# Patient Record
Sex: MALE | Race: OTHER | NOT HISPANIC OR LATINO | URBAN - METROPOLITAN AREA
[De-identification: names, ages, dates, MRNs, and addresses within clinical notes are randomized per-mention and may not be internally consistent; named-entity substitution may affect disease eponyms.]

---

## 2017-06-09 ENCOUNTER — EMERGENCY (EMERGENCY)
Facility: HOSPITAL | Age: 62
LOS: 1 days | Discharge: HOME | End: 2017-06-09
Admitting: EMERGENCY MEDICINE
Payer: COMMERCIAL

## 2017-06-09 VITALS
SYSTOLIC BLOOD PRESSURE: 153 MMHG | RESPIRATION RATE: 20 BRPM | HEIGHT: 68 IN | DIASTOLIC BLOOD PRESSURE: 89 MMHG | HEART RATE: 88 BPM | OXYGEN SATURATION: 98 % | TEMPERATURE: 98 F

## 2017-06-09 DIAGNOSIS — Z90.49 ACQUIRED ABSENCE OF OTHER SPECIFIED PARTS OF DIGESTIVE TRACT: ICD-10-CM
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
## 2017-06-09 DIAGNOSIS — Z90.49 ACQUIRED ABSENCE OF OTHER SPECIFIED PARTS OF DIGESTIVE TRACT: Chronic | ICD-10-CM

## 2017-06-09 DIAGNOSIS — R51 HEADACHE: ICD-10-CM
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
## 2017-06-09 DIAGNOSIS — E11.9 TYPE 2 DIABETES MELLITUS WITHOUT COMPLICATIONS: ICD-10-CM
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
## 2017-06-09 DIAGNOSIS — I10 ESSENTIAL (PRIMARY) HYPERTENSION: ICD-10-CM
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
## 2017-06-09 DIAGNOSIS — S13.9XXA SPRAIN OF JOINTS AND LIGAMENTS OF UNSPECIFIED PARTS OF NECK, INITIAL ENCOUNTER: ICD-10-CM

## 2017-06-09 DIAGNOSIS — Z79.899 OTHER LONG TERM (CURRENT) DRUG THERAPY: ICD-10-CM

## 2017-06-09 PROCEDURE — 99284 EMERGENCY DEPT VISIT MOD MDM: CPT

## 2017-06-09 PROCEDURE — 99284 EMERGENCY DEPT VISIT MOD MDM: CPT | Mod: 25

## 2017-06-09 PROCEDURE — 72125 CT NECK SPINE W/O DYE: CPT | Mod: 26

## 2017-06-09 PROCEDURE — 72100 X-RAY EXAM L-S SPINE 2/3 VWS: CPT | Mod: 26

## 2017-06-09 PROCEDURE — 70450 CT HEAD/BRAIN W/O DYE: CPT | Mod: 26

## 2017-06-09 PROCEDURE — 72100 X-RAY EXAM L-S SPINE 2/3 VWS: CPT

## 2017-06-09 PROCEDURE — 71045 X-RAY EXAM CHEST 1 VIEW: CPT

## 2017-06-09 PROCEDURE — 72125 CT NECK SPINE W/O DYE: CPT

## 2017-06-09 PROCEDURE — 70450 CT HEAD/BRAIN W/O DYE: CPT

## 2017-06-09 PROCEDURE — 71010: CPT | Mod: 26

## 2017-06-09 RX ORDER — ACETAMINOPHEN 500 MG
975 TABLET ORAL ONCE
Qty: 0 | Refills: 0 | Status: COMPLETED | OUTPATIENT
Start: 2017-06-09 | End: 2017-06-09

## 2017-06-09 RX ADMIN — Medication 975 MILLIGRAM(S): at 22:58
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
## 2017-06-09 NOTE — ED PROVIDER NOTE - PHYSICAL EXAMINATION
NAD, VSS, Afebrile, No facial tender, No scalp swelling or hematoma, No C/T mid tender, + Low lumbar mid tender without sciatica tender, No pelvic or hip tender, Neuro- intact with steady gait.

## 2018-04-01 ENCOUNTER — OUTPATIENT (OUTPATIENT)
Dept: OUTPATIENT SERVICES | Facility: HOSPITAL | Age: 63
LOS: 1 days | End: 2018-04-01
Payer: MEDICAID

## 2018-04-01 DIAGNOSIS — Z90.49 ACQUIRED ABSENCE OF OTHER SPECIFIED PARTS OF DIGESTIVE TRACT: Chronic | ICD-10-CM

## 2018-04-01 PROCEDURE — G9001: CPT

## 2018-04-17 DIAGNOSIS — R69 ILLNESS, UNSPECIFIED: ICD-10-CM

## 2020-01-01 ENCOUNTER — OUTPATIENT (OUTPATIENT)
Dept: OUTPATIENT SERVICES | Facility: HOSPITAL | Age: 65
LOS: 1 days | End: 2020-01-01
Payer: MEDICAID

## 2020-01-01 DIAGNOSIS — Z90.49 ACQUIRED ABSENCE OF OTHER SPECIFIED PARTS OF DIGESTIVE TRACT: Chronic | ICD-10-CM

## 2020-01-24 ENCOUNTER — INPATIENT (INPATIENT)
Facility: HOSPITAL | Age: 65
LOS: 51 days | Discharge: HOME | DRG: 228 | End: 2020-03-16
Attending: THORACIC SURGERY (CARDIOTHORACIC VASCULAR SURGERY) | Admitting: HOSPITALIST
Payer: MEDICAID

## 2020-01-24 VITALS
HEART RATE: 103 BPM | OXYGEN SATURATION: 97 % | SYSTOLIC BLOOD PRESSURE: 185 MMHG | RESPIRATION RATE: 18 BRPM | TEMPERATURE: 98 F | WEIGHT: 179.9 LBS | DIASTOLIC BLOOD PRESSURE: 100 MMHG | HEIGHT: 68 IN

## 2020-01-24 DIAGNOSIS — Z90.49 ACQUIRED ABSENCE OF OTHER SPECIFIED PARTS OF DIGESTIVE TRACT: Chronic | ICD-10-CM

## 2020-01-24 PROCEDURE — 99285 EMERGENCY DEPT VISIT HI MDM: CPT

## 2020-01-25 DIAGNOSIS — I20.8 OTHER FORMS OF ANGINA PECTORIS: ICD-10-CM

## 2020-01-25 DIAGNOSIS — E11.69 TYPE 2 DIABETES MELLITUS WITH OTHER SPECIFIED COMPLICATION: ICD-10-CM

## 2020-01-25 DIAGNOSIS — Z29.9 ENCOUNTER FOR PROPHYLACTIC MEASURES, UNSPECIFIED: ICD-10-CM

## 2020-01-25 DIAGNOSIS — I24.9 ACUTE ISCHEMIC HEART DISEASE, UNSPECIFIED: ICD-10-CM

## 2020-01-25 DIAGNOSIS — R07.89 OTHER CHEST PAIN: ICD-10-CM

## 2020-01-25 DIAGNOSIS — I10 ESSENTIAL (PRIMARY) HYPERTENSION: ICD-10-CM

## 2020-01-25 DIAGNOSIS — N17.9 ACUTE KIDNEY FAILURE, UNSPECIFIED: ICD-10-CM

## 2020-01-25 DIAGNOSIS — Z02.9 ENCOUNTER FOR ADMINISTRATIVE EXAMINATIONS, UNSPECIFIED: ICD-10-CM

## 2020-01-25 DIAGNOSIS — E87.5 HYPERKALEMIA: ICD-10-CM

## 2020-01-25 PROBLEM — E11.9 TYPE 2 DIABETES MELLITUS WITHOUT COMPLICATIONS: Chronic | Status: ACTIVE | Noted: 2017-06-09

## 2020-01-25 LAB
ALBUMIN SERPL ELPH-MCNC: 3.2 G/DL — LOW (ref 3.3–5)
ALP SERPL-CCNC: 82 U/L — SIGNIFICANT CHANGE UP (ref 40–120)
ALT FLD-CCNC: 18 U/L — SIGNIFICANT CHANGE UP (ref 10–45)
ANION GAP SERPL CALC-SCNC: 12 MMOL/L — SIGNIFICANT CHANGE UP (ref 5–17)
ANION GAP SERPL CALC-SCNC: 12 MMOL/L — SIGNIFICANT CHANGE UP (ref 5–17)
ANION GAP SERPL CALC-SCNC: 14 MMOL/L — SIGNIFICANT CHANGE UP (ref 5–17)
AST SERPL-CCNC: 28 U/L — SIGNIFICANT CHANGE UP (ref 10–40)
BILIRUB SERPL-MCNC: 0.2 MG/DL — SIGNIFICANT CHANGE UP (ref 0.2–1.2)
BUN SERPL-MCNC: 38 MG/DL — HIGH (ref 7–23)
BUN SERPL-MCNC: 38 MG/DL — HIGH (ref 7–23)
BUN SERPL-MCNC: 40 MG/DL — HIGH (ref 7–23)
CALCIUM SERPL-MCNC: 9.3 MG/DL — SIGNIFICANT CHANGE UP (ref 8.4–10.5)
CALCIUM SERPL-MCNC: 9.4 MG/DL — SIGNIFICANT CHANGE UP (ref 8.4–10.5)
CALCIUM SERPL-MCNC: 9.7 MG/DL — SIGNIFICANT CHANGE UP (ref 8.4–10.5)
CHLORIDE SERPL-SCNC: 101 MMOL/L — SIGNIFICANT CHANGE UP (ref 96–108)
CHLORIDE SERPL-SCNC: 102 MMOL/L — SIGNIFICANT CHANGE UP (ref 96–108)
CHLORIDE SERPL-SCNC: 99 MMOL/L — SIGNIFICANT CHANGE UP (ref 96–108)
CK MB BLD-MCNC: 2.9 % — SIGNIFICANT CHANGE UP (ref 0–3.5)
CK MB CFR SERPL CALC: 3.7 NG/ML — SIGNIFICANT CHANGE UP (ref 0–6.7)
CK MB CFR SERPL CALC: 3.8 NG/ML — SIGNIFICANT CHANGE UP (ref 0–6.7)
CK MB CFR SERPL CALC: 4.3 NG/ML — SIGNIFICANT CHANGE UP (ref 0–6.7)
CK SERPL-CCNC: 126 U/L — SIGNIFICANT CHANGE UP (ref 30–200)
CK SERPL-CCNC: 126 U/L — SIGNIFICANT CHANGE UP (ref 30–200)
CK SERPL-CCNC: 87 U/L — SIGNIFICANT CHANGE UP (ref 30–200)
CO2 SERPL-SCNC: 20 MMOL/L — LOW (ref 22–31)
CO2 SERPL-SCNC: 22 MMOL/L — SIGNIFICANT CHANGE UP (ref 22–31)
CO2 SERPL-SCNC: 23 MMOL/L — SIGNIFICANT CHANGE UP (ref 22–31)
CREAT SERPL-MCNC: 1.79 MG/DL — HIGH (ref 0.5–1.3)
CREAT SERPL-MCNC: 1.85 MG/DL — HIGH (ref 0.5–1.3)
CREAT SERPL-MCNC: 1.96 MG/DL — HIGH (ref 0.5–1.3)
GLUCOSE BLDC GLUCOMTR-MCNC: 219 MG/DL — HIGH (ref 70–99)
GLUCOSE BLDC GLUCOMTR-MCNC: 219 MG/DL — HIGH (ref 70–99)
GLUCOSE BLDC GLUCOMTR-MCNC: 220 MG/DL — HIGH (ref 70–99)
GLUCOSE BLDC GLUCOMTR-MCNC: 251 MG/DL — HIGH (ref 70–99)
GLUCOSE SERPL-MCNC: 266 MG/DL — HIGH (ref 70–99)
GLUCOSE SERPL-MCNC: 279 MG/DL — HIGH (ref 70–99)
GLUCOSE SERPL-MCNC: 287 MG/DL — HIGH (ref 70–99)
HCT VFR BLD CALC: 35.7 % — LOW (ref 39–50)
HGB BLD-MCNC: 12.7 G/DL — LOW (ref 13–17)
MCHC RBC-ENTMCNC: 29.5 PG — SIGNIFICANT CHANGE UP (ref 27–34)
MCHC RBC-ENTMCNC: 35.6 GM/DL — SIGNIFICANT CHANGE UP (ref 32–36)
MCV RBC AUTO: 82.8 FL — SIGNIFICANT CHANGE UP (ref 80–100)
NRBC # BLD: 0 /100 WBCS — SIGNIFICANT CHANGE UP (ref 0–0)
PLATELET # BLD AUTO: 181 K/UL — SIGNIFICANT CHANGE UP (ref 150–400)
POTASSIUM SERPL-MCNC: 5 MMOL/L — SIGNIFICANT CHANGE UP (ref 3.5–5.3)
POTASSIUM SERPL-MCNC: 5.5 MMOL/L — HIGH (ref 3.5–5.3)
POTASSIUM SERPL-MCNC: 5.7 MMOL/L — HIGH (ref 3.5–5.3)
POTASSIUM SERPL-SCNC: 5 MMOL/L — SIGNIFICANT CHANGE UP (ref 3.5–5.3)
POTASSIUM SERPL-SCNC: 5.5 MMOL/L — HIGH (ref 3.5–5.3)
POTASSIUM SERPL-SCNC: 5.7 MMOL/L — HIGH (ref 3.5–5.3)
PROT SERPL-MCNC: 6.5 G/DL — SIGNIFICANT CHANGE UP (ref 6–8.3)
RBC # BLD: 4.31 M/UL — SIGNIFICANT CHANGE UP (ref 4.2–5.8)
RBC # FLD: 11.7 % — SIGNIFICANT CHANGE UP (ref 10.3–14.5)
SODIUM SERPL-SCNC: 133 MMOL/L — LOW (ref 135–145)
SODIUM SERPL-SCNC: 136 MMOL/L — SIGNIFICANT CHANGE UP (ref 135–145)
SODIUM SERPL-SCNC: 136 MMOL/L — SIGNIFICANT CHANGE UP (ref 135–145)
TROPONIN T, HIGH SENSITIVITY RESULT: 38 NG/L — SIGNIFICANT CHANGE UP (ref 0–51)
TROPONIN T, HIGH SENSITIVITY RESULT: 45 NG/L — SIGNIFICANT CHANGE UP (ref 0–51)
TROPONIN T, HIGH SENSITIVITY RESULT: 53 NG/L — HIGH (ref 0–51)
WBC # BLD: 7.44 K/UL — SIGNIFICANT CHANGE UP (ref 3.8–10.5)
WBC # FLD AUTO: 7.44 K/UL — SIGNIFICANT CHANGE UP (ref 3.8–10.5)

## 2020-01-25 PROCEDURE — 71046 X-RAY EXAM CHEST 2 VIEWS: CPT | Mod: 26

## 2020-01-25 PROCEDURE — 99223 1ST HOSP IP/OBS HIGH 75: CPT

## 2020-01-25 RX ORDER — DEXTROSE 50 % IN WATER 50 %
25 SYRINGE (ML) INTRAVENOUS ONCE
Refills: 0 | Status: DISCONTINUED | OUTPATIENT
Start: 2020-01-25 | End: 2020-02-03

## 2020-01-25 RX ORDER — LISINOPRIL 2.5 MG/1
1 TABLET ORAL
Qty: 0 | Refills: 0 | DISCHARGE

## 2020-01-25 RX ORDER — METOPROLOL TARTRATE 50 MG
12.5 TABLET ORAL
Refills: 0 | Status: DISCONTINUED | OUTPATIENT
Start: 2020-01-25 | End: 2020-01-26

## 2020-01-25 RX ORDER — ASPIRIN/CALCIUM CARB/MAGNESIUM 324 MG
81 TABLET ORAL DAILY
Refills: 0 | Status: DISCONTINUED | OUTPATIENT
Start: 2020-01-25 | End: 2020-02-03

## 2020-01-25 RX ORDER — ATORVASTATIN CALCIUM 80 MG/1
40 TABLET, FILM COATED ORAL AT BEDTIME
Refills: 0 | Status: DISCONTINUED | OUTPATIENT
Start: 2020-01-25 | End: 2020-02-03

## 2020-01-25 RX ORDER — INSULIN LISPRO 100/ML
4 VIAL (ML) SUBCUTANEOUS
Refills: 0 | Status: DISCONTINUED | OUTPATIENT
Start: 2020-01-25 | End: 2020-01-26

## 2020-01-25 RX ORDER — DEXTROSE 50 % IN WATER 50 %
12.5 SYRINGE (ML) INTRAVENOUS ONCE
Refills: 0 | Status: DISCONTINUED | OUTPATIENT
Start: 2020-01-25 | End: 2020-02-03

## 2020-01-25 RX ORDER — DEXTROSE 50 % IN WATER 50 %
15 SYRINGE (ML) INTRAVENOUS ONCE
Refills: 0 | Status: DISCONTINUED | OUTPATIENT
Start: 2020-01-25 | End: 2020-02-03

## 2020-01-25 RX ORDER — SODIUM CHLORIDE 9 MG/ML
1000 INJECTION, SOLUTION INTRAVENOUS
Refills: 0 | Status: DISCONTINUED | OUTPATIENT
Start: 2020-01-25 | End: 2020-02-03

## 2020-01-25 RX ORDER — INSULIN GLARGINE 100 [IU]/ML
20 INJECTION, SOLUTION SUBCUTANEOUS AT BEDTIME
Refills: 0 | Status: DISCONTINUED | OUTPATIENT
Start: 2020-01-25 | End: 2020-01-26

## 2020-01-25 RX ORDER — GLUCAGON INJECTION, SOLUTION 0.5 MG/.1ML
1 INJECTION, SOLUTION SUBCUTANEOUS ONCE
Refills: 0 | Status: DISCONTINUED | OUTPATIENT
Start: 2020-01-25 | End: 2020-02-03

## 2020-01-25 RX ORDER — METFORMIN HYDROCHLORIDE 850 MG/1
1 TABLET ORAL
Qty: 0 | Refills: 0 | DISCHARGE

## 2020-01-25 RX ORDER — SODIUM ZIRCONIUM CYCLOSILICATE 10 G/10G
5 POWDER, FOR SUSPENSION ORAL ONCE
Refills: 0 | Status: COMPLETED | OUTPATIENT
Start: 2020-01-25 | End: 2020-01-25

## 2020-01-25 RX ORDER — GLYBURIDE 5 MG
1 TABLET ORAL
Qty: 0 | Refills: 0 | DISCHARGE

## 2020-01-25 RX ORDER — INSULIN LISPRO 100/ML
VIAL (ML) SUBCUTANEOUS
Refills: 0 | Status: DISCONTINUED | OUTPATIENT
Start: 2020-01-25 | End: 2020-02-03

## 2020-01-25 RX ORDER — HEPARIN SODIUM 5000 [USP'U]/ML
5000 INJECTION INTRAVENOUS; SUBCUTANEOUS EVERY 8 HOURS
Refills: 0 | Status: DISCONTINUED | OUTPATIENT
Start: 2020-01-25 | End: 2020-02-03

## 2020-01-25 RX ORDER — INSULIN LISPRO 100/ML
VIAL (ML) SUBCUTANEOUS AT BEDTIME
Refills: 0 | Status: DISCONTINUED | OUTPATIENT
Start: 2020-01-25 | End: 2020-02-03

## 2020-01-25 RX ADMIN — Medication 1: at 21:43

## 2020-01-25 RX ADMIN — SODIUM ZIRCONIUM CYCLOSILICATE 5 GRAM(S): 10 POWDER, FOR SUSPENSION ORAL at 17:07

## 2020-01-25 RX ADMIN — ATORVASTATIN CALCIUM 40 MILLIGRAM(S): 80 TABLET, FILM COATED ORAL at 21:43

## 2020-01-25 RX ADMIN — Medication 4 UNIT(S): at 17:08

## 2020-01-25 RX ADMIN — Medication 81 MILLIGRAM(S): at 17:48

## 2020-01-25 RX ADMIN — Medication 12.5 MILLIGRAM(S): at 17:06

## 2020-01-25 RX ADMIN — HEPARIN SODIUM 5000 UNIT(S): 5000 INJECTION INTRAVENOUS; SUBCUTANEOUS at 15:00

## 2020-01-25 RX ADMIN — Medication 2: at 11:06

## 2020-01-25 RX ADMIN — INSULIN GLARGINE 20 UNIT(S): 100 INJECTION, SOLUTION SUBCUTANEOUS at 21:43

## 2020-01-25 RX ADMIN — Medication 2: at 17:08

## 2020-01-25 RX ADMIN — HEPARIN SODIUM 5000 UNIT(S): 5000 INJECTION INTRAVENOUS; SUBCUTANEOUS at 21:43

## 2020-01-25 NOTE — H&P ADULT - ASSESSMENT
3yo male with hx of HTN, DM II, presented to the ED with complaints of acute on chronic left sided exertional chest pain, concerning for stable angina 63yo male with hx of HTN, DM II, presented to the ED with complaints of acute on chronic left sided exertional chest pain, concerning for stable angina

## 2020-01-25 NOTE — H&P ADULT - PROBLEM SELECTOR PLAN 4
-Likely in the setting of ACE-i use and STEPHANIE and Hyperglycemia  -Repeat K trending downward  -Will provide Insulin premeal  -Monitor BMP

## 2020-01-25 NOTE — ED PROVIDER NOTE - PHYSICAL EXAMINATION
General: Patient awake alert NAD.   HEENT: normocephalic, atraumatic, EOMI, no scleral icterus, moist MM  Cardiac: RRR, S1, S2, no murmur.   LUNGS: CTA B/L no wheeze, rhonchi, rales.   Abdomen: soft NT, ND, no rebound no guarding, no CVA tenderness.   EXT: no edema. 5/5 strength and full ROM in all extremities.     Neuro: A&Ox3, gait normal, no focal neurological deficits, CN 2-12 grossly intact  Skin: warm, dry, no rash.

## 2020-01-25 NOTE — ED ADULT NURSE NOTE - OBJECTIVE STATEMENT
64 year old male presents to the ED complaining of occasional intermittent chest pain (midsternal and worse with twisting movements/exerertion). PMH of HTN, DM. Pt. denies dizziness, SOB, back pain, numbness/tingling. Labs drawn and sent to lab. EKG done in triage and given to attending MD. Patient on CM - NSR in 90s.

## 2020-01-25 NOTE — ED PROVIDER NOTE - NS ED ROS FT
GENERAL: No fever, chills  EYES: no vision changes, no discharge.   HEENT: no difficulty swallowing or speaking   CARDIAC: + chest pain/pressure, +SOB, no lower ex edema  PULMONARY: no cough, + SOB  GI: no abdominal pain, n/v/d  : no dysuria  SKIN: no rashes  NEURO: no headache, lightheadedness, paresthesia  MSK: No joint pain, myalgia, weakness.

## 2020-01-25 NOTE — H&P ADULT - NSHPSOCIALHISTORY_GEN_ALL_CORE
Lives at home with wife and son  Current . Planning on quitting in 1 month  Denies smoking. Very rare social EtOH use

## 2020-01-25 NOTE — ED PROVIDER NOTE - OBJECTIVE STATEMENT
63 yo M pmhx of DM and HTN pw chest pain x 1 month. Since december, pt has noticed exertional left retrosternal chest pain with associated SOB. episodes resolve with rest and pain does not radiate anywhere, no nausea, vomiting, no diaphoresis. 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

## 2020-01-25 NOTE — H&P ADULT - PROBLEM SELECTOR PLAN 2
-Complaints of chest pain consistent with Stable angina  -Multiple risk factors  -Start ASA/Statin  -Start Metoprolol 12.5mg BID. Titrate as needed  -Plan for stress test and ECHO. Avoid caffeine  -Consider Cardiology consult after workup

## 2020-01-25 NOTE — H&P ADULT - HISTORY OF PRESENT ILLNESS
63yo male with hx of HTN, DM II, presented to the ED with complaints of acute on chronic left sided exertional chest pain. Pt states he has been having left sided pressure and stabbing chest pain radiating to his sternum and right with activity for the past few months. He states each episode last approximately 1-2 mins and subsides upon resting. He denies associated symptoms of radiation to his back, arm or jaw. He recently was at a wedding which he could not enjoy because dancing would reproduce to the pain. He states he did not pursue and medical attention until this time. Pt states this time his pain was associated with sob up exertion. He denies symptoms of LOC, diaphoresis, palpitations, abd pain, N/V, fever or chills. He denies prior cardiac work up.

## 2020-01-25 NOTE — H&P ADULT - PROBLEM SELECTOR PLAN 3
-No prior hx of Kidney disease. Unclear of baseline renal function  -Cr improving on repeat Blood work  -Encourage PO hydration  -Will continue to monitor

## 2020-01-25 NOTE — ED ADULT NURSE REASSESSMENT NOTE - NS ED NURSE REASSESS COMMENT FT1
Pt received from KAVON Figueroa in [facility]. Pt remains stable in the ED. Resting comfortably in bed. VSS. NAD. AOx4. Lungs CTA. Breathing unlabored and spontaneously, sating 97% on room air. S1 and S2 heard. No peripheral edema. Peripheral pulses strong bilaterally. On cardiac monitor, NSR. Abdomen soft, nontender and nondistended. Positive bowel sounds in all 4 quadrants. Pain rated as 0. Admitted to [facility] for ACS. Awaiting bed. Will continue to monitor.

## 2020-01-25 NOTE — ED PROVIDER NOTE - ATTENDING CONTRIBUTION TO CARE
Afebrile. Awake and Alert. Lungs CTA. Heart RRR. Abdomen soft NTND. CN II-XII grossly intact. Moves all extremities without lateralization.    r/o ACS Afebrile. Awake and Alert. Lungs CTA. Heart RRR. Abdomen soft NTND. CN II-XII grossly intact. Moves all extremities without lateralization.    r/o ACS: Risk factors HTN + HL, EKG non-ischemic  No stress testing available on Saturday, patient will need admission give risk profile  r/o PE: Well's negative and no hypoxia or tachycardia  r/o infection: no cough and no fever

## 2020-01-25 NOTE — ED PROVIDER NOTE - CLINICAL SUMMARY MEDICAL DECISION MAKING FREE TEXT BOX
elderly male with HTN DM, presents with exertional CP and SOb x 1 month. suspicious for stable angina. Obtain ED chest pain workup. Pt has never had cardiac workup, not candidate for CT coronaries due to baseline Cr. Likely requires admission for echo and stress test.

## 2020-01-25 NOTE — H&P ADULT - PROBLEM SELECTOR PLAN 8
Transitions of Care Status:  1.  Name of PCP: Dr. Shawn Camara [phone]  2.  PCP Contacted on Admission: [ ] Y    [x ] N    3.  PCP contacted at Discharge: [ ] Y    [ ] N    [ ] N/A  4.  Post-Discharge Appointment Date and Location:  5.  Summary of Handoff given to PCP:

## 2020-01-25 NOTE — H&P ADULT - PROBLEM SELECTOR PLAN 1
-Asymptomatic currently  -Likely secondary to stable angina. Multiple risk factors  -EKG NSR without ST/Twave changes  -Trops negative x 1  -Follow up with repeat trops  -Plan for Exercise stress test  -ECHO

## 2020-01-25 NOTE — H&P ADULT - PROBLEM SELECTOR PLAN 5
-Chronic  -Home Lisinopril 20mg daily  -Will hold in the setting of STEPHANIE + Hyperkalemia  -For BP control, started Metoprolol 12.5mg BID. As BB poor choice for antihypertensive, if uncontrolled BP start Amlodipine 5mg daily  -Continue to monitor

## 2020-01-25 NOTE — H&P ADULT - PROBLEM SELECTOR PLAN 6
-Chronic  -Home Glyburide + Metformin + Insulin 30units QHS  -Hold PO meds  -Will start Lantus 20units QHS + 4units premeal + SSI  -Follow up with Ha1c and fingersticks

## 2020-01-25 NOTE — ED ADULT NURSE NOTE - NSIMPLEMENTINTERV_GEN_ALL_ED
Implemented All Universal Safety Interventions:  [place] to call system. Call bell, personal items and telephone within reach. Instruct patient to call for assistance. Room bathroom lighting operational. Non-slip footwear when patient is off stretcher. Physically safe environment: no spills, clutter or unnecessary equipment. Stretcher in lowest position, wheels locked, appropriate side rails in place.

## 2020-01-25 NOTE — ED ADULT NURSE NOTE - CHPI ED NUR SYMPTOMS NEG
no diaphoresis/no syncope/no chills/no back pain/no vomiting/no dizziness/no fever/no nausea/no congestion/no shortness of breath

## 2020-01-25 NOTE — PATIENT PROFILE ADULT - LIVES WITH
Pt brought to see  by RN but pt reports she has AD's 'did long ago at [facility]' that she has at home.  shared the documents changed in 2015 and we could revisit but she wasn't interested.  asked if she could place in glove box to bring in so they could scan because she shared her husbands were on file at the [address] and I stated hers should be on file with her doctor too. No other needs uncovered but conversation was had. spouse

## 2020-01-26 LAB
ANION GAP SERPL CALC-SCNC: 14 MMOL/L — SIGNIFICANT CHANGE UP (ref 5–17)
BASOPHILS # BLD AUTO: 0.09 K/UL — SIGNIFICANT CHANGE UP (ref 0–0.2)
BASOPHILS NFR BLD AUTO: 1.8 % — SIGNIFICANT CHANGE UP (ref 0–2)
BUN SERPL-MCNC: 37 MG/DL — HIGH (ref 7–23)
CALCIUM SERPL-MCNC: 9.5 MG/DL — SIGNIFICANT CHANGE UP (ref 8.4–10.5)
CHLORIDE SERPL-SCNC: 103 MMOL/L — SIGNIFICANT CHANGE UP (ref 96–108)
CO2 SERPL-SCNC: 20 MMOL/L — LOW (ref 22–31)
CREAT SERPL-MCNC: 1.83 MG/DL — HIGH (ref 0.5–1.3)
EOSINOPHIL # BLD AUTO: 0.41 K/UL — SIGNIFICANT CHANGE UP (ref 0–0.5)
EOSINOPHIL NFR BLD AUTO: 8.4 % — HIGH (ref 0–6)
GLUCOSE BLDC GLUCOMTR-MCNC: 119 MG/DL — HIGH (ref 70–99)
GLUCOSE BLDC GLUCOMTR-MCNC: 187 MG/DL — HIGH (ref 70–99)
GLUCOSE BLDC GLUCOMTR-MCNC: 217 MG/DL — HIGH (ref 70–99)
GLUCOSE BLDC GLUCOMTR-MCNC: 227 MG/DL — HIGH (ref 70–99)
GLUCOSE BLDC GLUCOMTR-MCNC: 236 MG/DL — HIGH (ref 70–99)
GLUCOSE SERPL-MCNC: 234 MG/DL — HIGH (ref 70–99)
HBA1C BLD-MCNC: 9.2 % — HIGH (ref 4–5.6)
HCT VFR BLD CALC: 35.2 % — LOW (ref 39–50)
HCV AB S/CO SERPL IA: 0.19 S/CO — SIGNIFICANT CHANGE UP (ref 0–0.99)
HCV AB SERPL-IMP: SIGNIFICANT CHANGE UP
HGB BLD-MCNC: 11.9 G/DL — LOW (ref 13–17)
IMM GRANULOCYTES NFR BLD AUTO: 0.4 % — SIGNIFICANT CHANGE UP (ref 0–1.5)
LYMPHOCYTES # BLD AUTO: 1.63 K/UL — SIGNIFICANT CHANGE UP (ref 1–3.3)
LYMPHOCYTES # BLD AUTO: 33.4 % — SIGNIFICANT CHANGE UP (ref 13–44)
MCHC RBC-ENTMCNC: 28.5 PG — SIGNIFICANT CHANGE UP (ref 27–34)
MCHC RBC-ENTMCNC: 33.8 GM/DL — SIGNIFICANT CHANGE UP (ref 32–36)
MCV RBC AUTO: 84.4 FL — SIGNIFICANT CHANGE UP (ref 80–100)
MONOCYTES # BLD AUTO: 0.53 K/UL — SIGNIFICANT CHANGE UP (ref 0–0.9)
MONOCYTES NFR BLD AUTO: 10.9 % — SIGNIFICANT CHANGE UP (ref 2–14)
NEUTROPHILS # BLD AUTO: 2.2 K/UL — SIGNIFICANT CHANGE UP (ref 1.8–7.4)
NEUTROPHILS NFR BLD AUTO: 45.1 % — SIGNIFICANT CHANGE UP (ref 43–77)
PLATELET # BLD AUTO: 161 K/UL — SIGNIFICANT CHANGE UP (ref 150–400)
POTASSIUM SERPL-MCNC: 4.6 MMOL/L — SIGNIFICANT CHANGE UP (ref 3.5–5.3)
POTASSIUM SERPL-SCNC: 4.6 MMOL/L — SIGNIFICANT CHANGE UP (ref 3.5–5.3)
RBC # BLD: 4.17 M/UL — LOW (ref 4.2–5.8)
RBC # FLD: 11.6 % — SIGNIFICANT CHANGE UP (ref 10.3–14.5)
SODIUM SERPL-SCNC: 137 MMOL/L — SIGNIFICANT CHANGE UP (ref 135–145)
WBC # BLD: 4.88 K/UL — SIGNIFICANT CHANGE UP (ref 3.8–10.5)
WBC # FLD AUTO: 4.88 K/UL — SIGNIFICANT CHANGE UP (ref 3.8–10.5)

## 2020-01-26 PROCEDURE — 93306 TTE W/DOPPLER COMPLETE: CPT | Mod: 26

## 2020-01-26 PROCEDURE — 99233 SBSQ HOSP IP/OBS HIGH 50: CPT

## 2020-01-26 RX ORDER — METOPROLOL TARTRATE 50 MG
25 TABLET ORAL
Refills: 0 | Status: DISCONTINUED | OUTPATIENT
Start: 2020-01-26 | End: 2020-02-03

## 2020-01-26 RX ORDER — INSULIN LISPRO 100/ML
8 VIAL (ML) SUBCUTANEOUS
Refills: 0 | Status: DISCONTINUED | OUTPATIENT
Start: 2020-01-26 | End: 2020-01-27

## 2020-01-26 RX ORDER — INSULIN GLARGINE 100 [IU]/ML
25 INJECTION, SOLUTION SUBCUTANEOUS AT BEDTIME
Refills: 0 | Status: DISCONTINUED | OUTPATIENT
Start: 2020-01-26 | End: 2020-01-27

## 2020-01-26 RX ORDER — PANTOPRAZOLE SODIUM 20 MG/1
40 TABLET, DELAYED RELEASE ORAL
Refills: 0 | Status: DISCONTINUED | OUTPATIENT
Start: 2020-01-26 | End: 2020-02-03

## 2020-01-26 RX ADMIN — INSULIN GLARGINE 25 UNIT(S): 100 INJECTION, SOLUTION SUBCUTANEOUS at 23:19

## 2020-01-26 RX ADMIN — ATORVASTATIN CALCIUM 40 MILLIGRAM(S): 80 TABLET, FILM COATED ORAL at 23:20

## 2020-01-26 RX ADMIN — Medication 81 MILLIGRAM(S): at 12:16

## 2020-01-26 RX ADMIN — Medication 8 UNIT(S): at 17:13

## 2020-01-26 RX ADMIN — Medication 8 UNIT(S): at 08:42

## 2020-01-26 RX ADMIN — Medication 8 UNIT(S): at 12:16

## 2020-01-26 RX ADMIN — Medication 12.5 MILLIGRAM(S): at 05:58

## 2020-01-26 RX ADMIN — Medication 2: at 17:13

## 2020-01-26 RX ADMIN — HEPARIN SODIUM 5000 UNIT(S): 5000 INJECTION INTRAVENOUS; SUBCUTANEOUS at 13:59

## 2020-01-26 RX ADMIN — Medication 25 MILLIGRAM(S): at 18:57

## 2020-01-26 RX ADMIN — HEPARIN SODIUM 5000 UNIT(S): 5000 INJECTION INTRAVENOUS; SUBCUTANEOUS at 05:58

## 2020-01-26 RX ADMIN — Medication 1: at 08:42

## 2020-01-26 NOTE — PROGRESS NOTE ADULT - PROBLEM SELECTOR PLAN 1
-Asymptomatic currently  -Likely secondary to stable angina. Multiple risk factors  -EKG NSR without ST/Twave changes  -Trops peaked at 53 and trending downward  -Plan for Nuclear stress test  -ECHO

## 2020-01-26 NOTE — PROGRESS NOTE ADULT - PROBLEM SELECTOR PLAN 6
-Chronic  -Ha1c 9.2  -Home Glyburide + Metformin + Insulin 30units QHS  -Hold PO meds  -Increase insulin requirements to Lantus 25units QHS + 8units premeal + SSI  -Monitor Fingersticks

## 2020-01-26 NOTE — PROGRESS NOTE ADULT - PROBLEM SELECTOR PLAN 3
-No prior hx of Kidney disease. Unclear of baseline renal function, although highly suspect CKD   -Cr improving on repeat Blood work  -Encourage PO hydration  -Will continue to monitor

## 2020-01-26 NOTE — PROGRESS NOTE ADULT - PROBLEM SELECTOR PLAN 5
-Chronic  -Well controlled  -Continue to hold home Lisinopril 20mg daily  -Increased Metoprolol to 25mg BID  -As BB poor choice for antihypertensive, if uncontrolled BP start Amlodipine 5mg daily  -Continue to monitor

## 2020-01-26 NOTE — PROGRESS NOTE ADULT - ASSESSMENT
65yo male with hx of HTN, DM II, presented to the ED with complaints of acute on chronic left sided exertional chest pain, concerning for stable angina

## 2020-01-26 NOTE — PROGRESS NOTE ADULT - SUBJECTIVE AND OBJECTIVE BOX
Julio Cesar Matias M.D. Pager Number [phone]    Patient is a 64y old  Male who presents with a chief complaint of Chest pain (25 Jan 2020 12:57)      SUBJECTIVE / OVERNIGHT EVENTS:  Pt seen and examined at bedside. No acute events overnight.  Pt denies cp, palpitations, sob, abd pain, N/V, fever, chills.    ROS:  All other review of systems negative    Allergies    No Known Allergies    Intolerances        MEDICATIONS  (STANDING):  aspirin  chewable 81 milliGRAM(s) Oral daily  atorvastatin 40 milliGRAM(s) Oral at bedtime  dextrose 5%. 1000 milliLiter(s) (50 mL/Hr) IV Continuous <Continuous>  dextrose 50% Injectable 12.5 Gram(s) IV Push once  dextrose 50% Injectable 25 Gram(s) IV Push once  dextrose 50% Injectable 25 Gram(s) IV Push once  heparin  Injectable 5000 Unit(s) SubCutaneous every 8 hours  insulin glargine Injectable (LANTUS) 25 Unit(s) SubCutaneous at bedtime  insulin lispro (HumaLOG) corrective regimen sliding scale   SubCutaneous three times a day before meals  insulin lispro (HumaLOG) corrective regimen sliding scale   SubCutaneous at bedtime  insulin lispro Injectable (HumaLOG) 8 Unit(s) SubCutaneous three times a day before meals  metoprolol tartrate 25 milliGRAM(s) Oral two times a day    MEDICATIONS  (PRN):  dextrose 40% Gel 15 Gram(s) Oral once PRN Blood Glucose LESS THAN 70 milliGRAM(s)/deciliter  glucagon  Injectable 1 milliGRAM(s) IntraMuscular once PRN Glucose LESS THAN 70 milligrams/deciliter      Vital Signs Last 24 Hrs  T(C): 36.3 (26 Jan 2020 12:15), Max: 36.7 (25 Jan 2020 16:07)  T(F): 97.3 (26 Jan 2020 12:15), Max: 98 (25 Jan 2020 16:07)  HR: 76 (26 Jan 2020 12:15) (76 - 79)  BP: 139/80 (26 Jan 2020 12:15) (138/79 - 160/93)  BP(mean): --  RR: 18 (26 Jan 2020 12:15) (16 - 18)  SpO2: 99% (26 Jan 2020 12:15) (96% - 99%)  CAPILLARY BLOOD GLUCOSE      POCT Blood Glucose.: 119 mg/dL (26 Jan 2020 12:10)  POCT Blood Glucose.: 187 mg/dL (26 Jan 2020 07:55)  POCT Blood Glucose.: 251 mg/dL (25 Jan 2020 21:36)  POCT Blood Glucose.: 220 mg/dL (25 Jan 2020 16:41)    I&O's Summary    25 Jan 2020 07:01  -  26 Jan 2020 07:00  --------------------------------------------------------  IN: 360 mL / OUT: 0 mL / NET: 360 mL        PHYSICAL EXAM:  GENERAL: NAD, Obese male   HEAD:  Atraumatic, Normocephalic  EYES: EOMI, PERRLA, conjunctiva and sclera clear  NECK: Supple, No JVD  CHEST/LUNG: Clear to auscultation bilaterally; No wheeze  HEART: Regular rate and rhythm; No murmurs, rubs, or gallops  ABDOMEN: Soft, Nontender, Nondistended; Bowel sounds present  EXTREMITIES:  2+ Peripheral Pulses, No clubbing, cyanosis, or edema  NEUROLOGY: AAOx3, non-focal  PSYCH: calm  SKIN: No rashes or lesions    LABS:                        11.9   4.88  )-----------( 161      ( 26 Jan 2020 09:03 )             35.2     01-26    137  |  103  |  37<H>  ----------------------------<  234<H>  4.6   |  20<L>  |  1.83<H>    Ca    9.5      26 Jan 2020 06:31    TPro  6.5  /  Alb  3.2<L>  /  TBili  0.2  /  DBili  x   /  AST  28  /  ALT  18  /  AlkPhos  82  01-25      CARDIAC MARKERS ( 25 Jan 2020 15:33 )  x     / x     / 87 U/L / x     / 3.8 ng/mL  CARDIAC MARKERS ( 25 Jan 2020 11:44 )  x     / x     / 89 U/L / x     / 3.7 ng/mL  CARDIAC MARKERS ( 25 Jan 2020 01:48 )  x     / x     / 126 U/L / x     / 4.3 ng/mL          RADIOLOGY & ADDITIONAL TESTS:  Results Reviewed:   Imaging Personally Reviewed:  Electrocardiogram Personally Reviewed:    COORDINATION OF CARE:  Care Discussed with Consultants/Other Providers [Y/N]:  Prior or Outpatient Records Reviewed [Y/N]:

## 2020-01-26 NOTE — PROGRESS NOTE ADULT - PROBLEM SELECTOR PLAN 2
-Complaints of chest pain consistent with Stable angina  -Multiple risk factors  -Continue ASA/Statin  -Increase Metoprolol to 25mg BID. Titrate as needed  -Plan for stress test and ECHO. Avoid caffeine  -Consider Cardiology consult after workup

## 2020-01-27 DIAGNOSIS — N18.3 CHRONIC KIDNEY DISEASE, STAGE 3 (MODERATE): ICD-10-CM

## 2020-01-27 LAB
ANION GAP SERPL CALC-SCNC: 13 MMOL/L — SIGNIFICANT CHANGE UP (ref 5–17)
APPEARANCE UR: CLEAR — SIGNIFICANT CHANGE UP
BACTERIA # UR AUTO: NEGATIVE — SIGNIFICANT CHANGE UP
BILIRUB UR-MCNC: NEGATIVE — SIGNIFICANT CHANGE UP
BUN SERPL-MCNC: 46 MG/DL — HIGH (ref 7–23)
CALCIUM SERPL-MCNC: 9.5 MG/DL — SIGNIFICANT CHANGE UP (ref 8.4–10.5)
CHLORIDE SERPL-SCNC: 104 MMOL/L — SIGNIFICANT CHANGE UP (ref 96–108)
CHLORIDE UR-SCNC: <35 MMOL/L — SIGNIFICANT CHANGE UP
CO2 SERPL-SCNC: 21 MMOL/L — LOW (ref 22–31)
COLOR SPEC: SIGNIFICANT CHANGE UP
CREAT ?TM UR-MCNC: 46 MG/DL — SIGNIFICANT CHANGE UP
CREAT SERPL-MCNC: 1.97 MG/DL — HIGH (ref 0.5–1.3)
DIFF PNL FLD: ABNORMAL
EPI CELLS # UR: 0 /HPF — SIGNIFICANT CHANGE UP (ref 0–5)
GLUCOSE BLDC GLUCOMTR-MCNC: 170 MG/DL — HIGH (ref 70–99)
GLUCOSE BLDC GLUCOMTR-MCNC: 191 MG/DL — HIGH (ref 70–99)
GLUCOSE BLDC GLUCOMTR-MCNC: 195 MG/DL — HIGH (ref 70–99)
GLUCOSE BLDC GLUCOMTR-MCNC: 241 MG/DL — HIGH (ref 70–99)
GLUCOSE SERPL-MCNC: 190 MG/DL — HIGH (ref 70–99)
GLUCOSE UR QL: SIGNIFICANT CHANGE UP
HCT VFR BLD CALC: 35.9 % — LOW (ref 39–50)
HGB BLD-MCNC: 12.3 G/DL — LOW (ref 13–17)
HYALINE CASTS # UR AUTO: 2 /LPF — SIGNIFICANT CHANGE UP (ref 0–7)
KETONES UR-MCNC: NEGATIVE — SIGNIFICANT CHANGE UP
LEUKOCYTE ESTERASE UR-ACNC: NEGATIVE — SIGNIFICANT CHANGE UP
MAGNESIUM SERPL-MCNC: 2 MG/DL — SIGNIFICANT CHANGE UP (ref 1.6–2.6)
MCHC RBC-ENTMCNC: 28.5 PG — SIGNIFICANT CHANGE UP (ref 27–34)
MCHC RBC-ENTMCNC: 34.3 GM/DL — SIGNIFICANT CHANGE UP (ref 32–36)
MCV RBC AUTO: 83.3 FL — SIGNIFICANT CHANGE UP (ref 80–100)
NITRITE UR-MCNC: NEGATIVE — SIGNIFICANT CHANGE UP
OSMOLALITY UR: 264 MOSM/KG — SIGNIFICANT CHANGE UP (ref 50–1200)
PH UR: 5.5 — SIGNIFICANT CHANGE UP (ref 5–8)
PLATELET # BLD AUTO: 165 K/UL — SIGNIFICANT CHANGE UP (ref 150–400)
POTASSIUM SERPL-MCNC: 4.3 MMOL/L — SIGNIFICANT CHANGE UP (ref 3.5–5.3)
POTASSIUM SERPL-SCNC: 4.3 MMOL/L — SIGNIFICANT CHANGE UP (ref 3.5–5.3)
POTASSIUM UR-SCNC: 22 MMOL/L — SIGNIFICANT CHANGE UP
PROT ?TM UR-MCNC: 178 MG/DL — HIGH (ref 0–12)
PROT UR-MCNC: ABNORMAL
RBC # BLD: 4.31 M/UL — SIGNIFICANT CHANGE UP (ref 4.2–5.8)
RBC # FLD: 11.7 % — SIGNIFICANT CHANGE UP (ref 10.3–14.5)
RBC CASTS # UR COMP ASSIST: 4 /HPF — SIGNIFICANT CHANGE UP (ref 0–4)
SODIUM SERPL-SCNC: 138 MMOL/L — SIGNIFICANT CHANGE UP (ref 135–145)
SODIUM UR-SCNC: 35 MMOL/L — SIGNIFICANT CHANGE UP
SP GR SPEC: 1.01 — LOW (ref 1.01–1.02)
TROPONIN T, HIGH SENSITIVITY RESULT: 47 NG/L — SIGNIFICANT CHANGE UP (ref 0–51)
URATE UR-MCNC: 15.4 MG/DL — SIGNIFICANT CHANGE UP
UROBILINOGEN FLD QL: SIGNIFICANT CHANGE UP
UUN UR-MCNC: 384 MG/DL — SIGNIFICANT CHANGE UP
WBC # BLD: 6.06 K/UL — SIGNIFICANT CHANGE UP (ref 3.8–10.5)
WBC # FLD AUTO: 6.06 K/UL — SIGNIFICANT CHANGE UP (ref 3.8–10.5)
WBC UR QL: 0 /HPF — SIGNIFICANT CHANGE UP (ref 0–5)

## 2020-01-27 PROCEDURE — 93016 CV STRESS TEST SUPVJ ONLY: CPT

## 2020-01-27 PROCEDURE — 93018 CV STRESS TEST I&R ONLY: CPT

## 2020-01-27 PROCEDURE — 99233 SBSQ HOSP IP/OBS HIGH 50: CPT

## 2020-01-27 PROCEDURE — 78452 HT MUSCLE IMAGE SPECT MULT: CPT | Mod: 26

## 2020-01-27 RX ORDER — INSULIN LISPRO 100/ML
12 VIAL (ML) SUBCUTANEOUS
Refills: 0 | Status: DISCONTINUED | OUTPATIENT
Start: 2020-01-27 | End: 2020-01-29

## 2020-01-27 RX ORDER — INSULIN GLARGINE 100 [IU]/ML
30 INJECTION, SOLUTION SUBCUTANEOUS AT BEDTIME
Refills: 0 | Status: DISCONTINUED | OUTPATIENT
Start: 2020-01-27 | End: 2020-01-29

## 2020-01-27 RX ADMIN — PANTOPRAZOLE SODIUM 40 MILLIGRAM(S): 20 TABLET, DELAYED RELEASE ORAL at 05:22

## 2020-01-27 RX ADMIN — Medication 25 MILLIGRAM(S): at 17:06

## 2020-01-27 RX ADMIN — Medication 81 MILLIGRAM(S): at 13:11

## 2020-01-27 RX ADMIN — ATORVASTATIN CALCIUM 40 MILLIGRAM(S): 80 TABLET, FILM COATED ORAL at 22:43

## 2020-01-27 RX ADMIN — HEPARIN SODIUM 5000 UNIT(S): 5000 INJECTION INTRAVENOUS; SUBCUTANEOUS at 05:21

## 2020-01-27 RX ADMIN — HEPARIN SODIUM 5000 UNIT(S): 5000 INJECTION INTRAVENOUS; SUBCUTANEOUS at 14:34

## 2020-01-27 RX ADMIN — HEPARIN SODIUM 5000 UNIT(S): 5000 INJECTION INTRAVENOUS; SUBCUTANEOUS at 22:43

## 2020-01-27 RX ADMIN — Medication 1: at 13:10

## 2020-01-27 RX ADMIN — Medication 25 MILLIGRAM(S): at 05:21

## 2020-01-27 RX ADMIN — INSULIN GLARGINE 30 UNIT(S): 100 INJECTION, SOLUTION SUBCUTANEOUS at 22:42

## 2020-01-27 RX ADMIN — Medication 8 UNIT(S): at 08:08

## 2020-01-27 RX ADMIN — Medication 1: at 08:08

## 2020-01-27 RX ADMIN — Medication 12 UNIT(S): at 13:11

## 2020-01-27 RX ADMIN — Medication 12 UNIT(S): at 17:07

## 2020-01-27 RX ADMIN — Medication 1: at 17:06

## 2020-01-27 NOTE — PROGRESS NOTE ADULT - PROBLEM SELECTOR PLAN 2
-Complaints of chest pain consistent with Stable angina  -Multiple risk factors  -Continue ASA/Statin  -Continue Metoprolol to 25mg BID.  -Follow up with Nuclear stress test results  -ECHO revealed EF 55% grossly normal  -Consider Cardiology consult after workup

## 2020-01-27 NOTE — CONSULT NOTE ADULT - SUBJECTIVE AND OBJECTIVE BOX
CARDIOLOGY CONSULT NOTE - DR. CUADRA    HPI:  Patient is a 64 year old man with history of HTN, DM II, admitted with progressive exertional angina     sx present for months  no rest sx    Patient denies any dyspnea, palpitations, cough, syncope, edema, exertional symptoms, nausea, abdominal pain, fever, chills,  or rash.  Denies any history of CAD, MI, valve disease, cardiomyopathy, or congenital heart disease.    had stress today      ++angina during exer portion  ++ defects on spect      PAST MEDICAL & SURGICAL HISTORY:  HTN (hypertension)  Diabetes  History of appendectomy: x 30 yrs ago        PREVIOUS DIAGNOSTIC TESTING:    [ ] Echocardiogram:  [ ]  Catheterization:  [ ] Stress Test:  	    MEDICATIONS:    Home Medications:  Artificial Tears ophthalmic solution: 1 drop(s) to each affected eye , As Needed (2020 12:55)  Daily Gadiel oral tablet: 1 tab(s) orally once a day (2020 12:55)  glyBURIDE 5 mg oral tablet: 2 tab(s) orally 2 times a day (2020 12:55)  lisinopril 20 mg oral tablet: 1 tab(s) orally once a day (2020 12:55)  metFORMIN 1000 mg oral tablet: 0.5 tab(s) orally 2 times a day (2020 12:55)  Naprosyn 500 mg oral tablet: 1 tab(s) orally , As Needed (2020 11:02)  Tresiba FlexTouch 100 units/mL subcutaneous solution: 30 unit(s) subcutaneous once a day (at bedtime) (2020 12:55)  vitamin B Complex: 1 tab(s) orally once a day (2020 12:55)      MEDICATIONS  (STANDING):  aspirin  chewable 81 milliGRAM(s) Oral daily  atorvastatin 40 milliGRAM(s) Oral at bedtime  dextrose 5%. 1000 milliLiter(s) (50 mL/Hr) IV Continuous <Continuous>  dextrose 50% Injectable 12.5 Gram(s) IV Push once  dextrose 50% Injectable 25 Gram(s) IV Push once  dextrose 50% Injectable 25 Gram(s) IV Push once  heparin  Injectable 5000 Unit(s) SubCutaneous every 8 hours  insulin glargine Injectable (LANTUS) 30 Unit(s) SubCutaneous at bedtime  insulin lispro (HumaLOG) corrective regimen sliding scale   SubCutaneous three times a day before meals  insulin lispro (HumaLOG) corrective regimen sliding scale   SubCutaneous at bedtime  insulin lispro Injectable (HumaLOG) 12 Unit(s) SubCutaneous three times a day before meals  metoprolol tartrate 25 milliGRAM(s) Oral two times a day  pantoprazole    Tablet 40 milliGRAM(s) Oral before breakfast      FAMILY HISTORY:  FH: type 2 diabetes      SOCIAL HISTORY:    [x ] Non-smoker  [ ] Smoker  [ ] Alcohol    Allergies    No Known Allergies    Intolerances    	    REVIEW OF SYSTEMS:  CONSTITUTIONAL: No fever, weight loss, or fatigue  EYES: No eye pain, visual disturbances, or discharge  ENMT:  No difficulty hearing, tinnitus, vertigo; No sinus or throat pain  NECK: No pain or stiffness  RESPIRATORY: No cough, wheezing, chills or hemoptysis; No Shortness of Breath  CARDIOVASCULAR: as HPI  GASTROINTESTINAL: No abdominal or epigastric pain. No nausea, vomiting, or hematemesis; No diarrhea or constipation. No melena or hematochezia.  GENITOURINARY: No dysuria, frequency, hematuria, or incontinence  NEUROLOGICAL: No headaches, memory loss, loss of strength, numbness, or tremors  SKIN: No itching, burning, rashes, or lesions   	  [ ] All others negative	  [ ] Unable to obtain    PHYSICAL EXAM:    T(C): 36.6 (20 @ 13:57), Max: 36.6 (20 @ 13:57)  HR: 80 (20 @ 13:57) (79 - 87)  BP: 144/75 (20 @ 13:57) (125/74 - 154/85)  RR: 18 (20 @ 13:57) (18 - 18)  SpO2: 99% (20 @ 13:57) (97% - 99%)  Wt(kg): --  I&O's Summary    2020 07:  -  2020 07:00  --------------------------------------------------------  IN: 840 mL / OUT: 0 mL / NET: 840 mL      -  2020 16:44  --------------------------------------------------------  IN: 480 mL / OUT: 0 mL / NET: 480 mL      Daily     Daily Weight in k.6 (2020 13:57)    Appearance: Normal	  Psychiatry: A & O x 3, Mood & affect appropriate  HEENT:   Normal oral mucosa, PERRL, EOMI	  Lymphatic: No lymphadenopathy  Cardiovascular: Normal S1 S2,RRR, No JVD, No murmurs  Respiratory: Lungs clear to auscultation	  Gastrointestinal:  Soft, Non-tender, + BS	  Skin: No rashes, No ecchymoses, No cyanosis	  Neurologic: Non-focal  Extremities: Normal range of motion, No clubbing, cyanosis or edema  Vascular: Peripheral pulses palpable 2+ bilaterally    TELEMETRY: 	    ECG:  	sr, nonspec st abnl   RADIOLOGY:  OTHER: 	  	  LABS:	 	    CARDIAC MARKERS:        proBNP:     Lipid Profile:   HgA1c: Hemoglobin A1C, Whole Blood: 9.2 % (20 @ 09:03)    TSH:                           12.3   6.06  )-----------( 165      ( 2020 07:34 )             35.9         138  |  104  |  46<H>  ----------------------------<  190<H>  4.3   |  21<L>  |  1.97<H>    Ca    9.5      2020 06:10  Mg     2.0               Creatinine, Serum: 1.97 mg/dL (20 @ 06:10)  Creatinine, Serum: 1.83 mg/dL (20 @ 06:31)  Creatinine, Serum: 1.96 mg/dL (20 @ 15:33)  Creatinine, Serum: 1.79 mg/dL (20 @ 11:44)  Creatinine, Serum: 1.85 mg/dL (20 @ 01:48)        ASSESSMENT/PLAN:

## 2020-01-27 NOTE — PROGRESS NOTE ADULT - SUBJECTIVE AND OBJECTIVE BOX
Julio Cesar Matias M.D. Pager Number [phone]    Patient is a 64y old  Male who presents with a chief complaint of Chest pain (26 Jan 2020 13:11)      SUBJECTIVE / OVERNIGHT EVENTS:  Pt seen and examined at bedside. No acute events overnight. Pt s/p Nuclear stress test this AM. Tolerated it well.   Pt denies cp, palpitations, sob, abd pain, N/V, fever, chills.    ROS:  All other review of systems negative    Allergies    No Known Allergies    Intolerances        MEDICATIONS  (STANDING):  aspirin  chewable 81 milliGRAM(s) Oral daily  atorvastatin 40 milliGRAM(s) Oral at bedtime  dextrose 5%. 1000 milliLiter(s) (50 mL/Hr) IV Continuous <Continuous>  dextrose 50% Injectable 12.5 Gram(s) IV Push once  dextrose 50% Injectable 25 Gram(s) IV Push once  dextrose 50% Injectable 25 Gram(s) IV Push once  heparin  Injectable 5000 Unit(s) SubCutaneous every 8 hours  insulin glargine Injectable (LANTUS) 30 Unit(s) SubCutaneous at bedtime  insulin lispro (HumaLOG) corrective regimen sliding scale   SubCutaneous three times a day before meals  insulin lispro (HumaLOG) corrective regimen sliding scale   SubCutaneous at bedtime  insulin lispro Injectable (HumaLOG) 12 Unit(s) SubCutaneous three times a day before meals  metoprolol tartrate 25 milliGRAM(s) Oral two times a day  pantoprazole    Tablet 40 milliGRAM(s) Oral before breakfast    MEDICATIONS  (PRN):  dextrose 40% Gel 15 Gram(s) Oral once PRN Blood Glucose LESS THAN 70 milliGRAM(s)/deciliter  glucagon  Injectable 1 milliGRAM(s) IntraMuscular once PRN Glucose LESS THAN 70 milligrams/deciliter      Vital Signs Last 24 Hrs  T(C): 36.3 (27 Jan 2020 04:24), Max: 36.3 (26 Jan 2020 20:23)  T(F): 97.4 (27 Jan 2020 04:24), Max: 97.4 (27 Jan 2020 04:24)  HR: 81 (27 Jan 2020 04:24) (79 - 87)  BP: 125/74 (27 Jan 2020 04:24) (125/74 - 154/85)  BP(mean): --  RR: 18 (27 Jan 2020 04:24) (18 - 18)  SpO2: 97% (27 Jan 2020 04:24) (97% - 98%)  CAPILLARY BLOOD GLUCOSE      POCT Blood Glucose.: 195 mg/dL (27 Jan 2020 13:06)  POCT Blood Glucose.: 191 mg/dL (27 Jan 2020 07:56)  POCT Blood Glucose.: 217 mg/dL (26 Jan 2020 23:10)  POCT Blood Glucose.: 236 mg/dL (26 Jan 2020 21:37)  POCT Blood Glucose.: 227 mg/dL (26 Jan 2020 16:48)    I&O's Summary    26 Jan 2020 07:01  -  27 Jan 2020 07:00  --------------------------------------------------------  IN: 840 mL / OUT: 0 mL / NET: 840 mL        PHYSICAL EXAM:  GENERAL: NAD, Obese male   CHEST/LUNG: Clear to auscultation bilaterally; No wheeze  HEART: Regular rate and rhythm; No murmurs, rubs, or gallops  ABDOMEN: Soft, Nontender, Nondistended; Bowel sounds present  EXTREMITIES:  2+ Peripheral Pulses, No clubbing, cyanosis, or edema  NEUROLOGY: AAOx3, non-focal  PSYCH: calm  SKIN: No rashes or lesions    LABS:                        12.3   6.06  )-----------( 165      ( 27 Jan 2020 07:34 )             35.9     01-27    138  |  104  |  46<H>  ----------------------------<  190<H>  4.3   |  21<L>  |  1.97<H>    Ca    9.5      27 Jan 2020 06:10  Mg     2.0     01-27        CARDIAC MARKERS ( 25 Jan 2020 15:33 )  x     / x     / 87 U/L / x     / 3.8 ng/mL          RADIOLOGY & ADDITIONAL TESTS:  Results Reviewed:   Imaging Personally Reviewed:  Electrocardiogram Personally Reviewed:    COORDINATION OF CARE:  Care Discussed with Consultants/Other Providers [Y/N]:  Prior or Outpatient Records Reviewed [Y/N]:

## 2020-01-27 NOTE — PROGRESS NOTE ADULT - PROBLEM SELECTOR PLAN 3
-Pt with likely undiagnosed Stage 3 CKD, likely secondary to chronic HTN and DM II  -It appears baseline Cr aroun 1.8  -Avoid nephrotoxic medication  -Continue to monitor renal function

## 2020-01-27 NOTE — PROGRESS NOTE ADULT - PROBLEM SELECTOR PLAN 7
-Chronic  -Ha1c 9.2  -Home Glyburide + Metformin + Insulin 30units QHS  -Hold PO meds  -Increase insulin requirements to Lantus 30units QHS + 12units premeal + SSI  -Monitor Fingersticks

## 2020-01-27 NOTE — PROGRESS NOTE ADULT - PROBLEM SELECTOR PLAN 6
-Chronic  -Well controlled  -Continue to hold home Lisinopril 20mg daily  -Continue Metoprolol to 25mg BID  -Continue to monitor

## 2020-01-27 NOTE — CONSULT NOTE ADULT - ASSESSMENT
64 year old man with history of HTN, DM II, admitted with progressive exertional angina    1. Unstable exertional angina  -no rest symptoms   -no CHF  -high risk stress findings with exercise induced angina  -also some defects in anterior wall with inferior lv dysfxn  -will need cardiac cath to define anatomy in setting of above  -also with stephanie, med w/u in progress  -d/w patient, he agrees to proceed with cardiac cath if medical team is ok with dye load  -will definitely stage any needed pci in 24-48 hours post diag cath   -d/w pt risk of quan with dye, he agrees to proceed  -will d.w medical team regarding renal optimization before cath  -cont asa     -if develops rest angina, iv hep and brilinta load    2. STEPHANIE  -med w/u  dvt ppx      Cory Steel M.D. [facility]  Cardiology/Interventional Cardiology      office [phone]  cell [phone]

## 2020-01-27 NOTE — PROGRESS NOTE ADULT - PROBLEM SELECTOR PLAN 1
-Asymptomatic currently  -Likely secondary to stable angina. Multiple risk factors  -EKG NSR without ST/Twave changes  -Trops stable   -Follow up with Nuclear stress test results  -ECHO revealed EF 55% grossly normal

## 2020-01-28 DIAGNOSIS — N14.1 NEPHROPATHY INDUCED BY OTHER DRUGS, MEDICAMENTS AND BIOLOGICAL SUBSTANCES: ICD-10-CM

## 2020-01-28 LAB
ANION GAP SERPL CALC-SCNC: 15 MMOL/L — SIGNIFICANT CHANGE UP (ref 5–17)
BUN SERPL-MCNC: 48 MG/DL — HIGH (ref 7–23)
CALCIUM SERPL-MCNC: 9.3 MG/DL — SIGNIFICANT CHANGE UP (ref 8.4–10.5)
CHLORIDE SERPL-SCNC: 103 MMOL/L — SIGNIFICANT CHANGE UP (ref 96–108)
CO2 SERPL-SCNC: 20 MMOL/L — LOW (ref 22–31)
CREAT SERPL-MCNC: 2.01 MG/DL — HIGH (ref 0.5–1.3)
GLUCOSE BLDC GLUCOMTR-MCNC: 133 MG/DL — HIGH (ref 70–99)
GLUCOSE BLDC GLUCOMTR-MCNC: 182 MG/DL — HIGH (ref 70–99)
GLUCOSE BLDC GLUCOMTR-MCNC: 275 MG/DL — HIGH (ref 70–99)
GLUCOSE BLDC GLUCOMTR-MCNC: 97 MG/DL — SIGNIFICANT CHANGE UP (ref 70–99)
GLUCOSE SERPL-MCNC: 139 MG/DL — HIGH (ref 70–99)
HCT VFR BLD CALC: 34.9 % — LOW (ref 39–50)
HGB BLD-MCNC: 12.1 G/DL — LOW (ref 13–17)
MAGNESIUM SERPL-MCNC: 2.1 MG/DL — SIGNIFICANT CHANGE UP (ref 1.6–2.6)
MCHC RBC-ENTMCNC: 28.8 PG — SIGNIFICANT CHANGE UP (ref 27–34)
MCHC RBC-ENTMCNC: 34.7 GM/DL — SIGNIFICANT CHANGE UP (ref 32–36)
MCV RBC AUTO: 83.1 FL — SIGNIFICANT CHANGE UP (ref 80–100)
PLATELET # BLD AUTO: 172 K/UL — SIGNIFICANT CHANGE UP (ref 150–400)
POTASSIUM SERPL-MCNC: 4 MMOL/L — SIGNIFICANT CHANGE UP (ref 3.5–5.3)
POTASSIUM SERPL-SCNC: 4 MMOL/L — SIGNIFICANT CHANGE UP (ref 3.5–5.3)
RBC # BLD: 4.2 M/UL — SIGNIFICANT CHANGE UP (ref 4.2–5.8)
RBC # FLD: 11.9 % — SIGNIFICANT CHANGE UP (ref 10.3–14.5)
SODIUM SERPL-SCNC: 138 MMOL/L — SIGNIFICANT CHANGE UP (ref 135–145)
WBC # BLD: 6.29 K/UL — SIGNIFICANT CHANGE UP (ref 3.8–10.5)
WBC # FLD AUTO: 6.29 K/UL — SIGNIFICANT CHANGE UP (ref 3.8–10.5)

## 2020-01-28 PROCEDURE — 99233 SBSQ HOSP IP/OBS HIGH 50: CPT

## 2020-01-28 PROCEDURE — 76770 US EXAM ABDO BACK WALL COMP: CPT | Mod: 26

## 2020-01-28 PROCEDURE — 99223 1ST HOSP IP/OBS HIGH 75: CPT | Mod: GC

## 2020-01-28 RX ORDER — POLYETHYLENE GLYCOL 3350 17 G/17G
17 POWDER, FOR SOLUTION ORAL DAILY
Refills: 0 | Status: DISCONTINUED | OUTPATIENT
Start: 2020-01-28 | End: 2020-02-03

## 2020-01-28 RX ORDER — SODIUM CHLORIDE 9 MG/ML
1000 INJECTION INTRAMUSCULAR; INTRAVENOUS; SUBCUTANEOUS
Refills: 0 | Status: DISCONTINUED | OUTPATIENT
Start: 2020-01-28 | End: 2020-01-30

## 2020-01-28 RX ORDER — SENNA PLUS 8.6 MG/1
2 TABLET ORAL AT BEDTIME
Refills: 0 | Status: DISCONTINUED | OUTPATIENT
Start: 2020-01-28 | End: 2020-02-03

## 2020-01-28 RX ADMIN — PANTOPRAZOLE SODIUM 40 MILLIGRAM(S): 20 TABLET, DELAYED RELEASE ORAL at 06:31

## 2020-01-28 RX ADMIN — HEPARIN SODIUM 5000 UNIT(S): 5000 INJECTION INTRAVENOUS; SUBCUTANEOUS at 16:00

## 2020-01-28 RX ADMIN — Medication 25 MILLIGRAM(S): at 17:16

## 2020-01-28 RX ADMIN — ATORVASTATIN CALCIUM 40 MILLIGRAM(S): 80 TABLET, FILM COATED ORAL at 22:15

## 2020-01-28 RX ADMIN — Medication 1: at 17:14

## 2020-01-28 RX ADMIN — HEPARIN SODIUM 5000 UNIT(S): 5000 INJECTION INTRAVENOUS; SUBCUTANEOUS at 22:15

## 2020-01-28 RX ADMIN — SODIUM CHLORIDE 100 MILLILITER(S): 9 INJECTION INTRAMUSCULAR; INTRAVENOUS; SUBCUTANEOUS at 22:16

## 2020-01-28 RX ADMIN — Medication 81 MILLIGRAM(S): at 12:45

## 2020-01-28 RX ADMIN — INSULIN GLARGINE 30 UNIT(S): 100 INJECTION, SOLUTION SUBCUTANEOUS at 22:15

## 2020-01-28 RX ADMIN — Medication 12 UNIT(S): at 08:27

## 2020-01-28 RX ADMIN — Medication 12 UNIT(S): at 12:43

## 2020-01-28 RX ADMIN — Medication 1: at 22:15

## 2020-01-28 RX ADMIN — Medication 12 UNIT(S): at 17:14

## 2020-01-28 RX ADMIN — SODIUM CHLORIDE 100 MILLILITER(S): 9 INJECTION INTRAMUSCULAR; INTRAVENOUS; SUBCUTANEOUS at 19:21

## 2020-01-28 RX ADMIN — SENNA PLUS 2 TABLET(S): 8.6 TABLET ORAL at 22:15

## 2020-01-28 RX ADMIN — Medication 25 MILLIGRAM(S): at 06:31

## 2020-01-28 NOTE — PROGRESS NOTE ADULT - PROBLEM SELECTOR PLAN 3
-Pt with likely undiagnosed Stage 3 CKD, likely secondary to chronic HTN and DM II  -It appears baseline Cr aroun 1.8  -Renal US revealed Increased cortical echogenicity bilaterally, consistent with renal parenchymal disease. No hydronephrosis.  -Follow up with Nephrology consult  -Avoid nephrotoxic medication  -Continue to monitor renal function

## 2020-01-28 NOTE — DIETITIAN INITIAL EVALUATION ADULT. - PHYSICAL APPEARANCE
overweight/Nutrition-focused physical exam not warranted at this time./other (specify) Ht: 68 inches Wt: 182 pounds BMI: 27.7 kg/m2 IBW: 154 pounds(+/-10%) 118%IBW  no edema. no pressure ulcers documented.

## 2020-01-28 NOTE — CONSULT NOTE ADULT - SUBJECTIVE AND OBJECTIVE BOX
[facility] DIVISION OF KIDNEY DISEASES AND HYPERTENSION -- [phone]  -- INITIAL CONSULT NOTE  --------------------------------------------------------------------------------  HPI:         PAST HISTORY  --------------------------------------------------------------------------------  PAST MEDICAL & SURGICAL HISTORY:  HTN (hypertension)  Diabetes  History of appendectomy: x 30 yrs ago    FAMILY HISTORY:  FH: type 2 diabetes    PAST SOCIAL HISTORY: works as , lives with family, from [facility]    ALLERGIES & MEDICATIONS  --------------------------------------------------------------------------------  Allergies    No Known Allergies    Intolerances      Standing Inpatient Medications  aspirin  chewable 81 milliGRAM(s) Oral daily  atorvastatin 40 milliGRAM(s) Oral at bedtime  dextrose 5%. 1000 milliLiter(s) IV Continuous <Continuous>  dextrose 50% Injectable 12.5 Gram(s) IV Push once  dextrose 50% Injectable 25 Gram(s) IV Push once  dextrose 50% Injectable 25 Gram(s) IV Push once  heparin  Injectable 5000 Unit(s) SubCutaneous every 8 hours  insulin glargine Injectable (LANTUS) 30 Unit(s) SubCutaneous at bedtime  insulin lispro (HumaLOG) corrective regimen sliding scale   SubCutaneous three times a day before meals  insulin lispro (HumaLOG) corrective regimen sliding scale   SubCutaneous at bedtime  insulin lispro Injectable (HumaLOG) 12 Unit(s) SubCutaneous three times a day before meals  metoprolol tartrate 25 milliGRAM(s) Oral two times a day  pantoprazole    Tablet 40 milliGRAM(s) Oral before breakfast  senna 2 Tablet(s) Oral at bedtime    REVIEW OF SYSTEMS  --------------------------------------------------------------------------------  Gen: No fevers/chills  Skin: No rashes  Respiratory: No dyspnea, cough  CV: No chest pain  GI: No abdominal pain, diarrhea  MSK: No  edema  Heme: No easy bruising or bleeding  Psych: No significant depression    All other systems were reviewed and are negative, except as noted.    VITALS/PHYSICAL EXAM  --------------------------------------------------------------------------------  T(C): 36.6 (01-28-20 @ 13:31), Max: 36.6 (01-28-20 @ 13:31)  HR: 78 (01-28-20 @ 13:31) (72 - 79)  BP: 139/77 (01-28-20 @ 13:31) (131/82 - 147/85)  RR: 18 (01-28-20 @ 13:31) (18 - 18)  SpO2: 98% (01-28-20 @ 13:31) (98% - 98%)  Wt(kg): --    01-27-20 @ 07:01  -  01-28-20 @ 07:00  --------------------------------------------------------  IN: 830 mL / OUT: 0 mL / NET: 830 mL    01-28-20 @ 07:01  -  01-28-20 @ 14:38  --------------------------------------------------------  IN: 480 mL / OUT: 0 mL / NET: 480 mL    Physical Exam:  	Gen: NAD  	HEENT: MMM  	Pulm: CTA B/L  	CV: S1S2  	Abd: Soft, +BS, obese   	Ext: trace LE edema B/L  	Neuro: Awake  	Skin: Warm and dry, no rash	    LABS/STUDIES  --------------------------------------------------------------------------------              12.1   6.29  >-----------<  172      [01-28-20 @ 08:08]              34.9     138  |  103  |  48  ----------------------------<  139      [01-28-20 @ 05:39]  4.0   |  20  |  2.01        Ca     9.3     [01-28-20 @ 05:39]      Mg     2.1     [01-28-20 @ 05:39]    Creatinine Trend:  SCr 2.01 [01-28 @ 05:39]  SCr 1.97 [01-27 @ 06:10]  SCr 1.83 [01-26 @ 06:31]  SCr 1.96 [01-25 @ 15:33]  SCr 1.79 [01-25 @ 11:44]    Urinalysis - [01-27-20 @ 21:37]      Color Light Yellow / Appearance Clear / SG 1.008 / pH 5.5      Gluc Trace / Ketone Negative  / Bili Negative / Urobili <2 mg/dL       Blood Small / Protein 100 mg/dL / Leuk Est Negative / Nitrite Negative      RBC 4 / WBC 0 / Hyaline 2 / Gran  / Sq Epi  / Non Sq Epi 0 / Bacteria Negative    Urine Creatinine 46      [01-27-20 @ 19:10]  Urine Protein 178      [01-27-20 @ 21:35]  Urine Sodium 35      [01-27-20 @ 19:10]  Urine Urea Nitrogen 384      [01-27-20 @ 21:36]  Urine Potassium 22      [01-27-20 @ 19:10]  Urine Chloride <35      [01-27-20 @ 19:10]  Urine Osmolality 264      [01-27-20 @ 21:37]    HbA1c 9.2      [01-26-20 @ 09:03]    HCV 0.19, Nonreact      [01-26-20 @ 09:40] [facility] DIVISION OF KIDNEY DISEASES AND HYPERTENSION -- [phone]  -- INITIAL CONSULT NOTE  --------------------------------------------------------------------------------  HPI: 65yo male with PMH of HTN, DM II (20 years), presented to the ED with complaints of acute on chronic left sided exertional chest pain. Nephrology consulted for elevated serum creatinine and pre-CATH assessment[facility] no [facility] available. On admission (1/25/2020), Scr was 1.85 and has remained stable, today is 2.01. Patient reports chest pain which worsened on exertion and is relieved by rest. ateint denies any previous history of kidney disease. Reports having a PCP, Dr. Camara, with whom he had blood work done in August 2019 and was told he only had issues with his DM. Patient states his A1c has been as high as 11, but more recently has come down to 9. Reports neuropathy in his feet and receiving treatment for his eyes due to DM. Patient reports ESRD in his mother at an advanced age who started HD for 1 year before she passed away. Reports some occasional ASA use for pain but denies using NSAIDs on regular basis. Does reports having foamy urine but denies any history of coca colored urine. Denies any rashes.    PAST HISTORY  --------------------------------------------------------------------------------  PAST MEDICAL & SURGICAL HISTORY:  HTN (hypertension)  Diabetes  History of appendectomy: x 30 yrs ago    FAMILY HISTORY:  FH: type 2 diabetes    PAST SOCIAL HISTORY: works as , lives with family, from [facility]    ALLERGIES & MEDICATIONS  --------------------------------------------------------------------------------  Allergies    No Known Allergies    Intolerances    Standing Inpatient Medications  aspirin  chewable 81 milliGRAM(s) Oral daily  atorvastatin 40 milliGRAM(s) Oral at bedtime  dextrose 5%. 1000 milliLiter(s) IV Continuous <Continuous>  dextrose 50% Injectable 12.5 Gram(s) IV Push once  dextrose 50% Injectable 25 Gram(s) IV Push once  dextrose 50% Injectable 25 Gram(s) IV Push once  heparin  Injectable 5000 Unit(s) SubCutaneous every 8 hours  insulin glargine Injectable (LANTUS) 30 Unit(s) SubCutaneous at bedtime  insulin lispro (HumaLOG) corrective regimen sliding scale   SubCutaneous three times a day before meals  insulin lispro (HumaLOG) corrective regimen sliding scale   SubCutaneous at bedtime  insulin lispro Injectable (HumaLOG) 12 Unit(s) SubCutaneous three times a day before meals  metoprolol tartrate 25 milliGRAM(s) Oral two times a day  pantoprazole    Tablet 40 milliGRAM(s) Oral before breakfast  senna 2 Tablet(s) Oral at bedtime    REVIEW OF SYSTEMS  --------------------------------------------------------------------------------  Gen: No fevers/chills  Skin: No rashes  Respiratory: No dyspnea, cough  CV: No chest pain  GI: No abdominal pain, diarrhea  MSK: No  edema  Heme: No easy bruising or bleeding  Psych: No significant depression    All other systems were reviewed and are negative, except as noted.    VITALS/PHYSICAL EXAM  --------------------------------------------------------------------------------  T(C): 36.6 (01-28-20 @ 13:31), Max: 36.6 (01-28-20 @ 13:31)  HR: 78 (01-28-20 @ 13:31) (72 - 79)  BP: 139/77 (01-28-20 @ 13:31) (131/82 - 147/85)  RR: 18 (01-28-20 @ 13:31) (18 - 18)  SpO2: 98% (01-28-20 @ 13:31) (98% - 98%)  Wt(kg): --    01-27-20 @ 07:01  -  01-28-20 @ 07:00  --------------------------------------------------------  IN: 830 mL / OUT: 0 mL / NET: 830 mL    01-28-20 @ 07:01  -  01-28-20 @ 14:38  --------------------------------------------------------  IN: 480 mL / OUT: 0 mL / NET: 480 mL    Physical Exam:  	Gen: NAD  	HEENT: MMM  	Pulm: CTA B/L  	CV: S1S2  	Abd: Soft, +BS, obese   	Ext: trace LE edema B/L  	Neuro: Awake  	Skin: Warm and dry, no rash	    LABS/STUDIES  --------------------------------------------------------------------------------              12.1   6.29  >-----------<  172      [01-28-20 @ 08:08]              34.9     138  |  103  |  48  ----------------------------<  139      [01-28-20 @ 05:39]  4.0   |  20  |  2.01        Ca     9.3     [01-28-20 @ 05:39]      Mg     2.1     [01-28-20 @ 05:39]    Creatinine Trend:  SCr 2.01 [01-28 @ 05:39]  SCr 1.97 [01-27 @ 06:10]  SCr 1.83 [01-26 @ 06:31]  SCr 1.96 [01-25 @ 15:33]  SCr 1.79 [01-25 @ 11:44]    Urinalysis - [01-27-20 @ 21:37]      Color Light Yellow / Appearance Clear / SG 1.008 / pH 5.5      Gluc Trace / Ketone Negative  / Bili Negative / Urobili <2 mg/dL       Blood Small / Protein 100 mg/dL / Leuk Est Negative / Nitrite Negative      RBC 4 / WBC 0 / Hyaline 2 / Gran  / Sq Epi  / Non Sq Epi 0 / Bacteria Negative    Urine Creatinine 46      [01-27-20 @ 19:10]  Urine Protein 178      [01-27-20 @ 21:35]  Urine Sodium 35      [01-27-20 @ 19:10]  Urine Urea Nitrogen 384      [01-27-20 @ 21:36]  Urine Potassium 22      [01-27-20 @ 19:10]  Urine Chloride <35      [01-27-20 @ 19:10]  Urine Osmolality 264      [01-27-20 @ 21:37]    HbA1c 9.2      [01-26-20 @ 09:03]    HCV 0.19, Nonreact      [01-26-20 @ 09:40]

## 2020-01-28 NOTE — DIETITIAN INITIAL EVALUATION ADULT. - ADD RECOMMEND
1) Recommend adding bowel regimen for constipation 2) RD is available for diet education reinforcement.

## 2020-01-28 NOTE — CONSULT NOTE ADULT - PROBLEM SELECTOR RECOMMENDATION 2
Pt. with STEPHANIE vs CKD due to longstanding DM and HTN. Patient has 25% risk of ARRON and 1% risk of post PCI-ARRON requiring dialysis. Risk of ARRON discussed with patient. Hydrate 6 hours pre and 2 hours post with normal saline. Try to minimize contrast load to be <100cc. Use CCB or alternate bp meds for BP control if needed. Monitor Scr. Pt. with STEPHANIE vs CKD due to longstanding DM and HTN. Patient has 25% risk of ARRON and 1% risk of post PCI-ARRON requiring dialysis. Risk of ARRON discussed with patient. Hydrate 6 hours pre and 2 hours post with normal saline. Try to minimize contrast load. Use CCB or alternate bp meds for BP control if needed. Monitor Scr.

## 2020-01-28 NOTE — PROGRESS NOTE ADULT - SUBJECTIVE AND OBJECTIVE BOX
CARDIOLOGY FOLLOW UP - Dr. Steel    CC no cp or sob        PHYSICAL EXAM:    TELEMETRY: nsr     T(C): 36.6 (01-28-20 @ 13:31), Max: 36.6 (01-27-20 @ 13:57)  HR: 78 (01-28-20 @ 13:31) (72 - 80)  BP: 139/77 (01-28-20 @ 13:31) (131/82 - 147/85)  RR: 18 (01-28-20 @ 13:31) (18 - 18)  SpO2: 98% (01-28-20 @ 13:31) (98% - 99%)  Wt(kg): --  I&O's Summary    27 Jan 2020 07:01  -  28 Jan 2020 07:00  --------------------------------------------------------  IN: 830 mL / OUT: 0 mL / NET: 830 mL        Appearance: Normal	  Cardiovascular: Normal S1 S2,RRR, No JVD, No murmurs  Respiratory: crackles at Left base   Gastrointestinal:  Soft, Non-tender, + BS	  Extremities: Normal range of motion, No clubbing, cyanosis or edema        MEDICATIONS  (STANDING):  aspirin  chewable 81 milliGRAM(s) Oral daily  atorvastatin 40 milliGRAM(s) Oral at bedtime  dextrose 5%. 1000 milliLiter(s) (50 mL/Hr) IV Continuous <Continuous>  dextrose 50% Injectable 12.5 Gram(s) IV Push once  dextrose 50% Injectable 25 Gram(s) IV Push once  dextrose 50% Injectable 25 Gram(s) IV Push once  heparin  Injectable 5000 Unit(s) SubCutaneous every 8 hours  insulin glargine Injectable (LANTUS) 30 Unit(s) SubCutaneous at bedtime  insulin lispro (HumaLOG) corrective regimen sliding scale   SubCutaneous three times a day before meals  insulin lispro (HumaLOG) corrective regimen sliding scale   SubCutaneous at bedtime  insulin lispro Injectable (HumaLOG) 12 Unit(s) SubCutaneous three times a day before meals  metoprolol tartrate 25 milliGRAM(s) Oral two times a day  pantoprazole    Tablet 40 milliGRAM(s) Oral before breakfast  senna 2 Tablet(s) Oral at bedtime      	    ECG:  	  RADIOLOGY:   DIAGNOSTIC TESTING:  [ ] Echocardiogram:  [ ]  Catheterization:  [ ] Stress Test:    < from: Nuclear Stress Test-Pharmacologic (01.27.20 @ 11:41) >  IMPRESSIONS:Abnormal Study  * Chest Pain: No chest pain with administration of  Regadenoson.  However, during the initial attempt at  exercise stress, patient complained of 7/10 chest pressure  starting at 4:34 min of exercise and persisting 7:41 min  in recovery.  * Symptom: neck discomfort, abd discomfort,SOB.  * HR Response: Appropriate.  * BP Response: Appropriate.  * Heart Rhythm: Sinus Rhythm - 79 BPM.  * Conduction defects: Right axis deviation.  * Baseline ECG: Nonspecific ST-T wave abnormality.  * ECG Changes: No significant ischemic ST segmentchanges  beyond baseline abnormalities.  * Arrhythmia: None.  * Review of raw data shows: Significant motion artfact.  * The left ventricle was normal in size. There are large,  mild to moderate defects in the inferolateral, basal  inferior, and basal inferoseptal walls that are mostly  fixed suggestive of infarct with mild luis-infarct  ischemia.  * There are medium-sized, mild to moderate defects in the  anteroseptal and apical walls that are mostly fixed  suggestive of infarct with mild luis-infarct ischemia.  * Post-stress gated wall motion analysis was performed  (LVEF = 55 %;LVEDV = 76 ml.) revealing hypokinesis of the  inferolateral, basal inferior, basal inferoseptal, and  anteroseptal walls and reduced systolic thickening of the  apex.  *** No previous Nuclear/Stress exam.  ------------------------------------------------------------------------  Confirmed on  1/27/2020 - 15:16:14 by Arya Hernandez M.D.  ------------------------------------------------------------------------    < end of copied text >    OTHER: 	  < from: Xray Chest 2 Views PA/Lat (01.25.20 @ 02:05) >    FINDINGS:    The lungs are clear.  The cardiac silhouette is within normal limits.  Degenerative osseous changes.    IMPRESSION:     Clear lungs.        < end of copied text >    LABS:	 	    Troponin T, High Sensitivity Result: 47 ng/L [0 - 51] (01-27 @ 06:10)  Creatine Kinase, Serum: 87 U/L [30 - 200] (01-25 @ 15:33)  CKMB Units: 3.8 ng/mL [0.0 - 6.7] (01-25 @ 15:33)  Troponin T, High Sensitivity Result: 45 ng/L [0 - 51] (01-25 @ 15:33)  Troponin T, High Sensitivity Result: 53 ng/L [0 - 51] (01-25 @ 11:44)  Creatine Kinase, Serum: 89 U/L [30 - 200] (01-25 @ 11:44)  CKMB Units: 3.7 ng/mL [0.0 - 6.7] (01-25 @ 11:44)  Troponin T, High Sensitivity Result: 38 ng/L [0 - 51] (01-25 @ 01:48)  Creatine Kinase, Serum: 126 U/L [30 - 200] (01-25 @ 01:48)  CKMB Units: 4.3 ng/mL [0.0 - 6.7] (01-25 @ 01:48)                          12.1   6.29  )-----------( 172      ( 28 Jan 2020 08:08 )             34.9     01-28    138  |  103  |  48<H>  ----------------------------<  139<H>  4.0   |  20<L>  |  2.01<H>    Ca    9.3      28 Jan 2020 05:39  Mg     2.1     01-28

## 2020-01-28 NOTE — PROGRESS NOTE ADULT - PROBLEM SELECTOR PLAN 7
-Chronic  -Ha1c 9.2  -Home Glyburide + Metformin + Insulin 30units QHS  -Hold PO meds  -Fingersticks improving  -Will continue insulin requirements with Lantus 30units QHS + 12units premeal + SSI  -Monitor Fingersticks

## 2020-01-28 NOTE — CONSULT NOTE ADULT - ASSESSMENT
Patient with STEPHANIE vs stable CKD in the setting of long standing HTN and DM requiring cardiac CATH.

## 2020-01-28 NOTE — DIETITIAN INITIAL EVALUATION ADULT. - PERTINENT MEDS FT
MEDICATIONS  (STANDING):  aspirin  chewable 81 milliGRAM(s) Oral daily  atorvastatin 40 milliGRAM(s) Oral at bedtime  dextrose 5%. 1000 milliLiter(s) (50 mL/Hr) IV Continuous <Continuous>  dextrose 50% Injectable 12.5 Gram(s) IV Push once  dextrose 50% Injectable 25 Gram(s) IV Push once  dextrose 50% Injectable 25 Gram(s) IV Push once  heparin  Injectable 5000 Unit(s) SubCutaneous every 8 hours  insulin glargine Injectable (LANTUS) 30 Unit(s) SubCutaneous at bedtime  insulin lispro (HumaLOG) corrective regimen sliding scale   SubCutaneous three times a day before meals  insulin lispro (HumaLOG) corrective regimen sliding scale   SubCutaneous at bedtime  insulin lispro Injectable (HumaLOG) 12 Unit(s) SubCutaneous three times a day before meals  metoprolol tartrate 25 milliGRAM(s) Oral two times a day  pantoprazole    Tablet 40 milliGRAM(s) Oral before breakfast

## 2020-01-28 NOTE — CHART NOTE - NSCHARTNOTEFT_GEN_A_CORE
PA Medicine Event Note    Reviewed nephrology's note re IVF 6 hours prior and 2 hours post cath.  Discussed with Dr. Matias; confirmed  to start IVF  cc/h standing in preparation for possible cath.  discussed with KAVON.    Ammy Melgar PA-C  Dept of Medicine  #[id number]

## 2020-01-28 NOTE — DIETITIAN INITIAL EVALUATION ADULT. - OTHER INFO
Per chart, 65 y/o male admitted for acute on chronic left sided exertional chest pain, concerning for stable angina   PMH: HTN, T2DM    Information obtained from: pt, EMR    Intake PTA: Pt reports good PO intake at home. Pt states he is familiar with diet recommendations, although has trouble following them with his work schedule. He reports diet high in refined carbohydrates and "junk." Pt displays some nutrition knowledge. A1c is 9.2%, indicating poor glycemic control. Pt reports he checks his blood sugar throughout the day and is compliant with medications. Pt verbalizes his readiness for change.    Confirms NKFA, no nausea/vomiting, no difficulty chewing/swallowing, Multivitamin micronutrient supplementation PTA, last BM PTA. Pt endorses constipation, is requesting stool softener-PA notified.    Diet: Pt with good PO intake in-house, % PO intake per flow sheets    Education: The following were discussed with the pt: carbohydrate sources, pairing carbohydrates with proteins at meals, protein sources, portion sizes, balanced plate, consistent carbohydrate intake, carb counting, relationship of carbs and blood sugar, heart healthy fats, limiting saturated fats, increasing fiber intake, necessary diet modifications. Written education also provided.     Weight Hx: Pt endorses  pounds, current wt is 182 pounds (1/27)- wt stable.

## 2020-01-28 NOTE — PROGRESS NOTE ADULT - ASSESSMENT
64 year old man with history of HTN, DM II, admitted with progressive exertional angina    1. Unstable exertional angina  -no rest symptoms   -no CHF  -high risk stress findings with exercise induced angina  -also some defects in anterior wall with inferior lv dysfxn  -will need cardiac cath to define anatomy in setting of above  -also with stephanie, med w/u in progress  -d/w patient, he agrees to proceed with cardiac cath if medical team is ok with dye load  -will definitely stage any needed pci in 24-48 hours post diag cath   -d/w pt risk of quan with dye, he agrees to proceed  - Pending renal optimization before cath  -cont asa     -if develops rest angina, iv hep and brilinta load    2. STEPHANIE  -med w/u  - renal eval       dvt ppx

## 2020-01-28 NOTE — CONSULT NOTE ADULT - PROBLEM SELECTOR RECOMMENDATION 9
Pt with STEPHANIE vs stable CKD in the setting of long standing DM and HTN. Exact duration of STEPHANIE however unknown. UA significant for protein and RBCs. Urine electrolytes consistent with intrinsic disease. Urine Pr/Cr ratio in nephrotic range. Please repeat UA. Send serological work-up for secondary causes of renal failure including RCAHEL, P-ANCA, C-ANCA, Anti-GBM ab, HBsAg, Hep C antibody, HIV, Parvovirus, C3, C4, serum and urine immunofixation, PLAR-2, and RPR. Monitor labs and urine output. Avoid NSAIDs, ACEI/ARBS, RCA and nephrotoxins. Dose medications as per eGFR.

## 2020-01-28 NOTE — DIETITIAN INITIAL EVALUATION ADULT. - PROBLEM SELECTOR PLAN 8
Transitions of Care Status:  1.  Name of PCP: Dr. Shawn Camara [phone]  2.  PCP Contacted on Admission: [ ] Y    [x ] N    3.  PCP contacted at Discharge: [ ] Y    [ ] N    [ ] N/A  4.  Post-Discharge Appointment Date and Location:  5.  Summary of Handoff given to PCP:

## 2020-01-28 NOTE — PROGRESS NOTE ADULT - ASSESSMENT
63yo male with hx of HTN, DM II, presented to the ED with complaints of acute on chronic left sided exertional chest pain, concerning for stable angina

## 2020-01-28 NOTE — PROGRESS NOTE ADULT - SUBJECTIVE AND OBJECTIVE BOX
Julio Cesar Matias M.D. Pager Number [phone]    Patient is a 64y old  Male who presents with a chief complaint of Chest pain (2020 16:44)      SUBJECTIVE / OVERNIGHT EVENTS:  Pt seen and examined at bedside. No acute events overnight.  Pt denies cp, palpitations, sob, abd pain, N/V, fever, chills.    ROS:  All other review of systems negative    Allergies    No Known Allergies    Intolerances        MEDICATIONS  (STANDING):  aspirin  chewable 81 milliGRAM(s) Oral daily  atorvastatin 40 milliGRAM(s) Oral at bedtime  dextrose 5%. 1000 milliLiter(s) (50 mL/Hr) IV Continuous <Continuous>  dextrose 50% Injectable 12.5 Gram(s) IV Push once  dextrose 50% Injectable 25 Gram(s) IV Push once  dextrose 50% Injectable 25 Gram(s) IV Push once  heparin  Injectable 5000 Unit(s) SubCutaneous every 8 hours  insulin glargine Injectable (LANTUS) 30 Unit(s) SubCutaneous at bedtime  insulin lispro (HumaLOG) corrective regimen sliding scale   SubCutaneous three times a day before meals  insulin lispro (HumaLOG) corrective regimen sliding scale   SubCutaneous at bedtime  insulin lispro Injectable (HumaLOG) 12 Unit(s) SubCutaneous three times a day before meals  metoprolol tartrate 25 milliGRAM(s) Oral two times a day  pantoprazole    Tablet 40 milliGRAM(s) Oral before breakfast  senna 2 Tablet(s) Oral at bedtime    MEDICATIONS  (PRN):  dextrose 40% Gel 15 Gram(s) Oral once PRN Blood Glucose LESS THAN 70 milliGRAM(s)/deciliter  glucagon  Injectable 1 milliGRAM(s) IntraMuscular once PRN Glucose LESS THAN 70 milligrams/deciliter  polyethylene glycol 3350 17 Gram(s) Oral daily PRN Constipation      Vital Signs Last 24 Hrs  T(C): 36.3 (2020 04:18), Max: 36.6 (2020 13:57)  T(F): 97.4 (2020 04:18), Max: 97.9 (2020 13:57)  HR: 75 (2020 04:18) (72 - 80)  BP: 147/85 (2020 04:18) (131/82 - 147/85)  BP(mean): --  RR: 18 (2020 04:18) (18 - 18)  SpO2: 98% (2020 04:18) (98% - 99%)  CAPILLARY BLOOD GLUCOSE      POCT Blood Glucose.: 97 mg/dL (2020 12:09)  POCT Blood Glucose.: 133 mg/dL (2020 08:03)  POCT Blood Glucose.: 241 mg/dL (2020 21:47)  POCT Blood Glucose.: 170 mg/dL (2020 16:38)  POCT Blood Glucose.: 195 mg/dL (2020 13:06)    I&O's Summary    2020 07:01  -  2020 07:00  --------------------------------------------------------  IN: 830 mL / OUT: 0 mL / NET: 830 mL        PHYSICAL EXAM:  GENERAL: NAD, Obese male   CHEST/LUNG: Clear to auscultation bilaterally; No wheeze  HEART: Regular rate and rhythm; No murmurs, rubs, or gallops  ABDOMEN: Soft, Nontender, Nondistended; Bowel sounds present  EXTREMITIES:  2+ Peripheral Pulses, No clubbing, cyanosis, or edema  NEUROLOGY: AAOx3, non-focal  PSYCH: calm  SKIN: No rashes or lesions    LABS:                        12.1   6.29  )-----------( 172      ( 2020 08:08 )             34.9     01-28    138  |  103  |  48<H>  ----------------------------<  139<H>  4.0   |  20<L>  |  2.01<H>    Ca    9.3      2020 05:39  Mg     2.1     -            Urinalysis Basic - ( 2020 21:37 )    Color: Light Yellow / Appearance: Clear / S.008 / pH: x  Gluc: x / Ketone: Negative  / Bili: Negative / Urobili: <2 mg/dL   Blood: x / Protein: 100 mg/dL / Nitrite: Negative   Leuk Esterase: Negative / RBC: 4 /HPF / WBC 0 /HPF   Sq Epi: x / Non Sq Epi: 0 /HPF / Bacteria: Negative        RADIOLOGY & ADDITIONAL TESTS:  Results Reviewed:   Imaging Personally Reviewed:  Electrocardiogram Personally Reviewed:    COORDINATION OF CARE:  Care Discussed with Consultants/Other Providers [Y/N]:  Prior or Outpatient Records Reviewed [Y/N]:

## 2020-01-28 NOTE — PROGRESS NOTE ADULT - ATTENDING COMMENTS
Patient seen and examined, agree with the above assessment and plan by AUDIE Templeton.  appreciate renal eval  plan for cath tomorrow   start hydration preprocedure  cont current mgmt

## 2020-01-28 NOTE — PROGRESS NOTE ADULT - PROBLEM SELECTOR PLAN 1
-Asymptomatic currently  -Nuclear stress test revealed large, mild to moderate defects in the inferolateral, basal inferior, and basal inferoseptal walls that are mostly fixed suggestive of infarct with mild luis-infarct ischemia.  -ECHO revealed EF 55% grossly normal  -Cardiology recs noted; Plan for Cath after Nephrology consult/recommendations

## 2020-01-28 NOTE — PROVIDER CONTACT NOTE (OTHER) - SITUATION
patient pulled out his IV inadvertently, left with only 1 IVL with heparin, not compatible with albumin

## 2020-01-28 NOTE — PROGRESS NOTE ADULT - PROBLEM SELECTOR PLAN 2
-Complaints of chest pain consistent with Stable angina  -Multiple risk factors  -Continue ASA/Statin  -Continue Metoprolol to 25mg BID.  -Nuclear stress test results noted above  -ECHO revealed EF 55% grossly normal  -Cardiology recs noted; Plan for Cath after Nephrology consult/recommendations

## 2020-01-29 DIAGNOSIS — Z71.89 OTHER SPECIFIED COUNSELING: ICD-10-CM

## 2020-01-29 LAB
ANION GAP SERPL CALC-SCNC: 11 MMOL/L — SIGNIFICANT CHANGE UP (ref 5–17)
BUN SERPL-MCNC: 45 MG/DL — HIGH (ref 7–23)
CALCIUM SERPL-MCNC: 8.7 MG/DL — SIGNIFICANT CHANGE UP (ref 8.4–10.5)
CHLORIDE SERPL-SCNC: 106 MMOL/L — SIGNIFICANT CHANGE UP (ref 96–108)
CO2 SERPL-SCNC: 21 MMOL/L — LOW (ref 22–31)
CREAT SERPL-MCNC: 2.03 MG/DL — HIGH (ref 0.5–1.3)
GLUCOSE BLDC GLUCOMTR-MCNC: 256 MG/DL — HIGH (ref 70–99)
GLUCOSE BLDC GLUCOMTR-MCNC: 272 MG/DL — HIGH (ref 70–99)
GLUCOSE BLDC GLUCOMTR-MCNC: 70 MG/DL — SIGNIFICANT CHANGE UP (ref 70–99)
GLUCOSE BLDC GLUCOMTR-MCNC: 94 MG/DL — SIGNIFICANT CHANGE UP (ref 70–99)
GLUCOSE SERPL-MCNC: 121 MG/DL — HIGH (ref 70–99)
HCT VFR BLD CALC: 35.6 % — LOW (ref 39–50)
HGB BLD-MCNC: 12 G/DL — LOW (ref 13–17)
MAGNESIUM SERPL-MCNC: 2 MG/DL — SIGNIFICANT CHANGE UP (ref 1.6–2.6)
MCHC RBC-ENTMCNC: 28 PG — SIGNIFICANT CHANGE UP (ref 27–34)
MCHC RBC-ENTMCNC: 33.7 GM/DL — SIGNIFICANT CHANGE UP (ref 32–36)
MCV RBC AUTO: 83 FL — SIGNIFICANT CHANGE UP (ref 80–100)
NRBC # BLD: 0 /100 WBCS — SIGNIFICANT CHANGE UP (ref 0–0)
PLATELET # BLD AUTO: 161 K/UL — SIGNIFICANT CHANGE UP (ref 150–400)
POTASSIUM SERPL-MCNC: 4.2 MMOL/L — SIGNIFICANT CHANGE UP (ref 3.5–5.3)
POTASSIUM SERPL-SCNC: 4.2 MMOL/L — SIGNIFICANT CHANGE UP (ref 3.5–5.3)
RBC # BLD: 4.29 M/UL — SIGNIFICANT CHANGE UP (ref 4.2–5.8)
RBC # FLD: 11.9 % — SIGNIFICANT CHANGE UP (ref 10.3–14.5)
SODIUM SERPL-SCNC: 138 MMOL/L — SIGNIFICANT CHANGE UP (ref 135–145)
WBC # BLD: 6.33 K/UL — SIGNIFICANT CHANGE UP (ref 3.8–10.5)
WBC # FLD AUTO: 6.33 K/UL — SIGNIFICANT CHANGE UP (ref 3.8–10.5)

## 2020-01-29 PROCEDURE — 99233 SBSQ HOSP IP/OBS HIGH 50: CPT

## 2020-01-29 RX ORDER — INSULIN GLARGINE 100 [IU]/ML
25 INJECTION, SOLUTION SUBCUTANEOUS AT BEDTIME
Refills: 0 | Status: DISCONTINUED | OUTPATIENT
Start: 2020-01-29 | End: 2020-01-30

## 2020-01-29 RX ORDER — INSULIN LISPRO 100/ML
8 VIAL (ML) SUBCUTANEOUS
Refills: 0 | Status: DISCONTINUED | OUTPATIENT
Start: 2020-01-29 | End: 2020-01-30

## 2020-01-29 RX ADMIN — Medication 3: at 18:26

## 2020-01-29 RX ADMIN — INSULIN GLARGINE 25 UNIT(S): 100 INJECTION, SOLUTION SUBCUTANEOUS at 22:23

## 2020-01-29 RX ADMIN — SODIUM CHLORIDE 100 MILLILITER(S): 9 INJECTION INTRAMUSCULAR; INTRAVENOUS; SUBCUTANEOUS at 06:12

## 2020-01-29 RX ADMIN — Medication 25 MILLIGRAM(S): at 18:27

## 2020-01-29 RX ADMIN — PANTOPRAZOLE SODIUM 40 MILLIGRAM(S): 20 TABLET, DELAYED RELEASE ORAL at 06:12

## 2020-01-29 RX ADMIN — Medication 81 MILLIGRAM(S): at 12:05

## 2020-01-29 RX ADMIN — Medication 25 MILLIGRAM(S): at 06:12

## 2020-01-29 RX ADMIN — ATORVASTATIN CALCIUM 40 MILLIGRAM(S): 80 TABLET, FILM COATED ORAL at 22:24

## 2020-01-29 RX ADMIN — Medication 12 UNIT(S): at 08:38

## 2020-01-29 RX ADMIN — SODIUM CHLORIDE 100 MILLILITER(S): 9 INJECTION INTRAMUSCULAR; INTRAVENOUS; SUBCUTANEOUS at 18:38

## 2020-01-29 RX ADMIN — SENNA PLUS 2 TABLET(S): 8.6 TABLET ORAL at 22:24

## 2020-01-29 RX ADMIN — Medication 1: at 22:22

## 2020-01-29 RX ADMIN — Medication 8 UNIT(S): at 18:27

## 2020-01-29 NOTE — PROGRESS NOTE ADULT - SUBJECTIVE AND OBJECTIVE BOX
Julio Cesar Matias M.D. Pager Number [phone]    Patient is a 64y old  Male who presents with a chief complaint of Chest pain (2020 14:37)      SUBJECTIVE / OVERNIGHT EVENTS:  Pt seen and examined at bedside. No acute events overnight.  Pt denies cp, palpitations, sob, abd pain, N/V, fever, chills.    ROS:  All other review of systems negative    Allergies    No Known Allergies    Intolerances        MEDICATIONS  (STANDING):  aspirin  chewable 81 milliGRAM(s) Oral daily  atorvastatin 40 milliGRAM(s) Oral at bedtime  dextrose 5%. 1000 milliLiter(s) (50 mL/Hr) IV Continuous <Continuous>  dextrose 50% Injectable 12.5 Gram(s) IV Push once  dextrose 50% Injectable 25 Gram(s) IV Push once  dextrose 50% Injectable 25 Gram(s) IV Push once  heparin  Injectable 5000 Unit(s) SubCutaneous every 8 hours  insulin glargine Injectable (LANTUS) 30 Unit(s) SubCutaneous at bedtime  insulin lispro (HumaLOG) corrective regimen sliding scale   SubCutaneous three times a day before meals  insulin lispro (HumaLOG) corrective regimen sliding scale   SubCutaneous at bedtime  insulin lispro Injectable (HumaLOG) 12 Unit(s) SubCutaneous three times a day before meals  metoprolol tartrate 25 milliGRAM(s) Oral two times a day  pantoprazole    Tablet 40 milliGRAM(s) Oral before breakfast  senna 2 Tablet(s) Oral at bedtime  sodium chloride 0.9%. 1000 milliLiter(s) (100 mL/Hr) IV Continuous <Continuous>    MEDICATIONS  (PRN):  dextrose 40% Gel 15 Gram(s) Oral once PRN Blood Glucose LESS THAN 70 milliGRAM(s)/deciliter  glucagon  Injectable 1 milliGRAM(s) IntraMuscular once PRN Glucose LESS THAN 70 milligrams/deciliter  polyethylene glycol 3350 17 Gram(s) Oral daily PRN Constipation      Vital Signs Last 24 Hrs  T(C): 36.7 (2020 12:04), Max: 36.9 (2020 21:04)  T(F): 98 (2020 12:04), Max: 98.4 (2020 21:04)  HR: 68 (2020 12:04) (68 - 82)  BP: 162/89 (2020 12:04) (146/83 - 162/89)  BP(mean): --  RR: 18 (2020 12:04) (18 - 18)  SpO2: 99% (2020 12:04) (97% - 99%)  CAPILLARY BLOOD GLUCOSE      POCT Blood Glucose.: 70 mg/dL (2020 11:56)  POCT Blood Glucose.: 94 mg/dL (2020 08:13)  POCT Blood Glucose.: 275 mg/dL (2020 21:30)  POCT Blood Glucose.: 182 mg/dL (2020 16:36)    I&O's Summary    2020 07:01  -  2020 07:00  --------------------------------------------------------  IN: 1970 mL / OUT: 0 mL / NET: 1970 mL    2020 07:01  -  2020 15:29  --------------------------------------------------------  IN: 720 mL / OUT: 0 mL / NET: 720 mL        PHYSICAL EXAM:  GENERAL: NAD, Obese male   CHEST/LUNG: Clear to auscultation bilaterally; No wheeze  HEART: Regular rate and rhythm; No murmurs, rubs, or gallops  ABDOMEN: Soft, Nontender, Nondistended; Bowel sounds present  EXTREMITIES:  2+ Peripheral Pulses, No clubbing, cyanosis, or edema  NEUROLOGY: AAOx3, non-focal  PSYCH: calm  SKIN: No rashes or lesions    LABS:                        12.0   6.33  )-----------( 161      ( 2020 06:12 )             35.6         138  |  106  |  45<H>  ----------------------------<  121<H>  4.2   |  21<L>  |  2.03<H>    Ca    8.7      2020 06:12  Mg     2.0                 Urinalysis Basic - ( 2020 21:37 )    Color: Light Yellow / Appearance: Clear / S.008 / pH: x  Gluc: x / Ketone: Negative  / Bili: Negative / Urobili: <2 mg/dL   Blood: x / Protein: 100 mg/dL / Nitrite: Negative   Leuk Esterase: Negative / RBC: 4 /HPF / WBC 0 /HPF   Sq Epi: x / Non Sq Epi: 0 /HPF / Bacteria: Negative        RADIOLOGY & ADDITIONAL TESTS:  Results Reviewed:   Imaging Personally Reviewed:  Electrocardiogram Personally Reviewed:    COORDINATION OF CARE:  Care Discussed with Consultants/Other Providers [Y/N]:  Prior or Outpatient Records Reviewed [Y/N]:

## 2020-01-29 NOTE — PROGRESS NOTE ADULT - PROBLEM SELECTOR PLAN 2
-Complaints of chest pain consistent with Stable angina  -Multiple risk factors  -Continue ASA/Statin  -Continue Metoprolol to 25mg BID.  -Nuclear stress test results noted above  -ECHO revealed EF 55% grossly normal  -Follow up with Staged cath

## 2020-01-29 NOTE — PROGRESS NOTE ADULT - PROBLEM SELECTOR PLAN 3
-Pt with likely undiagnosed Stage 3 CKD, likely secondary to chronic HTN and DM II  -It appears baseline Cr aroun 1.8  -Renal US revealed Increased cortical echogenicity bilaterally, consistent with renal parenchymal disease. No hydronephrosis.  -Nephrology recs noted  -Avoid nephrotoxic medication  -Continue to monitor renal function

## 2020-01-29 NOTE — PROGRESS NOTE ADULT - PROBLEM SELECTOR PLAN 1
-Asymptomatic currently  -Nuclear stress test revealed large, mild to moderate defects in the inferolateral, basal inferior, and basal inferoseptal walls that are mostly fixed suggestive of infarct with mild luis-infarct ischemia.  -ECHO revealed EF 55% grossly normal  -Follow up with staged Cath due to Renal function  -Cardiology and Nephrology on board

## 2020-01-30 DIAGNOSIS — I25.10 ATHEROSCLEROTIC HEART DISEASE OF NATIVE CORONARY ARTERY WITHOUT ANGINA PECTORIS: ICD-10-CM

## 2020-01-30 DIAGNOSIS — E78.5 HYPERLIPIDEMIA, UNSPECIFIED: ICD-10-CM

## 2020-01-30 LAB
ANION GAP SERPL CALC-SCNC: 10 MMOL/L — SIGNIFICANT CHANGE UP (ref 5–17)
APTT BLD: 45.8 SEC — HIGH (ref 27.5–36.3)
B19V IGG SER-ACNC: 5.2 INDEX — HIGH (ref 0–0.8)
B19V IGG+IGM SER-IMP: POSITIVE
B19V IGG+IGM SER-IMP: SIGNIFICANT CHANGE UP
B19V IGM FLD-ACNC: 0.1 — SIGNIFICANT CHANGE UP
B19V IGM SER-ACNC: NEGATIVE — SIGNIFICANT CHANGE UP
BLD GP AB SCN SERPL QL: NEGATIVE — SIGNIFICANT CHANGE UP
BUN SERPL-MCNC: 38 MG/DL — HIGH (ref 7–23)
C3 SERPL-MCNC: 136 MG/DL — SIGNIFICANT CHANGE UP (ref 81–157)
C4 SERPL-MCNC: 56 MG/DL — HIGH (ref 13–39)
CALCIUM SERPL-MCNC: 8.2 MG/DL — LOW (ref 8.4–10.5)
CHLORIDE SERPL-SCNC: 104 MMOL/L — SIGNIFICANT CHANGE UP (ref 96–108)
CO2 SERPL-SCNC: 20 MMOL/L — LOW (ref 22–31)
CREAT SERPL-MCNC: 1.91 MG/DL — HIGH (ref 0.5–1.3)
FIBRINOGEN PPP-MCNC: 632 MG/DL — HIGH (ref 350–510)
GLUCOSE BLDC GLUCOMTR-MCNC: 102 MG/DL — HIGH (ref 70–99)
GLUCOSE BLDC GLUCOMTR-MCNC: 184 MG/DL — HIGH (ref 70–99)
GLUCOSE BLDC GLUCOMTR-MCNC: 226 MG/DL — HIGH (ref 70–99)
GLUCOSE BLDC GLUCOMTR-MCNC: 242 MG/DL — HIGH (ref 70–99)
GLUCOSE BLDC GLUCOMTR-MCNC: 245 MG/DL — HIGH (ref 70–99)
GLUCOSE BLDC GLUCOMTR-MCNC: 93 MG/DL — SIGNIFICANT CHANGE UP (ref 70–99)
GLUCOSE SERPL-MCNC: 253 MG/DL — HIGH (ref 70–99)
HBV SURFACE AG SER-ACNC: SIGNIFICANT CHANGE UP
HCT VFR BLD CALC: 32.1 % — LOW (ref 39–50)
HCV AB S/CO SERPL IA: 0.19 S/CO — SIGNIFICANT CHANGE UP (ref 0–0.99)
HCV AB SERPL-IMP: SIGNIFICANT CHANGE UP
HGB BLD-MCNC: 10.8 G/DL — LOW (ref 13–17)
INR BLD: 0.94 RATIO — SIGNIFICANT CHANGE UP (ref 0.88–1.16)
INTERPRETATION SERPL IFE-IMP: SIGNIFICANT CHANGE UP
MCHC RBC-ENTMCNC: 28.1 PG — SIGNIFICANT CHANGE UP (ref 27–34)
MCHC RBC-ENTMCNC: 33.6 GM/DL — SIGNIFICANT CHANGE UP (ref 32–36)
MCV RBC AUTO: 83.6 FL — SIGNIFICANT CHANGE UP (ref 80–100)
NRBC # BLD: 0 /100 WBCS — SIGNIFICANT CHANGE UP (ref 0–0)
PLATELET # BLD AUTO: 153 K/UL — SIGNIFICANT CHANGE UP (ref 150–400)
POTASSIUM SERPL-MCNC: 4.2 MMOL/L — SIGNIFICANT CHANGE UP (ref 3.5–5.3)
POTASSIUM SERPL-SCNC: 4.2 MMOL/L — SIGNIFICANT CHANGE UP (ref 3.5–5.3)
PROTHROM AB SERPL-ACNC: 10.8 SEC — SIGNIFICANT CHANGE UP (ref 10–12.9)
RBC # BLD: 3.84 M/UL — LOW (ref 4.2–5.8)
RBC # FLD: 11.9 % — SIGNIFICANT CHANGE UP (ref 10.3–14.5)
RH IG SCN BLD-IMP: POSITIVE — SIGNIFICANT CHANGE UP
SODIUM SERPL-SCNC: 134 MMOL/L — LOW (ref 135–145)
T PALLIDUM AB TITR SER: NEGATIVE — SIGNIFICANT CHANGE UP
T3 SERPL-MCNC: 103 NG/DL — SIGNIFICANT CHANGE UP (ref 80–200)
T4 AB SER-ACNC: 7.9 UG/DL — SIGNIFICANT CHANGE UP (ref 4.6–12)
TSH SERPL-MCNC: 1.78 UIU/ML — SIGNIFICANT CHANGE UP (ref 0.27–4.2)
WBC # BLD: 5.91 K/UL — SIGNIFICANT CHANGE UP (ref 3.8–10.5)
WBC # FLD AUTO: 5.91 K/UL — SIGNIFICANT CHANGE UP (ref 3.8–10.5)

## 2020-01-30 PROCEDURE — 99232 SBSQ HOSP IP/OBS MODERATE 35: CPT | Mod: GC

## 2020-01-30 PROCEDURE — 99223 1ST HOSP IP/OBS HIGH 75: CPT

## 2020-01-30 PROCEDURE — 99233 SBSQ HOSP IP/OBS HIGH 50: CPT

## 2020-01-30 PROCEDURE — 93880 EXTRACRANIAL BILAT STUDY: CPT | Mod: 26

## 2020-01-30 RX ORDER — INSULIN GLARGINE 100 [IU]/ML
30 INJECTION, SOLUTION SUBCUTANEOUS AT BEDTIME
Refills: 0 | Status: DISCONTINUED | OUTPATIENT
Start: 2020-01-30 | End: 2020-02-03

## 2020-01-30 RX ORDER — INSULIN LISPRO 100/ML
10 VIAL (ML) SUBCUTANEOUS
Refills: 0 | Status: DISCONTINUED | OUTPATIENT
Start: 2020-01-30 | End: 2020-01-30

## 2020-01-30 RX ORDER — INSULIN GLARGINE 100 [IU]/ML
28 INJECTION, SOLUTION SUBCUTANEOUS AT BEDTIME
Refills: 0 | Status: DISCONTINUED | OUTPATIENT
Start: 2020-01-30 | End: 2020-01-30

## 2020-01-30 RX ORDER — INSULIN LISPRO 100/ML
6 VIAL (ML) SUBCUTANEOUS
Refills: 0 | Status: DISCONTINUED | OUTPATIENT
Start: 2020-01-30 | End: 2020-02-03

## 2020-01-30 RX ORDER — INSULIN LISPRO 100/ML
5 VIAL (ML) SUBCUTANEOUS ONCE
Refills: 0 | Status: COMPLETED | OUTPATIENT
Start: 2020-01-30 | End: 2020-01-30

## 2020-01-30 RX ORDER — INSULIN LISPRO 100/ML
8 VIAL (ML) SUBCUTANEOUS
Refills: 0 | Status: DISCONTINUED | OUTPATIENT
Start: 2020-01-30 | End: 2020-02-03

## 2020-01-30 RX ADMIN — Medication 8 UNIT(S): at 17:47

## 2020-01-30 RX ADMIN — Medication 81 MILLIGRAM(S): at 13:22

## 2020-01-30 RX ADMIN — HEPARIN SODIUM 5000 UNIT(S): 5000 INJECTION INTRAVENOUS; SUBCUTANEOUS at 22:36

## 2020-01-30 RX ADMIN — HEPARIN SODIUM 5000 UNIT(S): 5000 INJECTION INTRAVENOUS; SUBCUTANEOUS at 13:22

## 2020-01-30 RX ADMIN — Medication 5 UNIT(S): at 13:23

## 2020-01-30 RX ADMIN — ATORVASTATIN CALCIUM 40 MILLIGRAM(S): 80 TABLET, FILM COATED ORAL at 22:36

## 2020-01-30 RX ADMIN — SENNA PLUS 2 TABLET(S): 8.6 TABLET ORAL at 22:36

## 2020-01-30 RX ADMIN — PANTOPRAZOLE SODIUM 40 MILLIGRAM(S): 20 TABLET, DELAYED RELEASE ORAL at 05:21

## 2020-01-30 RX ADMIN — Medication 2: at 17:47

## 2020-01-30 RX ADMIN — HEPARIN SODIUM 5000 UNIT(S): 5000 INJECTION INTRAVENOUS; SUBCUTANEOUS at 05:18

## 2020-01-30 RX ADMIN — Medication 1: at 08:25

## 2020-01-30 RX ADMIN — Medication 25 MILLIGRAM(S): at 05:18

## 2020-01-30 RX ADMIN — INSULIN GLARGINE 30 UNIT(S): 100 INJECTION, SOLUTION SUBCUTANEOUS at 22:36

## 2020-01-30 RX ADMIN — Medication 8 UNIT(S): at 08:25

## 2020-01-30 RX ADMIN — Medication 25 MILLIGRAM(S): at 17:02

## 2020-01-30 NOTE — PROGRESS NOTE ADULT - PROBLEM SELECTOR PLAN 2
-Asymptomatic currently  -ECHO revealed EF 55% grossly normal  -S/p cath with results noted above  -Cardiology and Nephrology on board  -Follow up with CT Surgery

## 2020-01-30 NOTE — PROGRESS NOTE ADULT - PROBLEM SELECTOR PLAN 4
-Pt with likely undiagnosed Stage 3 CKD, likely secondary to chronic HTN and DM II  -It appears baseline Cr around 1.8  -Renal US revealed Increased cortical echogenicity bilaterally, consistent with renal parenchymal disease. No hydronephrosis.  -Nephrology recs noted  -Avoid nephrotoxic medication  -Continue to monitor renal function

## 2020-01-30 NOTE — CONSULT NOTE ADULT - PROBLEM SELECTOR RECOMMENDATION 9
Will increase Lantus to 30u at bedtime.  Will adjust Humalog to be 6u before breakfast and 8u before lunch/dinner. Will continue Humalog correction scale coverage.   Will continue monitoring FS, log, and FU.  Patient counseled for compliance with consistent low carb diet and exercise as tolerated outpatient. Will continue monitoring FS, log, and FU.  Patient counseled for compliance with consistent low carb diet and exercise as tolerated outpatient.

## 2020-01-30 NOTE — PROGRESS NOTE ADULT - ASSESSMENT
64 year old man with history of HTN, DM II, admitted with progressive exertional angina.     1. CAD   -s/p stress w high risk findings - exercise induced angina, also some defects in anterior wall with inferior lv dysfxn  -s/p cath revealing severe triple vessel disease including lesions at the bifurcation of the LAD/diagonal and distal RCA/RPDA/RPL trifurcation.   -cr remains elevated, - renal f/u  -CTS eval pending for CABG    -cont asa, statin, bb       2. HTN  bp labile  continue bb  avoid acei/arb in setting of STEPHANIE   if needed start norvasc 5mg daily     3. STEPHANIE/CKD  renal f/u     dvt ppx

## 2020-01-30 NOTE — CONSULT NOTE ADULT - ASSESSMENT
Assessment  DMT2: 64y Male with DM T2 with hyperglycemia, A1C 9.2%, was on oral meds and insulin at home, now on basal bolus insulin, blood sugars fluctuating, at times running high and at times within lower-normal range, no hypoglycemic episodes. Patient's basal dose was increased today, Humalog lunch-time dose was cut in half due to fluctuating blood sugars. Patient is eating meals, non compliant with low carb diet, son by the bedside.  CAD: Planning CABG Monday 2/3, cardiac workup in progress, on medications, no chest pain, stable, monitored.  HTN: Controlled,  on antihypertensive medications.  HLD: Controlled, on statin.  CKD: Monitor labs/BMP          Harper Matias MD  Cell: [phone]  Office: [phone] Assessment  DMT2: 64y Male with DM T2 with hyperglycemia, A1C 9.2%, was on oral meds and insulin at home, now on basal bolus insulin, blood sugars fluctuating, at times running high and at times within lower-normal range,  no hypoglycemic episodes. Patient's basal dose was increased today, Humalog lunch-time dose was cut in half due to fluctuating blood sugars. Patient is eating meals, non compliant with low carb diet, son by the bedside.  CAD: Planning CABG Monday 2/3, cardiac workup in progress, on medications, no chest pain, stable, monitored.  HTN: Controlled,  on antihypertensive medications.  HLD: Controlled, on statin.  CKD: Monitor labs/BMP          Harper Matias MD  Cell: [phone]  Office: [phone]

## 2020-01-30 NOTE — PROGRESS NOTE ADULT - SUBJECTIVE AND OBJECTIVE BOX
CARDIOLOGY FOLLOW UP - Dr. Steel    CC no cp/sob   " i feel great"     PHYSICAL EXAM:  T(C): 36.3 (20 @ 11:15), Max: 36.8 (20 @ 21:09)  HR: 68 (20 @ 11:15) (68 - 83)  BP: 169/88 (20 @ 11:15) (129/66 - 169/88)  RR: 18 (20 @ 11:15) (18 - 18)  SpO2: 99% (20 @ 11:15) (96% - 99%)  Wt(kg): --  I&O's Summary    2020 07:01  -  2020 07:00  --------------------------------------------------------  IN: 2100 mL / OUT: 0 mL / NET: 2100 mL        Appearance: Normal 	  Cardiovascular: Normal S1 S2,RRR, No JVD, No murmurs  Respiratory: Lungs clear to auscultation	  Gastrointestinal:  Soft, Non-tender, + BS	  Extremities: Normal range of motion, No clubbing, cyanosis or edema        MEDICATIONS  (STANDING):  aspirin  chewable 81 milliGRAM(s) Oral daily  atorvastatin 40 milliGRAM(s) Oral at bedtime  dextrose 5%. 1000 milliLiter(s) (50 mL/Hr) IV Continuous <Continuous>  dextrose 50% Injectable 12.5 Gram(s) IV Push once  dextrose 50% Injectable 25 Gram(s) IV Push once  dextrose 50% Injectable 25 Gram(s) IV Push once  heparin  Injectable 5000 Unit(s) SubCutaneous every 8 hours  insulin glargine Injectable (LANTUS) 28 Unit(s) SubCutaneous at bedtime  insulin lispro (HumaLOG) corrective regimen sliding scale   SubCutaneous three times a day before meals  insulin lispro (HumaLOG) corrective regimen sliding scale   SubCutaneous at bedtime  insulin lispro Injectable (HumaLOG) 10 Unit(s) SubCutaneous three times a day before meals  metoprolol tartrate 25 milliGRAM(s) Oral two times a day  pantoprazole    Tablet 40 milliGRAM(s) Oral before breakfast  senna 2 Tablet(s) Oral at bedtime  sodium chloride 0.9%. 1000 milliLiter(s) (100 mL/Hr) IV Continuous <Continuous>      TELEMETRY: NSR 70-90	    ECG:  	  RADIOLOGY:   DIAGNOSTIC TESTING:  [ ] Echocardiogram:  [ ]  Catheterization: < from: Cardiac Cath Lab - Adult (20 @ 14:29) >  [facility]  Department of Cardiology  [address] [place] [id number]  [phone]  Cath Lab Report -- Comprehensive Report  Patient: FRANKLIN PRESLEY  Study date: 2020  Account number: [id number]  MR number: [id number]  : 1955  Gender: Male  Race: O  Case Physician(s):  Glen Steel M.D.  Referring Physician:  INDICATIONS: Unstable angina - CCS3.  HISTORY: There was no prior cardiac history. The patient has hypertension,  oral hypoglycemic-treated diabetes, and renal failure (not requiring  dialysis).  PROCEDURE:  --  Left coronary angiography.  --  Right coronary angiography.  TECHNIQUE: The risks and alternatives of the procedures and conscious  sedation were explained to the patient and informed consent was obtained.  Cardiac catheterization performed electively.  Local anesthetic given. Right femoral artery access. Left coronary artery  angiography. The vessel was injected utilizing a catheter. Right coronary  artery angiography. The vessel was injected utilizing a catheter.  RADIATION EXPOSURE: 2.6 min.  CONTRAST GIVEN: Omnipaque 31 ml.  MEDICATIONS GIVEN: Midazolam, 1 mg, IV. Fentanyl, 25 mcg, IV.  CORONARY VESSELS: The coronary circulation is right dominant.  LM:   --  LM: Normal.  LAD:   --  Proximal LAD: There was a discrete 80 % stenosis.  --  Distal LAD: There was a 60 % stenosis.  CX:   --  OM1: There was a 80 % stenosis.  RI:   --  Ramus intermedius: The vessel was small to medium sized.  Angiography showed severe atherosclerosis.  RCA:   --  Distal RCA: There was a 95 % stenosis.  COMPLICATIONS: There were no complications.  DIAGNOSTIC IMPRESSIONS: Severe triple vessel disease including lesions at  the bifurcation of the LAD/diagonal and distal RCA/RPDA/RPL trifurcation.  DIAGNOSTIC RECOMMENDATIONS: CTS evaluation for possible CABG versus high  risk/multivessel PCI.  Continue current medical management.  INTERVENTIONAL IMPRESSIONS: Severe triple vessel disease including lesions  at the bifurcation of the LAD/diagonal and distal RCA/RPDA/RPL  trifurcation.  INTERVENTIONAL RECOMMENDATIONS: CTS evaluation for possible CABG versus  high risk/multivessel PCI.  Continue current medical management.  DISPOSITION: The patient left the catheterization laboratory instable  condition.  Prepared and signed by  Glen Steel M.D.    < end of copied text >    [ ] Stress Test:    OTHER: 	    LABS:	 	                                10.8   5.91  )-----------( 153      ( 2020 06:06 )             32.1         134<L>  |  104  |  38<H>  ----------------------------<  253<H>  4.2   |  20<L>  |  1.91<H>    Ca    8.2<L>      2020 06:05  Mg     2.0

## 2020-01-30 NOTE — PROGRESS NOTE ADULT - SUBJECTIVE AND OBJECTIVE BOX
Julio Cesar Matias M.D. Pager Number [phone]    Patient is a 64y old  Male who presents with a chief complaint of Chest pain (30 Jan 2020 11:22)      SUBJECTIVE / OVERNIGHT EVENTS:  Pt seen and examined at bedside. No acute events overnight.  Pt denies cp, palpitations, sob, abd pain, N/V, fever, chills.    ROS:  All other review of systems negative    Allergies    No Known Allergies    Intolerances        MEDICATIONS  (STANDING):  aspirin  chewable 81 milliGRAM(s) Oral daily  atorvastatin 40 milliGRAM(s) Oral at bedtime  dextrose 5%. 1000 milliLiter(s) (50 mL/Hr) IV Continuous <Continuous>  dextrose 50% Injectable 12.5 Gram(s) IV Push once  dextrose 50% Injectable 25 Gram(s) IV Push once  dextrose 50% Injectable 25 Gram(s) IV Push once  heparin  Injectable 5000 Unit(s) SubCutaneous every 8 hours  insulin glargine Injectable (LANTUS) 28 Unit(s) SubCutaneous at bedtime  insulin lispro (HumaLOG) corrective regimen sliding scale   SubCutaneous three times a day before meals  insulin lispro (HumaLOG) corrective regimen sliding scale   SubCutaneous at bedtime  insulin lispro Injectable (HumaLOG) 10 Unit(s) SubCutaneous three times a day before meals  metoprolol tartrate 25 milliGRAM(s) Oral two times a day  pantoprazole    Tablet 40 milliGRAM(s) Oral before breakfast  senna 2 Tablet(s) Oral at bedtime  sodium chloride 0.9%. 1000 milliLiter(s) (100 mL/Hr) IV Continuous <Continuous>    MEDICATIONS  (PRN):  dextrose 40% Gel 15 Gram(s) Oral once PRN Blood Glucose LESS THAN 70 milliGRAM(s)/deciliter  glucagon  Injectable 1 milliGRAM(s) IntraMuscular once PRN Glucose LESS THAN 70 milligrams/deciliter  polyethylene glycol 3350 17 Gram(s) Oral daily PRN Constipation      Vital Signs Last 24 Hrs  T(C): 36.3 (30 Jan 2020 11:15), Max: 36.8 (29 Jan 2020 21:09)  T(F): 97.4 (30 Jan 2020 11:15), Max: 98.3 (29 Jan 2020 21:09)  HR: 68 (30 Jan 2020 11:15) (68 - 83)  BP: 169/88 (30 Jan 2020 11:15) (129/66 - 169/88)  BP(mean): --  RR: 18 (30 Jan 2020 11:15) (18 - 18)  SpO2: 99% (30 Jan 2020 11:15) (96% - 99%)  CAPILLARY BLOOD GLUCOSE      POCT Blood Glucose.: 93 mg/dL (30 Jan 2020 11:31)  POCT Blood Glucose.: 184 mg/dL (30 Jan 2020 08:17)  POCT Blood Glucose.: 256 mg/dL (29 Jan 2020 22:02)  POCT Blood Glucose.: 272 mg/dL (29 Jan 2020 17:14)    I&O's Summary    29 Jan 2020 07:01  -  30 Jan 2020 07:00  --------------------------------------------------------  IN: 2100 mL / OUT: 0 mL / NET: 2100 mL        PHYSICAL EXAM:  GENERAL: NAD, Obese male   CHEST/LUNG: Clear to auscultation bilaterally; No wheeze  HEART: Regular rate and rhythm; No murmurs, rubs, or gallops  ABDOMEN: Soft, Nontender, Nondistended; Bowel sounds present  EXTREMITIES:  2+ Peripheral Pulses, No clubbing, cyanosis, or edema  NEUROLOGY: AAOx3, non-focal  PSYCH: calm  SKIN: No rashes or lesions    LABS:                        10.8   5.91  )-----------( 153      ( 30 Jan 2020 06:06 )             32.1     01-30    134<L>  |  104  |  38<H>  ----------------------------<  253<H>  4.2   |  20<L>  |  1.91<H>    Ca    8.2<L>      30 Jan 2020 06:05  Mg     2.0     01-29                RADIOLOGY & ADDITIONAL TESTS:  Results Reviewed:   Imaging Personally Reviewed:  Electrocardiogram Personally Reviewed:    COORDINATION OF CARE:  Care Discussed with Consultants/Other Providers [Y/N]:  Prior or Outpatient Records Reviewed [Y/N]:

## 2020-01-30 NOTE — PROGRESS NOTE ADULT - PROBLEM SELECTOR PLAN 1
-C/p Cath which revealed severe triple vessel disease including lesions at the bifurcation of the LAD/diagonal and distal RCA/RPDA/RPL trifurcation  -Pt will benefit from CABG vs PCI  -Follow up with CT surgery eval  -Tentative plan for procedure on Monday 2/3

## 2020-01-30 NOTE — PROGRESS NOTE ADULT - PROBLEM SELECTOR PLAN 3
-Complaints of chest pain consistent with Stable angina  -Multiple risk factors  -Continue ASA/Statin  -Continue Metoprolol to 25mg BID.  -ECHO revealed EF 55% grossly normal  -S/p cath with results noted above

## 2020-01-30 NOTE — PROGRESS NOTE ADULT - PROBLEM SELECTOR PLAN 1
Pt with STEPHANIE vs stable CKD in the setting of long standing DM and HTN. Exact duration of STEPHANIE however unknown. UA significant for protein and RBCs. Urine electrolytes consistent with intrinsic disease. Urine Pr/Cr ratio in nephrotic range. Please repeat UA. HepBsAg, HepC, C3, C4 negative. RACHEL, P-ANCA, C-ANCA, Anti-GBM ab, HIV, Parvovirus, serum and urine immunofixation, PLAR-2, and RPR pending. Monitor labs and urine output. Avoid NSAIDs, ACEI/ARBS, RCA and nephrotoxins. Dose medications as per eGFR.

## 2020-01-30 NOTE — CONSULT NOTE ADULT - SUBJECTIVE AND OBJECTIVE BOX
History of Present Illness:  HPI:  65yo male with hx of HTN, DM II, presented to the ED with complaints of acute on chronic left sided exertional chest pain. Pt states he has been having left sided pressure and stabbing chest pain radiating to his sternum and right with activity for the past few months. He states each episode last approximately 1-2 mins and subsides upon resting. He denies associated symptoms of radiation to his back, arm or jaw. He recently was at a wedding which he could not enjoy because dancing would reproduce to the pain. He states he did not pursue and medical attention until this time. Pt states this time his pain was associated with sob up exertion. He denies symptoms of LOC, diaphoresis, palpitations, abd pain, N/V, fever or chills. He denies prior cardiac work up. (2020 12:57)       Past Medical History  HTN (hypertension)  Diabetes      Past Surgical History  History of appendectomy: x 30 yrs ago      MEDICATIONS  (STANDING):  aspirin  chewable 81 milliGRAM(s) Oral daily  atorvastatin 40 milliGRAM(s) Oral at bedtime  dextrose 5%. 1000 milliLiter(s) (50 mL/Hr) IV Continuous <Continuous>  dextrose 50% Injectable 12.5 Gram(s) IV Push once  dextrose 50% Injectable 25 Gram(s) IV Push once  dextrose 50% Injectable 25 Gram(s) IV Push once  heparin  Injectable 5000 Unit(s) SubCutaneous every 8 hours  insulin glargine Injectable (LANTUS) 28 Unit(s) SubCutaneous at bedtime  insulin lispro (HumaLOG) corrective regimen sliding scale   SubCutaneous three times a day before meals  insulin lispro (HumaLOG) corrective regimen sliding scale   SubCutaneous at bedtime  insulin lispro Injectable (HumaLOG) 10 Unit(s) SubCutaneous three times a day before meals  metoprolol tartrate 25 milliGRAM(s) Oral two times a day  pantoprazole    Tablet 40 milliGRAM(s) Oral before breakfast  senna 2 Tablet(s) Oral at bedtime  sodium chloride 0.9%. 1000 milliLiter(s) (100 mL/Hr) IV Continuous <Continuous>    MEDICATIONS  (PRN):  dextrose 40% Gel 15 Gram(s) Oral once PRN Blood Glucose LESS THAN 70 milliGRAM(s)/deciliter  glucagon  Injectable 1 milliGRAM(s) IntraMuscular once PRN Glucose LESS THAN 70 milligrams/deciliter  polyethylene glycol 3350 17 Gram(s) Oral daily PRN Constipation      Vital Signs Last 24 Hrs  T(C): 36.3 (20 @ 11:15), Max: 36.8 (20 @ 21:09)  T(F): 97.4 (20 @ 11:15), Max: 98.3 (20 @ 21:09)  HR: 68 (20 @ 11:15) (68 - 83)  BP: 169/88 (20 @ 11:15) (129/66 - 169/88)  RR: 18 (20 @ 11:15) (18 - 18)  SpO2: 99% (20 @ 11:15) (96% - 99%)           Daily     Daily Weight in k.8 (2020 08:34)  Admit Wt: Drug Dosing Weight  Height (cm): 172.72 (2020 23:16)  Weight (kg): 81.6 (2020 23:16)  BMI (kg/m2): 27.4 (2020 23:16)  BSA (m2): 1.95 (2020 23:16)    Allergies: No Known Allergies      SOCIAL HISTORY:  Smoker: [ ] Yes  [X] No                 Pt denies  ETOH use: [ ] Yes  [X] No             Pt denies  Ilicit Drug use:  [ ] Yes  [X] No     Pt denies    FAMILY HISTORY:  FH: type 2 diabetes      Review of Systems  GENERAL:  no weakness, fatigue, fevers or chills  NEURO: no dizziness, numbness, tingling or weakness  SKIN: no itching, burning, rashes, or lesions   HEENT: no visual changes;  no headache, no vertigo, no recent colds  RESPIRATORY: no shortness of breath, no cough, sputum, wheezing  CARDIOVASCULAR:  no chest pain,  or palpitations  GI: no abd pain. no N/V/D.  PERIPHERAL VASCULAR: no swelling, no tenderness, no erythema    PHYSICAL EXAM  General: Well nourished, well developed, NAD.                                              Neuro: Normal exam oriented to person/place & time with no focal motor or sensory  deficits.                    Eyes: Normal exam of conjunctiva & lids, pupils equally reactive.   ENT: Normal exam of nasal/oral mucosa with absence of cyanosis.   Neck: Normal exam of jugular veins, trachea & thyroid.   Chest: Normal lung exam with good air movement absence of wheezes, rales, or rhonchi.                                                                         CV:  Auscultation: normal S1S2, RRR   Carotids: No Bruits[X]  Abdominal Aorta: normal [X] nonpalpable[X]                                                                         GI: Normal exam of abdomen with no noted masses or tenderness. +BSx4Q                                                                                            Extremities: Normal no evidence of cyanosis or deformity, Edema: none  Lower Extremity Pulses: Right[+2DP] Left[+2DP] Varicosities[none]  SKIN : Normal exam to inspection & palpation.                                                           LABS:                        10.8   5.91  )-----------( 153      ( 2020 06:06 )             32.1     01    134<L>  |  104  |  38<H>  ----------------------------<  253<H>  4.2   |  20<L>  |  1.91<H>    Ca    8.2<L>      2020 06:05  Mg     2.0                 Cardiac Cath:    TTE / OLGA: History of Present Illness:  65yo male with hx of HTN, DM II, presented to the ED with complaints of acute on chronic left sided exertional chest pain. Pt states he has been having left sided pressure and stabbing chest pain radiating to his sternum and right with activity for the past few months. He states each episode last approximately 1-2 mins and subsides upon resting. He denies associated symptoms of radiation to his back, arm or jaw. He recently was at a wedding which he could not enjoy because dancing would reproduce to the pain. He states he did not pursue and medical attention until this time. Pt states this time his pain was associated with sob up exertion. He denies symptoms of LOC, diaphoresis, palpitations, abd pain, N/V, fever or chills. He denies prior cardiac work up. (25 Jan 2020 12:57)       Past Medical History  HTN (hypertension)  Diabetes      Past Surgical History  History of appendectomy: x 30 yrs ago      MEDICATIONS  (STANDING):  aspirin  chewable 81 milliGRAM(s) Oral daily  atorvastatin 40 milliGRAM(s) Oral at bedtime  heparin  Injectable 5000 Unit(s) SubCutaneous every 8 hours  insulin glargine Injectable (LANTUS) 28 Unit(s) SubCutaneous at bedtime  insulin lispro (HumaLOG) corrective regimen sliding scale   SubCutaneous three times a day before meals  insulin lispro (HumaLOG) corrective regimen sliding scale   SubCutaneous at bedtime  insulin lispro Injectable (HumaLOG) 10 Unit(s) SubCutaneous three times a day before meals  metoprolol tartrate 25 milliGRAM(s) Oral two times a day  pantoprazole    Tablet 40 milliGRAM(s) Oral before breakfast  senna 2 Tablet(s) Oral at bedtime  sodium chloride 0.9%. 1000 milliLiter(s) (100 mL/Hr) IV Continuous <Continuous>      Vital Signs Last 24 Hrs  T(C): 36.3 (01-30-20 @ 11:15), Max: 36.8 (01-29-20 @ 21:09)  T(F): 97.4 (01-30-20 @ 11:15), Max: 98.3 (01-29-20 @ 21:09)  HR: 68 (01-30-20 @ 11:15) (68 - 83)  BP: 169/88 (01-30-20 @ 11:15) (129/66 - 169/88)  RR: 18 (01-30-20 @ 11:15) (18 - 18)  SpO2: 99% (01-30-20 @ 11:15) (96% - 99%)             Height (cm): 172.72   Weight (kg): 81.6   BMI (kg/m2): 27.4   (24 Jan 2020 23:16)      Allergies: No Known Allergies      Social History:  Lives at home with wife and son  Current . Planning on quitting in 1 month  Smoker: [ ] Yes  [X] No                 Pt denies  ETOH use: [ ] Yes  [X] No             Pt denies      FAMILY HISTORY:  FH: type 2 diabetes      Review of Systems  GENERAL:  no weakness, fatigue, fevers or chills  NEURO: no dizziness, numbness, tingling or weakness  SKIN: no itching, burning, rashes, or lesions   HEENT: no visual changes;  no headache, no vertigo, no recent colds  RESPIRATORY: no shortness of breath, no cough, sputum, wheezing  CARDIOVASCULAR:  no chest pain,  or palpitations  GI: no abd pain. no N/V/D.  PERIPHERAL VASCULAR: no swelling, no tenderness, no erythema      PHYSICAL EXAM  General: Well nourished, well developed, NAD.                                              Neuro: Normal exam oriented to person/place & time with no focal motor or sensory  deficits.                    Eyes: Normal exam of conjunctiva & lids, pupils equally reactive.   ENT: Normal exam of nasal/oral mucosa with absence of cyanosis.   Neck: Normal exam of jugular veins, trachea & thyroid.   Chest: Normal lung exam with good air movement absence of wheezes, rales, or rhonchi.                                                                         CV:  Auscultation: normal S1S2, RRR   Carotids: No Bruits[X]  Abdominal Aorta: normal [X] nonpalpable[X]                                                                         GI: Normal exam of abdomen with no noted masses or tenderness. +BSx4Q                                                                                            Extremities: Normal no evidence of cyanosis or deformity, Edema: none  Lower Extremity Pulses: Right[+2DP] Left[+2DP] Varicosities[none]  SKIN : Normal exam to inspection & palpation. Right groin CDI                                                            LABS:                        10.8   5.91  )-----------( 153      ( 30 Jan 2020 06:06 )             32.1     134<L>  |  104  |  38<H>  ----------------------------<  253<H>  4.2   |  20<L>  |  1.91<H>        < from: Cardiac Cath Lab - Adult (01.29.20 @ 14:29) >  CORONARY VESSELS: The coronary circulation is right dominant.  LM:   --  LM: Normal.  LAD:   --  Proximal LAD: There was a discrete 80 % stenosis.  --  Distal LAD: There was a 60 % stenosis.  CX:   --  OM1: There was a 80 % stenosis.  RI:   --  Ramus intermedius: The vessel was small to medium sized.  Angiography showed severe atherosclerosis.  RCA:   --  Distal RCA: There was a 95 % stenosis.  COMPLICATIONS: There were no complications.      < from: Transthoracic Echocardiogram (01.26.20 @ 10:04) >  Observations:  Mitral Valve: Normal mitral valve. Minimal mitral  regurgitation.  Aortic Valve/Aorta: Aortic valve not well visualized;  probably normal. Peak transaortic valve gradient equals 6  mm Hg. Mild aortic regurgitation. Peak left ventricular  outflow tract gradient equals 3 mm Hg.  Aortic Root: 3.4 cm.  LVOT diameter: 2 cm.  Left Atrium: Normal left atrium.  LA volume index = 20  cc/m2.  Left Ventricle: Endocardium not well visualized; grossly  low normal left ventricular systolic function. Consider  repeat imaging with intravenous echo contrast to better  visualize the LV if clinically indicated. Normal left  ventricular internal dimensions and wall thickness.  Right Heart: Normal right atrium. Normal right ventricular  size and function. Tricuspid valve not well visualized,  probably normal. Minimal tricuspid regurgitation. Normal  pulmonic valve. No pulmonic regurgitation.  Pericardium/Pleura: Normal pericardium with no pericardial  effusion.  Hemodynamic: Estimated right atrial pressure is 8 mm Hg.  Inadequate tricuspid regurgitation Doppler envelope  precludes estimation of RVSP.

## 2020-01-30 NOTE — PROGRESS NOTE ADULT - ATTENDING COMMENTS
Patient seen and examined.  Agree with above NP note.  cv stable  no rest angina  cath with severe three vessel disease with bifurcations lesions of distal RCA, LAD  in light of diabetes, a1c 9.2, 3 vessel disease, ckd, high risk if quan with multiple staged intrerventions needed for high risk lesions including 2 bifurcation lesions CABG is recommended  await CTS eval Dr Quintero  cont asa, statin

## 2020-01-30 NOTE — PROGRESS NOTE ADULT - PROVIDER SPECIALTY LIST ADULT
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 [Sclera] : the conjunctiva were normal [Outer Ear] : the ears and nose were normal in appearance [Examination Of The Oral Cavity] : mucous membranes were moist and pink [Auscultation Breath Sounds / Voice Sounds] : breath sounds clear to auscultation bilaterally [Normal Chest Appearance] : the chest was normal in appearance [Chest Palpation Tender Sternum] : no chest wall tenderness [Apical Impulse] : quiet precordium with normal apical impulse [Heart Rate And Rhythm] : normal heart rate and rhythm [Heart Sounds] : normal S1 and S2 [Heart Sounds Gallop] : no gallops [Heart Sounds Pericardial Friction Rub] : no pericardial rub [Heart Sounds Click] : no clicks [Arterial Pulses] : normal upper and lower extremity pulses with no pulse delay [Edema] : no edema [Capillary Refill Test] : normal capillary refill [III] : a grade 3/6   [LMSB] : LMSB  [Holosystolic] : holosystolic [High] : high pitched [Harsh] : harsh [Bowel Sounds] : normal bowel sounds [Abdomen Soft] : soft [Nondistended] : nondistended [Abdomen Tenderness] : non-tender [Musculoskeletal Exam: Normal Movement Of All Extremities] : normal movements of all extremities [Nail Clubbing] : no clubbing  or cyanosis of the fingers [] : no rash [Skin Lesions] : no lesions [Skin Turgor] : normal turgor [FreeTextEntry1] : bowed legs, club feet bl

## 2020-01-30 NOTE — PROGRESS NOTE ADULT - ATTENDING COMMENTS
65 yo [place] male with DM poorly controlled, HTN with likely CKD3  He has echogenic kidney on u/s and has diabetic retinopathy and neuropathy with nephropathy  Had cath and multivessel disease  Renal function stable  Dc fluids  Trend renal function  Serologies ordered    Chacha Dimas MD  Off: [phone]  Cell: [phone]

## 2020-01-30 NOTE — CONSULT NOTE ADULT - PROBLEM SELECTOR RECOMMENDATION 9
Continue asa 81 daily, metoprolol 25 BID and atorvastatin 40 HS  Continue pre-op cardiac surgery workup  Check US Carotids/PFTs  Plan for OR Monday 2/3 with Dr. Mariscal

## 2020-01-30 NOTE — CONSULT NOTE ADULT - ASSESSMENT
65 y/o male with PMhx of HTN, DM II on insulin, CKD who presented to the ED with complaints of acute on chronic left sided exertional CP x few months which subsides upon resting. Pt had abnormal stress test and now s/p cardiac cath demonstrating multivessel CAD. CT Surgery consulted for CABG evaluation.

## 2020-01-30 NOTE — PROGRESS NOTE ADULT - SUBJECTIVE AND OBJECTIVE BOX
[facility] DIVISION OF KIDNEY DISEASES AND HYPERTENSION -- FOLLOW UP NOTE  --------------------------------------------------------------------------------  HPI: 65yo male with PMH of HTN, DM II (20 years), presented to the ED with complaints of acute on chronic left sided exertional chest pain. Nephrology consulted for elevated serum creatinine and pre-CATH assessment. [facility] available. On admission (1/25/2020), Scr was 1.85 and has remained stable, today is 2.01. Patient went for cardiac cath yesterday which revealed triple vessel disease. Patient was pre-medicated prior to cath with IV fluids, Scr today is 1.91.    Patient evaluated at bedside, in no acute distress. Pending Cardiology follow up and CT Surgery evaluation.    PAST HISTORY  --------------------------------------------------------------------------------  No significant changes to PMH, PSH, FHx, SHx, unless otherwise noted    ALLERGIES & MEDICATIONS  --------------------------------------------------------------------------------  Allergies    No Known Allergies    Intolerances    Standing Inpatient Medications  aspirin  chewable 81 milliGRAM(s) Oral daily  atorvastatin 40 milliGRAM(s) Oral at bedtime  dextrose 5%. 1000 milliLiter(s) IV Continuous <Continuous>  dextrose 50% Injectable 12.5 Gram(s) IV Push once  dextrose 50% Injectable 25 Gram(s) IV Push once  dextrose 50% Injectable 25 Gram(s) IV Push once  heparin  Injectable 5000 Unit(s) SubCutaneous every 8 hours  insulin glargine Injectable (LANTUS) 28 Unit(s) SubCutaneous at bedtime  insulin lispro (HumaLOG) corrective regimen sliding scale   SubCutaneous three times a day before meals  insulin lispro (HumaLOG) corrective regimen sliding scale   SubCutaneous at bedtime  insulin lispro Injectable (HumaLOG) 10 Unit(s) SubCutaneous three times a day before meals  metoprolol tartrate 25 milliGRAM(s) Oral two times a day  pantoprazole    Tablet 40 milliGRAM(s) Oral before breakfast  senna 2 Tablet(s) Oral at bedtime  sodium chloride 0.9%. 1000 milliLiter(s) IV Continuous <Continuous>    REVIEW OF SYSTEMS  --------------------------------------------------------------------------------  Gen: no lethargy  Respiratory: No dyspnea  CV: No chest pain  GI: No abdominal pain  MSK: no LE edema  Neuro: No dizziness  Heme: No bleeding    All other systems were reviewed and are negative, except as noted.    VITALS/PHYSICAL EXAM  --------------------------------------------------------------------------------  T(C): 36.3 (01-30-20 @ 11:15), Max: 36.8 (01-29-20 @ 21:09)  HR: 68 (01-30-20 @ 11:15) (68 - 83)  BP: 169/88 (01-30-20 @ 11:15) (129/66 - 169/88)  RR: 18 (01-30-20 @ 11:15) (18 - 18)  SpO2: 99% (01-30-20 @ 11:15) (96% - 99%)  Wt(kg): --    01-29-20 @ 07:01  -  01-30-20 @ 07:00  --------------------------------------------------------  IN: 2100 mL / OUT: 0 mL / NET: 2100 mL    Physical Exam:  	Gen: NAD  	HEENT: MMM  	Pulm: CTA B/L  	CV: S1S2  	Abd: Soft, +BS, obese   	Ext: trace LE edema B/L  	Neuro: Awake  	Skin: Warm and dry, no rash	    LABS/STUDIES  --------------------------------------------------------------------------------              10.8   5.91  >-----------<  153      [01-30-20 @ 06:06]              32.1     134  |  104  |  38  ----------------------------<  253      [01-30-20 @ 06:05]  4.2   |  20  |  1.91        Ca     8.2     [01-30-20 @ 06:05]      Mg     2.0     [01-29-20 @ 06:12]    Creatinine Trend:  SCr 1.91 [01-30 @ 06:05]  SCr 2.03 [01-29 @ 06:12]  SCr 2.01 [01-28 @ 05:39]  SCr 1.97 [01-27 @ 06:10]  SCr 1.83 [01-26 @ 06:31]

## 2020-01-30 NOTE — CONSULT NOTE ADULT - SUBJECTIVE AND OBJECTIVE BOX
HPI:  65yo male with hx of HTN, DM II, presented to the ED with complaints of acute on chronic left sided exertional chest pain. Pt states he has been having left sided pressure and stabbing chest pain radiating to his sternum and right with activity for the past few months. He states each episode last approximately 1-2 mins and subsides upon resting. He denies associated symptoms of radiation to his back, arm or jaw. He recently was at a wedding which he could not enjoy because dancing would reproduce to the pain. He states he did not pursue and medical attention until this time. Pt states this time his pain was associated with sob up exertion. He denies symptoms of LOC, diaphoresis, palpitations, abd pain, N/V, fever or chills. He denies prior cardiac work up. (25 Jan 2020 12:57)  Patient has history of diabetes, A1C 9.2%, was on insulin and oral meds at home (Tresiba 30u at bedtime, Glyburide 10mg BID, Metformin 500mg BID), no recent hypoglycemic episodes, no polyuria polydipsia. Patient follows up with PCP for diabetes management.  Endo was consulted for perioperative glycemic control.    PAST MEDICAL & SURGICAL HISTORY:  HTN (hypertension)  Diabetes  History of appendectomy: x 30 yrs ago      FAMILY HISTORY:  FH: type 2 diabetes      Social History:    Outpatient Medications:    MEDICATIONS  (STANDING):  aspirin  chewable 81 milliGRAM(s) Oral daily  atorvastatin 40 milliGRAM(s) Oral at bedtime  dextrose 5%. 1000 milliLiter(s) (50 mL/Hr) IV Continuous <Continuous>  dextrose 50% Injectable 12.5 Gram(s) IV Push once  dextrose 50% Injectable 25 Gram(s) IV Push once  dextrose 50% Injectable 25 Gram(s) IV Push once  heparin  Injectable 5000 Unit(s) SubCutaneous every 8 hours  insulin glargine Injectable (LANTUS) 28 Unit(s) SubCutaneous at bedtime  insulin lispro (HumaLOG) corrective regimen sliding scale   SubCutaneous three times a day before meals  insulin lispro (HumaLOG) corrective regimen sliding scale   SubCutaneous at bedtime  insulin lispro Injectable (HumaLOG) 10 Unit(s) SubCutaneous three times a day before meals  metoprolol tartrate 25 milliGRAM(s) Oral two times a day  pantoprazole    Tablet 40 milliGRAM(s) Oral before breakfast  senna 2 Tablet(s) Oral at bedtime  sodium chloride 0.9%. 1000 milliLiter(s) (100 mL/Hr) IV Continuous <Continuous>    MEDICATIONS  (PRN):  dextrose 40% Gel 15 Gram(s) Oral once PRN Blood Glucose LESS THAN 70 milliGRAM(s)/deciliter  glucagon  Injectable 1 milliGRAM(s) IntraMuscular once PRN Glucose LESS THAN 70 milligrams/deciliter  polyethylene glycol 3350 17 Gram(s) Oral daily PRN Constipation      Allergies    No Known Allergies    Intolerances      Review of Systems:  Constitutional: No fever, no chills  Eyes: No blurry vision  Neuro: No tremors  HEENT: No pain, no neck swelling  Cardiovascular: No chest pain, no palpitations  Respiratory: Has SOB, no cough  GI: No nausea, vomiting, abdominal pain  : No dysuria  Skin: no rash  MSK: Has leg swelling.  Psych: no depression  Endocrine: no polyuria, polydipsia    ALL OTHER SYSTEMS REVIEWED AND NEGATIVE    UNABLE TO OBTAIN    PHYSICAL EXAM:  VITALS: T(C): 36.3 (01-30-20 @ 11:15)  T(F): 97.4 (01-30-20 @ 11:15), Max: 98.3 (01-29-20 @ 21:09)  HR: 68 (01-30-20 @ 11:15) (68 - 83)  BP: 169/88 (01-30-20 @ 11:15) (129/66 - 169/88)  RR:  (18 - 18)  SpO2:  (96% - 99%)  Wt(kg): --  GENERAL: NAD, well-groomed, well-developed  EYES: No proptosis, no lid lag  HEENT:  Atraumatic, Normocephalic  THYROID: Normal size, no palpable nodules  RESPIRATORY: Clear to auscultation bilaterally; No rales, rhonchi, wheezing  CARDIOVASCULAR: Si S2, No murmurs;  GI: Soft, non distended, normal bowel sounds  SKIN: Dry, intact, No rashes or lesions  MUSCULOSKELETAL: Has BL lower extremity edema.  NEURO:  no tremor, sensation decreased in feet BL,    POCT Blood Glucose.: 102 mg/dL (01-30-20 @ 13:15)  POCT Blood Glucose.: 93 mg/dL (01-30-20 @ 11:31)  POCT Blood Glucose.: 184 mg/dL (01-30-20 @ 08:17)  POCT Blood Glucose.: 256 mg/dL (01-29-20 @ 22:02)  POCT Blood Glucose.: 272 mg/dL (01-29-20 @ 17:14)  POCT Blood Glucose.: 70 mg/dL (01-29-20 @ 11:56)  POCT Blood Glucose.: 94 mg/dL (01-29-20 @ 08:13)  POCT Blood Glucose.: 275 mg/dL (01-28-20 @ 21:30)  POCT Blood Glucose.: 182 mg/dL (01-28-20 @ 16:36)  POCT Blood Glucose.: 97 mg/dL (01-28-20 @ 12:09)  POCT Blood Glucose.: 133 mg/dL (01-28-20 @ 08:03)  POCT Blood Glucose.: 241 mg/dL (01-27-20 @ 21:47)  POCT Blood Glucose.: 170 mg/dL (01-27-20 @ 16:38)                            10.8   5.91  )-----------( 153      ( 30 Jan 2020 06:06 )             32.1       01-30    134<L>  |  104  |  38<H>  ----------------------------<  253<H>  4.2   |  20<L>  |  1.91<H>    EGFR if : 42<L>  EGFR if non : 36<L>    Ca    8.2<L>      01-30  Mg     2.0     01-29        Thyroid Function Tests:      Hemoglobin A1C, Whole Blood: 9.2 % <H> [4.0 - 5.6] (01-26-20 @ 09:03)          Radiology: HPI:  63yo male with hx of HTN, DM II, presented to the ED with complaints of acute on chronic left sided exertional chest pain. Pt states he has been having left sided pressure and stabbing chest pain radiating to his sternum and right with activity for the past few months. He states each episode last approximately 1-2 mins and subsides upon resting. He denies associated symptoms of radiation to his back, arm or jaw. He recently was at a wedding which he could not enjoy because dancing would reproduce to the pain. He states he did not pursue and medical attention until this time. Pt states this time his pain was associated with sob up exertion. He denies symptoms of LOC, diaphoresis, palpitations, abd pain, N/V, fever or chills. He denies prior cardiac work up. (25 Jan 2020 12:57)  Patient has history of diabetes, A1C 9.2%, was on insulin and oral meds at home (Tresiba 30u at bedtime, Glyburide 10mg BID, Metformin 500mg BID), no recent hypoglycemic episodes, no polyuria polydipsia. Patient follows up with PCP for diabetes management.  Endo was consulted for perioperative glycemic control.    PAST MEDICAL & SURGICAL HISTORY:  HTN (hypertension)  Diabetes  History of appendectomy: x 30 yrs ago      FAMILY HISTORY:  FH: type 2 diabetes      Social History:    Outpatient Medications:    MEDICATIONS  (STANDING):  aspirin  chewable 81 milliGRAM(s) Oral daily  atorvastatin 40 milliGRAM(s) Oral at bedtime  dextrose 5%. 1000 milliLiter(s) (50 mL/Hr) IV Continuous <Continuous>  dextrose 50% Injectable 12.5 Gram(s) IV Push once  dextrose 50% Injectable 25 Gram(s) IV Push once  dextrose 50% Injectable 25 Gram(s) IV Push once  heparin  Injectable 5000 Unit(s) SubCutaneous every 8 hours  insulin glargine Injectable (LANTUS) 28 Unit(s) SubCutaneous at bedtime  insulin lispro (HumaLOG) corrective regimen sliding scale   SubCutaneous three times a day before meals  insulin lispro (HumaLOG) corrective regimen sliding scale   SubCutaneous at bedtime  insulin lispro Injectable (HumaLOG) 10 Unit(s) SubCutaneous three times a day before meals  metoprolol tartrate 25 milliGRAM(s) Oral two times a day  pantoprazole    Tablet 40 milliGRAM(s) Oral before breakfast  senna 2 Tablet(s) Oral at bedtime  sodium chloride 0.9%. 1000 milliLiter(s) (100 mL/Hr) IV Continuous <Continuous>    MEDICATIONS  (PRN):  dextrose 40% Gel 15 Gram(s) Oral once PRN Blood Glucose LESS THAN 70 milliGRAM(s)/deciliter  glucagon  Injectable 1 milliGRAM(s) IntraMuscular once PRN Glucose LESS THAN 70 milligrams/deciliter  polyethylene glycol 3350 17 Gram(s) Oral daily PRN Constipation      Allergies    No Known Allergies    Intolerances      Review of Systems:  Constitutional: No fever, no chills  Eyes: No blurry vision  Neuro: No tremors  HEENT: No pain, no neck swelling  Cardiovascular: No chest pain, no palpitations  Respiratory: Has SOB, no cough  GI: No nausea, vomiting, abdominal pain  : No dysuria  Skin: no rash  MSK: Has leg swelling.  Psych: no depression  Endocrine: no polyuria, polydipsia    ALL OTHER SYSTEMS REVIEWED AND NEGATIVE    UNABLE TO OBTAIN    PHYSICAL EXAM:  VITALS: T(C): 36.3 (01-30-20 @ 11:15)  T(F): 97.4 (01-30-20 @ 11:15), Max: 98.3 (01-29-20 @ 21:09)  HR: 68 (01-30-20 @ 11:15) (68 - 83)  BP: 169/88 (01-30-20 @ 11:15) (129/66 - 169/88)  RR:  (18 - 18)  SpO2:  (96% - 99%)  Wt(kg): --  GENERAL: NAD, well-groomed, well-developed  EYES: No proptosis, no lid lag  HEENT:  Atraumatic, Normocephalic  THYROID: Normal size, no palpable nodules  RESPIRATORY: Clear to auscultation bilaterally; No rales, rhonchi, wheezing  CARDIOVASCULAR: Si S2, No murmurs;  GI: Soft, non distended, normal bowel sounds  SKIN: Dry, intact, No rashes or lesions  MUSCULOSKELETAL: Has BL lower extremity edema.  NEURO:  no tremor, sensation decreased in feet BL,    POCT Blood Glucose.: 102 mg/dL (01-30-20 @ 13:15)  POCT Blood Glucose.: 93 mg/dL (01-30-20 @ 11:31)  POCT Blood Glucose.: 184 mg/dL (01-30-20 @ 08:17)  POCT Blood Glucose.: 256 mg/dL (01-29-20 @ 22:02)  POCT Blood Glucose.: 272 mg/dL (01-29-20 @ 17:14)  POCT Blood Glucose.: 70 mg/dL (01-29-20 @ 11:56)  POCT Blood Glucose.: 94 mg/dL (01-29-20 @ 08:13)  POCT Blood Glucose.: 275 mg/dL (01-28-20 @ 21:30)  POCT Blood Glucose.: 182 mg/dL (01-28-20 @ 16:36)  POCT Blood Glucose.: 97 mg/dL (01-28-20 @ 12:09)  POCT Blood Glucose.: 133 mg/dL (01-28-20 @ 08:03)  POCT Blood Glucose.: 241 mg/dL (01-27-20 @ 21:47)  POCT Blood Glucose.: 170 mg/dL (01-27-20 @ 16:38)                            10.8   5.91  )-----------( 153      ( 30 Jan 2020 06:06 )             32.1       01-30    134<L>  |  104  |  38<H>  ----------------------------<  253<H>  4.2   |  20<L>  |  1.91<H>    EGFR if : 42<L>  EGFR if non : 36<L>    Ca    8.2<L>      01-30  Mg     2.0     01-29        Thyroid Function Tests:      Hemoglobin A1C, Whole Blood: 9.2 % <H> [4.0 - 5.6] (01-26-20 @ 09:03)          Radiology:

## 2020-01-31 LAB
ANION GAP SERPL CALC-SCNC: 10 MMOL/L — SIGNIFICANT CHANGE UP (ref 5–17)
BUN SERPL-MCNC: 32 MG/DL — HIGH (ref 7–23)
CALCIUM SERPL-MCNC: 8.7 MG/DL — SIGNIFICANT CHANGE UP (ref 8.4–10.5)
CHLORIDE SERPL-SCNC: 108 MMOL/L — SIGNIFICANT CHANGE UP (ref 96–108)
CO2 SERPL-SCNC: 20 MMOL/L — LOW (ref 22–31)
CREAT SERPL-MCNC: 1.95 MG/DL — HIGH (ref 0.5–1.3)
GLUCOSE BLDC GLUCOMTR-MCNC: 123 MG/DL — HIGH (ref 70–99)
GLUCOSE BLDC GLUCOMTR-MCNC: 149 MG/DL — HIGH (ref 70–99)
GLUCOSE BLDC GLUCOMTR-MCNC: 172 MG/DL — HIGH (ref 70–99)
GLUCOSE BLDC GLUCOMTR-MCNC: 276 MG/DL — HIGH (ref 70–99)
GLUCOSE SERPL-MCNC: 193 MG/DL — HIGH (ref 70–99)
HCT VFR BLD CALC: 31.4 % — LOW (ref 39–50)
HGB BLD-MCNC: 11.2 G/DL — LOW (ref 13–17)
MCHC RBC-ENTMCNC: 29.2 PG — SIGNIFICANT CHANGE UP (ref 27–34)
MCHC RBC-ENTMCNC: 35.7 GM/DL — SIGNIFICANT CHANGE UP (ref 32–36)
MCV RBC AUTO: 82 FL — SIGNIFICANT CHANGE UP (ref 80–100)
MRSA PCR RESULT.: SIGNIFICANT CHANGE UP
NRBC # BLD: 0 /100 WBCS — SIGNIFICANT CHANGE UP (ref 0–0)
PHOSPHOLIPASE A2 RECEPTOR ELISA: <2 RU/ML — SIGNIFICANT CHANGE UP
PHOSPHOLIPASE A2 RECEPTOR IFA: NEGATIVE — SIGNIFICANT CHANGE UP
PLATELET # BLD AUTO: 154 K/UL — SIGNIFICANT CHANGE UP (ref 150–400)
POTASSIUM SERPL-MCNC: 4.3 MMOL/L — SIGNIFICANT CHANGE UP (ref 3.5–5.3)
POTASSIUM SERPL-SCNC: 4.3 MMOL/L — SIGNIFICANT CHANGE UP (ref 3.5–5.3)
RBC # BLD: 3.83 M/UL — LOW (ref 4.2–5.8)
RBC # FLD: 11.9 % — SIGNIFICANT CHANGE UP (ref 10.3–14.5)
S AUREUS DNA NOSE QL NAA+PROBE: DETECTED
SODIUM SERPL-SCNC: 138 MMOL/L — SIGNIFICANT CHANGE UP (ref 135–145)
WBC # BLD: 6.06 K/UL — SIGNIFICANT CHANGE UP (ref 3.8–10.5)
WBC # FLD AUTO: 6.06 K/UL — SIGNIFICANT CHANGE UP (ref 3.8–10.5)

## 2020-01-31 PROCEDURE — 99232 SBSQ HOSP IP/OBS MODERATE 35: CPT | Mod: GC

## 2020-01-31 PROCEDURE — 99233 SBSQ HOSP IP/OBS HIGH 50: CPT

## 2020-01-31 RX ORDER — MUPIROCIN 20 MG/G
1 OINTMENT TOPICAL
Refills: 0 | Status: DISCONTINUED | OUTPATIENT
Start: 2020-01-31 | End: 2020-02-03

## 2020-01-31 RX ORDER — MUPIROCIN 20 MG/G
1 OINTMENT TOPICAL
Refills: 0 | Status: DISCONTINUED | OUTPATIENT
Start: 2020-01-31 | End: 2020-01-31

## 2020-01-31 RX ADMIN — PANTOPRAZOLE SODIUM 40 MILLIGRAM(S): 20 TABLET, DELAYED RELEASE ORAL at 05:47

## 2020-01-31 RX ADMIN — INSULIN GLARGINE 30 UNIT(S): 100 INJECTION, SOLUTION SUBCUTANEOUS at 22:42

## 2020-01-31 RX ADMIN — Medication 1: at 08:41

## 2020-01-31 RX ADMIN — Medication 81 MILLIGRAM(S): at 11:06

## 2020-01-31 RX ADMIN — Medication 1: at 22:43

## 2020-01-31 RX ADMIN — ATORVASTATIN CALCIUM 40 MILLIGRAM(S): 80 TABLET, FILM COATED ORAL at 22:42

## 2020-01-31 RX ADMIN — SENNA PLUS 2 TABLET(S): 8.6 TABLET ORAL at 22:42

## 2020-01-31 RX ADMIN — Medication 6 UNIT(S): at 08:40

## 2020-01-31 RX ADMIN — Medication 25 MILLIGRAM(S): at 17:28

## 2020-01-31 RX ADMIN — HEPARIN SODIUM 5000 UNIT(S): 5000 INJECTION INTRAVENOUS; SUBCUTANEOUS at 22:43

## 2020-01-31 RX ADMIN — Medication 8 UNIT(S): at 14:19

## 2020-01-31 RX ADMIN — Medication 25 MILLIGRAM(S): at 05:47

## 2020-01-31 RX ADMIN — MUPIROCIN 1 APPLICATION(S): 20 OINTMENT TOPICAL at 17:29

## 2020-01-31 RX ADMIN — Medication 8 UNIT(S): at 17:29

## 2020-01-31 RX ADMIN — HEPARIN SODIUM 5000 UNIT(S): 5000 INJECTION INTRAVENOUS; SUBCUTANEOUS at 05:53

## 2020-01-31 NOTE — PROGRESS NOTE ADULT - PROBLEM SELECTOR PLAN 10
Transitions of Care Status:  1.  Name of PCP: Dr. Shawn Camara [phone]  2.  PCP Contacted on Admission: [ ] Y    [x ] N    3.  PCP contacted at Discharge: [ ] Y    [ ] N    [ ] N/A  4.  Post-Discharge Appointment Date and Location:  5.  Summary of Handoff given to PCP:
Transitions of Care Status:  1.  Name of PCP: Dr. Shawn Camara [phone]  2.  PCP Contacted on Admission: [ ] Y    [x ] N    3.  PCP contacted at Discharge: [ ] Y    [ ] N    [ ] N/A  4.  Post-Discharge Appointment Date and Location:  5.  Summary of Handoff given to PCP:

## 2020-01-31 NOTE — PROGRESS NOTE ADULT - PROBLEM SELECTOR PLAN 9
DVT ppx HSQ
DVT ppx HSQ
Transitions of Care Status:  1.  Name of PCP: Dr. Shawn Camara [phone]  2.  PCP Contacted on Admission: [ ] Y    [x ] N    3.  PCP contacted at Discharge: [ ] Y    [ ] N    [ ] N/A  4.  Post-Discharge Appointment Date and Location:  5.  Summary of Handoff given to PCP:
Transitions of Care Status:  1.  Name of PCP: Dr. Shawn Camara [phone]  2.  PCP Contacted on Admission: [ ] Y    [x ] N    3.  PCP contacted at Discharge: [ ] Y    [ ] N    [ ] N/A  4.  Post-Discharge Appointment Date and Location:  5.  Summary of Handoff given to PCP:
Transitions of Care Status:  1.  Name of PCP: Dr. Shawn Camara [phone]  2.  PCP Contacted on Admission: [ ] Y    [x ] N    3.  PCP contacted at Discharge: [ ] Y    [ ] N    [ ] N/A  4.  Post-Discharge Appointment Date and Location:  5.  Summary of Handoff given to PCP:

## 2020-01-31 NOTE — PROGRESS NOTE ADULT - PROBLEM SELECTOR PLAN 1
Pt with stable CKD in the setting of long standing DM and HTN. Exact duration of STEPHANIE however unknown. UA significant for protein and RBCs. Urine electrolytes consistent with intrinsic disease. Urine Pr/Cr ratio in nephrotic range. Please repeat UA. HepBsAg, HepC, C3, C4, SIFE negative. RACHEL, P-ANCA, C-ANCA, Anti-GBM ab, HIV, Parvovirus, PLAR-2, and RPR pending. Monitor labs and urine output. Avoid NSAIDs, ACEI/ARBS, RCA and nephrotoxins. Dose medications as per eGFR.

## 2020-01-31 NOTE — PROGRESS NOTE ADULT - SUBJECTIVE AND OBJECTIVE BOX
CC: no cp/sob    TELEMETRY: nsr    PHYSICAL EXAM:    T(C): 36.7 (01-31-20 @ 11:23), Max: 36.7 (01-31-20 @ 11:23)  HR: 72 (01-31-20 @ 11:23) (70 - 82)  BP: 173/87 (01-31-20 @ 11:23) (157/87 - 173/87)  RR: 18 (01-31-20 @ 11:23) (18 - 18)  SpO2: 99% (01-31-20 @ 11:23) (97% - 99%)  Wt(kg): --  I&O's Summary    30 Jan 2020 07:01  -  31 Jan 2020 07:00  --------------------------------------------------------  IN: 504 mL / OUT: 0 mL / NET: 504 mL    31 Jan 2020 07:01  -  31 Jan 2020 11:38  --------------------------------------------------------  IN: 250 mL / OUT: 0 mL / NET: 250 mL        Appearance: Normal	  Cardiovascular: Normal S1 S2,RRR, No JVD, No murmurs  Respiratory: Lungs clear to auscultation	  Gastrointestinal:  Soft, Non-tender, + BS	  Extremities: Normal range of motion, No clubbing, cyanosis or edema  Vascular: Peripheral pulses palpable 2+ bilaterally     LABS:	 	                          11.2   6.06  )-----------( 154      ( 31 Jan 2020 05:57 )             31.4     01-31    138  |  108  |  32<H>  ----------------------------<  193<H>  4.3   |  20<L>  |  1.95<H>    Ca    8.7      31 Jan 2020 05:57        PT/INR - ( 30 Jan 2020 14:20 )   PT: 10.8 sec;   INR: 0.94 ratio         PTT - ( 30 Jan 2020 14:20 )  PTT:45.8 sec    CARDIAC MARKERS:

## 2020-01-31 NOTE — PROGRESS NOTE ADULT - PROBLEM SELECTOR PLAN 1
-C/p Cath which revealed severe triple vessel disease including lesions at the bifurcation of the LAD/diagonal and distal RCA/RPDA/RPL trifurcation  -CT surgery recs noted; Will take pt over to their service with plan for procedure on Monday 2/3

## 2020-01-31 NOTE — PROGRESS NOTE ADULT - PROBLEM SELECTOR PLAN 2
-Asymptomatic currently  -ECHO revealed EF 55% grossly normal  -S/p cath with results noted above  -Cardiology and Nephrology on board  -CT surgery recs noted; Will take pt over to their service with plan for procedure on Monday 2/3

## 2020-01-31 NOTE — PROGRESS NOTE ADULT - SUBJECTIVE AND OBJECTIVE BOX
[facility] DIVISION OF KIDNEY DISEASES AND HYPERTENSION -- FOLLOW UP NOTE  --------------------------------------------------------------------------------  HPI: 63yo male with PMH of HTN, DM II (20 years), presented to the ED with complaints of acute on chronic left sided exertional chest pain. Nephrology consulted for elevated serum creatinine and pre-CATH assessment. [facility] available. On admission (1/25/2020), Scr was 1.85 and has remained stable, today is 2.01. Patient went for cardiac cath on 1/29/2020 which revealed triple vessel disease. Patient was pre-medicated prior to cath with IV fluids, Scr today 48 hours post CATH today is 1.91.     Patient evaluated at bedside, in no acute distress. Evaluated by CT Surgery, planned for CABG during hospital visit.     PAST HISTORY  --------------------------------------------------------------------------------  No significant changes to PMH, PSH, FHx, SHx, unless otherwise noted    ALLERGIES & MEDICATIONS  --------------------------------------------------------------------------------  Allergies    No Known Allergies    Intolerances    Standing Inpatient Medications  aspirin  chewable 81 milliGRAM(s) Oral daily  atorvastatin 40 milliGRAM(s) Oral at bedtime  dextrose 5%. 1000 milliLiter(s) IV Continuous <Continuous>  dextrose 50% Injectable 12.5 Gram(s) IV Push once  dextrose 50% Injectable 25 Gram(s) IV Push once  dextrose 50% Injectable 25 Gram(s) IV Push once  heparin  Injectable 5000 Unit(s) SubCutaneous every 8 hours  insulin glargine Injectable (LANTUS) 30 Unit(s) SubCutaneous at bedtime  insulin lispro (HumaLOG) corrective regimen sliding scale   SubCutaneous three times a day before meals  insulin lispro (HumaLOG) corrective regimen sliding scale   SubCutaneous at bedtime  insulin lispro Injectable (HumaLOG) 6 Unit(s) SubCutaneous before breakfast  insulin lispro Injectable (HumaLOG) 8 Unit(s) SubCutaneous before lunch  insulin lispro Injectable (HumaLOG) 8 Unit(s) SubCutaneous before dinner  metoprolol tartrate 25 milliGRAM(s) Oral two times a day  mupirocin 2% Ointment 1 Application(s) Topical two times a day  pantoprazole    Tablet 40 milliGRAM(s) Oral before breakfast  senna 2 Tablet(s) Oral at bedtime    REVIEW OF SYSTEMS  --------------------------------------------------------------------------------  Gen: no lethargy  Respiratory: No dyspnea  CV: No chest pain  GI: No abdominal pain  MSK: no LE edema  Neuro: No dizziness  Heme: No bleeding    All other systems were reviewed and are negative, except as noted.    VITALS/PHYSICAL EXAM  --------------------------------------------------------------------------------  T(C): 36.5 (01-31-20 @ 04:18), Max: 36.6 (01-30-20 @ 22:05)  HR: 82 (01-31-20 @ 04:18) (68 - 82)  BP: 157/87 (01-31-20 @ 04:18) (157/87 - 169/88)  RR: 18 (01-31-20 @ 04:18) (18 - 18)  SpO2: 97% (01-31-20 @ 04:18) (97% - 99%)  Wt(kg): --    01-30-20 @ 07:01  -  01-31-20 @ 07:00  --------------------------------------------------------  IN: 504 mL / OUT: 0 mL / NET: 504 mL    Physical Exam:  	Gen: NAD  	HEENT: MMM  	Pulm: CTA B/L  	CV: S1S2  	Abd: Soft, +BS   	Ext: No LE edema B/L  	Neuro: Awake  	Skin: Warm and dry  	  LABS/STUDIES  --------------------------------------------------------------------------------              11.2   6.06  >-----------<  154      [01-31-20 @ 05:57]              31.4     138  |  108  |  32  ----------------------------<  193      [01-31-20 @ 05:57]  4.3   |  20  |  1.95        Ca     8.7     [01-31-20 @ 05:57]      PT/INR: PT 10.8 , INR 0.94       [01-30-20 @ 14:20]  PTT: 45.8       [01-30-20 @ 14:20]    Creatinine Trend:  SCr 1.95 [01-31 @ 05:57]  SCr 1.91 [01-30 @ 06:05]  SCr 2.03 [01-29 @ 06:12]  SCr 2.01 [01-28 @ 05:39]  SCr 1.97 [01-27 @ 06:10]

## 2020-01-31 NOTE — PROGRESS NOTE ADULT - SUBJECTIVE AND OBJECTIVE BOX
Julio Cesar Matias M.D. Pager Number [phone]    Patient is a 64y old  Male who presents with a chief complaint of Chest pain (31 Jan 2020 11:37)      SUBJECTIVE / OVERNIGHT EVENTS:  Pt seen and examined at bedside. No acute events overnight.  Pt denies cp, palpitations, sob, abd pain, N/V, fever, chills.    ROS:  All other review of systems negative    Allergies    No Known Allergies    Intolerances        MEDICATIONS  (STANDING):  aspirin  chewable 81 milliGRAM(s) Oral daily  atorvastatin 40 milliGRAM(s) Oral at bedtime  dextrose 5%. 1000 milliLiter(s) (50 mL/Hr) IV Continuous <Continuous>  dextrose 50% Injectable 12.5 Gram(s) IV Push once  dextrose 50% Injectable 25 Gram(s) IV Push once  dextrose 50% Injectable 25 Gram(s) IV Push once  heparin  Injectable 5000 Unit(s) SubCutaneous every 8 hours  insulin glargine Injectable (LANTUS) 30 Unit(s) SubCutaneous at bedtime  insulin lispro (HumaLOG) corrective regimen sliding scale   SubCutaneous three times a day before meals  insulin lispro (HumaLOG) corrective regimen sliding scale   SubCutaneous at bedtime  insulin lispro Injectable (HumaLOG) 6 Unit(s) SubCutaneous before breakfast  insulin lispro Injectable (HumaLOG) 8 Unit(s) SubCutaneous before lunch  insulin lispro Injectable (HumaLOG) 8 Unit(s) SubCutaneous before dinner  metoprolol tartrate 25 milliGRAM(s) Oral two times a day  mupirocin 2% Ointment 1 Application(s) Topical two times a day  pantoprazole    Tablet 40 milliGRAM(s) Oral before breakfast  senna 2 Tablet(s) Oral at bedtime    MEDICATIONS  (PRN):  dextrose 40% Gel 15 Gram(s) Oral once PRN Blood Glucose LESS THAN 70 milliGRAM(s)/deciliter  glucagon  Injectable 1 milliGRAM(s) IntraMuscular once PRN Glucose LESS THAN 70 milligrams/deciliter  polyethylene glycol 3350 17 Gram(s) Oral daily PRN Constipation      Vital Signs Last 24 Hrs  T(C): 36.2 (31 Jan 2020 12:05), Max: 36.7 (31 Jan 2020 11:23)  T(F): 97.2 (31 Jan 2020 12:05), Max: 98.1 (31 Jan 2020 11:23)  HR: 74 (31 Jan 2020 12:05) (70 - 82)  BP: 172/95 (31 Jan 2020 12:05) (157/87 - 173/87)  BP(mean): --  RR: 18 (31 Jan 2020 12:05) (18 - 18)  SpO2: 99% (31 Jan 2020 12:05) (97% - 99%)  CAPILLARY BLOOD GLUCOSE      POCT Blood Glucose.: 172 mg/dL (31 Jan 2020 07:55)  POCT Blood Glucose.: 226 mg/dL (30 Jan 2020 21:41)  POCT Blood Glucose.: 245 mg/dL (30 Jan 2020 17:46)  POCT Blood Glucose.: 242 mg/dL (30 Jan 2020 16:32)  POCT Blood Glucose.: 102 mg/dL (30 Jan 2020 13:15)    I&O's Summary    30 Jan 2020 07:01  -  31 Jan 2020 07:00  --------------------------------------------------------  IN: 504 mL / OUT: 0 mL / NET: 504 mL    31 Jan 2020 07:01  -  31 Jan 2020 12:41  --------------------------------------------------------  IN: 250 mL / OUT: 0 mL / NET: 250 mL        PHYSICAL EXAM:  GENERAL: NAD, Obese male   CHEST/LUNG: Clear to auscultation bilaterally; No wheeze  HEART: Regular rate and rhythm; No murmurs, rubs, or gallops  ABDOMEN: Soft, Nontender, Nondistended; Bowel sounds present  EXTREMITIES:  2+ Peripheral Pulses, No clubbing, cyanosis, or edema  NEUROLOGY: AAOx3, non-focal  PSYCH: calm  SKIN: No rashes or lesions    LABS:                        11.2   6.06  )-----------( 154      ( 31 Jan 2020 05:57 )             31.4     01-31    138  |  108  |  32<H>  ----------------------------<  193<H>  4.3   |  20<L>  |  1.95<H>    Ca    8.7      31 Jan 2020 05:57      PT/INR - ( 30 Jan 2020 14:20 )   PT: 10.8 sec;   INR: 0.94 ratio         PTT - ( 30 Jan 2020 14:20 )  PTT:45.8 sec          RADIOLOGY & ADDITIONAL TESTS:  Results Reviewed:   Imaging Personally Reviewed:  Electrocardiogram Personally Reviewed:    COORDINATION OF CARE:  Care Discussed with Consultants/Other Providers [Y/N]:  Prior or Outpatient Records Reviewed [Y/N]:

## 2020-01-31 NOTE — PROGRESS NOTE ADULT - ASSESSMENT
Assessment  DMT2: 64y Male with DM T2 with hyperglycemia, A1C 9.2%, was on oral meds and insulin at home, now on basal bolus insulin, adjusted dose yesterday, blood sugars improving, no hypoglycemic episodes. Patient is eating meals with good appetite, appears comfortable. Planning CABG Monday.  CAD: Planning CABG 2/3, cardiac workup in progress, on medications, no chest pain, stable, monitored.  HTN: Controlled,  on antihypertensive medications.  HLD: Controlled, on statin.  CKD: Monitor labs/BMP          Harper Matias MD  Cell: [phone]  Office: [phone] Assessment  DMT2: 64y Male with DM T2 with hyperglycemia, A1C 9.2%, was on oral meds and insulin at home, now on basal bolus insulin,  adjusted dose yesterday, blood sugars improving, no hypoglycemic episodes. Patient is eating meals with good appetite, appears comfortable. Planning CABG Monday.  CAD: Planning CABG 2/3, cardiac workup in progress, on medications, no chest pain, stable, monitored.  HTN: Controlled,  on antihypertensive medications.  HLD: Controlled, on statin.  CKD: Monitor labs/BMP          Harper Matias MD  Cell: [phone]  Office: [phone]

## 2020-01-31 NOTE — PROGRESS NOTE ADULT - SUBJECTIVE AND OBJECTIVE BOX
Chief complaint  Patient is a 64y old  Male who presents with a chief complaint of Chest pain (31 Jan 2020 12:41)   Review of systems  Patient in bed, looks comfortable, no hypoglycemia.    Labs and Fingersticks  CAPILLARY BLOOD GLUCOSE      POCT Blood Glucose.: 123 mg/dL (31 Jan 2020 13:35)  POCT Blood Glucose.: 172 mg/dL (31 Jan 2020 07:55)  POCT Blood Glucose.: 226 mg/dL (30 Jan 2020 21:41)  POCT Blood Glucose.: 245 mg/dL (30 Jan 2020 17:46)  POCT Blood Glucose.: 242 mg/dL (30 Jan 2020 16:32)      Anion Gap, Serum: 10 (01-31 @ 05:57)  Anion Gap, Serum: 10 (01-30 @ 06:05)      Calcium, Total Serum: 8.7 (01-31 @ 05:57)  Calcium, Total Serum: 8.2 <L> (01-30 @ 06:05)          01-31    138  |  108  |  32<H>  ----------------------------<  193<H>  4.3   |  20<L>  |  1.95<H>    Ca    8.7      31 Jan 2020 05:57                          11.2   6.06  )-----------( 154      ( 31 Jan 2020 05:57 )             31.4     Medications  MEDICATIONS  (STANDING):  aspirin  chewable 81 milliGRAM(s) Oral daily  atorvastatin 40 milliGRAM(s) Oral at bedtime  dextrose 5%. 1000 milliLiter(s) (50 mL/Hr) IV Continuous <Continuous>  dextrose 50% Injectable 12.5 Gram(s) IV Push once  dextrose 50% Injectable 25 Gram(s) IV Push once  dextrose 50% Injectable 25 Gram(s) IV Push once  heparin  Injectable 5000 Unit(s) SubCutaneous every 8 hours  insulin glargine Injectable (LANTUS) 30 Unit(s) SubCutaneous at bedtime  insulin lispro (HumaLOG) corrective regimen sliding scale   SubCutaneous three times a day before meals  insulin lispro (HumaLOG) corrective regimen sliding scale   SubCutaneous at bedtime  insulin lispro Injectable (HumaLOG) 6 Unit(s) SubCutaneous before breakfast  insulin lispro Injectable (HumaLOG) 8 Unit(s) SubCutaneous before lunch  insulin lispro Injectable (HumaLOG) 8 Unit(s) SubCutaneous before dinner  metoprolol tartrate 25 milliGRAM(s) Oral two times a day  mupirocin 2% Ointment 1 Application(s) Topical two times a day  pantoprazole    Tablet 40 milliGRAM(s) Oral before breakfast  senna 2 Tablet(s) Oral at bedtime      Physical Exam  General: Patient comfortable in bed  Vital Signs Last 12 Hrs  T(F): 97.2 (01-31-20 @ 12:05), Max: 98.1 (01-31-20 @ 11:23)  HR: 74 (01-31-20 @ 12:05) (72 - 82)  BP: 172/95 (01-31-20 @ 12:05) (157/87 - 173/87)  BP(mean): --  RR: 18 (01-31-20 @ 12:05) (18 - 18)  SpO2: 99% (01-31-20 @ 12:05) (97% - 99%)  Neck: No palpable thyroid nodules.  CVS: S1S2, No murmurs  Respiratory: No wheezing, no crepitations  GI: Abdomen soft, bowel sounds positive  Musculoskeletal:  edema lower extremities.   Skin: No skin rashes, no ecchymosis    Diagnostics Chief complaint  Patient is a 64y old  Male who presents with a chief  complaint of Chest pain (31 Jan 2020 12:41)   Review of systems  Patient in bed, looks comfortable, no hypoglycemia.    Labs and Fingersticks  CAPILLARY BLOOD GLUCOSE      POCT Blood Glucose.: 123 mg/dL (31 Jan 2020 13:35)  POCT Blood Glucose.: 172 mg/dL (31 Jan 2020 07:55)  POCT Blood Glucose.: 226 mg/dL (30 Jan 2020 21:41)  POCT Blood Glucose.: 245 mg/dL (30 Jan 2020 17:46)  POCT Blood Glucose.: 242 mg/dL (30 Jan 2020 16:32)      Anion Gap, Serum: 10 (01-31 @ 05:57)  Anion Gap, Serum: 10 (01-30 @ 06:05)      Calcium, Total Serum: 8.7 (01-31 @ 05:57)  Calcium, Total Serum: 8.2 <L> (01-30 @ 06:05)          01-31    138  |  108  |  32<H>  ----------------------------<  193<H>  4.3   |  20<L>  |  1.95<H>    Ca    8.7      31 Jan 2020 05:57                          11.2   6.06  )-----------( 154      ( 31 Jan 2020 05:57 )             31.4     Medications  MEDICATIONS  (STANDING):  aspirin  chewable 81 milliGRAM(s) Oral daily  atorvastatin 40 milliGRAM(s) Oral at bedtime  dextrose 5%. 1000 milliLiter(s) (50 mL/Hr) IV Continuous <Continuous>  dextrose 50% Injectable 12.5 Gram(s) IV Push once  dextrose 50% Injectable 25 Gram(s) IV Push once  dextrose 50% Injectable 25 Gram(s) IV Push once  heparin  Injectable 5000 Unit(s) SubCutaneous every 8 hours  insulin glargine Injectable (LANTUS) 30 Unit(s) SubCutaneous at bedtime  insulin lispro (HumaLOG) corrective regimen sliding scale   SubCutaneous three times a day before meals  insulin lispro (HumaLOG) corrective regimen sliding scale   SubCutaneous at bedtime  insulin lispro Injectable (HumaLOG) 6 Unit(s) SubCutaneous before breakfast  insulin lispro Injectable (HumaLOG) 8 Unit(s) SubCutaneous before lunch  insulin lispro Injectable (HumaLOG) 8 Unit(s) SubCutaneous before dinner  metoprolol tartrate 25 milliGRAM(s) Oral two times a day  mupirocin 2% Ointment 1 Application(s) Topical two times a day  pantoprazole    Tablet 40 milliGRAM(s) Oral before breakfast  senna 2 Tablet(s) Oral at bedtime      Physical Exam  General: Patient comfortable in bed  Vital Signs Last 12 Hrs  T(F): 97.2 (01-31-20 @ 12:05), Max: 98.1 (01-31-20 @ 11:23)  HR: 74 (01-31-20 @ 12:05) (72 - 82)  BP: 172/95 (01-31-20 @ 12:05) (157/87 - 173/87)  BP(mean): --  RR: 18 (01-31-20 @ 12:05) (18 - 18)  SpO2: 99% (01-31-20 @ 12:05) (97% - 99%)  Neck: No palpable thyroid nodules.  CVS: S1S2, No murmurs  Respiratory: No wheezing, no crepitations  GI: Abdomen soft, bowel sounds positive  Musculoskeletal:  edema lower extremities.   Skin: No skin rashes, no ecchymosis    Diagnostics

## 2020-02-01 DIAGNOSIS — E11.9 TYPE 2 DIABETES MELLITUS WITHOUT COMPLICATIONS: ICD-10-CM

## 2020-02-01 LAB
ANION GAP SERPL CALC-SCNC: 11 MMOL/L — SIGNIFICANT CHANGE UP (ref 5–17)
BUN SERPL-MCNC: 34 MG/DL — HIGH (ref 7–23)
CALCIUM SERPL-MCNC: 9.1 MG/DL — SIGNIFICANT CHANGE UP (ref 8.4–10.5)
CHLORIDE SERPL-SCNC: 105 MMOL/L — SIGNIFICANT CHANGE UP (ref 96–108)
CO2 SERPL-SCNC: 22 MMOL/L — SIGNIFICANT CHANGE UP (ref 22–31)
CREAT SERPL-MCNC: 1.96 MG/DL — HIGH (ref 0.5–1.3)
GBM IGG SER-ACNC: <1 AI — SIGNIFICANT CHANGE UP
GLUCOSE BLDC GLUCOMTR-MCNC: 123 MG/DL — HIGH (ref 70–99)
GLUCOSE BLDC GLUCOMTR-MCNC: 135 MG/DL — HIGH (ref 70–99)
GLUCOSE BLDC GLUCOMTR-MCNC: 150 MG/DL — HIGH (ref 70–99)
GLUCOSE BLDC GLUCOMTR-MCNC: 182 MG/DL — HIGH (ref 70–99)
GLUCOSE SERPL-MCNC: 240 MG/DL — HIGH (ref 70–99)
HCT VFR BLD CALC: 33.3 % — LOW (ref 39–50)
HGB BLD-MCNC: 11.2 G/DL — LOW (ref 13–17)
MCHC RBC-ENTMCNC: 28 PG — SIGNIFICANT CHANGE UP (ref 27–34)
MCHC RBC-ENTMCNC: 33.6 GM/DL — SIGNIFICANT CHANGE UP (ref 32–36)
MCV RBC AUTO: 83.3 FL — SIGNIFICANT CHANGE UP (ref 80–100)
NRBC # BLD: 0 /100 WBCS — SIGNIFICANT CHANGE UP (ref 0–0)
PLATELET # BLD AUTO: 156 K/UL — SIGNIFICANT CHANGE UP (ref 150–400)
POTASSIUM SERPL-MCNC: 4.2 MMOL/L — SIGNIFICANT CHANGE UP (ref 3.5–5.3)
POTASSIUM SERPL-SCNC: 4.2 MMOL/L — SIGNIFICANT CHANGE UP (ref 3.5–5.3)
RBC # BLD: 4 M/UL — LOW (ref 4.2–5.8)
RBC # FLD: 12 % — SIGNIFICANT CHANGE UP (ref 10.3–14.5)
SODIUM SERPL-SCNC: 138 MMOL/L — SIGNIFICANT CHANGE UP (ref 135–145)
WBC # BLD: 6.12 K/UL — SIGNIFICANT CHANGE UP (ref 3.8–10.5)
WBC # FLD AUTO: 6.12 K/UL — SIGNIFICANT CHANGE UP (ref 3.8–10.5)

## 2020-02-01 PROCEDURE — 99231 SBSQ HOSP IP/OBS SF/LOW 25: CPT

## 2020-02-01 RX ORDER — MUPIROCIN 20 MG/G
1 OINTMENT TOPICAL
Refills: 0 | Status: DISCONTINUED | OUTPATIENT
Start: 2020-02-01 | End: 2020-02-01

## 2020-02-01 RX ADMIN — MUPIROCIN 1 APPLICATION(S): 20 OINTMENT TOPICAL at 09:01

## 2020-02-01 RX ADMIN — HEPARIN SODIUM 5000 UNIT(S): 5000 INJECTION INTRAVENOUS; SUBCUTANEOUS at 05:42

## 2020-02-01 RX ADMIN — Medication 81 MILLIGRAM(S): at 12:32

## 2020-02-01 RX ADMIN — Medication 6 UNIT(S): at 09:00

## 2020-02-01 RX ADMIN — MUPIROCIN 1 APPLICATION(S): 20 OINTMENT TOPICAL at 17:24

## 2020-02-01 RX ADMIN — Medication 25 MILLIGRAM(S): at 05:43

## 2020-02-01 RX ADMIN — Medication 8 UNIT(S): at 12:30

## 2020-02-01 RX ADMIN — INSULIN GLARGINE 30 UNIT(S): 100 INJECTION, SOLUTION SUBCUTANEOUS at 22:43

## 2020-02-01 RX ADMIN — ATORVASTATIN CALCIUM 40 MILLIGRAM(S): 80 TABLET, FILM COATED ORAL at 22:38

## 2020-02-01 RX ADMIN — Medication 25 MILLIGRAM(S): at 17:24

## 2020-02-01 RX ADMIN — HEPARIN SODIUM 5000 UNIT(S): 5000 INJECTION INTRAVENOUS; SUBCUTANEOUS at 22:38

## 2020-02-01 RX ADMIN — SENNA PLUS 2 TABLET(S): 8.6 TABLET ORAL at 22:38

## 2020-02-01 RX ADMIN — Medication 8 UNIT(S): at 17:22

## 2020-02-01 RX ADMIN — HEPARIN SODIUM 5000 UNIT(S): 5000 INJECTION INTRAVENOUS; SUBCUTANEOUS at 14:42

## 2020-02-01 NOTE — PROGRESS NOTE ADULT - ASSESSMENT
Assessment  DMT2: 64y Male with DM T2 with hyperglycemia, A1C 9.2%, was on oral meds and insulin at home, now on basal bolus insulin,  adjusted dose yesterday, blood sugars improving, no hypoglycemic episodes. Patient is eating meals with good appetite, appears comfortable. Planning CABG Monday.  CAD: Planning CABG 2/3, cardiac workup in progress, on medications, no chest pain, stable, monitored.  HTN: Controlled,  on antihypertensive medications.  HLD: Controlled, on statin.  CKD: Monitor labs/BMP          Harper Matias MD  Cell: [phone]  Office: [phone]

## 2020-02-01 NOTE — PROGRESS NOTE ADULT - ASSESSMENT
64 year old man with history of HTN, DM II, admitted with progressive exertional angina.     1. CAD   -s/p cath revealing severe triple vessel disease including lesions at the bifurcation of the LAD/diagonal and distal RCA/RPDA/RPL trifurcation.   -cr remains stable - renal f/u  -CTS planning for CABG    -cont asa, statin, bb       2. HTN  bp labile  continue bb  avoid acei/arb in setting of STEPHANIE   if needed start norvasc 5mg daily     3. STEPHANIE/CKD  renal f/u     dvt ppx

## 2020-02-01 NOTE — PROGRESS NOTE ADULT - SUBJECTIVE AND OBJECTIVE BOX
VITAL SIGNS    Telemetry:  SR 70-80  Vital Signs Last 24 Hrs  T(C): 36.3 (02-01-20 @ 04:45), Max: 36.3 (01-31-20 @ 19:35)  T(F): 97.3 (02-01-20 @ 04:45), Max: 97.3 (01-31-20 @ 19:35)  HR: 70 (02-01-20 @ 04:45) (70 - 73)  BP: 158/89 (02-01-20 @ 04:45) (149/81 - 158/89)  RR: 18 (02-01-20 @ 04:45) (18 - 18)  SpO2: 100% (02-01-20 @ 04:45) (100% - 100%)            01-31 @ 07:01  -  02-01 @ 07:00  --------------------------------------------------------  IN: 1050 mL / OUT: 1200 mL / NET: -150 mL    02-01 @ 07:01  -  02-01 @ 13:35  --------------------------------------------------------  IN: 240 mL / OUT: 0 mL / NET: 240 mL       Daily     Daily   Admit Wt: Drug Dosing Weight  Height (cm): 172.72 (24 Jan 2020 23:16)  Weight (kg): 81.6 (24 Jan 2020 23:16)  BMI (kg/m2): 27.4 (24 Jan 2020 23:16)  BSA (m2): 1.95 (24 Jan 2020 23:16)      CAPILLARY BLOOD GLUCOSE      POCT Blood Glucose.: 123 mg/dL (01 Feb 2020 12:18)  POCT Blood Glucose.: 135 mg/dL (01 Feb 2020 08:45)  POCT Blood Glucose.: 276 mg/dL (31 Jan 2020 21:51)  POCT Blood Glucose.: 149 mg/dL (31 Jan 2020 17:18)          MEDICATIONS  aspirin  chewable 81 milliGRAM(s) Oral daily  atorvastatin 40 milliGRAM(s) Oral at bedtime  dextrose 40% Gel 15 Gram(s) Oral once PRN  dextrose 5%. 1000 milliLiter(s) IV Continuous <Continuous>  dextrose 50% Injectable 12.5 Gram(s) IV Push once  dextrose 50% Injectable 25 Gram(s) IV Push once  dextrose 50% Injectable 25 Gram(s) IV Push once  glucagon  Injectable 1 milliGRAM(s) IntraMuscular once PRN  heparin  Injectable 5000 Unit(s) SubCutaneous every 8 hours  insulin glargine Injectable (LANTUS) 30 Unit(s) SubCutaneous at bedtime  insulin lispro (HumaLOG) corrective regimen sliding scale   SubCutaneous three times a day before meals  insulin lispro (HumaLOG) corrective regimen sliding scale   SubCutaneous at bedtime  insulin lispro Injectable (HumaLOG) 6 Unit(s) SubCutaneous before breakfast  insulin lispro Injectable (HumaLOG) 8 Unit(s) SubCutaneous before lunch  insulin lispro Injectable (HumaLOG) 8 Unit(s) SubCutaneous before dinner  metoprolol tartrate 25 milliGRAM(s) Oral two times a day  mupirocin 2% Ointment 1 Application(s) Topical two times a day  pantoprazole    Tablet 40 milliGRAM(s) Oral before breakfast  polyethylene glycol 3350 17 Gram(s) Oral daily PRN  senna 2 Tablet(s) Oral at bedtime      >>> <<<  PHYSICAL EXAM  Subjective: NAD  Neurology: alert and oriented x 3, nonfocal, no gross deficits  CV : s1s2  Lungs: CTA b/l  Abdomen: soft, NT,ND, ( +)BM  :  voiding  Extremities: -c/c/e      LABS  02-01    138  |  105  |  34<H>  ----------------------------<  240<H>  4.2   |  22  |  1.96<H>    Ca    9.1      01 Feb 2020 06:10                                   11.2   6.12  )-----------( 156      ( 01 Feb 2020 06:10 )             33.3          PT/INR - ( 30 Jan 2020 14:20 )   PT: 10.8 sec;   INR: 0.94 ratio         PTT - ( 30 Jan 2020 14:20 )  PTT:45.8 sec       PAST MEDICAL & SURGICAL HISTORY:  HTN (hypertension)  Diabetes  History of appendectomy: x 30 yrs ago

## 2020-02-01 NOTE — PROGRESS NOTE ADULT - SUBJECTIVE AND OBJECTIVE BOX
CC: no cp/sob    TELEMETRY: nsr    PHYSICAL EXAM:    T(C): 36.3 (02-01-20 @ 04:45), Max: 36.7 (01-31-20 @ 11:23)  HR: 70 (02-01-20 @ 04:45) (70 - 74)  BP: 158/89 (02-01-20 @ 04:45) (149/81 - 173/87)  RR: 18 (02-01-20 @ 04:45) (18 - 18)  SpO2: 100% (02-01-20 @ 04:45) (99% - 100%)  Wt(kg): --  I&O's Summary    31 Jan 2020 07:01  -  01 Feb 2020 07:00  --------------------------------------------------------  IN: 1050 mL / OUT: 1200 mL / NET: -150 mL        Appearance: Normal	  Cardiovascular: Normal S1 S2,RRR, No JVD, No murmurs  Respiratory: Lungs clear to auscultation	  Gastrointestinal:  Soft, Non-tender, + BS	  Extremities: Normal range of motion, No clubbing, cyanosis or edema  Vascular: Peripheral pulses palpable 2+ bilaterally     LABS:	 	                          11.2   6.12  )-----------( 156      ( 01 Feb 2020 06:10 )             33.3     02-01    138  |  105  |  34<H>  ----------------------------<  240<H>  4.2   |  22  |  1.96<H>    Ca    9.1      01 Feb 2020 06:10        PT/INR - ( 30 Jan 2020 14:20 )   PT: 10.8 sec;   INR: 0.94 ratio         PTT - ( 30 Jan 2020 14:20 )  PTT:45.8 sec    CARDIAC MARKERS:      MEDICATIONS  (STANDING):  aspirin  chewable 81 milliGRAM(s) Oral daily  atorvastatin 40 milliGRAM(s) Oral at bedtime  dextrose 5%. 1000 milliLiter(s) (50 mL/Hr) IV Continuous <Continuous>  dextrose 50% Injectable 12.5 Gram(s) IV Push once  dextrose 50% Injectable 25 Gram(s) IV Push once  dextrose 50% Injectable 25 Gram(s) IV Push once  heparin  Injectable 5000 Unit(s) SubCutaneous every 8 hours  insulin glargine Injectable (LANTUS) 30 Unit(s) SubCutaneous at bedtime  insulin lispro (HumaLOG) corrective regimen sliding scale   SubCutaneous three times a day before meals  insulin lispro (HumaLOG) corrective regimen sliding scale   SubCutaneous at bedtime  insulin lispro Injectable (HumaLOG) 6 Unit(s) SubCutaneous before breakfast  insulin lispro Injectable (HumaLOG) 8 Unit(s) SubCutaneous before lunch  insulin lispro Injectable (HumaLOG) 8 Unit(s) SubCutaneous before dinner  metoprolol tartrate 25 milliGRAM(s) Oral two times a day  mupirocin 2% Ointment 1 Application(s) Topical two times a day  pantoprazole    Tablet 40 milliGRAM(s) Oral before breakfast  senna 2 Tablet(s) Oral at bedtime

## 2020-02-01 NOTE — PROGRESS NOTE ADULT - SUBJECTIVE AND OBJECTIVE BOX
Chief complaint  Patient is a 64y old  Male who presents with a chief complaint of Chest pain (01 Feb 2020 13:35)   Review of systems  Patient in bed, looks comfortable, no fever, no hypoglycemia.    Labs and Fingersticks  CAPILLARY BLOOD GLUCOSE      POCT Blood Glucose.: 123 mg/dL (01 Feb 2020 12:18)  POCT Blood Glucose.: 135 mg/dL (01 Feb 2020 08:45)  POCT Blood Glucose.: 276 mg/dL (31 Jan 2020 21:51)  POCT Blood Glucose.: 149 mg/dL (31 Jan 2020 17:18)      Anion Gap, Serum: 11 (02-01 @ 06:10)  Anion Gap, Serum: 10 (01-31 @ 05:57)      Calcium, Total Serum: 9.1 (02-01 @ 06:10)  Calcium, Total Serum: 8.7 (01-31 @ 05:57)          02-01    138  |  105  |  34<H>  ----------------------------<  240<H>  4.2   |  22  |  1.96<H>    Ca    9.1      01 Feb 2020 06:10                          11.2   6.12  )-----------( 156      ( 01 Feb 2020 06:10 )             33.3     Medications  MEDICATIONS  (STANDING):  aspirin  chewable 81 milliGRAM(s) Oral daily  atorvastatin 40 milliGRAM(s) Oral at bedtime  dextrose 5%. 1000 milliLiter(s) (50 mL/Hr) IV Continuous <Continuous>  dextrose 50% Injectable 12.5 Gram(s) IV Push once  dextrose 50% Injectable 25 Gram(s) IV Push once  dextrose 50% Injectable 25 Gram(s) IV Push once  heparin  Injectable 5000 Unit(s) SubCutaneous every 8 hours  insulin glargine Injectable (LANTUS) 30 Unit(s) SubCutaneous at bedtime  insulin lispro (HumaLOG) corrective regimen sliding scale   SubCutaneous three times a day before meals  insulin lispro (HumaLOG) corrective regimen sliding scale   SubCutaneous at bedtime  insulin lispro Injectable (HumaLOG) 6 Unit(s) SubCutaneous before breakfast  insulin lispro Injectable (HumaLOG) 8 Unit(s) SubCutaneous before lunch  insulin lispro Injectable (HumaLOG) 8 Unit(s) SubCutaneous before dinner  metoprolol tartrate 25 milliGRAM(s) Oral two times a day  mupirocin 2% Nasal 1 Application(s) Nasal two times a day  mupirocin 2% Ointment 1 Application(s) Topical two times a day  pantoprazole    Tablet 40 milliGRAM(s) Oral before breakfast  senna 2 Tablet(s) Oral at bedtime      Physical Exam  General: Patient comfortable in bed  Vital Signs Last 12 Hrs  T(F): 97.7 (02-01-20 @ 12:30), Max: 97.7 (02-01-20 @ 12:30)  HR: 76 (02-01-20 @ 12:30) (70 - 76)  BP: 160/89 (02-01-20 @ 12:30) (158/89 - 160/89)  BP(mean): 112 (02-01-20 @ 04:45) (112 - 112)  RR: 18 (02-01-20 @ 12:30) (18 - 18)  SpO2: 99% (02-01-20 @ 12:30) (99% - 100%)  Neck: No palpable thyroid nodules.  CVS: S1S2, No murmurs  Respiratory: No wheezing, no crepitations  GI: Abdomen soft, bowel sounds positive  Musculoskeletal:  edema lower extremities.   Skin: No skin rashes, no ecchymosis    Diagnostics done

## 2020-02-01 NOTE — PROGRESS NOTE ADULT - ASSESSMENT
63 y/o male with PMhx of HTN, DM II on insulin, CKD who presented to the ED with complaints of acute on chronic left sided exertional CP x few months which subsides upon resting. Pt had abnormal stress test and now s/p cardiac cath demonstrating multivessel CAD. CT Surgery consulted for CABG  2/1 Endocrinology consulted for asistance in hyperglycemic management. c/w asa, statin and BBlocker. Pending carotid doppler

## 2020-02-02 LAB
ANA TITR SER: NEGATIVE — SIGNIFICANT CHANGE UP
ANION GAP SERPL CALC-SCNC: 11 MMOL/L — SIGNIFICANT CHANGE UP (ref 5–17)
BLD GP AB SCN SERPL QL: NEGATIVE — SIGNIFICANT CHANGE UP
BUN SERPL-MCNC: 37 MG/DL — HIGH (ref 7–23)
CALCIUM SERPL-MCNC: 9.6 MG/DL — SIGNIFICANT CHANGE UP (ref 8.4–10.5)
CHLORIDE SERPL-SCNC: 103 MMOL/L — SIGNIFICANT CHANGE UP (ref 96–108)
CO2 SERPL-SCNC: 23 MMOL/L — SIGNIFICANT CHANGE UP (ref 22–31)
CREAT SERPL-MCNC: 1.91 MG/DL — HIGH (ref 0.5–1.3)
GLUCOSE BLDC GLUCOMTR-MCNC: 108 MG/DL — HIGH (ref 70–99)
GLUCOSE BLDC GLUCOMTR-MCNC: 200 MG/DL — HIGH (ref 70–99)
GLUCOSE BLDC GLUCOMTR-MCNC: 215 MG/DL — HIGH (ref 70–99)
GLUCOSE BLDC GLUCOMTR-MCNC: 91 MG/DL — SIGNIFICANT CHANGE UP (ref 70–99)
GLUCOSE SERPL-MCNC: 122 MG/DL — HIGH (ref 70–99)
HCT VFR BLD CALC: 33.2 % — LOW (ref 39–50)
HGB BLD-MCNC: 11.8 G/DL — LOW (ref 13–17)
MCHC RBC-ENTMCNC: 29.4 PG — SIGNIFICANT CHANGE UP (ref 27–34)
MCHC RBC-ENTMCNC: 35.5 GM/DL — SIGNIFICANT CHANGE UP (ref 32–36)
MCV RBC AUTO: 82.8 FL — SIGNIFICANT CHANGE UP (ref 80–100)
NRBC # BLD: 0 /100 WBCS — SIGNIFICANT CHANGE UP (ref 0–0)
PLATELET # BLD AUTO: 171 K/UL — SIGNIFICANT CHANGE UP (ref 150–400)
POTASSIUM SERPL-MCNC: 3.8 MMOL/L — SIGNIFICANT CHANGE UP (ref 3.5–5.3)
POTASSIUM SERPL-SCNC: 3.8 MMOL/L — SIGNIFICANT CHANGE UP (ref 3.5–5.3)
RBC # BLD: 4.01 M/UL — LOW (ref 4.2–5.8)
RBC # FLD: 11.9 % — SIGNIFICANT CHANGE UP (ref 10.3–14.5)
RH IG SCN BLD-IMP: POSITIVE — SIGNIFICANT CHANGE UP
SODIUM SERPL-SCNC: 137 MMOL/L — SIGNIFICANT CHANGE UP (ref 135–145)
WBC # BLD: 5.73 K/UL — SIGNIFICANT CHANGE UP (ref 3.8–10.5)
WBC # FLD AUTO: 5.73 K/UL — SIGNIFICANT CHANGE UP (ref 3.8–10.5)

## 2020-02-02 RX ORDER — CHLORHEXIDINE GLUCONATE 213 G/1000ML
1 SOLUTION TOPICAL
Refills: 0 | Status: DISCONTINUED | OUTPATIENT
Start: 2020-02-02 | End: 2020-02-03

## 2020-02-02 RX ORDER — CHLORHEXIDINE GLUCONATE 213 G/1000ML
15 SOLUTION TOPICAL
Refills: 0 | Status: COMPLETED | OUTPATIENT
Start: 2020-02-02 | End: 2020-02-03

## 2020-02-02 RX ORDER — CEFUROXIME AXETIL 250 MG
1500 TABLET ORAL ONCE
Refills: 0 | Status: DISCONTINUED | OUTPATIENT
Start: 2020-02-02 | End: 2020-02-03

## 2020-02-02 RX ADMIN — Medication 200 MILLIGRAM(S): at 11:28

## 2020-02-02 RX ADMIN — HEPARIN SODIUM 5000 UNIT(S): 5000 INJECTION INTRAVENOUS; SUBCUTANEOUS at 14:12

## 2020-02-02 RX ADMIN — CHLORHEXIDINE GLUCONATE 15 MILLILITER(S): 213 SOLUTION TOPICAL at 17:01

## 2020-02-02 RX ADMIN — Medication 25 MILLIGRAM(S): at 06:08

## 2020-02-02 RX ADMIN — HEPARIN SODIUM 5000 UNIT(S): 5000 INJECTION INTRAVENOUS; SUBCUTANEOUS at 06:07

## 2020-02-02 RX ADMIN — Medication 81 MILLIGRAM(S): at 11:28

## 2020-02-02 RX ADMIN — INSULIN GLARGINE 30 UNIT(S): 100 INJECTION, SOLUTION SUBCUTANEOUS at 22:52

## 2020-02-02 RX ADMIN — HEPARIN SODIUM 5000 UNIT(S): 5000 INJECTION INTRAVENOUS; SUBCUTANEOUS at 22:35

## 2020-02-02 RX ADMIN — Medication 6 UNIT(S): at 08:19

## 2020-02-02 RX ADMIN — Medication 2: at 17:01

## 2020-02-02 RX ADMIN — POLYETHYLENE GLYCOL 3350 17 GRAM(S): 17 POWDER, FOR SOLUTION ORAL at 11:29

## 2020-02-02 RX ADMIN — MUPIROCIN 1 APPLICATION(S): 20 OINTMENT TOPICAL at 17:01

## 2020-02-02 RX ADMIN — Medication 25 MILLIGRAM(S): at 17:01

## 2020-02-02 RX ADMIN — Medication 8 UNIT(S): at 12:34

## 2020-02-02 RX ADMIN — SENNA PLUS 2 TABLET(S): 8.6 TABLET ORAL at 22:35

## 2020-02-02 RX ADMIN — ATORVASTATIN CALCIUM 40 MILLIGRAM(S): 80 TABLET, FILM COATED ORAL at 22:35

## 2020-02-02 RX ADMIN — Medication 8 UNIT(S): at 17:01

## 2020-02-02 RX ADMIN — MUPIROCIN 1 APPLICATION(S): 20 OINTMENT TOPICAL at 06:08

## 2020-02-02 RX ADMIN — PANTOPRAZOLE SODIUM 40 MILLIGRAM(S): 20 TABLET, DELAYED RELEASE ORAL at 06:08

## 2020-02-02 NOTE — PROGRESS NOTE ADULT - ASSESSMENT
64 year old man with history of HTN, DM II, admitted with progressive exertional angina.     1. CAD   -s/p cath revealing severe triple vessel disease including lesions at the bifurcation of the LAD/diagonal and distal RCA/RPDA/RPL trifurcation.   -cr remains stable - renal f/u  -CTS planning for CABG tomorrow  -cont asa, statin, bb       2. HTN  bp labile  continue bb  avoid acei/arb in setting of STEPHANIE   if needed start norvasc 5mg daily     3. STEPHANIE/CKD  renal f/u     dvt ppx

## 2020-02-02 NOTE — PROGRESS NOTE ADULT - SUBJECTIVE AND OBJECTIVE BOX
Cardiac Surgery Pre-op Note:    CC: Patient is a 64y old  Male who presents with a chief complaint of Chest pain (02 Feb 2020 08:53)      Referring Physician:                                                                                                           Surgeon: Dr Steel    Procedure: (Date) (Procedure) C3-EVH    Allergies    No Known Allergies    Intolerances        HPI:  63yo male with hx of HTN, DM II, presented to the ED with complaints of acute on chronic left sided exertional chest pain. Pt states he has been having left sided pressure and stabbing chest pain radiating to his sternum and right with activity for the past few months. He states each episode last approximately 1-2 mins and subsides upon resting. He denies associated symptoms of radiation to his back, arm or jaw. He recently was at a wedding which he could not enjoy because dancing would reproduce to the pain. He states he did not pursue and medical attention until this time. Pt states this time his pain was associated with sob up exertion. He denies symptoms of LOC, diaphoresis, palpitations, abd pain, N/V, fever or chills. He denies prior cardiac work up. (25 Jan 2020 12:57)      PAST MEDICAL & SURGICAL HISTORY:  HTN (hypertension)  Diabetes  History of appendectomy: x 30 yrs ago      MEDICATIONS  (STANDING):  aspirin  chewable 81 milliGRAM(s) Oral daily  atorvastatin 40 milliGRAM(s) Oral at bedtime  cefuroxime  IVPB 1500 milliGRAM(s) IV Intermittent once  chlorhexidine 0.12% Liquid 15 milliLiter(s) Swish and Spit two times a day  chlorhexidine 4% Liquid 1 Application(s) Topical two times a day  dextrose 5%. 1000 milliLiter(s) (50 mL/Hr) IV Continuous <Continuous>  dextrose 50% Injectable 12.5 Gram(s) IV Push once  dextrose 50% Injectable 25 Gram(s) IV Push once  dextrose 50% Injectable 25 Gram(s) IV Push once  heparin  Injectable 5000 Unit(s) SubCutaneous every 8 hours  insulin glargine Injectable (LANTUS) 30 Unit(s) SubCutaneous at bedtime  insulin lispro (HumaLOG) corrective regimen sliding scale   SubCutaneous three times a day before meals  insulin lispro (HumaLOG) corrective regimen sliding scale   SubCutaneous at bedtime  insulin lispro Injectable (HumaLOG) 6 Unit(s) SubCutaneous before breakfast  insulin lispro Injectable (HumaLOG) 8 Unit(s) SubCutaneous before lunch  insulin lispro Injectable (HumaLOG) 8 Unit(s) SubCutaneous before dinner  metoprolol tartrate 25 milliGRAM(s) Oral two times a day  mupirocin 2% Ointment 1 Application(s) Topical two times a day  pantoprazole    Tablet 40 milliGRAM(s) Oral before breakfast  senna 2 Tablet(s) Oral at bedtime    MEDICATIONS  (PRN):  dextrose 40% Gel 15 Gram(s) Oral once PRN Blood Glucose LESS THAN 70 milliGRAM(s)/deciliter  glucagon  Injectable 1 milliGRAM(s) IntraMuscular once PRN Glucose LESS THAN 70 milligrams/deciliter  guaiFENesin   Syrup  (Sugar-Free) 200 milliGRAM(s) Oral every 6 hours PRN Cough  polyethylene glycol 3350 17 Gram(s) Oral daily PRN Constipation        Labs:                        11.8   5.73  )-----------( 171      ( 02 Feb 2020 07:00 )             33.2     02-02    137  |  103  |  37<H>  ----------------------------<  122<H>  3.8   |  23  |  1.91<H>    Ca    9.6      02 Feb 2020 07:00          Blood Type: ABO Interpretation: O (02-02 @ 06:12)    HGB A1C: 9.2  Prealbumin:   Pro-BNP:   Thyroid Panel: 01-30 @ 16:43/1.78  --/7.9/103    MRSA: MRSA PCR Result.: NotDetec (01-30 @ 22:12)   / MSSA: Staph Aureus PCR Result: Detected (01.30.20 @ 22:12)          CXR: < from: Xray Chest 2 Views PA/Lat (01.25.20 @ 02:05) >    PROCEDURE DATE:  01/25/2020            INTERPRETATION:  CLINICAL INFORMATION: Chest Pain    EXAM: PA and lateral chest radiographs    COMPARISON: Chest radiograph from 6/9/2017.    FINDINGS:    The lungs are clear.  The cardiac silhouette is within normal limits.  Degenerative osseous changes.    IMPRESSION:     Clear lungs.    < end of copied text >      EKG: < from: 12 Lead ECG (01.25.20 @ 04:09) >    Ventricular Rate 84 BPM    Atrial Rate 84 BPM    P-R Interval 134 ms    QRS Duration 84 ms    Q-T Interval 340 ms    QTC Calculation(Bezet) 401 ms    P Axis 44 degrees    R Axis 61 degrees    T Axis 61 degrees    Diagnosis Line NORMAL SINUS RHYTHM  NORMAL ECG    < end of copied text >      Carotid Duplex:  < from: [facility] Duplex Genaro Salvador (01.30.20 @ 15:59) >  INTERPRETATION:  Technique: Grayscale, color and spectral Doppler examination of both carotid arteries was performed.     HISTORY:  Preop CABG.    Mild intimal thickening of the bilateral carotid arteries.    Blood flow velocities are as follows:    RIGHT:    PROX CCA = 112 ;  DIST CCA = 61 ;  PROX ICA = 47 ;  DIST ICA = 54 ;  ECA = 104    LEFT   :    PROX CCA = 83 ;  DIST CCA = 70 ;  PROX ICA = 54;  DIST ICA = 54 ;  ECA = 89    There is antegrade flow through both vertebral arteries.    IMPRESSION: No hemodynamically significant stenosis in either carotid artery.    < end of copied text >      PFT's:     Echocardiogram: < from: Transthoracic Echocardiogram (01.26.20 @ 10:04) >  Dimensions:    Normal Values:  LA:     3.1    2.0 - 4.0 cm  Ao:     3.4    2.0 - 3.8 cm  SEPTUM: 0.9    0.6 - 1.2 cm  PWT:    0.8    0.6 - 1.1 cm  LVIDd:  4.1    3.0 - 5.6 cm  LVIDs:  3.1    1.8 - 4.0 cm  Derived variables:  LVMI: 54 g/m2  RWT: 0.39  Fractional short: 24 %  EF (Visual Estimate): 50-55 %  Doppler Peak Velocity (m/sec): AoV=1.2  ------------------------------------------------------------------------  Observations:  Mitral Valve: Normal mitral valve. Minimal mitral  regurgitation.  Aortic Valve/Aorta: Aortic valve not well visualized;  probably normal. Peak transaortic valve gradient equals 6  mm Hg. Mild aortic regurgitation. Peak left ventricular  outflow tract gradient equals 3 mm Hg.  Aortic Root: 3.4 cm.  LVOT diameter: 2 cm.  Left Atrium: Normal left atrium.  LA volume index = 20  cc/m2.  Left Ventricle: Endocardium not well visualized; grossly  low normal leftventricular systolic function. Consider  repeat imaging with intravenous echo contrast to better  visualize the LV if clinically indicated. Normal left  ventricular internal dimensions and wall thickness.  Right Heart: Normal right atrium. Normal right ventricular  size and function. Tricuspid valve not well visualized,  probably normal. Minimal tricuspid regurgitation. Normal  pulmonic valve. No pulmonic regurgitation.  Pericardium/Pleura: Normal pericardium with no pericardial  effusion.  Hemodynamic: Estimated right atrial pressure is 8 mm Hg.  Inadequate tricuspid regurgitation Doppler envelope  precludes estimation of RVSP.  ------------------------------------------------------------------------  Conclusions:  1. Normal mitral valve. Minimal mitral regurgitation.  2. Aortic valve not well visualized; probably normal. Mild  aortic regurgitation.  3. Endocardium not well visualized; grossly low normal left  ventricular systolic function. Consider repeat imaging with  intravenous echo contrast to better visualize the LV if  clinically indicated.  4. Normal right ventricular size and function.  *** No previous Echo exam.    < end of copied text >      Cardiac catheterization: < from: Cardiac Cath Lab - Adult (01.29.20 @ 14:29) >  PROCEDURE:  --  Left coronary angiography.  --  Right coronary angiography.  TECHNIQUE: The risks and alternatives of the procedures and conscious  sedation were explained to the patient and informed consent was obtained.  Cardiac catheterization performed electively.  Local anesthetic given. Right femoral artery access. Left coronary artery  angiography. The vessel was injected utilizing a catheter. Right coronary  artery angiography. The vessel was injected utilizing a catheter.  RADIATION EXPOSURE: 2.6 min.  CONTRAST GIVEN: Omnipaque 31 ml.  MEDICATIONS GIVEN: Midazolam, 1 mg, IV. Fentanyl, 25 mcg, IV.  CORONARY VESSELS: The coronary circulation is right dominant.  LM:   --  LM: Normal.  LAD:   --  Proximal LAD: There was a discrete 80 % stenosis.  --  Distal LAD: There was a 60 % stenosis.  CX:   --  OM1: There was a 80 % stenosis.  RI:   --  Ramus intermedius: The vessel was small to medium sized.  Angiography showed severe atherosclerosis.  RCA:   --  Distal RCA: There was a 95 % stenosis.  COMPLICATIONS: There were no complications.  DIAGNOSTIC IMPRESSIONS: Severe triple vessel disease including lesions at  the bifurcation of the LAD/diagonal and distal RCA/RPDA/RPL trifurcation.  DIAGNOSTIC RECOMMENDATIONS: CTS evaluation for possible CABG versus high  risk/multivessel PCI.  Continue current medical management.  INTERVENTIONAL IMPRESSIONS: Severe triple vessel disease including lesions  at the bifurcation of the LAD/diagonal and distal RCA/RPDA/RPL  trifurcation.  INTERVENTIONAL RECOMMENDATIONS: CTS evaluation for possible CABG versus  high risk/multivessel PCI.  Continue current medical management.  DISPOSITION: The patient left the catheterization laboratory instable  condition.    < end of copied text >      Vein Mapping:    Gen: WN/WD NAD  Neuro: AAOx3, nonfocal  Pulm: CTA B/L  CV: RRR, S1S2  Abd: Soft, NT, ND +BS  Ext: No edema, + peripheral pulses      Pt has AICD/PPM [ ] Yes  [x ] No             Brand Name:  Pre-op Beta Blocker ordered within 24 hrs of surgery (CABG ONLY)?  [x ] Yes  [ ] No  If not, Why?  Type & Cross  [x ] Yes  [ ] No  NPO after Midnight [x ] Yes  [ ] No  Pre-op ABX ordered, to be taped on chart:  [x ] Yes  [ ] No     Hibiclens/Peridex ordered [x ] Yes  [ ] No  Intraop on Hold: PRBCs, CXR, OLGA [x ]   Consent obtained  [x ] Yes  [ ] No

## 2020-02-02 NOTE — PROGRESS NOTE ADULT - ASSESSMENT
65 y/o male with PMhx of HTN, DM II on insulin, CKD who presented to the ED with complaints of acute on chronic left sided exertional CP x few months which subsides upon resting. Pt had abnormal stress test and now s/p cardiac cath demonstrating multivessel CAD. CT Surgery consulted for CABG  2/1 Endocrinology consulted for asistance in hyperglycemic management. c/w asa, statin and BBlocker. Pending carotid doppler

## 2020-02-02 NOTE — PROGRESS NOTE ADULT - ASSESSMENT
Assessment  DMT2: 64y Male with DM T2 with hyperglycemia, A1C 9.2%, was on oral meds and insulin at home, now on basal bolus insulin, blood sugars trending within acceptable range, no hypoglycemic episodes. Patient is eating meals, ambulating, comfortable, family at the bedside. Planning CABG tomorrow.  CAD: Planning CABG 2/3, cardiac workup in progress, on medications, no chest pain, stable, monitored.  HTN: Controlled,  on antihypertensive medications.  HLD: Controlled, on statin.  CKD: Monitor labs/BMP          Harper Matias MD  Cell: [phone]  Office: [phone] Assessment  DMT2: 64y Male with DM T2 with hyperglycemia, A1C 9.2%, was on oral meds and insulin at home, now on basal bolus insulin, blood sugars trending within acceptable range,  no hypoglycemic episodes. Patient is eating meals, ambulating, comfortable, family at the bedside. Planning CABG tomorrow.  CAD: Planning CABG 2/3, cardiac workup in progress, on medications, no chest pain, stable, monitored.  HTN: Controlled,  on antihypertensive medications.  HLD: Controlled, on statin.  CKD: Monitor labs/BMP          Harper Matias MD  Cell: [phone]  Office: [phone]

## 2020-02-02 NOTE — PROGRESS NOTE ADULT - SUBJECTIVE AND OBJECTIVE BOX
CC: no cp/sob    TELEMETRY: nsr    PHYSICAL EXAM:    T(C): 36.3 (02-02-20 @ 04:50), Max: 36.5 (02-01-20 @ 12:30)  HR: 71 (02-02-20 @ 04:50) (71 - 76)  BP: 160/82 (02-02-20 @ 04:50) (160/82 - 171/75)  RR: 18 (02-02-20 @ 04:50) (16 - 18)  SpO2: 98% (02-02-20 @ 04:50) (98% - 99%)  Wt(kg): --  I&O's Summary    01 Feb 2020 07:01  -  02 Feb 2020 07:00  --------------------------------------------------------  IN: 775 mL / OUT: 1000 mL / NET: -225 mL        Appearance: Normal	  Cardiovascular: Normal S1 S2,RRR, No JVD, No murmurs  Respiratory: Lungs clear to auscultation	  Gastrointestinal:  Soft, Non-tender, + BS	  Extremities: Normal range of motion, No clubbing, cyanosis or edema  Vascular: Peripheral pulses palpable 2+ bilaterally     LABS:	 	                          11.8   5.73  )-----------( 171      ( 02 Feb 2020 07:00 )             33.2     02-02    137  |  103  |  37<H>  ----------------------------<  122<H>  3.8   |  23  |  1.91<H>    Ca    9.6      02 Feb 2020 07:00            CARDIAC MARKERS:

## 2020-02-02 NOTE — PROGRESS NOTE ADULT - SUBJECTIVE AND OBJECTIVE BOX
Chief complaint  Patient is a 64y old  Male who presents with a chief complaint of Chest pain (02 Feb 2020 11:26)   Review of systems  Patient in bed, looks comfortable, no hypoglycemia.    Labs and Fingersticks  CAPILLARY BLOOD GLUCOSE      POCT Blood Glucose.: 91 mg/dL (02 Feb 2020 12:20)  POCT Blood Glucose.: 108 mg/dL (02 Feb 2020 07:34)  POCT Blood Glucose.: 182 mg/dL (01 Feb 2020 22:35)  POCT Blood Glucose.: 150 mg/dL (01 Feb 2020 17:01)      Anion Gap, Serum: 11 (02-02 @ 07:00)  Anion Gap, Serum: 11 (02-01 @ 06:10)      Calcium, Total Serum: 9.6 (02-02 @ 07:00)  Calcium, Total Serum: 9.1 (02-01 @ 06:10)          02-02    137  |  103  |  37<H>  ----------------------------<  122<H>  3.8   |  23  |  1.91<H>    Ca    9.6      02 Feb 2020 07:00                          11.8   5.73  )-----------( 171      ( 02 Feb 2020 07:00 )             33.2     Medications  MEDICATIONS  (STANDING):  aspirin  chewable 81 milliGRAM(s) Oral daily  atorvastatin 40 milliGRAM(s) Oral at bedtime  cefuroxime  IVPB 1500 milliGRAM(s) IV Intermittent once  chlorhexidine 0.12% Liquid 15 milliLiter(s) Swish and Spit two times a day  chlorhexidine 4% Liquid 1 Application(s) Topical two times a day  dextrose 5%. 1000 milliLiter(s) (50 mL/Hr) IV Continuous <Continuous>  dextrose 50% Injectable 12.5 Gram(s) IV Push once  dextrose 50% Injectable 25 Gram(s) IV Push once  dextrose 50% Injectable 25 Gram(s) IV Push once  heparin  Injectable 5000 Unit(s) SubCutaneous every 8 hours  insulin glargine Injectable (LANTUS) 30 Unit(s) SubCutaneous at bedtime  insulin lispro (HumaLOG) corrective regimen sliding scale   SubCutaneous three times a day before meals  insulin lispro (HumaLOG) corrective regimen sliding scale   SubCutaneous at bedtime  insulin lispro Injectable (HumaLOG) 6 Unit(s) SubCutaneous before breakfast  insulin lispro Injectable (HumaLOG) 8 Unit(s) SubCutaneous before lunch  insulin lispro Injectable (HumaLOG) 8 Unit(s) SubCutaneous before dinner  metoprolol tartrate 25 milliGRAM(s) Oral two times a day  mupirocin 2% Ointment 1 Application(s) Topical two times a day  pantoprazole    Tablet 40 milliGRAM(s) Oral before breakfast  senna 2 Tablet(s) Oral at bedtime      Physical Exam  General: Patient comfortable in bed  Vital Signs Last 12 Hrs  T(F): 97.3 (02-02-20 @ 12:23), Max: 97.3 (02-02-20 @ 04:50)  HR: 73 (02-02-20 @ 12:23) (71 - 73)  BP: 155/84 (02-02-20 @ 12:23) (155/84 - 160/82)  BP(mean): 108 (02-02-20 @ 04:50) (108 - 108)  RR: 18 (02-02-20 @ 12:23) (18 - 18)  SpO2: 99% (02-02-20 @ 12:23) (98% - 99%)  Neck: No palpable thyroid nodules.  CVS: S1S2, No murmurs  Respiratory: No wheezing, no crepitations  GI: Abdomen soft, bowel sounds positive  Musculoskeletal:  edema lower extremities.   Skin: No skin rashes, no ecchymosis    Diagnostics Chief complaint  Patient is a 64y old  Male who presents with a chief complaint of Chest pain (02 Feb 2020 11:26)   Review of systems  Patient in bed, looks comfortable,  no hypoglycemia.    Labs and Fingersticks  CAPILLARY BLOOD GLUCOSE      POCT Blood Glucose.: 91 mg/dL (02 Feb 2020 12:20)  POCT Blood Glucose.: 108 mg/dL (02 Feb 2020 07:34)  POCT Blood Glucose.: 182 mg/dL (01 Feb 2020 22:35)  POCT Blood Glucose.: 150 mg/dL (01 Feb 2020 17:01)      Anion Gap, Serum: 11 (02-02 @ 07:00)  Anion Gap, Serum: 11 (02-01 @ 06:10)      Calcium, Total Serum: 9.6 (02-02 @ 07:00)  Calcium, Total Serum: 9.1 (02-01 @ 06:10)          02-02    137  |  103  |  37<H>  ----------------------------<  122<H>  3.8   |  23  |  1.91<H>    Ca    9.6      02 Feb 2020 07:00                          11.8   5.73  )-----------( 171      ( 02 Feb 2020 07:00 )             33.2     Medications  MEDICATIONS  (STANDING):  aspirin  chewable 81 milliGRAM(s) Oral daily  atorvastatin 40 milliGRAM(s) Oral at bedtime  cefuroxime  IVPB 1500 milliGRAM(s) IV Intermittent once  chlorhexidine 0.12% Liquid 15 milliLiter(s) Swish and Spit two times a day  chlorhexidine 4% Liquid 1 Application(s) Topical two times a day  dextrose 5%. 1000 milliLiter(s) (50 mL/Hr) IV Continuous <Continuous>  dextrose 50% Injectable 12.5 Gram(s) IV Push once  dextrose 50% Injectable 25 Gram(s) IV Push once  dextrose 50% Injectable 25 Gram(s) IV Push once  heparin  Injectable 5000 Unit(s) SubCutaneous every 8 hours  insulin glargine Injectable (LANTUS) 30 Unit(s) SubCutaneous at bedtime  insulin lispro (HumaLOG) corrective regimen sliding scale   SubCutaneous three times a day before meals  insulin lispro (HumaLOG) corrective regimen sliding scale   SubCutaneous at bedtime  insulin lispro Injectable (HumaLOG) 6 Unit(s) SubCutaneous before breakfast  insulin lispro Injectable (HumaLOG) 8 Unit(s) SubCutaneous before lunch  insulin lispro Injectable (HumaLOG) 8 Unit(s) SubCutaneous before dinner  metoprolol tartrate 25 milliGRAM(s) Oral two times a day  mupirocin 2% Ointment 1 Application(s) Topical two times a day  pantoprazole    Tablet 40 milliGRAM(s) Oral before breakfast  senna 2 Tablet(s) Oral at bedtime      Physical Exam  General: Patient comfortable in bed  Vital Signs Last 12 Hrs  T(F): 97.3 (02-02-20 @ 12:23), Max: 97.3 (02-02-20 @ 04:50)  HR: 73 (02-02-20 @ 12:23) (71 - 73)  BP: 155/84 (02-02-20 @ 12:23) (155/84 - 160/82)  BP(mean): 108 (02-02-20 @ 04:50) (108 - 108)  RR: 18 (02-02-20 @ 12:23) (18 - 18)  SpO2: 99% (02-02-20 @ 12:23) (98% - 99%)  Neck: No palpable thyroid nodules.  CVS: S1S2, No murmurs  Respiratory: No wheezing, no crepitations  GI: Abdomen soft, bowel sounds positive  Musculoskeletal:  edema lower extremities.   Skin: No skin rashes, no ecchymosis    Diagnostics

## 2020-02-02 NOTE — PROGRESS NOTE ADULT - PROBLEM SELECTOR PLAN 1
Will continue current insulin regimen for now. Will continue monitoring FS, log, will Follow up.  Patient counseled for compliance with consistent low carb diet, exercise as tolerated as out patient. Will continue current insulin regimen for now. Will continue monitoring FS, log, will Follow up.

## 2020-02-03 ENCOUNTER — APPOINTMENT (OUTPATIENT)
Dept: CARDIOTHORACIC SURGERY | Facility: HOSPITAL | Age: 65
End: 2020-02-03

## 2020-02-03 LAB
ALBUMIN SERPL ELPH-MCNC: 2.9 G/DL — LOW (ref 3.3–5)
ALP SERPL-CCNC: 31 U/L — LOW (ref 40–120)
ALT FLD-CCNC: 30 U/L — SIGNIFICANT CHANGE UP (ref 10–45)
ANION GAP SERPL CALC-SCNC: 13 MMOL/L — SIGNIFICANT CHANGE UP (ref 5–17)
APTT BLD: 31.2 SEC — SIGNIFICANT CHANGE UP (ref 27.5–36.3)
AST SERPL-CCNC: 59 U/L — HIGH (ref 10–40)
AUTO DIFF PNL BLD: NEGATIVE — SIGNIFICANT CHANGE UP
BASE EXCESS BLDMV CALC-SCNC: -0.1 MMOL/L — SIGNIFICANT CHANGE UP (ref -3–3)
BASE EXCESS BLDMV CALC-SCNC: -0.1 MMOL/L — SIGNIFICANT CHANGE UP (ref -3–3)
BASE EXCESS BLDMV CALC-SCNC: 0.2 MMOL/L — SIGNIFICANT CHANGE UP (ref -3–3)
BASE EXCESS BLDMV CALC-SCNC: 0.7 MMOL/L — SIGNIFICANT CHANGE UP (ref -3–3)
BASE EXCESS BLDV CALC-SCNC: 1.2 MMOL/L — SIGNIFICANT CHANGE UP (ref -2–2)
BASE EXCESS BLDV CALC-SCNC: 1.2 MMOL/L — SIGNIFICANT CHANGE UP (ref -2–2)
BASE EXCESS BLDV CALC-SCNC: 1.9 MMOL/L — SIGNIFICANT CHANGE UP (ref -2–2)
BASE EXCESS BLDV CALC-SCNC: 3.7 MMOL/L — HIGH (ref -2–2)
BASE EXCESS BLDV CALC-SCNC: 4.5 MMOL/L — HIGH (ref -2–2)
BASOPHILS # BLD AUTO: 0 K/UL — SIGNIFICANT CHANGE UP (ref 0–0.2)
BASOPHILS NFR BLD AUTO: 0 % — SIGNIFICANT CHANGE UP (ref 0–2)
BILIRUB SERPL-MCNC: 0.8 MG/DL — SIGNIFICANT CHANGE UP (ref 0.2–1.2)
BLOOD GAS VENOUS - CREATININE: SIGNIFICANT CHANGE UP MG/DL (ref 0.5–1.3)
BUN SERPL-MCNC: 32 MG/DL — HIGH (ref 7–23)
C-ANCA SER-ACNC: NEGATIVE — SIGNIFICANT CHANGE UP
CA-I SERPL-SCNC: 0.93 MMOL/L — LOW (ref 1.12–1.3)
CA-I SERPL-SCNC: 0.94 MMOL/L — LOW (ref 1.12–1.3)
CA-I SERPL-SCNC: 0.95 MMOL/L — LOW (ref 1.12–1.3)
CA-I SERPL-SCNC: 1.09 MMOL/L — LOW (ref 1.12–1.3)
CA-I SERPL-SCNC: 1.21 MMOL/L — SIGNIFICANT CHANGE UP (ref 1.12–1.3)
CALCIUM SERPL-MCNC: 8 MG/DL — LOW (ref 8.4–10.5)
CHLORIDE BLDV-SCNC: SIGNIFICANT CHANGE UP MMOL/L (ref 96–108)
CHLORIDE SERPL-SCNC: 105 MMOL/L — SIGNIFICANT CHANGE UP (ref 96–108)
CK MB BLD-MCNC: 9.7 % — HIGH (ref 0–3.5)
CK MB CFR SERPL CALC: 63 NG/ML — HIGH (ref 0–6.7)
CK SERPL-CCNC: 648 U/L — HIGH (ref 30–200)
CO2 BLDMV-SCNC: 27 MMOL/L — SIGNIFICANT CHANGE UP (ref 21–29)
CO2 SERPL-SCNC: 23 MMOL/L — SIGNIFICANT CHANGE UP (ref 22–31)
CREAT SERPL-MCNC: 1.76 MG/DL — HIGH (ref 0.5–1.3)
EOSINOPHIL # BLD AUTO: 0.16 K/UL — SIGNIFICANT CHANGE UP (ref 0–0.5)
EOSINOPHIL NFR BLD AUTO: 2 % — SIGNIFICANT CHANGE UP (ref 0–6)
FIBRINOGEN PPP-MCNC: 324 MG/DL — LOW (ref 350–510)
GAS PNL BLDA: SIGNIFICANT CHANGE UP
GAS PNL BLDMV: SIGNIFICANT CHANGE UP
GAS PNL BLDV: 132 MMOL/L — LOW (ref 135–145)
GAS PNL BLDV: 133 MMOL/L — LOW (ref 135–145)
GAS PNL BLDV: 134 MMOL/L — LOW (ref 135–145)
GAS PNL BLDV: 134 MMOL/L — LOW (ref 135–145)
GAS PNL BLDV: 135 MMOL/L — SIGNIFICANT CHANGE UP (ref 135–145)
GAS PNL BLDV: SIGNIFICANT CHANGE UP
GLUCOSE BLDC GLUCOMTR-MCNC: 129 MG/DL — HIGH (ref 70–99)
GLUCOSE BLDC GLUCOMTR-MCNC: 131 MG/DL — HIGH (ref 70–99)
GLUCOSE BLDC GLUCOMTR-MCNC: 168 MG/DL — HIGH (ref 70–99)
GLUCOSE BLDV-MCNC: 153 MG/DL — HIGH (ref 70–99)
GLUCOSE BLDV-MCNC: 167 MG/DL — HIGH (ref 70–99)
GLUCOSE BLDV-MCNC: 177 MG/DL — HIGH (ref 70–99)
GLUCOSE BLDV-MCNC: 178 MG/DL — HIGH (ref 70–99)
GLUCOSE BLDV-MCNC: 178 MG/DL — HIGH (ref 70–99)
GLUCOSE SERPL-MCNC: 151 MG/DL — HIGH (ref 70–99)
HCO3 BLDMV-SCNC: 25 MMOL/L — SIGNIFICANT CHANGE UP (ref 20–28)
HCO3 BLDMV-SCNC: 26 MMOL/L — SIGNIFICANT CHANGE UP (ref 20–28)
HCO3 BLDV-SCNC: 25 MMOL/L — SIGNIFICANT CHANGE UP (ref 21–29)
HCO3 BLDV-SCNC: 26 MMOL/L — SIGNIFICANT CHANGE UP (ref 21–29)
HCO3 BLDV-SCNC: 27 MMOL/L — SIGNIFICANT CHANGE UP (ref 21–29)
HCO3 BLDV-SCNC: 28 MMOL/L — SIGNIFICANT CHANGE UP (ref 21–29)
HCO3 BLDV-SCNC: 28 MMOL/L — SIGNIFICANT CHANGE UP (ref 21–29)
HCT VFR BLD CALC: 21.9 % — LOW (ref 39–50)
HCT VFR BLDA CALC: 19 % — CRITICAL LOW (ref 39–50)
HCT VFR BLDA CALC: 20 % — CRITICAL LOW (ref 39–50)
HCT VFR BLDA CALC: 20 % — CRITICAL LOW (ref 39–50)
HCT VFR BLDA CALC: 21 % — CRITICAL LOW (ref 39–50)
HCT VFR BLDA CALC: 21 % — CRITICAL LOW (ref 39–50)
HEPARINASE TEG R TIME: 13.3 MIN (ref 4.3–8.3)
HGB BLD CALC-MCNC: 6.1 G/DL — CRITICAL LOW (ref 13–17)
HGB BLD CALC-MCNC: 6.2 G/DL — CRITICAL LOW (ref 13–17)
HGB BLD CALC-MCNC: 6.2 G/DL — CRITICAL LOW (ref 13–17)
HGB BLD CALC-MCNC: 6.7 G/DL — CRITICAL LOW (ref 13–17)
HGB BLD CALC-MCNC: 6.7 G/DL — CRITICAL LOW (ref 13–17)
HGB BLD-MCNC: 7.4 G/DL — LOW (ref 13–17)
HOROWITZ INDEX BLDMV+IHG-RTO: 100 — SIGNIFICANT CHANGE UP
HOROWITZ INDEX BLDMV+IHG-RTO: 40 — SIGNIFICANT CHANGE UP
HOROWITZ INDEX BLDMV+IHG-RTO: 50 — SIGNIFICANT CHANGE UP
HOROWITZ INDEX BLDMV+IHG-RTO: 50 — SIGNIFICANT CHANGE UP
HOROWITZ INDEX BLDV+IHG-RTO: 0 — SIGNIFICANT CHANGE UP
INR BLD: 1.16 RATIO — SIGNIFICANT CHANGE UP (ref 0.88–1.16)
LACTATE BLDV-MCNC: 0.6 MMOL/L — LOW (ref 0.7–2)
LACTATE BLDV-MCNC: 0.8 MMOL/L — SIGNIFICANT CHANGE UP (ref 0.7–2)
LACTATE BLDV-MCNC: 0.9 MMOL/L — SIGNIFICANT CHANGE UP (ref 0.7–2)
LACTATE BLDV-MCNC: 0.9 MMOL/L — SIGNIFICANT CHANGE UP (ref 0.7–2)
LACTATE BLDV-MCNC: 1 MMOL/L — SIGNIFICANT CHANGE UP (ref 0.7–2)
LYMPHOCYTES # BLD AUTO: 1.19 K/UL — SIGNIFICANT CHANGE UP (ref 1–3.3)
LYMPHOCYTES # BLD AUTO: 15 % — SIGNIFICANT CHANGE UP (ref 13–44)
MAGNESIUM SERPL-MCNC: 2.5 MG/DL — SIGNIFICANT CHANGE UP (ref 1.6–2.6)
MANUAL SMEAR VERIFICATION: SIGNIFICANT CHANGE UP
MCHC RBC-ENTMCNC: 28.4 PG — SIGNIFICANT CHANGE UP (ref 27–34)
MCHC RBC-ENTMCNC: 33.8 GM/DL — SIGNIFICANT CHANGE UP (ref 32–36)
MCV RBC AUTO: 83.9 FL — SIGNIFICANT CHANGE UP (ref 80–100)
METAMYELOCYTES # FLD: 1 % — HIGH (ref 0–0)
MONOCYTES # BLD AUTO: 0.55 K/UL — SIGNIFICANT CHANGE UP (ref 0–0.9)
MONOCYTES NFR BLD AUTO: 7 % — SIGNIFICANT CHANGE UP (ref 2–14)
NEUTROPHILS # BLD AUTO: 5.93 K/UL — SIGNIFICANT CHANGE UP (ref 1.8–7.4)
NEUTROPHILS NFR BLD AUTO: 70 % — SIGNIFICANT CHANGE UP (ref 43–77)
NEUTS BAND # BLD: 5 % — SIGNIFICANT CHANGE UP (ref 0–8)
NRBC # BLD: 0 /100 — SIGNIFICANT CHANGE UP (ref 0–0)
O2 CT VFR BLD CALC: 28 MMHG — LOW (ref 30–65)
O2 CT VFR BLD CALC: 30 MMHG — SIGNIFICANT CHANGE UP (ref 30–65)
O2 CT VFR BLD CALC: 32 MMHG — SIGNIFICANT CHANGE UP (ref 30–65)
O2 CT VFR BLD CALC: 37 MMHG — SIGNIFICANT CHANGE UP (ref 30–65)
P-ANCA SER-ACNC: NEGATIVE — SIGNIFICANT CHANGE UP
PCO2 BLDMV: 48 MMHG — SIGNIFICANT CHANGE UP (ref 30–65)
PCO2 BLDMV: 48 MMHG — SIGNIFICANT CHANGE UP (ref 30–65)
PCO2 BLDMV: 49 MMHG — SIGNIFICANT CHANGE UP (ref 30–65)
PCO2 BLDMV: 50 MMHG — SIGNIFICANT CHANGE UP (ref 30–65)
PCO2 BLDV: 36 MMHG — SIGNIFICANT CHANGE UP (ref 35–50)
PCO2 BLDV: 37 MMHG — SIGNIFICANT CHANGE UP (ref 35–50)
PCO2 BLDV: 40 MMHG — SIGNIFICANT CHANGE UP (ref 35–50)
PCO2 BLDV: 40 MMHG — SIGNIFICANT CHANGE UP (ref 35–50)
PCO2 BLDV: 61 MMHG — HIGH (ref 35–50)
PH BLDMV: 7.33 — SIGNIFICANT CHANGE UP (ref 7.32–7.45)
PH BLDMV: 7.34 — SIGNIFICANT CHANGE UP (ref 7.32–7.45)
PH BLDMV: 7.34 — SIGNIFICANT CHANGE UP (ref 7.32–7.45)
PH BLDMV: 7.35 — SIGNIFICANT CHANGE UP (ref 7.32–7.45)
PH BLDV: 7.28 — LOW (ref 7.35–7.45)
PH BLDV: 7.42 — SIGNIFICANT CHANGE UP (ref 7.35–7.45)
PH BLDV: 7.42 — SIGNIFICANT CHANGE UP (ref 7.35–7.45)
PH BLDV: 7.48 — HIGH (ref 7.35–7.45)
PH BLDV: 7.5 — HIGH (ref 7.35–7.45)
PHOSPHATE SERPL-MCNC: 2.8 MG/DL — SIGNIFICANT CHANGE UP (ref 2.5–4.5)
PLAT MORPH BLD: NORMAL — SIGNIFICANT CHANGE UP
PLATELET # BLD AUTO: 97 K/UL — LOW (ref 150–400)
PO2 BLDV: 35 MMHG — SIGNIFICANT CHANGE UP (ref 25–45)
PO2 BLDV: 43 MMHG — SIGNIFICANT CHANGE UP (ref 25–45)
PO2 BLDV: 45 MMHG — SIGNIFICANT CHANGE UP (ref 25–45)
PO2 BLDV: 47 MMHG — HIGH (ref 25–45)
PO2 BLDV: 74 MMHG — HIGH (ref 25–45)
POTASSIUM BLDV-SCNC: 4.4 MMOL/L — SIGNIFICANT CHANGE UP (ref 3.5–5)
POTASSIUM BLDV-SCNC: 5.6 MMOL/L — HIGH (ref 3.5–5)
POTASSIUM BLDV-SCNC: 6 MMOL/L — HIGH (ref 3.5–5)
POTASSIUM BLDV-SCNC: 6 MMOL/L — HIGH (ref 3.5–5)
POTASSIUM BLDV-SCNC: 6.1 MMOL/L — HIGH (ref 3.5–5)
POTASSIUM SERPL-MCNC: 4.2 MMOL/L — SIGNIFICANT CHANGE UP (ref 3.5–5.3)
POTASSIUM SERPL-SCNC: 4.2 MMOL/L — SIGNIFICANT CHANGE UP (ref 3.5–5.3)
PROT SERPL-MCNC: 4.5 G/DL — LOW (ref 6–8.3)
PROTHROM AB SERPL-ACNC: 13.3 SEC — HIGH (ref 10–12.9)
RAPIDTEG MAXIMUM AMPLITUDE: 47.5 MM (ref 52–70)
RBC # BLD: 2.61 M/UL — LOW (ref 4.2–5.8)
RBC # FLD: 12.9 % — SIGNIFICANT CHANGE UP (ref 10.3–14.5)
RBC BLD AUTO: SIGNIFICANT CHANGE UP
SAO2 % BLDMV: 50 % — LOW (ref 60–90)
SAO2 % BLDMV: 55 % — LOW (ref 60–90)
SAO2 % BLDMV: 60 % — SIGNIFICANT CHANGE UP (ref 60–90)
SAO2 % BLDMV: 69 % — SIGNIFICANT CHANGE UP (ref 60–90)
SAO2 % BLDV: 69 % — SIGNIFICANT CHANGE UP (ref 67–88)
SAO2 % BLDV: 81 % — SIGNIFICANT CHANGE UP (ref 67–88)
SAO2 % BLDV: 82 % — SIGNIFICANT CHANGE UP (ref 67–88)
SAO2 % BLDV: 84 % — SIGNIFICANT CHANGE UP (ref 67–88)
SAO2 % BLDV: 93 % — HIGH (ref 67–88)
SODIUM SERPL-SCNC: 141 MMOL/L — SIGNIFICANT CHANGE UP (ref 135–145)
TEG FUNCTIONAL FIBRINOGEN: 14.6 MM (ref 15–32)
TEG MAXIMUM AMPLITUDE: 50.2 MM (ref 52–69)
TEG REACTION TIME: 14.2 MIN (ref 4.6–9.1)
TROPONIN T, HIGH SENSITIVITY RESULT: 2148 NG/L — HIGH (ref 0–51)
WBC # BLD: 7.9 K/UL — SIGNIFICANT CHANGE UP (ref 3.8–10.5)
WBC # FLD AUTO: 7.9 K/UL — SIGNIFICANT CHANGE UP (ref 3.8–10.5)

## 2020-02-03 PROCEDURE — 71045 X-RAY EXAM CHEST 1 VIEW: CPT | Mod: 26

## 2020-02-03 PROCEDURE — 33508 ENDOSCOPIC VEIN HARVEST: CPT

## 2020-02-03 PROCEDURE — 93010 ELECTROCARDIOGRAM REPORT: CPT

## 2020-02-03 PROCEDURE — 33519 CABG ARTERY-VEIN THREE: CPT

## 2020-02-03 PROCEDURE — 33533 CABG ARTERIAL SINGLE: CPT

## 2020-02-03 PROCEDURE — 99291 CRITICAL CARE FIRST HOUR: CPT

## 2020-02-03 RX ORDER — METOCLOPRAMIDE HCL 10 MG
10 TABLET ORAL EVERY 8 HOURS
Refills: 0 | Status: DISCONTINUED | OUTPATIENT
Start: 2020-02-03 | End: 2020-02-03

## 2020-02-03 RX ORDER — SODIUM CHLORIDE 9 MG/ML
1000 INJECTION INTRAMUSCULAR; INTRAVENOUS; SUBCUTANEOUS
Refills: 0 | Status: DISCONTINUED | OUTPATIENT
Start: 2020-02-03 | End: 2020-02-10

## 2020-02-03 RX ORDER — SODIUM CHLORIDE 9 MG/ML
500 INJECTION, SOLUTION INTRAVENOUS ONCE
Refills: 0 | Status: COMPLETED | OUTPATIENT
Start: 2020-02-03 | End: 2020-02-03

## 2020-02-03 RX ORDER — SODIUM CHLORIDE 9 MG/ML
10 INJECTION INTRAMUSCULAR; INTRAVENOUS; SUBCUTANEOUS
Refills: 0 | Status: DISCONTINUED | OUTPATIENT
Start: 2020-02-03 | End: 2020-02-25

## 2020-02-03 RX ORDER — INSULIN HUMAN 100 [IU]/ML
5 INJECTION, SOLUTION SUBCUTANEOUS
Qty: 100 | Refills: 0 | Status: DISCONTINUED | OUTPATIENT
Start: 2020-02-03 | End: 2020-02-04

## 2020-02-03 RX ORDER — POLYETHYLENE GLYCOL 3350 17 G/17G
17 POWDER, FOR SOLUTION ORAL DAILY
Refills: 0 | Status: DISCONTINUED | OUTPATIENT
Start: 2020-02-03 | End: 2020-02-25

## 2020-02-03 RX ORDER — CHLORHEXIDINE GLUCONATE 213 G/1000ML
5 SOLUTION TOPICAL EVERY 4 HOURS
Refills: 0 | Status: DISCONTINUED | OUTPATIENT
Start: 2020-02-03 | End: 2020-02-03

## 2020-02-03 RX ORDER — ACETAMINOPHEN 500 MG
650 TABLET ORAL EVERY 6 HOURS
Refills: 0 | Status: DISCONTINUED | OUTPATIENT
Start: 2020-02-03 | End: 2020-02-05

## 2020-02-03 RX ORDER — CHLORHEXIDINE GLUCONATE 213 G/1000ML
1 SOLUTION TOPICAL
Refills: 0 | Status: DISCONTINUED | OUTPATIENT
Start: 2020-02-03 | End: 2020-02-03

## 2020-02-03 RX ORDER — NOREPINEPHRINE BITARTRATE/D5W 8 MG/250ML
0.03 PLASTIC BAG, INJECTION (ML) INTRAVENOUS
Qty: 8 | Refills: 0 | Status: DISCONTINUED | OUTPATIENT
Start: 2020-02-03 | End: 2020-02-05

## 2020-02-03 RX ORDER — CALCIUM GLUCONATE 100 MG/ML
1 VIAL (ML) INTRAVENOUS ONCE
Refills: 0 | Status: COMPLETED | OUTPATIENT
Start: 2020-02-03 | End: 2020-02-03

## 2020-02-03 RX ORDER — METOCLOPRAMIDE HCL 10 MG
10 TABLET ORAL EVERY 8 HOURS
Refills: 0 | Status: COMPLETED | OUTPATIENT
Start: 2020-02-03 | End: 2020-02-05

## 2020-02-03 RX ORDER — CHLORHEXIDINE GLUCONATE 213 G/1000ML
1 SOLUTION TOPICAL
Refills: 0 | Status: DISCONTINUED | OUTPATIENT
Start: 2020-02-03 | End: 2020-02-07

## 2020-02-03 RX ORDER — DOBUTAMINE HCL 250MG/20ML
2.5 VIAL (ML) INTRAVENOUS
Qty: 500 | Refills: 0 | Status: DISCONTINUED | OUTPATIENT
Start: 2020-02-03 | End: 2020-02-05

## 2020-02-03 RX ORDER — CEFUROXIME AXETIL 250 MG
1500 TABLET ORAL EVERY 8 HOURS
Refills: 0 | Status: COMPLETED | OUTPATIENT
Start: 2020-02-03 | End: 2020-02-04

## 2020-02-03 RX ORDER — MUPIROCIN 20 MG/G
1 OINTMENT TOPICAL
Refills: 0 | Status: COMPLETED | OUTPATIENT
Start: 2020-02-03 | End: 2020-02-08

## 2020-02-03 RX ORDER — MEPERIDINE HYDROCHLORIDE 50 MG/ML
25 INJECTION INTRAMUSCULAR; INTRAVENOUS; SUBCUTANEOUS ONCE
Refills: 0 | Status: DISCONTINUED | OUTPATIENT
Start: 2020-02-03 | End: 2020-02-05

## 2020-02-03 RX ORDER — SODIUM CHLORIDE 9 MG/ML
500 INJECTION, SOLUTION INTRAVENOUS ONCE
Refills: 0 | Status: DISCONTINUED | OUTPATIENT
Start: 2020-02-03 | End: 2020-02-07

## 2020-02-03 RX ORDER — PANTOPRAZOLE SODIUM 20 MG/1
40 TABLET, DELAYED RELEASE ORAL DAILY
Refills: 0 | Status: DISCONTINUED | OUTPATIENT
Start: 2020-02-03 | End: 2020-02-11

## 2020-02-03 RX ORDER — ASPIRIN/CALCIUM CARB/MAGNESIUM 324 MG
81 TABLET ORAL DAILY
Refills: 0 | Status: DISCONTINUED | OUTPATIENT
Start: 2020-02-03 | End: 2020-02-07

## 2020-02-03 RX ORDER — CHLORHEXIDINE GLUCONATE 213 G/1000ML
1 SOLUTION TOPICAL
Refills: 0 | Status: DISCONTINUED | OUTPATIENT
Start: 2020-02-03 | End: 2020-02-14

## 2020-02-03 RX ADMIN — PANTOPRAZOLE SODIUM 40 MILLIGRAM(S): 20 TABLET, DELAYED RELEASE ORAL at 06:16

## 2020-02-03 RX ADMIN — Medication 100 GRAM(S): at 20:18

## 2020-02-03 RX ADMIN — Medication 10 MILLIGRAM(S): at 23:53

## 2020-02-03 RX ADMIN — CHLORHEXIDINE GLUCONATE 1 APPLICATION(S): 213 SOLUTION TOPICAL at 06:07

## 2020-02-03 RX ADMIN — Medication 25 MILLIGRAM(S): at 06:16

## 2020-02-03 RX ADMIN — CHLORHEXIDINE GLUCONATE 15 MILLILITER(S): 213 SOLUTION TOPICAL at 06:15

## 2020-02-03 RX ADMIN — SODIUM CHLORIDE 3000 MILLILITER(S): 9 INJECTION, SOLUTION INTRAVENOUS at 22:06

## 2020-02-03 RX ADMIN — Medication 300 MILLIGRAM(S): at 23:55

## 2020-02-03 RX ADMIN — SODIUM CHLORIDE 2000 MILLILITER(S): 9 INJECTION, SOLUTION INTRAVENOUS at 21:39

## 2020-02-03 RX ADMIN — Medication 100 MILLIGRAM(S): at 18:58

## 2020-02-03 RX ADMIN — MUPIROCIN 1 APPLICATION(S): 20 OINTMENT TOPICAL at 18:59

## 2020-02-03 RX ADMIN — MUPIROCIN 1 APPLICATION(S): 20 OINTMENT TOPICAL at 06:16

## 2020-02-03 NOTE — PROGRESS NOTE ADULT - SUBJECTIVE AND OBJECTIVE BOX
FRANKLIN PRESLEY  MRN-[id number]  Patient is a 64y old  Male who presents with a chief complaint of Chest pain (03 Feb 2020 17:02)    HPI:  65yo male with hx of HTN, DM II, presented to the ED with complaints of acute on chronic left sided exertional chest pain. Pt states he has been having left sided pressure and stabbing chest pain radiating to his sternum and right with activity for the past few months. He states each episode last approximately 1-2 mins and subsides upon resting. He denies associated symptoms of radiation to his back, arm or jaw. He recently was at a wedding which he could not enjoy because dancing would reproduce to the pain. He states he did not pursue and medical attention until this time. Pt states this time his pain was associated with sob up exertion. He denies symptoms of LOC, diaphoresis, palpitations, abd pain, N/V, fever or chills. He denies prior cardiac work up. (25 Jan 2020 12:57)      Surgery/Hospital course:    Today:    REVIEW OF SYSTEMS:  Gen: No fever  EYES/ENT: No visual changes;  No vertigo or throat pain   NECK: No pain   RES:  No shortness of breath or Cough  Chest: + incisional pain  CARD: No chest pain   GI: No abdominal pain  : No dysuria  NEURO: No weakness  SKIN: No itching, rashes     Physical Exam:  Vital Signs Last 24 Hrs  T(C): 36 (03 Feb 2020 20:00), Max: 36.6 (03 Feb 2020 02:54)  T(F): 96.8 (03 Feb 2020 20:00), Max: 97.9 (03 Feb 2020 02:54)  HR: 94 (03 Feb 2020 22:15) (77 - 107)  BP: 160/96 (03 Feb 2020 07:20) (160/96 - 160/96)  BP(mean): 118 (03 Feb 2020 07:20) (118 - 118)  RR: 18 (03 Feb 2020 07:20) (18 - 18)  SpO2: 100% (03 Feb 2020 22:15) (98% - 100%)  Gen:  Awake, alert   CNS: non focal 	  Neck: no JVD  RES : clear , no wheezing    Chest:   + chest tubes                     CVS: Regular  rhythm. Normal S1/S2  Abd: Soft, non-distended. Bowel sounds present.  Skin: No rash.  Ext:  no edema, A Line  PSY:  ============================I/O===========================   I&O's Detail    02 Feb 2020 07:01  -  03 Feb 2020 07:00  --------------------------------------------------------  IN:    Oral Fluid: 1120 mL  Total IN: 1120 mL    OUT:    Voided: 700 mL  Total OUT: 700 mL    Total NET: 420 mL      03 Feb 2020 07:01  -  03 Feb 2020 22:48  --------------------------------------------------------  IN:    DOBUTamine Infusion: 24.4 mL    insulin regular Infusion: 21 mL    IV PiggyBack: 100 mL    Lactated Ringers IV Bolus: 1000 mL    norepinephrine Infusion: 28.8 mL    Packed Red Blood Cells: 350 mL    sodium chloride 0.9%.: 50 mL  Total IN: 1574.2 mL    OUT:    Chest Tube: 60 mL    Chest Tube: 120 mL    Chest Tube: 130 mL    Indwelling Catheter - Urethral: 445 mL  Total OUT: 755 mL    Total NET: 819.2 mL        ============================ LABS =========================                        7.4    7.90  )-----------( 97       ( 03 Feb 2020 16:55 )             21.9     02-03    141  |  105  |  32<H>  ----------------------------<  151<H>  4.2   |  23  |  1.76<H>    Ca    8.0<L>      03 Feb 2020 17:22  Phos  2.8     02-03  Mg     2.5     02-03    TPro  4.5<L>  /  Alb  2.9<L>  /  TBili  0.8  /  DBili  x   /  AST  59<H>  /  ALT  30  /  AlkPhos  31<L>  02-03    LIVER FUNCTIONS - ( 03 Feb 2020 17:22 )  Alb: 2.9 g/dL / Pro: 4.5 g/dL / ALK PHOS: 31 U/L / ALT: 30 U/L / AST: 59 U/L / GGT: x           PT/INR - ( 03 Feb 2020 16:55 )   PT: 13.3 sec;   INR: 1.16 ratio         PTT - ( 03 Feb 2020 16:55 )  PTT:31.2 sec  ABG - ( 03 Feb 2020 21:06 )  pH, Arterial: 7.37  pH, Blood: x     /  pCO2: 42    /  pO2: 133   / HCO3: 24    / Base Excess: -1.1  /  SaO2: 99                  ======================Micro/Rad/Cardio=================  Culture: Reviewed   CXR: Reviewed  Echo:Reviewed  ======================================================  PAST MEDICAL & SURGICAL HISTORY:  HTN (hypertension)  Diabetes  History of appendectomy: x 30 yrs ago    ====================ASSESMENT ==============  CNS:  RES:  CVS:  Hem:  John:  GI:  Endo:  ID:  Skin  Plan:  ====================== NEUROLOGY=====================  acetaminophen   Tablet .. 650 milliGRAM(s) Oral every 6 hours PRN Mild Pain (1 - 3)  meperidine     Injectable 25 milliGRAM(s) IV Push once  metoclopramide Injectable 10 milliGRAM(s) IV Push every 8 hours    ==================== RESPIRATORY======================  Mechanical Ventilation:  Mode: AC/ CMV (Assist Control/ Continuous Mandatory Ventilation)  RR (machine): 14  TV (machine): 550  FiO2: 40  PEEP: 5  ITime: 1  MAP: 10  PIP: 25      ====================CARDIOVASCULAR==================  DOBUTamine Infusion 2.5 MICROgram(s)/kG/Min (6.082 mL/Hr) IV Continuous <Continuous>  norepinephrine Infusion 0.03 MICROgram(s)/kG/Min (4.6 mL/Hr) IV Continuous <Continuous>    ===================HEMATOLOGIC/ONC ===================  aspirin enteric coated 81 milliGRAM(s) Oral daily    ===================== RENAL =========================  Osorio for monitoring urine output    ==================== GASTROINTESTINAL===================  lactated ringers Bolus 500 milliLiter(s) IV Bolus once  pantoprazole  Injectable 40 milliGRAM(s) IV Push daily  polyethylene glycol 3350 17 Gram(s) Oral daily  sodium chloride 0.9% lock flush 10 milliLiter(s) IV Push every 1 hour PRN Pre/post blood products, medications, blood draw, and to maintain line patency  sodium chloride 0.9%. 1000 milliLiter(s) (10 mL/Hr) IV Continuous <Continuous>    =======================    ENDOCRINE  =====================  insulin regular Infusion 5 Unit(s)/Hr (5 mL/Hr) IV Continuous <Continuous>    ========================INFECTIOUS DISEASE================  cefuroxime  IVPB 1500 milliGRAM(s) IV Intermittent every 8 hours    .crit FRANKLIN PRESLEY  MRN-[id number]  Patient is a 64y old  Male who presents with a chief complaint of Chest pain (03 Feb 2020 17:02)    HPI:  65yo male with hx of HTN, DM II, presented to the ED with complaints of acute on chronic left sided exertional chest pain. Pt states he has been having left sided pressure and stabbing chest pain radiating to his sternum and right with activity for the past few months. He states each episode last approximately 1-2 mins and subsides upon resting. He denies associated symptoms of radiation to his back, arm or jaw. He recently was at a wedding which he could not enjoy because dancing would reproduce to the pain. He states he did not pursue and medical attention until this time. Pt states this time his pain was associated with sob up exertion. He denies symptoms of LOC, diaphoresis, palpitations, abd pain, N/V, fever or chills. He denies prior cardiac work up. (25 Jan 2020 12:57)      Surgery/Hospital course:  2/3/20 CABG C4L patch angioplasty of LAD    intubated, full vent support  weaning   on Dobutamine and insulin drips  transfused 2 PRBC     Physical Exam:  Vital Signs Last 24 Hrs  T(C): 36 (03 Feb 2020 20:00), Max: 36.6 (03 Feb 2020 02:54)  T(F): 96.8 (03 Feb 2020 20:00), Max: 97.9 (03 Feb 2020 02:54)  HR: 94 (03 Feb 2020 22:15) (77 - 107)  BP: 160/96 (03 Feb 2020 07:20) (160/96 - 160/96)  BP(mean): 118 (03 Feb 2020 07:20) (118 - 118)  RR: 18 (03 Feb 2020 07:20) (18 - 18)  SpO2: 100% (03 Feb 2020 22:15) (98% - 100%)  Gen:  Awake, alert   CNS: non focal 	  Neck: no JVD  RES : clear , no wheezing    Chest:   + chest tubes                     CVS: Regular  rhythm. Normal S1/S2  Abd: Soft, non-distended. Bowel sounds present.  Skin: No rash.  Ext:  no edema, A Line    ============================I/O===========================   I&O's Detail    02 Feb 2020 07:01  -  03 Feb 2020 07:00  --------------------------------------------------------  IN:    Oral Fluid: 1120 mL  Total IN: 1120 mL    OUT:    Voided: 700 mL  Total OUT: 700 mL    Total NET: 420 mL      03 Feb 2020 07:01  -  03 Feb 2020 22:48  --------------------------------------------------------  IN:    DOBUTamine Infusion: 24.4 mL    insulin regular Infusion: 21 mL    IV PiggyBack: 100 mL    Lactated Ringers IV Bolus: 1000 mL    norepinephrine Infusion: 28.8 mL    Packed Red Blood Cells: 350 mL    sodium chloride 0.9%.: 50 mL  Total IN: 1574.2 mL    OUT:    Chest Tube: 60 mL    Chest Tube: 120 mL    Chest Tube: 130 mL    Indwelling Catheter - Urethral: 445 mL  Total OUT: 755 mL    Total NET: 819.2 mL        ============================ LABS =========================                        7.4    7.90  )-----------( 97       ( 03 Feb 2020 16:55 )             21.9     02-03    141  |  105  |  32<H>  ----------------------------<  151<H>  4.2   |  23  |  1.76<H>    Ca    8.0<L>      03 Feb 2020 17:22  Phos  2.8     02-03  Mg     2.5     02-03    TPro  4.5<L>  /  Alb  2.9<L>  /  TBili  0.8  /  DBili  x   /  AST  59<H>  /  ALT  30  /  AlkPhos  31<L>  02-03    LIVER FUNCTIONS - ( 03 Feb 2020 17:22 )  Alb: 2.9 g/dL / Pro: 4.5 g/dL / ALK PHOS: 31 U/L / ALT: 30 U/L / AST: 59 U/L / GGT: x           PT/INR - ( 03 Feb 2020 16:55 )   PT: 13.3 sec;   INR: 1.16 ratio   PTT - ( 03 Feb 2020 16:55 )  PTT:31.2 sec  ABG - ( 03 Feb 2020 21:06 )pH, Arterial: 7.37  pH,   pCO2: 42    /  pO2: 133   / HCO3: 24    / Base Excess: -1.1  /  SaO2: 99        ======================Micro/Rad/Cardio=================  CXR: Reviewed  Echo:Reviewed  ======================================================  PAST MEDICAL & SURGICAL HISTORY:  HTN (hypertension)  Diabetes  History of appendectomy: x 30 yrs ago    ====================ASSESMENT ==============  2/3/20 CABG C4L patch angioplasty of LAD  acute systolic CHF  hemodynamic instability  Hyperglycemia  CAD  CKD  DM2    Plan:  ====================== NEUROLOGY=====================  acetaminophen   Tablet .. 650 milliGRAM(s) Oral every 6 hours PRN Mild Pain (1 - 3)  meperidine     Injectable 25 milliGRAM(s) IV Push once  metoclopramide Injectable 10 milliGRAM(s) IV Push every 8 hours    ==================== RESPIRATORY======================  Mechanical Ventilation:  Mode: AC/ CMV (Assist Control/ Continuous Mandatory Ventilation)  RR (machine): 14  TV (machine): 550  FiO2: 40  PEEP: 5  wean to extubated  ====================CARDIOVASCULAR==================  inotropic suppport , BP support , titrate   ion 2.5 MICROgram(s)/kG/Min (6.082 mL/Hr) IV Continuous <Continuous>  norepinephrine Infusion 0.03 MICROgram(s)/kG/Min (4.6 mL/Hr) IV Continuous <Continuous>    ===================HEMATOLOGIC/ONC ===================  aspirin enteric coated 81 milliGRAM(s) Oral daily  transfuse 2 units PRBC  ===================== RENAL =========================  Osorio for monitoring urine output  iv fluid bolus  ==================== GASTROINTESTINAL===================  lactated ringers Bolus 500 milliLiter(s) IV Bolus once  pantoprazole  Injectable 40 milliGRAM(s) IV Push daily  polyethylene glycol 3350 17 Gram(s) Oral daily    =======================    ENDOCRINE  =====================  insulin regular Infusion 5 Unit(s)/Hr (5 mL/Hr) IV Continuous <Continuous>    ========================INFECTIOUS DISEASE================  cefuroxime  IVPB 1500 milliGRAM(s) IV Intermittent every 8 hours    Patient requires continuous monitoring with bedside rhythm monitoring,arterial line,pulse oximetry,ventilator monitoring;intermittent blood gas analysis.  Care plan discussed with ICU care team.  patient remain critical; required more than usual post op care; I have spent 40 minutes providing non routine post op care.

## 2020-02-03 NOTE — PROGRESS NOTE ADULT - ASSESSMENT
64 year old man with history of HTN, DM II, admitted with progressive exertional angina.     1. CAD   -s/p cath revealing severe triple vessel disease including lesions at the bifurcation of the LAD/diagonal and distal RCA/RPDA/RPL trifurcation.   -s/p CABG x 4   -cont asa, statin,  -pressors/ mech vent per CTU    2. HTN  on pressor per ctu     3. STEPHANIE/CKD  renal f/u     dvt ppx

## 2020-02-03 NOTE — PROGRESS NOTE ADULT - ASSESSMENT
Assessment  DMT2: 64y Male with DM T2 with hyperglycemia, A1C 9.2%, was on oral meds and insulin at home, kept on basal bolus insulin preoperatively, no hypoglycemic episodes. Patient is in the OR for CABG this AM.  CAD: Planning CABG 2/3, cardiac workup in progress, on medications, no chest pain, stable, monitored.  HTN: Controlled,  on antihypertensive medications.  HLD: Controlled, on statin.  CKD: Monitor labs/BMP          Harper Matias MD  Cell: [phone]  Office: [phone] Assessment  DMT2: 64y Male with DM T2 with hyperglycemia, A1C 9.2%, was on oral meds and insulin at home, kept on basal bolus insulin preoperatively, no hypoglycemic episodes.  Patient is in the OR for CABG this AM.  CAD: Planning CABG 2/3, cardiac workup in progress, on medications, no chest pain, stable, monitored.  HTN: Controlled,  on antihypertensive medications.  HLD: Controlled, on statin.  CKD: Monitor labs/BMP          Harper Matias MD  Cell: [phone]  Office: [phone]

## 2020-02-03 NOTE — PROGRESS NOTE ADULT - SUBJECTIVE AND OBJECTIVE BOX
CARDIOLOGY FOLLOW UP - Dr. Steel    CC s/p OR   intubated/ sedated        PHYSICAL EXAM:  T(C): 36.6 (02-03-20 @ 07:20), Max: 36.6 (02-03-20 @ 02:54)  HR: 99 (02-03-20 @ 16:33) (73 - 99)  BP: 160/96 (02-03-20 @ 07:20) (138/80 - 160/96)  RR: 18 (02-03-20 @ 07:20) (18 - 18)  SpO2: 100% (02-03-20 @ 16:33) (98% - 100%)  Wt(kg): --  I&O's Summary    02 Feb 2020 07:01  -  03 Feb 2020 07:00  --------------------------------------------------------  IN: 1120 mL / OUT: 700 mL / NET: 420 mL        Appearance: intubate/ sedated 	  Cardiovascular: Normal S1 S2,RRR, No JVD, No murmurs  Respiratory: Lungs clear to auscultation	  Gastrointestinal:  Soft, Non-tender, + BS	  Extremities: Normal range of motion, No clubbing, cyanosis or edema        MEDICATIONS  (STANDING):  aspirin enteric coated 81 milliGRAM(s) Oral daily  cefuroxime  IVPB 1500 milliGRAM(s) IV Intermittent every 8 hours  chlorhexidine 2% Cloths 1 Application(s) Topical <User Schedule>  chlorhexidine 4% Liquid 1 Application(s) Topical <User Schedule>  insulin regular Infusion 5 Unit(s)/Hr (5 mL/Hr) IV Continuous <Continuous>  meperidine     Injectable 25 milliGRAM(s) IV Push once  metoclopramide  IVPB 10 milliGRAM(s) IV Intermittent every 8 hours  mupirocin 2% Ointment 1 Application(s) Topical two times a day  norepinephrine Infusion 0.03 MICROgram(s)/kG/Min (4.6 mL/Hr) IV Continuous <Continuous>  pantoprazole  Injectable 40 milliGRAM(s) IV Push daily  polyethylene glycol 3350 17 Gram(s) Oral daily  sodium chloride 0.9%. 1000 milliLiter(s) (10 mL/Hr) IV Continuous <Continuous>      TELEMETRY: nsr 	    ECG:  	  RADIOLOGY:   DIAGNOSTIC TESTING:  [ ] Echocardiogram:  [ ]  Catheterization:  [ ] Stress Test:    OTHER: 	    LABS:	 	                            11.8   5.73  )-----------( 171      ( 02 Feb 2020 07:00 )             33.2     02-02    137  |  103  |  37<H>  ----------------------------<  122<H>  3.8   |  23  |  1.91<H>    Ca    9.6      02 Feb 2020 07:00

## 2020-02-03 NOTE — PROGRESS NOTE ADULT - PROBLEM SELECTOR PLAN 1
Patient will be on IV insulin postoperatively. Will continue monitoring FS and FU.  Once patient is eating meals, will transition to basal bolus insulin. Once patient is eating meals, will transition to basal bolus insulin.

## 2020-02-03 NOTE — PROGRESS NOTE ADULT - SUBJECTIVE AND OBJECTIVE BOX
Chief complaint  Patient is a 64y old  Male who presents with a chief complaint of Chest pain (02 Feb 2020 14:27)   Review of systems  Patient in OR for CABG this AM, no hypoglycemia.    Labs and Fingersticks  CAPILLARY BLOOD GLUCOSE      POCT Blood Glucose.: 168 mg/dL (03 Feb 2020 06:24)  POCT Blood Glucose.: 200 mg/dL (02 Feb 2020 22:14)  POCT Blood Glucose.: 215 mg/dL (02 Feb 2020 16:48)      Anion Gap, Serum: 11 (02-02 @ 07:00)      Calcium, Total Serum: 9.6 (02-02 @ 07:00)          02-02    137  |  103  |  37<H>  ----------------------------<  122<H>  3.8   |  23  |  1.91<H>    Ca    9.6      02 Feb 2020 07:00                          11.8   5.73  )-----------( 171      ( 02 Feb 2020 07:00 )             33.2     Medications  MEDICATIONS  (STANDING):  chlorhexidine 4% Liquid 1 Application(s) Topical two times a day      Physical Exam  General: Patient comfortable in bed  Vital Signs Last 12 Hrs  T(F): 97.9 (02-03-20 @ 04:33), Max: 97.9 (02-03-20 @ 02:54)  HR: 77 (02-03-20 @ 07:20) (77 - 77)  BP: 160/96 (02-03-20 @ 07:20) (160/96 - 160/96)  BP(mean): 118 (02-03-20 @ 07:20) (118 - 118)  RR: 18 (02-03-20 @ 07:20) (18 - 18)  SpO2: 98% (02-03-20 @ 07:20) (98% - 98%)  Neck: No palpable thyroid nodules.  CVS: S1S2, No murmurs  Respiratory: No wheezing, no crepitations  GI: Abdomen soft, bowel sounds positive  Musculoskeletal:  edema lower extremities.   Skin: No skin rashes, no ecchymosis    Diagnostics Chief complaint  Patient is a 64y old  Male who presents with a chief complaint of Chest pain  (02 Feb 2020 14:27)   Review of systems  Patient in OR for CABG this AM, no hypoglycemia.    Labs and Fingersticks  CAPILLARY BLOOD GLUCOSE      POCT Blood Glucose.: 168 mg/dL (03 Feb 2020 06:24)  POCT Blood Glucose.: 200 mg/dL (02 Feb 2020 22:14)  POCT Blood Glucose.: 215 mg/dL (02 Feb 2020 16:48)      Anion Gap, Serum: 11 (02-02 @ 07:00)      Calcium, Total Serum: 9.6 (02-02 @ 07:00)          02-02    137  |  103  |  37<H>  ----------------------------<  122<H>  3.8   |  23  |  1.91<H>    Ca    9.6      02 Feb 2020 07:00                          11.8   5.73  )-----------( 171      ( 02 Feb 2020 07:00 )             33.2     Medications  MEDICATIONS  (STANDING):  chlorhexidine 4% Liquid 1 Application(s) Topical two times a day      Physical Exam  General: Patient comfortable in bed  Vital Signs Last 12 Hrs  T(F): 97.9 (02-03-20 @ 04:33), Max: 97.9 (02-03-20 @ 02:54)  HR: 77 (02-03-20 @ 07:20) (77 - 77)  BP: 160/96 (02-03-20 @ 07:20) (160/96 - 160/96)  BP(mean): 118 (02-03-20 @ 07:20) (118 - 118)  RR: 18 (02-03-20 @ 07:20) (18 - 18)  SpO2: 98% (02-03-20 @ 07:20) (98% - 98%)  Neck: No palpable thyroid nodules.  CVS: S1S2, No murmurs  Respiratory: No wheezing, no crepitations  GI: Abdomen soft, bowel sounds positive  Musculoskeletal:  edema lower extremities.   Skin: No skin rashes, no ecchymosis    Diagnostics

## 2020-02-03 NOTE — PRE-OP CHECKLIST - MUPIRONCIN COMMENTS
[facility] 2[facility]2/2020 at 2100 and 2/3/2020 HIBICLENS SHOWER DONE @[facility] 2/2/2020 at 2100 and 2/3/2020 at 0599

## 2020-02-04 DIAGNOSIS — Z95.1 PRESENCE OF AORTOCORONARY BYPASS GRAFT: ICD-10-CM

## 2020-02-04 DIAGNOSIS — Z96.89 PRESENCE OF OTHER SPECIFIED FUNCTIONAL IMPLANTS: ICD-10-CM

## 2020-02-04 DIAGNOSIS — Z96.0 PRESENCE OF UROGENITAL IMPLANTS: ICD-10-CM

## 2020-02-04 LAB
ALBUMIN SERPL ELPH-MCNC: 2.8 G/DL — LOW (ref 3.3–5)
ALP SERPL-CCNC: 33 U/L — LOW (ref 40–120)
ALT FLD-CCNC: 40 U/L — SIGNIFICANT CHANGE UP (ref 10–45)
ANION GAP SERPL CALC-SCNC: 14 MMOL/L — SIGNIFICANT CHANGE UP (ref 5–17)
AST SERPL-CCNC: 90 U/L — HIGH (ref 10–40)
BASE EXCESS BLDMV CALC-SCNC: -0.1 MMOL/L — SIGNIFICANT CHANGE UP (ref -3–3)
BASE EXCESS BLDMV CALC-SCNC: -0.5 MMOL/L — SIGNIFICANT CHANGE UP (ref -3–3)
BASE EXCESS BLDMV CALC-SCNC: -0.8 MMOL/L — SIGNIFICANT CHANGE UP (ref -3–3)
BASE EXCESS BLDMV CALC-SCNC: -1 MMOL/L — SIGNIFICANT CHANGE UP (ref -3–3)
BASE EXCESS BLDMV CALC-SCNC: -1 MMOL/L — SIGNIFICANT CHANGE UP (ref -3–3)
BASE EXCESS BLDV CALC-SCNC: -0.9 MMOL/L — SIGNIFICANT CHANGE UP (ref -2–2)
BASE EXCESS BLDV CALC-SCNC: -1.6 MMOL/L — SIGNIFICANT CHANGE UP (ref -2–2)
BASE EXCESS BLDV CALC-SCNC: -4.2 MMOL/L — LOW (ref -2–2)
BILIRUB SERPL-MCNC: 0.4 MG/DL — SIGNIFICANT CHANGE UP (ref 0.2–1.2)
BUN SERPL-MCNC: 33 MG/DL — HIGH (ref 7–23)
CALCIUM SERPL-MCNC: 8.1 MG/DL — LOW (ref 8.4–10.5)
CHLORIDE SERPL-SCNC: 107 MMOL/L — SIGNIFICANT CHANGE UP (ref 96–108)
CO2 BLDMV-SCNC: 25 MMOL/L — SIGNIFICANT CHANGE UP (ref 21–29)
CO2 BLDMV-SCNC: 26 MMOL/L — SIGNIFICANT CHANGE UP (ref 21–29)
CO2 BLDMV-SCNC: 26 MMOL/L — SIGNIFICANT CHANGE UP (ref 21–29)
CO2 BLDV-SCNC: 22 MMOL/L — SIGNIFICANT CHANGE UP (ref 22–30)
CO2 BLDV-SCNC: 24 MMOL/L — SIGNIFICANT CHANGE UP (ref 22–30)
CO2 BLDV-SCNC: 26 MMOL/L — SIGNIFICANT CHANGE UP (ref 22–30)
CO2 SERPL-SCNC: 20 MMOL/L — LOW (ref 22–31)
CREAT SERPL-MCNC: 2 MG/DL — HIGH (ref 0.5–1.3)
GAS PNL BLDA: SIGNIFICANT CHANGE UP
GAS PNL BLDMV: SIGNIFICANT CHANGE UP
GAS PNL BLDV: SIGNIFICANT CHANGE UP
GAS PNL BLDV: SIGNIFICANT CHANGE UP
GLUCOSE BLDC GLUCOMTR-MCNC: 104 MG/DL — HIGH (ref 70–99)
GLUCOSE BLDC GLUCOMTR-MCNC: 109 MG/DL — HIGH (ref 70–99)
GLUCOSE BLDC GLUCOMTR-MCNC: 110 MG/DL — HIGH (ref 70–99)
GLUCOSE BLDC GLUCOMTR-MCNC: 116 MG/DL — HIGH (ref 70–99)
GLUCOSE BLDC GLUCOMTR-MCNC: 136 MG/DL — HIGH (ref 70–99)
GLUCOSE BLDC GLUCOMTR-MCNC: 137 MG/DL — HIGH (ref 70–99)
GLUCOSE BLDC GLUCOMTR-MCNC: 201 MG/DL — HIGH (ref 70–99)
GLUCOSE BLDC GLUCOMTR-MCNC: 237 MG/DL — HIGH (ref 70–99)
GLUCOSE SERPL-MCNC: 132 MG/DL — HIGH (ref 70–99)
HCO3 BLDMV-SCNC: 23 MMOL/L — SIGNIFICANT CHANGE UP (ref 20–28)
HCO3 BLDMV-SCNC: 24 MMOL/L — SIGNIFICANT CHANGE UP (ref 20–28)
HCO3 BLDMV-SCNC: 24 MMOL/L — SIGNIFICANT CHANGE UP (ref 20–28)
HCO3 BLDMV-SCNC: 25 MMOL/L — SIGNIFICANT CHANGE UP (ref 20–28)
HCO3 BLDMV-SCNC: 25 MMOL/L — SIGNIFICANT CHANGE UP (ref 20–28)
HCO3 BLDV-SCNC: 21 MMOL/L — SIGNIFICANT CHANGE UP (ref 21–29)
HCO3 BLDV-SCNC: 23 MMOL/L — SIGNIFICANT CHANGE UP (ref 21–29)
HCO3 BLDV-SCNC: 24 MMOL/L — SIGNIFICANT CHANGE UP (ref 21–29)
HCT VFR BLD CALC: 29.9 % — LOW (ref 39–50)
HCV AB S/CO SERPL IA: 0.16 S/CO — SIGNIFICANT CHANGE UP (ref 0–0.99)
HCV AB SERPL-IMP: SIGNIFICANT CHANGE UP
HGB BLD-MCNC: 9.9 G/DL — LOW (ref 13–17)
HOROWITZ INDEX BLDMV+IHG-RTO: 21 — SIGNIFICANT CHANGE UP
HOROWITZ INDEX BLDMV+IHG-RTO: 32 — SIGNIFICANT CHANGE UP
HOROWITZ INDEX BLDMV+IHG-RTO: 36 — SIGNIFICANT CHANGE UP
HOROWITZ INDEX BLDMV+IHG-RTO: 40 — SIGNIFICANT CHANGE UP
HOROWITZ INDEX BLDMV+IHG-RTO: 40 — SIGNIFICANT CHANGE UP
HOROWITZ INDEX BLDV+IHG-RTO: 32 — SIGNIFICANT CHANGE UP
HOROWITZ INDEX BLDV+IHG-RTO: 32 — SIGNIFICANT CHANGE UP
HOROWITZ INDEX BLDV+IHG-RTO: 36 — SIGNIFICANT CHANGE UP
MAGNESIUM SERPL-MCNC: 2.3 MG/DL — SIGNIFICANT CHANGE UP (ref 1.6–2.6)
MCHC RBC-ENTMCNC: 28.1 PG — SIGNIFICANT CHANGE UP (ref 27–34)
MCHC RBC-ENTMCNC: 33.1 GM/DL — SIGNIFICANT CHANGE UP (ref 32–36)
MCV RBC AUTO: 84.9 FL — SIGNIFICANT CHANGE UP (ref 80–100)
NRBC # BLD: 0 /100 WBCS — SIGNIFICANT CHANGE UP (ref 0–0)
O2 CT VFR BLD CALC: 29 MMHG — LOW (ref 30–65)
O2 CT VFR BLD CALC: 30 MMHG — SIGNIFICANT CHANGE UP (ref 30–65)
O2 CT VFR BLD CALC: 32 MMHG — SIGNIFICANT CHANGE UP (ref 30–65)
PCO2 BLDMV: 40 MMHG — SIGNIFICANT CHANGE UP (ref 30–65)
PCO2 BLDMV: 42 MMHG — SIGNIFICANT CHANGE UP (ref 30–65)
PCO2 BLDMV: 43 MMHG — SIGNIFICANT CHANGE UP (ref 30–65)
PCO2 BLDMV: 44 MMHG — SIGNIFICANT CHANGE UP (ref 30–65)
PCO2 BLDMV: 48 MMHG — SIGNIFICANT CHANGE UP (ref 30–65)
PCO2 BLDV: 40 MMHG — SIGNIFICANT CHANGE UP (ref 35–50)
PCO2 BLDV: 42 MMHG — SIGNIFICANT CHANGE UP (ref 35–50)
PCO2 BLDV: 45 MMHG — SIGNIFICANT CHANGE UP (ref 35–50)
PH BLDMV: 7.34 — SIGNIFICANT CHANGE UP (ref 7.32–7.45)
PH BLDMV: 7.36 — SIGNIFICANT CHANGE UP (ref 7.32–7.45)
PH BLDMV: 7.36 — SIGNIFICANT CHANGE UP (ref 7.32–7.45)
PH BLDMV: 7.37 — SIGNIFICANT CHANGE UP (ref 7.32–7.45)
PH BLDMV: 7.38 — SIGNIFICANT CHANGE UP (ref 7.32–7.45)
PH BLDV: 7.34 — LOW (ref 7.35–7.45)
PH BLDV: 7.35 — SIGNIFICANT CHANGE UP (ref 7.35–7.45)
PH BLDV: 7.36 — SIGNIFICANT CHANGE UP (ref 7.35–7.45)
PHOSPHATE SERPL-MCNC: 3.3 MG/DL — SIGNIFICANT CHANGE UP (ref 2.5–4.5)
PLATELET # BLD AUTO: 84 K/UL — LOW (ref 150–400)
PO2 BLDV: 31 MMHG — SIGNIFICANT CHANGE UP (ref 25–45)
PO2 BLDV: 33 MMHG — SIGNIFICANT CHANGE UP (ref 25–45)
PO2 BLDV: 36 MMHG — SIGNIFICANT CHANGE UP (ref 25–45)
POTASSIUM SERPL-MCNC: 5 MMOL/L — SIGNIFICANT CHANGE UP (ref 3.5–5.3)
POTASSIUM SERPL-SCNC: 5 MMOL/L — SIGNIFICANT CHANGE UP (ref 3.5–5.3)
PROT SERPL-MCNC: 4.4 G/DL — LOW (ref 6–8.3)
RBC # BLD: 3.52 M/UL — LOW (ref 4.2–5.8)
RBC # FLD: 14 % — SIGNIFICANT CHANGE UP (ref 10.3–14.5)
SAO2 % BLDMV: 47 % — LOW (ref 60–90)
SAO2 % BLDMV: 50 % — LOW (ref 60–90)
SAO2 % BLDMV: 53 % — LOW (ref 60–90)
SAO2 % BLDMV: 53 % — LOW (ref 60–90)
SAO2 % BLDMV: 57 % — LOW (ref 60–90)
SAO2 % BLDV: 53 % — LOW (ref 67–88)
SAO2 % BLDV: 58 % — LOW (ref 67–88)
SAO2 % BLDV: 60 % — LOW (ref 67–88)
SODIUM SERPL-SCNC: 141 MMOL/L — SIGNIFICANT CHANGE UP (ref 135–145)
WBC # BLD: 7.74 K/UL — SIGNIFICANT CHANGE UP (ref 3.8–10.5)
WBC # FLD AUTO: 7.74 K/UL — SIGNIFICANT CHANGE UP (ref 3.8–10.5)

## 2020-02-04 PROCEDURE — 93010 ELECTROCARDIOGRAM REPORT: CPT

## 2020-02-04 PROCEDURE — 99291 CRITICAL CARE FIRST HOUR: CPT

## 2020-02-04 PROCEDURE — 93321 DOPPLER ECHO F-UP/LMTD STD: CPT | Mod: 26

## 2020-02-04 PROCEDURE — 93308 TTE F-UP OR LMTD: CPT | Mod: 26

## 2020-02-04 PROCEDURE — 99232 SBSQ HOSP IP/OBS MODERATE 35: CPT | Mod: GC

## 2020-02-04 PROCEDURE — 71045 X-RAY EXAM CHEST 1 VIEW: CPT | Mod: 26,59

## 2020-02-04 RX ORDER — INSULIN LISPRO 100/ML
VIAL (ML) SUBCUTANEOUS
Refills: 0 | Status: DISCONTINUED | OUTPATIENT
Start: 2020-02-04 | End: 2020-02-04

## 2020-02-04 RX ORDER — DEXTROSE 50 % IN WATER 50 %
15 SYRINGE (ML) INTRAVENOUS ONCE
Refills: 0 | Status: DISCONTINUED | OUTPATIENT
Start: 2020-02-04 | End: 2020-02-25

## 2020-02-04 RX ORDER — INSULIN LISPRO 100/ML
9 VIAL (ML) SUBCUTANEOUS
Refills: 0 | Status: DISCONTINUED | OUTPATIENT
Start: 2020-02-04 | End: 2020-02-05

## 2020-02-04 RX ORDER — OXYCODONE AND ACETAMINOPHEN 5; 325 MG/1; MG/1
2 TABLET ORAL EVERY 4 HOURS
Refills: 0 | Status: DISCONTINUED | OUTPATIENT
Start: 2020-02-04 | End: 2020-02-05

## 2020-02-04 RX ORDER — INSULIN GLARGINE 100 [IU]/ML
20 INJECTION, SOLUTION SUBCUTANEOUS AT BEDTIME
Refills: 0 | Status: DISCONTINUED | OUTPATIENT
Start: 2020-02-04 | End: 2020-02-04

## 2020-02-04 RX ORDER — OXYCODONE AND ACETAMINOPHEN 5; 325 MG/1; MG/1
1 TABLET ORAL EVERY 4 HOURS
Refills: 0 | Status: DISCONTINUED | OUTPATIENT
Start: 2020-02-04 | End: 2020-02-05

## 2020-02-04 RX ORDER — ASPIRIN/CALCIUM CARB/MAGNESIUM 324 MG
300 TABLET ORAL ONCE
Refills: 0 | Status: COMPLETED | OUTPATIENT
Start: 2020-02-04 | End: 2020-02-03

## 2020-02-04 RX ORDER — INSULIN LISPRO 100/ML
6 VIAL (ML) SUBCUTANEOUS
Refills: 0 | Status: DISCONTINUED | OUTPATIENT
Start: 2020-02-04 | End: 2020-02-04

## 2020-02-04 RX ORDER — OXYCODONE HYDROCHLORIDE 5 MG/1
5 TABLET ORAL ONCE
Refills: 0 | Status: DISCONTINUED | OUTPATIENT
Start: 2020-02-04 | End: 2020-02-04

## 2020-02-04 RX ORDER — INSULIN LISPRO 100/ML
VIAL (ML) SUBCUTANEOUS AT BEDTIME
Refills: 0 | Status: DISCONTINUED | OUTPATIENT
Start: 2020-02-04 | End: 2020-02-07

## 2020-02-04 RX ORDER — DEXTROSE 50 % IN WATER 50 %
12.5 SYRINGE (ML) INTRAVENOUS ONCE
Refills: 0 | Status: DISCONTINUED | OUTPATIENT
Start: 2020-02-04 | End: 2020-02-25

## 2020-02-04 RX ORDER — ATORVASTATIN CALCIUM 80 MG/1
40 TABLET, FILM COATED ORAL AT BEDTIME
Refills: 0 | Status: DISCONTINUED | OUTPATIENT
Start: 2020-02-04 | End: 2020-02-08

## 2020-02-04 RX ORDER — HYDROMORPHONE HYDROCHLORIDE 2 MG/ML
0.5 INJECTION INTRAMUSCULAR; INTRAVENOUS; SUBCUTANEOUS ONCE
Refills: 0 | Status: DISCONTINUED | OUTPATIENT
Start: 2020-02-04 | End: 2020-02-04

## 2020-02-04 RX ORDER — INSULIN LISPRO 100/ML
VIAL (ML) SUBCUTANEOUS
Refills: 0 | Status: DISCONTINUED | OUTPATIENT
Start: 2020-02-04 | End: 2020-02-07

## 2020-02-04 RX ORDER — DEXTROSE 50 % IN WATER 50 %
25 SYRINGE (ML) INTRAVENOUS ONCE
Refills: 0 | Status: DISCONTINUED | OUTPATIENT
Start: 2020-02-04 | End: 2020-02-25

## 2020-02-04 RX ORDER — SODIUM CHLORIDE 9 MG/ML
500 INJECTION INTRAMUSCULAR; INTRAVENOUS; SUBCUTANEOUS ONCE
Refills: 0 | Status: COMPLETED | OUTPATIENT
Start: 2020-02-04 | End: 2020-02-04

## 2020-02-04 RX ORDER — FUROSEMIDE 40 MG
20 TABLET ORAL ONCE
Refills: 0 | Status: COMPLETED | OUTPATIENT
Start: 2020-02-04 | End: 2020-02-04

## 2020-02-04 RX ORDER — INSULIN GLARGINE 100 [IU]/ML
36 INJECTION, SOLUTION SUBCUTANEOUS AT BEDTIME
Refills: 0 | Status: DISCONTINUED | OUTPATIENT
Start: 2020-02-04 | End: 2020-02-05

## 2020-02-04 RX ORDER — SODIUM CHLORIDE 9 MG/ML
1000 INJECTION, SOLUTION INTRAVENOUS
Refills: 0 | Status: DISCONTINUED | OUTPATIENT
Start: 2020-02-04 | End: 2020-02-25

## 2020-02-04 RX ORDER — HEPARIN SODIUM 5000 [USP'U]/ML
5000 INJECTION INTRAVENOUS; SUBCUTANEOUS EVERY 8 HOURS
Refills: 0 | Status: DISCONTINUED | OUTPATIENT
Start: 2020-02-04 | End: 2020-02-07

## 2020-02-04 RX ORDER — GLUCAGON INJECTION, SOLUTION 0.5 MG/.1ML
1 INJECTION, SOLUTION SUBCUTANEOUS ONCE
Refills: 0 | Status: DISCONTINUED | OUTPATIENT
Start: 2020-02-04 | End: 2020-02-25

## 2020-02-04 RX ORDER — OXYCODONE AND ACETAMINOPHEN 5; 325 MG/1; MG/1
1 TABLET ORAL EVERY 6 HOURS
Refills: 0 | Status: DISCONTINUED | OUTPATIENT
Start: 2020-02-04 | End: 2020-02-04

## 2020-02-04 RX ORDER — OXYCODONE AND ACETAMINOPHEN 5; 325 MG/1; MG/1
2 TABLET ORAL EVERY 6 HOURS
Refills: 0 | Status: DISCONTINUED | OUTPATIENT
Start: 2020-02-04 | End: 2020-02-04

## 2020-02-04 RX ADMIN — OXYCODONE HYDROCHLORIDE 5 MILLIGRAM(S): 5 TABLET ORAL at 08:35

## 2020-02-04 RX ADMIN — Medication 6.08 MICROGRAM(S)/KG/MIN: at 08:05

## 2020-02-04 RX ADMIN — Medication 100 MILLIGRAM(S): at 17:06

## 2020-02-04 RX ADMIN — Medication 100 MILLIGRAM(S): at 00:35

## 2020-02-04 RX ADMIN — POLYETHYLENE GLYCOL 3350 17 GRAM(S): 17 POWDER, FOR SOLUTION ORAL at 13:33

## 2020-02-04 RX ADMIN — Medication 6.08 MICROGRAM(S)/KG/MIN: at 20:19

## 2020-02-04 RX ADMIN — OXYCODONE AND ACETAMINOPHEN 2 TABLET(S): 5; 325 TABLET ORAL at 13:23

## 2020-02-04 RX ADMIN — PANTOPRAZOLE SODIUM 40 MILLIGRAM(S): 20 TABLET, DELAYED RELEASE ORAL at 12:54

## 2020-02-04 RX ADMIN — HEPARIN SODIUM 5000 UNIT(S): 5000 INJECTION INTRAVENOUS; SUBCUTANEOUS at 13:33

## 2020-02-04 RX ADMIN — Medication 20 MILLIGRAM(S): at 17:10

## 2020-02-04 RX ADMIN — OXYCODONE HYDROCHLORIDE 5 MILLIGRAM(S): 5 TABLET ORAL at 08:05

## 2020-02-04 RX ADMIN — Medication 100 MILLIGRAM(S): at 10:47

## 2020-02-04 RX ADMIN — HYDROMORPHONE HYDROCHLORIDE 0.5 MILLIGRAM(S): 2 INJECTION INTRAMUSCULAR; INTRAVENOUS; SUBCUTANEOUS at 20:25

## 2020-02-04 RX ADMIN — Medication 2: at 12:53

## 2020-02-04 RX ADMIN — Medication 2: at 18:21

## 2020-02-04 RX ADMIN — OXYCODONE AND ACETAMINOPHEN 2 TABLET(S): 5; 325 TABLET ORAL at 12:53

## 2020-02-04 RX ADMIN — OXYCODONE AND ACETAMINOPHEN 2 TABLET(S): 5; 325 TABLET ORAL at 22:06

## 2020-02-04 RX ADMIN — INSULIN HUMAN 5 UNIT(S)/HR: 100 INJECTION, SOLUTION SUBCUTANEOUS at 08:05

## 2020-02-04 RX ADMIN — INSULIN GLARGINE 36 UNIT(S): 100 INJECTION, SOLUTION SUBCUTANEOUS at 22:04

## 2020-02-04 RX ADMIN — Medication 10 MILLIGRAM(S): at 06:21

## 2020-02-04 RX ADMIN — Medication 6 UNIT(S): at 12:53

## 2020-02-04 RX ADMIN — Medication 9 UNIT(S): at 18:58

## 2020-02-04 RX ADMIN — ATORVASTATIN CALCIUM 40 MILLIGRAM(S): 80 TABLET, FILM COATED ORAL at 22:05

## 2020-02-04 RX ADMIN — MUPIROCIN 1 APPLICATION(S): 20 OINTMENT TOPICAL at 06:22

## 2020-02-04 RX ADMIN — Medication 650 MILLIGRAM(S): at 06:51

## 2020-02-04 RX ADMIN — CHLORHEXIDINE GLUCONATE 1 APPLICATION(S): 213 SOLUTION TOPICAL at 05:31

## 2020-02-04 RX ADMIN — Medication 10 MILLIGRAM(S): at 13:33

## 2020-02-04 RX ADMIN — Medication 650 MILLIGRAM(S): at 06:21

## 2020-02-04 RX ADMIN — Medication 10 MILLIGRAM(S): at 22:05

## 2020-02-04 RX ADMIN — CHLORHEXIDINE GLUCONATE 1 APPLICATION(S): 213 SOLUTION TOPICAL at 04:57

## 2020-02-04 RX ADMIN — HYDROMORPHONE HYDROCHLORIDE 0.5 MILLIGRAM(S): 2 INJECTION INTRAMUSCULAR; INTRAVENOUS; SUBCUTANEOUS at 20:10

## 2020-02-04 RX ADMIN — Medication 81 MILLIGRAM(S): at 12:54

## 2020-02-04 RX ADMIN — SODIUM CHLORIDE 2000 MILLILITER(S): 9 INJECTION INTRAMUSCULAR; INTRAVENOUS; SUBCUTANEOUS at 11:25

## 2020-02-04 RX ADMIN — HEPARIN SODIUM 5000 UNIT(S): 5000 INJECTION INTRAVENOUS; SUBCUTANEOUS at 22:05

## 2020-02-04 RX ADMIN — OXYCODONE AND ACETAMINOPHEN 2 TABLET(S): 5; 325 TABLET ORAL at 22:36

## 2020-02-04 RX ADMIN — Medication 4.6 MICROGRAM(S)/KG/MIN: at 08:05

## 2020-02-04 RX ADMIN — MUPIROCIN 1 APPLICATION(S): 20 OINTMENT TOPICAL at 17:45

## 2020-02-04 NOTE — PROGRESS NOTE ADULT - PROBLEM SELECTOR PLAN 6
ASA continued for graft occlusion-thromboembolism prophylaxis  Lipitor was also started for long term graft patency  VTE prophylaxis with SCD+hep sq  PPI maintained for GI bleeding prophylaxis

## 2020-02-04 NOTE — PROGRESS NOTE ADULT - PROBLEM SELECTOR PLAN 4
transition to sliding scale +/- Lantus, once tolerating diet  Diabetic diet  Diabetic teaching  Endo consulting, appreciate recommendations

## 2020-02-04 NOTE — PROGRESS NOTE ADULT - SUBJECTIVE AND OBJECTIVE BOX
CRITICAL CARE ATTENDING - CTICU    MEDICATIONS  (STANDING):  aspirin enteric coated 81 milliGRAM(s) Oral daily  cefuroxime  IVPB 1500 milliGRAM(s) IV Intermittent every 8 hours  chlorhexidine 2% Cloths 1 Application(s) Topical <User Schedule>  chlorhexidine 4% Liquid 1 Application(s) Topical <User Schedule>  DOBUTamine Infusion 2.5 MICROgram(s)/kG/Min (6.082 mL/Hr) IV Continuous <Continuous>  insulin regular Infusion 5 Unit(s)/Hr (5 mL/Hr) IV Continuous <Continuous>  lactated ringers Bolus 500 milliLiter(s) IV Bolus once  meperidine     Injectable 25 milliGRAM(s) IV Push once  metoclopramide Injectable 10 milliGRAM(s) IV Push every 8 hours  mupirocin 2% Ointment 1 Application(s) Topical two times a day  norepinephrine Infusion 0.03 MICROgram(s)/kG/Min (4.6 mL/Hr) IV Continuous <Continuous>  pantoprazole  Injectable 40 milliGRAM(s) IV Push daily  polyethylene glycol 3350 17 Gram(s) Oral daily  sodium chloride 0.9%. 1000 milliLiter(s) (10 mL/Hr) IV Continuous <Continuous>                                    9.9    7.74  )-----------( 84       ( 04 Feb 2020 01:30 )             29.9       02-04    141  |  107  |  33<H>  ----------------------------<  132<H>  5.0   |  20<L>  |  2.00<H>    Ca    8.1<L>      04 Feb 2020 01:30  Phos  3.3     02-04  Mg     2.3     02-04    TPro  4.4<L>  /  Alb  2.8<L>  /  TBili  0.4  /  DBili  x   /  AST  90<H>  /  ALT  40  /  AlkPhos  33<L>  02-04      PT/INR - ( 03 Feb 2020 16:55 )   PT: 13.3 sec;   INR: 1.16 ratio         PTT - ( 03 Feb 2020 16:55 )  PTT:31.2 sec    Mode: CPAP with PS  RR (machine): 19  FiO2: 40  MAP: 8  PIP: 10      Daily Height in cm: 172.72 (03 Feb 2020 07:20)    Daily       02-02 @ 07:01 - 02-03 @ 07:00  --------------------------------------------------------  IN: 1120 mL / OUT: 700 mL / NET: 420 mL    02-03 @ 07:01  - 02-04 @ 06:18  --------------------------------------------------------  IN: 2123.9 mL / OUT: 1230 mL / NET: 893.9 mL        Critically Ill patient  : [ ] preoperative ,   [x ] post operative    Requires :  [ x] Arterial Line   [x ] Central Line  [ x] PA catheter  [ ] IABP  [ ] ECMO  [ ] LVAD  [ ] Ventilator  [ ] pacemaker [ ] Impella.                      [x ] ABG's     [ x] Pulse Oxymetry Monitoring  Bedside evaluation , monitoring , treatment of hemodynamics , fluids , IVP/ IVCD meds.        Diagnosis:       POD 1 - C4L, patch angioplasty of LAD     hx of CAD     CKD     Chest tube drainage    Requires chest PT, pulmonary toilet,  suctioning to maintain SaO2,  patent airway and treat atelectasis.     Hemodynamic lability,  instability. Requires IVCD [x ] vasopressors [x ] inotropes  [ ] vasodilator  [ ]IVSS fluid  to maintain MAP, perfusion, C.I.     [place] Toy[place] catheter interpretation and therapeutic management of unstable hemodynamics     DM type 2 - IVCD insulin     Thrombocytopenia            By signing my name below, I, Ciara Coronado, attest that this documentation has been prepared under the direction and in the presence of Matt Key MD.   Electronically Signed: Phill Castaneda. 02-04-20 @ 06:18    Discussed with CT surgeon, Physician's Assistant - Nurse Practitioner- Critical care medicine team.   Dicussed at  AM / PM rounds.   Chart, labs , films reviewed.    Total Time: CRITICAL CARE ATTENDING - CTICU    MEDICATIONS  (STANDING):  aspirin enteric coated 81 milliGRAM(s) Oral daily  cefuroxime  IVPB 1500 milliGRAM(s) IV Intermittent every 8 hours  chlorhexidine 2% Cloths 1 Application(s) Topical <User Schedule>  chlorhexidine 4% Liquid 1 Application(s) Topical <User Schedule>  DOBUTamine Infusion 2.5 MICROgram(s)/kG/Min (6.082 mL/Hr) IV Continuous <Continuous>  insulin regular Infusion 5 Unit(s)/Hr (5 mL/Hr) IV Continuous <Continuous>  lactated ringers Bolus 500 milliLiter(s) IV Bolus once  meperidine     Injectable 25 milliGRAM(s) IV Push once  metoclopramide Injectable 10 milliGRAM(s) IV Push every 8 hours  mupirocin 2% Ointment 1 Application(s) Topical two times a day  norepinephrine Infusion 0.03 MICROgram(s)/kG/Min (4.6 mL/Hr) IV Continuous <Continuous>  pantoprazole  Injectable 40 milliGRAM(s) IV Push daily  polyethylene glycol 3350 17 Gram(s) Oral daily  sodium chloride 0.9%. 1000 milliLiter(s) (10 mL/Hr) IV Continuous <Continuous>                                    9.9    7.74  )-----------( 84       ( 04 Feb 2020 01:30 )             29.9       02-04    141  |  107  |  33<H>  ----------------------------<  132<H>  5.0   |  20<L>  |  2.00<H>    Ca    8.1<L>      04 Feb 2020 01:30  Phos  3.3     02-04  Mg     2.3     02-04    TPro  4.4<L>  /  Alb  2.8<L>  /  TBili  0.4  /  DBili  x   /  AST  90<H>  /  ALT  40  /  AlkPhos  33<L>  02-04      PT/INR - ( 03 Feb 2020 16:55 )   PT: 13.3 sec;   INR: 1.16 ratio         PTT - ( 03 Feb 2020 16:55 )  PTT:31.2 sec    Mode: CPAP with PS  RR (machine): 19  FiO2: 40  MAP: 8  PIP: 10      Daily Height in cm: 172.72 (03 Feb 2020 07:20)    Daily       02-02 @ 07:01 - 02-03 @ 07:00  --------------------------------------------------------  IN: 1120 mL / OUT: 700 mL / NET: 420 mL    02-03 @ 07:01  - 02-04 @ 06:18  --------------------------------------------------------  IN: 2123.9 mL / OUT: 1230 mL / NET: 893.9 mL        Critically Ill patient  : [ ] preoperative ,   [x ] post operative    Requires :  [ x] Arterial Line   [x ] Central Line  [ x] PA catheter  [ ] IABP  [ ] ECMO  [ ] LVAD  [ ] Ventilator  [ ] pacemaker [ ] Impella.                      [x ] ABG's     [ x] Pulse Oxymetry Monitoring  Bedside evaluation , monitoring , treatment of hemodynamics , fluids , IVP/ IVCD meds.        Diagnosis:       POD 1 - C4L, patch angioplasty of LAD     Hypotension    Hypovolemia    [place] Toyin catheter interpretation and therapeutic management of unstable hemodynamics     CHF- acute [ x]   chronic [ ]    systolic [ x]   diatolic [ ]   Post Op           - Echo- EF -   ?          [ ] RV dysfunction          - Cxr-cardiomegally, edema          - Clinical-  [x ]inotropes   [x ]pressors   [ ]diuresis   [ ]IABP   [ ]ECMO   [ ]LVAD   [ ]Respiratory Failure   -     hx of CAD     CKD     Renal Failure - Acute Kidney Injury     Chest tube drainage    Requires chest PT, pulmonary toilet,  suctioning to maintain SaO2,  patent airway and treat atelectasis.     Hemodynamic lability,  instability. Requires IVCD [x ] vasopressors [x ] inotropes  [ ] vasodilator  [ x]IVSS fluid  to maintain MAP, perfusion, C.I.     [place] Toy[place] catheter interpretation and therapeutic management of unstable hemodynamics     DM type 2 - IVCD insulin     Thrombocytopenia    Requires bedside physical therapy, mobilization and total assisted care.     IMatt, personally performed the services described in this documentation. All medical record entries made by the scribe were at my direction and in my presence. I have reviewed the chart and agree that the record reflects my personal performance and is accurate and complete.   Matt Key MD.             By signing my name below, I, Ciara Coronado, attest that this documentation has been prepared under the direction and in the presence of Matt Key MD.   Electronically Signed: Phill Castaneda. 02-04-20 @ 06:18    Discussed with CT surgeon, Physician's Assistant - Nurse Practitioner- Critical care medicine team.   Dicussed at  AM / PM rounds.   Chart, labs , films reviewed.    Total Time: 30 min

## 2020-02-04 NOTE — PROGRESS NOTE ADULT - PROBLEM SELECTOR PLAN 1
Will increase Lantus to 36u at bedtime.  Will increase Humalog to 9u before each meal and continue Humalog correction scale coverage. Will continue monitoring FS and FU.  Patient counseled for compliance with consistent low carb diet and exercise as tolerated outpatient. Patient counseled for compliance with consistent low carb diet and exercise as tolerated outpatient.

## 2020-02-04 NOTE — PROGRESS NOTE ADULT - SUBJECTIVE AND OBJECTIVE BOX
Chief complaint  Patient is a 64y old  Male who presents with a chief complaint of Chest pain (04 Feb 2020 11:29)   Review of systems  Patient in bed, looks comfortable, no hypoglycemia.    Labs and Fingersticks  CAPILLARY BLOOD GLUCOSE      POCT Blood Glucose.: 137 mg/dL (04 Feb 2020 10:00)  POCT Blood Glucose.: 116 mg/dL (04 Feb 2020 09:17)  POCT Blood Glucose.: 109 mg/dL (04 Feb 2020 07:15)  POCT Blood Glucose.: 104 mg/dL (04 Feb 2020 04:54)  POCT Blood Glucose.: 110 mg/dL (04 Feb 2020 03:00)  POCT Blood Glucose.: 136 mg/dL (03 Feb 2020 23:46)  POCT Blood Glucose.: 129 mg/dL (03 Feb 2020 22:25)  POCT Blood Glucose.: 131 mg/dL (03 Feb 2020 18:57)      Anion Gap, Serum: 14 (02-04 @ 01:30)  Anion Gap, Serum: 13 (02-03 @ 17:22)      Calcium, Total Serum: 8.1 <L> (02-04 @ 01:30)  Calcium, Total Serum: 8.0 <L> (02-03 @ 17:22)  Albumin, Serum: 2.8 <L> (02-04 @ 01:30)  Albumin, Serum: 2.9 <L> (02-03 @ 17:22)    Alanine Aminotransferase (ALT/SGPT): 40 (02-04 @ 01:30)  Alanine Aminotransferase (ALT/SGPT): 30 (02-03 @ 17:22)  Alkaline Phosphatase, Serum: 33 <L> (02-04 @ 01:30)  Alkaline Phosphatase, Serum: 31 <L> (02-03 @ 17:22)  Aspartate Aminotransferase (AST/SGOT): 90 <H> (02-04 @ 01:30)  Aspartate Aminotransferase (AST/SGOT): 59 <H> (02-03 @ 17:22)        02-04    141  |  107  |  33<H>  ----------------------------<  132<H>  5.0   |  20<L>  |  2.00<H>    Ca    8.1<L>      04 Feb 2020 01:30  Phos  3.3     02-04  Mg     2.3     02-04    TPro  4.4<L>  /  Alb  2.8<L>  /  TBili  0.4  /  DBili  x   /  AST  90<H>  /  ALT  40  /  AlkPhos  33<L>  02-04                        9.9    7.74  )-----------( 84       ( 04 Feb 2020 01:30 )             29.9     Medications  MEDICATIONS  (STANDING):  artificial  tears Solution 1 Drop(s) Both EYES two times a day  aspirin enteric coated 81 milliGRAM(s) Oral daily  atorvastatin 40 milliGRAM(s) Oral at bedtime  chlorhexidine 2% Cloths 1 Application(s) Topical <User Schedule>  chlorhexidine 4% Liquid 1 Application(s) Topical <User Schedule>  dextrose 5%. 1000 milliLiter(s) (50 mL/Hr) IV Continuous <Continuous>  dextrose 50% Injectable 12.5 Gram(s) IV Push once  dextrose 50% Injectable 25 Gram(s) IV Push once  dextrose 50% Injectable 25 Gram(s) IV Push once  DOBUTamine Infusion 2.5 MICROgram(s)/kG/Min (6.082 mL/Hr) IV Continuous <Continuous>  heparin  Injectable 5000 Unit(s) SubCutaneous every 8 hours  insulin glargine Injectable (LANTUS) 36 Unit(s) SubCutaneous at bedtime  insulin lispro (HumaLOG) corrective regimen sliding scale   SubCutaneous three times a day before meals  insulin lispro (HumaLOG) corrective regimen sliding scale   SubCutaneous at bedtime  insulin lispro Injectable (HumaLOG) 9 Unit(s) SubCutaneous three times a day before meals  insulin regular Infusion 5 Unit(s)/Hr (5 mL/Hr) IV Continuous <Continuous>  lactated ringers Bolus 500 milliLiter(s) IV Bolus once  meperidine     Injectable 25 milliGRAM(s) IV Push once  metoclopramide Injectable 10 milliGRAM(s) IV Push every 8 hours  multivitamin 1 Tablet(s) Oral daily  mupirocin 2% Ointment 1 Application(s) Topical two times a day  norepinephrine Infusion 0.03 MICROgram(s)/kG/Min (4.6 mL/Hr) IV Continuous <Continuous>  pantoprazole  Injectable 40 milliGRAM(s) IV Push daily  polyethylene glycol 3350 17 Gram(s) Oral daily  sodium chloride 0.9%. 1000 milliLiter(s) (10 mL/Hr) IV Continuous <Continuous>      Physical Exam  General: Patient comfortable in bed  Vital Signs Last 12 Hrs  T(F): 99 (02-04-20 @ 16:00), Max: 99.1 (02-04-20 @ 08:00)  HR: 95 (02-04-20 @ 17:00) (88 - 98)  BP: 116/57 (02-04-20 @ 17:00) (116/57 - 116/57)  BP(mean): 79 (02-04-20 @ 17:00) (79 - 79)  RR: 23 (02-04-20 @ 17:00) (15 - 23)  SpO2: 96% (02-04-20 @ 17:00) (95% - 99%)  Neck: No palpable thyroid nodules.  CVS: S1S2, No murmurs  Respiratory: No wheezing, no crepitations  GI: Abdomen soft, bowel sounds positive  Musculoskeletal:  edema lower extremities.   Skin: No skin rashes, no ecchymosis    Diagnostics Chief complaint  Patient is a 64y old  Male who presents with a chief complaint of Chest pain (04 Feb 2020 11:29)   Review of systems  Patient in bed, looks comfortable,  no hypoglycemia.    Labs and Fingersticks  CAPILLARY BLOOD GLUCOSE      POCT Blood Glucose.: 137 mg/dL (04 Feb 2020 10:00)  POCT Blood Glucose.: 116 mg/dL (04 Feb 2020 09:17)  POCT Blood Glucose.: 109 mg/dL (04 Feb 2020 07:15)  POCT Blood Glucose.: 104 mg/dL (04 Feb 2020 04:54)  POCT Blood Glucose.: 110 mg/dL (04 Feb 2020 03:00)  POCT Blood Glucose.: 136 mg/dL (03 Feb 2020 23:46)  POCT Blood Glucose.: 129 mg/dL (03 Feb 2020 22:25)  POCT Blood Glucose.: 131 mg/dL (03 Feb 2020 18:57)      Anion Gap, Serum: 14 (02-04 @ 01:30)  Anion Gap, Serum: 13 (02-03 @ 17:22)      Calcium, Total Serum: 8.1 <L> (02-04 @ 01:30)  Calcium, Total Serum: 8.0 <L> (02-03 @ 17:22)  Albumin, Serum: 2.8 <L> (02-04 @ 01:30)  Albumin, Serum: 2.9 <L> (02-03 @ 17:22)    Alanine Aminotransferase (ALT/SGPT): 40 (02-04 @ 01:30)  Alanine Aminotransferase (ALT/SGPT): 30 (02-03 @ 17:22)  Alkaline Phosphatase, Serum: 33 <L> (02-04 @ 01:30)  Alkaline Phosphatase, Serum: 31 <L> (02-03 @ 17:22)  Aspartate Aminotransferase (AST/SGOT): 90 <H> (02-04 @ 01:30)  Aspartate Aminotransferase (AST/SGOT): 59 <H> (02-03 @ 17:22)        02-04    141  |  107  |  33<H>  ----------------------------<  132<H>  5.0   |  20<L>  |  2.00<H>    Ca    8.1<L>      04 Feb 2020 01:30  Phos  3.3     02-04  Mg     2.3     02-04    TPro  4.4<L>  /  Alb  2.8<L>  /  TBili  0.4  /  DBili  x   /  AST  90<H>  /  ALT  40  /  AlkPhos  33<L>  02-04                        9.9    7.74  )-----------( 84       ( 04 Feb 2020 01:30 )             29.9     Medications  MEDICATIONS  (STANDING):  artificial  tears Solution 1 Drop(s) Both EYES two times a day  aspirin enteric coated 81 milliGRAM(s) Oral daily  atorvastatin 40 milliGRAM(s) Oral at bedtime  chlorhexidine 2% Cloths 1 Application(s) Topical <User Schedule>  chlorhexidine 4% Liquid 1 Application(s) Topical <User Schedule>  dextrose 5%. 1000 milliLiter(s) (50 mL/Hr) IV Continuous <Continuous>  dextrose 50% Injectable 12.5 Gram(s) IV Push once  dextrose 50% Injectable 25 Gram(s) IV Push once  dextrose 50% Injectable 25 Gram(s) IV Push once  DOBUTamine Infusion 2.5 MICROgram(s)/kG/Min (6.082 mL/Hr) IV Continuous <Continuous>  heparin  Injectable 5000 Unit(s) SubCutaneous every 8 hours  insulin glargine Injectable (LANTUS) 36 Unit(s) SubCutaneous at bedtime  insulin lispro (HumaLOG) corrective regimen sliding scale   SubCutaneous three times a day before meals  insulin lispro (HumaLOG) corrective regimen sliding scale   SubCutaneous at bedtime  insulin lispro Injectable (HumaLOG) 9 Unit(s) SubCutaneous three times a day before meals  insulin regular Infusion 5 Unit(s)/Hr (5 mL/Hr) IV Continuous <Continuous>  lactated ringers Bolus 500 milliLiter(s) IV Bolus once  meperidine     Injectable 25 milliGRAM(s) IV Push once  metoclopramide Injectable 10 milliGRAM(s) IV Push every 8 hours  multivitamin 1 Tablet(s) Oral daily  mupirocin 2% Ointment 1 Application(s) Topical two times a day  norepinephrine Infusion 0.03 MICROgram(s)/kG/Min (4.6 mL/Hr) IV Continuous <Continuous>  pantoprazole  Injectable 40 milliGRAM(s) IV Push daily  polyethylene glycol 3350 17 Gram(s) Oral daily  sodium chloride 0.9%. 1000 milliLiter(s) (10 mL/Hr) IV Continuous <Continuous>      Physical Exam  General: Patient comfortable in bed  Vital Signs Last 12 Hrs  T(F): 99 (02-04-20 @ 16:00), Max: 99.1 (02-04-20 @ 08:00)  HR: 95 (02-04-20 @ 17:00) (88 - 98)  BP: 116/57 (02-04-20 @ 17:00) (116/57 - 116/57)  BP(mean): 79 (02-04-20 @ 17:00) (79 - 79)  RR: 23 (02-04-20 @ 17:00) (15 - 23)  SpO2: 96% (02-04-20 @ 17:00) (95% - 99%)  Neck: No palpable thyroid nodules.  CVS: S1S2, No murmurs  Respiratory: No wheezing, no crepitations  GI: Abdomen soft, bowel sounds positive  Musculoskeletal:  edema lower extremities.   Skin: No skin rashes, no ecchymosis    Diagnostics

## 2020-02-04 NOTE — PROGRESS NOTE ADULT - SUBJECTIVE AND OBJECTIVE BOX
[facility] DIVISION OF KIDNEY DISEASES AND HYPERTENSION -- FOLLOW UP NOTE  --------------------------------------------------------------------------------  HPI: 63yo male with PMH of HTN, DM II (20 years), presented to the ED with complaints of acute on chronic left sided exertional chest pain. Nephrology consulted for elevated serum creatinine and pre-CATH assessment. [facility] available. On admission (1/25/2020), Scr was 1.85. Patient went for cardiac cath on 1/29/2020 which revealed triple vessel disease. Patient was pre-medicated prior to cath with IV fluids. Scr had improved to 1.76 yesterday, however has increased mildly to 2.0 after CABG (2/3/20).    Pt. seen and examined at bedside this AM. Pt. states that he has chest discomfort secondary to CABG yesterday. Denies SOB, N/V/F/C.  PAST HISTORY  --------------------------------------------------------------------------------  No significant changes to PMH, PSH, FHx, SHx, unless otherwise noted    ALLERGIES & MEDICATIONS  --------------------------------------------------------------------------------  Allergies    No Known Allergies    Intolerances    Standing Inpatient Medications  aspirin enteric coated 81 milliGRAM(s) Oral daily  cefuroxime  IVPB 1500 milliGRAM(s) IV Intermittent every 8 hours  chlorhexidine 2% Cloths 1 Application(s) Topical <User Schedule>  chlorhexidine 4% Liquid 1 Application(s) Topical <User Schedule>  DOBUTamine Infusion 2.5 MICROgram(s)/kG/Min IV Continuous <Continuous>  insulin regular Infusion 5 Unit(s)/Hr IV Continuous <Continuous>  lactated ringers Bolus 500 milliLiter(s) IV Bolus once  meperidine     Injectable 25 milliGRAM(s) IV Push once  metoclopramide Injectable 10 milliGRAM(s) IV Push every 8 hours  mupirocin 2% Ointment 1 Application(s) Topical two times a day  norepinephrine Infusion 0.03 MICROgram(s)/kG/Min IV Continuous <Continuous>  pantoprazole  Injectable 40 milliGRAM(s) IV Push daily  polyethylene glycol 3350 17 Gram(s) Oral daily  sodium chloride 0.9%. 1000 milliLiter(s) IV Continuous <Continuous>    REVIEW OF SYSTEMS  --------------------------------------------------------------------------------  Gen: no lethargy  Respiratory: No dyspnea  CV: No chest pain  GI: No abdominal pain  MSK: no LE edema  Neuro: No dizziness  Heme: No bleeding    All other systems were reviewed and are negative, except as noted.  VITALS/PHYSICAL EXAM  --------------------------------------------------------------------------------  T(C): 37.3 (02-04-20 @ 08:00), Max: 37.3 (02-04-20 @ 08:00)  HR: 93 (02-04-20 @ 08:00) (87 - 107)  BP: --  RR: 18 (02-04-20 @ 08:00) (18 - 18)  SpO2: 98% (02-04-20 @ 08:00) (96% - 100%)  Wt(kg): --  Height (cm): 172.72 (02-03-20 @ 07:20)  Weight (kg): 81.1 (02-03-20 @ 07:20)  BMI (kg/m2): 27.2 (02-03-20 @ 07:20)  BSA (m2): 1.95 (02-03-20 @ 07:20)    02-03-20 @ 07:01  -  02-04-20 @ 07:00  --------------------------------------------------------  IN: 2164.1 mL / OUT: 1375 mL / NET: 789.1 mL    02-04-20 @ 07:01  -  02-04-20 @ 08:53  --------------------------------------------------------  IN: 20.1 mL / OUT: 220 mL / NET: -199.9 mL    Physical Exam:  	Gen: NAD  	HEENT: MMM  	Pulm: shallow breathing however CTA B/L, + chest tubes  	CV: + central chest dressing from CABG C/D/I, S1S2  	Abd: Soft, +BS   	Ext: trace LE edema B/L  	Neuro: Awake  	Skin: Warm and dry  LABS/STUDIES  --------------------------------------------------------------------------------              9.9    7.74  >-----------<  84       [02-04-20 @ 01:30]              29.9     141  |  107  |  33  ----------------------------<  132      [02-04-20 @ 01:30]  5.0   |  20  |  2.00        Ca     8.1     [02-04-20 @ 01:30]      Mg     2.3     [02-04-20 @ 01:30]      Phos  3.3     [02-04-20 @ 01:30]    Creatinine Trend:  SCr 2.00 [02-04 @ 01:30]  SCr 1.76 [02-03 @ 17:22]  SCr 1.91 [02-02 @ 07:00]  SCr 1.96 [02-01 @ 06:10]  SCr 1.95 [01-31 @ 05:57]

## 2020-02-04 NOTE — PROGRESS NOTE ADULT - SUBJECTIVE AND OBJECTIVE BOX
CTU transfer note  VITAL SIGNS  T(F): 98  HR: 90  Tele:   BP: 105/60  RR: 18  SpO2: 99%    02-03-20 @ 07:01  -  02-04-20 @ 07:00  --------------------------------------------------------  OUT: 1375 mL / NET: -1375 mL    02-04-20 @ 07:01  -  02-04-20 @ 20:01  --------------------------------------------------------  OUT: 960 mL / NET: -960 mL    LAB                        9.9    7.74  )-----------( 84       ( 04 Feb 2020 01:30 )             29.9   141  |  107  |  33<H>  ----------------------------<  132<H>  5.0   |  20<L>  |  2.00<H>      CAPILLARY BLOOD GLUCOSE  POCT Blood Glucose.: 201 mg/dL (04 Feb 2020 18:02)  POCT Blood Glucose.: 137 mg/dL (04 Feb 2020 10:00)  POCT Blood Glucose.: 116 mg/dL (04 Feb 2020 09:17)  POCT Blood Glucose.: 109 mg/dL (04 Feb 2020 07:15)  POCT Blood Glucose.: 104 mg/dL (04 Feb 2020 04:54)  POCT Blood Glucose.: 110 mg/dL (04 Feb 2020 03:00)  POCT Blood Glucose.: 136 mg/dL (03 Feb 2020 23:46)  POCT Blood Glucose.: 129 mg/dL (03 Feb 2020 22:25)      DIAGNOSTICS  Xray Chest 1 View- PORTABLE-Routine (02.04.20 @ 03:37)   The heart appears to be normal in size. The lungs are clear. Endotracheal tube and NG tube were removed.   A left chest tubes in place. No pneumothorax. A [place] catheter is seen on the right and the tip is in the main pulmonary artery. Mediastinal tube is in place.  Status post sternotomy.  TTE: Endocardial visualization enhanced with intravenous  injection of Ultrasonic Enhancing Agent (Definity).  Normal left ventricular systolic function. Probably normal wall motion.  The right ventricle is only adequately visualized in the parasternal long-axis images. In that view, it appears grossly normal.  A catheter is seen in the RVOT.  No pericardial effusion.      MEDICATIONS  aspirin enteric coated 81 milliGRAM(s) Oral daily  atorvastatin 40 milliGRAM(s) Oral at bedtime  DOBUTamine Infusion 2.5 MICROgram(s)/kG/Min IV Continuous <Continuous>  heparin  Injectable 5000 Unit(s) SubCutaneous every 8 hours  HYDROmorphone  Injectable 0.5 milliGRAM(s) IV Push once  insulin glargine Injectable (LANTUS) 36 Unit(s) SubCutaneous at bedtime  insulin lispro (HumaLOG) corrective regimen sliding scale   SubCutaneous three times a day before meals  insulin lispro (HumaLOG) corrective regimen sliding scale   SubCutaneous at bedtime  insulin lispro Injectable (HumaLOG) 9 Unit(s) SubCutaneous three times a day before meals  letoclopramide Injectable 10 milliGRAM(s) IV Push every 8 hours  mupirocin 2% Ointment 1 Application(s) Topical two times a day  pantoprazole  Injectable 40 milliGRAM(s) IV Push daily  polyethylene glycol 3350 17 Gram(s) Oral daily  sodium chloride 0.9%. 1000 milliLiter(s) IV Continuous <Continuous>      PHYSICAL EXAM  GEN: Patient in Pain 9/10 dilauded ordered, examined in bed   PSYCH: Appropriate, communicative, A+Ox3  NEURO: Non-focal,  A+O x 3  CARDIAC: S1 S2 RRR without rub or murmur  Edema +1-+2/4   Pacing wires:  Ventricular  setting: to EPM off  PULMONARY:   clear vesicular  Chest tubes: left Pleural serosanguinous drainage no airleak noted   Abdomen: Soft flat non-tender, distended bowel sounds active x 4 LBM:   : clear yellow urine  Skin:  warm dry intact   sternal incision:   covered, clean, dry intact dressing  ACE wraps: intact to right leg, feet cool to touch + pedal pulses CTU transfer note  VITAL SIGNS  T(F): 98  HR: 90  Tele: sr  BP: 105/60  RR: 18  SpO2: 99%    02-03-20 @ 07:01  -  02-04-20 @ 07:00  --------------------------------------------------------  OUT: 1375 mL / NET: -1375 mL    02-04-20 @ 07:01  -  02-04-20 @ 20:01  --------------------------------------------------------  OUT: 960 mL / NET: -960 mL    LAB                        9.9    7.74  )-----------( 84       ( 04 Feb 2020 01:30 )             29.9   141  |  107  |  33<H>  ----------------------------<  132<H>  5.0   |  20<L>  |  2.00<H>      CAPILLARY BLOOD GLUCOSE  POCT Blood Glucose.: 201 mg/dL (04 Feb 2020 18:02)  POCT Blood Glucose.: 137 mg/dL (04 Feb 2020 10:00)  POCT Blood Glucose.: 116 mg/dL (04 Feb 2020 09:17)  POCT Blood Glucose.: 109 mg/dL (04 Feb 2020 07:15)  POCT Blood Glucose.: 104 mg/dL (04 Feb 2020 04:54)  POCT Blood Glucose.: 110 mg/dL (04 Feb 2020 03:00)  POCT Blood Glucose.: 136 mg/dL (03 Feb 2020 23:46)  POCT Blood Glucose.: 129 mg/dL (03 Feb 2020 22:25)      DIAGNOSTICS  Xray Chest 1 View- PORTABLE-Routine (02.04.20 @ 03:37)   The heart appears to be normal in size. The lungs are clear. Endotracheal tube and NG tube were removed.   A left chest tubes in place. No pneumothorax. A [place] catheter is seen on the right and the tip is in the main pulmonary artery. Mediastinal tube is in place.  Status post sternotomy.  TTE: Endocardial visualization enhanced with intravenous  injection of Ultrasonic Enhancing Agent (Definity).  Normal left ventricular systolic function. Probably normal wall motion.  The right ventricle is only adequately visualized in the parasternal long-axis images. In that view, it appears grossly normal.  A catheter is seen in the RVOT.  No pericardial effusion.      MEDICATIONS  aspirin enteric coated 81 milliGRAM(s) Oral daily  atorvastatin 40 milliGRAM(s) Oral at bedtime  DOBUTamine Infusion 2.5 MICROgram(s)/kG/Min IV Continuous <Continuous>  heparin  Injectable 5000 Unit(s) SubCutaneous every 8 hours  HYDROmorphone  Injectable 0.5 milliGRAM(s) IV Push once  insulin glargine Injectable (LANTUS) 36 Unit(s) SubCutaneous at bedtime  insulin lispro (HumaLOG) corrective regimen sliding scale   SubCutaneous three times a day before meals  insulin lispro (HumaLOG) corrective regimen sliding scale   SubCutaneous at bedtime  insulin lispro Injectable (HumaLOG) 9 Unit(s) SubCutaneous three times a day before meals  letoclopramide Injectable 10 milliGRAM(s) IV Push every 8 hours  mupirocin 2% Ointment 1 Application(s) Topical two times a day  pantoprazole  Injectable 40 milliGRAM(s) IV Push daily  polyethylene glycol 3350 17 Gram(s) Oral daily  sodium chloride 0.9%. 1000 milliLiter(s) IV Continuous <Continuous>      PHYSICAL EXAM  GEN: Patient in Pain 9/10 dilauded ordered, examined in bed   PSYCH: Appropriate, communicative, A+Ox3  NEURO: Non-focal,  A+O x 3  CARDIAC: S1 S2 RRR without rub or murmur  Edema +1-+2/4   Pacing wires:  Ventricular  setting: to EPM off  PULMONARY:   clear vesicular  Chest tubes: left Pleural serosanguinous drainage no airleak noted   Abdomen: Soft flat non-tender, distended bowel sounds active x 4 LBM:   : clear yellow urine  Skin:  warm dry intact   sternal incision:   covered, clean, dry intact dressing  ACE wraps: intact to right leg, feet cool to touch + pedal pulses

## 2020-02-04 NOTE — PROGRESS NOTE ADULT - PROBLEM SELECTOR PLAN 1
Transferred to 2C step down  De-intensify:  Remove IJ/Henny when appropriate  +pacing wires to EPM  follow up AM labs: Keep K>4.0  Mg>2.0	  progressive ambulation  pulmonary toileting/incentive spirometry  pain management  restart appropriate home medications  Beta blocker when appropriate

## 2020-02-04 NOTE — PROGRESS NOTE ADULT - ATTENDING COMMENTS
Will increase Lantus to 36u at bedtime.  Will increase Humalog to 9u before each meal and continue Humalog correction scale coverage. Will continue monitoring FS and FU.

## 2020-02-04 NOTE — PROGRESS NOTE ADULT - SUBJECTIVE AND OBJECTIVE BOX
CC" pt awake, alert. c/o mold chest discomfort as surgical site, hungry    TELEMETRY: nsr    PHYSICAL EXAM:    T(C): 37.3 (02-04-20 @ 08:00), Max: 37.3 (02-04-20 @ 08:00)  HR: 90 (02-04-20 @ 10:00) (87 - 107)  BP: --  RR: 17 (02-04-20 @ 10:00) (17 - 18)  SpO2: 96% (02-04-20 @ 10:00) (96% - 100%)  Wt(kg): --  I&O's Summary    03 Feb 2020 07:01  -  04 Feb 2020 07:00  --------------------------------------------------------  IN: 2164.1 mL / OUT: 1375 mL / NET: 789.1 mL    04 Feb 2020 07:01  -  04 Feb 2020 11:30  --------------------------------------------------------  IN: 580.4 mL / OUT: 445 mL / NET: 135.4 mL        Appearance: Normal	  Cardiovascular: Normal S1 S2,RRR, No JVD, No murmurs  Respiratory: Lungs clear to auscultation	  Gastrointestinal:  Soft, Non-tender, + BS	  Extremities: Normal range of motion, No clubbing, cyanosis or edema  Vascular: Peripheral pulses palpable 2+ bilaterally     LABS:	 	                          9.9    7.74  )-----------( 84       ( 04 Feb 2020 01:30 )             29.9     02-04    141  |  107  |  33<H>  ----------------------------<  132<H>  5.0   |  20<L>  |  2.00<H>    Ca    8.1<L>      04 Feb 2020 01:30  Phos  3.3     02-04  Mg     2.3     02-04    TPro  4.4<L>  /  Alb  2.8<L>  /  TBili  0.4  /  DBili  x   /  AST  90<H>  /  ALT  40  /  AlkPhos  33<L>  02-04      PT/INR - ( 03 Feb 2020 16:55 )   PT: 13.3 sec;   INR: 1.16 ratio         PTT - ( 03 Feb 2020 16:55 )  PTT:31.2 sec    CARDIAC MARKERS:        MEDICATIONS  (STANDING):  aspirin enteric coated 81 milliGRAM(s) Oral daily  atorvastatin 40 milliGRAM(s) Oral at bedtime  cefuroxime  IVPB 1500 milliGRAM(s) IV Intermittent every 8 hours  chlorhexidine 2% Cloths 1 Application(s) Topical <User Schedule>  chlorhexidine 4% Liquid 1 Application(s) Topical <User Schedule>  dextrose 5%. 1000 milliLiter(s) (50 mL/Hr) IV Continuous <Continuous>  dextrose 50% Injectable 12.5 Gram(s) IV Push once  dextrose 50% Injectable 25 Gram(s) IV Push once  dextrose 50% Injectable 25 Gram(s) IV Push once  DOBUTamine Infusion 2.5 MICROgram(s)/kG/Min (6.082 mL/Hr) IV Continuous <Continuous>  heparin  Injectable 5000 Unit(s) SubCutaneous every 8 hours  insulin glargine Injectable (LANTUS) 20 Unit(s) SubCutaneous at bedtime  insulin lispro (HumaLOG) corrective regimen sliding scale   SubCutaneous three times a day before meals  insulin lispro Injectable (HumaLOG) 6 Unit(s) SubCutaneous three times a day before meals  insulin regular Infusion 5 Unit(s)/Hr (5 mL/Hr) IV Continuous <Continuous>  lactated ringers Bolus 500 milliLiter(s) IV Bolus once  meperidine     Injectable 25 milliGRAM(s) IV Push once  metoclopramide Injectable 10 milliGRAM(s) IV Push every 8 hours  mupirocin 2% Ointment 1 Application(s) Topical two times a day  norepinephrine Infusion 0.03 MICROgram(s)/kG/Min (4.6 mL/Hr) IV Continuous <Continuous>  pantoprazole  Injectable 40 milliGRAM(s) IV Push daily  polyethylene glycol 3350 17 Gram(s) Oral daily  sodium chloride 0.9%. 1000 milliLiter(s) (10 mL/Hr) IV Continuous <Continuous>

## 2020-02-04 NOTE — PROGRESS NOTE ADULT - ASSESSMENT
Assessment  DMT2: 64y Male with DM T2 with hyperglycemia, A1C 9.2%, was on oral meds and insulin at home, postop transitioned to subQ insulin, blood sugars trending up and not at target (ABG , 192), no hypoglycemic episodes. Patient is postop CABG, starting to eat meals, appears comfortable.  CAD: s/p CABG 2/3, cardiac workup in progress, on medications, no chest pain, stable, monitored.  HTN: Controlled,  on antihypertensive medications.  HLD: Controlled, on statin.  CKD: Monitor labs/BMP          Harper Matias MD  Cell: [phone]  Office: [phone] Assessment  DMT2: 64y Male with DM T2 with hyperglycemia, A1C 9.2%, was on oral meds and insulin at home, postop transitioned to subQ insulin, blood sugars trending up and not at target (ABG , 192), no hypoglycemic episodes. Patient is postop CABG,  starting to eat meals, appears comfortable.  CAD: s/p CABG 2/3, cardiac workup in progress, on medications, no chest pain, stable, monitored.  HTN: Controlled,  on antihypertensive medications.  HLD: Controlled, on statin.  CKD: Monitor labs/BMP          Harper Matias MD  Cell: [phone]  Office: [phone]

## 2020-02-04 NOTE — PROGRESS NOTE ADULT - PROBLEM SELECTOR PLAN 5
stable CKD in the setting of long standing HTN and DM  Avoid NSAIDs, ACEI/ARBS, RCA and nephrotoxins. Dose medications as per eGFR.

## 2020-02-04 NOTE — PROGRESS NOTE ADULT - PROBLEM SELECTOR PLAN 1
Pt with likely stable CKD in the setting of long standing DM and HTN. However, patient wit possibly ? STEPHANIE in the setting of cardiorenal syndrome. No prior labs for review. Scr on admission was 1.85, and is currently stable at 2.0 this AM after CABG. UA significant for protein and RBCs. Urine electrolytes consistent with intrinsic disease. Urine Pr/Cr ratio in nephrotic range. HepBsAg, HepC, C3, C4, SIFE, RACHEL, P-ANCA, C-ANCA, Anti-GBM ab, Parvovirus, RPR were negative/non-reactive. PLAR-2 pending. Monitor labs and urine output. Avoid NSAIDs, ACEI/ARBS, RCA and nephrotoxins. Dose medications as per eGFR.    Kevin Correa  Nephrology Fellow  Cell: [phone] (from 8 am to 5 pm)  (After 5 pm or on weekends please page on-call fellow)

## 2020-02-04 NOTE — PROGRESS NOTE ADULT - ASSESSMENT
64 year old man with history of HTN, DM II, admitted with progressive exertional angina.     1. CAD   -s/p cath revealing severe triple vessel disease including lesions at the bifurcation of the LAD/diagonal and distal RCA/RPDA/RPL trifurcation.   -s/p CABG x 4   -cont asa, statin,  -iontropes/pressors per CTU    2. HTN  on pressor per ctu     3. STEPHANIE/CKD  renal f/u     dvt ppx

## 2020-02-04 NOTE — PROGRESS NOTE ADULT - ASSESSMENT
65 y/o male with PMhx of HTN, DM II on insulin, CKD who presented to the ED with complaints of acute on chronic left sided exertional CP x few months which subsides upon resting. Pt had abnormal stress test and now s/p cardiac cath demonstrating multivessel CAD.  EF 50%, A1c 9.2  Hospital course:  2/3: C4L, Patch angioplasty of LAD. Patient received 500 FEIBA, 4 PRBC, 5 Cryo, 1 PLT, 2 FFP 1 V wire with skin ground   2/4:  Extubated at 0130 Transferred to step down on Dobutamine at 2.68 m/k/m with a Left PCT Devon and attached to EPM which is currently off  Dispo: PT eval pending anticipate home 65 y/o male with PMhx of HTN, DM II on insulin, CKD who presented to the ED with complaints of acute on chronic left sided exertional CP x few months which subsides upon resting. Pt had abnormal stress test and now s/p cardiac cath demonstrating multivessel CAD.  EF 50%, A1c 9.2  Hospital course:  2/3: C4L, Patch angioplasty of LAD. Patient received 500 FEIBA, 4 PRBC, 5 Cryo, 1 PLT, 2 FFP 1 V wire with skin ground   2/4:  Extubated at 0130 Transferred to step down on Dobutamine at 2.68 m/k/m with a Left CT , Devon, + pw to epm to off  Dispo: PT eval pending anticipate home

## 2020-02-05 LAB
ANION GAP SERPL CALC-SCNC: 15 MMOL/L — SIGNIFICANT CHANGE UP (ref 5–17)
BASOPHILS # BLD AUTO: 0.02 K/UL — SIGNIFICANT CHANGE UP (ref 0–0.2)
BASOPHILS NFR BLD AUTO: 0.2 % — SIGNIFICANT CHANGE UP (ref 0–2)
BUN SERPL-MCNC: 41 MG/DL — HIGH (ref 7–23)
CALCIUM SERPL-MCNC: 7.9 MG/DL — LOW (ref 8.4–10.5)
CHLORIDE SERPL-SCNC: 104 MMOL/L — SIGNIFICANT CHANGE UP (ref 96–108)
CO2 SERPL-SCNC: 19 MMOL/L — LOW (ref 22–31)
CREAT SERPL-MCNC: 2.47 MG/DL — HIGH (ref 0.5–1.3)
EOSINOPHIL # BLD AUTO: 0.03 K/UL — SIGNIFICANT CHANGE UP (ref 0–0.5)
EOSINOPHIL NFR BLD AUTO: 0.3 % — SIGNIFICANT CHANGE UP (ref 0–6)
GLUCOSE BLDC GLUCOMTR-MCNC: 107 MG/DL — HIGH (ref 70–99)
GLUCOSE BLDC GLUCOMTR-MCNC: 139 MG/DL — HIGH (ref 70–99)
GLUCOSE BLDC GLUCOMTR-MCNC: 144 MG/DL — HIGH (ref 70–99)
GLUCOSE BLDC GLUCOMTR-MCNC: 217 MG/DL — HIGH (ref 70–99)
GLUCOSE BLDC GLUCOMTR-MCNC: 224 MG/DL — HIGH (ref 70–99)
GLUCOSE BLDC GLUCOMTR-MCNC: 229 MG/DL — HIGH (ref 70–99)
GLUCOSE SERPL-MCNC: 219 MG/DL — HIGH (ref 70–99)
HCT VFR BLD CALC: 25.8 % — LOW (ref 39–50)
HCV RNA SERPL NAA DL=5-ACNC: SIGNIFICANT CHANGE UP IU/ML
HCV RNA SPEC NAA+PROBE-LOG IU: SIGNIFICANT CHANGE UP LOG10IU/ML
HGB BLD-MCNC: 8.6 G/DL — LOW (ref 13–17)
IMM GRANULOCYTES NFR BLD AUTO: 0.3 % — SIGNIFICANT CHANGE UP (ref 0–1.5)
LYMPHOCYTES # BLD AUTO: 1.31 K/UL — SIGNIFICANT CHANGE UP (ref 1–3.3)
LYMPHOCYTES # BLD AUTO: 14.8 % — SIGNIFICANT CHANGE UP (ref 13–44)
MCHC RBC-ENTMCNC: 28.8 PG — SIGNIFICANT CHANGE UP (ref 27–34)
MCHC RBC-ENTMCNC: 33.3 GM/DL — SIGNIFICANT CHANGE UP (ref 32–36)
MCV RBC AUTO: 86.3 FL — SIGNIFICANT CHANGE UP (ref 80–100)
MONOCYTES # BLD AUTO: 1.46 K/UL — HIGH (ref 0–0.9)
MONOCYTES NFR BLD AUTO: 16.5 % — HIGH (ref 2–14)
NEUTROPHILS # BLD AUTO: 6 K/UL — SIGNIFICANT CHANGE UP (ref 1.8–7.4)
NEUTROPHILS NFR BLD AUTO: 67.9 % — SIGNIFICANT CHANGE UP (ref 43–77)
NRBC # BLD: 0 /100 WBCS — SIGNIFICANT CHANGE UP (ref 0–0)
PLATELET # BLD AUTO: 95 K/UL — LOW (ref 150–400)
POTASSIUM SERPL-MCNC: 4.8 MMOL/L — SIGNIFICANT CHANGE UP (ref 3.5–5.3)
POTASSIUM SERPL-SCNC: 4.8 MMOL/L — SIGNIFICANT CHANGE UP (ref 3.5–5.3)
RBC # BLD: 2.99 M/UL — LOW (ref 4.2–5.8)
RBC # FLD: 14 % — SIGNIFICANT CHANGE UP (ref 10.3–14.5)
SODIUM SERPL-SCNC: 138 MMOL/L — SIGNIFICANT CHANGE UP (ref 135–145)
WBC # BLD: 8.85 K/UL — SIGNIFICANT CHANGE UP (ref 3.8–10.5)
WBC # FLD AUTO: 8.85 K/UL — SIGNIFICANT CHANGE UP (ref 3.8–10.5)

## 2020-02-05 PROCEDURE — 71045 X-RAY EXAM CHEST 1 VIEW: CPT | Mod: 26

## 2020-02-05 PROCEDURE — 71045 X-RAY EXAM CHEST 1 VIEW: CPT | Mod: 26,77

## 2020-02-05 PROCEDURE — 99232 SBSQ HOSP IP/OBS MODERATE 35: CPT | Mod: GC

## 2020-02-05 RX ORDER — HYDROMORPHONE HYDROCHLORIDE 2 MG/ML
4 INJECTION INTRAMUSCULAR; INTRAVENOUS; SUBCUTANEOUS EVERY 4 HOURS
Refills: 0 | Status: DISCONTINUED | OUTPATIENT
Start: 2020-02-05 | End: 2020-02-07

## 2020-02-05 RX ORDER — INSULIN GLARGINE 100 [IU]/ML
48 INJECTION, SOLUTION SUBCUTANEOUS AT BEDTIME
Refills: 0 | Status: DISCONTINUED | OUTPATIENT
Start: 2020-02-05 | End: 2020-02-06

## 2020-02-05 RX ORDER — INSULIN LISPRO 100/ML
14 VIAL (ML) SUBCUTANEOUS
Refills: 0 | Status: DISCONTINUED | OUTPATIENT
Start: 2020-02-05 | End: 2020-02-06

## 2020-02-05 RX ORDER — HYDROMORPHONE HYDROCHLORIDE 2 MG/ML
0.25 INJECTION INTRAMUSCULAR; INTRAVENOUS; SUBCUTANEOUS EVERY 4 HOURS
Refills: 0 | Status: DISCONTINUED | OUTPATIENT
Start: 2020-02-05 | End: 2020-02-07

## 2020-02-05 RX ORDER — ACETAMINOPHEN 500 MG
650 TABLET ORAL EVERY 6 HOURS
Refills: 0 | Status: DISCONTINUED | OUTPATIENT
Start: 2020-02-05 | End: 2020-02-07

## 2020-02-05 RX ORDER — METOPROLOL TARTRATE 50 MG
50 TABLET ORAL EVERY 12 HOURS
Refills: 0 | Status: DISCONTINUED | OUTPATIENT
Start: 2020-02-05 | End: 2020-02-07

## 2020-02-05 RX ORDER — METOPROLOL TARTRATE 50 MG
25 TABLET ORAL EVERY 12 HOURS
Refills: 0 | Status: DISCONTINUED | OUTPATIENT
Start: 2020-02-05 | End: 2020-02-05

## 2020-02-05 RX ORDER — HYDROMORPHONE HYDROCHLORIDE 2 MG/ML
2 INJECTION INTRAMUSCULAR; INTRAVENOUS; SUBCUTANEOUS
Refills: 0 | Status: DISCONTINUED | OUTPATIENT
Start: 2020-02-05 | End: 2020-02-07

## 2020-02-05 RX ADMIN — Medication 1 DROP(S): at 18:02

## 2020-02-05 RX ADMIN — Medication 10 MILLIGRAM(S): at 13:02

## 2020-02-05 RX ADMIN — PANTOPRAZOLE SODIUM 40 MILLIGRAM(S): 20 TABLET, DELAYED RELEASE ORAL at 13:02

## 2020-02-05 RX ADMIN — Medication 1 TABLET(S): at 13:01

## 2020-02-05 RX ADMIN — OXYCODONE AND ACETAMINOPHEN 2 TABLET(S): 5; 325 TABLET ORAL at 10:54

## 2020-02-05 RX ADMIN — Medication 14 UNIT(S): at 18:03

## 2020-02-05 RX ADMIN — POLYETHYLENE GLYCOL 3350 17 GRAM(S): 17 POWDER, FOR SOLUTION ORAL at 13:01

## 2020-02-05 RX ADMIN — HEPARIN SODIUM 5000 UNIT(S): 5000 INJECTION INTRAVENOUS; SUBCUTANEOUS at 22:11

## 2020-02-05 RX ADMIN — OXYCODONE AND ACETAMINOPHEN 2 TABLET(S): 5; 325 TABLET ORAL at 07:10

## 2020-02-05 RX ADMIN — HEPARIN SODIUM 5000 UNIT(S): 5000 INJECTION INTRAVENOUS; SUBCUTANEOUS at 05:29

## 2020-02-05 RX ADMIN — Medication 25 MILLIGRAM(S): at 15:45

## 2020-02-05 RX ADMIN — MUPIROCIN 1 APPLICATION(S): 20 OINTMENT TOPICAL at 05:29

## 2020-02-05 RX ADMIN — Medication 10 MILLIGRAM(S): at 05:28

## 2020-02-05 RX ADMIN — HYDROMORPHONE HYDROCHLORIDE 4 MILLIGRAM(S): 2 INJECTION INTRAMUSCULAR; INTRAVENOUS; SUBCUTANEOUS at 19:53

## 2020-02-05 RX ADMIN — HYDROMORPHONE HYDROCHLORIDE 0.25 MILLIGRAM(S): 2 INJECTION INTRAMUSCULAR; INTRAVENOUS; SUBCUTANEOUS at 16:00

## 2020-02-05 RX ADMIN — OXYCODONE AND ACETAMINOPHEN 2 TABLET(S): 5; 325 TABLET ORAL at 06:36

## 2020-02-05 RX ADMIN — HEPARIN SODIUM 5000 UNIT(S): 5000 INJECTION INTRAVENOUS; SUBCUTANEOUS at 13:02

## 2020-02-05 RX ADMIN — INSULIN GLARGINE 48 UNIT(S): 100 INJECTION, SOLUTION SUBCUTANEOUS at 22:20

## 2020-02-05 RX ADMIN — HYDROMORPHONE HYDROCHLORIDE 0.25 MILLIGRAM(S): 2 INJECTION INTRAMUSCULAR; INTRAVENOUS; SUBCUTANEOUS at 15:45

## 2020-02-05 RX ADMIN — Medication 50 MILLIGRAM(S): at 20:53

## 2020-02-05 RX ADMIN — CHLORHEXIDINE GLUCONATE 1 APPLICATION(S): 213 SOLUTION TOPICAL at 05:25

## 2020-02-05 RX ADMIN — Medication 9 UNIT(S): at 08:05

## 2020-02-05 RX ADMIN — MUPIROCIN 1 APPLICATION(S): 20 OINTMENT TOPICAL at 17:57

## 2020-02-05 RX ADMIN — OXYCODONE AND ACETAMINOPHEN 2 TABLET(S): 5; 325 TABLET ORAL at 02:41

## 2020-02-05 RX ADMIN — HYDROMORPHONE HYDROCHLORIDE 4 MILLIGRAM(S): 2 INJECTION INTRAMUSCULAR; INTRAVENOUS; SUBCUTANEOUS at 20:23

## 2020-02-05 RX ADMIN — Medication 2: at 08:05

## 2020-02-05 RX ADMIN — Medication 2: at 13:01

## 2020-02-05 RX ADMIN — OXYCODONE AND ACETAMINOPHEN 2 TABLET(S): 5; 325 TABLET ORAL at 02:11

## 2020-02-05 RX ADMIN — Medication 81 MILLIGRAM(S): at 13:02

## 2020-02-05 RX ADMIN — Medication 14 UNIT(S): at 13:00

## 2020-02-05 RX ADMIN — ATORVASTATIN CALCIUM 40 MILLIGRAM(S): 80 TABLET, FILM COATED ORAL at 22:11

## 2020-02-05 RX ADMIN — OXYCODONE AND ACETAMINOPHEN 2 TABLET(S): 5; 325 TABLET ORAL at 11:25

## 2020-02-05 NOTE — PROGRESS NOTE ADULT - ASSESSMENT
64 year old man with history of HTN, DM II, admitted with progressive exertional angina.     1. CAD   -s/p cath revealing severe triple vessel disease including lesions at the bifurcation of the LAD/diagonal and distal RCA/RPDA/RPL trifurcation.   -s/p CABG x 4   -cont asa, statin,  -wean off pressors per CTS  -eventual BB when BP improves    2. HTN  on pressors per ctu     3. STEPHANIE/CKD  renal f/u     dvt ppx

## 2020-02-05 NOTE — PROGRESS NOTE ADULT - SUBJECTIVE AND OBJECTIVE BOX
im ok  VITAL SIGNS    Telemetry:  nsr 90  Vital Signs Last 24 Hrs  T(C): 36.3 (02-05-20 @ 07:41), Max: 37.2 (02-04-20 @ 16:00)  T(F): 97.4 (02-05-20 @ 07:41), Max: 99 (02-04-20 @ 16:00)  HR: 90 (02-05-20 @ 07:41) (88 - 98)  BP: 102/62 (02-05-20 @ 07:41) (102/62 - 120/64)  RR: 18 (02-05-20 @ 07:41) (15 - 23)  SpO2: 100% (02-05-20 @ 07:41) (95% - 100%)                       8.6<L>                138  | 19<L>| 41<H>        8.85  >-----------< 95<L>   ------------------------< 219<H>                 25.8<L>                4.8  | 104  | 2.47<H>                                                                     Ca 7.9<L> Mg x     Ph x        ,             9.9<L>                141  | 20<L>| 33<H>        7.74  >-----------< 84<L>   ------------------------< 132<H>                 29.9<L>                5.0  | 107  | 2.00<H>                                                                     Ca 8.1<L> Mg 2.3   Ph 3.3              02-04-20 @ 07:01  -  02-05-20 @ 07:00  --------------------------------------------------------  IN: 1702.4 mL / OUT: 1470 mL / NET: 232.4 mL    02-05-20 @ 07:01  -  02-05-20 @ 08:49  --------------------------------------------------------  IN: 10 mL / OUT: 50 mL / NET: -40 mL    02-03 @ 16:55  PT13.3 INR1.16  PTT31.2      Daily     Daily         CAPILLARY BLOOD GLUCOSE      POCT Blood Glucose.: 217 mg/dL (05 Feb 2020 07:56)  POCT Blood Glucose.: 237 mg/dL (04 Feb 2020 21:41)  POCT Blood Glucose.: 201 mg/dL (04 Feb 2020 18:02)  POCT Blood Glucose.: 137 mg/dL (04 Feb 2020 10:00)  POCT Blood Glucose.: 116 mg/dL (04 Feb 2020 09:17)                Coumadin    [ ] YES          [x  ]      NO                                   PHYSICAL EXAM        Neurology: alert and oriented x 3, nonfocal, no gross deficits  CV : .S1S2 RRR  Sternal Wound :  CDI , Stable  Pacing Wires        [ x ]   Settings:            vvi                      Isolated  [  ]  Lungs: bibasilar crackles   Drains:     MS         [  ] Drainage:                 L Pleural  [ x  Drainage:       50/340       Abdomen: soft, nontender, nondistended, positive bowel sounds  :         thomas  Extremities:     Trace __ edema, _neg__calf tenderness.                            R__svg site cdi          acetaminophen   Tablet .. 650 milliGRAM(s) Oral every 6 hours PRN  artificial tears (preservative free) Ophthalmic Solution 1 Drop(s) Both EYES two times a day  aspirin enteric coated 81 milliGRAM(s) Oral daily  atorvastatin 40 milliGRAM(s) Oral at bedtime  chlorhexidine 2% Cloths 1 Application(s) Topical <User Schedule>  chlorhexidine 4% Liquid 1 Application(s) Topical <User Schedule>  dextrose 40% Gel 15 Gram(s) Oral once PRN  dextrose 5%. 1000 milliLiter(s) IV Continuous <Continuous>  dextrose 50% Injectable 12.5 Gram(s) IV Push once  dextrose 50% Injectable 25 Gram(s) IV Push once  dextrose 50% Injectable 25 Gram(s) IV Push once  glucagon  Injectable 1 milliGRAM(s) IntraMuscular once PRN  heparin  Injectable 5000 Unit(s) SubCutaneous every 8 hours  insulin glargine Injectable (LANTUS) 36 Unit(s) SubCutaneous at bedtime  insulin lispro (HumaLOG) corrective regimen sliding scale   SubCutaneous three times a day before meals  insulin lispro (HumaLOG) corrective regimen sliding scale   SubCutaneous at bedtime  insulin lispro Injectable (HumaLOG) 9 Unit(s) SubCutaneous three times a day before meals  lactated ringers Bolus 500 milliLiter(s) IV Bolus once  meperidine     Injectable 25 milliGRAM(s) IV Push once  metoclopramide Injectable 10 milliGRAM(s) IV Push every 8 hours  multivitamin 1 Tablet(s) Oral daily  mupirocin 2% Ointment 1 Application(s) Topical two times a day  norepinephrine Infusion 0.03 MICROgram(s)/kG/Min IV Continuous <Continuous>  oxycodone    5 mG/acetaminophen 325 mG 1 Tablet(s) Oral every 4 hours PRN  oxycodone    5 mG/acetaminophen 325 mG 2 Tablet(s) Oral every 4 hours PRN  pantoprazole  Injectable 40 milliGRAM(s) IV Push daily  polyethylene glycol 3350 17 Gram(s) Oral daily  sodium chloride 0.9% lock flush 10 milliLiter(s) IV Push every 1 hour PRN  sodium chloride 0.9%. 1000 milliLiter(s) IV Continuous <Continuous>                    Physical Therapy Rec:   Home  [  ]   Home w/ PT  [  ]  Rehab  [  ]  Discussed with Cardiothoracic Team at AM rounds.

## 2020-02-05 NOTE — PROGRESS NOTE ADULT - SUBJECTIVE AND OBJECTIVE BOX
[facility] DIVISION OF KIDNEY DISEASES AND HYPERTENSION -- FOLLOW UP NOTE  --------------------------------------------------------------------------------  HPI: 65 yo M with HTN, DM II (20 years), admitted with complaints of acute on chronic left sided exertional chest pain. Nephrology team is following for elevated serum creatinine and pre-cardiac catheterization assessment. [facility] available. On admission (1/25/2020), Scr was 1.85. Patient went for cardiac cath on 1/29/2020 which revealed triple vessel disease. Scr had improved to 1.76 on 2/3/20. Pt. underwent CABG on 2/3/20. Scr now progressively worsened to 2.47 today. Pt. is non-oliguric.    Pt. seen and examined at bedside this AM. Pt. states that he has chest discomfort secondary to CABG. Noted to have episodes of relatively lower BP overnight. Denies SOB, N/V/F/C.  PAST HISTORY  --------------------------------------------------------------------------------  No significant changes to PMH, PSH, FHx, SHx, unless otherwise noted    ALLERGIES & MEDICATIONS  --------------------------------------------------------------------------------  Allergies    No Known Allergies    Intolerances    Standing Inpatient Medications  artificial tears (preservative free) Ophthalmic Solution 1 Drop(s) Both EYES two times a day  aspirin enteric coated 81 milliGRAM(s) Oral daily  atorvastatin 40 milliGRAM(s) Oral at bedtime  chlorhexidine 2% Cloths 1 Application(s) Topical <User Schedule>  chlorhexidine 4% Liquid 1 Application(s) Topical <User Schedule>  dextrose 5%. 1000 milliLiter(s) IV Continuous <Continuous>  dextrose 50% Injectable 12.5 Gram(s) IV Push once  dextrose 50% Injectable 25 Gram(s) IV Push once  dextrose 50% Injectable 25 Gram(s) IV Push once  heparin  Injectable 5000 Unit(s) SubCutaneous every 8 hours  insulin glargine Injectable (LANTUS) 36 Unit(s) SubCutaneous at bedtime  insulin lispro (HumaLOG) corrective regimen sliding scale   SubCutaneous three times a day before meals  insulin lispro (HumaLOG) corrective regimen sliding scale   SubCutaneous at bedtime  insulin lispro Injectable (HumaLOG) 9 Unit(s) SubCutaneous three times a day before meals  lactated ringers Bolus 500 milliLiter(s) IV Bolus once  meperidine     Injectable 25 milliGRAM(s) IV Push once  metoclopramide Injectable 10 milliGRAM(s) IV Push every 8 hours  multivitamin 1 Tablet(s) Oral daily  mupirocin 2% Ointment 1 Application(s) Topical two times a day  norepinephrine Infusion 0.03 MICROgram(s)/kG/Min IV Continuous <Continuous>  pantoprazole  Injectable 40 milliGRAM(s) IV Push daily  polyethylene glycol 3350 17 Gram(s) Oral daily  sodium chloride 0.9%. 1000 milliLiter(s) IV Continuous <Continuous>    REVIEW OF SYSTEMS  --------------------------------------------------------------------------------  Gen: + lethargy  Respiratory: No dyspnea  CV: + (surgically-related) chest pain  GI: No abdominal pain  MSK: No LE edema  Neuro: No dizziness  Heme: No bleeding    All other systems were reviewed and are negative, except as noted.  VITALS/PHYSICAL EXAM  --------------------------------------------------------------------------------  T(C): 36.3 (02-05-20 @ 07:41), Max: 37.2 (02-04-20 @ 16:00)  HR: 90 (02-05-20 @ 07:41) (88 - 98)  BP: 102/62 (02-05-20 @ 07:41) (102/62 - 120/64)  RR: 18 (02-05-20 @ 07:41) (15 - 23)  SpO2: 100% (02-05-20 @ 07:41) (95% - 100%)  Wt(kg): --    02-04-20 @ 07:01  -  02-05-20 @ 07:00  --------------------------------------------------------  IN: 1702.4 mL / OUT: 1470 mL / NET: 232.4 mL    02-05-20 @ 07:01  -  02-05-20 @ 08:54  --------------------------------------------------------  IN: 10 mL / OUT: 50 mL / NET: -40 mL    Physical Exam:  	Gen: NAD  	HEENT: MMM  	Pulm: shallow breathing however CTA B/L, + chest tubes  	CV: + central chest dressing from CABG C/D/I, S1S2  	Abd: Soft, +BS   	Ext: trace LE edema B/L  	Neuro: Awake  	Skin: Warm and dry  LABS/STUDIES  --------------------------------------------------------------------------------              8.6    8.85  >-----------<  95       [02-05-20 @ 04:31]              25.8     138  |  104  |  41  ----------------------------<  219      [02-05-20 @ 04:31]  4.8   |  19  |  2.47        Ca     7.9     [02-05-20 @ 04:31]      Mg     2.3     [02-04-20 @ 01:30]      Phos  3.3     [02-04-20 @ 01:30]    Creatinine Trend:  SCr 2.47 [02-05 @ 04:31]  SCr 2.00 [02-04 @ 01:30]  SCr 1.76 [02-03 @ 17:22]  SCr 1.91 [02-02 @ 07:00]  SCr 1.96 [02-01 @ 06:10]

## 2020-02-05 NOTE — PROGRESS NOTE ADULT - PROBLEM SELECTOR PLAN 1
Pt with likely stable CKD in the setting of long standing DM and HTN. However, patient wit possibly ? STEPHANIE in the setting of cardiorenal syndrome. No prior labs for review. Scr on admission was 1.85, and is currently stable at 2.47 this AM after CABG and relatively lower BP. Pt. is non-oliguric. UA significant for protein and RBCs. Urine electrolytes consistent with intrinsic disease. Urine Pr/Cr ratio in nephrotic range. HepBsAg, HepC, C3, C4, SIFE, RACHEL, P-ANCA, C-ANCA, Anti-GBM ab, Parvovirus, RPR were negative/non-reactive. Monitor labs and urine output. Avoid NSAIDs, ACEI/ARBS, RCA and nephrotoxins. Dose medications as per eGFR.    Kevin Correa  Nephrology Fellow  Cell: [phone] (from 8 am to 5 pm)  (After 5 pm or on weekends please page on-call fellow) Pt with  CKD likely  in the setting of long standing DM and HTN.  No prior labs for review. Scr since admission has ranged between 1.8-2 mg/dl. This am elevated at 2.4 likely secondary to hemodynamic injury after CABG ( POD# 2) and relatively lower BP. Pt. is non-oliguric. UA significant for protein and RBCs. Urine electrolytes consistent with intrinsic disease. Urine Pr/Cr ratio in nephrotic range. HepBsAg, HepC, C3, C4, SIFE, RACHEL, P-ANCA, C-ANCA, Anti-GBM ab, Parvovirus, RPR were negative/non-reactive. Monitor labs and urine output. Avoid NSAIDs, ACEI/ARBS, RCA and nephrotoxins. Dose medications as per eGFR.    Kevin Correa  Nephrology Fellow  Cell: [phone] (from 8 am to 5 pm)  (After 5 pm or on weekends please page on-call fellow)

## 2020-02-05 NOTE — CHART NOTE - NSCHARTNOTEFT_GEN_A_CORE
Pt seen for LOS f/u on 2COH.     Pt is a 64 year old male with PMH HTN, T2DM (20 years), admitted with complaints of acute on chronic left sided exertional chest pain. Went for cardiac cath on 1/29/2020 which revealed triple vessel disease. Is now s/p CABG on 2/3/20. Endo following for glycemic management, FS remain elevated >200.    Source: Patient [x]    Family [ ]     other [x] EMR    Diet: Regular PO diet     Patient reports [ ] nausea  [ ] vomiting [ ] diarrhea [ ] constipation  [ ]chewing problems [ ] swallowing issues  [x] other:     PO intake:  < 50% [ ] 50-75% [ ]   % [ ]  other :    Source for PO intake [x] Patient [ ] family [ ] chart [ ] staff [ ] other    Enteral/Parenteral Nutrition: n/a    Current Weight: 203.7 pounds (current, standing, +1 generalized edema noted). 182.1 pounds 1/27. Question accuracy of current weight, pt's weight stable at around 185 pounds throughout admission.    Pertinent Medications: MEDICATIONS  (STANDING):  artificial tears (preservative free) Ophthalmic Solution 1 Drop(s) Both EYES two times a day  aspirin enteric coated 81 milliGRAM(s) Oral daily  atorvastatin 40 milliGRAM(s) Oral at bedtime  chlorhexidine 2% Cloths 1 Application(s) Topical <User Schedule>  chlorhexidine 4% Liquid 1 Application(s) Topical <User Schedule>  dextrose 5%. 1000 milliLiter(s) (50 mL/Hr) IV Continuous <Continuous>  dextrose 50% Injectable 12.5 Gram(s) IV Push once  dextrose 50% Injectable 25 Gram(s) IV Push once  dextrose 50% Injectable 25 Gram(s) IV Push once  heparin  Injectable 5000 Unit(s) SubCutaneous every 8 hours  insulin glargine Injectable (LANTUS) 36 Unit(s) SubCutaneous at bedtime  insulin lispro (HumaLOG) corrective regimen sliding scale   SubCutaneous three times a day before meals  insulin lispro (HumaLOG) corrective regimen sliding scale   SubCutaneous at bedtime  insulin lispro Injectable (HumaLOG) 9 Unit(s) SubCutaneous three times a day before meals  lactated ringers Bolus 500 milliLiter(s) IV Bolus once  meperidine     Injectable 25 milliGRAM(s) IV Push once  metoclopramide Injectable 10 milliGRAM(s) IV Push every 8 hours  multivitamin 1 Tablet(s) Oral daily  mupirocin 2% Ointment 1 Application(s) Topical two times a day  norepinephrine Infusion 0.03 MICROgram(s)/kG/Min (4.6 mL/Hr) IV Continuous <Continuous>  pantoprazole  Injectable 40 milliGRAM(s) IV Push daily  polyethylene glycol 3350 17 Gram(s) Oral daily  sodium chloride 0.9%. 1000 milliLiter(s) (10 mL/Hr) IV Continuous <Continuous>    MEDICATIONS  (PRN):  acetaminophen   Tablet .. 650 milliGRAM(s) Oral every 6 hours PRN Mild Pain (1 - 3)  dextrose 40% Gel 15 Gram(s) Oral once PRN Blood Glucose LESS THAN 70 milliGRAM(s)/deciliter  glucagon  Injectable 1 milliGRAM(s) IntraMuscular once PRN Glucose LESS THAN 70 milligrams/deciliter  oxycodone    5 mG/acetaminophen 325 mG 1 Tablet(s) Oral every 4 hours PRN Moderate Pain (4 - 6)  oxycodone    5 mG/acetaminophen 325 mG 2 Tablet(s) Oral every 4 hours PRN Severe Pain (7 - 10)  sodium chloride 0.9% lock flush 10 milliLiter(s) IV Push every 1 hour PRN Pre/post blood products, medications, blood draw, and to maintain line patency    Pertinent Labs:  02-05 Na138 mmol/L Glu 219 mg/dL<H> K+ 4.8 mmol/L Cr  2.47 mg/dL<H> BUN 41 mg/dL<H> 02-04 Phos 3.3 mg/dL 02-04 Alb 2.8 g/dL<L> 01-26 [id number] 9.2 %<H>  CAPILLARY BLOOD GLUCOSE  POCT Blood Glucose.: 229 mg/dL (05 Feb 2020 09:20)  POCT Blood Glucose.: 217 mg/dL (05 Feb 2020 07:56)  POCT Blood Glucose.: 237 mg/dL (04 Feb 2020 21:41)  POCT Blood Glucose.: 201 mg/dL (04 Feb 2020 18:02)      Skin: No pressure injuries noted      Estimated Needs:   [x] no change since previous assessment  [ ] recalculated:       Previous Nutrition Diagnosis: Food and Nutrition Related Knowledge deficit         Nutrition Diagnosis is [x] ongoing  [ ] resolved [ ] not applicable          New Nutrition Diagnosis: Food & Nutrition Related Knowledge Deficit         Interventions:     1)        Monitoring and Evaluation:     [x] PO intake [x] Tolerance to diet prescription [x] weights [x] follow up per protocol    [x] other: RD remains available: Naima Rodriguez MS, RDN, CDN, CDE, [facility]. #[phone] Pt seen for LOS f/u on 2COH.     Pt is a 64 year old male with PMH HTN, T2DM (20 years), admitted with complaints of acute on chronic left sided exertional chest pain. Went for cardiac cath on 1/29/2020 which revealed triple vessel disease. Is now s/p CABG on 2/3/20. Endo following for glycemic management, FS remain elevated >200.    Source: Patient [x]    Family [ ]     other [x] EMR    Diet: Regular PO diet     Patient reports [ ] nausea  [ ] vomiting [ ] diarrhea [ ] constipation  [ ]chewing problems [ ] swallowing issues  [x] other:     PO intake:  < 50% [ ] 50-75% [ ]   % [ ]  other :    Source for PO intake [x] Patient [ ] family [ ] chart [ ] staff [ ] other    Enteral/Parenteral Nutrition: n/a    Current Weight: 203.7 pounds (current, standing, +1 generalized edema noted). 182.1 pounds 1/27. Question accuracy of current weight, pt's weight stable at around 185 pounds throughout admission.    Pertinent Medications: MEDICATIONS  (STANDING):  artificial tears (preservative free) Ophthalmic Solution 1 Drop(s) Both EYES two times a day  aspirin enteric coated 81 milliGRAM(s) Oral daily  atorvastatin 40 milliGRAM(s) Oral at bedtime  chlorhexidine 2% Cloths 1 Application(s) Topical <User Schedule>  chlorhexidine 4% Liquid 1 Application(s) Topical <User Schedule>  dextrose 5%. 1000 milliLiter(s) (50 mL/Hr) IV Continuous <Continuous>  dextrose 50% Injectable 12.5 Gram(s) IV Push once  dextrose 50% Injectable 25 Gram(s) IV Push once  dextrose 50% Injectable 25 Gram(s) IV Push once  heparin  Injectable 5000 Unit(s) SubCutaneous every 8 hours  insulin glargine Injectable (LANTUS) 36 Unit(s) SubCutaneous at bedtime  insulin lispro (HumaLOG) corrective regimen sliding scale   SubCutaneous three times a day before meals  insulin lispro (HumaLOG) corrective regimen sliding scale   SubCutaneous at bedtime  insulin lispro Injectable (HumaLOG) 9 Unit(s) SubCutaneous three times a day before meals  lactated ringers Bolus 500 milliLiter(s) IV Bolus once  meperidine     Injectable 25 milliGRAM(s) IV Push once  metoclopramide Injectable 10 milliGRAM(s) IV Push every 8 hours  multivitamin 1 Tablet(s) Oral daily  mupirocin 2% Ointment 1 Application(s) Topical two times a day  norepinephrine Infusion 0.03 MICROgram(s)/kG/Min (4.6 mL/Hr) IV Continuous <Continuous>  pantoprazole  Injectable 40 milliGRAM(s) IV Push daily  polyethylene glycol 3350 17 Gram(s) Oral daily  sodium chloride 0.9%. 1000 milliLiter(s) (10 mL/Hr) IV Continuous <Continuous>    MEDICATIONS  (PRN):  acetaminophen   Tablet .. 650 milliGRAM(s) Oral every 6 hours PRN Mild Pain (1 - 3)  dextrose 40% Gel 15 Gram(s) Oral once PRN Blood Glucose LESS THAN 70 milliGRAM(s)/deciliter  glucagon  Injectable 1 milliGRAM(s) IntraMuscular once PRN Glucose LESS THAN 70 milligrams/deciliter  oxycodone    5 mG/acetaminophen 325 mG 1 Tablet(s) Oral every 4 hours PRN Moderate Pain (4 - 6)  oxycodone    5 mG/acetaminophen 325 mG 2 Tablet(s) Oral every 4 hours PRN Severe Pain (7 - 10)  sodium chloride 0.9% lock flush 10 milliLiter(s) IV Push every 1 hour PRN Pre/post blood products, medications, blood draw, and to maintain line patency    Pertinent Labs:  02-05 Na138 mmol/L Glu 219 mg/dL<H> K+ 4.8 mmol/L Cr  2.47 mg/dL<H> BUN 41 mg/dL<H> 02-04 Phos 3.3 mg/dL 02-04 Alb 2.8 g/dL<L> 01-26 [id number] 9.2 %<H>  CAPILLARY BLOOD GLUCOSE  POCT Blood Glucose.: 229 mg/dL (05 Feb 2020 09:20)  POCT Blood Glucose.: 217 mg/dL (05 Feb 2020 07:56)  POCT Blood Glucose.: 237 mg/dL (04 Feb 2020 21:41)  POCT Blood Glucose.: 201 mg/dL (04 Feb 2020 18:02)      Skin: No pressure injuries noted      Estimated Needs:   [x] no change since previous assessment  [ ] recalculated:       Previous Nutrition Diagnosis: Food and Nutrition Related Knowledge deficit         Nutrition Diagnosis is [x] ongoing  [ ] resolved [ ] not applicable          New Nutrition Diagnosis: Food & Nutrition Related Knowledge Deficit         Interventions:     1) Recommend add consistent CHO no snack diet to promote glycemic control  2)      Monitoring and Evaluation:     [x] PO intake [x] Tolerance to diet prescription [x] weights [x] follow up per protocol    [x] other: RD remains available: Naima Rodriguez MS, RDN, CDN, CDE, [facility] #[phone] Pt seen for LOS f/u on 2COH. Pt appears lethargic, reports fair appetite at this time, only consuming broth and regular gingerale for lunch today. Is amenable to supplement meals with Glucerna shakes.    Pt is a 64 year old male with PMH HTN, T2DM (20 years), admitted with complaints of acute on chronic left sided exertional chest pain. Went for cardiac cath on 1/29/2020 which revealed triple vessel disease. Is now s/p CABG on 2/3/20. Endo following for glycemic management, FS remain elevated >200.    Source: Patient [x]    Family [ ]     other [x] EMR    Diet: Regular PO diet     Patient reports [ ] nausea  [ ] vomiting [ ] diarrhea [ ] constipation  [ ]chewing problems [ ] swallowing issues  [x] other: Denies N/V, diarrhea or constipation    PO intake:  < 50% [x] 50-75% [ ]   % [ ]  other :    Source for PO intake [x] Patient [ ] family [ ] chart [ ] staff [ ] other    Enteral/Parenteral Nutrition: n/a    Current Weight: 203.7 pounds (current, standing, +1 generalized edema noted). 182.1 pounds 1/27. Question accuracy of current weight, pt's weight stable at around 185 pounds throughout admission.    Pertinent Medications: MEDICATIONS  (STANDING):  artificial tears (preservative free) Ophthalmic Solution 1 Drop(s) Both EYES two times a day  aspirin enteric coated 81 milliGRAM(s) Oral daily  atorvastatin 40 milliGRAM(s) Oral at bedtime  chlorhexidine 2% Cloths 1 Application(s) Topical <User Schedule>  chlorhexidine 4% Liquid 1 Application(s) Topical <User Schedule>  dextrose 5%. 1000 milliLiter(s) (50 mL/Hr) IV Continuous <Continuous>  dextrose 50% Injectable 12.5 Gram(s) IV Push once  dextrose 50% Injectable 25 Gram(s) IV Push once  dextrose 50% Injectable 25 Gram(s) IV Push once  heparin  Injectable 5000 Unit(s) SubCutaneous every 8 hours  insulin glargine Injectable (LANTUS) 36 Unit(s) SubCutaneous at bedtime  insulin lispro (HumaLOG) corrective regimen sliding scale   SubCutaneous three times a day before meals  insulin lispro (HumaLOG) corrective regimen sliding scale   SubCutaneous at bedtime  insulin lispro Injectable (HumaLOG) 9 Unit(s) SubCutaneous three times a day before meals  lactated ringers Bolus 500 milliLiter(s) IV Bolus once  meperidine     Injectable 25 milliGRAM(s) IV Push once  metoclopramide Injectable 10 milliGRAM(s) IV Push every 8 hours  multivitamin 1 Tablet(s) Oral daily  mupirocin 2% Ointment 1 Application(s) Topical two times a day  norepinephrine Infusion 0.03 MICROgram(s)/kG/Min (4.6 mL/Hr) IV Continuous <Continuous>  pantoprazole  Injectable 40 milliGRAM(s) IV Push daily  polyethylene glycol 3350 17 Gram(s) Oral daily  sodium chloride 0.9%. 1000 milliLiter(s) (10 mL/Hr) IV Continuous <Continuous>    MEDICATIONS  (PRN):  acetaminophen   Tablet .. 650 milliGRAM(s) Oral every 6 hours PRN Mild Pain (1 - 3)  dextrose 40% Gel 15 Gram(s) Oral once PRN Blood Glucose LESS THAN 70 milliGRAM(s)/deciliter  glucagon  Injectable 1 milliGRAM(s) IntraMuscular once PRN Glucose LESS THAN 70 milligrams/deciliter  oxycodone    5 mG/acetaminophen 325 mG 1 Tablet(s) Oral every 4 hours PRN Moderate Pain (4 - 6)  oxycodone    5 mG/acetaminophen 325 mG 2 Tablet(s) Oral every 4 hours PRN Severe Pain (7 - 10)  sodium chloride 0.9% lock flush 10 milliLiter(s) IV Push every 1 hour PRN Pre/post blood products, medications, blood draw, and to maintain line patency    Pertinent Labs:  02-05 Na138 mmol/L Glu 219 mg/dL<H> K+ 4.8 mmol/L Cr  2.47 mg/dL<H> BUN 41 mg/dL<H> 02-04 Phos 3.3 mg/dL 02-04 Alb 2.8 g/dL<L> 01-26 [id number] 9.2 %<H>  CAPILLARY BLOOD GLUCOSE  POCT Blood Glucose.: 229 mg/dL (05 Feb 2020 09:20)  POCT Blood Glucose.: 217 mg/dL (05 Feb 2020 07:56)  POCT Blood Glucose.: 237 mg/dL (04 Feb 2020 21:41)  POCT Blood Glucose.: 201 mg/dL (04 Feb 2020 18:02)      Skin: No pressure injuries noted      Estimated Needs:   [x] no change since previous assessment  [ ] recalculated:       Previous Nutrition Diagnosis: Food and Nutrition Related Knowledge deficit         Nutrition Diagnosis is [x] ongoing  [ ] resolved [ ] not applicable          New Nutrition Diagnosis: Increased nutrient needs related to post-op nutrient and energy demands for surgical wound healing and recovery as evidenced by s/p CABG POD#2; and Inadequate po intake  related to decreased appetite post-op as evidenced by meal intake 20% today         Interventions:     1) Recommend add consistent CHO no snack diet to promote glycemic control in setting of hyperglycemia  2) Recommend add Glucerna x 3 containers daily (660 kcal, 30 grams protein) to supplement meals in setting of poor meal intake with pre-meal insulin dosing. Instructed pt and spouse at bedside to consume meals or supplements as tolerated especially in setting of pre-meal insulin dosing and to meet post-op needs  3) Diet education reinforcement deferred at this time as pt is very lethargic. May revisit when pt more receptive to education       Monitoring and Evaluation:     [x] PO intake [x] Tolerance to diet prescription [x] weights [x] follow up per protocol    [x] other: RD remains available: Naima Rodriguez MS, RDN, CDN, CDE, [facility]. #[phone]

## 2020-02-05 NOTE — PHYSICAL THERAPY INITIAL EVALUATION ADULT - ADDITIONAL COMMENTS
Pt states he lives in a private home. There are 4 steps to enter the home and a full flight of stairs to the bedroom. Pt states he was independent with all functional mobility & ADL's without the use of an AD prior.

## 2020-02-05 NOTE — PROGRESS NOTE ADULT - ASSESSMENT
63 y/o male with PMhx of HTN, DM II on insulin, CKD who presented to the ED with complaints of acute on chronic left sided exertional CP x few months which subsides upon resting. Pt had abnormal stress test and now s/p cardiac cath demonstrating multivessel CAD.  EF 50%, A1c 9.2  Hospital course:  2/3: C4L, Patch angioplasty of LAD. Patient received 500 FEIBA, 4 PRBC, 5 Cryo, 1 PLT, 2 FFP 1 V wire with skin ground   :  Extubated at 0130 Transferred to step down on Dobutamine at 2.68 m/k/m with a Left CT , Osorio, + pw to [facility] to off  Dispo: PT eval pending anticipate home    D/c  , d/c ct, d/c intro, d/c CT, d/c carlos.

## 2020-02-05 NOTE — PROGRESS NOTE ADULT - ASSESSMENT
Assessment  DMT2: 64y Male with DM T2 with hyperglycemia, A1C 9.2%, was on oral meds and insulin at home, postop on basal bolus insulin, increased dose yesterday, blood sugars still running high and not at target, no hypoglycemic episodes. Patient is stepped down to 2[place], eating meals, appears comfortable.  CAD: s/p CABG 2/3, on medications, no chest pain, stable, monitored.  HTN: Controlled,  on antihypertensive medications.  HLD: Controlled, on statin.  CKD: Monitor labs/BMP          Harper Matias MD  Cell: [phone]  Office: [phone] Assessment  DMT2: 64y Male with DM T2 with hyperglycemia, A1C 9.2%, was on oral meds and insulin at home, postop on basal bolus insulin, increased dose yesterday,  blood sugars still running high and not at target, no hypoglycemic episodes. Patient is stepped down to 2[place]hen, eating meals, appears comfortable.  CAD: s/p CABG 2/3, on medications, no chest pain, stable, monitored.  HTN: Controlled,  on antihypertensive medications.  HLD: Controlled, on statin.  CKD: Monitor labs/BMP          Harper Matias MD  Cell: [phone]  Office: [phone]

## 2020-02-05 NOTE — PHYSICAL THERAPY INITIAL EVALUATION ADULT - ACTIVE RANGE OF MOTION EXAMINATION, REHAB EVAL
bilateral  lower extremity Active ROM was WFL (within functional limits)/no Active ROM deficits were identified

## 2020-02-05 NOTE — PROGRESS NOTE ADULT - ATTENDING COMMENTS
Will increase Lantus to 48u at bedtime.  Will increase Humalog to 14u before each meal and continue Humalog correction scale coverage. Will continue monitoring FS and FU.

## 2020-02-05 NOTE — PROGRESS NOTE ADULT - PROBLEM SELECTOR PLAN 1
Will increase Lantus to 48u at bedtime.  Will increase Humalog to 14u before each meal and continue Humalog correction scale coverage. Will continue monitoring FS and FU.  Patient counseled for compliance with consistent low carb diet and exercise as tolerated outpatient. Patient counseled for compliance with consistent low carb diet and exercise as tolerated outpatient.

## 2020-02-05 NOTE — PHYSICAL THERAPY INITIAL EVALUATION ADULT - PERTINENT HX OF CURRENT PROBLEM, REHAB EVAL
Pt is a 63 y/o M with PMH of HTN, DM II, presented to the ED with complaints of acute on chronic left sided exertional chest pain. Pt stated he has been having left sided pressure and stabbing chest pain radiating to his sternum and right with activity for the past few months. He stated each episode last approximately 1-2 mins and subsided upon resting. He denied associated symptoms of radiation to his back, arm or jaw. CONT....

## 2020-02-05 NOTE — PHYSICAL THERAPY INITIAL EVALUATION ADULT - PLANNED THERAPY INTERVENTIONS, PT EVAL
1. GOAL: Pt will be able to negotiate 10 steps +HR independently with reciprocal pattern in 3 weeks./bed mobility training/transfer training/gait training

## 2020-02-05 NOTE — PHYSICAL THERAPY INITIAL EVALUATION ADULT - MODALITIES TREATMENT COMMENTS
3/1/20: surgical incision complication wound midline inferior sternum 3.8cm x 1.8cm x 4.5cm with clear/red/tan drainage, no malodor, no purulence, no erythema

## 2020-02-05 NOTE — PROGRESS NOTE ADULT - SUBJECTIVE AND OBJECTIVE BOX
CC: no cp/sob    TELEMETRY: nsr    PHYSICAL EXAM:    T(C): 36.5 (02-05-20 @ 11:02), Max: 37.2 (02-04-20 @ 16:00)  HR: 93 (02-05-20 @ 11:02) (90 - 98)  BP: 132/76 (02-05-20 @ 11:02) (102/62 - 132/76)  RR: 16 (02-05-20 @ 11:02) (16 - 23)  SpO2: 96% (02-05-20 @ 11:02) (93% - 100%)  Wt(kg): --  I&O's Summary    04 Feb 2020 07:01  -  05 Feb 2020 07:00  --------------------------------------------------------  IN: 1702.4 mL / OUT: 1470 mL / NET: 232.4 mL    05 Feb 2020 07:01  -  05 Feb 2020 14:03  --------------------------------------------------------  IN: 270 mL / OUT: 250 mL / NET: 20 mL        Appearance: Normal	  Cardiovascular: Normal S1 S2,RRR, No JVD, No murmurs  Respiratory: Lungs clear to auscultation	  Gastrointestinal:  Soft, Non-tender, + BS	  Extremities: Normal range of motion, No clubbing, cyanosis or edema  Vascular: Peripheral pulses palpable 2+ bilaterally     LABS:	 	                          8.6    8.85  )-----------( 95       ( 05 Feb 2020 04:31 )             25.8     02-05    138  |  104  |  41<H>  ----------------------------<  219<H>  4.8   |  19<L>  |  2.47<H>    Ca    7.9<L>      05 Feb 2020 04:31  Phos  3.3     02-04  Mg     2.3     02-04    TPro  4.4<L>  /  Alb  2.8<L>  /  TBili  0.4  /  DBili  x   /  AST  90<H>  /  ALT  40  /  AlkPhos  33<L>  02-04      PT/INR - ( 03 Feb 2020 16:55 )   PT: 13.3 sec;   INR: 1.16 ratio         PTT - ( 03 Feb 2020 16:55 )  PTT:31.2 sec    CARDIAC MARKERS:        MEDICATIONS  (STANDING):  artificial tears (preservative free) Ophthalmic Solution 1 Drop(s) Both EYES two times a day  aspirin enteric coated 81 milliGRAM(s) Oral daily  atorvastatin 40 milliGRAM(s) Oral at bedtime  chlorhexidine 2% Cloths 1 Application(s) Topical <User Schedule>  chlorhexidine 4% Liquid 1 Application(s) Topical <User Schedule>  dextrose 5%. 1000 milliLiter(s) (50 mL/Hr) IV Continuous <Continuous>  dextrose 50% Injectable 12.5 Gram(s) IV Push once  dextrose 50% Injectable 25 Gram(s) IV Push once  dextrose 50% Injectable 25 Gram(s) IV Push once  heparin  Injectable 5000 Unit(s) SubCutaneous every 8 hours  insulin glargine Injectable (LANTUS) 48 Unit(s) SubCutaneous at bedtime  insulin lispro (HumaLOG) corrective regimen sliding scale   SubCutaneous three times a day before meals  insulin lispro (HumaLOG) corrective regimen sliding scale   SubCutaneous at bedtime  insulin lispro Injectable (HumaLOG) 14 Unit(s) SubCutaneous three times a day before meals  lactated ringers Bolus 500 milliLiter(s) IV Bolus once  meperidine     Injectable 25 milliGRAM(s) IV Push once  multivitamin 1 Tablet(s) Oral daily  mupirocin 2% Ointment 1 Application(s) Topical two times a day  norepinephrine Infusion 0.03 MICROgram(s)/kG/Min (4.6 mL/Hr) IV Continuous <Continuous>  pantoprazole  Injectable 40 milliGRAM(s) IV Push daily  polyethylene glycol 3350 17 Gram(s) Oral daily  sodium chloride 0.9%. 1000 milliLiter(s) (10 mL/Hr) IV Continuous <Continuous>

## 2020-02-05 NOTE — PROGRESS NOTE ADULT - SUBJECTIVE AND OBJECTIVE BOX
Chief complaint  Patient is a 64y old  Male who presents with a chief complaint of Chest pain (05 Feb 2020 14:03)   Review of systems  Patient in bed, looks comfortable, no hypoglycemia.    Labs and Fingersticks  CAPILLARY BLOOD GLUCOSE      POCT Blood Glucose.: 224 mg/dL (05 Feb 2020 12:32)  POCT Blood Glucose.: 229 mg/dL (05 Feb 2020 09:20)  POCT Blood Glucose.: 217 mg/dL (05 Feb 2020 07:56)  POCT Blood Glucose.: 237 mg/dL (04 Feb 2020 21:41)  POCT Blood Glucose.: 201 mg/dL (04 Feb 2020 18:02)      Anion Gap, Serum: 15 (02-05 @ 04:31)  Anion Gap, Serum: 14 (02-04 @ 01:30)  Anion Gap, Serum: 13 (02-03 @ 17:22)      Calcium, Total Serum: 7.9 <L> (02-05 @ 04:31)  Calcium, Total Serum: 8.1 <L> (02-04 @ 01:30)  Calcium, Total Serum: 8.0 <L> (02-03 @ 17:22)  Albumin, Serum: 2.8 <L> (02-04 @ 01:30)  Albumin, Serum: 2.9 <L> (02-03 @ 17:22)    Alanine Aminotransferase (ALT/SGPT): 40 (02-04 @ 01:30)  Alanine Aminotransferase (ALT/SGPT): 30 (02-03 @ 17:22)  Alkaline Phosphatase, Serum: 33 <L> (02-04 @ 01:30)  Alkaline Phosphatase, Serum: 31 <L> (02-03 @ 17:22)  Aspartate Aminotransferase (AST/SGOT): 90 <H> (02-04 @ 01:30)  Aspartate Aminotransferase (AST/SGOT): 59 <H> (02-03 @ 17:22)        02-05    138  |  104  |  41<H>  ----------------------------<  219<H>  4.8   |  19<L>  |  2.47<H>    Ca    7.9<L>      05 Feb 2020 04:31  Phos  3.3     02-04  Mg     2.3     02-04    TPro  4.4<L>  /  Alb  2.8<L>  /  TBili  0.4  /  DBili  x   /  AST  90<H>  /  ALT  40  /  AlkPhos  33<L>  02-04                        8.6    8.85  )-----------( 95       ( 05 Feb 2020 04:31 )             25.8     Medications  MEDICATIONS  (STANDING):  artificial tears (preservative free) Ophthalmic Solution 1 Drop(s) Both EYES two times a day  aspirin enteric coated 81 milliGRAM(s) Oral daily  atorvastatin 40 milliGRAM(s) Oral at bedtime  chlorhexidine 2% Cloths 1 Application(s) Topical <User Schedule>  chlorhexidine 4% Liquid 1 Application(s) Topical <User Schedule>  dextrose 5%. 1000 milliLiter(s) (50 mL/Hr) IV Continuous <Continuous>  dextrose 50% Injectable 12.5 Gram(s) IV Push once  dextrose 50% Injectable 25 Gram(s) IV Push once  dextrose 50% Injectable 25 Gram(s) IV Push once  heparin  Injectable 5000 Unit(s) SubCutaneous every 8 hours  insulin glargine Injectable (LANTUS) 48 Unit(s) SubCutaneous at bedtime  insulin lispro (HumaLOG) corrective regimen sliding scale   SubCutaneous three times a day before meals  insulin lispro (HumaLOG) corrective regimen sliding scale   SubCutaneous at bedtime  insulin lispro Injectable (HumaLOG) 14 Unit(s) SubCutaneous three times a day before meals  lactated ringers Bolus 500 milliLiter(s) IV Bolus once  meperidine     Injectable 25 milliGRAM(s) IV Push once  multivitamin 1 Tablet(s) Oral daily  mupirocin 2% Ointment 1 Application(s) Topical two times a day  norepinephrine Infusion 0.03 MICROgram(s)/kG/Min (4.6 mL/Hr) IV Continuous <Continuous>  pantoprazole  Injectable 40 milliGRAM(s) IV Push daily  polyethylene glycol 3350 17 Gram(s) Oral daily  sodium chloride 0.9%. 1000 milliLiter(s) (10 mL/Hr) IV Continuous <Continuous>      Physical Exam  General: Patient comfortable in bed  Vital Signs Last 12 Hrs  T(F): 97.7 (02-05-20 @ 11:02), Max: 97.8 (02-05-20 @ 03:11)  HR: 93 (02-05-20 @ 11:02) (90 - 97)  BP: 132/76 (02-05-20 @ 11:02) (102/62 - 132/76)  BP(mean): 85 (02-05-20 @ 03:11) (85 - 85)  RR: 16 (02-05-20 @ 11:02) (16 - 18)  SpO2: 96% (02-05-20 @ 11:02) (93% - 100%)  Neck: No palpable thyroid nodules.  CVS: S1S2, No murmurs  Respiratory: No wheezing, no crepitations  GI: Abdomen soft, bowel sounds positive  Musculoskeletal:  edema lower extremities.   Skin: No skin rashes, no ecchymosis    Diagnostics Chief complaint  Patient is a 64y old  Male who presents  with a chief complaint of Chest pain (05 Feb 2020 14:03)   Review of systems  Patient in bed, looks comfortable, no hypoglycemia.    Labs and Fingersticks  CAPILLARY BLOOD GLUCOSE      POCT Blood Glucose.: 224 mg/dL (05 Feb 2020 12:32)  POCT Blood Glucose.: 229 mg/dL (05 Feb 2020 09:20)  POCT Blood Glucose.: 217 mg/dL (05 Feb 2020 07:56)  POCT Blood Glucose.: 237 mg/dL (04 Feb 2020 21:41)  POCT Blood Glucose.: 201 mg/dL (04 Feb 2020 18:02)      Anion Gap, Serum: 15 (02-05 @ 04:31)  Anion Gap, Serum: 14 (02-04 @ 01:30)  Anion Gap, Serum: 13 (02-03 @ 17:22)      Calcium, Total Serum: 7.9 <L> (02-05 @ 04:31)  Calcium, Total Serum: 8.1 <L> (02-04 @ 01:30)  Calcium, Total Serum: 8.0 <L> (02-03 @ 17:22)  Albumin, Serum: 2.8 <L> (02-04 @ 01:30)  Albumin, Serum: 2.9 <L> (02-03 @ 17:22)    Alanine Aminotransferase (ALT/SGPT): 40 (02-04 @ 01:30)  Alanine Aminotransferase (ALT/SGPT): 30 (02-03 @ 17:22)  Alkaline Phosphatase, Serum: 33 <L> (02-04 @ 01:30)  Alkaline Phosphatase, Serum: 31 <L> (02-03 @ 17:22)  Aspartate Aminotransferase (AST/SGOT): 90 <H> (02-04 @ 01:30)  Aspartate Aminotransferase (AST/SGOT): 59 <H> (02-03 @ 17:22)        02-05    138  |  104  |  41<H>  ----------------------------<  219<H>  4.8   |  19<L>  |  2.47<H>    Ca    7.9<L>      05 Feb 2020 04:31  Phos  3.3     02-04  Mg     2.3     02-04    TPro  4.4<L>  /  Alb  2.8<L>  /  TBili  0.4  /  DBili  x   /  AST  90<H>  /  ALT  40  /  AlkPhos  33<L>  02-04                        8.6    8.85  )-----------( 95       ( 05 Feb 2020 04:31 )             25.8     Medications  MEDICATIONS  (STANDING):  artificial tears (preservative free) Ophthalmic Solution 1 Drop(s) Both EYES two times a day  aspirin enteric coated 81 milliGRAM(s) Oral daily  atorvastatin 40 milliGRAM(s) Oral at bedtime  chlorhexidine 2% Cloths 1 Application(s) Topical <User Schedule>  chlorhexidine 4% Liquid 1 Application(s) Topical <User Schedule>  dextrose 5%. 1000 milliLiter(s) (50 mL/Hr) IV Continuous <Continuous>  dextrose 50% Injectable 12.5 Gram(s) IV Push once  dextrose 50% Injectable 25 Gram(s) IV Push once  dextrose 50% Injectable 25 Gram(s) IV Push once  heparin  Injectable 5000 Unit(s) SubCutaneous every 8 hours  insulin glargine Injectable (LANTUS) 48 Unit(s) SubCutaneous at bedtime  insulin lispro (HumaLOG) corrective regimen sliding scale   SubCutaneous three times a day before meals  insulin lispro (HumaLOG) corrective regimen sliding scale   SubCutaneous at bedtime  insulin lispro Injectable (HumaLOG) 14 Unit(s) SubCutaneous three times a day before meals  lactated ringers Bolus 500 milliLiter(s) IV Bolus once  meperidine     Injectable 25 milliGRAM(s) IV Push once  multivitamin 1 Tablet(s) Oral daily  mupirocin 2% Ointment 1 Application(s) Topical two times a day  norepinephrine Infusion 0.03 MICROgram(s)/kG/Min (4.6 mL/Hr) IV Continuous <Continuous>  pantoprazole  Injectable 40 milliGRAM(s) IV Push daily  polyethylene glycol 3350 17 Gram(s) Oral daily  sodium chloride 0.9%. 1000 milliLiter(s) (10 mL/Hr) IV Continuous <Continuous>      Physical Exam  General: Patient comfortable in bed  Vital Signs Last 12 Hrs  T(F): 97.7 (02-05-20 @ 11:02), Max: 97.8 (02-05-20 @ 03:11)  HR: 93 (02-05-20 @ 11:02) (90 - 97)  BP: 132/76 (02-05-20 @ 11:02) (102/62 - 132/76)  BP(mean): 85 (02-05-20 @ 03:11) (85 - 85)  RR: 16 (02-05-20 @ 11:02) (16 - 18)  SpO2: 96% (02-05-20 @ 11:02) (93% - 100%)  Neck: No palpable thyroid nodules.  CVS: S1S2, No murmurs  Respiratory: No wheezing, no crepitations  GI: Abdomen soft, bowel sounds positive  Musculoskeletal:  edema lower extremities.   Skin: No skin rashes, no ecchymosis    Diagnostics

## 2020-02-06 LAB
ALBUMIN SERPL ELPH-MCNC: 2.4 G/DL — LOW (ref 3.3–5)
ALP SERPL-CCNC: 62 U/L — SIGNIFICANT CHANGE UP (ref 40–120)
ALT FLD-CCNC: 171 U/L — HIGH (ref 10–45)
ANION GAP SERPL CALC-SCNC: 12 MMOL/L — SIGNIFICANT CHANGE UP (ref 5–17)
APTT BLD: 32.8 SEC — SIGNIFICANT CHANGE UP (ref 27.5–36.3)
AST SERPL-CCNC: 174 U/L — HIGH (ref 10–40)
BILIRUB SERPL-MCNC: 0.4 MG/DL — SIGNIFICANT CHANGE UP (ref 0.2–1.2)
BUN SERPL-MCNC: 50 MG/DL — HIGH (ref 7–23)
CALCIUM SERPL-MCNC: 8 MG/DL — LOW (ref 8.4–10.5)
CHLORIDE SERPL-SCNC: 98 MMOL/L — SIGNIFICANT CHANGE UP (ref 96–108)
CO2 SERPL-SCNC: 20 MMOL/L — LOW (ref 22–31)
CREAT SERPL-MCNC: 2.55 MG/DL — HIGH (ref 0.5–1.3)
GAS PNL BLDA: SIGNIFICANT CHANGE UP
GAS PNL BLDA: SIGNIFICANT CHANGE UP
GLUCOSE BLDC GLUCOMTR-MCNC: 110 MG/DL — HIGH (ref 70–99)
GLUCOSE BLDC GLUCOMTR-MCNC: 121 MG/DL — HIGH (ref 70–99)
GLUCOSE BLDC GLUCOMTR-MCNC: 135 MG/DL — HIGH (ref 70–99)
GLUCOSE BLDC GLUCOMTR-MCNC: 59 MG/DL — LOW (ref 70–99)
GLUCOSE BLDC GLUCOMTR-MCNC: 65 MG/DL — LOW (ref 70–99)
GLUCOSE BLDC GLUCOMTR-MCNC: 78 MG/DL — SIGNIFICANT CHANGE UP (ref 70–99)
GLUCOSE BLDC GLUCOMTR-MCNC: 87 MG/DL — SIGNIFICANT CHANGE UP (ref 70–99)
GLUCOSE SERPL-MCNC: 95 MG/DL — SIGNIFICANT CHANGE UP (ref 70–99)
HCT VFR BLD CALC: 23.7 % — LOW (ref 39–50)
HCT VFR BLD CALC: 27.8 % — LOW (ref 39–50)
HGB BLD-MCNC: 7.7 G/DL — LOW (ref 13–17)
HGB BLD-MCNC: 9.1 G/DL — LOW (ref 13–17)
INR BLD: 1.25 RATIO — HIGH (ref 0.88–1.16)
MAGNESIUM SERPL-MCNC: 2.3 MG/DL — SIGNIFICANT CHANGE UP (ref 1.6–2.6)
MCHC RBC-ENTMCNC: 28.2 PG — SIGNIFICANT CHANGE UP (ref 27–34)
MCHC RBC-ENTMCNC: 28.6 PG — SIGNIFICANT CHANGE UP (ref 27–34)
MCHC RBC-ENTMCNC: 32.5 GM/DL — SIGNIFICANT CHANGE UP (ref 32–36)
MCHC RBC-ENTMCNC: 32.7 GM/DL — SIGNIFICANT CHANGE UP (ref 32–36)
MCV RBC AUTO: 86.8 FL — SIGNIFICANT CHANGE UP (ref 80–100)
MCV RBC AUTO: 87.4 FL — SIGNIFICANT CHANGE UP (ref 80–100)
NRBC # BLD: 0 /100 WBCS — SIGNIFICANT CHANGE UP (ref 0–0)
NRBC # BLD: 0 /100 WBCS — SIGNIFICANT CHANGE UP (ref 0–0)
PHOSPHATE SERPL-MCNC: 3.2 MG/DL — SIGNIFICANT CHANGE UP (ref 2.5–4.5)
PLATELET # BLD AUTO: 156 K/UL — SIGNIFICANT CHANGE UP (ref 150–400)
PLATELET # BLD AUTO: 191 K/UL — SIGNIFICANT CHANGE UP (ref 150–400)
POTASSIUM SERPL-MCNC: 4.6 MMOL/L — SIGNIFICANT CHANGE UP (ref 3.5–5.3)
POTASSIUM SERPL-SCNC: 4.6 MMOL/L — SIGNIFICANT CHANGE UP (ref 3.5–5.3)
PROT SERPL-MCNC: 5.2 G/DL — LOW (ref 6–8.3)
PROTHROM AB SERPL-ACNC: 14.5 SEC — HIGH (ref 10–12.9)
RBC # BLD: 2.73 M/UL — LOW (ref 4.2–5.8)
RBC # BLD: 3.18 M/UL — LOW (ref 4.2–5.8)
RBC # FLD: 13.5 % — SIGNIFICANT CHANGE UP (ref 10.3–14.5)
RBC # FLD: 13.7 % — SIGNIFICANT CHANGE UP (ref 10.3–14.5)
SODIUM SERPL-SCNC: 130 MMOL/L — LOW (ref 135–145)
WBC # BLD: 13.76 K/UL — HIGH (ref 3.8–10.5)
WBC # BLD: 17.75 K/UL — HIGH (ref 3.8–10.5)
WBC # FLD AUTO: 13.76 K/UL — HIGH (ref 3.8–10.5)
WBC # FLD AUTO: 17.75 K/UL — HIGH (ref 3.8–10.5)

## 2020-02-06 PROCEDURE — 71045 X-RAY EXAM CHEST 1 VIEW: CPT | Mod: 26

## 2020-02-06 PROCEDURE — 99291 CRITICAL CARE FIRST HOUR: CPT

## 2020-02-06 PROCEDURE — 93306 TTE W/DOPPLER COMPLETE: CPT | Mod: 26

## 2020-02-06 PROCEDURE — 99232 SBSQ HOSP IP/OBS MODERATE 35: CPT | Mod: GC

## 2020-02-06 RX ORDER — FUROSEMIDE 40 MG
20 TABLET ORAL ONCE
Refills: 0 | Status: COMPLETED | OUTPATIENT
Start: 2020-02-06 | End: 2020-02-06

## 2020-02-06 RX ORDER — FUROSEMIDE 40 MG
20 TABLET ORAL DAILY
Refills: 0 | Status: DISCONTINUED | OUTPATIENT
Start: 2020-02-07 | End: 2020-02-07

## 2020-02-06 RX ORDER — HYDROMORPHONE HYDROCHLORIDE 2 MG/ML
0.5 INJECTION INTRAMUSCULAR; INTRAVENOUS; SUBCUTANEOUS ONCE
Refills: 0 | Status: DISCONTINUED | OUTPATIENT
Start: 2020-02-06 | End: 2020-02-06

## 2020-02-06 RX ORDER — TAMSULOSIN HYDROCHLORIDE 0.4 MG/1
0.4 CAPSULE ORAL AT BEDTIME
Refills: 0 | Status: DISCONTINUED | OUTPATIENT
Start: 2020-02-06 | End: 2020-02-25

## 2020-02-06 RX ORDER — INSULIN LISPRO 100/ML
8 VIAL (ML) SUBCUTANEOUS
Refills: 0 | Status: DISCONTINUED | OUTPATIENT
Start: 2020-02-06 | End: 2020-02-07

## 2020-02-06 RX ORDER — INSULIN LISPRO 100/ML
11 VIAL (ML) SUBCUTANEOUS
Refills: 0 | Status: DISCONTINUED | OUTPATIENT
Start: 2020-02-06 | End: 2020-02-06

## 2020-02-06 RX ORDER — INSULIN GLARGINE 100 [IU]/ML
40 INJECTION, SOLUTION SUBCUTANEOUS AT BEDTIME
Refills: 0 | Status: DISCONTINUED | OUTPATIENT
Start: 2020-02-06 | End: 2020-02-07

## 2020-02-06 RX ADMIN — Medication 1 TABLET(S): at 11:29

## 2020-02-06 RX ADMIN — MUPIROCIN 1 APPLICATION(S): 20 OINTMENT TOPICAL at 17:46

## 2020-02-06 RX ADMIN — PANTOPRAZOLE SODIUM 40 MILLIGRAM(S): 20 TABLET, DELAYED RELEASE ORAL at 11:29

## 2020-02-06 RX ADMIN — POLYETHYLENE GLYCOL 3350 17 GRAM(S): 17 POWDER, FOR SOLUTION ORAL at 11:29

## 2020-02-06 RX ADMIN — TAMSULOSIN HYDROCHLORIDE 0.4 MILLIGRAM(S): 0.4 CAPSULE ORAL at 21:08

## 2020-02-06 RX ADMIN — MUPIROCIN 1 APPLICATION(S): 20 OINTMENT TOPICAL at 06:36

## 2020-02-06 RX ADMIN — Medication 1 DROP(S): at 06:36

## 2020-02-06 RX ADMIN — Medication 50 MILLIGRAM(S): at 06:37

## 2020-02-06 RX ADMIN — Medication 50 MILLIGRAM(S): at 17:46

## 2020-02-06 RX ADMIN — HEPARIN SODIUM 5000 UNIT(S): 5000 INJECTION INTRAVENOUS; SUBCUTANEOUS at 21:08

## 2020-02-06 RX ADMIN — ATORVASTATIN CALCIUM 40 MILLIGRAM(S): 80 TABLET, FILM COATED ORAL at 21:08

## 2020-02-06 RX ADMIN — Medication 5 MILLIGRAM(S): at 06:40

## 2020-02-06 RX ADMIN — HYDROMORPHONE HYDROCHLORIDE 4 MILLIGRAM(S): 2 INJECTION INTRAMUSCULAR; INTRAVENOUS; SUBCUTANEOUS at 07:58

## 2020-02-06 RX ADMIN — INSULIN GLARGINE 40 UNIT(S): 100 INJECTION, SOLUTION SUBCUTANEOUS at 22:09

## 2020-02-06 RX ADMIN — Medication 20 MILLIGRAM(S): at 03:29

## 2020-02-06 RX ADMIN — Medication 20 MILLIGRAM(S): at 15:30

## 2020-02-06 RX ADMIN — HEPARIN SODIUM 5000 UNIT(S): 5000 INJECTION INTRAVENOUS; SUBCUTANEOUS at 06:37

## 2020-02-06 RX ADMIN — HYDROMORPHONE HYDROCHLORIDE 4 MILLIGRAM(S): 2 INJECTION INTRAMUSCULAR; INTRAVENOUS; SUBCUTANEOUS at 17:50

## 2020-02-06 RX ADMIN — Medication 1 DROP(S): at 17:45

## 2020-02-06 RX ADMIN — HYDROMORPHONE HYDROCHLORIDE 4 MILLIGRAM(S): 2 INJECTION INTRAMUSCULAR; INTRAVENOUS; SUBCUTANEOUS at 18:20

## 2020-02-06 RX ADMIN — HYDROMORPHONE HYDROCHLORIDE 4 MILLIGRAM(S): 2 INJECTION INTRAMUSCULAR; INTRAVENOUS; SUBCUTANEOUS at 08:27

## 2020-02-06 RX ADMIN — HEPARIN SODIUM 5000 UNIT(S): 5000 INJECTION INTRAVENOUS; SUBCUTANEOUS at 15:31

## 2020-02-06 RX ADMIN — HYDROMORPHONE HYDROCHLORIDE 4 MILLIGRAM(S): 2 INJECTION INTRAMUSCULAR; INTRAVENOUS; SUBCUTANEOUS at 12:28

## 2020-02-06 RX ADMIN — CHLORHEXIDINE GLUCONATE 1 APPLICATION(S): 213 SOLUTION TOPICAL at 11:28

## 2020-02-06 RX ADMIN — HYDROMORPHONE HYDROCHLORIDE 4 MILLIGRAM(S): 2 INJECTION INTRAMUSCULAR; INTRAVENOUS; SUBCUTANEOUS at 13:00

## 2020-02-06 RX ADMIN — HYDROMORPHONE HYDROCHLORIDE 0.5 MILLIGRAM(S): 2 INJECTION INTRAMUSCULAR; INTRAVENOUS; SUBCUTANEOUS at 03:29

## 2020-02-06 RX ADMIN — Medication 14 UNIT(S): at 07:54

## 2020-02-06 RX ADMIN — Medication 81 MILLIGRAM(S): at 11:29

## 2020-02-06 NOTE — PROGRESS NOTE ADULT - SUBJECTIVE AND OBJECTIVE BOX
VITAL SIGNS    Telemetry:  SR 60-90  Vital Signs Last 24 Hrs  T(C): 37 (20 @ 12:03), Max: 37 (20 @ 12:03)  T(F): 98.6 (20 @ 12:03), Max: 98.6 (20 @ 12:03)  HR: 96 (20 @ 12:03) (90 - 101)  BP: 124/74 (20 @ 12:03) (109/72 - 146/77)  RR: 18 (20 @ 12:03) (18 - 20)  SpO2: 90% (20 @ 12:05) (85% - 96%)             @ 07:01  -   @ 07:00  --------------------------------------------------------  IN: 1450 mL / OUT: 1690 mL / NET: -240 mL     @ 07:01  -   @ 15:48  --------------------------------------------------------  IN: 360 mL / OUT: 0 mL / NET: 360 mL       Daily     Daily Weight in k.9 (2020 08:31)  Admit Wt: Drug Dosing Weight  Height (cm): 172.72 (2020 07:20)  Weight (kg): 81.1 (2020 07:20)  BMI (kg/m2): 27.2 (2020 07:20)  BSA (m2): 1.95 (2020 07:20)    Bilirubin Total, Serum: 0.4 mg/dL ( @ 09:59)    CAPILLARY BLOOD GLUCOSE      POCT Blood Glucose.: 87 mg/dL (2020 11:34)  POCT Blood Glucose.: 110 mg/dL (2020 07:44)  POCT Blood Glucose.: 107 mg/dL (2020 21:37)  POCT Blood Glucose.: 139 mg/dL (2020 18:01)  POCT Blood Glucose.: 144 mg/dL (2020 16:57)          MEDICATIONS  acetaminophen   Tablet .. 650 milliGRAM(s) Oral every 6 hours PRN  artificial tears (preservative free) Ophthalmic Solution 1 Drop(s) Both EYES two times a day  aspirin enteric coated 81 milliGRAM(s) Oral daily  atorvastatin 40 milliGRAM(s) Oral at bedtime  chlorhexidine 2% Cloths 1 Application(s) Topical <User Schedule>  chlorhexidine 4% Liquid 1 Application(s) Topical <User Schedule>  dextrose 40% Gel 15 Gram(s) Oral once PRN  dextrose 5%. 1000 milliLiter(s) IV Continuous <Continuous>  dextrose 50% Injectable 12.5 Gram(s) IV Push once  dextrose 50% Injectable 25 Gram(s) IV Push once  dextrose 50% Injectable 25 Gram(s) IV Push once  glucagon  Injectable 1 milliGRAM(s) IntraMuscular once PRN  heparin  Injectable 5000 Unit(s) SubCutaneous every 8 hours  HYDROmorphone   Tablet 2 milliGRAM(s) Oral every 3 hours PRN  HYDROmorphone   Tablet 4 milliGRAM(s) Oral every 4 hours PRN  HYDROmorphone  Injectable 0.25 milliGRAM(s) IV Push every 4 hours PRN  insulin glargine Injectable (LANTUS) 40 Unit(s) SubCutaneous at bedtime  insulin lispro (HumaLOG) corrective regimen sliding scale   SubCutaneous three times a day before meals  insulin lispro (HumaLOG) corrective regimen sliding scale   SubCutaneous at bedtime  insulin lispro Injectable (HumaLOG) 8 Unit(s) SubCutaneous three times a day before meals  lactated ringers Bolus 500 milliLiter(s) IV Bolus once  metoprolol tartrate 50 milliGRAM(s) Oral every 12 hours  multivitamin 1 Tablet(s) Oral daily  mupirocin 2% Ointment 1 Application(s) Topical two times a day  pantoprazole  Injectable 40 milliGRAM(s) IV Push daily  polyethylene glycol 3350 17 Gram(s) Oral daily  sodium chloride 0.9% lock flush 10 milliLiter(s) IV Push every 1 hour PRN  sodium chloride 0.9%. 1000 milliLiter(s) IV Continuous <Continuous>  tamsulosin 0.4 milliGRAM(s) Oral at bedtime      Interval History: Pt states he feels better overall. Denies sob or chest pain. No acute events overnight. Osorio d/c this morning.    PHYSICAL EXAM  General: WN, WD, NAD  Neurology: alert and oriented x 3, nonfocal, no gross deficits  CV : s1, s2  Sternal Wound :  CDI , Stable + pw  Lungs: CTA B/L  Abdomen: soft, NT,ND, ( +)Bowel sounds  :  voiding  Extremities: Rt leg ACE wrapped SVG site.       LABS  -    130<L>  |  98  |  50<H>  ----------------------------<  95  4.6   |  20<L>  |  2.55<H>    Ca    8.0<L>      2020 09:59  Phos  3.2     02-  Mg     2.3     -    TPro  5.2<L>  /  Alb  2.4<L>  /  TBili  0.4  /  DBili  x   /  AST  174<H>  /  ALT  171<H>  /  AlkPhos  62  02-06                                 9.1    13.76 )-----------( 156      ( 2020 09:59 )             27.8                 PAST MEDICAL & SURGICAL HISTORY:  HTN (hypertension)  Diabetes  History of appendectomy: x 30 yrs ago

## 2020-02-06 NOTE — PROGRESS NOTE ADULT - ATTENDING COMMENTS
Patient seen and examined.  Agree with above NP note.  patient appears somewhat lethargic with dyspnea  appears hypervolemic   would give iv lasix   d/w cts nurse prac  in setting of inc lethargy, rising creat  ? hypoperfusion  consider repeat tte, inotropic support if does not improve  r/o infection

## 2020-02-06 NOTE — PROGRESS NOTE ADULT - ASSESSMENT
intraop kennedy 2/3/20 ef 50%, nl lv, mild diastolic dysfx stage 1  limited echo 2/4/20: nl LV sys fx , no pericardial effusion     a/p  64 year old man with history of HTN, DM II, admitted with progressive exertional angina.     1. CAD   -s/p cath revealing severe triple vessel disease including lesions at the bifurcation of the LAD/diagonal and distal RCA/RPDA/RPL trifurcation.   -s/p CABG x 4   - intraop kennedy ef 50%, nl lv, mild diastolic dysfx stage 1  -cont asa, statin  -s/p  pressors   -on BB   -appears overloaded, chest xray with increased bibasilar consolidation , diuretics per cts    2. HTN  s/p pressors , sys bp stable   on BB    3. STEPHANIE/CKD  renal f/u     dvt ppx

## 2020-02-06 NOTE — PROGRESS NOTE ADULT - SUBJECTIVE AND OBJECTIVE BOX
[facility] DIVISION OF KIDNEY DISEASES AND HYPERTENSION -- FOLLOW UP NOTE  --------------------------------------------------------------------------------  HPI: 65 yo M with HTN, DM II (20 years), admitted with complaints of acute on chronic left sided exertional chest pain. Nephrology team is following for elevated serum creatinine and pre-cardiac catheterization assessment. [facility] available. On admission (1/25/2020), Scr was 1.85. Patient went for cardiac cath on 1/29/20 which revealed triple vessel disease. Scr had improved to 1.76 on 2/3/20. Pt. underwent CABG on 2/3/20. Scr now increased however likely plateaued at 2.55 today. Pt. is non-oliguric.    Pt. seen and examined at bedside this AM. Pt. states that he has chest discomfort secondary to CABG. Noted to have episodes of relatively lower BP overnight. Chanelle ARZATE, N/V/F/C.    PAST HISTORY  --------------------------------------------------------------------------------  No significant changes to PMH, PSH, FHx, SHx, unless otherwise noted    ALLERGIES & MEDICATIONS  --------------------------------------------------------------------------------  Allergies    No Known Allergies    Intolerances      Standing Inpatient Medications  artificial tears (preservative free) Ophthalmic Solution 1 Drop(s) Both EYES two times a day  aspirin enteric coated 81 milliGRAM(s) Oral daily  atorvastatin 40 milliGRAM(s) Oral at bedtime  chlorhexidine 2% Cloths 1 Application(s) Topical <User Schedule>  chlorhexidine 4% Liquid 1 Application(s) Topical <User Schedule>  dextrose 5%. 1000 milliLiter(s) IV Continuous <Continuous>  dextrose 50% Injectable 12.5 Gram(s) IV Push once  dextrose 50% Injectable 25 Gram(s) IV Push once  dextrose 50% Injectable 25 Gram(s) IV Push once  heparin  Injectable 5000 Unit(s) SubCutaneous every 8 hours  insulin glargine Injectable (LANTUS) 40 Unit(s) SubCutaneous at bedtime  insulin lispro (HumaLOG) corrective regimen sliding scale   SubCutaneous three times a day before meals  insulin lispro (HumaLOG) corrective regimen sliding scale   SubCutaneous at bedtime  insulin lispro Injectable (HumaLOG) 8 Unit(s) SubCutaneous three times a day before meals  lactated ringers Bolus 500 milliLiter(s) IV Bolus once  metoprolol tartrate 50 milliGRAM(s) Oral every 12 hours  multivitamin 1 Tablet(s) Oral daily  mupirocin 2% Ointment 1 Application(s) Topical two times a day  pantoprazole  Injectable 40 milliGRAM(s) IV Push daily  polyethylene glycol 3350 17 Gram(s) Oral daily  sodium chloride 0.9%. 1000 milliLiter(s) IV Continuous <Continuous>    REVIEW OF SYSTEMS  --------------------------------------------------------------------------------  Gen: + lethargy  Respiratory: No dyspnea  CV: + (surgically-related) chest pain  GI: No abdominal pain  MSK: No LE edema  Neuro: No dizziness  Heme: No bleeding    All other systems were reviewed and are negative, except as noted.    VITALS/PHYSICAL EXAM  --------------------------------------------------------------------------------  T(C): 37 (02-06-20 @ 12:03), Max: 37 (02-06-20 @ 12:03)  HR: 96 (02-06-20 @ 12:03) (90 - 107)  BP: 124/74 (02-06-20 @ 12:03) (109/72 - 146/77)  RR: 18 (02-06-20 @ 12:03) (17 - 20)  SpO2: 90% (02-06-20 @ 12:05) (85% - 96%)  Wt(kg): --    02-05-20 @ 07:01  -  02-06-20 @ 07:00  --------------------------------------------------------  IN: 1450 mL / OUT: 1690 mL / NET: -240 mL    02-06-20 @ 07:01  -  02-06-20 @ 14:31  --------------------------------------------------------  IN: 120 mL / OUT: 0 mL / NET: 120 mL    Physical Exam:  	Gen: NAD  	HEENT: MMM  	Pulm: shallow breathing however CTA B/L, + chest tubes  	CV: + central chest dressing from CABG C/D/I, S1S2  	Abd: Soft, +BS   	Ext: trace LE edema B/L  	Neuro: Awake  	Skin: Warm and dry    LABS/STUDIES  --------------------------------------------------------------------------------              9.1    13.76 >-----------<  156      [02-06-20 @ 09:59]              27.8     130  |  98  |  50  ----------------------------<  95      [02-06-20 @ 09:59]  4.6   |  20  |  2.55        Ca     8.0     [02-06-20 @ 09:59]      Mg     2.3     [02-06-20 @ 09:59]      Phos  3.2     [02-06-20 @ 09:59]    Creatinine Trend:  SCr 2.55 [02-06 @ 09:59]  SCr 2.47 [02-05 @ 04:31]  SCr 2.00 [02-04 @ 01:30]  SCr 1.76 [02-03 @ 17:22]  SCr 1.91 [02-02 @ 07:00]

## 2020-02-06 NOTE — PROGRESS NOTE ADULT - SUBJECTIVE AND OBJECTIVE BOX
CARDIOLOGY FOLLOW UP - Dr. Steel    CC no cp or sob        PHYSICAL EXAM:  T(C): 36.9 (02-06-20 @ 04:58), Max: 36.9 (02-06-20 @ 04:58)  HR: 93 (02-06-20 @ 06:32) (90 - 107)  BP: 130/78 (02-06-20 @ 06:32) (109/72 - 146/77)  RR: 18 (02-06-20 @ 06:32) (16 - 20)  SpO2: 93% (02-06-20 @ 06:32) (90% - 96%)  Wt(kg): --  I&O's Summary    05 Feb 2020 07:01  -  06 Feb 2020 07:00  --------------------------------------------------------  IN: 1450 mL / OUT: 1690 mL / NET: -240 mL    06 Feb 2020 07:01  -  06 Feb 2020 10:55  --------------------------------------------------------  IN: 120 mL / OUT: 0 mL / NET: 120 mL        Appearance: Normal	  Cardiovascular: Normal S1 S2,RRR   Respiratory:  crackles   Gastrointestinal:  Soft, Non-tender, + BS	  Extremities: UE/LE  edema        MEDICATIONS  (STANDING):  artificial tears (preservative free) Ophthalmic Solution 1 Drop(s) Both EYES two times a day  aspirin enteric coated 81 milliGRAM(s) Oral daily  atorvastatin 40 milliGRAM(s) Oral at bedtime  chlorhexidine 2% Cloths 1 Application(s) Topical <User Schedule>  chlorhexidine 4% Liquid 1 Application(s) Topical <User Schedule>  dextrose 5%. 1000 milliLiter(s) (50 mL/Hr) IV Continuous <Continuous>  dextrose 50% Injectable 12.5 Gram(s) IV Push once  dextrose 50% Injectable 25 Gram(s) IV Push once  dextrose 50% Injectable 25 Gram(s) IV Push once  heparin  Injectable 5000 Unit(s) SubCutaneous every 8 hours  insulin glargine Injectable (LANTUS) 40 Unit(s) SubCutaneous at bedtime  insulin lispro (HumaLOG) corrective regimen sliding scale   SubCutaneous three times a day before meals  insulin lispro (HumaLOG) corrective regimen sliding scale   SubCutaneous at bedtime  insulin lispro Injectable (HumaLOG) 11 Unit(s) SubCutaneous three times a day before meals  lactated ringers Bolus 500 milliLiter(s) IV Bolus once  metoprolol tartrate 50 milliGRAM(s) Oral every 12 hours  multivitamin 1 Tablet(s) Oral daily  mupirocin 2% Ointment 1 Application(s) Topical two times a day  pantoprazole  Injectable 40 milliGRAM(s) IV Push daily  polyethylene glycol 3350 17 Gram(s) Oral daily  sodium chloride 0.9%. 1000 milliLiter(s) (10 mL/Hr) IV Continuous <Continuous>      TELEMETRY: NSR- sinus tachycardia 	    ECG:  	  RADIOLOGY  : < from: Xray Chest 1 View- PORTABLE-Urgent (02.06.20 @ 04:39) >    FINDINGS:    Lines/Tubes: None    Heart and mediastinum:  Post sternotomy.    Lungs, pleura, and airways: Bibasilar consolidations, increased since prior.     Bones and soft tissues: The bones and soft tissues are unchanged.    Impression:    Bibasilar consolidations, increased since prior          < end of copied text >    DIAGNOSTIC TESTING:  [ ] Echocardiogram:  < from: Limited Transthoracic Echo (w/Cont) (02.04.20 @ 08:38) >  ------------------------------------------------------------------------  Observations:  Aortic Valve/Aorta:  Left Ventricle: Endocardial visualization enhanced with  intravenous injection of Ultrasonic Enhancing Agent  (Definity).  Normal left ventricular internal dimensions and wall  thickness.  Normal left ventricular systolic function. Probably normal  wall motion.  Right Heart: The right ventricle is only adequately  visualized in the parasternal long-axis images. In that  view, it appears grossly normal.  A catheter is seen in the RVOT.  Pericardium/Pleura: No pericardial effusion.  ------------------------------------------------------------------------  Conclusions:  Endocardial visualization enhanced with intravenous  injection of Ultrasonic Enhancing Agent (Definity).  Normal left ventricular systolic function. Probably normal  wall motion.  The right ventricle is only adequately visualized in the  parasternal long-axis images. In that view, it appears  grossly normal.  A catheter is seen in the RVOT.  No pericardial effusion.  ------------------------------------------------------------------------  Confirmed on  2/4/2020 - 10:02:15 by Bradford Chadwick MD, FASE  ------------------------------------------------------------------------    < end of copied text >    [ ]  Catheterization:  [ ] Stress Test:    OTHER: 	    LABS:	 	    Creatine Kinase, Serum: 648 U/L [30 - 200] (02-03 @ 17:22)  CKMB Units: 63.0 ng/mL [0.0 - 6.7] (02-03 @ 17:22)  Troponin T, High Sensitivity Result: 2148 ng/L [0 - 51] (02-03 @ 17:22)                          9.1    13.76 )-----------( 156      ( 06 Feb 2020 09:59 )             27.8     02-06    130<L>  |  98  |  50<H>  ----------------------------<  95  4.6   |  20<L>  |  2.55<H>    Ca    8.0<L>      06 Feb 2020 09:59  Phos  3.2     02-06  Mg     2.3     02-06    TPro  5.2<L>  /  Alb  2.4<L>  /  TBili  0.4  /  DBili  x   /  AST  174<H>  /  ALT  171<H>  /  AlkPhos  62  02-06

## 2020-02-06 NOTE — PROGRESS NOTE ADULT - PROBLEM SELECTOR PLAN 3
Cr. slightly elevated to 2.5. stable CKD in the setting of long standing HTN and DM  Avoid NSAIDs, ACEI/ARBS, RCA and nephrotoxins. Dose medications as per eGFR.

## 2020-02-06 NOTE — PROGRESS NOTE ADULT - SUBJECTIVE AND OBJECTIVE BOX
HPI:  65yo male resented to the ED with complaints of acute on chronic left sided exertional chest pain. Pt states he has been having left sided pressure and stabbing chest pain radiating to his sternum and right with activity for the past few months. He states each episode last approximately 1-2 mins and subsides upon resting. He denies associated symptoms of radiation to his back, arm or jaw. He recently was at a wedding which he could not enjoy because dancing would reproduce to the pain. He states he did not pursue and medical attention until this time. Pt states this time his pain was associated with sob up exertion. He denies symptoms of LOC, diaphoresis, palpitations, abd pain, N/V, fever or chills. He denies prior cardiac work up. (25 Jan 2020 12:57)  FRANKLIN PRESLEY  MRN#:  [id number]    The patient is a 64y [place] hx of HTN, DM II, CKD stage III, presented with unstable angina, found to have multivessel CAD and underwent C4L with patch angioplasty of 2/03. Patient had been recovering on the floor with generalized weakness and poor cough. He called the  who was seen, evaluated, & examined with the CTICU staff on rounds and later in the evening with a multidisciplinary care plan formulated & implemented.  All available clinical, laboratory, radiographic, pharmacologic, and electrocardiographic data were reviewed & analyzed.      The patient was in the CTICU in critical condition at risk for imminent decompensation secondary to persistent cardiopulmonary dysfunction, anticipated cardiogenic shock-cardiovascular dysfunction, hypovolemic shock, hemodynamically significant anemia due to acute blood loss, hemodynamically significant bradycardia, hyperlactatemia-acidosis, and stress hyperglycemia.      Respiratory status required full ventilatory support, close monitoring of respiratory rate and breathing pattern, the following of ABG’s with A-line monitoring, continuous pulse oximetry monitoring, an IV Dexmedetomidine infusion, an IV Propofol infusion for support & to evaluate for & prevent further decompensation secondary to persistent cardiopulmonary dysfunction and cardiogenic shock-cardiovascular dysfunction. Based upon my evaluation and review, I maintained the current therapy/I made appropriate ventilator changes/I extubated the patient.     Invasive hemodynamic monitoring with a central venous catheter & an A-line were required for the continuous central venous and MAP/BP monitoring to ensure adequate cardiovascular support and to evaluate for & help prevent decompensation while receiving intermittent volume expansion, external epicardial pacing, blood transfusions, blood product transfusions, an IV Levophed drip, an IV Vasopressin drip, an IV Epinephrine drip, an IV Dobutamine drip, an IV Primacor drip, an IV Amiodarone drip, and an IV Neosynephrine drip secondary to anticipated cardiogenic shock-cardiovascular dysfunction, hemodynamically significant anemia due to acute blood loss, hypovolemic shock, hyperlactatemia-acidosis, hemodynamically significant bradycardia, acute postoperative blood loss anemia, and acute on chronic postoperative kidney injury-failure. Based upon my evaluation and review, I maintained the current vasopressor/inotropic/antiarrhythmic/fluid therapy/I modified the vasopressor/inotropic/ antiarrythymic/fluid therapy/ I added the following vasopressor/inotropic/antiarrythmic/fluid therapy.    Metabolic stability, uncontrolled type 2 diabetes-hyperglycemia, & infection prophylaxis required an IV regular Insulin drip & the following of serial glucose levels to help achieve & maintain euglycemia. Based upon my evaluation and review, I maintained the current metabolic therapy.     Patient required acute postoperative critical care management and it at risk for life threatening decompensation. I provided 35 minutes of non-continuous care to the patient. FRANKLIN PRESLEY  MRN#:  [id number]    The patient is a 64y Male with PMH of HTN, DM II, CKD stage III, presented with unstable angina, found to have multivessel CAD and underwent C4L with patch angioplasty of his LAD 2/03. Patient had been recovering on the floor with generalized weakness and poor cough. He called the nurse this evening and told her he was having trouble breathing and subsequently became unresponsive and a code blue was called. Upon arrival, patient was in PEA with a heart rate of about 50 and dropped to 37 on the monitor. I supervised his CPR and after intubation, one amp of Epinephrine and Atropine, patient developed rapid atrial fibrillation and regained a pulse. He was treated with IV Amiodarone and returned to SR and subsequent bradycardia. An IV phenylephrine infusion was started and patient brought to CTU for further care.  All available clinical, laboratory, radiographic, pharmacologic, and electrocardiographic data were reviewed & analyzed.      The patient was in the CTICU in critical condition at risk for imminent decompensation secondary to acute hypoxic respiratory failure, vasogenic shock, s/p PEA, hemodynamically significant anemia,, hemodynamically significant bradycardia, hyperlactatemia-acidosis, stage II CKD and stress hyperglycemia.      Respiratory status required full ventilatory support, close monitoring of respiratory rate and breathing pattern, the following of ABG’s with A-line monitoring, continuous pulse oximetry monitoring, and an IV Diprivan infusion for support & to evaluate for & prevent further decompensation secondary to acute hypoxic respiratory failure and vasogenic shock. Based upon my evaluation and review, I increased the PEEP on the ventilator and ordered gradual tapering of the Diprivan in order to assess patient's mental status. Patient considered to possibly have a PE, but currently not sufficiently stable to travel to CT scan. Empiric IV Heparin and a D-dimer ordered.    Invasive hemodynamic monitoring with a central venous catheter & an A-line were placed after resuscitation and required for the continuous central venous and MAP/BP monitoring to ensure adequate cardiovascular support and to evaluate for & help prevent decompensation while receiving intermittent volume expansion and an IV Neosynephrine drip secondary to vasogenic shock s/p PEA arrest, hemodynamically significant anemia due to recent acute blood loss, hypovolemic shock, hyperlactatemia-acidosis, hemodynamically significant bradycardia, acute postoperative blood loss anemia, and chronic stage III kidney disease. Based upon my evaluation and review, I ordered packed red blood cells, discontinued patient's Lopressor     Metabolic stability, uncontrolled type 2 diabetes-hyperglycemia, & infection prophylaxis required an insulin sliding scale, Lantus, and Humalog & the following of serial glucose levels to help achieve & maintain euglycemia. Based upon my evaluation and review, I plan to start an insulin infusion due to his instability and recent hypoglycemia.    Patient required acute postoperative critical care management and it at risk for life threatening decompensation. I provided 35 minutes of non-continuous care to the patient. FRANKLIN PRESLEY  MRN#:  [id number]    The patient is a 64y Male with PMH of HTN, DM II, CKD stage III, presented with unstable angina, found to have multivessel CAD and underwent C4L with patch angioplasty of his LAD 2/03. Patient had been recovering on the floor with generalized weakness and poor cough. He called the nurse this evening and told her he was having trouble breathing and subsequently became unresponsive and a code blue was called. Upon arrival, patient was in PEA with a heart rate of about 50 and dropped to 37 on the monitor. I supervised his CPR and after intubation, one amp of Epinephrine and Atropine, patient developed rapid atrial fibrillation and regained a pulse. He was treated with IV Amiodarone and returned to SR and subsequent bradycardia. An IV phenylephrine infusion was started and patient being sent to CTU for further care.  All available clinical, laboratory, radiographic, pharmacologic, and electrocardiographic data were reviewed & analyzed.      The patient was in the CTICU in critical condition at risk for imminent decompensation secondary to acute hypoxic respiratory failure, vasogenic shock, s/p PEA, hemodynamically significant anemia,, hemodynamically significant bradycardia, hyperlactatemia-acidosis, stage II CKD and stress hyperglycemia.      Respiratory status required full ventilatory support, close monitoring of respiratory rate and breathing pattern, the following of ABG’s with A-line monitoring, continuous pulse oximetry monitoring, and an IV Diprivan infusion for support & to evaluate for & prevent further decompensation secondary to acute hypoxic respiratory failure and vasogenic shock. Based upon my evaluation and review, I plan to increase the PEEP on the ventilator and taper the Diprivan once stabilized in order to assess patient's mental status. Patient considered to possibly have a PE, but currently not sufficiently stable to travel to CT scan. Empiric IV Heparin and a D-dimer ordered.    Invasive hemodynamic monitoring with a central venous catheter & an A-line were placed after resuscitation and required for the continuous central venous and MAP/BP monitoring to ensure adequate cardiovascular support and to evaluate for & help prevent decompensation while receiving intermittent volume expansion and an IV Neosynephrine drip secondary to vasogenic shock s/p PEA arrest, hemodynamically significant anemia due to recent acute blood loss, hypovolemic shock, hyperlactatemia-acidosis, hemodynamically significant bradycardia, acute postoperative blood loss anemia, and chronic stage III kidney disease. Based upon my evaluation and review, I plan to order packed red blood cells and discontinue patient's Lopressor.    Metabolic stability, uncontrolled type 2 diabetes-hyperglycemia, & infection prophylaxis required an insulin sliding scale, Lantus, and Humalog & the following of serial glucose levels to help achieve & maintain euglycemia. Based upon my evaluation and review, I plan to start an insulin infusion due to his instability and recent hypoglycemia.    Patient required acute postoperative critical care management and it at risk for life threatening decompensation. I provided 30 minutes of non-continuous care to the patient.

## 2020-02-06 NOTE — PROGRESS NOTE ADULT - ASSESSMENT
65 y/o male with PMhx of HTN, DM II on insulin, CKD who presented to the ED with complaints of acute on chronic left sided exertional CP x few months which subsides upon resting. Pt had abnormal stress test and now s/p cardiac cath demonstrating multivessel CAD.  EF 50%, A1c 9.2  Hospital course:  2/3: C4L, Patch angioplasty of LAD. Patient received 500 FEIBA, 4 PRBC, 5 Cryo, 1 PLT, 2 FFP 1 V wire with skin ground   :  Extubated at 0130 Transferred to step down on Dobutamine at 2.68 m/k/m with a Left CT , Osorio, + pw to epm to off  Dispo: PT eval pending anticipate home    D/c  , d/c ct, d/c intro, d/c CT, d/c carlos.    VSS; Lasix 20mg IV x 1. Started on lasix 20mg PO starting 2/7 x 3 days. TOV passed-400. Cr. 2.5 today. Continue to trend.   D/C planning to home w/ PT when medically cleared.

## 2020-02-06 NOTE — PROGRESS NOTE ADULT - SUBJECTIVE AND OBJECTIVE BOX
Chief complaint  Patient is a 64y old  Male who presents with a chief complaint of Chest pain (06 Feb 2020 10:55)   Review of systems  Patient in bed, looks comfortable, no hypoglycemia.    Labs and Fingersticks  CAPILLARY BLOOD GLUCOSE      POCT Blood Glucose.: 87 mg/dL (06 Feb 2020 11:34)  POCT Blood Glucose.: 110 mg/dL (06 Feb 2020 07:44)  POCT Blood Glucose.: 107 mg/dL (05 Feb 2020 21:37)  POCT Blood Glucose.: 139 mg/dL (05 Feb 2020 18:01)  POCT Blood Glucose.: 144 mg/dL (05 Feb 2020 16:57)  POCT Blood Glucose.: 224 mg/dL (05 Feb 2020 12:32)      Anion Gap, Serum: 12 (02-06 @ 09:59)  Anion Gap, Serum: 15 (02-05 @ 04:31)      Calcium, Total Serum: 8.0 <L> (02-06 @ 09:59)  Calcium, Total Serum: 7.9 <L> (02-05 @ 04:31)  Albumin, Serum: 2.4 <L> (02-06 @ 09:59)    Alanine Aminotransferase (ALT/SGPT): 171 <H> (02-06 @ 09:59)  Alkaline Phosphatase, Serum: 62 (02-06 @ 09:59)  Aspartate Aminotransferase (AST/SGOT): 174 <H> (02-06 @ 09:59)        02-06    130<L>  |  98  |  50<H>  ----------------------------<  95  4.6   |  20<L>  |  2.55<H>    Ca    8.0<L>      06 Feb 2020 09:59  Phos  3.2     02-06  Mg     2.3     02-06    TPro  5.2<L>  /  Alb  2.4<L>  /  TBili  0.4  /  DBili  x   /  AST  174<H>  /  ALT  171<H>  /  AlkPhos  62  02-06                        9.1    13.76 )-----------( 156      ( 06 Feb 2020 09:59 )             27.8     Medications  MEDICATIONS  (STANDING):  artificial tears (preservative free) Ophthalmic Solution 1 Drop(s) Both EYES two times a day  aspirin enteric coated 81 milliGRAM(s) Oral daily  atorvastatin 40 milliGRAM(s) Oral at bedtime  chlorhexidine 2% Cloths 1 Application(s) Topical <User Schedule>  chlorhexidine 4% Liquid 1 Application(s) Topical <User Schedule>  dextrose 5%. 1000 milliLiter(s) (50 mL/Hr) IV Continuous <Continuous>  dextrose 50% Injectable 12.5 Gram(s) IV Push once  dextrose 50% Injectable 25 Gram(s) IV Push once  dextrose 50% Injectable 25 Gram(s) IV Push once  heparin  Injectable 5000 Unit(s) SubCutaneous every 8 hours  insulin glargine Injectable (LANTUS) 40 Unit(s) SubCutaneous at bedtime  insulin lispro (HumaLOG) corrective regimen sliding scale   SubCutaneous three times a day before meals  insulin lispro (HumaLOG) corrective regimen sliding scale   SubCutaneous at bedtime  insulin lispro Injectable (HumaLOG) 8 Unit(s) SubCutaneous three times a day before meals  lactated ringers Bolus 500 milliLiter(s) IV Bolus once  metoprolol tartrate 50 milliGRAM(s) Oral every 12 hours  multivitamin 1 Tablet(s) Oral daily  mupirocin 2% Ointment 1 Application(s) Topical two times a day  pantoprazole  Injectable 40 milliGRAM(s) IV Push daily  polyethylene glycol 3350 17 Gram(s) Oral daily  sodium chloride 0.9%. 1000 milliLiter(s) (10 mL/Hr) IV Continuous <Continuous>      Physical Exam  General: Patient comfortable in bed  Vital Signs Last 12 Hrs  T(F): 98.6 (02-06-20 @ 12:03), Max: 98.6 (02-06-20 @ 12:03)  HR: 96 (02-06-20 @ 12:03) (93 - 101)  BP: 124/74 (02-06-20 @ 12:03) (111/72 - 146/77)  BP(mean): 85 (02-06-20 @ 04:58) (85 - 100)  RR: 18 (02-06-20 @ 12:03) (18 - 20)  SpO2: 90% (02-06-20 @ 12:05) (85% - 96%)  Neck: No palpable thyroid nodules.  CVS: S1S2, No murmurs  Respiratory: No wheezing, no crepitations  GI: Abdomen soft, bowel sounds positive  Musculoskeletal:  edema lower extremities.   Skin: No skin rashes, no ecchymosis    Diagnostics Chief complaint  Patient is a 64y old  Male who presents with a chief complaint of Chest pain (06 Feb 2020 10:55)   Review of systems  Patient in bed, looks comfortable,  no hypoglycemia.    Labs and Fingersticks  CAPILLARY BLOOD GLUCOSE      POCT Blood Glucose.: 87 mg/dL (06 Feb 2020 11:34)  POCT Blood Glucose.: 110 mg/dL (06 Feb 2020 07:44)  POCT Blood Glucose.: 107 mg/dL (05 Feb 2020 21:37)  POCT Blood Glucose.: 139 mg/dL (05 Feb 2020 18:01)  POCT Blood Glucose.: 144 mg/dL (05 Feb 2020 16:57)  POCT Blood Glucose.: 224 mg/dL (05 Feb 2020 12:32)      Anion Gap, Serum: 12 (02-06 @ 09:59)  Anion Gap, Serum: 15 (02-05 @ 04:31)      Calcium, Total Serum: 8.0 <L> (02-06 @ 09:59)  Calcium, Total Serum: 7.9 <L> (02-05 @ 04:31)  Albumin, Serum: 2.4 <L> (02-06 @ 09:59)    Alanine Aminotransferase (ALT/SGPT): 171 <H> (02-06 @ 09:59)  Alkaline Phosphatase, Serum: 62 (02-06 @ 09:59)  Aspartate Aminotransferase (AST/SGOT): 174 <H> (02-06 @ 09:59)        02-06    130<L>  |  98  |  50<H>  ----------------------------<  95  4.6   |  20<L>  |  2.55<H>    Ca    8.0<L>      06 Feb 2020 09:59  Phos  3.2     02-06  Mg     2.3     02-06    TPro  5.2<L>  /  Alb  2.4<L>  /  TBili  0.4  /  DBili  x   /  AST  174<H>  /  ALT  171<H>  /  AlkPhos  62  02-06                        9.1    13.76 )-----------( 156      ( 06 Feb 2020 09:59 )             27.8     Medications  MEDICATIONS  (STANDING):  artificial tears (preservative free) Ophthalmic Solution 1 Drop(s) Both EYES two times a day  aspirin enteric coated 81 milliGRAM(s) Oral daily  atorvastatin 40 milliGRAM(s) Oral at bedtime  chlorhexidine 2% Cloths 1 Application(s) Topical <User Schedule>  chlorhexidine 4% Liquid 1 Application(s) Topical <User Schedule>  dextrose 5%. 1000 milliLiter(s) (50 mL/Hr) IV Continuous <Continuous>  dextrose 50% Injectable 12.5 Gram(s) IV Push once  dextrose 50% Injectable 25 Gram(s) IV Push once  dextrose 50% Injectable 25 Gram(s) IV Push once  heparin  Injectable 5000 Unit(s) SubCutaneous every 8 hours  insulin glargine Injectable (LANTUS) 40 Unit(s) SubCutaneous at bedtime  insulin lispro (HumaLOG) corrective regimen sliding scale   SubCutaneous three times a day before meals  insulin lispro (HumaLOG) corrective regimen sliding scale   SubCutaneous at bedtime  insulin lispro Injectable (HumaLOG) 8 Unit(s) SubCutaneous three times a day before meals  lactated ringers Bolus 500 milliLiter(s) IV Bolus once  metoprolol tartrate 50 milliGRAM(s) Oral every 12 hours  multivitamin 1 Tablet(s) Oral daily  mupirocin 2% Ointment 1 Application(s) Topical two times a day  pantoprazole  Injectable 40 milliGRAM(s) IV Push daily  polyethylene glycol 3350 17 Gram(s) Oral daily  sodium chloride 0.9%. 1000 milliLiter(s) (10 mL/Hr) IV Continuous <Continuous>      Physical Exam  General: Patient comfortable in bed  Vital Signs Last 12 Hrs  T(F): 98.6 (02-06-20 @ 12:03), Max: 98.6 (02-06-20 @ 12:03)  HR: 96 (02-06-20 @ 12:03) (93 - 101)  BP: 124/74 (02-06-20 @ 12:03) (111/72 - 146/77)  BP(mean): 85 (02-06-20 @ 04:58) (85 - 100)  RR: 18 (02-06-20 @ 12:03) (18 - 20)  SpO2: 90% (02-06-20 @ 12:05) (85% - 96%)  Neck: No palpable thyroid nodules.  CVS: S1S2, No murmurs  Respiratory: No wheezing, no crepitations  GI: Abdomen soft, bowel sounds positive  Musculoskeletal:  edema lower extremities.   Skin: No skin rashes, no ecchymosis    Diagnostics

## 2020-02-06 NOTE — AIRWAY PLACEMENT NOTE ADULT - POST AIRWAY PLACEMENT ASSESSMENT:
breath sounds bilateral/breath sounds equal/positive end tidal CO2 noted/chest excursion noted/skin color improved

## 2020-02-06 NOTE — PROGRESS NOTE ADULT - ASSESSMENT
Assessment  DMT2: 64y Male with DM T2 with hyperglycemia, A1C 9.2%, was on oral meds and insulin at home, postop on basal bolus insulin, increased dose yesterday, blood sugars improving, trending down, no hypoglycemic episodes. Patient is eating meals, appears comfortable, family at the bedside.  CAD: s/p CABG 2/3, on medications, no chest pain, stable, monitored.  HTN: Controlled,  on antihypertensive medications.  HLD: Controlled, on statin.  CKD: Monitor labs/BMP          Harper Matias MD  Cell: [phone]  Office: [phone] Assessment  DMT2: 64y Male with DM T2 with hyperglycemia, A1C 9.2%, was on  oral meds and insulin at home, postop on basal bolus insulin, increased dose yesterday, blood sugars improving, trending down, no hypoglycemic episodes. Patient is eating meals, appears comfortable, family at the bedside.  CAD: s/p CABG 2/3, on medications, no chest pain, stable, monitored.  HTN: Controlled,  on antihypertensive medications.  HLD: Controlled, on statin.  CKD: Monitor labs/BMP          Harper Matias MD  Cell: [phone]  Office: [phone]

## 2020-02-06 NOTE — PROGRESS NOTE ADULT - PROBLEM SELECTOR PLAN 1
Pt with  CKD likely  in the setting of long standing DM and HTN.  No prior labs for review. Scr since admission has ranged between 1.8-2 mg/dl. This AM Scr was elevated but likely plateaued at 2.55 secondary to hemodynamic injury after CABG ( POD# 3) and relatively lower BP. Pt. is non-oliguric. UA significant for protein and RBCs. Urine electrolytes consistent with intrinsic disease. Urine Pr/Cr ratio in nephrotic range. HepBsAg, HepC, C3, C4, SIFE, RACHEL, P-ANCA, C-ANCA, Anti-GBM ab, Parvovirus, RPR were negative/non-reactive. Monitor labs and urine output. Avoid NSAIDs, ACEI/ARBS, RCA and nephrotoxins. Dose medications as per eGFR.    Kevin Correa  Nephrology Fellow  Cell: [phone] (from 8 am to 5 pm)  (After 5 pm or on weekends please page on-call fellow)

## 2020-02-06 NOTE — PROGRESS NOTE ADULT - ATTENDING COMMENTS
CKD with superimposed STEPHANIE: likely hemodynamic injury in the setting of relatively lower BP/Surgery  s/p CABG POD 3  Creatinine appears to have plateaued but now hyponatremic  Complains of not urinating since earlier today. Need to check bladder scan to r/o retention    Roya Horta MD  O: [phone]  C: [phone]

## 2020-02-06 NOTE — PROGRESS NOTE ADULT - ATTENDING COMMENTS
Will decrease Lantus to 40u at bedtime.  Will decrease Humalog to 8u before each meal and continue Humalog correction scale coverage. Will continue monitoring FS and FU.

## 2020-02-06 NOTE — PROGRESS NOTE ADULT - PROBLEM SELECTOR PLAN 1
Will decrease Lantus to 40u at bedtime.  Will decrease Humalog to 8u before each meal and continue Humalog correction scale coverage. Will continue monitoring FS and FU.  Patient was on insulin at home; Suggest to DC on current insulin regimen and FU endo 2 weeks.  Patient counseled for compliance with consistent low carb diet and exercise as tolerated outpatient. Patient was on insulin at home; Suggest to DC on current insulin regimen and FU endo 2 weeks.  Patient counseled for compliance with consistent low carb diet and exercise as tolerated outpatient.

## 2020-02-06 NOTE — PROGRESS NOTE ADULT - PROBLEM SELECTOR PLAN 1
+pacing wires isolated  follow up AM labs: Keep K>4.0  Mg>2.0	  progressive ambulation  pulmonary toileting/incentive spirometry  pain management  restart appropriate home medications  Beta blocker, asa, statin

## 2020-02-07 DIAGNOSIS — E87.1 HYPO-OSMOLALITY AND HYPONATREMIA: ICD-10-CM

## 2020-02-07 LAB
ALBUMIN SERPL ELPH-MCNC: 2.2 G/DL — LOW (ref 3.3–5)
ALBUMIN SERPL ELPH-MCNC: 2.3 G/DL — LOW (ref 3.3–5)
ALP SERPL-CCNC: 177 U/L — HIGH (ref 40–120)
ALP SERPL-CCNC: 179 U/L — HIGH (ref 40–120)
ALT FLD-CCNC: 265 U/L — HIGH (ref 10–45)
ALT FLD-CCNC: 314 U/L — HIGH (ref 10–45)
ANION GAP SERPL CALC-SCNC: 15 MMOL/L — SIGNIFICANT CHANGE UP (ref 5–17)
ANION GAP SERPL CALC-SCNC: 17 MMOL/L — SIGNIFICANT CHANGE UP (ref 5–17)
APTT BLD: 30.9 SEC — SIGNIFICANT CHANGE UP (ref 27.5–36.3)
APTT BLD: 86.3 SEC — HIGH (ref 27.5–36.3)
AST SERPL-CCNC: 288 U/L — HIGH (ref 10–40)
AST SERPL-CCNC: 357 U/L — HIGH (ref 10–40)
BASOPHILS # BLD AUTO: 0.1 K/UL — SIGNIFICANT CHANGE UP (ref 0–0.2)
BASOPHILS NFR BLD AUTO: 0.8 % — SIGNIFICANT CHANGE UP (ref 0–2)
BILIRUB SERPL-MCNC: 0.7 MG/DL — SIGNIFICANT CHANGE UP (ref 0.2–1.2)
BILIRUB SERPL-MCNC: 0.7 MG/DL — SIGNIFICANT CHANGE UP (ref 0.2–1.2)
BLD GP AB SCN SERPL QL: NEGATIVE — SIGNIFICANT CHANGE UP
BUN SERPL-MCNC: 52 MG/DL — HIGH (ref 7–23)
BUN SERPL-MCNC: 53 MG/DL — HIGH (ref 7–23)
CALCIUM SERPL-MCNC: 7.3 MG/DL — LOW (ref 8.4–10.5)
CALCIUM SERPL-MCNC: 7.9 MG/DL — LOW (ref 8.4–10.5)
CHLORIDE SERPL-SCNC: 94 MMOL/L — LOW (ref 96–108)
CHLORIDE SERPL-SCNC: 94 MMOL/L — LOW (ref 96–108)
CO2 SERPL-SCNC: 16 MMOL/L — LOW (ref 22–31)
CO2 SERPL-SCNC: 19 MMOL/L — LOW (ref 22–31)
CREAT SERPL-MCNC: 2.53 MG/DL — HIGH (ref 0.5–1.3)
CREAT SERPL-MCNC: 2.58 MG/DL — HIGH (ref 0.5–1.3)
D DIMER BLD IA.RAPID-MCNC: 544 NG/ML DDU — HIGH
EOSINOPHIL # BLD AUTO: 0.22 K/UL — SIGNIFICANT CHANGE UP (ref 0–0.5)
EOSINOPHIL NFR BLD AUTO: 1.7 % — SIGNIFICANT CHANGE UP (ref 0–6)
GAS PNL BLDA: SIGNIFICANT CHANGE UP
GLUCOSE BLDC GLUCOMTR-MCNC: 104 MG/DL — HIGH (ref 70–99)
GLUCOSE BLDC GLUCOMTR-MCNC: 105 MG/DL — HIGH (ref 70–99)
GLUCOSE BLDC GLUCOMTR-MCNC: 115 MG/DL — HIGH (ref 70–99)
GLUCOSE BLDC GLUCOMTR-MCNC: 133 MG/DL — HIGH (ref 70–99)
GLUCOSE BLDC GLUCOMTR-MCNC: 135 MG/DL — HIGH (ref 70–99)
GLUCOSE BLDC GLUCOMTR-MCNC: 141 MG/DL — HIGH (ref 70–99)
GLUCOSE BLDC GLUCOMTR-MCNC: 84 MG/DL — SIGNIFICANT CHANGE UP (ref 70–99)
GLUCOSE BLDC GLUCOMTR-MCNC: 86 MG/DL — SIGNIFICANT CHANGE UP (ref 70–99)
GLUCOSE BLDC GLUCOMTR-MCNC: 95 MG/DL — SIGNIFICANT CHANGE UP (ref 70–99)
GLUCOSE BLDC GLUCOMTR-MCNC: 95 MG/DL — SIGNIFICANT CHANGE UP (ref 70–99)
GLUCOSE SERPL-MCNC: 165 MG/DL — HIGH (ref 70–99)
GLUCOSE SERPL-MCNC: 247 MG/DL — HIGH (ref 70–99)
HCT VFR BLD CALC: 22.3 % — LOW (ref 39–50)
HCT VFR BLD CALC: 26.8 % — LOW (ref 39–50)
HCT VFR BLD CALC: 27.2 % — LOW (ref 39–50)
HGB BLD-MCNC: 7.4 G/DL — LOW (ref 13–17)
HGB BLD-MCNC: 8.7 G/DL — LOW (ref 13–17)
HGB BLD-MCNC: 9 G/DL — LOW (ref 13–17)
INR BLD: 1.24 RATIO — HIGH (ref 0.88–1.16)
LYMPHOCYTES # BLD AUTO: 1.51 K/UL — SIGNIFICANT CHANGE UP (ref 1–3.3)
LYMPHOCYTES # BLD AUTO: 11.9 % — LOW (ref 13–44)
MAGNESIUM SERPL-MCNC: 2.2 MG/DL — SIGNIFICANT CHANGE UP (ref 1.6–2.6)
MANUAL SMEAR VERIFICATION: SIGNIFICANT CHANGE UP
MCHC RBC-ENTMCNC: 28.1 PG — SIGNIFICANT CHANGE UP (ref 27–34)
MCHC RBC-ENTMCNC: 28.4 PG — SIGNIFICANT CHANGE UP (ref 27–34)
MCHC RBC-ENTMCNC: 28.5 PG — SIGNIFICANT CHANGE UP (ref 27–34)
MCHC RBC-ENTMCNC: 32.5 GM/DL — SIGNIFICANT CHANGE UP (ref 32–36)
MCHC RBC-ENTMCNC: 33.1 GM/DL — SIGNIFICANT CHANGE UP (ref 32–36)
MCHC RBC-ENTMCNC: 33.2 GM/DL — SIGNIFICANT CHANGE UP (ref 32–36)
MCV RBC AUTO: 85.4 FL — SIGNIFICANT CHANGE UP (ref 80–100)
MCV RBC AUTO: 86.1 FL — SIGNIFICANT CHANGE UP (ref 80–100)
MCV RBC AUTO: 86.5 FL — SIGNIFICANT CHANGE UP (ref 80–100)
MONOCYTES # BLD AUTO: 0.65 K/UL — SIGNIFICANT CHANGE UP (ref 0–0.9)
MONOCYTES NFR BLD AUTO: 5.1 % — SIGNIFICANT CHANGE UP (ref 2–14)
NEUTROPHILS # BLD AUTO: 9.88 K/UL — HIGH (ref 1.8–7.4)
NEUTROPHILS NFR BLD AUTO: 78 % — HIGH (ref 43–77)
NRBC # BLD: 0 /100 WBCS — SIGNIFICANT CHANGE UP (ref 0–0)
NRBC # BLD: 0 /100 WBCS — SIGNIFICANT CHANGE UP (ref 0–0)
NRBC # BLD: 1 /100 — HIGH (ref 0–0)
PHOSPHATE SERPL-MCNC: 3.7 MG/DL — SIGNIFICANT CHANGE UP (ref 2.5–4.5)
PLAT MORPH BLD: NORMAL — SIGNIFICANT CHANGE UP
PLATELET # BLD AUTO: 157 K/UL — SIGNIFICANT CHANGE UP (ref 150–400)
PLATELET # BLD AUTO: 163 K/UL — SIGNIFICANT CHANGE UP (ref 150–400)
PLATELET # BLD AUTO: 165 K/UL — SIGNIFICANT CHANGE UP (ref 150–400)
POLYCHROMASIA BLD QL SMEAR: SLIGHT — SIGNIFICANT CHANGE UP
POTASSIUM SERPL-MCNC: 4.1 MMOL/L — SIGNIFICANT CHANGE UP (ref 3.5–5.3)
POTASSIUM SERPL-MCNC: 4.3 MMOL/L — SIGNIFICANT CHANGE UP (ref 3.5–5.3)
POTASSIUM SERPL-SCNC: 4.1 MMOL/L — SIGNIFICANT CHANGE UP (ref 3.5–5.3)
POTASSIUM SERPL-SCNC: 4.3 MMOL/L — SIGNIFICANT CHANGE UP (ref 3.5–5.3)
PROT SERPL-MCNC: 4.8 G/DL — LOW (ref 6–8.3)
PROT SERPL-MCNC: 5.1 G/DL — LOW (ref 6–8.3)
PROTHROM AB SERPL-ACNC: 14.3 SEC — HIGH (ref 10–12.9)
RBC # BLD: 2.61 M/UL — LOW (ref 4.2–5.8)
RBC # BLD: 3.1 M/UL — LOW (ref 4.2–5.8)
RBC # BLD: 3.16 M/UL — LOW (ref 4.2–5.8)
RBC # FLD: 13.4 % — SIGNIFICANT CHANGE UP (ref 10.3–14.5)
RBC # FLD: 13.5 % — SIGNIFICANT CHANGE UP (ref 10.3–14.5)
RBC # FLD: 13.7 % — SIGNIFICANT CHANGE UP (ref 10.3–14.5)
RBC BLD AUTO: SIGNIFICANT CHANGE UP
RH IG SCN BLD-IMP: POSITIVE — SIGNIFICANT CHANGE UP
SODIUM SERPL-SCNC: 127 MMOL/L — LOW (ref 135–145)
SODIUM SERPL-SCNC: 128 MMOL/L — LOW (ref 135–145)
VARIANT LYMPHS # BLD: 2.5 % — SIGNIFICANT CHANGE UP (ref 0–6)
WBC # BLD: 11.76 K/UL — HIGH (ref 3.8–10.5)
WBC # BLD: 11.95 K/UL — HIGH (ref 3.8–10.5)
WBC # BLD: 12.67 K/UL — HIGH (ref 3.8–10.5)
WBC # FLD AUTO: 11.76 K/UL — HIGH (ref 3.8–10.5)
WBC # FLD AUTO: 11.95 K/UL — HIGH (ref 3.8–10.5)
WBC # FLD AUTO: 12.67 K/UL — HIGH (ref 3.8–10.5)

## 2020-02-07 PROCEDURE — 93970 EXTREMITY STUDY: CPT | Mod: 26

## 2020-02-07 PROCEDURE — 99232 SBSQ HOSP IP/OBS MODERATE 35: CPT | Mod: GC

## 2020-02-07 PROCEDURE — 99291 CRITICAL CARE FIRST HOUR: CPT

## 2020-02-07 PROCEDURE — 71045 X-RAY EXAM CHEST 1 VIEW: CPT | Mod: 26,77

## 2020-02-07 PROCEDURE — 93306 TTE W/DOPPLER COMPLETE: CPT | Mod: 26

## 2020-02-07 PROCEDURE — 36620 INSERTION CATHETER ARTERY: CPT | Mod: 78

## 2020-02-07 PROCEDURE — 32557 INSERT CATH PLEURA W/ IMAGE: CPT | Mod: 78

## 2020-02-07 PROCEDURE — 71045 X-RAY EXAM CHEST 1 VIEW: CPT | Mod: 26,76

## 2020-02-07 PROCEDURE — 36556 INSERT NON-TUNNEL CV CATH: CPT | Mod: 78

## 2020-02-07 PROCEDURE — 93010 ELECTROCARDIOGRAM REPORT: CPT | Mod: 76

## 2020-02-07 RX ORDER — PHENYLEPHRINE HYDROCHLORIDE 10 MG/ML
0.82 INJECTION INTRAVENOUS
Qty: 40 | Refills: 0 | Status: DISCONTINUED | OUTPATIENT
Start: 2020-02-07 | End: 2020-02-07

## 2020-02-07 RX ORDER — PROPOFOL 10 MG/ML
50 INJECTION, EMULSION INTRAVENOUS
Qty: 500 | Refills: 0 | Status: DISCONTINUED | OUTPATIENT
Start: 2020-02-07 | End: 2020-02-07

## 2020-02-07 RX ORDER — CHLORHEXIDINE GLUCONATE 213 G/1000ML
15 SOLUTION TOPICAL EVERY 12 HOURS
Refills: 0 | Status: DISCONTINUED | OUTPATIENT
Start: 2020-02-07 | End: 2020-02-08

## 2020-02-07 RX ORDER — SODIUM CHLORIDE 9 MG/ML
1000 INJECTION, SOLUTION INTRAVENOUS
Refills: 0 | Status: DISCONTINUED | OUTPATIENT
Start: 2020-02-07 | End: 2020-02-10

## 2020-02-07 RX ORDER — FUROSEMIDE 40 MG
20 TABLET ORAL ONCE
Refills: 0 | Status: COMPLETED | OUTPATIENT
Start: 2020-02-07 | End: 2020-02-07

## 2020-02-07 RX ORDER — DEXMEDETOMIDINE HYDROCHLORIDE IN 0.9% SODIUM CHLORIDE 4 UG/ML
0.3 INJECTION INTRAVENOUS
Qty: 200 | Refills: 0 | Status: DISCONTINUED | OUTPATIENT
Start: 2020-02-07 | End: 2020-02-08

## 2020-02-07 RX ORDER — ASPIRIN/CALCIUM CARB/MAGNESIUM 324 MG
81 TABLET ORAL DAILY
Refills: 0 | Status: DISCONTINUED | OUTPATIENT
Start: 2020-02-07 | End: 2020-02-15

## 2020-02-07 RX ORDER — INSULIN HUMAN 100 [IU]/ML
4 INJECTION, SOLUTION SUBCUTANEOUS
Qty: 100 | Refills: 0 | Status: DISCONTINUED | OUTPATIENT
Start: 2020-02-07 | End: 2020-02-13

## 2020-02-07 RX ORDER — POTASSIUM CHLORIDE 20 MEQ
10 PACKET (EA) ORAL ONCE
Refills: 0 | Status: COMPLETED | OUTPATIENT
Start: 2020-02-07 | End: 2020-02-07

## 2020-02-07 RX ORDER — CHLORHEXIDINE GLUCONATE 213 G/1000ML
1 SOLUTION TOPICAL
Refills: 0 | Status: DISCONTINUED | OUTPATIENT
Start: 2020-02-07 | End: 2020-02-07

## 2020-02-07 RX ORDER — HEPARIN SODIUM 5000 [USP'U]/ML
1000 INJECTION INTRAVENOUS; SUBCUTANEOUS
Qty: 25000 | Refills: 0 | Status: DISCONTINUED | OUTPATIENT
Start: 2020-02-07 | End: 2020-02-14

## 2020-02-07 RX ADMIN — DEXMEDETOMIDINE HYDROCHLORIDE IN 0.9% SODIUM CHLORIDE 6.08 MICROGRAM(S)/KG/HR: 4 INJECTION INTRAVENOUS at 20:00

## 2020-02-07 RX ADMIN — CHLORHEXIDINE GLUCONATE 1 APPLICATION(S): 213 SOLUTION TOPICAL at 07:15

## 2020-02-07 RX ADMIN — CHLORHEXIDINE GLUCONATE 15 MILLILITER(S): 213 SOLUTION TOPICAL at 07:16

## 2020-02-07 RX ADMIN — Medication 50 MILLIEQUIVALENT(S): at 07:15

## 2020-02-07 RX ADMIN — Medication 81 MILLIGRAM(S): at 14:27

## 2020-02-07 RX ADMIN — CHLORHEXIDINE GLUCONATE 15 MILLILITER(S): 213 SOLUTION TOPICAL at 17:22

## 2020-02-07 RX ADMIN — PANTOPRAZOLE SODIUM 40 MILLIGRAM(S): 20 TABLET, DELAYED RELEASE ORAL at 13:29

## 2020-02-07 RX ADMIN — Medication 1 DROP(S): at 17:22

## 2020-02-07 RX ADMIN — MUPIROCIN 1 APPLICATION(S): 20 OINTMENT TOPICAL at 17:22

## 2020-02-07 RX ADMIN — Medication 20 MILLIGRAM(S): at 17:40

## 2020-02-07 NOTE — PROGRESS NOTE ADULT - SUBJECTIVE AND OBJECTIVE BOX
CRITICAL CARE ATTENDING - CTICU    MEDICATIONS  (STANDING):  artificial tears (preservative free) Ophthalmic Solution 1 Drop(s) Both EYES two times a day  aspirin enteric coated 81 milliGRAM(s) Oral daily  atorvastatin 40 milliGRAM(s) Oral at bedtime  chlorhexidine 0.12% Liquid 15 milliLiter(s) Oral Mucosa every 12 hours  chlorhexidine 2% Cloths 1 Application(s) Topical <User Schedule>  chlorhexidine 4% Liquid 1 Application(s) Topical <User Schedule>  dextrose 5%. 1000 milliLiter(s) (50 mL/Hr) IV Continuous <Continuous>  dextrose 50% Injectable 12.5 Gram(s) IV Push once  dextrose 50% Injectable 25 Gram(s) IV Push once  dextrose 50% Injectable 25 Gram(s) IV Push once  heparin  Infusion 1000 Unit(s)/Hr (10 mL/Hr) IV Continuous <Continuous>  insulin glargine Injectable (LANTUS) 40 Unit(s) SubCutaneous at bedtime  insulin regular Infusion 4 Unit(s)/Hr (4 mL/Hr) IV Continuous <Continuous>  lactated ringers Bolus 500 milliLiter(s) IV Bolus once  multivitamin 1 Tablet(s) Oral daily  mupirocin 2% Ointment 1 Application(s) Topical two times a day  pantoprazole  Injectable 40 milliGRAM(s) IV Push daily  phenylephrine    Infusion 0.822 MICROgram(s)/kG/Min (25 mL/Hr) IV Continuous <Continuous>  polyethylene glycol 3350 17 Gram(s) Oral daily  potassium chloride  10 mEq/50 mL IVPB 10 milliEquivalent(s) IV Intermittent once  propofol Infusion 50 MICROgram(s)/kG/Min (24.33 mL/Hr) IV Continuous <Continuous>  sodium chloride 0.9%. 1000 milliLiter(s) (10 mL/Hr) IV Continuous <Continuous>  tamsulosin 0.4 milliGRAM(s) Oral at bedtime                                    8.7    12.67 )-----------( 163      ( 2020 05:16 )             26.8       02-07    128<L>  |  94<L>  |  52<H>  ----------------------------<  165<H>  4.3   |  19<L>  |  2.53<H>    Ca    7.9<L>      2020 02:06  Phos  3.7     02-07  Mg     2.2     02-07    TPro  5.1<L>  /  Alb  2.2<L>  /  TBili  0.7  /  DBili  x   /  AST  357<H>  /  ALT  314<H>  /  AlkPhos  179<H>  02-      PT/INR - ( 2020 02:06 )   PT: 14.3 sec;   INR: 1.24 ratio         PTT - ( 2020 02:06 )  PTT:30.9 sec    Mode: AC/ CMV (Assist Control/ Continuous Mandatory Ventilation)  RR (machine): 12  TV (machine): 550  FiO2: 80  PEEP: 10  ITime: 1  MAP: 12  PIP: 23      Daily     Daily Weight in k (2020 01:00)      02- @ 07:01  -  02- @ 07:00  --------------------------------------------------------  IN: 1138.6 mL / OUT: 2100 mL / NET: -961.4 mL        Critically Ill patient  : [ ] preoperative ,   [ x] post operative    Requires :  [x ] Arterial Line   [x ] Central Line  [x ] PA catheter  [ ] IABP  [ ] ECMO  [ ] LVAD  [ x] Ventilator  [ ] pacemaker [ ] Impella.                      [ x] ABG's     [x ] Pulse Oxymetry Monitoring  Bedside evaluation , monitoring , treatment of hemodynamics , fluids , IVP/ IVCD meds.        Diagnosis:     POD 2 - C4L, patch angioplasty of LAD     Hypotension    Hypovolemia    [place] [facility] catheter interpretation and therapeutic management of unstable hemodynamics     CHF- acute [ x]   chronic [ ]    systolic [ x]   diatolic [ ]   Post Op           - Echo- EF -   ?          [ ] RV dysfunction          - Cxr-cardiomegally, edema          - Clinical-  [ ]inotropes   [ ]pressors   [ ]diuresis   [ ]IABP   [ ]ECMO   [ ]LVAD   [ ]Respiratory Failure   -     hx of CAD     CKD     Renal Failure - Acute Kidney Injury     Chest tube drainage    Requires chest PT, pulmonary toilet,  suctioning to maintain SaO2,  patent airway and treat atelectasis.     Hemodynamic lability,  instability. Requires IVCD [ ] vasopressors [ ] inotropes  [ ] vasodilator  [ x]IVSS fluid  to maintain MAP, perfusion, C.I.     Ventilator Management:  [ x]AC-rest    [ ]CPAP-PS Wean    [ ]Trach Collar     [ ]Extubate    [ ] T-Piece  [ ]peep>5     IVCD anticoagulation with [x ] Heparin  [ ] Argatroban for     [place] [facility] catheter interpretation and therapeutic management of unstable hemodynamics     DM type 2 - IVCD insulin     Requires bedside physical therapy, mobilization and total senior living care.     Hyponatremia           By signing my name below, I, Ciara Coronado, attest that this documentation has been prepared under the direction and in the presence of Matt Key MD.   Electronically Signed: Phill Castaneda. 20 @ 07:02    Discussed with CT surgeon, Physician's Assistant - Nurse Practitioner- Critical care medicine team.   Dicussed at  AM / PM rounds.   Chart, labs , films reviewed.    Total Time: CRITICAL CARE ATTENDING - CTICU    MEDICATIONS  (STANDING):  artificial tears (preservative free) Ophthalmic Solution 1 Drop(s) Both EYES two times a day  aspirin enteric coated 81 milliGRAM(s) Oral daily  atorvastatin 40 milliGRAM(s) Oral at bedtime  chlorhexidine 0.12% Liquid 15 milliLiter(s) Oral Mucosa every 12 hours  chlorhexidine 2% Cloths 1 Application(s) Topical <User Schedule>  chlorhexidine 4% Liquid 1 Application(s) Topical <User Schedule>  dextrose 5%. 1000 milliLiter(s) (50 mL/Hr) IV Continuous <Continuous>  dextrose 50% Injectable 12.5 Gram(s) IV Push once  dextrose 50% Injectable 25 Gram(s) IV Push once  dextrose 50% Injectable 25 Gram(s) IV Push once  heparin  Infusion 1000 Unit(s)/Hr (10 mL/Hr) IV Continuous <Continuous>  insulin glargine Injectable (LANTUS) 40 Unit(s) SubCutaneous at bedtime  insulin regular Infusion 4 Unit(s)/Hr (4 mL/Hr) IV Continuous <Continuous>  lactated ringers Bolus 500 milliLiter(s) IV Bolus once  multivitamin 1 Tablet(s) Oral daily  mupirocin 2% Ointment 1 Application(s) Topical two times a day  pantoprazole  Injectable 40 milliGRAM(s) IV Push daily  phenylephrine    Infusion 0.822 MICROgram(s)/kG/Min (25 mL/Hr) IV Continuous <Continuous>  polyethylene glycol 3350 17 Gram(s) Oral daily  potassium chloride  10 mEq/50 mL IVPB 10 milliEquivalent(s) IV Intermittent once  propofol Infusion 50 MICROgram(s)/kG/Min (24.33 mL/Hr) IV Continuous <Continuous>  sodium chloride 0.9%. 1000 milliLiter(s) (10 mL/Hr) IV Continuous <Continuous>  tamsulosin 0.4 milliGRAM(s) Oral at bedtime                                    8.7    12.67 )-----------( 163      ( 2020 05:16 )             26.8       02-07    128<L>  |  94<L>  |  52<H>  ----------------------------<  165<H>  4.3   |  19<L>  |  2.53<H>    Ca    7.9<L>      2020 02:06  Phos  3.7     02-07  Mg     2.2     02-07    TPro  5.1<L>  /  Alb  2.2<L>  /  TBili  0.7  /  DBili  x   /  AST  357<H>  /  ALT  314<H>  /  AlkPhos  179<H>  02-07      PT/INR - ( 2020 02:06 )   PT: 14.3 sec;   INR: 1.24 ratio         PTT - ( 2020 02:06 )  PTT:30.9 sec    Mode: AC/ CMV (Assist Control/ Continuous Mandatory Ventilation)  RR (machine): 12  TV (machine): 550  FiO2: 80  PEEP: 10  ITime: 1  MAP: 12  PIP: 23      Daily     Daily Weight in k (2020 01:00)      02- @ 07:01  -  02- @ 07:00  --------------------------------------------------------  IN: 1138.6 mL / OUT: 2100 mL / NET: -961.4 mL        Critically Ill patient  : [ ] preoperative ,   [ x] post operative    Requires :  [x ] Arterial Line   [x ] Central Line  [x ] PA catheter  [ ] IABP  [ ] ECMO  [ ] LVAD  [ x] Ventilator  [ ] pacemaker [ ] Impella.                      [ x] ABG's     [x ] Pulse Oxymetry Monitoring  Bedside evaluation , monitoring , treatment of hemodynamics , fluids , IVP/ IVCD meds.        Diagnosis:     POD 4 - C4L, patch angioplasty of LAD     hx of CAD     CKD     PEA arrest     [place] [facility] catheter interpretation and therapeutic management of unstable hemodynamics     IVCD anticoagulation with [ x] Heparin  [ ] Argatroban for     CHF- acute [ x]   chronic [ ]    systolic [ x]   diatolic [ ]   Post Op           - Echo- EF -   ?          [ ] RV dysfunction          - Cxr-cardiomegally, edema          - Clinical-  [ ]inotropes   [ ]pressors   [ ]diuresis   [ ]IABP   [ ]ECMO   [ ]LVAD   [ ]Respiratory Failure   -     Ventilator Management:  [x ]AC-rest    [ ]CPAP-PS Wean    [ ]Trach Collar     [ ]Extubate    [ ] T-Piece  [ ]peep>5     Requires chest PT, pulmonary toilet,  suctioning to maintain SaO2,  patent airway and treat atelectasis.     Hemodynamic lability,  instability. Requires IVCD [ ] vasopressors [ ] inotropes  [ ] vasodilator  [ x]IVSS fluid  to maintain MAP, perfusion, C.I.     DM type 2 - IVCD insulin     Hyponatremia           By signing my name below, I, Ciara Coronado, attest that this documentation has been prepared under the direction and in the presence of Matt Key MD.   Electronically Signed: Phill Castaneda. 20 @ 07:02    Discussed with CT surgeon, Physician's Assistant - Nurse Practitioner- Critical care medicine team.   Dicussed at  AM / PM rounds.   Chart, labs , films reviewed.    Total Time: CRITICAL CARE ATTENDING - CTICU    MEDICATIONS  (STANDING):  artificial tears (preservative free) Ophthalmic Solution 1 Drop(s) Both EYES two times a day  aspirin enteric coated 81 milliGRAM(s) Oral daily  atorvastatin 40 milliGRAM(s) Oral at bedtime  chlorhexidine 0.12% Liquid 15 milliLiter(s) Oral Mucosa every 12 hours  chlorhexidine 2% Cloths 1 Application(s) Topical <User Schedule>  chlorhexidine 4% Liquid 1 Application(s) Topical <User Schedule>  dextrose 5%. 1000 milliLiter(s) (50 mL/Hr) IV Continuous <Continuous>  dextrose 50% Injectable 12.5 Gram(s) IV Push once  dextrose 50% Injectable 25 Gram(s) IV Push once  dextrose 50% Injectable 25 Gram(s) IV Push once  heparin  Infusion 1000 Unit(s)/Hr (10 mL/Hr) IV Continuous <Continuous>  insulin glargine Injectable (LANTUS) 40 Unit(s) SubCutaneous at bedtime  insulin regular Infusion 4 Unit(s)/Hr (4 mL/Hr) IV Continuous <Continuous>  lactated ringers Bolus 500 milliLiter(s) IV Bolus once  multivitamin 1 Tablet(s) Oral daily  mupirocin 2% Ointment 1 Application(s) Topical two times a day  pantoprazole  Injectable 40 milliGRAM(s) IV Push daily  phenylephrine    Infusion 0.822 MICROgram(s)/kG/Min (25 mL/Hr) IV Continuous <Continuous>  polyethylene glycol 3350 17 Gram(s) Oral daily  potassium chloride  10 mEq/50 mL IVPB 10 milliEquivalent(s) IV Intermittent once  propofol Infusion 50 MICROgram(s)/kG/Min (24.33 mL/Hr) IV Continuous <Continuous>  sodium chloride 0.9%. 1000 milliLiter(s) (10 mL/Hr) IV Continuous <Continuous>  tamsulosin 0.4 milliGRAM(s) Oral at bedtime                                    8.7    12.67 )-----------( 163      ( 2020 05:16 )             26.8       02-07    128<L>  |  94<L>  |  52<H>  ----------------------------<  165<H>  4.3   |  19<L>  |  2.53<H>    Ca    7.9<L>      2020 02:06  Phos  3.7     02-07  Mg     2.2     02-07    TPro  5.1<L>  /  Alb  2.2<L>  /  TBili  0.7  /  DBili  x   /  AST  357<H>  /  ALT  314<H>  /  AlkPhos  179<H>  02-07      PT/INR - ( 2020 02:06 )   PT: 14.3 sec;   INR: 1.24 ratio         PTT - ( 2020 02:06 )  PTT:30.9 sec    Mode: AC/ CMV (Assist Control/ Continuous Mandatory Ventilation)  RR (machine): 12  TV (machine): 550  FiO2: 80  PEEP: 10  ITime: 1  MAP: 12  PIP: 23      Daily     Daily Weight in k (2020 01:00)      02- @ 07:01  -  - @ 07:00  --------------------------------------------------------  IN: 1138.6 mL / OUT: 2100 mL / NET: -961.4 mL        Critically Ill patient  : [ ] preoperative ,   [ x] post operative    Requires :  [x ] Arterial Line   [x ] Central Line  [x ] PA catheter  [ ] IABP  [ ] ECMO  [ ] LVAD  [ x] Ventilator  [x ] pacemaker [ ] Impella.                      [ x] ABG's     [x ] Pulse Oxymetry Monitoring  Bedside evaluation , monitoring , treatment of hemodynamics , fluids , IVP/ IVCD meds.        Diagnosis:     POD 4 - C4L, patch angioplasty of LAD     hx of CAD     CKD     PEA arrest last night / CPR    hypotension    Bradycardia    Temporary pacemaker (TPM) interrogation and setting.     Requires    [x ]VVI    temporary pacing at 80 min     to maintain HR, MAP, CI, and perfusion. overnight - TPM now off - on back up     [place] [facility] catheter interpretation and therapeutic management of unstable hemodynamics     IVCD anticoagulation with [ x] Heparin  [ ] Argatroban for ?  PE    CHF- acute [ x]   chronic [ ]    systolic [ x]   diatolic [ ]   Post Op           - Echo- EF -   ?          [ ] RV dysfunction          - Cxr-cardiomegally, edema          - Clinical-  [ ]inotropes   [x ]pressors   [ ]diuresis   [ ]IABP   [ ]ECMO   [ ]LVAD   [ x]Respiratory Failure   -     Ventilator Management:  [x ]AC-rest    [ ]CPAP-PS Wean    [ ]Trach Collar     [ ]Extubate    [ ] T-Piece  [ ]peep>5     Requires chest PT, pulmonary toilet,  suctioning to maintain SaO2,  patent airway and treat atelectasis.     Difficult weaning process - multiple organ system involvement in critically ill patient     Hemodynamic lability,  instability. Requires IVCD [ x] vasopressors [ ] inotropes  [ ] vasodilator  [ x]IVSS fluid  to maintain MAP, perfusion, C.I.     DM type 2 - IVCD insulin     Hyponatremia     Renal Failure - Acute Kidney Injury         By signing my name below, I, Ciara Coronado, attest that this documentation has been prepared under the direction and in the presence of Matt Key MD.   Electronically Signed: Phill Castaneda. 20 @ 07:02    Discussed with CT surgeon, Physician's Assistant - Nurse Practitioner- Critical care medicine team.   Dicussed at  AM / PM rounds.   Chart, labs , films reviewed.    Total Time: 30 min

## 2020-02-07 NOTE — PROVIDER CONTACT NOTE (CHANGE IN STATUS NOTIFICATION) - ACTION/TREATMENT ORDERED:
code blue/ACLS protocol initiated. see code flowsheet. ROSC ultimately obtained. pt transferred to CTU 22. handoff report given to CTU RN.

## 2020-02-07 NOTE — PROGRESS NOTE ADULT - SUBJECTIVE AND OBJECTIVE BOX
Chief complaint  Patient is a 64y old  Male who presents with a chief complaint of Chest pain (07 Feb 2020 14:44)   Review of systems  Patient in bed, looks comfortable, no hypoglycemia.    Labs and Fingersticks  CAPILLARY BLOOD GLUCOSE  105 (07 Feb 2020 13:00)  165 (07 Feb 2020 03:00)  211 (07 Feb 2020 02:00)      POCT Blood Glucose.: 105 mg/dL (07 Feb 2020 12:56)  POCT Blood Glucose.: 104 mg/dL (07 Feb 2020 09:47)  POCT Blood Glucose.: 95 mg/dL (07 Feb 2020 09:21)  POCT Blood Glucose.: 95 mg/dL (07 Feb 2020 08:17)  POCT Blood Glucose.: 86 mg/dL (07 Feb 2020 07:17)  POCT Blood Glucose.: 84 mg/dL (07 Feb 2020 06:19)  POCT Blood Glucose.: 133 mg/dL (07 Feb 2020 03:59)  POCT Blood Glucose.: 135 mg/dL (06 Feb 2020 22:50)  POCT Blood Glucose.: 121 mg/dL (06 Feb 2020 22:05)  POCT Blood Glucose.: 65 mg/dL (06 Feb 2020 21:45)  POCT Blood Glucose.: 59 mg/dL (06 Feb 2020 21:29)  POCT Blood Glucose.: 78 mg/dL (06 Feb 2020 16:46)      Anion Gap, Serum: 15 (02-07 @ 02:06)  Anion Gap, Serum: 17 (02-06 @ 23:46)  Anion Gap, Serum: 12 (02-06 @ 09:59)      Calcium, Total Serum: 7.9 <L> (02-07 @ 02:06)  Calcium, Total Serum: 7.3 <L> (02-06 @ 23:46)  Calcium, Total Serum: 8.0 <L> (02-06 @ 09:59)  Albumin, Serum: 2.2 <L> (02-07 @ 02:06)  Albumin, Serum: 2.3 <L> (02-06 @ 23:46)  Albumin, Serum: 2.4 <L> (02-06 @ 09:59)    Alanine Aminotransferase (ALT/SGPT): 314 <H> (02-07 @ 02:06)  Alanine Aminotransferase (ALT/SGPT): 265 <H> (02-06 @ 23:46)  Alanine Aminotransferase (ALT/SGPT): 171 <H> (02-06 @ 09:59)  Alkaline Phosphatase, Serum: 179 <H> (02-07 @ 02:06)  Alkaline Phosphatase, Serum: 177 <H> (02-06 @ 23:46)  Alkaline Phosphatase, Serum: 62 (02-06 @ 09:59)  Aspartate Aminotransferase (AST/SGOT): 357 <H> (02-07 @ 02:06)  Aspartate Aminotransferase (AST/SGOT): 288 <H> (02-06 @ 23:46)  Aspartate Aminotransferase (AST/SGOT): 174 <H> (02-06 @ 09:59)        02-07    128<L>  |  94<L>  |  52<H>  ----------------------------<  165<H>  4.3   |  19<L>  |  2.53<H>    Ca    7.9<L>      07 Feb 2020 02:06  Phos  3.7     02-07  Mg     2.2     02-07    TPro  5.1<L>  /  Alb  2.2<L>  /  TBili  0.7  /  DBili  x   /  AST  357<H>  /  ALT  314<H>  /  AlkPhos  179<H>  02-07                        9.0    11.95 )-----------( 165      ( 07 Feb 2020 14:33 )             27.2     Medications  MEDICATIONS  (STANDING):  artificial tears (preservative free) Ophthalmic Solution 1 Drop(s) Both EYES two times a day  aspirin  chewable 81 milliGRAM(s) Oral daily  atorvastatin 40 milliGRAM(s) Oral at bedtime  chlorhexidine 0.12% Liquid 15 milliLiter(s) Oral Mucosa every 12 hours  chlorhexidine 2% Cloths 1 Application(s) Topical <User Schedule>  chlorhexidine 4% Liquid 1 Application(s) Topical <User Schedule>  dexMEDEtomidine Infusion 0.3 MICROgram(s)/kG/Hr (6.082 mL/Hr) IV Continuous <Continuous>  dextrose 5% + sodium chloride 0.9%. 1000 milliLiter(s) (25 mL/Hr) IV Continuous <Continuous>  dextrose 5%. 1000 milliLiter(s) (50 mL/Hr) IV Continuous <Continuous>  dextrose 50% Injectable 12.5 Gram(s) IV Push once  dextrose 50% Injectable 25 Gram(s) IV Push once  dextrose 50% Injectable 25 Gram(s) IV Push once  heparin  Infusion 1000 Unit(s)/Hr (10 mL/Hr) IV Continuous <Continuous>  insulin glargine Injectable (LANTUS) 40 Unit(s) SubCutaneous at bedtime  insulin regular Infusion 4 Unit(s)/Hr (4 mL/Hr) IV Continuous <Continuous>  lactated ringers Bolus 500 milliLiter(s) IV Bolus once  multivitamin 1 Tablet(s) Oral daily  mupirocin 2% Ointment 1 Application(s) Topical two times a day  pantoprazole  Injectable 40 milliGRAM(s) IV Push daily  phenylephrine    Infusion 0.822 MICROgram(s)/kG/Min (25 mL/Hr) IV Continuous <Continuous>  polyethylene glycol 3350 17 Gram(s) Oral daily  propofol Infusion 50 MICROgram(s)/kG/Min (24.33 mL/Hr) IV Continuous <Continuous>  sodium chloride 0.9%. 1000 milliLiter(s) (10 mL/Hr) IV Continuous <Continuous>  tamsulosin 0.4 milliGRAM(s) Oral at bedtime      Physical Exam  General: Patient comfortable in bed  Vital Signs Last 12 Hrs  T(F): 99.1 (02-07-20 @ 12:00), Max: 99.1 (02-07-20 @ 12:00)  HR: 79 (02-07-20 @ 14:45) (74 - 90)  BP: --  BP(mean): --  RR: 17 (02-07-20 @ 14:45) (12 - 34)  SpO2: 100% (02-07-20 @ 14:45) (64% - 100%)  Neck: No palpable thyroid nodules.  CVS: S1S2, No murmurs  Respiratory: No wheezing, no crepitations  GI: Abdomen soft, bowel sounds positive  Musculoskeletal:  edema lower extremities.   Skin: No skin rashes, no ecchymosis    Diagnostics Chief complaint  Patient is a 64y old  Male who presents with a chief complaint of Chest pain (07 Feb 2020 14:44)   Review of systems  Patient in bed, looks comfortable,  no hypoglycemia.    Labs and Fingersticks  CAPILLARY BLOOD GLUCOSE  105 (07 Feb 2020 13:00)  165 (07 Feb 2020 03:00)  211 (07 Feb 2020 02:00)      POCT Blood Glucose.: 105 mg/dL (07 Feb 2020 12:56)  POCT Blood Glucose.: 104 mg/dL (07 Feb 2020 09:47)  POCT Blood Glucose.: 95 mg/dL (07 Feb 2020 09:21)  POCT Blood Glucose.: 95 mg/dL (07 Feb 2020 08:17)  POCT Blood Glucose.: 86 mg/dL (07 Feb 2020 07:17)  POCT Blood Glucose.: 84 mg/dL (07 Feb 2020 06:19)  POCT Blood Glucose.: 133 mg/dL (07 Feb 2020 03:59)  POCT Blood Glucose.: 135 mg/dL (06 Feb 2020 22:50)  POCT Blood Glucose.: 121 mg/dL (06 Feb 2020 22:05)  POCT Blood Glucose.: 65 mg/dL (06 Feb 2020 21:45)  POCT Blood Glucose.: 59 mg/dL (06 Feb 2020 21:29)  POCT Blood Glucose.: 78 mg/dL (06 Feb 2020 16:46)      Anion Gap, Serum: 15 (02-07 @ 02:06)  Anion Gap, Serum: 17 (02-06 @ 23:46)  Anion Gap, Serum: 12 (02-06 @ 09:59)      Calcium, Total Serum: 7.9 <L> (02-07 @ 02:06)  Calcium, Total Serum: 7.3 <L> (02-06 @ 23:46)  Calcium, Total Serum: 8.0 <L> (02-06 @ 09:59)  Albumin, Serum: 2.2 <L> (02-07 @ 02:06)  Albumin, Serum: 2.3 <L> (02-06 @ 23:46)  Albumin, Serum: 2.4 <L> (02-06 @ 09:59)    Alanine Aminotransferase (ALT/SGPT): 314 <H> (02-07 @ 02:06)  Alanine Aminotransferase (ALT/SGPT): 265 <H> (02-06 @ 23:46)  Alanine Aminotransferase (ALT/SGPT): 171 <H> (02-06 @ 09:59)  Alkaline Phosphatase, Serum: 179 <H> (02-07 @ 02:06)  Alkaline Phosphatase, Serum: 177 <H> (02-06 @ 23:46)  Alkaline Phosphatase, Serum: 62 (02-06 @ 09:59)  Aspartate Aminotransferase (AST/SGOT): 357 <H> (02-07 @ 02:06)  Aspartate Aminotransferase (AST/SGOT): 288 <H> (02-06 @ 23:46)  Aspartate Aminotransferase (AST/SGOT): 174 <H> (02-06 @ 09:59)        02-07    128<L>  |  94<L>  |  52<H>  ----------------------------<  165<H>  4.3   |  19<L>  |  2.53<H>    Ca    7.9<L>      07 Feb 2020 02:06  Phos  3.7     02-07  Mg     2.2     02-07    TPro  5.1<L>  /  Alb  2.2<L>  /  TBili  0.7  /  DBili  x   /  AST  357<H>  /  ALT  314<H>  /  AlkPhos  179<H>  02-07                        9.0    11.95 )-----------( 165      ( 07 Feb 2020 14:33 )             27.2     Medications  MEDICATIONS  (STANDING):  artificial tears (preservative free) Ophthalmic Solution 1 Drop(s) Both EYES two times a day  aspirin  chewable 81 milliGRAM(s) Oral daily  atorvastatin 40 milliGRAM(s) Oral at bedtime  chlorhexidine 0.12% Liquid 15 milliLiter(s) Oral Mucosa every 12 hours  chlorhexidine 2% Cloths 1 Application(s) Topical <User Schedule>  chlorhexidine 4% Liquid 1 Application(s) Topical <User Schedule>  dexMEDEtomidine Infusion 0.3 MICROgram(s)/kG/Hr (6.082 mL/Hr) IV Continuous <Continuous>  dextrose 5% + sodium chloride 0.9%. 1000 milliLiter(s) (25 mL/Hr) IV Continuous <Continuous>  dextrose 5%. 1000 milliLiter(s) (50 mL/Hr) IV Continuous <Continuous>  dextrose 50% Injectable 12.5 Gram(s) IV Push once  dextrose 50% Injectable 25 Gram(s) IV Push once  dextrose 50% Injectable 25 Gram(s) IV Push once  heparin  Infusion 1000 Unit(s)/Hr (10 mL/Hr) IV Continuous <Continuous>  insulin glargine Injectable (LANTUS) 40 Unit(s) SubCutaneous at bedtime  insulin regular Infusion 4 Unit(s)/Hr (4 mL/Hr) IV Continuous <Continuous>  lactated ringers Bolus 500 milliLiter(s) IV Bolus once  multivitamin 1 Tablet(s) Oral daily  mupirocin 2% Ointment 1 Application(s) Topical two times a day  pantoprazole  Injectable 40 milliGRAM(s) IV Push daily  phenylephrine    Infusion 0.822 MICROgram(s)/kG/Min (25 mL/Hr) IV Continuous <Continuous>  polyethylene glycol 3350 17 Gram(s) Oral daily  propofol Infusion 50 MICROgram(s)/kG/Min (24.33 mL/Hr) IV Continuous <Continuous>  sodium chloride 0.9%. 1000 milliLiter(s) (10 mL/Hr) IV Continuous <Continuous>  tamsulosin 0.4 milliGRAM(s) Oral at bedtime      Physical Exam  General: Patient comfortable in bed  Vital Signs Last 12 Hrs  T(F): 99.1 (02-07-20 @ 12:00), Max: 99.1 (02-07-20 @ 12:00)  HR: 79 (02-07-20 @ 14:45) (74 - 90)  BP: --  BP(mean): --  RR: 17 (02-07-20 @ 14:45) (12 - 34)  SpO2: 100% (02-07-20 @ 14:45) (64% - 100%)  Neck: No palpable thyroid nodules.  CVS: S1S2, No murmurs  Respiratory: No wheezing, no crepitations  GI: Abdomen soft, bowel sounds positive  Musculoskeletal:  edema lower extremities.   Skin: No skin rashes, no ecchymosis    Diagnostics

## 2020-02-07 NOTE — PROGRESS NOTE ADULT - SUBJECTIVE AND OBJECTIVE BOX
[facility] DIVISION OF KIDNEY DISEASES AND HYPERTENSION -- FOLLOW UP NOTE  --------------------------------------------------------------------------------  HPI: 65 yo M with HTN, DM II (20 years), admitted with complaints of acute on chronic left sided exertional chest pain. Nephrology team is following for elevated serum creatinine and pre-cardiac catheterization assessment. [facility] no prior records available. On admission (1/25/2020), Scr was 1.85. Patient went for cardiac cath on 1/29/20 which revealed triple vessel disease. Scr had improved to 1.76 on 2/3/20. Pt. underwent CABG on 2/3/20. Scr now increased however likely plateaued at 2.53 today. Pt. is non-oliguric.    Pt. seen and examined at bedside this AM. Overnight pt. had PEA arrest with ROSC achieved quickly. Pt. found to be bradycardic now being paced. Pt. was intubated and transferred to CCU. Noted to have high lactate initially at 6.1 late yesterday, now improved. UOP in past 24 hours ~1.5L. Remains intubated, but awakening.    PAST HISTORY  --------------------------------------------------------------------------------  No significant changes to PMH, PSH, FHx, SHx, unless otherwise noted    ALLERGIES & MEDICATIONS  --------------------------------------------------------------------------------  Allergies    No Known Allergies    Intolerances    Standing Inpatient Medications  artificial tears (preservative free) Ophthalmic Solution 1 Drop(s) Both EYES two times a day  aspirin enteric coated 81 milliGRAM(s) Oral daily  atorvastatin 40 milliGRAM(s) Oral at bedtime  chlorhexidine 0.12% Liquid 15 milliLiter(s) Oral Mucosa every 12 hours  chlorhexidine 2% Cloths 1 Application(s) Topical <User Schedule>  chlorhexidine 4% Liquid 1 Application(s) Topical <User Schedule>  dextrose 5%. 1000 milliLiter(s) IV Continuous <Continuous>  dextrose 50% Injectable 12.5 Gram(s) IV Push once  dextrose 50% Injectable 25 Gram(s) IV Push once  dextrose 50% Injectable 25 Gram(s) IV Push once  heparin  Infusion 1000 Unit(s)/Hr IV Continuous <Continuous>  insulin glargine Injectable (LANTUS) 40 Unit(s) SubCutaneous at bedtime  insulin regular Infusion 4 Unit(s)/Hr IV Continuous <Continuous>  lactated ringers Bolus 500 milliLiter(s) IV Bolus once  multivitamin 1 Tablet(s) Oral daily  mupirocin 2% Ointment 1 Application(s) Topical two times a day  pantoprazole  Injectable 40 milliGRAM(s) IV Push daily  phenylephrine    Infusion 0.822 MICROgram(s)/kG/Min IV Continuous <Continuous>  polyethylene glycol 3350 17 Gram(s) Oral daily  propofol Infusion 50 MICROgram(s)/kG/Min IV Continuous <Continuous>  sodium chloride 0.9%. 1000 milliLiter(s) IV Continuous <Continuous>  tamsulosin 0.4 milliGRAM(s) Oral at bedtime    REVIEW OF SYSTEMS  --------------------------------------------------------------------------------  Unable to obtain.    VITALS/PHYSICAL EXAM  --------------------------------------------------------------------------------  T(C): 36.7 (02-06-20 @ 19:45), Max: 37 (02-06-20 @ 12:03)  HR: 74 (02-07-20 @ 06:45) (74 - 96)  BP: 102/69 (02-06-20 @ 19:45) (102/69 - 124/74)  RR: 16 (02-07-20 @ 06:45) (13 - 18)  SpO2: 80% (02-07-20 @ 06:45) (80% - 100%)  Wt(kg): --    02-06-20 @ 07:01  -  02-07-20 @ 07:00  --------------------------------------------------------  IN: 1138.6 mL / OUT: 2100 mL / NET: -961.4 mL    Physical Exam:  	Gen: intubated, awake  	HEENT: + ETT  	Pulm: CTA b/l  	CV: + central chest dressing from CABG C/D/I, S1S2  	Abd: Soft, +BS   	Ext: trace LE edema B/L  	Neuro: Awake  	Skin: Warm and dry    LABS/STUDIES  --------------------------------------------------------------------------------              8.7    12.67 >-----------<  163      [02-07-20 @ 05:16]              26.8     128  |  94  |  52  ----------------------------<  165      [02-07-20 @ 02:06]  4.3   |  19  |  2.53        Ca     7.9     [02-07-20 @ 02:06]      Mg     2.2     [02-07-20 @ 02:06]      Phos  3.7     [02-07-20 @ 02:06]    Creatinine Trend:  SCr 2.53 [02-07 @ 02:06]  SCr 2.58 [02-06 @ 23:46]  SCr 2.55 [02-06 @ 09:59]  SCr 2.47 [02-05 @ 04:31]  SCr 2.00 [02-04 @ 01:30]    PLA2R: KRYSTYNA <2, IFA Negative      [01-30-20 @ 01:27]    Immunofixation Serum:   No Monoclonal Band Identified  Reference Range: None Detected      [01-30-20 @ 00:23]

## 2020-02-07 NOTE — PROGRESS NOTE ADULT - SUBJECTIVE AND OBJECTIVE BOX
FRANKLIN PRESLEY  MRN#:  [id number]    The patient is a 64y Male with PMH of HTN, DM II, CKD stage III, presented with unstable angina, found to have multivessel CAD and underwent C4L with patch angioplasty of his LAD 2/03. Patient had been recovering on the floor with generalized weakness and poor cough. He called the nurse last evening and told her he was having trouble breathing and subsequently became unresponsive and a code blue was called. Upon arrival, patient was in PEA with a heart rate of about 50 and dropped to 37 on the monitor. I supervised his CPR and after intubation, one amp of Epinephrine and Atropine, patient developed rapid atrial fibrillation and regained a pulse. He was treated with IV Amiodarone and returned to SR and subsequent bradycardia. An IV phenylephrine infusion was started and patient being sent to CTU for further care.  All available clinical, laboratory, radiographic, pharmacologic, and electrocardiographic data were reviewed & analyzed.      The patient was in the CTICU in critical condition at risk for imminent decompensation secondary to acute hypoxic respiratory failure, vasogenic shock, s/p PEA, hemodynamically significant anemia,, hemodynamically significant bradycardia, hyperlactatemia-acidosis, stage II CKD and stress hyperglycemia.      Respiratory status required full ventilatory support, close monitoring of respiratory rate and breathing pattern, the following of ABG’s with A-line monitoring, continuous pulse oximetry monitoring, and an IV Diprivan infusion for support & to evaluate for & prevent further decompensation secondary to acute hypoxic respiratory failure and vasogenic shock. Based upon my evaluation and review, I increased the PEEP and lowered the Fi02 to 70% on the ventilator. Patient continues on empiric IV heparin with a plan to obtain LE dopplers and possibly CTA if Aa gradient persists. Diprivan weaned to off and patient able to follow commands and move all extremities.    Invasive hemodynamic monitoring with a central venous catheter & an A-line were placed after resuscitation and required for the continuous central venous and MAP/BP monitoring to ensure adequate cardiovascular support and to evaluate for & help prevent decompensation while receiving intermittent volume expansion and an IV Neosynephrine drip secondary to vasogenic shock s/p PEA arrest, hemodynamically significant anemia due to recent acute blood loss, hypovolemic shock, hyperlactatemia-acidosis, acute postoperative blood loss anemia, and chronic stage III kidney disease. Based upon my evaluation and review, I ordered intermittent volume resuscitation and weaned off the Neosynephrine infusion.    Metabolic stability, uncontrolled type 2 diabetes-hyperglycemia, & infection prophylaxis required an insulin sliding scale, Lantus, and Humalog & the following of serial glucose levels to help achieve & maintain euglycemia. Based upon my evaluation and review, I plan to start an insulin infusion due to his instability and recent hypoglycemia.    Patient required acute postoperative critical care management and it at risk for life threatening decompensation. I provided 40 minutes of non-continuous care to the patient. FRANKLIN PRESLEY  MRN#:  [id number]    The patient is a 64y Male with PMH of HTN, DM II, CKD stage III, presented with unstable angina, found to have multivessel CAD and underwent C4L with patch angioplasty of his LAD 2/03. Patient had been recovering on the floor with generalized weakness and poor cough. He called the nurse last evening and told her he was having trouble breathing and subsequently became unresponsive and a code blue was called. Upon arrival, patient was in PEA with a heart rate of about 50 and dropped to 37 on the monitor. I supervised his CPR and after intubation, one amp of Epinephrine and Atropine, patient developed rapid atrial fibrillation and regained a pulse. He was treated with IV Amiodarone and returned to SR and subsequent bradycardia. An IV phenylephrine infusion was started and patient being sent to CTU for further care.  All available clinical, laboratory, radiographic, pharmacologic, and electrocardiographic data were reviewed & analyzed.      The patient was in the CTICU in critical condition at risk for imminent decompensation secondary to acute hypoxic respiratory failure, vasogenic shock, s/p PEA, hemodynamically significant anemia,, hemodynamically significant bradycardia, hyperlactatemia-acidosis, stage II CKD and stress hyperglycemia.      Respiratory status required full ventilatory support, close monitoring of respiratory rate and breathing pattern, the following of ABG’s with A-line monitoring, continuous pulse oximetry monitoring, and an IV Diprivan infusion for support & to evaluate for & prevent further decompensation secondary to acute hypoxic respiratory failure and vasogenic shock. Based upon my evaluation and review, I increased the PEEP and lowered the Fi02 to 70% on the ventilator. Patient continues on empiric IV heparin with a plan to obtain LE dopplers and possibly CTA if Aa gradient persists. Diprivan weaned to off and patient able to follow commands and move all extremities.    Invasive hemodynamic monitoring with a central venous catheter & an A-line were placed after resuscitation and required for the continuous central venous and MAP/BP monitoring to ensure adequate cardiovascular support and to evaluate for & help prevent decompensation while receiving intermittent volume expansion and an IV Neosynephrine drip secondary to vasogenic shock s/p PEA arrest, hemodynamically significant anemia due to recent acute blood loss, hypovolemic shock, hyperlactatemia-acidosis, acute postoperative blood loss anemia, and chronic stage III kidney disease. Based upon my evaluation and review, I ordered intermittent volume resuscitation and weaned off the Neosynephrine infusion. AV pacing initiated with temporary epicardial wires due to frequent symptomatic bradycardia.    Metabolic stability, uncontrolled type 2 diabetes-hyperglycemia, & infection prophylaxis required an insulin sliding scale, Lantus, and Humalog & the following of serial glucose levels to help achieve & maintain euglycemia. Based upon my evaluation and review, I plan to start an insulin infusion due to his instability and recent hypoglycemia.    Patient required acute postoperative critical care management and it at risk for life threatening decompensation. I provided 40 minutes of non-continuous care to the patient.

## 2020-02-07 NOTE — PROVIDER CONTACT NOTE (CHANGE IN STATUS NOTIFICATION) - SITUATION
responded to patient call bell. pt stated "I can't breathe". pt appeared in distress. 3L NC placed on patient. pt became unresponsive, code blue initiated. code team @ bedside. see code flowsheet.

## 2020-02-07 NOTE — PROGRESS NOTE ADULT - ASSESSMENT
Assessment  DMT2: 64y Male with DM T2 with hyperglycemia, A1C 9.2%, was on oral meds and insulin at home, postop on basal bolus insulin, s/p pea arrest yesterday transferred to CTU and was intubated, blood sugars trending within acceptable range, patient got basal insulin last night, now restarted on IV. Patient is currently intubated, family at the bedside.  CAD: s/p CABG 2/3, on medications, no chest pain, stable, monitored.  HTN: Controlled,  on antihypertensive medications.  HLD: Controlled, on statin.  CKD: Monitor labs/BMP          Harper Matias MD  Cell: [phone]  Office: [phone] Assessment  DMT2: 64y Male with DM T2 with hyperglycemia, A1C 9.2%, was on oral meds and insulin at home,  postop on basal bolus insulin, s/p pea arrest yesterday transferred to CTU and was intubated, blood sugars trending within acceptable range, patient got basal insulin last night, now restarted on IV. Patient is currently intubated, family at the bedside.  CAD: s/p CABG 2/3, on medications, no chest pain, stable, monitored.  HTN: Controlled,  on antihypertensive medications.  HLD: Controlled, on statin.  CKD: Monitor labs/BMP          Harper Matias MD  Cell: [phone]  Office: [phone]

## 2020-02-07 NOTE — PROGRESS NOTE ADULT - ASSESSMENT
intraop kennedy 2/3/20 ef 50%, nl lv, mild diastolic dysfx stage 1  limited echo 2/4/20: nl LV sys fx , no pericardial effusion     a/p  64 year old man with history of HTN, DM II, admitted with progressive exertional angina, s/p cath with severe triple vessel disease, s/p CABG, post op course c/b brief witnessed PEA likely hypoxic arrest, s/p intubation.     1. CAD, s/p cath with severe triple vessel disease including lesions at the bifurcation of the LAD/diagonal and distal RCA/RPDA/RPL trifurcation.   -s/p CABG x 4   -intraop kennedy ef 50%  -cont asa, statin  -s/p PEA arrest, vent management per cticu   -off vasopressors  -cont a/c  -r/o DVT/PE    2. HTN  -bp stable off pressors    3. STEPHANIE/CKD  renal f/u     4. Diuresis per CTICU    5. S/P Right pigtail placement    dvt ppx  d/w bedside nurse  d/w family at bedside

## 2020-02-07 NOTE — PROGRESS NOTE ADULT - ATTENDING COMMENTS
CKD with superimposed STEPHANIE: likely hemodynamic injury in the setting of relatively lower BP/Surgery  s/p CABG POD 4  Overnight with a PEA arrest ( 2/7)  Creatinine appears to have plateaued    Non oliguric  Hyponatremia noted: aim to keep net negative  Free water restriction    Roya Horta MD  O: [phone]  C: [phone]

## 2020-02-07 NOTE — PROGRESS NOTE ADULT - PROBLEM SELECTOR PLAN 1
Pt with  CKD likely  in the setting of long standing DM and HTN.  No prior labs for review. Scr since admission has ranged between 1.8-2 mg/dl. This AM Scr was elevated but likely plateaued at 2.53 secondary to hemodynamic injury after CABG ( POD# 4) and PEA arrest s/p CPR and cardiac pacing overnight. Pt. is non-oliguric. UA significant for protein and RBCs. Urine electrolytes consistent with intrinsic disease. Urine Pr/Cr ratio in nephrotic range. HepBsAg, HepC, C3, C4, SIFE, RACHEL, P-ANCA, C-ANCA, Anti-GBM ab, Parvovirus, RPR, anti-PLA2R ab were negative/non-reactive. Monitor labs and urine output. Avoid NSAIDs, ACEI/ARBS, RCA and nephrotoxins. Dose medications as per eGFR

## 2020-02-07 NOTE — PROGRESS NOTE ADULT - PROBLEM SELECTOR PLAN 2
Pt. with likely hypervolemic hyponatremia in the setting of volume overload. Check urine osm, serum osm, and urine sodium. Recommend stabilizing pt. hemodynamically before considering diuretic therapy. Pt. is currently non-oliguric. Monitor serum sodium    Kevin Correa  Nephrology Fellow  Cell: [phone] (from 8 am to 5 pm)  (After 5 pm or on weekends please page on-call fellow)

## 2020-02-07 NOTE — PROGRESS NOTE ADULT - SUBJECTIVE AND OBJECTIVE BOX
CARDIOLOGY FOLLOW UP NOTE - DR. CUADRA    Subjective:    events noted  pea arrest yesterday   intubated, sedated    PHYSICAL EXAM:  T(C): 37.3 (20 @ 12:00), Max: 37.3 (20 @ 12:00)  HR: 76 (20 @ 14:30) (74 - 90)  BP: 102/69 (20 @ 19:45) (102/69 - 102/69)  RR: 17 (20 @ 14:30) (12 - 34)  SpO2: 100% (20 @ 14:30) (64% - 100%)  Wt(kg): --  I&O's Summary    2020 07:01  -  2020 07:00  --------------------------------------------------------  IN: 1188.6 mL / OUT: 2500 mL / NET: -1311.4 mL    2020 07:01  -  2020 14:45  --------------------------------------------------------  IN: 198.1 mL / OUT: 1055 mL / NET: -856.9 mL      Daily     Daily Weight in k (2020 01:00)    Appearance: intubated, sedated  Cardiovascular: Normal S1 S2,RRR, No JVD, No murmurs  Respiratory: Lungs clear to auscultation	  Gastrointestinal:  Soft, Non-tender, + BS	  Extremities: Normal range of motion, edema b/l      Home Medications:  Artificial Tears ophthalmic solution: 1 drop(s) to each affected eye , As Needed (2020 12:55)  Daily Gadiel oral tablet: 1 tab(s) orally once a day (2020 12:55)  glyBURIDE 5 mg oral tablet: 2 tab(s) orally 2 times a day (2020 12:55)  lisinopril 20 mg oral tablet: 1 tab(s) orally once a day (2020 12:55)  metFORMIN 1000 mg oral tablet: 0.5 tab(s) orally 2 times a day (2020 12:55)  Naprosyn 500 mg oral tablet: 1 tab(s) orally , As Needed (2020 11:02)  Tresiba FlexTouch 100 units/mL subcutaneous solution: 30 unit(s) subcutaneous once a day (at bedtime) (2020 12:55)  vitamin B Complex: 1 tab(s) orally once a day (2020 12:55)      MEDICATIONS  (STANDING):  artificial tears (preservative free) Ophthalmic Solution 1 Drop(s) Both EYES two times a day  aspirin  chewable 81 milliGRAM(s) Oral daily  atorvastatin 40 milliGRAM(s) Oral at bedtime  chlorhexidine 0.12% Liquid 15 milliLiter(s) Oral Mucosa every 12 hours  chlorhexidine 2% Cloths 1 Application(s) Topical <User Schedule>  chlorhexidine 4% Liquid 1 Application(s) Topical <User Schedule>  dexMEDEtomidine Infusion 0.3 MICROgram(s)/kG/Hr (6.082 mL/Hr) IV Continuous <Continuous>  dextrose 5% + sodium chloride 0.9%. 1000 milliLiter(s) (25 mL/Hr) IV Continuous <Continuous>  dextrose 5%. 1000 milliLiter(s) (50 mL/Hr) IV Continuous <Continuous>  dextrose 50% Injectable 12.5 Gram(s) IV Push once  dextrose 50% Injectable 25 Gram(s) IV Push once  dextrose 50% Injectable 25 Gram(s) IV Push once  heparin  Infusion 1000 Unit(s)/Hr (10 mL/Hr) IV Continuous <Continuous>  insulin glargine Injectable (LANTUS) 40 Unit(s) SubCutaneous at bedtime  insulin regular Infusion 4 Unit(s)/Hr (4 mL/Hr) IV Continuous <Continuous>  lactated ringers Bolus 500 milliLiter(s) IV Bolus once  multivitamin 1 Tablet(s) Oral daily  mupirocin 2% Ointment 1 Application(s) Topical two times a day  pantoprazole  Injectable 40 milliGRAM(s) IV Push daily  phenylephrine    Infusion 0.822 MICROgram(s)/kG/Min (25 mL/Hr) IV Continuous <Continuous>  polyethylene glycol 3350 17 Gram(s) Oral daily  propofol Infusion 50 MICROgram(s)/kG/Min (24.33 mL/Hr) IV Continuous <Continuous>  sodium chloride 0.9%. 1000 milliLiter(s) (10 mL/Hr) IV Continuous <Continuous>  tamsulosin 0.4 milliGRAM(s) Oral at bedtime      TELEMETRY: 	    ECG:  	  RADIOLOGY:   DIAGNOSTIC TESTING:  [ ] Echocardiogram:  [ ] Catheterization:  [ ] Stress Test:    OTHER: 	    LABS:	 	    CARDIAC MARKERS:  Troponin T, High Sensitivity Result: 2148 ng/L ( @ 17:22)                                9.0    11.95 )-----------( 165      ( 2020 14:33 )             27.2         128<L>  |  94<L>  |  52<H>  ----------------------------<  165<H>  4.3   |  19<L>  |  2.53<H>    Ca    7.9<L>      2020 02:06  Phos  3.7       Mg     2.2         TPro  5.1<L>  /  Alb  2.2<L>  /  TBili  0.7  /  DBili  x   /  AST  357<H>  /  ALT  314<H>  /  AlkPhos  179<H>      proBNP:   PT/INR - ( 2020 02:06 )   PT: 14.3 sec;   INR: 1.24 ratio         PTT - ( 2020 02:06 )  PTT:30.9 sec  Lipid Profile:   HgA1c:     Creatinine, Serum: 2.53 mg/dL (20 @ 02:06)  Creatinine, Serum: 2.58 mg/dL (20 @ 23:46)  Creatinine, Serum: 2.55 mg/dL (20 @ 09:59)  Creatinine, Serum: 2.47 mg/dL (20 @ 04:31)

## 2020-02-07 NOTE — PROGRESS NOTE ADULT - PROBLEM SELECTOR PLAN 1
Suggest to continue IV insulin for now.   Will continue monitoring FS and FU. Suggest to continue IV insulin for now.

## 2020-02-08 LAB
ALBUMIN SERPL ELPH-MCNC: 2.1 G/DL — LOW (ref 3.3–5)
ALBUMIN SERPL ELPH-MCNC: 2.5 G/DL — LOW (ref 3.3–5)
ALP SERPL-CCNC: 147 U/L — HIGH (ref 40–120)
ALP SERPL-CCNC: 147 U/L — HIGH (ref 40–120)
ALT FLD-CCNC: 238 U/L — HIGH (ref 10–45)
ALT FLD-CCNC: 288 U/L — HIGH (ref 10–45)
ANION GAP SERPL CALC-SCNC: 15 MMOL/L — SIGNIFICANT CHANGE UP (ref 5–17)
ANION GAP SERPL CALC-SCNC: 22 MMOL/L — HIGH (ref 5–17)
APPEARANCE UR: CLEAR — SIGNIFICANT CHANGE UP
APTT BLD: 68.2 SEC — HIGH (ref 27.5–36.3)
APTT BLD: 89.4 SEC — HIGH (ref 27.5–36.3)
APTT BLD: 93.7 SEC — HIGH (ref 27.5–36.3)
APTT BLD: 96.6 SEC — HIGH (ref 27.5–36.3)
AST SERPL-CCNC: 160 U/L — HIGH (ref 10–40)
AST SERPL-CCNC: 92 U/L — HIGH (ref 10–40)
BILIRUB SERPL-MCNC: 0.6 MG/DL — SIGNIFICANT CHANGE UP (ref 0.2–1.2)
BILIRUB SERPL-MCNC: 0.9 MG/DL — SIGNIFICANT CHANGE UP (ref 0.2–1.2)
BILIRUB UR-MCNC: NEGATIVE — SIGNIFICANT CHANGE UP
BUN SERPL-MCNC: 56 MG/DL — HIGH (ref 7–23)
BUN SERPL-MCNC: 59 MG/DL — HIGH (ref 7–23)
CALCIUM SERPL-MCNC: 7.8 MG/DL — LOW (ref 8.4–10.5)
CALCIUM SERPL-MCNC: 7.8 MG/DL — LOW (ref 8.4–10.5)
CHLORIDE SERPL-SCNC: 96 MMOL/L — SIGNIFICANT CHANGE UP (ref 96–108)
CHLORIDE SERPL-SCNC: 99 MMOL/L — SIGNIFICANT CHANGE UP (ref 96–108)
CO2 SERPL-SCNC: 15 MMOL/L — LOW (ref 22–31)
CO2 SERPL-SCNC: 18 MMOL/L — LOW (ref 22–31)
COLOR SPEC: YELLOW — SIGNIFICANT CHANGE UP
CREAT SERPL-MCNC: 2.53 MG/DL — HIGH (ref 0.5–1.3)
CREAT SERPL-MCNC: 2.55 MG/DL — HIGH (ref 0.5–1.3)
DIFF PNL FLD: ABNORMAL
GAS PNL BLDA: SIGNIFICANT CHANGE UP
GLUCOSE BLDC GLUCOMTR-MCNC: 104 MG/DL — HIGH (ref 70–99)
GLUCOSE BLDC GLUCOMTR-MCNC: 106 MG/DL — HIGH (ref 70–99)
GLUCOSE BLDC GLUCOMTR-MCNC: 106 MG/DL — HIGH (ref 70–99)
GLUCOSE BLDC GLUCOMTR-MCNC: 107 MG/DL — HIGH (ref 70–99)
GLUCOSE BLDC GLUCOMTR-MCNC: 117 MG/DL — HIGH (ref 70–99)
GLUCOSE BLDC GLUCOMTR-MCNC: 118 MG/DL — HIGH (ref 70–99)
GLUCOSE BLDC GLUCOMTR-MCNC: 119 MG/DL — HIGH (ref 70–99)
GLUCOSE BLDC GLUCOMTR-MCNC: 122 MG/DL — HIGH (ref 70–99)
GLUCOSE BLDC GLUCOMTR-MCNC: 123 MG/DL — HIGH (ref 70–99)
GLUCOSE BLDC GLUCOMTR-MCNC: 126 MG/DL — HIGH (ref 70–99)
GLUCOSE BLDC GLUCOMTR-MCNC: 132 MG/DL — HIGH (ref 70–99)
GLUCOSE BLDC GLUCOMTR-MCNC: 135 MG/DL — HIGH (ref 70–99)
GLUCOSE BLDC GLUCOMTR-MCNC: 136 MG/DL — HIGH (ref 70–99)
GLUCOSE BLDC GLUCOMTR-MCNC: 148 MG/DL — HIGH (ref 70–99)
GLUCOSE BLDC GLUCOMTR-MCNC: 158 MG/DL — HIGH (ref 70–99)
GLUCOSE BLDC GLUCOMTR-MCNC: 92 MG/DL — SIGNIFICANT CHANGE UP (ref 70–99)
GLUCOSE SERPL-MCNC: 114 MG/DL — HIGH (ref 70–99)
GLUCOSE SERPL-MCNC: 155 MG/DL — HIGH (ref 70–99)
GLUCOSE UR QL: ABNORMAL
GRAM STN FLD: SIGNIFICANT CHANGE UP
GRAM STN FLD: SIGNIFICANT CHANGE UP
HCT VFR BLD CALC: 25.8 % — LOW (ref 39–50)
HCT VFR BLD CALC: 27.6 % — LOW (ref 39–50)
HGB BLD-MCNC: 8.7 G/DL — LOW (ref 13–17)
HGB BLD-MCNC: 9.2 G/DL — LOW (ref 13–17)
KETONES UR-MCNC: NEGATIVE — SIGNIFICANT CHANGE UP
LEUKOCYTE ESTERASE UR-ACNC: NEGATIVE — SIGNIFICANT CHANGE UP
MAGNESIUM SERPL-MCNC: 2.3 MG/DL — SIGNIFICANT CHANGE UP (ref 1.6–2.6)
MCHC RBC-ENTMCNC: 28.8 PG — SIGNIFICANT CHANGE UP (ref 27–34)
MCHC RBC-ENTMCNC: 29.1 PG — SIGNIFICANT CHANGE UP (ref 27–34)
MCHC RBC-ENTMCNC: 33.3 GM/DL — SIGNIFICANT CHANGE UP (ref 32–36)
MCHC RBC-ENTMCNC: 33.7 GM/DL — SIGNIFICANT CHANGE UP (ref 32–36)
MCV RBC AUTO: 86.3 FL — SIGNIFICANT CHANGE UP (ref 80–100)
MCV RBC AUTO: 86.3 FL — SIGNIFICANT CHANGE UP (ref 80–100)
NITRITE UR-MCNC: NEGATIVE — SIGNIFICANT CHANGE UP
NRBC # BLD: 0 /100 WBCS — SIGNIFICANT CHANGE UP (ref 0–0)
NRBC # BLD: 0 /100 WBCS — SIGNIFICANT CHANGE UP (ref 0–0)
PH UR: 6 — SIGNIFICANT CHANGE UP (ref 5–8)
PHOSPHATE SERPL-MCNC: 3.6 MG/DL — SIGNIFICANT CHANGE UP (ref 2.5–4.5)
PLATELET # BLD AUTO: 174 K/UL — SIGNIFICANT CHANGE UP (ref 150–400)
PLATELET # BLD AUTO: 218 K/UL — SIGNIFICANT CHANGE UP (ref 150–400)
POTASSIUM SERPL-MCNC: 4.4 MMOL/L — SIGNIFICANT CHANGE UP (ref 3.5–5.3)
POTASSIUM SERPL-MCNC: 4.6 MMOL/L — SIGNIFICANT CHANGE UP (ref 3.5–5.3)
POTASSIUM SERPL-SCNC: 4.4 MMOL/L — SIGNIFICANT CHANGE UP (ref 3.5–5.3)
POTASSIUM SERPL-SCNC: 4.6 MMOL/L — SIGNIFICANT CHANGE UP (ref 3.5–5.3)
PROT SERPL-MCNC: 5.4 G/DL — LOW (ref 6–8.3)
PROT SERPL-MCNC: 5.5 G/DL — LOW (ref 6–8.3)
PROT UR-MCNC: ABNORMAL
RBC # BLD: 2.99 M/UL — LOW (ref 4.2–5.8)
RBC # BLD: 3.2 M/UL — LOW (ref 4.2–5.8)
RBC # FLD: 13.8 % — SIGNIFICANT CHANGE UP (ref 10.3–14.5)
RBC # FLD: 13.8 % — SIGNIFICANT CHANGE UP (ref 10.3–14.5)
SODIUM SERPL-SCNC: 132 MMOL/L — LOW (ref 135–145)
SODIUM SERPL-SCNC: 133 MMOL/L — LOW (ref 135–145)
SP GR SPEC: 1.02 — SIGNIFICANT CHANGE UP (ref 1.01–1.02)
SPECIMEN SOURCE: SIGNIFICANT CHANGE UP
SPECIMEN SOURCE: SIGNIFICANT CHANGE UP
UROBILINOGEN FLD QL: ABNORMAL
WBC # BLD: 12.97 K/UL — HIGH (ref 3.8–10.5)
WBC # BLD: 18.94 K/UL — HIGH (ref 3.8–10.5)
WBC # FLD AUTO: 12.97 K/UL — HIGH (ref 3.8–10.5)
WBC # FLD AUTO: 18.94 K/UL — HIGH (ref 3.8–10.5)

## 2020-02-08 PROCEDURE — 99232 SBSQ HOSP IP/OBS MODERATE 35: CPT | Mod: GC

## 2020-02-08 PROCEDURE — 93010 ELECTROCARDIOGRAM REPORT: CPT

## 2020-02-08 PROCEDURE — 71045 X-RAY EXAM CHEST 1 VIEW: CPT | Mod: 26

## 2020-02-08 PROCEDURE — 99291 CRITICAL CARE FIRST HOUR: CPT

## 2020-02-08 RX ORDER — AMIODARONE HYDROCHLORIDE 400 MG/1
TABLET ORAL
Refills: 0 | Status: DISCONTINUED | OUTPATIENT
Start: 2020-02-08 | End: 2020-02-25

## 2020-02-08 RX ORDER — DIGOXIN 250 MCG
0.25 TABLET ORAL ONCE
Refills: 0 | Status: COMPLETED | OUTPATIENT
Start: 2020-02-08 | End: 2020-02-08

## 2020-02-08 RX ORDER — FENTANYL CITRATE 50 UG/ML
25 INJECTION INTRAVENOUS ONCE
Refills: 0 | Status: DISCONTINUED | OUTPATIENT
Start: 2020-02-08 | End: 2020-02-08

## 2020-02-08 RX ORDER — FUROSEMIDE 40 MG
40 TABLET ORAL DAILY
Refills: 0 | Status: DISCONTINUED | OUTPATIENT
Start: 2020-02-08 | End: 2020-02-10

## 2020-02-08 RX ORDER — AMIODARONE HYDROCHLORIDE 400 MG/1
200 TABLET ORAL DAILY
Refills: 0 | Status: DISCONTINUED | OUTPATIENT
Start: 2020-02-12 | End: 2020-02-25

## 2020-02-08 RX ORDER — FUROSEMIDE 40 MG
40 TABLET ORAL ONCE
Refills: 0 | Status: COMPLETED | OUTPATIENT
Start: 2020-02-08 | End: 2020-02-08

## 2020-02-08 RX ORDER — POTASSIUM CHLORIDE 20 MEQ
10 PACKET (EA) ORAL ONCE
Refills: 0 | Status: COMPLETED | OUTPATIENT
Start: 2020-02-08 | End: 2020-02-08

## 2020-02-08 RX ORDER — POTASSIUM CHLORIDE 20 MEQ
10 PACKET (EA) ORAL
Refills: 0 | Status: COMPLETED | OUTPATIENT
Start: 2020-02-08 | End: 2020-02-08

## 2020-02-08 RX ORDER — VANCOMYCIN HCL 1 G
1000 VIAL (EA) INTRAVENOUS EVERY 24 HOURS
Refills: 0 | Status: DISCONTINUED | OUTPATIENT
Start: 2020-02-08 | End: 2020-02-09

## 2020-02-08 RX ORDER — MEROPENEM 1 G/30ML
1000 INJECTION INTRAVENOUS EVERY 12 HOURS
Refills: 0 | Status: DISCONTINUED | OUTPATIENT
Start: 2020-02-08 | End: 2020-02-10

## 2020-02-08 RX ORDER — HYDRALAZINE HCL 50 MG
5 TABLET ORAL ONCE
Refills: 0 | Status: COMPLETED | OUTPATIENT
Start: 2020-02-08 | End: 2020-02-08

## 2020-02-08 RX ORDER — AMIODARONE HYDROCHLORIDE 400 MG/1
400 TABLET ORAL EVERY 8 HOURS
Refills: 0 | Status: COMPLETED | OUTPATIENT
Start: 2020-02-08 | End: 2020-02-12

## 2020-02-08 RX ORDER — HYDROMORPHONE HYDROCHLORIDE 2 MG/ML
0.5 INJECTION INTRAMUSCULAR; INTRAVENOUS; SUBCUTANEOUS ONCE
Refills: 0 | Status: DISCONTINUED | OUTPATIENT
Start: 2020-02-08 | End: 2020-02-08

## 2020-02-08 RX ORDER — INSULIN LISPRO 100/ML
VIAL (ML) SUBCUTANEOUS
Refills: 0 | Status: DISCONTINUED | OUTPATIENT
Start: 2020-02-08 | End: 2020-02-10

## 2020-02-08 RX ORDER — FENTANYL CITRATE 50 UG/ML
50 INJECTION INTRAVENOUS ONCE
Refills: 0 | Status: DISCONTINUED | OUTPATIENT
Start: 2020-02-08 | End: 2020-02-08

## 2020-02-08 RX ORDER — MAGNESIUM SULFATE 500 MG/ML
2 VIAL (ML) INJECTION ONCE
Refills: 0 | Status: COMPLETED | OUTPATIENT
Start: 2020-02-08 | End: 2020-02-08

## 2020-02-08 RX ORDER — NICARDIPINE HYDROCHLORIDE 30 MG/1
3 CAPSULE, EXTENDED RELEASE ORAL
Qty: 40 | Refills: 0 | Status: DISCONTINUED | OUTPATIENT
Start: 2020-02-08 | End: 2020-02-08

## 2020-02-08 RX ADMIN — FENTANYL CITRATE 25 MICROGRAM(S): 50 INJECTION INTRAVENOUS at 11:55

## 2020-02-08 RX ADMIN — SODIUM CHLORIDE 25 MILLILITER(S): 9 INJECTION, SOLUTION INTRAVENOUS at 08:40

## 2020-02-08 RX ADMIN — Medication 1 DROP(S): at 06:20

## 2020-02-08 RX ADMIN — Medication 40 MILLIGRAM(S): at 19:29

## 2020-02-08 RX ADMIN — PANTOPRAZOLE SODIUM 40 MILLIGRAM(S): 20 TABLET, DELAYED RELEASE ORAL at 14:19

## 2020-02-08 RX ADMIN — POLYETHYLENE GLYCOL 3350 17 GRAM(S): 17 POWDER, FOR SOLUTION ORAL at 17:48

## 2020-02-08 RX ADMIN — CHLORHEXIDINE GLUCONATE 15 MILLILITER(S): 213 SOLUTION TOPICAL at 06:17

## 2020-02-08 RX ADMIN — HEPARIN SODIUM 8 UNIT(S)/HR: 5000 INJECTION INTRAVENOUS; SUBCUTANEOUS at 08:35

## 2020-02-08 RX ADMIN — AMIODARONE HYDROCHLORIDE 400 MILLIGRAM(S): 400 TABLET ORAL at 19:29

## 2020-02-08 RX ADMIN — TAMSULOSIN HYDROCHLORIDE 0.4 MILLIGRAM(S): 0.4 CAPSULE ORAL at 21:47

## 2020-02-08 RX ADMIN — FENTANYL CITRATE 50 MICROGRAM(S): 50 INJECTION INTRAVENOUS at 21:55

## 2020-02-08 RX ADMIN — FENTANYL CITRATE 25 MICROGRAM(S): 50 INJECTION INTRAVENOUS at 11:40

## 2020-02-08 RX ADMIN — MEROPENEM 100 MILLIGRAM(S): 1 INJECTION INTRAVENOUS at 19:29

## 2020-02-08 RX ADMIN — HYDROMORPHONE HYDROCHLORIDE 0.5 MILLIGRAM(S): 2 INJECTION INTRAMUSCULAR; INTRAVENOUS; SUBCUTANEOUS at 13:16

## 2020-02-08 RX ADMIN — Medication 81 MILLIGRAM(S): at 17:41

## 2020-02-08 RX ADMIN — NICARDIPINE HYDROCHLORIDE 15 MG/HR: 30 CAPSULE, EXTENDED RELEASE ORAL at 11:41

## 2020-02-08 RX ADMIN — DEXMEDETOMIDINE HYDROCHLORIDE IN 0.9% SODIUM CHLORIDE 6.08 MICROGRAM(S)/KG/HR: 4 INJECTION INTRAVENOUS at 08:36

## 2020-02-08 RX ADMIN — Medication 50 MILLIEQUIVALENT(S): at 11:40

## 2020-02-08 RX ADMIN — Medication 1 DROP(S): at 17:48

## 2020-02-08 RX ADMIN — HEPARIN SODIUM 7.5 UNIT(S)/HR: 5000 INJECTION INTRAVENOUS; SUBCUTANEOUS at 17:52

## 2020-02-08 RX ADMIN — Medication 50 MILLIEQUIVALENT(S): at 12:20

## 2020-02-08 RX ADMIN — Medication 1 TABLET(S): at 17:41

## 2020-02-08 RX ADMIN — HYDROMORPHONE HYDROCHLORIDE 0.5 MILLIGRAM(S): 2 INJECTION INTRAMUSCULAR; INTRAVENOUS; SUBCUTANEOUS at 13:31

## 2020-02-08 RX ADMIN — CHLORHEXIDINE GLUCONATE 1 APPLICATION(S): 213 SOLUTION TOPICAL at 06:17

## 2020-02-08 RX ADMIN — SODIUM CHLORIDE 10 MILLILITER(S): 9 INJECTION INTRAMUSCULAR; INTRAVENOUS; SUBCUTANEOUS at 08:37

## 2020-02-08 RX ADMIN — FENTANYL CITRATE 50 MICROGRAM(S): 50 INJECTION INTRAVENOUS at 21:40

## 2020-02-08 RX ADMIN — Medication 0.25 MILLIGRAM(S): at 14:19

## 2020-02-08 RX ADMIN — MUPIROCIN 1 APPLICATION(S): 20 OINTMENT TOPICAL at 06:17

## 2020-02-08 RX ADMIN — INSULIN HUMAN 4 UNIT(S)/HR: 100 INJECTION, SOLUTION SUBCUTANEOUS at 08:36

## 2020-02-08 RX ADMIN — Medication 40 MILLIGRAM(S): at 10:00

## 2020-02-08 RX ADMIN — Medication 50 GRAM(S): at 12:19

## 2020-02-08 RX ADMIN — Medication 5 MILLIGRAM(S): at 12:48

## 2020-02-08 NOTE — PROGRESS NOTE ADULT - SUBJECTIVE AND OBJECTIVE BOX
CARDIOLOGY FOLLOW UP - Dr. Steel    CC   seen earlier today; off sedation, intubated  plan for extubation today       PHYSICAL EXAM:  T(C): 38.1 (02-08-20 @ 08:00), Max: 38.5 (02-07-20 @ 20:00)  HR: 97 (02-08-20 @ 11:00) (69 - 98)  BP: 110/68 (02-08-20 @ 01:00) (110/68 - 110/68)  RR: 18 (02-08-20 @ 11:00) (14 - 24)  SpO2: 97% (02-08-20 @ 11:00) (97% - 100%)  Wt(kg): --  I&O's Summary    07 Feb 2020 07:01  -  08 Feb 2020 07:00  --------------------------------------------------------  IN: 992 mL / OUT: 1865 mL / NET: -873 mL    08 Feb 2020 07:01  -  08 Feb 2020 11:29  --------------------------------------------------------  IN: 223.4 mL / OUT: 465 mL / NET: -241.6 mL        Appearance: NAD, intubated 	  Cardiovascular: Normal S1 S2,RRR  Respiratory: rhonchi   Gastrointestinal:  Soft, Non-tender, + BS	  Extremities: Normal range of motion bl LE  edema        MEDICATIONS  (STANDING):  artificial tears (preservative free) Ophthalmic Solution 1 Drop(s) Both EYES two times a day  aspirin  chewable 81 milliGRAM(s) Oral daily  chlorhexidine 2% Cloths 1 Application(s) Topical <User Schedule>  dexMEDEtomidine Infusion 0.3 MICROgram(s)/kG/Hr (6.082 mL/Hr) IV Continuous <Continuous>  dextrose 5% + sodium chloride 0.9%. 1000 milliLiter(s) (25 mL/Hr) IV Continuous <Continuous>  dextrose 5%. 1000 milliLiter(s) (50 mL/Hr) IV Continuous <Continuous>  dextrose 50% Injectable 12.5 Gram(s) IV Push once  dextrose 50% Injectable 25 Gram(s) IV Push once  dextrose 50% Injectable 25 Gram(s) IV Push once  fentaNYL    Injectable 25 MICROGram(s) IV Push once  heparin  Infusion 1000 Unit(s)/Hr (8 mL/Hr) IV Continuous <Continuous>  insulin regular Infusion 4 Unit(s)/Hr (4 mL/Hr) IV Continuous <Continuous>  magnesium sulfate  IVPB 2 Gram(s) IV Intermittent once  multivitamin 1 Tablet(s) Oral daily  niCARdipine Infusion 3 mG/Hr (15 mL/Hr) IV Continuous <Continuous>  pantoprazole  Injectable 40 milliGRAM(s) IV Push daily  polyethylene glycol 3350 17 Gram(s) Oral daily  potassium chloride  10 mEq/50 mL IVPB 10 milliEquivalent(s) IV Intermittent once  potassium chloride  10 mEq/50 mL IVPB 10 milliEquivalent(s) IV Intermittent every 1 hour  sodium chloride 0.9%. 1000 milliLiter(s) (10 mL/Hr) IV Continuous <Continuous>  tamsulosin 0.4 milliGRAM(s) Oral at bedtime      TELEMETRY: nsr, pac 	    ECG:  	  RADIOLOGY:   DIAGNOSTIC TESTING:  [ ] Echocardiogram:  [ ]  Catheterization:  [ ] Stress Test:    OTHER: 	  < from: [facility] Duplex Lower Ext Vein Scan, Bilat (02.07.20 @ 13:20) >  IMPRESSION:     No evidence of deep venous thrombosis in either lower extremity.      < end of copied text >    LABS:	 	    Creatine Kinase, Serum: 648 U/L [30 - 200] (02-03 @ 17:22)  CKMB Units: 63.0 ng/mL [0.0 - 6.7] (02-03 @ 17:22)  Troponin T, High Sensitivity Result: 2148 ng/L [0 - 51] (02-03 @ 17:22)                          8.7    12.97 )-----------( 174      ( 08 Feb 2020 01:54 )             25.8     02-08    132<L>  |  99  |  56<H>  ----------------------------<  114<H>  4.4   |  18<L>  |  2.55<H>    Ca    7.8<L>      08 Feb 2020 01:54  Phos  3.6     02-08  Mg     2.3     02-08    TPro  5.4<L>  /  Alb  2.1<L>  /  TBili  0.6  /  DBili  x   /  AST  160<H>  /  ALT  288<H>  /  AlkPhos  147<H>  02-08    PT/INR - ( 07 Feb 2020 02:06 )   PT: 14.3 sec;   INR: 1.24 ratio         PTT - ( 08 Feb 2020 06:47 )  PTT:93.7 sec

## 2020-02-08 NOTE — PROGRESS NOTE ADULT - SUBJECTIVE AND OBJECTIVE BOX
[facility] Division of Kidney Diseases & Hypertension  FOLLOW UP NOTE  [phone]--------------------------------------------------------------------------------  Chief Complaint:Acute ischemic heart disease      24 hour events/subjective:    Patient seen today, intubated and sedated. No acute events overnight  Charts and labs reviewed. SCr: 2.55 today  UO: 1.8L over the past 24 hours        PAST HISTORY  --------------------------------------------------------------------------------  No significant changes to PMH, PSH, FHx, SHx, unless otherwise noted    ALLERGIES & MEDICATIONS  --------------------------------------------------------------------------------  Allergies    No Known Allergies    Intolerances      Standing Inpatient Medications  artificial tears (preservative free) Ophthalmic Solution 1 Drop(s) Both EYES two times a day  aspirin  chewable 81 milliGRAM(s) Oral daily  chlorhexidine 0.12% Liquid 15 milliLiter(s) Oral Mucosa every 12 hours  chlorhexidine 2% Cloths 1 Application(s) Topical <User Schedule>  dexMEDEtomidine Infusion 0.3 MICROgram(s)/kG/Hr IV Continuous <Continuous>  dextrose 5% + sodium chloride 0.9%. 1000 milliLiter(s) IV Continuous <Continuous>  dextrose 5%. 1000 milliLiter(s) IV Continuous <Continuous>  dextrose 50% Injectable 12.5 Gram(s) IV Push once  dextrose 50% Injectable 25 Gram(s) IV Push once  dextrose 50% Injectable 25 Gram(s) IV Push once  heparin  Infusion 1000 Unit(s)/Hr IV Continuous <Continuous>  insulin regular Infusion 4 Unit(s)/Hr IV Continuous <Continuous>  multivitamin 1 Tablet(s) Oral daily  niCARdipine Infusion 3 mG/Hr IV Continuous <Continuous>  pantoprazole  Injectable 40 milliGRAM(s) IV Push daily  polyethylene glycol 3350 17 Gram(s) Oral daily  potassium chloride  10 mEq/50 mL IVPB 10 milliEquivalent(s) IV Intermittent every 1 hour  sodium chloride 0.9%. 1000 milliLiter(s) IV Continuous <Continuous>  tamsulosin 0.4 milliGRAM(s) Oral at bedtime    PRN Inpatient Medications  dextrose 40% Gel 15 Gram(s) Oral once PRN  glucagon  Injectable 1 milliGRAM(s) IntraMuscular once PRN  sodium chloride 0.9% lock flush 10 milliLiter(s) IV Push every 1 hour PRN      REVIEW OF SYSTEMS  --------------------------------------------------------------------------------  Unable to obtain.  VITALS/PHYSICAL EXAM  --------------------------------------------------------------------------------  T(C): 38.1 (02-08-20 @ 08:00), Max: 38.5 (02-07-20 @ 20:00)  HR: 97 (02-08-20 @ 11:00) (69 - 98)  BP: 110/68 (02-08-20 @ 01:00) (110/68 - 110/68)  RR: 18 (02-08-20 @ 11:00) (14 - 24)  SpO2: 97% (02-08-20 @ 11:00) (97% - 100%)  Wt(kg): --        02-07-20 @ 07:01  -  02-08-20 @ 07:00  --------------------------------------------------------  IN: 992 mL / OUT: 1865 mL / NET: -873 mL    02-08-20 @ 07:01  -  02-08-20 @ 11:17  --------------------------------------------------------  IN: 223.4 mL / OUT: 465 mL / NET: -241.6 mL      Physical Exam:  	Gen: intubated, awake  	HEENT: + ETT  	Pulm: CTA b/l  	CV: + central chest dressing from CABG C/D/I, S1S2  	Abd: Soft, +BS   	Ext: trace LE edema B/L  	Neuro: Awake  	Skin: Warm and dry    LABS/STUDIES  --------------------------------------------------------------------------------              8.7    12.97 >-----------<  174      [02-08-20 @ 01:54]              25.8     132  |  99  |  56  ----------------------------<  114      [02-08-20 @ 01:54]  4.4   |  18  |  2.55        Ca     7.8     [02-08-20 @ 01:54]      Mg     2.3     [02-08-20 @ 01:54]      Phos  3.6     [02-08-20 @ 01:54]    TPro  5.4  /  Alb  2.1  /  TBili  0.6  /  DBili  x   /  AST  160  /  ALT  288  /  AlkPhos  147  [02-08-20 @ 01:54]    PT/INR: PT 14.3 , INR 1.24       [02-07-20 @ 02:06]  PTT: 93.7       [02-08-20 @ 06:47]      Creatinine Trend:  SCr 2.55 [02-08 @ 01:54]  SCr 2.53 [02-07 @ 02:06]  SCr 2.58 [02-06 @ 23:46]  SCr 2.55 [02-06 @ 09:59]  SCr 2.47 [02-05 @ 04:31]    Urinalysis - [02-08-20 @ 02:06]      Color Yellow / Appearance Clear / SG 1.023 / pH 6.0      Gluc Trace / Ketone Negative  / Bili Negative / Urobili 2 mg/dL       Blood Moderate / Protein 100 mg/dL / Leuk Est Negative / Nitrite Negative      RBC 15 / WBC 6 / Hyaline 8 / Gran 2 / Sq Epi  / Non Sq Epi 1 / Bacteria Negative        HCV 0.16, Nonreact      [02-04-20 @ 10:29]

## 2020-02-08 NOTE — PROGRESS NOTE ADULT - ASSESSMENT
intraop kennedy 2/3/20 ef 50%, nl lv, mild diastolic dysfx stage 1  limited echo 2/4/20: nl LV sys fx , no pericardial effusion     a/p  64 year old man with history of HTN, DM II, admitted with progressive exertional angina, s/p cath with severe triple vessel disease, s/p CABG, post op course c/b brief witnessed PEA likely hypoxic arrest, s/p intubation.     1. CAD, s/p cath with severe triple vessel disease including lesions at the bifurcation of the LAD/diagonal and distal RCA/RPDA/RPL trifurcation.   -s/p CABG x 4   -intraop kennedy ef 50%  -cont asa, statin  -s/p PEA arrest,   -events noted now extubation   -off vasopressors  -cont a/c : hep gtt   -LE dopplers, negative for dvt     2. HTN  -bp stable off pressors    3. STEPHANIE/CKD  renal f/u     4. Diuresis per CTICU    5. S/P Right pigtail placement    dvt ppx  d/w bedside nurse

## 2020-02-08 NOTE — PROGRESS NOTE ADULT - ATTENDING COMMENTS
Agree with above NP note.  events noted  extubated  diuresis per cticu   asa  trend creat  care per cticu

## 2020-02-08 NOTE — PROGRESS NOTE ADULT - SUBJECTIVE AND OBJECTIVE BOX
FRANKLIN PRESLEY  MRN-[id number]  Patient is a 64y old  Male who presents with a chief complaint of Chest pain (2020 15:06)    HPI:  63yo male with hx of HTN, DM II, presented to the ED with complaints of acute on chronic left sided exertional chest pain. Pt states he has been having left sided pressure and stabbing chest pain radiating to his sternum and right with activity for the past few months. He states each episode last approximately 1-2 mins and subsides upon resting. He denies associated symptoms of radiation to his back, arm or jaw. He recently was at a wedding which he could not enjoy because dancing would reproduce to the pain. He states he did not pursue and medical attention until this time. Pt states this time his pain was associated with sob up exertion. He denies symptoms of LOC, diaphoresis, palpitations, abd pain, N/V, fever or chills. He denies prior cardiac work up. (2020 12:57)      Surgery/Hospital course:  2/3/20 CABG C4L patch angioplasty of LAD  20 witnessed PEA arrest     Today:    REVIEW OF SYSTEMS:  Gen: No fever  EYES/ENT: No visual changes;  No vertigo or throat pain   NECK: No pain   RES:  No shortness of breath or Cough  Chest: + incisional pain  CARD: No chest pain   GI: No abdominal pain  : No dysuria  NEURO: No weakness  SKIN: No itching, rashes     Physical Exam:  Vital Signs Last 24 Hrs  T(C): 38.3 (2020 06:00), Max: 38.5 (2020 20:00)  T(F): 100.9 (2020 06:00), Max: 101.3 (2020 20:00)  HR: 81 (2020 06:00) (69 - 84)  BP: 110/68 (2020 01:00) (110/68 - 110/68)  BP(mean): 84 (2020 01:00) (84 - 84)  RR: 17 (2020 06:00) (12 - 34)  SpO2: 100% (2020 06:00) (64% - 100%)  Gen:  Awake, alert   CNS: non focal 	  Neck: no JVD  RES : clear , no wheezing    Chest:   + chest tubes                     CVS: Regular  rhythm. Normal S1/S2  Abd: Soft, non-distended. Bowel sounds present.  Skin: No rash.  Ext:  no edema, A Line    ============================I/O===========================   I&O's Detail    2020 07:  -  2020 07:00  --------------------------------------------------------  IN:    heparin Infusion: 60 mL    insulin regular Infusion: 5 mL    Oral Fluid: 480 mL    Packed Red Blood Cells: 400 mL    phenylephrine   Infusion: 35 mL    propofol Infusion: 138.6 mL    sodium chloride 0.9%.: 70 mL  Total IN: 1188.6 mL    OUT:    Indwelling Catheter - Urethral: 1125 mL    Nasoenteral Tube: 400 mL    Voided: 975 mL  Total OUT: 2500 mL    Total NET: -1311.4 mL      2020 07:  -  2020 06:47  --------------------------------------------------------  IN:    dexmedetomidine Infusion: 198.2 mL    dextrose 5% + sodium chloride 0.9%.: 500 mL    heparin Infusion: 196 mL    insulin regular Infusion: 12 mL    propofol Infusion: 55.8 mL    sodium chloride 0.9%.: 30 mL  Total IN: 992 mL    OUT:    Chest Tube: 670 mL    Indwelling Catheter - Urethral: 1045 mL    Nasoenteral Tube: 150 mL  Total OUT: 1865 mL    Total NET: -873 mL        ============================ LABS =========================                        8.7    12.97 )-----------( 174      ( 2020 01:54 )             25.8     02-08    132<L>  |  99  |  56<H>  ----------------------------<  114<H>  4.4   |  18<L>  |  2.55<H>    Ca    7.8<L>      2020 01:54  Phos  3.6     02-  Mg     2.3     -08    TPro  5.4<L>  /  Alb  2.1<L>  /  TBili  0.6  /  DBili  x   /  AST  160<H>  /  ALT  288<H>  /  AlkPhos  147<H>  02-08    LIVER FUNCTIONS - ( 2020 01:54 )  Alb: 2.1 g/dL / Pro: 5.4 g/dL / ALK PHOS: 147 U/L / ALT: 288 U/L / AST: 160 U/L / GGT: x           PT/INR - ( 2020 02:06 )   PT: 14.3 sec;   INR: 1.24 ratio         PTT - ( 2020 23:45 )  PTT:96.6 sec  ABG - ( 2020 06:37 )  pH, Arterial: 7.43  pH, Blood: x     /  pCO2: 29    /  pO2: 130   / HCO3: 19    / Base Excess: -4.4  /  SaO2: 99                Urinalysis Basic - ( 2020 02:06 )    Color: Yellow / Appearance: Clear / S.023 / pH: x  Gluc: x / Ketone: Negative  / Bili: Negative / Urobili: 2 mg/dL   Blood: x / Protein: 100 mg/dL / Nitrite: Negative   Leuk Esterase: Negative / RBC: 15 /hpf / WBC 6 /HPF   Sq Epi: x / Non Sq Epi: 1 / Bacteria: Negative      ======================Micro/Rad/Cardio=================  Culture: Reviewed   CXR: Reviewed  Echo:Reviewed  ======================================================  PAST MEDICAL & SURGICAL HISTORY:  HTN (hypertension)  Diabetes  History of appendectomy: x 30 yrs ago    ====================ASSESMENT ==============  2/3/20 CABG C4L patch angioplasty of LAD  PEA arrest   acute systolic CHF  hemodynamic instability  Hyperglycemia  CAD  CKD  DM2  essential hypertension       Plan:  ====================== NEUROLOGY=====================  dexMEDEtomidine Infusion 0.3 MICROgram(s)/kG/Hr (6.082 mL/Hr) IV Continuous <Continuous>    ==================== RESPIRATORY======================  Mechanical Ventilation:  Mode: AC/ CMV (Assist Control/ Continuous Mandatory Ventilation)  RR (machine): 12  TV (machine): 500  FiO2: 50  PEEP: 8      ====================CARDIOVASCULAR==================  tamsulosin 0.4 milliGRAM(s) Oral at bedtime    ===================HEMATOLOGIC/ONC ===================  aspirin  chewable 81 milliGRAM(s) Oral daily  heparin  Infusion 1000 Unit(s)/Hr (8.5 mL/Hr) IV Continuous <Continuous>    ===================== RENAL =========================  Osorio for monitoring urine output    ==================== GASTROINTESTINAL===================  dextrose 5% + sodium chloride 0.9%. 1000 milliLiter(s) (25 mL/Hr) IV Continuous <Continuous>  dextrose 5%. 1000 milliLiter(s) (50 mL/Hr) IV Continuous <Continuous>  multivitamin 1 Tablet(s) Oral daily  pantoprazole  Injectable 40 milliGRAM(s) IV Push daily  polyethylene glycol 3350 17 Gram(s) Oral daily    =======================    ENDOCRINE  =====================  glucagon  Injectable 1 milliGRAM(s) IntraMuscular once PRN Glucose LESS THAN 70 milligrams/deciliter  insulin regular Infusion 4 Unit(s)/Hr (4 mL/Hr) IV Continuous <Continuous>    ========================INFECTIOUS DISEASE================      .crit    By signing my name below, I, Ciara Coronado, attest that this documentation has been prepared under the direction and in the presence of Dr. Carranza.  Electronically signed: Doreen Castaneda, 20 @ 06:47    I Dr. Carranza, personally performed the services described in this documentation. all medical record entries made by the doreen were at my direction and in my presence. I have reviewed the chart and agree that the record reflects my personal performance and is accurate and complete  Electronically signed: Jessenia Carranza, 20 @ 06:47 FRANKLIN PRESLEY  MRN-[id number]  Patient is a 64y old  Male who presents with a chief complaint of Chest pain (2020 15:06)    HPI:  63yo male with hx of HTN, DM II, presented to the ED with complaints of acute on chronic left sided exertional chest pain. Pt states he has been having left sided pressure and stabbing chest pain radiating to his sternum and right with activity for the past few months. He states each episode last approximately 1-2 mins and subsides upon resting. He denies associated symptoms of radiation to his back, arm or jaw. He recently was at a wedding which he could not enjoy because dancing would reproduce to the pain. He states he did not pursue and medical attention until this time. Pt states this time his pain was associated with sob up exertion. He denies symptoms of LOC, diaphoresis, palpitations, abd pain, N/V, fever or chills. He denies prior cardiac work up. (2020 12:57)      Surgery/Hospital course:  2/3/20 CABG C4L patch angioplasty of LAD  20 witnessed PEA arrest     Today:  Wean off ventilator to extubation   C/o incisional chest pain, mild cough, no fever    Drips: Heparin, Nicardipine,       REVIEW OF SYSTEMS:  Gen: No fever  EYES/ENT: No visual changes;  No vertigo or throat pain   NECK: No pain   RES:  No shortness of breath or Cough  Chest: + incisional pain  CARD: No chest pain   GI: No abdominal pain  : No dysuria  NEURO: No weakness  SKIN: No itching, rashes     Physical Exam:  Vital Signs Last 24 Hrs  T(C): 38.3 (2020 06:00), Max: 38.5 (2020 20:00)  T(F): 100.9 (2020 06:00), Max: 101.3 (2020 20:00)  HR: 81 (2020 06:00) (69 - 84)  BP: 110/68 (2020 01:00) (110/68 - 110/68)  BP(mean): 84 (2020 01:00) (84 - 84)  RR: 17 (2020 06:00) (12 - 34)  SpO2: 100% (2020 06:00) (64% - 100%)  Gen:  Awake, alert   CNS: non focal 	  Neck: no JVD  RES : minimal rhonchi, no wheezing   Chest:   + chest tubes                     CVS: Tachycardia, aflutter   Abd: Soft, non-distended. Bowel sounds present.  Skin: No rash.  Ext:  +edema, A Line    ============================I/O===========================   I&O's Detail    2020 07:01  -  2020 07:00  --------------------------------------------------------  IN:    heparin Infusion: 60 mL    insulin regular Infusion: 5 mL    Oral Fluid: 480 mL    Packed Red Blood Cells: 400 mL    phenylephrine   Infusion: 35 mL    propofol Infusion: 138.6 mL    sodium chloride 0.9%.: 70 mL  Total IN: 1188.6 mL    OUT:    Indwelling Catheter - Urethral: 1125 mL    Nasoenteral Tube: 400 mL    Voided: 975 mL  Total OUT: 2500 mL    Total NET: -1311.4 mL      2020 07:01  -  2020 06:47  --------------------------------------------------------  IN:    dexmedetomidine Infusion: 198.2 mL    dextrose 5% + sodium chloride 0.9%.: 500 mL    heparin Infusion: 196 mL    insulin regular Infusion: 12 mL    propofol Infusion: 55.8 mL    sodium chloride 0.9%.: 30 mL  Total IN: 992 mL    OUT:    Chest Tube: 670 mL    Indwelling Catheter - Urethral: 1045 mL    Nasoenteral Tube: 150 mL  Total OUT: 1865 mL    Total NET: -873 mL        ============================ LABS =========================                        8.7    12.97 )-----------( 174      ( 2020 01:54 )             25.8     02-08    132<L>  |  99  |  56<H>  ----------------------------<  114<H>  4.4   |  18<L>  |  2.55<H>    Ca    7.8<L>      2020 01:54  Phos  3.6     02-  Mg     2.3     -    TPro  5.4<L>  /  Alb  2.1<L>  /  TBili  0.6  /  DBili  x   /  AST  160<H>  /  ALT  288<H>  /  AlkPhos  147<H>  02    LIVER FUNCTIONS - ( 2020 01:54 )  Alb: 2.1 g/dL / Pro: 5.4 g/dL / ALK PHOS: 147 U/L / ALT: 288 U/L / AST: 160 U/L / GGT: x           PT/INR - ( 2020 02:06 )   PT: 14.3 sec;   INR: 1.24 ratio         PTT - ( 2020 23:45 )  PTT:96.6 sec  ABG - ( 2020 06:37 )  pH, Arterial: 7.43  pH, Blood: x     /  pCO2: 29    /  pO2: 130   / HCO3: 19    / Base Excess: -4.4  /  SaO2: 99                Urinalysis Basic - ( 2020 02:06 )    Color: Yellow / Appearance: Clear / S.023 / pH: x  Gluc: x / Ketone: Negative  / Bili: Negative / Urobili: 2 mg/dL   Blood: x / Protein: 100 mg/dL / Nitrite: Negative   Leuk Esterase: Negative / RBC: 15 /hpf / WBC 6 /HPF   Sq Epi: x / Non Sq Epi: 1 / Bacteria: Negative      ======================Micro/Rad/Cardio=================  Culture: Reviewed   CXR: Reviewed  Echo:Reviewed  ======================================================  PAST MEDICAL & SURGICAL HISTORY:  HTN (hypertension)  Diabetes  History of appendectomy: x 30 yrs ago    ====================ASSESMENT ==============  2/3/20 CABG C4L patch angioplasty of LAD  PEA arrest   acute systolic CHF  hypertension   fever   shock liver   acute on chronic STEPHANIE   CAD/ASHD   CKD  DM2  essential hypertension     Plan:  ====================== NEUROLOGY=====================  Analgesia, fentanyl IV   ==================== RESPIRATORY======================  Weaning to extubation, pO2 66, post extubation requires HF O2 support   Repeat ABG    Mechanical Ventilation:  Mode: AC/ CMV (Assist Control/ Continuous Mandatory Ventilation)  RR (machine): 12  TV (machine): 500  FiO2: 50  PEEP: 8      ====================CARDIOVASCULAR==================  Atrial flutter, new onset, will replace potassium and magnesium, monitor HR   Pt with elevated liver enzymes, hold off amiodarone for now     ===================HEMATOLOGIC/ONC ===================  aspirin  chewable 81 milliGRAM(s) Oral daily  heparin  Infusion 1000 Unit(s)/Hr (8.5 mL/Hr) IV Continuous <Continuous>    ===================== RENAL =========================  Osorio for monitoring urine output    tamsulosin 0.4 milliGRAM(s) Oral at bedtime      ==================== GASTROINTESTINAL===================  dextrose 5% + sodium chloride 0.9%. 1000 milliLiter(s) (25 mL/Hr) IV Continuous <Continuous>  dextrose 5%. 1000 milliLiter(s) (50 mL/Hr) IV Continuous <Continuous>  multivitamin 1 Tablet(s) Oral daily  pantoprazole  Injectable 40 milliGRAM(s) IV Push daily  polyethylene glycol 3350 17 Gram(s) Oral daily    =======================    ENDOCRINE  =====================  glucagon  Injectable 1 milliGRAM(s) IntraMuscular once PRN Glucose LESS THAN 70 milligrams/deciliter  insulin regular Infusion 4 Unit(s)/Hr (4 mL/Hr) IV Continuous <Continuous>      .crit    By signing my name below, I, Ciara Coronado, attest that this documentation has been prepared under the direction and in the presence of Dr. Carranza.  Electronically signed: Phill Castaneda, 20 @ 06:47    I Dr. Carranza, personally performed the services described in this documentation. all medical record entries made by the steveibandreas were at my direction and in my presence. I have reviewed the chart and agree that the record reflects my personal performance and is accurate and complete  Electronically signed: Jessenia Carranza, 20 @ 06:47 FRANKLIN PRESLEY  MRN-[id number]  Patient is a 64y old  Male who presents with a chief complaint of Chest pain (2020 15:06)    HPI:  63yo male with hx of HTN, DM II, presented to the ED with complaints of acute on chronic left sided exertional chest pain. Pt states he has been having left sided pressure and stabbing chest pain radiating to his sternum and right with activity for the past few months. He states each episode last approximately 1-2 mins and subsides upon resting. He denies associated symptoms of radiation to his back, arm or jaw. He recently was at a wedding which he could not enjoy because dancing would reproduce to the pain. He states he did not pursue and medical attention until this time. Pt states this time his pain was associated with sob up exertion. He denies symptoms of LOC, diaphoresis, palpitations, abd pain, N/V, fever or chills. He denies prior cardiac work up. (2020 12:57)      Surgery/Hospital course:  2/3/20 CABG C4L patch angioplasty of LAD  20 witnessed PEA arrest     Today:  Wean off ventilator to extubation   C/o incisional chest pain, mild cough, no fever    Drips: Heparin, Nicardipine,       REVIEW OF SYSTEMS:  Gen: No fever  EYES/ENT: No visual changes;  No vertigo or throat pain   NECK: No pain   RES:  + shortness of breath   Chest: + incisional pain  CARD: No chest pain   GI: No abdominal pain  : No dysuria  NEURO: No weakness  SKIN: No itching, rashes     Physical Exam:  Vital Signs Last 24 Hrs  T(C): 38.3 (2020 06:00), Max: 38.5 (2020 20:00)  T(F): 100.9 (2020 06:00), Max: 101.3 (2020 20:00)  HR: 81 (2020 06:00) (69 - 84)  BP: 110/68 (2020 01:00) (110/68 - 110/68)  BP(mean): 84 (2020 01:00) (84 - 84)  RR: 17 (2020 06:00) (12 - 34)  SpO2: 100% (2020 06:00) (64% - 100%)  Gen:  Awake, alert   CNS: non focal 	  Neck: no JVD  RES : minimal rhonchi, no wheezing   Chest:   + chest tubes                     CVS: Tachycardia, aflutter   Abd: Soft, non-distended. Bowel sounds present.  Skin: No rash.  Ext:  +edema, A Line    ============================I/O===========================   I&O's Detail    2020 07:01  -  2020 07:00  --------------------------------------------------------  IN:    heparin Infusion: 60 mL    insulin regular Infusion: 5 mL    Oral Fluid: 480 mL    Packed Red Blood Cells: 400 mL    phenylephrine   Infusion: 35 mL    propofol Infusion: 138.6 mL    sodium chloride 0.9%.: 70 mL  Total IN: 1188.6 mL    OUT:    Indwelling Catheter - Urethral: 1125 mL    Nasoenteral Tube: 400 mL    Voided: 975 mL  Total OUT: 2500 mL    Total NET: -1311.4 mL      2020 07:  -  2020 06:47  --------------------------------------------------------  IN:    dexmedetomidine Infusion: 198.2 mL    dextrose 5% + sodium chloride 0.9%.: 500 mL    heparin Infusion: 196 mL    insulin regular Infusion: 12 mL    propofol Infusion: 55.8 mL    sodium chloride 0.9%.: 30 mL  Total IN: 992 mL    OUT:    Chest Tube: 670 mL    Indwelling Catheter - Urethral: 1045 mL    Nasoenteral Tube: 150 mL  Total OUT: 1865 mL    Total NET: -873 mL        ============================ LABS =========================                        8.7    12.97 )-----------( 174      ( 2020 01:54 )             25.8     02-08    132<L>  |  99  |  56<H>  ----------------------------<  114<H>  4.4   |  18<L>  |  2.55<H>    Ca    7.8<L>      2020 01:54  Phos  3.6     02-  Mg     2.3     -    TPro  5.4<L>  /  Alb  2.1<L>  /  TBili  0.6  /  DBili  x   /  AST  160<H>  /  ALT  288<H>  /  AlkPhos  147<H>  02    LIVER FUNCTIONS - ( 2020 01:54 )  Alb: 2.1 g/dL / Pro: 5.4 g/dL / ALK PHOS: 147 U/L / ALT: 288 U/L / AST: 160 U/L / GGT: x           PT/INR - ( 2020 02:06 )   PT: 14.3 sec;   INR: 1.24 ratio         PTT - ( 2020 23:45 )  PTT:96.6 sec  ABG - ( 2020 06:37 )  pH, Arterial: 7.43  pH, Blood: x     /  pCO2: 29    /  pO2: 130   / HCO3: 19    / Base Excess: -4.4  /  SaO2: 99                Urinalysis Basic - ( 2020 02:06 )    Color: Yellow / Appearance: Clear / S.023 / pH: x  Gluc: x / Ketone: Negative  / Bili: Negative / Urobili: 2 mg/dL   Blood: x / Protein: 100 mg/dL / Nitrite: Negative   Leuk Esterase: Negative / RBC: 15 /hpf / WBC 6 /HPF   Sq Epi: x / Non Sq Epi: 1 / Bacteria: Negative      ======================Micro/Rad/Cardio=================  Culture: Reviewed   CXR: Reviewed  Echo:Reviewed  ======================================================  PAST MEDICAL & SURGICAL HISTORY:  HTN (hypertension)  Diabetes  History of appendectomy: x 30 yrs ago    ====================ASSESMENT ==============  2/3/20 CABG C4L patch angioplasty of LAD  PEA arrest   acute systolic CHF  hypertension   atrial fibrillation with RVR  fever   shock liver   acute on chronic STEPHANIE   CAD/ASHD   CKD  DM2  essential hypertension     Plan:  ====================== NEUROLOGY=====================  Analgesia, fentanyl , dilaudid IV   ==================== RESPIRATORY======================  Weaning to extubation, pO2 66, post extubation requires HF O2 support   Repeat ABG    Mechanical Ventilation:  Mode: AC/ CMV (Assist Control/ Continuous Mandatory Ventilation)  RR (machine): 12  TV (machine): 500  FiO2: 50  PEEP: 8      ====================CARDIOVASCULAR==================  Atrial flutter, new onset, will replace potassium and magnesium, monitor HR   Pt with elevated liver enzymes, hold off amiodarone for now , repeat LFT/CMP  digoxine iv x1  ===================HEMATOLOGIC/ONC ===================  aspirin  chewable 81 milliGRAM(s) Oral daily  heparin  Infusion 1000 Unit(s)/Hr (8.5 mL/Hr) IV Continuous <Continuous>    ===================== RENAL =========================  Osorio for monitoring urine output  tamsulosin 0.4 milliGRAM(s) Oral at bedtime      ==================== GASTROINTESTINAL===================  dextrose 5% + sodium chloride 0.9%. 1000 milliLiter(s) (25 mL/Hr) IV Continuous <Continuous>  multivitamin 1 Tablet(s) Oral daily  pantoprazole  Injectable 40 milliGRAM(s) IV Push daily  polyethylene glycol 3350 17 Gram(s) Oral daily    =======================    ENDOCRINE  =====================  glucagon  Injectable 1 milliGRAM(s) IntraMuscular once PRN Glucose LESS THAN 70 milligrams/deciliter  insulin regular Infusion 4 Unit(s)/Hr (4 mL/Hr) IV Continuous <Continuous>      Patient requires continuous monitoring with bedside rhythm monitoring,arterial line,pulse oximetry,ventilator monitoring/ extubation ;intermittent blood gas analysis.  Care plan discussed with ICU care team.  patient remain critical; required more than usual post op care; I have spent 35 minutes providing non routine post op care.      By signing my name below, I, Ciara Coronado, attest that this documentation has been prepared under the direction and in the presence of Dr. Carranza.  Electronically signed: Doreen Castaneda, 20 @ 06:47    I Dr. Carranza, personally performed the services described in this documentation. all medical record entries made by the doreen were at my direction and in my presence. I have reviewed the chart and agree that the record reflects my personal performance and is accurate and complete  Electronically signed: Jessenia Carranza, 20 @ 06:47

## 2020-02-08 NOTE — PROGRESS NOTE ADULT - SUBJECTIVE AND OBJECTIVE BOX
Chief complaint  Patient is a 64y old  Male who presents with a chief complaint of Chest pain (08 Feb 2020 11:29)   Review of systems  Patient in bed, looks comfortable, no fever, no hypoglycemia.    Labs and Fingersticks  CAPILLARY BLOOD GLUCOSE  149 (08 Feb 2020 17:00)  135 (08 Feb 2020 16:00)  122 (08 Feb 2020 15:00)  109 (08 Feb 2020 14:00)  92 (08 Feb 2020 13:00)  117 (08 Feb 2020 12:00)  108 (08 Feb 2020 11:00)  92 (08 Feb 2020 10:00)  106 (08 Feb 2020 09:00)  119 (08 Feb 2020 08:00)  132 (08 Feb 2020 06:00)  118 (08 Feb 2020 04:00)  106 (08 Feb 2020 03:00)  107 (08 Feb 2020 02:00)  118 (08 Feb 2020 01:00)  136 (08 Feb 2020 00:00)  141 (07 Feb 2020 23:00)  135 (07 Feb 2020 21:00)  133 (07 Feb 2020 20:00)      POCT Blood Glucose.: 126 mg/dL (08 Feb 2020 18:01)  POCT Blood Glucose.: 135 mg/dL (08 Feb 2020 16:11)  POCT Blood Glucose.: 122 mg/dL (08 Feb 2020 15:00)  POCT Blood Glucose.: 92 mg/dL (08 Feb 2020 13:05)  POCT Blood Glucose.: 106 mg/dL (08 Feb 2020 09:00)  POCT Blood Glucose.: 119 mg/dL (08 Feb 2020 08:14)  POCT Blood Glucose.: 132 mg/dL (08 Feb 2020 06:05)  POCT Blood Glucose.: 118 mg/dL (08 Feb 2020 04:19)  POCT Blood Glucose.: 106 mg/dL (08 Feb 2020 03:14)  POCT Blood Glucose.: 107 mg/dL (08 Feb 2020 02:02)  POCT Blood Glucose.: 136 mg/dL (08 Feb 2020 00:13)  POCT Blood Glucose.: 141 mg/dL (07 Feb 2020 23:15)  POCT Blood Glucose.: 135 mg/dL (07 Feb 2020 21:32)  POCT Blood Glucose.: 115 mg/dL (07 Feb 2020 18:17)      Anion Gap, Serum: 22 <H> (02-08 @ 17:20)  Anion Gap, Serum: 15 (02-08 @ 01:54)  Anion Gap, Serum: 15 (02-07 @ 02:06)  Anion Gap, Serum: 17 (02-06 @ 23:46)      Calcium, Total Serum: 7.8 <L> (02-08 @ 17:20)  Calcium, Total Serum: 7.8 <L> (02-08 @ 01:54)  Calcium, Total Serum: 7.9 <L> (02-07 @ 02:06)  Calcium, Total Serum: 7.3 <L> (02-06 @ 23:46)  Albumin, Serum: 2.5 <L> (02-08 @ 17:20)  Albumin, Serum: 2.1 <L> (02-08 @ 01:54)  Albumin, Serum: 2.2 <L> (02-07 @ 02:06)  Albumin, Serum: 2.3 <L> (02-06 @ 23:46)    Alanine Aminotransferase (ALT/SGPT): 238 <H> (02-08 @ 17:20)  Alanine Aminotransferase (ALT/SGPT): 288 <H> (02-08 @ 01:54)  Alanine Aminotransferase (ALT/SGPT): 314 <H> (02-07 @ 02:06)  Alanine Aminotransferase (ALT/SGPT): 265 <H> (02-06 @ 23:46)  Alkaline Phosphatase, Serum: 147 <H> (02-08 @ 17:20)  Alkaline Phosphatase, Serum: 147 <H> (02-08 @ 01:54)  Alkaline Phosphatase, Serum: 179 <H> (02-07 @ 02:06)  Alkaline Phosphatase, Serum: 177 <H> (02-06 @ 23:46)  Aspartate Aminotransferase (AST/SGOT): 92 <H> (02-08 @ 17:20)  Aspartate Aminotransferase (AST/SGOT): 160 <H> (02-08 @ 01:54)  Aspartate Aminotransferase (AST/SGOT): 357 <H> (02-07 @ 02:06)  Aspartate Aminotransferase (AST/SGOT): 288 <H> (02-06 @ 23:46)        02-08    133<L>  |  96  |  59<H>  ----------------------------<  155<H>  4.6   |  15<L>  |  2.53<H>    Ca    7.8<L>      08 Feb 2020 17:20  Phos  3.6     02-08  Mg     2.3     02-08    TPro  5.5<L>  /  Alb  2.5<L>  /  TBili  0.9  /  DBili  x   /  AST  92<H>  /  ALT  238<H>  /  AlkPhos  147<H>  02-08                        9.2    18.94 )-----------( 218      ( 08 Feb 2020 17:20 )             27.6     Medications  MEDICATIONS  (STANDING):  artificial tears (preservative free) Ophthalmic Solution 1 Drop(s) Both EYES two times a day  aspirin  chewable 81 milliGRAM(s) Oral daily  chlorhexidine 2% Cloths 1 Application(s) Topical <User Schedule>  dexMEDEtomidine Infusion 0.3 MICROgram(s)/kG/Hr (6.082 mL/Hr) IV Continuous <Continuous>  dextrose 5% + sodium chloride 0.9%. 1000 milliLiter(s) (25 mL/Hr) IV Continuous <Continuous>  dextrose 5%. 1000 milliLiter(s) (50 mL/Hr) IV Continuous <Continuous>  dextrose 50% Injectable 12.5 Gram(s) IV Push once  dextrose 50% Injectable 25 Gram(s) IV Push once  dextrose 50% Injectable 25 Gram(s) IV Push once  heparin  Infusion 1000 Unit(s)/Hr (7.5 mL/Hr) IV Continuous <Continuous>  insulin regular Infusion 4 Unit(s)/Hr (4 mL/Hr) IV Continuous <Continuous>  multivitamin 1 Tablet(s) Oral daily  niCARdipine Infusion 3 mG/Hr (15 mL/Hr) IV Continuous <Continuous>  pantoprazole  Injectable 40 milliGRAM(s) IV Push daily  polyethylene glycol 3350 17 Gram(s) Oral daily  sodium chloride 0.9%. 1000 milliLiter(s) (10 mL/Hr) IV Continuous <Continuous>  tamsulosin 0.4 milliGRAM(s) Oral at bedtime      Physical Exam  Culture - Sputum (collected 02-08-20 @ 09:16)  Source: .Sputum Sputum  Gram Stain (02-08-20 @ 14:05):    Moderate polymorphonuclear leukocytes per low power field    Few Squamous epithelial cells per low power field    Few Gram Negative Rods seen per oil power field    Rare Yeast like cells seen per oil power field    Rare Gram Positive Cocci in Pairs and Chains seen per oil power field    Rare Gram Negative Diplococci seen per oil power field      General: Patient comfortable in bed  Vital Signs Last 12 Hrs  T(F): 101.1 (02-08-20 @ 16:00), Max: 101.1 (02-08-20 @ 16:00)  HR: 109 (02-08-20 @ 17:00) (69 - 120)  BP: --  BP(mean): --  RR: 22 (02-08-20 @ 17:00) (14 - 25)  SpO2: 98% (02-08-20 @ 17:00) (97% - 100%)  Neck: No palpable thyroid nodules.  CVS: S1S2, No murmurs  Respiratory: No wheezing, no crepitations  GI: Abdomen soft, bowel sounds positive  Musculoskeletal:  edema lower extremities.   Skin: No skin rashes, no ecchymosis    Diagnostics

## 2020-02-08 NOTE — PROGRESS NOTE ADULT - PROBLEM SELECTOR PLAN 2
Pt. with likely hypervolemic hyponatremia in the setting of volume overload. Recommend stabilizing pt. hemodynamically before considering diuretic therapy. Pt. is currently non-oliguric. Monitor serum sodium      Michelle Gonzalez  Nephrology Fellow  Pager: [phone]

## 2020-02-09 LAB
ALBUMIN SERPL ELPH-MCNC: 2.2 G/DL — LOW (ref 3.3–5)
ALP SERPL-CCNC: 140 U/L — HIGH (ref 40–120)
ALT FLD-CCNC: 210 U/L — HIGH (ref 10–45)
ANION GAP SERPL CALC-SCNC: 15 MMOL/L — SIGNIFICANT CHANGE UP (ref 5–17)
APTT BLD: 34 SEC — SIGNIFICANT CHANGE UP (ref 27.5–36.3)
APTT BLD: 34.5 SEC — SIGNIFICANT CHANGE UP (ref 27.5–36.3)
APTT BLD: 74.5 SEC — HIGH (ref 27.5–36.3)
AST SERPL-CCNC: 79 U/L — HIGH (ref 10–40)
BILIRUB SERPL-MCNC: 0.8 MG/DL — SIGNIFICANT CHANGE UP (ref 0.2–1.2)
BUN SERPL-MCNC: 59 MG/DL — HIGH (ref 7–23)
CALCIUM SERPL-MCNC: 7.6 MG/DL — LOW (ref 8.4–10.5)
CHLORIDE SERPL-SCNC: 97 MMOL/L — SIGNIFICANT CHANGE UP (ref 96–108)
CO2 SERPL-SCNC: 19 MMOL/L — LOW (ref 22–31)
CREAT SERPL-MCNC: 2.57 MG/DL — HIGH (ref 0.5–1.3)
ENTEROCOC DNA BLD POS QL NAA+NON-PROBE: SIGNIFICANT CHANGE UP
GAS PNL BLDA: SIGNIFICANT CHANGE UP
GLUCOSE BLDC GLUCOMTR-MCNC: 119 MG/DL — HIGH (ref 70–99)
GLUCOSE BLDC GLUCOMTR-MCNC: 123 MG/DL — HIGH (ref 70–99)
GLUCOSE BLDC GLUCOMTR-MCNC: 151 MG/DL — HIGH (ref 70–99)
GLUCOSE BLDC GLUCOMTR-MCNC: 159 MG/DL — HIGH (ref 70–99)
GLUCOSE BLDC GLUCOMTR-MCNC: 163 MG/DL — HIGH (ref 70–99)
GLUCOSE BLDC GLUCOMTR-MCNC: 165 MG/DL — HIGH (ref 70–99)
GLUCOSE BLDC GLUCOMTR-MCNC: 174 MG/DL — HIGH (ref 70–99)
GLUCOSE BLDC GLUCOMTR-MCNC: 176 MG/DL — HIGH (ref 70–99)
GLUCOSE BLDC GLUCOMTR-MCNC: 180 MG/DL — HIGH (ref 70–99)
GLUCOSE BLDC GLUCOMTR-MCNC: 188 MG/DL — HIGH (ref 70–99)
GLUCOSE BLDC GLUCOMTR-MCNC: 210 MG/DL — HIGH (ref 70–99)
GLUCOSE BLDC GLUCOMTR-MCNC: 246 MG/DL — HIGH (ref 70–99)
GLUCOSE BLDC GLUCOMTR-MCNC: 278 MG/DL — HIGH (ref 70–99)
GLUCOSE BLDC GLUCOMTR-MCNC: 279 MG/DL — HIGH (ref 70–99)
GLUCOSE SERPL-MCNC: 125 MG/DL — HIGH (ref 70–99)
GRAM STN FLD: SIGNIFICANT CHANGE UP
GRAM STN FLD: SIGNIFICANT CHANGE UP
HCT VFR BLD CALC: 26.8 % — LOW (ref 39–50)
HGB BLD-MCNC: 8.6 G/DL — LOW (ref 13–17)
MAGNESIUM SERPL-MCNC: 2.5 MG/DL — SIGNIFICANT CHANGE UP (ref 1.6–2.6)
MCHC RBC-ENTMCNC: 27.7 PG — SIGNIFICANT CHANGE UP (ref 27–34)
MCHC RBC-ENTMCNC: 32.1 GM/DL — SIGNIFICANT CHANGE UP (ref 32–36)
MCV RBC AUTO: 86.5 FL — SIGNIFICANT CHANGE UP (ref 80–100)
METHOD TYPE: SIGNIFICANT CHANGE UP
NRBC # BLD: 0 /100 WBCS — SIGNIFICANT CHANGE UP (ref 0–0)
PHOSPHATE SERPL-MCNC: 3 MG/DL — SIGNIFICANT CHANGE UP (ref 2.5–4.5)
PLATELET # BLD AUTO: 186 K/UL — SIGNIFICANT CHANGE UP (ref 150–400)
POTASSIUM SERPL-MCNC: 4.3 MMOL/L — SIGNIFICANT CHANGE UP (ref 3.5–5.3)
POTASSIUM SERPL-SCNC: 4.3 MMOL/L — SIGNIFICANT CHANGE UP (ref 3.5–5.3)
PROT SERPL-MCNC: 5.5 G/DL — LOW (ref 6–8.3)
RBC # BLD: 3.1 M/UL — LOW (ref 4.2–5.8)
RBC # FLD: 14.1 % — SIGNIFICANT CHANGE UP (ref 10.3–14.5)
SODIUM SERPL-SCNC: 131 MMOL/L — LOW (ref 135–145)
SPECIMEN SOURCE: SIGNIFICANT CHANGE UP
WBC # BLD: 17.15 K/UL — HIGH (ref 3.8–10.5)
WBC # FLD AUTO: 17.15 K/UL — HIGH (ref 3.8–10.5)

## 2020-02-09 PROCEDURE — 99233 SBSQ HOSP IP/OBS HIGH 50: CPT

## 2020-02-09 PROCEDURE — 74176 CT ABD & PELVIS W/O CONTRAST: CPT | Mod: 26

## 2020-02-09 PROCEDURE — 71045 X-RAY EXAM CHEST 1 VIEW: CPT | Mod: 26

## 2020-02-09 PROCEDURE — 71250 CT THORAX DX C-: CPT | Mod: 26

## 2020-02-09 PROCEDURE — 36620 INSERTION CATHETER ARTERY: CPT | Mod: 78

## 2020-02-09 PROCEDURE — 99232 SBSQ HOSP IP/OBS MODERATE 35: CPT | Mod: GC

## 2020-02-09 RX ORDER — LINEZOLID 600 MG/300ML
600 INJECTION, SOLUTION INTRAVENOUS ONCE
Refills: 0 | Status: COMPLETED | OUTPATIENT
Start: 2020-02-09 | End: 2020-02-09

## 2020-02-09 RX ORDER — HYDROMORPHONE HYDROCHLORIDE 2 MG/ML
2 INJECTION INTRAMUSCULAR; INTRAVENOUS; SUBCUTANEOUS EVERY 6 HOURS
Refills: 0 | Status: DISCONTINUED | OUTPATIENT
Start: 2020-02-09 | End: 2020-02-09

## 2020-02-09 RX ORDER — BENZOCAINE AND MENTHOL 5; 1 G/100ML; G/100ML
1 LIQUID ORAL ONCE
Refills: 0 | Status: COMPLETED | OUTPATIENT
Start: 2020-02-09 | End: 2020-02-09

## 2020-02-09 RX ORDER — LINEZOLID 600 MG/300ML
600 INJECTION, SOLUTION INTRAVENOUS EVERY 12 HOURS
Refills: 0 | Status: DISCONTINUED | OUTPATIENT
Start: 2020-02-09 | End: 2020-02-10

## 2020-02-09 RX ORDER — FUROSEMIDE 40 MG
40 TABLET ORAL ONCE
Refills: 0 | Status: COMPLETED | OUTPATIENT
Start: 2020-02-09 | End: 2020-02-09

## 2020-02-09 RX ORDER — OXYCODONE AND ACETAMINOPHEN 5; 325 MG/1; MG/1
1 TABLET ORAL EVERY 4 HOURS
Refills: 0 | Status: DISCONTINUED | OUTPATIENT
Start: 2020-02-09 | End: 2020-02-16

## 2020-02-09 RX ORDER — HYDROMORPHONE HYDROCHLORIDE 2 MG/ML
4 INJECTION INTRAMUSCULAR; INTRAVENOUS; SUBCUTANEOUS EVERY 6 HOURS
Refills: 0 | Status: DISCONTINUED | OUTPATIENT
Start: 2020-02-09 | End: 2020-02-09

## 2020-02-09 RX ORDER — LINEZOLID 600 MG/300ML
INJECTION, SOLUTION INTRAVENOUS
Refills: 0 | Status: DISCONTINUED | OUTPATIENT
Start: 2020-02-09 | End: 2020-02-10

## 2020-02-09 RX ORDER — POTASSIUM CHLORIDE 20 MEQ
40 PACKET (EA) ORAL ONCE
Refills: 0 | Status: COMPLETED | OUTPATIENT
Start: 2020-02-09 | End: 2020-02-09

## 2020-02-09 RX ORDER — INSULIN LISPRO 100/ML
10 VIAL (ML) SUBCUTANEOUS
Refills: 0 | Status: DISCONTINUED | OUTPATIENT
Start: 2020-02-09 | End: 2020-02-10

## 2020-02-09 RX ORDER — INSULIN GLARGINE 100 [IU]/ML
35 INJECTION, SOLUTION SUBCUTANEOUS AT BEDTIME
Refills: 0 | Status: DISCONTINUED | OUTPATIENT
Start: 2020-02-09 | End: 2020-02-10

## 2020-02-09 RX ORDER — HYDRALAZINE HCL 50 MG
5 TABLET ORAL ONCE
Refills: 0 | Status: COMPLETED | OUTPATIENT
Start: 2020-02-09 | End: 2020-02-09

## 2020-02-09 RX ADMIN — AMIODARONE HYDROCHLORIDE 400 MILLIGRAM(S): 400 TABLET ORAL at 05:02

## 2020-02-09 RX ADMIN — HYDROMORPHONE HYDROCHLORIDE 4 MILLIGRAM(S): 2 INJECTION INTRAMUSCULAR; INTRAVENOUS; SUBCUTANEOUS at 11:16

## 2020-02-09 RX ADMIN — HYDROMORPHONE HYDROCHLORIDE 4 MILLIGRAM(S): 2 INJECTION INTRAMUSCULAR; INTRAVENOUS; SUBCUTANEOUS at 11:46

## 2020-02-09 RX ADMIN — LINEZOLID 300 MILLIGRAM(S): 600 INJECTION, SOLUTION INTRAVENOUS at 17:20

## 2020-02-09 RX ADMIN — Medication 40 MILLIEQUIVALENT(S): at 17:19

## 2020-02-09 RX ADMIN — HEPARIN SODIUM 7.5 UNIT(S)/HR: 5000 INJECTION INTRAVENOUS; SUBCUTANEOUS at 20:00

## 2020-02-09 RX ADMIN — INSULIN HUMAN 4 UNIT(S)/HR: 100 INJECTION, SOLUTION SUBCUTANEOUS at 08:30

## 2020-02-09 RX ADMIN — Medication 40 MILLIGRAM(S): at 05:02

## 2020-02-09 RX ADMIN — OXYCODONE AND ACETAMINOPHEN 1 TABLET(S): 5; 325 TABLET ORAL at 20:45

## 2020-02-09 RX ADMIN — TAMSULOSIN HYDROCHLORIDE 0.4 MILLIGRAM(S): 0.4 CAPSULE ORAL at 21:08

## 2020-02-09 RX ADMIN — Medication 5 MILLIGRAM(S): at 12:41

## 2020-02-09 RX ADMIN — INSULIN GLARGINE 35 UNIT(S): 100 INJECTION, SOLUTION SUBCUTANEOUS at 21:10

## 2020-02-09 RX ADMIN — Medication 1 TABLET(S): at 11:16

## 2020-02-09 RX ADMIN — BENZOCAINE AND MENTHOL 1 LOZENGE: 5; 1 LIQUID ORAL at 12:34

## 2020-02-09 RX ADMIN — AMIODARONE HYDROCHLORIDE 400 MILLIGRAM(S): 400 TABLET ORAL at 21:08

## 2020-02-09 RX ADMIN — OXYCODONE AND ACETAMINOPHEN 1 TABLET(S): 5; 325 TABLET ORAL at 21:30

## 2020-02-09 RX ADMIN — Medication 40 MILLIGRAM(S): at 20:23

## 2020-02-09 RX ADMIN — POLYETHYLENE GLYCOL 3350 17 GRAM(S): 17 POWDER, FOR SOLUTION ORAL at 11:16

## 2020-02-09 RX ADMIN — HYDROMORPHONE HYDROCHLORIDE 4 MILLIGRAM(S): 2 INJECTION INTRAMUSCULAR; INTRAVENOUS; SUBCUTANEOUS at 02:20

## 2020-02-09 RX ADMIN — Medication 1 DROP(S): at 17:20

## 2020-02-09 RX ADMIN — PANTOPRAZOLE SODIUM 40 MILLIGRAM(S): 20 TABLET, DELAYED RELEASE ORAL at 11:16

## 2020-02-09 RX ADMIN — AMIODARONE HYDROCHLORIDE 400 MILLIGRAM(S): 400 TABLET ORAL at 13:19

## 2020-02-09 RX ADMIN — Medication 1 DROP(S): at 05:02

## 2020-02-09 RX ADMIN — Medication 81 MILLIGRAM(S): at 11:16

## 2020-02-09 RX ADMIN — MEROPENEM 100 MILLIGRAM(S): 1 INJECTION INTRAVENOUS at 05:02

## 2020-02-09 RX ADMIN — HEPARIN SODIUM 7.5 UNIT(S)/HR: 5000 INJECTION INTRAVENOUS; SUBCUTANEOUS at 00:38

## 2020-02-09 RX ADMIN — MEROPENEM 100 MILLIGRAM(S): 1 INJECTION INTRAVENOUS at 17:20

## 2020-02-09 RX ADMIN — Medication 250 MILLIGRAM(S): at 00:17

## 2020-02-09 RX ADMIN — LINEZOLID 300 MILLIGRAM(S): 600 INJECTION, SOLUTION INTRAVENOUS at 06:22

## 2020-02-09 RX ADMIN — HYDROMORPHONE HYDROCHLORIDE 4 MILLIGRAM(S): 2 INJECTION INTRAMUSCULAR; INTRAVENOUS; SUBCUTANEOUS at 02:50

## 2020-02-09 RX ADMIN — CHLORHEXIDINE GLUCONATE 1 APPLICATION(S): 213 SOLUTION TOPICAL at 05:02

## 2020-02-09 NOTE — PROGRESS NOTE ADULT - PROBLEM SELECTOR PLAN 1
Suggest to continue IV insulin for now.  Will continue monitoring FS and FU.  Will switch to basal bolus insulin once blood sugars stabilize. Will continue monitoring FS and FU.  Will switch to basal bolus insulin once blood sugars stabilize.

## 2020-02-09 NOTE — PROGRESS NOTE ADULT - SUBJECTIVE AND OBJECTIVE BOX
Chief complaint  Patient is a 64y old  Male who presents with a chief complaint of Chest pain (09 Feb 2020 12:32)   Review of systems  Patient in bed, looks comfortable, no hypoglycemia.    Labs and Fingersticks  CAPILLARY BLOOD GLUCOSE  151 (09 Feb 2020 15:00)  174 (09 Feb 2020 14:00)  210 (09 Feb 2020 13:00)  246 (09 Feb 2020 12:00)  279 (09 Feb 2020 11:00)  278 (09 Feb 2020 10:00)  334 (09 Feb 2020 08:30)  119 (09 Feb 2020 00:00)  104 (08 Feb 2020 23:00)  123 (08 Feb 2020 22:00)  156 (08 Feb 2020 21:00)  148 (08 Feb 2020 20:00)  117 (08 Feb 2020 19:00)  126 (08 Feb 2020 18:00)  149 (08 Feb 2020 17:00)  135 (08 Feb 2020 16:00)      POCT Blood Glucose.: 151 mg/dL (09 Feb 2020 15:00)  POCT Blood Glucose.: 174 mg/dL (09 Feb 2020 14:00)  POCT Blood Glucose.: 210 mg/dL (09 Feb 2020 13:01)  POCT Blood Glucose.: 246 mg/dL (09 Feb 2020 11:59)  POCT Blood Glucose.: 279 mg/dL (09 Feb 2020 11:07)  POCT Blood Glucose.: 278 mg/dL (09 Feb 2020 10:17)  POCT Blood Glucose.: 180 mg/dL (09 Feb 2020 01:24)  POCT Blood Glucose.: 104 mg/dL (08 Feb 2020 23:25)  POCT Blood Glucose.: 123 mg/dL (08 Feb 2020 22:20)  POCT Blood Glucose.: 158 mg/dL (08 Feb 2020 21:02)  POCT Blood Glucose.: 148 mg/dL (08 Feb 2020 20:39)  POCT Blood Glucose.: 117 mg/dL (08 Feb 2020 18:50)  POCT Blood Glucose.: 126 mg/dL (08 Feb 2020 18:01)  POCT Blood Glucose.: 135 mg/dL (08 Feb 2020 16:11)      Anion Gap, Serum: 15 (02-09 @ 00:38)  Anion Gap, Serum: 22 <H> (02-08 @ 17:20)  Anion Gap, Serum: 15 (02-08 @ 01:54)      Calcium, Total Serum: 7.6 <L> (02-09 @ 00:38)  Calcium, Total Serum: 7.8 <L> (02-08 @ 17:20)  Calcium, Total Serum: 7.8 <L> (02-08 @ 01:54)  Albumin, Serum: 2.2 <L> (02-09 @ 00:38)  Albumin, Serum: 2.5 <L> (02-08 @ 17:20)  Albumin, Serum: 2.1 <L> (02-08 @ 01:54)    Alanine Aminotransferase (ALT/SGPT): 210 <H> (02-09 @ 00:38)  Alanine Aminotransferase (ALT/SGPT): 238 <H> (02-08 @ 17:20)  Alanine Aminotransferase (ALT/SGPT): 288 <H> (02-08 @ 01:54)  Alkaline Phosphatase, Serum: 140 <H> (02-09 @ 00:38)  Alkaline Phosphatase, Serum: 147 <H> (02-08 @ 17:20)  Alkaline Phosphatase, Serum: 147 <H> (02-08 @ 01:54)  Aspartate Aminotransferase (AST/SGOT): 79 <H> (02-09 @ 00:38)  Aspartate Aminotransferase (AST/SGOT): 92 <H> (02-08 @ 17:20)  Aspartate Aminotransferase (AST/SGOT): 160 <H> (02-08 @ 01:54)        02-09    131<L>  |  97  |  59<H>  ----------------------------<  125<H>  4.3   |  19<L>  |  2.57<H>    Ca    7.6<L>      09 Feb 2020 00:38  Phos  3.0     02-09  Mg     2.5     02-09    TPro  5.5<L>  /  Alb  2.2<L>  /  TBili  0.8  /  DBili  x   /  AST  79<H>  /  ALT  210<H>  /  AlkPhos  140<H>  02-09                        8.6    17.15 )-----------( 186      ( 09 Feb 2020 00:38 )             26.8     Medications  MEDICATIONS  (STANDING):  aMIOdarone    Tablet 400 milliGRAM(s) Oral every 8 hours  aMIOdarone    Tablet   Oral   artificial tears (preservative free) Ophthalmic Solution 1 Drop(s) Both EYES two times a day  aspirin  chewable 81 milliGRAM(s) Oral daily  chlorhexidine 2% Cloths 1 Application(s) Topical <User Schedule>  dextrose 5% + sodium chloride 0.9%. 1000 milliLiter(s) (25 mL/Hr) IV Continuous <Continuous>  dextrose 5%. 1000 milliLiter(s) (50 mL/Hr) IV Continuous <Continuous>  dextrose 50% Injectable 12.5 Gram(s) IV Push once  dextrose 50% Injectable 25 Gram(s) IV Push once  dextrose 50% Injectable 25 Gram(s) IV Push once  furosemide    Tablet 40 milliGRAM(s) Oral daily  heparin  Infusion 1000 Unit(s)/Hr (7.5 mL/Hr) IV Continuous <Continuous>  insulin lispro (HumaLOG) corrective regimen sliding scale   SubCutaneous Before meals and at bedtime  insulin regular Infusion 4 Unit(s)/Hr (4 mL/Hr) IV Continuous <Continuous>  linezolid  IVPB      linezolid  IVPB 600 milliGRAM(s) IV Intermittent every 12 hours  meropenem  IVPB 1000 milliGRAM(s) IV Intermittent every 12 hours  multivitamin 1 Tablet(s) Oral daily  pantoprazole  Injectable 40 milliGRAM(s) IV Push daily  polyethylene glycol 3350 17 Gram(s) Oral daily  sodium chloride 0.9%. 1000 milliLiter(s) (10 mL/Hr) IV Continuous <Continuous>  tamsulosin 0.4 milliGRAM(s) Oral at bedtime      Physical Exam  Culture - Sputum (collected 02-08-20 @ 23:06)  Source: .Sputum Sputum  Gram Stain (02-09-20 @ 04:29):    No polymorphonuclear leukocytes per low power field    Few Squamous epithelial cells per low power field    Few Gram Negative Rods seen per oil power field    Few Gram positive cocci in pairs seen per oil power field      General: Patient comfortable in bed  Vital Signs Last 12 Hrs  T(F): 99.1 (02-09-20 @ 12:00), Max: 99.5 (02-09-20 @ 04:00)  HR: 97 (02-09-20 @ 15:00) (91 - 101)  BP: --  BP(mean): --  RR: 23 (02-09-20 @ 15:00) (18 - 25)  SpO2: 99% (02-09-20 @ 15:00) (98% - 100%)  Neck: No palpable thyroid nodules.  CVS: S1S2, No murmurs  Respiratory: No wheezing, no crepitations  GI: Abdomen soft, bowel sounds positive  Musculoskeletal:  edema lower extremities.   Skin: No skin rashes, no ecchymosis    Diagnostics Chief complaint  Patient is a 64y old  Male who presents with  a chief complaint of Chest pain (09 Feb 2020 12:32)   Review of systems  Patient in bed, looks comfortable, no hypoglycemia.    Labs and Fingersticks  CAPILLARY BLOOD GLUCOSE  151 (09 Feb 2020 15:00)  174 (09 Feb 2020 14:00)  210 (09 Feb 2020 13:00)  246 (09 Feb 2020 12:00)  279 (09 Feb 2020 11:00)  278 (09 Feb 2020 10:00)  334 (09 Feb 2020 08:30)  119 (09 Feb 2020 00:00)  104 (08 Feb 2020 23:00)  123 (08 Feb 2020 22:00)  156 (08 Feb 2020 21:00)  148 (08 Feb 2020 20:00)  117 (08 Feb 2020 19:00)  126 (08 Feb 2020 18:00)  149 (08 Feb 2020 17:00)  135 (08 Feb 2020 16:00)      POCT Blood Glucose.: 151 mg/dL (09 Feb 2020 15:00)  POCT Blood Glucose.: 174 mg/dL (09 Feb 2020 14:00)  POCT Blood Glucose.: 210 mg/dL (09 Feb 2020 13:01)  POCT Blood Glucose.: 246 mg/dL (09 Feb 2020 11:59)  POCT Blood Glucose.: 279 mg/dL (09 Feb 2020 11:07)  POCT Blood Glucose.: 278 mg/dL (09 Feb 2020 10:17)  POCT Blood Glucose.: 180 mg/dL (09 Feb 2020 01:24)  POCT Blood Glucose.: 104 mg/dL (08 Feb 2020 23:25)  POCT Blood Glucose.: 123 mg/dL (08 Feb 2020 22:20)  POCT Blood Glucose.: 158 mg/dL (08 Feb 2020 21:02)  POCT Blood Glucose.: 148 mg/dL (08 Feb 2020 20:39)  POCT Blood Glucose.: 117 mg/dL (08 Feb 2020 18:50)  POCT Blood Glucose.: 126 mg/dL (08 Feb 2020 18:01)  POCT Blood Glucose.: 135 mg/dL (08 Feb 2020 16:11)      Anion Gap, Serum: 15 (02-09 @ 00:38)  Anion Gap, Serum: 22 <H> (02-08 @ 17:20)  Anion Gap, Serum: 15 (02-08 @ 01:54)      Calcium, Total Serum: 7.6 <L> (02-09 @ 00:38)  Calcium, Total Serum: 7.8 <L> (02-08 @ 17:20)  Calcium, Total Serum: 7.8 <L> (02-08 @ 01:54)  Albumin, Serum: 2.2 <L> (02-09 @ 00:38)  Albumin, Serum: 2.5 <L> (02-08 @ 17:20)  Albumin, Serum: 2.1 <L> (02-08 @ 01:54)    Alanine Aminotransferase (ALT/SGPT): 210 <H> (02-09 @ 00:38)  Alanine Aminotransferase (ALT/SGPT): 238 <H> (02-08 @ 17:20)  Alanine Aminotransferase (ALT/SGPT): 288 <H> (02-08 @ 01:54)  Alkaline Phosphatase, Serum: 140 <H> (02-09 @ 00:38)  Alkaline Phosphatase, Serum: 147 <H> (02-08 @ 17:20)  Alkaline Phosphatase, Serum: 147 <H> (02-08 @ 01:54)  Aspartate Aminotransferase (AST/SGOT): 79 <H> (02-09 @ 00:38)  Aspartate Aminotransferase (AST/SGOT): 92 <H> (02-08 @ 17:20)  Aspartate Aminotransferase (AST/SGOT): 160 <H> (02-08 @ 01:54)        02-09    131<L>  |  97  |  59<H>  ----------------------------<  125<H>  4.3   |  19<L>  |  2.57<H>    Ca    7.6<L>      09 Feb 2020 00:38  Phos  3.0     02-09  Mg     2.5     02-09    TPro  5.5<L>  /  Alb  2.2<L>  /  TBili  0.8  /  DBili  x   /  AST  79<H>  /  ALT  210<H>  /  AlkPhos  140<H>  02-09                        8.6    17.15 )-----------( 186      ( 09 Feb 2020 00:38 )             26.8     Medications  MEDICATIONS  (STANDING):  aMIOdarone    Tablet 400 milliGRAM(s) Oral every 8 hours  aMIOdarone    Tablet   Oral   artificial tears (preservative free) Ophthalmic Solution 1 Drop(s) Both EYES two times a day  aspirin  chewable 81 milliGRAM(s) Oral daily  chlorhexidine 2% Cloths 1 Application(s) Topical <User Schedule>  dextrose 5% + sodium chloride 0.9%. 1000 milliLiter(s) (25 mL/Hr) IV Continuous <Continuous>  dextrose 5%. 1000 milliLiter(s) (50 mL/Hr) IV Continuous <Continuous>  dextrose 50% Injectable 12.5 Gram(s) IV Push once  dextrose 50% Injectable 25 Gram(s) IV Push once  dextrose 50% Injectable 25 Gram(s) IV Push once  furosemide    Tablet 40 milliGRAM(s) Oral daily  heparin  Infusion 1000 Unit(s)/Hr (7.5 mL/Hr) IV Continuous <Continuous>  insulin lispro (HumaLOG) corrective regimen sliding scale   SubCutaneous Before meals and at bedtime  insulin regular Infusion 4 Unit(s)/Hr (4 mL/Hr) IV Continuous <Continuous>  linezolid  IVPB      linezolid  IVPB 600 milliGRAM(s) IV Intermittent every 12 hours  meropenem  IVPB 1000 milliGRAM(s) IV Intermittent every 12 hours  multivitamin 1 Tablet(s) Oral daily  pantoprazole  Injectable 40 milliGRAM(s) IV Push daily  polyethylene glycol 3350 17 Gram(s) Oral daily  sodium chloride 0.9%. 1000 milliLiter(s) (10 mL/Hr) IV Continuous <Continuous>  tamsulosin 0.4 milliGRAM(s) Oral at bedtime      Physical Exam  Culture - Sputum (collected 02-08-20 @ 23:06)  Source: .Sputum Sputum  Gram Stain (02-09-20 @ 04:29):    No polymorphonuclear leukocytes per low power field    Few Squamous epithelial cells per low power field    Few Gram Negative Rods seen per oil power field    Few Gram positive cocci in pairs seen per oil power field      General: Patient comfortable in bed  Vital Signs Last 12 Hrs  T(F): 99.1 (02-09-20 @ 12:00), Max: 99.5 (02-09-20 @ 04:00)  HR: 97 (02-09-20 @ 15:00) (91 - 101)  BP: --  BP(mean): --  RR: 23 (02-09-20 @ 15:00) (18 - 25)  SpO2: 99% (02-09-20 @ 15:00) (98% - 100%)  Neck: No palpable thyroid nodules.  CVS: S1S2, No murmurs  Respiratory: No wheezing, no crepitations  GI: Abdomen soft, bowel sounds positive  Musculoskeletal:  edema lower extremities.   Skin: No skin rashes, no ecchymosis    Diagnostics

## 2020-02-09 NOTE — PROGRESS NOTE ADULT - ATTENDING COMMENTS
Agree with above NP note.  cv stable  extubated  off vasopressors  + bacteremia  iv abx per cticu   supp Fio2  diuresis as needed  monitor creat

## 2020-02-09 NOTE — PROCEDURE NOTE - NSPOSTCAREGUIDE_GEN_A_CORE
Care for catheter as per unit/ICU protocols
Care for catheter as per unit/ICU protocols/Verbal/written post procedure instructions were given to patient/caregiver
Care for catheter as per unit/ICU protocols
Care for catheter as per unit/ICU protocols

## 2020-02-09 NOTE — PROGRESS NOTE ADULT - SUBJECTIVE AND OBJECTIVE BOX
CARDIOLOGY FOLLOW UP - Dr. Steel    CC c/o dry cough   on hi flow         PHYSICAL EXAM:  T(C): 37.3 (02-09-20 @ 12:00), Max: 38.5 (02-08-20 @ 20:00)  HR: 95 (02-09-20 @ 12:00) (91 - 120)  BP: --  RR: 20 (02-09-20 @ 12:00) (10 - 26)  SpO2: 100% (02-09-20 @ 12:00) (98% - 100%)  Wt(kg): --  I&O's Summary    08 Feb 2020 07:01  -  09 Feb 2020 07:00  --------------------------------------------------------  IN: 2151.3 mL / OUT: 2770 mL / NET: -618.7 mL    09 Feb 2020 07:01  -  09 Feb 2020 12:32  --------------------------------------------------------  IN: 277.5 mL / OUT: 605 mL / NET: -327.5 mL        Appearance: Normal	  Cardiovascular: Normal S1 S2,RRR, No JVD, No murmurs  Respiratory: diminsihed  Gastrointestinal:  Soft, Non-tender, + BS	  Extremities: Normal range of motion, ++ LE edema        MEDICATIONS  (STANDING):  aMIOdarone    Tablet 400 milliGRAM(s) Oral every 8 hours  aMIOdarone    Tablet   Oral   artificial tears (preservative free) Ophthalmic Solution 1 Drop(s) Both EYES two times a day  aspirin  chewable 81 milliGRAM(s) Oral daily  benzocaine 15 mG/menthol 3.6 mG (Sugar-Free) Lozenge 1 Lozenge Oral once  chlorhexidine 2% Cloths 1 Application(s) Topical <User Schedule>  dextrose 5% + sodium chloride 0.9%. 1000 milliLiter(s) (25 mL/Hr) IV Continuous <Continuous>  dextrose 5%. 1000 milliLiter(s) (50 mL/Hr) IV Continuous <Continuous>  dextrose 50% Injectable 12.5 Gram(s) IV Push once  dextrose 50% Injectable 25 Gram(s) IV Push once  dextrose 50% Injectable 25 Gram(s) IV Push once  furosemide    Tablet 40 milliGRAM(s) Oral daily  heparin  Infusion 1000 Unit(s)/Hr (7.5 mL/Hr) IV Continuous <Continuous>  insulin lispro (HumaLOG) corrective regimen sliding scale   SubCutaneous Before meals and at bedtime  insulin regular Infusion 4 Unit(s)/Hr (4 mL/Hr) IV Continuous <Continuous>  linezolid  IVPB      linezolid  IVPB 600 milliGRAM(s) IV Intermittent every 12 hours  meropenem  IVPB 1000 milliGRAM(s) IV Intermittent every 12 hours  multivitamin 1 Tablet(s) Oral daily  pantoprazole  Injectable 40 milliGRAM(s) IV Push daily  polyethylene glycol 3350 17 Gram(s) Oral daily  sodium chloride 0.9%. 1000 milliLiter(s) (10 mL/Hr) IV Continuous <Continuous>  tamsulosin 0.4 milliGRAM(s) Oral at bedtime      TELEMETRY: nsr pac 	    ECG:  	  RADIOLOGY:   < from: Xray Chest 1 View- PORTABLE-Routine (02.09.20 @ 02:56) >    Impression:    The heart is enlarged. Left lower lobe pneumonia and/or atelectasis. The right lung is clear. Endotracheal tube and NG tube were removed. The remaining life supporting devices are in good position and unchanged when compared to previous study done February 2020. No pneumothorax. Status post sternotomy.      < end of copied text >    DIAGNOSTIC TESTING:  [ ] Echocardiogram:  [ ]  Catheterization:  [ ] Stress Test:    OTHER: 	    LABS:	   	  Gram Stain:   Growth in anaerobic bottle: Gram Positive Cocci in Pairs and Chains (02.08.20 @ 14:44)      Creatine Kinase, Serum: 648 U/L [30 - 200] (02-03 @ 17:22)  CKMB Units: 63.0 ng/mL [0.0 - 6.7] (02-03 @ 17:22)  Troponin T, High Sensitivity Result: 2148 ng/L [0 - 51] (02-03 @ 17:22)                          8.6    17.15 )-----------( 186      ( 09 Feb 2020 00:38 )             26.8     02-09    131<L>  |  97  |  59<H>  ----------------------------<  125<H>  4.3   |  19<L>  |  2.57<H>    Ca    7.6<L>      09 Feb 2020 00:38  Phos  3.0     02-09  Mg     2.5     02-09    TPro  5.5<L>  /  Alb  2.2<L>  /  TBili  0.8  /  DBili  x   /  AST  79<H>  /  ALT  210<H>  /  AlkPhos  140<H>  02-09    PTT - ( 09 Feb 2020 10:24 )  PTT:34.0 sec

## 2020-02-09 NOTE — PROGRESS NOTE ADULT - ASSESSMENT
intraop kennedy 2/3/20 ef 50%, nl lv, mild diastolic dysfx stage 1  limited echo 2/4/20: nl LV sys fx , no pericardial effusion     a/p  64 year old man with history of HTN, DM II, admitted with progressive exertional angina, s/p cath with severe triple vessel disease, s/p CABG, post op course c/b brief witnessed PEA likely hypoxic arrest, s/p intubation.     1. CAD, s/p cath with severe triple vessel disease including lesions at the bifurcation of the LAD/diagonal and distal RCA/RPDA/RPL trifurcation.   -s/p CABG x 4   -intraop kennedy ef 50%  -cont asa, statin  -s/p PEA arrest, now extubated  -off vasopressors  -cont a/c : hep gtt   -LE dopplers, negative for dvt     2. HTN  -bp stable off pressors    3. STEPHANIE/CKD  renal f/u     4. Diuresis per [facility]    5. S/P Right pigtail placement    6. Sepsis   + bcx 2/8 Gram positive cocci in pair/chain   IV abx per [facility]  pending CT abd/pelvis  repeat chest xray 2/9  with LLL pna/ atelectasis       dvt ppx intraop kennedy 2/3/20 ef 50%, nl lv, mild diastolic dysfx stage 1  limited echo 2/4/20: nl LV sys fx , no pericardial effusion     a/p  64 year old man with history of HTN, DM II, admitted with progressive exertional angina, s/p cath with severe triple vessel disease, s/p CABG, post op course c/b brief witnessed PEA likely hypoxic arrest, s/p intubation.     1. CAD, s/p cath with severe triple vessel disease including lesions at the bifurcation of the LAD/diagonal and distal RCA/RPDA/RPL trifurcation.   -s/p CABG x 4   -intraop kennedy ef 50%  -cont asa, statin  -s/p PEA arrest, now extubated  -off vasopressors  -PAf  -cont a/c : hep gtt   -LE dopplers, negative for dvt     2. HTN  -bp stable off pressors    3. STEPHANIE/CKD  renal f/u     4. Diuresis per [facility]    5. S/P Right pigtail placement    6. Sepsis   + bcx 2/8 Gram positive cocci in pair/chain   IV abx per [facility]  pending CT abd/pelvis  repeat chest xray 2/9  with LLL pna/ atelectasis       dvt ppx

## 2020-02-09 NOTE — PROGRESS NOTE ADULT - PROBLEM SELECTOR PLAN 1
Pt with  CKD likely  in the setting of long standing DM and HTN.  No prior labs for review. Scr since admission has ranged between 1.8-2 mg/dl.  Scr likely plateaued at 2.53 secondary to hemodynamic injury after CABG  and PEA arrest s/p CPR and cardiac pacing overnight. Pt. is non-oliguric. UA significant for protein and RBCs. Urine electrolytes consistent with intrinsic disease. Urine Pr/Cr ratio in nephrotic range. HepBsAg, HepC, C3, C4, SIFE, RACHEL, P-ANCA, C-ANCA, Anti-GBM ab, Parvovirus, RPR, anti-PLA2R ab were negative/non-reactive. Monitor labs and urine output. Avoid NSAIDs, ACEI/ARBS, RCA and nephrotoxins. Dose medications as per eGFR. Renal function improving. Diuretics as needed

## 2020-02-09 NOTE — PROGRESS NOTE ADULT - SUBJECTIVE AND OBJECTIVE BOX
FRANKLIN PRESLEY  MRN-[id number]  Patient is a 64y old  Male who presents with a chief complaint of Chest pain (2020 15:03)    HPI:  65yo male with hx of HTN, DM II, presented to the ED with complaints of acute on chronic left sided exertional chest pain. Pt states he has been having left sided pressure and stabbing chest pain radiating to his sternum and right with activity for the past few months. He states each episode last approximately 1-2 mins and subsides upon resting. He denies associated symptoms of radiation to his back, arm or jaw. He recently was at a wedding which he could not enjoy because dancing would reproduce to the pain. He states he did not pursue and medical attention until this time. Pt states this time his pain was associated with sob up exertion. He denies symptoms of LOC, diaphoresis, palpitations, abd pain, N/V, fever or chills. He denies prior cardiac work up. (2020 12:57)      Surgery/Hospital course:    Today:    REVIEW OF SYSTEMS:  Gen: No fever  EYES/ENT: No visual changes;  No vertigo or throat pain   NECK: No pain   RES:  No shortness of breath or Cough  Chest: + incisional pain  CARD: No chest pain   GI: No abdominal pain  : No dysuria  NEURO: No weakness  SKIN: No itching, rashes     Physical Exam:  Vital Signs Last 24 Hrs  T(C): 37.4 (2020 16:00), Max: 38.5 (2020 20:00)  T(F): 99.3 (2020 16:00), Max: 101.3 (2020 20:00)  HR: 92 (2020 19:00) (91 - 105)  BP: --  BP(mean): --  RR: 22 (2020 19:00) (10 - 32)  SpO2: 97% (2020 19:00) (97% - 100%)  Gen:  Awake, alert   CNS: non focal 	  Neck: no JVD  RES : clear , no wheezing    Chest:   + chest tubes                     CVS: Regular  rhythm. Normal S1/S2  Abd: Soft, non-distended. Bowel sounds present.  Skin: No rash.  Ext:  no edema, A Line  PSY:  ============================I/O===========================   I&O's Detail    2020 07:01  -  2020 07:00  --------------------------------------------------------  IN:    dexmedetomidine Infusion: 30.3 mL    dextrose 5% + sodium chloride 0.9%.: 325 mL    heparin Infusion: 184.5 mL    insulin regular Infusion: 26.5 mL    IV PiggyBack: 850 mL    niCARdipine Infusion: 25 mL    Oral Fluid: 600 mL    sodium chloride 0.9%.: 110 mL  Total IN: 2151.3 mL    OUT:    Chest Tube: 50 mL    Indwelling Catheter - Urethral: 2720 mL  Total OUT: 2770 mL    Total NET: -618.7 mL      2020 07:01  -  2020 19:09  --------------------------------------------------------  IN:    heparin Infusion: 52.5 mL    insulin regular Infusion: 66 mL    Oral Fluid: 180 mL    sodium chloride 0.9%.: 120 mL  Total IN: 418.5 mL    OUT:    Chest Tube: 10 mL    Indwelling Catheter - Urethral: 1180 mL  Total OUT: 1190 mL    Total NET: -771.5 mL        ============================ LABS =========================                        8.6    17.15 )-----------( 186      ( 2020 00:38 )             26.8     02-09    131<L>  |  97  |  59<H>  ----------------------------<  125<H>  4.3   |  19<L>  |  2.57<H>    Ca    7.6<L>      2020 00:38  Phos  3.0     02  Mg     2.5         TPro  5.5<L>  /  Alb  2.2<L>  /  TBili  0.8  /  DBili  x   /  AST  79<H>  /  ALT  210<H>  /  AlkPhos  140<H>  0209    LIVER FUNCTIONS - ( 2020 00:38 )  Alb: 2.2 g/dL / Pro: 5.5 g/dL / ALK PHOS: 140 U/L / ALT: 210 U/L / AST: 79 U/L / GGT: x           PTT - ( 2020 16:55 )  PTT:34.5 sec  ABG - ( 2020 16:54 )  pH, Arterial: 7.48  pH, Blood: x     /  pCO2: 30    /  pO2: 108   / HCO3: 22    / Base Excess: -.4   /  SaO2: 98                Urinalysis Basic - ( 2020 02:06 )    Color: Yellow / Appearance: Clear / S.023 / pH: x  Gluc: x / Ketone: Negative  / Bili: Negative / Urobili: 2 mg/dL   Blood: x / Protein: 100 mg/dL / Nitrite: Negative   Leuk Esterase: Negative / RBC: 15 /hpf / WBC 6 /HPF   Sq Epi: x / Non Sq Epi: 1 / Bacteria: Negative      ======================Micro/Rad/Cardio=================  Culture: Reviewed   CXR: Reviewed  Echo:Reviewed  ======================================================  PAST MEDICAL & SURGICAL HISTORY:  HTN (hypertension)  Diabetes  History of appendectomy: x 30 yrs ago    ====================ASSESMENT ==============  CNS:  RES:  CVS:  Hem:  John:  GI:  Endo:  ID:  Skin  Plan:  ====================== NEUROLOGY=====================  HYDROmorphone   Tablet 2 milliGRAM(s) Oral every 6 hours PRN Moderate Pain (4 - 6)  HYDROmorphone   Tablet 4 milliGRAM(s) Oral every 6 hours PRN Severe Pain (7 - 10)    ==================== RESPIRATORY======================  Mechanical Ventilation:      ====================CARDIOVASCULAR==================  aMIOdarone    Tablet 400 milliGRAM(s) Oral every 8 hours  aMIOdarone    Tablet   Oral   furosemide    Tablet 40 milliGRAM(s) Oral daily  tamsulosin 0.4 milliGRAM(s) Oral at bedtime    ===================HEMATOLOGIC/ONC ===================  aspirin  chewable 81 milliGRAM(s) Oral daily  heparin  Infusion 1000 Unit(s)/Hr (7.5 mL/Hr) IV Continuous <Continuous>    ===================== RENAL =========================  Osorio for monitoring urine output    ==================== GASTROINTESTINAL===================  dextrose 5% + sodium chloride 0.9%. 1000 milliLiter(s) (25 mL/Hr) IV Continuous <Continuous>  dextrose 5%. 1000 milliLiter(s) (50 mL/Hr) IV Continuous <Continuous>  multivitamin 1 Tablet(s) Oral daily  pantoprazole  Injectable 40 milliGRAM(s) IV Push daily  polyethylene glycol 3350 17 Gram(s) Oral daily  sodium chloride 0.9% lock flush 10 milliLiter(s) IV Push every 1 hour PRN Pre/post blood products, medications, blood draw, and to maintain line patency  sodium chloride 0.9%. 1000 milliLiter(s) (10 mL/Hr) IV Continuous <Continuous>    =======================    ENDOCRINE  =====================  dextrose 40% Gel 15 Gram(s) Oral once PRN Blood Glucose LESS THAN 70 milliGRAM(s)/deciliter  dextrose 50% Injectable 12.5 Gram(s) IV Push once  dextrose 50% Injectable 25 Gram(s) IV Push once  dextrose 50% Injectable 25 Gram(s) IV Push once  glucagon  Injectable 1 milliGRAM(s) IntraMuscular once PRN Glucose LESS THAN 70 milligrams/deciliter  insulin glargine Injectable (LANTUS) 35 Unit(s) SubCutaneous at bedtime  insulin lispro (HumaLOG) corrective regimen sliding scale   SubCutaneous Before meals and at bedtime  insulin lispro Injectable (HumaLOG) 10 Unit(s) SubCutaneous three times a day before meals  insulin regular Infusion 4 Unit(s)/Hr (4 mL/Hr) IV Continuous <Continuous>    ========================INFECTIOUS DISEASE================  linezolid  IVPB      linezolid  IVPB 600 milliGRAM(s) IV Intermittent every 12 hours  meropenem  IVPB 1000 milliGRAM(s) IV Intermittent every 12 hours    .crit

## 2020-02-09 NOTE — PROGRESS NOTE ADULT - ASSESSMENT
Assessment  DMT2: 64y Male with DM T2 with hyperglycemia, A1C 9.2%, was on oral meds and insulin at home, restarted on IV insulin due to hyperglycemia, blood sugars are fluctuating, running high today and not at target, no hypoglycemic episodes. Patient is eating meals, alert and verbal.  CAD: s/p CABG 2/3, on medications, no chest pain, stable, monitored.  HTN: Controlled,  on antihypertensive medications.  HLD: Controlled, on statin.  CKD: Monitor labs/BMP          Harper Matias MD  Cell: [phone]  Office: [phone] Assessment  DMT2: 64y Male with DM T2 with hyperglycemia, A1C 9.2%, was on oral meds and insulin at home, restarted on IV insulin due to hyperglycemia, blood sugars are fluctuating, running high today and not at target, no hypoglycemic episodes. Patient is eating meals,  alert and verbal.  CAD: s/p CABG 2/3, on medications, no chest pain, stable, monitored.  HTN: Controlled,  on antihypertensive medications.  HLD: Controlled, on statin.  CKD: Monitor labs/BMP          Harper Matias MD  Cell: [phone]  Office: [phone]

## 2020-02-09 NOTE — PROGRESS NOTE ADULT - SUBJECTIVE AND OBJECTIVE BOX
[facility] Division of Kidney Diseases & Hypertension  FOLLOW UP NOTE  [phone]--------------------------------------------------------------------------------  Chief Complaint:Acute ischemic heart disease      24 hour events/subjective:  Patient seen today, extubated yesterday He is coughing up secretion but reports able to expectorate. Also has problems with getting sleep  No other complaints  Labs and charts reviewed. No other significant events  SCr: 1.34 today. UO: 3.5L over the past 24 hours        PAST HISTORY  --------------------------------------------------------------------------------  No significant changes to PMH, PSH, FHx, SHx, unless otherwise noted    ALLERGIES & MEDICATIONS  --------------------------------------------------------------------------------  Allergies    No Known Allergies    Intolerances      Standing Inpatient Medications  aMIOdarone    Tablet 400 milliGRAM(s) Oral every 8 hours  aMIOdarone    Tablet   Oral   artificial tears (preservative free) Ophthalmic Solution 1 Drop(s) Both EYES two times a day  aspirin  chewable 81 milliGRAM(s) Oral daily  chlorhexidine 2% Cloths 1 Application(s) Topical <User Schedule>  dextrose 5% + sodium chloride 0.9%. 1000 milliLiter(s) IV Continuous <Continuous>  dextrose 5%. 1000 milliLiter(s) IV Continuous <Continuous>  dextrose 50% Injectable 12.5 Gram(s) IV Push once  dextrose 50% Injectable 25 Gram(s) IV Push once  dextrose 50% Injectable 25 Gram(s) IV Push once  furosemide    Tablet 40 milliGRAM(s) Oral daily  heparin  Infusion 1000 Unit(s)/Hr IV Continuous <Continuous>  insulin lispro (HumaLOG) corrective regimen sliding scale   SubCutaneous Before meals and at bedtime  insulin regular Infusion 4 Unit(s)/Hr IV Continuous <Continuous>  linezolid  IVPB      linezolid  IVPB 600 milliGRAM(s) IV Intermittent every 12 hours  meropenem  IVPB 1000 milliGRAM(s) IV Intermittent every 12 hours  multivitamin 1 Tablet(s) Oral daily  pantoprazole  Injectable 40 milliGRAM(s) IV Push daily  polyethylene glycol 3350 17 Gram(s) Oral daily  sodium chloride 0.9%. 1000 milliLiter(s) IV Continuous <Continuous>  tamsulosin 0.4 milliGRAM(s) Oral at bedtime    PRN Inpatient Medications  dextrose 40% Gel 15 Gram(s) Oral once PRN  glucagon  Injectable 1 milliGRAM(s) IntraMuscular once PRN  HYDROmorphone   Tablet 2 milliGRAM(s) Oral every 6 hours PRN  HYDROmorphone   Tablet 4 milliGRAM(s) Oral every 6 hours PRN  sodium chloride 0.9% lock flush 10 milliLiter(s) IV Push every 1 hour PRN      REVIEW OF SYSTEMS  --------------------------------------------------------------------------------  Gen: No  fevers/chills  Skin: No rashes  Head/Eyes/Ears/Mouth: No headache; Normal hearing; Normal vision w/o blurriness  Respiratory: + cough  CV: + chest discomfort  GI: No abdominal pain  : No increased frequency, dysuria, hematuria, nocturia    All other systems were reviewed and are negative, except as noted.    VITALS/PHYSICAL EXAM  --------------------------------------------------------------------------------  T(C): 37.3 (02-09-20 @ 08:00), Max: 38.5 (02-08-20 @ 20:00)  HR: 98 (02-09-20 @ 10:30) (91 - 120)  BP: --  RR: 25 (02-09-20 @ 10:30) (10 - 26)  SpO2: 100% (02-09-20 @ 10:30) (97% - 100%)  Wt(kg): --        02-08-20 @ 07:01  -  02-09-20 @ 07:00  --------------------------------------------------------  IN: 2151.3 mL / OUT: 2770 mL / NET: -618.7 mL    02-09-20 @ 07:01  -  02-09-20 @ 10:43  --------------------------------------------------------  IN: 229.5 mL / OUT: 460 mL / NET: -230.5 mL      Physical Exam:  	Gen: awake, not in distress  	Pulm: CTA b/l  	CV: + central chest dressing from CABG C/D/I, S1S2  	Abd: Soft, +BS   	Ext: trace LE edema B/L  	Neuro: Awake  	Skin: Warm and dry    LABS/STUDIES  --------------------------------------------------------------------------------              8.6    17.15 >-----------<  186      [02-09-20 @ 00:38]              26.8     131  |  97  |  59  ----------------------------<  125      [02-09-20 @ 00:38]  4.3   |  19  |  2.57        Ca     7.6     [02-09-20 @ 00:38]      Mg     2.5     [02-09-20 @ 00:38]      Phos  3.0     [02-09-20 @ 00:38]    TPro  5.5  /  Alb  2.2  /  TBili  0.8  /  DBili  x   /  AST  79  /  ALT  210  /  AlkPhos  140  [02-09-20 @ 00:38]      PTT: 74.5       [02-09-20 @ 03:24]      Creatinine Trend:  SCr 2.57 [02-09 @ 00:38]  SCr 2.53 [02-08 @ 17:20]  SCr 2.55 [02-08 @ 01:54]  SCr 2.53 [02-07 @ 02:06]  SCr 2.58 [02-06 @ 23:46]    Urinalysis - [02-08-20 @ 02:06]      Color Yellow / Appearance Clear / SG 1.023 / pH 6.0      Gluc Trace / Ketone Negative  / Bili Negative / Urobili 2 mg/dL       Blood Moderate / Protein 100 mg/dL / Leuk Est Negative / Nitrite Negative      RBC 15 / WBC 6 / Hyaline 8 / Gran 2 / Sq Epi  / Non Sq Epi 1 / Bacteria Negative        HCV 0.16, Nonreact      [02-04-20 @ 10:29]

## 2020-02-09 NOTE — PROGRESS NOTE ADULT - PROBLEM SELECTOR PLAN 2
Pt. with likely hypervolemic hyponatremia in the setting of volume overload. Recommend stabilizing pt. hemodynamically before considering diuretic therapy. Pt. is currently non-oliguric. Monitor serum sodium      Michelle Gonzalez  Nephrology Fellow  Pager: [phone]

## 2020-02-10 LAB
-  AMPICILLIN: SIGNIFICANT CHANGE UP
-  GENTAMICIN SYNERGY: SIGNIFICANT CHANGE UP
-  VANCOMYCIN: SIGNIFICANT CHANGE UP
ALBUMIN SERPL ELPH-MCNC: 2.3 G/DL — LOW (ref 3.3–5)
ALBUMIN SERPL ELPH-MCNC: 2.4 G/DL — LOW (ref 3.3–5)
ALBUMIN SERPL ELPH-MCNC: 2.4 G/DL — LOW (ref 3.3–5)
ALP SERPL-CCNC: 116 U/L — SIGNIFICANT CHANGE UP (ref 40–120)
ALP SERPL-CCNC: 121 U/L — HIGH (ref 40–120)
ALP SERPL-CCNC: 127 U/L — HIGH (ref 40–120)
ALT FLD-CCNC: 137 U/L — HIGH (ref 10–45)
ALT FLD-CCNC: 144 U/L — HIGH (ref 10–45)
ALT FLD-CCNC: 155 U/L — HIGH (ref 10–45)
ANION GAP SERPL CALC-SCNC: 13 MMOL/L — SIGNIFICANT CHANGE UP (ref 5–17)
ANION GAP SERPL CALC-SCNC: 14 MMOL/L — SIGNIFICANT CHANGE UP (ref 5–17)
ANION GAP SERPL CALC-SCNC: 15 MMOL/L — SIGNIFICANT CHANGE UP (ref 5–17)
APPEARANCE UR: CLEAR — SIGNIFICANT CHANGE UP
APTT BLD: 43.6 SEC — HIGH (ref 27.5–36.3)
APTT BLD: 54.2 SEC — HIGH (ref 27.5–36.3)
APTT BLD: 60.8 SEC — HIGH (ref 27.5–36.3)
AST SERPL-CCNC: 42 U/L — HIGH (ref 10–40)
AST SERPL-CCNC: 48 U/L — HIGH (ref 10–40)
AST SERPL-CCNC: 61 U/L — HIGH (ref 10–40)
BACTERIA # UR AUTO: NEGATIVE — SIGNIFICANT CHANGE UP
BILIRUB SERPL-MCNC: 0.7 MG/DL — SIGNIFICANT CHANGE UP (ref 0.2–1.2)
BILIRUB SERPL-MCNC: 0.7 MG/DL — SIGNIFICANT CHANGE UP (ref 0.2–1.2)
BILIRUB SERPL-MCNC: 0.9 MG/DL — SIGNIFICANT CHANGE UP (ref 0.2–1.2)
BILIRUB UR-MCNC: NEGATIVE — SIGNIFICANT CHANGE UP
BUN SERPL-MCNC: 64 MG/DL — HIGH (ref 7–23)
BUN SERPL-MCNC: 65 MG/DL — HIGH (ref 7–23)
BUN SERPL-MCNC: 65 MG/DL — HIGH (ref 7–23)
CALCIUM SERPL-MCNC: 7.6 MG/DL — LOW (ref 8.4–10.5)
CALCIUM SERPL-MCNC: 7.7 MG/DL — LOW (ref 8.4–10.5)
CALCIUM SERPL-MCNC: 7.8 MG/DL — LOW (ref 8.4–10.5)
CHLORIDE SERPL-SCNC: 90 MMOL/L — LOW (ref 96–108)
CHLORIDE SERPL-SCNC: 90 MMOL/L — LOW (ref 96–108)
CHLORIDE SERPL-SCNC: 92 MMOL/L — LOW (ref 96–108)
CO2 SERPL-SCNC: 18 MMOL/L — LOW (ref 22–31)
CO2 SERPL-SCNC: 20 MMOL/L — LOW (ref 22–31)
CO2 SERPL-SCNC: 21 MMOL/L — LOW (ref 22–31)
COLOR SPEC: YELLOW — SIGNIFICANT CHANGE UP
CREAT SERPL-MCNC: 2.4 MG/DL — HIGH (ref 0.5–1.3)
CREAT SERPL-MCNC: 2.46 MG/DL — HIGH (ref 0.5–1.3)
CREAT SERPL-MCNC: 2.47 MG/DL — HIGH (ref 0.5–1.3)
CULTURE RESULTS: SIGNIFICANT CHANGE UP
DIFF PNL FLD: ABNORMAL
EPI CELLS # UR: 1 /HPF — SIGNIFICANT CHANGE UP
GAS PNL BLDA: SIGNIFICANT CHANGE UP
GLUCOSE BLDC GLUCOMTR-MCNC: 125 MG/DL — HIGH (ref 70–99)
GLUCOSE BLDC GLUCOMTR-MCNC: 134 MG/DL — HIGH (ref 70–99)
GLUCOSE BLDC GLUCOMTR-MCNC: 165 MG/DL — HIGH (ref 70–99)
GLUCOSE BLDC GLUCOMTR-MCNC: 169 MG/DL — HIGH (ref 70–99)
GLUCOSE BLDC GLUCOMTR-MCNC: 175 MG/DL — HIGH (ref 70–99)
GLUCOSE BLDC GLUCOMTR-MCNC: 182 MG/DL — HIGH (ref 70–99)
GLUCOSE BLDC GLUCOMTR-MCNC: 185 MG/DL — HIGH (ref 70–99)
GLUCOSE BLDC GLUCOMTR-MCNC: 191 MG/DL — HIGH (ref 70–99)
GLUCOSE BLDC GLUCOMTR-MCNC: 195 MG/DL — HIGH (ref 70–99)
GLUCOSE BLDC GLUCOMTR-MCNC: 218 MG/DL — HIGH (ref 70–99)
GLUCOSE BLDC GLUCOMTR-MCNC: 229 MG/DL — HIGH (ref 70–99)
GLUCOSE BLDC GLUCOMTR-MCNC: 270 MG/DL — HIGH (ref 70–99)
GLUCOSE SERPL-MCNC: 151 MG/DL — HIGH (ref 70–99)
GLUCOSE SERPL-MCNC: 227 MG/DL — HIGH (ref 70–99)
GLUCOSE SERPL-MCNC: 248 MG/DL — HIGH (ref 70–99)
GLUCOSE UR QL: ABNORMAL
HCT VFR BLD CALC: 25.4 % — LOW (ref 39–50)
HGB BLD-MCNC: 8.2 G/DL — LOW (ref 13–17)
HYALINE CASTS # UR AUTO: 4 /LPF — HIGH (ref 0–2)
KETONES UR-MCNC: NEGATIVE — SIGNIFICANT CHANGE UP
LEUKOCYTE ESTERASE UR-ACNC: NEGATIVE — SIGNIFICANT CHANGE UP
MAGNESIUM SERPL-MCNC: 2.3 MG/DL — SIGNIFICANT CHANGE UP (ref 1.6–2.6)
MCHC RBC-ENTMCNC: 27.6 PG — SIGNIFICANT CHANGE UP (ref 27–34)
MCHC RBC-ENTMCNC: 32.3 GM/DL — SIGNIFICANT CHANGE UP (ref 32–36)
MCV RBC AUTO: 85.5 FL — SIGNIFICANT CHANGE UP (ref 80–100)
METHOD TYPE: SIGNIFICANT CHANGE UP
NITRITE UR-MCNC: NEGATIVE — SIGNIFICANT CHANGE UP
NRBC # BLD: 0 /100 WBCS — SIGNIFICANT CHANGE UP (ref 0–0)
NT-PROBNP SERPL-SCNC: 5669 PG/ML — HIGH (ref 0–300)
ORGANISM # SPEC MICROSCOPIC CNT: SIGNIFICANT CHANGE UP
OSMOLALITY SERPL: 297 MOSMOL/KG — SIGNIFICANT CHANGE UP (ref 280–301)
PH UR: 6 — SIGNIFICANT CHANGE UP (ref 5–8)
PHOSPHATE SERPL-MCNC: 3.4 MG/DL — SIGNIFICANT CHANGE UP (ref 2.5–4.5)
PLATELET # BLD AUTO: 190 K/UL — SIGNIFICANT CHANGE UP (ref 150–400)
POTASSIUM SERPL-MCNC: 4.5 MMOL/L — SIGNIFICANT CHANGE UP (ref 3.5–5.3)
POTASSIUM SERPL-MCNC: 4.6 MMOL/L — SIGNIFICANT CHANGE UP (ref 3.5–5.3)
POTASSIUM SERPL-MCNC: 4.8 MMOL/L — SIGNIFICANT CHANGE UP (ref 3.5–5.3)
POTASSIUM SERPL-SCNC: 4.5 MMOL/L — SIGNIFICANT CHANGE UP (ref 3.5–5.3)
POTASSIUM SERPL-SCNC: 4.6 MMOL/L — SIGNIFICANT CHANGE UP (ref 3.5–5.3)
POTASSIUM SERPL-SCNC: 4.8 MMOL/L — SIGNIFICANT CHANGE UP (ref 3.5–5.3)
PROT SERPL-MCNC: 5.6 G/DL — LOW (ref 6–8.3)
PROT SERPL-MCNC: 5.7 G/DL — LOW (ref 6–8.3)
PROT SERPL-MCNC: 5.8 G/DL — LOW (ref 6–8.3)
PROT UR-MCNC: 100 — SIGNIFICANT CHANGE UP
RBC # BLD: 2.97 M/UL — LOW (ref 4.2–5.8)
RBC # FLD: 13.6 % — SIGNIFICANT CHANGE UP (ref 10.3–14.5)
RBC CASTS # UR COMP ASSIST: 25 /HPF — HIGH (ref 0–4)
SODIUM SERPL-SCNC: 123 MMOL/L — LOW (ref 135–145)
SODIUM SERPL-SCNC: 123 MMOL/L — LOW (ref 135–145)
SODIUM SERPL-SCNC: 127 MMOL/L — LOW (ref 135–145)
SP GR SPEC: 1.01 — SIGNIFICANT CHANGE UP (ref 1.01–1.02)
SPECIMEN SOURCE: SIGNIFICANT CHANGE UP
UROBILINOGEN FLD QL: ABNORMAL
WBC # BLD: 15.4 K/UL — HIGH (ref 3.8–10.5)
WBC # FLD AUTO: 15.4 K/UL — HIGH (ref 3.8–10.5)
WBC UR QL: 2 /HPF — SIGNIFICANT CHANGE UP (ref 0–5)

## 2020-02-10 PROCEDURE — 71045 X-RAY EXAM CHEST 1 VIEW: CPT | Mod: 26

## 2020-02-10 PROCEDURE — 99222 1ST HOSP IP/OBS MODERATE 55: CPT

## 2020-02-10 PROCEDURE — 99232 SBSQ HOSP IP/OBS MODERATE 35: CPT | Mod: GC

## 2020-02-10 PROCEDURE — 76700 US EXAM ABDOM COMPLETE: CPT | Mod: 26

## 2020-02-10 PROCEDURE — 99233 SBSQ HOSP IP/OBS HIGH 50: CPT

## 2020-02-10 RX ORDER — FUROSEMIDE 40 MG
40 TABLET ORAL EVERY 12 HOURS
Refills: 0 | Status: DISCONTINUED | OUTPATIENT
Start: 2020-02-10 | End: 2020-02-10

## 2020-02-10 RX ORDER — BUDESONIDE, MICRONIZED 100 %
0.5 POWDER (GRAM) MISCELLANEOUS ONCE
Refills: 0 | Status: COMPLETED | OUTPATIENT
Start: 2020-02-10 | End: 2020-02-10

## 2020-02-10 RX ORDER — POTASSIUM CHLORIDE 20 MEQ
40 PACKET (EA) ORAL ONCE
Refills: 0 | Status: COMPLETED | OUTPATIENT
Start: 2020-02-10 | End: 2020-02-10

## 2020-02-10 RX ORDER — FUROSEMIDE 40 MG
10 TABLET ORAL
Qty: 500 | Refills: 0 | Status: DISCONTINUED | OUTPATIENT
Start: 2020-02-10 | End: 2020-02-11

## 2020-02-10 RX ORDER — AMPICILLIN TRIHYDRATE 250 MG
1 CAPSULE ORAL EVERY 8 HOURS
Refills: 0 | Status: DISCONTINUED | OUTPATIENT
Start: 2020-02-10 | End: 2020-02-11

## 2020-02-10 RX ORDER — IPRATROPIUM/ALBUTEROL SULFATE 18-103MCG
3 AEROSOL WITH ADAPTER (GRAM) INHALATION EVERY 6 HOURS
Refills: 0 | Status: DISCONTINUED | OUTPATIENT
Start: 2020-02-10 | End: 2020-02-17

## 2020-02-10 RX ADMIN — OXYCODONE AND ACETAMINOPHEN 1 TABLET(S): 5; 325 TABLET ORAL at 21:38

## 2020-02-10 RX ADMIN — OXYCODONE AND ACETAMINOPHEN 1 TABLET(S): 5; 325 TABLET ORAL at 22:05

## 2020-02-10 RX ADMIN — TAMSULOSIN HYDROCHLORIDE 0.4 MILLIGRAM(S): 0.4 CAPSULE ORAL at 21:38

## 2020-02-10 RX ADMIN — Medication 3 MILLILITER(S): at 23:38

## 2020-02-10 RX ADMIN — Medication 108 GRAM(S): at 21:38

## 2020-02-10 RX ADMIN — Medication 40 MILLIGRAM(S): at 05:08

## 2020-02-10 RX ADMIN — AMIODARONE HYDROCHLORIDE 400 MILLIGRAM(S): 400 TABLET ORAL at 13:50

## 2020-02-10 RX ADMIN — Medication 5 MG/HR: at 13:50

## 2020-02-10 RX ADMIN — OXYCODONE AND ACETAMINOPHEN 1 TABLET(S): 5; 325 TABLET ORAL at 01:10

## 2020-02-10 RX ADMIN — HEPARIN SODIUM 11 UNIT(S)/HR: 5000 INJECTION INTRAVENOUS; SUBCUTANEOUS at 21:39

## 2020-02-10 RX ADMIN — Medication 10 MG/HR: at 21:39

## 2020-02-10 RX ADMIN — Medication 1 DROP(S): at 18:16

## 2020-02-10 RX ADMIN — AMIODARONE HYDROCHLORIDE 400 MILLIGRAM(S): 400 TABLET ORAL at 05:08

## 2020-02-10 RX ADMIN — CHLORHEXIDINE GLUCONATE 1 APPLICATION(S): 213 SOLUTION TOPICAL at 05:05

## 2020-02-10 RX ADMIN — Medication 81 MILLIGRAM(S): at 13:24

## 2020-02-10 RX ADMIN — Medication 0.5 MILLIGRAM(S): at 12:12

## 2020-02-10 RX ADMIN — AMIODARONE HYDROCHLORIDE 400 MILLIGRAM(S): 400 TABLET ORAL at 21:38

## 2020-02-10 RX ADMIN — OXYCODONE AND ACETAMINOPHEN 1 TABLET(S): 5; 325 TABLET ORAL at 13:54

## 2020-02-10 RX ADMIN — Medication 1 TABLET(S): at 13:24

## 2020-02-10 RX ADMIN — PANTOPRAZOLE SODIUM 40 MILLIGRAM(S): 20 TABLET, DELAYED RELEASE ORAL at 12:24

## 2020-02-10 RX ADMIN — INSULIN HUMAN 4 UNIT(S)/HR: 100 INJECTION, SOLUTION SUBCUTANEOUS at 13:51

## 2020-02-10 RX ADMIN — Medication 40 MILLIEQUIVALENT(S): at 15:44

## 2020-02-10 RX ADMIN — HEPARIN SODIUM 10 UNIT(S)/HR: 5000 INJECTION INTRAVENOUS; SUBCUTANEOUS at 13:50

## 2020-02-10 RX ADMIN — Medication 3 MILLILITER(S): at 17:53

## 2020-02-10 RX ADMIN — LINEZOLID 300 MILLIGRAM(S): 600 INJECTION, SOLUTION INTRAVENOUS at 05:08

## 2020-02-10 RX ADMIN — OXYCODONE AND ACETAMINOPHEN 1 TABLET(S): 5; 325 TABLET ORAL at 01:55

## 2020-02-10 RX ADMIN — Medication 108 GRAM(S): at 13:52

## 2020-02-10 RX ADMIN — Medication 1 DROP(S): at 05:05

## 2020-02-10 RX ADMIN — Medication 3 MILLILITER(S): at 12:12

## 2020-02-10 RX ADMIN — OXYCODONE AND ACETAMINOPHEN 1 TABLET(S): 5; 325 TABLET ORAL at 13:24

## 2020-02-10 RX ADMIN — INSULIN HUMAN 4 UNIT(S)/HR: 100 INJECTION, SOLUTION SUBCUTANEOUS at 21:39

## 2020-02-10 NOTE — PROGRESS NOTE ADULT - ASSESSMENT
intraop kennedy 2/3/20 ef 50%, nl lv, mild diastolic dysfx stage 1  limited echo 2/4/20: nl LV sys fx , no pericardial effusion     a/p  64 year old man with history of HTN, DM II, admitted with progressive exertional angina, s/p cath with severe triple vessel disease, s/p CABG, post op course c/b brief witnessed PEA likely hypoxic arrest, s/p intubation.     1. CAD, s/p cath with severe triple vessel disease including lesions at the bifurcation of the LAD/diagonal and distal RCA/RPDA/RPL trifurcation.   -s/p CABG x 4   -intraop kennedy ef 50%  -cont asa, statin  -s/p PEA arrest, now extubated  -off vasopressors  -PAF  -cont a/c : hep gtt   -LE dopplers, negative for dvt     2. HTN  -bp stable off pressors    3. STEPHANIE/CKD  renal f/u     4. Diuresis per [facility]    5. S/P Right pigtail placement    6. Sepsis   + bcx 2/8 Gram positive cocci in pair/chain   IV abx per [facility]  ID following   repeat chest xray 2/9  with LLL pna/ atelectasis   u/s abd pending  to r/o cholecystitis       dvt ppx

## 2020-02-10 NOTE — PROGRESS NOTE ADULT - SUBJECTIVE AND OBJECTIVE BOX
FRANKLIN PRESLEY  MRN-[id number]  Patient is a 64y old  Male who presents with a chief complaint of Chest pain (10 Feb 2020 15:07)    HPI:  63yo male with hx of HTN, DM II, presented to the ED with complaints of acute on chronic left sided exertional chest pain. Pt states he has been having left sided pressure and stabbing chest pain radiating to his sternum and right with activity for the past few months. He states each episode last approximately 1-2 mins and subsides upon resting. He denies associated symptoms of radiation to his back, arm or jaw. He recently was at a wedding which he could not enjoy because dancing would reproduce to the pain. He states he did not pursue and medical attention until this time. Pt states this time his pain was associated with sob up exertion. He denies symptoms of LOC, diaphoresis, palpitations, abd pain, N/V, fever or chills. He denies prior cardiac work up. (2020 12:57)      Surgery/Hospital course:    Today:    REVIEW OF SYSTEMS:  Gen: No fever  EYES/ENT: No visual changes;  No vertigo or throat pain   NECK: No pain   RES:  No shortness of breath or Cough  Chest: + incisional pain  CARD: No chest pain   GI: No abdominal pain  : No dysuria  NEURO: No weakness  SKIN: No itching, rashes     Physical Exam:  Vital Signs Last 24 Hrs  T(C): 36.8 (10 Feb 2020 16:00), Max: 37.7 (10 Feb 2020 00:00)  T(F): 98.2 (10 Feb 2020 16:00), Max: 99.9 (10 Feb 2020 00:00)  HR: 77 (10 Feb 2020 18:00) (71 - 92)  BP: 128/60 (10 Feb 2020 17:00) (118/67 - 139/70)  BP(mean): 86 (10 Feb 2020 17:00) (86 - 97)  RR: 23 (10 Feb 2020 18:00) (16 - 29)  SpO2: 100% (10 Feb 2020 17:34) (96% - 100%)  Gen:  Awake, alert   CNS: non focal 	  Neck: no JVD  RES : clear , no wheezing    Chest:   + chest tubes                     CVS: Regular  rhythm. Normal S1/S2  Abd: Soft, non-distended. Bowel sounds present.  Skin: No rash.  Ext:  no edema, A Line  PSY:  ============================I/O===========================   I&O's Detail    2020 07:  -  10 Feb 2020 07:00  --------------------------------------------------------  IN:    heparin Infusion: 155 mL    insulin regular Infusion: 87 mL    IV PiggyBack: 300 mL    Oral Fluid: 180 mL    sodium chloride 0.9%: 240 mL  Total IN: 962 mL    OUT:    Chest Tube: 360 mL    Indwelling Catheter - Urethral: 1995 mL  Total OUT: 2355 mL    Total NET: -1393 mL      10 Feb 2020 07:  -  10 Feb 2020 18:41  --------------------------------------------------------  IN:    furosemide Infusion: 10 mL    furosemide Infusion: 40 mL    heparin Infusion: 120 mL    insulin regular Infusion: 49 mL    sodium chloride 0.9%: 50 mL  Total IN: 269 mL    OUT:    Chest Tube: 110 mL    Indwelling Catheter - Urethral: 940 mL  Total OUT: 1050 mL    Total NET: -781 mL        ============================ LABS =========================                        8.2    15.40 )-----------( 190      ( 10 Feb 2020 01:59 )             25.4     02-10    127<L>  |  92<L>  |  65<H>  ----------------------------<  151<H>  4.6   |  21<L>  |  2.47<H>    Ca    7.6<L>      10 Feb 2020 15:46  Phos  3.4     02-10  Mg     2.3     02-10    TPro  5.7<L>  /  Alb  2.3<L>  /  TBili  0.7  /  DBili  x   /  AST  48<H>  /  ALT  137<H>  /  AlkPhos  116  02-10    LIVER FUNCTIONS - ( 10 Feb 2020 15:46 )  Alb: 2.3 g/dL / Pro: 5.7 g/dL / ALK PHOS: 116 U/L / ALT: 137 U/L / AST: 48 U/L / GGT: x           PTT - ( 10 Feb 2020 13:17 )  PTT:54.2 sec  ABG - ( 10 Feb 2020 14:01 )  pH, Arterial: 7.48  pH, Blood: x     /  pCO2: 31    /  pO2: 109   / HCO3: 23    / Base Excess: .1    /  SaO2: 99                Urinalysis Basic - ( 10 Feb 2020 09:32 )    Color: Yellow / Appearance: Clear / S.014 / pH: x  Gluc: x / Ketone: Negative  / Bili: Negative / Urobili: 2 mg/dL   Blood: x / Protein: 100 / Nitrite: Negative   Leuk Esterase: Negative / RBC: 25 /hpf / WBC 2 /HPF   Sq Epi: x / Non Sq Epi: 1 /hpf / Bacteria: Negative      ======================Micro/Rad/Cardio=================  Culture: Reviewed   CXR: Reviewed  Echo:Reviewed  ======================================================  PAST MEDICAL & SURGICAL HISTORY:  HTN (hypertension)  Diabetes  History of appendectomy: x 30 yrs ago    ====================ASSESMENT ==============  CNS:  RES:  CVS:  Hem:  John:  GI:  Endo:  ID:  Skin  Plan:  ====================== NEUROLOGY=====================  oxycodone    5 mG/acetaminophen 325 mG 1 Tablet(s) Oral every 4 hours PRN Severe Pain (7 - 10)    ==================== RESPIRATORY======================  Mechanical Ventilation:    albuterol/ipratropium for Nebulization 3 milliLiter(s) Nebulizer every 6 hours    ====================CARDIOVASCULAR==================  aMIOdarone    Tablet 400 milliGRAM(s) Oral every 8 hours  aMIOdarone    Tablet   Oral   furosemide Infusion 10 mG/Hr (5 mL/Hr) IV Continuous <Continuous>  tamsulosin 0.4 milliGRAM(s) Oral at bedtime    ===================HEMATOLOGIC/ONC ===================  aspirin  chewable 81 milliGRAM(s) Oral daily  heparin  Infusion 1000 Unit(s)/Hr (10 mL/Hr) IV Continuous <Continuous>    ===================== RENAL =========================  Osorio for monitoring urine output    ==================== GASTROINTESTINAL===================  dextrose 5%. 1000 milliLiter(s) (50 mL/Hr) IV Continuous <Continuous>  multivitamin 1 Tablet(s) Oral daily  pantoprazole  Injectable 40 milliGRAM(s) IV Push daily  polyethylene glycol 3350 17 Gram(s) Oral daily  sodium chloride 0.9% lock flush 10 milliLiter(s) IV Push every 1 hour PRN Pre/post blood products, medications, blood draw, and to maintain line patency    =======================    ENDOCRINE  =====================  dextrose 40% Gel 15 Gram(s) Oral once PRN Blood Glucose LESS THAN 70 milliGRAM(s)/deciliter  dextrose 50% Injectable 12.5 Gram(s) IV Push once  dextrose 50% Injectable 25 Gram(s) IV Push once  dextrose 50% Injectable 25 Gram(s) IV Push once  glucagon  Injectable 1 milliGRAM(s) IntraMuscular once PRN Glucose LESS THAN 70 milligrams/deciliter  insulin regular Infusion 4 Unit(s)/Hr (4 mL/Hr) IV Continuous <Continuous>    ========================INFECTIOUS DISEASE================  ampicillin  IVPB 1 Gram(s) IV Intermittent every 8 hours    .crit

## 2020-02-10 NOTE — PROGRESS NOTE ADULT - PROBLEM SELECTOR PLAN 1
Will DC Lantus and continue IV insulin for now.  Will continue monitoring FS and FU.  Will switch to basal bolus insulin once blood sugars stabilize. Will DC Lantus and continue IV insulin for now.  Will continue monitoring FS and FU.

## 2020-02-10 NOTE — PROGRESS NOTE ADULT - PROBLEM SELECTOR PLAN 1
Pt with  CKD likely  in the setting of long standing DM and HTN.  No prior labs for review. Scr since admission has ranged between 1.8-2 mg/dl.  Scr increased to >2.50 secondary to hemodynamic injury after CABG  and PEA arrest s/p CPR, however now improved this AM at 2.40. Pt. is non-oliguric. UA significant for protein and RBCs. Urine electrolytes consistent with intrinsic disease. Urine Pr/Cr ratio in nephrotic range. HepBsAg, HepC, C3, C4, SIFE, RACHEL, P-ANCA, C-ANCA, Anti-GBM ab, Parvovirus, RPR, anti-PLA2R ab were negative/non-reactive. Monitor labs and urine output. Avoid NSAIDs, ACEI/ARBS, RCA and nephrotoxins. Dose medications as per eGFR. Renal function improving. Diuretics as needed

## 2020-02-10 NOTE — PROGRESS NOTE ADULT - SUBJECTIVE AND OBJECTIVE BOX
Chief complaint  Patient is a 64y old  Male who presents with a chief complaint of Chest pain (10 Feb 2020 08:38)   Review of systems  Patient in bed, looks comfortable, no hypoglycemia.    Labs and Fingersticks  CAPILLARY BLOOD GLUCOSE  163 (09 Feb 2020 23:00)  176 (09 Feb 2020 22:00)  165 (09 Feb 2020 21:00)  159 (09 Feb 2020 20:00)  188 (09 Feb 2020 19:00)  119 (09 Feb 2020 18:00)  103 (09 Feb 2020 17:00)  123 (09 Feb 2020 16:00)  151 (09 Feb 2020 15:00)      POCT Blood Glucose.: 134 mg/dL (10 Feb 2020 13:02)  POCT Blood Glucose.: 169 mg/dL (10 Feb 2020 12:02)  POCT Blood Glucose.: 182 mg/dL (10 Feb 2020 11:07)  POCT Blood Glucose.: 191 mg/dL (10 Feb 2020 10:13)  POCT Blood Glucose.: 218 mg/dL (10 Feb 2020 09:02)  POCT Blood Glucose.: 163 mg/dL (09 Feb 2020 23:00)  POCT Blood Glucose.: 176 mg/dL (09 Feb 2020 22:03)  POCT Blood Glucose.: 165 mg/dL (09 Feb 2020 21:06)  POCT Blood Glucose.: 159 mg/dL (09 Feb 2020 19:55)  POCT Blood Glucose.: 188 mg/dL (09 Feb 2020 18:47)  POCT Blood Glucose.: 119 mg/dL (09 Feb 2020 17:51)  POCT Blood Glucose.: 123 mg/dL (09 Feb 2020 16:04)  POCT Blood Glucose.: 151 mg/dL (09 Feb 2020 15:00)      Anion Gap, Serum: 13 (02-10 @ 09:32)  Anion Gap, Serum: 15 (02-10 @ 01:59)  Anion Gap, Serum: 15 (02-09 @ 00:38)  Anion Gap, Serum: 22 <H> (02-08 @ 17:20)      Calcium, Total Serum: 7.8 <L> (02-10 @ 09:32)  Calcium, Total Serum: 7.7 <L> (02-10 @ 01:59)  Calcium, Total Serum: 7.6 <L> (02-09 @ 00:38)  Calcium, Total Serum: 7.8 <L> (02-08 @ 17:20)  Albumin, Serum: 2.4 <L> (02-10 @ 09:32)  Albumin, Serum: 2.4 <L> (02-10 @ 01:59)  Albumin, Serum: 2.2 <L> (02-09 @ 00:38)  Albumin, Serum: 2.5 <L> (02-08 @ 17:20)    Alanine Aminotransferase (ALT/SGPT): 155 <H> (02-10 @ 09:32)  Alanine Aminotransferase (ALT/SGPT): 144 <H> (02-10 @ 01:59)  Alanine Aminotransferase (ALT/SGPT): 210 <H> (02-09 @ 00:38)  Alanine Aminotransferase (ALT/SGPT): 238 <H> (02-08 @ 17:20)  Alkaline Phosphatase, Serum: 121 <H> (02-10 @ 09:32)  Alkaline Phosphatase, Serum: 127 <H> (02-10 @ 01:59)  Alkaline Phosphatase, Serum: 140 <H> (02-09 @ 00:38)  Alkaline Phosphatase, Serum: 147 <H> (02-08 @ 17:20)  Aspartate Aminotransferase (AST/SGOT): 61 <H> (02-10 @ 09:32)  Aspartate Aminotransferase (AST/SGOT): 42 <H> (02-10 @ 01:59)  Aspartate Aminotransferase (AST/SGOT): 79 <H> (02-09 @ 00:38)  Aspartate Aminotransferase (AST/SGOT): 92 <H> (02-08 @ 17:20)        02-10    123<L>  |  90<L>  |  65<H>  ----------------------------<  227<H>  4.5   |  20<L>  |  2.46<H>    Ca    7.8<L>      10 Feb 2020 09:32  Phos  3.4     02-10  Mg     2.3     02-10    TPro  5.8<L>  /  Alb  2.4<L>  /  TBili  0.7  /  DBili  x   /  AST  61<H>  /  ALT  155<H>  /  AlkPhos  121<H>  02-10                        8.2    15.40 )-----------( 190      ( 10 Feb 2020 01:59 )             25.4     Medications  MEDICATIONS  (STANDING):  albuterol/ipratropium for Nebulization 3 milliLiter(s) Nebulizer every 6 hours  aMIOdarone    Tablet 400 milliGRAM(s) Oral every 8 hours  aMIOdarone    Tablet   Oral   ampicillin  IVPB 1 Gram(s) IV Intermittent every 8 hours  artificial tears (preservative free) Ophthalmic Solution 1 Drop(s) Both EYES two times a day  aspirin  chewable 81 milliGRAM(s) Oral daily  chlorhexidine 2% Cloths 1 Application(s) Topical <User Schedule>  dextrose 5%. 1000 milliLiter(s) (50 mL/Hr) IV Continuous <Continuous>  dextrose 50% Injectable 12.5 Gram(s) IV Push once  dextrose 50% Injectable 25 Gram(s) IV Push once  dextrose 50% Injectable 25 Gram(s) IV Push once  furosemide Infusion 10 mG/Hr (5 mL/Hr) IV Continuous <Continuous>  heparin  Infusion 1000 Unit(s)/Hr (10 mL/Hr) IV Continuous <Continuous>  insulin regular Infusion 4 Unit(s)/Hr (4 mL/Hr) IV Continuous <Continuous>  multivitamin 1 Tablet(s) Oral daily  pantoprazole  Injectable 40 milliGRAM(s) IV Push daily  polyethylene glycol 3350 17 Gram(s) Oral daily  tamsulosin 0.4 milliGRAM(s) Oral at bedtime      Physical Exam  General: Patient comfortable in bed  Vital Signs Last 12 Hrs  T(F): 98.4 (02-10-20 @ 12:00), Max: 99.5 (02-10-20 @ 04:00)  HR: 80 (02-10-20 @ 14:00) (77 - 89)  BP: 118/67 (02-10-20 @ 13:00) (118/67 - 139/70)  BP(mean): 87 (02-10-20 @ 13:00) (87 - 97)  RR: 20 (02-10-20 @ 14:00) (16 - 24)  SpO2: 100% (02-10-20 @ 14:00) (96% - 100%)  Neck: No palpable thyroid nodules.  CVS: S1S2, No murmurs  Respiratory: No wheezing, no crepitations  GI: Abdomen soft, bowel sounds positive  Musculoskeletal:  edema lower extremities.   Skin: No skin rashes, no ecchymosis    Diagnostics Chief complaint  Patient is a 64y old  Male who presents with a chief complaint of Chest pain (10 Feb 2020 08:38)   Review of systems  Patient in bed, looks comfortable, no  hypoglycemia.    Labs and Fingersticks  CAPILLARY BLOOD GLUCOSE  163 (09 Feb 2020 23:00)  176 (09 Feb 2020 22:00)  165 (09 Feb 2020 21:00)  159 (09 Feb 2020 20:00)  188 (09 Feb 2020 19:00)  119 (09 Feb 2020 18:00)  103 (09 Feb 2020 17:00)  123 (09 Feb 2020 16:00)  151 (09 Feb 2020 15:00)      POCT Blood Glucose.: 134 mg/dL (10 Feb 2020 13:02)  POCT Blood Glucose.: 169 mg/dL (10 Feb 2020 12:02)  POCT Blood Glucose.: 182 mg/dL (10 Feb 2020 11:07)  POCT Blood Glucose.: 191 mg/dL (10 Feb 2020 10:13)  POCT Blood Glucose.: 218 mg/dL (10 Feb 2020 09:02)  POCT Blood Glucose.: 163 mg/dL (09 Feb 2020 23:00)  POCT Blood Glucose.: 176 mg/dL (09 Feb 2020 22:03)  POCT Blood Glucose.: 165 mg/dL (09 Feb 2020 21:06)  POCT Blood Glucose.: 159 mg/dL (09 Feb 2020 19:55)  POCT Blood Glucose.: 188 mg/dL (09 Feb 2020 18:47)  POCT Blood Glucose.: 119 mg/dL (09 Feb 2020 17:51)  POCT Blood Glucose.: 123 mg/dL (09 Feb 2020 16:04)  POCT Blood Glucose.: 151 mg/dL (09 Feb 2020 15:00)      Anion Gap, Serum: 13 (02-10 @ 09:32)  Anion Gap, Serum: 15 (02-10 @ 01:59)  Anion Gap, Serum: 15 (02-09 @ 00:38)  Anion Gap, Serum: 22 <H> (02-08 @ 17:20)      Calcium, Total Serum: 7.8 <L> (02-10 @ 09:32)  Calcium, Total Serum: 7.7 <L> (02-10 @ 01:59)  Calcium, Total Serum: 7.6 <L> (02-09 @ 00:38)  Calcium, Total Serum: 7.8 <L> (02-08 @ 17:20)  Albumin, Serum: 2.4 <L> (02-10 @ 09:32)  Albumin, Serum: 2.4 <L> (02-10 @ 01:59)  Albumin, Serum: 2.2 <L> (02-09 @ 00:38)  Albumin, Serum: 2.5 <L> (02-08 @ 17:20)    Alanine Aminotransferase (ALT/SGPT): 155 <H> (02-10 @ 09:32)  Alanine Aminotransferase (ALT/SGPT): 144 <H> (02-10 @ 01:59)  Alanine Aminotransferase (ALT/SGPT): 210 <H> (02-09 @ 00:38)  Alanine Aminotransferase (ALT/SGPT): 238 <H> (02-08 @ 17:20)  Alkaline Phosphatase, Serum: 121 <H> (02-10 @ 09:32)  Alkaline Phosphatase, Serum: 127 <H> (02-10 @ 01:59)  Alkaline Phosphatase, Serum: 140 <H> (02-09 @ 00:38)  Alkaline Phosphatase, Serum: 147 <H> (02-08 @ 17:20)  Aspartate Aminotransferase (AST/SGOT): 61 <H> (02-10 @ 09:32)  Aspartate Aminotransferase (AST/SGOT): 42 <H> (02-10 @ 01:59)  Aspartate Aminotransferase (AST/SGOT): 79 <H> (02-09 @ 00:38)  Aspartate Aminotransferase (AST/SGOT): 92 <H> (02-08 @ 17:20)        02-10    123<L>  |  90<L>  |  65<H>  ----------------------------<  227<H>  4.5   |  20<L>  |  2.46<H>    Ca    7.8<L>      10 Feb 2020 09:32  Phos  3.4     02-10  Mg     2.3     02-10    TPro  5.8<L>  /  Alb  2.4<L>  /  TBili  0.7  /  DBili  x   /  AST  61<H>  /  ALT  155<H>  /  AlkPhos  121<H>  02-10                        8.2    15.40 )-----------( 190      ( 10 Feb 2020 01:59 )             25.4     Medications  MEDICATIONS  (STANDING):  albuterol/ipratropium for Nebulization 3 milliLiter(s) Nebulizer every 6 hours  aMIOdarone    Tablet 400 milliGRAM(s) Oral every 8 hours  aMIOdarone    Tablet   Oral   ampicillin  IVPB 1 Gram(s) IV Intermittent every 8 hours  artificial tears (preservative free) Ophthalmic Solution 1 Drop(s) Both EYES two times a day  aspirin  chewable 81 milliGRAM(s) Oral daily  chlorhexidine 2% Cloths 1 Application(s) Topical <User Schedule>  dextrose 5%. 1000 milliLiter(s) (50 mL/Hr) IV Continuous <Continuous>  dextrose 50% Injectable 12.5 Gram(s) IV Push once  dextrose 50% Injectable 25 Gram(s) IV Push once  dextrose 50% Injectable 25 Gram(s) IV Push once  furosemide Infusion 10 mG/Hr (5 mL/Hr) IV Continuous <Continuous>  heparin  Infusion 1000 Unit(s)/Hr (10 mL/Hr) IV Continuous <Continuous>  insulin regular Infusion 4 Unit(s)/Hr (4 mL/Hr) IV Continuous <Continuous>  multivitamin 1 Tablet(s) Oral daily  pantoprazole  Injectable 40 milliGRAM(s) IV Push daily  polyethylene glycol 3350 17 Gram(s) Oral daily  tamsulosin 0.4 milliGRAM(s) Oral at bedtime      Physical Exam  General: Patient comfortable in bed  Vital Signs Last 12 Hrs  T(F): 98.4 (02-10-20 @ 12:00), Max: 99.5 (02-10-20 @ 04:00)  HR: 80 (02-10-20 @ 14:00) (77 - 89)  BP: 118/67 (02-10-20 @ 13:00) (118/67 - 139/70)  BP(mean): 87 (02-10-20 @ 13:00) (87 - 97)  RR: 20 (02-10-20 @ 14:00) (16 - 24)  SpO2: 100% (02-10-20 @ 14:00) (96% - 100%)  Neck: No palpable thyroid nodules.  CVS: S1S2, No murmurs  Respiratory: No wheezing, no crepitations  GI: Abdomen soft, bowel sounds positive  Musculoskeletal:  edema lower extremities.   Skin: No skin rashes, no ecchymosis    Diagnostics

## 2020-02-10 NOTE — PROGRESS NOTE ADULT - ASSESSMENT
Assessment  DMT2: 64y Male with DM T2 with hyperglycemia, A1C 9.2%, was on oral meds and insulin at home, patient received basal insulin last night, now on IV insulin, blood sugars improving, no hypoglycemic episodes. Patient is NPO for US.  CAD: s/p CABG 2/3, on medications, no chest pain, stable, monitored.  HTN: Controlled,  on antihypertensive medications.  HLD: Controlled, on statin.  CKD: Monitor labs/BMP          Harper Matias MD  Cell: [phone]  Office: [phone] Assessment  DMT2: 64y Male with DM T2 with hyperglycemia, A1C 9.2%, was on oral meds and insulin at home, patient received basal insulin last night, now on IV insulin, blood sugars improving, no hypoglycemic episodes.  Patient is NPO for US.  CAD: s/p CABG 2/3, on medications, no chest pain, stable, monitored.  HTN: Controlled,  on antihypertensive medications.  HLD: Controlled, on statin.  CKD: Monitor labs/BMP          Harper Matias MD  Cell: [phone]  Office: [phone]

## 2020-02-10 NOTE — PROGRESS NOTE ADULT - ATTENDING COMMENTS
Agree with above NP note.  cv stable  extubated  off vasopressors  + bacteremia r/o betty  iv abx per cticu   supp Fio2  diuresis as needed  monitor creat

## 2020-02-10 NOTE — PROGRESS NOTE ADULT - ATTENDING COMMENTS
CKD with superimposed STEPHANIE: likely hemodynamic injury in the setting of relatively lower BP/Surgery. Creatinine bumped to 2.5 from a baseline of 1.8-2 mg/dl  s/p CABG ( 2/3)  and PEA arrest ( 2/6)  Creatinine  appears to have plateaued but now hyponatremic  remains fluid overloaded   Would diurese with lasix drip. Can increase to 10-20 mg/hr as needed  If refractory to lasix drip will consider UF

## 2020-02-10 NOTE — PROGRESS NOTE ADULT - SUBJECTIVE AND OBJECTIVE BOX
CARDIOLOGY FOLLOW UP - Dr. Steel    CC c/o headache       PHYSICAL EXAM:  T(C): 36.9 (02-10-20 @ 12:00), Max: 37.7 (02-10-20 @ 00:00)  HR: 74 (02-10-20 @ 15:00) (74 - 97)  BP: 118/67 (02-10-20 @ 13:00) (118/67 - 139/70)  RR: 25 (02-10-20 @ 15:00) (16 - 32)  SpO2: 100% (02-10-20 @ 15:00) (96% - 100%)  Wt(kg): --  I&O's Summary    09 Feb 2020 07:01  -  10 Feb 2020 07:00  --------------------------------------------------------  IN: 962 mL / OUT: 2355 mL / NET: -1393 mL    10 Feb 2020 07:01  -  10 Feb 2020 15:07  --------------------------------------------------------  IN: 200 mL / OUT: 770 mL / NET: -570 mL        Appearance: Normal	  Cardiovascular: Normal S1 S2,RRR, No JVD, No murmurs  Respiratory: diminished  on hi flow   Gastrointestinal:  Soft, Non-tender, + BS	  Extremities: Normal range of motion, No clubbing, + b/l le edema         MEDICATIONS  (STANDING):  albuterol/ipratropium for Nebulization 3 milliLiter(s) Nebulizer every 6 hours  aMIOdarone    Tablet 400 milliGRAM(s) Oral every 8 hours  aMIOdarone    Tablet   Oral   ampicillin  IVPB 1 Gram(s) IV Intermittent every 8 hours  artificial tears (preservative free) Ophthalmic Solution 1 Drop(s) Both EYES two times a day  aspirin  chewable 81 milliGRAM(s) Oral daily  chlorhexidine 2% Cloths 1 Application(s) Topical <User Schedule>  dextrose 5%. 1000 milliLiter(s) (50 mL/Hr) IV Continuous <Continuous>  dextrose 50% Injectable 12.5 Gram(s) IV Push once  dextrose 50% Injectable 25 Gram(s) IV Push once  dextrose 50% Injectable 25 Gram(s) IV Push once  furosemide Infusion 10 mG/Hr (5 mL/Hr) IV Continuous <Continuous>  heparin  Infusion 1000 Unit(s)/Hr (10 mL/Hr) IV Continuous <Continuous>  insulin regular Infusion 4 Unit(s)/Hr (4 mL/Hr) IV Continuous <Continuous>  multivitamin 1 Tablet(s) Oral daily  pantoprazole  Injectable 40 milliGRAM(s) IV Push daily  polyethylene glycol 3350 17 Gram(s) Oral daily  potassium chloride   Solution 40 milliEquivalent(s) Oral once  tamsulosin 0.4 milliGRAM(s) Oral at bedtime      TELEMETRY: NSR     ECG:  	  RADIOLOGY:   DIAGNOSTIC TESTING:  [ ] Echocardiogram:  [ ]  Catheterization:  [ ] Stress Test:    OTHER: 	    LABS:	 	                                8.2    15.40 )-----------( 190      ( 10 Feb 2020 01:59 )             25.4     02-10    123<L>  |  90<L>  |  65<H>  ----------------------------<  227<H>  4.5   |  20<L>  |  2.46<H>    Ca    7.8<L>      10 Feb 2020 09:32  Phos  3.4     02-10  Mg     2.3     02-10    TPro  5.8<L>  /  Alb  2.4<L>  /  TBili  0.7  /  DBili  x   /  AST  61<H>  /  ALT  155<H>  /  AlkPhos  121<H>  02-10    PTT - ( 10 Feb 2020 13:17 )  PTT:54.2 sec

## 2020-02-10 NOTE — PROGRESS NOTE ADULT - PROBLEM SELECTOR PLAN 2
Pt. with likely hypervolemic hyponatremia in the setting of volume overload. Consider diuretic therapy. Check serum osm, urine osm, and urine sodium. Pt. is currently non-oliguric. Monitor serum sodium    Kevin Correa  Nephrology Fellow  Cell: [phone] (from 8 am to 5 pm)  (After 5 pm or on weekends please page on-call fellow)

## 2020-02-10 NOTE — PROGRESS NOTE ADULT - SUBJECTIVE AND OBJECTIVE BOX
[facility] DIVISION OF KIDNEY DISEASES AND HYPERTENSION -- FOLLOW UP NOTE  --------------------------------------------------------------------------------  HPI: 63 yo M with HTN, DM II (20 years), admitted with complaints of acute on chronic left sided exertional chest pain. Nephrology team is following for elevated serum creatinine and pre-cardiac catheterization assessment. [facility], no prior records available. On admission (1/25/2020), Scr was 1.85. Patient went for cardiac cath on 1/29/20 which revealed triple vessel disease. Scr had improved to 1.76 on 2/3/20. Pt. underwent CABG on 2/3/20. Scr now increased after CABG and PEA arrest on 2/6/20, however has improved now to 2.40 today. Pt. extubated on 2/8/20.     Pt. seen and examined at bedside this AM. Pt. appears weak and is unable to complete ROS. Serum sodium noted to be acutely worse this AM at 123. Pt. is non-oliguric with 1.8L of UOP in past 24 hours.    PAST HISTORY  --------------------------------------------------------------------------------  No significant changes to PMH, PSH, FHx, SHx, unless otherwise noted    ALLERGIES & MEDICATIONS  --------------------------------------------------------------------------------  Allergies    No Known Allergies    Intolerances    Standing Inpatient Medications  aMIOdarone    Tablet 400 milliGRAM(s) Oral every 8 hours  aMIOdarone    Tablet   Oral   artificial tears (preservative free) Ophthalmic Solution 1 Drop(s) Both EYES two times a day  aspirin  chewable 81 milliGRAM(s) Oral daily  chlorhexidine 2% Cloths 1 Application(s) Topical <User Schedule>  dextrose 5%. 1000 milliLiter(s) IV Continuous <Continuous>  dextrose 50% Injectable 12.5 Gram(s) IV Push once  dextrose 50% Injectable 25 Gram(s) IV Push once  dextrose 50% Injectable 25 Gram(s) IV Push once  furosemide   Injectable 40 milliGRAM(s) IV Push every 12 hours  heparin  Infusion 1000 Unit(s)/Hr IV Continuous <Continuous>  insulin glargine Injectable (LANTUS) 35 Unit(s) SubCutaneous at bedtime  insulin lispro (HumaLOG) corrective regimen sliding scale   SubCutaneous Before meals and at bedtime  insulin lispro Injectable (HumaLOG) 10 Unit(s) SubCutaneous three times a day before meals  insulin regular Infusion 4 Unit(s)/Hr IV Continuous <Continuous>  linezolid  IVPB      linezolid  IVPB 600 milliGRAM(s) IV Intermittent every 12 hours  multivitamin 1 Tablet(s) Oral daily  pantoprazole  Injectable 40 milliGRAM(s) IV Push daily  polyethylene glycol 3350 17 Gram(s) Oral daily  sodium chloride 0.9%. 1000 milliLiter(s) IV Continuous <Continuous>  tamsulosin 0.4 milliGRAM(s) Oral at bedtime    REVIEW OF SYSTEMS  --------------------------------------------------------------------------------  Unable to obtain-- See HPI    VITALS/PHYSICAL EXAM  --------------------------------------------------------------------------------  T(C): 37.5 (02-10-20 @ 04:00), Max: 37.7 (02-10-20 @ 00:00)  HR: 89 (02-10-20 @ 07:00) (77 - 101)  BP: --  RR: 19 (02-10-20 @ 07:00) (18 - 32)  SpO2: 96% (02-10-20 @ 07:00) (96% - 100%)  Wt(kg): --    02-09-20 @ 07:01  -  02-10-20 @ 07:00  --------------------------------------------------------  IN: 962 mL / OUT: 2355 mL / NET: -1393 mL    Physical Exam:  	Gen: awake  	HEENT: + high-flow NC O2  	Pulm: CTA b/l  	CV: + central chest dressing from CABG C/D/I, S1S2  	Abd: Soft  	Ext: trace edema B/L  	Neuro: Awake  	Skin: Warm and dry    LABS/STUDIES  --------------------------------------------------------------------------------              8.2    15.40 >-----------<  190      [02-10-20 @ 01:59]              25.4     123  |  90  |  64  ----------------------------<  248      [02-10-20 @ 01:59]  4.8   |  18  |  2.40        Ca     7.7     [02-10-20 @ 01:59]      Mg     2.3     [02-10-20 @ 01:59]      Phos  3.4     [02-10-20 @ 01:59]    Creatinine Trend:  SCr 2.40 [02-10 @ 01:59]  SCr 2.57 [02-09 @ 00:38]  SCr 2.53 [02-08 @ 17:20]  SCr 2.55 [02-08 @ 01:54]  SCr 2.53 [02-07 @ 02:06]

## 2020-02-10 NOTE — CONSULT NOTE ADULT - SUBJECTIVE AND OBJECTIVE BOX
Patient is a 64y old  Male who presents with a chief complaint of Chest pain (10 Feb 2020 07:57)    HPI:  65yo male with hx of HTN, DM II, presented to the ED with complaints of acute on chronic left sided exertional chest pain. Pt states he has been having left sided pressure and stabbing chest pain radiating to his sternum and right with activity for the past few months. He states each episode last approximately 1-2 mins and subsides upon resting. He denies associated symptoms of radiation to his back, arm or jaw. He recently was at a wedding which he could not enjoy because dancing would reproduce to the pain. He states he did not pursue and medical attention until this time. Pt states this time his pain was associated with sob up exertion. He denies symptoms of LOC, diaphoresis, palpitations, abd pain, N/V, fever or chills. He denies prior cardiac work up. (25 Jan 2020 12:57)      PAST MEDICAL & SURGICAL HISTORY:  HTN (hypertension)  Diabetes  History of appendectomy: x 30 yrs ago      Social history:    FAMILY HISTORY:  FH: type 2 diabetes                 Allergic/Immunologic:	No hives or rash   Allergies    No Known Allergies    Intolerances        Antimicrobials:    linezolid  IVPB      linezolid  IVPB 600 milliGRAM(s) IV Intermittent every 12 hours        Vital Signs Last 24 Hrs  T(C): 37.1 (10 Feb 2020 08:00), Max: 37.7 (10 Feb 2020 00:00)  T(F): 98.8 (10 Feb 2020 08:00), Max: 99.9 (10 Feb 2020 00:00)  HR: 82 (10 Feb 2020 08:00) (77 - 101)  BP: --  BP(mean): --  RR: 16 (10 Feb 2020 08:00) (16 - 32)  SpO2: 97% (10 Feb 2020 08:00) (96% - 100%)     .         No cachexia     Eyes:PERRL EOMI.NO discharge or conjunctival injection    ENMT:No sinus tenderness.No thrush.No pharyngeal exudate or erythema.Fair dental hygiene    Neck:Supple,No LN,no JVD      Respiratory:Good air entry bilaterally,CTA    Cardiovascular:S1 S2 wnl, No murmurs,rub or gallops    Gastrointestinal:Soft BS(+) no tenderness no masses ,No rebound or guarding    Genitourinary:No CVA tendereness     Rectal:    Extremities:No cyanosis,clubbing or edema.    Vascular:peripheral pulses felt    Neurological:AAO X 3,No grossly focal deficits    Skin:No rash     Lymph Nodes:No palpable LNs    Musculoskeletal:No joint swelling or LOM    Psychiatric:Affect normal.                                8.2    15.40 )-----------( 190      ( 10 Feb 2020 01:59 )             25.4         02-10    123<L>  |  90<L>  |  64<H>  ----------------------------<  248<H>  4.8   |  18<L>  |  2.40<H>    Ca    7.7<L>      10 Feb 2020 01:59  Phos  3.4     02-10  Mg     2.3     02-10    TPro  5.6<L>  /  Alb  2.4<L>  /  TBili  0.9  /  DBili  x   /  AST  42<H>  /  ALT  144<H>  /  AlkPhos  127<H>  02-10      RECENT CULTURES:  02-09 @ 04:25  .Blood Blood-Peripheral  --  --  --    No growth to date.  --  02-08 @ 23:06  .Sputum Sputum  --  --  --    Normal Respiratory Missy present  --  02-08 @ 14:44  .Blood Blood  --  --  --    Growth in anaerobic bottle: Gram Positive Cocci in Pairs and Chains  --  02-08 @ 09:16  .Sputum Sputum  --  --  --    Normal Respiratory Missy present  --  02-08 @ 09:13  .Blood Blood  Blood Culture PCR  Blood Culture PCR  PCR    Growth in anaerobic bottle: Enterococcus faecalis  ***Blood Panel PCR results on this specimen are available  approximately 3 hours after the Gram stain result.***  Gram stain, PCR, and/or culture results may not always  correspond due to difference in methodologies.  ************************************************************  This PCR assay was performed using [employer].  The following targets are tested for: Enterococcus,  vancomycin resistant enterococci, Listeria monocytogenes,  coagulase negative staphylococci, S. aureus,  methicillin resistant S. aureus, Streptococcus agalactiae  (Group B), S. pneumoniae, S. pyogenes (Group A),  Acinetobacter baumannii, Enterobacter cloacae, E. coli,  Klebsiella oxytoca, K. pneumoniae, Proteus sp.,  Serratia marcescens, Haemophilus influenzae,  Neisseria meningitidis, Pseudomonas aeruginosa, Candida  albicans, C. glabrata, C krusei, C parapsilosis,  C. tropicalis and the KPC resistance gene.  --      MICROBIOLOGY:  Culture Results:   No growth to date. (02-09 @ 04:25)  Culture Results:   No growth to date. (02-09 @ 04:25)  Culture Results:   Normal Respiratory Missy present (02-08 @ 23:06)  Culture Results:   Growth in anaerobic bottle: Gram Positive Cocci in Pairs and Chains (02-08 @ 14:44)  Culture Results:   Normal Respiratory Missy present (02-08 @ 09:16)  Culture Results:   Growth in anaerobic bottle: Enterococcus faecalis  ***Blood Panel PCR results on this specimen are available  approximately 3 hours after the Gram stain result.***  Gram stain, PCR, and/or culture results may not always  correspond due to difference in methodologies.  ************************************************************  This PCR assay was performed using [employer].  The following targets are tested for: Enterococcus,  vancomycin resistant enterococci, Listeria monocytogenes,  coagulase negative staphylococci, S. aureus,  methicillin resistant S. aureus, Streptococcus agalactiae  (Group B), S. pneumoniae, S. pyogenes (Group A),  Acinetobacter baumannii, Enterobacter cloacae, E. coli,  Klebsiella oxytoca, K. pneumoniae, Proteus sp.,  Serratia marcescens, Haemophilus influenzae,  Neisseria meningitidis, Pseudomonas aeruginosa, Candida  albicans, C. glabrata, C krusei, C parapsilosis,  C. tropicalis and the KPC resistance gene. (02-08 @ 09:13)          Radiology:      Assessment:        Recommendations and Plan:    Pager [phone]  After 5 pm/weekends or if no response :[phone]

## 2020-02-10 NOTE — CONSULT NOTE ADULT - ASSESSMENT
64 yr old with cri stage 4, DM, PVD, admitted and had CABG, Post op intubated and has bilateral effusions, worsening renal disease and bc positive for E. faecalis   follow up bc negative to date.   no signs of sternal wd infection  no recent UTI  lines all new.    discussed with Dr. Mariscal  no pna likely in reviewing ct  change to ampicillin .  will follow

## 2020-02-11 LAB
ALBUMIN SERPL ELPH-MCNC: 2.5 G/DL — LOW (ref 3.3–5)
ALP SERPL-CCNC: 119 U/L — SIGNIFICANT CHANGE UP (ref 40–120)
ALT FLD-CCNC: 125 U/L — HIGH (ref 10–45)
ANION GAP SERPL CALC-SCNC: 15 MMOL/L — SIGNIFICANT CHANGE UP (ref 5–17)
APTT BLD: 53.7 SEC — HIGH (ref 27.5–36.3)
APTT BLD: 62.2 SEC — HIGH (ref 27.5–36.3)
AST SERPL-CCNC: 39 U/L — SIGNIFICANT CHANGE UP (ref 10–40)
BILIRUB SERPL-MCNC: 0.6 MG/DL — SIGNIFICANT CHANGE UP (ref 0.2–1.2)
BUN SERPL-MCNC: 68 MG/DL — HIGH (ref 7–23)
CALCIUM SERPL-MCNC: 8 MG/DL — LOW (ref 8.4–10.5)
CHLORIDE SERPL-SCNC: 92 MMOL/L — LOW (ref 96–108)
CO2 SERPL-SCNC: 21 MMOL/L — LOW (ref 22–31)
CREAT SERPL-MCNC: 2.55 MG/DL — HIGH (ref 0.5–1.3)
CULTURE RESULTS: SIGNIFICANT CHANGE UP
GAS PNL BLDA: SIGNIFICANT CHANGE UP
GLUCOSE BLDC GLUCOMTR-MCNC: 114 MG/DL — HIGH (ref 70–99)
GLUCOSE BLDC GLUCOMTR-MCNC: 122 MG/DL — HIGH (ref 70–99)
GLUCOSE BLDC GLUCOMTR-MCNC: 126 MG/DL — HIGH (ref 70–99)
GLUCOSE BLDC GLUCOMTR-MCNC: 133 MG/DL — HIGH (ref 70–99)
GLUCOSE BLDC GLUCOMTR-MCNC: 136 MG/DL — HIGH (ref 70–99)
GLUCOSE BLDC GLUCOMTR-MCNC: 156 MG/DL — HIGH (ref 70–99)
GLUCOSE BLDC GLUCOMTR-MCNC: 157 MG/DL — HIGH (ref 70–99)
GLUCOSE BLDC GLUCOMTR-MCNC: 173 MG/DL — HIGH (ref 70–99)
GLUCOSE BLDC GLUCOMTR-MCNC: 175 MG/DL — HIGH (ref 70–99)
GLUCOSE BLDC GLUCOMTR-MCNC: 176 MG/DL — HIGH (ref 70–99)
GLUCOSE BLDC GLUCOMTR-MCNC: 189 MG/DL — HIGH (ref 70–99)
GLUCOSE BLDC GLUCOMTR-MCNC: 217 MG/DL — HIGH (ref 70–99)
GLUCOSE BLDC GLUCOMTR-MCNC: 94 MG/DL — SIGNIFICANT CHANGE UP (ref 70–99)
GLUCOSE SERPL-MCNC: 114 MG/DL — HIGH (ref 70–99)
HCT VFR BLD CALC: 25.9 % — LOW (ref 39–50)
HGB BLD-MCNC: 8.3 G/DL — LOW (ref 13–17)
INR BLD: 1.24 RATIO — HIGH (ref 0.88–1.16)
MAGNESIUM SERPL-MCNC: 2.3 MG/DL — SIGNIFICANT CHANGE UP (ref 1.6–2.6)
MCHC RBC-ENTMCNC: 27.2 PG — SIGNIFICANT CHANGE UP (ref 27–34)
MCHC RBC-ENTMCNC: 32 GM/DL — SIGNIFICANT CHANGE UP (ref 32–36)
MCV RBC AUTO: 84.9 FL — SIGNIFICANT CHANGE UP (ref 80–100)
NRBC # BLD: 0 /100 WBCS — SIGNIFICANT CHANGE UP (ref 0–0)
PHOSPHATE SERPL-MCNC: 3.5 MG/DL — SIGNIFICANT CHANGE UP (ref 2.5–4.5)
PLATELET # BLD AUTO: 235 K/UL — SIGNIFICANT CHANGE UP (ref 150–400)
POTASSIUM SERPL-MCNC: 4.4 MMOL/L — SIGNIFICANT CHANGE UP (ref 3.5–5.3)
POTASSIUM SERPL-SCNC: 4.4 MMOL/L — SIGNIFICANT CHANGE UP (ref 3.5–5.3)
PROT SERPL-MCNC: 6 G/DL — SIGNIFICANT CHANGE UP (ref 6–8.3)
PROTHROM AB SERPL-ACNC: 14.4 SEC — HIGH (ref 10–12.9)
RBC # BLD: 3.05 M/UL — LOW (ref 4.2–5.8)
RBC # FLD: 13.7 % — SIGNIFICANT CHANGE UP (ref 10.3–14.5)
SODIUM SERPL-SCNC: 128 MMOL/L — LOW (ref 135–145)
SPECIMEN SOURCE: SIGNIFICANT CHANGE UP
WBC # BLD: 16.74 K/UL — HIGH (ref 3.8–10.5)
WBC # FLD AUTO: 16.74 K/UL — HIGH (ref 3.8–10.5)

## 2020-02-11 PROCEDURE — 71045 X-RAY EXAM CHEST 1 VIEW: CPT | Mod: 26

## 2020-02-11 PROCEDURE — 99291 CRITICAL CARE FIRST HOUR: CPT

## 2020-02-11 PROCEDURE — 32556 INSERT CATH PLEURA W/O IMAGE: CPT | Mod: 78

## 2020-02-11 PROCEDURE — 99232 SBSQ HOSP IP/OBS MODERATE 35: CPT

## 2020-02-11 PROCEDURE — 99232 SBSQ HOSP IP/OBS MODERATE 35: CPT | Mod: GC

## 2020-02-11 RX ORDER — HYDROMORPHONE HYDROCHLORIDE 2 MG/ML
0.25 INJECTION INTRAMUSCULAR; INTRAVENOUS; SUBCUTANEOUS ONCE
Refills: 0 | Status: DISCONTINUED | OUTPATIENT
Start: 2020-02-11 | End: 2020-02-11

## 2020-02-11 RX ORDER — INSULIN LISPRO 100/ML
VIAL (ML) SUBCUTANEOUS
Refills: 0 | Status: DISCONTINUED | OUTPATIENT
Start: 2020-02-11 | End: 2020-02-25

## 2020-02-11 RX ORDER — AMPICILLIN TRIHYDRATE 250 MG
2 CAPSULE ORAL EVERY 8 HOURS
Refills: 0 | Status: DISCONTINUED | OUTPATIENT
Start: 2020-02-11 | End: 2020-02-20

## 2020-02-11 RX ORDER — INSULIN LISPRO 100/ML
10 VIAL (ML) SUBCUTANEOUS
Refills: 0 | Status: DISCONTINUED | OUTPATIENT
Start: 2020-02-11 | End: 2020-02-12

## 2020-02-11 RX ORDER — INSULIN LISPRO 100/ML
VIAL (ML) SUBCUTANEOUS AT BEDTIME
Refills: 0 | Status: DISCONTINUED | OUTPATIENT
Start: 2020-02-11 | End: 2020-02-25

## 2020-02-11 RX ORDER — PANTOPRAZOLE SODIUM 20 MG/1
40 TABLET, DELAYED RELEASE ORAL
Refills: 0 | Status: DISCONTINUED | OUTPATIENT
Start: 2020-02-12 | End: 2020-02-25

## 2020-02-11 RX ORDER — BUMETANIDE 0.25 MG/ML
0.5 INJECTION INTRAMUSCULAR; INTRAVENOUS
Qty: 20 | Refills: 0 | Status: DISCONTINUED | OUTPATIENT
Start: 2020-02-11 | End: 2020-02-13

## 2020-02-11 RX ORDER — INSULIN GLARGINE 100 [IU]/ML
40 INJECTION, SOLUTION SUBCUTANEOUS AT BEDTIME
Refills: 0 | Status: DISCONTINUED | OUTPATIENT
Start: 2020-02-11 | End: 2020-02-12

## 2020-02-11 RX ADMIN — INSULIN HUMAN 4 UNIT(S)/HR: 100 INJECTION, SOLUTION SUBCUTANEOUS at 00:18

## 2020-02-11 RX ADMIN — Medication 108 GRAM(S): at 05:10

## 2020-02-11 RX ADMIN — OXYCODONE AND ACETAMINOPHEN 1 TABLET(S): 5; 325 TABLET ORAL at 02:00

## 2020-02-11 RX ADMIN — Medication 216 GRAM(S): at 21:21

## 2020-02-11 RX ADMIN — Medication 81 MILLIGRAM(S): at 11:26

## 2020-02-11 RX ADMIN — Medication 1 DROP(S): at 18:08

## 2020-02-11 RX ADMIN — Medication 3 MILLILITER(S): at 18:17

## 2020-02-11 RX ADMIN — AMIODARONE HYDROCHLORIDE 400 MILLIGRAM(S): 400 TABLET ORAL at 21:21

## 2020-02-11 RX ADMIN — INSULIN HUMAN 4 UNIT(S)/HR: 100 INJECTION, SOLUTION SUBCUTANEOUS at 11:27

## 2020-02-11 RX ADMIN — HYDROMORPHONE HYDROCHLORIDE 0.25 MILLIGRAM(S): 2 INJECTION INTRAMUSCULAR; INTRAVENOUS; SUBCUTANEOUS at 14:15

## 2020-02-11 RX ADMIN — OXYCODONE AND ACETAMINOPHEN 1 TABLET(S): 5; 325 TABLET ORAL at 09:52

## 2020-02-11 RX ADMIN — OXYCODONE AND ACETAMINOPHEN 1 TABLET(S): 5; 325 TABLET ORAL at 16:30

## 2020-02-11 RX ADMIN — Medication 10 UNIT(S): at 18:09

## 2020-02-11 RX ADMIN — Medication 1 DROP(S): at 05:10

## 2020-02-11 RX ADMIN — AMIODARONE HYDROCHLORIDE 400 MILLIGRAM(S): 400 TABLET ORAL at 05:10

## 2020-02-11 RX ADMIN — Medication 3 MILLILITER(S): at 12:03

## 2020-02-11 RX ADMIN — HEPARIN SODIUM 11 UNIT(S)/HR: 5000 INJECTION INTRAVENOUS; SUBCUTANEOUS at 00:18

## 2020-02-11 RX ADMIN — Medication 3 MILLILITER(S): at 06:48

## 2020-02-11 RX ADMIN — OXYCODONE AND ACETAMINOPHEN 1 TABLET(S): 5; 325 TABLET ORAL at 09:22

## 2020-02-11 RX ADMIN — Medication 5 MG/HR: at 11:27

## 2020-02-11 RX ADMIN — Medication 1 TABLET(S): at 11:26

## 2020-02-11 RX ADMIN — INSULIN GLARGINE 40 UNIT(S): 100 INJECTION, SOLUTION SUBCUTANEOUS at 21:21

## 2020-02-11 RX ADMIN — OXYCODONE AND ACETAMINOPHEN 1 TABLET(S): 5; 325 TABLET ORAL at 22:05

## 2020-02-11 RX ADMIN — HYDROMORPHONE HYDROCHLORIDE 0.25 MILLIGRAM(S): 2 INJECTION INTRAMUSCULAR; INTRAVENOUS; SUBCUTANEOUS at 14:30

## 2020-02-11 RX ADMIN — Medication 216 GRAM(S): at 14:00

## 2020-02-11 RX ADMIN — BUMETANIDE 2.5 MG/HR: 0.25 INJECTION INTRAMUSCULAR; INTRAVENOUS at 13:32

## 2020-02-11 RX ADMIN — AMIODARONE HYDROCHLORIDE 400 MILLIGRAM(S): 400 TABLET ORAL at 13:59

## 2020-02-11 RX ADMIN — CHLORHEXIDINE GLUCONATE 1 APPLICATION(S): 213 SOLUTION TOPICAL at 05:10

## 2020-02-11 RX ADMIN — OXYCODONE AND ACETAMINOPHEN 1 TABLET(S): 5; 325 TABLET ORAL at 21:20

## 2020-02-11 RX ADMIN — OXYCODONE AND ACETAMINOPHEN 1 TABLET(S): 5; 325 TABLET ORAL at 16:00

## 2020-02-11 RX ADMIN — OXYCODONE AND ACETAMINOPHEN 1 TABLET(S): 5; 325 TABLET ORAL at 01:35

## 2020-02-11 RX ADMIN — TAMSULOSIN HYDROCHLORIDE 0.4 MILLIGRAM(S): 0.4 CAPSULE ORAL at 21:21

## 2020-02-11 NOTE — PROGRESS NOTE ADULT - ASSESSMENT
Assessment  DMT2: 64y Male with DM T2 with hyperglycemia, A1C 9.2%, was on oral meds and insulin at home, now on IV insulin, blood sugars improving, FS in overall acceptable range, no hypoglycemic episodes.  Patient is eating full meals.  CAD: s/p CABG 2/3, on medications, no chest pain, stable, monitored.  HTN: Controlled,  on antihypertensive medications.  HLD: Controlled, on statin.  CKD: Monitor labs/BMP          Harper Matias MD  Cell: [phone]  Office: [phone] Assessment  DMT2: 64y Male with DM T2 with hyperglycemia, A1C 9.2%, was on oral meds and insulin at home, now on IV insulin, blood sugars improving, FS in overall acceptable range, no hypoglycemic episodes.   Patient is eating full meals.  CAD: s/p CABG 2/3, on medications, no chest pain, stable, monitored.  HTN: Controlled,  on antihypertensive medications.  HLD: Controlled, on statin.  CKD: Monitor labs/BMP          Harper Matias MD  Cell: [phone]  Office: [phone]

## 2020-02-11 NOTE — PROGRESS NOTE ADULT - PROBLEM SELECTOR PLAN 1
Pt with  CKD likely  in the setting of long standing DM and HTN.  No prior labs for review. Scr since admission has ranged between 1.8-2 mg/dl.  Scr increased to >2.50 secondary to hemodynamic injury after CABG  and PEA arrest s/p CPR, however now stable at 2.55 this AM. UA significant for protein and RBCs. Urine electrolytes consistent with intrinsic disease. Urine Pr/Cr ratio in nephrotic range. HepBsAg, HepC, C3, C4, SIFE, RACHEL, P-ANCA, C-ANCA, Anti-GBM ab, Parvovirus, RPR, anti-PLA2R ab were negative/non-reactive. Monitor labs and urine output.     Pt. is non-oliguric on Lasix gtt. Consider switching to Bumex gtt given hypoalbuminemia. Avoid NSAIDs, ACEI/ARBS, RCA and nephrotoxins. Dose medications as per eGFR. Pt with  CKD likely  in the setting of long standing DM and HTN.  No prior labs for review. Scr since admission has ranged between 1.8-2 mg/dl.  Scr increased to >2.50 secondary to hemodynamic injury after CABG  and PEA arrest s/p CPR, however now stable at 2.55 this AM. UA significant for protein and RBCs. Urine electrolytes consistent with intrinsic disease. Urine Pr/Cr ratio in nephrotic range. HepBsAg, HepC, C3, C4, SIFE, RACHEL, P-ANCA, C-ANCA, Anti-GBM ab, Parvovirus, RPR, anti-PLA2R ab were negative/non-reactive. Monitor labs and urine output.     Pt. is non-oliguric on Lasix gtt.  Avoid NSAIDs, ACEI/ARBS, RCA and nephrotoxins. Dose medications as per eGFR.

## 2020-02-11 NOTE — PROGRESS NOTE ADULT - SUBJECTIVE AND OBJECTIVE BOX
FRANKLIN PRESLEY                     [id number]    HPI:  63yo male with hx of HTN, DM II, presented to the ED with complaints of acute on chronic left sided exertional chest pain. Pt states he has been having left sided pressure and stabbing chest pain radiating to his sternum and right with activity for the past few months. He states each episode last approximately 1-2 mins and subsides upon resting. He denies associated symptoms of radiation to his back, arm or jaw. He recently was at a wedding which he could not enjoy because dancing would reproduce to the pain. He states he did not pursue and medical attention until this time. Pt states this time his pain was associated with sob up exertion. He denies symptoms of LOC, diaphoresis, palpitations, abd pain, N/V, fever or chills. He denies prior cardiac work up. (2020 12:57)      Today:   s/p C4L on 2/3; PEA arrest on      VITAL SIGNS:  Vital Signs Last 24 Hrs  T(C): 37 (2020 03:00), Max: 37.1 (10 Feb 2020 08:00)  T(F): 98.6 (2020 03:00), Max: 98.8 (10 Feb 2020 08:00)  HR: 79 (2020 06:49) (71 - 85)  BP: 128/60 (10 Feb 2020 17:00) (118/67 - 139/70)  BP(mean): 86 (10 Feb 2020 17:00) (86 - 97)  RR: 17 (2020 06:00) (15 - 29)  SpO2: 100% (2020 06:49) (93% - 100%)    =========================I&Os=========================    I/Os:  I&O's Detail    10 Feb 2020 07:01  -  2020 07:00  --------------------------------------------------------  IN:    furosemide Infusion: 10 mL    furosemide Infusion: 60 mL    furosemide Infusion: 80 mL    furosemide Infusion: 10 mL    heparin Infusion: 252 mL    insulin regular Infusion: 87 mL    IV PiggyBack: 100 mL    Oral Fluid: 300 mL    sodium chloride 0.9%: 50 mL  Total IN: 949 mL    OUT:    Chest Tube: 280 mL    Indwelling Catheter - Urethral: 2400 mL  Total OUT: 2680 mL    Total NET: -1731 mL          CAPILLARY BLOOD GLUCOSE  176 (2020 07:00)  189 (2020 06:00)  136 (2020 04:00)  114 (2020 03:00)  99 (2020 02:00)  113 (2020 01:00)  122 (2020 00:00)        POCT Blood Glucose.: 176 mg/dL (2020 06:47)  POCT Blood Glucose.: 189 mg/dL (2020 06:05)  POCT Blood Glucose.: 136 mg/dL (2020 04:06)  POCT Blood Glucose.: 114 mg/dL (2020 02:53)  POCT Blood Glucose.: 122 mg/dL (2020 00:11)    ============================LABS=========================                        8.3    16.74 )-----------( 235      ( :28 )             25.9     02-    128<L>  |  92<L>  |  68<H>  ----------------------------<  114<H>  4.4   |  21<L>  |  2.55<H>    Ca    8.0<L>      28  Phos  3.5       Mg     2.3         TPro  6.0  /  Alb  2.5<L>  /  TBili  0.6  /  DBili  x   /  AST  39  /  ALT  125<H>  /  AlkPhos  119      LIVER FUNCTIONS - ( :28 )  Alb: 2.5 g/dL / Pro: 6.0 g/dL / ALK PHOS: 119 U/L / ALT: 125 U/L / AST: 39 U/L / GGT: x           PT/INR - ( 2020 01:28 )   PT: 14.4 sec;   INR: 1.24 ratio         PTT - ( 2020 01:28 )  PTT:62.2 sec  ABG - ( 2020 02:00 )  pH, Arterial: 7.50  pH, Blood: x     /  pCO2: 29    /  pO2: 118   / HCO3: 23    / Base Excess: .2    /  SaO2: 99                Urinalysis Basic - ( 10 Feb 2020 09:32 )    Color: Yellow / Appearance: Clear / S.014 / pH: x  Gluc: x / Ketone: Negative  / Bili: Negative / Urobili: 2 mg/dL   Blood: x / Protein: 100 / Nitrite: Negative   Leuk Esterase: Negative / RBC: 25 /hpf / WBC 2 /HPF   Sq Epi: x / Non Sq Epi: 1 /hpf / Bacteria: Negative        ===========================PMHX&PSHx==========================    PAST MEDICAL & SURGICAL HISTORY:  HTN (hypertension)  Diabetes  History of appendectomy: x 30 yrs ago      ===========================MEDICATIONS============================    MEDICATIONS  (STANDING):  albuterol/ipratropium for Nebulization 3 milliLiter(s) Nebulizer every 6 hours  aMIOdarone    Tablet 400 milliGRAM(s) Oral every 8 hours  aMIOdarone    Tablet   Oral   ampicillin  IVPB 1 Gram(s) IV Intermittent every 8 hours  artificial tears (preservative free) Ophthalmic Solution 1 Drop(s) Both EYES two times a day  aspirin  chewable 81 milliGRAM(s) Oral daily  chlorhexidine 2% Cloths 1 Application(s) Topical <User Schedule>  dextrose 5%. 1000 milliLiter(s) (50 mL/Hr) IV Continuous <Continuous>  dextrose 50% Injectable 12.5 Gram(s) IV Push once  dextrose 50% Injectable 25 Gram(s) IV Push once  dextrose 50% Injectable 25 Gram(s) IV Push once  furosemide Infusion 10 mG/Hr (5 mL/Hr) IV Continuous <Continuous>  heparin  Infusion 1000 Unit(s)/Hr (11 mL/Hr) IV Continuous <Continuous>  insulin regular Infusion 4 Unit(s)/Hr (4 mL/Hr) IV Continuous <Continuous>  multivitamin 1 Tablet(s) Oral daily  pantoprazole  Injectable 40 milliGRAM(s) IV Push daily  polyethylene glycol 3350 17 Gram(s) Oral daily  tamsulosin 0.4 milliGRAM(s) Oral at bedtime    MEDICATIONS  (PRN):  dextrose 40% Gel 15 Gram(s) Oral once PRN Blood Glucose LESS THAN 70 milliGRAM(s)/deciliter  glucagon  Injectable 1 milliGRAM(s) IntraMuscular once PRN Glucose LESS THAN 70 milligrams/deciliter  oxycodone    5 mG/acetaminophen 325 mG 1 Tablet(s) Oral every 4 hours PRN Severe Pain (7 - 10)  sodium chloride 0.9% lock flush 10 milliLiter(s) IV Push every 1 hour PRN Pre/post blood products, medications, blood draw, and to maintain line patency      ============================IMAGING STUDIES=========================  CXR (2/10/20):   Impression:    The heart is slightly enlarged. Left pleural effusion. Left lower lobe pneumonia and/or atelectasis. A pigtail catheter is seen in the right hemithorax. No pneumothorax. A central line is seen on the left and the tip is in the superior vena cava. Status post sternotomy.    ECHO (20):   Conclusions:  1. Endocardium not well visualized; grossly normal left  ventricular systolic function.   Endocardial visualization  enhanced with intravenous injection of Ultrasonic Enhancing  Agent (Definity).  Imaging is inadequate to rule out  discrete wall motion defects. Septal motion consistent with  cardiac surgery.  2. Decreased right ventricular systolic function.  No parasternal imaging.    =============================ASSESSMENT===========================   C4L on 2/3   PEA arrest on 2/6     =============================NEUROLOGY============================  Pain control with PCA / PCEA / Tylenol IV / Toradol / Percocet    ==============================RESPIRATORY========================  Pt is on       L nasal canula   Comfortable, not in any distress.  Using incentive spirometry & doing                ml  Monitor chest tube output  Chest tube to suction   Continue bronchodilators, pulmonary toilet    ============================CARDIOVASCULAR======================  Continue hemodynamic monitoring.  Not on any pressors    =====================RENAL===================  Continue LR 30CC/hr    Monitor I/Os and electrolytes    ====================GASTROINTESTINAL===================  On clears, tolerating  Continue GI prophylaxis with Pepcid / Protonix  Continue Zofran / Reglan for nausea - PRN	    ========================HEMATOLOGIC/ONCOLOGIC====================  Monitor chest tube output. No signs of active bleeding.   Follow CBC in AM    ============================INFECTIOUS DISEASE========================  Monitor for fever / leukocytosis.  All surgical incision / chest tube  sites look clean      Pt is on GI & DVT prophylaxis  OOB & ambulate     Pertinent clinical, laboratory, radiographic, hemodynamic, echocardiographic, respiratory data, microbiologic data and chart were reviewed and analyzed frequently throughout the course of the day and night  Patient seen, examined and plan discussed with CT Surgery / CTICU team during rounds.    I spent 30 minutes at bedside providing critical care from 7am to 5pm.    By signing my name below, I, Ciara Coronado, attest that this documentation has been prepared under the direction and in the presence of Brant Francois MD   Electronically Signed: Phill Castaneda. 20 @ 07:09 FRANKLIN PRESLEY                     [id number]    HPI:  65yo male with hx of HTN, DM II, presented to the ED with complaints of acute on chronic left sided exertional chest pain. Pt states he has been having left sided pressure and stabbing chest pain radiating to his sternum and right with activity for the past few months. He states each episode last approximately 1-2 mins and subsides upon resting. He denies associated symptoms of radiation to his back, arm or jaw. He recently was at a wedding which he could not enjoy because dancing would reproduce to the pain. He states he did not pursue and medical attention until this time. Pt states this time his pain was associated with sob up exertion. He denies symptoms of LOC, diaphoresis, palpitations, abd pain, N/V, fever or chills. He denies prior cardiac work up. (2020 12:57)      Hospital Course:  s/p C4L on 2/3; PEA arrest on      VITAL SIGNS:  Vital Signs Last 24 Hrs  T(C): 37 (2020 03:00), Max: 37.1 (10 Feb 2020 08:00)  T(F): 98.6 (2020 03:00), Max: 98.8 (10 Feb 2020 08:00)  HR: 79 (2020 06:49) (71 - 85)  BP: 128/60 (10 Feb 2020 17:00) (118/67 - 139/70)  BP(mean): 86 (10 Feb 2020 17:00) (86 - 97)  RR: 17 (2020 06:00) (15 - 29)  SpO2: 100% (2020 06:49) (93% - 100%)    =========================I&Os=========================    I/Os:  I&O's Detail    10 Feb 2020 07:01  -  2020 07:00  --------------------------------------------------------  IN:    furosemide Infusion: 10 mL    furosemide Infusion: 60 mL    furosemide Infusion: 80 mL    furosemide Infusion: 10 mL    heparin Infusion: 252 mL    insulin regular Infusion: 87 mL    IV PiggyBack: 100 mL    Oral Fluid: 300 mL    sodium chloride 0.9%: 50 mL  Total IN: 949 mL    OUT:    Chest Tube: 280 mL    Indwelling Catheter - Urethral: 2400 mL  Total OUT: 2680 mL    Total NET: -1731 mL          CAPILLARY BLOOD GLUCOSE  176 (2020 07:00)  189 (2020 06:00)  136 (2020 04:00)  114 (2020 03:00)  99 (2020 02:00)  113 (2020 01:00)  122 (2020 00:00)        POCT Blood Glucose.: 176 mg/dL (2020 06:47)  POCT Blood Glucose.: 189 mg/dL (2020 06:05)  POCT Blood Glucose.: 136 mg/dL (2020 04:06)  POCT Blood Glucose.: 114 mg/dL (2020 02:53)  POCT Blood Glucose.: 122 mg/dL (2020 00:11)    ============================LABS=========================                        8.3    16.74 )-----------( 235      ( :28 )             25.9         128<L>  |  92<L>  |  68<H>  ----------------------------<  114<H>  4.4   |  21<L>  |  2.55<H>    Ca    8.0<L>        Phos  3.5       Mg     2.3         TPro  6.0  /  Alb  2.5<L>  /  TBili  0.6  /  DBili  x   /  AST  39  /  ALT  125<H>  /  AlkPhos  119      LIVER FUNCTIONS - ( :28 )  Alb: 2.5 g/dL / Pro: 6.0 g/dL / ALK PHOS: 119 U/L / ALT: 125 U/L / AST: 39 U/L / GGT: x           PT/INR - ( 2020 01:28 )   PT: 14.4 sec;   INR: 1.24 ratio         PTT - ( 2020 01:28 )  PTT:62.2 sec  ABG - ( 2020 02:00 )  pH, Arterial: 7.50  pH, Blood: x     /  pCO2: 29    /  pO2: 118   / HCO3: 23    / Base Excess: .2    /  SaO2: 99                Urinalysis Basic - ( 10 Feb 2020 09:32 )    Color: Yellow / Appearance: Clear / S.014 / pH: x  Gluc: x / Ketone: Negative  / Bili: Negative / Urobili: 2 mg/dL   Blood: x / Protein: 100 / Nitrite: Negative   Leuk Esterase: Negative / RBC: 25 /hpf / WBC 2 /HPF   Sq Epi: x / Non Sq Epi: 1 /hpf / Bacteria: Negative        ===========================PMHX&PSHx==========================    PAST MEDICAL & SURGICAL HISTORY:  HTN (hypertension)  Diabetes  History of appendectomy: x 30 yrs ago      ===========================MEDICATIONS============================    MEDICATIONS  (STANDING):  albuterol/ipratropium for Nebulization 3 milliLiter(s) Nebulizer every 6 hours  aMIOdarone    Tablet 400 milliGRAM(s) Oral every 8 hours  aMIOdarone    Tablet   Oral   ampicillin  IVPB 1 Gram(s) IV Intermittent every 8 hours  artificial tears (preservative free) Ophthalmic Solution 1 Drop(s) Both EYES two times a day  aspirin  chewable 81 milliGRAM(s) Oral daily  chlorhexidine 2% Cloths 1 Application(s) Topical <User Schedule>  dextrose 5%. 1000 milliLiter(s) (50 mL/Hr) IV Continuous <Continuous>  dextrose 50% Injectable 12.5 Gram(s) IV Push once  dextrose 50% Injectable 25 Gram(s) IV Push once  dextrose 50% Injectable 25 Gram(s) IV Push once  furosemide Infusion 10 mG/Hr (5 mL/Hr) IV Continuous <Continuous>  heparin  Infusion 1000 Unit(s)/Hr (11 mL/Hr) IV Continuous <Continuous>  insulin regular Infusion 4 Unit(s)/Hr (4 mL/Hr) IV Continuous <Continuous>  multivitamin 1 Tablet(s) Oral daily  pantoprazole  Injectable 40 milliGRAM(s) IV Push daily  polyethylene glycol 3350 17 Gram(s) Oral daily  tamsulosin 0.4 milliGRAM(s) Oral at bedtime    MEDICATIONS  (PRN):  dextrose 40% Gel 15 Gram(s) Oral once PRN Blood Glucose LESS THAN 70 milliGRAM(s)/deciliter  glucagon  Injectable 1 milliGRAM(s) IntraMuscular once PRN Glucose LESS THAN 70 milligrams/deciliter  oxycodone    5 mG/acetaminophen 325 mG 1 Tablet(s) Oral every 4 hours PRN Severe Pain (7 - 10)  sodium chloride 0.9% lock flush 10 milliLiter(s) IV Push every 1 hour PRN Pre/post blood products, medications, blood draw, and to maintain line patency      ============================IMAGING STUDIES=========================  CXR (2/10/20):   Impression:    The heart is slightly enlarged. Left pleural effusion. Left lower lobe pneumonia and/or atelectasis. A pigtail catheter is seen in the right hemithorax. No pneumothorax. A central line is seen on the left and the tip is in the superior vena cava. Status post sternotomy.    ECHO (20):   Conclusions:  1. Endocardium not well visualized; grossly normal left  ventricular systolic function.   Endocardial visualization  enhanced with intravenous injection of Ultrasonic Enhancing  Agent (Definity).  Imaging is inadequate to rule out  discrete wall motion defects. Septal motion consistent with  cardiac surgery.  2. Decreased right ventricular systolic function.  No parasternal imaging.    =============================ASSESSMENT===========================   C4L on 2/3   PEA arrest on 2/6     =============================NEUROLOGY============================  Pain control with PCA / PCEA / Tylenol IV / Toradol / Percocet    ==============================RESPIRATORY========================  HF O2, 50% on 50L, will wean   Comfortable, not in any distress.  Monitor chest tube output  Chest tube to suction   Continue bronchodilators, pulmonary toilet    ============================CARDIOVASCULAR======================  Continue hemodynamic monitoring.  Not on any pressors    =====================RENAL===================  Osorio   Decrease lasix drip to 5mg   Monitor I/Os and electrolytes    ====================GASTROINTESTINAL===================  On clears, tolerating  Continue GI prophylaxis with Protonix    ========================HEMATOLOGIC/ONCOLOGIC====================  Anticoagulation with heparin for afib and possible PE   Monitor chest tube output. No signs of active bleeding.   Follow CBC in AM    ============================INFECTIOUS DISEASE========================  Start ampicillin   Monitor for fever / leukocytosis.  All surgical incision / chest tube  sites look clean      Pt is on GI & DVT prophylaxis  OOB & ambulate     Pertinent clinical, laboratory, radiographic, hemodynamic, echocardiographic, respiratory data, microbiologic data and chart were reviewed and analyzed frequently throughout the course of the day and night  Patient seen, examined and plan discussed with CT Surgery / CTICU team during rounds.    I spent 30 minutes at bedside providing critical care from 7am to 5pm.    By signing my name below, I, Ciara Coronado, attest that this documentation has been prepared under the direction and in the presence of Brant Francois MD   Electronically Signed: Phill Castaneda. 20 @ 07:09 FRANKLIN PRESLEY                     [id number]    HPI:  63yo male with hx of HTN, DM II, presented to the ED with complaints of acute on chronic left sided exertional chest pain. Pt states he has been having left sided pressure and stabbing chest pain radiating to his sternum and right with activity for the past few months. He states each episode last approximately 1-2 mins and subsides upon resting. He denies associated symptoms of radiation to his back, arm or jaw. He recently was at a wedding which he could not enjoy because dancing would reproduce to the pain. He states he did not pursue and medical attention until this time. Pt states this time his pain was associated with sob up exertion. He denies symptoms of LOC, diaphoresis, palpitations, abd pain, N/V, fever or chills. He denies prior cardiac work up. (2020 12:57)      Hospital Course:  s/p C4L on 2/3; PEA arrest on      VITAL SIGNS:  Vital Signs Last 24 Hrs  T(C): 37 (2020 03:00), Max: 37.1 (10 Feb 2020 08:00)  T(F): 98.6 (2020 03:00), Max: 98.8 (10 Feb 2020 08:00)  HR: 79 (2020 06:49) (71 - 85)  BP: 128/60 (10 Feb 2020 17:00) (118/67 - 139/70)  BP(mean): 86 (10 Feb 2020 17:00) (86 - 97)  RR: 17 (2020 06:00) (15 - 29)  SpO2: 100% (2020 06:49) (93% - 100%)    =========================I&Os=========================    I/Os:  I&O's Detail    10 Feb 2020 07:01  -  2020 07:00  --------------------------------------------------------  IN:    furosemide Infusion: 10 mL    furosemide Infusion: 60 mL    furosemide Infusion: 80 mL    furosemide Infusion: 10 mL    heparin Infusion: 252 mL    insulin regular Infusion: 87 mL    IV PiggyBack: 100 mL    Oral Fluid: 300 mL    sodium chloride 0.9%: 50 mL  Total IN: 949 mL    OUT:    Chest Tube: 280 mL    Indwelling Catheter - Urethral: 2400 mL  Total OUT: 2680 mL    Total NET: -1731 mL          CAPILLARY BLOOD GLUCOSE  176 (2020 07:00)  189 (2020 06:00)  136 (2020 04:00)  114 (2020 03:00)  99 (2020 02:00)  113 (2020 01:00)  122 (2020 00:00)        POCT Blood Glucose.: 176 mg/dL (2020 06:47)  POCT Blood Glucose.: 189 mg/dL (2020 06:05)  POCT Blood Glucose.: 136 mg/dL (2020 04:06)  POCT Blood Glucose.: 114 mg/dL (2020 02:53)  POCT Blood Glucose.: 122 mg/dL (2020 00:11)    ============================LABS=========================                        8.3    16.74 )-----------( 235      ( :28 )             25.9         128<L>  |  92<L>  |  68<H>  ----------------------------<  114<H>  4.4   |  21<L>  |  2.55<H>    Ca    8.0<L>        Phos  3.5       Mg     2.3         TPro  6.0  /  Alb  2.5<L>  /  TBili  0.6  /  DBili  x   /  AST  39  /  ALT  125<H>  /  AlkPhos  119      LIVER FUNCTIONS - ( :28 )  Alb: 2.5 g/dL / Pro: 6.0 g/dL / ALK PHOS: 119 U/L / ALT: 125 U/L / AST: 39 U/L / GGT: x           PT/INR - ( 2020 01:28 )   PT: 14.4 sec;   INR: 1.24 ratio         PTT - ( 2020 01:28 )  PTT:62.2 sec  ABG - ( 2020 02:00 )  pH, Arterial: 7.50  pH, Blood: x     /  pCO2: 29    /  pO2: 118   / HCO3: 23    / Base Excess: .2    /  SaO2: 99                Urinalysis Basic - ( 10 Feb 2020 09:32 )    Color: Yellow / Appearance: Clear / S.014 / pH: x  Gluc: x / Ketone: Negative  / Bili: Negative / Urobili: 2 mg/dL   Blood: x / Protein: 100 / Nitrite: Negative   Leuk Esterase: Negative / RBC: 25 /hpf / WBC 2 /HPF   Sq Epi: x / Non Sq Epi: 1 /hpf / Bacteria: Negative        ===========================PMHX&PSHx==========================    PAST MEDICAL & SURGICAL HISTORY:  HTN (hypertension)  Diabetes  History of appendectomy: x 30 yrs ago      ===========================MEDICATIONS============================    MEDICATIONS  (STANDING):  albuterol/ipratropium for Nebulization 3 milliLiter(s) Nebulizer every 6 hours  aMIOdarone    Tablet 400 milliGRAM(s) Oral every 8 hours  aMIOdarone    Tablet   Oral   ampicillin  IVPB 1 Gram(s) IV Intermittent every 8 hours  artificial tears (preservative free) Ophthalmic Solution 1 Drop(s) Both EYES two times a day  aspirin  chewable 81 milliGRAM(s) Oral daily  chlorhexidine 2% Cloths 1 Application(s) Topical <User Schedule>  dextrose 5%. 1000 milliLiter(s) (50 mL/Hr) IV Continuous <Continuous>  dextrose 50% Injectable 12.5 Gram(s) IV Push once  dextrose 50% Injectable 25 Gram(s) IV Push once  dextrose 50% Injectable 25 Gram(s) IV Push once  furosemide Infusion 10 mG/Hr (5 mL/Hr) IV Continuous <Continuous>  heparin  Infusion 1000 Unit(s)/Hr (11 mL/Hr) IV Continuous <Continuous>  insulin regular Infusion 4 Unit(s)/Hr (4 mL/Hr) IV Continuous <Continuous>  multivitamin 1 Tablet(s) Oral daily  pantoprazole  Injectable 40 milliGRAM(s) IV Push daily  polyethylene glycol 3350 17 Gram(s) Oral daily  tamsulosin 0.4 milliGRAM(s) Oral at bedtime    MEDICATIONS  (PRN):  dextrose 40% Gel 15 Gram(s) Oral once PRN Blood Glucose LESS THAN 70 milliGRAM(s)/deciliter  glucagon  Injectable 1 milliGRAM(s) IntraMuscular once PRN Glucose LESS THAN 70 milligrams/deciliter  oxycodone    5 mG/acetaminophen 325 mG 1 Tablet(s) Oral every 4 hours PRN Severe Pain (7 - 10)  sodium chloride 0.9% lock flush 10 milliLiter(s) IV Push every 1 hour PRN Pre/post blood products, medications, blood draw, and to maintain line patency      ============================IMAGING STUDIES=========================  CXR (2/10/20):   Impression:    The heart is slightly enlarged. Left pleural effusion. Left lower lobe pneumonia and/or atelectasis. A pigtail catheter is seen in the right hemithorax. No pneumothorax. A central line is seen on the left and the tip is in the superior vena cava. Status post sternotomy.    ECHO (20):   Conclusions:  1. Endocardium not well visualized; grossly normal left  ventricular systolic function.   Endocardial visualization  enhanced with intravenous injection of Ultrasonic Enhancing  Agent (Definity).  Imaging is inadequate to rule out  discrete wall motion defects. Septal motion consistent with  cardiac surgery.  2. Decreased right ventricular systolic function.  No parasternal imaging.    =============================ASSESSMENT===========================   C4L on 2/3   PEA arrest on 2/6     =============================NEUROLOGY============================  Pain control with PCA / PCEA / Tylenol IV / Toradol / Percocet    ==============================RESPIRATORY========================  Left moderate to large pleural effusion on bedside sono, pigtail   HF O2, 50% on 50L, will wean   Comfortable, not in any distress.  Monitor chest tube output  Chest tube to suction   Continue bronchodilators, pulmonary toilet    ============================CARDIOVASCULAR======================  Continue hemodynamic monitoring.  Not on any pressors    =====================RENAL===================  Osorio   Decrease lasix drip to 5mg   Monitor I/Os and electrolytes    ====================GASTROINTESTINAL===================  On clears, tolerating  Continue GI prophylaxis with Protonix    ========================HEMATOLOGIC/ONCOLOGIC====================  Hold heparin for now for pigtail   Monitor chest tube output. No signs of active bleeding.   Follow CBC in AM    ============================INFECTIOUS DISEASE========================  Start ampicillin   Monitor for fever / leukocytosis.  All surgical incision / chest tube  sites look clean      Pt is on GI & DVT prophylaxis  OOB & ambulate     Pertinent clinical, laboratory, radiographic, hemodynamic, echocardiographic, respiratory data, microbiologic data and chart were reviewed and analyzed frequently throughout the course of the day and night  Patient seen, examined and plan discussed with CT Surgery / CTICU team during rounds.    I spent 30 minutes at bedside providing critical care from 7am to 5pm.    By signing my name below, I, Ciara Coronado, attest that this documentation has been prepared under the direction and in the presence of Brant Francois MD   Electronically Signed: Phill Castaneda. 20 @ 07:09 FRANKLIN PRESLEY                     [id number]    HPI:  65yo male with hx of HTN, DM II, presented to the ED with complaints of acute on chronic left sided exertional chest pain. Pt states he has been having left sided pressure and stabbing chest pain radiating to his sternum and right with activity for the past few months. He states each episode last approximately 1-2 mins and subsides upon resting. He denies associated symptoms of radiation to his back, arm or jaw. He recently was at a wedding which he could not enjoy because dancing would reproduce to the pain. He states he did not pursue and medical attention until this time. Pt states this time his pain was associated with sob up exertion. He denies symptoms of LOC, diaphoresis, palpitations, abd pain, N/V, fever or chills. He denies prior cardiac work up. (2020 12:57)      Hospital Course:  s/p C4L on 2/3; PEA arrest on      VITAL SIGNS:  Vital Signs Last 24 Hrs  T(C): 37 (2020 03:00), Max: 37.1 (10 Feb 2020 08:00)  T(F): 98.6 (2020 03:00), Max: 98.8 (10 Feb 2020 08:00)  HR: 79 (2020 06:49) (71 - 85)  BP: 128/60 (10 Feb 2020 17:00) (118/67 - 139/70)  BP(mean): 86 (10 Feb 2020 17:00) (86 - 97)  RR: 17 (2020 06:00) (15 - 29)  SpO2: 100% (2020 06:49) (93% - 100%)    =========================I&Os=========================    I/Os:  I&O's Detail    10 Feb 2020 07:01  -  2020 07:00  --------------------------------------------------------  IN:    furosemide Infusion: 10 mL    furosemide Infusion: 60 mL    furosemide Infusion: 80 mL    furosemide Infusion: 10 mL    heparin Infusion: 252 mL    insulin regular Infusion: 87 mL    IV PiggyBack: 100 mL    Oral Fluid: 300 mL    sodium chloride 0.9%: 50 mL  Total IN: 949 mL    OUT:    Chest Tube: 280 mL    Indwelling Catheter - Urethral: 2400 mL  Total OUT: 2680 mL    Total NET: -1731 mL          CAPILLARY BLOOD GLUCOSE  176 (2020 07:00)  189 (2020 06:00)  136 (2020 04:00)  114 (2020 03:00)  99 (2020 02:00)  113 (2020 01:00)  122 (2020 00:00)        POCT Blood Glucose.: 176 mg/dL (2020 06:47)  POCT Blood Glucose.: 189 mg/dL (2020 06:05)  POCT Blood Glucose.: 136 mg/dL (2020 04:06)  POCT Blood Glucose.: 114 mg/dL (2020 02:53)  POCT Blood Glucose.: 122 mg/dL (2020 00:11)    ============================LABS=========================                        8.3    16.74 )-----------( 235      ( :28 )             25.9         128<L>  |  92<L>  |  68<H>  ----------------------------<  114<H>  4.4   |  21<L>  |  2.55<H>    Ca    8.0<L>        Phos  3.5       Mg     2.3         TPro  6.0  /  Alb  2.5<L>  /  TBili  0.6  /  DBili  x   /  AST  39  /  ALT  125<H>  /  AlkPhos  119      LIVER FUNCTIONS - ( :28 )  Alb: 2.5 g/dL / Pro: 6.0 g/dL / ALK PHOS: 119 U/L / ALT: 125 U/L / AST: 39 U/L / GGT: x           PT/INR - ( 2020 01:28 )   PT: 14.4 sec;   INR: 1.24 ratio         PTT - ( 2020 01:28 )  PTT:62.2 sec  ABG - ( 2020 02:00 )  pH, Arterial: 7.50  pH, Blood: x     /  pCO2: 29    /  pO2: 118   / HCO3: 23    / Base Excess: .2    /  SaO2: 99                Urinalysis Basic - ( 10 Feb 2020 09:32 )    Color: Yellow / Appearance: Clear / S.014 / pH: x  Gluc: x / Ketone: Negative  / Bili: Negative / Urobili: 2 mg/dL   Blood: x / Protein: 100 / Nitrite: Negative   Leuk Esterase: Negative / RBC: 25 /hpf / WBC 2 /HPF   Sq Epi: x / Non Sq Epi: 1 /hpf / Bacteria: Negative        ===========================PMHX&PSHx==========================    PAST MEDICAL & SURGICAL HISTORY:  HTN (hypertension)  Diabetes  History of appendectomy: x 30 yrs ago      ===========================MEDICATIONS============================    MEDICATIONS  (STANDING):  albuterol/ipratropium for Nebulization 3 milliLiter(s) Nebulizer every 6 hours  aMIOdarone    Tablet 400 milliGRAM(s) Oral every 8 hours  aMIOdarone    Tablet   Oral   ampicillin  IVPB 1 Gram(s) IV Intermittent every 8 hours  artificial tears (preservative free) Ophthalmic Solution 1 Drop(s) Both EYES two times a day  aspirin  chewable 81 milliGRAM(s) Oral daily  chlorhexidine 2% Cloths 1 Application(s) Topical <User Schedule>  dextrose 5%. 1000 milliLiter(s) (50 mL/Hr) IV Continuous <Continuous>  dextrose 50% Injectable 12.5 Gram(s) IV Push once  dextrose 50% Injectable 25 Gram(s) IV Push once  dextrose 50% Injectable 25 Gram(s) IV Push once  furosemide Infusion 10 mG/Hr (5 mL/Hr) IV Continuous <Continuous>  heparin  Infusion 1000 Unit(s)/Hr (11 mL/Hr) IV Continuous <Continuous>  insulin regular Infusion 4 Unit(s)/Hr (4 mL/Hr) IV Continuous <Continuous>  multivitamin 1 Tablet(s) Oral daily  pantoprazole  Injectable 40 milliGRAM(s) IV Push daily  polyethylene glycol 3350 17 Gram(s) Oral daily  tamsulosin 0.4 milliGRAM(s) Oral at bedtime    MEDICATIONS  (PRN):  dextrose 40% Gel 15 Gram(s) Oral once PRN Blood Glucose LESS THAN 70 milliGRAM(s)/deciliter  glucagon  Injectable 1 milliGRAM(s) IntraMuscular once PRN Glucose LESS THAN 70 milligrams/deciliter  oxycodone    5 mG/acetaminophen 325 mG 1 Tablet(s) Oral every 4 hours PRN Severe Pain (7 - 10)  sodium chloride 0.9% lock flush 10 milliLiter(s) IV Push every 1 hour PRN Pre/post blood products, medications, blood draw, and to maintain line patency      ============================IMAGING STUDIES=========================  CXR (2/10/20):   Impression:    The heart is slightly enlarged. Left pleural effusion. Left lower lobe pneumonia and/or atelectasis. A pigtail catheter is seen in the right hemithorax. No pneumothorax. A central line is seen on the left and the tip is in the superior vena cava. Status post sternotomy.    ECHO (20):   Conclusions:  1. Endocardium not well visualized; grossly normal left  ventricular systolic function.   Endocardial visualization  enhanced with intravenous injection of Ultrasonic Enhancing  Agent (Definity).  Imaging is inadequate to rule out  discrete wall motion defects. Septal motion consistent with  cardiac surgery.  2. Decreased right ventricular systolic function.  No parasternal imaging.    =============================ASSESSMENT===========================   s/p C4L on 2/3   PEA arrest on 2/6     =============================NEUROLOGY============================  A+O x3 Nonfocal  In Chair  ==============================RESPIRATORY========================  acute respiratory failure, post op, requring BiPAP/High Flow for hyp[oxia  Left moderate to large pleural effusion on bedside sono, pigtail placed  HF O2, 50% on 50L, will wean   Comfortable, not in any distress.  Monitor chest tube output  Chest tube to suction   Continue bronchodilators, pulmonary toilet    ============================CARDIOVASCULAR======================  Continue hemodynamic monitoring.  Not on any pressors  PAF, now in NSR, continue Amio load  =====================RENAL===================  Osorio   Decrease lasix drip to 5mg , strict I/O  Monitor I/Os and electrolytes    ====================GASTROINTESTINAL===================    Continue GI prophylaxis with Protonix    ========================HEMATOLOGIC/ONCOLOGIC====================  Hold heparin for now for pigtail   Monitor chest tube output. No signs of active bleeding.   Follow CBC in AM    ============================INFECTIOUS DISEASE========================  Start ampicillin   Monitor for fever / leukocytosis.  All surgical incision / chest tube  sites look clean      Pt is on GI & DVT prophylaxis  OOB & ambulate     Pertinent clinical, laboratory, radiographic, hemodynamic, echocardiographic, respiratory data, microbiologic data and chart were reviewed and analyzed frequently throughout the course of the day and night  Patient seen, examined and plan discussed with CT Surgery / CTICU team during rounds.    I spent 30 minutes at bedside providing critical care from 7am to 5pm.    By signing my name below, I, Ciara Coronado, attest that this documentation has been prepared under the direction and in the presence of Brant Francois MD   Electronically Signed: Phill Castaneda. 20 @ 07:09

## 2020-02-11 NOTE — PROGRESS NOTE ADULT - PROBLEM SELECTOR PLAN 1
Will DC IV insulin.  Will start Lantus 40u at bedtime.  Will start Humalog 10u before each meal and continue Humalog correction scale coverage. Will continue monitoring FS and FU.  Patient counseled for compliance with consistent low carb diet and exercise as tolerated outpatient

## 2020-02-11 NOTE — PROGRESS NOTE ADULT - SUBJECTIVE AND OBJECTIVE BOX
[facility] DIVISION OF KIDNEY DISEASES AND HYPERTENSION -- FOLLOW UP NOTE  --------------------------------------------------------------------------------  HPI: 63 yo M with HTN, DM II (20 years), admitted with complaints of acute on chronic left sided exertional chest pain. Nephrology team is following for elevated serum creatinine and pre-cardiac catheterization assessment. [facility] available. On admission (1/25/2020), Scr was 1.85. Patient went for cardiac cath on 1/29/20 which revealed triple vessel disease. Scr had improved to 1.76 on 2/3/20. Pt. underwent CABG on 2/3/20. Scr now increased after CABG and PEA arrest on 2/6/20, however is stable at 2.55 today. Pt. extubated on 2/8/20.     Pt. seen and examined at bedside this AM. Pt. is sitting upright in chair. Pt. on Lasix gtt, and is non-oliguric with 2.4 L of UOP in the past 24 hours. Pt. complains of scrotal edema, but feels strong enough to attempt walking today.    PAST HISTORY  --------------------------------------------------------------------------------  No significant changes to PMH, PSH, FHx, SHx, unless otherwise noted    ALLERGIES & MEDICATIONS  --------------------------------------------------------------------------------  Allergies    No Known Allergies    Intolerances    Standing Inpatient Medications  albuterol/ipratropium for Nebulization 3 milliLiter(s) Nebulizer every 6 hours  aMIOdarone    Tablet 400 milliGRAM(s) Oral every 8 hours  aMIOdarone    Tablet   Oral   ampicillin  IVPB 1 Gram(s) IV Intermittent every 8 hours  artificial tears (preservative free) Ophthalmic Solution 1 Drop(s) Both EYES two times a day  aspirin  chewable 81 milliGRAM(s) Oral daily  chlorhexidine 2% Cloths 1 Application(s) Topical <User Schedule>  dextrose 5%. 1000 milliLiter(s) IV Continuous <Continuous>  dextrose 50% Injectable 12.5 Gram(s) IV Push once  dextrose 50% Injectable 25 Gram(s) IV Push once  dextrose 50% Injectable 25 Gram(s) IV Push once  furosemide Infusion 10 mG/Hr IV Continuous <Continuous>  heparin  Infusion 1000 Unit(s)/Hr IV Continuous <Continuous>  insulin regular Infusion 4 Unit(s)/Hr IV Continuous <Continuous>  multivitamin 1 Tablet(s) Oral daily  pantoprazole  Injectable 40 milliGRAM(s) IV Push daily  polyethylene glycol 3350 17 Gram(s) Oral daily  tamsulosin 0.4 milliGRAM(s) Oral at bedtime    REVIEW OF SYSTEMS  --------------------------------------------------------------------------------  Limited ROS - complains of surgically related CP and scrotal edema.    VITALS/PHYSICAL EXAM  --------------------------------------------------------------------------------  T(C): 37 (02-11-20 @ 03:00), Max: 37.1 (02-10-20 @ 08:00)  HR: 79 (02-11-20 @ 06:49) (71 - 85)  BP: 128/60 (02-10-20 @ 17:00) (118/67 - 139/70)  RR: 17 (02-11-20 @ 06:00) (15 - 29)  SpO2: 100% (02-11-20 @ 06:49) (93% - 100%)  Wt(kg): --    02-10-20 @ 07:01  -  02-11-20 @ 07:00  --------------------------------------------------------  IN: 949 mL / OUT: 2680 mL / NET: -1731 mL    Physical Exam:  	Gen: awake  	HEENT: + high-flow NC O2  	Pulm: CTA b/l  	CV: + central chest dressing from CABG C/D/I, S1S2  	Abd: Soft  	: + scrotal edema  	Ext: pitting edema B/L  	Neuro: Awake  	Skin: Warm and dry    LABS/STUDIES  --------------------------------------------------------------------------------              8.3    16.74 >-----------<  235      [02-11-20 @ 01:28]              25.9     128  |  92  |  68  ----------------------------<  114      [02-11-20 @ 01:28]  4.4   |  21  |  2.55        Ca     8.0     [02-11-20 @ 01:28]      Mg     2.3     [02-11-20 @ 01:28]      Phos  3.5     [02-11-20 @ 01:28]    Serum Osmolality 297      [02-10-20 @ 07:32]    Creatinine Trend:  SCr 2.55 [02-11 @ 01:28]  SCr 2.47 [02-10 @ 15:46]  SCr 2.46 [02-10 @ 09:32]  SCr 2.40 [02-10 @ 01:59]  SCr 2.57 [02-09 @ 00:38]    Urinalysis - [02-10-20 @ 09:32]      Color Yellow / Appearance Clear / SG 1.014 / pH 6.0      Gluc 100 mg/dL / Ketone Negative  / Bili Negative / Urobili 2 mg/dL       Blood Small / Protein 100 / Leuk Est Negative / Nitrite Negative      RBC 25 / WBC 2 / Hyaline 4 / Gran  / Sq Epi  / Non Sq Epi 1 / Bacteria Negative

## 2020-02-11 NOTE — PROGRESS NOTE ADULT - PROBLEM SELECTOR PLAN 2
Pt. with likely hypervolemic hyponatremia in the setting of volume overload. Consider changing diuretics to Bumex gtt. Monitor daily weight and UOP.    Kevin Correa  Nephrology Fellow  Cell: [phone] (from 8 am to 5 pm)  (After 5 pm or on weekends please page on-call fellow) Pt. with likely hypervolemic hyponatremia in the setting of volume overload. Na improved. Consider changing diuretics to Bumex gtt. Monitor daily weight and UOP.    Kevin Correa  Nephrology Fellow  Cell: [phone] (from 8 am to 5 pm)  (After 5 pm or on weekends please page on-call fellow)

## 2020-02-11 NOTE — PROGRESS NOTE ADULT - ASSESSMENT
intraop kennedy 2/3/20 ef 50%, nl lv, mild diastolic dysfx stage 1  limited echo 2/4/20: nl LV sys fx , no pericardial effusion     a/p  64 year old man with history of HTN, DM II, admitted with progressive exertional angina, s/p cath with severe triple vessel disease, s/p CABG, post op course c/b brief witnessed PEA likely hypoxic arrest, s/p intubation.     1. CAD, s/p cath with severe triple vessel disease including lesions at the bifurcation of the LAD/diagonal and distal RCA/RPDA/RPL trifurcation.   -s/p CABG x 4   -intraop kennedy ef 50%  -cont asa, statin  -s/p PEA arrest, now extubated  -off vasopressors  --LE dopplers, negative for dvt     2. Postop  CHF-Systolic  -cont lasix gtt per CTU    3. PAF  -cont amio load  -cont heparin gtt  -AC per CTS    4. HTN  -bp stable off pressors    5. STEPHANIE/CKD  renal f/u     6. Postop Pleural effusion  - S/P Right pigtail placement  -CT mgmt per CTS    7. Sepsis -E. Faecalis bacteremia  IV abx per CTICU  ID following   repeat chest xray 2/9  with LLL pna/ atelectasis         dvt ppx

## 2020-02-11 NOTE — PROGRESS NOTE ADULT - SUBJECTIVE AND OBJECTIVE BOX
infectious diseases progress note:    Patient is a 64y old  Male who presents with a chief complaint of Chest pain (2020 07:09)        Acute ischemic heart disease        ROS:  CONSTITUTIONAL:  Negative fever or chills, feels well, good appetite  EYES:  Negative  blurry vision or double vision  CARDIOVASCULAR:  Negative for chest pain or palpitations  RESPIRATORY:  Negative for cough, wheezing, or SOB   GASTROINTESTINAL:  Negative for nausea, vomiting, diarrhea, constipation, or abdominal pain  GENITOURINARY:  Negative frequency, urgency or dysuria  NEUROLOGIC:  No headache, confusion, dizziness, lightheadedness    Allergies    No Known Allergies    Intolerances        ANTIBIOTICS/RELEVANT:  antimicrobials  ampicillin  IVPB 1 Gram(s) IV Intermittent every 8 hours    immunologic:    OTHER:  albuterol/ipratropium for Nebulization 3 milliLiter(s) Nebulizer every 6 hours  aMIOdarone    Tablet 400 milliGRAM(s) Oral every 8 hours  aMIOdarone    Tablet   Oral   artificial tears (preservative free) Ophthalmic Solution 1 Drop(s) Both EYES two times a day  aspirin  chewable 81 milliGRAM(s) Oral daily  chlorhexidine 2% Cloths 1 Application(s) Topical <User Schedule>  dextrose 40% Gel 15 Gram(s) Oral once PRN  dextrose 5%. 1000 milliLiter(s) IV Continuous <Continuous>  dextrose 50% Injectable 12.5 Gram(s) IV Push once  dextrose 50% Injectable 25 Gram(s) IV Push once  dextrose 50% Injectable 25 Gram(s) IV Push once  furosemide Infusion 10 mG/Hr IV Continuous <Continuous>  glucagon  Injectable 1 milliGRAM(s) IntraMuscular once PRN  heparin  Infusion 1000 Unit(s)/Hr IV Continuous <Continuous>  insulin regular Infusion 4 Unit(s)/Hr IV Continuous <Continuous>  multivitamin 1 Tablet(s) Oral daily  oxycodone    5 mG/acetaminophen 325 mG 1 Tablet(s) Oral every 4 hours PRN  pantoprazole  Injectable 40 milliGRAM(s) IV Push daily  polyethylene glycol 3350 17 Gram(s) Oral daily  sodium chloride 0.9% lock flush 10 milliLiter(s) IV Push every 1 hour PRN  tamsulosin 0.4 milliGRAM(s) Oral at bedtime      Objective:  Vital Signs Last 24 Hrs  T(C): 37.2 (2020 08:00), Max: 37.2 (2020 08:00)  T(F): 99 (2020 08:00), Max: 99 (2020 08:00)  HR: 80 (2020 08:00) (71 - 85)  BP: 128/60 (10 Feb 2020 17:00) (118/67 - 139/70)  BP(mean): 86 (10 Feb 2020 17:00) (86 - 97)  RR: 25 (2020 08:00) (15 - 29)  SpO2: 100% (2020 08:00) (93% - 100%)    PHYSICAL EXAM:  Constitutional:Well-developed, well nourished--no acute distress  Eyes:DANIELA, EOMI  Ear/Nose/Throat: no oral lesion, no sinus tenderness on percussion	  Neck:no JVD, no lymphadenopathy, supple  Respiratory: CTA lanre  Cardiovascular: S1S2 RRR, no murmurs  Gastrointestinal:soft, (+) BS, no HSM  Extremities:no e/e/c        LABS:                        8.3    16.74 )-----------( 235      ( 2020 01:28 )             25.9     02-11    128<L>  |  92<L>  |  68<H>  ----------------------------<  114<H>  4.4   |  21<L>  |  2.55<H>    Ca    8.0<L>      2020 01:28  Phos  3.5     -11  Mg     2.3     -11    TPro  6.0  /  Alb  2.5<L>  /  TBili  0.6  /  DBili  x   /  AST  39  /  ALT  125<H>  /  AlkPhos  119  02-11    PT/INR - ( 2020 01:28 )   PT: 14.4 sec;   INR: 1.24 ratio         PTT - ( 2020 01:28 )  PTT:62.2 sec  Urinalysis Basic - ( 10 Feb 2020 09:32 )    Color: Yellow / Appearance: Clear / S.014 / pH: x  Gluc: x / Ketone: Negative  / Bili: Negative / Urobili: 2 mg/dL   Blood: x / Protein: 100 / Nitrite: Negative   Leuk Esterase: Negative / RBC: 25 /hpf / WBC 2 /HPF   Sq Epi: x / Non Sq Epi: 1 /hpf / Bacteria: Negative          MICROBIOLOGY:    RECENT CULTURES:   @ 23:08 .Blood Blood                No growth to date.     @ 04:25 .Blood Blood-Peripheral                No growth to date.     @ 23:06 .Sputum Sputum       No polymorphonuclear leukocytes per low power field  Few Squamous epithelial cells per low power field  Few Gram Negative Rods seen per oil power field  Few Gram positive cocci in pairs seen per oil power field           Normal Respiratory Missy present     @ 14:44 .Blood Blood       Growth in anaerobic bottle: Gram Positive Cocci in Pairs and Chains           Growth in anaerobic bottle: Enterococcus faecalis  See previous culture [id number]     @ 09:16 .Sputum Sputum       Moderate polymorphonuclear leukocytes per low power field  Few Squamous epithelial cells per low power field  Few Gram Negative Rods seen per oil power field  Rare Yeast like cells seen per oil power field  Rare Gram Positive Cocci in Pairs and Chains seen per oil power field  Rare Gram Negative Diplococci seen per oil power field           Normal Respiratory Missy present     @ 09:13 .Blood Blood   PCR    Growth in anaerobic bottle: Gram Positive Cocci in Pairs and Chains    Blood Culture PCR  Enterococcus faecalis  Blood Culture PCR     Growth in anaerobic bottle: Enterococcus faecalis  ***Blood Panel PCR results on this specimen are available  approximately 3 hours after the Gram stain result.***  Gram stain, PCR, and/or culture results may not always  correspond due to difference in methodologies.  ************************************************************  This PCR assay was performed using [employer].  The following targets are tested for: Enterococcus,  vancomycin resistant enterococci, Listeria monocytogenes,  coagulase negative staphylococci, S. aureus,  methicillin resistant S. aureus, Streptococcus agalactiae  (Group B), S. pneumoniae, S. pyogenes (Group A),  Acinetobacter baumannii, Enterobacter cloacae, E. coli,  Klebsiella oxytoca, K. pneumoniae, Proteus sp.,  Serratia marcescens, Haemophilus influenzae,  Neisseria meningitidis, Pseudomonas aeruginosa, Candida  albicans, C. glabrata, C krusei, C parapsilosis,  C. tropicalis and the KPC resistance gene.          RESPIRATORY CULTURES:              RADIOLOGY & ADDITIONAL STUDIES:        Pager [phone]  After 5 pm/weekends or if no response :[phone]

## 2020-02-11 NOTE — PROGRESS NOTE ADULT - SUBJECTIVE AND OBJECTIVE BOX
CC: no events, no cp/sob    TELEMETRY: nsr    PHYSICAL EXAM:    T(C): 37.2 (02-11-20 @ 08:00), Max: 37.2 (02-11-20 @ 08:00)  HR: 75 (02-11-20 @ 11:00) (71 - 85)  BP: 128/60 (02-10-20 @ 17:00) (118/67 - 139/70)  RR: 31 (02-11-20 @ 11:00) (15 - 31)  SpO2: 99% (02-11-20 @ 11:00) (93% - 100%)  Wt(kg): --  I&O's Summary    10 Feb 2020 07:01  -  11 Feb 2020 07:00  --------------------------------------------------------  IN: 949 mL / OUT: 2680 mL / NET: -1731 mL    11 Feb 2020 07:01  -  11 Feb 2020 11:51  --------------------------------------------------------  IN: 177 mL / OUT: 595 mL / NET: -418 mL        Appearance: Normal	  Cardiovascular: Normal S1 S2,RRR, No JVD, No murmurs  Respiratory: Lungs clear to auscultation	  Gastrointestinal:  Soft, Non-tender, + BS	  Extremities: Normal range of motion, No clubbing, cyanosis or edema  Vascular: Peripheral pulses palpable 2+ bilaterally     LABS:	 	                          8.3    16.74 )-----------( 235      ( 11 Feb 2020 01:28 )             25.9     02-11    128<L>  |  92<L>  |  68<H>  ----------------------------<  114<H>  4.4   |  21<L>  |  2.55<H>    Ca    8.0<L>      11 Feb 2020 01:28  Phos  3.5     02-11  Mg     2.3     02-11    TPro  6.0  /  Alb  2.5<L>  /  TBili  0.6  /  DBili  x   /  AST  39  /  ALT  125<H>  /  AlkPhos  119  02-11      PT/INR - ( 11 Feb 2020 01:28 )   PT: 14.4 sec;   INR: 1.24 ratio         PTT - ( 11 Feb 2020 01:28 )  PTT:62.2 sec    CARDIAC MARKERS:      MEDICATIONS  (STANDING):  albuterol/ipratropium for Nebulization 3 milliLiter(s) Nebulizer every 6 hours  aMIOdarone    Tablet 400 milliGRAM(s) Oral every 8 hours  aMIOdarone    Tablet   Oral   ampicillin  IVPB 2 Gram(s) IV Intermittent every 8 hours  artificial tears (preservative free) Ophthalmic Solution 1 Drop(s) Both EYES two times a day  aspirin  chewable 81 milliGRAM(s) Oral daily  chlorhexidine 2% Cloths 1 Application(s) Topical <User Schedule>  dextrose 5%. 1000 milliLiter(s) (50 mL/Hr) IV Continuous <Continuous>  dextrose 50% Injectable 12.5 Gram(s) IV Push once  dextrose 50% Injectable 25 Gram(s) IV Push once  dextrose 50% Injectable 25 Gram(s) IV Push once  furosemide Infusion 10 mG/Hr (5 mL/Hr) IV Continuous <Continuous>  heparin  Infusion 1000 Unit(s)/Hr (11 mL/Hr) IV Continuous <Continuous>  insulin regular Infusion 4 Unit(s)/Hr (4 mL/Hr) IV Continuous <Continuous>  multivitamin 1 Tablet(s) Oral daily  polyethylene glycol 3350 17 Gram(s) Oral daily  tamsulosin 0.4 milliGRAM(s) Oral at bedtime

## 2020-02-11 NOTE — PROGRESS NOTE ADULT - SUBJECTIVE AND OBJECTIVE BOX
Chief complaint  Patient is a 64y old  Male who presents with a chief complaint of Chest pain (11 Feb 2020 11:51)   Review of systems  Patient in bed, looks comfortable, no hypoglycemia.    Labs and Fingersticks  CAPILLARY BLOOD GLUCOSE  173 (11 Feb 2020 12:00)  179 (11 Feb 2020 11:00)  217 (11 Feb 2020 10:00)  156 (11 Feb 2020 09:00)  175 (11 Feb 2020 08:00)  176 (11 Feb 2020 07:00)  189 (11 Feb 2020 06:00)  136 (11 Feb 2020 04:00)  114 (11 Feb 2020 03:00)  99 (11 Feb 2020 02:00)  113 (11 Feb 2020 01:00)  122 (11 Feb 2020 00:00)  165 (10 Feb 2020 23:00)  192 (10 Feb 2020 22:00)  229 (10 Feb 2020 21:00)  195 (10 Feb 2020 20:00)  270 (10 Feb 2020 19:00)  185 (10 Feb 2020 18:00)      POCT Blood Glucose.: 126 mg/dL (11 Feb 2020 14:53)  POCT Blood Glucose.: 157 mg/dL (11 Feb 2020 13:40)  POCT Blood Glucose.: 173 mg/dL (11 Feb 2020 12:02)  POCT Blood Glucose.: 217 mg/dL (11 Feb 2020 10:14)  POCT Blood Glucose.: 156 mg/dL (11 Feb 2020 08:57)  POCT Blood Glucose.: 175 mg/dL (11 Feb 2020 08:05)  POCT Blood Glucose.: 176 mg/dL (11 Feb 2020 06:47)  POCT Blood Glucose.: 189 mg/dL (11 Feb 2020 06:05)  POCT Blood Glucose.: 136 mg/dL (11 Feb 2020 04:06)  POCT Blood Glucose.: 114 mg/dL (11 Feb 2020 02:53)  POCT Blood Glucose.: 122 mg/dL (11 Feb 2020 00:11)  POCT Blood Glucose.: 165 mg/dL (10 Feb 2020 22:59)  POCT Blood Glucose.: 229 mg/dL (10 Feb 2020 21:25)  POCT Blood Glucose.: 195 mg/dL (10 Feb 2020 20:09)  POCT Blood Glucose.: 270 mg/dL (10 Feb 2020 18:39)  POCT Blood Glucose.: 185 mg/dL (10 Feb 2020 17:46)  POCT Blood Glucose.: 175 mg/dL (10 Feb 2020 16:45)      Anion Gap, Serum: 15 (02-11 @ 01:28)  Anion Gap, Serum: 14 (02-10 @ 15:46)  Anion Gap, Serum: 13 (02-10 @ 09:32)  Anion Gap, Serum: 15 (02-10 @ 01:59)      Calcium, Total Serum: 8.0 <L> (02-11 @ 01:28)  Calcium, Total Serum: 7.6 <L> (02-10 @ 15:46)  Calcium, Total Serum: 7.8 <L> (02-10 @ 09:32)  Calcium, Total Serum: 7.7 <L> (02-10 @ 01:59)  Albumin, Serum: 2.5 <L> (02-11 @ 01:28)  Albumin, Serum: 2.3 <L> (02-10 @ 15:46)  Albumin, Serum: 2.4 <L> (02-10 @ 09:32)  Albumin, Serum: 2.4 <L> (02-10 @ 01:59)    Alanine Aminotransferase (ALT/SGPT): 125 <H> (02-11 @ 01:28)  Alanine Aminotransferase (ALT/SGPT): 137 <H> (02-10 @ 15:46)  Alanine Aminotransferase (ALT/SGPT): 155 <H> (02-10 @ 09:32)  Alanine Aminotransferase (ALT/SGPT): 144 <H> (02-10 @ 01:59)  Alkaline Phosphatase, Serum: 119 (02-11 @ 01:28)  Alkaline Phosphatase, Serum: 116 (02-10 @ 15:46)  Alkaline Phosphatase, Serum: 121 <H> (02-10 @ 09:32)  Alkaline Phosphatase, Serum: 127 <H> (02-10 @ 01:59)  Aspartate Aminotransferase (AST/SGOT): 39 (02-11 @ 01:28)  Aspartate Aminotransferase (AST/SGOT): 48 <H> (02-10 @ 15:46)  Aspartate Aminotransferase (AST/SGOT): 61 <H> (02-10 @ 09:32)  Aspartate Aminotransferase (AST/SGOT): 42 <H> (02-10 @ 01:59)        02-11    128<L>  |  92<L>  |  68<H>  ----------------------------<  114<H>  4.4   |  21<L>  |  2.55<H>    Ca    8.0<L>      11 Feb 2020 01:28  Phos  3.5     02-11  Mg     2.3     02-11    TPro  6.0  /  Alb  2.5<L>  /  TBili  0.6  /  DBili  x   /  AST  39  /  ALT  125<H>  /  AlkPhos  119  02-11                        8.3    16.74 )-----------( 235      ( 11 Feb 2020 01:28 )             25.9     Medications  MEDICATIONS  (STANDING):  albuterol/ipratropium for Nebulization 3 milliLiter(s) Nebulizer every 6 hours  aMIOdarone    Tablet 400 milliGRAM(s) Oral every 8 hours  aMIOdarone    Tablet   Oral   ampicillin  IVPB 2 Gram(s) IV Intermittent every 8 hours  artificial tears (preservative free) Ophthalmic Solution 1 Drop(s) Both EYES two times a day  aspirin  chewable 81 milliGRAM(s) Oral daily  buMETAnide Infusion 0.5 mG/Hr (2.5 mL/Hr) IV Continuous <Continuous>  chlorhexidine 2% Cloths 1 Application(s) Topical <User Schedule>  dextrose 5%. 1000 milliLiter(s) (50 mL/Hr) IV Continuous <Continuous>  dextrose 50% Injectable 12.5 Gram(s) IV Push once  dextrose 50% Injectable 25 Gram(s) IV Push once  dextrose 50% Injectable 25 Gram(s) IV Push once  heparin  Infusion 1000 Unit(s)/Hr (11 mL/Hr) IV Continuous <Continuous>  insulin glargine Injectable (LANTUS) 40 Unit(s) SubCutaneous at bedtime  insulin lispro (HumaLOG) corrective regimen sliding scale   SubCutaneous three times a day before meals  insulin lispro (HumaLOG) corrective regimen sliding scale   SubCutaneous at bedtime  insulin lispro Injectable (HumaLOG) 10 Unit(s) SubCutaneous three times a day before meals  insulin regular Infusion 4 Unit(s)/Hr (4 mL/Hr) IV Continuous <Continuous>  multivitamin 1 Tablet(s) Oral daily  polyethylene glycol 3350 17 Gram(s) Oral daily  tamsulosin 0.4 milliGRAM(s) Oral at bedtime      Physical Exam  General: Patient comfortable in bed  Vital Signs Last 12 Hrs  T(F): 97.7 (02-11-20 @ 12:00), Max: 99 (02-11-20 @ 08:00)  HR: 76 (02-11-20 @ 14:00) (75 - 85)  BP: --  BP(mean): --  RR: 24 (02-11-20 @ 14:00) (15 - 31)  SpO2: 98% (02-11-20 @ 14:00) (98% - 100%)  Neck: No palpable thyroid nodules.  CVS: S1S2, No murmurs  Respiratory: No wheezing, no crepitations  GI: Abdomen soft, bowel sounds positive  Musculoskeletal:  edema lower extremities.   Skin: No skin rashes, no ecchymosis    Diagnostics Chief complaint  Patient is a 64y old  Male who presents with a chief complaint of  Chest pain (11 Feb 2020 11:51)   Review of systems  Patient in bed, looks comfortable, no hypoglycemia.    Labs and Fingersticks  CAPILLARY BLOOD GLUCOSE  173 (11 Feb 2020 12:00)  179 (11 Feb 2020 11:00)  217 (11 Feb 2020 10:00)  156 (11 Feb 2020 09:00)  175 (11 Feb 2020 08:00)  176 (11 Feb 2020 07:00)  189 (11 Feb 2020 06:00)  136 (11 Feb 2020 04:00)  114 (11 Feb 2020 03:00)  99 (11 Feb 2020 02:00)  113 (11 Feb 2020 01:00)  122 (11 Feb 2020 00:00)  165 (10 Feb 2020 23:00)  192 (10 Feb 2020 22:00)  229 (10 Feb 2020 21:00)  195 (10 Feb 2020 20:00)  270 (10 Feb 2020 19:00)  185 (10 Feb 2020 18:00)      POCT Blood Glucose.: 126 mg/dL (11 Feb 2020 14:53)  POCT Blood Glucose.: 157 mg/dL (11 Feb 2020 13:40)  POCT Blood Glucose.: 173 mg/dL (11 Feb 2020 12:02)  POCT Blood Glucose.: 217 mg/dL (11 Feb 2020 10:14)  POCT Blood Glucose.: 156 mg/dL (11 Feb 2020 08:57)  POCT Blood Glucose.: 175 mg/dL (11 Feb 2020 08:05)  POCT Blood Glucose.: 176 mg/dL (11 Feb 2020 06:47)  POCT Blood Glucose.: 189 mg/dL (11 Feb 2020 06:05)  POCT Blood Glucose.: 136 mg/dL (11 Feb 2020 04:06)  POCT Blood Glucose.: 114 mg/dL (11 Feb 2020 02:53)  POCT Blood Glucose.: 122 mg/dL (11 Feb 2020 00:11)  POCT Blood Glucose.: 165 mg/dL (10 Feb 2020 22:59)  POCT Blood Glucose.: 229 mg/dL (10 Feb 2020 21:25)  POCT Blood Glucose.: 195 mg/dL (10 Feb 2020 20:09)  POCT Blood Glucose.: 270 mg/dL (10 Feb 2020 18:39)  POCT Blood Glucose.: 185 mg/dL (10 Feb 2020 17:46)  POCT Blood Glucose.: 175 mg/dL (10 Feb 2020 16:45)      Anion Gap, Serum: 15 (02-11 @ 01:28)  Anion Gap, Serum: 14 (02-10 @ 15:46)  Anion Gap, Serum: 13 (02-10 @ 09:32)  Anion Gap, Serum: 15 (02-10 @ 01:59)      Calcium, Total Serum: 8.0 <L> (02-11 @ 01:28)  Calcium, Total Serum: 7.6 <L> (02-10 @ 15:46)  Calcium, Total Serum: 7.8 <L> (02-10 @ 09:32)  Calcium, Total Serum: 7.7 <L> (02-10 @ 01:59)  Albumin, Serum: 2.5 <L> (02-11 @ 01:28)  Albumin, Serum: 2.3 <L> (02-10 @ 15:46)  Albumin, Serum: 2.4 <L> (02-10 @ 09:32)  Albumin, Serum: 2.4 <L> (02-10 @ 01:59)    Alanine Aminotransferase (ALT/SGPT): 125 <H> (02-11 @ 01:28)  Alanine Aminotransferase (ALT/SGPT): 137 <H> (02-10 @ 15:46)  Alanine Aminotransferase (ALT/SGPT): 155 <H> (02-10 @ 09:32)  Alanine Aminotransferase (ALT/SGPT): 144 <H> (02-10 @ 01:59)  Alkaline Phosphatase, Serum: 119 (02-11 @ 01:28)  Alkaline Phosphatase, Serum: 116 (02-10 @ 15:46)  Alkaline Phosphatase, Serum: 121 <H> (02-10 @ 09:32)  Alkaline Phosphatase, Serum: 127 <H> (02-10 @ 01:59)  Aspartate Aminotransferase (AST/SGOT): 39 (02-11 @ 01:28)  Aspartate Aminotransferase (AST/SGOT): 48 <H> (02-10 @ 15:46)  Aspartate Aminotransferase (AST/SGOT): 61 <H> (02-10 @ 09:32)  Aspartate Aminotransferase (AST/SGOT): 42 <H> (02-10 @ 01:59)        02-11    128<L>  |  92<L>  |  68<H>  ----------------------------<  114<H>  4.4   |  21<L>  |  2.55<H>    Ca    8.0<L>      11 Feb 2020 01:28  Phos  3.5     02-11  Mg     2.3     02-11    TPro  6.0  /  Alb  2.5<L>  /  TBili  0.6  /  DBili  x   /  AST  39  /  ALT  125<H>  /  AlkPhos  119  02-11                        8.3    16.74 )-----------( 235      ( 11 Feb 2020 01:28 )             25.9     Medications  MEDICATIONS  (STANDING):  albuterol/ipratropium for Nebulization 3 milliLiter(s) Nebulizer every 6 hours  aMIOdarone    Tablet 400 milliGRAM(s) Oral every 8 hours  aMIOdarone    Tablet   Oral   ampicillin  IVPB 2 Gram(s) IV Intermittent every 8 hours  artificial tears (preservative free) Ophthalmic Solution 1 Drop(s) Both EYES two times a day  aspirin  chewable 81 milliGRAM(s) Oral daily  buMETAnide Infusion 0.5 mG/Hr (2.5 mL/Hr) IV Continuous <Continuous>  chlorhexidine 2% Cloths 1 Application(s) Topical <User Schedule>  dextrose 5%. 1000 milliLiter(s) (50 mL/Hr) IV Continuous <Continuous>  dextrose 50% Injectable 12.5 Gram(s) IV Push once  dextrose 50% Injectable 25 Gram(s) IV Push once  dextrose 50% Injectable 25 Gram(s) IV Push once  heparin  Infusion 1000 Unit(s)/Hr (11 mL/Hr) IV Continuous <Continuous>  insulin glargine Injectable (LANTUS) 40 Unit(s) SubCutaneous at bedtime  insulin lispro (HumaLOG) corrective regimen sliding scale   SubCutaneous three times a day before meals  insulin lispro (HumaLOG) corrective regimen sliding scale   SubCutaneous at bedtime  insulin lispro Injectable (HumaLOG) 10 Unit(s) SubCutaneous three times a day before meals  insulin regular Infusion 4 Unit(s)/Hr (4 mL/Hr) IV Continuous <Continuous>  multivitamin 1 Tablet(s) Oral daily  polyethylene glycol 3350 17 Gram(s) Oral daily  tamsulosin 0.4 milliGRAM(s) Oral at bedtime      Physical Exam  General: Patient comfortable in bed  Vital Signs Last 12 Hrs  T(F): 97.7 (02-11-20 @ 12:00), Max: 99 (02-11-20 @ 08:00)  HR: 76 (02-11-20 @ 14:00) (75 - 85)  BP: --  BP(mean): --  RR: 24 (02-11-20 @ 14:00) (15 - 31)  SpO2: 98% (02-11-20 @ 14:00) (98% - 100%)  Neck: No palpable thyroid nodules.  CVS: S1S2, No murmurs  Respiratory: No wheezing, no crepitations  GI: Abdomen soft, bowel sounds positive  Musculoskeletal:  edema lower extremities.   Skin: No skin rashes, no ecchymosis    Diagnostics

## 2020-02-11 NOTE — PROGRESS NOTE ADULT - ATTENDING COMMENTS
CKD with superimposed STEPHANIE: likely hemodynamic injury in the setting of relatively lower BP/Surgery. Creatinine bumped to 2.5 from a baseline of 1.8-2 mg/dl  s/p CABG ( 2/3)  and PEA arrest ( 2/6)  Creatinine  appears to have plateaued  Remains hyponatremic but improved  remains fluid overloaded and net negative over the last 24 hours  Consider increasing lasix drip to 15 mg/hr

## 2020-02-11 NOTE — PROGRESS NOTE ADULT - ATTENDING COMMENTS
Patient seen and exam today at bedside. Chart, labs, vitals, radiology reviewed. Above H&P reviewed and edited where appropriate.  Agree with history and physical exam. Agree with assessment and plan.

## 2020-02-11 NOTE — PROGRESS NOTE ADULT - ASSESSMENT
64 yr old with cri stage 4, DM, PVD, admitted and had CABG, Post op intubated and has bilateral effusions, worsening renal disease and bc positive for E. faecalis   follow up bc negative to date.   no signs of sternal wd infection  no recent UTI  lines all new.    discussed with Dr. Mariscal  no pna likely in reviewing ct  change to ampicillin .   follow up bc are all negative  no signs of endocarditis   continue present therapy  length needs to be discussed

## 2020-02-12 LAB
ALBUMIN SERPL ELPH-MCNC: 2.3 G/DL — LOW (ref 3.3–5)
ALP SERPL-CCNC: 116 U/L — SIGNIFICANT CHANGE UP (ref 40–120)
ALT FLD-CCNC: 78 U/L — HIGH (ref 10–45)
ANION GAP SERPL CALC-SCNC: 16 MMOL/L — SIGNIFICANT CHANGE UP (ref 5–17)
APTT BLD: 102.3 SEC — HIGH (ref 27.5–36.3)
APTT BLD: 79.5 SEC — HIGH (ref 27.5–36.3)
APTT BLD: 84.8 SEC — HIGH (ref 27.5–36.3)
AST SERPL-CCNC: 24 U/L — SIGNIFICANT CHANGE UP (ref 10–40)
BILIRUB SERPL-MCNC: 0.6 MG/DL — SIGNIFICANT CHANGE UP (ref 0.2–1.2)
BUN SERPL-MCNC: 75 MG/DL — HIGH (ref 7–23)
CALCIUM SERPL-MCNC: 7.9 MG/DL — LOW (ref 8.4–10.5)
CHLORIDE SERPL-SCNC: 88 MMOL/L — LOW (ref 96–108)
CO2 SERPL-SCNC: 21 MMOL/L — LOW (ref 22–31)
CREAT SERPL-MCNC: 2.48 MG/DL — HIGH (ref 0.5–1.3)
GAS PNL BLDA: SIGNIFICANT CHANGE UP
GLUCOSE BLDC GLUCOMTR-MCNC: 103 MG/DL — HIGH (ref 70–99)
GLUCOSE BLDC GLUCOMTR-MCNC: 119 MG/DL — HIGH (ref 70–99)
GLUCOSE BLDC GLUCOMTR-MCNC: 123 MG/DL — HIGH (ref 70–99)
GLUCOSE BLDC GLUCOMTR-MCNC: 131 MG/DL — HIGH (ref 70–99)
GLUCOSE BLDC GLUCOMTR-MCNC: 135 MG/DL — HIGH (ref 70–99)
GLUCOSE BLDC GLUCOMTR-MCNC: 136 MG/DL — HIGH (ref 70–99)
GLUCOSE BLDC GLUCOMTR-MCNC: 147 MG/DL — HIGH (ref 70–99)
GLUCOSE BLDC GLUCOMTR-MCNC: 149 MG/DL — HIGH (ref 70–99)
GLUCOSE BLDC GLUCOMTR-MCNC: 155 MG/DL — HIGH (ref 70–99)
GLUCOSE BLDC GLUCOMTR-MCNC: 162 MG/DL — HIGH (ref 70–99)
GLUCOSE BLDC GLUCOMTR-MCNC: 230 MG/DL — HIGH (ref 70–99)
GLUCOSE SERPL-MCNC: 248 MG/DL — HIGH (ref 70–99)
HCT VFR BLD CALC: 25.4 % — LOW (ref 39–50)
HGB BLD-MCNC: 8.3 G/DL — LOW (ref 13–17)
MAGNESIUM SERPL-MCNC: 2.2 MG/DL — SIGNIFICANT CHANGE UP (ref 1.6–2.6)
MCHC RBC-ENTMCNC: 27.9 PG — SIGNIFICANT CHANGE UP (ref 27–34)
MCHC RBC-ENTMCNC: 32.7 GM/DL — SIGNIFICANT CHANGE UP (ref 32–36)
MCV RBC AUTO: 85.2 FL — SIGNIFICANT CHANGE UP (ref 80–100)
NRBC # BLD: 0 /100 WBCS — SIGNIFICANT CHANGE UP (ref 0–0)
PHOSPHATE SERPL-MCNC: 4.4 MG/DL — SIGNIFICANT CHANGE UP (ref 2.5–4.5)
PLATELET # BLD AUTO: 264 K/UL — SIGNIFICANT CHANGE UP (ref 150–400)
POTASSIUM SERPL-MCNC: 4.6 MMOL/L — SIGNIFICANT CHANGE UP (ref 3.5–5.3)
POTASSIUM SERPL-SCNC: 4.6 MMOL/L — SIGNIFICANT CHANGE UP (ref 3.5–5.3)
PROT SERPL-MCNC: 5.9 G/DL — LOW (ref 6–8.3)
RBC # BLD: 2.98 M/UL — LOW (ref 4.2–5.8)
RBC # FLD: 13.7 % — SIGNIFICANT CHANGE UP (ref 10.3–14.5)
SODIUM SERPL-SCNC: 125 MMOL/L — LOW (ref 135–145)
WBC # BLD: 17.87 K/UL — HIGH (ref 3.8–10.5)
WBC # FLD AUTO: 17.87 K/UL — HIGH (ref 3.8–10.5)

## 2020-02-12 PROCEDURE — 99232 SBSQ HOSP IP/OBS MODERATE 35: CPT

## 2020-02-12 PROCEDURE — 99291 CRITICAL CARE FIRST HOUR: CPT

## 2020-02-12 PROCEDURE — 99232 SBSQ HOSP IP/OBS MODERATE 35: CPT | Mod: GC

## 2020-02-12 PROCEDURE — 71045 X-RAY EXAM CHEST 1 VIEW: CPT | Mod: 26

## 2020-02-12 RX ORDER — HYDRALAZINE HCL 50 MG
10 TABLET ORAL EVERY 8 HOURS
Refills: 0 | Status: DISCONTINUED | OUTPATIENT
Start: 2020-02-12 | End: 2020-02-14

## 2020-02-12 RX ORDER — INSULIN LISPRO 100/ML
13 VIAL (ML) SUBCUTANEOUS
Refills: 0 | Status: DISCONTINUED | OUTPATIENT
Start: 2020-02-12 | End: 2020-02-13

## 2020-02-12 RX ORDER — INSULIN GLARGINE 100 [IU]/ML
54 INJECTION, SOLUTION SUBCUTANEOUS AT BEDTIME
Refills: 0 | Status: DISCONTINUED | OUTPATIENT
Start: 2020-02-12 | End: 2020-02-13

## 2020-02-12 RX ADMIN — PANTOPRAZOLE SODIUM 40 MILLIGRAM(S): 20 TABLET, DELAYED RELEASE ORAL at 08:47

## 2020-02-12 RX ADMIN — OXYCODONE AND ACETAMINOPHEN 1 TABLET(S): 5; 325 TABLET ORAL at 16:15

## 2020-02-12 RX ADMIN — Medication 216 GRAM(S): at 14:12

## 2020-02-12 RX ADMIN — OXYCODONE AND ACETAMINOPHEN 1 TABLET(S): 5; 325 TABLET ORAL at 05:05

## 2020-02-12 RX ADMIN — Medication 3 MILLILITER(S): at 13:41

## 2020-02-12 RX ADMIN — Medication 3 MILLILITER(S): at 06:16

## 2020-02-12 RX ADMIN — AMIODARONE HYDROCHLORIDE 400 MILLIGRAM(S): 400 TABLET ORAL at 14:11

## 2020-02-12 RX ADMIN — TAMSULOSIN HYDROCHLORIDE 0.4 MILLIGRAM(S): 0.4 CAPSULE ORAL at 21:45

## 2020-02-12 RX ADMIN — OXYCODONE AND ACETAMINOPHEN 1 TABLET(S): 5; 325 TABLET ORAL at 10:45

## 2020-02-12 RX ADMIN — Medication 10 MILLIGRAM(S): at 14:11

## 2020-02-12 RX ADMIN — Medication 216 GRAM(S): at 05:05

## 2020-02-12 RX ADMIN — OXYCODONE AND ACETAMINOPHEN 1 TABLET(S): 5; 325 TABLET ORAL at 10:10

## 2020-02-12 RX ADMIN — POLYETHYLENE GLYCOL 3350 17 GRAM(S): 17 POWDER, FOR SOLUTION ORAL at 12:12

## 2020-02-12 RX ADMIN — Medication 1 DROP(S): at 05:06

## 2020-02-12 RX ADMIN — CHLORHEXIDINE GLUCONATE 1 APPLICATION(S): 213 SOLUTION TOPICAL at 05:06

## 2020-02-12 RX ADMIN — AMIODARONE HYDROCHLORIDE 400 MILLIGRAM(S): 400 TABLET ORAL at 05:06

## 2020-02-12 RX ADMIN — Medication 81 MILLIGRAM(S): at 12:12

## 2020-02-12 RX ADMIN — Medication 10 MILLIGRAM(S): at 08:47

## 2020-02-12 RX ADMIN — OXYCODONE AND ACETAMINOPHEN 1 TABLET(S): 5; 325 TABLET ORAL at 21:45

## 2020-02-12 RX ADMIN — OXYCODONE AND ACETAMINOPHEN 1 TABLET(S): 5; 325 TABLET ORAL at 05:50

## 2020-02-12 RX ADMIN — OXYCODONE AND ACETAMINOPHEN 1 TABLET(S): 5; 325 TABLET ORAL at 22:15

## 2020-02-12 RX ADMIN — Medication 1 TABLET(S): at 12:12

## 2020-02-12 RX ADMIN — Medication 10 MILLIGRAM(S): at 21:45

## 2020-02-12 RX ADMIN — Medication 216 GRAM(S): at 21:45

## 2020-02-12 RX ADMIN — Medication 3 MILLILITER(S): at 18:22

## 2020-02-12 RX ADMIN — Medication 1 DROP(S): at 21:00

## 2020-02-12 RX ADMIN — OXYCODONE AND ACETAMINOPHEN 1 TABLET(S): 5; 325 TABLET ORAL at 15:46

## 2020-02-12 NOTE — PROGRESS NOTE ADULT - ASSESSMENT
64 yr old with cri stage 4, DM, PVD, admitted and had CABG, Post op intubated and has bilateral effusions, worsening renal disease and bc positive for E. faecalis   follow up bc negative to date.   no signs of sternal wd infection  no recent UTI  lines all new.    discussed with Dr. Mariscal  no pna likely in reviewing ct      follow up bc are all negative  no signs of endocarditis   continue present therapy  length needs to be discussed   wbc still high but no [id number] fo uncontrolled source

## 2020-02-12 NOTE — PROGRESS NOTE ADULT - ATTENDING COMMENTS
CKD with superimposed STEPHANIE: likely hemodynamic injury in the setting of relatively lower BP/Surgery. Creatinine bumped to 2.5 from a baseline of 1.8-2 mg/dl. With 3.8 gms of proteinuria. Serological w/u negative. Renal sono with no hydro  s/p CABG ( 2/3)  and PEA arrest ( 2/6)  S/P chest tube ( 2/11)  Creatinine  appears to have plateaued  Remains hyponatremic   remains fluid overloaded despite being net negative over the last 24 hours  Maintain on bumex drip  Would impose fluid restriction of 1 liter  Repeat BMP in PM for sodium

## 2020-02-12 NOTE — PROGRESS NOTE ADULT - SUBJECTIVE AND OBJECTIVE BOX
infectious diseases progress note:    Patient is a 64y old  Male who presents with a chief complaint of Chest pain (2020 07:32)        Acute ischemic heart disease        ROS:  CONSTITUTIONAL:  Negative fever or chills, feels well, good appetite  EYES:  Negative  blurry vision or double vision  CARDIOVASCULAR:  Negative for chest pain or palpitations  RESPIRATORY:  Negative for cough, wheezing, or SOB   GASTROINTESTINAL:  Negative for nausea, vomiting, diarrhea, constipation, or abdominal pain  GENITOURINARY:  Negative frequency, urgency or dysuria  NEUROLOGIC:  No headache, confusion, dizziness, lightheadedness    Allergies    No Known Allergies    Intolerances        ANTIBIOTICS/RELEVANT:  antimicrobials  ampicillin  IVPB 2 Gram(s) IV Intermittent every 8 hours    immunologic:    OTHER:  albuterol/ipratropium for Nebulization 3 milliLiter(s) Nebulizer every 6 hours  aMIOdarone    Tablet 400 milliGRAM(s) Oral every 8 hours  aMIOdarone    Tablet 200 milliGRAM(s) Oral daily  aMIOdarone    Tablet   Oral   artificial tears (preservative free) Ophthalmic Solution 1 Drop(s) Both EYES two times a day  aspirin  chewable 81 milliGRAM(s) Oral daily  buMETAnide Infusion 0.5 mG/Hr IV Continuous <Continuous>  chlorhexidine 2% Cloths 1 Application(s) Topical <User Schedule>  dextrose 40% Gel 15 Gram(s) Oral once PRN  dextrose 5%. 1000 milliLiter(s) IV Continuous <Continuous>  dextrose 50% Injectable 12.5 Gram(s) IV Push once  dextrose 50% Injectable 25 Gram(s) IV Push once  dextrose 50% Injectable 25 Gram(s) IV Push once  glucagon  Injectable 1 milliGRAM(s) IntraMuscular once PRN  heparin  Infusion 1000 Unit(s)/Hr IV Continuous <Continuous>  hydrALAZINE 10 milliGRAM(s) Oral every 8 hours  insulin glargine Injectable (LANTUS) 40 Unit(s) SubCutaneous at bedtime  insulin lispro (HumaLOG) corrective regimen sliding scale   SubCutaneous three times a day before meals  insulin lispro (HumaLOG) corrective regimen sliding scale   SubCutaneous at bedtime  insulin lispro Injectable (HumaLOG) 10 Unit(s) SubCutaneous three times a day before meals  insulin regular Infusion 4 Unit(s)/Hr IV Continuous <Continuous>  multivitamin 1 Tablet(s) Oral daily  oxycodone    5 mG/acetaminophen 325 mG 1 Tablet(s) Oral every 4 hours PRN  pantoprazole    Tablet 40 milliGRAM(s) Oral before breakfast  polyethylene glycol 3350 17 Gram(s) Oral daily  sodium chloride 0.9% lock flush 10 milliLiter(s) IV Push every 1 hour PRN  tamsulosin 0.4 milliGRAM(s) Oral at bedtime      Objective:  Vital Signs Last 24 Hrs  T(C): 36.8 (2020 04:00), Max: 37 (2020 20:00)  T(F): 98.2 (2020 04:00), Max: 98.6 (2020 20:00)  HR: 74 (2020 08:00) (72 - 85)  BP: --  BP(mean): --  RR: 19 (2020 08:00) (14 - 34)  SpO2: 100% (2020 08:00) (93% - 100%)    P   Eyes:DANIELA, EOMI  Ear/Nose/Throat: no oral lesion, no sinus tenderness on percussion	  Neck:no JVD, no lymphadenopathy, supple  Respiratory: CTA lanre  Cardiovascular: S1S2 RRR, no murmurs  Gastrointestinal:soft, (+) BS, no HSM  Extremities:no e/e/c emea  ulcers that are not infected on legs  scrotal edema         LABS:                        8.3    17.87 )-----------( 264      ( 2020 01:43 )             25.4     02-12    125<L>  |  88<L>  |  75<H>  ----------------------------<  248<H>  4.6   |  21<L>  |  2.48<H>    Ca    7.9<L>      2020 01:43  Phos  4.4     02-12  Mg     2.2     02-12    TPro  5.9<L>  /  Alb  2.3<L>  /  TBili  0.6  /  DBili  x   /  AST  24  /  ALT  78<H>  /  AlkPhos  116  02-12    PT/INR - ( 2020 01:28 )   PT: 14.4 sec;   INR: 1.24 ratio         PTT - ( 2020 05:24 )  PTT:79.5 sec  Urinalysis Basic - ( 10 Feb 2020 09:32 )    Color: Yellow / Appearance: Clear / S.014 / pH: x  Gluc: x / Ketone: Negative  / Bili: Negative / Urobili: 2 mg/dL   Blood: x / Protein: 100 / Nitrite: Negative   Leuk Esterase: Negative / RBC: 25 /hpf / WBC 2 /HPF   Sq Epi: x / Non Sq Epi: 1 /hpf / Bacteria: Negative          MICROBIOLOGY:    RECENT CULTURES:   @ 23:08 .Blood Blood                No growth to date.     @ 04:25 .Blood Blood-Peripheral                No growth to date.     @ 23:06 .Sputum Sputum       No polymorphonuclear leukocytes per low power field  Few Squamous epithelial cells per low power field  Few Gram Negative Rods seen per oil power field  Few Gram positive cocci in pairs seen per oil power field           Normal Respiratory Missy present     @ 14:44 .Blood Blood       Growth in anaerobic bottle: Gram Positive Cocci in Pairs and Chains           Growth in anaerobic bottle: Enterococcus faecalis  See previous culture [id number]     @ 09:16 .Sputum Sputum       Moderate polymorphonuclear leukocytes per low power field  Few Squamous epithelial cells per low power field  Few Gram Negative Rods seen per oil power field  Rare Yeast like cells seen per oil power field  Rare Gram Positive Cocci in Pairs and Chains seen per oil power field  Rare Gram Negative Diplococci seen per oil power field           Normal Respiratory Missy present     @ 09:13 .Blood Blood   PCR    Growth in anaerobic bottle: Gram Positive Cocci in Pairs and Chains    Blood Culture PCR  Enterococcus faecalis  Blood Culture PCR     Growth in anaerobic bottle: Enterococcus faecalis  ***Blood Panel PCR results on this specimen are available  approximately 3 hours after the Gram stain result.***  Gram stain, PCR, and/or culture results may not always  correspond due to difference in methodologies.  ************************************************************  This PCR assay was performed using [employer].  The following targets are tested for: Enterococcus,  vancomycin resistant enterococci, Listeria monocytogenes,  coagulase negative staphylococci, S. aureus,  methicillin resistant S. aureus, Streptococcus agalactiae  (Group B), S. pneumoniae, S. pyogenes (Group A),  Acinetobacter baumannii, Enterobacter cloacae, E. coli,  Klebsiella oxytoca, K. pneumoniae, Proteus sp.,  Serratia marcescens, Haemophilus influenzae,  Neisseria meningitidis, Pseudomonas aeruginosa, Candida  albicans, C. glabrata, C krusei, C parapsilosis,  C. tropicalis and the KPC resistance gene.          RESPIRATORY CULTURES:              RADIOLOGY & ADDITIONAL STUDIES:        Pager [phone]  After 5 pm/weekends or if no response :[phone]

## 2020-02-12 NOTE — PROGRESS NOTE ADULT - ASSESSMENT
Assessment  DMT2: 64y Male with DM T2 with hyperglycemia, A1C 9.2%, was on oral meds and insulin at home, now on insulin, patient received basal-dose Lantus last night, IV insulin was restarted this AM 2/2 hyperglycemia. Patient now on IV insulin, blood sugars improving, no hypoglycemic episodes. He is eating meals, appears comfortable, family at the bedside.  CAD: s/p CABG 2/3, on medications, no chest pain, stable, monitored.  HTN: Controlled,  on antihypertensive medications.  HLD: Controlled, on statin.  CKD: Monitor labs/BMP          Harper Matias MD  Cell: [phone]  Office: [phone] Assessment  DMT2: 64y Male with DM T2 with hyperglycemia, A1C 9.2%,  was on oral meds and insulin at home, now on insulin, patient received basal-dose Lantus last night, IV insulin was restarted this AM 2/2 hyperglycemia. Patient now on IV insulin, blood sugars improving, no hypoglycemic episodes. He is eating meals, appears comfortable, family at the bedside.  CAD: s/p CABG 2/3, on medications, no chest pain, stable, monitored.  HTN: Controlled,  on antihypertensive medications.  HLD: Controlled, on statin.  CKD: Monitor labs/BMP          Harper Matias MD  Cell: [phone]  Office: [phone]

## 2020-02-12 NOTE — PROGRESS NOTE ADULT - SUBJECTIVE AND OBJECTIVE BOX
CC: pt feeling better today, one chest tube removed, ambulating, son at bedside    TELEMETRY: nsr    PHYSICAL EXAM:    T(C): 36.4 (02-12-20 @ 12:00), Max: 37 (02-11-20 @ 20:00)  HR: 77 (02-12-20 @ 13:00) (72 - 81)  BP: --  RR: 22 (02-12-20 @ 13:00) (14 - 34)  SpO2: 100% (02-12-20 @ 13:00) (90% - 100%)  Wt(kg): --  I&O's Summary    11 Feb 2020 07:01  -  12 Feb 2020 07:00  --------------------------------------------------------  IN: 993 mL / OUT: 4130 mL / NET: -3137 mL    12 Feb 2020 07:01  -  12 Feb 2020 13:16  --------------------------------------------------------  IN: 445 mL / OUT: 890 mL / NET: -445 mL        Appearance: Normal	  Cardiovascular: Normal S1 S2,RRR, No JVD, No murmurs  Respiratory: Lungs clear to auscultation	  Gastrointestinal:  Soft, Non-tender, + BS	  Extremities: Normal range of motion, No clubbing, cyanosis or edema  Vascular: Peripheral pulses palpable 2+ bilaterally     LABS:	 	                          8.3    17.87 )-----------( 264      ( 12 Feb 2020 01:43 )             25.4     02-12    125<L>  |  88<L>  |  75<H>  ----------------------------<  248<H>  4.6   |  21<L>  |  2.48<H>    Ca    7.9<L>      12 Feb 2020 01:43  Phos  4.4     02-12  Mg     2.2     02-12    TPro  5.9<L>  /  Alb  2.3<L>  /  TBili  0.6  /  DBili  x   /  AST  24  /  ALT  78<H>  /  AlkPhos  116  02-12      PT/INR - ( 11 Feb 2020 01:28 )   PT: 14.4 sec;   INR: 1.24 ratio         PTT - ( 12 Feb 2020 12:12 )  PTT:102.3 sec    CARDIAC MARKERS:        MEDICATIONS  (STANDING):  albuterol/ipratropium for Nebulization 3 milliLiter(s) Nebulizer every 6 hours  aMIOdarone    Tablet 400 milliGRAM(s) Oral every 8 hours  aMIOdarone    Tablet 200 milliGRAM(s) Oral daily  aMIOdarone    Tablet   Oral   ampicillin  IVPB 2 Gram(s) IV Intermittent every 8 hours  artificial tears (preservative free) Ophthalmic Solution 1 Drop(s) Both EYES two times a day  aspirin  chewable 81 milliGRAM(s) Oral daily  buMETAnide Infusion 0.5 mG/Hr (2.5 mL/Hr) IV Continuous <Continuous>  chlorhexidine 2% Cloths 1 Application(s) Topical <User Schedule>  dextrose 5%. 1000 milliLiter(s) (50 mL/Hr) IV Continuous <Continuous>  dextrose 50% Injectable 12.5 Gram(s) IV Push once  dextrose 50% Injectable 25 Gram(s) IV Push once  dextrose 50% Injectable 25 Gram(s) IV Push once  heparin  Infusion 1000 Unit(s)/Hr (13 mL/Hr) IV Continuous <Continuous>  hydrALAZINE 10 milliGRAM(s) Oral every 8 hours  insulin glargine Injectable (LANTUS) 40 Unit(s) SubCutaneous at bedtime  insulin lispro (HumaLOG) corrective regimen sliding scale   SubCutaneous three times a day before meals  insulin lispro (HumaLOG) corrective regimen sliding scale   SubCutaneous at bedtime  insulin lispro Injectable (HumaLOG) 10 Unit(s) SubCutaneous three times a day before meals  insulin regular Infusion 4 Unit(s)/Hr (4 mL/Hr) IV Continuous <Continuous>  multivitamin 1 Tablet(s) Oral daily  pantoprazole    Tablet 40 milliGRAM(s) Oral before breakfast  polyethylene glycol 3350 17 Gram(s) Oral daily  tamsulosin 0.4 milliGRAM(s) Oral at bedtime

## 2020-02-12 NOTE — PROGRESS NOTE ADULT - PROBLEM SELECTOR PLAN 2
Pt. with likely hypervolemic hyponatremia in the setting of volume overload. Na is stable at 125 this AM. Recommend increasing Bumex gtt as outlined above. Monitor daily weight and UOP.    Kevin Correa  Nephrology Fellow  Cell: [phone] (from 8 am to 5 pm)  (After 5 pm or on weekends please page on-call fellow)

## 2020-02-12 NOTE — PROGRESS NOTE ADULT - SUBJECTIVE AND OBJECTIVE BOX
FRANKLIN PRESLEY                     [id number]    HPI:  65yo male with hx of HTN, DM II, presented to the ED with complaints of acute on chronic left sided exertional chest pain. Pt states he has been having left sided pressure and stabbing chest pain radiating to his sternum and right with activity for the past few months. He states each episode last approximately 1-2 mins and subsides upon resting. He denies associated symptoms of radiation to his back, arm or jaw. He recently was at a wedding which he could not enjoy because dancing would reproduce to the pain. He states he did not pursue and medical attention until this time. Pt states this time his pain was associated with sob up exertion. He denies symptoms of LOC, diaphoresis, palpitations, abd pain, N/V, fever or chills. He denies prior cardiac work up. (2020 12:57)      Hospital Course:  s/p C4L on 2/3; PEA arrest on      VITAL SIGNS:  Vital Signs Last 24 Hrs  T(C): 36.8 (2020 04:00), Max: 37.2 (2020 08:00)  T(F): 98.2 (2020 04:00), Max: 99 (2020 08:00)  HR: 72 (2020 07:00) (72 - 85)  BP: --  BP(mean): --  RR: 14 (2020 07:00) (14 - 34)  SpO2: 100% (2020 07:00) (93% - 100%)    =========================I&Os=========================    I/Os:  I&O's Detail    2020 07:01  -  2020 07:00  --------------------------------------------------------  IN:    bumetanide Infusion: 45 mL    furosemide Infusion: 5 mL    furosemide Infusion: 10 mL    furosemide Infusion: 15 mL    heparin Infusion: 227 mL    insulin regular Infusion: 41 mL    IV PiggyBack: 100 mL    Oral Fluid: 500 mL  Total IN: 943 mL    OUT:    Chest Tube: 970 mL    Chest Tube: 130 mL    Indwelling Catheter - Urethral: 3030 mL  Total OUT: 4130 mL    Total NET: -3187 mL          CAPILLARY BLOOD GLUCOSE  174 (2020 21:00)  133 (2020 18:00)  96 (2020 17:00)  101 (2020 16:00)  126 (2020 15:00)  157 (2020 14:00)  173 (2020 12:00)  179 (2020 11:00)  217 (2020 10:00)  156 (2020 09:00)  175 (2020 08:00)      POCT Blood Glucose.: 133 mg/dL (2020 17:58)  POCT Blood Glucose.: 94 mg/dL (2020 17:11)  POCT Blood Glucose.: 126 mg/dL (2020 14:53)  POCT Blood Glucose.: 157 mg/dL (2020 13:40)  POCT Blood Glucose.: 173 mg/dL (2020 12:02)  POCT Blood Glucose.: 217 mg/dL (2020 10:14)  POCT Blood Glucose.: 156 mg/dL (2020 08:57)  POCT Blood Glucose.: 175 mg/dL (2020 08:05)        ============================LABS=========================                        8.3    17.87 )-----------( 264      ( 2020 01:43 )             25.4     02-12    125<L>  |  88<L>  |  75<H>  ----------------------------<  248<H>  4.6   |  21<L>  |  2.48<H>    Ca    7.9<L>      :43  Phos  4.4     02-12  Mg     2.2         TPro  5.9<L>  /  Alb  2.3<L>  /  TBili  0.6  /  DBili  x   /  AST  24  /  ALT  78<H>  /  AlkPhos  116      LIVER FUNCTIONS - ( 2020 01:43 )  Alb: 2.3 g/dL / Pro: 5.9 g/dL / ALK PHOS: 116 U/L / ALT: 78 U/L / AST: 24 U/L / GGT: x           PT/INR - ( 2020 01:28 )   PT: 14.4 sec;   INR: 1.24 ratio         PTT - ( 2020 05:24 )  PTT:79.5 sec  ABG - ( 2020 01:42 )  pH, Arterial: 7.45  pH, Blood: x     /  pCO2: 34    /  pO2: 111   / HCO3: 23    / Base Excess: -.2   /  SaO2: 98                Urinalysis Basic - ( 10 Feb 2020 09:32 )    Color: Yellow / Appearance: Clear / S.014 / pH: x  Gluc: x / Ketone: Negative  / Bili: Negative / Urobili: 2 mg/dL   Blood: x / Protein: 100 / Nitrite: Negative   Leuk Esterase: Negative / RBC: 25 /hpf / WBC 2 /HPF   Sq Epi: x / Non Sq Epi: 1 /hpf / Bacteria: Negative        ===========================PMHX&PSHx==========================    PAST MEDICAL & SURGICAL HISTORY:  HTN (hypertension)  Diabetes  History of appendectomy: x 30 yrs ago      ===========================MEDICATIONS============================    MEDICATIONS  (STANDING):  albuterol/ipratropium for Nebulization 3 milliLiter(s) Nebulizer every 6 hours  aMIOdarone    Tablet 400 milliGRAM(s) Oral every 8 hours  aMIOdarone    Tablet 200 milliGRAM(s) Oral daily  aMIOdarone    Tablet   Oral   ampicillin  IVPB 2 Gram(s) IV Intermittent every 8 hours  artificial tears (preservative free) Ophthalmic Solution 1 Drop(s) Both EYES two times a day  aspirin  chewable 81 milliGRAM(s) Oral daily  buMETAnide Infusion 0.5 mG/Hr (2.5 mL/Hr) IV Continuous <Continuous>  chlorhexidine 2% Cloths 1 Application(s) Topical <User Schedule>  dextrose 5%. 1000 milliLiter(s) (50 mL/Hr) IV Continuous <Continuous>  dextrose 50% Injectable 12.5 Gram(s) IV Push once  dextrose 50% Injectable 25 Gram(s) IV Push once  dextrose 50% Injectable 25 Gram(s) IV Push once  heparin  Infusion 1000 Unit(s)/Hr (13 mL/Hr) IV Continuous <Continuous>  insulin glargine Injectable (LANTUS) 40 Unit(s) SubCutaneous at bedtime  insulin lispro (HumaLOG) corrective regimen sliding scale   SubCutaneous three times a day before meals  insulin lispro (HumaLOG) corrective regimen sliding scale   SubCutaneous at bedtime  insulin lispro Injectable (HumaLOG) 10 Unit(s) SubCutaneous three times a day before meals  insulin regular Infusion 4 Unit(s)/Hr (4 mL/Hr) IV Continuous <Continuous>  multivitamin 1 Tablet(s) Oral daily  pantoprazole    Tablet 40 milliGRAM(s) Oral before breakfast  polyethylene glycol 3350 17 Gram(s) Oral daily  tamsulosin 0.4 milliGRAM(s) Oral at bedtime    MEDICATIONS  (PRN):  dextrose 40% Gel 15 Gram(s) Oral once PRN Blood Glucose LESS THAN 70 milliGRAM(s)/deciliter  glucagon  Injectable 1 milliGRAM(s) IntraMuscular once PRN Glucose LESS THAN 70 milligrams/deciliter  oxycodone    5 mG/acetaminophen 325 mG 1 Tablet(s) Oral every 4 hours PRN Severe Pain (7 - 10)  sodium chloride 0.9% lock flush 10 milliLiter(s) IV Push every 1 hour PRN Pre/post blood products, medications, blood draw, and to maintain line patency      ============================IMAGING STUDIES=========================    =============================ASSESSMENT===========================   s/p C4L on 2/3   PEA arrest on 2/6     =============================NEUROLOGY============================  A+O x3 Nonfocal  In Chair    ==============================RESPIRATORY========================  acute respiratory failure, post op, requiring BiPAP/High Flow for hypoxia  Left moderate to large pleural effusion on bedside sono, pigtail placed  HF O2, 50% on 50L, will wean   Comfortable, not in any distress.  Monitor chest tube output  Chest tube to suction   Continue bronchodilators, pulmonary toilet      ============================CARDIOVASCULAR======================  Continue hemodynamic monitoring.  Not on any pressors  PAF, now in NSR, continue Amio load    =====================RENAL===================  Osorio   Decrease lasix drip to 5mg , strict I/O  Monitor I/Os and electrolytes      ====================GASTROINTESTINAL===================  Continue GI prophylaxis with Protonix    ========================HEMATOLOGIC/ONCOLOGIC====================  Hold heparin for now for pigtail   Monitor chest tube output. No signs of active bleeding.   Follow CBC in AM    ============================INFECTIOUS DISEASE========================  Start ampicillin   Monitor for fever / leukocytosis.  All surgical incision / chest tube  sites look clean      Pt is on GI & DVT prophylaxis  OOB & ambulate     Pertinent clinical, laboratory, radiographic, hemodynamic, echocardiographic, respiratory data, microbiologic data and chart were reviewed and analyzed frequently throughout the course of the day and night  Patient seen, examined and plan discussed with CT Surgery / CTICU team during rounds.    I spent 30 minutes at [facility] providing critical care from 7am to 5pm.    By signing my name below, I, Ciara Coronado, attest that this documentation has been prepared under the direction and in the presence of Brant Francois MD   Electronically Signed: Phill Castaneda. 20 @ 07:32 FRANKLIN PRESLEY                     [id number]    HPI:  65yo male with hx of HTN, DM II, presented to the ED with complaints of acute on chronic left sided exertional chest pain. Pt states he has been having left sided pressure and stabbing chest pain radiating to his sternum and right with activity for the past few months. He states each episode last approximately 1-2 mins and subsides upon resting. He denies associated symptoms of radiation to his back, arm or jaw. He recently was at a wedding which he could not enjoy because dancing would reproduce to the pain. He states he did not pursue and medical attention until this time. Pt states this time his pain was associated with sob up exertion. He denies symptoms of LOC, diaphoresis, palpitations, abd pain, N/V, fever or chills. He denies prior cardiac work up. (2020 12:57)      Hospital Course:  s/p C4L on 2/3; PEA arrest on      VITAL SIGNS:  Vital Signs Last 24 Hrs  T(C): 36.8 (2020 04:00), Max: 37.2 (2020 08:00)  T(F): 98.2 (2020 04:00), Max: 99 (2020 08:00)  HR: 72 (2020 07:00) (72 - 85)  BP: --  BP(mean): --  RR: 14 (2020 07:00) (14 - 34)  SpO2: 100% (2020 07:00) (93% - 100%)    =========================I&Os=========================    I/Os:  I&O's Detail    2020 07:01  -  2020 07:00  --------------------------------------------------------  IN:    bumetanide Infusion: 45 mL    furosemide Infusion: 5 mL    furosemide Infusion: 10 mL    furosemide Infusion: 15 mL    heparin Infusion: 227 mL    insulin regular Infusion: 41 mL    IV PiggyBack: 100 mL    Oral Fluid: 500 mL  Total IN: 943 mL    OUT:    Chest Tube: 970 mL    Chest Tube: 130 mL    Indwelling Catheter - Urethral: 3030 mL  Total OUT: 4130 mL    Total NET: -3187 mL          CAPILLARY BLOOD GLUCOSE  174 (2020 21:00)  133 (2020 18:00)  96 (2020 17:00)  101 (2020 16:00)  126 (2020 15:00)  157 (2020 14:00)  173 (2020 12:00)  179 (2020 11:00)  217 (2020 10:00)  156 (2020 09:00)  175 (2020 08:00)      POCT Blood Glucose.: 133 mg/dL (2020 17:58)  POCT Blood Glucose.: 94 mg/dL (2020 17:11)  POCT Blood Glucose.: 126 mg/dL (2020 14:53)  POCT Blood Glucose.: 157 mg/dL (2020 13:40)  POCT Blood Glucose.: 173 mg/dL (2020 12:02)  POCT Blood Glucose.: 217 mg/dL (2020 10:14)  POCT Blood Glucose.: 156 mg/dL (2020 08:57)  POCT Blood Glucose.: 175 mg/dL (2020 08:05)        ============================LABS=========================                        8.3    17.87 )-----------( 264      ( 2020 01:43 )             25.4     02-12    125<L>  |  88<L>  |  75<H>  ----------------------------<  248<H>  4.6   |  21<L>  |  2.48<H>    Ca    7.9<L>      :43  Phos  4.4     02-12  Mg     2.2         TPro  5.9<L>  /  Alb  2.3<L>  /  TBili  0.6  /  DBili  x   /  AST  24  /  ALT  78<H>  /  AlkPhos  116      LIVER FUNCTIONS - ( 2020 01:43 )  Alb: 2.3 g/dL / Pro: 5.9 g/dL / ALK PHOS: 116 U/L / ALT: 78 U/L / AST: 24 U/L / GGT: x           PT/INR - ( 2020 01:28 )   PT: 14.4 sec;   INR: 1.24 ratio         PTT - ( 2020 05:24 )  PTT:79.5 sec  ABG - ( 2020 01:42 )  pH, Arterial: 7.45  pH, Blood: x     /  pCO2: 34    /  pO2: 111   / HCO3: 23    / Base Excess: -.2   /  SaO2: 98                Urinalysis Basic - ( 10 Feb 2020 09:32 )    Color: Yellow / Appearance: Clear / S.014 / pH: x  Gluc: x / Ketone: Negative  / Bili: Negative / Urobili: 2 mg/dL   Blood: x / Protein: 100 / Nitrite: Negative   Leuk Esterase: Negative / RBC: 25 /hpf / WBC 2 /HPF   Sq Epi: x / Non Sq Epi: 1 /hpf / Bacteria: Negative        ===========================PMHX&PSHx==========================    PAST MEDICAL & SURGICAL HISTORY:  HTN (hypertension)  Diabetes  History of appendectomy: x 30 yrs ago      ===========================MEDICATIONS============================    MEDICATIONS  (STANDING):  albuterol/ipratropium for Nebulization 3 milliLiter(s) Nebulizer every 6 hours  aMIOdarone    Tablet 400 milliGRAM(s) Oral every 8 hours  aMIOdarone    Tablet 200 milliGRAM(s) Oral daily  aMIOdarone    Tablet   Oral   ampicillin  IVPB 2 Gram(s) IV Intermittent every 8 hours  artificial tears (preservative free) Ophthalmic Solution 1 Drop(s) Both EYES two times a day  aspirin  chewable 81 milliGRAM(s) Oral daily  buMETAnide Infusion 0.5 mG/Hr (2.5 mL/Hr) IV Continuous <Continuous>  chlorhexidine 2% Cloths 1 Application(s) Topical <User Schedule>  dextrose 5%. 1000 milliLiter(s) (50 mL/Hr) IV Continuous <Continuous>  dextrose 50% Injectable 12.5 Gram(s) IV Push once  dextrose 50% Injectable 25 Gram(s) IV Push once  dextrose 50% Injectable 25 Gram(s) IV Push once  heparin  Infusion 1000 Unit(s)/Hr (13 mL/Hr) IV Continuous <Continuous>  insulin glargine Injectable (LANTUS) 40 Unit(s) SubCutaneous at bedtime  insulin lispro (HumaLOG) corrective regimen sliding scale   SubCutaneous three times a day before meals  insulin lispro (HumaLOG) corrective regimen sliding scale   SubCutaneous at bedtime  insulin lispro Injectable (HumaLOG) 10 Unit(s) SubCutaneous three times a day before meals  insulin regular Infusion 4 Unit(s)/Hr (4 mL/Hr) IV Continuous <Continuous>  multivitamin 1 Tablet(s) Oral daily  pantoprazole    Tablet 40 milliGRAM(s) Oral before breakfast  polyethylene glycol 3350 17 Gram(s) Oral daily  tamsulosin 0.4 milliGRAM(s) Oral at bedtime    MEDICATIONS  (PRN):  dextrose 40% Gel 15 Gram(s) Oral once PRN Blood Glucose LESS THAN 70 milliGRAM(s)/deciliter  glucagon  Injectable 1 milliGRAM(s) IntraMuscular once PRN Glucose LESS THAN 70 milligrams/deciliter  oxycodone    5 mG/acetaminophen 325 mG 1 Tablet(s) Oral every 4 hours PRN Severe Pain (7 - 10)  sodium chloride 0.9% lock flush 10 milliLiter(s) IV Push every 1 hour PRN Pre/post blood products, medications, blood draw, and to maintain line patency      ============================IMAGING STUDIES=========================  CXR (20):  Impression:    The heart is enlarged. Bilateral pigtail catheter is seen in the pleural spaces. No pneumothorax. A central line seen on the left and the tip is in the superior vena cava. Status post sternotomy. No change in the appearance the chest when compared to previous study done 2020. A 2015 at 1:51 PM.    =============================ASSESSMENT===========================   s/p C4L on 2/3   PEA arrest on      =============================NEUROLOGY============================  A+O x3 Nonfocal  In Chair    ==============================RESPIRATORY========================  acute respiratory failure, post op, requiring BiPAP/High Flow for hypoxia  Left moderate to large pleural effusion on bedside sono, pigtail in place  HF O2, 40% on 40L, will wean   Comfortable, not in any distress.  Remove R chest tube today   Monitor L chest tube output  L Chest tube to suction   Continue bronchodilators, pulmonary toilet    ============================CARDIOVASCULAR======================  Continue hemodynamic monitoring.  Not on any pressors  PAF, now in NSR, continue Amio load    =====================RENAL===================  Osorio   Bumex drip 5mg  Monitor I/Os and electrolytes    ====================GASTROINTESTINAL===================  Continue GI prophylaxis with Protonix    ========================HEMATOLOGIC/ONCOLOGIC====================  Continue heparin drip  Monitor chest tube output. No signs of active bleeding.   Follow CBC in AM    ============================INFECTIOUS DISEASE========================  Continue ampicillin as per ID  Monitor for fever / leukocytosis.  All surgical incision / chest tube  sites look clean      Pt is on GI & DVT prophylaxis  OOB & ambulate     Pertinent clinical, laboratory, radiographic, hemodynamic, echocardiographic, respiratory data, microbiologic data and chart were reviewed and analyzed frequently throughout the course of the day and night  Patient seen, examined and plan discussed with CT Surgery / CTICU team during rounds.    I spent 30 minutes at bedside providing critical care from 7am to 5pm.    By signing my name below, I, Ciara Coronado, attest that this documentation has been prepared under the direction and in the presence of Brant Francois MD   Electronically Signed: Phill Castaneda. 20 @ 07:32

## 2020-02-12 NOTE — PROGRESS NOTE ADULT - PROBLEM SELECTOR PLAN 1
Will increase Lantus to 54u at bedtime.  Will increase Humalog to 15u before each meal and continue Humalog correction scale coverage. Will continue monitoring FS and FU.  Patient counseled for compliance with consistent low carb diet. Patient counseled for compliance with consistent low carb diet.

## 2020-02-12 NOTE — PROGRESS NOTE ADULT - SUBJECTIVE AND OBJECTIVE BOX
Chief complaint  Patient is a 64y old  Male who presents with a chief complaint of Chest pain (12 Feb 2020 13:16)   Review of systems  Patient in bed, looks comfortable, no hypoglycemia.    Labs and Fingersticks  CAPILLARY BLOOD GLUCOSE  230 (12 Feb 2020 08:00)  174 (11 Feb 2020 21:00)  133 (11 Feb 2020 18:00)  96 (11 Feb 2020 17:00)  101 (11 Feb 2020 16:00)      POCT Blood Glucose.: 136 mg/dL (12 Feb 2020 14:26)  POCT Blood Glucose.: 123 mg/dL (12 Feb 2020 13:33)  POCT Blood Glucose.: 119 mg/dL (12 Feb 2020 12:04)  POCT Blood Glucose.: 131 mg/dL (12 Feb 2020 11:10)  POCT Blood Glucose.: 147 mg/dL (12 Feb 2020 09:59)  POCT Blood Glucose.: 230 mg/dL (12 Feb 2020 08:31)  POCT Blood Glucose.: 133 mg/dL (11 Feb 2020 17:58)  POCT Blood Glucose.: 94 mg/dL (11 Feb 2020 17:11)      Anion Gap, Serum: 16 (02-12 @ 01:43)  Anion Gap, Serum: 15 (02-11 @ 01:28)  Anion Gap, Serum: 14 (02-10 @ 15:46)      Calcium, Total Serum: 7.9 <L> (02-12 @ 01:43)  Calcium, Total Serum: 8.0 <L> (02-11 @ 01:28)  Calcium, Total Serum: 7.6 <L> (02-10 @ 15:46)  Albumin, Serum: 2.3 <L> (02-12 @ 01:43)  Albumin, Serum: 2.5 <L> (02-11 @ 01:28)  Albumin, Serum: 2.3 <L> (02-10 @ 15:46)    Alanine Aminotransferase (ALT/SGPT): 78 <H> (02-12 @ 01:43)  Alanine Aminotransferase (ALT/SGPT): 125 <H> (02-11 @ 01:28)  Alanine Aminotransferase (ALT/SGPT): 137 <H> (02-10 @ 15:46)  Alkaline Phosphatase, Serum: 116 (02-12 @ 01:43)  Alkaline Phosphatase, Serum: 119 (02-11 @ 01:28)  Alkaline Phosphatase, Serum: 116 (02-10 @ 15:46)  Aspartate Aminotransferase (AST/SGOT): 24 (02-12 @ 01:43)  Aspartate Aminotransferase (AST/SGOT): 39 (02-11 @ 01:28)  Aspartate Aminotransferase (AST/SGOT): 48 <H> (02-10 @ 15:46)        02-12    125<L>  |  88<L>  |  75<H>  ----------------------------<  248<H>  4.6   |  21<L>  |  2.48<H>    Ca    7.9<L>      12 Feb 2020 01:43  Phos  4.4     02-12  Mg     2.2     02-12    TPro  5.9<L>  /  Alb  2.3<L>  /  TBili  0.6  /  DBili  x   /  AST  24  /  ALT  78<H>  /  AlkPhos  116  02-12                        8.3    17.87 )-----------( 264      ( 12 Feb 2020 01:43 )             25.4     Medications  MEDICATIONS  (STANDING):  albuterol/ipratropium for Nebulization 3 milliLiter(s) Nebulizer every 6 hours  aMIOdarone    Tablet 200 milliGRAM(s) Oral daily  aMIOdarone    Tablet   Oral   ampicillin  IVPB 2 Gram(s) IV Intermittent every 8 hours  artificial tears (preservative free) Ophthalmic Solution 1 Drop(s) Both EYES two times a day  aspirin  chewable 81 milliGRAM(s) Oral daily  buMETAnide Infusion 0.5 mG/Hr (2.5 mL/Hr) IV Continuous <Continuous>  chlorhexidine 2% Cloths 1 Application(s) Topical <User Schedule>  dextrose 5%. 1000 milliLiter(s) (50 mL/Hr) IV Continuous <Continuous>  dextrose 50% Injectable 12.5 Gram(s) IV Push once  dextrose 50% Injectable 25 Gram(s) IV Push once  dextrose 50% Injectable 25 Gram(s) IV Push once  heparin  Infusion 1000 Unit(s)/Hr (12 mL/Hr) IV Continuous <Continuous>  hydrALAZINE 10 milliGRAM(s) Oral every 8 hours  insulin glargine Injectable (LANTUS) 54 Unit(s) SubCutaneous at bedtime  insulin lispro (HumaLOG) corrective regimen sliding scale   SubCutaneous three times a day before meals  insulin lispro (HumaLOG) corrective regimen sliding scale   SubCutaneous at bedtime  insulin lispro Injectable (HumaLOG) 13 Unit(s) SubCutaneous three times a day before meals  insulin regular Infusion 4 Unit(s)/Hr (4 mL/Hr) IV Continuous <Continuous>  multivitamin 1 Tablet(s) Oral daily  pantoprazole    Tablet 40 milliGRAM(s) Oral before breakfast  polyethylene glycol 3350 17 Gram(s) Oral daily  tamsulosin 0.4 milliGRAM(s) Oral at bedtime      Physical Exam  General: Patient comfortable in bed  Vital Signs Last 12 Hrs  T(F): 97.6 (02-12-20 @ 12:00), Max: 98.2 (02-12-20 @ 04:00)  HR: 76 (02-12-20 @ 15:00) (72 - 81)  BP: --  BP(mean): --  RR: 22 (02-12-20 @ 15:00) (14 - 32)  SpO2: 98% (02-12-20 @ 15:00) (90% - 100%)  Neck: No palpable thyroid nodules.  CVS: S1S2, No murmurs  Respiratory: No wheezing, no crepitations  GI: Abdomen soft, bowel sounds positive  Musculoskeletal:  edema lower extremities.   Skin: No skin rashes, no ecchymosis    Diagnostics

## 2020-02-12 NOTE — PROGRESS NOTE ADULT - ASSESSMENT
intraop kennedy 2/3/20 ef 50%, nl lv, mild diastolic dysfx stage 1  limited echo 2/4/20: nl LV sys fx , no pericardial effusion     a/p  64 year old man with history of HTN, DM II, admitted with progressive exertional angina, s/p cath with severe triple vessel disease, s/p CABG, post op course c/b brief witnessed PEA likely hypoxic arrest, s/p intubation.     1. CAD, s/p cath with severe triple vessel disease including lesions at the bifurcation of the LAD/diagonal and distal RCA/RPDA/RPL trifurcation.   -s/p CABG x 4   -intraop kennedy ef 50%  -cont asa, statin  -s/p PEA arrest, now extubated  -off vasopressors  --LE dopplers, negative for dvt     2. Postop  CHF-Systolic  -cont bumex gtt per CTU    3. PAF  -cont amio load  -cont heparin gtt  -AC per CTS    4. HTN  -bp stable on hydralzine    5. STEPHANIE/CKD  renal f/u     6. Postop Pleural effusion  - S/P Right pigtail placement  -CT mgmt per CTS    7. Sepsis -E. Faecalis bacteremia  IV abx per CTICU  ID following   repeat chest xray 2/9  with LLL pna/ atelectasis         dvt ppx

## 2020-02-12 NOTE — PROGRESS NOTE ADULT - ATTENDING COMMENTS
Will increase Lantus to 54u at bedtime.  Will increase Humalog to 15u before each meal and continue Humalog correction scale coverage. Will continue monitoring FS and FU.

## 2020-02-12 NOTE — PROGRESS NOTE ADULT - SUBJECTIVE AND OBJECTIVE BOX
[facility] DIVISION OF KIDNEY DISEASES AND HYPERTENSION -- FOLLOW UP NOTE  --------------------------------------------------------------------------------  HPI: 65 yo M with HTN, DM II (20 years), admitted with complaints of acute on chronic left sided exertional chest pain. Nephrology team is following for elevated serum creatinine and pre-cardiac catheterization assessment. [facility] no prior records available. On admission (1/25/2020), Scr was 1.85. Patient went for cardiac cath on 1/29/20 which revealed triple vessel disease. Scr had improved to 1.76 on 2/3/20. Pt. underwent CABG on 2/3/20. Scr now increased after CABG and PEA arrest on 2/6/20, however is stable at 2.48 today. Pt. extubated on 2/8/20.     Pt. seen and examined at bedside this AM. Pt. is sitting upright in chair. Had chest tube inserted yesterday. Pt. on Bumex gtt, and is non-oliguric with 2.8 L of UOP in the past 24 hours. Pt. complains of scrotal edema. Pt. hypertensive. CVP reading is low.     PAST HISTORY  --------------------------------------------------------------------------------  No significant changes to PMH, PSH, FHx, SHx, unless otherwise noted    ALLERGIES & MEDICATIONS  --------------------------------------------------------------------------------  Allergies    No Known Allergies    Intolerances    Standing Inpatient Medications  albuterol/ipratropium for Nebulization 3 milliLiter(s) Nebulizer every 6 hours  aMIOdarone    Tablet 400 milliGRAM(s) Oral every 8 hours  aMIOdarone    Tablet 200 milliGRAM(s) Oral daily  aMIOdarone    Tablet   Oral   ampicillin  IVPB 2 Gram(s) IV Intermittent every 8 hours  artificial tears (preservative free) Ophthalmic Solution 1 Drop(s) Both EYES two times a day  aspirin  chewable 81 milliGRAM(s) Oral daily  buMETAnide Infusion 0.5 mG/Hr IV Continuous <Continuous>  chlorhexidine 2% Cloths 1 Application(s) Topical <User Schedule>  dextrose 5%. 1000 milliLiter(s) IV Continuous <Continuous>  dextrose 50% Injectable 12.5 Gram(s) IV Push once  dextrose 50% Injectable 25 Gram(s) IV Push once  dextrose 50% Injectable 25 Gram(s) IV Push once  heparin  Infusion 1000 Unit(s)/Hr IV Continuous <Continuous>  insulin glargine Injectable (LANTUS) 40 Unit(s) SubCutaneous at bedtime  insulin lispro (HumaLOG) corrective regimen sliding scale   SubCutaneous three times a day before meals  insulin lispro (HumaLOG) corrective regimen sliding scale   SubCutaneous at bedtime  insulin lispro Injectable (HumaLOG) 10 Unit(s) SubCutaneous three times a day before meals  insulin regular Infusion 4 Unit(s)/Hr IV Continuous <Continuous>  multivitamin 1 Tablet(s) Oral daily  pantoprazole    Tablet 40 milliGRAM(s) Oral before breakfast  polyethylene glycol 3350 17 Gram(s) Oral daily  tamsulosin 0.4 milliGRAM(s) Oral at bedtime    REVIEW OF SYSTEMS  --------------------------------------------------------------------------------  Gen: + lethargy, + fatigue  Respiratory: No dyspnea  CV: + CP from chest tube  GI: No abdominal pain  MSK: + LE edema, + scrotal edema  Neuro: No dizziness  Heme: No bleeding    All other systems were reviewed and are negative, except as noted.    VITALS/PHYSICAL EXAM  --------------------------------------------------------------------------------  T(C): 36.8 (02-12-20 @ 04:00), Max: 37.2 (02-11-20 @ 08:00)  HR: 76 (02-12-20 @ 06:00) (73 - 85)  BP: --  RR: 20 (02-12-20 @ 06:00) (15 - 34)  SpO2: 100% (02-12-20 @ 06:00) (93% - 100%)  Wt(kg): --    02-10-20 @ 07:01  -  02-11-20 @ 07:00  --------------------------------------------------------  IN: 949 mL / OUT: 2680 mL / NET: -1731 mL    02-11-20 @ 07:01  -  02-12-20 @ 06:30  --------------------------------------------------------  IN: 927.5 mL / OUT: 3980 mL / NET: -3052.5 mL    Physical Exam:  	Gen: awake  	HEENT: + high-flow NC O2  	Pulm: CTA b/l  	CV: + central chest dressing from CABG C/D/I, S1S2, + chest tube  	Abd: Soft  	: + scrotal edema  	Ext: + pitting edema B/L  	Neuro: Awake  	Skin: Warm and dry    LABS/STUDIES  --------------------------------------------------------------------------------              8.3    17.87 >-----------<  264      [02-12-20 @ 01:43]              25.4     125  |  88  |  75  ----------------------------<  248      [02-12-20 @ 01:43]  4.6   |  21  |  2.48        Ca     7.9     [02-12-20 @ 01:43]      Mg     2.2     [02-12-20 @ 01:43]      Phos  4.4     [02-12-20 @ 01:43]    Serum Osmolality 297      [02-10-20 @ 07:32]    Creatinine Trend:  SCr 2.48 [02-12 @ 01:43]  SCr 2.55 [02-11 @ 01:28]  SCr 2.47 [02-10 @ 15:46]  SCr 2.46 [02-10 @ 09:32]  SCr 2.40 [02-10 @ 01:59]    Urinalysis - [02-10-20 @ 09:32]      Color Yellow / Appearance Clear / SG 1.014 / pH 6.0      Gluc 100 mg/dL / Ketone Negative  / Bili Negative / Urobili 2 mg/dL       Blood Small / Protein 100 / Leuk Est Negative / Nitrite Negative      RBC 25 / WBC 2 / Hyaline 4 / Gran  / Sq Epi  / Non Sq Epi 1 / Bacteria Negative

## 2020-02-12 NOTE — PROGRESS NOTE ADULT - PROBLEM SELECTOR PLAN 1
Pt with  CKD likely  in the setting of long standing DM and HTN.  No prior labs for review. Scr since admission has ranged between 1.8-2 mg/dl.  Scr increased to ~2.50 secondary to hemodynamic injury after CABG  and PEA arrest s/p CPR, however now stable at 2.48 this AM. UA significant for protein and RBCs. Urine electrolytes consistent with intrinsic disease. Urine Pr/Cr ratio in nephrotic range. HepBsAg, HepC, C3, C4, SIFE, RACHEL, P-ANCA, C-ANCA, Anti-GBM ab, Parvovirus, RPR, anti-PLA2R ab were negative/non-reactive. Monitor labs and urine output.     Pt. is non-oliguric on Bumex gtt @ 0.5 mg/hr. CVP is low, however has significant edema (LE and scrotal). Recommend increasing Bumex to 1 mg/hr.  Avoid NSAIDs, ACEI/ARBS, RCA and nephrotoxins. Dose medications as per eGFR

## 2020-02-13 LAB
ALBUMIN SERPL ELPH-MCNC: 2.3 G/DL — LOW (ref 3.3–5)
ALP SERPL-CCNC: 111 U/L — SIGNIFICANT CHANGE UP (ref 40–120)
ALT FLD-CCNC: 62 U/L — HIGH (ref 10–45)
ANION GAP SERPL CALC-SCNC: 16 MMOL/L — SIGNIFICANT CHANGE UP (ref 5–17)
APTT BLD: 80.5 SEC — HIGH (ref 27.5–36.3)
APTT BLD: 82.2 SEC — HIGH (ref 27.5–36.3)
APTT BLD: 85.9 SEC — HIGH (ref 27.5–36.3)
APTT BLD: 88.6 SEC — HIGH (ref 27.5–36.3)
AST SERPL-CCNC: 23 U/L — SIGNIFICANT CHANGE UP (ref 10–40)
BILIRUB SERPL-MCNC: 0.6 MG/DL — SIGNIFICANT CHANGE UP (ref 0.2–1.2)
BUN SERPL-MCNC: 77 MG/DL — HIGH (ref 7–23)
CALCIUM SERPL-MCNC: 7.8 MG/DL — LOW (ref 8.4–10.5)
CHLORIDE SERPL-SCNC: 91 MMOL/L — LOW (ref 96–108)
CO2 SERPL-SCNC: 21 MMOL/L — LOW (ref 22–31)
CREAT SERPL-MCNC: 2.56 MG/DL — HIGH (ref 0.5–1.3)
GAS PNL BLDA: SIGNIFICANT CHANGE UP
GLUCOSE BLDC GLUCOMTR-MCNC: 280 MG/DL — HIGH (ref 70–99)
GLUCOSE BLDC GLUCOMTR-MCNC: 287 MG/DL — HIGH (ref 70–99)
GLUCOSE SERPL-MCNC: 148 MG/DL — HIGH (ref 70–99)
HCT VFR BLD CALC: 25.3 % — LOW (ref 39–50)
HGB BLD-MCNC: 8.1 G/DL — LOW (ref 13–17)
INR BLD: 1.24 RATIO — HIGH (ref 0.88–1.16)
MAGNESIUM SERPL-MCNC: 2.1 MG/DL — SIGNIFICANT CHANGE UP (ref 1.6–2.6)
MCHC RBC-ENTMCNC: 27.1 PG — SIGNIFICANT CHANGE UP (ref 27–34)
MCHC RBC-ENTMCNC: 32 GM/DL — SIGNIFICANT CHANGE UP (ref 32–36)
MCV RBC AUTO: 84.6 FL — SIGNIFICANT CHANGE UP (ref 80–100)
NRBC # BLD: 0 /100 WBCS — SIGNIFICANT CHANGE UP (ref 0–0)
PHOSPHATE SERPL-MCNC: 4.8 MG/DL — HIGH (ref 2.5–4.5)
PLATELET # BLD AUTO: 264 K/UL — SIGNIFICANT CHANGE UP (ref 150–400)
POTASSIUM SERPL-MCNC: 4.4 MMOL/L — SIGNIFICANT CHANGE UP (ref 3.5–5.3)
POTASSIUM SERPL-SCNC: 4.4 MMOL/L — SIGNIFICANT CHANGE UP (ref 3.5–5.3)
PROT SERPL-MCNC: 5.6 G/DL — LOW (ref 6–8.3)
PROTHROM AB SERPL-ACNC: 14.3 SEC — HIGH (ref 10–12.9)
RBC # BLD: 2.99 M/UL — LOW (ref 4.2–5.8)
RBC # FLD: 13.7 % — SIGNIFICANT CHANGE UP (ref 10.3–14.5)
SODIUM SERPL-SCNC: 128 MMOL/L — LOW (ref 135–145)
WBC # BLD: 21.1 K/UL — HIGH (ref 3.8–10.5)
WBC # FLD AUTO: 21.1 K/UL — HIGH (ref 3.8–10.5)

## 2020-02-13 PROCEDURE — 71045 X-RAY EXAM CHEST 1 VIEW: CPT | Mod: 26

## 2020-02-13 PROCEDURE — 99232 SBSQ HOSP IP/OBS MODERATE 35: CPT | Mod: GC

## 2020-02-13 PROCEDURE — 99291 CRITICAL CARE FIRST HOUR: CPT

## 2020-02-13 PROCEDURE — 99232 SBSQ HOSP IP/OBS MODERATE 35: CPT

## 2020-02-13 RX ORDER — INSULIN LISPRO 100/ML
16 VIAL (ML) SUBCUTANEOUS
Refills: 0 | Status: DISCONTINUED | OUTPATIENT
Start: 2020-02-13 | End: 2020-02-16

## 2020-02-13 RX ORDER — BACITRACIN ZINC 500 UNIT/G
1 OINTMENT IN PACKET (EA) TOPICAL
Refills: 0 | Status: DISCONTINUED | OUTPATIENT
Start: 2020-02-13 | End: 2020-02-13

## 2020-02-13 RX ORDER — INSULIN GLARGINE 100 [IU]/ML
46 INJECTION, SOLUTION SUBCUTANEOUS AT BEDTIME
Refills: 0 | Status: DISCONTINUED | OUTPATIENT
Start: 2020-02-13 | End: 2020-02-13

## 2020-02-13 RX ORDER — INSULIN LISPRO 100/ML
10 VIAL (ML) SUBCUTANEOUS
Refills: 0 | Status: DISCONTINUED | OUTPATIENT
Start: 2020-02-13 | End: 2020-02-13

## 2020-02-13 RX ORDER — BUMETANIDE 0.25 MG/ML
1 INJECTION INTRAMUSCULAR; INTRAVENOUS EVERY 12 HOURS
Refills: 0 | Status: DISCONTINUED | OUTPATIENT
Start: 2020-02-13 | End: 2020-02-14

## 2020-02-13 RX ORDER — INSULIN GLARGINE 100 [IU]/ML
50 INJECTION, SOLUTION SUBCUTANEOUS AT BEDTIME
Refills: 0 | Status: DISCONTINUED | OUTPATIENT
Start: 2020-02-13 | End: 2020-02-17

## 2020-02-13 RX ORDER — BACITRACIN ZINC 500 UNIT/G
1 OINTMENT IN PACKET (EA) TOPICAL
Refills: 0 | Status: DISCONTINUED | OUTPATIENT
Start: 2020-02-13 | End: 2020-02-24

## 2020-02-13 RX ORDER — INSULIN LISPRO 100/ML
13 VIAL (ML) SUBCUTANEOUS
Refills: 0 | Status: DISCONTINUED | OUTPATIENT
Start: 2020-02-13 | End: 2020-02-13

## 2020-02-13 RX ORDER — INSULIN GLARGINE 100 [IU]/ML
40 INJECTION, SOLUTION SUBCUTANEOUS AT BEDTIME
Refills: 0 | Status: DISCONTINUED | OUTPATIENT
Start: 2020-02-13 | End: 2020-02-13

## 2020-02-13 RX ADMIN — Medication 3 MILLILITER(S): at 19:12

## 2020-02-13 RX ADMIN — Medication 216 GRAM(S): at 15:38

## 2020-02-13 RX ADMIN — Medication 216 GRAM(S): at 21:05

## 2020-02-13 RX ADMIN — HEPARIN SODIUM 11 UNIT(S)/HR: 5000 INJECTION INTRAVENOUS; SUBCUTANEOUS at 09:57

## 2020-02-13 RX ADMIN — POLYETHYLENE GLYCOL 3350 17 GRAM(S): 17 POWDER, FOR SOLUTION ORAL at 12:07

## 2020-02-13 RX ADMIN — Medication 13 UNIT(S): at 08:26

## 2020-02-13 RX ADMIN — CHLORHEXIDINE GLUCONATE 1 APPLICATION(S): 213 SOLUTION TOPICAL at 05:54

## 2020-02-13 RX ADMIN — Medication 10 MILLIGRAM(S): at 15:28

## 2020-02-13 RX ADMIN — Medication 3 MILLILITER(S): at 06:06

## 2020-02-13 RX ADMIN — Medication 216 GRAM(S): at 05:53

## 2020-02-13 RX ADMIN — BUMETANIDE 1 MILLIGRAM(S): 0.25 INJECTION INTRAMUSCULAR; INTRAVENOUS at 18:00

## 2020-02-13 RX ADMIN — Medication 3: at 12:05

## 2020-02-13 RX ADMIN — Medication 13 UNIT(S): at 12:06

## 2020-02-13 RX ADMIN — OXYCODONE AND ACETAMINOPHEN 1 TABLET(S): 5; 325 TABLET ORAL at 10:30

## 2020-02-13 RX ADMIN — Medication 1 APPLICATION(S): at 21:58

## 2020-02-13 RX ADMIN — OXYCODONE AND ACETAMINOPHEN 1 TABLET(S): 5; 325 TABLET ORAL at 09:39

## 2020-02-13 RX ADMIN — Medication 10 MILLIGRAM(S): at 21:58

## 2020-02-13 RX ADMIN — Medication 3: at 17:21

## 2020-02-13 RX ADMIN — Medication 10 MILLIGRAM(S): at 05:53

## 2020-02-13 RX ADMIN — AMIODARONE HYDROCHLORIDE 200 MILLIGRAM(S): 400 TABLET ORAL at 05:53

## 2020-02-13 RX ADMIN — Medication 3 MILLILITER(S): at 12:55

## 2020-02-13 RX ADMIN — Medication 2: at 08:25

## 2020-02-13 RX ADMIN — OXYCODONE AND ACETAMINOPHEN 1 TABLET(S): 5; 325 TABLET ORAL at 20:30

## 2020-02-13 RX ADMIN — TAMSULOSIN HYDROCHLORIDE 0.4 MILLIGRAM(S): 0.4 CAPSULE ORAL at 21:58

## 2020-02-13 RX ADMIN — Medication 13 UNIT(S): at 17:22

## 2020-02-13 RX ADMIN — Medication 1 DROP(S): at 18:00

## 2020-02-13 RX ADMIN — INSULIN GLARGINE 50 UNIT(S): 100 INJECTION, SOLUTION SUBCUTANEOUS at 22:02

## 2020-02-13 RX ADMIN — PANTOPRAZOLE SODIUM 40 MILLIGRAM(S): 20 TABLET, DELAYED RELEASE ORAL at 08:28

## 2020-02-13 RX ADMIN — Medication 1 TABLET(S): at 12:08

## 2020-02-13 RX ADMIN — Medication 81 MILLIGRAM(S): at 12:07

## 2020-02-13 RX ADMIN — Medication 1 DROP(S): at 05:53

## 2020-02-13 RX ADMIN — OXYCODONE AND ACETAMINOPHEN 1 TABLET(S): 5; 325 TABLET ORAL at 20:00

## 2020-02-13 NOTE — PROGRESS NOTE ADULT - PROBLEM SELECTOR PLAN 2
Pt. with likely hypervolemic hyponatremia in the setting of volume overload. Na is improved at 128 this AM. Recommend increasing Bumex gtt as outlined above. Monitor daily weight and UOP.    Kevin Correa  Nephrology Fellow  Cell: [phone] (from 8 am to 5 pm)  (After 5 pm or on weekends please page on-call fellow)

## 2020-02-13 NOTE — PROGRESS NOTE ADULT - ATTENDING COMMENTS
Will adjust Lantus to 50u at bedtime.  Will adjust Humalog to 16u before each meal and continue coverage scale.  Will continue monitoring FS, log, and FU.

## 2020-02-13 NOTE — PROGRESS NOTE ADULT - ATTENDING COMMENTS
CKD with superimposed STEPHANIE: likely hemodynamic injury in the setting of relatively lower BP/Surgery. Creatinine bumped to 2.5 from a baseline of 1.8-2 mg/dl. With 3.8 gms of proteinuria. Serological w/u negative. Renal sono with no hydro  s/p CABG ( 2/3)  and PEA arrest ( 2/6)  S/P chest tube ( 2/11)  Creatinine  stable, BUn slightly higher   Remains hyponatremic   remains fluid overloaded, although improved(   net negative over the last 24 hours)  Would maintain on  bumex drip for additional 24 hours or if need to change would make it IV ( not oral)   Would impose fluid restriction of 1 liter

## 2020-02-13 NOTE — PROGRESS NOTE ADULT - SUBJECTIVE AND OBJECTIVE BOX
Chief complaint  Patient is a 64y old  Male who presents with a chief complaint of Chest pain (13 Feb 2020 14:27)   Review of systems  Patient in bed, looks comfortable, no hypoglycemia.    Labs and Fingersticks  CAPILLARY BLOOD GLUCOSE  183 (13 Feb 2020 05:00)  146 (13 Feb 2020 00:00)  103 (12 Feb 2020 21:00)  132 (12 Feb 2020 20:00)  149 (12 Feb 2020 19:00)  155 (12 Feb 2020 18:00)  135 (12 Feb 2020 17:00)  169 (12 Feb 2020 16:00)      POCT Blood Glucose.: 287 mg/dL (13 Feb 2020 11:24)  POCT Blood Glucose.: 103 mg/dL (12 Feb 2020 21:17)  POCT Blood Glucose.: 149 mg/dL (12 Feb 2020 19:02)  POCT Blood Glucose.: 155 mg/dL (12 Feb 2020 18:23)  POCT Blood Glucose.: 135 mg/dL (12 Feb 2020 17:13)  POCT Blood Glucose.: 162 mg/dL (12 Feb 2020 15:58)      Anion Gap, Serum: 16 (02-13 @ 00:21)  Anion Gap, Serum: 16 (02-12 @ 01:43)      Calcium, Total Serum: 7.8 <L> (02-13 @ 00:21)  Calcium, Total Serum: 7.9 <L> (02-12 @ 01:43)  Albumin, Serum: 2.3 <L> (02-13 @ 00:21)  Albumin, Serum: 2.3 <L> (02-12 @ 01:43)    Alanine Aminotransferase (ALT/SGPT): 62 <H> (02-13 @ 00:21)  Alanine Aminotransferase (ALT/SGPT): 78 <H> (02-12 @ 01:43)  Alkaline Phosphatase, Serum: 111 (02-13 @ 00:21)  Alkaline Phosphatase, Serum: 116 (02-12 @ 01:43)  Aspartate Aminotransferase (AST/SGOT): 23 (02-13 @ 00:21)  Aspartate Aminotransferase (AST/SGOT): 24 (02-12 @ 01:43)        02-13    128<L>  |  91<L>  |  77<H>  ----------------------------<  148<H>  4.4   |  21<L>  |  2.56<H>    Ca    7.8<L>      13 Feb 2020 00:21  Phos  4.8     02-13  Mg     2.1     02-13    TPro  5.6<L>  /  Alb  2.3<L>  /  TBili  0.6  /  DBili  x   /  AST  23  /  ALT  62<H>  /  AlkPhos  111  02-13                        8.1    21.10 )-----------( 264      ( 13 Feb 2020 00:21 )             25.3     Medications  MEDICATIONS  (STANDING):  albuterol/ipratropium for Nebulization 3 milliLiter(s) Nebulizer every 6 hours  aMIOdarone    Tablet 200 milliGRAM(s) Oral daily  aMIOdarone    Tablet   Oral   ampicillin  IVPB 2 Gram(s) IV Intermittent every 8 hours  artificial tears (preservative free) Ophthalmic Solution 1 Drop(s) Both EYES two times a day  aspirin  chewable 81 milliGRAM(s) Oral daily  buMETAnide 1 milliGRAM(s) Oral every 12 hours  chlorhexidine 2% Cloths 1 Application(s) Topical <User Schedule>  dextrose 5%. 1000 milliLiter(s) (50 mL/Hr) IV Continuous <Continuous>  dextrose 50% Injectable 12.5 Gram(s) IV Push once  dextrose 50% Injectable 25 Gram(s) IV Push once  dextrose 50% Injectable 25 Gram(s) IV Push once  heparin  Infusion 1000 Unit(s)/Hr (11 mL/Hr) IV Continuous <Continuous>  hydrALAZINE 10 milliGRAM(s) Oral every 8 hours  insulin glargine Injectable (LANTUS) 46 Unit(s) SubCutaneous at bedtime  insulin lispro (HumaLOG) corrective regimen sliding scale   SubCutaneous three times a day before meals  insulin lispro (HumaLOG) corrective regimen sliding scale   SubCutaneous at bedtime  insulin lispro Injectable (HumaLOG) 13 Unit(s) SubCutaneous three times a day before meals  insulin regular Infusion 4 Unit(s)/Hr (4 mL/Hr) IV Continuous <Continuous>  multivitamin 1 Tablet(s) Oral daily  pantoprazole    Tablet 40 milliGRAM(s) Oral before breakfast  polyethylene glycol 3350 17 Gram(s) Oral daily  tamsulosin 0.4 milliGRAM(s) Oral at bedtime      Physical Exam  General: Patient comfortable in bed  Vital Signs Last 12 Hrs  T(F): 98.6 (02-13-20 @ 14:00), Max: 98.8 (02-13-20 @ 08:00)  HR: 83 (02-13-20 @ 15:00) (77 - 106)  BP: --  BP(mean): --  RR: 20 (02-13-20 @ 15:00) (15 - 33)  SpO2: 93% (02-13-20 @ 15:00) (92% - 99%)  Neck: No palpable thyroid nodules.  CVS: S1S2, No murmurs  Respiratory: No wheezing, no crepitations  GI: Abdomen soft, bowel sounds positive  Musculoskeletal:  edema lower extremities.   Skin: No skin rashes, no ecchymosis    Diagnostics Chief complaint  Patient is a 64y old  Male who presents with a chief complaint of Chest pain (13 Feb 2020 14:27)   Review of systems  Patient in bed, looks comfortable,  no hypoglycemia.    Labs and Fingersticks  CAPILLARY BLOOD GLUCOSE  183 (13 Feb 2020 05:00)  146 (13 Feb 2020 00:00)  103 (12 Feb 2020 21:00)  132 (12 Feb 2020 20:00)  149 (12 Feb 2020 19:00)  155 (12 Feb 2020 18:00)  135 (12 Feb 2020 17:00)  169 (12 Feb 2020 16:00)      POCT Blood Glucose.: 287 mg/dL (13 Feb 2020 11:24)  POCT Blood Glucose.: 103 mg/dL (12 Feb 2020 21:17)  POCT Blood Glucose.: 149 mg/dL (12 Feb 2020 19:02)  POCT Blood Glucose.: 155 mg/dL (12 Feb 2020 18:23)  POCT Blood Glucose.: 135 mg/dL (12 Feb 2020 17:13)  POCT Blood Glucose.: 162 mg/dL (12 Feb 2020 15:58)      Anion Gap, Serum: 16 (02-13 @ 00:21)  Anion Gap, Serum: 16 (02-12 @ 01:43)      Calcium, Total Serum: 7.8 <L> (02-13 @ 00:21)  Calcium, Total Serum: 7.9 <L> (02-12 @ 01:43)  Albumin, Serum: 2.3 <L> (02-13 @ 00:21)  Albumin, Serum: 2.3 <L> (02-12 @ 01:43)    Alanine Aminotransferase (ALT/SGPT): 62 <H> (02-13 @ 00:21)  Alanine Aminotransferase (ALT/SGPT): 78 <H> (02-12 @ 01:43)  Alkaline Phosphatase, Serum: 111 (02-13 @ 00:21)  Alkaline Phosphatase, Serum: 116 (02-12 @ 01:43)  Aspartate Aminotransferase (AST/SGOT): 23 (02-13 @ 00:21)  Aspartate Aminotransferase (AST/SGOT): 24 (02-12 @ 01:43)        02-13    128<L>  |  91<L>  |  77<H>  ----------------------------<  148<H>  4.4   |  21<L>  |  2.56<H>    Ca    7.8<L>      13 Feb 2020 00:21  Phos  4.8     02-13  Mg     2.1     02-13    TPro  5.6<L>  /  Alb  2.3<L>  /  TBili  0.6  /  DBili  x   /  AST  23  /  ALT  62<H>  /  AlkPhos  111  02-13                        8.1    21.10 )-----------( 264      ( 13 Feb 2020 00:21 )             25.3     Medications  MEDICATIONS  (STANDING):  albuterol/ipratropium for Nebulization 3 milliLiter(s) Nebulizer every 6 hours  aMIOdarone    Tablet 200 milliGRAM(s) Oral daily  aMIOdarone    Tablet   Oral   ampicillin  IVPB 2 Gram(s) IV Intermittent every 8 hours  artificial tears (preservative free) Ophthalmic Solution 1 Drop(s) Both EYES two times a day  aspirin  chewable 81 milliGRAM(s) Oral daily  buMETAnide 1 milliGRAM(s) Oral every 12 hours  chlorhexidine 2% Cloths 1 Application(s) Topical <User Schedule>  dextrose 5%. 1000 milliLiter(s) (50 mL/Hr) IV Continuous <Continuous>  dextrose 50% Injectable 12.5 Gram(s) IV Push once  dextrose 50% Injectable 25 Gram(s) IV Push once  dextrose 50% Injectable 25 Gram(s) IV Push once  heparin  Infusion 1000 Unit(s)/Hr (11 mL/Hr) IV Continuous <Continuous>  hydrALAZINE 10 milliGRAM(s) Oral every 8 hours  insulin glargine Injectable (LANTUS) 46 Unit(s) SubCutaneous at bedtime  insulin lispro (HumaLOG) corrective regimen sliding scale   SubCutaneous three times a day before meals  insulin lispro (HumaLOG) corrective regimen sliding scale   SubCutaneous at bedtime  insulin lispro Injectable (HumaLOG) 13 Unit(s) SubCutaneous three times a day before meals  insulin regular Infusion 4 Unit(s)/Hr (4 mL/Hr) IV Continuous <Continuous>  multivitamin 1 Tablet(s) Oral daily  pantoprazole    Tablet 40 milliGRAM(s) Oral before breakfast  polyethylene glycol 3350 17 Gram(s) Oral daily  tamsulosin 0.4 milliGRAM(s) Oral at bedtime      Physical Exam  General: Patient comfortable in bed  Vital Signs Last 12 Hrs  T(F): 98.6 (02-13-20 @ 14:00), Max: 98.8 (02-13-20 @ 08:00)  HR: 83 (02-13-20 @ 15:00) (77 - 106)  BP: --  BP(mean): --  RR: 20 (02-13-20 @ 15:00) (15 - 33)  SpO2: 93% (02-13-20 @ 15:00) (92% - 99%)  Neck: No palpable thyroid nodules.  CVS: S1S2, No murmurs  Respiratory: No wheezing, no crepitations  GI: Abdomen soft, bowel sounds positive  Musculoskeletal:  edema lower extremities.   Skin: No skin rashes, no ecchymosis    Diagnostics

## 2020-02-13 NOTE — PROGRESS NOTE ADULT - PROBLEM SELECTOR PLAN 1
Pt with  CKD likely  in the setting of long standing DM and HTN.  No prior labs for review. Scr since admission has ranged between 1.8-2 mg/dl.  Scr increased to ~2.50 secondary to hemodynamic injury after CABG  and PEA arrest s/p CPR, however now stable at 2.56 this AM. UA significant for protein and RBCs. Urine electrolytes consistent with intrinsic disease. Urine Pr/Cr ratio in nephrotic range. HepBsAg, HepC, C3, C4, SIFE, RACHEL, P-ANCA, C-ANCA, Anti-GBM ab, Parvovirus, RPR, anti-PLA2R ab were negative/non-reactive. Monitor labs and urine output.     Pt. is non-oliguric on Bumex gtt @ 0.5 mg/hr. CVP is low, however has significant edema (LE and scrotal). Recommend increasing Bumex to 1 mg/hr.  Avoid NSAIDs, ACEI/ARBS, RCA and nephrotoxins. Dose medications as per eGFR

## 2020-02-13 NOTE — PROGRESS NOTE ADULT - ASSESSMENT
Assessment  DMT2: 64y Male with DM T2 with hyperglycemia, A1C 9.2%,  was on oral meds and insulin at home, now on insulin, basal-dose Lantus was held last night, blood sugars now trending up and not at target (, ABG , 206), no hypoglycemic episodes. Patient is eating meals with good appetite, appears comfortable.  CAD: s/p CABG 2/3, on medications, no chest pain, stable, monitored.  HTN: Controlled,  on antihypertensive medications.  HLD: Controlled, on statin.  CKD: Monitor labs/BMP          Harper Matias MD  Cell: [phone]  Office: [phone]

## 2020-02-13 NOTE — CHART NOTE - NSCHARTNOTEFT_GEN_A_CORE
Nutrition Follow Up Note  Patient seen for: CTU length of stay. Per per medical record, pt is a 64 year old male with a history of HTN and T2DM. Pt was admitted on 1/25 with chest pains. Pt had cardiac cath on 1/29 revealing 3-vessel CAD s/p CABGx4 (2/3). Pt was intubated on 2/6 after PEA arrest, extubate on 2/8. Pt with postop systolic CHF and pleural effusion s/p right pigtail placement. Pt with high WBC count, was positive for E. faecalis, however now negative. Per infectious disease, likely not PNA, they are following. Pt found to have Stage 3 CKD and hypovolemic hyponatremia, being followed by nephrology.     Source: Medical record and pt    Diet :   Consistent Carbohydrate with no snacks, 1000mL Fluid Restriction (No beef and no pork)    Patient reports that his intake varies depending on the food that he is given, but is overall poor, however his appetite is good. Pt is not happy with the current diet that he is on, he states that he misses the "junk" food that he is used to eating. Pt states that he is asking his wife to bring food to him. The need for consistent carbohydrate diet was explained to the pt. Pt will likely benefit from a thorough review of consistent carbohydrate diet, but was unable to provide at this time as interview was shortened for other provider. Also reviewed pt's increased protein needs in setting of recent CABGx4. Pt denies any recent N+V, or diarrhea. Pt reports being constipated with last BM on 2/10, however denies any GI pain, and is aware he can request miralax from RN.      PO intake :  <50% of in-house meals     Source for PO intake:  Pt    Daily wt in lbs: 214 (2/13), 224.8 (2/10), 200.4 (2/6), 203.7 (2/5), 182.1 (1/27).   Weight fluctuations likely related to fluid shifts.     Pertinent Medications:   Lantus 46 Units at bedtime, Humalog corrective sliding scale three times a day before meals and at bedtime, Humalog 13 Units three times a day before meals, insulin regular Infusion 4 Unit(s)/Hr, multivitamin, Bumex, Pacerone, Apresoline, Protonix, Miralax    Pertinent Labs: (2/12) Na 128<L>, BUN 77<H>, Cr 2.56<H>, <H>, K+ 4.4, Phos 4.8<H>    Finger Sticks:  POCT Blood Glucose.: 287 mg/dL (02-13 @ 11:24)  POCT Blood Glucose.: 103 mg/dL (02-12 @ 21:17)  POCT Blood Glucose.: 149 mg/dL (02-12 @ 19:02)  POCT Blood Glucose.: 155 mg/dL (02-12 @ 18:23)  POCT Blood Glucose.: 135 mg/dL (02-12 @ 17:13)  POCT Blood Glucose.: 162 mg/dL (02-12 @ 15:58)  POCT Blood Glucose.: 136 mg/dL (02-12 @ 14:26)  POCT Blood Glucose.: 123 mg/dL (02-12 @ 13:33)    Skin per nursing documentation: No pressure injuries noted.  Edema per nursing documentation: +3 anascara. +4 scrotum.    Estimated Needs:   [ ] no change since previous assessment  [ x ] recalculated    Previous Nutrition Diagnosis:   Nutrition Diagnosis is:    New Nutrition Diagnosis: N/A    Recommend  1)    Monitoring and Evaluation:   Continue to monitor Nutritional intake, Tolerance to diet prescription, weights, labs, skin integrity    RD remains available upon request and will follow up per protocol  Smitha Devlin, Nutrition Intern, Pager #[phone] Nutrition Follow Up Note  Patient seen for: CTU length of stay. Per per medical record, pt is a 64 year old male with a history of HTN and T2DM. Pt was admitted on 1/25 with chest pains. Pt had cardiac cath on 1/29 revealing 3-vessel CAD s/p CABGx4 (2/3). Pt was intubated on 2/6 after PEA arrest, extubate on 2/8. Pt with postop systolic CHF and pleural effusion s/p right pigtail placement. Pt with high WBC count, was positive for E. faecalis, however now negative. Per infectious disease, likely not PNA, they are following. Pt found to have Stage 3 CKD and hypovolemic hyponatremia, being followed by nephrology.     Source: Medical record and pt    Diet :   Consistent Carbohydrate with no snacks, 1000mL Fluid Restriction (No beef and no pork)    Patient reports that his intake varies depending on the food that he is given, but is overall poor, however his appetite is good. Pt is not happy with the current diet that he is on, he states that he misses the "junk" food that he is used to eating. Pt states that he is asking his wife to bring food to him. The need for consistent carbohydrate diet was explained to the pt. Pt will likely benefit from a thorough review of consistent carbohydrate diet, but was unable to provide at this time as interview was shortened for other provider. Also reviewed pt's increased protein needs in setting of recent CABGx4. Pt denies any recent N+V, or diarrhea. Pt reports being constipated with last BM on 2/10, however denies any GI pain, and is aware he can request miralax from RN.      PO intake :  <50% of in-house meals     Source for PO intake:  Pt    Daily wt in lbs: 214 (2/13), 224.8 (2/10), 200.4 (2/6), 203.7 (2/5), 182.1 (1/27).   Weight fluctuations likely related to fluid shifts.     Pertinent Medications:   Lantus 46 Units at bedtime, Humalog corrective sliding scale three times a day before meals and at bedtime, Humalog 13 Units three times a day before meals, insulin regular Infusion 4 Unit(s)/Hr, multivitamin, Bumex, Pacerone, Apresoline, Protonix, Miralax    Pertinent Labs: (2/12) Na 128<L>, BUN 77<H>, Cr 2.56<H>, <H>, K+ 4.4, Phos 4.8<H>    Finger Sticks:  POCT Blood Glucose.: 287 mg/dL (02-13 @ 11:24)  POCT Blood Glucose.: 103 mg/dL (02-12 @ 21:17)  POCT Blood Glucose.: 149 mg/dL (02-12 @ 19:02)  POCT Blood Glucose.: 155 mg/dL (02-12 @ 18:23)  POCT Blood Glucose.: 135 mg/dL (02-12 @ 17:13)  POCT Blood Glucose.: 162 mg/dL (02-12 @ 15:58)  POCT Blood Glucose.: 136 mg/dL (02-12 @ 14:26)  POCT Blood Glucose.: 123 mg/dL (02-12 @ 13:33)    Skin per nursing documentation: No pressure injuries noted.  Edema per nursing documentation: +3 anascara. +4 scrotum.    Estimated Needs:   [ ] no change since previous assessment  [ x ] recalculated  Estimated needs based on     Previous Nutrition Diagnosis #1 and #2: Increased nutrient needs and Inadequate PO intake   Nutrition Diagnosis is: ongoing and being addressed with encouragement at mealtimes.     Previous Nutrition Diagnosis #3: Food and Nutrition Related Knowledge deficit   Nutrition Diagnosis is: ongoing and being addressed with reinforced diet education.    New Nutrition Diagnosis: N/A    Recommend  1)    Monitoring and Evaluation:   Continue to monitor Nutritional intake, Tolerance to diet prescription, weights, labs, skin integrity    RD remains available upon request and will follow up per protocol  Smitha Devlin, Nutrition Intern, Pager #[phone] Nutrition Follow Up Note  Patient seen for: CTU length of stay. Per per medical record, pt is a 64 year old male with a history of HTN and T2DM. Pt was admitted on 1/25 with chest pains. Pt had cardiac cath on 1/29 revealing 3-vessel CAD s/p CABGx4 (2/3). Pt was intubated on 2/6 after PEA arrest, extubate on 2/8. Pt with postop systolic CHF and pleural effusion s/p right pigtail placement. Pt with high WBC count, was positive for E. faecalis, however now negative. Per infectious disease, likely not PNA, they are following. Pt found to have Stage 3 CKD and hypovolemic hyponatremia, being followed by nephrology.     Source: Medical record, pt, wife and son    Diet :   Consistent Carbohydrate with no snacks, 1000mL Fluid Restriction (No beef and no pork)    Patient reports that his intake varies depending on the food that he is given with a good appetite. Pt was not happy with the current diet that he is on, he states that he misses the "junk" food that he is used to eating. Pt wanted to eat food from home. Pt observed eating a majority of his lunch tray. The need for heart healthy consistent carbohydrate diet was explained to the pt. Education for a heart healthy consistent carbohydrate diet was thoroughly reviewed with pt and family. The importance of low sodium, meal timing for insulin, limiting refined carbohydrates, food pairing for optimal blood glucose, and healthy fats was reviewed. Also reviewed pt's increased protein needs in setting of recent CABGx4. Pt denies any recent N+V, or diarrhea. Pt reports being constipated with last BM on 2/10, however denies any GI pain, and is aware he can request miralax from RN.      PO intake :  % of in-house meals     Source for PO intake:  Pt and observation    Daily wt in lbs: 214 (2/13), 224.8 (2/10), 200.4 (2/6), 203.7 (2/5), 182.1 (1/27).   Weight fluctuations likely related to fluid shifts.     Pertinent Medications:   Lantus 46 Units at bedtime, Humalog corrective sliding scale three times a day before meals and at bedtime, Humalog 13 Units three times a day before meals, insulin regular Infusion 4 Unit(s)/Hr, multivitamin, Bumex, Pacerone, Apresoline, Protonix, Miralax    Pertinent Labs: (2/12) Na 128<L>, BUN 77<H>, Cr 2.56<H>, <H>, K+ 4.4, Phos 4.8<H>    Finger Sticks:  POCT Blood Glucose.: 287 mg/dL (02-13 @ 11:24)  POCT Blood Glucose.: 103 mg/dL (02-12 @ 21:17)  POCT Blood Glucose.: 149 mg/dL (02-12 @ 19:02)  POCT Blood Glucose.: 155 mg/dL (02-12 @ 18:23)  POCT Blood Glucose.: 135 mg/dL (02-12 @ 17:13)  POCT Blood Glucose.: 162 mg/dL (02-12 @ 15:58)  POCT Blood Glucose.: 136 mg/dL (02-12 @ 14:26)  POCT Blood Glucose.: 123 mg/dL (02-12 @ 13:33)    Skin per nursing documentation: No pressure injuries noted.  Edema per nursing documentation: +3 anasarca. +4 scrotum.    Estimated Needs:   [ ] no change since previous assessment  [ x ] recalculated  Estimated needs based on IBW +10%: 169.4 lbs (77 kg)  1925-2310kcals (25-30kcal/kg)  92.4-107.8 gm protein (1.2-1.4 gm protein/kg)  Fluid deferred to team    Previous Nutrition Diagnosis #1: Increased nutrient needs  Nutrition Diagnosis is: ongoing and being addressed with encouragement of adequate protein intake at meals.     Previous Nutrition Diagnosis #2: Food and Nutrition Related Knowledge deficit   Nutrition Diagnosis is: ongoing and being addressed with reinforced diet education.    Previous Nutrition Diagnosis #3: Inadequate PO intake   Nutrition Diagnosis is: complete. Pt is now eating a majority of meals in-house.    New Nutrition Diagnosis: N/A    Recommend  1) Continue current consistency carbohydrate diet with no pork and no beef, consistency and fluid deferred to team.  2) Reinforce heart healthy consistent carbohydrate diet education.  3) RD to remain available for future diet recommendations.     Monitoring and Evaluation:   Continue to monitor Nutritional intake, Tolerance to diet prescription, weights, labs, skin integrity    RD remains available upon request and will follow up per protocol  Smitha Devlin, Nutrition Intern, Pager #[phone] Nutrition Follow Up Note  Patient seen for: CTU length of stay. Per per medical record, pt is a 64 year old male with a history of HTN and T2DM. Pt was admitted on 1/25 with chest pains. Pt had cardiac cath on 1/29 revealing 3-vessel CAD s/p CABGx4 (2/3). Pt was intubated on 2/6 after PEA arrest, extubate on 2/8. Pt with postop systolic CHF and pleural effusion s/p right pigtail placement. Pt with high WBC count, was positive for E. faecalis, however now negative. Per infectious disease, likely not PNA, they are following. Pt found to have Stage 3 CKD and hypovolemic hyponatremia, being followed by nephrology.     Source: Medical record, pt, wife and son    Diet :   Consistent Carbohydrate with no snacks, 1000mL Fluid Restriction (No beef and no pork)    Pt was observed eating a majority of his lunch tray. Pt reports that his intake varies depending on the food that he is given, has a good appetite. Pt was not happy with the consistent carbohydrate diet that he is on, he states that he misses the "junk" food that he is used to eating. Pt wanted to eat food from home. The need for heart healthy consistent carbohydrate diet was explained to the pt. Education for a heart healthy consistent carbohydrate diet was thoroughly reviewed with pt and family. The importance of low sodium, meal timing and insulin, limiting refined carbohydrates, food pairing for optimal blood glucose, and healthy fats was reviewed. Also reviewed pt's increased protein needs in setting of recent CABGx4. Pt denies any recent N+V, or diarrhea. Pt reports being constipated with last BM on 2/10, however denies any GI pain, and is aware he can request miralax from RN.      PO intake :  % of in-house meals     Source for PO intake:  Pt and observation    Daily wt in lbs: 214 (2/13), 224.8 (2/10), 200.4 (2/6), 203.7 (2/5), 182.1 (1/27).   Weight fluctuations likely related to fluid shifts.     Pertinent Medications:   Lantus 46 Units at bedtime, Humalog corrective sliding scale three times a day before meals and at bedtime, Humalog 13 Units three times a day before meals, insulin regular Infusion 4 Unit(s)/Hr, multivitamin, Bumex, Pacerone, Apresoline, Protonix, Miralax    Pertinent Labs: (2/12) Na 128<L>, BUN 77<H>, Cr 2.56<H>, <H>, K+ 4.4, Phos 4.8<H>    Finger Sticks:  POCT Blood Glucose.: 287 mg/dL (02-13 @ 11:24)  POCT Blood Glucose.: 103 mg/dL (02-12 @ 21:17)  POCT Blood Glucose.: 149 mg/dL (02-12 @ 19:02)  POCT Blood Glucose.: 155 mg/dL (02-12 @ 18:23)  POCT Blood Glucose.: 135 mg/dL (02-12 @ 17:13)  POCT Blood Glucose.: 162 mg/dL (02-12 @ 15:58)  POCT Blood Glucose.: 136 mg/dL (02-12 @ 14:26)  POCT Blood Glucose.: 123 mg/dL (02-12 @ 13:33)    Skin per nursing documentation: No pressure injuries noted.  Edema per nursing documentation: +3 anasarca. +4 scrotum.    Estimated Needs:   [ ] no change since previous assessment  [ x ] recalculated  Estimated needs based on IBW +10%: 169.4 lbs (77 kg)  1925-2310kcals (25-30kcal/kg)  92.4-107.8 gm protein (1.2-1.4 gm protein/kg)  Fluid deferred to team    Previous Nutrition Diagnosis #1: Increased nutrient needs  Nutrition Diagnosis is: ongoing and being addressed with encouragement of adequate protein intake at meals.     Previous Nutrition Diagnosis #2: Food and Nutrition Related Knowledge deficit   Nutrition Diagnosis is: ongoing and being addressed with reinforced diet education.    Previous Nutrition Diagnosis #3: Inadequate PO intake   Nutrition Diagnosis is: complete. Pt is now eating a majority of meals in-house.    New Nutrition Diagnosis: N/A    Recommend  1) Continue current consistent carbohydrate diet with no pork and no beef, consistency and fluid deferred to team.  2) Reinforce heart healthy consistent carbohydrate diet education.  3) RD to remain available for future diet recommendations.     Monitoring and Evaluation:   Continue to monitor Nutritional intake, Tolerance to diet prescription, weights, labs, skin integrity    RD remains available upon request and will follow up per protocol  Smitha Devlin, Nutrition Intern, Pager #[phone] Nutrition Follow Up Note  Patient seen for: CTU length of stay. Per per medical record, pt is a 64 year old male with a history of HTN and T2DM. Pt was admitted on 1/25 with chest pains. Pt had cardiac cath on 1/29 revealing 3-vessel CAD s/p CABGx4 (2/3). Pt was intubated on 2/6 after PEA arrest, extubate on 2/8. Pt with postop systolic CHF and pleural effusion s/p right pigtail placement. Pt with high WBC count, was positive for E. faecalis, however now negative. Per infectious disease, likely not PNA, they are following. Pt found to have Stage 3 CKD and hypovolemic hyponatremia, being followed by nephrology.     Source: Medical record, pt, wife and son    Diet :   Consistent Carbohydrate with no snacks, 1000mL Fluid Restriction (No beef and no pork)    Pt was observed eating a majority of his lunch tray. Pt reports that his intake varies depending on the food that he is given, has a good appetite. Pt was not happy with the consistent carbohydrate diet that he is on, he states that he misses the "junk" food that he is used to eating. Pt wanted to eat food from home. The need for heart healthy consistent carbohydrate diet was explained to the pt. Education for a heart healthy consistent carbohydrate diet was thoroughly reviewed with pt and family. The importance of low sodium, meal timing and insulin, limiting refined carbohydrates, food pairing for optimal blood glucose, and healthy fats was reviewed. Also reviewed pt's increased protein needs in setting of recent CABGx4. Pt denies any recent N+V, or diarrhea. Pt reports being constipated with last BM on 2/10, however denies any GI pain, and is aware he can request Miralax from RN.      PO intake :  % of in-house meals     Source for PO intake:  Pt and observation    Daily wt in lbs: 214 (2/13), 224.8 (2/10), 200.4 (2/6), 203.7 (2/5), 182.1 (1/27).   Weight fluctuations likely related to fluid shifts.     Pertinent Medications:   Lantus 46 Units at bedtime, Humalog corrective sliding scale three times a day before meals and at bedtime, Humalog 13 Units three times a day before meals, insulin regular Infusion 4 Unit(s)/Hr, multivitamin, Bumex, Pacerone, Apresoline, Protonix, Miralax    Pertinent Labs: (2/12) Na 128<L>, BUN 77<H>, Cr 2.56<H>, <H>, K+ 4.4, Phos 4.8<H>    Finger Sticks:  POCT Blood Glucose.: 287 mg/dL (02-13 @ 11:24)  POCT Blood Glucose.: 103 mg/dL (02-12 @ 21:17)  POCT Blood Glucose.: 149 mg/dL (02-12 @ 19:02)  POCT Blood Glucose.: 155 mg/dL (02-12 @ 18:23)  POCT Blood Glucose.: 135 mg/dL (02-12 @ 17:13)  POCT Blood Glucose.: 162 mg/dL (02-12 @ 15:58)  POCT Blood Glucose.: 136 mg/dL (02-12 @ 14:26)  POCT Blood Glucose.: 123 mg/dL (02-12 @ 13:33)    Skin per nursing documentation: No pressure injuries noted.  Edema per nursing documentation: +3 anasarca. +4 scrotum.    Estimated Needs:   [ ] no change since previous assessment  [ x ] recalculated  Estimated needs based on IBW +10%: 169.4 lbs (77 kg)  1925-2310kcals (25-30kcal/kg)  92.4-107.8 gm protein (1.2-1.4 gm protein/kg)  Fluid deferred to team    Previous Nutrition Diagnosis #1: Increased nutrient needs  Nutrition Diagnosis is: ongoing and being addressed with encouragement of adequate protein intake at meals.     Previous Nutrition Diagnosis #2: Food and Nutrition Related Knowledge deficit   Nutrition Diagnosis is: ongoing and being addressed with reinforced diet education.    Previous Nutrition Diagnosis #3: Inadequate PO intake   Nutrition Diagnosis is: complete. Pt is now eating a majority of meals in-house.    New Nutrition Diagnosis: N/A    Recommend  1) Continue current consistent carbohydrate diet with no pork and no beef, consistency and fluid deferred to team.  2) Reinforce heart healthy consistent carbohydrate diet education.  3) RD to remain available for future diet recommendations.     Monitoring and Evaluation:   Continue to monitor Nutritional intake, Tolerance to diet prescription, weights, labs, skin integrity    RD remains available upon request and will follow up per protocol  Smitha Devlin, Nutrition Intern, Pager #[phone]

## 2020-02-13 NOTE — PROGRESS NOTE ADULT - PROBLEM SELECTOR PLAN 1
Will adjust Lantus to 46u at bedtime.  Will adjust Humalog to 13u before each meal and continue coverage scale.  Will continue monitoring FS, log, and FU.  Patient counseled for compliance with consistent low carb diet and exercise as tolerated outpatient. Patient counseled for compliance with consistent low carb diet and exercise as tolerated outpatient.

## 2020-02-13 NOTE — PROGRESS NOTE ADULT - SUBJECTIVE AND OBJECTIVE BOX
FRANKLIN PRESLEY                     [id number]    HPI:  65yo male with hx of HTN, DM II, presented to the ED with complaints of acute on chronic left sided exertional chest pain. Pt states he has been having left sided pressure and stabbing chest pain radiating to his sternum and right with activity for the past few months. He states each episode last approximately 1-2 mins and subsides upon resting. He denies associated symptoms of radiation to his back, arm or jaw. He recently was at a wedding which he could not enjoy because dancing would reproduce to the pain. He states he did not pursue and medical attention until this time. Pt states this time his pain was associated with sob up exertion. He denies symptoms of LOC, diaphoresis, palpitations, abd pain, N/V, fever or chills. He denies prior cardiac work up. (25 Jan 2020 12:57)      Hospital Course:  s/p C4L on 2/3; PEA arrest on 2/6     ICU Vital Signs Last 24 Hrs  T(C): 36.7 (13 Feb 2020 04:00), Max: 36.7 (13 Feb 2020 04:00)  T(F): 98 (13 Feb 2020 04:00), Max: 98 (13 Feb 2020 04:00)  HR: 87 (13 Feb 2020 14:00) (76 - 106)  BP: --  BP(mean): --  ABP: 155/50 (13 Feb 2020 14:00) (124/43 - 164/53)  ABP(mean): 76 (13 Feb 2020 14:00) (63 - 89)  RR: 21 (13 Feb 2020 14:00) (15 - 33)  SpO2: 97% (13 Feb 2020 14:00) (92% - 100%)      =========================I&Os=========================   I&O's Detail    12 Feb 2020 07:01  -  13 Feb 2020 07:00  --------------------------------------------------------  IN:    bumetanide Infusion: 60 mL    heparin Infusion: 289.5 mL    insulin regular Infusion: 41.5 mL    IV PiggyBack: 200 mL    Oral Fluid: 870 mL  Total IN: 1461 mL    OUT:    Chest Tube: 330 mL    Chest Tube: 40 mL    Indwelling Catheter - Urethral: 3350 mL  Total OUT: 3720 mL    Total NET: -2259 mL      13 Feb 2020 07:01  -  13 Feb 2020 14:28  --------------------------------------------------------  IN:    Oral Fluid: 350 mL  Total IN: 350 mL    OUT:    Chest Tube: 70 mL    Indwelling Catheter - Urethral: 855 mL  Total OUT: 925 mL    Total NET: -575 mL              ============================LABS=========================                        8.1    21.10 )-----------( 264      ( 13 Feb 2020 00:21 )             25.3   02-13    128<L>  |  91<L>  |  77<H>  ----------------------------<  148<H>  4.4   |  21<L>  |  2.56<H>    Ca    7.8<L>      13 Feb 2020 00:21  Phos  4.8     02-13  Mg     2.1     02-13    TPro  5.6<L>  /  Alb  2.3<L>  /  TBili  0.6  /  DBili  x   /  AST  23  /  ALT  62<H>  /  AlkPhos  111  02-13           PT/INR - ( 11 Feb 2020 01:28 )   PT: 14.4 sec;   INR: 1.24 ratio        ===========================PMHX&PSHx==========================    PAST MEDICAL & SURGICAL HISTORY:  HTN (hypertension)  Diabetes  History of appendectomy: x 30 yrs ago      ===========================MEDICATIONS============================    MEDICATIONS  (STANDING):  albuterol/ipratropium for Nebulization 3 milliLiter(s) Nebulizer every 6 hours  aMIOdarone    Tablet 400 milliGRAM(s) Oral every 8 hours  aMIOdarone    Tablet 200 milliGRAM(s) Oral daily  aMIOdarone    Tablet   Oral   ampicillin  IVPB 2 Gram(s) IV Intermittent every 8 hours  artificial tears (preservative free) Ophthalmic Solution 1 Drop(s) Both EYES two times a day  aspirin  chewable 81 milliGRAM(s) Oral daily  buMETAnide Infusion 0.5 mG/Hr (2.5 mL/Hr) IV Continuous <Continuous>  chlorhexidine 2% Cloths 1 Application(s) Topical <User Schedule>  dextrose 5%. 1000 milliLiter(s) (50 mL/Hr) IV Continuous <Continuous>  dextrose 50% Injectable 12.5 Gram(s) IV Push once  dextrose 50% Injectable 25 Gram(s) IV Push once  dextrose 50% Injectable 25 Gram(s) IV Push once  heparin  Infusion 1000 Unit(s)/Hr (13 mL/Hr) IV Continuous <Continuous>  insulin glargine Injectable (LANTUS) 40 Unit(s) SubCutaneous at bedtime  insulin lispro (HumaLOG) corrective regimen sliding scale   SubCutaneous three times a day before meals  insulin lispro (HumaLOG) corrective regimen sliding scale   SubCutaneous at bedtime  insulin lispro Injectable (HumaLOG) 10 Unit(s) SubCutaneous three times a day before meals  insulin regular Infusion 4 Unit(s)/Hr (4 mL/Hr) IV Continuous <Continuous>  multivitamin 1 Tablet(s) Oral daily  pantoprazole    Tablet 40 milliGRAM(s) Oral before breakfast  polyethylene glycol 3350 17 Gram(s) Oral daily  tamsulosin 0.4 milliGRAM(s) Oral at bedtime    MEDICATIONS  (PRN):  dextrose 40% Gel 15 Gram(s) Oral once PRN Blood Glucose LESS THAN 70 milliGRAM(s)/deciliter  glucagon  Injectable 1 milliGRAM(s) IntraMuscular once PRN Glucose LESS THAN 70 milligrams/deciliter  oxycodone    5 mG/acetaminophen 325 mG 1 Tablet(s) Oral every 4 hours PRN Severe Pain (7 - 10)  sodium chloride 0.9% lock flush 10 milliLiter(s) IV Push every 1 hour PRN Pre/post blood products, medications, blood draw, and to maintain line patency      ============================IMAGING STUDIES=========================  CXR (2/12/20):  Impression:    The heart is enlarged. Bilateral pigtail catheter is seen in the pleural spaces. No pneumothorax. A central line seen on the left and the tip is in the superior vena cava. Status post sternotomy. No change in the appearance the chest when compared to previous study done February 11, 2020. A 2015 at 1:51 PM.    =============================ASSESSMENT===========================   s/p C4L on 2/3   PEA arrest on 2/6     =============================NEUROLOGY============================  A+O x3 Nonfocal  In Chair    ==============================RESPIRATORY========================  acute respiratory failure, post op, requiring BiPAP/High Flow for hypoxia  Left moderate to large pleural effusion on bedside sono, pigtail in place  HF O2, 40% on 40L, will wean   Comfortable, not in any distress.  Remove R chest tube today   Monitor L chest tube output  L Chest tube to suction   Continue bronchodilators, pulmonary toilet    ============================CARDIOVASCULAR======================  Continue hemodynamic monitoring.  Not on any pressors  PAF, now in NSR, continue Amio load    =====================RENAL===================  Osorio   Bumex drip 5mg  Monitor I/Os and electrolytes    ====================GASTROINTESTINAL===================  Continue GI prophylaxis with Protonix    ========================HEMATOLOGIC/ONCOLOGIC====================  Continue heparin drip  Monitor chest tube output. No signs of active bleeding.   Follow CBC in AM    ============================INFECTIOUS DISEASE========================  Continue ampicillin as per ID  Monitor for fever / leukocytosis.  All surgical incision / chest tube  sites look clean      Pt is on GI & DVT prophylaxis  OOB & ambulate     Pertinent clinical, laboratory, radiographic, hemodynamic, echocardiographic, respiratory data, microbiologic data and chart were reviewed and analyzed frequently throughout the course of the day and night  Patient seen, examined and plan discussed with CT Surgery / CTICU team during rounds.    I spent 30 minutes at bedside providing critical care, noncontinuous.

## 2020-02-13 NOTE — PROGRESS NOTE ADULT - ASSESSMENT
64 yr old with cri stage 4, DM, PVD, admitted and had CABG, Post op intubated and has bilateral effusions, worsening renal disease and bc positive for E. faecalis   follow up bc negative to date.   no signs of sternal wd infection  no recent UTI  lines all new.    discussed with Dr. Mariscal  no pna likely in reviewing ct      follow up bc are all negative  no signs of endocarditis   continue present therapy  length needs to be discussed   wbc still high but no [id number] fo uncontrolled source   unclear what the cause of leukocytosis . legs do not look infected nor does sternum

## 2020-02-13 NOTE — PROGRESS NOTE ADULT - SUBJECTIVE AND OBJECTIVE BOX
infectious diseases progress note:    Patient is a 64y old  Male who presents with a chief complaint of Chest pain (13 Feb 2020 07:35)        Acute ischemic heart disease             Allergies    No Known Allergies    Intolerances        ANTIBIOTICS/RELEVANT:  antimicrobials  ampicillin  IVPB 2 Gram(s) IV Intermittent every 8 hours    immunologic:    OTHER:  albuterol/ipratropium for Nebulization 3 milliLiter(s) Nebulizer every 6 hours  aMIOdarone    Tablet 200 milliGRAM(s) Oral daily  aMIOdarone    Tablet   Oral   artificial tears (preservative free) Ophthalmic Solution 1 Drop(s) Both EYES two times a day  aspirin  chewable 81 milliGRAM(s) Oral daily  buMETAnide 1 milliGRAM(s) Oral every 12 hours  chlorhexidine 2% Cloths 1 Application(s) Topical <User Schedule>  dextrose 40% Gel 15 Gram(s) Oral once PRN  dextrose 5%. 1000 milliLiter(s) IV Continuous <Continuous>  dextrose 50% Injectable 12.5 Gram(s) IV Push once  dextrose 50% Injectable 25 Gram(s) IV Push once  dextrose 50% Injectable 25 Gram(s) IV Push once  glucagon  Injectable 1 milliGRAM(s) IntraMuscular once PRN  heparin  Infusion 1000 Unit(s)/Hr IV Continuous <Continuous>  hydrALAZINE 10 milliGRAM(s) Oral every 8 hours  insulin glargine Injectable (LANTUS) 54 Unit(s) SubCutaneous at bedtime  insulin lispro (HumaLOG) corrective regimen sliding scale   SubCutaneous three times a day before meals  insulin lispro (HumaLOG) corrective regimen sliding scale   SubCutaneous at bedtime  insulin lispro Injectable (HumaLOG) 13 Unit(s) SubCutaneous three times a day before meals  insulin regular Infusion 4 Unit(s)/Hr IV Continuous <Continuous>  multivitamin 1 Tablet(s) Oral daily  oxycodone    5 mG/acetaminophen 325 mG 1 Tablet(s) Oral every 4 hours PRN  pantoprazole    Tablet 40 milliGRAM(s) Oral before breakfast  polyethylene glycol 3350 17 Gram(s) Oral daily  sodium chloride 0.9% lock flush 10 milliLiter(s) IV Push every 1 hour PRN  tamsulosin 0.4 milliGRAM(s) Oral at bedtime      Objective:  Vital Signs Last 24 Hrs  T(C): 36.7 (13 Feb 2020 04:00), Max: 36.7 (13 Feb 2020 04:00)  T(F): 98 (13 Feb 2020 04:00), Max: 98 (13 Feb 2020 04:00)  HR: 87 (13 Feb 2020 07:00) (76 - 87)  BP: --  BP(mean): --  RR: 20 (13 Feb 2020 07:00) (15 - 33)  SpO2: 97% (13 Feb 2020 07:00) (90% - 100%)       Eyes:DANIELA, EOMI  Ear/Nose/Throat: no oral lesion, no sinus tenderness on percussion	  Neck:no JVD, no lymphadenopathy, supple  Respiratory: CTA lanre  Cardiovascular: S1S2 RRR, no murmurs  Gastrointestinal:soft, (+) BS, no HSM  Extremities:no e/e/c        LABS:                        8.1    21.10 )-----------( 264      ( 13 Feb 2020 00:21 )             25.3     02-13    128<L>  |  91<L>  |  77<H>  ----------------------------<  148<H>  4.4   |  21<L>  |  2.56<H>    Ca    7.8<L>      13 Feb 2020 00:21  Phos  4.8     02-13  Mg     2.1     02-13    TPro  5.6<L>  /  Alb  2.3<L>  /  TBili  0.6  /  DBili  x   /  AST  23  /  ALT  62<H>  /  AlkPhos  111  02-13    PT/INR - ( 13 Feb 2020 00:21 )   PT: 14.3 sec;   INR: 1.24 ratio         PTT - ( 13 Feb 2020 05:20 )  PTT:88.6 sec        MICROBIOLOGY:    RECENT CULTURES:  02-11 @ 17:25 .Blood Blood                No growth to date.    02-09 @ 23:08 .Blood Blood                No growth to date.    02-09 @ 04:25 .Blood Blood-Peripheral                No growth to date.    02-08 @ 23:06 .Sputum Sputum       No polymorphonuclear leukocytes per low power field  Few Squamous epithelial cells per low power field  Few Gram Negative Rods seen per oil power field  Few Gram positive cocci in pairs seen per oil power field           Normal Respiratory Missy present    02-08 @ 14:44 .Blood Blood       Growth in anaerobic bottle: Gram Positive Cocci in Pairs and Chains           Growth in anaerobic bottle: Enterococcus faecalis  See previous culture [id number]    02-08 @ 09:16 .Sputum Sputum       Moderate polymorphonuclear leukocytes per low power field  Few Squamous epithelial cells per low power field  Few Gram Negative Rods seen per oil power field  Rare Yeast like cells seen per oil power field  Rare Gram Positive Cocci in Pairs and Chains seen per oil power field  Rare Gram Negative Diplococci seen per oil power field           Normal Respiratory Missy present    02-08 @ 09:13 .Blood Blood   PCR    Growth in anaerobic bottle: Gram Positive Cocci in Pairs and Chains    Blood Culture PCR  Enterococcus faecalis  Blood Culture PCR     Growth in anaerobic bottle: Enterococcus faecalis  ***Blood Panel PCR results on this specimen are available  approximately 3 hours after the Gram stain result.***  Gram stain, PCR, and/or culture results may not always  correspond due to difference in methodologies.  ************************************************************  This PCR assay was performed using [employer].  The following targets are tested for: Enterococcus,  vancomycin resistant enterococci, Listeria monocytogenes,  coagulase negative staphylococci, S. aureus,  methicillin resistant S. aureus, Streptococcus agalactiae  (Group B), S. pneumoniae, S. pyogenes (Group A),  Acinetobacter baumannii, Enterobacter cloacae, E. coli,  Klebsiella oxytoca, K. pneumoniae, Proteus sp.,  Serratia marcescens, Haemophilus influenzae,  Neisseria meningitidis, Pseudomonas aeruginosa, Candida  albicans, C. glabrata, C krusei, C parapsilosis,  C. tropicalis and the KPC resistance gene.          RESPIRATORY CULTURES:              RADIOLOGY & ADDITIONAL STUDIES:        Pager [phone]  After 5 pm/weekends or if no response :[phone]

## 2020-02-13 NOTE — PROGRESS NOTE ADULT - SUBJECTIVE AND OBJECTIVE BOX
CARDIOLOGY FOLLOW UP - Dr. Steel    CC no acute complaints       PHYSICAL EXAM:  T(C): 37 (02-13-20 @ 14:00), Max: 37.1 (02-13-20 @ 08:00)  HR: 83 (02-13-20 @ 15:00) (77 - 106)  BP: --  RR: 20 (02-13-20 @ 15:00) (15 - 33)  SpO2: 93% (02-13-20 @ 15:00) (92% - 100%)  Wt(kg): --  I&O's Summary    12 Feb 2020 07:01  -  13 Feb 2020 07:00  --------------------------------------------------------  IN: 1461 mL / OUT: 3720 mL / NET: -2259 mL    13 Feb 2020 07:01  -  13 Feb 2020 17:03  --------------------------------------------------------  IN: 699 mL / OUT: 1115 mL / NET: -416 mL        Appearance: Normal	  Cardiovascular: Normal S1 S2,RRR, No JVD, No murmurs  Respiratory: diminished, +chest tube   Gastrointestinal:  Soft, Non-tender, + BS	  Extremities: Normal range of motion, No clubbing, cyanosis or edema        MEDICATIONS  (STANDING):  albuterol/ipratropium for Nebulization 3 milliLiter(s) Nebulizer every 6 hours  aMIOdarone    Tablet 200 milliGRAM(s) Oral daily  aMIOdarone    Tablet   Oral   ampicillin  IVPB 2 Gram(s) IV Intermittent every 8 hours  artificial tears (preservative free) Ophthalmic Solution 1 Drop(s) Both EYES two times a day  aspirin  chewable 81 milliGRAM(s) Oral daily  buMETAnide 1 milliGRAM(s) Oral every 12 hours  chlorhexidine 2% Cloths 1 Application(s) Topical <User Schedule>  dextrose 5%. 1000 milliLiter(s) (50 mL/Hr) IV Continuous <Continuous>  dextrose 50% Injectable 12.5 Gram(s) IV Push once  dextrose 50% Injectable 25 Gram(s) IV Push once  dextrose 50% Injectable 25 Gram(s) IV Push once  heparin  Infusion 1000 Unit(s)/Hr (11 mL/Hr) IV Continuous <Continuous>  hydrALAZINE 10 milliGRAM(s) Oral every 8 hours  insulin glargine Injectable (LANTUS) 46 Unit(s) SubCutaneous at bedtime  insulin lispro (HumaLOG) corrective regimen sliding scale   SubCutaneous three times a day before meals  insulin lispro (HumaLOG) corrective regimen sliding scale   SubCutaneous at bedtime  insulin lispro Injectable (HumaLOG) 13 Unit(s) SubCutaneous three times a day before meals  insulin regular Infusion 4 Unit(s)/Hr (4 mL/Hr) IV Continuous <Continuous>  multivitamin 1 Tablet(s) Oral daily  pantoprazole    Tablet 40 milliGRAM(s) Oral before breakfast  polyethylene glycol 3350 17 Gram(s) Oral daily  tamsulosin 0.4 milliGRAM(s) Oral at bedtime      TELEMETRY: 	    ECG:  	  RADIOLOGY:   DIAGNOSTIC TESTING:  [ ] Echocardiogram:  [ ]  Catheterization:  [ ] Stress Test:    OTHER: 	    LABS:	 	                                8.1    21.10 )-----------( 264      ( 13 Feb 2020 00:21 )             25.3     02-13    128<L>  |  91<L>  |  77<H>  ----------------------------<  148<H>  4.4   |  21<L>  |  2.56<H>    Ca    7.8<L>      13 Feb 2020 00:21  Phos  4.8     02-13  Mg     2.1     02-13    TPro  5.6<L>  /  Alb  2.3<L>  /  TBili  0.6  /  DBili  x   /  AST  23  /  ALT  62<H>  /  AlkPhos  111  02-13    PT/INR - ( 13 Feb 2020 00:21 )   PT: 14.3 sec;   INR: 1.24 ratio         PTT - ( 13 Feb 2020 12:22 )  PTT:80.5 sec

## 2020-02-13 NOTE — PROGRESS NOTE ADULT - SUBJECTIVE AND OBJECTIVE BOX
[facility] DIVISION OF KIDNEY DISEASES AND HYPERTENSION -- FOLLOW UP NOTE  --------------------------------------------------------------------------------  HPI: 65 yo M with HTN, DM II (20 years), admitted with complaints of acute on chronic left sided exertional chest pain. Nephrology team is following for elevated serum creatinine and pre-cardiac catheterization assessment. [facility] available. On admission (1/25/2020), Scr was 1.85. Patient went for cardiac cath on 1/29/20 which revealed triple vessel disease. Scr had improved to 1.76 on 2/3/20. Pt. underwent CABG on 2/3/20. Scr now increased after CABG and PEA arrest on 2/6/20, however is stable at 2.56 today. Pt. extubated on 2/8/20.     Pt. seen and examined at bedside this AM. Pt. is sitting upright in chair. Pt. on Bumex gtt, and is non-oliguric with 3 L of UOP in the past 24 hours. Pt. complains of scrotal edema.      PAST HISTORY  --------------------------------------------------------------------------------  No significant changes to PMH, PSH, FHx, SHx, unless otherwise noted    ALLERGIES & MEDICATIONS  --------------------------------------------------------------------------------  Allergies    No Known Allergies    Intolerances    Standing Inpatient Medications  albuterol/ipratropium for Nebulization 3 milliLiter(s) Nebulizer every 6 hours  aMIOdarone    Tablet 200 milliGRAM(s) Oral daily  aMIOdarone    Tablet   Oral   ampicillin  IVPB 2 Gram(s) IV Intermittent every 8 hours  artificial tears (preservative free) Ophthalmic Solution 1 Drop(s) Both EYES two times a day  aspirin  chewable 81 milliGRAM(s) Oral daily  buMETAnide Infusion 0.5 mG/Hr IV Continuous <Continuous>  chlorhexidine 2% Cloths 1 Application(s) Topical <User Schedule>  dextrose 5%. 1000 milliLiter(s) IV Continuous <Continuous>  dextrose 50% Injectable 12.5 Gram(s) IV Push once  dextrose 50% Injectable 25 Gram(s) IV Push once  dextrose 50% Injectable 25 Gram(s) IV Push once  heparin  Infusion 1000 Unit(s)/Hr IV Continuous <Continuous>  hydrALAZINE 10 milliGRAM(s) Oral every 8 hours  insulin glargine Injectable (LANTUS) 54 Unit(s) SubCutaneous at bedtime  insulin lispro (HumaLOG) corrective regimen sliding scale   SubCutaneous three times a day before meals  insulin lispro (HumaLOG) corrective regimen sliding scale   SubCutaneous at bedtime  insulin lispro Injectable (HumaLOG) 13 Unit(s) SubCutaneous three times a day before meals  insulin regular Infusion 4 Unit(s)/Hr IV Continuous <Continuous>  multivitamin 1 Tablet(s) Oral daily  pantoprazole    Tablet 40 milliGRAM(s) Oral before breakfast  polyethylene glycol 3350 17 Gram(s) Oral daily  tamsulosin 0.4 milliGRAM(s) Oral at bedtime    REVIEW OF SYSTEMS  --------------------------------------------------------------------------------  Gen: + lethargy, + fatigue  Respiratory: No dyspnea  GI: No abdominal pain  MSK: + LE edema, + scrotal edema  Neuro: No dizziness  Heme: No bleeding    All other systems were reviewed and are negative, except as noted.    VITALS/PHYSICAL EXAM  --------------------------------------------------------------------------------  T(C): 36.7 (02-13-20 @ 04:00), Max: 36.7 (02-13-20 @ 04:00)  HR: 87 (02-13-20 @ 07:00) (74 - 87)  BP: --  RR: 20 (02-13-20 @ 07:00) (15 - 33)  SpO2: 97% (02-13-20 @ 07:00) (90% - 100%)  Wt(kg): --    02-12-20 @ 07:01  -  02-13-20 @ 07:00  --------------------------------------------------------  IN: 1461 mL / OUT: 3720 mL / NET: -2259 mL    Physical Exam:  	Gen: awake  	HEENT: supple neck  	Pulm: CTA b/l  	CV: + central chest dressing from CABG C/D/I, S1S2  	Abd: Soft  	: + scrotal edema  	Ext: + pitting edema B/L  	Neuro: Awake  	Skin: Warm and dry  LABS/STUDIES  --------------------------------------------------------------------------------              8.1    21.10 >-----------<  264      [02-13-20 @ 00:21]              25.3     128  |  91  |  77  ----------------------------<  148      [02-13-20 @ 00:21]  4.4   |  21  |  2.56        Ca     7.8     [02-13-20 @ 00:21]      Mg     2.1     [02-13-20 @ 00:21]      Phos  4.8     [02-13-20 @ 00:21]    Creatinine Trend:  SCr 2.56 [02-13 @ 00:21]  SCr 2.48 [02-12 @ 01:43]  SCr 2.55 [02-11 @ 01:28]  SCr 2.47 [02-10 @ 15:46]  SCr 2.46 [02-10 @ 09:32]

## 2020-02-14 LAB
ALBUMIN SERPL ELPH-MCNC: 2.4 G/DL — LOW (ref 3.3–5)
ALP SERPL-CCNC: 99 U/L — SIGNIFICANT CHANGE UP (ref 40–120)
ALT FLD-CCNC: 45 U/L — SIGNIFICANT CHANGE UP (ref 10–45)
ANION GAP SERPL CALC-SCNC: 15 MMOL/L — SIGNIFICANT CHANGE UP (ref 5–17)
APTT BLD: 32.7 SEC — SIGNIFICANT CHANGE UP (ref 27.5–36.3)
APTT BLD: 69.7 SEC — HIGH (ref 27.5–36.3)
APTT BLD: 71.5 SEC — HIGH (ref 27.5–36.3)
AST SERPL-CCNC: 18 U/L — SIGNIFICANT CHANGE UP (ref 10–40)
BILIRUB SERPL-MCNC: 0.5 MG/DL — SIGNIFICANT CHANGE UP (ref 0.2–1.2)
BLD GP AB SCN SERPL QL: NEGATIVE — SIGNIFICANT CHANGE UP
BUN SERPL-MCNC: 82 MG/DL — HIGH (ref 7–23)
CALCIUM SERPL-MCNC: 8.1 MG/DL — LOW (ref 8.4–10.5)
CHLORIDE SERPL-SCNC: 88 MMOL/L — LOW (ref 96–108)
CO2 SERPL-SCNC: 24 MMOL/L — SIGNIFICANT CHANGE UP (ref 22–31)
CREAT ?TM UR-MCNC: 52 MG/DL — SIGNIFICANT CHANGE UP
CREAT SERPL-MCNC: 2.98 MG/DL — HIGH (ref 0.5–1.3)
CULTURE RESULTS: SIGNIFICANT CHANGE UP
CULTURE RESULTS: SIGNIFICANT CHANGE UP
GAS PNL BLDA: SIGNIFICANT CHANGE UP
GLUCOSE BLDC GLUCOMTR-MCNC: 157 MG/DL — HIGH (ref 70–99)
GLUCOSE BLDC GLUCOMTR-MCNC: 189 MG/DL — HIGH (ref 70–99)
GLUCOSE SERPL-MCNC: 151 MG/DL — HIGH (ref 70–99)
HCT VFR BLD CALC: 22.9 % — LOW (ref 39–50)
HCT VFR BLD CALC: 25.7 % — LOW (ref 39–50)
HGB BLD-MCNC: 7.5 G/DL — LOW (ref 13–17)
HGB BLD-MCNC: 8.5 G/DL — LOW (ref 13–17)
INR BLD: 1.22 RATIO — HIGH (ref 0.88–1.16)
MAGNESIUM SERPL-MCNC: 2 MG/DL — SIGNIFICANT CHANGE UP (ref 1.6–2.6)
MCHC RBC-ENTMCNC: 27.6 PG — SIGNIFICANT CHANGE UP (ref 27–34)
MCHC RBC-ENTMCNC: 27.9 PG — SIGNIFICANT CHANGE UP (ref 27–34)
MCHC RBC-ENTMCNC: 32.8 GM/DL — SIGNIFICANT CHANGE UP (ref 32–36)
MCHC RBC-ENTMCNC: 33.1 GM/DL — SIGNIFICANT CHANGE UP (ref 32–36)
MCV RBC AUTO: 84.2 FL — SIGNIFICANT CHANGE UP (ref 80–100)
MCV RBC AUTO: 84.3 FL — SIGNIFICANT CHANGE UP (ref 80–100)
NRBC # BLD: 0 /100 WBCS — SIGNIFICANT CHANGE UP (ref 0–0)
NRBC # BLD: 0 /100 WBCS — SIGNIFICANT CHANGE UP (ref 0–0)
PHOSPHATE SERPL-MCNC: 4.8 MG/DL — HIGH (ref 2.5–4.5)
PLATELET # BLD AUTO: 270 K/UL — SIGNIFICANT CHANGE UP (ref 150–400)
PLATELET # BLD AUTO: 283 K/UL — SIGNIFICANT CHANGE UP (ref 150–400)
POTASSIUM SERPL-MCNC: 4.4 MMOL/L — SIGNIFICANT CHANGE UP (ref 3.5–5.3)
POTASSIUM SERPL-SCNC: 4.4 MMOL/L — SIGNIFICANT CHANGE UP (ref 3.5–5.3)
PREALB SERPL-MCNC: 13 MG/DL — LOW (ref 20–40)
PROT ?TM UR-MCNC: 73 MG/DL — HIGH (ref 0–12)
PROT SERPL-MCNC: 5.9 G/DL — LOW (ref 6–8.3)
PROT/CREAT UR-RTO: 1.4 RATIO — HIGH (ref 0–0.2)
PROTHROM AB SERPL-ACNC: 14.1 SEC — HIGH (ref 10–12.9)
RBC # BLD: 2.72 M/UL — LOW (ref 4.2–5.8)
RBC # BLD: 3.05 M/UL — LOW (ref 4.2–5.8)
RBC # FLD: 13.9 % — SIGNIFICANT CHANGE UP (ref 10.3–14.5)
RBC # FLD: 14 % — SIGNIFICANT CHANGE UP (ref 10.3–14.5)
RH IG SCN BLD-IMP: POSITIVE — SIGNIFICANT CHANGE UP
SODIUM SERPL-SCNC: 127 MMOL/L — LOW (ref 135–145)
SPECIMEN SOURCE: SIGNIFICANT CHANGE UP
SPECIMEN SOURCE: SIGNIFICANT CHANGE UP
WBC # BLD: 15.85 K/UL — HIGH (ref 3.8–10.5)
WBC # BLD: 17.78 K/UL — HIGH (ref 3.8–10.5)
WBC # FLD AUTO: 15.85 K/UL — HIGH (ref 3.8–10.5)
WBC # FLD AUTO: 17.78 K/UL — HIGH (ref 3.8–10.5)

## 2020-02-14 PROCEDURE — 71045 X-RAY EXAM CHEST 1 VIEW: CPT | Mod: 26

## 2020-02-14 PROCEDURE — 99232 SBSQ HOSP IP/OBS MODERATE 35: CPT

## 2020-02-14 PROCEDURE — 99233 SBSQ HOSP IP/OBS HIGH 50: CPT | Mod: GC

## 2020-02-14 PROCEDURE — 71045 X-RAY EXAM CHEST 1 VIEW: CPT | Mod: 26,77,76

## 2020-02-14 PROCEDURE — 99291 CRITICAL CARE FIRST HOUR: CPT

## 2020-02-14 PROCEDURE — 78582 LUNG VENTILAT&PERFUS IMAGING: CPT | Mod: 26

## 2020-02-14 RX ORDER — BUMETANIDE 0.25 MG/ML
1 INJECTION INTRAMUSCULAR; INTRAVENOUS EVERY 8 HOURS
Refills: 0 | Status: DISCONTINUED | OUTPATIENT
Start: 2020-02-14 | End: 2020-02-21

## 2020-02-14 RX ORDER — HEPARIN SODIUM 5000 [USP'U]/ML
1050 INJECTION INTRAVENOUS; SUBCUTANEOUS
Qty: 25000 | Refills: 0 | Status: DISCONTINUED | OUTPATIENT
Start: 2020-02-14 | End: 2020-02-16

## 2020-02-14 RX ORDER — PROTAMINE SULFATE 10 MG/ML
25 AMPUL (ML) INTRAVENOUS ONCE
Refills: 0 | Status: DISCONTINUED | OUTPATIENT
Start: 2020-02-14 | End: 2020-02-14

## 2020-02-14 RX ORDER — HYDRALAZINE HCL 50 MG
25 TABLET ORAL EVERY 8 HOURS
Refills: 0 | Status: DISCONTINUED | OUTPATIENT
Start: 2020-02-14 | End: 2020-02-15

## 2020-02-14 RX ORDER — WARFARIN SODIUM 2.5 MG/1
5 TABLET ORAL ONCE
Refills: 0 | Status: COMPLETED | OUTPATIENT
Start: 2020-02-14 | End: 2020-02-14

## 2020-02-14 RX ORDER — CALCIUM ACETATE 667 MG
667 TABLET ORAL
Refills: 0 | Status: DISCONTINUED | OUTPATIENT
Start: 2020-02-14 | End: 2020-02-25

## 2020-02-14 RX ADMIN — Medication 1 APPLICATION(S): at 17:06

## 2020-02-14 RX ADMIN — Medication 216 GRAM(S): at 13:40

## 2020-02-14 RX ADMIN — CHLORHEXIDINE GLUCONATE 1 APPLICATION(S): 213 SOLUTION TOPICAL at 05:12

## 2020-02-14 RX ADMIN — Medication 25 MILLIGRAM(S): at 21:26

## 2020-02-14 RX ADMIN — Medication 667 MILLIGRAM(S): at 17:06

## 2020-02-14 RX ADMIN — Medication 1: at 11:53

## 2020-02-14 RX ADMIN — Medication 1 DROP(S): at 17:06

## 2020-02-14 RX ADMIN — BUMETANIDE 1 MILLIGRAM(S): 0.25 INJECTION INTRAMUSCULAR; INTRAVENOUS at 08:57

## 2020-02-14 RX ADMIN — BUMETANIDE 1 MILLIGRAM(S): 0.25 INJECTION INTRAMUSCULAR; INTRAVENOUS at 13:43

## 2020-02-14 RX ADMIN — INSULIN GLARGINE 50 UNIT(S): 100 INJECTION, SOLUTION SUBCUTANEOUS at 21:26

## 2020-02-14 RX ADMIN — Medication 16 UNIT(S): at 11:52

## 2020-02-14 RX ADMIN — BUMETANIDE 1 MILLIGRAM(S): 0.25 INJECTION INTRAMUSCULAR; INTRAVENOUS at 05:12

## 2020-02-14 RX ADMIN — Medication 25 MILLIGRAM(S): at 13:07

## 2020-02-14 RX ADMIN — Medication 16 UNIT(S): at 08:30

## 2020-02-14 RX ADMIN — OXYCODONE AND ACETAMINOPHEN 1 TABLET(S): 5; 325 TABLET ORAL at 13:05

## 2020-02-14 RX ADMIN — Medication 216 GRAM(S): at 05:06

## 2020-02-14 RX ADMIN — TAMSULOSIN HYDROCHLORIDE 0.4 MILLIGRAM(S): 0.4 CAPSULE ORAL at 21:26

## 2020-02-14 RX ADMIN — Medication 3 MILLILITER(S): at 00:57

## 2020-02-14 RX ADMIN — Medication 1 DROP(S): at 05:08

## 2020-02-14 RX ADMIN — Medication 1: at 16:10

## 2020-02-14 RX ADMIN — Medication 667 MILLIGRAM(S): at 11:57

## 2020-02-14 RX ADMIN — OXYCODONE AND ACETAMINOPHEN 1 TABLET(S): 5; 325 TABLET ORAL at 17:35

## 2020-02-14 RX ADMIN — Medication 216 GRAM(S): at 21:27

## 2020-02-14 RX ADMIN — BUMETANIDE 1 MILLIGRAM(S): 0.25 INJECTION INTRAMUSCULAR; INTRAVENOUS at 21:26

## 2020-02-14 RX ADMIN — Medication 16 UNIT(S): at 16:11

## 2020-02-14 RX ADMIN — Medication 1 TABLET(S): at 11:57

## 2020-02-14 RX ADMIN — Medication 1: at 08:29

## 2020-02-14 RX ADMIN — OXYCODONE AND ACETAMINOPHEN 1 TABLET(S): 5; 325 TABLET ORAL at 17:05

## 2020-02-14 RX ADMIN — PANTOPRAZOLE SODIUM 40 MILLIGRAM(S): 20 TABLET, DELAYED RELEASE ORAL at 08:13

## 2020-02-14 RX ADMIN — Medication 10 MILLIGRAM(S): at 05:11

## 2020-02-14 RX ADMIN — Medication 3 MILLILITER(S): at 05:10

## 2020-02-14 RX ADMIN — Medication 81 MILLIGRAM(S): at 11:57

## 2020-02-14 RX ADMIN — AMIODARONE HYDROCHLORIDE 200 MILLIGRAM(S): 400 TABLET ORAL at 05:06

## 2020-02-14 RX ADMIN — WARFARIN SODIUM 5 MILLIGRAM(S): 2.5 TABLET ORAL at 21:26

## 2020-02-14 RX ADMIN — OXYCODONE AND ACETAMINOPHEN 1 TABLET(S): 5; 325 TABLET ORAL at 13:35

## 2020-02-14 RX ADMIN — Medication 1 APPLICATION(S): at 05:08

## 2020-02-14 NOTE — PROGRESS NOTE ADULT - SUBJECTIVE AND OBJECTIVE BOX
infectious diseases progress note:    Patient is a 64y old  Male who presents with a chief complaint of Chest pain (14 Feb 2020 07:25)        Acute ischemic heart disease        ROS:  CONSTITUTIONAL:  Negative fever or chills, feels well, good appetite  EYES:  Negative  blurry vision or double vision  CARDIOVASCULAR:  Negative for chest pain or palpitations  RESPIRATORY:  Negative for cough, wheezing, or SOB   GASTROINTESTINAL:  Negative for nausea, vomiting, diarrhea, constipation, or abdominal pain  GENITOURINARY:  Negative frequency, urgency or dysuria  NEUROLOGIC:  No headache, confusion, dizziness, lightheadedness    Allergies    No Known Allergies    Intolerances        ANTIBIOTICS/RELEVANT:  antimicrobials  ampicillin  IVPB 2 Gram(s) IV Intermittent every 8 hours    immunologic:    OTHER:  albuterol/ipratropium for Nebulization 3 milliLiter(s) Nebulizer every 6 hours  aMIOdarone    Tablet 200 milliGRAM(s) Oral daily  aMIOdarone    Tablet   Oral   artificial tears (preservative free) Ophthalmic Solution 1 Drop(s) Both EYES two times a day  aspirin  chewable 81 milliGRAM(s) Oral daily  BACItracin   Ointment 1 Application(s) Topical two times a day  buMETAnide 1 milliGRAM(s) Oral every 12 hours  chlorhexidine 2% Cloths 1 Application(s) Topical <User Schedule>  dextrose 40% Gel 15 Gram(s) Oral once PRN  dextrose 5%. 1000 milliLiter(s) IV Continuous <Continuous>  dextrose 50% Injectable 12.5 Gram(s) IV Push once  dextrose 50% Injectable 25 Gram(s) IV Push once  dextrose 50% Injectable 25 Gram(s) IV Push once  glucagon  Injectable 1 milliGRAM(s) IntraMuscular once PRN  heparin  Infusion 1000 Unit(s)/Hr IV Continuous <Continuous>  hydrALAZINE 10 milliGRAM(s) Oral every 8 hours  insulin glargine Injectable (LANTUS) 50 Unit(s) SubCutaneous at bedtime  insulin lispro (HumaLOG) corrective regimen sliding scale   SubCutaneous three times a day before meals  insulin lispro (HumaLOG) corrective regimen sliding scale   SubCutaneous at bedtime  insulin lispro Injectable (HumaLOG) 16 Unit(s) SubCutaneous three times a day before meals  multivitamin 1 Tablet(s) Oral daily  oxycodone    5 mG/acetaminophen 325 mG 1 Tablet(s) Oral every 4 hours PRN  pantoprazole    Tablet 40 milliGRAM(s) Oral before breakfast  polyethylene glycol 3350 17 Gram(s) Oral daily  sodium chloride 0.9% lock flush 10 milliLiter(s) IV Push every 1 hour PRN  tamsulosin 0.4 milliGRAM(s) Oral at bedtime      Objective:  Vital Signs Last 24 Hrs  T(C): 36.8 (14 Feb 2020 08:00), Max: 37 (13 Feb 2020 14:00)  T(F): 98.3 (14 Feb 2020 08:00), Max: 98.6 (13 Feb 2020 14:00)  HR: 89 (14 Feb 2020 08:00) (80 - 106)  BP: --  BP(mean): --  RR: 26 (14 Feb 2020 08:00) (15 - 33)  SpO2: 96% (14 Feb 2020 08:00) (92% - 100%)    PHYSICAL EXAM:   no acute distress  Eyes:DANIELA, EOMI  Ear/Nose/Throat: no oral lesion, no sinus tenderness on percussion	  Neck:no JVD, no lymphadenopathy, supple  Respiratory: CTA lanre  Cardiovascular: small amount of bloody drainage from mid sternum  no pus   Gastrointestinal:soft, (+) BS, no HSM  Extremities: multple skin changes         LABS:                        7.5    17.78 )-----------( 270      ( 14 Feb 2020 01:38 )             22.9     02-14    127<L>  |  88<L>  |  82<H>  ----------------------------<  151<H>  4.4   |  24  |  2.98<H>    Ca    8.1<L>      14 Feb 2020 01:38  Phos  4.8     02-14  Mg     2.0     02-14    TPro  5.9<L>  /  Alb  2.4<L>  /  TBili  0.5  /  DBili  x   /  AST  18  /  ALT  45  /  AlkPhos  99  02-14    PT/INR - ( 13 Feb 2020 00:21 )   PT: 14.3 sec;   INR: 1.24 ratio         PTT - ( 14 Feb 2020 01:38 )  PTT:71.5 sec        MICROBIOLOGY:    RECENT CULTURES:  02-11 @ 17:25 .Blood Blood                No growth to date.    02-09 @ 23:08 .Blood Blood                No growth to date.    02-09 @ 04:25 .Blood Blood-Peripheral                No growth at 5 days.    02-08 @ 23:06 .Sputum Sputum       No polymorphonuclear leukocytes per low power field  Few Squamous epithelial cells per low power field  Few Gram Negative Rods seen per oil power field  Few Gram positive cocci in pairs seen per oil power field           Normal Respiratory Missy present    02-08 @ 14:44 .Blood Blood       Growth in anaerobic bottle: Gram Positive Cocci in Pairs and Chains           Growth in anaerobic bottle: Enterococcus faecalis  See previous culture [id number]    02-08 @ 09:16 .Sputum Sputum       Moderate polymorphonuclear leukocytes per low power field  Few Squamous epithelial cells per low power field  Few Gram Negative Rods seen per oil power field  Rare Yeast like cells seen per oil power field  Rare Gram Positive Cocci in Pairs and Chains seen per oil power field  Rare Gram Negative Diplococci seen per oil power field           Normal Respiratory Missy present    02-08 @ 09:13 .Blood Blood   PCR    Growth in anaerobic bottle: Gram Positive Cocci in Pairs and Chains    Blood Culture PCR  Enterococcus faecalis  Blood Culture PCR     Growth in anaerobic bottle: Enterococcus faecalis  ***Blood Panel PCR results on this specimen are available  approximately 3 hours after the Gram stain result.***  Gram stain, PCR, and/or culture results may not always  correspond due to difference in methodologies.  ************************************************************  This PCR assay was performed using [employer].  The following targets are tested for: Enterococcus,  vancomycin resistant enterococci, Listeria monocytogenes,  coagulase negative staphylococci, S. aureus,  methicillin resistant S. aureus, Streptococcus agalactiae  (Group B), S. pneumoniae, S. pyogenes (Group A),  Acinetobacter baumannii, Enterobacter cloacae, E. coli,  Klebsiella oxytoca, K. pneumoniae, Proteus sp.,  Serratia marcescens, Haemophilus influenzae,  Neisseria meningitidis, Pseudomonas aeruginosa, Candida  albicans, C. glabrata, C krusei, C parapsilosis,  C. tropicalis and the KPC resistance gene.          RESPIRATORY CULTURES:              RADIOLOGY & ADDITIONAL STUDIES:        Pager [phone]  After 5 pm/weekends or if no response :[phone]

## 2020-02-14 NOTE — PROGRESS NOTE ADULT - ASSESSMENT
64 yr old with cri stage 4, DM, PVD, admitted and had CABG, Post op intubated and has bilateral effusions, worsening renal disease and bc positive for E. faecalis   follow up bc negative to date.   no signs of sternal wd infection  no recent UTI  lines all new.    discussed with Dr. Mariscal  no pna likely in reviewing ct      follow up bc are all negative  no signs of endocarditis   continue present therapy  length needs to be discussed   wbc still high but no [id number] fo uncontrolled source   unclear what the cause of leukocytosis . legs do not look infected       continue ampicillin  alone at present as we are not treating an intravascular infection   sterum has a small amount of     bloody drainage in the mid wound that needs to be watched

## 2020-02-14 NOTE — PROGRESS NOTE ADULT - ASSESSMENT
intraop kennedy 2/3/20 ef 50%, nl lv, mild diastolic dysfx stage 1  limited echo 2/4/20: nl LV sys fx , no pericardial effusion     a/p  64 year old man with history of HTN, DM II, admitted with progressive exertional angina, s/p cath with severe triple vessel disease, s/p CABG, post op course c/b brief witnessed PEA likely hypoxic arrest, s/p intubation.     1. CAD, s/p cath with severe triple vessel disease including lesions at the bifurcation of the LAD/diagonal and distal RCA/RPDA/RPL trifurcation.   -s/p CABG x 4   -intraop kennedy ef 50%  -cont asa, statin  -s/p PEA arrest, now extubated  -off vasopressors  --LE dopplers, negative for dvt   - repeat echo pending     2. Postop  CHF-Systolic  -cont bumex per ctu     3. PAF  -cont amio load  -AC per CTS    4. HTN  -bp stable on hydralazine    5. STEPHANIE/CKD  renal f/u     6. Postop Pleural effusion  - S/P Right pigtail placement  -CT mgmt per CTS    7. Sepsis -E. Faecalis bacteremia  IV abx per CTICU  ID following   repeat chest xray 2/9  with LLL pna/ atelectasis         dvt ppx

## 2020-02-14 NOTE — PROGRESS NOTE ADULT - PROBLEM SELECTOR PLAN 2
Pt. with likely hypervolemic hyponatremia in the setting of volume overload. Na is slightly worse this AM at 127 (yesterday was 128). Recommend Bumex IV as above. Monitor daily weight and UOP.    Kevin Correa  Nephrology Fellow  Cell: [phone] (from 8 am to 5 pm)  (After 5 pm or on weekends please page on-call fellow)

## 2020-02-14 NOTE — PROGRESS NOTE ADULT - ATTENDING COMMENTS
CKD ( with nephrotic range proteinuria) with superimposed STEPHANIE: likely hemodynamic injury in the setting of relatively lower BP/Surgery. Creatinine bumped to 2.5 from a baseline of 1.8-2 mg/dl. With 3.8 gms of proteinuria. Serological w/u negative. Renal sono with no hydro  s/p CABG ( 2/3)  and PEA arrest ( 2/6)  S/P chest tube ( 2/11)  Renal function worse possibly related to persistent fluid overload and transition to oral diuretics yesterday  Remains hyponatremic    Would transition to Bumex IV again  If further worsening of renal function/refractory to diuresis over the next 24-48  hours, may need to initiate dialysis. Discussed with the sons in detail  Impose fluid restriction of 1 liter  Anemic: for pRBC today  Would check iron studies CKD ( with nephrotic range proteinuria) with superimposed STEPHANIE: likely hemodynamic injury in the setting of relatively lower BP/Surgery. Creatinine bumped to 2.5 from a baseline of 1.8-2 mg/dl. With 3.8 gms of proteinuria. Serological w/u negative. Renal sono with no hydro  s/p CABG ( 2/3)  and PEA arrest ( 2/6)  S/P chest tube ( 2/11)  Renal function worse possibly related to persistent fluid overload and transition to oral diuretics yesterday  Remains hyponatremic   Please check 2D echo   Would transition to Bumex IV again  If further worsening of renal function/refractory to diuresis over the next 24-48  hours, may need to initiate dialysis. Discussed with the sons in detail  Impose fluid restriction of 1 liter  Anemic: for pRBC today  Would check iron studies

## 2020-02-14 NOTE — PROGRESS NOTE ADULT - ASSESSMENT
Assessment  DMT2: 64y Male with DM T2 with hyperglycemia, A1C 9.2%,  was on oral meds and insulin at home, now on basal bolus insulin, adjusted dose yesterday, blood sugars improving, no hypoglycemic episodes. Patient is eating meals with good appetite, appears comfortable.  CAD: s/p CABG 2/3, on medications, no chest pain, stable, monitored.  HTN: Controlled,  on antihypertensive medications.  HLD: Controlled, on statin.  CKD: Monitor labs/BMP          Harper Matias MD  Cell: [phone]  Office: [phone] Assessment  DMT2: 64y Male with DM T2 with hyperglycemia, A1C 9.2%,  was on oral meds and insulin at home, now on basal bolus insulin, adjusted dose yesterday, blood sugars improving,  no hypoglycemic episodes. Patient is eating meals with good appetite, appears comfortable.  CAD: s/p CABG 2/3, on medications, no chest pain, stable, monitored.  HTN: Controlled,  on antihypertensive medications.  HLD: Controlled, on statin.  CKD: Monitor labs/BMP          Harper Matias MD  Cell: [phone]  Office: [phone]

## 2020-02-14 NOTE — PROGRESS NOTE ADULT - SUBJECTIVE AND OBJECTIVE BOX
Chief complaint  Patient is a 64y old  Male who presents with a chief complaint of Chest pain (14 Feb 2020 08:45)   Review of systems  Patient in bed, looks comfortable, no hypoglycemia.    Labs and Fingersticks  CAPILLARY BLOOD GLUCOSE      POCT Blood Glucose.: 189 mg/dL (14 Feb 2020 11:48)  POCT Blood Glucose.: 280 mg/dL (13 Feb 2020 17:10)      Anion Gap, Serum: 15 (02-14 @ 01:38)  Anion Gap, Serum: 16 (02-13 @ 00:21)      Calcium, Total Serum: 8.1 <L> (02-14 @ 01:38)  Calcium, Total Serum: 7.8 <L> (02-13 @ 00:21)  Albumin, Serum: 2.4 <L> (02-14 @ 01:38)  Albumin, Serum: 2.3 <L> (02-13 @ 00:21)    Alanine Aminotransferase (ALT/SGPT): 45 (02-14 @ 01:38)  Alanine Aminotransferase (ALT/SGPT): 62 <H> (02-13 @ 00:21)  Alkaline Phosphatase, Serum: 99 (02-14 @ 01:38)  Alkaline Phosphatase, Serum: 111 (02-13 @ 00:21)  Aspartate Aminotransferase (AST/SGOT): 18 (02-14 @ 01:38)  Aspartate Aminotransferase (AST/SGOT): 23 (02-13 @ 00:21)        02-14    127<L>  |  88<L>  |  82<H>  ----------------------------<  151<H>  4.4   |  24  |  2.98<H>    Ca    8.1<L>      14 Feb 2020 01:38  Phos  4.8     02-14  Mg     2.0     02-14    TPro  5.9<L>  /  Alb  2.4<L>  /  TBili  0.5  /  DBili  x   /  AST  18  /  ALT  45  /  AlkPhos  99  02-14                        7.5    17.78 )-----------( 270      ( 14 Feb 2020 01:38 )             22.9     Medications  MEDICATIONS  (STANDING):  albuterol/ipratropium for Nebulization 3 milliLiter(s) Nebulizer every 6 hours  aMIOdarone    Tablet 200 milliGRAM(s) Oral daily  aMIOdarone    Tablet   Oral   ampicillin  IVPB 2 Gram(s) IV Intermittent every 8 hours  artificial tears (preservative free) Ophthalmic Solution 1 Drop(s) Both EYES two times a day  aspirin  chewable 81 milliGRAM(s) Oral daily  BACItracin   Ointment 1 Application(s) Topical two times a day  buMETAnide Injectable 1 milliGRAM(s) IV Push every 8 hours  calcium acetate 667 milliGRAM(s) Oral three times a day with meals  dextrose 5%. 1000 milliLiter(s) (50 mL/Hr) IV Continuous <Continuous>  dextrose 50% Injectable 12.5 Gram(s) IV Push once  dextrose 50% Injectable 25 Gram(s) IV Push once  dextrose 50% Injectable 25 Gram(s) IV Push once  hydrALAZINE 25 milliGRAM(s) Oral every 8 hours  insulin glargine Injectable (LANTUS) 50 Unit(s) SubCutaneous at bedtime  insulin lispro (HumaLOG) corrective regimen sliding scale   SubCutaneous three times a day before meals  insulin lispro (HumaLOG) corrective regimen sliding scale   SubCutaneous at bedtime  insulin lispro Injectable (HumaLOG) 16 Unit(s) SubCutaneous three times a day before meals  multivitamin 1 Tablet(s) Oral daily  pantoprazole    Tablet 40 milliGRAM(s) Oral before breakfast  polyethylene glycol 3350 17 Gram(s) Oral daily  tamsulosin 0.4 milliGRAM(s) Oral at bedtime      Physical Exam  General: Patient comfortable in bed  Vital Signs Last 12 Hrs  T(F): 98.3 (02-14-20 @ 11:30), Max: 98.3 (02-14-20 @ 08:00)  HR: 89 (02-14-20 @ 14:00) (80 - 94)  BP: --  BP(mean): --  RR: 18 (02-14-20 @ 14:00) (15 - 27)  SpO2: 95% (02-14-20 @ 14:00) (94% - 99%)  Neck: No palpable thyroid nodules.  CVS: S1S2, No murmurs  Respiratory: No wheezing, no crepitations  GI: Abdomen soft, bowel sounds positive  Musculoskeletal:  edema lower extremities.   Skin: No skin rashes, no ecchymosis    Diagnostics Chief complaint  Patient is a 64y old  Male who presents with a chief complaint of Chest pain (14 Feb 2020 08:45)   Review of systems  Patient in bed, looks comfortable,  no hypoglycemia.    Labs and Fingersticks  CAPILLARY BLOOD GLUCOSE      POCT Blood Glucose.: 189 mg/dL (14 Feb 2020 11:48)  POCT Blood Glucose.: 280 mg/dL (13 Feb 2020 17:10)      Anion Gap, Serum: 15 (02-14 @ 01:38)  Anion Gap, Serum: 16 (02-13 @ 00:21)      Calcium, Total Serum: 8.1 <L> (02-14 @ 01:38)  Calcium, Total Serum: 7.8 <L> (02-13 @ 00:21)  Albumin, Serum: 2.4 <L> (02-14 @ 01:38)  Albumin, Serum: 2.3 <L> (02-13 @ 00:21)    Alanine Aminotransferase (ALT/SGPT): 45 (02-14 @ 01:38)  Alanine Aminotransferase (ALT/SGPT): 62 <H> (02-13 @ 00:21)  Alkaline Phosphatase, Serum: 99 (02-14 @ 01:38)  Alkaline Phosphatase, Serum: 111 (02-13 @ 00:21)  Aspartate Aminotransferase (AST/SGOT): 18 (02-14 @ 01:38)  Aspartate Aminotransferase (AST/SGOT): 23 (02-13 @ 00:21)        02-14    127<L>  |  88<L>  |  82<H>  ----------------------------<  151<H>  4.4   |  24  |  2.98<H>    Ca    8.1<L>      14 Feb 2020 01:38  Phos  4.8     02-14  Mg     2.0     02-14    TPro  5.9<L>  /  Alb  2.4<L>  /  TBili  0.5  /  DBili  x   /  AST  18  /  ALT  45  /  AlkPhos  99  02-14                        7.5    17.78 )-----------( 270      ( 14 Feb 2020 01:38 )             22.9     Medications  MEDICATIONS  (STANDING):  albuterol/ipratropium for Nebulization 3 milliLiter(s) Nebulizer every 6 hours  aMIOdarone    Tablet 200 milliGRAM(s) Oral daily  aMIOdarone    Tablet   Oral   ampicillin  IVPB 2 Gram(s) IV Intermittent every 8 hours  artificial tears (preservative free) Ophthalmic Solution 1 Drop(s) Both EYES two times a day  aspirin  chewable 81 milliGRAM(s) Oral daily  BACItracin   Ointment 1 Application(s) Topical two times a day  buMETAnide Injectable 1 milliGRAM(s) IV Push every 8 hours  calcium acetate 667 milliGRAM(s) Oral three times a day with meals  dextrose 5%. 1000 milliLiter(s) (50 mL/Hr) IV Continuous <Continuous>  dextrose 50% Injectable 12.5 Gram(s) IV Push once  dextrose 50% Injectable 25 Gram(s) IV Push once  dextrose 50% Injectable 25 Gram(s) IV Push once  hydrALAZINE 25 milliGRAM(s) Oral every 8 hours  insulin glargine Injectable (LANTUS) 50 Unit(s) SubCutaneous at bedtime  insulin lispro (HumaLOG) corrective regimen sliding scale   SubCutaneous three times a day before meals  insulin lispro (HumaLOG) corrective regimen sliding scale   SubCutaneous at bedtime  insulin lispro Injectable (HumaLOG) 16 Unit(s) SubCutaneous three times a day before meals  multivitamin 1 Tablet(s) Oral daily  pantoprazole    Tablet 40 milliGRAM(s) Oral before breakfast  polyethylene glycol 3350 17 Gram(s) Oral daily  tamsulosin 0.4 milliGRAM(s) Oral at bedtime      Physical Exam  General: Patient comfortable in bed  Vital Signs Last 12 Hrs  T(F): 98.3 (02-14-20 @ 11:30), Max: 98.3 (02-14-20 @ 08:00)  HR: 89 (02-14-20 @ 14:00) (80 - 94)  BP: --  BP(mean): --  RR: 18 (02-14-20 @ 14:00) (15 - 27)  SpO2: 95% (02-14-20 @ 14:00) (94% - 99%)  Neck: No palpable thyroid nodules.  CVS: S1S2, No murmurs  Respiratory: No wheezing, no crepitations  GI: Abdomen soft, bowel sounds positive  Musculoskeletal:  edema lower extremities.   Skin: No skin rashes, no ecchymosis    Diagnostics

## 2020-02-14 NOTE — PROGRESS NOTE ADULT - SUBJECTIVE AND OBJECTIVE BOX
FRANKLIN PRESLEY                     [id number]    HPI:  63yo male with hx of HTN, DM II, presented to the ED with complaints of acute on chronic left sided exertional chest pain. Pt states he has been having left sided pressure and stabbing chest pain radiating to his sternum and right with activity for the past few months. He states each episode last approximately 1-2 mins and subsides upon resting. He denies associated symptoms of radiation to his back, arm or jaw. He recently was at a wedding which he could not enjoy because dancing would reproduce to the pain. He states he did not pursue and medical attention until this time. Pt states this time his pain was associated with sob up exertion. He denies symptoms of LOC, diaphoresis, palpitations, abd pain, N/V, fever or chills. He denies prior cardiac work up. (25 Jan 2020 12:57)      Hospital Course:  s/p C4L on 2/3; PEA arrest on 2/6     ICU Vital Signs Last 24 Hrs  T(C): 36.8 (14 Feb 2020 08:00), Max: 37 (13 Feb 2020 14:00)  T(F): 98.3 (14 Feb 2020 08:00), Max: 98.6 (13 Feb 2020 14:00)  HR: 89 (14 Feb 2020 08:00) (80 - 106)  BP: --  BP(mean): --  ABP: 140/45 (14 Feb 2020 08:00) (135/56 - 167/57)  ABP(mean): 71 (14 Feb 2020 08:00) (71 - 84)  RR: 26 (14 Feb 2020 08:00) (15 - 33)  SpO2: 96% (14 Feb 2020 08:00) (92% - 100%)        =========================I&Os=========================  I&O's Detail    13 Feb 2020 07:01  -  14 Feb 2020 07:00  --------------------------------------------------------  IN:    heparin Infusion: 258 mL    IV PiggyBack: 300 mL    Oral Fluid: 1100 mL  Total IN: 1658 mL    OUT:    Chest Tube: 195 mL    Indwelling Catheter - Urethral: 2010 mL  Total OUT: 2205 mL    Total NET: -547 mL      14 Feb 2020 07:01  -  14 Feb 2020 08:46  --------------------------------------------------------  IN:    heparin Infusion: 10.5 mL  Total IN: 10.5 mL    OUT:    Indwelling Catheter - Urethral: 125 mL  Total OUT: 125 mL    Total NET: -114.5 mL      ============================LABS=========================                         7.5    17.78 )-----------( 270      ( 14 Feb 2020 01:38 )             22.9   02-14    127<L>  |  88<L>  |  82<H>  ----------------------------<  151<H>  4.4   |  24  |  2.98<H>    Ca    8.1<L>      14 Feb 2020 01:38  Phos  4.8     02-14  Mg     2.0     02-14    TPro  5.9<L>  /  Alb  2.4<L>  /  TBili  0.5  /  DBili  x   /  AST  18  /  ALT  45  /  AlkPhos  99  02-14               ===========================PMHX&PSHx==========================    PAST MEDICAL & SURGICAL HISTORY:  HTN (hypertension)  Diabetes  History of appendectomy: x 30 yrs ago      ===========================MEDICATIONS============================  MEDICATIONS  (STANDING):  albuterol/ipratropium for Nebulization 3 milliLiter(s) Nebulizer every 6 hours  aMIOdarone    Tablet 200 milliGRAM(s) Oral daily  aMIOdarone    Tablet   Oral   ampicillin  IVPB 2 Gram(s) IV Intermittent every 8 hours  artificial tears (preservative free) Ophthalmic Solution 1 Drop(s) Both EYES two times a day  aspirin  chewable 81 milliGRAM(s) Oral daily  BACItracin   Ointment 1 Application(s) Topical two times a day  buMETAnide Injectable 1 milliGRAM(s) IV Push every 8 hours  chlorhexidine 2% Cloths 1 Application(s) Topical <User Schedule>  dextrose 5%. 1000 milliLiter(s) (50 mL/Hr) IV Continuous <Continuous>  dextrose 50% Injectable 12.5 Gram(s) IV Push once  dextrose 50% Injectable 25 Gram(s) IV Push once  dextrose 50% Injectable 25 Gram(s) IV Push once  heparin  Infusion 1000 Unit(s)/Hr (10.5 mL/Hr) IV Continuous <Continuous>  hydrALAZINE 10 milliGRAM(s) Oral every 8 hours  insulin glargine Injectable (LANTUS) 50 Unit(s) SubCutaneous at bedtime  insulin lispro (HumaLOG) corrective regimen sliding scale   SubCutaneous three times a day before meals  insulin lispro (HumaLOG) corrective regimen sliding scale   SubCutaneous at bedtime  insulin lispro Injectable (HumaLOG) 16 Unit(s) SubCutaneous three times a day before meals  multivitamin 1 Tablet(s) Oral daily  pantoprazole    Tablet 40 milliGRAM(s) Oral before breakfast  polyethylene glycol 3350 17 Gram(s) Oral daily  tamsulosin 0.4 milliGRAM(s) Oral at bedtime        ============================IMAGING STUDIES=========================  CXR (2/12/20):  Impression:    The heart is enlarged. Bilateral pigtail catheter is seen in the pleural spaces. No pneumothorax. A central line seen on the left and the tip is in the superior vena cava. Status post sternotomy. No change in the appearance the chest when compared to previous study done February 11, 2020. A 2015 at 1:51 PM.    =============================ASSESSMENT===========================   s/p C4L on 2/3   PEA arrest on 2/6     =============================NEUROLOGY============================  A+O x3 Nonfocal  In Chair    ==============================RESPIRATORY========================  acute respiratory failure, post op, requiring BiPAP/High Flow for hypoxia  Left moderate to large pleural effusion on bedside sono, pigtail in place  HF O2, 40% on 40L, will wean   Comfortable, not in any distress.  Remove R chest tube today   Monitor L chest tube output  L Chest tube to suction   Continue bronchodilators, pulmonary toilet    ============================CARDIOVASCULAR======================  Continue hemodynamic monitoring.  Not on any pressors  PAF, now in NSR, continue Amio load    =====================RENAL===================  Osorio   Bumex drip 5mg  Monitor I/Os and electrolytes    ====================GASTROINTESTINAL===================  Continue GI prophylaxis with Protonix    ========================HEMATOLOGIC/ONCOLOGIC====================  Continue heparin drip  Monitor chest tube output. No signs of active bleeding.   Follow CBC in AM    ============================INFECTIOUS DISEASE========================  Continue ampicillin as per ID  Monitor for fever / leukocytosis.  All surgical incision / chest tube  sites look clean      Pt is on GI & DVT prophylaxis  OOB & ambulate     Pertinent clinical, laboratory, radiographic, hemodynamic, echocardiographic, respiratory data, microbiologic data and chart were reviewed and analyzed frequently throughout the course of the day and night  Patient seen, examined and plan discussed with CT Surgery / CTICU team during rounds.    I spent 30 minutes at bedside providing critical care, noncontinuous. FRANKLIN PRESLEY                     [id number]    HPI:  63yo male with hx of HTN, DM II, presented to the ED with complaints of acute on chronic left sided exertional chest pain. Pt states he has been having left sided pressure and stabbing chest pain radiating to his sternum and right with activity for the past few months. He states each episode last approximately 1-2 mins and subsides upon resting. He denies associated symptoms of radiation to his back, arm or jaw. He recently was at a wedding which he could not enjoy because dancing would reproduce to the pain. He states he did not pursue and medical attention until this time. Pt states this time his pain was associated with sob up exertion. He denies symptoms of LOC, diaphoresis, palpitations, abd pain, N/V, fever or chills. He denies prior cardiac work up. (25 Jan 2020 12:57)      Hospital Course:  s/p C4L on 2/3; PEA arrest on 2/6     ICU Vital Signs Last 24 Hrs  T(C): 36.8 (14 Feb 2020 08:00), Max: 37 (13 Feb 2020 14:00)  T(F): 98.3 (14 Feb 2020 08:00), Max: 98.6 (13 Feb 2020 14:00)  HR: 89 (14 Feb 2020 08:00) (80 - 106)  BP: --  BP(mean): --  ABP: 140/45 (14 Feb 2020 08:00) (135/56 - 167/57)  ABP(mean): 71 (14 Feb 2020 08:00) (71 - 84)  RR: 26 (14 Feb 2020 08:00) (15 - 33)  SpO2: 96% (14 Feb 2020 08:00) (92% - 100%)        =========================I&Os=========================  I&O's Detail    13 Feb 2020 07:01  -  14 Feb 2020 07:00  --------------------------------------------------------  IN:    heparin Infusion: 258 mL    IV PiggyBack: 300 mL    Oral Fluid: 1100 mL  Total IN: 1658 mL    OUT:    Chest Tube: 195 mL    Indwelling Catheter - Urethral: 2010 mL  Total OUT: 2205 mL    Total NET: -547 mL      14 Feb 2020 07:01  -  14 Feb 2020 08:46  --------------------------------------------------------  IN:    heparin Infusion: 10.5 mL  Total IN: 10.5 mL    OUT:    Indwelling Catheter - Urethral: 125 mL  Total OUT: 125 mL    Total NET: -114.5 mL      ============================LABS=========================                         7.5    17.78 )-----------( 270      ( 14 Feb 2020 01:38 )             22.9   02-14    127<L>  |  88<L>  |  82<H>  ----------------------------<  151<H>  4.4   |  24  |  2.98<H>    Ca    8.1<L>      14 Feb 2020 01:38  Phos  4.8     02-14  Mg     2.0     02-14    TPro  5.9<L>  /  Alb  2.4<L>  /  TBili  0.5  /  DBili  x   /  AST  18  /  ALT  45  /  AlkPhos  99  02-14               ===========================PMHX&PSHx==========================    PAST MEDICAL & SURGICAL HISTORY:  HTN (hypertension)  Diabetes  History of appendectomy: x 30 yrs ago      ===========================MEDICATIONS============================  MEDICATIONS  (STANDING):  albuterol/ipratropium for Nebulization 3 milliLiter(s) Nebulizer every 6 hours  aMIOdarone    Tablet 200 milliGRAM(s) Oral daily  aMIOdarone    Tablet   Oral   ampicillin  IVPB 2 Gram(s) IV Intermittent every 8 hours  artificial tears (preservative free) Ophthalmic Solution 1 Drop(s) Both EYES two times a day  aspirin  chewable 81 milliGRAM(s) Oral daily  BACItracin   Ointment 1 Application(s) Topical two times a day  buMETAnide Injectable 1 milliGRAM(s) IV Push every 8 hours  chlorhexidine 2% Cloths 1 Application(s) Topical <User Schedule>  dextrose 5%. 1000 milliLiter(s) (50 mL/Hr) IV Continuous <Continuous>  dextrose 50% Injectable 12.5 Gram(s) IV Push once  dextrose 50% Injectable 25 Gram(s) IV Push once  dextrose 50% Injectable 25 Gram(s) IV Push once  heparin  Infusion 1000 Unit(s)/Hr (10.5 mL/Hr) IV Continuous <Continuous>  hydrALAZINE 10 milliGRAM(s) Oral every 8 hours  insulin glargine Injectable (LANTUS) 50 Unit(s) SubCutaneous at bedtime  insulin lispro (HumaLOG) corrective regimen sliding scale   SubCutaneous three times a day before meals  insulin lispro (HumaLOG) corrective regimen sliding scale   SubCutaneous at bedtime  insulin lispro Injectable (HumaLOG) 16 Unit(s) SubCutaneous three times a day before meals  multivitamin 1 Tablet(s) Oral daily  pantoprazole    Tablet 40 milliGRAM(s) Oral before breakfast  polyethylene glycol 3350 17 Gram(s) Oral daily  tamsulosin 0.4 milliGRAM(s) Oral at bedtime        ============================IMAGING STUDIES=========================  CXR (2/12/20):  Impression:    The heart is enlarged. Bilateral pigtail catheter is seen in the pleural spaces. No pneumothorax. A central line seen on the left and the tip is in the superior vena cava. Status post sternotomy. No change in the appearance the chest when compared to previous study done February 11, 2020. A 2015 at 1:51 PM.    =============================ASSESSMENT===========================   s/p C4L on 2/3   PEA arrest on 2/6     =============================NEUROLOGY============================  A+O x3 Nonfocal  In Chair    ==============================RESPIRATORY========================  acute respiratory failure, post op, requiring BiPAP/High Flow for hypoxia  Left moderate to large pleural effusion on bedside sono, pigtail in place  HF O2, 40% on 40L, will wean   Comfortable, not in any distress.  Remove R chest tube today   Monitor L chest tube output  L Chest tube to suction   Continue bronchodilators, pulmonary toilet    ============================CARDIOVASCULAR======================  Continue hemodynamic monitoring.  Not on any pressors  PAF, now in NSR, continue Amio load    =====================RENAL===================  Osorio   Monitor I/Os and electrolytes  Patient has probably diagnosis of Nephrotic syndrome  Performing a 24 urine for protin   Modifying diet   ====================GASTROINTESTINAL===================  Continue GI prophylaxis with Protonix    ========================HEMATOLOGIC/ONCOLOGIC====================  Continue heparin drip  Monitor chest tube output. No signs of active bleeding.   Follow CBC in AM    ============================INFECTIOUS DISEASE========================  Monitor for fever / leukocytosis.  All surgical incision / chest tube  sites look clean      Pt is on GI & DVT prophylaxis  OOB & ambulate     Pertinent clinical, laboratory, radiographic, hemodynamic, echocardiographic, respiratory data, microbiologic data and chart were reviewed and analyzed frequently throughout the course of the day and night  Patient seen, examined and plan discussed with CT Surgery / CTICU team during rounds.    I spent 30 minutes at bedside providing critical care, noncontinuous.

## 2020-02-14 NOTE — PROGRESS NOTE ADULT - PROBLEM SELECTOR PLAN 1
Pt with  CKD likely  in the setting of long standing DM and HTN.  No prior labs for review. Scr since admission has ranged between 1.8-2 mg/dl.  Scr increased to ~2.50 secondary to hemodynamic injury after CABG  and PEA arrest s/p CPR, and has now worsened to 2.98.. UA significant for protein and RBCs. Urine electrolytes consistent with intrinsic disease. Urine Pr/Cr ratio in nephrotic range. HepBsAg, HepC, C3, C4, SIFE, RACHEL, P-ANCA, C-ANCA, Anti-GBM ab, Parvovirus, RPR, anti-PLA2R ab were negative/non-reactive. Monitor labs and urine output.     Pt. with significant edema (LE and scrotal). Pt. transitioned to PO Bumex, is non-oliguric, but with less UOP in past 24 hours than previously when on Bumex gtt. Recommend Bumex IV over PO. Avoid NSAIDs, ACEI/ARBS, RCA and nephrotoxins. Dose medications as per eGFR

## 2020-02-14 NOTE — PROGRESS NOTE ADULT - SUBJECTIVE AND OBJECTIVE BOX
[facility] DIVISION OF KIDNEY DISEASES AND HYPERTENSION -- FOLLOW UP NOTE  --------------------------------------------------------------------------------  HPI: 63 yo M with HTN, DM II (20 years), admitted with complaints of acute on chronic left sided exertional chest pain. Nephrology team is following for elevated serum creatinine and pre-cardiac catheterization assessment. [facility] available. On admission (1/25/2020), Scr was 1.85. Patient went for cardiac cath on 1/29/20 which revealed triple vessel disease. Scr had improved to 1.76 on 2/3/20. Pt. underwent CABG on 2/3/20. Scr now increased after CABG and PEA arrest on 2/6/20, had improved to ~2.5, however worsened this AM to 2.98. Pt. extubated on 2/8/20.     Pt. seen and examined at bedside this AM. Pt. is sitting upright in chair. Pt. no longer on Bumex gtt and transitioned to oral. Non-oliguric with 1.8 L of UOP in the past 24 hours (compared to 3 L the day before). Pt. complains of LE edema and scrrotal edema.    PAST HISTORY  --------------------------------------------------------------------------------  No significant changes to PMH, PSH, FHx, SHx, unless otherwise noted    ALLERGIES & MEDICATIONS  --------------------------------------------------------------------------------  Allergies    No Known Allergies    Intolerances    Standing Inpatient Medications  albuterol/ipratropium for Nebulization 3 milliLiter(s) Nebulizer every 6 hours  aMIOdarone    Tablet 200 milliGRAM(s) Oral daily  aMIOdarone    Tablet   Oral   ampicillin  IVPB 2 Gram(s) IV Intermittent every 8 hours  artificial tears (preservative free) Ophthalmic Solution 1 Drop(s) Both EYES two times a day  aspirin  chewable 81 milliGRAM(s) Oral daily  BACItracin   Ointment 1 Application(s) Topical two times a day  buMETAnide 1 milliGRAM(s) Oral every 12 hours  chlorhexidine 2% Cloths 1 Application(s) Topical <User Schedule>  dextrose 5%. 1000 milliLiter(s) IV Continuous <Continuous>  dextrose 50% Injectable 12.5 Gram(s) IV Push once  dextrose 50% Injectable 25 Gram(s) IV Push once  dextrose 50% Injectable 25 Gram(s) IV Push once  heparin  Infusion 1000 Unit(s)/Hr IV Continuous <Continuous>  hydrALAZINE 10 milliGRAM(s) Oral every 8 hours  insulin glargine Injectable (LANTUS) 50 Unit(s) SubCutaneous at bedtime  insulin lispro (HumaLOG) corrective regimen sliding scale   SubCutaneous three times a day before meals  insulin lispro (HumaLOG) corrective regimen sliding scale   SubCutaneous at bedtime  insulin lispro Injectable (HumaLOG) 16 Unit(s) SubCutaneous three times a day before meals  multivitamin 1 Tablet(s) Oral daily  pantoprazole    Tablet 40 milliGRAM(s) Oral before breakfast  polyethylene glycol 3350 17 Gram(s) Oral daily  tamsulosin 0.4 milliGRAM(s) Oral at bedtime    REVIEW OF SYSTEMS  --------------------------------------------------------------------------------  Gen: + lethargy, + fatigue  Respiratory: No dyspnea  GI: No abdominal pain  MSK: + LE edema, + scrotal edema  Neuro: No dizziness  Heme: No bleeding    All other systems were reviewed and are negative, except as noted.  VITALS/PHYSICAL EXAM  --------------------------------------------------------------------------------  T(C): 36.8 (02-14-20 @ 04:00), Max: 37.1 (02-13-20 @ 08:00)  HR: 89 (02-14-20 @ 06:00) (80 - 106)  BP: --  RR: 18 (02-14-20 @ 06:00) (15 - 33)  SpO2: 96% (02-14-20 @ 06:00) (92% - 100%)  Wt(kg): --    02-13-20 @ 07:01  -  02-14-20 @ 07:00  --------------------------------------------------------  IN: 1658 mL / OUT: 2205 mL / NET: -547 mL    02-14-20 @ 07:01  -  02-14-20 @ 07:29  --------------------------------------------------------  IN: 10.5 mL / OUT: 0 mL / NET: 10.5 mL    Physical Exam:  	Gen: awake  	HEENT: supple neck  	Pulm: CTA b/l  	CV: + central chest dressing from CABG C/D/I, S1S2  	Abd: Soft  	: + scrotal edema  	Ext: + pitting edema B/L  	Neuro: Awake  	Skin: Warm and dry  LABS/STUDIES  --------------------------------------------------------------------------------              7.5    17.78 >-----------<  270      [02-14-20 @ 01:38]              22.9     127  |  88  |  82  ----------------------------<  151      [02-14-20 @ 01:38]  4.4   |  24  |  2.98        Ca     8.1     [02-14-20 @ 01:38]      Mg     2.0     [02-14-20 @ 01:38]      Phos  4.8     [02-14-20 @ 01:38]    Creatinine Trend:  SCr 2.98 [02-14 @ 01:38]  SCr 2.56 [02-13 @ 00:21]  SCr 2.48 [02-12 @ 01:43]  SCr 2.55 [02-11 @ 01:28]  SCr 2.47 [02-10 @ 15:46]

## 2020-02-14 NOTE — PROGRESS NOTE ADULT - SUBJECTIVE AND OBJECTIVE BOX
CARDIOLOGY FOLLOW UP - Dr. Steel    CC +fatigue, resting  NAD, family at bedside       PHYSICAL EXAM:  T(C): 36.8 (02-14-20 @ 11:30), Max: 36.8 (02-14-20 @ 04:00)  HR: 89 (02-14-20 @ 14:00) (80 - 94)  BP: --  RR: 18 (02-14-20 @ 14:00) (15 - 33)  SpO2: 95% (02-14-20 @ 14:00) (94% - 100%)  Wt(kg): --  I&O's Summary    13 Feb 2020 07:01  -  14 Feb 2020 07:00  --------------------------------------------------------  IN: 1658 mL / OUT: 2205 mL / NET: -547 mL    14 Feb 2020 07:01  -  14 Feb 2020 15:03  --------------------------------------------------------  IN: 1143 mL / OUT: 770 mL / NET: 373 mL        Appearance: Normal	  Cardiovascular: Normal S1 S2,RRR, No JVD, No murmurs  Respiratory: diminished, +chest tube   Gastrointestinal:  Soft, Non-tender, + BS	  Extremities: Normal range of motion, No clubbing, cyanosis or edema        MEDICATIONS  (STANDING):  albuterol/ipratropium for Nebulization 3 milliLiter(s) Nebulizer every 6 hours  aMIOdarone    Tablet 200 milliGRAM(s) Oral daily  aMIOdarone    Tablet   Oral   ampicillin  IVPB 2 Gram(s) IV Intermittent every 8 hours  artificial tears (preservative free) Ophthalmic Solution 1 Drop(s) Both EYES two times a day  aspirin  chewable 81 milliGRAM(s) Oral daily  BACItracin   Ointment 1 Application(s) Topical two times a day  buMETAnide Injectable 1 milliGRAM(s) IV Push every 8 hours  calcium acetate 667 milliGRAM(s) Oral three times a day with meals  dextrose 5%. 1000 milliLiter(s) (50 mL/Hr) IV Continuous <Continuous>  dextrose 50% Injectable 12.5 Gram(s) IV Push once  dextrose 50% Injectable 25 Gram(s) IV Push once  dextrose 50% Injectable 25 Gram(s) IV Push once  hydrALAZINE 25 milliGRAM(s) Oral every 8 hours  insulin glargine Injectable (LANTUS) 50 Unit(s) SubCutaneous at bedtime  insulin lispro (HumaLOG) corrective regimen sliding scale   SubCutaneous three times a day before meals  insulin lispro (HumaLOG) corrective regimen sliding scale   SubCutaneous at bedtime  insulin lispro Injectable (HumaLOG) 16 Unit(s) SubCutaneous three times a day before meals  multivitamin 1 Tablet(s) Oral daily  pantoprazole    Tablet 40 milliGRAM(s) Oral before breakfast  polyethylene glycol 3350 17 Gram(s) Oral daily  tamsulosin 0.4 milliGRAM(s) Oral at bedtime      TELEMETRY:nsr    ECG:  	  RADIOLOGY:   DIAGNOSTIC TESTING:  [ ] Echocardiogram:  [ ]  Catheterization:  [ ] Stress Test:    OTHER: 	    LABS:	 	                                7.5    17.78 )-----------( 270      ( 14 Feb 2020 01:38 )             22.9     02-14    127<L>  |  88<L>  |  82<H>  ----------------------------<  151<H>  4.4   |  24  |  2.98<H>    Ca    8.1<L>      14 Feb 2020 01:38  Phos  4.8     02-14  Mg     2.0     02-14    TPro  5.9<L>  /  Alb  2.4<L>  /  TBili  0.5  /  DBili  x   /  AST  18  /  ALT  45  /  AlkPhos  99  02-14    PT/INR - ( 13 Feb 2020 00:21 )   PT: 14.3 sec;   INR: 1.24 ratio         PTT - ( 14 Feb 2020 08:11 )  PTT:69.7 sec

## 2020-02-15 LAB
ALBUMIN SERPL ELPH-MCNC: 2.3 G/DL — LOW (ref 3.3–5)
ALP SERPL-CCNC: 96 U/L — SIGNIFICANT CHANGE UP (ref 40–120)
ALT FLD-CCNC: 37 U/L — SIGNIFICANT CHANGE UP (ref 10–45)
ANION GAP SERPL CALC-SCNC: 16 MMOL/L — SIGNIFICANT CHANGE UP (ref 5–17)
APTT BLD: 62 SEC — HIGH (ref 27.5–36.3)
APTT BLD: 64.2 SEC — HIGH (ref 27.5–36.3)
AST SERPL-CCNC: 20 U/L — SIGNIFICANT CHANGE UP (ref 10–40)
BILIRUB SERPL-MCNC: 0.5 MG/DL — SIGNIFICANT CHANGE UP (ref 0.2–1.2)
BUN SERPL-MCNC: 81 MG/DL — HIGH (ref 7–23)
CALCIUM SERPL-MCNC: 8.2 MG/DL — LOW (ref 8.4–10.5)
CHLORIDE SERPL-SCNC: 89 MMOL/L — LOW (ref 96–108)
CO2 SERPL-SCNC: 23 MMOL/L — SIGNIFICANT CHANGE UP (ref 22–31)
CREAT SERPL-MCNC: 2.83 MG/DL — HIGH (ref 0.5–1.3)
CULTURE RESULTS: SIGNIFICANT CHANGE UP
GAS PNL BLDA: SIGNIFICANT CHANGE UP
GLUCOSE BLDC GLUCOMTR-MCNC: 141 MG/DL — HIGH (ref 70–99)
GLUCOSE BLDC GLUCOMTR-MCNC: 167 MG/DL — HIGH (ref 70–99)
GLUCOSE BLDC GLUCOMTR-MCNC: 168 MG/DL — HIGH (ref 70–99)
GLUCOSE BLDC GLUCOMTR-MCNC: 307 MG/DL — HIGH (ref 70–99)
GLUCOSE SERPL-MCNC: 138 MG/DL — HIGH (ref 70–99)
HCT VFR BLD CALC: 24.7 % — LOW (ref 39–50)
HGB BLD-MCNC: 8.4 G/DL — LOW (ref 13–17)
INR BLD: 1.34 RATIO — HIGH (ref 0.88–1.16)
MAGNESIUM SERPL-MCNC: 1.9 MG/DL — SIGNIFICANT CHANGE UP (ref 1.6–2.6)
MCHC RBC-ENTMCNC: 28.5 PG — SIGNIFICANT CHANGE UP (ref 27–34)
MCHC RBC-ENTMCNC: 34 GM/DL — SIGNIFICANT CHANGE UP (ref 32–36)
MCV RBC AUTO: 83.7 FL — SIGNIFICANT CHANGE UP (ref 80–100)
NRBC # BLD: 0 /100 WBCS — SIGNIFICANT CHANGE UP (ref 0–0)
PHOSPHATE SERPL-MCNC: 3.9 MG/DL — SIGNIFICANT CHANGE UP (ref 2.5–4.5)
PLATELET # BLD AUTO: 320 K/UL — SIGNIFICANT CHANGE UP (ref 150–400)
POTASSIUM SERPL-MCNC: 4.2 MMOL/L — SIGNIFICANT CHANGE UP (ref 3.5–5.3)
POTASSIUM SERPL-SCNC: 4.2 MMOL/L — SIGNIFICANT CHANGE UP (ref 3.5–5.3)
PROT SERPL-MCNC: 5.7 G/DL — LOW (ref 6–8.3)
PROTHROM AB SERPL-ACNC: 15.4 SEC — HIGH (ref 10–12.9)
RBC # BLD: 2.95 M/UL — LOW (ref 4.2–5.8)
RBC # FLD: 13.8 % — SIGNIFICANT CHANGE UP (ref 10.3–14.5)
SODIUM SERPL-SCNC: 128 MMOL/L — LOW (ref 135–145)
SPECIMEN SOURCE: SIGNIFICANT CHANGE UP
T3 SERPL-MCNC: 59 NG/DL — LOW (ref 80–200)
T4 AB SER-ACNC: 7.5 UG/DL — SIGNIFICANT CHANGE UP (ref 4.6–12)
TSH SERPL-MCNC: 2.37 UIU/ML — SIGNIFICANT CHANGE UP (ref 0.27–4.2)
WBC # BLD: 14.52 K/UL — HIGH (ref 3.8–10.5)
WBC # FLD AUTO: 14.52 K/UL — HIGH (ref 3.8–10.5)

## 2020-02-15 PROCEDURE — 93306 TTE W/DOPPLER COMPLETE: CPT | Mod: 26

## 2020-02-15 PROCEDURE — 99232 SBSQ HOSP IP/OBS MODERATE 35: CPT

## 2020-02-15 PROCEDURE — 71045 X-RAY EXAM CHEST 1 VIEW: CPT | Mod: 26

## 2020-02-15 PROCEDURE — 99233 SBSQ HOSP IP/OBS HIGH 50: CPT | Mod: GC

## 2020-02-15 PROCEDURE — 99291 CRITICAL CARE FIRST HOUR: CPT

## 2020-02-15 RX ORDER — HYDRALAZINE HCL 50 MG
37.5 TABLET ORAL EVERY 8 HOURS
Refills: 0 | Status: DISCONTINUED | OUTPATIENT
Start: 2020-02-15 | End: 2020-02-16

## 2020-02-15 RX ORDER — POTASSIUM CHLORIDE 20 MEQ
10 PACKET (EA) ORAL DAILY
Refills: 0 | Status: COMPLETED | OUTPATIENT
Start: 2020-02-15 | End: 2020-02-15

## 2020-02-15 RX ORDER — WARFARIN SODIUM 2.5 MG/1
5 TABLET ORAL ONCE
Refills: 0 | Status: COMPLETED | OUTPATIENT
Start: 2020-02-15 | End: 2020-02-15

## 2020-02-15 RX ORDER — ASPIRIN/CALCIUM CARB/MAGNESIUM 324 MG
81 TABLET ORAL DAILY
Refills: 0 | Status: DISCONTINUED | OUTPATIENT
Start: 2020-02-15 | End: 2020-02-25

## 2020-02-15 RX ORDER — DEXTROSE 50 % IN WATER 50 %
12.5 SYRINGE (ML) INTRAVENOUS ONCE
Refills: 0 | Status: COMPLETED | OUTPATIENT
Start: 2020-02-15 | End: 2020-02-15

## 2020-02-15 RX ADMIN — Medication 81 MILLIGRAM(S): at 12:01

## 2020-02-15 RX ADMIN — Medication 3 MILLILITER(S): at 12:58

## 2020-02-15 RX ADMIN — Medication 10 MILLIEQUIVALENT(S): at 06:13

## 2020-02-15 RX ADMIN — Medication 216 GRAM(S): at 13:14

## 2020-02-15 RX ADMIN — AMIODARONE HYDROCHLORIDE 200 MILLIGRAM(S): 400 TABLET ORAL at 06:13

## 2020-02-15 RX ADMIN — Medication 16 UNIT(S): at 17:48

## 2020-02-15 RX ADMIN — Medication 667 MILLIGRAM(S): at 11:44

## 2020-02-15 RX ADMIN — OXYCODONE AND ACETAMINOPHEN 1 TABLET(S): 5; 325 TABLET ORAL at 15:15

## 2020-02-15 RX ADMIN — BUMETANIDE 1 MILLIGRAM(S): 0.25 INJECTION INTRAMUSCULAR; INTRAVENOUS at 21:25

## 2020-02-15 RX ADMIN — Medication 16 UNIT(S): at 12:01

## 2020-02-15 RX ADMIN — OXYCODONE AND ACETAMINOPHEN 1 TABLET(S): 5; 325 TABLET ORAL at 08:56

## 2020-02-15 RX ADMIN — Medication 216 GRAM(S): at 23:00

## 2020-02-15 RX ADMIN — Medication 216 GRAM(S): at 06:13

## 2020-02-15 RX ADMIN — Medication 1 APPLICATION(S): at 06:12

## 2020-02-15 RX ADMIN — TAMSULOSIN HYDROCHLORIDE 0.4 MILLIGRAM(S): 0.4 CAPSULE ORAL at 21:25

## 2020-02-15 RX ADMIN — Medication 25 MILLIGRAM(S): at 06:13

## 2020-02-15 RX ADMIN — Medication 1 APPLICATION(S): at 17:49

## 2020-02-15 RX ADMIN — WARFARIN SODIUM 5 MILLIGRAM(S): 2.5 TABLET ORAL at 21:25

## 2020-02-15 RX ADMIN — BUMETANIDE 1 MILLIGRAM(S): 0.25 INJECTION INTRAMUSCULAR; INTRAVENOUS at 06:13

## 2020-02-15 RX ADMIN — BUMETANIDE 1 MILLIGRAM(S): 0.25 INJECTION INTRAMUSCULAR; INTRAVENOUS at 13:14

## 2020-02-15 RX ADMIN — Medication 37.5 MILLIGRAM(S): at 22:15

## 2020-02-15 RX ADMIN — Medication 12.5 MILLILITER(S): at 06:12

## 2020-02-15 RX ADMIN — Medication 1 DROP(S): at 17:51

## 2020-02-15 RX ADMIN — Medication 16 UNIT(S): at 08:12

## 2020-02-15 RX ADMIN — Medication 4: at 08:13

## 2020-02-15 RX ADMIN — Medication 1: at 12:01

## 2020-02-15 RX ADMIN — Medication 3 MILLILITER(S): at 06:07

## 2020-02-15 RX ADMIN — PANTOPRAZOLE SODIUM 40 MILLIGRAM(S): 20 TABLET, DELAYED RELEASE ORAL at 08:27

## 2020-02-15 RX ADMIN — OXYCODONE AND ACETAMINOPHEN 1 TABLET(S): 5; 325 TABLET ORAL at 15:45

## 2020-02-15 RX ADMIN — Medication 667 MILLIGRAM(S): at 08:27

## 2020-02-15 RX ADMIN — Medication 37.5 MILLIGRAM(S): at 13:17

## 2020-02-15 RX ADMIN — Medication 3 MILLILITER(S): at 17:02

## 2020-02-15 RX ADMIN — INSULIN GLARGINE 50 UNIT(S): 100 INJECTION, SOLUTION SUBCUTANEOUS at 21:25

## 2020-02-15 RX ADMIN — OXYCODONE AND ACETAMINOPHEN 1 TABLET(S): 5; 325 TABLET ORAL at 09:26

## 2020-02-15 RX ADMIN — Medication 1 TABLET(S): at 11:44

## 2020-02-15 RX ADMIN — Medication 1 DROP(S): at 06:22

## 2020-02-15 RX ADMIN — Medication 667 MILLIGRAM(S): at 17:49

## 2020-02-15 NOTE — PROGRESS NOTE ADULT - ATTENDING COMMENTS
patient seen and examined.  Agree with above NP note.  cv stable  clinically improvin  a/c, amio per cts  iv abx   f/u echo

## 2020-02-15 NOTE — PROGRESS NOTE ADULT - PROBLEM SELECTOR PLAN 1
Will continue current insulin regimen for now. Will continue monitoring FS, log, will Follow up.  Patient counseled for compliance with consistent low carb diet, exercise.

## 2020-02-15 NOTE — PROGRESS NOTE ADULT - SUBJECTIVE AND OBJECTIVE BOX
Chief complaint  Patient is a 64y old  Male who presents with a chief complaint of Chest pain (15 Feb 2020 14:49)   Review of systems  Patient in bed, looks comfortable, no fever, no hypoglycemia.    Labs and Fingersticks  CAPILLARY BLOOD GLUCOSE      POCT Blood Glucose.: 168 mg/dL (15 Feb 2020 11:58)  POCT Blood Glucose.: 307 mg/dL (15 Feb 2020 08:07)  POCT Blood Glucose.: 141 mg/dL (15 Feb 2020 05:10)  POCT Blood Glucose.: 157 mg/dL (14 Feb 2020 20:04)      Anion Gap, Serum: 16 (02-15 @ 00:28)  Anion Gap, Serum: 15 (02-14 @ 01:38)      Calcium, Total Serum: 8.2 <L> (02-15 @ 00:28)  Calcium, Total Serum: 8.1 <L> (02-14 @ 01:38)  Albumin, Serum: 2.3 <L> (02-15 @ 00:28)  Albumin, Serum: 2.4 <L> (02-14 @ 01:38)    Alanine Aminotransferase (ALT/SGPT): 37 (02-15 @ 00:28)  Alanine Aminotransferase (ALT/SGPT): 45 (02-14 @ 01:38)  Alkaline Phosphatase, Serum: 96 (02-15 @ 00:28)  Alkaline Phosphatase, Serum: 99 (02-14 @ 01:38)  Aspartate Aminotransferase (AST/SGOT): 20 (02-15 @ 00:28)  Aspartate Aminotransferase (AST/SGOT): 18 (02-14 @ 01:38)        02-15    128<L>  |  89<L>  |  81<H>  ----------------------------<  138<H>  4.2   |  23  |  2.83<H>    Ca    8.2<L>      15 Feb 2020 00:28  Phos  3.9     02-15  Mg     1.9     02-15    TPro  5.7<L>  /  Alb  2.3<L>  /  TBili  0.5  /  DBili  x   /  AST  20  /  ALT  37  /  AlkPhos  96  02-15                        8.4    14.52 )-----------( 320      ( 15 Feb 2020 00:28 )             24.7     Medications  MEDICATIONS  (STANDING):  albuterol/ipratropium for Nebulization 3 milliLiter(s) Nebulizer every 6 hours  aMIOdarone    Tablet 200 milliGRAM(s) Oral daily  aMIOdarone    Tablet   Oral   ampicillin  IVPB 2 Gram(s) IV Intermittent every 8 hours  artificial tears (preservative free) Ophthalmic Solution 1 Drop(s) Both EYES two times a day  aspirin enteric coated 81 milliGRAM(s) Oral daily  BACItracin   Ointment 1 Application(s) Topical two times a day  buMETAnide Injectable 1 milliGRAM(s) IV Push every 8 hours  calcium acetate 667 milliGRAM(s) Oral three times a day with meals  dextrose 5%. 1000 milliLiter(s) (50 mL/Hr) IV Continuous <Continuous>  dextrose 50% Injectable 12.5 Gram(s) IV Push once  dextrose 50% Injectable 25 Gram(s) IV Push once  dextrose 50% Injectable 25 Gram(s) IV Push once  heparin  Infusion 1050 Unit(s)/Hr (12 mL/Hr) IV Continuous <Continuous>  hydrALAZINE 37.5 milliGRAM(s) Oral every 8 hours  insulin glargine Injectable (LANTUS) 50 Unit(s) SubCutaneous at bedtime  insulin lispro (HumaLOG) corrective regimen sliding scale   SubCutaneous three times a day before meals  insulin lispro (HumaLOG) corrective regimen sliding scale   SubCutaneous at bedtime  insulin lispro Injectable (HumaLOG) 16 Unit(s) SubCutaneous three times a day before meals  multivitamin 1 Tablet(s) Oral daily  pantoprazole    Tablet 40 milliGRAM(s) Oral before breakfast  polyethylene glycol 3350 17 Gram(s) Oral daily  tamsulosin 0.4 milliGRAM(s) Oral at bedtime  warfarin 5 milliGRAM(s) Oral once      Physical Exam  General: Patient comfortable in bed  Vital Signs Last 12 Hrs  T(F): 97 (02-15-20 @ 12:00), Max: 99.2 (02-15-20 @ 04:00)  HR: 90 (02-15-20 @ 12:00) (89 - 98)  BP: 123/59 (02-15-20 @ 12:00) (116/67 - 137/58)  BP(mean): 83 (02-15-20 @ 12:00) (82 - 95)  RR: 21 (02-15-20 @ 12:00) (20 - 26)  SpO2: 97% (02-15-20 @ 12:00) (93% - 99%)  Neck: No palpable thyroid nodules.  CVS: S1S2, No murmurs  Respiratory: No wheezing, no crepitations  GI: Abdomen soft, bowel sounds positive  Musculoskeletal:  edema lower extremities.   Skin: No skin rashes, no ecchymosis    Diagnostics

## 2020-02-15 NOTE — PROGRESS NOTE ADULT - SUBJECTIVE AND OBJECTIVE BOX
Patient is a 64y old  Male who presents with a chief complaint of Chest pain (15 Feb 2020 07:19)    Being followed by ID for Enterococcal bacteremia    Interval history:  Afebrile  Repeat BC- NGTD  No acute events      ROS:  No cough, SOB, CP  No N/V/D./abd pain  No other complaints      Antimicrobials:    ampicillin  IVPB 2 Gram(s) IV Intermittent every 8 hours      Vital Signs Last 24 Hrs  T(C): 36.1 (02-15-20 @ 12:00), Max: 37.4 (02-14-20 @ 20:00)  T(F): 97 (02-15-20 @ 12:00), Max: 99.4 (02-14-20 @ 20:00)  HR: 90 (02-15-20 @ 12:00) (86 - 98)  BP: 123/59 (02-15-20 @ 12:00) (116/67 - 144/63)  BP(mean): 83 (02-15-20 @ 12:00) (82 - 95)  RR: 21 (02-15-20 @ 12:00) (18 - 30)  SpO2: 97% (02-15-20 @ 12:00) (93% - 100%)    Physical Exam:    Constitutional well preserved, NAD    HEENT EOMI, No pallor or icterus    No oral exudate or erythema    Neck supple no LN    Chest Clear to auscultation, Left chest tube, sternotomy without drainage    CVS S1S2 WNl No murmur or rub or gallop    Abd soft BS normal No tenderness no masses    Ext No cyanosis clubbing or edema    IV site no erythema tenderness or discharge    Joints no swelling or LOM    CNS AAO X 3 non focal    Lab Data:                          8.4    14.52 )-----------( 320      ( 15 Feb 2020 00:28 )             24.7       02-15    128<L>  |  89<L>  |  81<H>  ----------------------------<  138<H>  4.2   |  23  |  2.83<H>    Ca    8.2<L>      15 Feb 2020 00:28  Phos  3.9     02-15  Mg     1.9     02-15    TPro  5.7<L>  /  Alb  2.3<L>  /  TBili  0.5  /  DBili  x   /  AST  20  /  ALT  37  /  AlkPhos  96  02-15        .Blood Blood  02-13-20   No growth to date.  --  --      .Blood Blood  02-11-20   No growth to date.  --  --      .Blood Blood  02-09-20   No growth at 5 days.  --  --      .Blood Blood-Peripheral  02-09-20   No growth at 5 days.  --  --      .Sputum Sputum  02-08-20   Normal Respiratory Missy present  --    No polymorphonuclear leukocytes per low power field  Few Squamous epithelial cells per low power field  Few Gram Negative Rods seen per oil power field  Few Gram positive cocci in pairs seen per oil power field      .Blood Blood  02-08-20   Growth in anaerobic bottle: Enterococcus faecalis  See previous culture [id number]  --    Growth in anaerobic bottle: Gram Positive Cocci in Pairs and Chains      < from: Xray Chest 1 View- PORTABLE-Routine (02.15.20 @ 02:46) >  EXAM:  XR CHEST PORTABLE ROUTINE 1V                            PROCEDURE DATE:  02/15/2020            INTERPRETATION:    DATE OF STUDY: 2/15/2020    PRIOR:2/14/2020    CLINICAL INDICATION: S/P CTS.    TECHNIQUE: portable chest.    FINDINGS/  IMPRESSION:   S/P sternotomy.  It is difficult to accurately assess heart size and this projection.  Right lung remains clear.  Further increase in left pleural effusion. Increasing lower left lung haziness may be due to atelectasis - but underlying pneumonia not excluded in the right clinical setting.  No pneumothorax  No acute osseous finding..    < end of copied text >

## 2020-02-15 NOTE — PROGRESS NOTE ADULT - PROBLEM SELECTOR PLAN 1
Pt with  CKD likely  in the setting of long standing DM and HTN.  No prior labs for review. Scr since admission has ranged between 1.8-2 mg/dl.  Scr increased to ~2.50 secondary to hemodynamic injury after CABG  and PEA arrest s/p CPR, and has now worsened to 2.98.. UA significant for protein and RBCs. Urine electrolytes consistent with intrinsic disease. Urine Pr/Cr ratio in nephrotic range. HepBsAg, HepC, C3, C4, SIFE, RACHEL, P-ANCA, C-ANCA, Anti-GBM ab, Parvovirus, RPR, anti-PLA2R ab were negative/non-reactive. Monitor labs and urine output.     Pt. with significant edema (LE and scrotal), back to bumex IV UOP improved. Suggest to continue with IV diuretics for now. 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 No indication for HD, given good response to diuretics.  Avoid NSAIDs, ACEI/ARBS, RCA and nephrotoxins. Dose medications as per eGFR

## 2020-02-15 NOTE — PROGRESS NOTE ADULT - ASSESSMENT
64 yr old with cri stage 4, DM, PVD, admitted and had CABG, Post op intubated and has bilateral effusions, worsening renal disease and bc positive for E. faecalis   follow up bc negative to date.   no signs of sternal wd infection  no recent UTI  lines all new.  No pna on CT     Follow up bc are all negative  no signs of endocarditis   continue present therapy  length needs to be discussed   wbc still high but no evidecne fo uncontrolled source   Unclear what the cause of leukocytosis . legs do not look infected       continue ampicillin  alone at present as we are not treating an intravascular infection   sterum has a small amount of   bloody drainage in the mid wound that needs to be watched    Ender Patel MD  pager [phone]  office [phone]  ID is available over the weekend [phone]

## 2020-02-15 NOTE — PROGRESS NOTE ADULT - SUBJECTIVE AND OBJECTIVE BOX
[facility] DIVISION OF KIDNEY DISEASES AND HYPERTENSION -- FOLLOW UP NOTE  --------------------------------------------------------------------------------  HPI: 63 yo M with HTN, DM II (20 years), admitted with complaints of acute on chronic left sided exertional chest pain. Nephrology team is following for elevated serum creatinine and pre-cardiac catheterization assessment. [facility] available. On admission (1/25/2020), Scr was 1.85. Patient went for cardiac cath on 1/29/20 which revealed triple vessel disease. Scr had improved to 1.76 on 2/3/20. Pt. underwent CABG on 2/3/20. Scr now increased after CABG and PEA arrest on 2/6/20, had improved to ~2.5, however worsened this AM to 2.98. Pt. extubated on 2/8/20.     Pt. seen and examined at bedside this AM. Pt. is sitting upright in chair. Pt back on bumex every 8 hours, ~3 lit of UOP, claims edema has improved, denies cp/sob/n/v/abdominal pain, today felt dizzy in the morning.     PAST HISTORY  --------------------------------------------------------------------------------  No significant changes to PMH, PSH, FHx, SHx, unless otherwise noted    ALLERGIES & MEDICATIONS  --------------------------------------------------------------------------------  Allergies    No Known Allergies    Intolerances      Standing Inpatient Medications  albuterol/ipratropium for Nebulization 3 milliLiter(s) Nebulizer every 6 hours  aMIOdarone    Tablet 200 milliGRAM(s) Oral daily  aMIOdarone    Tablet   Oral   ampicillin  IVPB 2 Gram(s) IV Intermittent every 8 hours  artificial tears (preservative free) Ophthalmic Solution 1 Drop(s) Both EYES two times a day  aspirin  chewable 81 milliGRAM(s) Oral daily  BACItracin   Ointment 1 Application(s) Topical two times a day  buMETAnide Injectable 1 milliGRAM(s) IV Push every 8 hours  calcium acetate 667 milliGRAM(s) Oral three times a day with meals  dextrose 5%. 1000 milliLiter(s) IV Continuous <Continuous>  dextrose 50% Injectable 12.5 Gram(s) IV Push once  dextrose 50% Injectable 25 Gram(s) IV Push once  dextrose 50% Injectable 25 Gram(s) IV Push once  heparin  Infusion 1050 Unit(s)/Hr IV Continuous <Continuous>  hydrALAZINE 25 milliGRAM(s) Oral every 8 hours  insulin glargine Injectable (LANTUS) 50 Unit(s) SubCutaneous at bedtime  insulin lispro (HumaLOG) corrective regimen sliding scale   SubCutaneous three times a day before meals  insulin lispro (HumaLOG) corrective regimen sliding scale   SubCutaneous at bedtime  insulin lispro Injectable (HumaLOG) 16 Unit(s) SubCutaneous three times a day before meals  multivitamin 1 Tablet(s) Oral daily  pantoprazole    Tablet 40 milliGRAM(s) Oral before breakfast  polyethylene glycol 3350 17 Gram(s) Oral daily  tamsulosin 0.4 milliGRAM(s) Oral at bedtime    PRN Inpatient Medications  dextrose 40% Gel 15 Gram(s) Oral once PRN  glucagon  Injectable 1 milliGRAM(s) IntraMuscular once PRN  oxycodone    5 mG/acetaminophen 325 mG 1 Tablet(s) Oral every 4 hours PRN  sodium chloride 0.9% lock flush 10 milliLiter(s) IV Push every 1 hour PRN      REVIEW OF SYSTEMS  --------------------------------------------------------------------------------  Gen: no lethargy, + fatigue  Respiratory: No dyspnea  GI: No abdominal pain  MSK: + LE edema, + scrotal edema  Neuro: No dizziness  Heme: No bleeding    All other systems were reviewed and are nega    VITALS/PHYSICAL EXAM  --------------------------------------------------------------------------------  T(C): 37.3 (02-15-20 @ 04:00), Max: 37.4 (02-14-20 @ 20:00)  HR: 94 (02-15-20 @ 07:00) (86 - 98)  BP: 137/58 (02-15-20 @ 07:00) (119/64 - 144/63)  RR: 26 (02-15-20 @ 07:00) (16 - 30)  SpO2: 98% (02-15-20 @ 07:00) (93% - 100%)  Wt(kg): --        02-14-20 @ 07:01  -  02-15-20 @ 07:00  --------------------------------------------------------  IN: 2104.5 mL / OUT: 3205 mL / NET: -1100.5 mL      Physical Exam:  	Gen: awake  	HEENT: supple neck  	Pulm: CTA b/l  	CV: + central chest dressing from CABG C/D/I, S1S2  	Abd: Soft  	: + scrotal edema  	Ext: + pitting edema B/L  	Neuro: Awake  	Skin: Warm and dry    LABS/STUDIES  --------------------------------------------------------------------------------              8.4    14.52 >-----------<  320      [02-15-20 @ 00:28]              24.7     128  |  89  |  81  ----------------------------<  138      [02-15-20 @ 00:28]  4.2   |  23  |  2.83        Ca     8.2     [02-15-20 @ 00:28]      Mg     1.9     [02-15-20 @ 00:28]      Phos  3.9     [02-15-20 @ 00:28]    TPro  5.7  /  Alb  2.3  /  TBili  0.5  /  DBili  x   /  AST  20  /  ALT  37  /  AlkPhos  96  [02-15-20 @ 00:28]    PT/INR: PT 14.1 , INR 1.22       [02-14-20 @ 16:28]  PTT: 64.2       [02-15-20 @ 04:58]      Creatinine Trend:  SCr 2.83 [02-15 @ 00:28]  SCr 2.98 [02-14 @ 01:38]  SCr 2.56 [02-13 @ 00:21]  SCr 2.48 [02-12 @ 01:43]  SCr 2.55 [02-11 @ 01:28]    Urinalysis - [02-10-20 @ 09:32]      Color Yellow / Appearance Clear / SG 1.014 / pH 6.0      Gluc 100 mg/dL / Ketone Negative  / Bili Negative / Urobili 2 mg/dL       Blood Small / Protein 100 / Leuk Est Negative / Nitrite Negative      RBC 25 / WBC 2 / Hyaline 4 / Gran  / Sq Epi  / Non Sq Epi 1 / Bacteria Negative    Urine Creatinine 52      [02-14-20 @ 11:43]  Urine Protein 73      [02-14-20 @ 11:43]    HbA1c 9.2      [01-26-20 @ 09:03]  TSH 2.37      [02-14-20 @ 21:15]    HBsAg Nonreact      [01-30-20 @ 00:20]  HCV 0.16, Nonreact      [02-04-20 @ 10:29]    RACHEL: titer Negative, pattern --      [01-30-20 @ 00:20]  C3 Complement 136      [01-30-20 @ 00:23]  C4 Complement 56      [01-30-20 @ 00:23]  ANCA: cANCA Negative, pANCA Negative, atypical ANCA Negative      [01-30-20 @ 00:20]  anti-GBM <1.0      [01-30-20 @ 01:14]  Syphilis Screen (Treponema Pallidum Ab) Negative      [01-30-20 @ 00:20]  PLA2R: KRYSTYNA <2, IFA Negative      [01-30-20 @ 01:27]  Immunofixation Serum:   No Monoclonal Band Identified    Reference Range: None Detected      [01-30-20 @ 00:23] [facility] DIVISION OF KIDNEY DISEASES AND HYPERTENSION -- FOLLOW UP NOTE  --------------------------------------------------------------------------------  HPI: 63 yo M with HTN, DM II (20 years), admitted with complaints of acute on chronic left sided exertional chest pain. Nephrology team is following for elevated serum creatinine and pre-cardiac catheterization assessment. [facility] available. On admission (1/25/2020), Scr was 1.85. Patient went for cardiac cath on 1/29/20 which revealed triple vessel disease. Scr had improved to 1.76 on 2/3/20. Pt. underwent CABG on 2/3/20. Scr now increased after CABG and PEA arrest on 2/6/20, had improved to ~2.5, however worsened this AM to 2.98. Pt. extubated on 2/8/20.     Pt. seen and examined at bedside this AM. Pt. is sitting upright in chair. Pt back on bumex every 8 hours, ~3 lit of UOP, claims edema has improved, denies cp/sob/n/v/abdominal pain, today felt dizzy in the morning.     PAST HISTORY  --------------------------------------------------------------------------------  No significant changes to PMH, PSH, FHx, SHx, unless otherwise noted    ALLERGIES & MEDICATIONS  --------------------------------------------------------------------------------  Allergies    No Known Allergies    Intolerances      Standing Inpatient Medications  albuterol/ipratropium for Nebulization 3 milliLiter(s) Nebulizer every 6 hours  aMIOdarone    Tablet 200 milliGRAM(s) Oral daily  aMIOdarone    Tablet   Oral   ampicillin  IVPB 2 Gram(s) IV Intermittent every 8 hours  artificial tears (preservative free) Ophthalmic Solution 1 Drop(s) Both EYES two times a day  aspirin  chewable 81 milliGRAM(s) Oral daily  BACItracin   Ointment 1 Application(s) Topical two times a day  buMETAnide Injectable 1 milliGRAM(s) IV Push every 8 hours  calcium acetate 667 milliGRAM(s) Oral three times a day with meals  dextrose 5%. 1000 milliLiter(s) IV Continuous <Continuous>  dextrose 50% Injectable 12.5 Gram(s) IV Push once  dextrose 50% Injectable 25 Gram(s) IV Push once  dextrose 50% Injectable 25 Gram(s) IV Push once  heparin  Infusion 1050 Unit(s)/Hr IV Continuous <Continuous>  hydrALAZINE 25 milliGRAM(s) Oral every 8 hours  insulin glargine Injectable (LANTUS) 50 Unit(s) SubCutaneous at bedtime  insulin lispro (HumaLOG) corrective regimen sliding scale   SubCutaneous three times a day before meals  insulin lispro (HumaLOG) corrective regimen sliding scale   SubCutaneous at bedtime  insulin lispro Injectable (HumaLOG) 16 Unit(s) SubCutaneous three times a day before meals  multivitamin 1 Tablet(s) Oral daily  pantoprazole    Tablet 40 milliGRAM(s) Oral before breakfast  polyethylene glycol 3350 17 Gram(s) Oral daily  tamsulosin 0.4 milliGRAM(s) Oral at bedtime    PRN Inpatient Medications  dextrose 40% Gel 15 Gram(s) Oral once PRN  glucagon  Injectable 1 milliGRAM(s) IntraMuscular once PRN  oxycodone    5 mG/acetaminophen 325 mG 1 Tablet(s) Oral every 4 hours PRN  sodium chloride 0.9% lock flush 10 milliLiter(s) IV Push every 1 hour PRN      REVIEW OF SYSTEMS  --------------------------------------------------------------------------------  Gen: no lethargy, + fatigue  Respiratory: No dyspnea  GI: No abdominal pain  MSK: + LE edema, + scrotal edema  Neuro: No dizziness  Heme: No bleeding    All other systems were reviewed and are nega    VITALS/PHYSICAL EXAM  --------------------------------------------------------------------------------  T(C): 37.3 (02-15-20 @ 04:00), Max: 37.4 (02-14-20 @ 20:00)  HR: 94 (02-15-20 @ 07:00) (86 - 98)  BP: 137/58 (02-15-20 @ 07:00) (119/64 - 144/63)  RR: 26 (02-15-20 @ 07:00) (16 - 30)  SpO2: 98% (02-15-20 @ 07:00) (93% - 100%)  Wt(kg): --        02-14-20 @ 07:01  -  02-15-20 @ 07:00  --------------------------------------------------------  IN: 2104.5 mL / OUT: 3205 mL / NET: -1100.5 mL      Physical Exam:  	Gen: awake  	HEENT: supple neck  	Pulm: CTA b/l  	CV: + central chest dressing from CABG C/D/I, S1S2  	Abd: Soft  	: + scrotal edema  	Ext: + pitting edema B/L  	Neuro: Awake              Psych: Unable to assess due to clinical condition  	Skin: Warm and dry    LABS/STUDIES  --------------------------------------------------------------------------------              8.4    14.52 >-----------<  320      [02-15-20 @ 00:28]              24.7     128  |  89  |  81  ----------------------------<  138      [02-15-20 @ 00:28]  4.2   |  23  |  2.83        Ca     8.2     [02-15-20 @ 00:28]      Mg     1.9     [02-15-20 @ 00:28]      Phos  3.9     [02-15-20 @ 00:28]    TPro  5.7  /  Alb  2.3  /  TBili  0.5  /  DBili  x   /  AST  20  /  ALT  37  /  AlkPhos  96  [02-15-20 @ 00:28]    PT/INR: PT 14.1 , INR 1.22       [02-14-20 @ 16:28]  PTT: 64.2       [02-15-20 @ 04:58]      Creatinine Trend:  SCr 2.83 [02-15 @ 00:28]  SCr 2.98 [02-14 @ 01:38]  SCr 2.56 [02-13 @ 00:21]  SCr 2.48 [02-12 @ 01:43]  SCr 2.55 [02-11 @ 01:28]    Urinalysis - [02-10-20 @ 09:32]      Color Yellow / Appearance Clear / SG 1.014 / pH 6.0      Gluc 100 mg/dL / Ketone Negative  / Bili Negative / Urobili 2 mg/dL       Blood Small / Protein 100 / Leuk Est Negative / Nitrite Negative      RBC 25 / WBC 2 / Hyaline 4 / Gran  / Sq Epi  / Non Sq Epi 1 / Bacteria Negative    Urine Creatinine 52      [02-14-20 @ 11:43]  Urine Protein 73      [02-14-20 @ 11:43]    HbA1c 9.2      [01-26-20 @ 09:03]  TSH 2.37      [02-14-20 @ 21:15]    HBsAg Nonreact      [01-30-20 @ 00:20]  HCV 0.16, Nonreact      [02-04-20 @ 10:29]    RACHEL: titer Negative, pattern --      [01-30-20 @ 00:20]  C3 Complement 136      [01-30-20 @ 00:23]  C4 Complement 56      [01-30-20 @ 00:23]  ANCA: cANCA Negative, pANCA Negative, atypical ANCA Negative      [01-30-20 @ 00:20]  anti-GBM <1.0      [01-30-20 @ 01:14]  Syphilis Screen (Treponema Pallidum Ab) Negative      [01-30-20 @ 00:20]  PLA2R: KRYSTYNA <2, IFA Negative      [01-30-20 @ 01:27]  Immunofixation Serum:   No Monoclonal Band Identified    Reference Range: None Detected      [01-30-20 @ 00:23]

## 2020-02-15 NOTE — PROGRESS NOTE ADULT - ASSESSMENT
intraop kennedy 2/3/20 ef 50%, nl lv, mild diastolic dysfx stage 1  limited echo 2/4/20: nl LV sys fx , no pericardial effusion     a/p  64 year old man with history of HTN, DM II, admitted with progressive exertional angina, s/p cath with severe triple vessel disease, s/p CABG, post op course c/b brief witnessed PEA likely hypoxic arrest, s/p intubation.     1. CAD, s/p cath with severe triple vessel disease including lesions at the bifurcation of the LAD/diagonal and distal RCA/RPDA/RPL trifurcation.   -s/p CABG x 4   -intraop kennedy ef 50%  -cont asa, statin  -s/p PEA arrest, now extubated  -off vasopressors  --LE dopplers, negative for dvt   - repeat echo pending     2. Postop  CHF-Systolic  -cont bumex per ctu     3. PAF  -cont amio load  -AC per CTS    4. HTN  -bp stable on hydralazine    5. STEPHANIE/CKD  renal f/u     6. Postop Pleural effusion  - S/P Right chest tube:  removed   - L chest tube in place   -CT mgmt per CTS    7. Sepsis -E. Faecalis bacteremia  IV abx per CTICU  ID following , repeat chest xray noted         dvt ppx intraop kennedy 2/3/20 ef 50%, nl lv, mild diastolic dysfx stage 1  limited echo 2/4/20: nl LV sys fx , no pericardial effusion     a/p  64 year old man with history of HTN, DM II, admitted with progressive exertional angina, s/p cath with severe triple vessel disease, s/p CABG, post op course c/b brief witnessed PEA likely hypoxic arrest, s/p intubation.     1. CAD, s/p cath with severe triple vessel disease including lesions at the bifurcation of the LAD/diagonal and distal RCA/RPDA/RPL trifurcation.   -s/p CABG x 4   -intraop kennedy ef 50%  -cont asa, statin  -s/p PEA arrest, now extubated  -off vasopressors  -LE dopplers, negative for dvt   - repeat echo pending     2. Postop  CHF-Systolic  -cont bumex per ctu     3. PAF  -cont amio load  -AC per CTS    4. HTN  -bp stable on hydralazine    5. STEPHANIE/CKD  renal f/u     6. Postop Pleural effusion  - S/P Right chest tube:  removed   - L chest tube in place   -CT mgmt per CTS    7. Sepsis -E. Faecalis bacteremia  IV abx per CTICU  ID following , repeat chest xray noted         dvt ppx

## 2020-02-15 NOTE — PROGRESS NOTE ADULT - SUBJECTIVE AND OBJECTIVE BOX
FRANKLIN PRESLEY                     [id number]    HPI:  63yo male with hx of HTN, DM II, presented to the ED with complaints of acute on chronic left sided exertional chest pain. Pt states he has been having left sided pressure and stabbing chest pain radiating to his sternum and right with activity for the past few months. He states each episode last approximately 1-2 mins and subsides upon resting. He denies associated symptoms of radiation to his back, arm or jaw. He recently was at a wedding which he could not enjoy because dancing would reproduce to the pain. He states he did not pursue and medical attention until this time. Pt states this time his pain was associated with sob up exertion. He denies symptoms of LOC, diaphoresis, palpitations, abd pain, N/V, fever or chills. He denies prior cardiac work up. (25 Jan 2020 12:57)      Hospital Course:  s/p C4L on 2/3; PEA arrest on 2/6   Probable nephrotic syndrome    ICU Vital Signs Last 24 Hrs  ICU Vital Signs Last 24 Hrs  T(C): 36.8 (15 Feb 2020 08:00), Max: 37.4 (14 Feb 2020 20:00)  T(F): 98.2 (15 Feb 2020 08:00), Max: 99.4 (14 Feb 2020 20:00)  HR: 95 (15 Feb 2020 09:00) (86 - 98)  BP: 133/71 (15 Feb 2020 09:00) (119/64 - 144/63)  BP(mean): 95 (15 Feb 2020 09:00) (83 - 95)  ABP: 167/61 (15 Feb 2020 09:00) (137/58 - 196/66)  ABP(mean): 88 (15 Feb 2020 09:00) (72 - 95)  RR: 23 (15 Feb 2020 09:00) (16 - 30)  SpO2: 95% (15 Feb 2020 09:00) (93% - 100%)    =========================I&Os=========================    I&O's Summary    14 Feb 2020 07:01  -  15 Feb 2020 07:00  --------------------------------------------------------  IN: 2104.5 mL / OUT: 3205 mL / NET: -1100.5 mL    15 Feb 2020 07:01  -  15 Feb 2020 09:20  --------------------------------------------------------  IN: 324 mL / OUT: 400 mL / NET: -76 mL        ============================LABS=========================                        8.4    14.52 )-----------( 320      ( 15 Feb 2020 00:28 )             24.7   02-15    128<L>  |  89<L>  |  81<H>  ----------------------------<  138<H>  4.2   |  23  |  2.83<H>    Ca    8.2<L>      15 Feb 2020 00:28  Phos  3.9     02-15  Mg     1.9     02-15    TPro  5.7<L>  /  Alb  2.3<L>  /  TBili  0.5  /  DBili  x   /  AST  20  /  ALT  37  /  AlkPhos  96  02-15    ===========================PMHX&PSHx==========================    PAST MEDICAL & SURGICAL HISTORY:  HTN (hypertension)  Diabetes  History of appendectomy: x 30 yrs ago    ===========================MEDICATIONS============================  MEDICATIONS  (STANDING):  albuterol/ipratropium for Nebulization 3 milliLiter(s) Nebulizer every 6 hours  aMIOdarone    Tablet 200 milliGRAM(s) Oral daily  aMIOdarone    Tablet   Oral   ampicillin  IVPB 2 Gram(s) IV Intermittent every 8 hours  artificial tears (preservative free) Ophthalmic Solution 1 Drop(s) Both EYES two times a day  aspirin  chewable 81 milliGRAM(s) Oral daily  BACItracin   Ointment 1 Application(s) Topical two times a day  buMETAnide Injectable 1 milliGRAM(s) IV Push every 8 hours  calcium acetate 667 milliGRAM(s) Oral three times a day with meals  heparin  Infusion 1050 Unit(s)/Hr (12 mL/Hr) IV Continuous <Continuous>  hydrALAZINE 37.5 milliGRAM(s) Oral every 8 hours  insulin glargine Injectable (LANTUS) 50 Unit(s) SubCutaneous at bedtime  insulin lispro (HumaLOG) corrective regimen sliding scale   SubCutaneous three times a day before meals  insulin lispro (HumaLOG) corrective regimen sliding scale   SubCutaneous at bedtime  insulin lispro Injectable (HumaLOG) 16 Unit(s) SubCutaneous three times a day before meals  multivitamin 1 Tablet(s) Oral daily  pantoprazole    Tablet 40 milliGRAM(s) Oral before breakfast  polyethylene glycol 3350 17 Gram(s) Oral daily  tamsulosin 0.4 milliGRAM(s) Oral at bedtime    =============================ASSESSMENT===========================   s/p C4L on 2/3   PEA arrest on 2/6   Negative V/Q scan  Probable nephrotic syndrome     =============================NEUROLOGY============================  A+O x3 Nonfocal  In Chair    ==============================RESPIRATORY========================  acute respiratory failure, post op, requiring BiPAP/High Flow for hypoxia  Left moderate to large pleural effusion on bedside sono, pigtail in place  HF O2, 40% on 40L, will wean   Comfortable, not in any distress.  Remove R chest tube today   Monitor L chest tube output  L Chest tube to suction   Continue bronchodilators, pulmonary toilet    ============================CARDIOVASCULAR======================  Continue hemodynamic monitoring.  Not on any pressors  PAF, now in NSR, continue Amio load    =====================RENAL===================  Osorio   Monitor I/Os and electrolytes  Patient has probably diagnosis of Nephrotic syndrome  Performing a 24 urine for protin   Modifying diet   ====================GASTROINTESTINAL===================  Continue GI prophylaxis with Protonix    ========================HEMATOLOGIC/ONCOLOGIC====================  Continue heparin drip  Monitor chest tube output. No signs of active bleeding.   Follow CBC in AM    ============================INFECTIOUS DISEASE========================  Monitor for fever / leukocytosis.  All surgical incision / chest tube  sites look clean      Pt is on GI & DVT prophylaxis  OOB & ambulate     Pertinent clinical, laboratory, radiographic, hemodynamic, echocardiographic, respiratory data, microbiologic data and chart were reviewed and analyzed frequently throughout the course of the day and night  Patient seen, examined and plan discussed with CT Surgery / CTICU team during rounds.    I spent 30 minutes at bedside providing critical care, noncontinuous.

## 2020-02-15 NOTE — PROGRESS NOTE ADULT - PROBLEM SELECTOR PLAN 2
Pt. with likely hypervolemic hyponatremia in the setting of volume overload. Na stable, c/w diuretics as above.     Osvaldo Card   Nephrology Fellow  Pager

## 2020-02-15 NOTE — PROGRESS NOTE ADULT - SUBJECTIVE AND OBJECTIVE BOX
CARDIOLOGY FOLLOW UP - Dr. Steel    CC "i feel ok"        PHYSICAL EXAM:  T(C): 36.1 (02-15-20 @ 12:00), Max: 37.4 (02-14-20 @ 20:00)  HR: 90 (02-15-20 @ 12:00) (86 - 98)  BP: 123/59 (02-15-20 @ 12:00) (116/67 - 144/63)  RR: 21 (02-15-20 @ 12:00) (18 - 30)  SpO2: 97% (02-15-20 @ 12:00) (93% - 100%)  Wt(kg): --  I&O's Summary    14 Feb 2020 07:01  -  15 Feb 2020 07:00  --------------------------------------------------------  IN: 2104.5 mL / OUT: 3205 mL / NET: -1100.5 mL    15 Feb 2020 07:01  -  15 Feb 2020 14:49  --------------------------------------------------------  IN: 560 mL / OUT: 825 mL / NET: -265 mL        Appearance: Normal	  Cardiovascular: Normal S1 S2,RR  Respiratory: diminished  	  Gastrointestinal:  Soft, Non-tender, + BS	  Extremities: Normal range of motion, No clubbing, cyanosis or edema        MEDICATIONS  (STANDING):  albuterol/ipratropium for Nebulization 3 milliLiter(s) Nebulizer every 6 hours  aMIOdarone    Tablet 200 milliGRAM(s) Oral daily  aMIOdarone    Tablet   Oral   ampicillin  IVPB 2 Gram(s) IV Intermittent every 8 hours  artificial tears (preservative free) Ophthalmic Solution 1 Drop(s) Both EYES two times a day  aspirin enteric coated 81 milliGRAM(s) Oral daily  BACItracin   Ointment 1 Application(s) Topical two times a day  buMETAnide Injectable 1 milliGRAM(s) IV Push every 8 hours  calcium acetate 667 milliGRAM(s) Oral three times a day with meals  dextrose 5%. 1000 milliLiter(s) (50 mL/Hr) IV Continuous <Continuous>  dextrose 50% Injectable 12.5 Gram(s) IV Push once  dextrose 50% Injectable 25 Gram(s) IV Push once  dextrose 50% Injectable 25 Gram(s) IV Push once  heparin  Infusion 1050 Unit(s)/Hr (12 mL/Hr) IV Continuous <Continuous>  hydrALAZINE 37.5 milliGRAM(s) Oral every 8 hours  insulin glargine Injectable (LANTUS) 50 Unit(s) SubCutaneous at bedtime  insulin lispro (HumaLOG) corrective regimen sliding scale   SubCutaneous three times a day before meals  insulin lispro (HumaLOG) corrective regimen sliding scale   SubCutaneous at bedtime  insulin lispro Injectable (HumaLOG) 16 Unit(s) SubCutaneous three times a day before meals  multivitamin 1 Tablet(s) Oral daily  pantoprazole    Tablet 40 milliGRAM(s) Oral before breakfast  polyethylene glycol 3350 17 Gram(s) Oral daily  tamsulosin 0.4 milliGRAM(s) Oral at bedtime  warfarin 5 milliGRAM(s) Oral once      TELEMETRY: nsr 	    ECG:  	  RADIOLOGY:   < from: Xray Chest 1 View- PORTABLE-Routine (02.15.20 @ 02:46) >  FINDINGS/  IMPRESSION:   S/P sternotomy.  It is difficult to accurately assess heart size and this projection.  Right lung remains clear.  Further increase in left pleural effusion. Increasing lower left lung haziness may be due to atelectasis - but underlying pneumonia not excluded in the right clinical setting.  No pneumothorax  No acute osseous finding..      < end of copied text >    DIAGNOSTIC TESTING:  [ ] Echocardiogram:  [ ]  Catheterization:  [ ] Stress Test:    OTHER: 	    LABS:	 	                            8.4    14.52 )-----------( 320      ( 15 Feb 2020 00:28 )             24.7     02-15    128<L>  |  89<L>  |  81<H>  ----------------------------<  138<H>  4.2   |  23  |  2.83<H>    Ca    8.2<L>      15 Feb 2020 00:28  Phos  3.9     02-15  Mg     1.9     02-15    TPro  5.7<L>  /  Alb  2.3<L>  /  TBili  0.5  /  DBili  x   /  AST  20  /  ALT  37  /  AlkPhos  96  02-15    PT/INR - ( 15 Feb 2020 11:02 )   PT: 15.4 sec;   INR: 1.34 ratio         PTT - ( 15 Feb 2020 11:02 )  PTT:62.0 sec

## 2020-02-15 NOTE — PROGRESS NOTE ADULT - ASSESSMENT
Assessment  DMT2: 64y Male with DM T2 with hyperglycemia, A1C 9.2%,  was on oral meds and insulin at home, now on basal bolus insulin, adjusted dose yesterday, blood sugars fluctuating, feels hypoglycemia symptoms when sugars reach 100, received D50%,  no hypoglycemic episodes. Patient is eating meals with good appetite, appears comfortable.  CAD: s/p CABG 2/3, on medications, no chest pain, stable, monitored.  HTN: Controlled,  on antihypertensive medications.  HLD: Controlled, on statin.  CKD: Monitor labs/BMP          Harper Matias MD  Cell: [phone]  Office: [phone]

## 2020-02-15 NOTE — PROGRESS NOTE ADULT - ATTENDING COMMENTS
I have seen this patient with the fellow and agree with their assessment and plan. In addition,    # STEPHANIE  - He initially had acute tubular necrosis as a result of PEA arrest. Serum creatinine stayed stable at 2.5, but noted worsening serum creatinine yesterday likely secondary to renal venous congestion. IV bumetanide resumed. To continue IV bumetanide to achieve net negative ~1-1.5 liters.     # CKD stage 3/4. Etiology secondary to diabetic nephropathy. Baseline serum creatinine 1.8-2.0.     # Proteinuria - Likely from diabetic nephropathy. PLA2R negative. Serological workup negative thus far.     # Volume overload. Still has generalized edema. Continue IV bumetanide to keep I/O negative.     # Anemia - likely multifactorial (anemia of CKD and phlebotomy). To check iron studies.     # Medication toxicity Monitoring: medication dose reviewed. Since patient has acute kidney injury, please dose medications to CrCl<15    The rest of the recommendations as per fellow's note.    Maryam Dutta MD  Attending Nephrologist  [phone]

## 2020-02-16 LAB
ALBUMIN SERPL ELPH-MCNC: 2.1 G/DL — LOW (ref 3.3–5)
ALP SERPL-CCNC: 99 U/L — SIGNIFICANT CHANGE UP (ref 40–120)
ALT FLD-CCNC: 31 U/L — SIGNIFICANT CHANGE UP (ref 10–45)
ANION GAP SERPL CALC-SCNC: 14 MMOL/L — SIGNIFICANT CHANGE UP (ref 5–17)
APTT BLD: 62.6 SEC — HIGH (ref 27.5–36.3)
APTT BLD: 67.7 SEC — HIGH (ref 27.5–36.3)
AST SERPL-CCNC: 18 U/L — SIGNIFICANT CHANGE UP (ref 10–40)
BILIRUB SERPL-MCNC: 0.5 MG/DL — SIGNIFICANT CHANGE UP (ref 0.2–1.2)
BUN SERPL-MCNC: 72 MG/DL — HIGH (ref 7–23)
CALCIUM SERPL-MCNC: 8.2 MG/DL — LOW (ref 8.4–10.5)
CHLORIDE SERPL-SCNC: 89 MMOL/L — LOW (ref 96–108)
CO2 SERPL-SCNC: 23 MMOL/L — SIGNIFICANT CHANGE UP (ref 22–31)
COLLECT DURATION TIME UR: 24 HR — SIGNIFICANT CHANGE UP
CREAT SERPL-MCNC: 2.62 MG/DL — HIGH (ref 0.5–1.3)
CULTURE RESULTS: SIGNIFICANT CHANGE UP
GAS PNL BLDA: SIGNIFICANT CHANGE UP
GLUCOSE BLDC GLUCOMTR-MCNC: 169 MG/DL — HIGH (ref 70–99)
GLUCOSE BLDC GLUCOMTR-MCNC: 196 MG/DL — HIGH (ref 70–99)
GLUCOSE BLDC GLUCOMTR-MCNC: 249 MG/DL — HIGH (ref 70–99)
GLUCOSE SERPL-MCNC: 184 MG/DL — HIGH (ref 70–99)
HCT VFR BLD CALC: 25.9 % — LOW (ref 39–50)
HGB BLD-MCNC: 8.3 G/DL — LOW (ref 13–17)
INR BLD: 1.34 RATIO — HIGH (ref 0.88–1.16)
INR BLD: 1.38 RATIO — HIGH (ref 0.88–1.16)
MAGNESIUM SERPL-MCNC: 1.9 MG/DL — SIGNIFICANT CHANGE UP (ref 1.6–2.6)
MCHC RBC-ENTMCNC: 27.5 PG — SIGNIFICANT CHANGE UP (ref 27–34)
MCHC RBC-ENTMCNC: 32 GM/DL — SIGNIFICANT CHANGE UP (ref 32–36)
MCV RBC AUTO: 85.8 FL — SIGNIFICANT CHANGE UP (ref 80–100)
NRBC # BLD: 0 /100 WBCS — SIGNIFICANT CHANGE UP (ref 0–0)
PHOSPHATE SERPL-MCNC: 3.3 MG/DL — SIGNIFICANT CHANGE UP (ref 2.5–4.5)
PLATELET # BLD AUTO: 311 K/UL — SIGNIFICANT CHANGE UP (ref 150–400)
POTASSIUM SERPL-MCNC: 4.2 MMOL/L — SIGNIFICANT CHANGE UP (ref 3.5–5.3)
POTASSIUM SERPL-SCNC: 4.2 MMOL/L — SIGNIFICANT CHANGE UP (ref 3.5–5.3)
PROT 24H UR-MRATE: 2709 MG/24 H — HIGH (ref 50–100)
PROT SERPL-MCNC: 5.9 G/DL — LOW (ref 6–8.3)
PROTHROM AB SERPL-ACNC: 15.4 SEC — HIGH (ref 10–12.9)
PROTHROM AB SERPL-ACNC: 16 SEC — HIGH (ref 10–12.9)
RBC # BLD: 3.02 M/UL — LOW (ref 4.2–5.8)
RBC # FLD: 14.1 % — SIGNIFICANT CHANGE UP (ref 10.3–14.5)
SODIUM SERPL-SCNC: 126 MMOL/L — LOW (ref 135–145)
SPECIMEN SOURCE: SIGNIFICANT CHANGE UP
TOTAL VOLUME - 24 HOUR: 3225 ML — SIGNIFICANT CHANGE UP
URINE CREATININE CALCULATION: 1.4 G/24 H — SIGNIFICANT CHANGE UP (ref 1–2)
WBC # BLD: 14.75 K/UL — HIGH (ref 3.8–10.5)
WBC # FLD AUTO: 14.75 K/UL — HIGH (ref 3.8–10.5)

## 2020-02-16 PROCEDURE — 99291 CRITICAL CARE FIRST HOUR: CPT

## 2020-02-16 PROCEDURE — 93010 ELECTROCARDIOGRAM REPORT: CPT

## 2020-02-16 PROCEDURE — 99232 SBSQ HOSP IP/OBS MODERATE 35: CPT | Mod: GC

## 2020-02-16 PROCEDURE — 32556 INSERT CATH PLEURA W/O IMAGE: CPT | Mod: 78

## 2020-02-16 PROCEDURE — 71045 X-RAY EXAM CHEST 1 VIEW: CPT | Mod: 26,76

## 2020-02-16 RX ORDER — WARFARIN SODIUM 2.5 MG/1
5 TABLET ORAL ONCE
Refills: 0 | Status: COMPLETED | OUTPATIENT
Start: 2020-02-16 | End: 2020-02-16

## 2020-02-16 RX ORDER — INSULIN LISPRO 100/ML
18 VIAL (ML) SUBCUTANEOUS
Refills: 0 | Status: DISCONTINUED | OUTPATIENT
Start: 2020-02-16 | End: 2020-02-17

## 2020-02-16 RX ORDER — MAGNESIUM SULFATE 500 MG/ML
1 VIAL (ML) INJECTION ONCE
Refills: 0 | Status: COMPLETED | OUTPATIENT
Start: 2020-02-16 | End: 2020-02-16

## 2020-02-16 RX ORDER — METOPROLOL TARTRATE 50 MG
25 TABLET ORAL EVERY 12 HOURS
Refills: 0 | Status: DISCONTINUED | OUTPATIENT
Start: 2020-02-16 | End: 2020-02-21

## 2020-02-16 RX ORDER — HYDRALAZINE HCL 50 MG
50 TABLET ORAL EVERY 8 HOURS
Refills: 0 | Status: DISCONTINUED | OUTPATIENT
Start: 2020-02-16 | End: 2020-02-25

## 2020-02-16 RX ORDER — LANOLIN ALCOHOL/MO/W.PET/CERES
3 CREAM (GRAM) TOPICAL AT BEDTIME
Refills: 0 | Status: DISCONTINUED | OUTPATIENT
Start: 2020-02-16 | End: 2020-02-25

## 2020-02-16 RX ORDER — HEPARIN SODIUM 5000 [USP'U]/ML
5000 INJECTION INTRAVENOUS; SUBCUTANEOUS EVERY 8 HOURS
Refills: 0 | Status: DISCONTINUED | OUTPATIENT
Start: 2020-02-16 | End: 2020-02-25

## 2020-02-16 RX ADMIN — Medication 18 UNIT(S): at 17:12

## 2020-02-16 RX ADMIN — POLYETHYLENE GLYCOL 3350 17 GRAM(S): 17 POWDER, FOR SOLUTION ORAL at 13:36

## 2020-02-16 RX ADMIN — Medication 667 MILLIGRAM(S): at 17:07

## 2020-02-16 RX ADMIN — Medication 216 GRAM(S): at 13:41

## 2020-02-16 RX ADMIN — Medication 1 TABLET(S): at 13:41

## 2020-02-16 RX ADMIN — Medication 25 MILLIGRAM(S): at 17:06

## 2020-02-16 RX ADMIN — Medication 16 UNIT(S): at 08:23

## 2020-02-16 RX ADMIN — Medication 37.5 MILLIGRAM(S): at 13:36

## 2020-02-16 RX ADMIN — Medication 1: at 08:23

## 2020-02-16 RX ADMIN — Medication 1 DROP(S): at 05:49

## 2020-02-16 RX ADMIN — PANTOPRAZOLE SODIUM 40 MILLIGRAM(S): 20 TABLET, DELAYED RELEASE ORAL at 08:20

## 2020-02-16 RX ADMIN — OXYCODONE AND ACETAMINOPHEN 1 TABLET(S): 5; 325 TABLET ORAL at 06:40

## 2020-02-16 RX ADMIN — Medication 1 DROP(S): at 17:07

## 2020-02-16 RX ADMIN — TAMSULOSIN HYDROCHLORIDE 0.4 MILLIGRAM(S): 0.4 CAPSULE ORAL at 22:14

## 2020-02-16 RX ADMIN — HEPARIN SODIUM 5000 UNIT(S): 5000 INJECTION INTRAVENOUS; SUBCUTANEOUS at 22:14

## 2020-02-16 RX ADMIN — AMIODARONE HYDROCHLORIDE 200 MILLIGRAM(S): 400 TABLET ORAL at 05:47

## 2020-02-16 RX ADMIN — Medication 667 MILLIGRAM(S): at 13:36

## 2020-02-16 RX ADMIN — Medication 3 MILLILITER(S): at 06:19

## 2020-02-16 RX ADMIN — OXYCODONE AND ACETAMINOPHEN 1 TABLET(S): 5; 325 TABLET ORAL at 16:40

## 2020-02-16 RX ADMIN — BUMETANIDE 1 MILLIGRAM(S): 0.25 INJECTION INTRAMUSCULAR; INTRAVENOUS at 05:47

## 2020-02-16 RX ADMIN — OXYCODONE AND ACETAMINOPHEN 1 TABLET(S): 5; 325 TABLET ORAL at 00:30

## 2020-02-16 RX ADMIN — Medication 50 MILLIGRAM(S): at 22:14

## 2020-02-16 RX ADMIN — OXYCODONE AND ACETAMINOPHEN 1 TABLET(S): 5; 325 TABLET ORAL at 00:00

## 2020-02-16 RX ADMIN — Medication 216 GRAM(S): at 05:49

## 2020-02-16 RX ADMIN — Medication 2: at 17:12

## 2020-02-16 RX ADMIN — BUMETANIDE 1 MILLIGRAM(S): 0.25 INJECTION INTRAMUSCULAR; INTRAVENOUS at 22:16

## 2020-02-16 RX ADMIN — Medication 216 GRAM(S): at 21:26

## 2020-02-16 RX ADMIN — OXYCODONE AND ACETAMINOPHEN 1 TABLET(S): 5; 325 TABLET ORAL at 06:15

## 2020-02-16 RX ADMIN — OXYCODONE AND ACETAMINOPHEN 1 TABLET(S): 5; 325 TABLET ORAL at 13:40

## 2020-02-16 RX ADMIN — Medication 3 MILLIGRAM(S): at 22:15

## 2020-02-16 RX ADMIN — BUMETANIDE 1 MILLIGRAM(S): 0.25 INJECTION INTRAMUSCULAR; INTRAVENOUS at 13:36

## 2020-02-16 RX ADMIN — Medication 81 MILLIGRAM(S): at 13:35

## 2020-02-16 RX ADMIN — Medication 3 MILLILITER(S): at 17:22

## 2020-02-16 RX ADMIN — Medication 1 APPLICATION(S): at 17:07

## 2020-02-16 RX ADMIN — Medication 37.5 MILLIGRAM(S): at 05:48

## 2020-02-16 RX ADMIN — INSULIN GLARGINE 50 UNIT(S): 100 INJECTION, SOLUTION SUBCUTANEOUS at 22:16

## 2020-02-16 RX ADMIN — Medication 100 GRAM(S): at 13:44

## 2020-02-16 RX ADMIN — WARFARIN SODIUM 5 MILLIGRAM(S): 2.5 TABLET ORAL at 22:15

## 2020-02-16 RX ADMIN — Medication 3 MILLILITER(S): at 12:36

## 2020-02-16 RX ADMIN — Medication 1 APPLICATION(S): at 05:47

## 2020-02-16 RX ADMIN — Medication 667 MILLIGRAM(S): at 08:20

## 2020-02-16 NOTE — PROGRESS NOTE ADULT - ASSESSMENT
Assessment  DMT2: 64y Male with DM T2 with hyperglycemia, A1C 9.2%,  was on oral meds and insulin at home, now on basal bolus insulin, blood sugars still running high after meals,  no hypoglycemic episodes. Patient is eating meals with good appetite, appears comfortable.  CAD: s/p CABG 2/3, on medications, no chest pain, stable, monitored.  HTN: Controlled,  on antihypertensive medications.  HLD: Controlled, on statin.  CKD: Monitor labs/BMP          Harper Matias MD  Cell: [phone]  Office: [phone]

## 2020-02-16 NOTE — PROGRESS NOTE ADULT - PROBLEM SELECTOR PLAN 1
Pt with  CKD likely  in the setting of long standing DM and HTN.  No prior labs for review. Scr since admission has ranged between 1.8-2 mg/dl.  Scr increased to ~2.50 secondary to hemodynamic injury after CABG  and PEA arrest s/p CPR, and has now worsened to 2.98.. UA significant for protein and RBCs. Urine electrolytes consistent with intrinsic disease. Urine Pr/Cr ratio in nephrotic range. HepBsAg, HepC, C3, C4, SIFE, RACHEL, P-ANCA, C-ANCA, Anti-GBM ab, Parvovirus, RPR, anti-PLA2R ab were negative/non-reactive. Monitor labs and urine output.     Pt. with significant edema (LE and scrotal), [facility] Suggest to continue with IV diuretics for now. Proteinuria most likely from DM nephropathy, 24 hour urine protein 2g Not nephrotic range, most likely DM induced proteinuria.  No indication for HD, given good response to diuretics.  Avoid NSAIDs, ACEI/ARBS, RCA and nephrotoxins. Dose medications as per eGFR

## 2020-02-16 NOTE — PROGRESS NOTE ADULT - SUBJECTIVE AND OBJECTIVE BOX
CRITICAL CARE ATTENDING - CTICU    MEDICATIONS  (STANDING):  albuterol/ipratropium for Nebulization 3 milliLiter(s) Nebulizer every 6 hours  aMIOdarone    Tablet 200 milliGRAM(s) Oral daily  aMIOdarone    Tablet   Oral   ampicillin  IVPB 2 Gram(s) IV Intermittent every 8 hours  artificial tears (preservative free) Ophthalmic Solution 1 Drop(s) Both EYES two times a day  aspirin enteric coated 81 milliGRAM(s) Oral daily  BACItracin   Ointment 1 Application(s) Topical two times a day  buMETAnide Injectable 1 milliGRAM(s) IV Push every 8 hours  calcium acetate 667 milliGRAM(s) Oral three times a day with meals  dextrose 5%. 1000 milliLiter(s) (50 mL/Hr) IV Continuous <Continuous>  dextrose 50% Injectable 12.5 Gram(s) IV Push once  dextrose 50% Injectable 25 Gram(s) IV Push once  dextrose 50% Injectable 25 Gram(s) IV Push once  hydrALAZINE 37.5 milliGRAM(s) Oral every 8 hours  insulin glargine Injectable (LANTUS) 50 Unit(s) SubCutaneous at bedtime  insulin lispro (HumaLOG) corrective regimen sliding scale   SubCutaneous three times a day before meals  insulin lispro (HumaLOG) corrective regimen sliding scale   SubCutaneous at bedtime  insulin lispro Injectable (HumaLOG) 16 Unit(s) SubCutaneous three times a day before meals  multivitamin 1 Tablet(s) Oral daily  pantoprazole    Tablet 40 milliGRAM(s) Oral before breakfast  polyethylene glycol 3350 17 Gram(s) Oral daily  tamsulosin 0.4 milliGRAM(s) Oral at bedtime                                    8.3    14.75 )-----------( 311      ( 16 Feb 2020 00:49 )             25.9       02-16    126<L>  |  89<L>  |  72<H>  ----------------------------<  184<H>  4.2   |  23  |  2.62<H>    Ca    8.2<L>      16 Feb 2020 00:49  Phos  3.3     02-16  Mg     1.9     02-16    TPro  5.9<L>  /  Alb  2.1<L>  /  TBili  0.5  /  DBili  x   /  AST  18  /  ALT  31  /  AlkPhos  99  02-16      PT/INR - ( 16 Feb 2020 02:59 )   PT: 15.4 sec;   INR: 1.34 ratio         PTT - ( 16 Feb 2020 02:59 )  PTT:67.7 sec        Daily     Daily       02-15 @ 07:01  -  02-16 @ 07:00  --------------------------------------------------------  IN: 1588 mL / OUT: 3370 mL / NET: -1782 mL    02-16 @ 07:01  -  02-16 @ 09:56  --------------------------------------------------------  IN: 12 mL / OUT: 450 mL / NET: -438 mL        Critically Ill patient  : [ ] preoperative ,   [ ] post operative    Requires :  [ ] Arterial Line   [ ] Central Line  [ ] PA catheter  [ ] IABP  [ ] ECMO  [ ] LVAD  [ ] Ventilator  [ ] pacemaker [ ] Impella.                      [ ] ABG's     [ ] Pulse Oxymetry Monitoring  Bedside evaluation , monitoring , treatment of hemodynamics , fluids , IVP/ IVCD meds.        Diagnosis:     POD 2/3 - CABG X 4 L    PEA Arrest ? Pulmonary Embolism ?     IVCD anticoagulation with [ x] Heparin  [ ] Argatroban for PE / A Fib    L Pleural Effusion     Hyponatremia     Renal Failure - Acute Kidney Injury     Nephrotic syndrome     A Fib.    CHF- acute [x ]   chronic [ ]    systolic [x ]   diatolic [ ]          - Echo- EF -             [x ] RV dysfunction          - Cxr-cardiomegally, edema          - Clinical-  [ ]inotropes   [ ]pressors   [x ]diuresis   [ ]IABP   [ ]ECMO   [ ]LVAD   [ ]Respiratory Failure    Sonosite L Chest - effusion                     -                     Discussed with CT surgeon, Physician's Assistant - Nurse Practitioner- Critical care medicine team.   Dicussed at  AM / PM rounds.   Chart, labs , films reviewed.    Total Time: 20 min

## 2020-02-16 NOTE — PROGRESS NOTE ADULT - SUBJECTIVE AND OBJECTIVE BOX
CARDIOLOGY FOLLOW UP NOTE - DR. CUADRA    Subjective:    walking with pt  no chest pain  no inc sob    PHYSICAL EXAM:  T(C): 37 (02-16-20 @ 08:00), Max: 37.1 (02-16-20 @ 00:00)  HR: 91 (02-16-20 @ 11:00) (81 - 95)  BP: 123/69 (02-16-20 @ 11:00) (111/71 - 132/68)  RR: 21 (02-16-20 @ 10:00) (16 - 28)  SpO2: 96% (02-16-20 @ 11:00) (93% - 98%)  Wt(kg): --  I&O's Summary    15 Feb 2020 07:01  -  16 Feb 2020 07:00  --------------------------------------------------------  IN: 1588 mL / OUT: 3370 mL / NET: -1782 mL    16 Feb 2020 07:01  -  16 Feb 2020 11:52  --------------------------------------------------------  IN: 12 mL / OUT: 775 mL / NET: -763 mL      Daily     Daily     Appearance: Normal	  Cardiovascular: Normal S1 S2,RRR, No JVD, No murmurs  Respiratory: Lungs clear to auscultation	  Gastrointestinal:  Soft, Non-tender, + BS	  Extremities: Normal range of motion, edema       Home Medications:  Artificial Tears ophthalmic solution: 1 drop(s) to each affected eye , As Needed (25 Jan 2020 12:55)  Daily Gadiel oral tablet: 1 tab(s) orally once a day (25 Jan 2020 12:55)  glyBURIDE 5 mg oral tablet: 2 tab(s) orally 2 times a day (25 Jan 2020 12:55)  lisinopril 20 mg oral tablet: 1 tab(s) orally once a day (25 Jan 2020 12:55)  metFORMIN 1000 mg oral tablet: 0.5 tab(s) orally 2 times a day (25 Jan 2020 12:55)  Naprosyn 500 mg oral tablet: 1 tab(s) orally , As Needed (25 Jan 2020 11:02)  Tresiba FlexTouch 100 units/mL subcutaneous solution: 30 unit(s) subcutaneous once a day (at bedtime) (25 Jan 2020 12:55)  vitamin B Complex: 1 tab(s) orally once a day (25 Jan 2020 12:55)      MEDICATIONS  (STANDING):  albuterol/ipratropium for Nebulization 3 milliLiter(s) Nebulizer every 6 hours  aMIOdarone    Tablet 200 milliGRAM(s) Oral daily  aMIOdarone    Tablet   Oral   ampicillin  IVPB 2 Gram(s) IV Intermittent every 8 hours  artificial tears (preservative free) Ophthalmic Solution 1 Drop(s) Both EYES two times a day  aspirin enteric coated 81 milliGRAM(s) Oral daily  BACItracin   Ointment 1 Application(s) Topical two times a day  buMETAnide Injectable 1 milliGRAM(s) IV Push every 8 hours  calcium acetate 667 milliGRAM(s) Oral three times a day with meals  dextrose 5%. 1000 milliLiter(s) (50 mL/Hr) IV Continuous <Continuous>  dextrose 50% Injectable 12.5 Gram(s) IV Push once  dextrose 50% Injectable 25 Gram(s) IV Push once  dextrose 50% Injectable 25 Gram(s) IV Push once  hydrALAZINE 37.5 milliGRAM(s) Oral every 8 hours  insulin glargine Injectable (LANTUS) 50 Unit(s) SubCutaneous at bedtime  insulin lispro (HumaLOG) corrective regimen sliding scale   SubCutaneous three times a day before meals  insulin lispro (HumaLOG) corrective regimen sliding scale   SubCutaneous at bedtime  insulin lispro Injectable (HumaLOG) 16 Unit(s) SubCutaneous three times a day before meals  metoprolol tartrate 25 milliGRAM(s) Oral every 12 hours  multivitamin 1 Tablet(s) Oral daily  pantoprazole    Tablet 40 milliGRAM(s) Oral before breakfast  polyethylene glycol 3350 17 Gram(s) Oral daily  tamsulosin 0.4 milliGRAM(s) Oral at bedtime      TELEMETRY: 	    ECG:  	  RADIOLOGY:   DIAGNOSTIC TESTING:  [ ] Echocardiogram:  [ ] Catheterization:  [ ] Stress Test:    OTHER: 	    LABS:	 	    CARDIAC MARKERS:                                8.3    14.75 )-----------( 311      ( 16 Feb 2020 00:49 )             25.9     02-16    126<L>  |  89<L>  |  72<H>  ----------------------------<  184<H>  4.2   |  23  |  2.62<H>    Ca    8.2<L>      16 Feb 2020 00:49  Phos  3.3     02-16  Mg     1.9     02-16    TPro  5.9<L>  /  Alb  2.1<L>  /  TBili  0.5  /  DBili  x   /  AST  18  /  ALT  31  /  AlkPhos  99  02-16    proBNP:   PT/INR - ( 16 Feb 2020 02:59 )   PT: 15.4 sec;   INR: 1.34 ratio         PTT - ( 16 Feb 2020 02:59 )  PTT:67.7 sec  Lipid Profile:   HgA1c:     Creatinine, Serum: 2.62 mg/dL (02-16-20 @ 00:49)  Creatinine, Serum: 2.83 mg/dL (02-15-20 @ 00:28)  Creatinine, Serum: 2.98 mg/dL (02-14-20 @ 01:38)

## 2020-02-16 NOTE — PROGRESS NOTE ADULT - PROBLEM SELECTOR PLAN 1
Will increase Lantus to 50 units at bed time.  Will increase Humalog to 18 units before each meal in addition to Humalog correction scale coverage.  Patient counseled for compliance with consistent low carb diet, exercise.

## 2020-02-16 NOTE — PROGRESS NOTE ADULT - ASSESSMENT
intraop kennedy 2/3/20 ef 50%, nl lv, mild diastolic dysfx stage 1  limited echo 2/4/20: nl LV sys fx , no pericardial effusion     a/p  64 year old man with history of HTN, DM II, admitted with progressive exertional angina, s/p cath with severe triple vessel disease, s/p CABG, post op course c/b brief witnessed PEA likely hypoxic arrest, s/p intubation.     1. CAD, s/p cath with severe triple vessel disease including lesions at the bifurcation of the LAD/diagonal and distal RCA/RPDA/RPL trifurcation.   -s/p CABG x 4, intraop kennedy ef 50%  -cont asa, statin  -s/p PEA arrest, now extubated  -off vasopressors  -LE dopplers, negative for dvt   -repeat echo with preserved lv fxn/EF    2. Postop acute diastolic HF  -cont bumex per ctu     3. PAF  -cont amio, bb   -AC per CTS    4. HTN  -bp stable on hydralazine/bb    5. STEPHANIE/CKD  renal f/u     6. Postop Pleural effusion  - S/P Right chest tube:  removed   - s/p L chest tube, mgmt per CTS    7. Sepsis -E. Faecalis bacteremia  IV abx per CTICU  ID following , repeat chest xray noted       dvt ppx

## 2020-02-16 NOTE — PROGRESS NOTE ADULT - ATTENDING COMMENTS
I have seen this patient with the fellow and agree with their assessment and plan. In addition,    # STEPHANIE  - He initially had acute tubular necrosis as a result of PEA arrest. Serum creatinine stayed stable at 2.5, but noted worsening serum creatinine yesterday likely secondary to renal venous congestion. Serum creatinine improved with IV bumetanide. Continue diuresis.     # CKD stage 3/4. Etiology secondary to diabetic nephropathy. Baseline serum creatinine 1.8-2.0.     # Proteinuria - Likely from diabetic nephropathy. PLA2R negative. Serological workup negative thus far.     # Hyponatremia - likely from volume overload. Continue bumetanide.     # Volume overload. Still has generalized edema. Continue IV bumetanide to keep I/O negative.     # Anemia - likely multifactorial (anemia of CKD and phlebotomy). To check iron studies.     # Medication toxicity Monitoring: medication dose reviewed. Since patient has acute kidney injury, please dose medications to CrCl<15    The rest of the recommendations as per fellow's note.    Maryam Dutta MD  Attending Nephrologist  [phone]

## 2020-02-16 NOTE — PROGRESS NOTE ADULT - SUBJECTIVE AND OBJECTIVE BOX
[facility] DIVISION OF KIDNEY DISEASES AND HYPERTENSION -- FOLLOW UP NOTE  --------------------------------------------------------------------------------  HPI: 63 yo M with HTN, DM II (20 years), admitted with complaints of acute on chronic left sided exertional chest pain. Nephrology team is following for elevated serum creatinine and pre-cardiac catheterization assessment. [facility] no prior records available. On admission (1/25/2020), Scr was 1.85. Patient went for cardiac cath on 1/29/20 which revealed triple vessel disease. Scr had improved to 1.76 on 2/3/20. Pt. underwent CABG on 2/3/20. Scr now increased after CABG and PEA arrest on 2/6/20, had improved to ~2.5, however worsened this AM to 2.98. Pt. extubated on 2/8/20.     Pt. seen and examined at bedside this AM. Pt. is sitting upright in chair, on diuretics, non oliguric ~3 lit of UOP, pt feels fatigue, having cough. No fever, or events.       PAST HISTORY  --------------------------------------------------------------------------------  No significant changes to PMH, PSH, FHx, SHx, unless otherwise noted    ALLERGIES & MEDICATIONS  --------------------------------------------------------------------------------  Allergies    No Known Allergies    Intolerances      Standing Inpatient Medications  albuterol/ipratropium for Nebulization 3 milliLiter(s) Nebulizer every 6 hours  aMIOdarone    Tablet 200 milliGRAM(s) Oral daily  aMIOdarone    Tablet   Oral   ampicillin  IVPB 2 Gram(s) IV Intermittent every 8 hours  artificial tears (preservative free) Ophthalmic Solution 1 Drop(s) Both EYES two times a day  aspirin enteric coated 81 milliGRAM(s) Oral daily  BACItracin   Ointment 1 Application(s) Topical two times a day  buMETAnide Injectable 1 milliGRAM(s) IV Push every 8 hours  calcium acetate 667 milliGRAM(s) Oral three times a day with meals  dextrose 5%. 1000 milliLiter(s) IV Continuous <Continuous>  dextrose 50% Injectable 12.5 Gram(s) IV Push once  dextrose 50% Injectable 25 Gram(s) IV Push once  dextrose 50% Injectable 25 Gram(s) IV Push once  hydrALAZINE 37.5 milliGRAM(s) Oral every 8 hours  insulin glargine Injectable (LANTUS) 50 Unit(s) SubCutaneous at bedtime  insulin lispro (HumaLOG) corrective regimen sliding scale   SubCutaneous three times a day before meals  insulin lispro (HumaLOG) corrective regimen sliding scale   SubCutaneous at bedtime  insulin lispro Injectable (HumaLOG) 16 Unit(s) SubCutaneous three times a day before meals  multivitamin 1 Tablet(s) Oral daily  pantoprazole    Tablet 40 milliGRAM(s) Oral before breakfast  polyethylene glycol 3350 17 Gram(s) Oral daily  tamsulosin 0.4 milliGRAM(s) Oral at bedtime    PRN Inpatient Medications  dextrose 40% Gel 15 Gram(s) Oral once PRN  glucagon  Injectable 1 milliGRAM(s) IntraMuscular once PRN  oxycodone    5 mG/acetaminophen 325 mG 1 Tablet(s) Oral every 4 hours PRN  sodium chloride 0.9% lock flush 10 milliLiter(s) IV Push every 1 hour PRN      REVIEW OF SYSTEMS  --------------------------------------------------------------------------------  Gen: no lethargy, + fatigue  Respiratory: No dyspnea  GI: No abdominal pain  MSK: + LE edema, + scrotal edema  Neuro: No dizziness  Heme: No bleeding    All other systems were reviewed and are nega  VITALS/PHYSICAL EXAM  --------------------------------------------------------------------------------  T(C): 37 (02-16-20 @ 08:00), Max: 37.1 (02-16-20 @ 00:00)  HR: 93 (02-16-20 @ 09:00) (81 - 95)  BP: 117/63 (02-16-20 @ 09:00) (111/71 - 132/68)  RR: 23 (02-16-20 @ 09:00) (16 - 28)  SpO2: 96% (02-16-20 @ 09:00) (93% - 98%)  Wt(kg): --        02-15-20 @ 07:01  -  02-16-20 @ 07:00  --------------------------------------------------------  IN: 1588 mL / OUT: 3370 mL / NET: -1782 mL    02-16-20 @ 07:01  -  02-16-20 @ 10:04  --------------------------------------------------------  IN: 12 mL / OUT: 450 mL / NET: -438 mL      Physical Exam:  	Gen: awake  	HEENT: supple neck  	Pulm: wheezes, minimal crackles.   	CV: + central chest dressing from CABG C/D/I, S1S2  	Abd: Soft  	: + scrotal edema  	Ext: + pitting edema B/L  	Neuro: Awake              Psych: Unable to assess due to clinical condition  	Skin: Warm and dry      LABS/STUDIES  --------------------------------------------------------------------------------              8.3    14.75 >-----------<  311      [02-16-20 @ 00:49]              25.9     126  |  89  |  72  ----------------------------<  184      [02-16-20 @ 00:49]  4.2   |  23  |  2.62        Ca     8.2     [02-16-20 @ 00:49]      Mg     1.9     [02-16-20 @ 00:49]      Phos  3.3     [02-16-20 @ 00:49]    TPro  5.9  /  Alb  2.1  /  TBili  0.5  /  DBili  x   /  AST  18  /  ALT  31  /  AlkPhos  99  [02-16-20 @ 00:49]    PT/INR: PT 15.4 , INR 1.34       [02-16-20 @ 02:59]  PTT: 67.7       [02-16-20 @ 02:59]      Creatinine Trend:  SCr 2.62 [02-16 @ 00:49]  SCr 2.83 [02-15 @ 00:28]  SCr 2.98 [02-14 @ 01:38]  SCr 2.56 [02-13 @ 00:21]  SCr 2.48 [02-12 @ 01:43]    Urinalysis - [02-10-20 @ 09:32]      Color Yellow / Appearance Clear / SG 1.014 / pH 6.0      Gluc 100 mg/dL / Ketone Negative  / Bili Negative / Urobili 2 mg/dL       Blood Small / Protein 100 / Leuk Est Negative / Nitrite Negative      RBC 25 / WBC 2 / Hyaline 4 / Gran  / Sq Epi  / Non Sq Epi 1 / Bacteria Negative    Urine Creatinine 52      [02-14-20 @ 11:43]  Urine Protein 73      [02-14-20 @ 11:43]    HbA1c 9.2      [01-26-20 @ 09:03]  TSH 2.37      [02-14-20 @ 21:15]    HBsAg Nonreact      [01-30-20 @ 00:20]  HCV 0.16, Nonreact      [02-04-20 @ 10:29]    RACHEL: titer Negative, pattern --      [01-30-20 @ 00:20]  C3 Complement 136      [01-30-20 @ 00:23]  C4 Complement 56      [01-30-20 @ 00:23]  ANCA: cANCA Negative, pANCA Negative, atypical ANCA Negative      [01-30-20 @ 00:20]  anti-GBM <1.0      [01-30-20 @ 01:14]  Syphilis Screen (Treponema Pallidum Ab) Negative      [01-30-20 @ 00:20]  PLA2R: KRYSTYNA <2, IFA Negative      [01-30-20 @ 01:27]  Immunofixation Serum:   No Monoclonal Band Identified    Reference Range: None Detected      [01-30-20 @ 00:23]

## 2020-02-16 NOTE — PROGRESS NOTE ADULT - SUBJECTIVE AND OBJECTIVE BOX
Chief complaint  Patient is a 64y old  Male who presents with a chief complaint of Chest pain (16 Feb 2020 11:52)   Review of systems  Patient in bed, looks comfortable, no fever, no hypoglycemia.    Labs and Fingersticks  CAPILLARY BLOOD GLUCOSE      POCT Blood Glucose.: 249 mg/dL (16 Feb 2020 17:11)  POCT Blood Glucose.: 169 mg/dL (16 Feb 2020 12:53)  POCT Blood Glucose.: 167 mg/dL (15 Feb 2020 21:56)      Anion Gap, Serum: 14 (02-16 @ 00:49)  Anion Gap, Serum: 16 (02-15 @ 00:28)      Calcium, Total Serum: 8.2 <L> (02-16 @ 00:49)  Calcium, Total Serum: 8.2 <L> (02-15 @ 00:28)  Albumin, Serum: 2.1 <L> (02-16 @ 00:49)  Albumin, Serum: 2.3 <L> (02-15 @ 00:28)    Alanine Aminotransferase (ALT/SGPT): 31 (02-16 @ 00:49)  Alanine Aminotransferase (ALT/SGPT): 37 (02-15 @ 00:28)  Alkaline Phosphatase, Serum: 99 (02-16 @ 00:49)  Alkaline Phosphatase, Serum: 96 (02-15 @ 00:28)  Aspartate Aminotransferase (AST/SGOT): 18 (02-16 @ 00:49)  Aspartate Aminotransferase (AST/SGOT): 20 (02-15 @ 00:28)        02-16    126<L>  |  89<L>  |  72<H>  ----------------------------<  184<H>  4.2   |  23  |  2.62<H>    Ca    8.2<L>      16 Feb 2020 00:49  Phos  3.3     02-16  Mg     1.9     02-16    TPro  5.9<L>  /  Alb  2.1<L>  /  TBili  0.5  /  DBili  x   /  AST  18  /  ALT  31  /  AlkPhos  99  02-16                        8.3    14.75 )-----------( 311      ( 16 Feb 2020 00:49 )             25.9     Medications  MEDICATIONS  (STANDING):  albuterol/ipratropium for Nebulization 3 milliLiter(s) Nebulizer every 6 hours  aMIOdarone    Tablet 200 milliGRAM(s) Oral daily  aMIOdarone    Tablet   Oral   ampicillin  IVPB 2 Gram(s) IV Intermittent every 8 hours  artificial tears (preservative free) Ophthalmic Solution 1 Drop(s) Both EYES two times a day  aspirin enteric coated 81 milliGRAM(s) Oral daily  BACItracin   Ointment 1 Application(s) Topical two times a day  buMETAnide Injectable 1 milliGRAM(s) IV Push every 8 hours  calcium acetate 667 milliGRAM(s) Oral three times a day with meals  dextrose 5%. 1000 milliLiter(s) (50 mL/Hr) IV Continuous <Continuous>  dextrose 50% Injectable 12.5 Gram(s) IV Push once  dextrose 50% Injectable 25 Gram(s) IV Push once  dextrose 50% Injectable 25 Gram(s) IV Push once  heparin  Injectable 5000 Unit(s) SubCutaneous every 8 hours  hydrALAZINE 37.5 milliGRAM(s) Oral every 8 hours  insulin glargine Injectable (LANTUS) 50 Unit(s) SubCutaneous at bedtime  insulin lispro (HumaLOG) corrective regimen sliding scale   SubCutaneous three times a day before meals  insulin lispro (HumaLOG) corrective regimen sliding scale   SubCutaneous at bedtime  insulin lispro Injectable (HumaLOG) 18 Unit(s) SubCutaneous three times a day before meals  metoprolol tartrate 25 milliGRAM(s) Oral every 12 hours  multivitamin 1 Tablet(s) Oral daily  pantoprazole    Tablet 40 milliGRAM(s) Oral before breakfast  polyethylene glycol 3350 17 Gram(s) Oral daily  tamsulosin 0.4 milliGRAM(s) Oral at bedtime  warfarin 5 milliGRAM(s) Oral once      Physical Exam  General: Patient comfortable in bed  Vital Signs Last 12 Hrs  T(F): 97.4 (02-16-20 @ 16:00), Max: 98.6 (02-16-20 @ 08:00)  HR: 89 (02-16-20 @ 17:17) (81 - 95)  BP: 117/57 (02-16-20 @ 17:00) (108/54 - 132/68)  BP(mean): 81 (02-16-20 @ 17:00) (76 - 94)  RR: 24 (02-16-20 @ 17:00) (18 - 28)  SpO2: 100% (02-16-20 @ 17:17) (93% - 100%)  Neck: No palpable thyroid nodules.  CVS: S1S2, No murmurs  Respiratory: No wheezing, no crepitations  GI: Abdomen soft, bowel sounds positive  Musculoskeletal:  edema lower extremities.   Skin: No skin rashes, no ecchymosis    Diagnostics

## 2020-02-17 LAB
ALBUMIN SERPL ELPH-MCNC: 2.2 G/DL — LOW (ref 3.3–5)
ALP SERPL-CCNC: 91 U/L — SIGNIFICANT CHANGE UP (ref 40–120)
ALT FLD-CCNC: 31 U/L — SIGNIFICANT CHANGE UP (ref 10–45)
ANION GAP SERPL CALC-SCNC: 14 MMOL/L — SIGNIFICANT CHANGE UP (ref 5–17)
APTT BLD: 31.6 SEC — SIGNIFICANT CHANGE UP (ref 27.5–36.3)
AST SERPL-CCNC: 22 U/L — SIGNIFICANT CHANGE UP (ref 10–40)
BASOPHILS # BLD AUTO: 0.03 K/UL — SIGNIFICANT CHANGE UP (ref 0–0.2)
BASOPHILS NFR BLD AUTO: 0.2 % — SIGNIFICANT CHANGE UP (ref 0–2)
BILIRUB SERPL-MCNC: 0.5 MG/DL — SIGNIFICANT CHANGE UP (ref 0.2–1.2)
BUN SERPL-MCNC: 61 MG/DL — HIGH (ref 7–23)
CALCIUM SERPL-MCNC: 8.1 MG/DL — LOW (ref 8.4–10.5)
CHLORIDE SERPL-SCNC: 89 MMOL/L — LOW (ref 96–108)
CO2 SERPL-SCNC: 24 MMOL/L — SIGNIFICANT CHANGE UP (ref 22–31)
CREAT SERPL-MCNC: 2.09 MG/DL — HIGH (ref 0.5–1.3)
EOSINOPHIL # BLD AUTO: 0.24 K/UL — SIGNIFICANT CHANGE UP (ref 0–0.5)
EOSINOPHIL NFR BLD AUTO: 1.8 % — SIGNIFICANT CHANGE UP (ref 0–6)
GAS PNL BLDA: SIGNIFICANT CHANGE UP
GLUCOSE BLDC GLUCOMTR-MCNC: 107 MG/DL — HIGH (ref 70–99)
GLUCOSE BLDC GLUCOMTR-MCNC: 167 MG/DL — HIGH (ref 70–99)
GLUCOSE BLDC GLUCOMTR-MCNC: 202 MG/DL — HIGH (ref 70–99)
GLUCOSE BLDC GLUCOMTR-MCNC: 204 MG/DL — HIGH (ref 70–99)
GLUCOSE BLDC GLUCOMTR-MCNC: 209 MG/DL — HIGH (ref 70–99)
GLUCOSE SERPL-MCNC: 200 MG/DL — HIGH (ref 70–99)
HCT VFR BLD CALC: 24.9 % — LOW (ref 39–50)
HGB BLD-MCNC: 8.4 G/DL — LOW (ref 13–17)
IMM GRANULOCYTES NFR BLD AUTO: 1.7 % — HIGH (ref 0–1.5)
INR BLD: 1.46 RATIO — HIGH (ref 0.88–1.16)
LYMPHOCYTES # BLD AUTO: 0.78 K/UL — LOW (ref 1–3.3)
LYMPHOCYTES # BLD AUTO: 5.8 % — LOW (ref 13–44)
MAGNESIUM SERPL-MCNC: 2 MG/DL — SIGNIFICANT CHANGE UP (ref 1.6–2.6)
MCHC RBC-ENTMCNC: 28.7 PG — SIGNIFICANT CHANGE UP (ref 27–34)
MCHC RBC-ENTMCNC: 33.7 GM/DL — SIGNIFICANT CHANGE UP (ref 32–36)
MCV RBC AUTO: 85 FL — SIGNIFICANT CHANGE UP (ref 80–100)
MONOCYTES # BLD AUTO: 1.15 K/UL — HIGH (ref 0–0.9)
MONOCYTES NFR BLD AUTO: 8.6 % — SIGNIFICANT CHANGE UP (ref 2–14)
NEUTROPHILS # BLD AUTO: 11.02 K/UL — HIGH (ref 1.8–7.4)
NEUTROPHILS NFR BLD AUTO: 81.9 % — HIGH (ref 43–77)
NRBC # BLD: 0 /100 WBCS — SIGNIFICANT CHANGE UP (ref 0–0)
PHOSPHATE SERPL-MCNC: 3.4 MG/DL — SIGNIFICANT CHANGE UP (ref 2.5–4.5)
PLATELET # BLD AUTO: 334 K/UL — SIGNIFICANT CHANGE UP (ref 150–400)
POTASSIUM SERPL-MCNC: 4.1 MMOL/L — SIGNIFICANT CHANGE UP (ref 3.5–5.3)
POTASSIUM SERPL-SCNC: 4.1 MMOL/L — SIGNIFICANT CHANGE UP (ref 3.5–5.3)
PROT SERPL-MCNC: 5.8 G/DL — LOW (ref 6–8.3)
PROTHROM AB SERPL-ACNC: 17 SEC — HIGH (ref 10–12.9)
RBC # BLD: 2.93 M/UL — LOW (ref 4.2–5.8)
RBC # FLD: 14 % — SIGNIFICANT CHANGE UP (ref 10.3–14.5)
SODIUM SERPL-SCNC: 127 MMOL/L — LOW (ref 135–145)
WBC # BLD: 13.45 K/UL — HIGH (ref 3.8–10.5)
WBC # FLD AUTO: 13.45 K/UL — HIGH (ref 3.8–10.5)

## 2020-02-17 PROCEDURE — 99232 SBSQ HOSP IP/OBS MODERATE 35: CPT | Mod: GC

## 2020-02-17 PROCEDURE — 71045 X-RAY EXAM CHEST 1 VIEW: CPT | Mod: 26,59

## 2020-02-17 PROCEDURE — 99291 CRITICAL CARE FIRST HOUR: CPT

## 2020-02-17 RX ORDER — OXYCODONE AND ACETAMINOPHEN 5; 325 MG/1; MG/1
1 TABLET ORAL EVERY 6 HOURS
Refills: 0 | Status: DISCONTINUED | OUTPATIENT
Start: 2020-02-17 | End: 2020-02-18

## 2020-02-17 RX ORDER — INSULIN LISPRO 100/ML
21 VIAL (ML) SUBCUTANEOUS
Refills: 0 | Status: DISCONTINUED | OUTPATIENT
Start: 2020-02-17 | End: 2020-02-18

## 2020-02-17 RX ORDER — METOPROLOL TARTRATE 50 MG
25 TABLET ORAL ONCE
Refills: 0 | Status: COMPLETED | OUTPATIENT
Start: 2020-02-17 | End: 2020-02-17

## 2020-02-17 RX ORDER — INSULIN GLARGINE 100 [IU]/ML
25 INJECTION, SOLUTION SUBCUTANEOUS ONCE
Refills: 0 | Status: COMPLETED | OUTPATIENT
Start: 2020-02-17 | End: 2020-02-17

## 2020-02-17 RX ORDER — IPRATROPIUM/ALBUTEROL SULFATE 18-103MCG
3 AEROSOL WITH ADAPTER (GRAM) INHALATION EVERY 6 HOURS
Refills: 0 | Status: DISCONTINUED | OUTPATIENT
Start: 2020-02-17 | End: 2020-02-25

## 2020-02-17 RX ORDER — INSULIN GLARGINE 100 [IU]/ML
55 INJECTION, SOLUTION SUBCUTANEOUS AT BEDTIME
Refills: 0 | Status: DISCONTINUED | OUTPATIENT
Start: 2020-02-17 | End: 2020-02-18

## 2020-02-17 RX ADMIN — Medication 25 MILLIGRAM(S): at 09:44

## 2020-02-17 RX ADMIN — Medication 2: at 07:50

## 2020-02-17 RX ADMIN — Medication 1 DROP(S): at 17:25

## 2020-02-17 RX ADMIN — Medication 3 MILLIGRAM(S): at 21:48

## 2020-02-17 RX ADMIN — Medication 21 UNIT(S): at 17:26

## 2020-02-17 RX ADMIN — INSULIN GLARGINE 25 UNIT(S): 100 INJECTION, SOLUTION SUBCUTANEOUS at 03:27

## 2020-02-17 RX ADMIN — Medication 1 TABLET(S): at 12:48

## 2020-02-17 RX ADMIN — Medication 18 UNIT(S): at 12:48

## 2020-02-17 RX ADMIN — Medication 3 MILLILITER(S): at 06:05

## 2020-02-17 RX ADMIN — Medication 216 GRAM(S): at 13:59

## 2020-02-17 RX ADMIN — Medication 81 MILLIGRAM(S): at 12:48

## 2020-02-17 RX ADMIN — Medication 667 MILLIGRAM(S): at 07:49

## 2020-02-17 RX ADMIN — Medication 50 MILLIGRAM(S): at 21:48

## 2020-02-17 RX ADMIN — Medication 667 MILLIGRAM(S): at 12:48

## 2020-02-17 RX ADMIN — OXYCODONE AND ACETAMINOPHEN 1 TABLET(S): 5; 325 TABLET ORAL at 00:30

## 2020-02-17 RX ADMIN — Medication 50 MILLIGRAM(S): at 14:00

## 2020-02-17 RX ADMIN — INSULIN GLARGINE 55 UNIT(S): 100 INJECTION, SOLUTION SUBCUTANEOUS at 22:00

## 2020-02-17 RX ADMIN — Medication 1 APPLICATION(S): at 05:31

## 2020-02-17 RX ADMIN — POLYETHYLENE GLYCOL 3350 17 GRAM(S): 17 POWDER, FOR SOLUTION ORAL at 13:59

## 2020-02-17 RX ADMIN — BUMETANIDE 1 MILLIGRAM(S): 0.25 INJECTION INTRAMUSCULAR; INTRAVENOUS at 23:00

## 2020-02-17 RX ADMIN — Medication 667 MILLIGRAM(S): at 17:23

## 2020-02-17 RX ADMIN — Medication 216 GRAM(S): at 05:31

## 2020-02-17 RX ADMIN — Medication 1 DROP(S): at 05:30

## 2020-02-17 RX ADMIN — Medication 25 MILLIGRAM(S): at 17:23

## 2020-02-17 RX ADMIN — HEPARIN SODIUM 5000 UNIT(S): 5000 INJECTION INTRAVENOUS; SUBCUTANEOUS at 14:00

## 2020-02-17 RX ADMIN — OXYCODONE AND ACETAMINOPHEN 1 TABLET(S): 5; 325 TABLET ORAL at 01:00

## 2020-02-17 RX ADMIN — OXYCODONE AND ACETAMINOPHEN 1 TABLET(S): 5; 325 TABLET ORAL at 21:48

## 2020-02-17 RX ADMIN — PANTOPRAZOLE SODIUM 40 MILLIGRAM(S): 20 TABLET, DELAYED RELEASE ORAL at 05:31

## 2020-02-17 RX ADMIN — Medication 18 UNIT(S): at 07:49

## 2020-02-17 RX ADMIN — BUMETANIDE 1 MILLIGRAM(S): 0.25 INJECTION INTRAMUSCULAR; INTRAVENOUS at 06:19

## 2020-02-17 RX ADMIN — Medication 2: at 12:47

## 2020-02-17 RX ADMIN — Medication 1 APPLICATION(S): at 17:26

## 2020-02-17 RX ADMIN — Medication 3 MILLILITER(S): at 00:44

## 2020-02-17 RX ADMIN — Medication 216 GRAM(S): at 21:47

## 2020-02-17 RX ADMIN — Medication 50 MILLIGRAM(S): at 05:30

## 2020-02-17 RX ADMIN — AMIODARONE HYDROCHLORIDE 200 MILLIGRAM(S): 400 TABLET ORAL at 05:31

## 2020-02-17 RX ADMIN — OXYCODONE AND ACETAMINOPHEN 1 TABLET(S): 5; 325 TABLET ORAL at 22:30

## 2020-02-17 RX ADMIN — BUMETANIDE 1 MILLIGRAM(S): 0.25 INJECTION INTRAMUSCULAR; INTRAVENOUS at 14:00

## 2020-02-17 RX ADMIN — HEPARIN SODIUM 5000 UNIT(S): 5000 INJECTION INTRAVENOUS; SUBCUTANEOUS at 21:48

## 2020-02-17 RX ADMIN — TAMSULOSIN HYDROCHLORIDE 0.4 MILLIGRAM(S): 0.4 CAPSULE ORAL at 21:48

## 2020-02-17 RX ADMIN — HEPARIN SODIUM 5000 UNIT(S): 5000 INJECTION INTRAVENOUS; SUBCUTANEOUS at 05:31

## 2020-02-17 NOTE — PROGRESS NOTE ADULT - SUBJECTIVE AND OBJECTIVE BOX
Chief complaint  Patient is a 64y old  Male who presents with a chief complaint of Chest pain (17 Feb 2020 14:00)   Review of systems  Patient in bed, looks comfortable, no fever, no hypoglycemia.    Labs and Fingersticks  CAPILLARY BLOOD GLUCOSE  204 (17 Feb 2020 12:00)  209 (17 Feb 2020 08:00)  202 (17 Feb 2020 03:00)      POCT Blood Glucose.: 107 mg/dL (17 Feb 2020 17:15)  POCT Blood Glucose.: 204 mg/dL (17 Feb 2020 12:26)  POCT Blood Glucose.: 209 mg/dL (17 Feb 2020 07:42)  POCT Blood Glucose.: 202 mg/dL (17 Feb 2020 03:25)  POCT Blood Glucose.: 196 mg/dL (16 Feb 2020 22:06)      Anion Gap, Serum: 14 (02-17 @ 01:01)  Anion Gap, Serum: 14 (02-16 @ 00:49)      Calcium, Total Serum: 8.1 <L> (02-17 @ 01:01)  Calcium, Total Serum: 8.2 <L> (02-16 @ 00:49)  Albumin, Serum: 2.2 <L> (02-17 @ 01:01)  Albumin, Serum: 2.1 <L> (02-16 @ 00:49)    Alanine Aminotransferase (ALT/SGPT): 31 (02-17 @ 01:01)  Alanine Aminotransferase (ALT/SGPT): 31 (02-16 @ 00:49)  Alkaline Phosphatase, Serum: 91 (02-17 @ 01:01)  Alkaline Phosphatase, Serum: 99 (02-16 @ 00:49)  Aspartate Aminotransferase (AST/SGOT): 22 (02-17 @ 01:01)  Aspartate Aminotransferase (AST/SGOT): 18 (02-16 @ 00:49)        02-17    127<L>  |  89<L>  |  61<H>  ----------------------------<  200<H>  4.1   |  24  |  2.09<H>    Ca    8.1<L>      17 Feb 2020 01:01  Phos  3.4     02-17  Mg     2.0     02-17    TPro  5.8<L>  /  Alb  2.2<L>  /  TBili  0.5  /  DBili  x   /  AST  22  /  ALT  31  /  AlkPhos  91  02-17                        8.4    13.45 )-----------( 334      ( 17 Feb 2020 01:01 )             24.9     Medications  MEDICATIONS  (STANDING):  aMIOdarone    Tablet 200 milliGRAM(s) Oral daily  aMIOdarone    Tablet   Oral   ampicillin  IVPB 2 Gram(s) IV Intermittent every 8 hours  artificial tears (preservative free) Ophthalmic Solution 1 Drop(s) Both EYES two times a day  aspirin enteric coated 81 milliGRAM(s) Oral daily  BACItracin   Ointment 1 Application(s) Topical two times a day  buMETAnide Injectable 1 milliGRAM(s) IV Push every 8 hours  calcium acetate 667 milliGRAM(s) Oral three times a day with meals  dextrose 5%. 1000 milliLiter(s) (50 mL/Hr) IV Continuous <Continuous>  dextrose 50% Injectable 12.5 Gram(s) IV Push once  dextrose 50% Injectable 25 Gram(s) IV Push once  dextrose 50% Injectable 25 Gram(s) IV Push once  heparin  Injectable 5000 Unit(s) SubCutaneous every 8 hours  hydrALAZINE 50 milliGRAM(s) Oral every 8 hours  insulin glargine Injectable (LANTUS) 55 Unit(s) SubCutaneous at bedtime  insulin lispro (HumaLOG) corrective regimen sliding scale   SubCutaneous three times a day before meals  insulin lispro (HumaLOG) corrective regimen sliding scale   SubCutaneous at bedtime  insulin lispro Injectable (HumaLOG) 21 Unit(s) SubCutaneous three times a day before meals  melatonin 3 milliGRAM(s) Oral at bedtime  metoprolol tartrate 25 milliGRAM(s) Oral every 12 hours  multivitamin 1 Tablet(s) Oral daily  pantoprazole    Tablet 40 milliGRAM(s) Oral before breakfast  polyethylene glycol 3350 17 Gram(s) Oral daily  tamsulosin 0.4 milliGRAM(s) Oral at bedtime      Physical Exam  General: Patient comfortable in bed  Vital Signs Last 12 Hrs  T(F): 97.2 (02-17-20 @ 12:00), Max: 97.8 (02-17-20 @ 08:00)  HR: 90 (02-17-20 @ 19:00) (81 - 94)  BP: --  BP(mean): --  RR: 20 (02-17-20 @ 19:00) (15 - 26)  SpO2: 100% (02-17-20 @ 19:00) (98% - 100%)  Neck: No palpable thyroid nodules.  CVS: S1S2, No murmurs  Respiratory: No wheezing, no crepitations  GI: Abdomen soft, bowel sounds positive  Musculoskeletal:  edema lower extremities.   Skin: No skin rashes, no ecchymosis    Diagnostics

## 2020-02-17 NOTE — PROGRESS NOTE ADULT - ATTENDING COMMENTS
I have seen this patient with the fellow and agree with their assessment and plan. In addition,    # STEPHANIE  - He initially had acute tubular necrosis as a result of PEA arrest. Serum creatinine stayed stable at 2.5. He had worsenign kidney function several days ago due to mc-venous congestion. Serum creatinine is improving today (2.0). Continue diuresis.     # CKD stage 3/4. Etiology secondary to diabetic nephropathy. Baseline serum creatinine 1.8-2.0.     # Proteinuria - Likely from diabetic nephropathy. PLA2R negative. Serological workup negative thus far.     # Hyponatremia - likely from volume overload. Continue bumetanide.     # Volume overload. Still has generalized edema. Continue IV bumetanide to keep I/O negative.     # Anemia - likely multifactorial (anemia of CKD and phlebotomy). To check iron studies.     # Medication toxicity Monitoring: medication dose reviewed. Since patient has acute kidney injury, please dose medications to CrCl<15    The rest of the recommendations as per fellow's note.    Maryam Dutta MD  Attending Nephrologist  [phone]

## 2020-02-17 NOTE — PROGRESS NOTE ADULT - PROBLEM SELECTOR PLAN 1
Pt with  CKD likely  in the setting of long standing DM and HTN.  No prior labs for review. Scr since admission has ranged between 1.8-2 mg/dl.  Scr increased to ~2.50 secondary to hemodynamic injury after CABG  and PEA arrest s/p CPR, and worsened to 2.9.. UA significant for protein and RBCs. Urine electrolytes consistent with intrinsic disease. Urine Pr/Cr ratio in nephrotic range. HepBsAg, HepC, C3, C4, SIFE, RACHEL, P-ANCA, C-ANCA, Anti-GBM ab, Parvovirus, RPR, anti-PLA2R ab were negative/non-reactive. Monitor labs and urine output.   Scr improving, with diuresis. Suggest to continue with IV diuretics for now. Proteinuria most likely from DM nephropathy, 24 hour urine protein 2g Not nephrotic range.  No indication for HD, given good response to diuretics.  Avoid NSAIDs, ACEI/ARBS, RCA and nephrotoxins. Dose medications as per eGFR

## 2020-02-17 NOTE — PROGRESS NOTE ADULT - SUBJECTIVE AND OBJECTIVE BOX
CRITICAL CARE ATTENDING - CTICU    MEDICATIONS  (STANDING):  albuterol/ipratropium for Nebulization 3 milliLiter(s) Nebulizer every 6 hours  aMIOdarone    Tablet 200 milliGRAM(s) Oral daily  aMIOdarone    Tablet   Oral   ampicillin  IVPB 2 Gram(s) IV Intermittent every 8 hours  artificial tears (preservative free) Ophthalmic Solution 1 Drop(s) Both EYES two times a day  aspirin enteric coated 81 milliGRAM(s) Oral daily  BACItracin   Ointment 1 Application(s) Topical two times a day  buMETAnide Injectable 1 milliGRAM(s) IV Push every 8 hours  calcium acetate 667 milliGRAM(s) Oral three times a day with meals  dextrose 5%. 1000 milliLiter(s) (50 mL/Hr) IV Continuous <Continuous>  dextrose 50% Injectable 12.5 Gram(s) IV Push once  dextrose 50% Injectable 25 Gram(s) IV Push once  dextrose 50% Injectable 25 Gram(s) IV Push once  heparin  Injectable 5000 Unit(s) SubCutaneous every 8 hours  hydrALAZINE 50 milliGRAM(s) Oral every 8 hours  insulin glargine Injectable (LANTUS) 50 Unit(s) SubCutaneous at bedtime  insulin lispro (HumaLOG) corrective regimen sliding scale   SubCutaneous three times a day before meals  insulin lispro (HumaLOG) corrective regimen sliding scale   SubCutaneous at bedtime  insulin lispro Injectable (HumaLOG) 18 Unit(s) SubCutaneous three times a day before meals  melatonin 3 milliGRAM(s) Oral at bedtime  metoprolol tartrate 25 milliGRAM(s) Oral every 12 hours  multivitamin 1 Tablet(s) Oral daily  pantoprazole    Tablet 40 milliGRAM(s) Oral before breakfast  polyethylene glycol 3350 17 Gram(s) Oral daily  tamsulosin 0.4 milliGRAM(s) Oral at bedtime                                    8.4    13.45 )-----------( 334      ( 2020 01:01 )             24.9       02-17    127<L>  |  89<L>  |  61<H>  ----------------------------<  200<H>  4.1   |  24  |  2.09<H>    Ca    8.1<L>      2020 01:01  Phos  3.4     02-17  Mg     2.0     02-17    TPro  5.8<L>  /  Alb  2.2<L>  /  TBili  0.5  /  DBili  x   /  AST  22  /  ALT  31  /  AlkPhos  91        PT/INR - ( 2020 01:01 )   PT: 17.0 sec;   INR: 1.46 ratio         PTT - ( 2020 01:01 )  PTT:31.6 sec        Daily     Daily Weight in k.9 (2020 00:00)       @ 07:01  -   @ 07:00  --------------------------------------------------------  IN: 1519 mL / OUT: 4550 mL / NET: -3031 mL        Critically Ill patient  : [ ] preoperative ,   [x ] post operative    Requires :  [x ] Arterial Line   [ ] Central Line  [ ] PA catheter  [ ] IABP  [ ] ECMO  [ ] LVAD  [ ] Ventilator  [ ] pacemaker [ ] Impella.                      [x ] ABG's     [x ] Pulse Oxymetry Monitoring  Bedside evaluation , monitoring , treatment of hemodynamics , fluids , IVP/ IVCD meds.        Diagnosis:     POD 2/3 - CABG X 4 L    PEA Arrest ? Pulmonary Embolism ?     L Pleural Effusion     Hyponatremia     Renal Failure - Acute Kidney Injury     Nephrotic syndrome     A Fib.    CHF- acute [x ]   chronic [ ]    systolic [x ]   diastolic [ ]          - Echo- EF -             [x ] RV dysfunction          - Cxr-cardiomegally, edema          - Clinical-  [ ]inotropes   [ ]pressors   [x ]diuresis   [ ]IABP   [ ]ECMO   [ ]LVAD   [ ]Respiratory Failure    Sonosite L Chest - effusion               By signing my name below, I, Ciara Coronado, attest that this documentation has been prepared under the direction and in the presence of Matt Key MD.   Electronically Signed: Phill Castaneda. 20 @ 07:27    Discussed with CT surgeon, Physician's Assistant - Nurse Practitioner- Critical care medicine team.   Dicussed at  AM / PM rounds.   Chart, labs , films reviewed.    Total Time: CRITICAL CARE ATTENDING - CTICU    MEDICATIONS  (STANDING):  albuterol/ipratropium for Nebulization 3 milliLiter(s) Nebulizer every 6 hours  aMIOdarone    Tablet 200 milliGRAM(s) Oral daily  aMIOdarone    Tablet   Oral   ampicillin  IVPB 2 Gram(s) IV Intermittent every 8 hours  artificial tears (preservative free) Ophthalmic Solution 1 Drop(s) Both EYES two times a day  aspirin enteric coated 81 milliGRAM(s) Oral daily  BACItracin   Ointment 1 Application(s) Topical two times a day  buMETAnide Injectable 1 milliGRAM(s) IV Push every 8 hours  calcium acetate 667 milliGRAM(s) Oral three times a day with meals  dextrose 5%. 1000 milliLiter(s) (50 mL/Hr) IV Continuous <Continuous>  dextrose 50% Injectable 12.5 Gram(s) IV Push once  dextrose 50% Injectable 25 Gram(s) IV Push once  dextrose 50% Injectable 25 Gram(s) IV Push once  heparin  Injectable 5000 Unit(s) SubCutaneous every 8 hours  hydrALAZINE 50 milliGRAM(s) Oral every 8 hours  insulin glargine Injectable (LANTUS) 50 Unit(s) SubCutaneous at bedtime  insulin lispro (HumaLOG) corrective regimen sliding scale   SubCutaneous three times a day before meals  insulin lispro (HumaLOG) corrective regimen sliding scale   SubCutaneous at bedtime  insulin lispro Injectable (HumaLOG) 18 Unit(s) SubCutaneous three times a day before meals  melatonin 3 milliGRAM(s) Oral at bedtime  metoprolol tartrate 25 milliGRAM(s) Oral every 12 hours  multivitamin 1 Tablet(s) Oral daily  pantoprazole    Tablet 40 milliGRAM(s) Oral before breakfast  polyethylene glycol 3350 17 Gram(s) Oral daily  tamsulosin 0.4 milliGRAM(s) Oral at bedtime                                    8.4    13.45 )-----------( 334      ( 2020 01:01 )             24.9       02-17    127<L>  |  89<L>  |  61<H>  ----------------------------<  200<H>  4.1   |  24  |  2.09<H>    Ca    8.1<L>      2020 01:01  Phos  3.4     02-17  Mg     2.0     02-17    TPro  5.8<L>  /  Alb  2.2<L>  /  TBili  0.5  /  DBili  x   /  AST  22  /  ALT  31  /  AlkPhos  91        PT/INR - ( 2020 01:01 )   PT: 17.0 sec;   INR: 1.46 ratio         PTT - ( 2020 01:01 )  PTT:31.6 sec        Daily     Daily Weight in k.9 (2020 00:00)       @ 07:01  -   @ 07:00  --------------------------------------------------------  IN: 1519 mL / OUT: 4550 mL / NET: -3031 mL        Critically Ill patient  : [ ] preoperative ,   [x ] post operative    Requires :  [x ] Arterial Line   [ ] Central Line  [ ] PA catheter  [ ] IABP  [ ] ECMO  [ ] LVAD  [ ] Ventilator  [ ] pacemaker [ ] Impella.                      [x ] ABG's     [x ] Pulse Oxymetry Monitoring  Bedside evaluation , monitoring , treatment of hemodynamics , fluids , IVP/ IVCD meds.        Diagnosis:     POD 2/3 - CABG X 4 L    PEA Arrest ? Pulmonary Embolism ?     L Pleural Effusion     Hyponatremia     Renal Failure - Acute Kidney Injury     Nephrotic syndrome     A Fib.    CHF- acute [x ]   chronic [ ]    systolic [x ]   diastolic [ ]          - Echo- EF -             [x ] RV dysfunction          - Cxr-cardiomegally, edema          - Clinical-  [ ]inotropes   [ ]pressors   [x ]diuresis   [ ]IABP   [ ]ECMO   [ ]LVAD   [ ]Respiratory Failure    Sonosite L Chest - effusion     Requires chest PT, pulmonary toilet, suctioning to maintain SaO2,  patent airway and treat atelectasis.     Requires bedside physical therapy, mobilization and total residential care.           By signing my name below, I, Ciara Coronado, attest that this documentation has been prepared under the direction and in the presence of Matt Key MD.   Electronically Signed: Phill Castaneda. 20 @ 07:27    Discussed with CT surgeon, Physician's Assistant - Nurse Practitioner- Critical care medicine team.   Dicussed at  AM / PM rounds.   Chart, labs , films reviewed.    Total Time: CRITICAL CARE ATTENDING - CTICU    MEDICATIONS  (STANDING):  albuterol/ipratropium for Nebulization 3 milliLiter(s) Nebulizer every 6 hours  aMIOdarone    Tablet 200 milliGRAM(s) Oral daily  aMIOdarone    Tablet   Oral   ampicillin  IVPB 2 Gram(s) IV Intermittent every 8 hours  artificial tears (preservative free) Ophthalmic Solution 1 Drop(s) Both EYES two times a day  aspirin enteric coated 81 milliGRAM(s) Oral daily  BACItracin   Ointment 1 Application(s) Topical two times a day  buMETAnide Injectable 1 milliGRAM(s) IV Push every 8 hours  calcium acetate 667 milliGRAM(s) Oral three times a day with meals  dextrose 5%. 1000 milliLiter(s) (50 mL/Hr) IV Continuous <Continuous>  dextrose 50% Injectable 12.5 Gram(s) IV Push once  dextrose 50% Injectable 25 Gram(s) IV Push once  dextrose 50% Injectable 25 Gram(s) IV Push once  heparin  Injectable 5000 Unit(s) SubCutaneous every 8 hours  hydrALAZINE 50 milliGRAM(s) Oral every 8 hours  insulin glargine Injectable (LANTUS) 50 Unit(s) SubCutaneous at bedtime  insulin lispro (HumaLOG) corrective regimen sliding scale   SubCutaneous three times a day before meals  insulin lispro (HumaLOG) corrective regimen sliding scale   SubCutaneous at bedtime  insulin lispro Injectable (HumaLOG) 18 Unit(s) SubCutaneous three times a day before meals  melatonin 3 milliGRAM(s) Oral at bedtime  metoprolol tartrate 25 milliGRAM(s) Oral every 12 hours  multivitamin 1 Tablet(s) Oral daily  pantoprazole    Tablet 40 milliGRAM(s) Oral before breakfast  polyethylene glycol 3350 17 Gram(s) Oral daily  tamsulosin 0.4 milliGRAM(s) Oral at bedtime                                    8.4    13.45 )-----------( 334      ( 2020 01:01 )             24.9       02-17    127<L>  |  89<L>  |  61<H>  ----------------------------<  200<H>  4.1   |  24  |  2.09<H>    Ca    8.1<L>      2020 01:01  Phos  3.4     02-17  Mg     2.0     02-17    TPro  5.8<L>  /  Alb  2.2<L>  /  TBili  0.5  /  DBili  x   /  AST  22  /  ALT  31  /  AlkPhos  91        PT/INR - ( 2020 01:01 )   PT: 17.0 sec;   INR: 1.46 ratio         PTT - ( 2020 01:01 )  PTT:31.6 sec        Daily     Daily Weight in k.9 (2020 00:00)      - @ 07:01  -   @ 07:00  --------------------------------------------------------  IN: 1519 mL / OUT: 4550 mL / NET: -3031 mL        Critically Ill patient  : [ ] preoperative ,   [x ] post operative    Requires :  [x ] Arterial Line   [ ] Central Line  [ ] PA catheter  [ ] IABP  [ ] ECMO  [ ] LVAD  [ ] Ventilator  [ ] pacemaker [ ] Impella.                      [x ] ABG's     [x ] Pulse Oxymetry Monitoring  Bedside evaluation , monitoring , treatment of hemodynamics , fluids , IVP/ IVCD meds.        Diagnosis:     POD 2/3 - CABG X 4 L    PEA Arrest ? Pulmonary Embolism ?     L Pleural Effusion - drained with pig tail catheter    Hyponatremia     Renal Failure - Acute Kidney Injury     Nephrotic syndrome     A Fib.    CHF- acute [x ]   chronic [ ]    systolic [x ]   diastolic [ ]          - Echo- EF -             [x ] RV dysfunction          - Cxr-cardiomegally, edema          - Clinical-  [ ]inotropes   [ ]pressors   [x ]diuresis   [ ]IABP   [ ]ECMO   [ ]LVAD   [ ]Respiratory Failure    Sonosite   L Chest - effusion     L Atelectasis     Requires chest PT, pulmonary toilet, suctioning to maintain SaO2,  patent airway and treat atelectasis.     Requires bedside physical therapy, mobilization and total long-term care.     IVCD insulin converted to Lantus + Humalog     E fecalis from blood X 2     I, Matt Key, personally performed the services described in this documentation. All medical record entries made by the scribe were at my direction and in my presence. I have reviewed the chart and agree that the record reflects my personal performance and is accurate and complete.   Matt Key MD.           By signing my name below, I, Ciara Coronado, attest that this documentation has been prepared under the direction and in the presence of Matt Key MD.   Electronically Signed: Phill Castaneda. 20 @ 07:27    Discussed with CT surgeon, Physician's Assistant - Nurse Practitioner- Critical care medicine team.   Dicussed at  AM / PM rounds.   Chart, labs , films reviewed.    Total Time: 30 min

## 2020-02-17 NOTE — PROGRESS NOTE ADULT - PROBLEM SELECTOR PLAN 1
Will increase Lantus to 55 units at bed time.  Will increase Humalog to 21units before each meal in addition to Humalog correction scale coverage.  Patient counseled for compliance with consistent low carb diet, exercise.

## 2020-02-17 NOTE — PROGRESS NOTE ADULT - SUBJECTIVE AND OBJECTIVE BOX
CARDIOLOGY FOLLOW UP - Dr. Steel    CC oob to chair   family at bedside, no cp or increase sob        PHYSICAL EXAM:  T(C): 36.6 (02-17-20 @ 08:00), Max: 36.7 (02-17-20 @ 00:00)  HR: 83 (02-17-20 @ 12:34) (79 - 94)  BP: 115/61 (02-17-20 @ 03:00) (111/55 - 138/63)  RR: 22 (02-17-20 @ 12:34) (19 - 32)  SpO2: 100% (02-17-20 @ 12:34) (94% - 100%)  Wt(kg): --  I&O's Summary    16 Feb 2020 07:01  -  17 Feb 2020 07:00  --------------------------------------------------------  IN: 1519 mL / OUT: 4550 mL / NET: -3031 mL    17 Feb 2020 07:01  -  17 Feb 2020 14:00  --------------------------------------------------------  IN: 755 mL / OUT: 765 mL / NET: -10 mL        Appearance: Normal	  Cardiovascular: Normal S1 S2,RRR, No JVD, No murmurs  Respiratory: diminsihed + chest tube   Gastrointestinal:  Soft, Non-tender, + BS	  Extremities: Normal range of motion,  edema        MEDICATIONS  (STANDING):  aMIOdarone    Tablet 200 milliGRAM(s) Oral daily  aMIOdarone    Tablet   Oral   ampicillin  IVPB 2 Gram(s) IV Intermittent every 8 hours  artificial tears (preservative free) Ophthalmic Solution 1 Drop(s) Both EYES two times a day  aspirin enteric coated 81 milliGRAM(s) Oral daily  BACItracin   Ointment 1 Application(s) Topical two times a day  buMETAnide Injectable 1 milliGRAM(s) IV Push every 8 hours  calcium acetate 667 milliGRAM(s) Oral three times a day with meals  dextrose 5%. 1000 milliLiter(s) (50 mL/Hr) IV Continuous <Continuous>  dextrose 50% Injectable 12.5 Gram(s) IV Push once  dextrose 50% Injectable 25 Gram(s) IV Push once  dextrose 50% Injectable 25 Gram(s) IV Push once  heparin  Injectable 5000 Unit(s) SubCutaneous every 8 hours  hydrALAZINE 50 milliGRAM(s) Oral every 8 hours  insulin glargine Injectable (LANTUS) 50 Unit(s) SubCutaneous at bedtime  insulin lispro (HumaLOG) corrective regimen sliding scale   SubCutaneous three times a day before meals  insulin lispro (HumaLOG) corrective regimen sliding scale   SubCutaneous at bedtime  insulin lispro Injectable (HumaLOG) 18 Unit(s) SubCutaneous three times a day before meals  melatonin 3 milliGRAM(s) Oral at bedtime  metoprolol tartrate 25 milliGRAM(s) Oral every 12 hours  multivitamin 1 Tablet(s) Oral daily  pantoprazole    Tablet 40 milliGRAM(s) Oral before breakfast  polyethylene glycol 3350 17 Gram(s) Oral daily  tamsulosin 0.4 milliGRAM(s) Oral at bedtime      TELEMETRY: nsr    ECG:  	  RADIOLOGY:   < from: Xray Chest 1 View- PORTABLE-Urgent (02.17.20 @ 06:45) >    FINDINGS/  IMPRESSION:   S/P sternotomy.  Lower left chest pigtail catheter remains in situ.  Stable mild cardiomegaly.  No florid central congestive changes.  Interval mild increase in left pleural effusion.  New right basilar atelectatic changes.  No pneumothorax  No acute bony findings.    < end of copied text >    DIAGNOSTIC TESTING:  [ ] Echocardiogram:  [ ]  Catheterization:  [ ] Stress Test:    OTHER: 	    LABS:	 	                            8.4    13.45 )-----------( 334      ( 17 Feb 2020 01:01 )             24.9     02-17    127<L>  |  89<L>  |  61<H>  ----------------------------<  200<H>  4.1   |  24  |  2.09<H>    Ca    8.1<L>      17 Feb 2020 01:01  Phos  3.4     02-17  Mg     2.0     02-17    TPro  5.8<L>  /  Alb  2.2<L>  /  TBili  0.5  /  DBili  x   /  AST  22  /  ALT  31  /  AlkPhos  91  02-17    PT/INR - ( 17 Feb 2020 01:01 )   PT: 17.0 sec;   INR: 1.46 ratio         PTT - ( 17 Feb 2020 01:01 )  PTT:31.6 sec

## 2020-02-17 NOTE — PROGRESS NOTE ADULT - ASSESSMENT
Assessment  DMT2: 64y Male with DM T2 with hyperglycemia, A1C 9.2%,  was on oral meds and insulin at home, now on basal bolus insulin, blood sugars still running high after meals,  no hypoglycemic episodes. Patient is eating full meals with good appetite, appears comfortable.  CAD: s/p CABG 2/3, on medications, no chest pain, stable, monitored.  HTN: Controlled,  on antihypertensive medications.  HLD: Controlled, on statin.  CKD: Monitor labs/BMP          Harper Matias MD  Cell: [phone]  Office: [phone]

## 2020-02-17 NOTE — PROGRESS NOTE ADULT - SUBJECTIVE AND OBJECTIVE BOX
[facility] DIVISION OF KIDNEY DISEASES AND HYPERTENSION -- FOLLOW UP NOTE  --------------------------------------------------------------------------------  HPI: 63 yo M with HTN, DM II (20 years), admitted with complaints of acute on chronic left sided exertional chest pain. Nephrology team is following for elevated serum creatinine and pre-cardiac catheterization assessment. [facility] available. On admission (1/25/2020), Scr was 1.85. Patient went for cardiac cath on 1/29/20 which revealed triple vessel disease. Scr had improved to 1.76 on 2/3/20. Pt. underwent CABG on 2/3/20. Scr now increased after CABG and PEA arrest on 2/6/20, had improved to ~2.5, however worsened this AM to 2.98. Pt. extubated on 2/8/20.     Pt. seen and examined at bedside this AM. Pt. is sitting upright in chair, on diuretics, non oliguric ~3.4 lit of UOP, pt feels fatigue, having cough. No fever, or events.  net negative 3 lit      PAST HISTORY  --------------------------------------------------------------------------------  No significant changes to PMH, PSH, FHx, SHx, unless otherwise noted    ALLERGIES & MEDICATIONS  --------------------------------------------------------------------------------  Allergies    No Known Allergies    Intolerances      Standing Inpatient Medications  albuterol/ipratropium for Nebulization 3 milliLiter(s) Nebulizer every 6 hours  aMIOdarone    Tablet 200 milliGRAM(s) Oral daily  aMIOdarone    Tablet   Oral   ampicillin  IVPB 2 Gram(s) IV Intermittent every 8 hours  artificial tears (preservative free) Ophthalmic Solution 1 Drop(s) Both EYES two times a day  aspirin enteric coated 81 milliGRAM(s) Oral daily  BACItracin   Ointment 1 Application(s) Topical two times a day  buMETAnide Injectable 1 milliGRAM(s) IV Push every 8 hours  calcium acetate 667 milliGRAM(s) Oral three times a day with meals  dextrose 5%. 1000 milliLiter(s) IV Continuous <Continuous>  dextrose 50% Injectable 12.5 Gram(s) IV Push once  dextrose 50% Injectable 25 Gram(s) IV Push once  dextrose 50% Injectable 25 Gram(s) IV Push once  heparin  Injectable 5000 Unit(s) SubCutaneous every 8 hours  hydrALAZINE 50 milliGRAM(s) Oral every 8 hours  insulin glargine Injectable (LANTUS) 50 Unit(s) SubCutaneous at bedtime  insulin lispro (HumaLOG) corrective regimen sliding scale   SubCutaneous three times a day before meals  insulin lispro (HumaLOG) corrective regimen sliding scale   SubCutaneous at bedtime  insulin lispro Injectable (HumaLOG) 18 Unit(s) SubCutaneous three times a day before meals  melatonin 3 milliGRAM(s) Oral at bedtime  metoprolol tartrate 25 milliGRAM(s) Oral every 12 hours  multivitamin 1 Tablet(s) Oral daily  pantoprazole    Tablet 40 milliGRAM(s) Oral before breakfast  polyethylene glycol 3350 17 Gram(s) Oral daily  tamsulosin 0.4 milliGRAM(s) Oral at bedtime    PRN Inpatient Medications  dextrose 40% Gel 15 Gram(s) Oral once PRN  glucagon  Injectable 1 milliGRAM(s) IntraMuscular once PRN  sodium chloride 0.9% lock flush 10 milliLiter(s) IV Push every 1 hour PRN      REVIEW OF SYSTEMS  --------------------------------------------------------------------------------  Gen: no lethargy, + fatigue  Respiratory: No dyspnea  GI: No abdominal pain  MSK: + LE edema, + scrotal edema  Neuro: No dizziness  Heme: No bleeding    All other systems were reviewed and are negative, except as noted.    VITALS/PHYSICAL EXAM  --------------------------------------------------------------------------------  T(C): 36.6 (02-17-20 @ 08:00), Max: 36.7 (02-17-20 @ 00:00)  HR: 88 (02-17-20 @ 08:00) (79 - 94)  BP: 115/61 (02-17-20 @ 03:00) (108/54 - 138/63)  RR: 22 (02-17-20 @ 08:00) (19 - 32)  SpO2: 100% (02-17-20 @ 08:00) (93% - 100%)  Wt(kg): --        02-16-20 @ 07:01  -  02-17-20 @ 07:00  --------------------------------------------------------  IN: 1519 mL / OUT: 4550 mL / NET: -3031 mL    02-17-20 @ 07:01  -  02-17-20 @ 08:17  --------------------------------------------------------  IN: 0 mL / OUT: 200 mL / NET: -200 mL      Physical Exam:  	Gen: awake  	HEENT: supple neck  	Pulm: wheezes, minimal crackles.   	CV: + central chest dressing from CABG C/D/I, S1S2  	Abd: Soft  	: + scrotal edema  	Ext: + pitting edema B/L  	Neuro: Awake              Psych: Unable to assess due to clinical condition  	Skin: Warm and dry      LABS/STUDIES  --------------------------------------------------------------------------------              8.4    13.45 >-----------<  334      [02-17-20 @ 01:01]              24.9     127  |  89  |  61  ----------------------------<  200      [02-17-20 @ 01:01]  4.1   |  24  |  2.09        Ca     8.1     [02-17-20 @ 01:01]      Mg     2.0     [02-17-20 @ 01:01]      Phos  3.4     [02-17-20 @ 01:01]    TPro  5.8  /  Alb  2.2  /  TBili  0.5  /  DBili  x   /  AST  22  /  ALT  31  /  AlkPhos  91  [02-17-20 @ 01:01]    PT/INR: PT 17.0 , INR 1.46       [02-17-20 @ 01:01]  PTT: 31.6       [02-17-20 @ 01:01]      Creatinine Trend:  SCr 2.09 [02-17 @ 01:01]  SCr 2.62 [02-16 @ 00:49]  SCr 2.83 [02-15 @ 00:28]  SCr 2.98 [02-14 @ 01:38]  SCr 2.56 [02-13 @ 00:21]    Urinalysis - [02-10-20 @ 09:32]      Color Yellow / Appearance Clear / SG 1.014 / pH 6.0      Gluc 100 mg/dL / Ketone Negative  / Bili Negative / Urobili 2 mg/dL       Blood Small / Protein 100 / Leuk Est Negative / Nitrite Negative      RBC 25 / WBC 2 / Hyaline 4 / Gran  / Sq Epi  / Non Sq Epi 1 / Bacteria Negative    Urine Creatinine 52      [02-14-20 @ 11:43]  Urine Protein 73      [02-14-20 @ 11:43]    HbA1c 9.2      [01-26-20 @ 09:03]  TSH 2.37      [02-14-20 @ 21:15]    HBsAg Nonreact      [01-30-20 @ 00:20]  HCV 0.16, Nonreact      [02-04-20 @ 10:29]    RACHEL: titer Negative, pattern --      [01-30-20 @ 00:20]  C3 Complement 136      [01-30-20 @ 00:23]  C4 Complement 56      [01-30-20 @ 00:23]  ANCA: cANCA Negative, pANCA Negative, atypical ANCA Negative      [01-30-20 @ 00:20]  anti-GBM <1.0      [01-30-20 @ 01:14]  Syphilis Screen (Treponema Pallidum Ab) Negative      [01-30-20 @ 00:20]  PLA2R: KRYSTYNA <2, IFA Negative      [01-30-20 @ 01:27]  Immunofixation Serum:   No Monoclonal Band Identified    Reference Range: None Detected      [01-30-20 @ 00:23]

## 2020-02-18 DIAGNOSIS — R78.81 BACTEREMIA: ICD-10-CM

## 2020-02-18 DIAGNOSIS — J90 PLEURAL EFFUSION, NOT ELSEWHERE CLASSIFIED: ICD-10-CM

## 2020-02-18 LAB
ALBUMIN SERPL ELPH-MCNC: 2 G/DL — LOW (ref 3.3–5)
ALP SERPL-CCNC: 95 U/L — SIGNIFICANT CHANGE UP (ref 40–120)
ALT FLD-CCNC: 29 U/L — SIGNIFICANT CHANGE UP (ref 10–45)
ANION GAP SERPL CALC-SCNC: 13 MMOL/L — SIGNIFICANT CHANGE UP (ref 5–17)
APTT BLD: 36 SEC — SIGNIFICANT CHANGE UP (ref 27.5–36.3)
AST SERPL-CCNC: 21 U/L — SIGNIFICANT CHANGE UP (ref 10–40)
BASOPHILS # BLD AUTO: 0.04 K/UL — SIGNIFICANT CHANGE UP (ref 0–0.2)
BASOPHILS NFR BLD AUTO: 0.3 % — SIGNIFICANT CHANGE UP (ref 0–2)
BILIRUB SERPL-MCNC: 0.3 MG/DL — SIGNIFICANT CHANGE UP (ref 0.2–1.2)
BLD GP AB SCN SERPL QL: NEGATIVE — SIGNIFICANT CHANGE UP
BUN SERPL-MCNC: 66 MG/DL — HIGH (ref 7–23)
CALCIUM SERPL-MCNC: 8.4 MG/DL — SIGNIFICANT CHANGE UP (ref 8.4–10.5)
CHLORIDE SERPL-SCNC: 91 MMOL/L — LOW (ref 96–108)
CO2 SERPL-SCNC: 25 MMOL/L — SIGNIFICANT CHANGE UP (ref 22–31)
CREAT SERPL-MCNC: 2.29 MG/DL — HIGH (ref 0.5–1.3)
CULTURE RESULTS: SIGNIFICANT CHANGE UP
CULTURE RESULTS: SIGNIFICANT CHANGE UP
EOSINOPHIL # BLD AUTO: 0.5 K/UL — SIGNIFICANT CHANGE UP (ref 0–0.5)
EOSINOPHIL NFR BLD AUTO: 4.3 % — SIGNIFICANT CHANGE UP (ref 0–6)
GAS PNL BLDA: SIGNIFICANT CHANGE UP
GAS PNL BLDA: SIGNIFICANT CHANGE UP
GLUCOSE BLDC GLUCOMTR-MCNC: 149 MG/DL — HIGH (ref 70–99)
GLUCOSE BLDC GLUCOMTR-MCNC: 177 MG/DL — HIGH (ref 70–99)
GLUCOSE BLDC GLUCOMTR-MCNC: 181 MG/DL — HIGH (ref 70–99)
GLUCOSE BLDC GLUCOMTR-MCNC: 92 MG/DL — SIGNIFICANT CHANGE UP (ref 70–99)
GLUCOSE BLDC GLUCOMTR-MCNC: 98 MG/DL — SIGNIFICANT CHANGE UP (ref 70–99)
GLUCOSE SERPL-MCNC: 211 MG/DL — HIGH (ref 70–99)
HCT VFR BLD CALC: 23.6 % — LOW (ref 39–50)
HCT VFR BLD CALC: 27.9 % — LOW (ref 39–50)
HGB BLD-MCNC: 7.5 G/DL — LOW (ref 13–17)
HGB BLD-MCNC: 9 G/DL — LOW (ref 13–17)
IMM GRANULOCYTES NFR BLD AUTO: 1.3 % — SIGNIFICANT CHANGE UP (ref 0–1.5)
INR BLD: 1.75 RATIO — HIGH (ref 0.88–1.16)
LYMPHOCYTES # BLD AUTO: 0.91 K/UL — LOW (ref 1–3.3)
LYMPHOCYTES # BLD AUTO: 7.9 % — LOW (ref 13–44)
MAGNESIUM SERPL-MCNC: 2.1 MG/DL — SIGNIFICANT CHANGE UP (ref 1.6–2.6)
MCHC RBC-ENTMCNC: 27.4 PG — SIGNIFICANT CHANGE UP (ref 27–34)
MCHC RBC-ENTMCNC: 27.7 PG — SIGNIFICANT CHANGE UP (ref 27–34)
MCHC RBC-ENTMCNC: 31.8 GM/DL — LOW (ref 32–36)
MCHC RBC-ENTMCNC: 32.3 GM/DL — SIGNIFICANT CHANGE UP (ref 32–36)
MCV RBC AUTO: 85.8 FL — SIGNIFICANT CHANGE UP (ref 80–100)
MCV RBC AUTO: 86.1 FL — SIGNIFICANT CHANGE UP (ref 80–100)
MONOCYTES # BLD AUTO: 0.94 K/UL — HIGH (ref 0–0.9)
MONOCYTES NFR BLD AUTO: 8.1 % — SIGNIFICANT CHANGE UP (ref 2–14)
NEUTROPHILS # BLD AUTO: 9.03 K/UL — HIGH (ref 1.8–7.4)
NEUTROPHILS NFR BLD AUTO: 78.1 % — HIGH (ref 43–77)
NRBC # BLD: 0 /100 WBCS — SIGNIFICANT CHANGE UP (ref 0–0)
NRBC # BLD: 0 /100 WBCS — SIGNIFICANT CHANGE UP (ref 0–0)
PHOSPHATE SERPL-MCNC: 3.5 MG/DL — SIGNIFICANT CHANGE UP (ref 2.5–4.5)
PLATELET # BLD AUTO: 316 K/UL — SIGNIFICANT CHANGE UP (ref 150–400)
PLATELET # BLD AUTO: 317 K/UL — SIGNIFICANT CHANGE UP (ref 150–400)
POTASSIUM SERPL-MCNC: 4.1 MMOL/L — SIGNIFICANT CHANGE UP (ref 3.5–5.3)
POTASSIUM SERPL-SCNC: 4.1 MMOL/L — SIGNIFICANT CHANGE UP (ref 3.5–5.3)
PROT SERPL-MCNC: 5.5 G/DL — LOW (ref 6–8.3)
PROTHROM AB SERPL-ACNC: 20.3 SEC — HIGH (ref 10–12.9)
RBC # BLD: 2.74 M/UL — LOW (ref 4.2–5.8)
RBC # BLD: 3.25 M/UL — LOW (ref 4.2–5.8)
RBC # FLD: 13.7 % — SIGNIFICANT CHANGE UP (ref 10.3–14.5)
RBC # FLD: 14 % — SIGNIFICANT CHANGE UP (ref 10.3–14.5)
RH IG SCN BLD-IMP: POSITIVE — SIGNIFICANT CHANGE UP
SODIUM SERPL-SCNC: 129 MMOL/L — LOW (ref 135–145)
SPECIMEN SOURCE: SIGNIFICANT CHANGE UP
SPECIMEN SOURCE: SIGNIFICANT CHANGE UP
WBC # BLD: 11.09 K/UL — HIGH (ref 3.8–10.5)
WBC # BLD: 11.57 K/UL — HIGH (ref 3.8–10.5)
WBC # FLD AUTO: 11.09 K/UL — HIGH (ref 3.8–10.5)
WBC # FLD AUTO: 11.57 K/UL — HIGH (ref 3.8–10.5)

## 2020-02-18 PROCEDURE — 99232 SBSQ HOSP IP/OBS MODERATE 35: CPT

## 2020-02-18 PROCEDURE — 71045 X-RAY EXAM CHEST 1 VIEW: CPT | Mod: 26

## 2020-02-18 PROCEDURE — 99232 SBSQ HOSP IP/OBS MODERATE 35: CPT | Mod: GC

## 2020-02-18 PROCEDURE — 99233 SBSQ HOSP IP/OBS HIGH 50: CPT

## 2020-02-18 RX ORDER — ATORVASTATIN CALCIUM 80 MG/1
40 TABLET, FILM COATED ORAL AT BEDTIME
Refills: 0 | Status: DISCONTINUED | OUTPATIENT
Start: 2020-02-18 | End: 2020-02-25

## 2020-02-18 RX ORDER — INSULIN GLARGINE 100 [IU]/ML
60 INJECTION, SOLUTION SUBCUTANEOUS AT BEDTIME
Refills: 0 | Status: DISCONTINUED | OUTPATIENT
Start: 2020-02-18 | End: 2020-02-19

## 2020-02-18 RX ORDER — INSULIN LISPRO 100/ML
25 VIAL (ML) SUBCUTANEOUS
Refills: 0 | Status: DISCONTINUED | OUTPATIENT
Start: 2020-02-18 | End: 2020-02-19

## 2020-02-18 RX ADMIN — PANTOPRAZOLE SODIUM 40 MILLIGRAM(S): 20 TABLET, DELAYED RELEASE ORAL at 07:34

## 2020-02-18 RX ADMIN — Medication 25 UNIT(S): at 12:05

## 2020-02-18 RX ADMIN — Medication 667 MILLIGRAM(S): at 07:35

## 2020-02-18 RX ADMIN — Medication 25 UNIT(S): at 17:34

## 2020-02-18 RX ADMIN — Medication 21 UNIT(S): at 07:43

## 2020-02-18 RX ADMIN — Medication 1: at 07:43

## 2020-02-18 RX ADMIN — Medication 3 MILLIGRAM(S): at 22:20

## 2020-02-18 RX ADMIN — Medication 50 MILLIGRAM(S): at 13:06

## 2020-02-18 RX ADMIN — HEPARIN SODIUM 5000 UNIT(S): 5000 INJECTION INTRAVENOUS; SUBCUTANEOUS at 06:23

## 2020-02-18 RX ADMIN — ATORVASTATIN CALCIUM 40 MILLIGRAM(S): 80 TABLET, FILM COATED ORAL at 22:19

## 2020-02-18 RX ADMIN — Medication 216 GRAM(S): at 13:11

## 2020-02-18 RX ADMIN — BUMETANIDE 1 MILLIGRAM(S): 0.25 INJECTION INTRAMUSCULAR; INTRAVENOUS at 22:20

## 2020-02-18 RX ADMIN — Medication 1 DROP(S): at 17:52

## 2020-02-18 RX ADMIN — BUMETANIDE 1 MILLIGRAM(S): 0.25 INJECTION INTRAMUSCULAR; INTRAVENOUS at 13:24

## 2020-02-18 RX ADMIN — Medication 1 TABLET(S): at 12:04

## 2020-02-18 RX ADMIN — Medication 50 MILLIGRAM(S): at 22:20

## 2020-02-18 RX ADMIN — Medication 25 MILLIGRAM(S): at 17:45

## 2020-02-18 RX ADMIN — INSULIN GLARGINE 60 UNIT(S): 100 INJECTION, SOLUTION SUBCUTANEOUS at 22:20

## 2020-02-18 RX ADMIN — Medication 1 APPLICATION(S): at 17:52

## 2020-02-18 RX ADMIN — Medication 667 MILLIGRAM(S): at 17:47

## 2020-02-18 RX ADMIN — Medication 216 GRAM(S): at 06:22

## 2020-02-18 RX ADMIN — AMIODARONE HYDROCHLORIDE 200 MILLIGRAM(S): 400 TABLET ORAL at 06:23

## 2020-02-18 RX ADMIN — Medication 667 MILLIGRAM(S): at 12:04

## 2020-02-18 RX ADMIN — Medication 1 DROP(S): at 06:22

## 2020-02-18 RX ADMIN — Medication 81 MILLIGRAM(S): at 12:04

## 2020-02-18 RX ADMIN — Medication 1 APPLICATION(S): at 06:23

## 2020-02-18 RX ADMIN — Medication 50 MILLIGRAM(S): at 06:23

## 2020-02-18 RX ADMIN — Medication 25 MILLIGRAM(S): at 06:22

## 2020-02-18 RX ADMIN — HEPARIN SODIUM 5000 UNIT(S): 5000 INJECTION INTRAVENOUS; SUBCUTANEOUS at 22:20

## 2020-02-18 RX ADMIN — Medication 216 GRAM(S): at 22:19

## 2020-02-18 RX ADMIN — BUMETANIDE 1 MILLIGRAM(S): 0.25 INJECTION INTRAMUSCULAR; INTRAVENOUS at 06:23

## 2020-02-18 RX ADMIN — TAMSULOSIN HYDROCHLORIDE 0.4 MILLIGRAM(S): 0.4 CAPSULE ORAL at 17:45

## 2020-02-18 RX ADMIN — HEPARIN SODIUM 5000 UNIT(S): 5000 INJECTION INTRAVENOUS; SUBCUTANEOUS at 13:06

## 2020-02-18 NOTE — PROGRESS NOTE ADULT - SUBJECTIVE AND OBJECTIVE BOX
infectious diseases progress note:    Patient is a 64y old  Male who presents with a chief complaint of Chest pain (17 Feb 2020 19:32)        Acute ischemic heart disease           Allergies    No Known Allergies    Intolerances        ANTIBIOTICS/RELEVANT:  antimicrobials  ampicillin  IVPB 2 Gram(s) IV Intermittent every 8 hours    immunologic:    OTHER:  albuterol/ipratropium for Nebulization. 3 milliLiter(s) Nebulizer every 6 hours PRN  aMIOdarone    Tablet 200 milliGRAM(s) Oral daily  aMIOdarone    Tablet   Oral   artificial tears (preservative free) Ophthalmic Solution 1 Drop(s) Both EYES two times a day  aspirin enteric coated 81 milliGRAM(s) Oral daily  BACItracin   Ointment 1 Application(s) Topical two times a day  buMETAnide Injectable 1 milliGRAM(s) IV Push every 8 hours  calcium acetate 667 milliGRAM(s) Oral three times a day with meals  dextrose 40% Gel 15 Gram(s) Oral once PRN  dextrose 5%. 1000 milliLiter(s) IV Continuous <Continuous>  dextrose 50% Injectable 12.5 Gram(s) IV Push once  dextrose 50% Injectable 25 Gram(s) IV Push once  dextrose 50% Injectable 25 Gram(s) IV Push once  glucagon  Injectable 1 milliGRAM(s) IntraMuscular once PRN  heparin  Injectable 5000 Unit(s) SubCutaneous every 8 hours  hydrALAZINE 50 milliGRAM(s) Oral every 8 hours  insulin glargine Injectable (LANTUS) 55 Unit(s) SubCutaneous at bedtime  insulin lispro (HumaLOG) corrective regimen sliding scale   SubCutaneous three times a day before meals  insulin lispro (HumaLOG) corrective regimen sliding scale   SubCutaneous at bedtime  insulin lispro Injectable (HumaLOG) 21 Unit(s) SubCutaneous three times a day before meals  melatonin 3 milliGRAM(s) Oral at bedtime  metoprolol tartrate 25 milliGRAM(s) Oral every 12 hours  multivitamin 1 Tablet(s) Oral daily  oxycodone    5 mG/acetaminophen 325 mG 1 Tablet(s) Oral every 6 hours PRN  pantoprazole    Tablet 40 milliGRAM(s) Oral before breakfast  polyethylene glycol 3350 17 Gram(s) Oral daily  sodium chloride 0.9% lock flush 10 milliLiter(s) IV Push every 1 hour PRN  tamsulosin 0.4 milliGRAM(s) Oral at bedtime      Objective:  Vital Signs Last 24 Hrs  T(C): 36.7 (18 Feb 2020 07:00), Max: 36.9 (18 Feb 2020 00:00)  T(F): 98.1 (18 Feb 2020 07:00), Max: 98.4 (18 Feb 2020 00:00)  HR: 88 (18 Feb 2020 07:00) (78 - 94)  BP: --  BP(mean): --  RR: 24 (18 Feb 2020 07:00) (14 - 28)  SpO2: 97% (18 Feb 2020 07:00) (97% - 100%)    PHYSICAL EXAM:     Eyes:DANIELA, EOMI  Ear/Nose/Throat: no oral lesion, no sinus tenderness on percussion	  Neck:no JVD, no lymphadenopathy, supple  Respiratory: CTA lanre  Cardiovascular: S1S2 RRR, no murmurs  Gastrointestinal:soft, (+) BS, no HSM  Extremities:no e/e/c        LABS:                        9.0    11.09 )-----------( 317      ( 18 Feb 2020 06:04 )             27.9     02-18    129<L>  |  91<L>  |  66<H>  ----------------------------<  211<H>  4.1   |  25  |  2.29<H>    Ca    8.4      18 Feb 2020 01:13  Phos  3.5     02-18  Mg     2.1     02-18    TPro  5.5<L>  /  Alb  2.0<L>  /  TBili  0.3  /  DBili  x   /  AST  21  /  ALT  29  /  AlkPhos  95  02-18    PT/INR - ( 18 Feb 2020 01:13 )   PT: 20.3 sec;   INR: 1.75 ratio         PTT - ( 18 Feb 2020 01:13 )  PTT:36.0 sec        MICROBIOLOGY:    RECENT CULTURES:  02-13 @ 16:32 .Blood Blood                No growth to date.    02-11 @ 17:25 .Blood Blood                No growth at 5 days.          RESPIRATORY CULTURES:              RADIOLOGY & ADDITIONAL STUDIES:        Pager [phone]  After 5 pm/weekends or if no response :[phone]

## 2020-02-18 NOTE — PROGRESS NOTE ADULT - ATTENDING COMMENTS
I have seen this patient with the fellow and agree with their assessment and plan. In addition,    # STEPHANIE  - He initially had acute tubular necrosis as a result of PEA arrest. He had worsening kidney function several days ago due to mc-venous congestion. Serum creatinine has reached a plateau. Continue IV diuretics. If worsening kidney function tomorrow, to consider holding diuretics for a day.     # CKD stage 3/4. Etiology secondary to diabetic nephropathy. Baseline serum creatinine 1.8-2.0.     # Proteinuria - Likely from diabetic nephropathy. PLA2R negative. Serological workup negative thus far.     # Hyponatremia - likely from volume overload. Continue bumetanide.     # Volume overload. Still has generalized edema. Continue IV bumetanide to keep I/O negative.     # Medication toxicity Monitoring: medication dose reviewed. Since patient has acute kidney injury, please dose medications to CrCl<15    The rest of the recommendations as per fellow's note.    Maryam Dutta MD  Attending Nephrologist  [phone]

## 2020-02-18 NOTE — PROGRESS NOTE ADULT - ASSESSMENT
65yo male with hx of HTN, DM II   s/p C4L on 2/3; PEA arrest on 2/6   + enterococcus Bld  C/S > started ampicillin q8h, ID consulted,  R pigtail for effusion  2/8    Extubated  2/9  2/15 L pigtail effusion  2/17 PRBC   2/18 Tx 2 Diaz

## 2020-02-18 NOTE — PROGRESS NOTE ADULT - ASSESSMENT
Assessment  DMT2: 64y Male with DM T2 with hyperglycemia, A1C 9.2%, was on oral meds and insulin at home, now on basal bolus insulin, increased dose yesterday, blood sugars still running high and not at target, no hypoglycemic episodes. Patient is eating full meals with good appetite, appears comfortable, wife at the bedside.  CAD: s/p CABG 2/3, on medications, no chest pain, stable, monitored.  HTN: Controlled,  on antihypertensive medications.  HLD: Controlled, on statin.  CKD: Monitor labs/BMP          Harper Matias MD  Cell: [phone]  Office: [phone] Assessment  DMT2: 64y Male with DM T2 with hyperglycemia, A1C 9.2%, was on oral meds and insulin at home, now on basal bolus insulin, increased dose yesterday, blood sugars  still running high and not at target, no hypoglycemic episodes. Patient is eating full meals with good appetite, appears comfortable, wife at the bedside.  CAD: s/p CABG 2/3, on medications, no chest pain, stable, monitored.  HTN: Controlled,  on antihypertensive medications.  HLD: Controlled, on statin.  CKD: Monitor labs/BMP          Harper Matias MD  Cell: [phone]  Office: [phone]

## 2020-02-18 NOTE — PROGRESS NOTE ADULT - PROBLEM SELECTOR PLAN 2
Pt. with likely hypervolemic hyponatremia in the setting of volume overload. Na stable/improved, c/w diuretics as above.     Kevin Correa  Nephrology Fellow  Cell: [phone] (from 8 am to 5 pm)  (After 5 pm or on weekends please page on-call fellow)

## 2020-02-18 NOTE — PROGRESS NOTE ADULT - SUBJECTIVE AND OBJECTIVE BOX
FRANKLIN PRESLEY  MRN-[id number]    Patient is a 64y old  Male who presents with a chief complaint of Chest pain (2020 08:05)    HPI:  63yo male with hx of HTN, DM II was found to have multi-vessel coronary artery disease and is s/p CABG complicated by post-operative cardiac arrest now recovered. Per history he presented to the ED with complaints of acute on chronic left sided exertional chest pain. He stated that he had been having left sided pressure and stabbing chest pain radiating to his sternum and right with activity for the past few months. He stated each episode last approximately 1-2 mins and subsides upon resting. Post operatively he had a witnessed PEA arrest with unclear etiology. He is now off all drips and on nasal cannula.     PAST MEDICAL & SURGICAL HISTORY:  HTN (hypertension)  Diabetes  History of appendectomy: x 30 yrs ago    FAMILY HISTORY:  FH: type 2 diabetes    Social History:  Denies illicit drug use, smoking or frequent alcohol consumption.     Allergies  No Known Allergies    MEDICATIONS  (STANDING):  aMIOdarone    Tablet 200 milliGRAM(s) Oral daily  aMIOdarone    Tablet   Oral   ampicillin  IVPB 2 Gram(s) IV Intermittent every 8 hours  artificial tears (preservative free) Ophthalmic Solution 1 Drop(s) Both EYES two times a day  aspirin enteric coated 81 milliGRAM(s) Oral daily  BACItracin   Ointment 1 Application(s) Topical two times a day  buMETAnide Injectable 1 milliGRAM(s) IV Push every 8 hours  calcium acetate 667 milliGRAM(s) Oral three times a day with meals  dextrose 5%. 1000 milliLiter(s) (50 mL/Hr) IV Continuous <Continuous>  dextrose 50% Injectable 12.5 Gram(s) IV Push once  dextrose 50% Injectable 25 Gram(s) IV Push once  dextrose 50% Injectable 25 Gram(s) IV Push once  heparin  Injectable 5000 Unit(s) SubCutaneous every 8 hours  hydrALAZINE 50 milliGRAM(s) Oral every 8 hours  insulin glargine Injectable (LANTUS) 55 Unit(s) SubCutaneous at bedtime  insulin lispro (HumaLOG) corrective regimen sliding scale   SubCutaneous three times a day before meals  insulin lispro (HumaLOG) corrective regimen sliding scale   SubCutaneous at bedtime  insulin lispro Injectable (HumaLOG) 21 Unit(s) SubCutaneous three times a day before meals  melatonin 3 milliGRAM(s) Oral at bedtime  metoprolol tartrate 25 milliGRAM(s) Oral every 12 hours  multivitamin 1 Tablet(s) Oral daily  pantoprazole    Tablet 40 milliGRAM(s) Oral before breakfast  polyethylene glycol 3350 17 Gram(s) Oral daily  tamsulosin 0.4 milliGRAM(s) Oral at bedtime    MEDICATIONS  (PRN):  albuterol/ipratropium for Nebulization. 3 milliLiter(s) Nebulizer every 6 hours PRN Shortness of Breath and/or Wheezing  dextrose 40% Gel 15 Gram(s) Oral once PRN Blood Glucose LESS THAN 70 milliGRAM(s)/deciliter  glucagon  Injectable 1 milliGRAM(s) IntraMuscular once PRN Glucose LESS THAN 70 milligrams/deciliter  oxycodone    5 mG/acetaminophen 325 mG 1 Tablet(s) Oral every 6 hours PRN Severe Pain (7 - 10)  sodium chloride 0.9% lock flush 10 milliLiter(s) IV Push every 1 hour PRN Pre/post blood products, medications, blood draw, and to maintain line patency    Review of Systems:  Constitutional:  Negative for weight change, fever, malaise  HEENT:  Negative for sinus pain, hoarseness, sore throat, dysphagia, vision changes  Cardiovascular:  Negative for chest pain, palpitations, dizziness  Respiratory:  Negative for cough, wheezing, dyspnea  Gastrointestinal:  Negative for nausea, vomiting, diarrhea, melena  Musculoskeletal:  Negative for pain, swelling, stiffness   Neuro:  Negative for weakness, numbness, headache  Psych:  Negative for anxiety, depression  Endocrine:  Negative for polyuria, polydipsia, temperature Intolerance    All other systems negative unless otherwise stated    ICU Vital Signs Last 24 Hrs  T(C): 36.7 (2020 07:00), Max: 36.9 (2020 00:00)  T(F): 98.1 (2020 07:00), Max: 98.4 (2020 00:00)  HR: 81 (2020 09:00) (77 - 94)  BP: --  BP(mean): --  ABP: 139/51 (2020 09:00) (118/615 - 346/346)  ABP(mean): 75 (2020 09:00) (70 - 346)  RR: 23 (2020 09:00) (14 - 28)  SpO2: 100% (2020 09:00) (97% - 100%)    Daily     Daily Weight in k.6 (2020 00:00)  I&O's Summary    2020 07:01  -  2020 07:00  --------------------------------------------------------  IN: 1842 mL / OUT: 2700 mL / NET: -858 mL    2020 07:01  -  2020 09:27  --------------------------------------------------------  IN: 50 mL / OUT: 435 mL / NET: -385 mL    Physical Exam:  Gen: Alert, no apparent distress  CV: Regular rate and rhythm no murmurs, rubs or gallops  Pulm: Clear to auscultation bilaterally, no rales, rhonchi or wheezes  Chest: Chest tubes/drains in place with dressings clean, dry and intact  GI: Abd is soft, non-tender and non-distended with +BS  Ext: No clubbing, cyanosis; 2-3 + bilateral lower extremity pitting edema  Neuro: A+Ox3, follows commands and moves all extremities    Labs:                          9.0    11.09 )-----------( 317      ( 2020 06:04 )             27.9       02-18    129<L>  |  91<L>  |  66<H>  ----------------------------<  211<H>  4.1   |  25  |  2.29<H>    Ca    8.4      2020 01:13  Phos  3.5     02-18  Mg     2.1     02-18    TPro  5.5<L>  /  Alb  2.0<L>  /  TBili  0.3  /  DBili  x   /  AST  21  /  ALT  29  /  AlkPhos  95  02-18  PT/INR - ( 2020 01:13 )   PT: 20.3 sec;   INR: 1.75 ratio    PTT - ( 2020 01:13 )  PTT:36.0 sec    Assessment/Plan: 63yo male with hx of HTN, DM II was found to have multi-vessel coronary artery disease and is s/p CABG complicated by post-operative cardiac arrest now recovered.     Neuro:   Pain control with Oxycodone / Tylenol                                          Cardiovascular:    Stable hemodynamics  Not on any pressors  Continue Amiodarone, Lopressor, Hydralazine  Continue ASA, will add statin  Continue hemodynamic monitoring    Respiratory:  Pt is comfortable on nasal cannula  Encourage incentive spirometry  Continue nebulizers  Monitor chest tube output    GI:  Tolerating Renal/Diabetic diet  Continue bowel regimen  Continue Zofran for nausea - PRN	          Renal:  Continue Flomax  Likely has CKD, now with STEPHANIE on CKD which is improving  Continue Bumex for target 500 cc to 1L negative daily  Monitor I/Os and electrolytes    Hematologic / Oncology:  No signs of active bleeding, H/H decreased, transfused one unit PRBC with appropriate response  Monitor chest tube output    HSQ for DVT ppl    Infectious disease:  Ampicillin for enterococcus in blood  Discuss duration with ID (2 weeks for bacteremia?)    Endocrine:  Followed by Endocrine, continue high dose     All clinical, lab, hemodynamic and radiographic data were reviewed and the plan was discussed with CTICU team.     Sam Whitfield MD

## 2020-02-18 NOTE — PROGRESS NOTE ADULT - ASSESSMENT
intraop kennedy 2/3/20 ef 50%, nl lv, mild diastolic dysfx stage 1  limited echo 2/4/20: nl LV sys fx , no pericardial effusion     a/p  64 year old man with history of HTN, DM II, admitted with progressive exertional angina, s/p cath with severe triple vessel disease, s/p CABG, post op course c/b brief witnessed PEA likely hypoxic arrest, s/p intubation.     1. CAD, s/p cath with severe triple vessel disease including lesions at the bifurcation of the LAD/diagonal and distal RCA/RPDA/RPL trifurcation.   -s/p CABG x 4, intraop kennedy ef 50%  -cont asa, statin, BB   -s/p PEA arrest, now extubated  -off vasopressors  -LE dopplers, negative for dvt   -repeat echo with preserved lv fxn/EF    2. Postop acute diastolic HF  -cont bumex per cts     3. PAF  -cont amio, bb   -AC per CTS    4. HTN  -bp stable on hydralazine/bb    5. STEPHANIE/CKD  renal f/u     6. Postop Pleural effusion  - S/P Right chest tube:  removed   - s/p L chest tube, mgmt per CTS    7. Sepsis -E. Faecalis bacteremia  IV abx per CTICU, repest bcx 2/13 neg  ID following , repeat chest xray noted       dvt ppx

## 2020-02-18 NOTE — PROGRESS NOTE ADULT - PROBLEM SELECTOR PLAN 1
Will increase Lantus to 60u at bedtime.  Will increase Humalog to 25u before each meal and continue Humalog correction scale coverage. Will continue monitoring FS and FU.  Patient counseled for compliance with consistent low carb diet and exercise as tolerated outpatient. Will increase Lantus to 60u at bedtime.  Will increase Humalog to 25u before each  meal and continue Humalog correction scale coverage. Will continue monitoring FS and FU.  Patient counseled for compliance with consistent low carb diet and exercise as tolerated outpatient.

## 2020-02-18 NOTE — PROGRESS NOTE ADULT - ASSESSMENT
64 yr old with cri stage 4, DM, PVD, admitted and had CABG, Post op intubated and has bilateral effusions, worsening renal disease and bc positive for E. faecalis   follow up bc negative to date.   no signs of sternal wd infection  no recent UTI  lines all new.  No pna on CT     Follow up bc are all negative  no signs of endocarditis   continue present therapy  length needs to be discussed   wbc still high but no evidecne fo uncontrolled source     legs do not look infected       continue ampicillin  alone at present as we are not treating an intravascular infection       will discuss endpoint of ab with [facility]

## 2020-02-18 NOTE — PROGRESS NOTE ADULT - SUBJECTIVE AND OBJECTIVE BOX
CARDIOLOGY FOLLOW UP - Dr. Steel    CC no cp or sob  wife at bedside        PHYSICAL EXAM:  T(C): 36.6 (02-18-20 @ 13:08), Max: 36.9 (02-18-20 @ 00:00)  HR: 84 (02-18-20 @ 13:08) (77 - 91)  BP: 138/72 (02-18-20 @ 13:08) (125/70 - 138/72)  RR: 20 (02-18-20 @ 13:08) (14 - 29)  SpO2: 98% (02-18-20 @ 13:08) (97% - 100%)  Wt(kg): --  I&O's Summary    17 Feb 2020 07:01  -  18 Feb 2020 07:00  --------------------------------------------------------  IN: 1842 mL / OUT: 2700 mL / NET: -858 mL    18 Feb 2020 07:01  -  18 Feb 2020 16:26  --------------------------------------------------------  IN: 170 mL / OUT: 1210 mL / NET: -1040 mL        Appearance: Normal	  Cardiovascular: Normal S1 S2,RRR  Respiratory: diminished + chest tube    Gastrointestinal:  Soft, Non-tender, + BS	  Extremities: LE edema         MEDICATIONS  (STANDING):  aMIOdarone    Tablet 200 milliGRAM(s) Oral daily  aMIOdarone    Tablet   Oral   ampicillin  IVPB 2 Gram(s) IV Intermittent every 8 hours  artificial tears (preservative free) Ophthalmic Solution 1 Drop(s) Both EYES two times a day  aspirin enteric coated 81 milliGRAM(s) Oral daily  atorvastatin 40 milliGRAM(s) Oral at bedtime  BACItracin   Ointment 1 Application(s) Topical two times a day  buMETAnide Injectable 1 milliGRAM(s) IV Push every 8 hours  calcium acetate 667 milliGRAM(s) Oral three times a day with meals  dextrose 5%. 1000 milliLiter(s) (50 mL/Hr) IV Continuous <Continuous>  dextrose 50% Injectable 12.5 Gram(s) IV Push once  dextrose 50% Injectable 25 Gram(s) IV Push once  dextrose 50% Injectable 25 Gram(s) IV Push once  heparin  Injectable 5000 Unit(s) SubCutaneous every 8 hours  hydrALAZINE 50 milliGRAM(s) Oral every 8 hours  insulin glargine Injectable (LANTUS) 60 Unit(s) SubCutaneous at bedtime  insulin lispro (HumaLOG) corrective regimen sliding scale   SubCutaneous three times a day before meals  insulin lispro (HumaLOG) corrective regimen sliding scale   SubCutaneous at bedtime  insulin lispro Injectable (HumaLOG) 25 Unit(s) SubCutaneous three times a day before meals  melatonin 3 milliGRAM(s) Oral at bedtime  metoprolol tartrate 25 milliGRAM(s) Oral every 12 hours  multivitamin 1 Tablet(s) Oral daily  pantoprazole    Tablet 40 milliGRAM(s) Oral before breakfast  polyethylene glycol 3350 17 Gram(s) Oral daily  tamsulosin 0.4 milliGRAM(s) Oral at bedtime      TELEMETRY: nsr 	    ECG:  	  RADIOLOGY:   DIAGNOSTIC TESTING:  [ ] Echocardiogram:  [ ]  Catheterization:  [ ] Stress Test:    OTHER: 	    LABS:	 	                            9.0    11.09 )-----------( 317      ( 18 Feb 2020 06:04 )             27.9     02-18    129<L>  |  91<L>  |  66<H>  ----------------------------<  211<H>  4.1   |  25  |  2.29<H>    Ca    8.4      18 Feb 2020 01:13  Phos  3.5     02-18  Mg     2.1     02-18    TPro  5.5<L>  /  Alb  2.0<L>  /  TBili  0.3  /  DBili  x   /  AST  21  /  ALT  29  /  AlkPhos  95  02-18    PT/INR - ( 18 Feb 2020 01:13 )   PT: 20.3 sec;   INR: 1.75 ratio         PTT - ( 18 Feb 2020 01:13 )  PTT:36.0 sec

## 2020-02-18 NOTE — PROGRESS NOTE ADULT - SUBJECTIVE AND OBJECTIVE BOX
Subjective:  "I am well, a little weak"  Wife at bedside Pt OOB chair      Tele:  SR 80s           V/S:                    T(F): 97.8 (20 @ 13:08), Max: 98.4 (20 @ 00:00)  HR: 84 (20 @ 13:08) (77 - 91)  BP: 138/72 (20 @ 13:08) (125/70 - 138/72)  RR: 20 (20 @ 13:08) (14 - 29)  SpO2: 98% (20 @ 13:08) (97% - 100%)  Wt(kg): --      LV EF:      Labs:      129<L>  |  91<L>  |  66<H>  ----------------------------<  211<H>  4.1   |  25  |  2.29<H>    Ca    8.4      2020 01:13  Phos  3.5       Mg     2.1         TPro  5.5<L>  /  Alb  2.0<L>  /  TBili  0.3  /  DBili  x   /  AST  21  /  ALT  29  /  AlkPhos  95                                 9.0    11.09 )-----------( 317      ( 2020 06:04 )             27.9        PT/INR - ( 2020 01:13 )   PT: 20.3 sec;   INR: 1.75 ratio         PTT - ( 2020 01:13 )  PTT:36.0 sec     CAPILLARY BLOOD GLUCOSE  181 (2020 07:00)      POCT Blood Glucose.: 149 mg/dL (2020 12:03)  POCT Blood Glucose.: 177 mg/dL (2020 10:02)  POCT Blood Glucose.: 181 mg/dL (2020 07:39)  POCT Blood Glucose.: 167 mg/dL (2020 21:39)  POCT Blood Glucose.: 107 mg/dL (2020 17:15)           CXR:  < from: Xray Chest 1 View- PORTABLE-Routine (20 @ 02:55) >  The heart is enlarged. Left pleural effusion. Loss of volume left lung. Left lower lobe pneumonia and/or atelectasis. The right lung appears to be clear. Status post sternotomy.      < end of copied text >      I&O's Detail    2020 07:01  -  2020 07:00  --------------------------------------------------------  IN:    IV PiggyBack: 200 mL    Oral Fluid: 1292 mL    Packed Red Blood Cells: 350 mL  Total IN: 1842 mL    OUT:    Chest Tube: 310 mL    Indwelling Catheter - Urethral: 2390 mL  Total OUT: 2700 mL    Total NET: -858 mL      2020 07:01  -  2020 13:15  --------------------------------------------------------  IN:    Oral Fluid: 170 mL  Total IN: 170 mL    OUT:    Chest Tube: 110 mL    Indwelling Catheter - Urethral: 700 mL  Total OUT: 810 mL    Total NET: -640 mL          CHEST TUBE:  [x ] YES [ ] NO  OUTPUT:    300    per 24 hours    AIR LEAKS:  [ ] YES [x ] NO      SAMANTHA DRAIN:   [ ] YES [x ] NO  OUTPUT:     per 24 hours    EPICARDIAL WIRES:  [ ] YES [x ] NO      BOWEL MOVEMENT:  [ x] YES [ ] NO      Daily     Daily Weight in k.6 (2020 00:00)  Medications:  albuterol/ipratropium for Nebulization. 3 milliLiter(s) Nebulizer every 6 hours PRN  aMIOdarone    Tablet 200 milliGRAM(s) Oral daily  aMIOdarone    Tablet   Oral   ampicillin  IVPB 2 Gram(s) IV Intermittent every 8 hours  artificial tears (preservative free) Ophthalmic Solution 1 Drop(s) Both EYES two times a day  aspirin enteric coated 81 milliGRAM(s) Oral daily  atorvastatin 40 milliGRAM(s) Oral at bedtime  BACItracin   Ointment 1 Application(s) Topical two times a day  buMETAnide Injectable 1 milliGRAM(s) IV Push every 8 hours  calcium acetate 667 milliGRAM(s) Oral three times a day with meals  dextrose 40% Gel 15 Gram(s) Oral once PRN  dextrose 5%. 1000 milliLiter(s) IV Continuous <Continuous>  dextrose 50% Injectable 12.5 Gram(s) IV Push once  dextrose 50% Injectable 25 Gram(s) IV Push once  dextrose 50% Injectable 25 Gram(s) IV Push once  glucagon  Injectable 1 milliGRAM(s) IntraMuscular once PRN  heparin  Injectable 5000 Unit(s) SubCutaneous every 8 hours  hydrALAZINE 50 milliGRAM(s) Oral every 8 hours  insulin glargine Injectable (LANTUS) 60 Unit(s) SubCutaneous at bedtime  insulin lispro (HumaLOG) corrective regimen sliding scale   SubCutaneous three times a day before meals  insulin lispro (HumaLOG) corrective regimen sliding scale   SubCutaneous at bedtime  insulin lispro Injectable (HumaLOG) 25 Unit(s) SubCutaneous three times a day before meals  melatonin 3 milliGRAM(s) Oral at bedtime  metoprolol tartrate 25 milliGRAM(s) Oral every 12 hours  multivitamin 1 Tablet(s) Oral daily  oxycodone    5 mG/acetaminophen 325 mG 1 Tablet(s) Oral every 6 hours PRN  pantoprazole    Tablet 40 milliGRAM(s) Oral before breakfast  polyethylene glycol 3350 17 Gram(s) Oral daily  sodium chloride 0.9% lock flush 10 milliLiter(s) IV Push every 1 hour PRN  tamsulosin 0.4 milliGRAM(s) Oral at bedtime        Physical Exam:    Gen: Alert, no apparent distress    Neuro: A+Ox3, follows commands and moves all extremities    CV: Regular rate and rhythm no murmurs, rubs or gallops    Pulm: Clear to auscultation bilaterally, no rales, rhonchi or wheezes  L pigtail sx mod amt serosang drainage    GI: Abd is soft, non-tender and non-distended with +BS    Ext: + 2  bilateral lower extremity pitting edema   SVG sites    :  Indwelling thomas  soy urine                 PAST MEDICAL & SURGICAL HISTORY:  HTN (hypertension)  Diabetes  History of appendectomy: x 30 yrs ago

## 2020-02-18 NOTE — PROGRESS NOTE ADULT - SUBJECTIVE AND OBJECTIVE BOX
[facility] DIVISION OF KIDNEY DISEASES AND HYPERTENSION -- FOLLOW UP NOTE  --------------------------------------------------------------------------------  HPI: 65 yo M with HTN, DM II (20 years), admitted with complaints of acute on chronic left sided exertional chest pain. Nephrology team is following for elevated serum creatinine and pre-cardiac catheterization assessment. [facility] no prior records available. On admission (1/25/2020), Scr was 1.85. Patient went for cardiac cath on 1/29/20 which revealed triple vessel disease. Scr had improved to 1.76 on 2/3/20. Pt. underwent CABG on 2/3/20. Scr now increased after CABG and PEA arrest on 2/6/20, had improved to ~2.5. Scr over past few days had improved down to 2.09 on 2/17/20. Scr slightly increased this AM to 2.29.    Pt. seen and examined at bedside this AM. Pt. is sitting upright in chair, on diuretics, non oliguric ~2.1 L of UOP in the past 24 hours.    PAST HISTORY  --------------------------------------------------------------------------------  No significant changes to PMH, PSH, FHx, SHx, unless otherwise noted    ALLERGIES & MEDICATIONS  --------------------------------------------------------------------------------  Allergies    No Known Allergies    Intolerances    Standing Inpatient Medications  aMIOdarone    Tablet 200 milliGRAM(s) Oral daily  aMIOdarone    Tablet   Oral   ampicillin  IVPB 2 Gram(s) IV Intermittent every 8 hours  artificial tears (preservative free) Ophthalmic Solution 1 Drop(s) Both EYES two times a day  aspirin enteric coated 81 milliGRAM(s) Oral daily  BACItracin   Ointment 1 Application(s) Topical two times a day  buMETAnide Injectable 1 milliGRAM(s) IV Push every 8 hours  calcium acetate 667 milliGRAM(s) Oral three times a day with meals  dextrose 5%. 1000 milliLiter(s) IV Continuous <Continuous>  dextrose 50% Injectable 12.5 Gram(s) IV Push once  dextrose 50% Injectable 25 Gram(s) IV Push once  dextrose 50% Injectable 25 Gram(s) IV Push once  heparin  Injectable 5000 Unit(s) SubCutaneous every 8 hours  hydrALAZINE 50 milliGRAM(s) Oral every 8 hours  insulin glargine Injectable (LANTUS) 55 Unit(s) SubCutaneous at bedtime  insulin lispro (HumaLOG) corrective regimen sliding scale   SubCutaneous three times a day before meals  insulin lispro (HumaLOG) corrective regimen sliding scale   SubCutaneous at bedtime  insulin lispro Injectable (HumaLOG) 21 Unit(s) SubCutaneous three times a day before meals  melatonin 3 milliGRAM(s) Oral at bedtime  metoprolol tartrate 25 milliGRAM(s) Oral every 12 hours  multivitamin 1 Tablet(s) Oral daily  pantoprazole    Tablet 40 milliGRAM(s) Oral before breakfast  polyethylene glycol 3350 17 Gram(s) Oral daily  tamsulosin 0.4 milliGRAM(s) Oral at bedtime    REVIEW OF SYSTEMS  --------------------------------------------------------------------------------  Gen: no lethargy, + fatigue  Respiratory: No dyspnea  GI: No abdominal pain  MSK: + LE edema, + scrotal edema  Neuro: No dizziness  Heme: No bleeding    All other systems were reviewed and are negative, except as noted.  VITALS/PHYSICAL EXAM  --------------------------------------------------------------------------------  T(C): 36.7 (02-18-20 @ 07:00), Max: 36.9 (02-18-20 @ 00:00)  HR: 88 (02-18-20 @ 07:00) (78 - 94)  BP: --  RR: 24 (02-18-20 @ 07:00) (14 - 28)  SpO2: 97% (02-18-20 @ 07:00) (97% - 100%)  Wt(kg): --    02-17-20 @ 07:01  -  02-18-20 @ 07:00  --------------------------------------------------------  IN: 1842 mL / OUT: 2700 mL / NET: -858 mL    02-18-20 @ 07:01  -  02-18-20 @ 08:06  --------------------------------------------------------  IN: 50 mL / OUT: 80 mL / NET: -30 mL    Physical Exam:  	Gen: awake  	HEENT: supple neck  	Pulm: wheezes, minimal crackles.   	CV: + central chest dressing from CABG C/D/I, S1S2  	Abd: Soft  	: + scrotal edema  	Ext: + pitting edema B/L (improved)  	Neuro: Awake              Psych: Unable to assess due to clinical condition  	Skin: Warm and dry  LABS/STUDIES  --------------------------------------------------------------------------------              9.0    11.09 >-----------<  317      [02-18-20 @ 06:04]              27.9     129  |  91  |  66  ----------------------------<  211      [02-18-20 @ 01:13]  4.1   |  25  |  2.29        Ca     8.4     [02-18-20 @ 01:13]      Mg     2.1     [02-18-20 @ 01:13]      Phos  3.5     [02-18-20 @ 01:13]    Creatinine Trend:  SCr 2.29 [02-18 @ 01:13]  SCr 2.09 [02-17 @ 01:01]  SCr 2.62 [02-16 @ 00:49]  SCr 2.83 [02-15 @ 00:28]  SCr 2.98 [02-14 @ 01:38]    Urine Creatinine 52      [02-14-20 @ 11:43]  Urine Protein 73      [02-14-20 @ 11:43]    HbA1c 9.2      [01-26-20 @ 09:03]    HBsAg Nonreact      [01-30-20 @ 00:20]  HCV 0.16, Nonreact      [02-04-20 @ 10:29]

## 2020-02-18 NOTE — PROGRESS NOTE ADULT - PROBLEM SELECTOR PLAN 1
Pt with  CKD likely  in the setting of long standing DM and HTN. No prior labs for review. Scr since admission has ranged between 1.8-2 mg/dl.  Scr increased to ~2.50 secondary to hemodynamic injury after CABG  and PEA arrest s/p CPR, and worsened to 2.9. Scr yesterday was improved to 2.09, now slightly increased to 2.29. Serologic work up negative. Non-nephrotic range proteinuria. Pt. likely with diabetic nephropathy.   - Continue with diuretics.  - Monitor labs and urine output.   - Avoid NSAIDs, ACEI/ARBS, RCA and nephrotoxins. Dose medications as per eGFR

## 2020-02-18 NOTE — PROGRESS NOTE ADULT - SUBJECTIVE AND OBJECTIVE BOX
Chief complaint  Patient is a 64y old  Male who presents with a chief complaint of Chest pain (18 Feb 2020 13:14)   Review of systems  Patient in bed, looks comfortable, no hypoglycemia.    Labs and Fingersticks  CAPILLARY BLOOD GLUCOSE  181 (18 Feb 2020 07:00)      POCT Blood Glucose.: 149 mg/dL (18 Feb 2020 12:03)  POCT Blood Glucose.: 177 mg/dL (18 Feb 2020 10:02)  POCT Blood Glucose.: 181 mg/dL (18 Feb 2020 07:39)  POCT Blood Glucose.: 167 mg/dL (17 Feb 2020 21:39)  POCT Blood Glucose.: 107 mg/dL (17 Feb 2020 17:15)      Anion Gap, Serum: 13 (02-18 @ 01:13)  Anion Gap, Serum: 14 (02-17 @ 01:01)      Calcium, Total Serum: 8.4 (02-18 @ 01:13)  Calcium, Total Serum: 8.1 <L> (02-17 @ 01:01)  Albumin, Serum: 2.0 <L> (02-18 @ 01:13)  Albumin, Serum: 2.2 <L> (02-17 @ 01:01)    Alanine Aminotransferase (ALT/SGPT): 29 (02-18 @ 01:13)  Alanine Aminotransferase (ALT/SGPT): 31 (02-17 @ 01:01)  Alkaline Phosphatase, Serum: 95 (02-18 @ 01:13)  Alkaline Phosphatase, Serum: 91 (02-17 @ 01:01)  Aspartate Aminotransferase (AST/SGOT): 21 (02-18 @ 01:13)  Aspartate Aminotransferase (AST/SGOT): 22 (02-17 @ 01:01)        02-18    129<L>  |  91<L>  |  66<H>  ----------------------------<  211<H>  4.1   |  25  |  2.29<H>    Ca    8.4      18 Feb 2020 01:13  Phos  3.5     02-18  Mg     2.1     02-18    TPro  5.5<L>  /  Alb  2.0<L>  /  TBili  0.3  /  DBili  x   /  AST  21  /  ALT  29  /  AlkPhos  95  02-18                        9.0    11.09 )-----------( 317      ( 18 Feb 2020 06:04 )             27.9     Medications  MEDICATIONS  (STANDING):  aMIOdarone    Tablet 200 milliGRAM(s) Oral daily  aMIOdarone    Tablet   Oral   ampicillin  IVPB 2 Gram(s) IV Intermittent every 8 hours  artificial tears (preservative free) Ophthalmic Solution 1 Drop(s) Both EYES two times a day  aspirin enteric coated 81 milliGRAM(s) Oral daily  atorvastatin 40 milliGRAM(s) Oral at bedtime  BACItracin   Ointment 1 Application(s) Topical two times a day  buMETAnide Injectable 1 milliGRAM(s) IV Push every 8 hours  calcium acetate 667 milliGRAM(s) Oral three times a day with meals  dextrose 5%. 1000 milliLiter(s) (50 mL/Hr) IV Continuous <Continuous>  dextrose 50% Injectable 12.5 Gram(s) IV Push once  dextrose 50% Injectable 25 Gram(s) IV Push once  dextrose 50% Injectable 25 Gram(s) IV Push once  heparin  Injectable 5000 Unit(s) SubCutaneous every 8 hours  hydrALAZINE 50 milliGRAM(s) Oral every 8 hours  insulin glargine Injectable (LANTUS) 60 Unit(s) SubCutaneous at bedtime  insulin lispro (HumaLOG) corrective regimen sliding scale   SubCutaneous three times a day before meals  insulin lispro (HumaLOG) corrective regimen sliding scale   SubCutaneous at bedtime  insulin lispro Injectable (HumaLOG) 25 Unit(s) SubCutaneous three times a day before meals  melatonin 3 milliGRAM(s) Oral at bedtime  metoprolol tartrate 25 milliGRAM(s) Oral every 12 hours  multivitamin 1 Tablet(s) Oral daily  pantoprazole    Tablet 40 milliGRAM(s) Oral before breakfast  polyethylene glycol 3350 17 Gram(s) Oral daily  tamsulosin 0.4 milliGRAM(s) Oral at bedtime      Physical Exam  General: Patient comfortable in bed  Vital Signs Last 12 Hrs  T(F): 97.8 (02-18-20 @ 13:08), Max: 98.1 (02-18-20 @ 07:00)  HR: 84 (02-18-20 @ 13:08) (77 - 88)  BP: 138/72 (02-18-20 @ 13:08) (125/70 - 138/72)  BP(mean): 78 (02-18-20 @ 10:00) (78 - 78)  RR: 20 (02-18-20 @ 13:08) (15 - 29)  SpO2: 98% (02-18-20 @ 13:08) (97% - 100%)  Neck: No palpable thyroid nodules.  CVS: S1S2, No murmurs  Respiratory: No wheezing, no crepitations  GI: Abdomen soft, bowel sounds positive  Musculoskeletal:  edema lower extremities.   Skin: No skin rashes, no ecchymosis    Diagnostics Chief complaint  Patient is a 64y old  Male who presents with a chief complaint  of Chest pain (18 Feb 2020 13:14)   Review of systems  Patient in bed, looks comfortable, no hypoglycemia.    Labs and Fingersticks  CAPILLARY BLOOD GLUCOSE  181 (18 Feb 2020 07:00)      POCT Blood Glucose.: 149 mg/dL (18 Feb 2020 12:03)  POCT Blood Glucose.: 177 mg/dL (18 Feb 2020 10:02)  POCT Blood Glucose.: 181 mg/dL (18 Feb 2020 07:39)  POCT Blood Glucose.: 167 mg/dL (17 Feb 2020 21:39)  POCT Blood Glucose.: 107 mg/dL (17 Feb 2020 17:15)      Anion Gap, Serum: 13 (02-18 @ 01:13)  Anion Gap, Serum: 14 (02-17 @ 01:01)      Calcium, Total Serum: 8.4 (02-18 @ 01:13)  Calcium, Total Serum: 8.1 <L> (02-17 @ 01:01)  Albumin, Serum: 2.0 <L> (02-18 @ 01:13)  Albumin, Serum: 2.2 <L> (02-17 @ 01:01)    Alanine Aminotransferase (ALT/SGPT): 29 (02-18 @ 01:13)  Alanine Aminotransferase (ALT/SGPT): 31 (02-17 @ 01:01)  Alkaline Phosphatase, Serum: 95 (02-18 @ 01:13)  Alkaline Phosphatase, Serum: 91 (02-17 @ 01:01)  Aspartate Aminotransferase (AST/SGOT): 21 (02-18 @ 01:13)  Aspartate Aminotransferase (AST/SGOT): 22 (02-17 @ 01:01)        02-18    129<L>  |  91<L>  |  66<H>  ----------------------------<  211<H>  4.1   |  25  |  2.29<H>    Ca    8.4      18 Feb 2020 01:13  Phos  3.5     02-18  Mg     2.1     02-18    TPro  5.5<L>  /  Alb  2.0<L>  /  TBili  0.3  /  DBili  x   /  AST  21  /  ALT  29  /  AlkPhos  95  02-18                        9.0    11.09 )-----------( 317      ( 18 Feb 2020 06:04 )             27.9     Medications  MEDICATIONS  (STANDING):  aMIOdarone    Tablet 200 milliGRAM(s) Oral daily  aMIOdarone    Tablet   Oral   ampicillin  IVPB 2 Gram(s) IV Intermittent every 8 hours  artificial tears (preservative free) Ophthalmic Solution 1 Drop(s) Both EYES two times a day  aspirin enteric coated 81 milliGRAM(s) Oral daily  atorvastatin 40 milliGRAM(s) Oral at bedtime  BACItracin   Ointment 1 Application(s) Topical two times a day  buMETAnide Injectable 1 milliGRAM(s) IV Push every 8 hours  calcium acetate 667 milliGRAM(s) Oral three times a day with meals  dextrose 5%. 1000 milliLiter(s) (50 mL/Hr) IV Continuous <Continuous>  dextrose 50% Injectable 12.5 Gram(s) IV Push once  dextrose 50% Injectable 25 Gram(s) IV Push once  dextrose 50% Injectable 25 Gram(s) IV Push once  heparin  Injectable 5000 Unit(s) SubCutaneous every 8 hours  hydrALAZINE 50 milliGRAM(s) Oral every 8 hours  insulin glargine Injectable (LANTUS) 60 Unit(s) SubCutaneous at bedtime  insulin lispro (HumaLOG) corrective regimen sliding scale   SubCutaneous three times a day before meals  insulin lispro (HumaLOG) corrective regimen sliding scale   SubCutaneous at bedtime  insulin lispro Injectable (HumaLOG) 25 Unit(s) SubCutaneous three times a day before meals  melatonin 3 milliGRAM(s) Oral at bedtime  metoprolol tartrate 25 milliGRAM(s) Oral every 12 hours  multivitamin 1 Tablet(s) Oral daily  pantoprazole    Tablet 40 milliGRAM(s) Oral before breakfast  polyethylene glycol 3350 17 Gram(s) Oral daily  tamsulosin 0.4 milliGRAM(s) Oral at bedtime      Physical Exam  General: Patient comfortable in bed  Vital Signs Last 12 Hrs  T(F): 97.8 (02-18-20 @ 13:08), Max: 98.1 (02-18-20 @ 07:00)  HR: 84 (02-18-20 @ 13:08) (77 - 88)  BP: 138/72 (02-18-20 @ 13:08) (125/70 - 138/72)  BP(mean): 78 (02-18-20 @ 10:00) (78 - 78)  RR: 20 (02-18-20 @ 13:08) (15 - 29)  SpO2: 98% (02-18-20 @ 13:08) (97% - 100%)  Neck: No palpable thyroid nodules.  CVS: S1S2, No murmurs  Respiratory: No wheezing, no crepitations  GI: Abdomen soft, bowel sounds positive  Musculoskeletal:  edema lower extremities.   Skin: No skin rashes, no ecchymosis    Diagnostics

## 2020-02-19 LAB
ANION GAP SERPL CALC-SCNC: 13 MMOL/L — SIGNIFICANT CHANGE UP (ref 5–17)
BASOPHILS # BLD AUTO: 0.06 K/UL — SIGNIFICANT CHANGE UP (ref 0–0.2)
BASOPHILS NFR BLD AUTO: 0.5 % — SIGNIFICANT CHANGE UP (ref 0–2)
BUN SERPL-MCNC: 69 MG/DL — HIGH (ref 7–23)
CALCIUM SERPL-MCNC: 8.6 MG/DL — SIGNIFICANT CHANGE UP (ref 8.4–10.5)
CHLORIDE SERPL-SCNC: 94 MMOL/L — LOW (ref 96–108)
CO2 SERPL-SCNC: 25 MMOL/L — SIGNIFICANT CHANGE UP (ref 22–31)
CREAT SERPL-MCNC: 2.22 MG/DL — HIGH (ref 0.5–1.3)
EOSINOPHIL # BLD AUTO: 0.42 K/UL — SIGNIFICANT CHANGE UP (ref 0–0.5)
EOSINOPHIL NFR BLD AUTO: 3.8 % — SIGNIFICANT CHANGE UP (ref 0–6)
GLUCOSE BLDC GLUCOMTR-MCNC: 130 MG/DL — HIGH (ref 70–99)
GLUCOSE BLDC GLUCOMTR-MCNC: 145 MG/DL — HIGH (ref 70–99)
GLUCOSE BLDC GLUCOMTR-MCNC: 182 MG/DL — HIGH (ref 70–99)
GLUCOSE BLDC GLUCOMTR-MCNC: 237 MG/DL — HIGH (ref 70–99)
GLUCOSE BLDC GLUCOMTR-MCNC: 68 MG/DL — LOW (ref 70–99)
GLUCOSE BLDC GLUCOMTR-MCNC: 82 MG/DL — SIGNIFICANT CHANGE UP (ref 70–99)
GLUCOSE SERPL-MCNC: 113 MG/DL — HIGH (ref 70–99)
HCT VFR BLD CALC: 28.2 % — LOW (ref 39–50)
HGB BLD-MCNC: 9.1 G/DL — LOW (ref 13–17)
IMM GRANULOCYTES NFR BLD AUTO: 1.2 % — SIGNIFICANT CHANGE UP (ref 0–1.5)
LYMPHOCYTES # BLD AUTO: 1.09 K/UL — SIGNIFICANT CHANGE UP (ref 1–3.3)
LYMPHOCYTES # BLD AUTO: 9.8 % — LOW (ref 13–44)
MCHC RBC-ENTMCNC: 27.8 PG — SIGNIFICANT CHANGE UP (ref 27–34)
MCHC RBC-ENTMCNC: 32.3 GM/DL — SIGNIFICANT CHANGE UP (ref 32–36)
MCV RBC AUTO: 86.2 FL — SIGNIFICANT CHANGE UP (ref 80–100)
MONOCYTES # BLD AUTO: 1.16 K/UL — HIGH (ref 0–0.9)
MONOCYTES NFR BLD AUTO: 10.4 % — SIGNIFICANT CHANGE UP (ref 2–14)
NEUTROPHILS # BLD AUTO: 8.29 K/UL — HIGH (ref 1.8–7.4)
NEUTROPHILS NFR BLD AUTO: 74.3 % — SIGNIFICANT CHANGE UP (ref 43–77)
NRBC # BLD: 0 /100 WBCS — SIGNIFICANT CHANGE UP (ref 0–0)
PLATELET # BLD AUTO: 385 K/UL — SIGNIFICANT CHANGE UP (ref 150–400)
POTASSIUM SERPL-MCNC: 4.5 MMOL/L — SIGNIFICANT CHANGE UP (ref 3.5–5.3)
POTASSIUM SERPL-SCNC: 4.5 MMOL/L — SIGNIFICANT CHANGE UP (ref 3.5–5.3)
RBC # BLD: 3.27 M/UL — LOW (ref 4.2–5.8)
RBC # FLD: 14.1 % — SIGNIFICANT CHANGE UP (ref 10.3–14.5)
SODIUM SERPL-SCNC: 132 MMOL/L — LOW (ref 135–145)
WBC # BLD: 11.15 K/UL — HIGH (ref 3.8–10.5)
WBC # FLD AUTO: 11.15 K/UL — HIGH (ref 3.8–10.5)

## 2020-02-19 PROCEDURE — 99232 SBSQ HOSP IP/OBS MODERATE 35: CPT

## 2020-02-19 PROCEDURE — 99232 SBSQ HOSP IP/OBS MODERATE 35: CPT | Mod: GC

## 2020-02-19 PROCEDURE — 71045 X-RAY EXAM CHEST 1 VIEW: CPT | Mod: 26

## 2020-02-19 RX ORDER — OXYCODONE AND ACETAMINOPHEN 5; 325 MG/1; MG/1
2 TABLET ORAL EVERY 4 HOURS
Refills: 0 | Status: DISCONTINUED | OUTPATIENT
Start: 2020-02-19 | End: 2020-02-19

## 2020-02-19 RX ORDER — INSULIN LISPRO 100/ML
23 VIAL (ML) SUBCUTANEOUS
Refills: 0 | Status: DISCONTINUED | OUTPATIENT
Start: 2020-02-19 | End: 2020-02-19

## 2020-02-19 RX ORDER — INSULIN LISPRO 100/ML
25 VIAL (ML) SUBCUTANEOUS
Refills: 0 | Status: DISCONTINUED | OUTPATIENT
Start: 2020-02-19 | End: 2020-02-20

## 2020-02-19 RX ORDER — INSULIN GLARGINE 100 [IU]/ML
60 INJECTION, SOLUTION SUBCUTANEOUS AT BEDTIME
Refills: 0 | Status: DISCONTINUED | OUTPATIENT
Start: 2020-02-19 | End: 2020-02-22

## 2020-02-19 RX ORDER — ACETAMINOPHEN 500 MG
650 TABLET ORAL EVERY 6 HOURS
Refills: 0 | Status: DISCONTINUED | OUTPATIENT
Start: 2020-02-19 | End: 2020-02-19

## 2020-02-19 RX ORDER — ACETAMINOPHEN 500 MG
1000 TABLET ORAL ONCE
Refills: 0 | Status: COMPLETED | OUTPATIENT
Start: 2020-02-19 | End: 2020-02-19

## 2020-02-19 RX ADMIN — Medication 1 TABLET(S): at 13:16

## 2020-02-19 RX ADMIN — Medication 25 UNIT(S): at 09:11

## 2020-02-19 RX ADMIN — Medication 1 APPLICATION(S): at 17:19

## 2020-02-19 RX ADMIN — Medication 1: at 09:10

## 2020-02-19 RX ADMIN — ATORVASTATIN CALCIUM 40 MILLIGRAM(S): 80 TABLET, FILM COATED ORAL at 22:25

## 2020-02-19 RX ADMIN — HEPARIN SODIUM 5000 UNIT(S): 5000 INJECTION INTRAVENOUS; SUBCUTANEOUS at 22:26

## 2020-02-19 RX ADMIN — Medication 25 MILLIGRAM(S): at 06:01

## 2020-02-19 RX ADMIN — TAMSULOSIN HYDROCHLORIDE 0.4 MILLIGRAM(S): 0.4 CAPSULE ORAL at 22:26

## 2020-02-19 RX ADMIN — Medication 667 MILLIGRAM(S): at 13:16

## 2020-02-19 RX ADMIN — Medication 667 MILLIGRAM(S): at 17:21

## 2020-02-19 RX ADMIN — BUMETANIDE 1 MILLIGRAM(S): 0.25 INJECTION INTRAMUSCULAR; INTRAVENOUS at 06:00

## 2020-02-19 RX ADMIN — AMIODARONE HYDROCHLORIDE 200 MILLIGRAM(S): 400 TABLET ORAL at 06:01

## 2020-02-19 RX ADMIN — Medication 23 UNIT(S): at 13:16

## 2020-02-19 RX ADMIN — Medication 25 MILLIGRAM(S): at 17:20

## 2020-02-19 RX ADMIN — HEPARIN SODIUM 5000 UNIT(S): 5000 INJECTION INTRAVENOUS; SUBCUTANEOUS at 06:01

## 2020-02-19 RX ADMIN — Medication 650 MILLIGRAM(S): at 02:48

## 2020-02-19 RX ADMIN — Medication 667 MILLIGRAM(S): at 09:12

## 2020-02-19 RX ADMIN — Medication 2: at 13:20

## 2020-02-19 RX ADMIN — PANTOPRAZOLE SODIUM 40 MILLIGRAM(S): 20 TABLET, DELAYED RELEASE ORAL at 06:01

## 2020-02-19 RX ADMIN — Medication 216 GRAM(S): at 06:01

## 2020-02-19 RX ADMIN — Medication 50 MILLIGRAM(S): at 13:16

## 2020-02-19 RX ADMIN — Medication 216 GRAM(S): at 13:17

## 2020-02-19 RX ADMIN — Medication 81 MILLIGRAM(S): at 13:16

## 2020-02-19 RX ADMIN — BUMETANIDE 1 MILLIGRAM(S): 0.25 INJECTION INTRAMUSCULAR; INTRAVENOUS at 22:26

## 2020-02-19 RX ADMIN — Medication 1 APPLICATION(S): at 06:00

## 2020-02-19 RX ADMIN — INSULIN GLARGINE 60 UNIT(S): 100 INJECTION, SOLUTION SUBCUTANEOUS at 23:30

## 2020-02-19 RX ADMIN — Medication 25 UNIT(S): at 17:20

## 2020-02-19 RX ADMIN — BUMETANIDE 1 MILLIGRAM(S): 0.25 INJECTION INTRAMUSCULAR; INTRAVENOUS at 13:16

## 2020-02-19 RX ADMIN — Medication 1 DROP(S): at 06:01

## 2020-02-19 RX ADMIN — Medication 3 MILLIGRAM(S): at 22:26

## 2020-02-19 RX ADMIN — Medication 50 MILLIGRAM(S): at 06:01

## 2020-02-19 RX ADMIN — Medication 216 GRAM(S): at 22:25

## 2020-02-19 RX ADMIN — Medication 650 MILLIGRAM(S): at 02:18

## 2020-02-19 RX ADMIN — Medication 50 MILLIGRAM(S): at 22:26

## 2020-02-19 RX ADMIN — Medication 400 MILLIGRAM(S): at 10:28

## 2020-02-19 NOTE — PROGRESS NOTE ADULT - ASSESSMENT
intraop kennedy 2/3/20 ef 50%, nl lv, mild diastolic dysfx stage 1  limited echo 2/4/20: nl LV sys fx , no pericardial effusion     a/p  64 year old man with history of HTN, DM II, admitted with progressive exertional angina, s/p cath with severe triple vessel disease, s/p CABG, post op course c/b brief witnessed PEA likely hypoxic arrest, s/p intubation.     1. CAD, s/p cath with severe triple vessel disease including lesions at the bifurcation of the LAD/diagonal and distal RCA/RPDA/RPL trifurcation.   -s/p CABG x 4, intraop kennedy ef 50%  -cont asa, statin, BB   -s/p PEA arrest, now extubated  -off vasopressors  -LE dopplers, negative for dvt   -repeat echo 2/15 with preserved lv fxn/EF    2. Postop acute diastolic HF  -cont bumex per cts , net outpt negative    3. PAF  -cont amio, bb   -AC per CTS    4. HTN  -bp stable on hydralazine/bb    5. STEPHANIE/CKD  renal f/u     6. Postop Pleural effusion  - S/P Right chest tube:  removed   - s/p L chest tube, mgmt per CTS    7. Sepsis -E. Faecalis bacteremia  IV abx per CTICU, repeat bcx 2/13 neg  ID following , repeat chest xray noted       dvt ppx

## 2020-02-19 NOTE — PROGRESS NOTE ADULT - SUBJECTIVE AND OBJECTIVE BOX
Chief complaint  Patient is a 64y old  Male who presents with a chief complaint of Chest pain (19 Feb 2020 11:36)   Review of systems  Patient sitting up in chair, looks comfortable, no hypoglycemia.    Labs and Fingersticks  CAPILLARY BLOOD GLUCOSE      POCT Blood Glucose.: 237 mg/dL (19 Feb 2020 11:34)  POCT Blood Glucose.: 182 mg/dL (19 Feb 2020 08:04)  POCT Blood Glucose.: 92 mg/dL (18 Feb 2020 21:42)  POCT Blood Glucose.: 98 mg/dL (18 Feb 2020 16:55)      Anion Gap, Serum: 13 (02-19 @ 06:31)  Anion Gap, Serum: 13 (02-18 @ 01:13)      Calcium, Total Serum: 8.6 (02-19 @ 06:31)  Calcium, Total Serum: 8.4 (02-18 @ 01:13)  Albumin, Serum: 2.0 <L> (02-18 @ 01:13)    Alanine Aminotransferase (ALT/SGPT): 29 (02-18 @ 01:13)  Alkaline Phosphatase, Serum: 95 (02-18 @ 01:13)  Aspartate Aminotransferase (AST/SGOT): 21 (02-18 @ 01:13)        02-19    132<L>  |  94<L>  |  69<H>  ----------------------------<  113<H>  4.5   |  25  |  2.22<H>    Ca    8.6      19 Feb 2020 06:31  Phos  3.5     02-18  Mg     2.1     02-18    TPro  5.5<L>  /  Alb  2.0<L>  /  TBili  0.3  /  DBili  x   /  AST  21  /  ALT  29  /  AlkPhos  95  02-18                        9.1    11.15 )-----------( 385      ( 19 Feb 2020 06:46 )             28.2     Medications  MEDICATIONS  (STANDING):  aMIOdarone    Tablet 200 milliGRAM(s) Oral daily  aMIOdarone    Tablet   Oral   ampicillin  IVPB 2 Gram(s) IV Intermittent every 8 hours  artificial tears (preservative free) Ophthalmic Solution 1 Drop(s) Both EYES two times a day  aspirin enteric coated 81 milliGRAM(s) Oral daily  atorvastatin 40 milliGRAM(s) Oral at bedtime  BACItracin   Ointment 1 Application(s) Topical two times a day  buMETAnide Injectable 1 milliGRAM(s) IV Push every 8 hours  calcium acetate 667 milliGRAM(s) Oral three times a day with meals  dextrose 5%. 1000 milliLiter(s) (50 mL/Hr) IV Continuous <Continuous>  dextrose 50% Injectable 12.5 Gram(s) IV Push once  dextrose 50% Injectable 25 Gram(s) IV Push once  dextrose 50% Injectable 25 Gram(s) IV Push once  heparin  Injectable 5000 Unit(s) SubCutaneous every 8 hours  hydrALAZINE 50 milliGRAM(s) Oral every 8 hours  insulin glargine Injectable (LANTUS) 60 Unit(s) SubCutaneous at bedtime  insulin lispro (HumaLOG) corrective regimen sliding scale   SubCutaneous three times a day before meals  insulin lispro (HumaLOG) corrective regimen sliding scale   SubCutaneous at bedtime  insulin lispro Injectable (HumaLOG) 25 Unit(s) SubCutaneous three times a day with meals  melatonin 3 milliGRAM(s) Oral at bedtime  metoprolol tartrate 25 milliGRAM(s) Oral every 12 hours  multivitamin 1 Tablet(s) Oral daily  pantoprazole    Tablet 40 milliGRAM(s) Oral before breakfast  polyethylene glycol 3350 17 Gram(s) Oral daily  tamsulosin 0.4 milliGRAM(s) Oral at bedtime      Physical Exam  General: Patient comfortable in bed  Vital Signs Last 12 Hrs  T(F): 97.7 (02-19-20 @ 12:52), Max: 98.2 (02-19-20 @ 05:56)  HR: 82 (02-19-20 @ 12:52) (82 - 84)  BP: 120/55 (02-19-20 @ 12:52) (120/55 - 129/72)  BP(mean): --  RR: 18 (02-19-20 @ 12:52) (18 - 20)  SpO2: 95% (02-19-20 @ 12:52) (95% - 98%)  Neck: No palpable thyroid nodules.  CVS: S1S2, No murmurs  Respiratory: No wheezing, no crepitations  GI: Abdomen soft, bowel sounds positive  Musculoskeletal:  edema lower extremities.   Skin: No skin rashes, no ecchymosis    Diagnostics Chief complaint  Patient is a 64y old  Male who presents  with a chief complaint of Chest pain (19 Feb 2020 11:36)   Review of systems  Patient sitting up in chair, looks comfortable, no hypoglycemia.    Labs and Fingersticks  CAPILLARY BLOOD GLUCOSE      POCT Blood Glucose.: 237 mg/dL (19 Feb 2020 11:34)  POCT Blood Glucose.: 182 mg/dL (19 Feb 2020 08:04)  POCT Blood Glucose.: 92 mg/dL (18 Feb 2020 21:42)  POCT Blood Glucose.: 98 mg/dL (18 Feb 2020 16:55)      Anion Gap, Serum: 13 (02-19 @ 06:31)  Anion Gap, Serum: 13 (02-18 @ 01:13)      Calcium, Total Serum: 8.6 (02-19 @ 06:31)  Calcium, Total Serum: 8.4 (02-18 @ 01:13)  Albumin, Serum: 2.0 <L> (02-18 @ 01:13)    Alanine Aminotransferase (ALT/SGPT): 29 (02-18 @ 01:13)  Alkaline Phosphatase, Serum: 95 (02-18 @ 01:13)  Aspartate Aminotransferase (AST/SGOT): 21 (02-18 @ 01:13)        02-19    132<L>  |  94<L>  |  69<H>  ----------------------------<  113<H>  4.5   |  25  |  2.22<H>    Ca    8.6      19 Feb 2020 06:31  Phos  3.5     02-18  Mg     2.1     02-18    TPro  5.5<L>  /  Alb  2.0<L>  /  TBili  0.3  /  DBili  x   /  AST  21  /  ALT  29  /  AlkPhos  95  02-18                        9.1    11.15 )-----------( 385      ( 19 Feb 2020 06:46 )             28.2     Medications  MEDICATIONS  (STANDING):  aMIOdarone    Tablet 200 milliGRAM(s) Oral daily  aMIOdarone    Tablet   Oral   ampicillin  IVPB 2 Gram(s) IV Intermittent every 8 hours  artificial tears (preservative free) Ophthalmic Solution 1 Drop(s) Both EYES two times a day  aspirin enteric coated 81 milliGRAM(s) Oral daily  atorvastatin 40 milliGRAM(s) Oral at bedtime  BACItracin   Ointment 1 Application(s) Topical two times a day  buMETAnide Injectable 1 milliGRAM(s) IV Push every 8 hours  calcium acetate 667 milliGRAM(s) Oral three times a day with meals  dextrose 5%. 1000 milliLiter(s) (50 mL/Hr) IV Continuous <Continuous>  dextrose 50% Injectable 12.5 Gram(s) IV Push once  dextrose 50% Injectable 25 Gram(s) IV Push once  dextrose 50% Injectable 25 Gram(s) IV Push once  heparin  Injectable 5000 Unit(s) SubCutaneous every 8 hours  hydrALAZINE 50 milliGRAM(s) Oral every 8 hours  insulin glargine Injectable (LANTUS) 60 Unit(s) SubCutaneous at bedtime  insulin lispro (HumaLOG) corrective regimen sliding scale   SubCutaneous three times a day before meals  insulin lispro (HumaLOG) corrective regimen sliding scale   SubCutaneous at bedtime  insulin lispro Injectable (HumaLOG) 25 Unit(s) SubCutaneous three times a day with meals  melatonin 3 milliGRAM(s) Oral at bedtime  metoprolol tartrate 25 milliGRAM(s) Oral every 12 hours  multivitamin 1 Tablet(s) Oral daily  pantoprazole    Tablet 40 milliGRAM(s) Oral before breakfast  polyethylene glycol 3350 17 Gram(s) Oral daily  tamsulosin 0.4 milliGRAM(s) Oral at bedtime      Physical Exam  General: Patient comfortable in bed  Vital Signs Last 12 Hrs  T(F): 97.7 (02-19-20 @ 12:52), Max: 98.2 (02-19-20 @ 05:56)  HR: 82 (02-19-20 @ 12:52) (82 - 84)  BP: 120/55 (02-19-20 @ 12:52) (120/55 - 129/72)  BP(mean): --  RR: 18 (02-19-20 @ 12:52) (18 - 20)  SpO2: 95% (02-19-20 @ 12:52) (95% - 98%)  Neck: No palpable thyroid nodules.  CVS: S1S2, No murmurs  Respiratory: No wheezing, no crepitations  GI: Abdomen soft, bowel sounds positive  Musculoskeletal:  edema lower extremities.   Skin: No skin rashes, no ecchymosis    Diagnostics

## 2020-02-19 NOTE — PROGRESS NOTE ADULT - ASSESSMENT
63yo male with hx of HTN, DM II   s/p C4L on 2/3; PEA arrest on 2/6   + enterococcus Bld  C/S > started ampicillin q8h, ID consulted,  R pigtail for effusion  2/8    Extubated  2/9  2/15 L pigtail effusion  2/17 PRBC   2/18 Tx 2 Diaz  2/19 thoams removed, trial void. Maintain left pigtail for significant output. Pt encouraged to ambulate. Supplemental o2 sat NC weaned to 2L. Blood cultures repeated.

## 2020-02-19 NOTE — PROGRESS NOTE ADULT - ATTENDING COMMENTS
Patient seen and exam today at bedside. Chart, labs, vitals, radiology reviewed. Above H&P reviewed and edited where appropriate.  Agree with history and physical exam. Agree with assessment and plan. Patient counseled for compliance with consistent low carb diet and exercise as tolerated outpatient.

## 2020-02-19 NOTE — PROGRESS NOTE ADULT - PROBLEM SELECTOR PLAN 2
Pt. with likely hypervolemic hyponatremia in the setting of volume overload. Na stable, c/w diuretics as above.     Kevin Correa  Nephrology Fellow  Cell: [phone] (from 8 am to 5 pm)  (After 5 pm or on weekends please page on-call fellow)

## 2020-02-19 NOTE — PROGRESS NOTE ADULT - ASSESSMENT
Assessment  DMT2: 64y Male with DM T2 with hyperglycemia, A1C 9.2%, was on oral meds and insulin at home, now on basal bolus insulin, increased dose yesterday, blood sugars fluctuating, trending within acceptable range this AM, no hypoglycemic episodes. Patient is eating meals with inconsistent carb intake, he appears comfortable, sitting up in chair with family at the bedside. Primary team recommending DC to [facility].  CAD: s/p CABG 2/3, on medications, no chest pain, stable, monitored.  HTN: Controlled,  on antihypertensive medications.  HLD: Controlled, on statin.  CKD: Monitor labs/BMP          Harper Matias MD  Cell: [phone]  Office: [phone] Assessment  DMT2: 64y Male with DM T2 with hyperglycemia, A1C 9.2%, was on oral meds and insulin at home, now on basal bolus insulin, increased dose yesterday,  blood sugars fluctuating, trending within acceptable range this AM, no hypoglycemic episodes. Patient is eating meals with inconsistent carb intake, he appears comfortable, sitting up in chair with family at the bedside. Primary team recommending DC to [facility].  CAD: s/p CABG 2/3, on medications, no chest pain, stable, monitored.  HTN: Controlled,  on antihypertensive medications.  HLD: Controlled, on statin.  CKD: Monitor labs/BMP          Harper Matias MD  Cell: [phone]  Office: [phone]

## 2020-02-19 NOTE — PROGRESS NOTE ADULT - ATTENDING COMMENTS
I have seen this patient with the fellow and agree with their assessment and plan. In addition,    # STEPHANIE  - He initially had acute tubular necrosis as a result of PEA arrest. He had worsening kidney function several days ago due to mc-venous congestion. Serum creatinine has reached a plateau. Continue IV diuretics.     # CKD stage 3/4. Etiology secondary to diabetic nephropathy. Baseline serum creatinine 1.8-2.0.     # Proteinuria - Likely from diabetic nephropathy. PLA2R negative. Serological workup negative thus far.     # Hyponatremia - likely from volume overload. Continue bumetanide.     # Volume overload. Still has generalized edema. Continue IV bumetanide to keep I/O negative.     # Medication toxicity Monitoring: medication dose reviewed. Since patient has acute kidney injury, please dose medications to CrCl<15    The rest of the recommendations as per fellow's note.    Maryam Dutta MD  Attending Nephrologist  [phone]

## 2020-02-19 NOTE — PROGRESS NOTE ADULT - SUBJECTIVE AND OBJECTIVE BOX
VITAL SIGNS    Telemetry:  SR 70-90  Vital Signs Last 24 Hrs  T(C): 36.8 (20 @ 05:56), Max: 36.8 (20 @ 05:56)  T(F): 98.2 (20 @ 05:56), Max: 98.2 (20 @ 05:56)  HR: 84 (20 @ 05:56) (80 - 84)  BP: 129/72 (20 @ 05:56) (125/70 - 138/72)  RR: 20 (20 @ 08:00) (18 - 29)  SpO2: 97% (20 @ 08:00) (97% - 100%)             @ 07:01  -   @ 07:00  --------------------------------------------------------  IN: 690 mL / OUT: 3490 mL / NET: -2800 mL     @ 07:01  -   @ 09:40  --------------------------------------------------------  IN: 240 mL / OUT: 0 mL / NET: 240 mL       Daily     Daily Weight in k.3 (2020 07:22)  Admit Wt: Drug Dosing Weight  Height (cm): 172.72 (2020 07:20)  Weight (kg): 81.1 (2020 07:20)  BMI (kg/m2): 27.2 (2020 07:20)  BSA (m2): 1.95 (2020 07:20)      CAPILLARY BLOOD GLUCOSE      POCT Blood Glucose.: 182 mg/dL (2020 08:04)  POCT Blood Glucose.: 92 mg/dL (2020 21:42)  POCT Blood Glucose.: 98 mg/dL (2020 16:55)  POCT Blood Glucose.: 149 mg/dL (2020 12:03)  POCT Blood Glucose.: 177 mg/dL (2020 10:02)          MEDICATIONS  acetaminophen   Tablet .. 650 milliGRAM(s) Oral every 6 hours PRN  albuterol/ipratropium for Nebulization. 3 milliLiter(s) Nebulizer every 6 hours PRN  aMIOdarone    Tablet 200 milliGRAM(s) Oral daily  aMIOdarone    Tablet   Oral   ampicillin  IVPB 2 Gram(s) IV Intermittent every 8 hours  artificial tears (preservative free) Ophthalmic Solution 1 Drop(s) Both EYES two times a day  aspirin enteric coated 81 milliGRAM(s) Oral daily  atorvastatin 40 milliGRAM(s) Oral at bedtime  BACItracin   Ointment 1 Application(s) Topical two times a day  buMETAnide Injectable 1 milliGRAM(s) IV Push every 8 hours  calcium acetate 667 milliGRAM(s) Oral three times a day with meals  dextrose 40% Gel 15 Gram(s) Oral once PRN  dextrose 5%. 1000 milliLiter(s) IV Continuous <Continuous>  dextrose 50% Injectable 12.5 Gram(s) IV Push once  dextrose 50% Injectable 25 Gram(s) IV Push once  dextrose 50% Injectable 25 Gram(s) IV Push once  glucagon  Injectable 1 milliGRAM(s) IntraMuscular once PRN  heparin  Injectable 5000 Unit(s) SubCutaneous every 8 hours  hydrALAZINE 50 milliGRAM(s) Oral every 8 hours  insulin glargine Injectable (LANTUS) 60 Unit(s) SubCutaneous at bedtime  insulin lispro (HumaLOG) corrective regimen sliding scale   SubCutaneous three times a day before meals  insulin lispro (HumaLOG) corrective regimen sliding scale   SubCutaneous at bedtime  insulin lispro Injectable (HumaLOG) 25 Unit(s) SubCutaneous three times a day before meals  melatonin 3 milliGRAM(s) Oral at bedtime  metoprolol tartrate 25 milliGRAM(s) Oral every 12 hours  multivitamin 1 Tablet(s) Oral daily  oxycodone    5 mG/acetaminophen 325 mG 2 Tablet(s) Oral every 4 hours PRN  pantoprazole    Tablet 40 milliGRAM(s) Oral before breakfast  polyethylene glycol 3350 17 Gram(s) Oral daily  sodium chloride 0.9% lock flush 10 milliLiter(s) IV Push every 1 hour PRN  tamsulosin 0.4 milliGRAM(s) Oral at bedtime      >>> <<<  PHYSICAL EXAM  Subjective: c/o pain to midsternal incision  Neurology: alert and oriented x 3, nonfocal, no gross deficits  CV : s1s2   Sternal Wound :  CDI , Stable  Lungs: CTA diminished at bilateral bases left pigtail -260/490cc   Abdomen: soft, NT,ND, ( +)BM  :  thomas  Extremities: +2 edema, svg site c/d i      LABS      132<L>  |  94<L>  |  69<H>  ----------------------------<  113<H>  4.5   |  25  |  2.22<H>    Ca    8.6      2020 06:31  Phos  3.5       Mg     2.1         TPro  5.5<L>  /  Alb  2.0<L>  /  TBili  0.3  /  DBili  x   /  AST  21  /  ALT  29  /  AlkPhos  95  18                                 9.1    11.15 )-----------( 385      ( 2020 06:46 )             28.2          PT/INR - ( 2020 01:13 )   PT: 20.3 sec;   INR: 1.75 ratio         PTT - ( 2020 01:13 )  PTT:36.0 sec       PAST MEDICAL & SURGICAL HISTORY:  HTN (hypertension)  Diabetes  History of appendectomy: x 30 yrs ago

## 2020-02-19 NOTE — PROGRESS NOTE ADULT - SUBJECTIVE AND OBJECTIVE BOX
CARDIOLOGY FOLLOW UP - Dr. Steel    CC feels weak today  no increase sob or cp  sitting in chair today, fam at bedside       PHYSICAL EXAM:  T(C): 36.8 (02-19-20 @ 05:56), Max: 36.8 (02-19-20 @ 05:56)  HR: 84 (02-19-20 @ 05:56) (84 - 84)  BP: 129/72 (02-19-20 @ 05:56) (129/72 - 138/72)  RR: 20 (02-19-20 @ 08:00) (18 - 20)  SpO2: 97% (02-19-20 @ 08:00) (97% - 98%)  Wt(kg): --  I&O's Summary    18 Feb 2020 07:01  -  19 Feb 2020 07:00  --------------------------------------------------------  IN: 690 mL / OUT: 3490 mL / NET: -2800 mL    19 Feb 2020 07:01  -  19 Feb 2020 11:36  --------------------------------------------------------  IN: 240 mL / OUT: 810 mL / NET: -570 mL        Appearance: Normal	  Cardiovascular: Normal S1 S2,RRR, No JVD, No murmurs  Respiratory: diminished + chest tube  Gastrointestinal:  Soft, Non-tender, + BS	  Extremities: LE edema         MEDICATIONS  (STANDING):  aMIOdarone    Tablet 200 milliGRAM(s) Oral daily  aMIOdarone    Tablet   Oral   ampicillin  IVPB 2 Gram(s) IV Intermittent every 8 hours  artificial tears (preservative free) Ophthalmic Solution 1 Drop(s) Both EYES two times a day  aspirin enteric coated 81 milliGRAM(s) Oral daily  atorvastatin 40 milliGRAM(s) Oral at bedtime  BACItracin   Ointment 1 Application(s) Topical two times a day  buMETAnide Injectable 1 milliGRAM(s) IV Push every 8 hours  calcium acetate 667 milliGRAM(s) Oral three times a day with meals  dextrose 5%. 1000 milliLiter(s) (50 mL/Hr) IV Continuous <Continuous>  dextrose 50% Injectable 12.5 Gram(s) IV Push once  dextrose 50% Injectable 25 Gram(s) IV Push once  dextrose 50% Injectable 25 Gram(s) IV Push once  heparin  Injectable 5000 Unit(s) SubCutaneous every 8 hours  hydrALAZINE 50 milliGRAM(s) Oral every 8 hours  insulin glargine Injectable (LANTUS) 60 Unit(s) SubCutaneous at bedtime  insulin lispro (HumaLOG) corrective regimen sliding scale   SubCutaneous three times a day before meals  insulin lispro (HumaLOG) corrective regimen sliding scale   SubCutaneous at bedtime  insulin lispro Injectable (HumaLOG) 23 Unit(s) SubCutaneous three times a day with meals  melatonin 3 milliGRAM(s) Oral at bedtime  metoprolol tartrate 25 milliGRAM(s) Oral every 12 hours  multivitamin 1 Tablet(s) Oral daily  pantoprazole    Tablet 40 milliGRAM(s) Oral before breakfast  polyethylene glycol 3350 17 Gram(s) Oral daily  tamsulosin 0.4 milliGRAM(s) Oral at bedtime      TELEMETRY: nsr	    ECG:  	  RADIOLOGY:   DIAGNOSTIC TESTING:  [ ] Echocardiogram:  [ ]  Catheterization:  [ ] Stress Test:    OTHER: 	    LABS:	 	                            9.1    11.15 )-----------( 385      ( 19 Feb 2020 06:46 )             28.2     02-19    132<L>  |  94<L>  |  69<H>  ----------------------------<  113<H>  4.5   |  25  |  2.22<H>    Ca    8.6      19 Feb 2020 06:31  Phos  3.5     02-18  Mg     2.1     02-18    TPro  5.5<L>  /  Alb  2.0<L>  /  TBili  0.3  /  DBili  x   /  AST  21  /  ALT  29  /  AlkPhos  95  02-18    PT/INR - ( 18 Feb 2020 01:13 )   PT: 20.3 sec;   INR: 1.75 ratio         PTT - ( 18 Feb 2020 01:13 )  PTT:36.0 sec

## 2020-02-19 NOTE — PROGRESS NOTE ADULT - PROBLEM SELECTOR PLAN 1
Pt with  CKD likely  in the setting of long standing DM and HTN. No prior labs for review. Scr since admission has ranged between 1.8-2 mg/dl.  Scr increased to ~2.50 secondary to hemodynamic injury after CABG and PEA arrest s/p CPR, and worsened to 2.9. Scr currently stable over past two days at 2.22 today. Non-nephrotic range proteinuria. Pt. likely with diabetic nephropathy.   - Continue with diuretics (currently on Bumex IV)  - Monitor labs and urine output.   - Avoid NSAIDs, ACEI/ARBS, RCA and nephrotoxins. Dose medications as per eGFR

## 2020-02-19 NOTE — PROGRESS NOTE ADULT - SUBJECTIVE AND OBJECTIVE BOX
infectious diseases progress note:    Patient is a 64y old  Male who presents with a chief complaint of Chest pain (18 Feb 2020 16:26)        Acute ischemic heart disease           Allergies    No Known Allergies    Intolerances        ANTIBIOTICS/RELEVANT:  antimicrobials  ampicillin  IVPB 2 Gram(s) IV Intermittent every 8 hours    immunologic:    OTHER:  acetaminophen   Tablet .. 650 milliGRAM(s) Oral every 6 hours PRN  albuterol/ipratropium for Nebulization. 3 milliLiter(s) Nebulizer every 6 hours PRN  aMIOdarone    Tablet 200 milliGRAM(s) Oral daily  aMIOdarone    Tablet   Oral   artificial tears (preservative free) Ophthalmic Solution 1 Drop(s) Both EYES two times a day  aspirin enteric coated 81 milliGRAM(s) Oral daily  atorvastatin 40 milliGRAM(s) Oral at bedtime  BACItracin   Ointment 1 Application(s) Topical two times a day  buMETAnide Injectable 1 milliGRAM(s) IV Push every 8 hours  calcium acetate 667 milliGRAM(s) Oral three times a day with meals  dextrose 40% Gel 15 Gram(s) Oral once PRN  dextrose 5%. 1000 milliLiter(s) IV Continuous <Continuous>  dextrose 50% Injectable 12.5 Gram(s) IV Push once  dextrose 50% Injectable 25 Gram(s) IV Push once  dextrose 50% Injectable 25 Gram(s) IV Push once  glucagon  Injectable 1 milliGRAM(s) IntraMuscular once PRN  heparin  Injectable 5000 Unit(s) SubCutaneous every 8 hours  hydrALAZINE 50 milliGRAM(s) Oral every 8 hours  insulin glargine Injectable (LANTUS) 60 Unit(s) SubCutaneous at bedtime  insulin lispro (HumaLOG) corrective regimen sliding scale   SubCutaneous three times a day before meals  insulin lispro (HumaLOG) corrective regimen sliding scale   SubCutaneous at bedtime  insulin lispro Injectable (HumaLOG) 25 Unit(s) SubCutaneous three times a day before meals  melatonin 3 milliGRAM(s) Oral at bedtime  metoprolol tartrate 25 milliGRAM(s) Oral every 12 hours  multivitamin 1 Tablet(s) Oral daily  pantoprazole    Tablet 40 milliGRAM(s) Oral before breakfast  polyethylene glycol 3350 17 Gram(s) Oral daily  sodium chloride 0.9% lock flush 10 milliLiter(s) IV Push every 1 hour PRN  tamsulosin 0.4 milliGRAM(s) Oral at bedtime      Objective:  Vital Signs Last 24 Hrs  T(C): 36.8 (19 Feb 2020 05:56), Max: 36.8 (19 Feb 2020 05:56)  T(F): 98.2 (19 Feb 2020 05:56), Max: 98.2 (19 Feb 2020 05:56)  HR: 84 (19 Feb 2020 05:56) (80 - 84)  BP: 129/72 (19 Feb 2020 05:56) (125/70 - 138/72)  BP(mean): 78 (18 Feb 2020 10:00) (78 - 78)  RR: 18 (19 Feb 2020 05:56) (18 - 29)  SpO2: 98% (19 Feb 2020 05:56) (98% - 100%)       Eyes:DANIELA, EOMI  Ear/Nose/Throat: no oral lesion, no sinus tenderness on percussion	  Neck:no JVD, no lymphadenopathy, supple  Respiratory: CTA lanre  Cardiovascular: S1S2 RRR, no murmurs  Gastrointestinal:soft, (+) BS, no HSM  Extremities:no e/e/c        LABS:                        9.1    11.15 )-----------( 385      ( 19 Feb 2020 06:46 )             28.2     02-19    132<L>  |  94<L>  |  69<H>  ----------------------------<  113<H>  4.5   |  25  |  2.22<H>    Ca    8.6      19 Feb 2020 06:31  Phos  3.5     02-18  Mg     2.1     02-18    TPro  5.5<L>  /  Alb  2.0<L>  /  TBili  0.3  /  DBili  x   /  AST  21  /  ALT  29  /  AlkPhos  95  02-18    PT/INR - ( 18 Feb 2020 01:13 )   PT: 20.3 sec;   INR: 1.75 ratio         PTT - ( 18 Feb 2020 01:13 )  PTT:36.0 sec        MICROBIOLOGY:    RECENT CULTURES:  02-13 @ 16:32 .Blood Blood                No growth at 5 days.          RESPIRATORY CULTURES:              RADIOLOGY & ADDITIONAL STUDIES:        Pager [phone]  After 5 pm/weekends or if no response :[phone]

## 2020-02-19 NOTE — PROGRESS NOTE ADULT - SUBJECTIVE AND OBJECTIVE BOX
[facility] DIVISION OF KIDNEY DISEASES AND HYPERTENSION -- FOLLOW UP NOTE  --------------------------------------------------------------------------------  HPI: 65 yo M with HTN, DM II (20 years), admitted with complaints of acute on chronic left sided exertional chest pain. Nephrology team is following for elevated serum creatinine and pre-cardiac catheterization assessment. [facility] available. On admission (1/25/2020), Scr was 1.85. Patient went for cardiac cath on 1/29/20 which revealed triple vessel disease. Scr had improved to 1.76 on 2/3/20. Pt. underwent CABG on 2/3/20. Scr now increased after CABG and PEA arrest on 2/6/20, had improved to ~2.5. Scr over past few days had improved down to 2.09 on 2/17/20. Scr stable at 2.22 this AM    Pt. seen and examined at bedside this AM. Pt. is sitting upright in chair, on diuretics, non oliguric ~1.8 L of UOP in the past 24 hours.    PAST HISTORY  --------------------------------------------------------------------------------  No significant changes to PMH, PSH, FHx, SHx, unless otherwise noted    ALLERGIES & MEDICATIONS  --------------------------------------------------------------------------------  Allergies    No Known Allergies    Intolerances    Standing Inpatient Medications  aMIOdarone    Tablet 200 milliGRAM(s) Oral daily  aMIOdarone    Tablet   Oral   ampicillin  IVPB 2 Gram(s) IV Intermittent every 8 hours  artificial tears (preservative free) Ophthalmic Solution 1 Drop(s) Both EYES two times a day  aspirin enteric coated 81 milliGRAM(s) Oral daily  atorvastatin 40 milliGRAM(s) Oral at bedtime  BACItracin   Ointment 1 Application(s) Topical two times a day  buMETAnide Injectable 1 milliGRAM(s) IV Push every 8 hours  calcium acetate 667 milliGRAM(s) Oral three times a day with meals  dextrose 5%. 1000 milliLiter(s) IV Continuous <Continuous>  dextrose 50% Injectable 12.5 Gram(s) IV Push once  dextrose 50% Injectable 25 Gram(s) IV Push once  dextrose 50% Injectable 25 Gram(s) IV Push once  heparin  Injectable 5000 Unit(s) SubCutaneous every 8 hours  hydrALAZINE 50 milliGRAM(s) Oral every 8 hours  insulin glargine Injectable (LANTUS) 60 Unit(s) SubCutaneous at bedtime  insulin lispro (HumaLOG) corrective regimen sliding scale   SubCutaneous three times a day before meals  insulin lispro (HumaLOG) corrective regimen sliding scale   SubCutaneous at bedtime  insulin lispro Injectable (HumaLOG) 25 Unit(s) SubCutaneous three times a day before meals  melatonin 3 milliGRAM(s) Oral at bedtime  metoprolol tartrate 25 milliGRAM(s) Oral every 12 hours  multivitamin 1 Tablet(s) Oral daily  pantoprazole    Tablet 40 milliGRAM(s) Oral before breakfast  polyethylene glycol 3350 17 Gram(s) Oral daily  tamsulosin 0.4 milliGRAM(s) Oral at bedtime    REVIEW OF SYSTEMS  --------------------------------------------------------------------------------  Gen: + fatigue  Respiratory: No dyspnea  GI: No abdominal pain  MSK: + LE edema, + scrotal edema  Neuro: No dizziness  Heme: No bleeding    All other systems were reviewed and are negative, except as noted.    VITALS/PHYSICAL EXAM  --------------------------------------------------------------------------------  T(C): 36.8 (02-19-20 @ 05:56), Max: 36.8 (02-19-20 @ 05:56)  HR: 84 (02-19-20 @ 05:56) (80 - 84)  BP: 129/72 (02-19-20 @ 05:56) (125/70 - 138/72)  RR: 18 (02-19-20 @ 05:56) (18 - 29)  SpO2: 98% (02-19-20 @ 05:56) (98% - 100%)  Wt(kg): --    02-18-20 @ 07:01  -  02-19-20 @ 07:00  --------------------------------------------------------  IN: 690 mL / OUT: 3490 mL / NET: -2800 mL    02-19-20 @ 07:01  -  02-19-20 @ 08:52  --------------------------------------------------------  IN: 240 mL / OUT: 0 mL / NET: 240 mL    Physical Exam:  	Gen: awake  	HEENT: supple neck  	Pulm: Decreased bibasilar breath sounds  	CV: + central chest dressing from CABG C/D/I, S1S2  	Abd: Soft  	: + scrotal edema (improved)  	Ext: + pitting edema B/L (improved)    LABS/STUDIES  --------------------------------------------------------------------------------              9.1    11.15 >-----------<  385      [02-19-20 @ 06:46]              28.2     132  |  94  |  69  ----------------------------<  113      [02-19-20 @ 06:31]  4.5   |  25  |  2.22        Ca     8.6     [02-19-20 @ 06:31]      Mg     2.1     [02-18-20 @ 01:13]      Phos  3.5     [02-18-20 @ 01:13]    Creatinine Trend:  SCr 2.22 [02-19 @ 06:31]  SCr 2.29 [02-18 @ 01:13]  SCr 2.09 [02-17 @ 01:01]  SCr 2.62 [02-16 @ 00:49]  SCr 2.83 [02-15 @ 00:28]

## 2020-02-19 NOTE — PROGRESS NOTE ADULT - ASSESSMENT
64 yr old with cri stage 4, DM, PVD, admitted and had CABG, Post op intubated and has bilateral effusions, worsening renal disease and bc positive for E. faecalis   follow up bc negative to date.   no signs of sternal wd infection  no recent UTI  lines all new.  No pna on CT     Follow up bc are all negative  no signs of endocarditis   continue present therapy  length needs to be discussed        legs do not look infected        s we are not treating an intravascular infection ( No evidence of endocardtis)      discussed Loida demarco Dr  to dc ab and watch     [l;ease call for any signs of infection aND RECULTRUE AT THAT POINT

## 2020-02-19 NOTE — PROGRESS NOTE ADULT - PROBLEM SELECTOR PLAN 1
Will continue current insulin regimen for now. Will continue monitoring FS and FU.  Patient was on insulin at home, knows how to do insulin pen injections. Will continue monitoring and make recommendations prior to DC.  Patient counseled for compliance with consistent low carb diet and exercise as tolerated outpatient. Will continue current insulin regimen for now. Will continue monitoring FS and FU.  Patient was on insulin at home, knows how to do insulin pen injections. Will continue monitoring and make recommendations prior to DC.

## 2020-02-20 DIAGNOSIS — J98.11 ATELECTASIS: ICD-10-CM

## 2020-02-20 DIAGNOSIS — R06.83 SNORING: ICD-10-CM

## 2020-02-20 DIAGNOSIS — J98.01 ACUTE BRONCHOSPASM: ICD-10-CM

## 2020-02-20 DIAGNOSIS — R06.02 SHORTNESS OF BREATH: ICD-10-CM

## 2020-02-20 LAB
ANION GAP SERPL CALC-SCNC: 14 MMOL/L — SIGNIFICANT CHANGE UP (ref 5–17)
BASOPHILS # BLD AUTO: 0.06 K/UL — SIGNIFICANT CHANGE UP (ref 0–0.2)
BASOPHILS NFR BLD AUTO: 0.6 % — SIGNIFICANT CHANGE UP (ref 0–2)
BUN SERPL-MCNC: 73 MG/DL — HIGH (ref 7–23)
CALCIUM SERPL-MCNC: 8.6 MG/DL — SIGNIFICANT CHANGE UP (ref 8.4–10.5)
CHLORIDE SERPL-SCNC: 92 MMOL/L — LOW (ref 96–108)
CO2 SERPL-SCNC: 25 MMOL/L — SIGNIFICANT CHANGE UP (ref 22–31)
CREAT SERPL-MCNC: 1.97 MG/DL — HIGH (ref 0.5–1.3)
D DIMER BLD IA.RAPID-MCNC: 4187 NG/ML DDU — HIGH
EOSINOPHIL # BLD AUTO: 0.4 K/UL — SIGNIFICANT CHANGE UP (ref 0–0.5)
EOSINOPHIL NFR BLD AUTO: 3.8 % — SIGNIFICANT CHANGE UP (ref 0–6)
GLUCOSE BLDC GLUCOMTR-MCNC: 135 MG/DL — HIGH (ref 70–99)
GLUCOSE BLDC GLUCOMTR-MCNC: 182 MG/DL — HIGH (ref 70–99)
GLUCOSE BLDC GLUCOMTR-MCNC: 226 MG/DL — HIGH (ref 70–99)
GLUCOSE BLDC GLUCOMTR-MCNC: 230 MG/DL — HIGH (ref 70–99)
GLUCOSE SERPL-MCNC: 158 MG/DL — HIGH (ref 70–99)
HCT VFR BLD CALC: 28.2 % — LOW (ref 39–50)
HGB BLD-MCNC: 8.9 G/DL — LOW (ref 13–17)
IMM GRANULOCYTES NFR BLD AUTO: 1.3 % — SIGNIFICANT CHANGE UP (ref 0–1.5)
LYMPHOCYTES # BLD AUTO: 1.11 K/UL — SIGNIFICANT CHANGE UP (ref 1–3.3)
LYMPHOCYTES # BLD AUTO: 10.6 % — LOW (ref 13–44)
MCHC RBC-ENTMCNC: 27.4 PG — SIGNIFICANT CHANGE UP (ref 27–34)
MCHC RBC-ENTMCNC: 31.6 GM/DL — LOW (ref 32–36)
MCV RBC AUTO: 86.8 FL — SIGNIFICANT CHANGE UP (ref 80–100)
MONOCYTES # BLD AUTO: 1.02 K/UL — HIGH (ref 0–0.9)
MONOCYTES NFR BLD AUTO: 9.7 % — SIGNIFICANT CHANGE UP (ref 2–14)
NEUTROPHILS # BLD AUTO: 7.79 K/UL — HIGH (ref 1.8–7.4)
NEUTROPHILS NFR BLD AUTO: 74 % — SIGNIFICANT CHANGE UP (ref 43–77)
NRBC # BLD: 0 /100 WBCS — SIGNIFICANT CHANGE UP (ref 0–0)
NT-PROBNP SERPL-SCNC: 4656 PG/ML — HIGH (ref 0–300)
PLATELET # BLD AUTO: 354 K/UL — SIGNIFICANT CHANGE UP (ref 150–400)
POTASSIUM SERPL-MCNC: 4.4 MMOL/L — SIGNIFICANT CHANGE UP (ref 3.5–5.3)
POTASSIUM SERPL-SCNC: 4.4 MMOL/L — SIGNIFICANT CHANGE UP (ref 3.5–5.3)
RBC # BLD: 3.25 M/UL — LOW (ref 4.2–5.8)
RBC # FLD: 14 % — SIGNIFICANT CHANGE UP (ref 10.3–14.5)
SODIUM SERPL-SCNC: 131 MMOL/L — LOW (ref 135–145)
WBC # BLD: 10.52 K/UL — HIGH (ref 3.8–10.5)
WBC # FLD AUTO: 10.52 K/UL — HIGH (ref 3.8–10.5)

## 2020-02-20 PROCEDURE — 71250 CT THORAX DX C-: CPT | Mod: 26

## 2020-02-20 PROCEDURE — 99232 SBSQ HOSP IP/OBS MODERATE 35: CPT | Mod: GC

## 2020-02-20 PROCEDURE — 71045 X-RAY EXAM CHEST 1 VIEW: CPT | Mod: 26

## 2020-02-20 PROCEDURE — 99223 1ST HOSP IP/OBS HIGH 75: CPT

## 2020-02-20 RX ORDER — BUDESONIDE, MICRONIZED 100 %
0.5 POWDER (GRAM) MISCELLANEOUS
Refills: 0 | Status: DISCONTINUED | OUTPATIENT
Start: 2020-02-20 | End: 2020-02-25

## 2020-02-20 RX ORDER — OXYCODONE AND ACETAMINOPHEN 5; 325 MG/1; MG/1
1 TABLET ORAL EVERY 6 HOURS
Refills: 0 | Status: DISCONTINUED | OUTPATIENT
Start: 2020-02-20 | End: 2020-02-25

## 2020-02-20 RX ORDER — IPRATROPIUM/ALBUTEROL SULFATE 18-103MCG
3 AEROSOL WITH ADAPTER (GRAM) INHALATION EVERY 6 HOURS
Refills: 0 | Status: DISCONTINUED | OUTPATIENT
Start: 2020-02-20 | End: 2020-02-25

## 2020-02-20 RX ORDER — TIOTROPIUM BROMIDE 18 UG/1
1 CAPSULE ORAL; RESPIRATORY (INHALATION) DAILY
Refills: 0 | Status: DISCONTINUED | OUTPATIENT
Start: 2020-02-20 | End: 2020-02-25

## 2020-02-20 RX ORDER — ALBUTEROL 90 UG/1
1 AEROSOL, METERED ORAL EVERY 4 HOURS
Refills: 0 | Status: DISCONTINUED | OUTPATIENT
Start: 2020-02-20 | End: 2020-02-25

## 2020-02-20 RX ORDER — INSULIN LISPRO 100/ML
23 VIAL (ML) SUBCUTANEOUS
Refills: 0 | Status: DISCONTINUED | OUTPATIENT
Start: 2020-02-20 | End: 2020-02-22

## 2020-02-20 RX ADMIN — Medication 25 MILLIGRAM(S): at 06:05

## 2020-02-20 RX ADMIN — TAMSULOSIN HYDROCHLORIDE 0.4 MILLIGRAM(S): 0.4 CAPSULE ORAL at 21:47

## 2020-02-20 RX ADMIN — Medication 216 GRAM(S): at 06:06

## 2020-02-20 RX ADMIN — Medication 3 MILLILITER(S): at 11:26

## 2020-02-20 RX ADMIN — HEPARIN SODIUM 5000 UNIT(S): 5000 INJECTION INTRAVENOUS; SUBCUTANEOUS at 21:46

## 2020-02-20 RX ADMIN — Medication 1 DROP(S): at 17:06

## 2020-02-20 RX ADMIN — Medication 3 MILLILITER(S): at 17:05

## 2020-02-20 RX ADMIN — Medication 1 APPLICATION(S): at 06:05

## 2020-02-20 RX ADMIN — Medication 1: at 17:02

## 2020-02-20 RX ADMIN — PANTOPRAZOLE SODIUM 40 MILLIGRAM(S): 20 TABLET, DELAYED RELEASE ORAL at 06:05

## 2020-02-20 RX ADMIN — BUMETANIDE 1 MILLIGRAM(S): 0.25 INJECTION INTRAMUSCULAR; INTRAVENOUS at 06:05

## 2020-02-20 RX ADMIN — Medication 25 UNIT(S): at 07:57

## 2020-02-20 RX ADMIN — OXYCODONE AND ACETAMINOPHEN 1 TABLET(S): 5; 325 TABLET ORAL at 09:00

## 2020-02-20 RX ADMIN — OXYCODONE AND ACETAMINOPHEN 1 TABLET(S): 5; 325 TABLET ORAL at 21:57

## 2020-02-20 RX ADMIN — Medication 50 MILLIGRAM(S): at 21:46

## 2020-02-20 RX ADMIN — Medication 23 UNIT(S): at 12:12

## 2020-02-20 RX ADMIN — Medication 2: at 12:11

## 2020-02-20 RX ADMIN — Medication 0.5 MILLIGRAM(S): at 17:11

## 2020-02-20 RX ADMIN — Medication 3 MILLILITER(S): at 06:10

## 2020-02-20 RX ADMIN — Medication 1 TABLET(S): at 11:23

## 2020-02-20 RX ADMIN — OXYCODONE AND ACETAMINOPHEN 1 TABLET(S): 5; 325 TABLET ORAL at 22:27

## 2020-02-20 RX ADMIN — Medication 50 MILLIGRAM(S): at 06:04

## 2020-02-20 RX ADMIN — BUMETANIDE 1 MILLIGRAM(S): 0.25 INJECTION INTRAMUSCULAR; INTRAVENOUS at 21:46

## 2020-02-20 RX ADMIN — Medication 1 APPLICATION(S): at 17:06

## 2020-02-20 RX ADMIN — AMIODARONE HYDROCHLORIDE 200 MILLIGRAM(S): 400 TABLET ORAL at 06:04

## 2020-02-20 RX ADMIN — Medication 1 DROP(S): at 06:04

## 2020-02-20 RX ADMIN — Medication 2: at 07:57

## 2020-02-20 RX ADMIN — ATORVASTATIN CALCIUM 40 MILLIGRAM(S): 80 TABLET, FILM COATED ORAL at 21:46

## 2020-02-20 RX ADMIN — Medication 25 MILLIGRAM(S): at 17:06

## 2020-02-20 RX ADMIN — Medication 667 MILLIGRAM(S): at 07:59

## 2020-02-20 RX ADMIN — Medication 667 MILLIGRAM(S): at 17:05

## 2020-02-20 RX ADMIN — HEPARIN SODIUM 5000 UNIT(S): 5000 INJECTION INTRAVENOUS; SUBCUTANEOUS at 13:48

## 2020-02-20 RX ADMIN — HEPARIN SODIUM 5000 UNIT(S): 5000 INJECTION INTRAVENOUS; SUBCUTANEOUS at 06:05

## 2020-02-20 RX ADMIN — Medication 50 MILLIGRAM(S): at 13:48

## 2020-02-20 RX ADMIN — Medication 81 MILLIGRAM(S): at 11:23

## 2020-02-20 RX ADMIN — Medication 667 MILLIGRAM(S): at 12:13

## 2020-02-20 RX ADMIN — Medication 23 UNIT(S): at 17:03

## 2020-02-20 RX ADMIN — Medication 3 MILLIGRAM(S): at 21:46

## 2020-02-20 RX ADMIN — INSULIN GLARGINE 60 UNIT(S): 100 INJECTION, SOLUTION SUBCUTANEOUS at 21:47

## 2020-02-20 RX ADMIN — BUMETANIDE 1 MILLIGRAM(S): 0.25 INJECTION INTRAMUSCULAR; INTRAVENOUS at 13:47

## 2020-02-20 RX ADMIN — OXYCODONE AND ACETAMINOPHEN 1 TABLET(S): 5; 325 TABLET ORAL at 08:30

## 2020-02-20 NOTE — PROGRESS NOTE ADULT - SUBJECTIVE AND OBJECTIVE BOX
Chief complaint  Patient is a 64y old  Male who presents with a chief complaint of Chest pain (20 Feb 2020 10:39)   Review of systems  Patient in bed, looks comfortable, had mild hypoglycemia (FS 68).    Labs and Fingersticks  CAPILLARY BLOOD GLUCOSE      POCT Blood Glucose.: 226 mg/dL (20 Feb 2020 07:49)  POCT Blood Glucose.: 145 mg/dL (19 Feb 2020 23:26)  POCT Blood Glucose.: 82 mg/dL (19 Feb 2020 22:32)  POCT Blood Glucose.: 68 mg/dL (19 Feb 2020 21:39)  POCT Blood Glucose.: 130 mg/dL (19 Feb 2020 16:36)      Anion Gap, Serum: 14 (02-20 @ 05:38)  Anion Gap, Serum: 13 (02-19 @ 06:31)      Calcium, Total Serum: 8.6 (02-20 @ 05:38)  Calcium, Total Serum: 8.6 (02-19 @ 06:31)          02-20    131<L>  |  92<L>  |  73<H>  ----------------------------<  158<H>  4.4   |  25  |  1.97<H>    Ca    8.6      20 Feb 2020 05:38                          8.9    10.52 )-----------( 354      ( 20 Feb 2020 05:38 )             28.2     Medications  MEDICATIONS  (STANDING):  ALBUTerol    90 MICROgram(s) HFA Inhaler 1 Puff(s) Inhalation every 4 hours  albuterol/ipratropium for Nebulization 3 milliLiter(s) Nebulizer every 6 hours  aMIOdarone    Tablet 200 milliGRAM(s) Oral daily  aMIOdarone    Tablet   Oral   artificial tears (preservative free) Ophthalmic Solution 1 Drop(s) Both EYES two times a day  aspirin enteric coated 81 milliGRAM(s) Oral daily  atorvastatin 40 milliGRAM(s) Oral at bedtime  BACItracin   Ointment 1 Application(s) Topical two times a day  buDESOnide    Inhalation Suspension 0.5 milliGRAM(s) Inhalation two times a day  buMETAnide Injectable 1 milliGRAM(s) IV Push every 8 hours  calcium acetate 667 milliGRAM(s) Oral three times a day with meals  dextrose 5%. 1000 milliLiter(s) (50 mL/Hr) IV Continuous <Continuous>  dextrose 50% Injectable 12.5 Gram(s) IV Push once  dextrose 50% Injectable 25 Gram(s) IV Push once  dextrose 50% Injectable 25 Gram(s) IV Push once  heparin  Injectable 5000 Unit(s) SubCutaneous every 8 hours  hydrALAZINE 50 milliGRAM(s) Oral every 8 hours  insulin glargine Injectable (LANTUS) 60 Unit(s) SubCutaneous at bedtime  insulin lispro (HumaLOG) corrective regimen sliding scale   SubCutaneous three times a day before meals  insulin lispro (HumaLOG) corrective regimen sliding scale   SubCutaneous at bedtime  insulin lispro Injectable (HumaLOG) 23 Unit(s) SubCutaneous three times a day with meals  melatonin 3 milliGRAM(s) Oral at bedtime  metoprolol tartrate 25 milliGRAM(s) Oral every 12 hours  multivitamin 1 Tablet(s) Oral daily  pantoprazole    Tablet 40 milliGRAM(s) Oral before breakfast  polyethylene glycol 3350 17 Gram(s) Oral daily  tamsulosin 0.4 milliGRAM(s) Oral at bedtime  tiotropium 18 MICROgram(s) Capsule 1 Capsule(s) Inhalation daily      Physical Exam  General: Patient comfortable in bed  Vital Signs Last 12 Hrs  T(F): 97.7 (02-20-20 @ 05:35), Max: 97.7 (02-20-20 @ 05:35)  HR: 86 (02-20-20 @ 05:35) (86 - 86)  BP: 134/78 (02-20-20 @ 05:35) (134/78 - 134/78)  BP(mean): --  RR: 20 (02-20-20 @ 07:41) (18 - 20)  SpO2: 97% (02-20-20 @ 07:41) (94% - 97%)  Neck: No palpable thyroid nodules.  CVS: S1S2, No murmurs  Respiratory: No wheezing, no crepitations  GI: Abdomen soft, bowel sounds positive  Musculoskeletal:  edema lower extremities.   Skin: No skin rashes, no ecchymosis    Diagnostics Chief complaint  Patient is a 64y old  Male who presents with a chief complaint of Chest pain (20 Feb 2020 10:39)   Review of systems  Patient in bed, looks comfortable, had mild  hypoglycemia (FS 68).    Labs and Fingersticks  CAPILLARY BLOOD GLUCOSE      POCT Blood Glucose.: 226 mg/dL (20 Feb 2020 07:49)  POCT Blood Glucose.: 145 mg/dL (19 Feb 2020 23:26)  POCT Blood Glucose.: 82 mg/dL (19 Feb 2020 22:32)  POCT Blood Glucose.: 68 mg/dL (19 Feb 2020 21:39)  POCT Blood Glucose.: 130 mg/dL (19 Feb 2020 16:36)      Anion Gap, Serum: 14 (02-20 @ 05:38)  Anion Gap, Serum: 13 (02-19 @ 06:31)      Calcium, Total Serum: 8.6 (02-20 @ 05:38)  Calcium, Total Serum: 8.6 (02-19 @ 06:31)          02-20    131<L>  |  92<L>  |  73<H>  ----------------------------<  158<H>  4.4   |  25  |  1.97<H>    Ca    8.6      20 Feb 2020 05:38                          8.9    10.52 )-----------( 354      ( 20 Feb 2020 05:38 )             28.2     Medications  MEDICATIONS  (STANDING):  ALBUTerol    90 MICROgram(s) HFA Inhaler 1 Puff(s) Inhalation every 4 hours  albuterol/ipratropium for Nebulization 3 milliLiter(s) Nebulizer every 6 hours  aMIOdarone    Tablet 200 milliGRAM(s) Oral daily  aMIOdarone    Tablet   Oral   artificial tears (preservative free) Ophthalmic Solution 1 Drop(s) Both EYES two times a day  aspirin enteric coated 81 milliGRAM(s) Oral daily  atorvastatin 40 milliGRAM(s) Oral at bedtime  BACItracin   Ointment 1 Application(s) Topical two times a day  buDESOnide    Inhalation Suspension 0.5 milliGRAM(s) Inhalation two times a day  buMETAnide Injectable 1 milliGRAM(s) IV Push every 8 hours  calcium acetate 667 milliGRAM(s) Oral three times a day with meals  dextrose 5%. 1000 milliLiter(s) (50 mL/Hr) IV Continuous <Continuous>  dextrose 50% Injectable 12.5 Gram(s) IV Push once  dextrose 50% Injectable 25 Gram(s) IV Push once  dextrose 50% Injectable 25 Gram(s) IV Push once  heparin  Injectable 5000 Unit(s) SubCutaneous every 8 hours  hydrALAZINE 50 milliGRAM(s) Oral every 8 hours  insulin glargine Injectable (LANTUS) 60 Unit(s) SubCutaneous at bedtime  insulin lispro (HumaLOG) corrective regimen sliding scale   SubCutaneous three times a day before meals  insulin lispro (HumaLOG) corrective regimen sliding scale   SubCutaneous at bedtime  insulin lispro Injectable (HumaLOG) 23 Unit(s) SubCutaneous three times a day with meals  melatonin 3 milliGRAM(s) Oral at bedtime  metoprolol tartrate 25 milliGRAM(s) Oral every 12 hours  multivitamin 1 Tablet(s) Oral daily  pantoprazole    Tablet 40 milliGRAM(s) Oral before breakfast  polyethylene glycol 3350 17 Gram(s) Oral daily  tamsulosin 0.4 milliGRAM(s) Oral at bedtime  tiotropium 18 MICROgram(s) Capsule 1 Capsule(s) Inhalation daily      Physical Exam  General: Patient comfortable in bed  Vital Signs Last 12 Hrs  T(F): 97.7 (02-20-20 @ 05:35), Max: 97.7 (02-20-20 @ 05:35)  HR: 86 (02-20-20 @ 05:35) (86 - 86)  BP: 134/78 (02-20-20 @ 05:35) (134/78 - 134/78)  BP(mean): --  RR: 20 (02-20-20 @ 07:41) (18 - 20)  SpO2: 97% (02-20-20 @ 07:41) (94% - 97%)  Neck: No palpable thyroid nodules.  CVS: S1S2, No murmurs  Respiratory: No wheezing, no crepitations  GI: Abdomen soft, bowel sounds positive  Musculoskeletal:  edema lower extremities.   Skin: No skin rashes, no ecchymosis    Diagnostics

## 2020-02-20 NOTE — PROGRESS NOTE ADULT - ASSESSMENT
intraop kennedy 2/3/20 ef 50%, nl lv, mild diastolic dysfx stage 1  limited echo 2/4/20: nl LV sys fx , no pericardial effusion     a/p  64 year old man with history of HTN, DM II, admitted with progressive exertional angina, s/p cath with severe triple vessel disease, s/p CABG, post op course c/b brief witnessed PEA likely hypoxic arrest, s/p intubation.     1. CAD, s/p cath with severe triple vessel disease including lesions at the bifurcation of the LAD/diagonal and distal RCA/RPDA/RPL trifurcation.   -s/p CABG x 4, intraop kennedy ef 50%  -cont asa, statin, BB   -s/p PEA arrest, now extubated  -off vasopressors  -LE dopplers, negative for dvt   -repeat echo 2/15 with preserved lv fxn/EF    2. Postop acute diastolic HF  -remains overloaded.    - chest xray noted, cont bumex per cts , net outpt negative    3. PAF  -cont amio, bb   -AC per CTS    4. HTN  -bp stable on hydralazine/bb    5. STEPHANIE/CKD  renal f/u     6. Postop Pleural effusion  - S/P Right chest tube:  removed   - s/p L chest tube, mgmt per CTS  - d-dimer elevated/ Pulm f/u, pending CT chest     7. Sepsis -E. Faecalis bacteremia  IV abx per CTICU, repeat bcx 2/13 neg  ID following      dvt ppx

## 2020-02-20 NOTE — PROGRESS NOTE ADULT - PROBLEM SELECTOR PLAN 4
Maintain L pigtail until output minimal  CXR am  non contrast Chest CT to be done today to assess loculated left pleural effusion  for possible tap in IR today.

## 2020-02-20 NOTE — CONSULT NOTE ADULT - PROBLEM SELECTOR PROBLEM 3
Essential hypertension
Pleural effusion, left
Type 2 diabetes mellitus with other specified complication, with long-term current use of insulin

## 2020-02-20 NOTE — PROGRESS NOTE ADULT - SUBJECTIVE AND OBJECTIVE BOX
[facility] DIVISION OF KIDNEY DISEASES AND HYPERTENSION -- FOLLOW UP NOTE  --------------------------------------------------------------------------------  HPI: 65 yo M with HTN, DM II (20 years), admitted with complaints of acute on chronic left sided exertional chest pain. Nephrology team is following for elevated serum creatinine and pre-cardiac catheterization assessment. [facility] prior [facility] available. On admission (1/25/2020), Scr was 1.85. Patient went for cardiac cath on 1/29/20 which revealed triple vessel disease. Scr had improved to 1.76 on 2/3/20. Pt. underwent CABG on 2/3/20. Scr now increased after CABG and PEA arrest on 2/6/20, had improved to ~2.5. Scr over past few days had improved down to 2.09 on 2/17/20. Scr improved at 1.97 this AM    Pt. seen and examined at bedside this AM. Pt. on diuretics, non oliguric with 2.5 L of UOP in the past 24 hours.    PAST HISTORY  --------------------------------------------------------------------------------  No significant changes to PMH, PSH, FHx, SHx, unless otherwise noted    ALLERGIES & MEDICATIONS  --------------------------------------------------------------------------------  Allergies    No Known Allergies    Intolerances      Standing Inpatient Medications  ALBUTerol    90 MICROgram(s) HFA Inhaler 1 Puff(s) Inhalation every 4 hours  albuterol/ipratropium for Nebulization 3 milliLiter(s) Nebulizer every 6 hours  aMIOdarone    Tablet 200 milliGRAM(s) Oral daily  aMIOdarone    Tablet   Oral   artificial tears (preservative free) Ophthalmic Solution 1 Drop(s) Both EYES two times a day  aspirin enteric coated 81 milliGRAM(s) Oral daily  atorvastatin 40 milliGRAM(s) Oral at bedtime  BACItracin   Ointment 1 Application(s) Topical two times a day  buDESOnide    Inhalation Suspension 0.5 milliGRAM(s) Inhalation two times a day  buMETAnide Injectable 1 milliGRAM(s) IV Push every 8 hours  calcium acetate 667 milliGRAM(s) Oral three times a day with meals  dextrose 5%. 1000 milliLiter(s) IV Continuous <Continuous>  dextrose 50% Injectable 12.5 Gram(s) IV Push once  dextrose 50% Injectable 25 Gram(s) IV Push once  dextrose 50% Injectable 25 Gram(s) IV Push once  heparin  Injectable 5000 Unit(s) SubCutaneous every 8 hours  hydrALAZINE 50 milliGRAM(s) Oral every 8 hours  insulin glargine Injectable (LANTUS) 60 Unit(s) SubCutaneous at bedtime  insulin lispro (HumaLOG) corrective regimen sliding scale   SubCutaneous three times a day before meals  insulin lispro (HumaLOG) corrective regimen sliding scale   SubCutaneous at bedtime  insulin lispro Injectable (HumaLOG) 23 Unit(s) SubCutaneous three times a day with meals  melatonin 3 milliGRAM(s) Oral at bedtime  metoprolol tartrate 25 milliGRAM(s) Oral every 12 hours  multivitamin 1 Tablet(s) Oral daily  pantoprazole    Tablet 40 milliGRAM(s) Oral before breakfast  polyethylene glycol 3350 17 Gram(s) Oral daily  tamsulosin 0.4 milliGRAM(s) Oral at bedtime  tiotropium 18 MICROgram(s) Capsule 1 Capsule(s) Inhalation daily    REVIEW OF SYSTEMS  --------------------------------------------------------------------------------  Gen: + fatigue  Respiratory: No dyspnea  GI: No abdominal pain  MSK: + LE edema  Neuro: No dizziness  Heme: No bleeding    All other systems were reviewed and are negative, except as noted.    VITALS/PHYSICAL EXAM  --------------------------------------------------------------------------------  T(C): 36.5 (02-20-20 @ 05:35), Max: 36.6 (02-19-20 @ 20:42)  HR: 86 (02-20-20 @ 05:35) (82 - 86)  BP: 134/78 (02-20-20 @ 05:35) (109/53 - 136/77)  RR: 20 (02-20-20 @ 07:41) (18 - 20)  SpO2: 97% (02-20-20 @ 07:41) (94% - 97%)  Wt(kg): --    02-19-20 @ 07:01  -  02-20-20 @ 07:00  --------------------------------------------------------  IN: 720 mL / OUT: 2860 mL / NET: -2140 mL    02-20-20 @ 07:01  -  02-20-20 @ 10:31  --------------------------------------------------------  IN: 240 mL / OUT: 650 mL / NET: -410 mL    Physical Exam:  	Gen: awake  	HEENT: supple neck  	Pulm: Decreased bibasilar breath sounds  	CV: + central chest dressing from CABG C/D/I, S1S2  	Abd: Soft  	Ext: + pitting edema B/L (improved)    LABS/STUDIES  --------------------------------------------------------------------------------              8.9    10.52 >-----------<  354      [02-20-20 @ 05:38]              28.2     131  |  92  |  73  ----------------------------<  158      [02-20-20 @ 05:38]  4.4   |  25  |  1.97        Ca     8.6     [02-20-20 @ 05:38]    Creatinine Trend:  SCr 1.97 [02-20 @ 05:38]  SCr 2.22 [02-19 @ 06:31]  SCr 2.29 [02-18 @ 01:13]  SCr 2.09 [02-17 @ 01:01]  SCr 2.62 [02-16 @ 00:49]

## 2020-02-20 NOTE — CONSULT NOTE ADULT - PROBLEM SELECTOR RECOMMENDATION 3
Suggest to continue medications, monitoring, FU primary team recommendations.
pig tail in place-drainage continues--optimally DC once below 150 in 24 hr period
A1c 9.2 on insulin  Endocrine consulted, Dr. Matias  Continue Lantus 28 HS and Humalog 10 TID  Check FS AC/HS   Continue diabetic diet

## 2020-02-20 NOTE — PROGRESS NOTE ADULT - ATTENDING COMMENTS
I have seen this patient with the fellow and agree with their assessment and plan. In addition,    # STEPHANIE  - He initially had acute tubular necrosis as a result of PEA arrest. He had worsening kidney function several days ago due to mc-venous congestion. Serum creatinine has reached a plateau. We will continue to monitor kidney function.    # CKD stage 3/4. Etiology secondary to diabetic nephropathy. Baseline serum creatinine 1.8-2.0.     # Edema - volume expanded. Continue IV diuretics.     # Proteinuria - Likely from diabetic nephropathy. PLA2R negative. Serological workup negative thus far.     # Hyponatremia - likely from volume overload. Continue bumetanide.     # Medication toxicity Monitoring: medication dose reviewed. Since patient has acute kidney injury, please dose medications to CrCl<30    The rest of the recommendations as per fellow's note.    Maryam Dutta MD  Attending Nephrologist  [phone]

## 2020-02-20 NOTE — PROGRESS NOTE ADULT - PROBLEM SELECTOR PLAN 2
Pt. with likely hypervolemic hyponatremia in the setting of volume overload. Na stable, c/w diuretics as above.     Kevin Correa  Nephrology Fellow  Cell: [phone] (from 8 am to 5 pm)  (After 5 pm or on weekends please page on-call fellow)

## 2020-02-20 NOTE — CONSULT NOTE ADULT - PROBLEM SELECTOR PROBLEM 1
SOB (shortness of breath)
Type 2 diabetes mellitus with other specified complication, with long-term current use of insulin
CAD (coronary artery disease)
STEPHANIE (acute kidney injury)

## 2020-02-20 NOTE — PROGRESS NOTE ADULT - SUBJECTIVE AND OBJECTIVE BOX
VITAL SIGNS-Telemetry:  SR 80-90  Vital Signs Last 24 Hrs  T(C): 36.5 (20 @ 05:35), Max: 36.6 (20 @ 20:42)  T(F): 97.7 (20 @ 05:35), Max: 97.9 (20 @ 20:42)  HR: 86 (20 @ 05:35) (82 - 86)  BP: 134/78 (20 @ 05:35) (109/53 - 136/77)  RR: 20 (20 @ 07:41) (18 - 20)  SpO2: 97% (20 @ 07:41) (94% - 97%)          07:01  -   @ 07:00  --------------------------------------------------------  IN: 720 mL / OUT: 2860 mL / NET: -2140 mL     @ 07:01  -   @ 10:15  --------------------------------------------------------  IN: 240 mL / OUT: 650 mL / NET: -410 mL    Daily     Daily Weight in k.8 (2020 07:20)    CAPILLARY BLOOD GLUCOSE  POCT Blood Glucose.: 226 mg/dL (2020 07:49)  POCT Blood Glucose.: 145 mg/dL (2020 23:26)  POCT Blood Glucose.: 82 mg/dL (2020 22:32)  POCT Blood Glucose.: 68 mg/dL (2020 21:39)    PHYSICAL EXAM:  Neurology: alert and oriented x 3, nonfocal, no gross deficits  CV : S1S2  Sternal Wound :  CDI , Stable  Lungs:  Abdomen: soft, nontender, nondistended, positive bowel sounds, last bowel movement         Extremities:         ALBUTerol    90 MICROgram(s) HFA Inhaler 1 Puff(s) Inhalation every 4 hours  albuterol/ipratropium for Nebulization 3 milliLiter(s) Nebulizer every 6 hours  albuterol/ipratropium for Nebulization. 3 milliLiter(s) Nebulizer every 6 hours PRN  aMIOdarone    Tablet 200 milliGRAM(s) Oral daily  aMIOdarone    Tablet   Oral   artificial tears (preservative free) Ophthalmic Solution 1 Drop(s) Both EYES two times a day  aspirin enteric coated 81 milliGRAM(s) Oral daily  atorvastatin 40 milliGRAM(s) Oral at bedtime  BACItracin   Ointment 1 Application(s) Topical two times a day  buDESOnide    Inhalation Suspension 0.5 milliGRAM(s) Inhalation two times a day  buMETAnide Injectable 1 milliGRAM(s) IV Push every 8 hours  calcium acetate 667 milliGRAM(s) Oral three times a day with meals  dextrose 40% Gel 15 Gram(s) Oral once PRN  dextrose 5%. 1000 milliLiter(s) IV Continuous <Continuous>  dextrose 50% Injectable 12.5 Gram(s) IV Push once  dextrose 50% Injectable 25 Gram(s) IV Push once  dextrose 50% Injectable 25 Gram(s) IV Push once  glucagon  Injectable 1 milliGRAM(s) IntraMuscular once PRN  heparin  Injectable 5000 Unit(s) SubCutaneous every 8 hours  hydrALAZINE 50 milliGRAM(s) Oral every 8 hours  insulin glargine Injectable (LANTUS) 60 Unit(s) SubCutaneous at bedtime  insulin lispro (HumaLOG) corrective regimen sliding scale   SubCutaneous three times a day before meals  insulin lispro (HumaLOG) corrective regimen sliding scale   SubCutaneous at bedtime  insulin lispro Injectable (HumaLOG) 23 Unit(s) SubCutaneous three times a day with meals  melatonin 3 milliGRAM(s) Oral at bedtime  metoprolol tartrate 25 milliGRAM(s) Oral every 12 hours  multivitamin 1 Tablet(s) Oral daily  oxycodone    5 mG/acetaminophen 325 mG 1 Tablet(s) Oral every 6 hours PRN  pantoprazole    Tablet 40 milliGRAM(s) Oral before breakfast  polyethylene glycol 3350 17 Gram(s) Oral daily  sodium chloride 0.9% lock flush 10 milliLiter(s) IV Push every 1 hour PRN  tamsulosin 0.4 milliGRAM(s) Oral at bedtime  tiotropium 18 MICROgram(s) Capsule 1 Capsule(s) Inhalation daily    Physical Therapy Rec:   Home  [  ]   Home w/ PT  [  ]  Rehab  [ x ]  Discussed with Cardiothoracic Team at AM rounds. VITAL SIGNS-Telemetry:  SR 80-90  Vital Signs Last 24 Hrs  T(C): 36.5 (20 @ 05:35), Max: 36.6 (20 @ 20:42)  T(F): 97.7 (20 @ 05:35), Max: 97.9 (20 @ 20:42)  HR: 86 (20 @ 05:35) (82 - 86)  BP: 134/78 (20 @ 05:35) (109/53 - 136/77)  RR: 20 (20 @ 07:41) (18 - 20)  SpO2: 97% (20 @ 07:41) (94% - 97%)          @ 07:01  -   @ 07:00  --------------------------------------------------------  IN: 720 mL / OUT: 2860 mL / NET: -2140 mL     @ 07:01  -   @ 10:15  --------------------------------------------------------  IN: 240 mL / OUT: 650 mL / NET: -410 mL    Daily     Daily Weight in k.8 (2020 07:20)    CAPILLARY BLOOD GLUCOSE  POCT Blood Glucose.: 226 mg/dL (2020 07:49)  POCT Blood Glucose.: 145 mg/dL (2020 23:26)  POCT Blood Glucose.: 82 mg/dL (2020 22:32)  POCT Blood Glucose.: 68 mg/dL (2020 21:39)    PHYSICAL EXAM:  Neurology: alert and oriented x 3, nonfocal, no gross deficits  CV : S1S2  Sternal Wound :  CDI , Stable  Lungs: CTA- diminished bs lll  Abdomen: soft, nontender, nondistended, positive bowel sounds, last bowel movement         Extremities:     tr edema, no calf tenderness - hyperpigmentation LEs    ALBUTerol    90 MICROgram(s) HFA Inhaler 1 Puff(s) Inhalation every 4 hours  albuterol/ipratropium for Nebulization 3 milliLiter(s) Nebulizer every 6 hours  albuterol/ipratropium for Nebulization. 3 milliLiter(s) Nebulizer every 6 hours PRN  aMIOdarone    Tablet 200 milliGRAM(s) Oral daily  aMIOdarone    Tablet   Oral   artificial tears (preservative free) Ophthalmic Solution 1 Drop(s) Both EYES two times a day  aspirin enteric coated 81 milliGRAM(s) Oral daily  atorvastatin 40 milliGRAM(s) Oral at bedtime  BACItracin   Ointment 1 Application(s) Topical two times a day  buDESOnide    Inhalation Suspension 0.5 milliGRAM(s) Inhalation two times a day  buMETAnide Injectable 1 milliGRAM(s) IV Push every 8 hours  calcium acetate 667 milliGRAM(s) Oral three times a day with meals  dextrose 40% Gel 15 Gram(s) Oral once PRN  dextrose 5%. 1000 milliLiter(s) IV Continuous <Continuous>  dextrose 50% Injectable 12.5 Gram(s) IV Push once  dextrose 50% Injectable 25 Gram(s) IV Push once  dextrose 50% Injectable 25 Gram(s) IV Push once  glucagon  Injectable 1 milliGRAM(s) IntraMuscular once PRN  heparin  Injectable 5000 Unit(s) SubCutaneous every 8 hours  hydrALAZINE 50 milliGRAM(s) Oral every 8 hours  insulin glargine Injectable (LANTUS) 60 Unit(s) SubCutaneous at bedtime  insulin lispro (HumaLOG) corrective regimen sliding scale   SubCutaneous three times a day before meals  insulin lispro (HumaLOG) corrective regimen sliding scale   SubCutaneous at bedtime  insulin lispro Injectable (HumaLOG) 23 Unit(s) SubCutaneous three times a day with meals  melatonin 3 milliGRAM(s) Oral at bedtime  metoprolol tartrate 25 milliGRAM(s) Oral every 12 hours  multivitamin 1 Tablet(s) Oral daily  oxycodone    5 mG/acetaminophen 325 mG 1 Tablet(s) Oral every 6 hours PRN  pantoprazole    Tablet 40 milliGRAM(s) Oral before breakfast  polyethylene glycol 3350 17 Gram(s) Oral daily  sodium chloride 0.9% lock flush 10 milliLiter(s) IV Push every 1 hour PRN  tamsulosin 0.4 milliGRAM(s) Oral at bedtime  tiotropium 18 MICROgram(s) Capsule 1 Capsule(s) Inhalation daily    Physical Therapy Rec:   Home  [  ]   Home w/ PT  [  ]  Rehab  [ x ]  Discussed with Cardiothoracic Team at AM rounds.

## 2020-02-20 NOTE — CONSULT NOTE ADULT - PROBLEM SELECTOR RECOMMENDATION 9
multifactorial-CAD/CHF, atelectasis due to pain, pleural effusion, bronchospasm, ? PE--O2 NC sat above 90%  doppler of LE and D-dimer-if elevated the VQ scan (RI)

## 2020-02-20 NOTE — PROGRESS NOTE ADULT - ASSESSMENT
63yo male with hx of HTN, DM II   s/p C4L on 2/3; PEA arrest on 2/6   + enterococcus Bld  C/S > started ampicillin q8h, ID consulted,  R pigtail for effusion  2/8    Extubated  2/9  2/15 L pigtail effusion  2/17 PRBC   2/18 Tx 2 Diaz  2/19 thomas removed, trial void. Maintain left pigtail for significant output. Pt encouraged to ambulate. Supplemental o2 sat NC weaned to 2L. Blood cultures repeated.  2/20 VSS -Pulmonary consult - appreciated - duonebs ATC q6h & pulmicort bid initiated - ddimer drawn.  pt w/ hx negative LE dopplers   will order non con chest DT as pt has a loculated left effusion that will require tap at IR.

## 2020-02-20 NOTE — PROGRESS NOTE ADULT - PROBLEM SELECTOR PLAN 1
Pt with  CKD likely  in the setting of long standing DM and HTN. No prior labs for review. Scr since admission has ranged between 1.8-2 mg/dl.  Scr increased to ~2.50 secondary to hemodynamic injury after CABG and PEA arrest s/p CPR, and worsened to 2.9. Scr improved today to 1.97. Non-nephrotic range proteinuria. Pt. likely with diabetic nephropathy.   - Continue with diuretics (currently on Bumex IV)  - Monitor labs and urine output.   - Avoid NSAIDs, ACEI/ARBS, RCA and nephrotoxins. Dose medications as per eGFR

## 2020-02-20 NOTE — PROGRESS NOTE ADULT - SUBJECTIVE AND OBJECTIVE BOX
CARDIOLOGY FOLLOW UP - Dr. Steel    CC states "i still feel weak, i want to rest"  denies increase cp or sob       PHYSICAL EXAM:  T(C): 36.5 (02-20-20 @ 05:35), Max: 36.6 (02-19-20 @ 20:42)  HR: 86 (02-20-20 @ 05:35) (82 - 86)  BP: 134/78 (02-20-20 @ 05:35) (109/53 - 136/77)  RR: 20 (02-20-20 @ 07:41) (18 - 20)  SpO2: 97% (02-20-20 @ 07:41) (94% - 97%)  Wt(kg): --  I&O's Summary    19 Feb 2020 07:01  -  20 Feb 2020 07:00  --------------------------------------------------------  IN: 720 mL / OUT: 2860 mL / NET: -2140 mL    20 Feb 2020 07:01  -  20 Feb 2020 10:39  --------------------------------------------------------  IN: 240 mL / OUT: 650 mL / NET: -410 mL        Appearance: ill appering 	  Cardiovascular: Normal S1 S2,RRR  Respiratory: crackles at base + chest tube   Gastrointestinal:  Soft, Non-tender, + BS	  Extremities: Normal range of motion, ++ le  edema        MEDICATIONS  (STANDING):  ALBUTerol    90 MICROgram(s) HFA Inhaler 1 Puff(s) Inhalation every 4 hours  albuterol/ipratropium for Nebulization 3 milliLiter(s) Nebulizer every 6 hours  aMIOdarone    Tablet 200 milliGRAM(s) Oral daily  aMIOdarone    Tablet   Oral   artificial tears (preservative free) Ophthalmic Solution 1 Drop(s) Both EYES two times a day  aspirin enteric coated 81 milliGRAM(s) Oral daily  atorvastatin 40 milliGRAM(s) Oral at bedtime  BACItracin   Ointment 1 Application(s) Topical two times a day  buDESOnide    Inhalation Suspension 0.5 milliGRAM(s) Inhalation two times a day  buMETAnide Injectable 1 milliGRAM(s) IV Push every 8 hours  calcium acetate 667 milliGRAM(s) Oral three times a day with meals  dextrose 5%. 1000 milliLiter(s) (50 mL/Hr) IV Continuous <Continuous>  dextrose 50% Injectable 12.5 Gram(s) IV Push once  dextrose 50% Injectable 25 Gram(s) IV Push once  dextrose 50% Injectable 25 Gram(s) IV Push once  heparin  Injectable 5000 Unit(s) SubCutaneous every 8 hours  hydrALAZINE 50 milliGRAM(s) Oral every 8 hours  insulin glargine Injectable (LANTUS) 60 Unit(s) SubCutaneous at bedtime  insulin lispro (HumaLOG) corrective regimen sliding scale   SubCutaneous three times a day before meals  insulin lispro (HumaLOG) corrective regimen sliding scale   SubCutaneous at bedtime  insulin lispro Injectable (HumaLOG) 23 Unit(s) SubCutaneous three times a day with meals  melatonin 3 milliGRAM(s) Oral at bedtime  metoprolol tartrate 25 milliGRAM(s) Oral every 12 hours  multivitamin 1 Tablet(s) Oral daily  pantoprazole    Tablet 40 milliGRAM(s) Oral before breakfast  polyethylene glycol 3350 17 Gram(s) Oral daily  tamsulosin 0.4 milliGRAM(s) Oral at bedtime  tiotropium 18 MICROgram(s) Capsule 1 Capsule(s) Inhalation daily      TELEMETRY: nsr 	    ECG:  	  RADIOLOGY:   DIAGNOSTIC TESTING:  [ ] Echocardiogram:  [ ]  Catheterization:  [ ] Stress Test:    OTHER: 	  < from: Xray Chest 1 View- PORTABLE-Routine (02.20.20 @ 06:21) >  FINDINGS:    Lines/Tubes: Unchanged    Heart and mediastinum: Poststernotomy    Lungs, pleura, and airways: Stable left pleural effusion and pulmonary edema    Bones and soft tissues: The bones and soft tissues are unchanged.    Impression:    Stable left pleural effusion and pulmonary edema          < end of copied text >    LABS:	 	    D-Dimer Assay, Quantitative: 4187: D-Dimer result less than 230 ng/mL DDU correlates with the absence  of thrombosis in a patient with a low and moderate       pre-test probability of thrombosis.  1 DDU is approximately equal to  2 ng/mL FEU (previous units). ng/mL DDU (02.20.20 @ 09:32)                            8.9    10.52 )-----------( 354      ( 20 Feb 2020 05:38 )             28.2     02-20    131<L>  |  92<L>  |  73<H>  ----------------------------<  158<H>  4.4   |  25  |  1.97<H>    Ca    8.6      20 Feb 2020 05:38

## 2020-02-20 NOTE — PROGRESS NOTE ADULT - ASSESSMENT
Assessment  DMT2: 64y Male with DM T2 with hyperglycemia, A1C 9.2%, was on oral meds and insulin at home, now on basal bolus insulin, patient had mild hypoglycemic episode yesterday (FS 68), blood sugars now improved and trending up. Patient gets food from home and eats meals with inconsistent carb intake, causing blood sugars to fluctuate. Planning CT chest today for pleural effusion and possible IR-guided tap.  CAD: s/p CABG 2/3, on medications, no chest pain, stable, monitored.  HTN: Controlled,  on antihypertensive medications.  HLD: Controlled, on statin.  CKD: Monitor labs/BMP          Harper Matias MD  Cell: [phone]  Office: [phone] Assessment  DMT2: 64y Male with DM T2 with hyperglycemia, A1C 9.2%, was on oral meds and insulin at home, now on basal bolus insulin, patient had mild hypoglycemic episode yesterday (FS 68), blood sugars now improved and trending up.  Patient gets food from home and eats meals with inconsistent carb intake, causing blood sugars to fluctuate. Planning CT chest today for pleural effusion and possible IR-guided tap.  CAD: s/p CABG 2/3, on medications, no chest pain, stable, monitored.  HTN: Controlled,  on antihypertensive medications.  HLD: Controlled, on statin.  CKD: Monitor labs/BMP          Harper Matias MD  Cell: [phone]  Office: [phone]

## 2020-02-20 NOTE — CONSULT NOTE ADULT - SUBJECTIVE AND OBJECTIVE BOX
HPI: 63yo male with hx of HTN, DM II, presented to the ED with complaints of acute on chronic left sided exertional chest pain. Pt states he has been having left sided pressure and stabbing chest pain radiating to his sternum and right with activity for the past few months. Since admission- s/p cath with severe triple vessel disease, s/p CABG, post op course c/b brief witnessed PEA 2/6 likely hypoxic arrest, s/p intubation. ( CAD, s/p cath with severe triple vessel disease including lesions at the bifurcation of the LAD/diagonal and distal RCA/RPDA/RPL trifurcation.   -s/p CABG x 4, intraop kennedy ef 50%)--s/p ampicillin until 2/18 for enterococcus in blood, extubated 2/9, pigtail right 2/8 and left sided on 2/15-for effusion.     Over all exhausted---some sob especially on exertion, pain in chest following surgery, mild cough-NP, some ambulation w/ assistance.  Left pigtail drainage over 12 hrs-60cc      PAST MEDICAL & SURGICAL HISTORY:  HTN (hypertension)  Diabetes  History of appendectomy: x 30 yrs ago      FAMILY HISTORY:  FH: type 2 diabetes (father), M-old age      SOCIAL HISTORY:  Smoking: insignificant  EtOH Use: rare-social  Marital Status: M-2 k  Occupation: multiple jobs-retired  Exposures: none  Recent Travel: [facility] yrs    Allergies    No Known Allergies    Intolerances        HOME MEDICATIONS: Home Medications:  Artificial Tears ophthalmic solution: 1 drop(s) to each affected eye , As Needed (25 Jan 2020 12:55)  Daily Gadiel oral tablet: 1 tab(s) orally once a day (25 Jan 2020 12:55)  glyBURIDE 5 mg oral tablet: 2 tab(s) orally 2 times a day (25 Jan 2020 12:55)  lisinopril 20 mg oral tablet: 1 tab(s) orally once a day (25 Jan 2020 12:55)  metFORMIN 1000 mg oral tablet: 0.5 tab(s) orally 2 times a day (25 Jan 2020 12:55)  Naprosyn 500 mg oral tablet: 1 tab(s) orally , As Needed (25 Jan 2020 11:02)  Tresiba FlexTouch 100 units/mL subcutaneous solution: 30 unit(s) subcutaneous once a day (at bedtime) (25 Jan 2020 12:55)  vitamin B Complex: 1 tab(s) orally once a day (25 Jan 2020 12:55)      REVIEW OF SYSTEMS:  Constitutional: No fevers or chills. No weight loss. + fatigue or generalized malaise.  Eyes: No itching or discharge from the eyes  ENT: No ear pain. No ear discharge. No nasal congestion. No post nasal drip. No epistaxis. No throat pain. No sore throat. No difficulty swallowing.   CV: + chest pain. No palpitations. + lightheadedness or dizziness.   Resp: + dyspnea at rest. + dyspnea on exertion. No orthopnea. + wheezing. + cough. No stridor. + sputum production. No chest pain with respiration.  GI: No nausea. No vomiting. No diarrhea.  MSK: No joint pain or pain in any extremities  Integumentary: No skin lesions. + pedal edema.  Neurological: + gross motor weakness. No sensory changes.    [ +] All other systems negative-except snore/eds  [ ] Unable to assess ROS because ________    OBJECTIVE:  ICU Vital Signs Last 24 Hrs  T(C): 36.6 (19 Feb 2020 20:42), Max: 36.8 (19 Feb 2020 05:56)  T(F): 97.9 (19 Feb 2020 20:42), Max: 98.2 (19 Feb 2020 05:56)  HR: 85 (19 Feb 2020 22:18) (82 - 85)  BP: 136/77 (19 Feb 2020 22:18) (109/53 - 136/77)  BP(mean): --  ABP: --  ABP(mean): --  RR: 18 (19 Feb 2020 20:42) (18 - 20)  SpO2: 94% (19 Feb 2020 20:42) (94% - 98%)        02-18 @ 07:01  -  02-19 @ 07:00  --------------------------------------------------------  IN: 690 mL / OUT: 3490 mL / NET: -2800 mL    02-19 @ 07:01  -  02-20 @ 05:26  --------------------------------------------------------  IN: 720 mL / OUT: 2800 mL / NET: -2080 mL      CAPILLARY BLOOD GLUCOSE  181 (18 Feb 2020 07:00)      POCT Blood Glucose.: 145 mg/dL (19 Feb 2020 23:26)      PHYSICAL EXAM: NAD in bed on NC-3 liters  General: Awake, alert, oriented X 3.   HEENT: Atraumatic, normocephalic.                 Mallampatti Grade 3                No nasal congestion.                No tonsillar or pharyngeal exudates.  Lymph Nodes: No palpable lymphadenopathy  Neck: No JVD. No carotid bruit.   Respiratory: Normal chest expansion                         Normal percussion                         Normal and equal air entry                         No wheeze, rhonchi but mild bibasilar rales.  Cardiovascular: S1 S2 normal. No murmurs, rubs or gallops.   Abdomen: Soft, non-tender, non-distended. No organomegaly. Normoactive bowel sounds.  Extremities: Warm to touch. Peripheral pulse palpable. + leg/ pedal edema.   Skin: No rashes or skin lesions  Neurological: Motor and sensory examination equal and abnormal in all four extremities.  Psychiatry: Appropriate mood and affect.    HOSPITAL MEDICATIONS:  MEDICATIONS  (STANDING):  aMIOdarone    Tablet 200 milliGRAM(s) Oral daily  aMIOdarone    Tablet   Oral   ampicillin  IVPB 2 Gram(s) IV Intermittent every 8 hours  artificial tears (preservative free) Ophthalmic Solution 1 Drop(s) Both EYES two times a day  aspirin enteric coated 81 milliGRAM(s) Oral daily  atorvastatin 40 milliGRAM(s) Oral at bedtime  BACItracin   Ointment 1 Application(s) Topical two times a day  buMETAnide Injectable 1 milliGRAM(s) IV Push every 8 hours  calcium acetate 667 milliGRAM(s) Oral three times a day with meals  dextrose 5%. 1000 milliLiter(s) (50 mL/Hr) IV Continuous <Continuous>  dextrose 50% Injectable 12.5 Gram(s) IV Push once  dextrose 50% Injectable 25 Gram(s) IV Push once  dextrose 50% Injectable 25 Gram(s) IV Push once  heparin  Injectable 5000 Unit(s) SubCutaneous every 8 hours  hydrALAZINE 50 milliGRAM(s) Oral every 8 hours  insulin glargine Injectable (LANTUS) 60 Unit(s) SubCutaneous at bedtime  insulin lispro (HumaLOG) corrective regimen sliding scale   SubCutaneous three times a day before meals  insulin lispro (HumaLOG) corrective regimen sliding scale   SubCutaneous at bedtime  insulin lispro Injectable (HumaLOG) 25 Unit(s) SubCutaneous three times a day with meals  melatonin 3 milliGRAM(s) Oral at bedtime  metoprolol tartrate 25 milliGRAM(s) Oral every 12 hours  multivitamin 1 Tablet(s) Oral daily  pantoprazole    Tablet 40 milliGRAM(s) Oral before breakfast  polyethylene glycol 3350 17 Gram(s) Oral daily  tamsulosin 0.4 milliGRAM(s) Oral at bedtime    MEDICATIONS  (PRN):  albuterol/ipratropium for Nebulization. 3 milliLiter(s) Nebulizer every 6 hours PRN Shortness of Breath and/or Wheezing  dextrose 40% Gel 15 Gram(s) Oral once PRN Blood Glucose LESS THAN 70 milliGRAM(s)/deciliter  glucagon  Injectable 1 milliGRAM(s) IntraMuscular once PRN Glucose LESS THAN 70 milligrams/deciliter  sodium chloride 0.9% lock flush 10 milliLiter(s) IV Push every 1 hour PRN Pre/post blood products, medications, blood draw, and to maintain line patency      LABS:                        9.1    11.15 )-----------( 385      ( 19 Feb 2020 06:46 )             28.2     02-19    132<L>  |  94<L>  |  69<H>  ----------------------------<  113<H>  4.5   |  25  |  2.22<H>    Ca    8.6      19 Feb 2020 06:31          Arterial Blood Gas:  02-18 @ 05:57  7.47/38/86/27/97/3.7  ABG lactate: --        MICROBIOLOGY:     RADIOLOGY: < from: Xray Chest 1 View- PORTABLE-Urgent (02.19.20 @ 10:26) >    EXAM: Frontal radiograph of the chest.    COMPARISON: Chest radiograph from 2/18/2020.    FINDINGS:  Clear lungs.  No pneumothorax.  The heart size is not well evaluated on this projection.  No acute osseous abnormality. Left pigtail catheter.    IMPRESSION:   Clear lungs.      < end of copied text >    [ ] Reviewed and interpreted by me    Point of Care Ultrasound Findings;    PFT:    EKG:

## 2020-02-20 NOTE — CONSULT NOTE ADULT - PROBLEM SELECTOR PROBLEM 2
CAD (coronary artery disease)
Triple vessel disease of the heart
CKD (chronic kidney disease), stage III
Contrast dye induced nephropathy

## 2020-02-20 NOTE — CONSULT NOTE ADULT - ASSESSMENT
65yo male with hx of HTN, DM II, presented to the ED with complaints of acute on chronic left sided exertional chest pain. Pt states he has been having left sided pressure and stabbing chest pain radiating to his sternum and right with activity for the past few months. Since admission- s/p cath with severe triple vessel disease, s/p CABG, post op course c/b brief witnessed PEA 2/6 likely hypoxic arrest, s/p intubation. ( CAD, s/p cath with severe triple vessel disease including lesions at the bifurcation of the LAD/diagonal and distal RCA/RPDA/RPL trifurcation.   -s/p CABG x 4, intraop kennedy ef 50%)--s/p ampicillin until 2/18 for enterococcus in blood, extubated 2/9, pigtail right 2/8 and left sided on 2/15-for effusion.  Remains sob-NO h/o resp issues prior to hospitalization.  ***Current sob-multifactorial-CAD/effusions, atelectasis due to pain, mild bronchospasm and debility.

## 2020-02-21 DIAGNOSIS — L03.115 CELLULITIS OF RIGHT LOWER LIMB: ICD-10-CM

## 2020-02-21 LAB
ANION GAP SERPL CALC-SCNC: 12 MMOL/L — SIGNIFICANT CHANGE UP (ref 5–17)
APTT BLD: 30.4 SEC — SIGNIFICANT CHANGE UP (ref 27.5–36.3)
BUN SERPL-MCNC: 70 MG/DL — HIGH (ref 7–23)
CALCIUM SERPL-MCNC: 9 MG/DL — SIGNIFICANT CHANGE UP (ref 8.4–10.5)
CHLORIDE SERPL-SCNC: 93 MMOL/L — LOW (ref 96–108)
CO2 SERPL-SCNC: 26 MMOL/L — SIGNIFICANT CHANGE UP (ref 22–31)
CREAT SERPL-MCNC: 2.15 MG/DL — HIGH (ref 0.5–1.3)
GLUCOSE BLDC GLUCOMTR-MCNC: 132 MG/DL — HIGH (ref 70–99)
GLUCOSE BLDC GLUCOMTR-MCNC: 155 MG/DL — HIGH (ref 70–99)
GLUCOSE BLDC GLUCOMTR-MCNC: 190 MG/DL — HIGH (ref 70–99)
GLUCOSE BLDC GLUCOMTR-MCNC: 88 MG/DL — SIGNIFICANT CHANGE UP (ref 70–99)
GLUCOSE SERPL-MCNC: 93 MG/DL — SIGNIFICANT CHANGE UP (ref 70–99)
HCT VFR BLD CALC: 29.5 % — LOW (ref 39–50)
HGB BLD-MCNC: 9.1 G/DL — LOW (ref 13–17)
INR BLD: 1.19 RATIO — HIGH (ref 0.88–1.16)
MCHC RBC-ENTMCNC: 27.3 PG — SIGNIFICANT CHANGE UP (ref 27–34)
MCHC RBC-ENTMCNC: 30.8 GM/DL — LOW (ref 32–36)
MCV RBC AUTO: 88.6 FL — SIGNIFICANT CHANGE UP (ref 80–100)
NRBC # BLD: 0 /100 WBCS — SIGNIFICANT CHANGE UP (ref 0–0)
PLATELET # BLD AUTO: 367 K/UL — SIGNIFICANT CHANGE UP (ref 150–400)
POTASSIUM SERPL-MCNC: 4.9 MMOL/L — SIGNIFICANT CHANGE UP (ref 3.5–5.3)
POTASSIUM SERPL-SCNC: 4.9 MMOL/L — SIGNIFICANT CHANGE UP (ref 3.5–5.3)
PROTHROM AB SERPL-ACNC: 13.6 SEC — HIGH (ref 10–12.9)
RBC # BLD: 3.33 M/UL — LOW (ref 4.2–5.8)
RBC # FLD: 14.1 % — SIGNIFICANT CHANGE UP (ref 10.3–14.5)
SODIUM SERPL-SCNC: 131 MMOL/L — LOW (ref 135–145)
WBC # BLD: 11.9 K/UL — HIGH (ref 3.8–10.5)
WBC # FLD AUTO: 11.9 K/UL — HIGH (ref 3.8–10.5)

## 2020-02-21 PROCEDURE — 99232 SBSQ HOSP IP/OBS MODERATE 35: CPT

## 2020-02-21 PROCEDURE — 99232 SBSQ HOSP IP/OBS MODERATE 35: CPT | Mod: GC

## 2020-02-21 RX ORDER — DAPTOMYCIN 500 MG/10ML
300 INJECTION, POWDER, LYOPHILIZED, FOR SOLUTION INTRAVENOUS ONCE
Refills: 0 | Status: COMPLETED | OUTPATIENT
Start: 2020-02-21 | End: 2020-02-21

## 2020-02-21 RX ORDER — DAPTOMYCIN 500 MG/10ML
INJECTION, POWDER, LYOPHILIZED, FOR SOLUTION INTRAVENOUS
Refills: 0 | Status: DISCONTINUED | OUTPATIENT
Start: 2020-02-21 | End: 2020-02-25

## 2020-02-21 RX ORDER — DAPTOMYCIN 500 MG/10ML
300 INJECTION, POWDER, LYOPHILIZED, FOR SOLUTION INTRAVENOUS EVERY 24 HOURS
Refills: 0 | Status: DISCONTINUED | OUTPATIENT
Start: 2020-02-22 | End: 2020-02-25

## 2020-02-21 RX ADMIN — Medication 23 UNIT(S): at 11:47

## 2020-02-21 RX ADMIN — OXYCODONE AND ACETAMINOPHEN 1 TABLET(S): 5; 325 TABLET ORAL at 05:55

## 2020-02-21 RX ADMIN — Medication 3 MILLILITER(S): at 17:12

## 2020-02-21 RX ADMIN — AMIODARONE HYDROCHLORIDE 200 MILLIGRAM(S): 400 TABLET ORAL at 05:55

## 2020-02-21 RX ADMIN — Medication 3 MILLILITER(S): at 06:08

## 2020-02-21 RX ADMIN — DAPTOMYCIN 112 MILLIGRAM(S): 500 INJECTION, POWDER, LYOPHILIZED, FOR SOLUTION INTRAVENOUS at 18:33

## 2020-02-21 RX ADMIN — BUMETANIDE 1 MILLIGRAM(S): 0.25 INJECTION INTRAMUSCULAR; INTRAVENOUS at 05:54

## 2020-02-21 RX ADMIN — Medication 667 MILLIGRAM(S): at 16:48

## 2020-02-21 RX ADMIN — Medication 1 DROP(S): at 17:13

## 2020-02-21 RX ADMIN — Medication 50 MILLIGRAM(S): at 13:50

## 2020-02-21 RX ADMIN — Medication 1: at 11:47

## 2020-02-21 RX ADMIN — Medication 667 MILLIGRAM(S): at 07:59

## 2020-02-21 RX ADMIN — INSULIN GLARGINE 60 UNIT(S): 100 INJECTION, SOLUTION SUBCUTANEOUS at 21:29

## 2020-02-21 RX ADMIN — Medication 3 MILLIGRAM(S): at 22:10

## 2020-02-21 RX ADMIN — Medication 50 MILLIGRAM(S): at 22:10

## 2020-02-21 RX ADMIN — OXYCODONE AND ACETAMINOPHEN 1 TABLET(S): 5; 325 TABLET ORAL at 06:25

## 2020-02-21 RX ADMIN — Medication 1 APPLICATION(S): at 17:13

## 2020-02-21 RX ADMIN — Medication 3 MILLILITER(S): at 11:16

## 2020-02-21 RX ADMIN — Medication 0.5 MILLIGRAM(S): at 05:54

## 2020-02-21 RX ADMIN — Medication 1 APPLICATION(S): at 05:54

## 2020-02-21 RX ADMIN — Medication 23 UNIT(S): at 07:59

## 2020-02-21 RX ADMIN — HEPARIN SODIUM 5000 UNIT(S): 5000 INJECTION INTRAVENOUS; SUBCUTANEOUS at 22:10

## 2020-02-21 RX ADMIN — Medication 1 TABLET(S): at 11:16

## 2020-02-21 RX ADMIN — Medication 0.5 MILLIGRAM(S): at 17:13

## 2020-02-21 RX ADMIN — Medication 81 MILLIGRAM(S): at 11:16

## 2020-02-21 RX ADMIN — HEPARIN SODIUM 5000 UNIT(S): 5000 INJECTION INTRAVENOUS; SUBCUTANEOUS at 05:54

## 2020-02-21 RX ADMIN — Medication 50 MILLIGRAM(S): at 05:54

## 2020-02-21 RX ADMIN — PANTOPRAZOLE SODIUM 40 MILLIGRAM(S): 20 TABLET, DELAYED RELEASE ORAL at 05:55

## 2020-02-21 RX ADMIN — HEPARIN SODIUM 5000 UNIT(S): 5000 INJECTION INTRAVENOUS; SUBCUTANEOUS at 13:50

## 2020-02-21 RX ADMIN — ATORVASTATIN CALCIUM 40 MILLIGRAM(S): 80 TABLET, FILM COATED ORAL at 22:09

## 2020-02-21 RX ADMIN — Medication 1 DROP(S): at 05:54

## 2020-02-21 RX ADMIN — TAMSULOSIN HYDROCHLORIDE 0.4 MILLIGRAM(S): 0.4 CAPSULE ORAL at 22:10

## 2020-02-21 RX ADMIN — Medication 23 UNIT(S): at 16:48

## 2020-02-21 RX ADMIN — Medication 667 MILLIGRAM(S): at 11:48

## 2020-02-21 RX ADMIN — Medication 25 MILLIGRAM(S): at 05:55

## 2020-02-21 NOTE — PROGRESS NOTE ADULT - PROBLEM SELECTOR PLAN 1
Pt with  CKD likely  in the setting of long standing DM and HTN. No prior labs for review. Scr since admission has ranged between 1.8-2 mg/dl.  Scr increased to ~2.50 secondary to hemodynamic injury after CABG and PEA arrest s/p CPR, and worsened to 2.9. Scr improved yesterday to 1.97, now slightly increased to 2.1. Non-nephrotic range proteinuria. Pt. likely with diabetic nephropathy.   - Continue with diuretics (currently on Bumex IV)  - Monitor labs and urine output.   - Avoid NSAIDs, ACEI/ARBS, RCA and nephrotoxins. Dose medications as per eGFR

## 2020-02-21 NOTE — PROGRESS NOTE ADULT - PROBLEM SELECTOR PLAN 3
s/p DC CT-improved loculations on CT but sill present, inflammation RUL present--will require f/up over next 4-6 wks

## 2020-02-21 NOTE — PROGRESS NOTE ADULT - PROBLEM SELECTOR PLAN 2
Pt. with likely hypervolemic hyponatremia in the setting of volume overload. Na stable, c/w diuretics as above.     Kevin Correa  Nephrology Fellow  Cell: [phone] (from 8 am to 5 pm)  (After 5 pm or on weekends please page on-call fellow) Pt. with likely hypervolemic hyponatremia in the setting of volume overload. Na stable, c/w diuretics as above. Recommend rechecking UA, urine sodium, urine osm, and serum osm.    Kevin Correa  Nephrology Fellow  Cell: [phone] (from 8 am to 5 pm)  (After 5 pm or on weekends please page on-call fellow)

## 2020-02-21 NOTE — PROGRESS NOTE ADULT - ASSESSMENT
65yo male with hx of HTN, DM II, presented to the ED with complaints of acute on chronic left sided exertional chest pain. Pt states he has been having left sided pressure and stabbing chest pain radiating to his sternum and right with activity for the past few months. Since admission- s/p cath with severe triple vessel disease, s/p CABG, post op course c/b brief witnessed PEA 2/6 likely hypoxic arrest, s/p intubation. ( CAD, s/p cath with severe triple vessel disease including lesions at the bifurcation of the LAD/diagonal and distal RCA/RPDA/RPL trifurcation.   -s/p CABG x 4, intraop kennedy ef 50%)--s/p ampicillin until 2/18 for enterococcus in blood, extubated 2/9, pigtail right 2/8 and left sided on 2/15-for effusion.  Remains sob-NO h/o resp issues prior to hospitalization.  ***Current sob-multifactorial-CAD/effusions, atelectasis due to pain, mild bronchospasm and debility.  ********************  2/21-slightly better, pig tail cath removed from left chest; CT chest done-loculated left effusion-slight better

## 2020-02-21 NOTE — PROGRESS NOTE ADULT - SUBJECTIVE AND OBJECTIVE BOX
CARDIOLOGY FOLLOW UP - Dr. Steel    CC no cp or sob   unable to tolerate VQ scan this am.       PHYSICAL EXAM:  T(C): 36.7 (02-21-20 @ 12:44), Max: 36.7 (02-20-20 @ 20:42)  HR: 85 (02-21-20 @ 12:44) (83 - 88)  BP: 127/64 (02-21-20 @ 12:44) (58/80 - 142/73)  RR: 18 (02-21-20 @ 12:44) (18 - 20)  SpO2: 97% (02-21-20 @ 12:44) (95% - 97%)  Wt(kg): --  I&O's Summary    20 Feb 2020 07:01  -  21 Feb 2020 07:00  --------------------------------------------------------  IN: 480 mL / OUT: 650 mL / NET: -170 mL    21 Feb 2020 07:01  -  21 Feb 2020 13:28  --------------------------------------------------------  IN: 480 mL / OUT: 800 mL / NET: -320 mL        Appearance: Normal	  Cardiovascular: Normal S1 S2,RRR  Respiratory:  diminished   Gastrointestinal:  Soft, Non-tender, + BS	  Extremities: bl LE ++ edema        MEDICATIONS  (STANDING):  ALBUTerol    90 MICROgram(s) HFA Inhaler 1 Puff(s) Inhalation every 4 hours  albuterol/ipratropium for Nebulization 3 milliLiter(s) Nebulizer every 6 hours  aMIOdarone    Tablet 200 milliGRAM(s) Oral daily  aMIOdarone    Tablet   Oral   artificial tears (preservative free) Ophthalmic Solution 1 Drop(s) Both EYES two times a day  aspirin enteric coated 81 milliGRAM(s) Oral daily  atorvastatin 40 milliGRAM(s) Oral at bedtime  BACItracin   Ointment 1 Application(s) Topical two times a day  buDESOnide    Inhalation Suspension 0.5 milliGRAM(s) Inhalation two times a day  calcium acetate 667 milliGRAM(s) Oral three times a day with meals  dextrose 5%. 1000 milliLiter(s) (50 mL/Hr) IV Continuous <Continuous>  dextrose 50% Injectable 12.5 Gram(s) IV Push once  dextrose 50% Injectable 25 Gram(s) IV Push once  dextrose 50% Injectable 25 Gram(s) IV Push once  heparin  Injectable 5000 Unit(s) SubCutaneous every 8 hours  hydrALAZINE 50 milliGRAM(s) Oral every 8 hours  insulin glargine Injectable (LANTUS) 60 Unit(s) SubCutaneous at bedtime  insulin lispro (HumaLOG) corrective regimen sliding scale   SubCutaneous three times a day before meals  insulin lispro (HumaLOG) corrective regimen sliding scale   SubCutaneous at bedtime  insulin lispro Injectable (HumaLOG) 23 Unit(s) SubCutaneous three times a day with meals  melatonin 3 milliGRAM(s) Oral at bedtime  multivitamin 1 Tablet(s) Oral daily  pantoprazole    Tablet 40 milliGRAM(s) Oral before breakfast  polyethylene glycol 3350 17 Gram(s) Oral daily  tamsulosin 0.4 milliGRAM(s) Oral at bedtime  tiotropium 18 MICROgram(s) Capsule 1 Capsule(s) Inhalation daily      TELEMETRY: nsr 	    ECG:  	  RADIOLOGY:   < from: CT Chest No Cont (02.20.20 @ 11:05) >  IMPRESSION:     When compared with February 9, 2020, again seen is a left pleural effusion. However, the dependent component appears smaller than on previous exam. Several loculated components are now identified along the left lateral chest wall and along the paramediastinal surface. The largest is noted medially and measures approximately 9 x 4 cm.    Apparent nodule along the mediastinal pleural surface in the left apex without significant change from previous exam. This is seen on series 3 image 37 and measures 2.3 x 1.4 cm.    Respiratory motion is present. Patchy areas of consolidation in the right lung appear worse than on previous examination.    Postsurgical changes as above.          < end of copied text >    DIAGNOSTIC TESTING:  [ ] Echocardiogram:  [ ]  Catheterization:  [ ] Stress Test:    OTHER: 	  < from: Xray Chest 1 View- PORTABLE-Urgent (02.20.20 @ 11:18) >  COMPARISON: Chest radiograph from the same day at 5:51 AM.    FINDINGS:  Interval removal of a left-sided pigtail catheter.  Stable left pleural effusion. No pneumothorax.  Status post sternotomy. The heart size is not well evaluated on this projection.  No acute bony pathology.    IMPRESSION:   Stable left pleural effusion.          < end of copied text >    LABS:	 	                            9.1    11.90 )-----------( 367      ( 21 Feb 2020 06:22 )             29.5     02-21    131<L>  |  93<L>  |  70<H>  ----------------------------<  93  4.9   |  26  |  2.15<H>    Ca    9.0      21 Feb 2020 06:22      PT/INR - ( 21 Feb 2020 11:12 )   PT: 13.6 sec;   INR: 1.19 ratio         PTT - ( 21 Feb 2020 11:12 )  PTT:30.4 sec

## 2020-02-21 NOTE — PROGRESS NOTE ADULT - SUBJECTIVE AND OBJECTIVE BOX
CHIEF COMPLAINT: f/up sob, resp failure, pleural effusions, atelectasis-pain still present in chest-some sob but better    Interval Events: pig tail removed from left chest    REVIEW OF SYSTEMS:  Constitutional: No fevers or chills. No weight loss. + fatigue or generalized malaise.  Eyes: No itching or discharge from the eyes  ENT: No ear pain. No ear discharge. No nasal congestion. No post nasal drip. No epistaxis. No throat pain. No sore throat. No difficulty swallowing.   CV: No chest pain. No palpitations. No lightheadedness or dizziness.   Resp: No dyspnea at rest. + dyspnea on exertion. No orthopnea. No wheezing. No cough. No stridor. No sputum production. + chest pain with respiration.  GI: No nausea. No vomiting. No diarrhea.  MSK: No joint pain or pain in any extremities  Integumentary: No skin lesions. + leg/ pedal edema.  Neurological: + gross motor weakness. No sensory changes.  [ +] All other systems negative  [ ] Unable to assess ROS because ________    OBJECTIVE:  ICU Vital Signs Last 24 Hrs  T(C): 36.7 (20 Feb 2020 20:42), Max: 36.8 (20 Feb 2020 13:00)  T(F): 98.1 (20 Feb 2020 20:42), Max: 98.3 (20 Feb 2020 13:00)  HR: 85 (20 Feb 2020 20:42) (85 - 87)  BP: 58/80 (20 Feb 2020 20:42) (58/80 - 144/78)  BP(mean): --  ABP: --  ABP(mean): --  RR: 18 (20 Feb 2020 20:42) (18 - 20)  SpO2: 97% (20 Feb 2020 20:42) (94% - 97%)        02-19 @ 07:01  -  02-20 @ 07:00  --------------------------------------------------------  IN: 720 mL / OUT: 2860 mL / NET: -2140 mL    02-20 @ 07:01  -  02-21 @ 05:27  --------------------------------------------------------  IN: 480 mL / OUT: 650 mL / NET: -170 mL      CAPILLARY BLOOD GLUCOSE      POCT Blood Glucose.: 135 mg/dL (20 Feb 2020 21:44)      PHYSICAL EXAM: NAD in bed on RA  General: Awake, alert, oriented X 3.   HEENT: Atraumatic, normocephalic.                 Mallampatti Grade 3                No nasal congestion.                No tonsillar or pharyngeal exudates.  Lymph Nodes: No palpable lymphadenopathy  Neck: No JVD. No carotid bruit.   Respiratory: abnormal chest expansion-reduced BS bases                         Normal percussion                         abnormal and equal air entry                         No wheeze, rhonchi or rales.  Cardiovascular: S1 S2 normal. No murmurs, rubs or gallops.   Abdomen: Soft, non-tender, non-distended. No organomegaly. Normoactive bowel sounds.  Extremities: Warm to touch. Peripheral pulse palpable. + leg/ pedal edema.   Skin: No rashes or skin lesions  Neurological: Motor and sensory examination equal and normal in all four extremities.  Psychiatry: Appropriate mood and affect.    HOSPITAL MEDICATIONS:  MEDICATIONS  (STANDING):  ALBUTerol    90 MICROgram(s) HFA Inhaler 1 Puff(s) Inhalation every 4 hours  albuterol/ipratropium for Nebulization 3 milliLiter(s) Nebulizer every 6 hours  aMIOdarone    Tablet 200 milliGRAM(s) Oral daily  aMIOdarone    Tablet   Oral   artificial tears (preservative free) Ophthalmic Solution 1 Drop(s) Both EYES two times a day  aspirin enteric coated 81 milliGRAM(s) Oral daily  atorvastatin 40 milliGRAM(s) Oral at bedtime  BACItracin   Ointment 1 Application(s) Topical two times a day  buDESOnide    Inhalation Suspension 0.5 milliGRAM(s) Inhalation two times a day  buMETAnide Injectable 1 milliGRAM(s) IV Push every 8 hours  calcium acetate 667 milliGRAM(s) Oral three times a day with meals  dextrose 5%. 1000 milliLiter(s) (50 mL/Hr) IV Continuous <Continuous>  dextrose 50% Injectable 12.5 Gram(s) IV Push once  dextrose 50% Injectable 25 Gram(s) IV Push once  dextrose 50% Injectable 25 Gram(s) IV Push once  heparin  Injectable 5000 Unit(s) SubCutaneous every 8 hours  hydrALAZINE 50 milliGRAM(s) Oral every 8 hours  insulin glargine Injectable (LANTUS) 60 Unit(s) SubCutaneous at bedtime  insulin lispro (HumaLOG) corrective regimen sliding scale   SubCutaneous three times a day before meals  insulin lispro (HumaLOG) corrective regimen sliding scale   SubCutaneous at bedtime  insulin lispro Injectable (HumaLOG) 23 Unit(s) SubCutaneous three times a day with meals  melatonin 3 milliGRAM(s) Oral at bedtime  metoprolol tartrate 25 milliGRAM(s) Oral every 12 hours  multivitamin 1 Tablet(s) Oral daily  pantoprazole    Tablet 40 milliGRAM(s) Oral before breakfast  polyethylene glycol 3350 17 Gram(s) Oral daily  tamsulosin 0.4 milliGRAM(s) Oral at bedtime  tiotropium 18 MICROgram(s) Capsule 1 Capsule(s) Inhalation daily    MEDICATIONS  (PRN):  albuterol/ipratropium for Nebulization. 3 milliLiter(s) Nebulizer every 6 hours PRN Shortness of Breath and/or Wheezing  dextrose 40% Gel 15 Gram(s) Oral once PRN Blood Glucose LESS THAN 70 milliGRAM(s)/deciliter  glucagon  Injectable 1 milliGRAM(s) IntraMuscular once PRN Glucose LESS THAN 70 milligrams/deciliter  oxycodone    5 mG/acetaminophen 325 mG 1 Tablet(s) Oral every 6 hours PRN Moderate Pain (4 - 6)  sodium chloride 0.9% lock flush 10 milliLiter(s) IV Push every 1 hour PRN Pre/post blood products, medications, blood draw, and to maintain line patency      LABS:                        8.9    10.52 )-----------( 354      ( 20 Feb 2020 05:38 )             28.2     02-20    131<L>  |  92<L>  |  73<H>  ----------------------------<  158<H>  4.4   |  25  |  1.97<H>    Ca    8.6      20 Feb 2020 05:38                MICROBIOLOGY:     RADIOLOGY: < from: CT Chest No Cont (02.20.20 @ 11:05) >  AIRWAYS, LUNGS, PLEURA:   When compared with February 9, 2020, again seen is a leftpleural effusion. However, the dependent component appears smaller than on previous exam. Several loculated components are now identified along the left lateral chest wall and along the paramediastinal surface. The largest is noted medially on series3 image 78 and measures approximately 9 x 4 cm.    Apparent nodule along the mediastinal pleural surface in the left apex without significant change from previous exam. This is seen on series 3 image 37 and measures 2.3 x 1.4 cm.    Respiratory motion is present. Patchy areas of consolidation in the right lung appear worse than on previous examination.    MEDIASTINUM, LYMPH NODES: No lymphadenopathy. Small amount of fluid and edema within the retrosternal mediastinal fat which is likely due to prior surgery.    HEART AND VASCULATURE: The heart is mildly enlarged. Small pericardial effusion. The aorta and pulmonary artery are normal in size. Mild coronary calcifications.    UPPER ABDOMEN: Nodular liver.    BONES AND SOFT TISSUES: Multilevel degenerative changes of the spine. Edema in the anterior midline supraclavicular soft tissues which may be due to recent surgery.    LOWER NECK: Redemonstrated calcifications within the thyroid gland.      IMPRESSION:     When compared with February 9, 2020, again seen is a left pleural effusion. However, the dependent component appears smaller than on previous exam. Several loculated components are now identified along the left lateral chest wall and along the paramediastinal surface. The largest is noted medially and measures approximately 9 x 4 cm.    Apparent nodule along the mediastinal pleural surface in the left apex without significant change from previous exam. This is seen on series 3 image 37 and measures 2.3 x 1.4 cm.    Respiratory motion is present. Patchy areas of consolidation in the right lung appear worse than on previous examination.    Postsurgical changes as above.      [ ] Reviewed and interpreted by me    Point of Care Ultrasound Findings:    PFT:    EKG:

## 2020-02-21 NOTE — PROGRESS NOTE ADULT - ASSESSMENT
intraop kennedy 2/3/20 ef 50%, nl lv, mild diastolic dysfx stage 1  limited echo 2/4/20: nl LV sys fx , no pericardial effusion     a/p  64 year old man with history of HTN, DM II, admitted with progressive exertional angina, s/p cath with severe triple vessel disease, s/p CABG, post op course c/b brief witnessed PEA likely hypoxic arrest, s/p intubation.     1. CAD, s/p cath with severe triple vessel disease including lesions at the bifurcation of the LAD/diagonal and distal RCA/RPDA/RPL trifurcation.   -s/p CABG x 4, intraop kennedy ef 50%  -cont asa, statin  - bb d/c ??? resume per cts   -s/p PEA arrest, now extubated  -off vasopressors  -LE dopplers, negative for dvt   -repeat echo 2/15 with preserved lv fxn/EF    2. Postop acute diastolic HF  -remains overloaded.    - chest xray noted, Bumex dc per CTS and to be started on torsemide  20mg po daily    3. PAF  -cont amio  -  bb d/c ??? resume per cts   -AC per CTS    4. HTN  -bp stable on hydralazine/bb    5. STEPHANIE/CKD  renal f/u     6. Postop Pleural effusion  - S/P Right chest tube:  removed   - s/p L chest tube removal , mgmt per CTS  - d-dimer elevated/  CT chest  noted; Pulm f/u  - pending LE dopplers     7. Sepsis -E. Faecalis bacteremia  IV abx per CTICU, repeat bcx 2/13 neg  ID following      dvt ppx intraop kennedy 2/3/20 ef 50%, nl lv, mild diastolic dysfx stage 1  limited echo 2/4/20: nl LV sys fx , no pericardial effusion     a/p  64 year old man with history of HTN, DM II, admitted with progressive exertional angina, s/p cath with severe triple vessel disease, s/p CABG, post op course c/b brief witnessed PEA likely hypoxic arrest, s/p intubation.     1. CAD, s/p cath with severe triple vessel disease including lesions at the bifurcation of the LAD/diagonal and distal RCA/RPDA/RPL trifurcation.   -s/p CABG x 4, intraop kennedy ef 50%  -cont asa, statin  - bb d/c ??? resume per cts   -s/p PEA arrest, now extubated  -off vasopressors  -LE dopplers, negative for dvt   -repeat echo 2/15 with preserved lv fxn/EF    2. Postop acute diastolic HF  - CT chest noted, remains overloaded on exam. c/o PALMA   -Bumex IV dc per CTS and to be started on torsemide  20mg po daily    3. PAF  -cont amio  - bb d/c ??? resume per cts   -AC per CTS    4. HTN  -bp stable on hydralazine     5. STEPHANIE/CKD  renal f/u     6. Postop Pleural effusion  - S/P Right chest tube:  removed   - s/p L chest tube removal , mgmt per CTS  - d-dimer elevated/  CT chest  noted; Pulm f/u  - pending LE dopplers     7. Sepsis -E. Faecalis bacteremia  IV abx per CTICU, repeat bcx 2/13 neg  ID following      dvt ppx intraop kennedy 2/3/20 ef 50%, nl lv, mild diastolic dysfx stage 1  limited echo 2/4/20: nl LV sys fx , no pericardial effusion     a/p  64 year old man with history of HTN, DM II, admitted with progressive exertional angina, s/p cath with severe triple vessel disease, s/p CABG, post op course c/b brief witnessed PEA likely hypoxic arrest, s/p intubation.     1. CAD, s/p cath with severe triple vessel disease including lesions at the bifurcation of the LAD/diagonal and distal RCA/RPDA/RPL trifurcation.   -s/p CABG x 4, intraop kennedy ef 50%  -cont asa, statin  -bb d/c ??? resume per cts   -s/p PEA arrest, now extubated  -off vasopressors  -LE dopplers, negative for dvt   -repeat echo 2/15 with preserved lv fxn/EF    2. Postop acute diastolic HF  - CT chest noted, remains overloaded on exam. c/o PALMA   -Bumex IV dc per CTS and to be started on torsemide  20mg po daily    3. PAF  -cont amio  - bb d/c ??? resume per cts   -AC per CTS    4. HTN  -bp stable on hydralazine     5. STEPHANIE/CKD  renal f/u     6. Postop Pleural effusion  - S/P Right chest tube:  removed   - s/p L chest tube removal , mgmt per CTS  - d-dimer elevated/  CT chest  noted; Pulm f/u  - pending LE dopplers     7. Sepsis -E. Faecalis bacteremia  IV abx per CTICU, repeat bcx 2/13 neg  ID following      dvt ppx

## 2020-02-21 NOTE — PROGRESS NOTE ADULT - SUBJECTIVE AND OBJECTIVE BOX
VITAL SIGNS-Telemetry:    Vital Signs Last 24 Hrs  T(C): 36.3 (20 @ 05:47), Max: 36.8 (20 @ 13:00)  T(F): 97.3 (20 @ 05:47), Max: 98.3 (20 @ 13:00)  HR: 83 (20 @ 10:41) (83 - 88)  BP: 119/72 (20 @ 10:41) (58/80 - 144/78)  RR: 20 (20 @ 10:41) (18 - 20)  SpO2: 97% (20 @ 10:41) (95% - 97%)          @ 07:01  -   @ 07:00  --------------------------------------------------------  IN: 480 mL / OUT: 650 mL / NET: -170 mL     @ 07:01  -   @ 12:21  --------------------------------------------------------  IN: 240 mL / OUT: 800 mL / NET: -560 mL        Daily     Daily Weight in k (2020 07:14)    CAPILLARY BLOOD GLUCOSE      POCT Blood Glucose.: 190 mg/dL (2020 11:42)  POCT Blood Glucose.: 88 mg/dL (2020 07:44)  POCT Blood Glucose.: 135 mg/dL (2020 21:44)  POCT Blood Glucose.: 182 mg/dL (2020 16:45)         PHYSICAL EXAM:  Neurology: alert and oriented x 3, nonfocal, no gross deficits  CV : S1S2  Sternal Wound :  CDI , Stable  Lungs: diminshed BS bases  Abdomen: soft, nontender, nondistended, positive bowel sounds, last bowel movement    +bm     Extremities:     + edema b/l  , no calf tenderness . leg inc cdi -     ALBUTerol    90 MICROgram(s) HFA Inhaler 1 Puff(s) Inhalation every 4 hours  albuterol/ipratropium for Nebulization 3 milliLiter(s) Nebulizer every 6 hours  albuterol/ipratropium for Nebulization. 3 milliLiter(s) Nebulizer every 6 hours PRN  aMIOdarone    Tablet 200 milliGRAM(s) Oral daily  aMIOdarone    Tablet   Oral   artificial tears (preservative free) Ophthalmic Solution 1 Drop(s) Both EYES two times a day  aspirin enteric coated 81 milliGRAM(s) Oral daily  atorvastatin 40 milliGRAM(s) Oral at bedtime  BACItracin   Ointment 1 Application(s) Topical two times a day  buDESOnide    Inhalation Suspension 0.5 milliGRAM(s) Inhalation two times a day  buMETAnide Injectable 1 milliGRAM(s) IV Push every 8 hours  calcium acetate 667 milliGRAM(s) Oral three times a day with meals  dextrose 40% Gel 15 Gram(s) Oral once PRN  dextrose 5%. 1000 milliLiter(s) IV Continuous <Continuous>  dextrose 50% Injectable 12.5 Gram(s) IV Push once  dextrose 50% Injectable 25 Gram(s) IV Push once  dextrose 50% Injectable 25 Gram(s) IV Push once  glucagon  Injectable 1 milliGRAM(s) IntraMuscular once PRN  heparin  Injectable 5000 Unit(s) SubCutaneous every 8 hours  hydrALAZINE 50 milliGRAM(s) Oral every 8 hours  insulin glargine Injectable (LANTUS) 60 Unit(s) SubCutaneous at bedtime  insulin lispro (HumaLOG) corrective regimen sliding scale   SubCutaneous three times a day before meals  insulin lispro (HumaLOG) corrective regimen sliding scale   SubCutaneous at bedtime  insulin lispro Injectable (HumaLOG) 23 Unit(s) SubCutaneous three times a day with meals  melatonin 3 milliGRAM(s) Oral at bedtime  multivitamin 1 Tablet(s) Oral daily  oxycodone    5 mG/acetaminophen 325 mG 1 Tablet(s) Oral every 6 hours PRN  pantoprazole    Tablet 40 milliGRAM(s) Oral before breakfast  polyethylene glycol 3350 17 Gram(s) Oral daily  sodium chloride 0.9% lock flush 10 milliLiter(s) IV Push every 1 hour PRN  tamsulosin 0.4 milliGRAM(s) Oral at bedtime  tiotropium 18 MICROgram(s) Capsule 1 Capsule(s) Inhalation daily    Physical Therapy Rec:   Home  [  ]   Home w/ PT  [  ]  Rehab  [ x ]  Discussed with Cardiothoracic Team at AM rounds. VITAL SIGNS-Telemetry:  SR 88  Vital Signs Last 24 Hrs  T(C): 36.3 (20 @ 05:47), Max: 36.8 (20 @ 13:00)  T(F): 97.3 (20 @ 05:47), Max: 98.3 (20 @ 13:00)  HR: 83 (20 @ 10:41) (83 - 88)  BP: 119/72 (20 @ 10:41) (58/80 - 144/78)  RR: 20 (20 @ 10:41) (18 - 20)  SpO2: 97% (20 @ 10:41) (95% - 97%)          @ 07:01  -   @ 07:00  --------------------------------------------------------  IN: 480 mL / OUT: 650 mL / NET: -170 mL     @ 07:01  -   @ 12:21  --------------------------------------------------------  IN: 240 mL / OUT: 800 mL / NET: -560 mL        Daily     Daily Weight in k (2020 07:14)    CAPILLARY BLOOD GLUCOSE      POCT Blood Glucose.: 190 mg/dL (2020 11:42)  POCT Blood Glucose.: 88 mg/dL (2020 07:44)  POCT Blood Glucose.: 135 mg/dL (2020 21:44)  POCT Blood Glucose.: 182 mg/dL (2020 16:45)         PHYSICAL EXAM:  Neurology: alert and oriented x 3, nonfocal, no gross deficits  CV : S1S2  Sternal Wound :  CDI , Stable  Lungs: diminshed BS bases  Abdomen: soft, nontender, nondistended, positive bowel sounds, last bowel movement    +bm     Extremities:     + edema b/l  , no calf tenderness . RLE reddened - no drainage    ALBUTerol    90 MICROgram(s) HFA Inhaler 1 Puff(s) Inhalation every 4 hours  albuterol/ipratropium for Nebulization 3 milliLiter(s) Nebulizer every 6 hours  albuterol/ipratropium for Nebulization. 3 milliLiter(s) Nebulizer every 6 hours PRN  aMIOdarone    Tablet 200 milliGRAM(s) Oral daily  aMIOdarone    Tablet   Oral   artificial tears (preservative free) Ophthalmic Solution 1 Drop(s) Both EYES two times a day  aspirin enteric coated 81 milliGRAM(s) Oral daily  atorvastatin 40 milliGRAM(s) Oral at bedtime  BACItracin   Ointment 1 Application(s) Topical two times a day  buDESOnide    Inhalation Suspension 0.5 milliGRAM(s) Inhalation two times a day  buMETAnide Injectable 1 milliGRAM(s) IV Push every 8 hours  calcium acetate 667 milliGRAM(s) Oral three times a day with meals  dextrose 40% Gel 15 Gram(s) Oral once PRN  dextrose 5%. 1000 milliLiter(s) IV Continuous <Continuous>  dextrose 50% Injectable 12.5 Gram(s) IV Push once  dextrose 50% Injectable 25 Gram(s) IV Push once  dextrose 50% Injectable 25 Gram(s) IV Push once  glucagon  Injectable 1 milliGRAM(s) IntraMuscular once PRN  heparin  Injectable 5000 Unit(s) SubCutaneous every 8 hours  hydrALAZINE 50 milliGRAM(s) Oral every 8 hours  insulin glargine Injectable (LANTUS) 60 Unit(s) SubCutaneous at bedtime  insulin lispro (HumaLOG) corrective regimen sliding scale   SubCutaneous three times a day before meals  insulin lispro (HumaLOG) corrective regimen sliding scale   SubCutaneous at bedtime  insulin lispro Injectable (HumaLOG) 23 Unit(s) SubCutaneous three times a day with meals  melatonin 3 milliGRAM(s) Oral at bedtime  multivitamin 1 Tablet(s) Oral daily  oxycodone    5 mG/acetaminophen 325 mG 1 Tablet(s) Oral every 6 hours PRN  pantoprazole    Tablet 40 milliGRAM(s) Oral before breakfast  polyethylene glycol 3350 17 Gram(s) Oral daily  sodium chloride 0.9% lock flush 10 milliLiter(s) IV Push every 1 hour PRN  tamsulosin 0.4 milliGRAM(s) Oral at bedtime  tiotropium 18 MICROgram(s) Capsule 1 Capsule(s) Inhalation daily    Physical Therapy Rec:   Home  [  ]   Home w/ PT  [  ]  Rehab  [ x ]  Discussed with Cardiothoracic Team at AM rounds.

## 2020-02-21 NOTE — PROGRESS NOTE ADULT - SUBJECTIVE AND OBJECTIVE BOX
infectious diseases progress note:    Patient is a 64y old  Male who presents with a chief complaint of Chest pain (21 Feb 2020 13:50)        Acute ischemic heart disease             Allergies    No Known Allergies    Intolerances        ANTIBIOTICS/RELEVANT:  antimicrobials    immunologic:    OTHER:  ALBUTerol    90 MICROgram(s) HFA Inhaler 1 Puff(s) Inhalation every 4 hours  albuterol/ipratropium for Nebulization 3 milliLiter(s) Nebulizer every 6 hours  albuterol/ipratropium for Nebulization. 3 milliLiter(s) Nebulizer every 6 hours PRN  aMIOdarone    Tablet 200 milliGRAM(s) Oral daily  aMIOdarone    Tablet   Oral   artificial tears (preservative free) Ophthalmic Solution 1 Drop(s) Both EYES two times a day  aspirin enteric coated 81 milliGRAM(s) Oral daily  atorvastatin 40 milliGRAM(s) Oral at bedtime  BACItracin   Ointment 1 Application(s) Topical two times a day  buDESOnide    Inhalation Suspension 0.5 milliGRAM(s) Inhalation two times a day  calcium acetate 667 milliGRAM(s) Oral three times a day with meals  dextrose 40% Gel 15 Gram(s) Oral once PRN  dextrose 5%. 1000 milliLiter(s) IV Continuous <Continuous>  dextrose 50% Injectable 12.5 Gram(s) IV Push once  dextrose 50% Injectable 25 Gram(s) IV Push once  dextrose 50% Injectable 25 Gram(s) IV Push once  glucagon  Injectable 1 milliGRAM(s) IntraMuscular once PRN  heparin  Injectable 5000 Unit(s) SubCutaneous every 8 hours  hydrALAZINE 50 milliGRAM(s) Oral every 8 hours  insulin glargine Injectable (LANTUS) 60 Unit(s) SubCutaneous at bedtime  insulin lispro (HumaLOG) corrective regimen sliding scale   SubCutaneous three times a day before meals  insulin lispro (HumaLOG) corrective regimen sliding scale   SubCutaneous at bedtime  insulin lispro Injectable (HumaLOG) 23 Unit(s) SubCutaneous three times a day with meals  melatonin 3 milliGRAM(s) Oral at bedtime  multivitamin 1 Tablet(s) Oral daily  oxycodone    5 mG/acetaminophen 325 mG 1 Tablet(s) Oral every 6 hours PRN  pantoprazole    Tablet 40 milliGRAM(s) Oral before breakfast  polyethylene glycol 3350 17 Gram(s) Oral daily  sodium chloride 0.9% lock flush 10 milliLiter(s) IV Push every 1 hour PRN  tamsulosin 0.4 milliGRAM(s) Oral at bedtime  tiotropium 18 MICROgram(s) Capsule 1 Capsule(s) Inhalation daily      Objective:  Vital Signs Last 24 Hrs  T(C): 36.7 (21 Feb 2020 12:44), Max: 36.7 (20 Feb 2020 20:42)  T(F): 98.1 (21 Feb 2020 12:44), Max: 98.1 (20 Feb 2020 20:42)  HR: 85 (21 Feb 2020 12:44) (83 - 88)  BP: 127/64 (21 Feb 2020 12:44) (58/80 - 142/73)  BP(mean): --  RR: 18 (21 Feb 2020 12:44) (18 - 20)  SpO2: 97% (21 Feb 2020 12:44) (95% - 97%)       Eyes:DANIELA, EOMI  Ear/Nose/Throat: no oral lesion, no sinus tenderness on percussion	  Neck:no JVD, no lymphadenopathy, supple  Respiratory: CTA lanre  Cardiovascular: S1S2 RRR, no murmurs wd with small dehs no signs of infection   Gastrointestinal:soft, (+) BS, no HSM  Extremities:no e/e/c        LABS:                        9.1    11.90 )-----------( 367      ( 21 Feb 2020 06:22 )             29.5     02-21    131<L>  |  93<L>  |  70<H>  ----------------------------<  93  4.9   |  26  |  2.15<H>    Ca    9.0      21 Feb 2020 06:22      PT/INR - ( 21 Feb 2020 11:12 )   PT: 13.6 sec;   INR: 1.19 ratio         PTT - ( 21 Feb 2020 11:12 )  PTT:30.4 sec        MICROBIOLOGY:    RECENT CULTURES:  02-19 @ 15:07 .Blood Blood                No growth to date.          RESPIRATORY CULTURES:              RADIOLOGY & ADDITIONAL STUDIES:        Pager [phone]  After 5 pm/weekends or if no response :[phone]

## 2020-02-21 NOTE — PROGRESS NOTE ADULT - ASSESSMENT
Assessment  DMT2: 64y Male with DM T2 with hyperglycemia, A1C 9.2%, was on oral meds and insulin at home, now on basal bolus insulin, decreased Humalog dose yesterday s/p mild hypoglycemic episode. Blood sugars fluctuating within overall acceptable range, no new hypoglycemic episodes. Patient is eating meals with inconsistent carb intake, appears comfortable, family at the bedside. Planning DC to rehab once medically cleared.  CAD: s/p CABG 2/3, on medications, no chest pain, stable, monitored.  HTN: Controlled,  on antihypertensive medications.  HLD: Controlled, on statin.  CKD: Monitor labs/BMP          Harper Matias MD  Cell: [phone]  Office: [phone] Assessment  DMT2: 64y Male with DM T2 with hyperglycemia, A1C 9.2%, was on oral meds and insulin at home, now on basal bolus insulin, decreased Humalog dose yesterday s/p mild hypoglycemic episode. Blood sugars fluctuating within  overall acceptable range, no new hypoglycemic episodes. Patient is eating meals with inconsistent carb intake, appears comfortable, family at the bedside. Planning DC to rehab once medically cleared.  CAD: s/p CABG 2/3, on medications, no chest pain, stable, monitored.  HTN: Controlled,  on antihypertensive medications.  HLD: Controlled, on statin.  CKD: Monitor labs/BMP          Harper Matias MD  Cell: [phone]  Office: [phone]

## 2020-02-21 NOTE — PROGRESS NOTE ADULT - SUBJECTIVE AND OBJECTIVE BOX
Chief complaint  Patient is a 64y old  Male who presents with a chief complaint of Chest pain (21 Feb 2020 13:28)   Review of systems  Patient sitting up in chair, looks comfortable, no hypoglycemia. Family at bedside.    Labs and Fingersticks  CAPILLARY BLOOD GLUCOSE      POCT Blood Glucose.: 190 mg/dL (21 Feb 2020 11:42)  POCT Blood Glucose.: 88 mg/dL (21 Feb 2020 07:44)  POCT Blood Glucose.: 135 mg/dL (20 Feb 2020 21:44)  POCT Blood Glucose.: 182 mg/dL (20 Feb 2020 16:45)      Anion Gap, Serum: 12 (02-21 @ 06:22)  Anion Gap, Serum: 14 (02-20 @ 05:38)      Calcium, Total Serum: 9.0 (02-21 @ 06:22)  Calcium, Total Serum: 8.6 (02-20 @ 05:38)          02-21    131<L>  |  93<L>  |  70<H>  ----------------------------<  93  4.9   |  26  |  2.15<H>    Ca    9.0      21 Feb 2020 06:22                          9.1    11.90 )-----------( 367      ( 21 Feb 2020 06:22 )             29.5     Medications  MEDICATIONS  (STANDING):  ALBUTerol    90 MICROgram(s) HFA Inhaler 1 Puff(s) Inhalation every 4 hours  albuterol/ipratropium for Nebulization 3 milliLiter(s) Nebulizer every 6 hours  aMIOdarone    Tablet 200 milliGRAM(s) Oral daily  aMIOdarone    Tablet   Oral   artificial tears (preservative free) Ophthalmic Solution 1 Drop(s) Both EYES two times a day  aspirin enteric coated 81 milliGRAM(s) Oral daily  atorvastatin 40 milliGRAM(s) Oral at bedtime  BACItracin   Ointment 1 Application(s) Topical two times a day  buDESOnide    Inhalation Suspension 0.5 milliGRAM(s) Inhalation two times a day  calcium acetate 667 milliGRAM(s) Oral three times a day with meals  dextrose 5%. 1000 milliLiter(s) (50 mL/Hr) IV Continuous <Continuous>  dextrose 50% Injectable 12.5 Gram(s) IV Push once  dextrose 50% Injectable 25 Gram(s) IV Push once  dextrose 50% Injectable 25 Gram(s) IV Push once  heparin  Injectable 5000 Unit(s) SubCutaneous every 8 hours  hydrALAZINE 50 milliGRAM(s) Oral every 8 hours  insulin glargine Injectable (LANTUS) 60 Unit(s) SubCutaneous at bedtime  insulin lispro (HumaLOG) corrective regimen sliding scale   SubCutaneous three times a day before meals  insulin lispro (HumaLOG) corrective regimen sliding scale   SubCutaneous at bedtime  insulin lispro Injectable (HumaLOG) 23 Unit(s) SubCutaneous three times a day with meals  melatonin 3 milliGRAM(s) Oral at bedtime  multivitamin 1 Tablet(s) Oral daily  pantoprazole    Tablet 40 milliGRAM(s) Oral before breakfast  polyethylene glycol 3350 17 Gram(s) Oral daily  tamsulosin 0.4 milliGRAM(s) Oral at bedtime  tiotropium 18 MICROgram(s) Capsule 1 Capsule(s) Inhalation daily      Physical Exam  General: Patient comfortable in bed  Vital Signs Last 12 Hrs  T(F): 98.1 (02-21-20 @ 12:44), Max: 98.1 (02-21-20 @ 12:44)  HR: 85 (02-21-20 @ 12:44) (83 - 88)  BP: 127/64 (02-21-20 @ 12:44) (119/72 - 134/74)  BP(mean): --  RR: 18 (02-21-20 @ 12:44) (18 - 20)  SpO2: 97% (02-21-20 @ 12:44) (95% - 97%)  Neck: No palpable thyroid nodules.  CVS: S1S2, No murmurs  Respiratory: No wheezing, no crepitations  GI: Abdomen soft, bowel sounds positive  Musculoskeletal:  edema lower extremities.   Skin: No skin rashes, no ecchymosis    Diagnostics Chief complaint  Patient is a 64y old  Male who presents with a  chief complaint of Chest pain (21 Feb 2020 13:28)   Review of systems  Patient sitting up in chair, looks comfortable, no hypoglycemia. Family at bedside.    Labs and Fingersticks  CAPILLARY BLOOD GLUCOSE      POCT Blood Glucose.: 190 mg/dL (21 Feb 2020 11:42)  POCT Blood Glucose.: 88 mg/dL (21 Feb 2020 07:44)  POCT Blood Glucose.: 135 mg/dL (20 Feb 2020 21:44)  POCT Blood Glucose.: 182 mg/dL (20 Feb 2020 16:45)      Anion Gap, Serum: 12 (02-21 @ 06:22)  Anion Gap, Serum: 14 (02-20 @ 05:38)      Calcium, Total Serum: 9.0 (02-21 @ 06:22)  Calcium, Total Serum: 8.6 (02-20 @ 05:38)          02-21    131<L>  |  93<L>  |  70<H>  ----------------------------<  93  4.9   |  26  |  2.15<H>    Ca    9.0      21 Feb 2020 06:22                          9.1    11.90 )-----------( 367      ( 21 Feb 2020 06:22 )             29.5     Medications  MEDICATIONS  (STANDING):  ALBUTerol    90 MICROgram(s) HFA Inhaler 1 Puff(s) Inhalation every 4 hours  albuterol/ipratropium for Nebulization 3 milliLiter(s) Nebulizer every 6 hours  aMIOdarone    Tablet 200 milliGRAM(s) Oral daily  aMIOdarone    Tablet   Oral   artificial tears (preservative free) Ophthalmic Solution 1 Drop(s) Both EYES two times a day  aspirin enteric coated 81 milliGRAM(s) Oral daily  atorvastatin 40 milliGRAM(s) Oral at bedtime  BACItracin   Ointment 1 Application(s) Topical two times a day  buDESOnide    Inhalation Suspension 0.5 milliGRAM(s) Inhalation two times a day  calcium acetate 667 milliGRAM(s) Oral three times a day with meals  dextrose 5%. 1000 milliLiter(s) (50 mL/Hr) IV Continuous <Continuous>  dextrose 50% Injectable 12.5 Gram(s) IV Push once  dextrose 50% Injectable 25 Gram(s) IV Push once  dextrose 50% Injectable 25 Gram(s) IV Push once  heparin  Injectable 5000 Unit(s) SubCutaneous every 8 hours  hydrALAZINE 50 milliGRAM(s) Oral every 8 hours  insulin glargine Injectable (LANTUS) 60 Unit(s) SubCutaneous at bedtime  insulin lispro (HumaLOG) corrective regimen sliding scale   SubCutaneous three times a day before meals  insulin lispro (HumaLOG) corrective regimen sliding scale   SubCutaneous at bedtime  insulin lispro Injectable (HumaLOG) 23 Unit(s) SubCutaneous three times a day with meals  melatonin 3 milliGRAM(s) Oral at bedtime  multivitamin 1 Tablet(s) Oral daily  pantoprazole    Tablet 40 milliGRAM(s) Oral before breakfast  polyethylene glycol 3350 17 Gram(s) Oral daily  tamsulosin 0.4 milliGRAM(s) Oral at bedtime  tiotropium 18 MICROgram(s) Capsule 1 Capsule(s) Inhalation daily      Physical Exam  General: Patient comfortable in bed  Vital Signs Last 12 Hrs  T(F): 98.1 (02-21-20 @ 12:44), Max: 98.1 (02-21-20 @ 12:44)  HR: 85 (02-21-20 @ 12:44) (83 - 88)  BP: 127/64 (02-21-20 @ 12:44) (119/72 - 134/74)  BP(mean): --  RR: 18 (02-21-20 @ 12:44) (18 - 20)  SpO2: 97% (02-21-20 @ 12:44) (95% - 97%)  Neck: No palpable thyroid nodules.  CVS: S1S2, No murmurs  Respiratory: No wheezing, no crepitations  GI: Abdomen soft, bowel sounds positive  Musculoskeletal:  edema lower extremities.   Skin: No skin rashes, no ecchymosis    Diagnostics

## 2020-02-21 NOTE — CHART NOTE - NSCHARTNOTEFT_GEN_A_CORE
pt has reddens around the lower wd  could be early cellulitis  I am reluctant to start vanco with renal failure  will give dapto and observe

## 2020-02-21 NOTE — PROGRESS NOTE ADULT - PROBLEM SELECTOR PLAN 1
multifactorial-CAD/CHF, atelectasis due to pain, pleural effusion, bronchospasm, ? PE--O2 NC sat above 90%  doppler of LE and D-dimer-if elevated the VQ scan (RI).

## 2020-02-21 NOTE — PROGRESS NOTE ADULT - PROBLEM SELECTOR PLAN 6
ID consulted   no antibiotics warranted for lungs but dapto initiated for RLE cellulitis  wbc 11 afebrile

## 2020-02-21 NOTE — PROGRESS NOTE ADULT - PROBLEM SELECTOR PLAN 4
Maintain L pigtail until output minimal  CXR am  non contrast Chest CT: multiple loculated left effusions & patchy consolidation right  id consult called - Dr. Carrasco  for possible tap in IR today.

## 2020-02-21 NOTE — PROGRESS NOTE ADULT - ASSESSMENT
63yo male with hx of HTN, DM II   s/p C4L on 2/3; PEA arrest on 2/6   + enterococcus Bld  C/S > started ampicillin q8h, ID consulted,  R pigtail for effusion  2/8    Extubated  2/9  2/15 L pigtail effusion  2/17 PRBC   2/18 Tx 2 Diaz  2/19 thomas removed, trial void. Maintain left pigtail for significant output. Pt encouraged to ambulate. Supplemental o2 sat NC weaned to 2L. Blood cultures repeated.  2/20 VSS -Pulmonary consult - appreciated - duonebs ATC q6h & pulmicort bid initiated - ddimer drawn.  pt w/ hx negative LE dopplers   will order non con chest DT as pt has a loculated left effusion that will require tap at IR.  2/21 - NON con Chest CT done - multiple loculated Left pleural effusions w/ patchy consolidation R - rounds made w/ Dr. carl.  ID - consult called re: Ct - WBC 11 afebrile.  +PALMA.  unable to tolerate VQ scan this am.  will d/c bumex & start Torsemide 20mg po daily  d/c planning rehab when medically stable

## 2020-02-21 NOTE — PROGRESS NOTE ADULT - SUBJECTIVE AND OBJECTIVE BOX
[facility] DIVISION OF KIDNEY DISEASES AND HYPERTENSION -- FOLLOW UP NOTE  --------------------------------------------------------------------------------  HPI: 65 yo M with HTN, DM II (20 years), admitted with complaints of acute on chronic left sided exertional chest pain. Nephrology team is following for elevated serum creatinine and pre-cardiac catheterization assessment. [facility] available. On admission (1/25/2020), Scr was 1.85. Patient went for cardiac cath on 1/29/20 which revealed triple vessel disease. Scr had improved to 1.76 on 2/3/20. Pt. underwent CABG on 2/3/20. Scr now increased after CABG and PEA arrest on 2/6/20, had improved to ~2.5. Scr over past few days had improved down to 2.09 on 2/17/20. Scr improved at 1.97 yesterday, slightly worse today at 2.15.    Pt. seen and examined at bedside this AM. Pt. on diuretics, non oliguric however unable to quantify (episodes of incontinence).    PAST HISTORY  --------------------------------------------------------------------------------  No significant changes to PMH, PSH, FHx, SHx, unless otherwise noted    ALLERGIES & MEDICATIONS  --------------------------------------------------------------------------------  Allergies    No Known Allergies    Intolerances      Standing Inpatient Medications  ALBUTerol    90 MICROgram(s) HFA Inhaler 1 Puff(s) Inhalation every 4 hours  albuterol/ipratropium for Nebulization 3 milliLiter(s) Nebulizer every 6 hours  aMIOdarone    Tablet 200 milliGRAM(s) Oral daily  aMIOdarone    Tablet   Oral   artificial tears (preservative free) Ophthalmic Solution 1 Drop(s) Both EYES two times a day  aspirin enteric coated 81 milliGRAM(s) Oral daily  atorvastatin 40 milliGRAM(s) Oral at bedtime  BACItracin   Ointment 1 Application(s) Topical two times a day  buDESOnide    Inhalation Suspension 0.5 milliGRAM(s) Inhalation two times a day  buMETAnide Injectable 1 milliGRAM(s) IV Push every 8 hours  calcium acetate 667 milliGRAM(s) Oral three times a day with meals  dextrose 5%. 1000 milliLiter(s) IV Continuous <Continuous>  dextrose 50% Injectable 12.5 Gram(s) IV Push once  dextrose 50% Injectable 25 Gram(s) IV Push once  dextrose 50% Injectable 25 Gram(s) IV Push once  heparin  Injectable 5000 Unit(s) SubCutaneous every 8 hours  hydrALAZINE 50 milliGRAM(s) Oral every 8 hours  insulin glargine Injectable (LANTUS) 60 Unit(s) SubCutaneous at bedtime  insulin lispro (HumaLOG) corrective regimen sliding scale   SubCutaneous three times a day before meals  insulin lispro (HumaLOG) corrective regimen sliding scale   SubCutaneous at bedtime  insulin lispro Injectable (HumaLOG) 23 Unit(s) SubCutaneous three times a day with meals  melatonin 3 milliGRAM(s) Oral at bedtime  multivitamin 1 Tablet(s) Oral daily  pantoprazole    Tablet 40 milliGRAM(s) Oral before breakfast  polyethylene glycol 3350 17 Gram(s) Oral daily  tamsulosin 0.4 milliGRAM(s) Oral at bedtime  tiotropium 18 MICROgram(s) Capsule 1 Capsule(s) Inhalation daily    REVIEW OF SYSTEMS  --------------------------------------------------------------------------------  Gen: + fatigue  Respiratory: No dyspnea  GI: No abdominal pain  MSK: + LE edema  Neuro: No dizziness  Heme: No bleeding    All other systems were reviewed and are negative, except as noted.    VITALS/PHYSICAL EXAM  --------------------------------------------------------------------------------  T(C): 36.3 (02-21-20 @ 05:47), Max: 36.8 (02-20-20 @ 13:00)  HR: 88 (02-21-20 @ 05:47) (85 - 88)  BP: 134/74 (02-21-20 @ 05:47) (58/80 - 144/78)  RR: 18 (02-21-20 @ 05:47) (18 - 18)  SpO2: 95% (02-21-20 @ 05:47) (95% - 97%)  Wt(kg): --    02-20-20 @ 07:01  -  02-21-20 @ 07:00  --------------------------------------------------------  IN: 480 mL / OUT: 650 mL / NET: -170 mL    02-21-20 @ 07:01  -  02-21-20 @ 09:54  --------------------------------------------------------  IN: 240 mL / OUT: 0 mL / NET: 240 mL    Physical Exam:  	Gen: awake  	HEENT: supple neck  	Pulm: Decreased bibasilar breath sounds  	CV: + central chest dressing from CABG C/D/I, S1S2  	Abd: Soft  	Ext: + pitting edema B/L (improved)    LABS/STUDIES  --------------------------------------------------------------------------------              9.1    11.90 >-----------<  367      [02-21-20 @ 06:22]              29.5     131  |  93  |  70  ----------------------------<  93      [02-21-20 @ 06:22]  4.9   |  26  |  2.15        Ca     9.0     [02-21-20 @ 06:22]    Creatinine Trend:  SCr 2.15 [02-21 @ 06:22]  SCr 1.97 [02-20 @ 05:38]  SCr 2.22 [02-19 @ 06:31]  SCr 2.29 [02-18 @ 01:13]  SCr 2.09 [02-17 @ 01:01] see HPI

## 2020-02-21 NOTE — PROGRESS NOTE ADULT - ATTENDING COMMENTS
Agree with above NP note.  HD stable  cont abx per id/cts  amio for paf  pe rule out in progress  care per cts  still appears hypervolemic, diuretics per cts

## 2020-02-21 NOTE — PROGRESS NOTE ADULT - ATTENDING COMMENTS
I have seen this patient with the fellow and agree with their assessment and plan. In addition,    # STEPHANIE  - He initially had acute tubular necrosis as a result of PEA arrest. He had worsening kidney function several days ago due to mc-venous congestion. Serum creatinine has reached a plateau. We will continue to monitor kidney function.    # CKD stage 3/4. Etiology secondary to diabetic nephropathy. Baseline serum creatinine 1.8-2.0.     # Edema - volume expanded. Continue IV bumetanide 2mg tid.     # Proteinuria - Likely from diabetic nephropathy. PLA2R negative. Serological workup negative thus far.     # Hyponatremia - likely from volume overload. Continue bumetanide.     # Medication toxicity Monitoring: medication dose reviewed. Since patient has acute kidney injury, please dose medications to CrCl<30    The rest of the recommendations as per fellow's note.    Maryam Dutta MD  Attending Nephrologist  [phone]

## 2020-02-21 NOTE — PROGRESS NOTE ADULT - ATTENDING COMMENTS
as above-  multifactorial dyspnea-CAD s/p CABG, PEA arrest, atelectasis due to pain, pleural effusion L-pig tail in place, bronchospasm, ?PE-O2 NC sat above 90%                     Pleural effusion-pig tail removed 2/20-CT chest NC-improved but still loculations on left-f/up 4-6 wks  CAD/CHF-diurese as cr allows-keep K/Mg above  atelectasis-pain control, incentive spirometry, acapella                    ? DVT/PE--repeat venous dopplers, D-dimer/BNP and VQ scan (elev CR)  bronchospasm-duoneb q 6, pulmicort .5 bid; out pt PFTs  snore-? osas--out pt SS  DVT/GI prophylaxis, PT, nutrition herman Hernandez MD-Pulmonary    [phone]

## 2020-02-21 NOTE — PROGRESS NOTE ADULT - PROBLEM SELECTOR PLAN 1
Will continue current insulin regimen for now. Will continue monitoring FS and FU.  Patient was on insulin at home, knows how to do insulin pen injections. Will continue monitoring and make recommendations prior to DC.  Patient and wife counseled not to bring food from home, discussed compliance with consistent low carb diet and exercise as tolerated outpatient.

## 2020-02-21 NOTE — PROGRESS NOTE ADULT - ASSESSMENT
64 yr old with cri stage 4, DM, PVD, admitted and had CABG, Post op intubated and has bilateral effusions, worsening renal disease and bc positive for E. faecalis   follow up bc negative to date.   no signs of sternal wd infection  no recent UTI  lines all new.  pt received about 10 day course fro his enterococcal bacteremia without combination therapy  I did not feel he had an intravascular infection   has been afebrile  wbc ok  clinically stable  with small are of dehis on serum  had ct which has been reviewed  Increased infiltrate on right  chest xray negative    I don't thin pt has pna at present    can hold off on ab and start cefepime if he changes discussed with Dr. Mariscal

## 2020-02-22 DIAGNOSIS — R09.02 HYPOXEMIA: ICD-10-CM

## 2020-02-22 LAB
ANION GAP SERPL CALC-SCNC: 14 MMOL/L — SIGNIFICANT CHANGE UP (ref 5–17)
APTT BLD: 31.4 SEC — SIGNIFICANT CHANGE UP (ref 27.5–36.3)
BUN SERPL-MCNC: 67 MG/DL — HIGH (ref 7–23)
CALCIUM SERPL-MCNC: 9.1 MG/DL — SIGNIFICANT CHANGE UP (ref 8.4–10.5)
CHLORIDE SERPL-SCNC: 93 MMOL/L — LOW (ref 96–108)
CO2 SERPL-SCNC: 25 MMOL/L — SIGNIFICANT CHANGE UP (ref 22–31)
CREAT SERPL-MCNC: 2.1 MG/DL — HIGH (ref 0.5–1.3)
GLUCOSE BLDC GLUCOMTR-MCNC: 133 MG/DL — HIGH (ref 70–99)
GLUCOSE BLDC GLUCOMTR-MCNC: 141 MG/DL — HIGH (ref 70–99)
GLUCOSE BLDC GLUCOMTR-MCNC: 162 MG/DL — HIGH (ref 70–99)
GLUCOSE BLDC GLUCOMTR-MCNC: 213 MG/DL — HIGH (ref 70–99)
GLUCOSE SERPL-MCNC: 99 MG/DL — SIGNIFICANT CHANGE UP (ref 70–99)
HCT VFR BLD CALC: 27.9 % — LOW (ref 39–50)
HGB BLD-MCNC: 9 G/DL — LOW (ref 13–17)
INR BLD: 1.18 RATIO — HIGH (ref 0.88–1.16)
MCHC RBC-ENTMCNC: 27.9 PG — SIGNIFICANT CHANGE UP (ref 27–34)
MCHC RBC-ENTMCNC: 32.3 GM/DL — SIGNIFICANT CHANGE UP (ref 32–36)
MCV RBC AUTO: 86.4 FL — SIGNIFICANT CHANGE UP (ref 80–100)
NRBC # BLD: 0 /100 WBCS — SIGNIFICANT CHANGE UP (ref 0–0)
PLATELET # BLD AUTO: 366 K/UL — SIGNIFICANT CHANGE UP (ref 150–400)
POTASSIUM SERPL-MCNC: 4.4 MMOL/L — SIGNIFICANT CHANGE UP (ref 3.5–5.3)
POTASSIUM SERPL-SCNC: 4.4 MMOL/L — SIGNIFICANT CHANGE UP (ref 3.5–5.3)
PROTHROM AB SERPL-ACNC: 13.5 SEC — HIGH (ref 10–12.9)
RBC # BLD: 3.23 M/UL — LOW (ref 4.2–5.8)
RBC # FLD: 14.2 % — SIGNIFICANT CHANGE UP (ref 10.3–14.5)
SODIUM SERPL-SCNC: 132 MMOL/L — LOW (ref 135–145)
WBC # BLD: 10.4 K/UL — SIGNIFICANT CHANGE UP (ref 3.8–10.5)
WBC # FLD AUTO: 10.4 K/UL — SIGNIFICANT CHANGE UP (ref 3.8–10.5)

## 2020-02-22 PROCEDURE — 99232 SBSQ HOSP IP/OBS MODERATE 35: CPT

## 2020-02-22 PROCEDURE — 93970 EXTREMITY STUDY: CPT | Mod: 26

## 2020-02-22 RX ORDER — INSULIN LISPRO 100/ML
22 VIAL (ML) SUBCUTANEOUS
Refills: 0 | Status: DISCONTINUED | OUTPATIENT
Start: 2020-02-22 | End: 2020-02-23

## 2020-02-22 RX ORDER — INSULIN GLARGINE 100 [IU]/ML
56 INJECTION, SOLUTION SUBCUTANEOUS AT BEDTIME
Refills: 0 | Status: DISCONTINUED | OUTPATIENT
Start: 2020-02-22 | End: 2020-02-23

## 2020-02-22 RX ADMIN — Medication 3 MILLIGRAM(S): at 22:03

## 2020-02-22 RX ADMIN — Medication 1 APPLICATION(S): at 06:05

## 2020-02-22 RX ADMIN — Medication 23 UNIT(S): at 12:27

## 2020-02-22 RX ADMIN — INSULIN GLARGINE 56 UNIT(S): 100 INJECTION, SOLUTION SUBCUTANEOUS at 22:02

## 2020-02-22 RX ADMIN — OXYCODONE AND ACETAMINOPHEN 1 TABLET(S): 5; 325 TABLET ORAL at 14:55

## 2020-02-22 RX ADMIN — Medication 1: at 12:27

## 2020-02-22 RX ADMIN — Medication 1 DROP(S): at 17:29

## 2020-02-22 RX ADMIN — ATORVASTATIN CALCIUM 40 MILLIGRAM(S): 80 TABLET, FILM COATED ORAL at 22:02

## 2020-02-22 RX ADMIN — Medication 50 MILLIGRAM(S): at 22:02

## 2020-02-22 RX ADMIN — DAPTOMYCIN 112 MILLIGRAM(S): 500 INJECTION, POWDER, LYOPHILIZED, FOR SOLUTION INTRAVENOUS at 18:27

## 2020-02-22 RX ADMIN — Medication 3 MILLILITER(S): at 17:29

## 2020-02-22 RX ADMIN — HEPARIN SODIUM 5000 UNIT(S): 5000 INJECTION INTRAVENOUS; SUBCUTANEOUS at 06:05

## 2020-02-22 RX ADMIN — Medication 667 MILLIGRAM(S): at 17:29

## 2020-02-22 RX ADMIN — Medication 3 MILLILITER(S): at 00:05

## 2020-02-22 RX ADMIN — Medication 667 MILLIGRAM(S): at 12:27

## 2020-02-22 RX ADMIN — HEPARIN SODIUM 5000 UNIT(S): 5000 INJECTION INTRAVENOUS; SUBCUTANEOUS at 13:55

## 2020-02-22 RX ADMIN — Medication 1 TABLET(S): at 12:27

## 2020-02-22 RX ADMIN — Medication 0.5 MILLIGRAM(S): at 05:42

## 2020-02-22 RX ADMIN — Medication 3 MILLILITER(S): at 12:27

## 2020-02-22 RX ADMIN — HEPARIN SODIUM 5000 UNIT(S): 5000 INJECTION INTRAVENOUS; SUBCUTANEOUS at 22:02

## 2020-02-22 RX ADMIN — Medication 22 UNIT(S): at 17:29

## 2020-02-22 RX ADMIN — Medication 3 MILLILITER(S): at 05:41

## 2020-02-22 RX ADMIN — Medication 667 MILLIGRAM(S): at 08:32

## 2020-02-22 RX ADMIN — Medication 20 MILLIGRAM(S): at 06:06

## 2020-02-22 RX ADMIN — AMIODARONE HYDROCHLORIDE 200 MILLIGRAM(S): 400 TABLET ORAL at 06:04

## 2020-02-22 RX ADMIN — Medication 0.5 MILLIGRAM(S): at 17:29

## 2020-02-22 RX ADMIN — TAMSULOSIN HYDROCHLORIDE 0.4 MILLIGRAM(S): 0.4 CAPSULE ORAL at 22:03

## 2020-02-22 RX ADMIN — Medication 50 MILLIGRAM(S): at 13:55

## 2020-02-22 RX ADMIN — Medication 23 UNIT(S): at 08:31

## 2020-02-22 RX ADMIN — Medication 1 DROP(S): at 06:05

## 2020-02-22 RX ADMIN — PANTOPRAZOLE SODIUM 40 MILLIGRAM(S): 20 TABLET, DELAYED RELEASE ORAL at 06:06

## 2020-02-22 RX ADMIN — Medication 81 MILLIGRAM(S): at 12:27

## 2020-02-22 RX ADMIN — Medication 50 MILLIGRAM(S): at 06:05

## 2020-02-22 RX ADMIN — OXYCODONE AND ACETAMINOPHEN 1 TABLET(S): 5; 325 TABLET ORAL at 13:55

## 2020-02-22 NOTE — PROGRESS NOTE ADULT - ASSESSMENT
65yo male with hx of HTN, DM II   s/p C4L on 2/3; PEA arrest on 2/6   + enterococcus Bld  C/S > started ampicillin q8h, ID consulted,  R pigtail for effusion  2/8    Extubated  2/9  2/15 L pigtail effusion  2/17 PRBC   2/18 Tx 2 Diaz  2/19 thomas removed, trial void. Maintain left pigtail for significant output. Pt encouraged to ambulate. Supplemental o2 sat NC weaned to 2L. Blood cultures repeated.  2/20 VSS -Pulmonary consult - appreciated - duonebs ATC q6h & pulmicort bid initiated - ddimer drawn.  pt w/ hx negative LE dopplers   will order non con chest DT as pt has a loculated left effusion that will require tap at IR.  2/21 - NON con Chest CT done - multiple loculated Left pleural effusions w/ patchy consolidation R - rounds made w/ Dr. carl.  ID - consult called re: Ct - WBC 11 afebrile.  +PALMA.  unable to tolerate VQ scan this am.  will d/c bumex & start Torsemide 20mg po daily  d/c planning rehab when medically stable

## 2020-02-22 NOTE — PROGRESS NOTE ADULT - SUBJECTIVE AND OBJECTIVE BOX
Subjective:  "I cannot breathe well, this never was like this before the surgery"  Pt OOB chair,  supplemental O2 in use  Instructed use incentive spirometry      Tele:  SR  70-90           V/S:                    T(F): 97.7 (20 @ 04:39), Max: 98.1 (20 @ 12:44)  HR: 91 (20 @ 04:39) (83 - 94)  BP: 137/76 (20 @ 04:39) (119/72 - 137/76)  RR: 18 (20 @ 04:39) (18 - 20)  SpO2: 96% (20 @ 04:39) (96% - 98%)  Wt(kg): --      LV EF:  50%      Labs:      132<L>  |  93<L>  |  67<H>  ----------------------------<  99  4.4   |  25  |  2.10<H>    Ca    9.1      2020 06:18                                 9.0    10.40 )-----------( 366      ( 2020 06:18 )             27.9        PT/INR - ( 2020 06:18 )   PT: 13.5 sec;   INR: 1.18 ratio         PTT - ( 2020 06:18 )  PTT:31.4 sec     CAPILLARY BLOOD GLUCOSE      POCT Blood Glucose.: 133 mg/dL (2020 07:10)  POCT Blood Glucose.: 155 mg/dL (2020 21:21)  POCT Blood Glucose.: 132 mg/dL (2020 16:46)  POCT Blood Glucose.: 190 mg/dL (2020 11:42)          I&O's Detail    2020 07:01  -  2020 07:00  --------------------------------------------------------  IN:    Oral Fluid: 780 mL  Total IN: 780 mL    OUT:    Voided: 1400 mL  Total OUT: 1400 mL    Total NET: -620 mL      2020 07:01  -  2020 10:27  --------------------------------------------------------  IN:    Oral Fluid: 480 mL  Total IN: 480 mL    OUT:  Total OUT: 0 mL    Total NET: 480 mL          BOWEL MOVEMENT:  [ x] YES [ ] NO      Daily     Daily Weight in k.1 (2020 08:00)  Medications:  ALBUTerol    90 MICROgram(s) HFA Inhaler 1 Puff(s) Inhalation every 4 hours  albuterol/ipratropium for Nebulization 3 milliLiter(s) Nebulizer every 6 hours  albuterol/ipratropium for Nebulization. 3 milliLiter(s) Nebulizer every 6 hours PRN  aMIOdarone    Tablet 200 milliGRAM(s) Oral daily  aMIOdarone    Tablet   Oral   artificial tears (preservative free) Ophthalmic Solution 1 Drop(s) Both EYES two times a day  aspirin enteric coated 81 milliGRAM(s) Oral daily  atorvastatin 40 milliGRAM(s) Oral at bedtime  BACItracin   Ointment 1 Application(s) Topical two times a day  buDESOnide    Inhalation Suspension 0.5 milliGRAM(s) Inhalation two times a day  calcium acetate 667 milliGRAM(s) Oral three times a day with meals  DAPTOmycin IVPB      DAPTOmycin IVPB 300 milliGRAM(s) IV Intermittent every 24 hours  dextrose 40% Gel 15 Gram(s) Oral once PRN  dextrose 5%. 1000 milliLiter(s) IV Continuous <Continuous>  dextrose 50% Injectable 12.5 Gram(s) IV Push once  dextrose 50% Injectable 25 Gram(s) IV Push once  dextrose 50% Injectable 25 Gram(s) IV Push once  glucagon  Injectable 1 milliGRAM(s) IntraMuscular once PRN  heparin  Injectable 5000 Unit(s) SubCutaneous every 8 hours  hydrALAZINE 50 milliGRAM(s) Oral every 8 hours  insulin glargine Injectable (LANTUS) 60 Unit(s) SubCutaneous at bedtime  insulin lispro (HumaLOG) corrective regimen sliding scale   SubCutaneous three times a day before meals  insulin lispro (HumaLOG) corrective regimen sliding scale   SubCutaneous at bedtime  insulin lispro Injectable (HumaLOG) 23 Unit(s) SubCutaneous three times a day with meals  melatonin 3 milliGRAM(s) Oral at bedtime  multivitamin 1 Tablet(s) Oral daily  oxycodone    5 mG/acetaminophen 325 mG 1 Tablet(s) Oral every 6 hours PRN  pantoprazole    Tablet 40 milliGRAM(s) Oral before breakfast  polyethylene glycol 3350 17 Gram(s) Oral daily  sodium chloride 0.9% lock flush 10 milliLiter(s) IV Push every 1 hour PRN  tamsulosin 0.4 milliGRAM(s) Oral at bedtime  tiotropium 18 MICROgram(s) Capsule 1 Capsule(s) Inhalation daily  torsemide 20 milliGRAM(s) Oral daily        Physical Exam:    Neurology: alert and oriented x 3, nonfocal, no gross deficits    CV : S1S2    Sternal Wound :  healed,  Stable    Lungs: diminished left BS base  Supplemental O2 in use    Abdomen: soft, nontender, nondistended, positive bowel sounds, last bowel movement    +bm       Extremities:    1+ edema B/l lower extrem  RLE lateral aspected with open draining wound> purulent  line of demarcation along calf to shin  LLE shin with open wound draining purulent    Dr Mariscal at bedside examined wound sites                   PAST MEDICAL & SURGICAL HISTORY:  HTN (hypertension)  Diabetes  History of appendectomy: x 30 yrs ago

## 2020-02-22 NOTE — PROGRESS NOTE ADULT - PROBLEM SELECTOR PLAN 1
Will decrease Lantus to 56 units at bed time.  Will decrease Humalog to 22 units before each meal in addition to Humalog correction scale coverage.  Patient counseled for compliance with consistent low carb diet, exercise.   Patient was on insulin at home, knows how to do insulin pen injections. Will continue monitoring and make recommendations prior to DC.  Patient and wife counseled not to bring food from home, discussed compliance with consistent low carb diet and exercise as tolerated outpatient.

## 2020-02-22 NOTE — PROGRESS NOTE ADULT - ASSESSMENT
63yo male with hx of HTN, DM II, CAD s/p cath with severe triple vessel disease, s/p CABG, post op course c/b brief witnessed PEA 2/6 likely hypoxic arrest, s/p intubation. ( CAD, s/p cath with severe triple vessel disease including lesions at the bifurcation of the LAD/diagonal and distal RCA/RPDA/RPL trifurcation.   -s/p CABG x 4, intraop kennedy ef 50%)--s/p ampicillin until 2/18 for enterococcus in blood, extubated 2/9, pigtail right 2/8 and left sided on 2/15-for effusion.  SOB remains although a bit improved-- multifactorial-CAD/effusions, atelectasis due to pain, mild bronchospasm and debility. pig tail cath removed from left chest; CT chest done-loculated left effusion is slightly better but still present

## 2020-02-22 NOTE — PROGRESS NOTE ADULT - ASSESSMENT
intraop kennedy 2/3/20 ef 50%, nl lv, mild diastolic dysfx stage 1  limited echo 2/4/20: nl LV sys fx , no pericardial effusion     a/p  64 year old man with history of HTN, DM II, admitted with progressive exertional angina, s/p cath with severe triple vessel disease, s/p CABG, post op course c/b brief witnessed PEA likely hypoxic arrest, s/p intubation.     1. CAD, s/p cath with severe triple vessel disease including lesions at the bifurcation of the LAD/diagonal and distal RCA/RPDA/RPL trifurcation.   -s/p CABG x 4, intraop kennedy ef 50%  -cont asa, statin, BB   -s/p PEA arrest, now extubated  -off vasopressors  -LE dopplers, negative for dvt   -repeat echo 2/15 with preserved lv fxn/EF    2. Postop acute diastolic HF  -remains overloaded.    -chest xray noted, s/p bumex, on torsemide PO per CTS     3. PAF  -cont amio, bb   -AC per CTS    4. HTN  -bp stable on hydralazine/bb    5. STEPHANIE/CKD  renal f/u     6. Postop Pleural effusion  - S/P Right chest tube:  removed   - s/p L chest tube, mgmt per CTS  - d-dimer elevated/ Pulm f/u  -NON con Chest CT done - multiple loculated Left pleural effusions w/ patchy consolidation R   -unable to tolerate VQ scan   -LE doppler to r/o DVT     7. Sepsis -E. Faecalis bacteremia  IV abx per CTICU, repeat bcx 2/13 neg  ID following      dvt ppx

## 2020-02-22 NOTE — PROGRESS NOTE ADULT - SUBJECTIVE AND OBJECTIVE BOX
[facility] DIVISION OF KIDNEY DISEASES AND HYPERTENSION -- FOLLOW UP NOTE  --------------------------------------------------------------------------------  HPI: 65 yo M with HTN, DM II (20 years), admitted with complaints of acute on chronic left sided exertional chest pain. Nephrology team is following for elevated serum creatinine and pre-cardiac catheterization assessment. [facility] no prior records available. On admission (1/25/2020), Scr was 1.85. Patient went for cardiac cath on 1/29/20 which revealed triple vessel disease. Scr had improved to 1.76 on 2/3/20. Pt. underwent CABG on 2/3/20. Scr now increased after CABG and PEA arrest on 2/6/20, had improved to ~2.5. Scr over past few days had improved down to 2.09 on 2/17/20. Scr stable between 1.9-2.1.    Pt. seen and examined at bedside this AM. Pt. on diuretics, non oliguric. Complains of SOB this AM.    PAST HISTORY  --------------------------------------------------------------------------------  No significant changes to PMH, PSH, FHx, SHx, unless otherwise noted    ALLERGIES & MEDICATIONS  --------------------------------------------------------------------------------  Allergies    No Known Allergies    Intolerances    Standing Inpatient Medications  ALBUTerol    90 MICROgram(s) HFA Inhaler 1 Puff(s) Inhalation every 4 hours  albuterol/ipratropium for Nebulization 3 milliLiter(s) Nebulizer every 6 hours  aMIOdarone    Tablet 200 milliGRAM(s) Oral daily  aMIOdarone    Tablet   Oral   artificial tears (preservative free) Ophthalmic Solution 1 Drop(s) Both EYES two times a day  aspirin enteric coated 81 milliGRAM(s) Oral daily  atorvastatin 40 milliGRAM(s) Oral at bedtime  BACItracin   Ointment 1 Application(s) Topical two times a day  buDESOnide    Inhalation Suspension 0.5 milliGRAM(s) Inhalation two times a day  calcium acetate 667 milliGRAM(s) Oral three times a day with meals  DAPTOmycin IVPB      DAPTOmycin IVPB 300 milliGRAM(s) IV Intermittent every 24 hours  dextrose 5%. 1000 milliLiter(s) IV Continuous <Continuous>  dextrose 50% Injectable 12.5 Gram(s) IV Push once  dextrose 50% Injectable 25 Gram(s) IV Push once  dextrose 50% Injectable 25 Gram(s) IV Push once  heparin  Injectable 5000 Unit(s) SubCutaneous every 8 hours  hydrALAZINE 50 milliGRAM(s) Oral every 8 hours  insulin glargine Injectable (LANTUS) 56 Unit(s) SubCutaneous at bedtime  insulin lispro (HumaLOG) corrective regimen sliding scale   SubCutaneous three times a day before meals  insulin lispro (HumaLOG) corrective regimen sliding scale   SubCutaneous at bedtime  insulin lispro Injectable (HumaLOG) 22 Unit(s) SubCutaneous three times a day with meals  melatonin 3 milliGRAM(s) Oral at bedtime  multivitamin 1 Tablet(s) Oral daily  pantoprazole    Tablet 40 milliGRAM(s) Oral before breakfast  polyethylene glycol 3350 17 Gram(s) Oral daily  tamsulosin 0.4 milliGRAM(s) Oral at bedtime  tiotropium 18 MICROgram(s) Capsule 1 Capsule(s) Inhalation daily  torsemide 20 milliGRAM(s) Oral daily    REVIEW OF SYSTEMS  --------------------------------------------------------------------------------  Gen: + fatigue  Respiratory: + dyspnea  GI: No abdominal pain  MSK: + LE edema  Neuro: No dizziness  Heme: No bleeding    All other systems were reviewed and are negative, except as noted.    VITALS/PHYSICAL EXAM  --------------------------------------------------------------------------------  T(C): 36.4 (02-22-20 @ 12:02), Max: 36.7 (02-21-20 @ 19:48)  HR: 99 (02-22-20 @ 13:36) (88 - 99)  BP: 124/73 (02-22-20 @ 13:36) (121/66 - 148/76)  RR: 18 (02-22-20 @ 12:02) (18 - 20)  SpO2: 97% (02-22-20 @ 13:36) (94% - 98%)  Wt(kg): --    02-21-20 @ 07:01  -  02-22-20 @ 07:00  --------------------------------------------------------  IN: 780 mL / OUT: 1400 mL / NET: -620 mL    02-22-20 @ 07:01  -  02-22-20 @ 14:28  --------------------------------------------------------  IN: 480 mL / OUT: 400 mL / NET: 80 mL    Physical Exam:  	Gen: awake  	HEENT: supple neck  	Pulm: Decreased bibasilar breath sounds  	CV: + central chest dressing from CABG C/D/I, S1S2  	Abd: Soft  	Ext: + pitting edema B/L (improved)    LABS/STUDIES  --------------------------------------------------------------------------------              9.0    10.40 >-----------<  366      [02-22-20 @ 06:18]              27.9     132  |  93  |  67  ----------------------------<  99      [02-22-20 @ 06:18]  4.4   |  25  |  2.10        Ca     9.1     [02-22-20 @ 06:18]    Creatinine Trend:  SCr 2.10 [02-22 @ 06:18]  SCr 2.15 [02-21 @ 06:22]  SCr 1.97 [02-20 @ 05:38]  SCr 2.22 [02-19 @ 06:31]  SCr 2.29 [02-18 @ 01:13]

## 2020-02-22 NOTE — PROGRESS NOTE ADULT - PROBLEM SELECTOR PLAN 1
multifactorial-CAD/CHF, atelectasis due to pain, pleural effusion, bronchospasm, ? PE--O2 NC sat above 90%  VQ scan pending

## 2020-02-22 NOTE — PROGRESS NOTE ADULT - PROBLEM SELECTOR PLAN 2
Pt. with likely hypervolemic hyponatremia in the setting of volume overload. Na stable, c/w diuretics as above. Recommend rechecking UA, urine sodium, urine osm, and serum osm.    Kevin Correa  Nephrology Fellow  Cell: [phone] (from 8 am to 5 pm)  (After 5 pm or on weekends please page on-call fellow)

## 2020-02-22 NOTE — PROGRESS NOTE ADULT - SUBJECTIVE AND OBJECTIVE BOX
CHIEF COMPLAINT: f/up sob, resp failure, pleural effusions, atelectasis- pain improving    Interval Events: pig tail removed from left chest 2/20    REVIEW OF SYSTEMS:  Constitutional: No fevers or chills. No weight loss. + fatigue or generalized malaise.  Eyes: No itching or discharge from the eyes  ENT: No ear pain. No ear discharge. No nasal congestion. No post nasal drip. No epistaxis. No throat pain. No sore throat. No difficulty swallowing.   CV: No chest pain. No palpitations. No lightheadedness or dizziness.   Resp: No dyspnea at rest. + dyspnea on exertion. No orthopnea. No wheezing. No cough. No stridor. No sputum production. + chest pain with respiration.  GI: No nausea. No vomiting. No diarrhea.  MSK: No joint pain or pain in any extremities  Integumentary: No skin lesions. + leg/ pedal edema.  Neurological: + gross motor weakness. No sensory changes.  [ +] All other systems negative  [ ] Unable to assess ROS because ________    OBJECTIVE:  ICU Vital Signs Last 24 Hrs  T(C): 36.7 (20 Feb 2020 20:42), Max: 36.8 (20 Feb 2020 13:00)  T(F): 98.1 (20 Feb 2020 20:42), Max: 98.3 (20 Feb 2020 13:00)  HR: 85 (20 Feb 2020 20:42) (85 - 87)  BP: 58/80 (20 Feb 2020 20:42) (58/80 - 144/78)  BP(mean): --  ABP: --  ABP(mean): --  RR: 18 (20 Feb 2020 20:42) (18 - 20)  SpO2: 97% (20 Feb 2020 20:42) (94% - 97%)        02-19 @ 07:01  -  02-20 @ 07:00  --------------------------------------------------------  IN: 720 mL / OUT: 2860 mL / NET: -2140 mL    02-20 @ 07:01  -  02-21 @ 05:27  --------------------------------------------------------  IN: 480 mL / OUT: 650 mL / NET: -170 mL      CAPILLARY BLOOD GLUCOSE      POCT Blood Glucose.: 135 mg/dL (20 Feb 2020 21:44)      PHYSICAL EXAM: NAD in bed on RA  General: Awake, alert, oriented X 3.   HEENT: Atraumatic, normocephalic.                 Mallampatti Grade 3                No nasal congestion.                No tonsillar or pharyngeal exudates.  Lymph Nodes: No palpable lymphadenopathy  Neck: No JVD. No carotid bruit.   Respiratory: abnormal chest expansion-reduced BS bases                         Normal percussion                         abnormal and equal air entry                         No wheeze, rhonchi or rales.  Cardiovascular: S1 S2 normal. No murmurs, rubs or gallops.   Abdomen: Soft, non-tender, non-distended. No organomegaly. Normoactive bowel sounds.  Extremities: Warm to touch. Peripheral pulse palpable. + leg/ pedal edema.   Skin: No rashes or skin lesions  Neurological: Motor and sensory examination equal and normal in all four extremities.  Psychiatry: Appropriate mood and affect.    HOSPITAL MEDICATIONS:  MEDICATIONS  (STANDING):  ALBUTerol    90 MICROgram(s) HFA Inhaler 1 Puff(s) Inhalation every 4 hours  albuterol/ipratropium for Nebulization 3 milliLiter(s) Nebulizer every 6 hours  aMIOdarone    Tablet 200 milliGRAM(s) Oral daily  aMIOdarone    Tablet   Oral   artificial tears (preservative free) Ophthalmic Solution 1 Drop(s) Both EYES two times a day  aspirin enteric coated 81 milliGRAM(s) Oral daily  atorvastatin 40 milliGRAM(s) Oral at bedtime  BACItracin   Ointment 1 Application(s) Topical two times a day  buDESOnide    Inhalation Suspension 0.5 milliGRAM(s) Inhalation two times a day  buMETAnide Injectable 1 milliGRAM(s) IV Push every 8 hours  calcium acetate 667 milliGRAM(s) Oral three times a day with meals  dextrose 5%. 1000 milliLiter(s) (50 mL/Hr) IV Continuous <Continuous>  dextrose 50% Injectable 12.5 Gram(s) IV Push once  dextrose 50% Injectable 25 Gram(s) IV Push once  dextrose 50% Injectable 25 Gram(s) IV Push once  heparin  Injectable 5000 Unit(s) SubCutaneous every 8 hours  hydrALAZINE 50 milliGRAM(s) Oral every 8 hours  insulin glargine Injectable (LANTUS) 60 Unit(s) SubCutaneous at bedtime  insulin lispro (HumaLOG) corrective regimen sliding scale   SubCutaneous three times a day before meals  insulin lispro (HumaLOG) corrective regimen sliding scale   SubCutaneous at bedtime  insulin lispro Injectable (HumaLOG) 23 Unit(s) SubCutaneous three times a day with meals  melatonin 3 milliGRAM(s) Oral at bedtime  metoprolol tartrate 25 milliGRAM(s) Oral every 12 hours  multivitamin 1 Tablet(s) Oral daily  pantoprazole    Tablet 40 milliGRAM(s) Oral before breakfast  polyethylene glycol 3350 17 Gram(s) Oral daily  tamsulosin 0.4 milliGRAM(s) Oral at bedtime  tiotropium 18 MICROgram(s) Capsule 1 Capsule(s) Inhalation daily    MEDICATIONS  (PRN):  albuterol/ipratropium for Nebulization. 3 milliLiter(s) Nebulizer every 6 hours PRN Shortness of Breath and/or Wheezing  dextrose 40% Gel 15 Gram(s) Oral once PRN Blood Glucose LESS THAN 70 milliGRAM(s)/deciliter  glucagon  Injectable 1 milliGRAM(s) IntraMuscular once PRN Glucose LESS THAN 70 milligrams/deciliter  oxycodone    5 mG/acetaminophen 325 mG 1 Tablet(s) Oral every 6 hours PRN Moderate Pain (4 - 6)  sodium chloride 0.9% lock flush 10 milliLiter(s) IV Push every 1 hour PRN Pre/post blood products, medications, blood draw, and to maintain line patency      RADIOLOGY: < from: CT Chest No Cont (02.20.20 @ 11:05) >  AIRWAYS, LUNGS, PLEURA:   When compared with February 9, 2020, again seen is a leftpleural effusion. However, the dependent component appears smaller than on previous exam. Several loculated components are now identified along the left lateral chest wall and along the paramediastinal surface. The largest is noted medially on series3 image 78 and measures approximately 9 x 4 cm.    Apparent nodule along the mediastinal pleural surface in the left apex without significant change from previous exam. This is seen on series 3 image 37 and measures 2.3 x 1.4 cm.    Respiratory motion is present. Patchy areas of consolidation in the right lung appear worse than on previous examination.    MEDIASTINUM, LYMPH NODES: No lymphadenopathy. Small amount of fluid and edema within the retrosternal mediastinal fat which is likely due to prior surgery.    HEART AND VASCULATURE: The heart is mildly enlarged. Small pericardial effusion. The aorta and pulmonary artery are normal in size. Mild coronary calcifications.    UPPER ABDOMEN: Nodular liver.    BONES AND SOFT TISSUES: Multilevel degenerative changes of the spine. Edema in the anterior midline supraclavicular soft tissues which may be due to recent surgery.    LOWER NECK: Redemonstrated calcifications within the thyroid gland.      IMPRESSION:     When compared with February 9, 2020, again seen is a left pleural effusion. However, the dependent component appears smaller than on previous exam. Several loculated components are now identified along the left lateral chest wall and along the paramediastinal surface. The largest is noted medially and measures approximately 9 x 4 cm.    Apparent nodule along the mediastinal pleural surface in the left apex without significant change from previous exam. This is seen on series 3 image 37 and measures 2.3 x 1.4 cm.    Respiratory motion is present. Patchy areas of consolidation in the right lung appear worse than on previous examination.    Postsurgical changes as above.      [ ] Reviewed and interpreted by me    Point of Care Ultrasound Findings:    PFT:    EKG:

## 2020-02-22 NOTE — PROGRESS NOTE ADULT - ATTENDING COMMENTS
I have seen this patient with the fellow and agree with their assessment and plan. In addition, still grossly overloaded, would continue IV diuretics for longer as better bioavailability over PO diuretic.    Elías Arriola MD  Cell   Pager   Office

## 2020-02-22 NOTE — PROGRESS NOTE ADULT - SUBJECTIVE AND OBJECTIVE BOX
CARDIOLOGY FOLLOW UP - Dr. Steel    CC no cp  unchanged SOB, c/o cough       PHYSICAL EXAM:  T(C): 36.5 (02-22-20 @ 04:39), Max: 36.7 (02-21-20 @ 12:44)  HR: 91 (02-22-20 @ 04:39) (83 - 94)  BP: 137/76 (02-22-20 @ 04:39) (119/72 - 137/76)  RR: 18 (02-22-20 @ 04:39) (18 - 20)  SpO2: 96% (02-22-20 @ 04:39) (96% - 98%)  Wt(kg): --  I&O's Summary    21 Feb 2020 07:01  -  22 Feb 2020 07:00  --------------------------------------------------------  IN: 780 mL / OUT: 1400 mL / NET: -620 mL    Appearance: Normal	  Cardiovascular: Normal S1 S2,RRR  Respiratory:  diminished   Gastrointestinal:  Soft, Non-tender, + BS	  Extremities: bl LE ++ edema          MEDICATIONS  (STANDING):  ALBUTerol    90 MICROgram(s) HFA Inhaler 1 Puff(s) Inhalation every 4 hours  albuterol/ipratropium for Nebulization 3 milliLiter(s) Nebulizer every 6 hours  aMIOdarone    Tablet 200 milliGRAM(s) Oral daily  aMIOdarone    Tablet   Oral   artificial tears (preservative free) Ophthalmic Solution 1 Drop(s) Both EYES two times a day  aspirin enteric coated 81 milliGRAM(s) Oral daily  atorvastatin 40 milliGRAM(s) Oral at bedtime  BACItracin   Ointment 1 Application(s) Topical two times a day  buDESOnide    Inhalation Suspension 0.5 milliGRAM(s) Inhalation two times a day  calcium acetate 667 milliGRAM(s) Oral three times a day with meals  DAPTOmycin IVPB      DAPTOmycin IVPB 300 milliGRAM(s) IV Intermittent every 24 hours  dextrose 5%. 1000 milliLiter(s) (50 mL/Hr) IV Continuous <Continuous>  dextrose 50% Injectable 12.5 Gram(s) IV Push once  dextrose 50% Injectable 25 Gram(s) IV Push once  dextrose 50% Injectable 25 Gram(s) IV Push once  heparin  Injectable 5000 Unit(s) SubCutaneous every 8 hours  hydrALAZINE 50 milliGRAM(s) Oral every 8 hours  insulin glargine Injectable (LANTUS) 60 Unit(s) SubCutaneous at bedtime  insulin lispro (HumaLOG) corrective regimen sliding scale   SubCutaneous three times a day before meals  insulin lispro (HumaLOG) corrective regimen sliding scale   SubCutaneous at bedtime  insulin lispro Injectable (HumaLOG) 23 Unit(s) SubCutaneous three times a day with meals  melatonin 3 milliGRAM(s) Oral at bedtime  multivitamin 1 Tablet(s) Oral daily  pantoprazole    Tablet 40 milliGRAM(s) Oral before breakfast  polyethylene glycol 3350 17 Gram(s) Oral daily  tamsulosin 0.4 milliGRAM(s) Oral at bedtime  tiotropium 18 MICROgram(s) Capsule 1 Capsule(s) Inhalation daily  torsemide 20 milliGRAM(s) Oral daily      TELEMETRY: NSR  	    ECG:  	  RADIOLOGY:   DIAGNOSTIC TESTING:  [ ] Echocardiogram:  [ ]  Catheterization:  [ ] Stress Test:    OTHER: 	    LABS:	 	                                9.0    10.40 )-----------( 366      ( 22 Feb 2020 06:18 )             27.9     02-22    132<L>  |  93<L>  |  67<H>  ----------------------------<  99  4.4   |  25  |  2.10<H>    Ca    9.1      22 Feb 2020 06:18      PT/INR - ( 22 Feb 2020 06:18 )   PT: 13.5 sec;   INR: 1.18 ratio         PTT - ( 22 Feb 2020 06:18 )  PTT:31.4 sec

## 2020-02-22 NOTE — PROGRESS NOTE ADULT - PROBLEM SELECTOR PLAN 1
Pt with  CKD likely  in the setting of long standing DM and HTN. No prior labs for review. Scr since admission has ranged between 1.8-2.1 mg/dl.  Scr increased to ~2.50 secondary to hemodynamic injury after CABG and PEA arrest s/p CPR, and worsened to 2.9. Scr improved yesterday to 1.97, now slightly increased to 2.1. Non-nephrotic range proteinuria. Pt. likely with diabetic nephropathy.   - Switched by CTS to Torsemide 20 mg daily, however pt. with significant volume overload. Would recommend increasing dose of previous Bumex IV (to 2 mg TID).  - Monitor labs and urine output.   - Avoid NSAIDs, ACEI/ARBS, RCA and nephrotoxins. Dose medications as per eGFR Pt with  CKD likely  in the setting of long standing DM and HTN. No prior labs for review. Scr since admission has ranged between 1.8-2.1 mg/dl.  Scr increased to ~2.50 secondary to hemodynamic injury after CABG and PEA arrest s/p CPR, and worsened to 2.9. Scr improved yesterday to 1.97, now slightly increased to 2.1. Non-nephrotic range proteinuria. Pt. likely with diabetic nephropathy.   - Switched  to Torsemide 20 mg daily, however pt. with significant volume overload. Would recommend increasing dose of previous Bumex IV (to 2 mg TID).  - Monitor labs and urine output.   - Avoid NSAIDs, ACEI/ARBS, RCA and nephrotoxins. Dose medications as per eGFR

## 2020-02-22 NOTE — PROGRESS NOTE ADULT - SUBJECTIVE AND OBJECTIVE BOX
Chief complaint  Patient is a 64y old  Male who presents with a chief complaint of Chest pain (22 Feb 2020 14:17)   Review of systems  Patient in bed, looks comfortable, no fever, no hypoglycemia.    Labs and Fingersticks  CAPILLARY BLOOD GLUCOSE      POCT Blood Glucose.: 141 mg/dL (22 Feb 2020 16:41)  POCT Blood Glucose.: 162 mg/dL (22 Feb 2020 11:57)  POCT Blood Glucose.: 133 mg/dL (22 Feb 2020 07:10)  POCT Blood Glucose.: 155 mg/dL (21 Feb 2020 21:21)      Anion Gap, Serum: 14 (02-22 @ 06:18)  Anion Gap, Serum: 12 (02-21 @ 06:22)      Calcium, Total Serum: 9.1 (02-22 @ 06:18)  Calcium, Total Serum: 9.0 (02-21 @ 06:22)          02-22    132<L>  |  93<L>  |  67<H>  ----------------------------<  99  4.4   |  25  |  2.10<H>    Ca    9.1      22 Feb 2020 06:18                          9.0    10.40 )-----------( 366      ( 22 Feb 2020 06:18 )             27.9     Medications  MEDICATIONS  (STANDING):  ALBUTerol    90 MICROgram(s) HFA Inhaler 1 Puff(s) Inhalation every 4 hours  albuterol/ipratropium for Nebulization 3 milliLiter(s) Nebulizer every 6 hours  aMIOdarone    Tablet 200 milliGRAM(s) Oral daily  aMIOdarone    Tablet   Oral   artificial tears (preservative free) Ophthalmic Solution 1 Drop(s) Both EYES two times a day  aspirin enteric coated 81 milliGRAM(s) Oral daily  atorvastatin 40 milliGRAM(s) Oral at bedtime  BACItracin   Ointment 1 Application(s) Topical two times a day  buDESOnide    Inhalation Suspension 0.5 milliGRAM(s) Inhalation two times a day  calcium acetate 667 milliGRAM(s) Oral three times a day with meals  DAPTOmycin IVPB      DAPTOmycin IVPB 300 milliGRAM(s) IV Intermittent every 24 hours  dextrose 5%. 1000 milliLiter(s) (50 mL/Hr) IV Continuous <Continuous>  dextrose 50% Injectable 12.5 Gram(s) IV Push once  dextrose 50% Injectable 25 Gram(s) IV Push once  dextrose 50% Injectable 25 Gram(s) IV Push once  heparin  Injectable 5000 Unit(s) SubCutaneous every 8 hours  hydrALAZINE 50 milliGRAM(s) Oral every 8 hours  insulin glargine Injectable (LANTUS) 56 Unit(s) SubCutaneous at bedtime  insulin lispro (HumaLOG) corrective regimen sliding scale   SubCutaneous three times a day before meals  insulin lispro (HumaLOG) corrective regimen sliding scale   SubCutaneous at bedtime  insulin lispro Injectable (HumaLOG) 22 Unit(s) SubCutaneous three times a day with meals  melatonin 3 milliGRAM(s) Oral at bedtime  multivitamin 1 Tablet(s) Oral daily  pantoprazole    Tablet 40 milliGRAM(s) Oral before breakfast  polyethylene glycol 3350 17 Gram(s) Oral daily  tamsulosin 0.4 milliGRAM(s) Oral at bedtime  tiotropium 18 MICROgram(s) Capsule 1 Capsule(s) Inhalation daily  torsemide 20 milliGRAM(s) Oral daily      Physical Exam  General: Patient comfortable in bed  Vital Signs Last 12 Hrs  T(F): 97.5 (02-22-20 @ 12:02), Max: 97.5 (02-22-20 @ 12:02)  HR: 99 (02-22-20 @ 13:36) (93 - 99)  BP: 124/73 (02-22-20 @ 13:36) (124/73 - 148/76)  BP(mean): --  RR: 18 (02-22-20 @ 12:02) (18 - 20)  SpO2: 97% (02-22-20 @ 13:36) (94% - 97%)  Neck: No palpable thyroid nodules.  CVS: S1S2, No murmurs  Respiratory: No wheezing, no crepitations  GI: Abdomen soft, bowel sounds positive  Musculoskeletal:  edema lower extremities.   Skin: No skin rashes, no ecchymosis    Diagnostics

## 2020-02-22 NOTE — PROGRESS NOTE ADULT - ASSESSMENT
Assessment  DMT2: 64y Male with DM T2 with hyperglycemia, A1C 9.2%, was on oral meds and insulin at home, now on basal bolus insulin, Blood sugars overall in acceptable range, no new hypoglycemic episodes. Patient is eating meals syas has decreased appetite today, appears comfortable, family at the bedside. Planning DC to rehab once medically cleared.  Foot infection: On Tx, stable.  CAD: s/p CABG 2/3, on medications, no chest pain, stable, monitored.  HTN: Controlled,  on antihypertensive medications.  HLD: Controlled, on statin.  CKD: Monitor labs/BMP          Harper Matias MD  Cell: [phone]  Office: [phone]

## 2020-02-23 DIAGNOSIS — S21.101A UNSPECIFIED OPEN WOUND OF RIGHT FRONT WALL OF THORAX WITHOUT PENETRATION INTO THORACIC CAVITY, INITIAL ENCOUNTER: ICD-10-CM

## 2020-02-23 LAB
ANION GAP SERPL CALC-SCNC: 11 MMOL/L — SIGNIFICANT CHANGE UP (ref 5–17)
BASOPHILS # BLD AUTO: 0.09 K/UL — SIGNIFICANT CHANGE UP (ref 0–0.2)
BASOPHILS NFR BLD AUTO: 0.9 % — SIGNIFICANT CHANGE UP (ref 0–2)
BUN SERPL-MCNC: 67 MG/DL — HIGH (ref 7–23)
CALCIUM SERPL-MCNC: 8.8 MG/DL — SIGNIFICANT CHANGE UP (ref 8.4–10.5)
CHLORIDE SERPL-SCNC: 96 MMOL/L — SIGNIFICANT CHANGE UP (ref 96–108)
CK SERPL-CCNC: 113 U/L — SIGNIFICANT CHANGE UP (ref 30–200)
CO2 SERPL-SCNC: 24 MMOL/L — SIGNIFICANT CHANGE UP (ref 22–31)
CREAT SERPL-MCNC: 1.82 MG/DL — HIGH (ref 0.5–1.3)
EOSINOPHIL # BLD AUTO: 0.38 K/UL — SIGNIFICANT CHANGE UP (ref 0–0.5)
EOSINOPHIL NFR BLD AUTO: 3.8 % — SIGNIFICANT CHANGE UP (ref 0–6)
GLUCOSE BLDC GLUCOMTR-MCNC: 130 MG/DL — HIGH (ref 70–99)
GLUCOSE BLDC GLUCOMTR-MCNC: 148 MG/DL — HIGH (ref 70–99)
GLUCOSE BLDC GLUCOMTR-MCNC: 176 MG/DL — HIGH (ref 70–99)
GLUCOSE BLDC GLUCOMTR-MCNC: 178 MG/DL — HIGH (ref 70–99)
GLUCOSE SERPL-MCNC: 161 MG/DL — HIGH (ref 70–99)
HCT VFR BLD CALC: 28.9 % — LOW (ref 39–50)
HGB BLD-MCNC: 9.2 G/DL — LOW (ref 13–17)
IMM GRANULOCYTES NFR BLD AUTO: 1.6 % — HIGH (ref 0–1.5)
LYMPHOCYTES # BLD AUTO: 1.02 K/UL — SIGNIFICANT CHANGE UP (ref 1–3.3)
LYMPHOCYTES # BLD AUTO: 10.1 % — LOW (ref 13–44)
MCHC RBC-ENTMCNC: 27.7 PG — SIGNIFICANT CHANGE UP (ref 27–34)
MCHC RBC-ENTMCNC: 31.8 GM/DL — LOW (ref 32–36)
MCV RBC AUTO: 87 FL — SIGNIFICANT CHANGE UP (ref 80–100)
MONOCYTES # BLD AUTO: 1.13 K/UL — HIGH (ref 0–0.9)
MONOCYTES NFR BLD AUTO: 11.2 % — SIGNIFICANT CHANGE UP (ref 2–14)
NEUTROPHILS # BLD AUTO: 7.35 K/UL — SIGNIFICANT CHANGE UP (ref 1.8–7.4)
NEUTROPHILS NFR BLD AUTO: 72.4 % — SIGNIFICANT CHANGE UP (ref 43–77)
NRBC # BLD: 0 /100 WBCS — SIGNIFICANT CHANGE UP (ref 0–0)
PLATELET # BLD AUTO: 378 K/UL — SIGNIFICANT CHANGE UP (ref 150–400)
POTASSIUM SERPL-MCNC: 5.3 MMOL/L — SIGNIFICANT CHANGE UP (ref 3.5–5.3)
POTASSIUM SERPL-SCNC: 5.3 MMOL/L — SIGNIFICANT CHANGE UP (ref 3.5–5.3)
RBC # BLD: 3.32 M/UL — LOW (ref 4.2–5.8)
RBC # FLD: 14.3 % — SIGNIFICANT CHANGE UP (ref 10.3–14.5)
SODIUM SERPL-SCNC: 131 MMOL/L — LOW (ref 135–145)
WBC # BLD: 10.13 K/UL — SIGNIFICANT CHANGE UP (ref 3.8–10.5)
WBC # FLD AUTO: 10.13 K/UL — SIGNIFICANT CHANGE UP (ref 3.8–10.5)

## 2020-02-23 PROCEDURE — 99232 SBSQ HOSP IP/OBS MODERATE 35: CPT

## 2020-02-23 PROCEDURE — 71045 X-RAY EXAM CHEST 1 VIEW: CPT | Mod: 26

## 2020-02-23 PROCEDURE — 93010 ELECTROCARDIOGRAM REPORT: CPT

## 2020-02-23 RX ORDER — INSULIN LISPRO 100/ML
24 VIAL (ML) SUBCUTANEOUS
Refills: 0 | Status: DISCONTINUED | OUTPATIENT
Start: 2020-02-23 | End: 2020-02-24

## 2020-02-23 RX ORDER — INSULIN GLARGINE 100 [IU]/ML
60 INJECTION, SOLUTION SUBCUTANEOUS AT BEDTIME
Refills: 0 | Status: DISCONTINUED | OUTPATIENT
Start: 2020-02-23 | End: 2020-02-24

## 2020-02-23 RX ORDER — CEFEPIME 1 G/1
2000 INJECTION, POWDER, FOR SOLUTION INTRAMUSCULAR; INTRAVENOUS EVERY 24 HOURS
Refills: 0 | Status: DISCONTINUED | OUTPATIENT
Start: 2020-02-23 | End: 2020-02-24

## 2020-02-23 RX ADMIN — Medication 50 MILLIGRAM(S): at 13:20

## 2020-02-23 RX ADMIN — Medication 667 MILLIGRAM(S): at 07:59

## 2020-02-23 RX ADMIN — TAMSULOSIN HYDROCHLORIDE 0.4 MILLIGRAM(S): 0.4 CAPSULE ORAL at 21:19

## 2020-02-23 RX ADMIN — INSULIN GLARGINE 60 UNIT(S): 100 INJECTION, SOLUTION SUBCUTANEOUS at 21:45

## 2020-02-23 RX ADMIN — CEFEPIME 100 MILLIGRAM(S): 1 INJECTION, POWDER, FOR SOLUTION INTRAMUSCULAR; INTRAVENOUS at 13:20

## 2020-02-23 RX ADMIN — Medication 3 MILLIGRAM(S): at 21:19

## 2020-02-23 RX ADMIN — Medication 667 MILLIGRAM(S): at 12:46

## 2020-02-23 RX ADMIN — Medication 1 DROP(S): at 18:47

## 2020-02-23 RX ADMIN — ATORVASTATIN CALCIUM 40 MILLIGRAM(S): 80 TABLET, FILM COATED ORAL at 21:19

## 2020-02-23 RX ADMIN — Medication 3 MILLILITER(S): at 12:47

## 2020-02-23 RX ADMIN — Medication 3 MILLILITER(S): at 00:22

## 2020-02-23 RX ADMIN — Medication 0.5 MILLIGRAM(S): at 06:01

## 2020-02-23 RX ADMIN — Medication 24 UNIT(S): at 12:20

## 2020-02-23 RX ADMIN — DAPTOMYCIN 112 MILLIGRAM(S): 500 INJECTION, POWDER, LYOPHILIZED, FOR SOLUTION INTRAVENOUS at 16:30

## 2020-02-23 RX ADMIN — Medication 50 MILLIGRAM(S): at 06:01

## 2020-02-23 RX ADMIN — POLYETHYLENE GLYCOL 3350 17 GRAM(S): 17 POWDER, FOR SOLUTION ORAL at 12:46

## 2020-02-23 RX ADMIN — AMIODARONE HYDROCHLORIDE 200 MILLIGRAM(S): 400 TABLET ORAL at 06:01

## 2020-02-23 RX ADMIN — Medication 20 MILLIGRAM(S): at 06:01

## 2020-02-23 RX ADMIN — HEPARIN SODIUM 5000 UNIT(S): 5000 INJECTION INTRAVENOUS; SUBCUTANEOUS at 21:19

## 2020-02-23 RX ADMIN — PANTOPRAZOLE SODIUM 40 MILLIGRAM(S): 20 TABLET, DELAYED RELEASE ORAL at 06:01

## 2020-02-23 RX ADMIN — OXYCODONE AND ACETAMINOPHEN 1 TABLET(S): 5; 325 TABLET ORAL at 00:21

## 2020-02-23 RX ADMIN — Medication 81 MILLIGRAM(S): at 12:46

## 2020-02-23 RX ADMIN — Medication 1: at 16:49

## 2020-02-23 RX ADMIN — Medication 0.5 MILLIGRAM(S): at 18:48

## 2020-02-23 RX ADMIN — Medication 3 MILLILITER(S): at 06:01

## 2020-02-23 RX ADMIN — HEPARIN SODIUM 5000 UNIT(S): 5000 INJECTION INTRAVENOUS; SUBCUTANEOUS at 06:01

## 2020-02-23 RX ADMIN — Medication 1 TABLET(S): at 12:47

## 2020-02-23 RX ADMIN — Medication 1: at 12:19

## 2020-02-23 RX ADMIN — Medication 24 UNIT(S): at 16:49

## 2020-02-23 RX ADMIN — Medication 3 MILLILITER(S): at 18:48

## 2020-02-23 RX ADMIN — Medication 22 UNIT(S): at 07:59

## 2020-02-23 RX ADMIN — Medication 50 MILLIGRAM(S): at 21:19

## 2020-02-23 RX ADMIN — OXYCODONE AND ACETAMINOPHEN 1 TABLET(S): 5; 325 TABLET ORAL at 00:55

## 2020-02-23 RX ADMIN — Medication 1 DROP(S): at 06:01

## 2020-02-23 RX ADMIN — Medication 667 MILLIGRAM(S): at 18:46

## 2020-02-23 RX ADMIN — HEPARIN SODIUM 5000 UNIT(S): 5000 INJECTION INTRAVENOUS; SUBCUTANEOUS at 13:20

## 2020-02-23 NOTE — PROGRESS NOTE ADULT - SUBJECTIVE AND OBJECTIVE BOX
CARDIOLOGY FOLLOW UP - Dr. Damián GA awake, alert, sitting up in bed, stable off supplental O2  c/o cough worse when lying flat       PHYSICAL EXAM:  T(C): 36.8 (02-23-20 @ 05:03), Max: 36.8 (02-23-20 @ 05:03)  HR: 96 (02-23-20 @ 06:03) (93 - 99)  BP: 137/74 (02-23-20 @ 05:03) (124/73 - 148/76)  RR: 18 (02-23-20 @ 05:03) (18 - 20)  SpO2: 93% (02-23-20 @ 06:03) (93% - 97%)  Wt(kg): --  I&O's Summary    22 Feb 2020 07:01  -  23 Feb 2020 07:00  --------------------------------------------------------  IN: 930 mL / OUT: 1200 mL / NET: -270 mL        Appearance: Normal	  Cardiovascular: Normal S1 S2,RRR, No JVD, No murmurs  Respiratory: Lungs clear to auscultation	  Gastrointestinal:  Soft, Non-tender, + BS	  Extremities: Normal range of motion, No clubbing, cyanosis or edema        MEDICATIONS  (STANDING):  ALBUTerol    90 MICROgram(s) HFA Inhaler 1 Puff(s) Inhalation every 4 hours  albuterol/ipratropium for Nebulization 3 milliLiter(s) Nebulizer every 6 hours  aMIOdarone    Tablet 200 milliGRAM(s) Oral daily  aMIOdarone    Tablet   Oral   artificial tears (preservative free) Ophthalmic Solution 1 Drop(s) Both EYES two times a day  aspirin enteric coated 81 milliGRAM(s) Oral daily  atorvastatin 40 milliGRAM(s) Oral at bedtime  BACItracin   Ointment 1 Application(s) Topical two times a day  buDESOnide    Inhalation Suspension 0.5 milliGRAM(s) Inhalation two times a day  calcium acetate 667 milliGRAM(s) Oral three times a day with meals  DAPTOmycin IVPB      DAPTOmycin IVPB 300 milliGRAM(s) IV Intermittent every 24 hours  dextrose 5%. 1000 milliLiter(s) (50 mL/Hr) IV Continuous <Continuous>  dextrose 50% Injectable 12.5 Gram(s) IV Push once  dextrose 50% Injectable 25 Gram(s) IV Push once  dextrose 50% Injectable 25 Gram(s) IV Push once  heparin  Injectable 5000 Unit(s) SubCutaneous every 8 hours  hydrALAZINE 50 milliGRAM(s) Oral every 8 hours  insulin glargine Injectable (LANTUS) 56 Unit(s) SubCutaneous at bedtime  insulin lispro (HumaLOG) corrective regimen sliding scale   SubCutaneous three times a day before meals  insulin lispro (HumaLOG) corrective regimen sliding scale   SubCutaneous at bedtime  insulin lispro Injectable (HumaLOG) 22 Unit(s) SubCutaneous three times a day with meals  melatonin 3 milliGRAM(s) Oral at bedtime  multivitamin 1 Tablet(s) Oral daily  pantoprazole    Tablet 40 milliGRAM(s) Oral before breakfast  polyethylene glycol 3350 17 Gram(s) Oral daily  tamsulosin 0.4 milliGRAM(s) Oral at bedtime  tiotropium 18 MICROgram(s) Capsule 1 Capsule(s) Inhalation daily  torsemide 20 milliGRAM(s) Oral daily      TELEMETRY: nsr 	    ECG:  	  RADIOLOGY:   DIAGNOSTIC TESTING:  [ ] Echocardiogram:  [ ]  Catheterization:  [ ] Stress Test:    OTHER: 	    LABS:	 	                                9.0    10.40 )-----------( 366      ( 22 Feb 2020 06:18 )             27.9     02-22    132<L>  |  93<L>  |  67<H>  ----------------------------<  99  4.4   |  25  |  2.10<H>    Ca    9.1      22 Feb 2020 06:18      PT/INR - ( 22 Feb 2020 06:18 )   PT: 13.5 sec;   INR: 1.18 ratio         PTT - ( 22 Feb 2020 06:18 )  PTT:31.4 sec

## 2020-02-23 NOTE — PROGRESS NOTE ADULT - ATTENDING COMMENTS
Agree with above NP note.  HD stable  cont abx per id/cts  amio for paf  care per cts  diuretics per cts

## 2020-02-23 NOTE — PROGRESS NOTE ADULT - ATTENDING COMMENTS
I have seen this patient with the fellow and agree with their assessment and plan.     Elías Arriola MD  Cell   Pager   Office

## 2020-02-23 NOTE — PROGRESS NOTE ADULT - ASSESSMENT
intraop kennedy 2/3/20 ef 50%, nl lv, mild diastolic dysfx stage 1  limited echo 2/4/20: nl LV sys fx , no pericardial effusion     a/p  64 year old man with history of HTN, DM II, admitted with progressive exertional angina, s/p cath with severe triple vessel disease, s/p CABG, post op course c/b brief witnessed PEA likely hypoxic arrest, s/p intubation.     1. CAD, s/p cath with severe triple vessel disease including lesions at the bifurcation of the LAD/diagonal and distal RCA/RPDA/RPL trifurcation.   -s/p CABG x 4, intraop kennedy ef 50%  -cont asa, statin, BB   -s/p PEA arrest, now extubated  -off vasopressors  -LE dopplers, negative for dvt   -repeat echo 2/15 with preserved lv fxn/EF    2. Postop acute diastolic HF  -volume status improved   -chest xray noted, s/p bumex, on torsemide PO per CTS     3. PAF  -cont amio, bb   -AC per CTS    4. HTN  -bp stable on hydralazine/bb    5. STEPHANIE/CKD  renal f/u     6. Postop Pleural effusion  - S/P Right chest tube:  removed   - s/p L chest tube, mgmt per CTS  - d-dimer elevated/ Pulm f/u  -NON con Chest CT done - multiple loculated Left pleural effusions w/ patchy consolidation R   -unable to tolerate VQ scan   -LE doppler to r/o DVT     7. Sepsis -E. Faecalis bacteremia  IV abx per CTICU, repeat bcx 2/13 neg  ID following      dvt ppx

## 2020-02-23 NOTE — PROGRESS NOTE ADULT - ASSESSMENT
64 yr old with cri stage 4, DM, PVD, admitted and had CABG, Post op intubated and has bilateral effusions, worsening renal disease and bc positive for E. faecalis; follow up bc negative to date.  Call to see patient for discharge from sternal wound  Presently on treatment for RLE SSTI  CT chest with suspected postsurgical changes  No fevers, no leukocytosis  Overall,  1) Sternal wound, ? infection  - Continue Dapto 300mg q 24  - Add Cefepime 2g q24  - Send bacterial culture from wound if further drainage  - Wound care  - Monitor for fevers/chills, further signs infection  2) Enterococcal bacteremia  - Continue Dapto as above    Arturo Ledezma MD  Pager [phone]  After 5pm and on weekends call [phone]

## 2020-02-23 NOTE — PROGRESS NOTE ADULT - PROBLEM SELECTOR PLAN 1
Pt with  CKD likely  in the setting of long standing DM and HTN. No prior labs for review. Scr since admission has ranged between 1.8-2.1. Scr increased to ~2.50 secondary to hemodynamic injury after CABG and PEA arrest s/p CPR, and worsened to 2.9. Scr currently improved at 1.86. Non-nephrotic range proteinuria. Pt. likely with diabetic nephropathy.   - Switched  to Torsemide 20 mg daily, currently with less SOB. Continue for now.  - Monitor labs and urine output.   - Avoid NSAIDs, ACEI/ARBS, RCA and nephrotoxins. Dose medications as per eGFR

## 2020-02-23 NOTE — PROGRESS NOTE ADULT - PROBLEM SELECTOR PLAN 2
Pt. with likely hypervolemic hyponatremia in the setting of volume overload. Na stable, c/w diuretics as above. Recommend rechecking UA, urine sodium, urine osm, and serum osm.    Kevin Correa  Nephrology Fellow  Cell: [phone] (from 8 am to 5 pm)  (After 5 pm or on weekends please page on-call fellow)

## 2020-02-23 NOTE — PROGRESS NOTE ADULT - ASSESSMENT
65yo male with hx of HTN, DM II   s/p C4L on 2/3; PEA arrest on 2/6   + enterococcus Bld  C/S > started ampicillin q8h, ID consulted,  R pigtail for effusion  2/8    Extubated  2/9  2/15 L pigtail effusion  2/17 PRBC   2/18 Tx 2 Diaz  2/19 thomas removed, trial void. Maintain left pigtail for significant output. Pt encouraged to ambulate. Supplemental o2 sat NC weaned to 2L. Blood cultures repeated.  2/20 VSS -Pulmonary consult - appreciated - duonebs ATC q6h & pulmicort bid initiated - ddimer drawn.  pt w/ hx negative LE dopplers   will order non con chest DT as pt has a loculated left effusion that will require tap at IR.  2/21 - NON con Chest CT done - multiple loculated Left pleural effusions w/ patchy consolidation R - rounds made w/ Dr. carl.  ID - consult called re: Ct - WBC 11 afebrile.  +PALMA.  unable to tolerate VQ scan this am.  will d/c bumex & start Torsemide 20mg po daily  d/c planning rehab when medically stable  2/22  Wound care consult leg wounds> shower w/ local skin care  2/23  SW drainage> on Dapto> as per ID will cover SWD  CXR this am   Tighter glycemic control

## 2020-02-23 NOTE — PROGRESS NOTE ADULT - SUBJECTIVE AND OBJECTIVE BOX
CC: ? sternal wound infection    Saw/spoke to patient. Patient unchanged. Called to see patient because has new drainage from sternal wound, described as purulent.    Allergies  No Known Allergies    ANTIMICROBIALS:  DAPTOmycin IVPB    DAPTOmycin IVPB 300 every 24 hours    PE:    Vital Signs Last 24 Hrs  T(C): 36.8 (23 Feb 2020 09:20), Max: 36.8 (23 Feb 2020 05:03)  T(F): 98.2 (23 Feb 2020 09:20), Max: 98.2 (23 Feb 2020 05:03)  HR: 95 (23 Feb 2020 10:37) (93 - 99)  BP: 150/75 (23 Feb 2020 09:20) (124/73 - 150/75)  RR: 18 (23 Feb 2020 09:20) (18 - 18)  SpO2: 93% (23 Feb 2020 10:37) (92% - 97%)    Gen: AOx3, NAD, non-toxic, pleasant  CV: S1+S2 normal, nontachycardic, sternal wound with small area of purulent discharge  Resp: Clear bilat, no resp distress, no crackles/wheezes  Abd: Soft, nontender, +BS  Ext: RLE redness at calf    LABS:                        9.2    10.13 )-----------( 378      ( 23 Feb 2020 10:20 )             28.9     02-23    131<L>  |  96  |  67<H>  ----------------------------<  161<H>  5.3   |  24  |  1.82<H>    Ca    8.8      23 Feb 2020 07:30    MICROBIOLOGY:    .Blood Blood  02-19-20   No growth to date.    .Blood Blood  02-13-20   No growth at 5 days.    .Sputum Sputum  02-08-20   Normal Respiratory Missy present  --    No polymorphonuclear leukocytes per low power field  Few Squamous epithelial cells per low power field  Few Gram Negative Rods seen per oil power field  Few Gram positive cocci in pairs seen per oil power field      .Blood Blood  02-08-20   Growth in anaerobic bottle: Enterococcus faecalis  See previous culture [id number]  --    Growth in anaerobic bottle: Gram Positive Cocci in Pairs and Chains    .Blood Blood  02-08-20   Growth in anaerobic bottle: Enterococcus faecalis    (otherwise reviewed)    RADIOLOGY:    2/22 USG:    IMPRESSION:     No deep vein thrombosis in either lower extremity.    CT Chest 2/20:    IMPRESSION:     When compared with February 9, 2020, again seen is a left pleural effusion. However, the dependent component appears smaller than on previous exam. Several loculated components are now identified along the left lateral chest wall and along the paramediastinal surface. The largest is noted medially and measures approximately 9 x 4 cm.    Apparent nodule along the mediastinal pleural surface in the left apex without significant change from previous exam. This is seen on series 3 image 37 and measures 2.3 x 1.4 cm.    Respiratory motion is present. Patchy areas of consolidation in the right lung appear worse than on previous examination.    Postsurgical changes as above.

## 2020-02-23 NOTE — PROGRESS NOTE ADULT - SUBJECTIVE AND OBJECTIVE BOX
Subjective:   "I did not sleep better with that mask"  OOB chair, wife at bedside      Tele:  SR  90s           V/S:                    T(F): 98.2 (20 @ 05:03), Max: 98.2 (20 @ 05:03)  HR: 96 (20 @ 06:03) (93 - 99)  BP: 137/74 (20 @ 05:03) (124/73 - 148/76)  RR: 18 (20 @ 05:03) (18 - 18)  SpO2: 93% (20 @ 06:03) (93% - 97%)  Wt(kg): --      LV EF:      Labs:      131<L>  |  96  |  67<H>  ----------------------------<  161<H>  5.3   |  24  |  1.82<H>    Ca    8.8      2020 07:30                                 9.0    10.40 )-----------( 366      ( 2020 06:18 )             27.9        PT/INR - ( 2020 06:18 )   PT: 13.5 sec;   INR: 1.18 ratio         PTT - ( 2020 06:18 )  PTT:31.4 sec     CAPILLARY BLOOD GLUCOSE      POCT Blood Glucose.: 148 mg/dL (2020 07:46)  POCT Blood Glucose.: 213 mg/dL (2020 21:39)  POCT Blood Glucose.: 141 mg/dL (2020 16:41)  POCT Blood Glucose.: 162 mg/dL (2020 11:57)          I&O's Detail    2020 07:01  -  2020 07:00  --------------------------------------------------------  IN:    Oral Fluid: 930 mL  Total IN: 930 mL    OUT:    Voided: 1200 mL  Total OUT: 1200 mL    Total NET: -270 mL      2020 07:01  -  2020 09:34  --------------------------------------------------------  IN:    Oral Fluid: 120 mL  Total IN: 120 mL    OUT:  Total OUT: 0 mL    Total NET: 120 mL          BOWEL MOVEMENT:  [x ] YES [ ] NO      Daily     Daily Weight in k.1 (2020 09:05)  Medications:  ALBUTerol    90 MICROgram(s) HFA Inhaler 1 Puff(s) Inhalation every 4 hours  albuterol/ipratropium for Nebulization 3 milliLiter(s) Nebulizer every 6 hours  albuterol/ipratropium for Nebulization. 3 milliLiter(s) Nebulizer every 6 hours PRN  aMIOdarone    Tablet 200 milliGRAM(s) Oral daily  aMIOdarone    Tablet   Oral   artificial tears (preservative free) Ophthalmic Solution 1 Drop(s) Both EYES two times a day  aspirin enteric coated 81 milliGRAM(s) Oral daily  atorvastatin 40 milliGRAM(s) Oral at bedtime  BACItracin   Ointment 1 Application(s) Topical two times a day  buDESOnide    Inhalation Suspension 0.5 milliGRAM(s) Inhalation two times a day  calcium acetate 667 milliGRAM(s) Oral three times a day with meals  DAPTOmycin IVPB      DAPTOmycin IVPB 300 milliGRAM(s) IV Intermittent every 24 hours  dextrose 40% Gel 15 Gram(s) Oral once PRN  dextrose 5%. 1000 milliLiter(s) IV Continuous <Continuous>  dextrose 50% Injectable 12.5 Gram(s) IV Push once  dextrose 50% Injectable 25 Gram(s) IV Push once  dextrose 50% Injectable 25 Gram(s) IV Push once  glucagon  Injectable 1 milliGRAM(s) IntraMuscular once PRN  heparin  Injectable 5000 Unit(s) SubCutaneous every 8 hours  hydrALAZINE 50 milliGRAM(s) Oral every 8 hours  insulin glargine Injectable (LANTUS) 60 Unit(s) SubCutaneous at bedtime  insulin lispro (HumaLOG) corrective regimen sliding scale   SubCutaneous three times a day before meals  insulin lispro (HumaLOG) corrective regimen sliding scale   SubCutaneous at bedtime  insulin lispro Injectable (HumaLOG) 24 Unit(s) SubCutaneous three times a day with meals  melatonin 3 milliGRAM(s) Oral at bedtime  multivitamin 1 Tablet(s) Oral daily  oxycodone    5 mG/acetaminophen 325 mG 1 Tablet(s) Oral every 6 hours PRN  pantoprazole    Tablet 40 milliGRAM(s) Oral before breakfast  polyethylene glycol 3350 17 Gram(s) Oral daily  sodium chloride 0.9% lock flush 10 milliLiter(s) IV Push every 1 hour PRN  tamsulosin 0.4 milliGRAM(s) Oral at bedtime  tiotropium 18 MICROgram(s) Capsule 1 Capsule(s) Inhalation daily  torsemide 20 milliGRAM(s) Oral daily        Physical Exam:    Neurology: alert and oriented x 3, nonfocal, no gross deficits    CV : S1S2    Sternal Wound :  Upper third prox pole with opening>serosang fluid draining  + click    Lungs: diminished left BS base  Room air    Abdomen: soft, nontender, nondistended, positive bowel sounds, last bowel movement    +bm       Extremities:    1+ edema B/l lower extrem  RLE lateral aspected with open draining wound> purulent  line of demarcation along calf to shin  LLE shin with open wound draining purulent    Dr Quintero at bedside examined wound sites                   PAST MEDICAL & SURGICAL HISTORY:  HTN (hypertension)  Diabetes  History of appendectomy: x 30 yrs ago

## 2020-02-24 ENCOUNTER — TRANSCRIPTION ENCOUNTER (OUTPATIENT)
Age: 65
End: 2020-02-24

## 2020-02-24 LAB
ALBUMIN SERPL ELPH-MCNC: 1.9 G/DL — LOW (ref 3.3–5)
ALP SERPL-CCNC: 96 U/L — SIGNIFICANT CHANGE UP (ref 40–120)
ALT FLD-CCNC: 38 U/L — SIGNIFICANT CHANGE UP (ref 10–45)
ANION GAP SERPL CALC-SCNC: 17 MMOL/L — SIGNIFICANT CHANGE UP (ref 5–17)
APPEARANCE UR: CLEAR — SIGNIFICANT CHANGE UP
AST SERPL-CCNC: 29 U/L — SIGNIFICANT CHANGE UP (ref 10–40)
BACTERIA # UR AUTO: NEGATIVE — SIGNIFICANT CHANGE UP
BILIRUB SERPL-MCNC: 0.4 MG/DL — SIGNIFICANT CHANGE UP (ref 0.2–1.2)
BILIRUB UR-MCNC: NEGATIVE — SIGNIFICANT CHANGE UP
BLD GP AB SCN SERPL QL: NEGATIVE — SIGNIFICANT CHANGE UP
BUN SERPL-MCNC: 54 MG/DL — HIGH (ref 7–23)
CALCIUM SERPL-MCNC: 8.9 MG/DL — SIGNIFICANT CHANGE UP (ref 8.4–10.5)
CHLORIDE SERPL-SCNC: 95 MMOL/L — LOW (ref 96–108)
CO2 SERPL-SCNC: 20 MMOL/L — LOW (ref 22–31)
COLOR SPEC: SIGNIFICANT CHANGE UP
CREAT SERPL-MCNC: 1.83 MG/DL — HIGH (ref 0.5–1.3)
CULTURE RESULTS: SIGNIFICANT CHANGE UP
CULTURE RESULTS: SIGNIFICANT CHANGE UP
DIFF PNL FLD: NEGATIVE — SIGNIFICANT CHANGE UP
EPI CELLS # UR: 0 /HPF — SIGNIFICANT CHANGE UP (ref 0–5)
GLUCOSE BLDC GLUCOMTR-MCNC: 135 MG/DL — HIGH (ref 70–99)
GLUCOSE BLDC GLUCOMTR-MCNC: 146 MG/DL — HIGH (ref 70–99)
GLUCOSE BLDC GLUCOMTR-MCNC: 153 MG/DL — HIGH (ref 70–99)
GLUCOSE BLDC GLUCOMTR-MCNC: 189 MG/DL — HIGH (ref 70–99)
GLUCOSE BLDC GLUCOMTR-MCNC: 245 MG/DL — HIGH (ref 70–99)
GLUCOSE BLDC GLUCOMTR-MCNC: 263 MG/DL — HIGH (ref 70–99)
GLUCOSE BLDC GLUCOMTR-MCNC: 53 MG/DL — LOW (ref 70–99)
GLUCOSE SERPL-MCNC: 169 MG/DL — HIGH (ref 70–99)
GLUCOSE UR QL: SIGNIFICANT CHANGE UP
HCT VFR BLD CALC: 28.9 % — LOW (ref 39–50)
HGB BLD-MCNC: 9 G/DL — LOW (ref 13–17)
HYALINE CASTS # UR AUTO: 1 /LPF — SIGNIFICANT CHANGE UP (ref 0–7)
KETONES UR-MCNC: NEGATIVE — SIGNIFICANT CHANGE UP
LEUKOCYTE ESTERASE UR-ACNC: NEGATIVE — SIGNIFICANT CHANGE UP
MCHC RBC-ENTMCNC: 28 PG — SIGNIFICANT CHANGE UP (ref 27–34)
MCHC RBC-ENTMCNC: 31.1 GM/DL — LOW (ref 32–36)
MCV RBC AUTO: 90 FL — SIGNIFICANT CHANGE UP (ref 80–100)
NITRITE UR-MCNC: NEGATIVE — SIGNIFICANT CHANGE UP
NRBC # BLD: 0 /100 WBCS — SIGNIFICANT CHANGE UP (ref 0–0)
PH UR: 6.5 — SIGNIFICANT CHANGE UP (ref 5–8)
PLATELET # BLD AUTO: 338 K/UL — SIGNIFICANT CHANGE UP (ref 150–400)
POTASSIUM SERPL-MCNC: 4.5 MMOL/L — SIGNIFICANT CHANGE UP (ref 3.5–5.3)
POTASSIUM SERPL-SCNC: 4.5 MMOL/L — SIGNIFICANT CHANGE UP (ref 3.5–5.3)
PROT SERPL-MCNC: 6.4 G/DL — SIGNIFICANT CHANGE UP (ref 6–8.3)
PROT UR-MCNC: ABNORMAL
RBC # BLD: 3.21 M/UL — LOW (ref 4.2–5.8)
RBC # FLD: 14.5 % — SIGNIFICANT CHANGE UP (ref 10.3–14.5)
RBC CASTS # UR COMP ASSIST: 5 /HPF — HIGH (ref 0–4)
RH IG SCN BLD-IMP: POSITIVE — SIGNIFICANT CHANGE UP
SODIUM SERPL-SCNC: 132 MMOL/L — LOW (ref 135–145)
SP GR SPEC: 1.01 — SIGNIFICANT CHANGE UP (ref 1.01–1.02)
SPECIMEN SOURCE: SIGNIFICANT CHANGE UP
SPECIMEN SOURCE: SIGNIFICANT CHANGE UP
UROBILINOGEN FLD QL: SIGNIFICANT CHANGE UP
WBC # BLD: 9.44 K/UL — SIGNIFICANT CHANGE UP (ref 3.8–10.5)
WBC # FLD AUTO: 9.44 K/UL — SIGNIFICANT CHANGE UP (ref 3.8–10.5)
WBC UR QL: 1 /HPF — SIGNIFICANT CHANGE UP (ref 0–5)

## 2020-02-24 PROCEDURE — 99232 SBSQ HOSP IP/OBS MODERATE 35: CPT

## 2020-02-24 PROCEDURE — 71045 X-RAY EXAM CHEST 1 VIEW: CPT | Mod: 26

## 2020-02-24 PROCEDURE — 93010 ELECTROCARDIOGRAM REPORT: CPT

## 2020-02-24 RX ORDER — INSULIN GLARGINE 100 [IU]/ML
40 INJECTION, SOLUTION SUBCUTANEOUS AT BEDTIME
Refills: 0 | Status: DISCONTINUED | OUTPATIENT
Start: 2020-02-24 | End: 2020-02-25

## 2020-02-24 RX ORDER — SODIUM CHLORIDE 0.65 %
1 AEROSOL, SPRAY (ML) NASAL THREE TIMES A DAY
Refills: 0 | Status: DISCONTINUED | OUTPATIENT
Start: 2020-02-24 | End: 2020-02-25

## 2020-02-24 RX ORDER — INSULIN LISPRO 100/ML
22 VIAL (ML) SUBCUTANEOUS
Refills: 0 | Status: DISCONTINUED | OUTPATIENT
Start: 2020-02-24 | End: 2020-02-25

## 2020-02-24 RX ORDER — DEXTROSE 50 % IN WATER 50 %
12.5 SYRINGE (ML) INTRAVENOUS ONCE
Refills: 0 | Status: COMPLETED | OUTPATIENT
Start: 2020-02-24 | End: 2020-02-24

## 2020-02-24 RX ORDER — INSULIN GLARGINE 100 [IU]/ML
54 INJECTION, SOLUTION SUBCUTANEOUS AT BEDTIME
Refills: 0 | Status: DISCONTINUED | OUTPATIENT
Start: 2020-02-24 | End: 2020-02-24

## 2020-02-24 RX ORDER — CEFEPIME 1 G/1
2000 INJECTION, POWDER, FOR SOLUTION INTRAMUSCULAR; INTRAVENOUS DAILY
Refills: 0 | Status: DISCONTINUED | OUTPATIENT
Start: 2020-02-24 | End: 2020-02-25

## 2020-02-24 RX ADMIN — Medication 1 DROP(S): at 05:32

## 2020-02-24 RX ADMIN — CEFEPIME 100 MILLIGRAM(S): 1 INJECTION, POWDER, FOR SOLUTION INTRAMUSCULAR; INTRAVENOUS at 13:34

## 2020-02-24 RX ADMIN — PANTOPRAZOLE SODIUM 40 MILLIGRAM(S): 20 TABLET, DELAYED RELEASE ORAL at 05:33

## 2020-02-24 RX ADMIN — HEPARIN SODIUM 5000 UNIT(S): 5000 INJECTION INTRAVENOUS; SUBCUTANEOUS at 13:35

## 2020-02-24 RX ADMIN — Medication 0.5 MILLIGRAM(S): at 05:31

## 2020-02-24 RX ADMIN — Medication 3 MILLILITER(S): at 11:30

## 2020-02-24 RX ADMIN — Medication 3 MILLIGRAM(S): at 22:23

## 2020-02-24 RX ADMIN — OXYCODONE AND ACETAMINOPHEN 1 TABLET(S): 5; 325 TABLET ORAL at 03:45

## 2020-02-24 RX ADMIN — HEPARIN SODIUM 5000 UNIT(S): 5000 INJECTION INTRAVENOUS; SUBCUTANEOUS at 22:22

## 2020-02-24 RX ADMIN — Medication 50 MILLIGRAM(S): at 22:23

## 2020-02-24 RX ADMIN — POLYETHYLENE GLYCOL 3350 17 GRAM(S): 17 POWDER, FOR SOLUTION ORAL at 11:30

## 2020-02-24 RX ADMIN — Medication 3 MILLILITER(S): at 00:10

## 2020-02-24 RX ADMIN — Medication 667 MILLIGRAM(S): at 11:30

## 2020-02-24 RX ADMIN — Medication 22 UNIT(S): at 11:54

## 2020-02-24 RX ADMIN — Medication 81 MILLIGRAM(S): at 11:30

## 2020-02-24 RX ADMIN — Medication 50 MILLIGRAM(S): at 13:35

## 2020-02-24 RX ADMIN — Medication 0.5 MILLIGRAM(S): at 17:03

## 2020-02-24 RX ADMIN — Medication 22 UNIT(S): at 20:00

## 2020-02-24 RX ADMIN — Medication 1 SPRAY(S): at 22:21

## 2020-02-24 RX ADMIN — AMIODARONE HYDROCHLORIDE 200 MILLIGRAM(S): 400 TABLET ORAL at 05:32

## 2020-02-24 RX ADMIN — Medication 3: at 11:54

## 2020-02-24 RX ADMIN — INSULIN GLARGINE 40 UNIT(S): 100 INJECTION, SOLUTION SUBCUTANEOUS at 22:50

## 2020-02-24 RX ADMIN — HEPARIN SODIUM 5000 UNIT(S): 5000 INJECTION INTRAVENOUS; SUBCUTANEOUS at 05:31

## 2020-02-24 RX ADMIN — Medication 1: at 20:02

## 2020-02-24 RX ADMIN — TAMSULOSIN HYDROCHLORIDE 0.4 MILLIGRAM(S): 0.4 CAPSULE ORAL at 22:23

## 2020-02-24 RX ADMIN — OXYCODONE AND ACETAMINOPHEN 1 TABLET(S): 5; 325 TABLET ORAL at 04:30

## 2020-02-24 RX ADMIN — DAPTOMYCIN 112 MILLIGRAM(S): 500 INJECTION, POWDER, LYOPHILIZED, FOR SOLUTION INTRAVENOUS at 17:30

## 2020-02-24 RX ADMIN — Medication 3 MILLILITER(S): at 17:03

## 2020-02-24 RX ADMIN — ATORVASTATIN CALCIUM 40 MILLIGRAM(S): 80 TABLET, FILM COATED ORAL at 22:23

## 2020-02-24 RX ADMIN — Medication 1 DROP(S): at 17:04

## 2020-02-24 RX ADMIN — Medication 3 MILLILITER(S): at 23:05

## 2020-02-24 RX ADMIN — Medication 20 MILLIGRAM(S): at 05:32

## 2020-02-24 RX ADMIN — Medication 3 MILLILITER(S): at 05:31

## 2020-02-24 RX ADMIN — Medication 1 APPLICATION(S): at 05:33

## 2020-02-24 RX ADMIN — Medication 24 UNIT(S): at 08:00

## 2020-02-24 RX ADMIN — Medication 50 MILLIGRAM(S): at 05:32

## 2020-02-24 RX ADMIN — Medication 667 MILLIGRAM(S): at 08:01

## 2020-02-24 RX ADMIN — Medication 1 TABLET(S): at 11:30

## 2020-02-24 RX ADMIN — Medication 12.5 GRAM(S): at 05:20

## 2020-02-24 RX ADMIN — Medication 2: at 08:01

## 2020-02-24 RX ADMIN — Medication 667 MILLIGRAM(S): at 20:00

## 2020-02-24 NOTE — PROGRESS NOTE ADULT - SUBJECTIVE AND OBJECTIVE BOX
Subjective:  "My sugar was really low, they had to give me sugar"  OOB recliner chair, reports skin"tender"      Tele:  SR  80s           V/S:                    T(F): 97.9 (20 @ 05:20), Max: 97.9 (20 @ 05:20)  HR: 90 (20 @ 05:20) (90 - 101)  BP: 132/78 (20 @ 05:20) (127/69 - 136/78)  RR: 18 (20 @ 05:20) (18 - 18)  SpO2: 93% (20 @ 05:20) (92% - 99%)  Wt(kg): --      LV EF:  50%      Labs:      132<L>  |  95<L>  |  54<H>  ----------------------------<  169<H>  4.5   |  20<L>  |  1.83<H>    Ca    8.9      2020 06:46    TPro  6.4  /  Alb  1.9<L>  /  TBili  0.4  /  DBili  x   /  AST  29  /  ALT  38  /  AlkPhos  96                                 9.0    9.44  )-----------( 338      ( 2020 06:46 )             28.9             CAPILLARY BLOOD GLUCOSE      POCT Blood Glucose.: 245 mg/dL (2020 07:27)  POCT Blood Glucose.: 135 mg/dL (2020 05:35)  POCT Blood Glucose.: 53 mg/dL (2020 05:16)  POCT Blood Glucose.: 130 mg/dL (2020 21:32)  POCT Blood Glucose.: 176 mg/dL (2020 16:41)  POCT Blood Glucose.: 178 mg/dL (2020 11:58)           CXR:< from: Xray Chest 1 View- PORTABLE-Routine (20 @ 05:55) >  Clear lungs. Left pleural effusion, unchanged. No pneumothorax.  Status post sternotomy.   The visualized osseous and soft tissue structures demonstrate no acute pathology.    < end of copied text >      I&O's Detail    2020 07:01  -  2020 07:00  --------------------------------------------------------  IN:    Oral Fluid: 500 mL    Solution: 100 mL    Solution: 50 mL  Total IN: 650 mL    OUT:  Total OUT: 0 mL    Total NET: 650 mL      2020 07:01  -  2020 10:35  --------------------------------------------------------  IN:    Oral Fluid: 240 mL  Total IN: 240 mL    OUT:    Voided: 900 mL  Total OUT: 900 mL    Total NET: -660 mL      BOWEL MOVEMENT:  [x ] YES [ ] NO      Daily     Daily Weight in k.6 (2020 08:39)  Medications:  ALBUTerol    90 MICROgram(s) HFA Inhaler 1 Puff(s) Inhalation every 4 hours  albuterol/ipratropium for Nebulization 3 milliLiter(s) Nebulizer every 6 hours  albuterol/ipratropium for Nebulization. 3 milliLiter(s) Nebulizer every 6 hours PRN  aMIOdarone    Tablet 200 milliGRAM(s) Oral daily  aMIOdarone    Tablet   Oral   artificial tears (preservative free) Ophthalmic Solution 1 Drop(s) Both EYES two times a day  aspirin enteric coated 81 milliGRAM(s) Oral daily  atorvastatin 40 milliGRAM(s) Oral at bedtime  BACItracin   Ointment 1 Application(s) Topical two times a day  buDESOnide    Inhalation Suspension 0.5 milliGRAM(s) Inhalation two times a day  calcium acetate 667 milliGRAM(s) Oral three times a day with meals  cefepime   IVPB 2000 milliGRAM(s) IV Intermittent every 24 hours  DAPTOmycin IVPB      DAPTOmycin IVPB 300 milliGRAM(s) IV Intermittent every 24 hours  dextrose 40% Gel 15 Gram(s) Oral once PRN  dextrose 5%. 1000 milliLiter(s) IV Continuous <Continuous>  dextrose 50% Injectable 12.5 Gram(s) IV Push once  dextrose 50% Injectable 25 Gram(s) IV Push once  dextrose 50% Injectable 25 Gram(s) IV Push once  glucagon  Injectable 1 milliGRAM(s) IntraMuscular once PRN  heparin  Injectable 5000 Unit(s) SubCutaneous every 8 hours  hydrALAZINE 50 milliGRAM(s) Oral every 8 hours  insulin glargine Injectable (LANTUS) 54 Unit(s) SubCutaneous at bedtime  insulin lispro (HumaLOG) corrective regimen sliding scale   SubCutaneous three times a day before meals  insulin lispro (HumaLOG) corrective regimen sliding scale   SubCutaneous at bedtime  insulin lispro Injectable (HumaLOG) 22 Unit(s) SubCutaneous three times a day with meals  melatonin 3 milliGRAM(s) Oral at bedtime  multivitamin 1 Tablet(s) Oral daily  oxycodone    5 mG/acetaminophen 325 mG 1 Tablet(s) Oral every 6 hours PRN  pantoprazole    Tablet 40 milliGRAM(s) Oral before breakfast  polyethylene glycol 3350 17 Gram(s) Oral daily  sodium chloride 0.9% lock flush 10 milliLiter(s) IV Push every 1 hour PRN  tamsulosin 0.4 milliGRAM(s) Oral at bedtime  tiotropium 18 MICROgram(s) Capsule 1 Capsule(s) Inhalation daily  torsemide 20 milliGRAM(s) Oral daily        Physical Exam:    Neurology: alert and oriented x 3, nonfocal, no gross deficits    CV : S1S2    Sternal Wound :  Upper third prox pole with opening>serosang fluid draining  + click    Lungs: diminished left BS base  Room air    Abdomen: soft, nontender, nondistended, positive bowel sounds, 2/23    +bm       Extremities:    1+ edema B/l lower extrem  RLE lateral aspected with open draining wound> purulent  blanched tissue surrounding wound  along calf to shin  LLE shin with dried scab                       PAST MEDICAL & SURGICAL HISTORY:  HTN (hypertension)  Diabetes  History of appendectomy: x 30 yrs ago

## 2020-02-24 NOTE — CONSULT NOTE ADULT - ASSESSMENT
s/p CABG with sternal infection  will need sternal debridement and muscle flap coverage  pt consented  all questions answered  will schedule for OR for tomorrow

## 2020-02-24 NOTE — PROGRESS NOTE ADULT - ATTENDING COMMENTS
Agree with above NP note.  cv stable  cont diuretics for volume overload  abx per cts  creat stable  amio per cts

## 2020-02-24 NOTE — PROGRESS NOTE ADULT - ASSESSMENT
64 yr old with cri stage 4, DM, PVD, admitted and had CABG, Post op intubated and has bilateral effusions, worsening renal disease and bc positive for E. faecalis; follow up bc negative to date.  Call to see patient for discharge from sternal wound  Presently on treatment for RLE SSTI  CT chest with suspected postsurgical changes  No fevers, no leukocytosis  Overall,  1) Sternal wound,  infection  - Continue Dapto 300mg q 24  - Add Cefepime 2g q24    clearly has sternal wd drainage that is new  his leg looks better   awaiting cultures and  decision on debridement      -

## 2020-02-24 NOTE — PROGRESS NOTE ADULT - ASSESSMENT
65yo male with hx of HTN, DM II, presented to the ED with complaints of acute on chronic left sided exertional chest pain. Pt states he has been having left sided pressure and stabbing chest pain radiating to his sternum and right with activity for the past few months. Since admission- s/p cath with severe triple vessel disease, s/p CABG, post op course c/b brief witnessed PEA 2/6 likely hypoxic arrest, s/p intubation. ( CAD, s/p cath with severe triple vessel disease including lesions at the bifurcation of the LAD/diagonal and distal RCA/RPDA/RPL trifurcation.   -s/p CABG x 4, intraop kennedy ef 50%)--s/p ampicillin until 2/18 for enterococcus in blood, extubated 2/9, pigtail right 2/8 and left sided on 2/15-for effusion.  Remains sob-NO h/o resp issues prior to hospitalization.  ***Current sob-multifactorial-CAD/effusions, atelectasis due to pain, mild bronchospasm and debility.  ********************  2/21-slightly better, pig tail cath removed from left chest; CT chest done-loculated left effusion-slight better  2/24-BS issues-less sob-dapto/cefepime as per ID

## 2020-02-24 NOTE — PROGRESS NOTE ADULT - ATTENDING COMMENTS
as above-BS issues present  multifactorial dyspnea-CAD s/p CABG, PEA arrest, atelectasis due to pain, pleural effusion L-pig tail in place, bronchospasm, ?PE-O2 NC sat above 90%                     Pleural effusion-pig tail removed 2/20-CT chest NC-improved but still loculations on left-f/up 4-6 wks  CAD/CHF-diurese as cr allows-keep K/Mg above  atelectasis-pain control, incentive spirometry, acapella                    ? DVT/PE--s/p repeat venous dopplers-negative  bronchospasm-duoneb q 6, pulmicort .5 bid; out pt PFTs              ID-daptomycin/cefepime as per ID  snore-? osas--out pt SS  DVT/GI prophylaxis, PT, nutrition herman Hernandez MD-Pulmonary    [phone]

## 2020-02-24 NOTE — CONSULT NOTE ADULT - ATTENDING COMMENTS
Above noted  65 yo diabetic [place] male , s/p C4L on 2/3, PEA arrest 2/6 , and prior blood C&S with Enterococcus  Has sternal drainage   Right leg wounds are unlikely source of + BC  Edema of both legs , on Torsemide, no ischemia
63 yo [place] male with DM poorly controlled, HTN with likely CKD3  He has echogenic kidney on u/s and has diabetic retinopathy and neuropathy with nephropathy  Discussed with pt the risk of contrast nephropathy however the risk is outweighed by the benefit of intervention  No objection to proceeding with cath . pt agreeable to cath and understand the risks  Hydrate NS 1cc/kg/hr 6 hours pre -angio  Complete the rest of the serologic workup for CKD as outlined above    Chacha Dimas MD  Off: [phone]  Cell: [phone]
Will increase Lantus to 30u at bedtime.  Will adjust Humalog to be 6u before breakfast and 8u before lunch/dinner. Will continue Humalog correction scale coverage.
as above-  multifactorial dyspnea-CAD s/p CABG, PEA arrest, atelectasis due to pain, pleural effusion L-pig tail in place, bronchospasm, ?PE-O2 NC sat above 90%  CAD/CHF-diurese as cr allows-keep K/Mg above  atelectasis-pain control, incentive spirometry, acapella                    ? DVT/PE--repeat venous dopplers, D-dimer/BNP and VQ scan (elev CR)  bronchospasm-duoneb q 6, pulmicort .5 bid; out pt PFTs  snore-? osas--out pt SS  DVT/GI prophylaxis, PT, nutrition herman Hernandez MD-Pulmonary    [phone]

## 2020-02-24 NOTE — PROGRESS NOTE ADULT - PROBLEM SELECTOR PLAN 1
Patient with sternal wound infection; Tight glycemic control necessary.  Will decrease Lantus to 54 units at bed time.  Will decrease Humalog to 22 units before each meal and continue Humalog correction scale coverage. Will continue monitoring FS and FU.  Patient was on insulin at home, will be DC on insulin.  Patient and family counseled for compliance with consistent low carb diet, not to bring food from home. Patient with sternal wound infection; Tight glycemic control necessary.  Will decrease Lantus to 54 units at bed time.  Will decrease Humalog to 22 units before each meal and continue Humalog correction scale coverage. Will continue monitoring FS and FU.  Patient was on insulin at home, will be DC on insulin.  Patient and family counseled for tight sugar control, compliance with consistent low carb diet, not to bring food from home.

## 2020-02-24 NOTE — CONSULT NOTE ADULT - SUBJECTIVE AND OBJECTIVE BOX
Plastic Surgery Consult Note  ([phone])    HPI:  65yo male with hx of HTN, DM II, presented to the ED with complaints of acute on chronic left sided exertional chest pain.  Pt is s/p CABG on 2/3; PEA arrest on 2/6   + enterococcus Bld  C/S > started ampicillin q8h.  Pt noted to have sternal wound drainage over past 24 hrs and plastic surgery consulted.  Pt and wife states no fevers but occasional chills.  Feels lethargic.      PAST MEDICAL & SURGICAL HISTORY:  HTN (hypertension)  Diabetes  History of appendectomy: x 30 yrs ago      Allergies    No Known Allergies    Intolerances        Home Medications:  Artificial Tears ophthalmic solution: 1 drop(s) to each affected eye , As Needed (25 Jan 2020 12:55)  Daily Gadiel oral tablet: 1 tab(s) orally once a day (25 Jan 2020 12:55)  glyBURIDE 5 mg oral tablet: 2 tab(s) orally 2 times a day (25 Jan 2020 12:55)  lisinopril 20 mg oral tablet: 1 tab(s) orally once a day (25 Jan 2020 12:55)  metFORMIN 1000 mg oral tablet: 0.5 tab(s) orally 2 times a day (25 Jan 2020 12:55)  Naprosyn 500 mg oral tablet: 1 tab(s) orally , As Needed (25 Jan 2020 11:02)  Tresiba FlexTouch 100 units/mL subcutaneous solution: 30 unit(s) subcutaneous once a day (at bedtime) (25 Jan 2020 12:55)  vitamin B Complex: 1 tab(s) orally once a day (25 Jan 2020 12:55)      MEDICATIONS  (STANDING):  ALBUTerol    90 MICROgram(s) HFA Inhaler 1 Puff(s) Inhalation every 4 hours  albuterol/ipratropium for Nebulization 3 milliLiter(s) Nebulizer every 6 hours  aMIOdarone    Tablet 200 milliGRAM(s) Oral daily  aMIOdarone    Tablet   Oral   artificial tears (preservative free) Ophthalmic Solution 1 Drop(s) Both EYES two times a day  aspirin enteric coated 81 milliGRAM(s) Oral daily  atorvastatin 40 milliGRAM(s) Oral at bedtime  buDESOnide    Inhalation Suspension 0.5 milliGRAM(s) Inhalation two times a day  calcium acetate 667 milliGRAM(s) Oral three times a day with meals  cefepime   IVPB 2000 milliGRAM(s) IV Intermittent daily  DAPTOmycin IVPB      DAPTOmycin IVPB 300 milliGRAM(s) IV Intermittent every 24 hours  dextrose 5%. 1000 milliLiter(s) (50 mL/Hr) IV Continuous <Continuous>  dextrose 50% Injectable 12.5 Gram(s) IV Push once  dextrose 50% Injectable 25 Gram(s) IV Push once  dextrose 50% Injectable 25 Gram(s) IV Push once  heparin  Injectable 5000 Unit(s) SubCutaneous every 8 hours  hydrALAZINE 50 milliGRAM(s) Oral every 8 hours  insulin glargine Injectable (LANTUS) 54 Unit(s) SubCutaneous at bedtime  insulin lispro (HumaLOG) corrective regimen sliding scale   SubCutaneous three times a day before meals  insulin lispro (HumaLOG) corrective regimen sliding scale   SubCutaneous at bedtime  insulin lispro Injectable (HumaLOG) 22 Unit(s) SubCutaneous three times a day with meals  melatonin 3 milliGRAM(s) Oral at bedtime  multivitamin 1 Tablet(s) Oral daily  pantoprazole    Tablet 40 milliGRAM(s) Oral before breakfast  polyethylene glycol 3350 17 Gram(s) Oral daily  tamsulosin 0.4 milliGRAM(s) Oral at bedtime  tiotropium 18 MICROgram(s) Capsule 1 Capsule(s) Inhalation daily  torsemide 20 milliGRAM(s) Oral daily      SOCIAL HISTORY:    FAMILY HISTORY:  FH: type 2 diabetes      ___________________________________________  REVIEW OF SYSTEMS:    Constitutional: No fevers, chills, no recent weight loss  ENMT: No changes in hearing, no changes in vision, no sore throat, no cough  Respiratory: No shortness of breath  Cardiovascular: No chest pain, palpitations  Gastrointestinal: No abdominal pain, no diarrhea/constipation  Genitourinary: No dysuria, frequency, or urgency    Extremities: No joint swelling, no limited range of movement  Neurological: No paresthesia  Skin: No rashes    ___________________________________________  OBJECTIVE:  Vital Signs Last 24 Hrs  T(C): 36.6 (24 Feb 2020 12:46), Max: 36.6 (24 Feb 2020 05:20)  T(F): 97.8 (24 Feb 2020 12:46), Max: 97.9 (24 Feb 2020 05:20)  HR: 100 (24 Feb 2020 12:46) (89 - 101)  BP: 149/79 (24 Feb 2020 12:46) (127/69 - 149/79)  BP(mean): --  RR: 18 (24 Feb 2020 12:46) (18 - 18)  SpO2: 94% (24 Feb 2020 12:46) (92% - 94%)CAPILLARY BLOOD GLUCOSE      POCT Blood Glucose.: 263 mg/dL (24 Feb 2020 11:38)    I&O's Detail    23 Feb 2020 07:01  -  24 Feb 2020 07:00  --------------------------------------------------------  IN:    Oral Fluid: 500 mL    Solution: 100 mL    Solution: 50 mL  Total IN: 650 mL    OUT:  Total OUT: 0 mL    Total NET: 650 mL      24 Feb 2020 07:01  -  24 Feb 2020 16:33  --------------------------------------------------------  IN:    Oral Fluid: 480 mL    Solution: 50 mL  Total IN: 530 mL    OUT:    Voided: 1725 mL  Total OUT: 1725 mL    Total NET: -1195 mL          PHYSICAL EXAM:    General: Alert, NAD  Neuro:  HEENT:   Chest:  sternal incision with some drainage mid-incision, non tender  Extremities:  MSK:   ____________________________________________  LABS:  CBC Full  -  ( 24 Feb 2020 06:46 )  WBC Count : 9.44 K/uL  RBC Count : 3.21 M/uL  Hemoglobin : 9.0 g/dL  Hematocrit : 28.9 %  Platelet Count - Automated : 338 K/uL  Mean Cell Volume : 90.0 fl  Mean Cell Hemoglobin : 28.0 pg  Mean Cell Hemoglobin Concentration : 31.1 gm/dL  Auto Neutrophil # : x  Auto Lymphocyte # : x  Auto Monocyte # : x  Auto Eosinophil # : x  Auto Basophil # : x  Auto Neutrophil % : x  Auto Lymphocyte % : x  Auto Monocyte % : x  Auto Eosinophil % : x  Auto Basophil % : x    02-24    132<L>  |  95<L>  |  54<H>  ----------------------------<  169<H>  4.5   |  20<L>  |  1.83<H>    Ca    8.9      24 Feb 2020 06:46    TPro  6.4  /  Alb  1.9<L>  /  TBili  0.4  /  DBili  x   /  AST  29  /  ALT  38  /  AlkPhos  96  02-24    LIVER FUNCTIONS - ( 24 Feb 2020 06:46 )  Alb: 1.9 g/dL / Pro: 6.4 g/dL / ALK PHOS: 96 U/L / ALT: 38 U/L / AST: 29 U/L / GGT: x                   ____________________________________________  MICRO:    ____________________________________________  RADIOLOGY:

## 2020-02-24 NOTE — PROGRESS NOTE ADULT - SUBJECTIVE AND OBJECTIVE BOX
CHIEF COMPLAINT:  f/up sob, resp failure, pleural effusions, atelectasis-breathing better over all    Interval Events: BS issues-low    REVIEW OF SYSTEMS:  Constitutional: No fevers or chills. No weight loss. + fatigue or generalized malaise.  Eyes: No itching or discharge from the eyes  ENT: No ear pain. No ear discharge. No nasal congestion. No post nasal drip. No epistaxis. No throat pain. No sore throat. No difficulty swallowing.   CV: No chest pain. No palpitations. No lightheadedness or dizziness.   Resp: No dyspnea at rest. + dyspnea on exertion. No orthopnea. No wheezing. No cough. No stridor. No sputum production. No chest pain with respiration.  GI: No nausea. No vomiting. No diarrhea.  MSK: No joint pain or pain in any extremities  Integumentary: No skin lesions. + pedal edema.  Neurological: + gross motor weakness. No sensory changes.  [+ ] All other systems negative  [ ] Unable to assess ROS because ________    OBJECTIVE:  ICU Vital Signs Last 24 Hrs  T(C): 36.4 (23 Feb 2020 20:54), Max: 36.8 (23 Feb 2020 09:20)  T(F): 97.5 (23 Feb 2020 20:54), Max: 98.2 (23 Feb 2020 09:20)  HR: 95 (24 Feb 2020 00:22) (95 - 101)  BP: 127/69 (23 Feb 2020 20:54) (127/69 - 150/75)  BP(mean): --  ABP: --  ABP(mean): --  RR: 18 (23 Feb 2020 20:54) (18 - 18)  SpO2: 94% (24 Feb 2020 00:22) (92% - 99%)        02-22 @ 07:01  -  02-23 @ 07:00  --------------------------------------------------------  IN: 930 mL / OUT: 1200 mL / NET: -270 mL    02-23 @ 07:01  -  02-24 @ 05:13  --------------------------------------------------------  IN: 450 mL / OUT: 0 mL / NET: 450 mL      CAPILLARY BLOOD GLUCOSE      POCT Blood Glucose.: 130 mg/dL (23 Feb 2020 21:32)      PHYSICAL EXAM: NAD in chair on RA  General: Awake, alert, oriented X 3.   HEENT: Atraumatic, normocephalic.                 Mallampatti Grade 3                No nasal congestion.                No tonsillar or pharyngeal exudates.  Lymph Nodes: No palpable lymphadenopathy  Neck: No JVD. No carotid bruit.   Respiratory: abnormal chest expansion-reduced BS bases                         Normal percussion                         Normal and equal air entry                         No wheeze, rhonchi or rales.  Cardiovascular: S1 S2 normal. No murmurs, rubs or gallops.   Abdomen: Soft, non-tender, non-distended. No organomegaly. Normoactive bowel sounds.  Extremities: Warm to touch. Peripheral pulse palpable. + leg/ pedal edema.   Skin: No rashes or skin lesions  Neurological: Motor and sensory examination equal and normal in all four extremities.  Psychiatry: Appropriate mood and affect.    HOSPITAL MEDICATIONS:  MEDICATIONS  (STANDING):  ALBUTerol    90 MICROgram(s) HFA Inhaler 1 Puff(s) Inhalation every 4 hours  albuterol/ipratropium for Nebulization 3 milliLiter(s) Nebulizer every 6 hours  aMIOdarone    Tablet 200 milliGRAM(s) Oral daily  aMIOdarone    Tablet   Oral   artificial tears (preservative free) Ophthalmic Solution 1 Drop(s) Both EYES two times a day  aspirin enteric coated 81 milliGRAM(s) Oral daily  atorvastatin 40 milliGRAM(s) Oral at bedtime  BACItracin   Ointment 1 Application(s) Topical two times a day  buDESOnide    Inhalation Suspension 0.5 milliGRAM(s) Inhalation two times a day  calcium acetate 667 milliGRAM(s) Oral three times a day with meals  cefepime   IVPB 2000 milliGRAM(s) IV Intermittent every 24 hours  DAPTOmycin IVPB      DAPTOmycin IVPB 300 milliGRAM(s) IV Intermittent every 24 hours  dextrose 5%. 1000 milliLiter(s) (50 mL/Hr) IV Continuous <Continuous>  dextrose 50% Injectable 12.5 Gram(s) IV Push once  dextrose 50% Injectable 25 Gram(s) IV Push once  dextrose 50% Injectable 25 Gram(s) IV Push once  heparin  Injectable 5000 Unit(s) SubCutaneous every 8 hours  hydrALAZINE 50 milliGRAM(s) Oral every 8 hours  insulin glargine Injectable (LANTUS) 60 Unit(s) SubCutaneous at bedtime  insulin lispro (HumaLOG) corrective regimen sliding scale   SubCutaneous three times a day before meals  insulin lispro (HumaLOG) corrective regimen sliding scale   SubCutaneous at bedtime  insulin lispro Injectable (HumaLOG) 24 Unit(s) SubCutaneous three times a day with meals  melatonin 3 milliGRAM(s) Oral at bedtime  multivitamin 1 Tablet(s) Oral daily  pantoprazole    Tablet 40 milliGRAM(s) Oral before breakfast  polyethylene glycol 3350 17 Gram(s) Oral daily  tamsulosin 0.4 milliGRAM(s) Oral at bedtime  tiotropium 18 MICROgram(s) Capsule 1 Capsule(s) Inhalation daily  torsemide 20 milliGRAM(s) Oral daily    MEDICATIONS  (PRN):  albuterol/ipratropium for Nebulization. 3 milliLiter(s) Nebulizer every 6 hours PRN Shortness of Breath and/or Wheezing  dextrose 40% Gel 15 Gram(s) Oral once PRN Blood Glucose LESS THAN 70 milliGRAM(s)/deciliter  glucagon  Injectable 1 milliGRAM(s) IntraMuscular once PRN Glucose LESS THAN 70 milligrams/deciliter  oxycodone    5 mG/acetaminophen 325 mG 1 Tablet(s) Oral every 6 hours PRN Moderate Pain (4 - 6)  sodium chloride 0.9% lock flush 10 milliLiter(s) IV Push every 1 hour PRN Pre/post blood products, medications, blood draw, and to maintain line patency      LABS:                        9.2    10.13 )-----------( 378      ( 23 Feb 2020 10:20 )             28.9     02-23    131<L>  |  96  |  67<H>  ----------------------------<  161<H>  5.3   |  24  |  1.82<H>    Ca    8.8      23 Feb 2020 07:30      PT/INR - ( 22 Feb 2020 06:18 )   PT: 13.5 sec;   INR: 1.18 ratio         PTT - ( 22 Feb 2020 06:18 )  PTT:31.4 sec          MICROBIOLOGY:     RADIOLOGY: < from: [facility] Duplex Lower Ext Vein Scan, Bilat (02.22.20 @ 23:36) >  INTERPRETATION:  CLINICAL INDICATION: Shortness of breath, assess DVT.    TECHNIQUE: Grayscale, color Doppler and spectral Doppler ultrasound was utilized to evaluate bilateral lower extremity deep venous system.      COMPARISON: 2/7/2020.    FINDINGS: There is no thrombosis in bilateral common femoral veins, femoral veins or popliteal veins. Visualized calf veins are patent. There is bilateral leg soft tissue edema.    IMPRESSION:     No deep vein thrombosis in either lower extremity.    < end of copied text >    [ ] Reviewed and interpreted by me    Point of Care Ultrasound Findings:    PFT:    EKG:

## 2020-02-24 NOTE — CONSULT NOTE ADULT - SUBJECTIVE AND OBJECTIVE BOX
Wound Surgery Consult Note:    HPI:  65yo male with hx of HTN, DM II, presented to the ED with complaints of acute on chronic left sided exertional chest pain. Pt states he has been having left sided pressure and stabbing chest pain radiating to his sternum and right with activity for the past few months. He states each episode last approximately 1-2 mins and subsides upon resting. He denies associated symptoms of radiation to his back, arm or jaw. He recently was at a wedding which he could not enjoy because dancing would reproduce to the pain. He states he did not pursue and medical attention until this time. Pt states this time his pain was associated with sob up exertion. He denies symptoms of LOC, diaphoresis, palpitations, abd pain, N/V, fever or chills. He denies prior cardiac work up. (25 Jan 2020 12:57)    Mr. Bertrand was encountered sitting in a reclining chair comfortably. He was seen with Dr. Potter.    PAST MEDICAL & SURGICAL HISTORY:  HTN (hypertension)  Diabetes  History of appendectomy: x 30 yrs ago    REVIEW OF SYSTEMS  General:	no fevers or chills  Respiratory and Thorax: + SOB with exertion/coughing with talking  Cardiovascular:	+CP, s/p C4 LIMA   Gastrointestinal:	no n/v  Genitourinary:	swollen testicles  Musculoskeletal:	 ambulatory  Neurological:	no LOC  Endocrine:	DM  Skin: Right medial knee incision with eschar, right lower leg wound, BLE spots of hyperpigmentation    Allergic/Immunologic:	  Pt unable to offer  Skin/ MSK: see HPI  All other systems negative    MEDICATIONS  (STANDING):  ALBUTerol    90 MICROgram(s) HFA Inhaler 1 Puff(s) Inhalation every 4 hours  albuterol/ipratropium for Nebulization 3 milliLiter(s) Nebulizer every 6 hours  aMIOdarone    Tablet 200 milliGRAM(s) Oral daily  aMIOdarone    Tablet   Oral   artificial tears (preservative free) Ophthalmic Solution 1 Drop(s) Both EYES two times a day  aspirin enteric coated 81 milliGRAM(s) Oral daily  atorvastatin 40 milliGRAM(s) Oral at bedtime  BACItracin   Ointment 1 Application(s) Topical two times a day  buDESOnide    Inhalation Suspension 0.5 milliGRAM(s) Inhalation two times a day  calcium acetate 667 milliGRAM(s) Oral three times a day with meals  cefepime   IVPB 2000 milliGRAM(s) IV Intermittent every 24 hours  DAPTOmycin IVPB      DAPTOmycin IVPB 300 milliGRAM(s) IV Intermittent every 24 hours  dextrose 5%. 1000 milliLiter(s) (50 mL/Hr) IV Continuous <Continuous>  dextrose 50% Injectable 12.5 Gram(s) IV Push once  dextrose 50% Injectable 25 Gram(s) IV Push once  dextrose 50% Injectable 25 Gram(s) IV Push once  heparin  Injectable 5000 Unit(s) SubCutaneous every 8 hours  hydrALAZINE 50 milliGRAM(s) Oral every 8 hours  insulin glargine Injectable (LANTUS) 54 Unit(s) SubCutaneous at bedtime  insulin lispro (HumaLOG) corrective regimen sliding scale   SubCutaneous three times a day before meals  insulin lispro (HumaLOG) corrective regimen sliding scale   SubCutaneous at bedtime  insulin lispro Injectable (HumaLOG) 22 Unit(s) SubCutaneous three times a day with meals  melatonin 3 milliGRAM(s) Oral at bedtime  multivitamin 1 Tablet(s) Oral daily  pantoprazole    Tablet 40 milliGRAM(s) Oral before breakfast  polyethylene glycol 3350 17 Gram(s) Oral daily  tamsulosin 0.4 milliGRAM(s) Oral at bedtime  tiotropium 18 MICROgram(s) Capsule 1 Capsule(s) Inhalation daily  torsemide 20 milliGRAM(s) Oral daily    MEDICATIONS  (PRN):  albuterol/ipratropium for Nebulization. 3 milliLiter(s) Nebulizer every 6 hours PRN Shortness of Breath and/or Wheezing  dextrose 40% Gel 15 Gram(s) Oral once PRN Blood Glucose LESS THAN 70 milliGRAM(s)/deciliter  glucagon  Injectable 1 milliGRAM(s) IntraMuscular once PRN Glucose LESS THAN 70 milligrams/deciliter  oxycodone    5 mG/acetaminophen 325 mG 1 Tablet(s) Oral every 6 hours PRN Moderate Pain (4 - 6)  sodium chloride 0.9% lock flush 10 milliLiter(s) IV Push every 1 hour PRN Pre/post blood products, medications, blood draw, and to maintain line patency    Allergies    No Known Allergies    Intolerances    SOCIAL HISTORY:  ; lives with spouse; Denies smoking, ETOH, drugs    FAMILY HISTORY:  FH: type 2 diabetes    Vital Signs Last 24 Hrs  T(C): 36.6 (24 Feb 2020 05:20), Max: 36.6 (24 Feb 2020 05:20)  T(F): 97.9 (24 Feb 2020 05:20), Max: 97.9 (24 Feb 2020 05:20)  HR: 89 (24 Feb 2020 10:46) (89 - 101)  BP: 132/78 (24 Feb 2020 05:20) (127/69 - 136/78)  BP(mean): --  RR: 18 (24 Feb 2020 05:20) (18 - 18)  SpO2: 94% (24 Feb 2020 10:46) (92% - 99%)    Physical Exam:  General: A&Ox3  Respiratory: dyspnea with speaking on room air  Gastrointestinal: soft NT/ND   Neurology: sensation grossly intact  Musculoskeletal:  no contractures or deformities  Vascular: BLE edema R>L, DP/PT pulses not manually palpable, BLE equally warm, no acute ischemia noted  Skin: Right lower leg with blanchable erythema from just below knee to ankle on the anterior to medial aspects, right medial lower leg ulcer - L 6cm xW 5cm x D 0.1cm in an irregular shape, wound bed with adherent, yellow slough covering 80%, moderate serosanguinous drainage, No odor, increased warmth, tenderness, induration, fluctuance  Scattered small areas of hyperpigmented skin on both lower legs  Right medial leg just below the knee with an incision with small amount of eschar.    LABS:  02-24    132<L>  |  95<L>  |  54<H>  ----------------------------<  169<H>  4.5   |  20<L>  |  1.83<H>    Ca    8.9      24 Feb 2020 06:46    TPro  6.4  /  Alb  1.9<L>  /  TBili  0.4  /  DBili  x   /  AST  29  /  ALT  38  /  AlkPhos  96  02-24                          9.0    9.44  )-----------( 338      ( 24 Feb 2020 06:46 )             28.9           RADIOLOGY & ADDITIONAL STUDIES:    EXAM:  DUPLEX SCAN EXT VEINS LOWER BI                        PROCEDURE DATE:  02/22/2020    INTERPRETATION:  CLINICAL INDICATION: Shortness of breath, assess DVT.    TECHNIQUE: Grayscale, color Doppler and spectral Doppler ultrasound was utilized to evaluate bilateral lower extremity deep venous system.      COMPARISON: 2/7/2020.    FINDINGS: There is no thrombosis in bilateral common femoral veins, femoral veins or popliteal veins. Visualized calf veins are patent. There is bilateral leg soft tissue edema.    IMPRESSION:     No deep vein thrombosis in either lower extremity.

## 2020-02-24 NOTE — PROGRESS NOTE ADULT - ASSESSMENT
63yo male with hx of HTN, DM II   s/p C4L on 2/3; PEA arrest on 2/6   + enterococcus Bld  C/S > started ampicillin q8h, ID consulted,  R pigtail for effusion  2/8    Extubated  2/9  2/15 L pigtail effusion  2/17 PRBC   2/18 Tx 2 Diaz  2/19 thomas removed, trial void. Maintain left pigtail for significant output. Pt encouraged to ambulate. Supplemental o2 sat NC weaned to 2L. Blood cultures repeated.  2/20 VSS -Pulmonary consult - appreciated - duonebs ATC q6h & pulmicort bid initiated - ddimer drawn.  pt w/ hx negative LE dopplers   will order non con chest DT as pt has a loculated left effusion that will require tap at IR.  2/21 - NON con Chest CT done - multiple loculated Left pleural effusions w/ patchy consolidation R - rounds made w/ Dr. carl.  ID - consult called re: Ct - WBC 11 afebrile.  +PALMA.  unable to tolerate VQ scan this am.  will d/c bumex & start Torsemide 20mg po daily  d/c planning rehab when medically stable  2/22  Wound care consult leg wounds> shower w/ local skin care  2/23  SW drainage> on Dapto> as per ID will cover SWD  CXR this am   Tighter glycemic control  2/24 ID added cefapime SW drainage purulent, afebrile

## 2020-02-24 NOTE — PROGRESS NOTE ADULT - ASSESSMENT
intraop eknnedy 2/3/20 ef 50%, nl lv, mild diastolic dysfx stage 1  limited echo 2/4/20: nl LV sys fx , no pericardial effusion     a/p  64 year old man with history of HTN, DM II, admitted with progressive exertional angina, s/p cath with severe triple vessel disease, s/p CABG, post op course c/b brief witnessed PEA likely hypoxic arrest, s/p intubation.     1. CAD, s/p cath with severe triple vessel disease including lesions at the bifurcation of the LAD/diagonal and distal RCA/RPDA/RPL trifurcation.   -s/p CABG x 4, intraop kennedy ef 50%  -cont asa, statin, BB   -s/p PEA arrest, now extubated  -off vasopressors  -LE dopplers, negative for dvt   -repeat echo 2/15 with preserved lv fxn/EF    2. Postop acute diastolic HF  -mildly fluid overloaded on exam   -chest xray noted, s/p bumex, on torsemide PO per CTS     3. PAF  -cont amio, bb   -AC per CTS    4. HTN  -bp stable on hydralazine/bb    5. STEPHANIE/CKD  renal f/u     6. Postop Pleural effusion  -S/P Right chest tube:  removed   -s/p L chest tube, mgmt per CTS  -d-dimer elevated/ Pulm f/u  -NON con Chest CT done - multiple loculated Left pleural effusions w/ patchy consolidation R   -unable to tolerate VQ scan   -LE doppler to r/o DVT     7. Sepsis -E. Faecalis bacteremia  IV abx per CTICU, repeat bcx 2/13 neg  ID following      dvt ppx intraop kennedy 2/3/20 ef 50%, nl lv, mild diastolic dysfx stage 1  limited echo 2/4/20: nl LV sys fx , no pericardial effusion     a/p  64 year old man with history of HTN, DM II, admitted with progressive exertional angina, s/p cath with severe triple vessel disease, s/p CABG, post op course c/b brief witnessed PEA likely hypoxic arrest, s/p intubation.     1. CAD, s/p cath with severe triple vessel disease including lesions at the bifurcation of the LAD/diagonal and distal RCA/RPDA/RPL trifurcation.   -s/p CABG x 4, intraop kennedy ef 50%  -cont asa, statin, BB   -s/p PEA arrest, now extubated  -off vasopressors  -LE dopplers, negative for dvt   -repeat echo 2/15 with preserved lv fxn/EF    2. Postop acute diastolic HF  -mildly fluid overloaded on exam   -chest xray noted, s/p bumex, on torsemide PO per CTS     3. PAF  -cont amio   -AC per CTS  -resume bb per cts    4. HTN  -bp stable on hydralazine/bb    5. STEPHANIE/CKD  renal f/u     6. Postop Pleural effusion  -S/P Right chest tube:  removed   -s/p L chest tube, mgmt per CTS  -d-dimer elevated/ Pulm f/u  -NON con Chest CT done - multiple loculated Left pleural effusions w/ patchy consolidation R   -unable to tolerate VQ scan   -LE doppler to r/o DVT     7. Sepsis -E. Faecalis bacteremia  IV abx per CTICU, repeat bcx 2/13 neg  ID following      dvt ppx

## 2020-02-24 NOTE — CONSULT NOTE ADULT - PROVIDER SPECIALTY LIST ADULT
CT Surgery
Nephrology
Cardiology
Infectious Disease
Plastic Surgery
Wound Care
Endocrinology
Pulmonology

## 2020-02-24 NOTE — PROGRESS NOTE ADULT - PROBLEM SELECTOR PLAN 1
multifactorial-CAD/CHF, atelectasis due to pain, pleural effusion, bronchospasm, ? PE--O2 NC sat above 90%  f/up doppler of LE negative.

## 2020-02-24 NOTE — PROGRESS NOTE ADULT - SUBJECTIVE AND OBJECTIVE BOX
infectious diseases progress note:    Patient is a 64y old  Male who presents with a chief complaint of Chest pain (24 Feb 2020 05:12)        Acute ischemic heart disease             Allergies    No Known Allergies    Intolerances        ANTIBIOTICS/RELEVANT:  antimicrobials  cefepime   IVPB 2000 milliGRAM(s) IV Intermittent every 24 hours  DAPTOmycin IVPB      DAPTOmycin IVPB 300 milliGRAM(s) IV Intermittent every 24 hours    immunologic:    OTHER:  ALBUTerol    90 MICROgram(s) HFA Inhaler 1 Puff(s) Inhalation every 4 hours  albuterol/ipratropium for Nebulization 3 milliLiter(s) Nebulizer every 6 hours  albuterol/ipratropium for Nebulization. 3 milliLiter(s) Nebulizer every 6 hours PRN  aMIOdarone    Tablet 200 milliGRAM(s) Oral daily  aMIOdarone    Tablet   Oral   artificial tears (preservative free) Ophthalmic Solution 1 Drop(s) Both EYES two times a day  aspirin enteric coated 81 milliGRAM(s) Oral daily  atorvastatin 40 milliGRAM(s) Oral at bedtime  BACItracin   Ointment 1 Application(s) Topical two times a day  buDESOnide    Inhalation Suspension 0.5 milliGRAM(s) Inhalation two times a day  calcium acetate 667 milliGRAM(s) Oral three times a day with meals  dextrose 40% Gel 15 Gram(s) Oral once PRN  dextrose 5%. 1000 milliLiter(s) IV Continuous <Continuous>  dextrose 50% Injectable 12.5 Gram(s) IV Push once  dextrose 50% Injectable 25 Gram(s) IV Push once  dextrose 50% Injectable 25 Gram(s) IV Push once  glucagon  Injectable 1 milliGRAM(s) IntraMuscular once PRN  heparin  Injectable 5000 Unit(s) SubCutaneous every 8 hours  hydrALAZINE 50 milliGRAM(s) Oral every 8 hours  insulin glargine Injectable (LANTUS) 60 Unit(s) SubCutaneous at bedtime  insulin lispro (HumaLOG) corrective regimen sliding scale   SubCutaneous three times a day before meals  insulin lispro (HumaLOG) corrective regimen sliding scale   SubCutaneous at bedtime  insulin lispro Injectable (HumaLOG) 24 Unit(s) SubCutaneous three times a day with meals  melatonin 3 milliGRAM(s) Oral at bedtime  multivitamin 1 Tablet(s) Oral daily  oxycodone    5 mG/acetaminophen 325 mG 1 Tablet(s) Oral every 6 hours PRN  pantoprazole    Tablet 40 milliGRAM(s) Oral before breakfast  polyethylene glycol 3350 17 Gram(s) Oral daily  sodium chloride 0.9% lock flush 10 milliLiter(s) IV Push every 1 hour PRN  tamsulosin 0.4 milliGRAM(s) Oral at bedtime  tiotropium 18 MICROgram(s) Capsule 1 Capsule(s) Inhalation daily  torsemide 20 milliGRAM(s) Oral daily      Objective:  Vital Signs Last 24 Hrs  T(C): 36.6 (24 Feb 2020 05:20), Max: 36.8 (23 Feb 2020 09:20)  T(F): 97.9 (24 Feb 2020 05:20), Max: 98.2 (23 Feb 2020 09:20)  HR: 90 (24 Feb 2020 05:20) (90 - 101)  BP: 132/78 (24 Feb 2020 05:20) (127/69 - 150/75)  BP(mean): --  RR: 18 (24 Feb 2020 05:20) (18 - 18)  SpO2: 93% (24 Feb 2020 05:20) (92% - 99%)     distress  Eyes:DANIELA, EOMI  Ear/Nose/Throat: no oral lesion, no sinus tenderness on percussion	  Neck:no JVD, no lymphadenopathy, supple  Respiratory: CTA lanre  Cardiovascular: S1S2 RRR, no murmurs  Gastrointestinal:soft, (+) BS, no HSM  Extremities:no e/e/c        LABS:                        9.0    9.44  )-----------( 338      ( 24 Feb 2020 06:46 )             28.9     02-24    132<L>  |  95<L>  |  54<H>  ----------------------------<  169<H>  4.5   |  20<L>  |  1.83<H>    Ca    8.9      24 Feb 2020 06:46    TPro  6.4  /  Alb  1.9<L>  /  TBili  0.4  /  DBili  x   /  AST  29  /  ALT  38  /  AlkPhos  96  02-24            MICROBIOLOGY:    RECENT CULTURES:  02-19 @ 15:07 .Blood Blood                No growth to date.          RESPIRATORY CULTURES:              RADIOLOGY & ADDITIONAL STUDIES:        Pager [phone]  After 5 pm/weekends or if no response :[phone]

## 2020-02-24 NOTE — CONSULT NOTE ADULT - ASSESSMENT
Impression:    Right medial lower leg wound    Recommend:  1.) topical therapy: right medial lower leg - cleanse with sterile water, pat dry, apply woundres wound gel, cover with Acticoat Flex 3 mesh, then DSD, secure with allevyn Q 3 days  2.) BLE elevation  3.) Abx per primary team/ID  4.) F/U vascular for donor site  5.) Nutrition optimization  6.) Glycemic control    Will follow with primary team  [facility]  [address] [place] [address], [address]  [place] [address]  Tel [phone]  Seen with Dr. Potter and discussed with clinical nurse  Thank you for this consult.  Merna Orozco, NP-C, CWOCN [id number]

## 2020-02-24 NOTE — PROGRESS NOTE ADULT - SUBJECTIVE AND OBJECTIVE BOX
CARDIOLOGY FOLLOW UP - Dr. Steel    CC  no cp/sob  oob, cough improved, events noted , abx added for  purulent drainage as SW     PHYSICAL EXAM:  T(C): 36.6 (02-24-20 @ 05:20), Max: 36.6 (02-24-20 @ 05:20)  HR: 89 (02-24-20 @ 10:46) (89 - 101)  BP: 132/78 (02-24-20 @ 05:20) (127/69 - 132/78)  RR: 18 (02-24-20 @ 05:20) (18 - 18)  SpO2: 94% (02-24-20 @ 10:46) (92% - 94%)  Wt(kg): --  I&O's Summary    23 Feb 2020 07:01  -  24 Feb 2020 07:00  --------------------------------------------------------  IN: 650 mL / OUT: 0 mL / NET: 650 mL    24 Feb 2020 07:01  -  24 Feb 2020 12:44  --------------------------------------------------------  IN: 240 mL / OUT: 900 mL / NET: -660 mL        Appearance: Normal	  Cardiovascular: Normal S1 S2,RRR, No JVD, No murmurs  Respiratory: crackles   Gastrointestinal:  Soft, Non-tender, + BS	  Extremities: Normal range of motion, No clubbing, b/l le edema       MEDICATIONS  (STANDING):  ALBUTerol    90 MICROgram(s) HFA Inhaler 1 Puff(s) Inhalation every 4 hours  albuterol/ipratropium for Nebulization 3 milliLiter(s) Nebulizer every 6 hours  aMIOdarone    Tablet 200 milliGRAM(s) Oral daily  aMIOdarone    Tablet   Oral   artificial tears (preservative free) Ophthalmic Solution 1 Drop(s) Both EYES two times a day  aspirin enteric coated 81 milliGRAM(s) Oral daily  atorvastatin 40 milliGRAM(s) Oral at bedtime  BACItracin   Ointment 1 Application(s) Topical two times a day  buDESOnide    Inhalation Suspension 0.5 milliGRAM(s) Inhalation two times a day  calcium acetate 667 milliGRAM(s) Oral three times a day with meals  cefepime   IVPB 2000 milliGRAM(s) IV Intermittent every 24 hours  DAPTOmycin IVPB      DAPTOmycin IVPB 300 milliGRAM(s) IV Intermittent every 24 hours  dextrose 5%. 1000 milliLiter(s) (50 mL/Hr) IV Continuous <Continuous>  dextrose 50% Injectable 12.5 Gram(s) IV Push once  dextrose 50% Injectable 25 Gram(s) IV Push once  dextrose 50% Injectable 25 Gram(s) IV Push once  heparin  Injectable 5000 Unit(s) SubCutaneous every 8 hours  hydrALAZINE 50 milliGRAM(s) Oral every 8 hours  insulin glargine Injectable (LANTUS) 54 Unit(s) SubCutaneous at bedtime  insulin lispro (HumaLOG) corrective regimen sliding scale   SubCutaneous three times a day before meals  insulin lispro (HumaLOG) corrective regimen sliding scale   SubCutaneous at bedtime  insulin lispro Injectable (HumaLOG) 22 Unit(s) SubCutaneous three times a day with meals  melatonin 3 milliGRAM(s) Oral at bedtime  multivitamin 1 Tablet(s) Oral daily  pantoprazole    Tablet 40 milliGRAM(s) Oral before breakfast  polyethylene glycol 3350 17 Gram(s) Oral daily  tamsulosin 0.4 milliGRAM(s) Oral at bedtime  tiotropium 18 MICROgram(s) Capsule 1 Capsule(s) Inhalation daily  torsemide 20 milliGRAM(s) Oral daily      TELEMETRY: nsr  	    ECG:  	  RADIOLOGY:   DIAGNOSTIC TESTING:  [ ] Echocardiogram:  [ ]  Catheterization:  [ ] Stress Test:    OTHER: 	    LABS:	 	                                9.0    9.44  )-----------( 338      ( 24 Feb 2020 06:46 )             28.9     02-24    132<L>  |  95<L>  |  54<H>  ----------------------------<  169<H>  4.5   |  20<L>  |  1.83<H>    Ca    8.9      24 Feb 2020 06:46    TPro  6.4  /  Alb  1.9<L>  /  TBili  0.4  /  DBili  x   /  AST  29  /  ALT  38  /  AlkPhos  96  02-24

## 2020-02-24 NOTE — PROGRESS NOTE ADULT - SUBJECTIVE AND OBJECTIVE BOX
Chief complaint  Patient is a 64y old  Male who presents with a chief complaint of Chest pain (24 Feb 2020 12:44)   Review of systems  Patient sitting up in chair surrounded by family, had hypoglycemic episode this AM.    Labs and Fingersticks  CAPILLARY BLOOD GLUCOSE      POCT Blood Glucose.: 263 mg/dL (24 Feb 2020 11:38)  POCT Blood Glucose.: 245 mg/dL (24 Feb 2020 07:27)  POCT Blood Glucose.: 135 mg/dL (24 Feb 2020 05:35)  POCT Blood Glucose.: 53 mg/dL (24 Feb 2020 05:16)  POCT Blood Glucose.: 130 mg/dL (23 Feb 2020 21:32)  POCT Blood Glucose.: 176 mg/dL (23 Feb 2020 16:41)      Anion Gap, Serum: 17 (02-24 @ 06:46)  Anion Gap, Serum: 11 (02-23 @ 07:30)      Calcium, Total Serum: 8.9 (02-24 @ 06:46)  Calcium, Total Serum: 8.8 (02-23 @ 07:30)  Albumin, Serum: 1.9 <L> (02-24 @ 06:46)    Alanine Aminotransferase (ALT/SGPT): 38 (02-24 @ 06:46)  Alkaline Phosphatase, Serum: 96 (02-24 @ 06:46)  Aspartate Aminotransferase (AST/SGOT): 29 (02-24 @ 06:46)        02-24    132<L>  |  95<L>  |  54<H>  ----------------------------<  169<H>  4.5   |  20<L>  |  1.83<H>    Ca    8.9      24 Feb 2020 06:46    TPro  6.4  /  Alb  1.9<L>  /  TBili  0.4  /  DBili  x   /  AST  29  /  ALT  38  /  AlkPhos  96  02-24                        9.0    9.44  )-----------( 338      ( 24 Feb 2020 06:46 )             28.9     Medications  MEDICATIONS  (STANDING):  ALBUTerol    90 MICROgram(s) HFA Inhaler 1 Puff(s) Inhalation every 4 hours  albuterol/ipratropium for Nebulization 3 milliLiter(s) Nebulizer every 6 hours  aMIOdarone    Tablet 200 milliGRAM(s) Oral daily  aMIOdarone    Tablet   Oral   artificial tears (preservative free) Ophthalmic Solution 1 Drop(s) Both EYES two times a day  aspirin enteric coated 81 milliGRAM(s) Oral daily  atorvastatin 40 milliGRAM(s) Oral at bedtime  BACItracin   Ointment 1 Application(s) Topical two times a day  buDESOnide    Inhalation Suspension 0.5 milliGRAM(s) Inhalation two times a day  calcium acetate 667 milliGRAM(s) Oral three times a day with meals  cefepime   IVPB 2000 milliGRAM(s) IV Intermittent daily  DAPTOmycin IVPB      DAPTOmycin IVPB 300 milliGRAM(s) IV Intermittent every 24 hours  dextrose 5%. 1000 milliLiter(s) (50 mL/Hr) IV Continuous <Continuous>  dextrose 50% Injectable 12.5 Gram(s) IV Push once  dextrose 50% Injectable 25 Gram(s) IV Push once  dextrose 50% Injectable 25 Gram(s) IV Push once  heparin  Injectable 5000 Unit(s) SubCutaneous every 8 hours  hydrALAZINE 50 milliGRAM(s) Oral every 8 hours  insulin glargine Injectable (LANTUS) 54 Unit(s) SubCutaneous at bedtime  insulin lispro (HumaLOG) corrective regimen sliding scale   SubCutaneous three times a day before meals  insulin lispro (HumaLOG) corrective regimen sliding scale   SubCutaneous at bedtime  insulin lispro Injectable (HumaLOG) 22 Unit(s) SubCutaneous three times a day with meals  melatonin 3 milliGRAM(s) Oral at bedtime  multivitamin 1 Tablet(s) Oral daily  pantoprazole    Tablet 40 milliGRAM(s) Oral before breakfast  polyethylene glycol 3350 17 Gram(s) Oral daily  tamsulosin 0.4 milliGRAM(s) Oral at bedtime  tiotropium 18 MICROgram(s) Capsule 1 Capsule(s) Inhalation daily  torsemide 20 milliGRAM(s) Oral daily      Physical Exam  General: Patient comfortable in bed  Vital Signs Last 12 Hrs  T(F): 97.8 (02-24-20 @ 12:46), Max: 97.9 (02-24-20 @ 05:20)  HR: 100 (02-24-20 @ 12:46) (89 - 100)  BP: 149/79 (02-24-20 @ 12:46) (132/78 - 149/79)  BP(mean): --  RR: 18 (02-24-20 @ 12:46) (18 - 18)  SpO2: 94% (02-24-20 @ 12:46) (93% - 94%)  Neck: No palpable thyroid nodules.  CVS: S1S2, No murmurs  Respiratory: No wheezing, no crepitations  GI: Abdomen soft, bowel sounds positive  Musculoskeletal:  edema lower extremities.   Skin: No skin rashes, no ecchymosis    Diagnostics Chief complaint  Patient is a 64y old  Male who presents with a chief complaint of Chest pain (24 Feb 2020 12:44)   Review of systems  Patient sitting up in chair surrounded by family,  had hypoglycemic episode this AM.    Labs and Fingersticks  CAPILLARY BLOOD GLUCOSE      POCT Blood Glucose.: 263 mg/dL (24 Feb 2020 11:38)  POCT Blood Glucose.: 245 mg/dL (24 Feb 2020 07:27)  POCT Blood Glucose.: 135 mg/dL (24 Feb 2020 05:35)  POCT Blood Glucose.: 53 mg/dL (24 Feb 2020 05:16)  POCT Blood Glucose.: 130 mg/dL (23 Feb 2020 21:32)  POCT Blood Glucose.: 176 mg/dL (23 Feb 2020 16:41)      Anion Gap, Serum: 17 (02-24 @ 06:46)  Anion Gap, Serum: 11 (02-23 @ 07:30)      Calcium, Total Serum: 8.9 (02-24 @ 06:46)  Calcium, Total Serum: 8.8 (02-23 @ 07:30)  Albumin, Serum: 1.9 <L> (02-24 @ 06:46)    Alanine Aminotransferase (ALT/SGPT): 38 (02-24 @ 06:46)  Alkaline Phosphatase, Serum: 96 (02-24 @ 06:46)  Aspartate Aminotransferase (AST/SGOT): 29 (02-24 @ 06:46)        02-24    132<L>  |  95<L>  |  54<H>  ----------------------------<  169<H>  4.5   |  20<L>  |  1.83<H>    Ca    8.9      24 Feb 2020 06:46    TPro  6.4  /  Alb  1.9<L>  /  TBili  0.4  /  DBili  x   /  AST  29  /  ALT  38  /  AlkPhos  96  02-24                        9.0    9.44  )-----------( 338      ( 24 Feb 2020 06:46 )             28.9     Medications  MEDICATIONS  (STANDING):  ALBUTerol    90 MICROgram(s) HFA Inhaler 1 Puff(s) Inhalation every 4 hours  albuterol/ipratropium for Nebulization 3 milliLiter(s) Nebulizer every 6 hours  aMIOdarone    Tablet 200 milliGRAM(s) Oral daily  aMIOdarone    Tablet   Oral   artificial tears (preservative free) Ophthalmic Solution 1 Drop(s) Both EYES two times a day  aspirin enteric coated 81 milliGRAM(s) Oral daily  atorvastatin 40 milliGRAM(s) Oral at bedtime  BACItracin   Ointment 1 Application(s) Topical two times a day  buDESOnide    Inhalation Suspension 0.5 milliGRAM(s) Inhalation two times a day  calcium acetate 667 milliGRAM(s) Oral three times a day with meals  cefepime   IVPB 2000 milliGRAM(s) IV Intermittent daily  DAPTOmycin IVPB      DAPTOmycin IVPB 300 milliGRAM(s) IV Intermittent every 24 hours  dextrose 5%. 1000 milliLiter(s) (50 mL/Hr) IV Continuous <Continuous>  dextrose 50% Injectable 12.5 Gram(s) IV Push once  dextrose 50% Injectable 25 Gram(s) IV Push once  dextrose 50% Injectable 25 Gram(s) IV Push once  heparin  Injectable 5000 Unit(s) SubCutaneous every 8 hours  hydrALAZINE 50 milliGRAM(s) Oral every 8 hours  insulin glargine Injectable (LANTUS) 54 Unit(s) SubCutaneous at bedtime  insulin lispro (HumaLOG) corrective regimen sliding scale   SubCutaneous three times a day before meals  insulin lispro (HumaLOG) corrective regimen sliding scale   SubCutaneous at bedtime  insulin lispro Injectable (HumaLOG) 22 Unit(s) SubCutaneous three times a day with meals  melatonin 3 milliGRAM(s) Oral at bedtime  multivitamin 1 Tablet(s) Oral daily  pantoprazole    Tablet 40 milliGRAM(s) Oral before breakfast  polyethylene glycol 3350 17 Gram(s) Oral daily  tamsulosin 0.4 milliGRAM(s) Oral at bedtime  tiotropium 18 MICROgram(s) Capsule 1 Capsule(s) Inhalation daily  torsemide 20 milliGRAM(s) Oral daily      Physical Exam  General: Patient comfortable in bed  Vital Signs Last 12 Hrs  T(F): 97.8 (02-24-20 @ 12:46), Max: 97.9 (02-24-20 @ 05:20)  HR: 100 (02-24-20 @ 12:46) (89 - 100)  BP: 149/79 (02-24-20 @ 12:46) (132/78 - 149/79)  BP(mean): --  RR: 18 (02-24-20 @ 12:46) (18 - 18)  SpO2: 94% (02-24-20 @ 12:46) (93% - 94%)  Neck: No palpable thyroid nodules.  CVS: S1S2, No murmurs  Respiratory: No wheezing, no crepitations  GI: Abdomen soft, bowel sounds positive  Musculoskeletal:  edema lower extremities.   Skin: No skin rashes, no ecchymosis    Diagnostics Chief complaint  Patient is a 64y old  Male who presents with a chief complaint of Chest pain (24 Feb 2020 12:44)   Review of systems  Patient sitting up in chair surrounded by family,  had  hypoglycemic episode this AM.    Labs and Fingersticks  CAPILLARY BLOOD GLUCOSE      POCT Blood Glucose.: 263 mg/dL (24 Feb 2020 11:38)  POCT Blood Glucose.: 245 mg/dL (24 Feb 2020 07:27)  POCT Blood Glucose.: 135 mg/dL (24 Feb 2020 05:35)  POCT Blood Glucose.: 53 mg/dL (24 Feb 2020 05:16)  POCT Blood Glucose.: 130 mg/dL (23 Feb 2020 21:32)  POCT Blood Glucose.: 176 mg/dL (23 Feb 2020 16:41)      Anion Gap, Serum: 17 (02-24 @ 06:46)  Anion Gap, Serum: 11 (02-23 @ 07:30)      Calcium, Total Serum: 8.9 (02-24 @ 06:46)  Calcium, Total Serum: 8.8 (02-23 @ 07:30)  Albumin, Serum: 1.9 <L> (02-24 @ 06:46)    Alanine Aminotransferase (ALT/SGPT): 38 (02-24 @ 06:46)  Alkaline Phosphatase, Serum: 96 (02-24 @ 06:46)  Aspartate Aminotransferase (AST/SGOT): 29 (02-24 @ 06:46)        02-24    132<L>  |  95<L>  |  54<H>  ----------------------------<  169<H>  4.5   |  20<L>  |  1.83<H>    Ca    8.9      24 Feb 2020 06:46    TPro  6.4  /  Alb  1.9<L>  /  TBili  0.4  /  DBili  x   /  AST  29  /  ALT  38  /  AlkPhos  96  02-24                        9.0    9.44  )-----------( 338      ( 24 Feb 2020 06:46 )             28.9     Medications  MEDICATIONS  (STANDING):  ALBUTerol    90 MICROgram(s) HFA Inhaler 1 Puff(s) Inhalation every 4 hours  albuterol/ipratropium for Nebulization 3 milliLiter(s) Nebulizer every 6 hours  aMIOdarone    Tablet 200 milliGRAM(s) Oral daily  aMIOdarone    Tablet   Oral   artificial tears (preservative free) Ophthalmic Solution 1 Drop(s) Both EYES two times a day  aspirin enteric coated 81 milliGRAM(s) Oral daily  atorvastatin 40 milliGRAM(s) Oral at bedtime  BACItracin   Ointment 1 Application(s) Topical two times a day  buDESOnide    Inhalation Suspension 0.5 milliGRAM(s) Inhalation two times a day  calcium acetate 667 milliGRAM(s) Oral three times a day with meals  cefepime   IVPB 2000 milliGRAM(s) IV Intermittent daily  DAPTOmycin IVPB      DAPTOmycin IVPB 300 milliGRAM(s) IV Intermittent every 24 hours  dextrose 5%. 1000 milliLiter(s) (50 mL/Hr) IV Continuous <Continuous>  dextrose 50% Injectable 12.5 Gram(s) IV Push once  dextrose 50% Injectable 25 Gram(s) IV Push once  dextrose 50% Injectable 25 Gram(s) IV Push once  heparin  Injectable 5000 Unit(s) SubCutaneous every 8 hours  hydrALAZINE 50 milliGRAM(s) Oral every 8 hours  insulin glargine Injectable (LANTUS) 54 Unit(s) SubCutaneous at bedtime  insulin lispro (HumaLOG) corrective regimen sliding scale   SubCutaneous three times a day before meals  insulin lispro (HumaLOG) corrective regimen sliding scale   SubCutaneous at bedtime  insulin lispro Injectable (HumaLOG) 22 Unit(s) SubCutaneous three times a day with meals  melatonin 3 milliGRAM(s) Oral at bedtime  multivitamin 1 Tablet(s) Oral daily  pantoprazole    Tablet 40 milliGRAM(s) Oral before breakfast  polyethylene glycol 3350 17 Gram(s) Oral daily  tamsulosin 0.4 milliGRAM(s) Oral at bedtime  tiotropium 18 MICROgram(s) Capsule 1 Capsule(s) Inhalation daily  torsemide 20 milliGRAM(s) Oral daily      Physical Exam  General: Patient comfortable in bed  Vital Signs Last 12 Hrs  T(F): 97.8 (02-24-20 @ 12:46), Max: 97.9 (02-24-20 @ 05:20)  HR: 100 (02-24-20 @ 12:46) (89 - 100)  BP: 149/79 (02-24-20 @ 12:46) (132/78 - 149/79)  BP(mean): --  RR: 18 (02-24-20 @ 12:46) (18 - 18)  SpO2: 94% (02-24-20 @ 12:46) (93% - 94%)  Neck: No palpable thyroid nodules.  CVS: S1S2, No murmurs  Respiratory: No wheezing, no crepitations  GI: Abdomen soft, bowel sounds positive  Musculoskeletal:  edema lower extremities.   Skin: No skin rashes, no ecchymosis    Diagnostics

## 2020-02-24 NOTE — PROGRESS NOTE ADULT - ASSESSMENT
Assessment  DMT2: 64y Male with DM T2 with hyperglycemia, A1C 9.2%, was on oral meds and insulin at home, now on basal bolus insulin, patient had hypoglycemic episode this AM, blood sugars now running high. Family at bedside state patient didn't eat much for dinner last night, he is eating meals with inconsistent carb intake, food from home, as well as glucerna. Sitting up in chair, appears comfortable, surrounded by family. Patient being followed by ID and Wound Care for sternal wound infx.  Foot infection: On Tx, stable.  CAD: s/p CABG 2/3, on medications, no chest pain, stable, monitored.  HTN: Controlled,  on antihypertensive medications.  HLD: Controlled, on statin.  CKD: Monitor labs/BMP          Harper Matias MD  Cell: [phone]  Office: [phone] Assessment  DMT2: 64y Male with DM T2 with hyperglycemia, A1C 9.2%, was on oral meds and insulin at home, now on basal bolus insulin, patient had hypoglycemic episode this AM, blood sugars now running high. Family at bedside  state patient didn't eat much for dinner last night, he is eating meals with inconsistent carb intake, food from home, as well as glucerna. Sitting up in chair, appears comfortable, surrounded by family. Patient being followed by ID and Wound Care for sternal wound infx.  Foot infection: On Tx, stable.  CAD: s/p CABG 2/3, on medications, no chest pain, stable, monitored.  HTN: Controlled,  on antihypertensive medications.  HLD: Controlled, on statin.  CKD: Monitor labs/BMP          Harper Matias MD  Cell: [phone]  Office: [phone]

## 2020-02-25 LAB
ALBUMIN SERPL ELPH-MCNC: 2.3 G/DL — LOW (ref 3.3–5)
ALBUMIN SERPL ELPH-MCNC: 2.6 G/DL — LOW (ref 3.3–5)
ALP SERPL-CCNC: 70 U/L — SIGNIFICANT CHANGE UP (ref 40–120)
ALP SERPL-CCNC: 98 U/L — SIGNIFICANT CHANGE UP (ref 40–120)
ALT FLD-CCNC: 30 U/L — SIGNIFICANT CHANGE UP (ref 10–45)
ALT FLD-CCNC: 38 U/L — SIGNIFICANT CHANGE UP (ref 10–45)
ANION GAP SERPL CALC-SCNC: 13 MMOL/L — SIGNIFICANT CHANGE UP (ref 5–17)
ANION GAP SERPL CALC-SCNC: 13 MMOL/L — SIGNIFICANT CHANGE UP (ref 5–17)
APTT BLD: 31.1 SEC — SIGNIFICANT CHANGE UP (ref 27.5–36.3)
AST SERPL-CCNC: 22 U/L — SIGNIFICANT CHANGE UP (ref 10–40)
AST SERPL-CCNC: 29 U/L — SIGNIFICANT CHANGE UP (ref 10–40)
BASOPHILS # BLD AUTO: 0.08 K/UL — SIGNIFICANT CHANGE UP (ref 0–0.2)
BASOPHILS NFR BLD AUTO: 1.1 % — SIGNIFICANT CHANGE UP (ref 0–2)
BILIRUB SERPL-MCNC: 0.3 MG/DL — SIGNIFICANT CHANGE UP (ref 0.2–1.2)
BILIRUB SERPL-MCNC: 0.5 MG/DL — SIGNIFICANT CHANGE UP (ref 0.2–1.2)
BUN SERPL-MCNC: 50 MG/DL — HIGH (ref 7–23)
BUN SERPL-MCNC: 52 MG/DL — HIGH (ref 7–23)
CALCIUM SERPL-MCNC: 8.1 MG/DL — LOW (ref 8.4–10.5)
CALCIUM SERPL-MCNC: 9 MG/DL — SIGNIFICANT CHANGE UP (ref 8.4–10.5)
CHLORIDE SERPL-SCNC: 95 MMOL/L — LOW (ref 96–108)
CHLORIDE SERPL-SCNC: 99 MMOL/L — SIGNIFICANT CHANGE UP (ref 96–108)
CO2 SERPL-SCNC: 24 MMOL/L — SIGNIFICANT CHANGE UP (ref 22–31)
CO2 SERPL-SCNC: 25 MMOL/L — SIGNIFICANT CHANGE UP (ref 22–31)
CREAT SERPL-MCNC: 1.99 MG/DL — HIGH (ref 0.5–1.3)
CREAT SERPL-MCNC: 2.01 MG/DL — HIGH (ref 0.5–1.3)
EOSINOPHIL # BLD AUTO: 0.33 K/UL — SIGNIFICANT CHANGE UP (ref 0–0.5)
EOSINOPHIL NFR BLD AUTO: 4.3 % — SIGNIFICANT CHANGE UP (ref 0–6)
GAS PNL BLDA: SIGNIFICANT CHANGE UP
GLUCOSE BLDC GLUCOMTR-MCNC: 122 MG/DL — HIGH (ref 70–99)
GLUCOSE BLDC GLUCOMTR-MCNC: 131 MG/DL — HIGH (ref 70–99)
GLUCOSE BLDC GLUCOMTR-MCNC: 139 MG/DL — HIGH (ref 70–99)
GLUCOSE BLDC GLUCOMTR-MCNC: 148 MG/DL — HIGH (ref 70–99)
GLUCOSE BLDC GLUCOMTR-MCNC: 153 MG/DL — HIGH (ref 70–99)
GLUCOSE SERPL-MCNC: 120 MG/DL — HIGH (ref 70–99)
GLUCOSE SERPL-MCNC: 142 MG/DL — HIGH (ref 70–99)
HCT VFR BLD CALC: 26.1 % — LOW (ref 39–50)
HCT VFR BLD CALC: 27.8 % — LOW (ref 39–50)
HGB BLD-MCNC: 8 G/DL — LOW (ref 13–17)
HGB BLD-MCNC: 8.6 G/DL — LOW (ref 13–17)
IMM GRANULOCYTES NFR BLD AUTO: 1.4 % — SIGNIFICANT CHANGE UP (ref 0–1.5)
INR BLD: 1.29 RATIO — HIGH (ref 0.88–1.16)
LYMPHOCYTES # BLD AUTO: 1.19 K/UL — SIGNIFICANT CHANGE UP (ref 1–3.3)
LYMPHOCYTES # BLD AUTO: 15.7 % — SIGNIFICANT CHANGE UP (ref 13–44)
MAGNESIUM SERPL-MCNC: 2.1 MG/DL — SIGNIFICANT CHANGE UP (ref 1.6–2.6)
MAGNESIUM SERPL-MCNC: 2.2 MG/DL — SIGNIFICANT CHANGE UP (ref 1.6–2.6)
MCHC RBC-ENTMCNC: 26.7 PG — LOW (ref 27–34)
MCHC RBC-ENTMCNC: 27 PG — SIGNIFICANT CHANGE UP (ref 27–34)
MCHC RBC-ENTMCNC: 30.7 GM/DL — LOW (ref 32–36)
MCHC RBC-ENTMCNC: 30.9 GM/DL — LOW (ref 32–36)
MCV RBC AUTO: 86.3 FL — SIGNIFICANT CHANGE UP (ref 80–100)
MCV RBC AUTO: 88.2 FL — SIGNIFICANT CHANGE UP (ref 80–100)
MONOCYTES # BLD AUTO: 0.93 K/UL — HIGH (ref 0–0.9)
MONOCYTES NFR BLD AUTO: 12.3 % — SIGNIFICANT CHANGE UP (ref 2–14)
NEUTROPHILS # BLD AUTO: 4.95 K/UL — SIGNIFICANT CHANGE UP (ref 1.8–7.4)
NEUTROPHILS NFR BLD AUTO: 65.2 % — SIGNIFICANT CHANGE UP (ref 43–77)
NRBC # BLD: 0 /100 WBCS — SIGNIFICANT CHANGE UP (ref 0–0)
NRBC # BLD: 0 /100 WBCS — SIGNIFICANT CHANGE UP (ref 0–0)
PHOSPHATE SERPL-MCNC: 5.2 MG/DL — HIGH (ref 2.5–4.5)
PLATELET # BLD AUTO: 274 K/UL — SIGNIFICANT CHANGE UP (ref 150–400)
PLATELET # BLD AUTO: 339 K/UL — SIGNIFICANT CHANGE UP (ref 150–400)
POTASSIUM SERPL-MCNC: 4.7 MMOL/L — SIGNIFICANT CHANGE UP (ref 3.5–5.3)
POTASSIUM SERPL-MCNC: 4.8 MMOL/L — SIGNIFICANT CHANGE UP (ref 3.5–5.3)
POTASSIUM SERPL-SCNC: 4.7 MMOL/L — SIGNIFICANT CHANGE UP (ref 3.5–5.3)
POTASSIUM SERPL-SCNC: 4.8 MMOL/L — SIGNIFICANT CHANGE UP (ref 3.5–5.3)
PROT SERPL-MCNC: 5.7 G/DL — LOW (ref 6–8.3)
PROT SERPL-MCNC: 6.5 G/DL — SIGNIFICANT CHANGE UP (ref 6–8.3)
PROTHROM AB SERPL-ACNC: 14.8 SEC — HIGH (ref 10–12.9)
RBC # BLD: 2.96 M/UL — LOW (ref 4.2–5.8)
RBC # BLD: 3.22 M/UL — LOW (ref 4.2–5.8)
RBC # FLD: 14.4 % — SIGNIFICANT CHANGE UP (ref 10.3–14.5)
RBC # FLD: 14.4 % — SIGNIFICANT CHANGE UP (ref 10.3–14.5)
SODIUM SERPL-SCNC: 132 MMOL/L — LOW (ref 135–145)
SODIUM SERPL-SCNC: 137 MMOL/L — SIGNIFICANT CHANGE UP (ref 135–145)
WBC # BLD: 7.59 K/UL — SIGNIFICANT CHANGE UP (ref 3.8–10.5)
WBC # BLD: 9.2 K/UL — SIGNIFICANT CHANGE UP (ref 3.8–10.5)
WBC # FLD AUTO: 7.59 K/UL — SIGNIFICANT CHANGE UP (ref 3.8–10.5)
WBC # FLD AUTO: 9.2 K/UL — SIGNIFICANT CHANGE UP (ref 3.8–10.5)

## 2020-02-25 PROCEDURE — 99232 SBSQ HOSP IP/OBS MODERATE 35: CPT

## 2020-02-25 PROCEDURE — 71045 X-RAY EXAM CHEST 1 VIEW: CPT | Mod: 26

## 2020-02-25 PROCEDURE — 93010 ELECTROCARDIOGRAM REPORT: CPT | Mod: 77

## 2020-02-25 PROCEDURE — 93010 ELECTROCARDIOGRAM REPORT: CPT

## 2020-02-25 PROCEDURE — 99291 CRITICAL CARE FIRST HOUR: CPT

## 2020-02-25 RX ORDER — PHENYLEPHRINE HYDROCHLORIDE 10 MG/ML
0.3 INJECTION INTRAVENOUS
Qty: 40 | Refills: 0 | Status: DISCONTINUED | OUTPATIENT
Start: 2020-02-25 | End: 2020-02-26

## 2020-02-25 RX ORDER — PANTOPRAZOLE SODIUM 20 MG/1
40 TABLET, DELAYED RELEASE ORAL
Refills: 0 | Status: DISCONTINUED | OUTPATIENT
Start: 2020-02-25 | End: 2020-03-16

## 2020-02-25 RX ORDER — DAPTOMYCIN 500 MG/10ML
300 INJECTION, POWDER, LYOPHILIZED, FOR SOLUTION INTRAVENOUS EVERY 24 HOURS
Refills: 0 | Status: DISCONTINUED | OUTPATIENT
Start: 2020-02-26 | End: 2020-02-28

## 2020-02-25 RX ORDER — HYDROMORPHONE HYDROCHLORIDE 2 MG/ML
0.5 INJECTION INTRAMUSCULAR; INTRAVENOUS; SUBCUTANEOUS ONCE
Refills: 0 | Status: DISCONTINUED | OUTPATIENT
Start: 2020-02-25 | End: 2020-02-25

## 2020-02-25 RX ORDER — SODIUM CHLORIDE 9 MG/ML
1000 INJECTION INTRAMUSCULAR; INTRAVENOUS; SUBCUTANEOUS
Refills: 0 | Status: DISCONTINUED | OUTPATIENT
Start: 2020-02-25 | End: 2020-02-28

## 2020-02-25 RX ORDER — MEPERIDINE HYDROCHLORIDE 50 MG/ML
25 INJECTION INTRAMUSCULAR; INTRAVENOUS; SUBCUTANEOUS ONCE
Refills: 0 | Status: DISCONTINUED | OUTPATIENT
Start: 2020-02-25 | End: 2020-02-26

## 2020-02-25 RX ORDER — CHLORHEXIDINE GLUCONATE 213 G/1000ML
5 SOLUTION TOPICAL EVERY 4 HOURS
Refills: 0 | Status: DISCONTINUED | OUTPATIENT
Start: 2020-02-25 | End: 2020-02-26

## 2020-02-25 RX ORDER — PROPOFOL 10 MG/ML
20 INJECTION, EMULSION INTRAVENOUS
Qty: 1000 | Refills: 0 | Status: DISCONTINUED | OUTPATIENT
Start: 2020-02-25 | End: 2020-02-26

## 2020-02-25 RX ORDER — CEFEPIME 1 G/1
2000 INJECTION, POWDER, FOR SOLUTION INTRAMUSCULAR; INTRAVENOUS EVERY 24 HOURS
Refills: 0 | Status: DISCONTINUED | OUTPATIENT
Start: 2020-02-26 | End: 2020-02-28

## 2020-02-25 RX ORDER — SODIUM CHLORIDE 9 MG/ML
1000 INJECTION INTRAMUSCULAR; INTRAVENOUS; SUBCUTANEOUS
Refills: 0 | Status: DISCONTINUED | OUTPATIENT
Start: 2020-02-25 | End: 2020-02-25

## 2020-02-25 RX ADMIN — Medication 3 MILLILITER(S): at 06:36

## 2020-02-25 RX ADMIN — Medication 50 MILLIGRAM(S): at 06:36

## 2020-02-25 RX ADMIN — Medication 1 SPRAY(S): at 06:40

## 2020-02-25 RX ADMIN — HYDROMORPHONE HYDROCHLORIDE 0.5 MILLIGRAM(S): 2 INJECTION INTRAMUSCULAR; INTRAVENOUS; SUBCUTANEOUS at 23:30

## 2020-02-25 RX ADMIN — Medication 20 MILLIGRAM(S): at 06:36

## 2020-02-25 RX ADMIN — Medication 1 DROP(S): at 06:36

## 2020-02-25 RX ADMIN — Medication 81 MILLIGRAM(S): at 11:09

## 2020-02-25 RX ADMIN — SODIUM CHLORIDE 50 MILLILITER(S): 9 INJECTION INTRAMUSCULAR; INTRAVENOUS; SUBCUTANEOUS at 10:52

## 2020-02-25 RX ADMIN — Medication 1 TABLET(S): at 11:09

## 2020-02-25 RX ADMIN — AMIODARONE HYDROCHLORIDE 200 MILLIGRAM(S): 400 TABLET ORAL at 06:36

## 2020-02-25 RX ADMIN — Medication 0.5 MILLIGRAM(S): at 06:39

## 2020-02-25 RX ADMIN — PANTOPRAZOLE SODIUM 40 MILLIGRAM(S): 20 TABLET, DELAYED RELEASE ORAL at 06:36

## 2020-02-25 RX ADMIN — Medication 3 MILLILITER(S): at 11:10

## 2020-02-25 RX ADMIN — Medication 50 MILLIGRAM(S): at 14:09

## 2020-02-25 RX ADMIN — HEPARIN SODIUM 5000 UNIT(S): 5000 INJECTION INTRAVENOUS; SUBCUTANEOUS at 06:36

## 2020-02-25 RX ADMIN — HYDROMORPHONE HYDROCHLORIDE 0.5 MILLIGRAM(S): 2 INJECTION INTRAMUSCULAR; INTRAVENOUS; SUBCUTANEOUS at 23:45

## 2020-02-25 RX ADMIN — CEFEPIME 100 MILLIGRAM(S): 1 INJECTION, POWDER, FOR SOLUTION INTRAMUSCULAR; INTRAVENOUS at 11:09

## 2020-02-25 NOTE — BRIEF OPERATIVE NOTE - OPERATION/FINDINGS
Sternal wound debridement and irrigation with removal of all 6 sternal wires. Purulence encountered and sent for culture. Closure with bilateral pectoralis muscle advancement flaps.
Multi-vessel CAD

## 2020-02-25 NOTE — PROGRESS NOTE ADULT - SUBJECTIVE AND OBJECTIVE BOX
HPI:  65yo male with , presented to the ED with complaints of acute on chronic left sided exertional chest pain. Pt states he has been having left sided pressure and stabbing chest pain radiating to his sternum and right with activity for the past few months. He states each episode last approximately 1-2 mins and subsides upon resting. He denies associated symptoms of radiation to his back, arm or jaw. He recently was at a wedding which he could not enjoy because dancing would reproduce to the pain. He states he did not pursue and medical attention until this time. Pt states this time his pain was associated with sob up exertion. He denies symptoms of LOC, diaphoresis, palpitations, abd pain, N/V, fever or chills. He denies prior cardiac work up. (25 Jan 2020 12:57)  FRANKLIN PRESLEY  MRN#:  [id number]    The patient is a 64y Male hx of HTN, DM II who was seen, evaluated, & examined with the CTICU staff on rounds and later in the evening with a multidisciplinary care plan formulated & implemented.  All available clinical, laboratory, radiographic, pharmacologic, and electrocardiographic data were reviewed & analyzed.      The patient was in the CTICU in critical condition at risk for imminent decompensation secondary to persistent cardiopulmonary dysfunction, anticipated cardiogenic shock-cardiovascular dysfunction, hypovolemic shock, hemodynamically significant anemia due to acute blood loss, hemodynamically significant bradycardia, hyperlactatemia-acidosis, and stress hyperglycemia.      Respiratory status required full ventilatory support, close monitoring of respiratory rate and breathing pattern, the following of ABG’s with A-line monitoring, continuous pulse oximetry monitoring, an IV Dexmedetomidine infusion, an IV Propofol infusion for support & to evaluate for & prevent further decompensation secondary to persistent cardiopulmonary dysfunction and cardiogenic shock-cardiovascular dysfunction. Based upon my evaluation and review, I maintained the current therapy/I made appropriate ventilator changes/I extubated the patient.     Invasive hemodynamic monitoring with a central venous catheter & an A-line were required for the continuous central venous and MAP/BP monitoring to ensure adequate cardiovascular support and to evaluate for & help prevent decompensation while receiving intermittent volume expansion, external epicardial pacing, blood transfusions, blood product transfusions, an IV Levophed drip, an IV Vasopressin drip, an IV Epinephrine drip, an IV Dobutamine drip, an IV Primacor drip, an IV Amiodarone drip, and an IV Neosynephrine drip secondary to anticipated cardiogenic shock-cardiovascular dysfunction, hemodynamically significant anemia due to acute blood loss, hypovolemic shock, hyperlactatemia-acidosis, hemodynamically significant bradycardia, acute postoperative blood loss anemia, and acute on chronic postoperative kidney injury-failure. Based upon my evaluation and review, I maintained the current vasopressor/inotropic/antiarrhythmic/fluid therapy/I modified the vasopressor/inotropic/ antiarrythymic/fluid therapy/ I added the following vasopressor/inotropic/antiarrythmic/fluid therapy.    Metabolic stability, uncontrolled type 2 diabetes-hyperglycemia, & infection prophylaxis required an IV regular Insulin drip & the following of serial glucose levels to help achieve & maintain euglycemia. Based upon my evaluation and review, I maintained the current metabolic therapy.     Patient required acute postoperative critical care management and it at risk for life threatening decompensation. I provided 35 minutes of non-continuous care to the patient. FRANKLIN PRESLEY  MRN#:  [id number]    The patient is a 64y Male with PMH of HTN, DM II, CKD stage III, presented with unstable angina, found to have multivessel CAD and underwent C4L with patch angioplasty of his LAD 2/03. Patient had been recovering on the floor with generalized weakness and poor cough when he developed a PEA arrest and subsequently rapid atrial fibrillation after ROSC. In addition he was treated for enterococcal bacteremia gradually recovered, found to have a loculated pleural effusion and now readmitted to CTU s/p sternal wound debridement today for sternal wound infection.  All available clinical, laboratory, radiographic, pharmacologic, and electrocardiographic data were reviewed & analyzed.      The patient was in the CTICU in critical condition at risk for imminent decompensation secondary to persistent cardiopulmonary dysfunction, vasogenic shock, sternal wound infection, hemodynamically significant anemia, and stage II CKD.      Respiratory status required full ventilatory support, close monitoring of respiratory rate and breathing pattern, the following of ABG’s with A-line monitoring, continuous pulse oximetry monitoring, and an IV Diprivan infusion for support & to evaluate for & prevent further decompensation secondary to cardiopulmonary dysfunciton and vasogenic shock. Based upon my evaluation and review, I continued on full ventilator support due to    Invasive hemodynamic monitoring with a central venous catheter & an A-line were placed after resuscitation and required for the continuous central venous and MAP/BP monitoring to ensure adequate cardiovascular support and to evaluate for & help prevent decompensation while receiving intermittent volume expansion and an IV neosynephrine drip secondary to vasogenic shock and hemodynamically significant anemia. Based upon my evaluation and review, I ordered one unit packed red blood cells and weaned down the phenylephrine infusion.    Metabolic stability, type 2 diabetes-hyperglycemia, & infection prophylaxis required an insulin sliding scale & the following of serial glucose levels to help achieve & maintain euglycemia. Based upon my evaluation and review, I plan to start an insulin infusion due to his instability and recent hypoglycemia.    Patient required acute postoperative critical care management and it at risk for life threatening decompensation. I provided 30 minutes of non-continuous care to the patient. FRANKLIN PRESLEY  MRN#:  [id number]    The patient is a 64y Male with PMH of HTN, DM II, CKD stage III, presented with unstable angina, found to have multivessel CAD and underwent C4L with patch angioplasty of his LAD 2/03. Patient had been recovering on the floor with generalized weakness and poor cough when he developed a PEA arrest and subsequently rapid atrial fibrillation after ROSC. In addition he was treated for enterococcal bacteremia gradually recovered, found to have a loculated pleural effusion and now readmitted to CTU s/p sternal wound debridement today for sternal wound infection.  All available clinical, laboratory, radiographic, pharmacologic, and electrocardiographic data were reviewed & analyzed.      The patient was in the CTICU in critical condition at risk for imminent decompensation secondary to persistent cardiopulmonary dysfunction, vasogenic shock, sternal wound infection, hemodynamically significant anemia, and stage III CKD.      Respiratory status required full ventilatory support, close monitoring of respiratory rate and breathing pattern, the following of ABG’s with A-line monitoring, continuous pulse oximetry monitoring, and an IV Diprivan infusion for support & to evaluate for & prevent further decompensation secondary to cardiopulmonary dysfunciton and vasogenic shock. Based upon my evaluation and review, I continued on full ventilator support due to    Invasive hemodynamic monitoring with a central venous catheter & an A-line were placed after resuscitation and required for the continuous central venous and MAP/BP monitoring to ensure adequate cardiovascular support and to evaluate for & help prevent decompensation while receiving intermittent volume expansion and an IV neosynephrine drip secondary to vasogenic shock and hemodynamically significant anemia. Based upon my evaluation and review, I ordered one unit packed red blood cells and weaned down the phenylephrine infusion.    Metabolic stability, type 2 diabetes-hyperglycemia, & infection prophylaxis required an insulin sliding scale & the following of serial glucose levels to help achieve & maintain euglycemia. Based upon my evaluation and review, I plan to start an insulin infusion due to his instability and recent hypoglycemia.    Patient required acute postoperative critical care management and it at risk for life threatening decompensation. I provided 30 minutes of non-continuous care to the patient.

## 2020-02-25 NOTE — BRIEF OPERATIVE NOTE - NSICDXBRIEFPROCEDURE_GEN_ALL_CORE_FT
PROCEDURES:  Debridement, sternum, with pectoralis muscle flap creation 25-Feb-2020 19:50:15  Nick Miles
PROCEDURES:  CABG x 4 03-Feb-2020 16:45:52 C4L, patch angioplasty of LAD Kyung Dunbar

## 2020-02-25 NOTE — BRIEF OPERATIVE NOTE - NSICDXBRIEFPOSTOP_GEN_ALL_CORE_FT
POST-OP DIAGNOSIS:  Sternal wound infection 25-Feb-2020 19:50:38  Nick Miles
POST-OP DIAGNOSIS:  3-vessel CAD 03-Feb-2020 16:46:20  Kyung Dunbar

## 2020-02-25 NOTE — PROGRESS NOTE ADULT - ASSESSMENT
65yo male with hx of HTN, DM II, presented to the ED with complaints of acute on chronic left sided exertional chest pain. Pt states he has been having left sided pressure and stabbing chest pain radiating to his sternum and right with activity for the past few months. Since admission- s/p cath with severe triple vessel disease, s/p CABG, post op course c/b brief witnessed PEA 2/6 likely hypoxic arrest, s/p intubation. ( CAD, s/p cath with severe triple vessel disease including lesions at the bifurcation of the LAD/diagonal and distal RCA/RPDA/RPL trifurcation.   -s/p CABG x 4, intraop kennedy ef 50%)--s/p ampicillin until 2/18 for enterococcus in blood, extubated 2/9, pigtail right 2/8 and left sided on 2/15-for effusion.  Remains sob-NO h/o resp issues prior to hospitalization.  ***Current sob-multifactorial-CAD/effusions, atelectasis due to pain, mild bronchospasm and debility.  ********************  2/21-slightly better, pig tail cath removed from left chest; CT chest done-loculated left effusion-slight better  2/24-BS issues-less sob-dapto/cefepime as per ID  2/25-for debridement of chest area-sternal wound

## 2020-02-25 NOTE — PROGRESS NOTE ADULT - PROBLEM SELECTOR PLAN 1
Pt with  CKD likely  in the setting of long standing DM and HTN. No prior labs for review. Scr since admission has ranged between 1.8-2.1. Scr increased to ~2.50 secondary to hemodynamic injury after CABG and PEA arrest s/p CPR, and worsened to 2.9. Scr currently 2.01. Non-nephrotic range proteinuria. Pt. likely with diabetic nephropathy.   - Switched  to Torsemide 20 mg daily, currently with less SOB. Continue for now. Would consider IV diuretics given tense LE edema.  - Monitor labs and urine output.   - Avoid NSAIDs, ACEI/ARBS, RCA and nephrotoxins. Dose medications as per eGFR

## 2020-02-25 NOTE — PROGRESS NOTE ADULT - ATTENDING COMMENTS
as above-for debridement 2/25 of sternal area  multifactorial dyspnea-CAD s/p CABG, PEA arrest, atelectasis due to pain, pleural effusion L-pig tail in place, bronchospasm, ?PE-O2 NC sat above 90%                     Pleural effusion-pig tail removed 2/20-CT chest NC-improved but still loculations on left-f/up 4-6 wks  CAD/CHF-diurese as cr allows-keep K/Mg above  atelectasis-pain control, incentive spirometry, acapella                    ? DVT/PE--s/p repeat venous dopplers-negative; VQ unable  bronchospasm-duoneb q 6, pulmicort .5 bid; out pt PFTs              ID-daptomycin/cefepime as per ID  snore-? osas--out pt SS  DVT/GI prophylaxis, PT, nutrition herman Hernandez MD-Pulmonary    [phone]

## 2020-02-25 NOTE — PROGRESS NOTE ADULT - ASSESSMENT
64 yr old with cri stage 4, DM, PVD, admitted and had CABG, Post op intubated and has bilateral effusions, worsening renal disease and bc positive for E. faecalis; follow up bc negative to date.  Call to see patient for discharge from sternal wound  Presently on treatment for RLE SSTI  CT chest with suspected postsurgical changes  No fevers, no leukocytosis  Overall,  1  Sternal wound,  infection plus cellulitis around the svg site on right   - Continue Dapto 300mg q 24  - Add Cefepime 2g q24    clearly has sternal wd drainage that is new  his leg looks better   awaiting cultures and  flap and debridement   -

## 2020-02-25 NOTE — PRE-ANESTHESIA EVALUATION ADULT - NSANTHOSAYNRD_GEN_A_CORE
No. TAIWO screening performed.  STOP BANG Legend: 0-2 = LOW Risk; 3-4 = INTERMEDIATE Risk; 5-8 = HIGH Risk

## 2020-02-25 NOTE — PROGRESS NOTE ADULT - SUBJECTIVE AND OBJECTIVE BOX
[facility] DIVISION OF KIDNEY DISEASES AND HYPERTENSION -- FOLLOW UP NOTE  --------------------------------------------------------------------------------  HPI: 65 yo M with HTN, DM II (20 years), admitted with complaints of acute on chronic left sided exertional chest pain. Nephrology team is following for elevated serum creatinine and pre-cardiac catheterization assessment. [facility] available. On admission (1/25/2020), Scr was 1.85. Patient went for cardiac cath on 1/29/20 which revealed triple vessel disease. Scr had improved to 1.76 on 2/3/20. Pt. underwent CABG on 2/3/20. Scr now increased after CABG and PEA arrest on 2/6/20, had improved to ~2.5. Scr over past few days had improved down to 2.09 on 2/17/20. Scr stable between 1.9-2.1, currently at 2.01. Pt. to have surgical procedure with plastic surgery today for drainage coming from central chest surgical site.    Pt. seen and examined at bedside this AM. Pt. on diuretics, non oliguric. States SOB is stable.    PAST HISTORY  --------------------------------------------------------------------------------  No significant changes to PMH, PSH, FHx, SHx, unless otherwise noted    ALLERGIES & MEDICATIONS  --------------------------------------------------------------------------------  Allergies    No Known Allergies    Intolerances    Standing Inpatient Medications  ALBUTerol    90 MICROgram(s) HFA Inhaler 1 Puff(s) Inhalation every 4 hours  albuterol/ipratropium for Nebulization 3 milliLiter(s) Nebulizer every 6 hours  aMIOdarone    Tablet 200 milliGRAM(s) Oral daily  aMIOdarone    Tablet   Oral   artificial tears (preservative free) Ophthalmic Solution 1 Drop(s) Both EYES two times a day  aspirin enteric coated 81 milliGRAM(s) Oral daily  atorvastatin 40 milliGRAM(s) Oral at bedtime  buDESOnide    Inhalation Suspension 0.5 milliGRAM(s) Inhalation two times a day  calcium acetate 667 milliGRAM(s) Oral three times a day with meals  cefepime   IVPB 2000 milliGRAM(s) IV Intermittent daily  DAPTOmycin IVPB      DAPTOmycin IVPB 300 milliGRAM(s) IV Intermittent every 24 hours  dextrose 5%. 1000 milliLiter(s) IV Continuous <Continuous>  dextrose 50% Injectable 12.5 Gram(s) IV Push once  dextrose 50% Injectable 25 Gram(s) IV Push once  dextrose 50% Injectable 25 Gram(s) IV Push once  heparin  Injectable 5000 Unit(s) SubCutaneous every 8 hours  hydrALAZINE 50 milliGRAM(s) Oral every 8 hours  insulin glargine Injectable (LANTUS) 40 Unit(s) SubCutaneous at bedtime  insulin lispro (HumaLOG) corrective regimen sliding scale   SubCutaneous three times a day before meals  insulin lispro (HumaLOG) corrective regimen sliding scale   SubCutaneous at bedtime  insulin lispro Injectable (HumaLOG) 22 Unit(s) SubCutaneous three times a day with meals  melatonin 3 milliGRAM(s) Oral at bedtime  multivitamin 1 Tablet(s) Oral daily  pantoprazole    Tablet 40 milliGRAM(s) Oral before breakfast  polyethylene glycol 3350 17 Gram(s) Oral daily  sodium chloride 0.65% Nasal 1 Spray(s) Both Nostrils three times a day  sodium chloride 0.9%. 1000 milliLiter(s) IV Continuous <Continuous>  tamsulosin 0.4 milliGRAM(s) Oral at bedtime  tiotropium 18 MICROgram(s) Capsule 1 Capsule(s) Inhalation daily    REVIEW OF SYSTEMS  --------------------------------------------------------------------------------  Gen: + fatigue  Respiratory: + dyspnea (improved)  GI: No abdominal pain  MSK: + LE edema  Neuro: No dizziness  Heme: No bleeding    All other systems were reviewed and are negative, except as noted.    VITALS/PHYSICAL EXAM  --------------------------------------------------------------------------------  T(C): 36.8 (02-25-20 @ 14:12), Max: 36.8 (02-25-20 @ 14:12)  HR: 90 (02-25-20 @ 14:12) (88 - 106)  BP: 125/76 (02-25-20 @ 14:12) (125/76 - 131/76)  RR: 18 (02-25-20 @ 14:12) (18 - 18)  SpO2: 98% (02-25-20 @ 14:12) (92% - 98%)  Wt(kg): --    02-24-20 @ 07:01  -  02-25-20 @ 07:00  --------------------------------------------------------  IN: 750 mL / OUT: 2325 mL / NET: -1575 mL    02-25-20 @ 07:01  -  02-25-20 @ 14:50  --------------------------------------------------------  IN: 250 mL / OUT: 600 mL / NET: -350 mL    Physical Exam:  	Gen: awake  	HEENT: supple neck  	Pulm: Decreased bibasilar breath sounds  	CV: + central chest dressing from CABG C/D/I, S1S2  	Abd: Soft  	Ext: + pitting edema B/L (improved)    LABS/STUDIES  --------------------------------------------------------------------------------              8.6    9.20  >-----------<  339      [02-25-20 @ 04:59]              27.8     132  |  95  |  52  ----------------------------<  120      [02-25-20 @ 04:59]  4.7   |  24  |  2.01        Ca     9.0     [02-25-20 @ 04:59]      Mg     2.2     [02-25-20 @ 04:59]    Creatinine Trend:  SCr 2.01 [02-25 @ 04:59]  SCr 1.83 [02-24 @ 06:46]  SCr 1.82 [02-23 @ 07:30]  SCr 2.10 [02-22 @ 06:18]  SCr 2.15 [02-21 @ 06:22]    Urinalysis - [02-24-20 @ 17:29]      Color Light Yellow / Appearance Clear / SG 1.013 / pH 6.5      Gluc Trace / Ketone Negative  / Bili Negative / Urobili <2 mg/dL       Blood Negative / Protein 100 mg/dL / Leuk Est Negative / Nitrite Negative      RBC 5 / WBC 1 / Hyaline 1 / Gran  / Sq Epi  / Non Sq Epi 0 / Bacteria Negative

## 2020-02-25 NOTE — PROGRESS NOTE ADULT - SUBJECTIVE AND OBJECTIVE BOX
infectious diseases progress note:    Patient is a 64y old  Male who presents with a chief complaint of Chest pain (2020 05:12)        Acute ischemic heart disease        ROS:  CONSTITUTIONAL:  Negative fever or chills, feels well, good appetite  EYES:  Negative  blurry vision or double vision  CARDIOVASCULAR:  Negative for chest pain or palpitations  RESPIRATORY:  Negative for cough, wheezing, or SOB   GASTROINTESTINAL:  Negative for nausea, vomiting, diarrhea, constipation, or abdominal pain  GENITOURINARY:  Negative frequency, urgency or dysuria  NEUROLOGIC:  No headache, confusion, dizziness, lightheadedness    Allergies    No Known Allergies    Intolerances        ANTIBIOTICS/RELEVANT:  antimicrobials  cefepime   IVPB 2000 milliGRAM(s) IV Intermittent daily  DAPTOmycin IVPB      DAPTOmycin IVPB 300 milliGRAM(s) IV Intermittent every 24 hours    immunologic:    OTHER:  ALBUTerol    90 MICROgram(s) HFA Inhaler 1 Puff(s) Inhalation every 4 hours  albuterol/ipratropium for Nebulization 3 milliLiter(s) Nebulizer every 6 hours  albuterol/ipratropium for Nebulization. 3 milliLiter(s) Nebulizer every 6 hours PRN  aMIOdarone    Tablet 200 milliGRAM(s) Oral daily  aMIOdarone    Tablet   Oral   artificial tears (preservative free) Ophthalmic Solution 1 Drop(s) Both EYES two times a day  aspirin enteric coated 81 milliGRAM(s) Oral daily  atorvastatin 40 milliGRAM(s) Oral at bedtime  buDESOnide    Inhalation Suspension 0.5 milliGRAM(s) Inhalation two times a day  calcium acetate 667 milliGRAM(s) Oral three times a day with meals  dextrose 40% Gel 15 Gram(s) Oral once PRN  dextrose 5%. 1000 milliLiter(s) IV Continuous <Continuous>  dextrose 50% Injectable 12.5 Gram(s) IV Push once  dextrose 50% Injectable 25 Gram(s) IV Push once  dextrose 50% Injectable 25 Gram(s) IV Push once  glucagon  Injectable 1 milliGRAM(s) IntraMuscular once PRN  heparin  Injectable 5000 Unit(s) SubCutaneous every 8 hours  hydrALAZINE 50 milliGRAM(s) Oral every 8 hours  insulin glargine Injectable (LANTUS) 40 Unit(s) SubCutaneous at bedtime  insulin lispro (HumaLOG) corrective regimen sliding scale   SubCutaneous three times a day before meals  insulin lispro (HumaLOG) corrective regimen sliding scale   SubCutaneous at bedtime  insulin lispro Injectable (HumaLOG) 22 Unit(s) SubCutaneous three times a day with meals  melatonin 3 milliGRAM(s) Oral at bedtime  multivitamin 1 Tablet(s) Oral daily  oxycodone    5 mG/acetaminophen 325 mG 1 Tablet(s) Oral every 6 hours PRN  pantoprazole    Tablet 40 milliGRAM(s) Oral before breakfast  polyethylene glycol 3350 17 Gram(s) Oral daily  sodium chloride 0.65% Nasal 1 Spray(s) Both Nostrils three times a day  sodium chloride 0.9% lock flush 10 milliLiter(s) IV Push every 1 hour PRN  tamsulosin 0.4 milliGRAM(s) Oral at bedtime  tiotropium 18 MICROgram(s) Capsule 1 Capsule(s) Inhalation daily  torsemide 20 milliGRAM(s) Oral daily      Objective:  Vital Signs Last 24 Hrs  T(C): 36.3 (2020 04:15), Max: 36.6 (2020 12:46)  T(F): 97.4 (2020 04:15), Max: 97.8 (2020 12:46)  HR: 96 (2020 06:22) (88 - 100)  BP: 131/76 (2020 04:15) (125/77 - 149/79)  BP(mean): 94 (2020 04:15) (93 - 94)  RR: 18 (2020 04:15) (18 - 18)  SpO2: 95% (2020 06:22) (94% - 96%)    PHYSICAL EXAM:     Eyes:DANIELA, EOMI  Ear/Nose/Throat: no oral lesion, no sinus tenderness on percussion	  Neck:no JVD, no lymphadenopathy, supple  Respiratory: CTA lanre  Cardiovascular: S1S2 RRR, wd with drainage   Gastrointestinal:soft, (+) BS, no HSM  Extremities: right lower extremity red  multiple sup wds bilaterally           LABS:                        8.6    9.20  )-----------( 339      ( 2020 04:59 )             27.8     02-25    132<L>  |  95<L>  |  52<H>  ----------------------------<  120<H>  4.7   |  24  |  2.01<H>    Ca    9.0      2020 04:59  Mg     2.2         TPro  6.5  /  Alb  2.3<L>  /  TBili  0.3  /  DBili  x   /  AST  29  /  ALT  38  /  AlkPhos  98        Urinalysis Basic - ( 2020 17:29 )    Color: Light Yellow / Appearance: Clear / S.013 / pH: x  Gluc: x / Ketone: Negative  / Bili: Negative / Urobili: <2 mg/dL   Blood: x / Protein: 100 mg/dL / Nitrite: Negative   Leuk Esterase: Negative / RBC: 5 /HPF / WBC 1 /HPF   Sq Epi: x / Non Sq Epi: 0 /HPF / Bacteria: Negative          MICROBIOLOGY:    RECENT CULTURES:   @ 15:07 .Blood Blood                No growth at 5 days.          RESPIRATORY CULTURES:              RADIOLOGY & ADDITIONAL STUDIES:        Pager [phone]  After 5 pm/weekends or if no response :[phone]

## 2020-02-25 NOTE — PROGRESS NOTE ADULT - PROBLEM SELECTOR PLAN 2
Pt. with likely hypervolemic hyponatremia in the setting of volume overload. Na stable, c/w diuretics as above. Recommend rechecking UA, urine sodium, urine osm, and serum osm.    Kevin Correa  Nephrology Fellow  Cell: [phone] (from 8 am to 5 pm)  (After 5 pm or on weekends please page on-call fellow)

## 2020-02-25 NOTE — PROGRESS NOTE ADULT - ASSESSMENT
65yo male with hx of HTN, DM II   s/p C4L on 2/3; PEA arrest on 2/6   + enterococcus Bld  C/S > started ampicillin q8h, ID consulted,  R pigtail for effusion  2/8    Extubated  2/9  2/15 L pigtail effusion  2/17 PRBC   2/18 Tx 2 Diaz  2/19 thomas removed, trial void. Maintain left pigtail for significant output. Pt encouraged to ambulate. Supplemental o2 sat NC weaned to 2L. Blood cultures repeated.  2/20 VSS -Pulmonary consult - appreciated - duonebs ATC q6h & pulmicort bid initiated - ddimer drawn.  pt w/ hx negative LE dopplers   will order non con chest DT as pt has a loculated left effusion that will require tap at IR.  2/21 - NON con Chest CT done - multiple loculated Left pleural effusions w/ patchy consolidation R - rounds made w/ Dr. carl.  ID - consult called re: Ct - WBC 11 afebrile.  +PALMA.  unable to tolerate VQ scan this am.  will d/c bumex & start Torsemide 20mg po daily  d/c planning rehab when medically stable  2/22  Wound care consult leg wounds> shower w/ local skin care  2/23  SW drainage> on Dapto> as per ID will cover SWD  CXR this am   Tighter glycemic control  2/24 ID added cefapime SW drainage purulent, afebrile

## 2020-02-25 NOTE — PROGRESS NOTE ADULT - ASSESSMENT
Assessment  DMT2: 64y Male with DM T2 with hyperglycemia, A1C 9.2%, was on oral meds and insulin at home, now on basal bolus insulin, decreased dose yesterday s/p hypoglycemic episode, blood sugars improving and trending within acceptable range, no new hypoglycemic episodes. Patient sitting up in chair, appears comfortable, NPO for sternal wound debridement today.  Foot infection: On Tx, stable.  CAD: s/p CABG 2/3, on medications, no chest pain, stable, monitored.  HTN: Controlled,  on antihypertensive medications.  HLD: Controlled, on statin.  CKD: Monitor labs/BMP          Harper Matias MD  Cell: [phone]  Office: [phone] Assessment  DMT2: 64y Male with DM T2 with hyperglycemia, A1C 9.2%, was on oral meds and insulin at home, now on basal bolus insulin, decreased dose yesterday s/p hypoglycemic episode, blood sugars improving and trending within acceptable range,  no new hypoglycemic episodes. Patient sitting up in chair, appears comfortable, NPO for sternal wound debridement today.  Foot infection: On Tx, stable.  CAD: s/p CABG 2/3, on medications, no chest pain, stable, monitored.  HTN: Controlled,  on antihypertensive medications.  HLD: Controlled, on statin.  CKD: Monitor labs/BMP          Harper Matias MD  Cell: [phone]  Office: [phone]

## 2020-02-25 NOTE — PROGRESS NOTE ADULT - PROBLEM SELECTOR PLAN 1
multifactorial-CAD/CHF, atelectasis due to pain, pleural effusion, bronchospasm, ? PE--O2 NC sat above 90%  f/up doppler of LE negative. VQ unable

## 2020-02-25 NOTE — BRIEF OPERATIVE NOTE - NSICDXBRIEFPREOP_GEN_ALL_CORE_FT
PRE-OP DIAGNOSIS:  Sternal wound infection 25-Feb-2020 19:50:30  Nick Miles
PRE-OP DIAGNOSIS:  3-vessel CAD 03-Feb-2020 16:46:13  Kyung Dunbar

## 2020-02-25 NOTE — PROGRESS NOTE ADULT - SUBJECTIVE AND OBJECTIVE BOX
CHIEF COMPLAINT:  f/up sob, resp failure, pleural effusions, atelectasis-better-still pain in chest    Interval Events: for sternal debridement today, ambulated    REVIEW OF SYSTEMS:  Constitutional: No fevers or chills. No weight loss. + fatigue or generalized malaise.  Eyes: No itching or discharge from the eyes  ENT: No ear pain. No ear discharge. No nasal congestion. No post nasal drip. No epistaxis. No throat pain. No sore throat. No difficulty swallowing.   CV: No chest pain. No palpitations. No lightheadedness or dizziness.   Resp: No dyspnea at rest. + dyspnea on exertion. No orthopnea. No wheezing. No cough. No stridor. No sputum production. No chest pain with respiration.  GI: No nausea. No vomiting. No diarrhea.  MSK: No joint pain or pain in any extremities  Integumentary: No skin lesions. + pedal edema.  Neurological: No gross motor weakness. No sensory changes.  [+ ] All other systems negative  [ ] Unable to assess ROS because ________    OBJECTIVE:  ICU Vital Signs Last 24 Hrs  T(C): 36.3 (2020 04:15), Max: 36.6 (2020 05:20)  T(F): 97.4 (2020 04:15), Max: 97.9 (2020 05:20)  HR: 100 (2020 04:15) (88 - 100)  BP: 131/76 (2020 04:15) (125/77 - 149/79)  BP(mean): 94 (2020 04:15) (93 - 94)  ABP: --  ABP(mean): --  RR: 18 (2020 04:15) (18 - 18)  SpO2: 95% (2020 04:15) (93% - 96%)         @ 07:01  -   @ 07:00  --------------------------------------------------------  IN: 650 mL / OUT: 0 mL / NET: 650 mL     @ 07:01  -  02-25 @ 05:12  --------------------------------------------------------  IN: 750 mL / OUT: 2325 mL / NET: -1575 mL      CAPILLARY BLOOD GLUCOSE      POCT Blood Glucose.: 153 mg/dL (2020 02:07)      PHYSICAL EXAM: NAD in chair on NC  O2  General: Awake, alert, oriented X 3.   HEENT: Atraumatic, normocephalic.                 Mallampatti Grade 3                No nasal congestion.                No tonsillar or pharyngeal exudates.  Lymph Nodes: No palpable lymphadenopathy  Neck: No JVD. No carotid bruit.   Respiratory: abnormal chest expansion-reduced BS bases                         Normal percussion-bases reduced                         Normal and equal air entry                         No wheeze, rhonchi or rales.  Cardiovascular: S1 S2 normal. No murmurs, rubs or gallops.   Abdomen: Soft, non-tender, non-distended. No organomegaly. Normoactive bowel sounds.  Extremities: Warm to touch. Peripheral pulse palpable. + pedal edema.   Skin: No rashes or skin lesions  Neurological: Motor and sensory examination equal and normal in all four extremities.  Psychiatry: Appropriate mood and affect.    HOSPITAL MEDICATIONS:  MEDICATIONS  (STANDING):  ALBUTerol    90 MICROgram(s) HFA Inhaler 1 Puff(s) Inhalation every 4 hours  albuterol/ipratropium for Nebulization 3 milliLiter(s) Nebulizer every 6 hours  aMIOdarone    Tablet 200 milliGRAM(s) Oral daily  aMIOdarone    Tablet   Oral   artificial tears (preservative free) Ophthalmic Solution 1 Drop(s) Both EYES two times a day  aspirin enteric coated 81 milliGRAM(s) Oral daily  atorvastatin 40 milliGRAM(s) Oral at bedtime  buDESOnide    Inhalation Suspension 0.5 milliGRAM(s) Inhalation two times a day  calcium acetate 667 milliGRAM(s) Oral three times a day with meals  cefepime   IVPB 2000 milliGRAM(s) IV Intermittent daily  DAPTOmycin IVPB      DAPTOmycin IVPB 300 milliGRAM(s) IV Intermittent every 24 hours  dextrose 5%. 1000 milliLiter(s) (50 mL/Hr) IV Continuous <Continuous>  dextrose 50% Injectable 12.5 Gram(s) IV Push once  dextrose 50% Injectable 25 Gram(s) IV Push once  dextrose 50% Injectable 25 Gram(s) IV Push once  heparin  Injectable 5000 Unit(s) SubCutaneous every 8 hours  hydrALAZINE 50 milliGRAM(s) Oral every 8 hours  insulin glargine Injectable (LANTUS) 40 Unit(s) SubCutaneous at bedtime  insulin lispro (HumaLOG) corrective regimen sliding scale   SubCutaneous three times a day before meals  insulin lispro (HumaLOG) corrective regimen sliding scale   SubCutaneous at bedtime  insulin lispro Injectable (HumaLOG) 22 Unit(s) SubCutaneous three times a day with meals  melatonin 3 milliGRAM(s) Oral at bedtime  multivitamin 1 Tablet(s) Oral daily  pantoprazole    Tablet 40 milliGRAM(s) Oral before breakfast  polyethylene glycol 3350 17 Gram(s) Oral daily  sodium chloride 0.65% Nasal 1 Spray(s) Both Nostrils three times a day  tamsulosin 0.4 milliGRAM(s) Oral at bedtime  tiotropium 18 MICROgram(s) Capsule 1 Capsule(s) Inhalation daily  torsemide 20 milliGRAM(s) Oral daily    MEDICATIONS  (PRN):  albuterol/ipratropium for Nebulization. 3 milliLiter(s) Nebulizer every 6 hours PRN Shortness of Breath and/or Wheezing  dextrose 40% Gel 15 Gram(s) Oral once PRN Blood Glucose LESS THAN 70 milliGRAM(s)/deciliter  glucagon  Injectable 1 milliGRAM(s) IntraMuscular once PRN Glucose LESS THAN 70 milligrams/deciliter  oxycodone    5 mG/acetaminophen 325 mG 1 Tablet(s) Oral every 6 hours PRN Moderate Pain (4 - 6)  sodium chloride 0.9% lock flush 10 milliLiter(s) IV Push every 1 hour PRN Pre/post blood products, medications, blood draw, and to maintain line patency      LABS:                        9.0    9.44  )-----------( 338      ( 2020 06:46 )             28.9     02-24    132<L>  |  95<L>  |  54<H>  ----------------------------<  169<H>  4.5   |  20<L>  |  1.83<H>    Ca    8.9      2020 06:46    TPro  6.4  /  Alb  1.9<L>  /  TBili  0.4  /  DBili  x   /  AST  29  /  ALT  38  /  AlkPhos  96  02-24      Urinalysis Basic - ( 2020 17:29 )    Color: Light Yellow / Appearance: Clear / S.013 / pH: x  Gluc: x / Ketone: Negative  / Bili: Negative / Urobili: <2 mg/dL   Blood: x / Protein: 100 mg/dL / Nitrite: Negative   Leuk Esterase: Negative / RBC: 5 /HPF / WBC 1 /HPF   Sq Epi: x / Non Sq Epi: 0 /HPF / Bacteria: Negative            MICROBIOLOGY:     RADIOLOGY:  [ ] Reviewed and interpreted by me    Point of Care Ultrasound Findings:    PFT:    EKG:

## 2020-02-25 NOTE — PROGRESS NOTE ADULT - SUBJECTIVE AND OBJECTIVE BOX
CARDIOLOGY FOLLOW UP - Dr. Steel    CC no cp/sob  events noted, + SW infection, cellulitis around the svg site on right  plan for  OR today for flap and debridement       PHYSICAL EXAM:  T(C): 36.3 (02-25-20 @ 04:15), Max: 36.6 (02-24-20 @ 12:46)  HR: 106 (02-25-20 @ 08:46) (88 - 106)  BP: 131/76 (02-25-20 @ 04:15) (125/77 - 149/79)  RR: 18 (02-25-20 @ 04:15) (18 - 18)  SpO2: 92% (02-25-20 @ 08:46) (92% - 96%)  Wt(kg): --  I&O's Summary    24 Feb 2020 07:01  -  25 Feb 2020 07:00  --------------------------------------------------------  IN: 750 mL / OUT: 2325 mL / NET: -1575 mL    25 Feb 2020 07:01  -  25 Feb 2020 10:18  --------------------------------------------------------  IN: 0 mL / OUT: 600 mL / NET: -600 mL        Appearance: Normal	  Cardiovascular: Normal S1 S2,RRR, No JVD, No murmurs  Respiratory: diminished   Gastrointestinal:  Soft, Non-tender, + BS	  Extremities: Normal range of motion, No clubbing + b/l le edema, + RLE redness       MEDICATIONS  (STANDING):  ALBUTerol    90 MICROgram(s) HFA Inhaler 1 Puff(s) Inhalation every 4 hours  albuterol/ipratropium for Nebulization 3 milliLiter(s) Nebulizer every 6 hours  aMIOdarone    Tablet 200 milliGRAM(s) Oral daily  aMIOdarone    Tablet   Oral   artificial tears (preservative free) Ophthalmic Solution 1 Drop(s) Both EYES two times a day  aspirin enteric coated 81 milliGRAM(s) Oral daily  atorvastatin 40 milliGRAM(s) Oral at bedtime  buDESOnide    Inhalation Suspension 0.5 milliGRAM(s) Inhalation two times a day  calcium acetate 667 milliGRAM(s) Oral three times a day with meals  cefepime   IVPB 2000 milliGRAM(s) IV Intermittent daily  DAPTOmycin IVPB      DAPTOmycin IVPB 300 milliGRAM(s) IV Intermittent every 24 hours  dextrose 5%. 1000 milliLiter(s) (50 mL/Hr) IV Continuous <Continuous>  dextrose 50% Injectable 12.5 Gram(s) IV Push once  dextrose 50% Injectable 25 Gram(s) IV Push once  dextrose 50% Injectable 25 Gram(s) IV Push once  heparin  Injectable 5000 Unit(s) SubCutaneous every 8 hours  hydrALAZINE 50 milliGRAM(s) Oral every 8 hours  insulin glargine Injectable (LANTUS) 40 Unit(s) SubCutaneous at bedtime  insulin lispro (HumaLOG) corrective regimen sliding scale   SubCutaneous three times a day before meals  insulin lispro (HumaLOG) corrective regimen sliding scale   SubCutaneous at bedtime  insulin lispro Injectable (HumaLOG) 22 Unit(s) SubCutaneous three times a day with meals  melatonin 3 milliGRAM(s) Oral at bedtime  multivitamin 1 Tablet(s) Oral daily  pantoprazole    Tablet 40 milliGRAM(s) Oral before breakfast  polyethylene glycol 3350 17 Gram(s) Oral daily  sodium chloride 0.65% Nasal 1 Spray(s) Both Nostrils three times a day  tamsulosin 0.4 milliGRAM(s) Oral at bedtime  tiotropium 18 MICROgram(s) Capsule 1 Capsule(s) Inhalation daily  torsemide 20 milliGRAM(s) Oral daily      TELEMETRY: NSR 	    ECG:  	  RADIOLOGY:   DIAGNOSTIC TESTING:  [ ] Echocardiogram:  [ ]  Catheterization:  [ ] Stress Test:    OTHER: 	    LABS:	 	                                8.6    9.20  )-----------( 339      ( 25 Feb 2020 04:59 )             27.8     02-25    132<L>  |  95<L>  |  52<H>  ----------------------------<  120<H>  4.7   |  24  |  2.01<H>    Ca    9.0      25 Feb 2020 04:59  Mg     2.2     02-25    TPro  6.5  /  Alb  2.3<L>  /  TBili  0.3  /  DBili  x   /  AST  29  /  ALT  38  /  AlkPhos  98  02-25

## 2020-02-25 NOTE — PRE-ANESTHESIA EVALUATION ADULT - NSANTHPROCED_GEN_ALL_CORE
Transesophageal Echocardiogram/Central Venous Catheter/Arterial Catheter/Pulmonary Artery Catheter
Central Venous Catheter/Arterial Catheter

## 2020-02-25 NOTE — PROGRESS NOTE ADULT - ASSESSMENT
intraop kennedy 2/3/20 ef 50%, nl lv, mild diastolic dysfx stage 1  limited echo 2/4/20: nl LV sys fx , no pericardial effusion     a/p  64 year old man with history of HTN, DM II, admitted with progressive exertional angina, s/p cath with severe triple vessel disease, s/p CABG, post op course c/b brief witnessed PEA likely hypoxic arrest, s/p intubation.     1. CAD, s/p cath with severe triple vessel disease including lesions at the bifurcation of the LAD/diagonal and distal RCA/RPDA/RPL trifurcation.   -s/p CABG x 4, intraop kennedy ef 50%  -cont asa, statin, BB   -s/p PEA arrest, now extubated  -off vasopressors  -LE dopplers, negative for dvt   -repeat echo 2/15 with preserved lv fxn/EF    2. Postop acute diastolic HF  -mildly fluid overloaded on exam   -chest xray noted, s/p bumex, on torsemide PO per CTS     3. PAF  -cont amio   -AC per CTS  -resume bb per cts    4. HTN  -bp stable on hydralazine    5. STEPHANIE/CKD  renal f/u     6. Postop Pleural effusion  -S/P Right chest tube:  removed   -s/p L chest tube, mgmt per CTS  -d-dimer elevated/ Pulm f/u  -NON con Chest CT done - multiple loculated Left pleural effusions w/ patchy consolidation R   -unable to tolerate VQ scan   -LE doppler to r/o DVT     7. Sepsis -E. Faecalis bacteremia, SW infection, RLE cellulitis   IV abx per [facility]ICU, repeat bcx 2/13 neg  repeat cultures pending  plan for debridement and flap today   ID, CTS f/u       dvt ppx

## 2020-02-25 NOTE — PROGRESS NOTE ADULT - SUBJECTIVE AND OBJECTIVE BOX
Subjective:  "Im hungry, how long do you plan on keeping food from me"  OR scheduled for this afternoon      Tele:  SR 90s           V/S:                    T(F): 97.4 (20 @ 04:15), Max: 97.8 (20 @ 12:46)  HR: 106 (20 @ 08:46) (88 - 106)  BP: 131/76 (20 @ 04:15) (125/77 - 149/79)  RR: 18 (20 @ 04:15) (18 - 18)  SpO2: 92% (20 @ 08:46) (92% - 96%)  Wt(kg): --      LV EF:  50%      Labs:      132<L>  |  95<L>  |  52<H>  ----------------------------<  120<H>  4.7   |  24  |  2.01<H>    Ca    9.0      2020 04:59  Mg     2.2         TPro  6.5  /  Alb  2.3<L>  /  TBili  0.3  /  DBili  x   /  AST  29  /  ALT  38  /  AlkPhos  98                                 8.6    9.20  )-----------( 339      ( 2020 04:59 )             27.8             CAPILLARY BLOOD GLUCOSE      POCT Blood Glucose.: 122 mg/dL (2020 07:55)  POCT Blood Glucose.: 153 mg/dL (2020 02:07)  POCT Blood Glucose.: 189 mg/dL (2020 22:35)  POCT Blood Glucose.: 153 mg/dL (2020 19:53)  POCT Blood Glucose.: 146 mg/dL (2020 16:49)  POCT Blood Glucose.: 263 mg/dL (2020 11:38)             I&O's Detail    2020 07:01  -  2020 07:00  --------------------------------------------------------  IN:    Oral Fluid: 700 mL    Solution: 50 mL  Total IN: 750 mL    OUT:    Voided: 2325 mL  Total OUT: 2325 mL    Total NET: -1575 mL      2020 07:01  -  2020 11:07  --------------------------------------------------------  IN:  Total IN: 0 mL    OUT:    Voided: 600 mL  Total OUT: 600 mL    Total NET: -600 mL          BOWEL MOVEMENT:  [x ] YES [ ] NO      Daily     Daily Weight in k (2020 10:04)  Medications:  ALBUTerol    90 MICROgram(s) HFA Inhaler 1 Puff(s) Inhalation every 4 hours  albuterol/ipratropium for Nebulization 3 milliLiter(s) Nebulizer every 6 hours  albuterol/ipratropium for Nebulization. 3 milliLiter(s) Nebulizer every 6 hours PRN  aMIOdarone    Tablet 200 milliGRAM(s) Oral daily  aMIOdarone    Tablet   Oral   artificial tears (preservative free) Ophthalmic Solution 1 Drop(s) Both EYES two times a day  aspirin enteric coated 81 milliGRAM(s) Oral daily  atorvastatin 40 milliGRAM(s) Oral at bedtime  buDESOnide    Inhalation Suspension 0.5 milliGRAM(s) Inhalation two times a day  calcium acetate 667 milliGRAM(s) Oral three times a day with meals  cefepime   IVPB 2000 milliGRAM(s) IV Intermittent daily  DAPTOmycin IVPB      DAPTOmycin IVPB 300 milliGRAM(s) IV Intermittent every 24 hours  dextrose 40% Gel 15 Gram(s) Oral once PRN  dextrose 5%. 1000 milliLiter(s) IV Continuous <Continuous>  dextrose 50% Injectable 12.5 Gram(s) IV Push once  dextrose 50% Injectable 25 Gram(s) IV Push once  dextrose 50% Injectable 25 Gram(s) IV Push once  glucagon  Injectable 1 milliGRAM(s) IntraMuscular once PRN  heparin  Injectable 5000 Unit(s) SubCutaneous every 8 hours  hydrALAZINE 50 milliGRAM(s) Oral every 8 hours  insulin glargine Injectable (LANTUS) 40 Unit(s) SubCutaneous at bedtime  insulin lispro (HumaLOG) corrective regimen sliding scale   SubCutaneous three times a day before meals  insulin lispro (HumaLOG) corrective regimen sliding scale   SubCutaneous at bedtime  insulin lispro Injectable (HumaLOG) 22 Unit(s) SubCutaneous three times a day with meals  melatonin 3 milliGRAM(s) Oral at bedtime  multivitamin 1 Tablet(s) Oral daily  oxycodone    5 mG/acetaminophen 325 mG 1 Tablet(s) Oral every 6 hours PRN  pantoprazole    Tablet 40 milliGRAM(s) Oral before breakfast  polyethylene glycol 3350 17 Gram(s) Oral daily  sodium chloride 0.65% Nasal 1 Spray(s) Both Nostrils three times a day  sodium chloride 0.9% lock flush 10 milliLiter(s) IV Push every 1 hour PRN  sodium chloride 0.9%. 1000 milliLiter(s) IV Continuous <Continuous>  tamsulosin 0.4 milliGRAM(s) Oral at bedtime  tiotropium 18 MICROgram(s) Capsule 1 Capsule(s) Inhalation daily        Physical Exam:    Neurology: alert and oriented x 3, nonfocal, no gross deficits    CV : S1S2    Sternal Wound :  Upper third prox pole with opening>serosang fluid draining  + click    Lungs: diminished left BS base  Room air    Abdomen: soft, nontender, nondistended, positive bowel sounds, 2/25    +bm       Extremities:    1+ edema B/l lower extrem  RLE lateral aspected with open draining wound> purulent  blanched tissue surrounding wound  along calf to shin  LLE shin with dried scab                 PAST MEDICAL & SURGICAL HISTORY:  HTN (hypertension)  Diabetes  History of appendectomy: x 30 yrs ago

## 2020-02-26 LAB
ALBUMIN SERPL ELPH-MCNC: 2.7 G/DL — LOW (ref 3.3–5)
ALP SERPL-CCNC: 72 U/L — SIGNIFICANT CHANGE UP (ref 40–120)
ALT FLD-CCNC: 26 U/L — SIGNIFICANT CHANGE UP (ref 10–45)
ANION GAP SERPL CALC-SCNC: 12 MMOL/L — SIGNIFICANT CHANGE UP (ref 5–17)
APTT BLD: 30.4 SEC — SIGNIFICANT CHANGE UP (ref 27.5–36.3)
AST SERPL-CCNC: 23 U/L — SIGNIFICANT CHANGE UP (ref 10–40)
BILIRUB SERPL-MCNC: 0.4 MG/DL — SIGNIFICANT CHANGE UP (ref 0.2–1.2)
BUN SERPL-MCNC: 50 MG/DL — HIGH (ref 7–23)
CALCIUM SERPL-MCNC: 8.7 MG/DL — SIGNIFICANT CHANGE UP (ref 8.4–10.5)
CHLORIDE SERPL-SCNC: 96 MMOL/L — SIGNIFICANT CHANGE UP (ref 96–108)
CO2 SERPL-SCNC: 25 MMOL/L — SIGNIFICANT CHANGE UP (ref 22–31)
CREAT SERPL-MCNC: 1.99 MG/DL — HIGH (ref 0.5–1.3)
CULTURE RESULTS: SIGNIFICANT CHANGE UP
GAS PNL BLDA: SIGNIFICANT CHANGE UP
GLUCOSE BLDC GLUCOMTR-MCNC: 116 MG/DL — HIGH (ref 70–99)
GLUCOSE BLDC GLUCOMTR-MCNC: 117 MG/DL — HIGH (ref 70–99)
GLUCOSE BLDC GLUCOMTR-MCNC: 127 MG/DL — HIGH (ref 70–99)
GLUCOSE BLDC GLUCOMTR-MCNC: 129 MG/DL — HIGH (ref 70–99)
GLUCOSE BLDC GLUCOMTR-MCNC: 130 MG/DL — HIGH (ref 70–99)
GLUCOSE BLDC GLUCOMTR-MCNC: 134 MG/DL — HIGH (ref 70–99)
GLUCOSE BLDC GLUCOMTR-MCNC: 136 MG/DL — HIGH (ref 70–99)
GLUCOSE BLDC GLUCOMTR-MCNC: 138 MG/DL — HIGH (ref 70–99)
GLUCOSE BLDC GLUCOMTR-MCNC: 156 MG/DL — HIGH (ref 70–99)
GLUCOSE BLDC GLUCOMTR-MCNC: 157 MG/DL — HIGH (ref 70–99)
GLUCOSE BLDC GLUCOMTR-MCNC: 165 MG/DL — HIGH (ref 70–99)
GLUCOSE BLDC GLUCOMTR-MCNC: 188 MG/DL — HIGH (ref 70–99)
GLUCOSE BLDC GLUCOMTR-MCNC: 249 MG/DL — HIGH (ref 70–99)
GLUCOSE SERPL-MCNC: 128 MG/DL — HIGH (ref 70–99)
HCT VFR BLD CALC: 26.8 % — LOW (ref 39–50)
HGB BLD-MCNC: 8.3 G/DL — LOW (ref 13–17)
INR BLD: 1.26 RATIO — HIGH (ref 0.88–1.16)
MAGNESIUM SERPL-MCNC: 2.1 MG/DL — SIGNIFICANT CHANGE UP (ref 1.6–2.6)
MCHC RBC-ENTMCNC: 27.3 PG — SIGNIFICANT CHANGE UP (ref 27–34)
MCHC RBC-ENTMCNC: 31 GM/DL — LOW (ref 32–36)
MCV RBC AUTO: 88.2 FL — SIGNIFICANT CHANGE UP (ref 80–100)
NRBC # BLD: 0 /100 WBCS — SIGNIFICANT CHANGE UP (ref 0–0)
PHOSPHATE SERPL-MCNC: 4.8 MG/DL — HIGH (ref 2.5–4.5)
PLATELET # BLD AUTO: 273 K/UL — SIGNIFICANT CHANGE UP (ref 150–400)
POTASSIUM SERPL-MCNC: 4.4 MMOL/L — SIGNIFICANT CHANGE UP (ref 3.5–5.3)
POTASSIUM SERPL-SCNC: 4.4 MMOL/L — SIGNIFICANT CHANGE UP (ref 3.5–5.3)
PROT SERPL-MCNC: 6.1 G/DL — SIGNIFICANT CHANGE UP (ref 6–8.3)
PROTHROM AB SERPL-ACNC: 14.5 SEC — HIGH (ref 10–12.9)
RBC # BLD: 3.04 M/UL — LOW (ref 4.2–5.8)
RBC # FLD: 14.5 % — SIGNIFICANT CHANGE UP (ref 10.3–14.5)
SODIUM SERPL-SCNC: 133 MMOL/L — LOW (ref 135–145)
SPECIMEN SOURCE: SIGNIFICANT CHANGE UP
WBC # BLD: 8.07 K/UL — SIGNIFICANT CHANGE UP (ref 3.8–10.5)
WBC # FLD AUTO: 8.07 K/UL — SIGNIFICANT CHANGE UP (ref 3.8–10.5)

## 2020-02-26 PROCEDURE — 71045 X-RAY EXAM CHEST 1 VIEW: CPT | Mod: 26

## 2020-02-26 PROCEDURE — 99232 SBSQ HOSP IP/OBS MODERATE 35: CPT | Mod: GC

## 2020-02-26 PROCEDURE — 93010 ELECTROCARDIOGRAM REPORT: CPT

## 2020-02-26 PROCEDURE — 99232 SBSQ HOSP IP/OBS MODERATE 35: CPT

## 2020-02-26 PROCEDURE — 99291 CRITICAL CARE FIRST HOUR: CPT

## 2020-02-26 RX ORDER — AMIODARONE HYDROCHLORIDE 400 MG/1
200 TABLET ORAL DAILY
Refills: 0 | Status: DISCONTINUED | OUTPATIENT
Start: 2020-02-26 | End: 2020-03-16

## 2020-02-26 RX ORDER — MUPIROCIN 20 MG/G
1 OINTMENT TOPICAL
Refills: 0 | Status: DISCONTINUED | OUTPATIENT
Start: 2020-02-26 | End: 2020-03-08

## 2020-02-26 RX ORDER — IPRATROPIUM/ALBUTEROL SULFATE 18-103MCG
3 AEROSOL WITH ADAPTER (GRAM) INHALATION EVERY 6 HOURS
Refills: 0 | Status: DISCONTINUED | OUTPATIENT
Start: 2020-02-26 | End: 2020-03-16

## 2020-02-26 RX ORDER — OXYCODONE AND ACETAMINOPHEN 5; 325 MG/1; MG/1
1 TABLET ORAL EVERY 6 HOURS
Refills: 0 | Status: DISCONTINUED | OUTPATIENT
Start: 2020-02-26 | End: 2020-02-26

## 2020-02-26 RX ORDER — HYDROMORPHONE HYDROCHLORIDE 2 MG/ML
0.5 INJECTION INTRAMUSCULAR; INTRAVENOUS; SUBCUTANEOUS ONCE
Refills: 0 | Status: DISCONTINUED | OUTPATIENT
Start: 2020-02-26 | End: 2020-02-26

## 2020-02-26 RX ORDER — METOPROLOL TARTRATE 50 MG
25 TABLET ORAL EVERY 12 HOURS
Refills: 0 | Status: DISCONTINUED | OUTPATIENT
Start: 2020-02-26 | End: 2020-03-04

## 2020-02-26 RX ORDER — FENTANYL CITRATE 50 UG/ML
50 INJECTION INTRAVENOUS ONCE
Refills: 0 | Status: DISCONTINUED | OUTPATIENT
Start: 2020-02-26 | End: 2020-02-26

## 2020-02-26 RX ORDER — SODIUM CHLORIDE 0.65 %
1 AEROSOL, SPRAY (ML) NASAL THREE TIMES A DAY
Refills: 0 | Status: DISCONTINUED | OUTPATIENT
Start: 2020-02-26 | End: 2020-03-16

## 2020-02-26 RX ORDER — INSULIN GLARGINE 100 [IU]/ML
40 INJECTION, SOLUTION SUBCUTANEOUS AT BEDTIME
Refills: 0 | Status: DISCONTINUED | OUTPATIENT
Start: 2020-02-26 | End: 2020-02-27

## 2020-02-26 RX ORDER — TIOTROPIUM BROMIDE 18 UG/1
1 CAPSULE ORAL; RESPIRATORY (INHALATION) DAILY
Refills: 0 | Status: DISCONTINUED | OUTPATIENT
Start: 2020-02-26 | End: 2020-03-16

## 2020-02-26 RX ORDER — SENNA PLUS 8.6 MG/1
2 TABLET ORAL AT BEDTIME
Refills: 0 | Status: DISCONTINUED | OUTPATIENT
Start: 2020-02-26 | End: 2020-03-16

## 2020-02-26 RX ORDER — ALBUTEROL 90 UG/1
1 AEROSOL, METERED ORAL EVERY 4 HOURS
Refills: 0 | Status: DISCONTINUED | OUTPATIENT
Start: 2020-02-26 | End: 2020-03-16

## 2020-02-26 RX ORDER — SODIUM CHLORIDE 9 MG/ML
10 INJECTION INTRAMUSCULAR; INTRAVENOUS; SUBCUTANEOUS
Refills: 0 | Status: DISCONTINUED | OUTPATIENT
Start: 2020-02-26 | End: 2020-03-16

## 2020-02-26 RX ORDER — INSULIN LISPRO 100/ML
22 VIAL (ML) SUBCUTANEOUS
Refills: 0 | Status: DISCONTINUED | OUTPATIENT
Start: 2020-02-26 | End: 2020-02-27

## 2020-02-26 RX ORDER — POLYETHYLENE GLYCOL 3350 17 G/17G
17 POWDER, FOR SOLUTION ORAL DAILY
Refills: 0 | Status: DISCONTINUED | OUTPATIENT
Start: 2020-02-26 | End: 2020-03-16

## 2020-02-26 RX ORDER — CHLORHEXIDINE GLUCONATE 213 G/1000ML
15 SOLUTION TOPICAL EVERY 12 HOURS
Refills: 0 | Status: DISCONTINUED | OUTPATIENT
Start: 2020-02-26 | End: 2020-02-27

## 2020-02-26 RX ORDER — ASPIRIN/CALCIUM CARB/MAGNESIUM 324 MG
81 TABLET ORAL DAILY
Refills: 0 | Status: DISCONTINUED | OUTPATIENT
Start: 2020-02-26 | End: 2020-03-16

## 2020-02-26 RX ORDER — ATORVASTATIN CALCIUM 80 MG/1
40 TABLET, FILM COATED ORAL AT BEDTIME
Refills: 0 | Status: DISCONTINUED | OUTPATIENT
Start: 2020-02-26 | End: 2020-03-16

## 2020-02-26 RX ORDER — LANOLIN ALCOHOL/MO/W.PET/CERES
3 CREAM (GRAM) TOPICAL AT BEDTIME
Refills: 0 | Status: DISCONTINUED | OUTPATIENT
Start: 2020-02-26 | End: 2020-03-16

## 2020-02-26 RX ORDER — BUDESONIDE, MICRONIZED 100 %
0.5 POWDER (GRAM) MISCELLANEOUS
Refills: 0 | Status: DISCONTINUED | OUTPATIENT
Start: 2020-02-26 | End: 2020-03-16

## 2020-02-26 RX ORDER — INSULIN LISPRO 100/ML
VIAL (ML) SUBCUTANEOUS
Refills: 0 | Status: DISCONTINUED | OUTPATIENT
Start: 2020-02-26 | End: 2020-03-16

## 2020-02-26 RX ORDER — INSULIN HUMAN 100 [IU]/ML
2 INJECTION, SOLUTION SUBCUTANEOUS
Qty: 100 | Refills: 0 | Status: DISCONTINUED | OUTPATIENT
Start: 2020-02-26 | End: 2020-02-26

## 2020-02-26 RX ORDER — INSULIN HUMAN 100 [IU]/ML
INJECTION, SOLUTION SUBCUTANEOUS EVERY 4 HOURS
Refills: 0 | Status: DISCONTINUED | OUTPATIENT
Start: 2020-02-26 | End: 2020-02-26

## 2020-02-26 RX ORDER — HYDROMORPHONE HYDROCHLORIDE 2 MG/ML
2 INJECTION INTRAMUSCULAR; INTRAVENOUS; SUBCUTANEOUS
Refills: 0 | Status: DISCONTINUED | OUTPATIENT
Start: 2020-02-26 | End: 2020-03-04

## 2020-02-26 RX ORDER — HYDROMORPHONE HYDROCHLORIDE 2 MG/ML
0.25 INJECTION INTRAMUSCULAR; INTRAVENOUS; SUBCUTANEOUS ONCE
Refills: 0 | Status: DISCONTINUED | OUTPATIENT
Start: 2020-02-26 | End: 2020-02-26

## 2020-02-26 RX ORDER — CALCIUM ACETATE 667 MG
667 TABLET ORAL
Refills: 0 | Status: DISCONTINUED | OUTPATIENT
Start: 2020-02-26 | End: 2020-03-16

## 2020-02-26 RX ORDER — HYDRALAZINE HCL 50 MG
50 TABLET ORAL EVERY 8 HOURS
Refills: 0 | Status: DISCONTINUED | OUTPATIENT
Start: 2020-02-26 | End: 2020-02-26

## 2020-02-26 RX ORDER — MEPERIDINE HYDROCHLORIDE 50 MG/ML
25 INJECTION INTRAMUSCULAR; INTRAVENOUS; SUBCUTANEOUS ONCE
Refills: 0 | Status: DISCONTINUED | OUTPATIENT
Start: 2020-02-26 | End: 2020-02-26

## 2020-02-26 RX ADMIN — Medication 1 DROP(S): at 18:14

## 2020-02-26 RX ADMIN — MUPIROCIN 1 APPLICATION(S): 20 OINTMENT TOPICAL at 17:42

## 2020-02-26 RX ADMIN — AMIODARONE HYDROCHLORIDE 200 MILLIGRAM(S): 400 TABLET ORAL at 05:30

## 2020-02-26 RX ADMIN — Medication 25 MILLIGRAM(S): at 03:45

## 2020-02-26 RX ADMIN — HYDROMORPHONE HYDROCHLORIDE 2 MILLIGRAM(S): 2 INJECTION INTRAMUSCULAR; INTRAVENOUS; SUBCUTANEOUS at 17:54

## 2020-02-26 RX ADMIN — FENTANYL CITRATE 50 MICROGRAM(S): 50 INJECTION INTRAVENOUS at 02:17

## 2020-02-26 RX ADMIN — Medication 1: at 14:32

## 2020-02-26 RX ADMIN — HYDROMORPHONE HYDROCHLORIDE 0.5 MILLIGRAM(S): 2 INJECTION INTRAMUSCULAR; INTRAVENOUS; SUBCUTANEOUS at 04:30

## 2020-02-26 RX ADMIN — FENTANYL CITRATE 50 MICROGRAM(S): 50 INJECTION INTRAVENOUS at 02:35

## 2020-02-26 RX ADMIN — Medication 1 SPRAY(S): at 21:12

## 2020-02-26 RX ADMIN — Medication 667 MILLIGRAM(S): at 07:54

## 2020-02-26 RX ADMIN — HYDROMORPHONE HYDROCHLORIDE 2 MILLIGRAM(S): 2 INJECTION INTRAMUSCULAR; INTRAVENOUS; SUBCUTANEOUS at 13:45

## 2020-02-26 RX ADMIN — SENNA PLUS 2 TABLET(S): 8.6 TABLET ORAL at 21:13

## 2020-02-26 RX ADMIN — Medication 2: at 17:40

## 2020-02-26 RX ADMIN — HYDROMORPHONE HYDROCHLORIDE 2 MILLIGRAM(S): 2 INJECTION INTRAMUSCULAR; INTRAVENOUS; SUBCUTANEOUS at 21:43

## 2020-02-26 RX ADMIN — OXYCODONE AND ACETAMINOPHEN 1 TABLET(S): 5; 325 TABLET ORAL at 07:54

## 2020-02-26 RX ADMIN — Medication 81 MILLIGRAM(S): at 03:46

## 2020-02-26 RX ADMIN — Medication 3 MILLIGRAM(S): at 21:13

## 2020-02-26 RX ADMIN — Medication 1 SPRAY(S): at 14:30

## 2020-02-26 RX ADMIN — Medication 25 MILLIGRAM(S): at 17:42

## 2020-02-26 RX ADMIN — Medication 1 TABLET(S): at 12:52

## 2020-02-26 RX ADMIN — ATORVASTATIN CALCIUM 40 MILLIGRAM(S): 80 TABLET, FILM COATED ORAL at 21:13

## 2020-02-26 RX ADMIN — Medication 667 MILLIGRAM(S): at 12:52

## 2020-02-26 RX ADMIN — Medication 22 UNIT(S): at 17:40

## 2020-02-26 RX ADMIN — HYDROMORPHONE HYDROCHLORIDE 2 MILLIGRAM(S): 2 INJECTION INTRAMUSCULAR; INTRAVENOUS; SUBCUTANEOUS at 12:54

## 2020-02-26 RX ADMIN — CEFEPIME 100 MILLIGRAM(S): 1 INJECTION, POWDER, FOR SOLUTION INTRAMUSCULAR; INTRAVENOUS at 12:53

## 2020-02-26 RX ADMIN — MEPERIDINE HYDROCHLORIDE 25 MILLIGRAM(S): 50 INJECTION INTRAMUSCULAR; INTRAVENOUS; SUBCUTANEOUS at 05:29

## 2020-02-26 RX ADMIN — CHLORHEXIDINE GLUCONATE 15 MILLILITER(S): 213 SOLUTION TOPICAL at 17:42

## 2020-02-26 RX ADMIN — CHLORHEXIDINE GLUCONATE 15 MILLILITER(S): 213 SOLUTION TOPICAL at 06:31

## 2020-02-26 RX ADMIN — POLYETHYLENE GLYCOL 3350 17 GRAM(S): 17 POWDER, FOR SOLUTION ORAL at 12:52

## 2020-02-26 RX ADMIN — Medication 0.5 MILLIGRAM(S): at 05:49

## 2020-02-26 RX ADMIN — Medication 1: at 21:54

## 2020-02-26 RX ADMIN — HYDROMORPHONE HYDROCHLORIDE 2 MILLIGRAM(S): 2 INJECTION INTRAMUSCULAR; INTRAVENOUS; SUBCUTANEOUS at 18:54

## 2020-02-26 RX ADMIN — Medication 1 SPRAY(S): at 05:32

## 2020-02-26 RX ADMIN — HYDROMORPHONE HYDROCHLORIDE 2 MILLIGRAM(S): 2 INJECTION INTRAMUSCULAR; INTRAVENOUS; SUBCUTANEOUS at 21:13

## 2020-02-26 RX ADMIN — OXYCODONE AND ACETAMINOPHEN 1 TABLET(S): 5; 325 TABLET ORAL at 08:25

## 2020-02-26 RX ADMIN — INSULIN GLARGINE 40 UNIT(S): 100 INJECTION, SOLUTION SUBCUTANEOUS at 21:53

## 2020-02-26 RX ADMIN — Medication 22 UNIT(S): at 07:54

## 2020-02-26 RX ADMIN — HYDROMORPHONE HYDROCHLORIDE 0.5 MILLIGRAM(S): 2 INJECTION INTRAMUSCULAR; INTRAVENOUS; SUBCUTANEOUS at 04:45

## 2020-02-26 RX ADMIN — Medication 0.5 MILLIGRAM(S): at 17:42

## 2020-02-26 RX ADMIN — MUPIROCIN 1 APPLICATION(S): 20 OINTMENT TOPICAL at 05:30

## 2020-02-26 RX ADMIN — Medication 667 MILLIGRAM(S): at 17:42

## 2020-02-26 RX ADMIN — Medication 22 UNIT(S): at 14:30

## 2020-02-26 RX ADMIN — PANTOPRAZOLE SODIUM 40 MILLIGRAM(S): 20 TABLET, DELAYED RELEASE ORAL at 07:54

## 2020-02-26 RX ADMIN — Medication 1 DROP(S): at 05:30

## 2020-02-26 RX ADMIN — DAPTOMYCIN 112 MILLIGRAM(S): 500 INJECTION, POWDER, LYOPHILIZED, FOR SOLUTION INTRAVENOUS at 17:41

## 2020-02-26 NOTE — PROGRESS NOTE ADULT - PROBLEM SELECTOR PROBLEM 8
Type 2 diabetes mellitus with other specified complication, with long-term current use of insulin
Discharge planning issues
Prophylactic measure
Type 2 diabetes mellitus with other specified complication, with long-term current use of insulin

## 2020-02-26 NOTE — PROGRESS NOTE ADULT - ASSESSMENT
64 yr old with cri stage 4, DM, PVD, admitted and had CABG, Post op intubated and has bilateral effusions, worsening renal disease and bc positive for E. faecalis; follow up bc negative to date.  Call to see patient for discharge from sternal wound  Presently on treatment for RLE SSTI  CT chest with suspected postsurgical changes     Overall,  1  Sternal wound,  infection plus cellulitis around the svg site on right   - Continue Dapto 300mg q 24  - Add Cefepime 2g q24    clearly has sternal wd drainage that is new  his leg looks better   awaiting cultures   sp formal debridement  wd looks good this am   -

## 2020-02-26 NOTE — PROGRESS NOTE ADULT - SUBJECTIVE AND OBJECTIVE BOX
Plastic Surgery Progress Note (pg [facility]: [id number], [facility]: [phone], [facility]: [phone])    SUBJECTIVE:  Doing well. No acute events overnight; pain well controlled, up out of bed this morning.     OBJECTIVE:     ** VITAL SIGNS / I&O's **    Vital Signs Last 24 Hrs  T(C): 37.5 (26 Feb 2020 04:00), Max: 37.5 (26 Feb 2020 04:00)  T(F): 99.5 (26 Feb 2020 04:00), Max: 99.5 (26 Feb 2020 04:00)  HR: 93 (26 Feb 2020 07:00) (73 - 107)  BP: 127/78 (26 Feb 2020 03:00) (125/74 - 147/83)  BP(mean): 97 (26 Feb 2020 03:00) (93 - 109)  RR: 20 (26 Feb 2020 07:00) (14 - 44)  SpO2: 99% (26 Feb 2020 07:00) (92% - 100%)      25 Feb 2020 07:01  -  26 Feb 2020 07:00  --------------------------------------------------------  IN:    insulin regular Infusion: 15 mL    Oral Fluid: 240 mL    Packed Red Blood Cells: 300 mL    phenylephrine   Infusion: 17.7 mL    propofol Infusion: 46.1 mL    sodium chloride 0.9%: 300 mL    sodium chloride 0.9%.: 110 mL    Solution: 50 mL  Total IN: 1078.8 mL    OUT:    Bulb: 25 mL    Bulb: 195 mL    Indwelling Catheter - Urethral: 1225 mL    Voided: 1200 mL  Total OUT: 2645 mL    Total NET: -1566.2 mL          ** PHYSICAL EXAM **    -- CONSTITUTIONAL: AOx3. NAD.   -- CHEST: Dressing c/d/i, no collections, JPs ss      **MEDS**  ALBUTerol    90 MICROgram(s) HFA Inhaler 1 Puff(s) Inhalation every 4 hours  albuterol/ipratropium for Nebulization. 3 milliLiter(s) Nebulizer every 6 hours PRN  aMIOdarone    Tablet 200 milliGRAM(s) Oral daily  artificial tears (preservative free) Ophthalmic Solution 1 Drop(s) Both EYES two times a day  aspirin enteric coated 81 milliGRAM(s) Oral daily  atorvastatin 40 milliGRAM(s) Oral at bedtime  buDESOnide    Inhalation Suspension 0.5 milliGRAM(s) Inhalation two times a day  calcium acetate 667 milliGRAM(s) Oral three times a day with meals  cefepime   IVPB 2000 milliGRAM(s) IV Intermittent every 24 hours  chlorhexidine 0.12% Liquid 15 milliLiter(s) Oral Mucosa every 12 hours  DAPTOmycin IVPB 300 milliGRAM(s) IV Intermittent every 24 hours  guaiFENesin   Syrup  (Sugar-Free) 200 milliGRAM(s) Oral every 6 hours PRN  insulin glargine Injectable (LANTUS) 40 Unit(s) SubCutaneous at bedtime  insulin lispro Injectable (HumaLOG) 22 Unit(s) SubCutaneous three times a day with meals  insulin regular  human corrective regimen sliding scale   SubCutaneous every 4 hours  insulin regular Infusion 2 Unit(s)/Hr IV Continuous <Continuous>  melatonin 3 milliGRAM(s) Oral at bedtime  metoprolol tartrate 25 milliGRAM(s) Oral every 12 hours  multivitamin 1 Tablet(s) Oral daily  mupirocin 2% Ointment 1 Application(s) Topical two times a day  oxycodone    5 mG/acetaminophen 325 mG 1 Tablet(s) Oral every 6 hours PRN  pantoprazole    Tablet 40 milliGRAM(s) Oral before breakfast  polyethylene glycol 3350 17 Gram(s) Oral daily  senna 2 Tablet(s) Oral at bedtime  sodium chloride 0.65% Nasal 1 Spray(s) Both Nostrils three times a day  sodium chloride 0.9% lock flush 10 milliLiter(s) IV Push every 1 hour PRN  sodium chloride 0.9%. 1000 milliLiter(s) IV Continuous <Continuous>  tiotropium 18 MICROgram(s) Capsule 1 Capsule(s) Inhalation daily      ** LABS **                          8.3    8.07  )-----------( 273      ( 26 Feb 2020 01:17 )             26.8     26 Feb 2020 01:17    133    |  96     |  50     ----------------------------<  128    4.4     |  25     |  1.99     Ca    8.7        26 Feb 2020 01:17  Phos  4.8       26 Feb 2020 01:17  Mg     2.1       26 Feb 2020 01:17    TPro  6.1    /  Alb  2.7    /  TBili  0.4    /  DBili  x      /  AST  23     /  ALT  26     /  AlkPhos  72     26 Feb 2020 01:17    PT/INR - ( 26 Feb 2020 01:17 )   PT: 14.5 sec;   INR: 1.26 ratio         PTT - ( 26 Feb 2020 01:17 )  PTT:30.4 sec  CAPILLARY BLOOD GLUCOSE  136 (26 Feb 2020 07:00)  129 (26 Feb 2020 06:00)  116 (26 Feb 2020 05:00)  117 (26 Feb 2020 04:00)  130 (26 Feb 2020 03:00)  124 (26 Feb 2020 01:00)  129 (26 Feb 2020 00:00)  135 (25 Feb 2020 23:00)  139 (25 Feb 2020 22:00)  131 (25 Feb 2020 21:00)  133 (25 Feb 2020 20:30)      POCT Blood Glucose.: 136 mg/dL (26 Feb 2020 06:52)  POCT Blood Glucose.: 127 mg/dL (26 Feb 2020 06:08)  POCT Blood Glucose.: 116 mg/dL (26 Feb 2020 04:57)  POCT Blood Glucose.: 117 mg/dL (26 Feb 2020 03:58)  POCT Blood Glucose.: 130 mg/dL (26 Feb 2020 03:06)  POCT Blood Glucose.: 129 mg/dL (25 Feb 2020 23:58)  POCT Blood Glucose.: 134 mg/dL (25 Feb 2020 23:06)  POCT Blood Glucose.: 139 mg/dL (25 Feb 2020 22:02)  POCT Blood Glucose.: 131 mg/dL (25 Feb 2020 21:24)  POCT Blood Glucose.: 148 mg/dL (25 Feb 2020 11:16)  POCT Blood Glucose.: 122 mg/dL (25 Feb 2020 07:55)

## 2020-02-26 NOTE — PROGRESS NOTE ADULT - PROBLEM SELECTOR PLAN 1
Pt with  CKD likely  in the setting of long standing DM and HTN. No prior labs for review. Scr since admission has ranged between 1.8-2.1. Scr increased to ~2.50 secondary to hemodynamic injury after CABG and PEA arrest s/p CPR, and worsened to 2.9. Scr currently 1.99 this AM. Non-nephrotic range proteinuria. Pt. likely with diabetic nephropathy.   - Currently with less SOB and is non-oliguric. Would consider IV diuretics if pt. becomes oliguric. Goal is net negative fluid balance given tense LE edema (though improved).  - Monitor labs and urine output.   - Avoid NSAIDs, ACEI/ARBS, RCA and nephrotoxins. Dose medications as per eGFR Pt with  CKD likely  in the setting of long standing DM and HTN. No prior labs for review. Scr since admission has ranged between 1.8-2.1. Scr increased to ~2.50 secondary to hemodynamic injury after CABG and PEA arrest s/p CPR, and worsened to 2.9. Scr currently 1.99 this AM. Non-nephrotic range proteinuria. Pt. likely with diabetic nephropathy.   - Currently with less SOB and is non-oliguric. Would consider IV diuretics if pt. becomes oliguric. Goal is net negative fluid balance given tense LE edema (though improved).  - Pt. on cefepime. Adjust dosage to renal function and degree of infectious process. Cefepime known to cause toxic encephalopathy in patients with decreased renal function. Monitor mental status.  - Monitor labs and urine output.   - Avoid NSAIDs, ACEI/ARBS, RCA and nephrotoxins. Dose medications as per eGFR

## 2020-02-26 NOTE — PROGRESS NOTE ADULT - ASSESSMENT
Assessment  DMT2: 64y Male with DM T2 with hyperglycemia, A1C 9.2%, was on oral meds and insulin at home, now s/p debridement, was on IV insulin overnight, transitioned to subQ decreased dose, blood sugars trending within overall acceptable range, now new hypoglycemic episodes. Patient is s/p sternal wound debridement, stepped down to 2cohen, appears comfortable, eating partial meals.  Foot infection: On Tx, stable.  CAD: s/p CABG 2/3, on medications, no chest pain, stable, monitored.  HTN: Controlled,  on antihypertensive medications.  HLD: Controlled, on statin.  CKD: Monitor labs/BMP          Harper Matias MD  Cell: [phone]  Office: [phone] Assessment  DMT2: 64y Male with DM T2 with hyperglycemia, A1C 9.2%, was on oral meds and insulin at home, now s/p debridement, was on IV insulin overnight, transitioned to subQ decreased dose,  blood sugars trending within overall acceptable range, now new hypoglycemic episodes. Patient is s/p sternal wound debridement, stepped down to 2cohen, appears comfortable, eating partial meals.  Foot infection: On Tx, stable.  CAD: s/p CABG 2/3, on medications, no chest pain, stable, monitored.  HTN: Controlled,  on antihypertensive medications.  HLD: Controlled, on statin.  CKD: Monitor labs/BMP          Harper Matias MD  Cell: [phone]  Office: [phone]

## 2020-02-26 NOTE — PROGRESS NOTE ADULT - SUBJECTIVE AND OBJECTIVE BOX
Chief complaint  Patient is a 64y old  Male who presents with a chief complaint of Chest pain (26 Feb 2020 13:53)   Review of systems  Patient sitting up in chair, looks comfortable, no hypoglycemia.    Labs and Fingersticks  CAPILLARY BLOOD GLUCOSE  138 (26 Feb 2020 08:00)  136 (26 Feb 2020 07:00)  129 (26 Feb 2020 06:00)  116 (26 Feb 2020 05:00)  117 (26 Feb 2020 04:00)  130 (26 Feb 2020 03:00)  124 (26 Feb 2020 01:00)  129 (26 Feb 2020 00:00)  135 (25 Feb 2020 23:00)  139 (25 Feb 2020 22:00)  131 (25 Feb 2020 21:00)  133 (25 Feb 2020 20:30)      POCT Blood Glucose.: 188 mg/dL (26 Feb 2020 14:05)  POCT Blood Glucose.: 157 mg/dL (26 Feb 2020 12:54)  POCT Blood Glucose.: 165 mg/dL (26 Feb 2020 10:18)  POCT Blood Glucose.: 138 mg/dL (26 Feb 2020 07:52)  POCT Blood Glucose.: 136 mg/dL (26 Feb 2020 06:52)  POCT Blood Glucose.: 127 mg/dL (26 Feb 2020 06:08)  POCT Blood Glucose.: 116 mg/dL (26 Feb 2020 04:57)  POCT Blood Glucose.: 117 mg/dL (26 Feb 2020 03:58)  POCT Blood Glucose.: 130 mg/dL (26 Feb 2020 03:06)  POCT Blood Glucose.: 129 mg/dL (25 Feb 2020 23:58)  POCT Blood Glucose.: 134 mg/dL (25 Feb 2020 23:06)  POCT Blood Glucose.: 139 mg/dL (25 Feb 2020 22:02)  POCT Blood Glucose.: 131 mg/dL (25 Feb 2020 21:24)      Anion Gap, Serum: 12 (02-26 @ 01:17)  Anion Gap, Serum: 13 (02-25 @ 20:44)  Anion Gap, Serum: 13 (02-25 @ 04:59)      Calcium, Total Serum: 8.7 (02-26 @ 01:17)  Calcium, Total Serum: 8.1 <L> (02-25 @ 20:44)  Calcium, Total Serum: 9.0 (02-25 @ 04:59)  Albumin, Serum: 2.7 <L> (02-26 @ 01:17)  Albumin, Serum: 2.6 <L> (02-25 @ 20:44)  Albumin, Serum: 2.3 <L> (02-25 @ 04:59)    Alanine Aminotransferase (ALT/SGPT): 26 (02-26 @ 01:17)  Alanine Aminotransferase (ALT/SGPT): 30 (02-25 @ 20:44)  Alanine Aminotransferase (ALT/SGPT): 38 (02-25 @ 04:59)  Alkaline Phosphatase, Serum: 72 (02-26 @ 01:17)  Alkaline Phosphatase, Serum: 70 (02-25 @ 20:44)  Alkaline Phosphatase, Serum: 98 (02-25 @ 04:59)  Aspartate Aminotransferase (AST/SGOT): 23 (02-26 @ 01:17)  Aspartate Aminotransferase (AST/SGOT): 22 (02-25 @ 20:44)  Aspartate Aminotransferase (AST/SGOT): 29 (02-25 @ 04:59)        02-26    133<L>  |  96  |  50<H>  ----------------------------<  128<H>  4.4   |  25  |  1.99<H>    Ca    8.7      26 Feb 2020 01:17  Phos  4.8     02-26  Mg     2.1     02-26    TPro  6.1  /  Alb  2.7<L>  /  TBili  0.4  /  DBili  x   /  AST  23  /  ALT  26  /  AlkPhos  72  02-26                        8.3    8.07  )-----------( 273      ( 26 Feb 2020 01:17 )             26.8     Medications  MEDICATIONS  (STANDING):  ALBUTerol    90 MICROgram(s) HFA Inhaler 1 Puff(s) Inhalation every 4 hours  aMIOdarone    Tablet 200 milliGRAM(s) Oral daily  artificial tears (preservative free) Ophthalmic Solution 1 Drop(s) Both EYES two times a day  aspirin enteric coated 81 milliGRAM(s) Oral daily  atorvastatin 40 milliGRAM(s) Oral at bedtime  buDESOnide    Inhalation Suspension 0.5 milliGRAM(s) Inhalation two times a day  calcium acetate 667 milliGRAM(s) Oral three times a day with meals  cefepime   IVPB 2000 milliGRAM(s) IV Intermittent every 24 hours  chlorhexidine 0.12% Liquid 15 milliLiter(s) Oral Mucosa every 12 hours  DAPTOmycin IVPB 300 milliGRAM(s) IV Intermittent every 24 hours  insulin glargine Injectable (LANTUS) 40 Unit(s) SubCutaneous at bedtime  insulin lispro (HumaLOG) corrective regimen sliding scale   SubCutaneous Before meals and at bedtime  insulin lispro Injectable (HumaLOG) 22 Unit(s) SubCutaneous three times a day with meals  melatonin 3 milliGRAM(s) Oral at bedtime  metoprolol tartrate 25 milliGRAM(s) Oral every 12 hours  multivitamin 1 Tablet(s) Oral daily  mupirocin 2% Ointment 1 Application(s) Topical two times a day  pantoprazole    Tablet 40 milliGRAM(s) Oral before breakfast  polyethylene glycol 3350 17 Gram(s) Oral daily  senna 2 Tablet(s) Oral at bedtime  sodium chloride 0.65% Nasal 1 Spray(s) Both Nostrils three times a day  sodium chloride 0.9%. 1000 milliLiter(s) (10 mL/Hr) IV Continuous <Continuous>  tiotropium 18 MICROgram(s) Capsule 1 Capsule(s) Inhalation daily      Physical Exam  General: Patient comfortable in bed  Vital Signs Last 12 Hrs  T(F): 99.3 (02-26-20 @ 14:00), Max: 99.5 (02-26-20 @ 04:00)  HR: 95 (02-26-20 @ 15:00) (93 - 107)  BP: 141/69 (02-26-20 @ 10:45) (134/74 - 141/69)  BP(mean): 97 (02-26-20 @ 10:45) (91 - 97)  RR: 20 (02-26-20 @ 15:00) (20 - 27)  SpO2: 98% (02-26-20 @ 15:00) (96% - 100%)  Neck: No palpable thyroid nodules.  CVS: S1S2, No murmurs  Respiratory: No wheezing, no crepitations  GI: Abdomen soft, bowel sounds positive  Musculoskeletal:  edema lower extremities.   Skin: No skin rashes, no ecchymosis    Diagnostics Chief complaint  Patient is a 64y old  Male who presents with a chief complaint  of Chest pain (26 Feb 2020 13:53)   Review of systems  Patient sitting up in chair, looks comfortable, no hypoglycemia.    Labs and Fingersticks  CAPILLARY BLOOD GLUCOSE  138 (26 Feb 2020 08:00)  136 (26 Feb 2020 07:00)  129 (26 Feb 2020 06:00)  116 (26 Feb 2020 05:00)  117 (26 Feb 2020 04:00)  130 (26 Feb 2020 03:00)  124 (26 Feb 2020 01:00)  129 (26 Feb 2020 00:00)  135 (25 Feb 2020 23:00)  139 (25 Feb 2020 22:00)  131 (25 Feb 2020 21:00)  133 (25 Feb 2020 20:30)      POCT Blood Glucose.: 188 mg/dL (26 Feb 2020 14:05)  POCT Blood Glucose.: 157 mg/dL (26 Feb 2020 12:54)  POCT Blood Glucose.: 165 mg/dL (26 Feb 2020 10:18)  POCT Blood Glucose.: 138 mg/dL (26 Feb 2020 07:52)  POCT Blood Glucose.: 136 mg/dL (26 Feb 2020 06:52)  POCT Blood Glucose.: 127 mg/dL (26 Feb 2020 06:08)  POCT Blood Glucose.: 116 mg/dL (26 Feb 2020 04:57)  POCT Blood Glucose.: 117 mg/dL (26 Feb 2020 03:58)  POCT Blood Glucose.: 130 mg/dL (26 Feb 2020 03:06)  POCT Blood Glucose.: 129 mg/dL (25 Feb 2020 23:58)  POCT Blood Glucose.: 134 mg/dL (25 Feb 2020 23:06)  POCT Blood Glucose.: 139 mg/dL (25 Feb 2020 22:02)  POCT Blood Glucose.: 131 mg/dL (25 Feb 2020 21:24)      Anion Gap, Serum: 12 (02-26 @ 01:17)  Anion Gap, Serum: 13 (02-25 @ 20:44)  Anion Gap, Serum: 13 (02-25 @ 04:59)      Calcium, Total Serum: 8.7 (02-26 @ 01:17)  Calcium, Total Serum: 8.1 <L> (02-25 @ 20:44)  Calcium, Total Serum: 9.0 (02-25 @ 04:59)  Albumin, Serum: 2.7 <L> (02-26 @ 01:17)  Albumin, Serum: 2.6 <L> (02-25 @ 20:44)  Albumin, Serum: 2.3 <L> (02-25 @ 04:59)    Alanine Aminotransferase (ALT/SGPT): 26 (02-26 @ 01:17)  Alanine Aminotransferase (ALT/SGPT): 30 (02-25 @ 20:44)  Alanine Aminotransferase (ALT/SGPT): 38 (02-25 @ 04:59)  Alkaline Phosphatase, Serum: 72 (02-26 @ 01:17)  Alkaline Phosphatase, Serum: 70 (02-25 @ 20:44)  Alkaline Phosphatase, Serum: 98 (02-25 @ 04:59)  Aspartate Aminotransferase (AST/SGOT): 23 (02-26 @ 01:17)  Aspartate Aminotransferase (AST/SGOT): 22 (02-25 @ 20:44)  Aspartate Aminotransferase (AST/SGOT): 29 (02-25 @ 04:59)        02-26    133<L>  |  96  |  50<H>  ----------------------------<  128<H>  4.4   |  25  |  1.99<H>    Ca    8.7      26 Feb 2020 01:17  Phos  4.8     02-26  Mg     2.1     02-26    TPro  6.1  /  Alb  2.7<L>  /  TBili  0.4  /  DBili  x   /  AST  23  /  ALT  26  /  AlkPhos  72  02-26                        8.3    8.07  )-----------( 273      ( 26 Feb 2020 01:17 )             26.8     Medications  MEDICATIONS  (STANDING):  ALBUTerol    90 MICROgram(s) HFA Inhaler 1 Puff(s) Inhalation every 4 hours  aMIOdarone    Tablet 200 milliGRAM(s) Oral daily  artificial tears (preservative free) Ophthalmic Solution 1 Drop(s) Both EYES two times a day  aspirin enteric coated 81 milliGRAM(s) Oral daily  atorvastatin 40 milliGRAM(s) Oral at bedtime  buDESOnide    Inhalation Suspension 0.5 milliGRAM(s) Inhalation two times a day  calcium acetate 667 milliGRAM(s) Oral three times a day with meals  cefepime   IVPB 2000 milliGRAM(s) IV Intermittent every 24 hours  chlorhexidine 0.12% Liquid 15 milliLiter(s) Oral Mucosa every 12 hours  DAPTOmycin IVPB 300 milliGRAM(s) IV Intermittent every 24 hours  insulin glargine Injectable (LANTUS) 40 Unit(s) SubCutaneous at bedtime  insulin lispro (HumaLOG) corrective regimen sliding scale   SubCutaneous Before meals and at bedtime  insulin lispro Injectable (HumaLOG) 22 Unit(s) SubCutaneous three times a day with meals  melatonin 3 milliGRAM(s) Oral at bedtime  metoprolol tartrate 25 milliGRAM(s) Oral every 12 hours  multivitamin 1 Tablet(s) Oral daily  mupirocin 2% Ointment 1 Application(s) Topical two times a day  pantoprazole    Tablet 40 milliGRAM(s) Oral before breakfast  polyethylene glycol 3350 17 Gram(s) Oral daily  senna 2 Tablet(s) Oral at bedtime  sodium chloride 0.65% Nasal 1 Spray(s) Both Nostrils three times a day  sodium chloride 0.9%. 1000 milliLiter(s) (10 mL/Hr) IV Continuous <Continuous>  tiotropium 18 MICROgram(s) Capsule 1 Capsule(s) Inhalation daily      Physical Exam  General: Patient comfortable in bed  Vital Signs Last 12 Hrs  T(F): 99.3 (02-26-20 @ 14:00), Max: 99.5 (02-26-20 @ 04:00)  HR: 95 (02-26-20 @ 15:00) (93 - 107)  BP: 141/69 (02-26-20 @ 10:45) (134/74 - 141/69)  BP(mean): 97 (02-26-20 @ 10:45) (91 - 97)  RR: 20 (02-26-20 @ 15:00) (20 - 27)  SpO2: 98% (02-26-20 @ 15:00) (96% - 100%)  Neck: No palpable thyroid nodules.  CVS: S1S2, No murmurs  Respiratory: No wheezing, no crepitations  GI: Abdomen soft, bowel sounds positive  Musculoskeletal:  edema lower extremities.   Skin: No skin rashes, no ecchymosis    Diagnostics

## 2020-02-26 NOTE — PROGRESS NOTE ADULT - ATTENDING COMMENTS
as above-for debridement 2/25 of sternal area  multifactorial dyspnea-CAD s/p CABG, PEA arrest, atelectasis due to pain, pleural effusion L-pig tail in place, bronchospasm, ?PE-O2 NC sat above 90%                     Pleural effusion-pig tail removed 2/20-CT chest NC-improved but still loculations on left-f/up 4-6 wks  CAD/CHF-diurese as cr allows-keep K/Mg above  atelectasis-pain control, incentive spirometry, acapella                    ? DVT/PE--s/p repeat venous dopplers-negative; VQ unable  bronchospasm-duoneb q 6, pulmicort .5 bid; out pt PFTs              ID-daptomycin/cefepime as per ID  snore-? osas--out pt SS  DVT/GI prophylaxis, PT, nutrition herman Hernandez MD-Pulmonary    [phone] as above-s/p debridement 2/25 of sternal area--drain in place (CTU)  multifactorial dyspnea-CAD s/p CABG, PEA arrest, atelectasis due to pain, pleural effusion L-pig tail in place, bronchospasm, ?PE-O2 NC sat above 90%                     Pleural effusion-pig tail removed 2/20-CT chest NC-improved but still loculations on left-f/up 4-6 wks  CAD/CHF-diurese as cr allows-keep K/Mg above  atelectasis-pain control, incentive spirometry, acapella                    ? DVT/PE--s/p repeat venous dopplers-negative; VQ unable  bronchospasm-duoneb q 6, pulmicort .5 bid; out pt PFTs              ID-daptomycin/cefepime as per ID  snore-? osas--out pt SS  DVT/GI prophylaxis, PT, nutrition herman Hernandez MD-Pulmonary    [phone]

## 2020-02-26 NOTE — PROGRESS NOTE ADULT - PROBLEM SELECTOR PLAN 2
Pt. with likely hypervolemic hyponatremia in the setting of volume overload. Na stable. Consider diuretics if pt. becomes oliguric. Achieve consistent net negative fluid balance.    Kevin Correa  Nephrology Fellow  Cell: [phone] (from 8 am to 5 pm)  (After 5 pm or on weekends please page on-call fellow)

## 2020-02-26 NOTE — PROGRESS NOTE ADULT - SUBJECTIVE AND OBJECTIVE BOX
[facility] DIVISION OF KIDNEY DISEASES AND HYPERTENSION -- FOLLOW UP NOTE  --------------------------------------------------------------------------------  HPI: 65 yo M with HTN, DM II (20 years), admitted with complaints of acute on chronic left sided exertional chest pain. Nephrology team is following for elevated serum creatinine and pre-cardiac catheterization assessment[facility] available. On admission (1/25/2020), Scr was 1.85. Patient went for cardiac cath on 1/29/20 which revealed triple vessel disease. Scr had improved to 1.76 on 2/3/20. Pt. underwent CABG on 2/3/20. Scr now increased after CABG and PEA arrest on 2/6/20, had improved to ~2.5. Scr over past few days had improved down to 2.09 on 2/17/20. Scr stable between 1.9-2.1, currently at 1.99 today. Pt. is currently post-op from sternal debridement and pectoralis flap creation and then transferred to CTICU.    Pt. seen and examined at bedside this AM. Pt. on diuretics, non oliguric now with Osorio catheter. States SOB is stable but is lethargic. Noted to be recently be started on cefepime.    PAST HISTORY  --------------------------------------------------------------------------------  No significant changes to PMH, PSH, FHx, SHx, unless otherwise noted    ALLERGIES & MEDICATIONS  --------------------------------------------------------------------------------  Allergies    No Known Allergies    Intolerances    Standing Inpatient Medications  ALBUTerol    90 MICROgram(s) HFA Inhaler 1 Puff(s) Inhalation every 4 hours  aMIOdarone    Tablet 200 milliGRAM(s) Oral daily  artificial tears (preservative free) Ophthalmic Solution 1 Drop(s) Both EYES two times a day  aspirin enteric coated 81 milliGRAM(s) Oral daily  atorvastatin 40 milliGRAM(s) Oral at bedtime  buDESOnide    Inhalation Suspension 0.5 milliGRAM(s) Inhalation two times a day  calcium acetate 667 milliGRAM(s) Oral three times a day with meals  cefepime   IVPB 2000 milliGRAM(s) IV Intermittent every 24 hours  chlorhexidine 0.12% Liquid 15 milliLiter(s) Oral Mucosa every 12 hours  DAPTOmycin IVPB 300 milliGRAM(s) IV Intermittent every 24 hours  insulin glargine Injectable (LANTUS) 40 Unit(s) SubCutaneous at bedtime  insulin lispro Injectable (HumaLOG) 22 Unit(s) SubCutaneous three times a day with meals  insulin regular  human corrective regimen sliding scale   SubCutaneous every 4 hours  insulin regular Infusion 2 Unit(s)/Hr IV Continuous <Continuous>  melatonin 3 milliGRAM(s) Oral at bedtime  metoprolol tartrate 25 milliGRAM(s) Oral every 12 hours  multivitamin 1 Tablet(s) Oral daily  mupirocin 2% Ointment 1 Application(s) Topical two times a day  pantoprazole    Tablet 40 milliGRAM(s) Oral before breakfast  polyethylene glycol 3350 17 Gram(s) Oral daily  senna 2 Tablet(s) Oral at bedtime  sodium chloride 0.65% Nasal 1 Spray(s) Both Nostrils three times a day  sodium chloride 0.9%. 1000 milliLiter(s) IV Continuous <Continuous>  tiotropium 18 MICROgram(s) Capsule 1 Capsule(s) Inhalation daily    REVIEW OF SYSTEMS  --------------------------------------------------------------------------------  Gen: + fatigue  Respiratory: + dyspnea (improved)  GI: No abdominal pain  MSK: + LE edema  Neuro: No dizziness  Heme: No bleeding    All other systems were reviewed and are negative, except as noted.    VITALS/PHYSICAL EXAM  --------------------------------------------------------------------------------  T(C): 37.5 (02-26-20 @ 04:00), Max: 37.5 (02-26-20 @ 04:00)  HR: 93 (02-26-20 @ 07:00) (73 - 107)  BP: 127/78 (02-26-20 @ 03:00) (125/74 - 147/83)  RR: 20 (02-26-20 @ 07:00) (14 - 44)  SpO2: 99% (02-26-20 @ 07:00) (92% - 100%)  Wt(kg): --    02-25-20 @ 07:01  -  02-26-20 @ 07:00  --------------------------------------------------------  IN: 1078.8 mL / OUT: 2645 mL / NET: -1566.2 mL    Physical Exam:  	Gen: awake  	HEENT: supple neck  	Pulm: Decreased bibasilar breath sounds  	CV: + central chest dressing from CABG C/D/I, S1S2  	Abd: Soft  	: + Osorio catheter with urine  	Ext: + pitting edema B/L (improved)    LABS/STUDIES  --------------------------------------------------------------------------------              8.3    8.07  >-----------<  273      [02-26-20 @ 01:17]              26.8     133  |  96  |  50  ----------------------------<  128      [02-26-20 @ 01:17]  4.4   |  25  |  1.99        Ca     8.7     [02-26-20 @ 01:17]      Mg     2.1     [02-26-20 @ 01:17]      Phos  4.8     [02-26-20 @ 01:17]    Creatinine Trend:  SCr 1.99 [02-26 @ 01:17]  SCr 1.99 [02-25 @ 20:44]  SCr 2.01 [02-25 @ 04:59]  SCr 1.83 [02-24 @ 06:46]  SCr 1.82 [02-23 @ 07:30]    Urinalysis - [02-24-20 @ 17:29]      Color Light Yellow / Appearance Clear / SG 1.013 / pH 6.5      Gluc Trace / Ketone Negative  / Bili Negative / Urobili <2 mg/dL       Blood Negative / Protein 100 mg/dL / Leuk Est Negative / Nitrite Negative      RBC 5 / WBC 1 / Hyaline 1 / Gran  / Sq Epi  / Non Sq Epi 0 / Bacteria Negative

## 2020-02-26 NOTE — PROGRESS NOTE ADULT - SUBJECTIVE AND OBJECTIVE BOX
CHIEF COMPLAINT: f/up sob, resp failure, pleural effusions, atelectasis-    Interval Events:    REVIEW OF SYSTEMS:  Constitutional: No fevers or chills. No weight loss. No fatigue or generalized malaise.  Eyes: No itching or discharge from the eyes  ENT: No ear pain. No ear discharge. No nasal congestion. No post nasal drip. No epistaxis. No throat pain. No sore throat. No difficulty swallowing.   CV: No chest pain. No palpitations. No lightheadedness or dizziness.   Resp: No dyspnea at rest. No dyspnea on exertion. No orthopnea. No wheezing. No cough. No stridor. No sputum production. No chest pain with respiration.  GI: No nausea. No vomiting. No diarrhea.  MSK: No joint pain or pain in any extremities  Integumentary: No skin lesions. No pedal edema.  Neurological: No gross motor weakness. No sensory changes.  [ ] All other systems negative  [ ] Unable to assess ROS because ________    OBJECTIVE:  ICU Vital Signs Last 24 Hrs  T(C): 37.5 (2020 04:00), Max: 37.5 (2020 04:00)  T(F): 99.5 (2020 04:00), Max: 99.5 (2020 04:00)  HR: 102 (2020 05:00) (73 - 107)  BP: 127/78 (2020 03:00) (125/74 - 147/83)  BP(mean): 97 (2020 03:00) (93 - 109)  ABP: 162/59 (2020 05:00) (109/52 - 185/61)  ABP(mean): 90 (2020 05:00) (69 - 96)  RR: 20 (2020 05:00) (14 - 44)  SpO2: 98% (2020 05:00) (92% - 100%)    Mode: CPAP with PS, FiO2: 50, PEEP: 5, PS: 5, MAP: 9, PIP: 11     @ 07:01  -  - @ 07:00  --------------------------------------------------------  IN: 750 mL / OUT: 2325 mL / NET: -1575 mL     @ 07:01   @ 05:37  --------------------------------------------------------  IN: 1052.8 mL / OUT: 2450 mL / NET: -1397.2 mL      CAPILLARY BLOOD GLUCOSE  116 (2020 05:00)      POCT Blood Glucose.: 116 mg/dL (2020 04:57)      PHYSICAL EXAM:  General: Awake, alert, oriented X 3.   HEENT: Atraumatic, normocephalic.                 Mallampatti Grade                 No nasal congestion.                No tonsillar or pharyngeal exudates.  Lymph Nodes: No palpable lymphadenopathy  Neck: No JVD. No carotid bruit.   Respiratory: Normal chest expansion                         Normal percussion                         Normal and equal air entry                         No wheeze, rhonchi or rales.  Cardiovascular: S1 S2 normal. No murmurs, rubs or gallops.   Abdomen: Soft, non-tender, non-distended. No organomegaly. Normoactive bowel sounds.  Extremities: Warm to touch. Peripheral pulse palpable. No pedal edema.   Skin: No rashes or skin lesions  Neurological: Motor and sensory examination equal and normal in all four extremities.  Psychiatry: Appropriate mood and affect.    HOSPITAL MEDICATIONS:  MEDICATIONS  (STANDING):  ALBUTerol    90 MICROgram(s) HFA Inhaler 1 Puff(s) Inhalation every 4 hours  aMIOdarone    Tablet 200 milliGRAM(s) Oral daily  artificial tears (preservative free) Ophthalmic Solution 1 Drop(s) Both EYES two times a day  aspirin enteric coated 81 milliGRAM(s) Oral daily  atorvastatin 40 milliGRAM(s) Oral at bedtime  buDESOnide    Inhalation Suspension 0.5 milliGRAM(s) Inhalation two times a day  calcium acetate 667 milliGRAM(s) Oral three times a day with meals  cefepime   IVPB 2000 milliGRAM(s) IV Intermittent every 24 hours  chlorhexidine 0.12% Liquid 15 milliLiter(s) Oral Mucosa every 12 hours  DAPTOmycin IVPB 300 milliGRAM(s) IV Intermittent every 24 hours  HYDROmorphone  Injectable 0.5 milliGRAM(s) IV Push once  insulin glargine Injectable (LANTUS) 40 Unit(s) SubCutaneous at bedtime  insulin lispro Injectable (HumaLOG) 22 Unit(s) SubCutaneous three times a day with meals  insulin regular  human corrective regimen sliding scale   SubCutaneous every 4 hours  insulin regular Infusion 2 Unit(s)/Hr (2 mL/Hr) IV Continuous <Continuous>  melatonin 3 milliGRAM(s) Oral at bedtime  metoprolol tartrate 25 milliGRAM(s) Oral every 12 hours  multivitamin 1 Tablet(s) Oral daily  mupirocin 2% Ointment 1 Application(s) Topical two times a day  pantoprazole    Tablet 40 milliGRAM(s) Oral before breakfast  polyethylene glycol 3350 17 Gram(s) Oral daily  senna 2 Tablet(s) Oral at bedtime  sodium chloride 0.65% Nasal 1 Spray(s) Both Nostrils three times a day  sodium chloride 0.9%. 1000 milliLiter(s) (10 mL/Hr) IV Continuous <Continuous>  tiotropium 18 MICROgram(s) Capsule 1 Capsule(s) Inhalation daily    MEDICATIONS  (PRN):  albuterol/ipratropium for Nebulization. 3 milliLiter(s) Nebulizer every 6 hours PRN Shortness of Breath and/or Wheezing  guaiFENesin   Syrup  (Sugar-Free) 200 milliGRAM(s) Oral every 6 hours PRN Cough  oxycodone    5 mG/acetaminophen 325 mG 1 Tablet(s) Oral every 6 hours PRN Moderate Pain (4 - 6)  sodium chloride 0.9% lock flush 10 milliLiter(s) IV Push every 1 hour PRN Pre/post blood products, medications, blood draw, and to maintain line patency      LABS:                        8.3    8.07  )-----------( 273      ( 2020 01:17 )             26.8     -    133<L>  |  96  |  50<H>  ----------------------------<  128<H>  4.4   |  25  |  1.99<H>    Ca    8.7      2020 01:17  Phos  4.8     02-26  Mg     2.1         TPro  6.1  /  Alb  2.7<L>  /  TBili  0.4  /  DBili  x   /  AST  23  /  ALT  26  /  AlkPhos  72      PT/INR - ( 2020 01:17 )   PT: 14.5 sec;   INR: 1.26 ratio         PTT - ( 2020 01:17 )  PTT:30.4 sec  Urinalysis Basic - ( 2020 17:29 )    Color: Light Yellow / Appearance: Clear / S.013 / pH: x  Gluc: x / Ketone: Negative  / Bili: Negative / Urobili: <2 mg/dL   Blood: x / Protein: 100 mg/dL / Nitrite: Negative   Leuk Esterase: Negative / RBC: 5 /HPF / WBC 1 /HPF   Sq Epi: x / Non Sq Epi: 0 /HPF / Bacteria: Negative      Arterial Blood Gas:   @ 01:44  7.47/34/164/25/100/1.4  ABG lactate: --  Arterial Blood Gas:   @ 00:52  7.44/38/166/25/99/1.7  ABG lactate: --  Arterial Blood Gas:   @ 20:40  7.40/44/307/26/100/1.8  ABG lactate: --  Arterial Blood Gas:   @ 17:54  7.44/38/427/25/100/1.7  ABG lactate: --  Arterial Blood Gas:   @ 16:30  7.44/39/327/26/100/1.9  ABG lactate: --        MICROBIOLOGY:     RADIOLOGY:  [ ] Reviewed and interpreted by me    Point of Care Ultrasound Findings:    PFT:    EKG: CHIEF COMPLAINT: f/up sob, resp failure, pleural effusions, atelectasis-pain in chest present--no additional sob    Interval Events: s/p debridement    REVIEW OF SYSTEMS:  Constitutional: No fevers or chills. No weight loss. + fatigue or generalized malaise.  Eyes: No itching or discharge from the eyes  ENT: No ear pain. No ear discharge. No nasal congestion. No post nasal drip. No epistaxis. No throat pain. No sore throat. No difficulty swallowing.   CV: No chest pain. No palpitations. No lightheadedness or dizziness.   Resp: No dyspnea at rest. + dyspnea on exertion. No orthopnea. No wheezing. No cough. No stridor. No sputum production. + chest pain with respiration.  GI: No nausea. No vomiting. No diarrhea.  MSK: No joint pain or pain in any extremities  Integumentary: No skin lesions. + pedal edema.  Neurological: No gross motor weakness. No sensory changes.  [+ ] All other systems negative  [ ] Unable to assess ROS because ________    OBJECTIVE:  ICU Vital Signs Last 24 Hrs  T(C): 37.5 (2020 04:00), Max: 37.5 (2020 04:00)  T(F): 99.5 (2020 04:00), Max: 99.5 (2020 04:00)  HR: 102 (2020 05:00) (73 - 107)  BP: 127/78 (2020 03:00) (125/74 - 147/83)  BP(mean): 97 (2020 03:00) (93 - 109)  ABP: 162/59 (2020 05:00) (109/52 - 185/61)  ABP(mean): 90 (2020 05:00) (69 - 96)  RR: 20 (2020 05:00) (14 - 44)  SpO2: 98% (2020 05:00) (92% - 100%)    Mode: CPAP with PS, FiO2: 50, PEEP: 5, PS: 5, MAP: 9, PIP: 11    - @ 07:01  -  - @ 07:00  --------------------------------------------------------  IN: 750 mL / OUT: 2325 mL / NET: -1575 mL     @ 07:01  -   @ 05:37  --------------------------------------------------------  IN: 1052.8 mL / OUT: 2450 mL / NET: -1397.2 mL      CAPILLARY BLOOD GLUCOSE  116 (2020 05:00)      POCT Blood Glucose.: 116 mg/dL (2020 04:57)      PHYSICAL EXAM: NAD in bed on NC O2  General: Awake, alert, oriented X 3.   HEENT: Atraumatic, normocephalic.                 Mallampatti Grade 3                No nasal congestion.                No tonsillar or pharyngeal exudates.  Lymph Nodes: No palpable lymphadenopathy  Neck: No JVD. No carotid bruit.   Respiratory: abnormal chest expansion-reduced BS bases                         Normal percussion                         Normal and equal air entry                         No wheeze, rhonchi or rales.  Cardiovascular: S1 S2 normal. No murmurs, rubs or gallops.   Abdomen: Soft, non-tender, non-distended. No organomegaly. Normoactive bowel sounds.  Extremities: Warm to touch. Peripheral pulse palpable. + pedal edema.   Skin: No rashes or skin lesions  Neurological: Motor and sensory examination equal and normal in all four extremities.  Psychiatry: Appropriate mood and affect.    HOSPITAL MEDICATIONS:  MEDICATIONS  (STANDING):  ALBUTerol    90 MICROgram(s) HFA Inhaler 1 Puff(s) Inhalation every 4 hours  aMIOdarone    Tablet 200 milliGRAM(s) Oral daily  artificial tears (preservative free) Ophthalmic Solution 1 Drop(s) Both EYES two times a day  aspirin enteric coated 81 milliGRAM(s) Oral daily  atorvastatin 40 milliGRAM(s) Oral at bedtime  buDESOnide    Inhalation Suspension 0.5 milliGRAM(s) Inhalation two times a day  calcium acetate 667 milliGRAM(s) Oral three times a day with meals  cefepime   IVPB 2000 milliGRAM(s) IV Intermittent every 24 hours  chlorhexidine 0.12% Liquid 15 milliLiter(s) Oral Mucosa every 12 hours  DAPTOmycin IVPB 300 milliGRAM(s) IV Intermittent every 24 hours  HYDROmorphone  Injectable 0.5 milliGRAM(s) IV Push once  insulin glargine Injectable (LANTUS) 40 Unit(s) SubCutaneous at bedtime  insulin lispro Injectable (HumaLOG) 22 Unit(s) SubCutaneous three times a day with meals  insulin regular  human corrective regimen sliding scale   SubCutaneous every 4 hours  insulin regular Infusion 2 Unit(s)/Hr (2 mL/Hr) IV Continuous <Continuous>  melatonin 3 milliGRAM(s) Oral at bedtime  metoprolol tartrate 25 milliGRAM(s) Oral every 12 hours  multivitamin 1 Tablet(s) Oral daily  mupirocin 2% Ointment 1 Application(s) Topical two times a day  pantoprazole    Tablet 40 milliGRAM(s) Oral before breakfast  polyethylene glycol 3350 17 Gram(s) Oral daily  senna 2 Tablet(s) Oral at bedtime  sodium chloride 0.65% Nasal 1 Spray(s) Both Nostrils three times a day  sodium chloride 0.9%. 1000 milliLiter(s) (10 mL/Hr) IV Continuous <Continuous>  tiotropium 18 MICROgram(s) Capsule 1 Capsule(s) Inhalation daily    MEDICATIONS  (PRN):  albuterol/ipratropium for Nebulization. 3 milliLiter(s) Nebulizer every 6 hours PRN Shortness of Breath and/or Wheezing  guaiFENesin   Syrup  (Sugar-Free) 200 milliGRAM(s) Oral every 6 hours PRN Cough  oxycodone    5 mG/acetaminophen 325 mG 1 Tablet(s) Oral every 6 hours PRN Moderate Pain (4 - 6)  sodium chloride 0.9% lock flush 10 milliLiter(s) IV Push every 1 hour PRN Pre/post blood products, medications, blood draw, and to maintain line patency      LABS:                        8.3    8.07  )-----------( 273      ( 2020 01:17 )             26.8     02-26    133<L>  |  96  |  50<H>  ----------------------------<  128<H>  4.4   |  25  |  1.99<H>    Ca    8.7      2020 01:17  Phos  4.8       Mg     2.1         TPro  6.1  /  Alb  2.7<L>  /  TBili  0.4  /  DBili  x   /  AST  23  /  ALT  26  /  AlkPhos  72      PT/INR - ( 2020 01:17 )   PT: 14.5 sec;   INR: 1.26 ratio         PTT - ( 2020 01:17 )  PTT:30.4 sec  Urinalysis Basic - ( 2020 17:29 )    Color: Light Yellow / Appearance: Clear / S.013 / pH: x  Gluc: x / Ketone: Negative  / Bili: Negative / Urobili: <2 mg/dL   Blood: x / Protein: 100 mg/dL / Nitrite: Negative   Leuk Esterase: Negative / RBC: 5 /HPF / WBC 1 /HPF   Sq Epi: x / Non Sq Epi: 0 /HPF / Bacteria: Negative      Arterial Blood Gas:   @ 01:44  7.47/34/164/25/100/1.4  ABG lactate: --  Arterial Blood Gas:   @ 00:52  7.44/38/166/25/99/1.7  ABG lactate: --  Arterial Blood Gas:   @ 20:40  7.40/44/307/26/100/1.8  ABG lactate: --  Arterial Blood Gas:   @ 17:54  7.44/38/427/25/100/1.7  ABG lactate: --  Arterial Blood Gas:   @ 16:30  7.44/39/327/26/100/1.9  ABG lactate: --        MICROBIOLOGY:     RADIOLOGY:  [ ] Reviewed and interpreted by me    Point of Care Ultrasound Findings:    PFT:    EKG:

## 2020-02-26 NOTE — PROGRESS NOTE ADULT - PROBLEM SELECTOR PLAN 6
s/p rx of ampicillen as of 2/20-now on Daptomycin/cefepime as per ID-sternal wound debridement 2/25 s/p rx of ampicillen as of 2/20-now on Daptomycin/cefepime as per ID-sternal wound debridement 2/25--drain in place

## 2020-02-26 NOTE — PROGRESS NOTE ADULT - ASSESSMENT
63yo male with hx of HTN, DM II, presented to the ED with complaints of acute on chronic left sided exertional chest pain. Pt states he has been having left sided pressure and stabbing chest pain radiating to his sternum and right with activity for the past few months. Since admission- s/p cath with severe triple vessel disease, s/p CABG, post op course c/b brief witnessed PEA 2/6 likely hypoxic arrest, s/p intubation. ( CAD, s/p cath with severe triple vessel disease including lesions at the bifurcation of the LAD/diagonal and distal RCA/RPDA/RPL trifurcation.   -s/p CABG x 4, intraop kennedy ef 50%)--s/p ampicillin until 2/18 for enterococcus in blood, extubated 2/9, pigtail right 2/8 and left sided on 2/15-for effusion.  Remains sob-NO h/o resp issues prior to hospitalization.  ***Current sob-multifactorial-CAD/effusions, atelectasis due to pain, mild bronchospasm and debility.  ********************  2/21-slightly better, pig tail cath removed from left chest; CT chest done-loculated left effusion-slight better  2/24-BS issues-less sob-dapto/cefepime as per ID  2/25-for debridement of chest area-sternal wound 63yo male with hx of HTN, DM II, presented to the ED with complaints of acute on chronic left sided exertional chest pain. Pt states he has been having left sided pressure and stabbing chest pain radiating to his sternum and right with activity for the past few months. Since admission- s/p cath with severe triple vessel disease, s/p CABG, post op course c/b brief witnessed PEA 2/6 likely hypoxic arrest, s/p intubation. ( CAD, s/p cath with severe triple vessel disease including lesions at the bifurcation of the LAD/diagonal and distal RCA/RPDA/RPL trifurcation.   -s/p CABG x 4, intraop kennedy ef 50%)--s/p ampicillin until 2/18 for enterococcus in blood, extubated 2/9, pigtail right 2/8 and left sided on 2/15-for effusion.  Remains sob-NO h/o resp issues prior to hospitalization.  ***Current sob-multifactorial-CAD/effusions, atelectasis due to pain, mild bronchospasm and debility.  ********************  2/21-slightly better, pig tail cath removed from left chest; CT chest done-loculated left effusion-slight better  2/24-BS issues-less sob-dapto/cefepime as per ID  2/25-for debridement of chest area-sternal wound  2/26-debridement completed--to CTU

## 2020-02-26 NOTE — PROGRESS NOTE ADULT - ASSESSMENT
intraop kennedy 2/3/20 ef 50%, nl lv, mild diastolic dysfx stage 1  limited echo 2/4/20: nl LV sys fx , no pericardial effusion     a/p  64 year old man with history of HTN, DM II, admitted with progressive exertional angina, s/p cath with severe triple vessel disease, s/p CABG, post op course c/b brief witnessed PEA likely hypoxic arrest, s/p intubation.     1. CAD, s/p cath with severe triple vessel disease including lesions at the bifurcation of the LAD/diagonal and distal RCA/RPDA/RPL trifurcation.   -s/p CABG x 4, intraop kennedy ef 50%  -cont asa, statin, BB   -s/p PEA arrest, now extubated  -off vasopressors  -LE dopplers, negative for dvt   -repeat echo 2/15 with preserved lv fxn/EF  -s/p reop for SWI-cv stable postop    2. Postop acute diastolic HF  -pt remains  fluid overloaded on exam   -chest xray noted, resume oral diuretics    3. PAF  -cont amio   -AC per CTS  -resume bb per cts    4. HTN  -bp stable on hydralazine    5. STEPHANIE/CKD  renal f/u     6. Postop Pleural effusion  -S/P Right chest tube:  removed   -s/p L chest tube, mgmt per CTS  -d-dimer elevated/ Pulm f/u       7. Sepsis -E. Faecalis bacteremia, SW infection, RLE cellulitis   IV abx per CTICU, repeat bcx 2/13 neg  repeat cultures pending  plan for debridement and flap today   ID, CTS f/u       dvt ppx intraop kennedy 2/3/20 ef 50%, nl lv, mild diastolic dysfx stage 1  limited echo 2/4/20: nl LV sys fx , no pericardial effusion     a/p  64 year old man with history of HTN, DM II, admitted with progressive exertional angina, s/p cath with severe triple vessel disease, s/p CABG, post op course c/b brief witnessed PEA likely hypoxic arrest, s/p intubation.     1. CAD, s/p cath with severe triple vessel disease including lesions at the bifurcation of the LAD/diagonal and distal RCA/RPDA/RPL trifurcation.   -s/p CABG x 4, intraop kennedy ef 50%  -cont asa, statin, BB   -s/p PEA arrest, now extubated  -off vasopressors  -LE dopplers, negative for dvt   -repeat echo 2/15 with preserved lv fxn/EF  -s/p reop for SWI-cv stable postop    2. Postop acute diastolic HF  -pt remains  fluid overloaded on exam   -chest xray noted, resume oral diuretics    3. PAF  -cont amio   -AC per CTS  -resume bb per cts    4. HTN  -bp stable on hydralazine    5. STEPHANIE/CKD  renal f/u     6. Postop Pleural effusion  -S/P Right chest tube:  removed   -s/p L chest tube, mgmt per CTS  -d-dimer elevated/ Pulm f/u       7. Sepsis -E. Faecalis bacteremia, SW infection, RLE cellulitis   IV abx per CTICU, repeat bcx 2/13 neg  s/p debridement   ID, CTS f/u       dvt ppx

## 2020-02-26 NOTE — PROGRESS NOTE ADULT - PROBLEM SELECTOR PLAN 1
Patient with sternal wound infection; Tight glycemic control necessary.  Will continue current insulin regimen for now. Will continue monitoring FS and FU.  Patient was on insulin at home, will be DC on insulin.  Patient and family counseled for tight sugar control, compliance with consistent low carb diet, not to bring food from home.

## 2020-02-26 NOTE — PROGRESS NOTE ADULT - SUBJECTIVE AND OBJECTIVE BOX
CC: pt s/p OR for wound debridement, awake, alert, eating lunch    TELEMETRY: nsr    PHYSICAL EXAM:    T(C): 37.2 (02-26-20 @ 10:45), Max: 37.5 (02-26-20 @ 04:00)  HR: 102 (02-26-20 @ 10:45) (73 - 107)  BP: 141/69 (02-26-20 @ 10:45) (125/74 - 147/83)  RR: 20 (02-26-20 @ 10:45) (14 - 44)  SpO2: 98% (02-26-20 @ 10:45) (96% - 100%)  Wt(kg): --  I&O's Summary    25 Feb 2020 07:01  -  26 Feb 2020 07:00  --------------------------------------------------------  IN: 1078.8 mL / OUT: 2645 mL / NET: -1566.2 mL    26 Feb 2020 07:01  -  26 Feb 2020 13:53  --------------------------------------------------------  IN: 600 mL / OUT: 455 mL / NET: 145 mL        Appearance: Normal	  Cardiovascular: Normal S1 S2,RRR, No JVD, No murmurs  Respiratory: Lungs clear to auscultation	  Gastrointestinal:  Soft, Non-tender, + BS	  Extremities: Normal range of motion, No clubbing, cyanosis or edema  Vascular: Peripheral pulses palpable 2+ bilaterally     LABS:	 	                          8.3    8.07  )-----------( 273      ( 26 Feb 2020 01:17 )             26.8     02-26    133<L>  |  96  |  50<H>  ----------------------------<  128<H>  4.4   |  25  |  1.99<H>    Ca    8.7      26 Feb 2020 01:17  Phos  4.8     02-26  Mg     2.1     02-26    TPro  6.1  /  Alb  2.7<L>  /  TBili  0.4  /  DBili  x   /  AST  23  /  ALT  26  /  AlkPhos  72  02-26      PT/INR - ( 26 Feb 2020 01:17 )   PT: 14.5 sec;   INR: 1.26 ratio         PTT - ( 26 Feb 2020 01:17 )  PTT:30.4 sec    CARDIAC MARKERS:      MEDICATIONS  (STANDING):  ALBUTerol    90 MICROgram(s) HFA Inhaler 1 Puff(s) Inhalation every 4 hours  aMIOdarone    Tablet 200 milliGRAM(s) Oral daily  artificial tears (preservative free) Ophthalmic Solution 1 Drop(s) Both EYES two times a day  aspirin enteric coated 81 milliGRAM(s) Oral daily  atorvastatin 40 milliGRAM(s) Oral at bedtime  buDESOnide    Inhalation Suspension 0.5 milliGRAM(s) Inhalation two times a day  calcium acetate 667 milliGRAM(s) Oral three times a day with meals  cefepime   IVPB 2000 milliGRAM(s) IV Intermittent every 24 hours  chlorhexidine 0.12% Liquid 15 milliLiter(s) Oral Mucosa every 12 hours  DAPTOmycin IVPB 300 milliGRAM(s) IV Intermittent every 24 hours  insulin glargine Injectable (LANTUS) 40 Unit(s) SubCutaneous at bedtime  insulin lispro Injectable (HumaLOG) 22 Unit(s) SubCutaneous three times a day with meals  melatonin 3 milliGRAM(s) Oral at bedtime  metoprolol tartrate 25 milliGRAM(s) Oral every 12 hours  multivitamin 1 Tablet(s) Oral daily  mupirocin 2% Ointment 1 Application(s) Topical two times a day  pantoprazole    Tablet 40 milliGRAM(s) Oral before breakfast  polyethylene glycol 3350 17 Gram(s) Oral daily  senna 2 Tablet(s) Oral at bedtime  sodium chloride 0.65% Nasal 1 Spray(s) Both Nostrils three times a day  sodium chloride 0.9%. 1000 milliLiter(s) (10 mL/Hr) IV Continuous <Continuous>  tiotropium 18 MICROgram(s) Capsule 1 Capsule(s) Inhalation daily

## 2020-02-26 NOTE — PROGRESS NOTE ADULT - SUBJECTIVE AND OBJECTIVE BOX
FRANKLIN PRESLEY  MRN#:  [id number]    The patient is a 64y Male with PMH of HTN, DM II, CKD stage III, presented with unstable angina, found to have multivessel CAD and underwent C4L with patch angioplasty of his LAD 2/03. Patient had been recovering on the floor with generalized weakness and poor cough when he developed a PEA arrest and subsequently rapid atrial fibrillation after ROSC. In addition he was treated for enterococcal bacteremia gradually recovered, found to have a loculated pleural effusion and now readmitted to CTU s/p sternal wound debridement today for sternal wound infection.  All available clinical, laboratory, radiographic, pharmacologic, and electrocardiographic data were reviewed & analyzed.      The patient was in the CTICU in critical condition at risk for imminent decompensation secondary to persistent cardiopulmonary dysfunction, vasogenic shock, sternal wound infection, hemodynamically significant anemia, and stage III CKD.      Respiratory status required supplemental oxygen, the following of ABG’s with A-line monitoring, continuous pulse oximetry monitoring, and an IV Diprivan infusion for support & to evaluate for & prevent further decompensation secondary to cardiopulmonary dysfunciton and vasogenic shock.      Invasive hemodynamic monitoring with a central venous catheter & an A-line were placed after resuscitation and required for the continuous central venous and MAP/BP monitoring to ensure adequate cardiovascular support and to evaluate for & help prevent decompensation while receiving intermittent volume expansion and cessation of the IV phenylephrine drip secondary to vasogenic shock and hemodynamically significant anemia.      Metabolic stability, type 2 diabetes-hyperglycemia, & infection prophylaxis required an insulin sliding scale & the following of serial glucose levels to help achieve & maintain euglycemia. Based upon my evaluation and review, I plan to start an insulin infusion due to his instability and recent hypoglycemia.    Patient required acute postoperative critical care management and it at risk for life threatening decompensation. I provided 30 minutes of non-continuous care to the patient.

## 2020-02-26 NOTE — PROGRESS NOTE ADULT - SUBJECTIVE AND OBJECTIVE BOX
infectious diseases progress note:    Patient is a 64y old  Male who presents with a chief complaint of Chest pain (2020 10:52)        Acute ischemic heart disease        R     No Known Allergies    Intolerances        ANTIBIOTICS/RELEVANT:  antimicrobials  cefepime   IVPB 2000 milliGRAM(s) IV Intermittent every 24 hours  DAPTOmycin IVPB 300 milliGRAM(s) IV Intermittent every 24 hours    immunologic:    OTHER:  ALBUTerol    90 MICROgram(s) HFA Inhaler 1 Puff(s) Inhalation every 4 hours  albuterol/ipratropium for Nebulization. 3 milliLiter(s) Nebulizer every 6 hours PRN  aMIOdarone    Tablet 200 milliGRAM(s) Oral daily  artificial tears (preservative free) Ophthalmic Solution 1 Drop(s) Both EYES two times a day  aspirin enteric coated 81 milliGRAM(s) Oral daily  atorvastatin 40 milliGRAM(s) Oral at bedtime  buDESOnide    Inhalation Suspension 0.5 milliGRAM(s) Inhalation two times a day  calcium acetate 667 milliGRAM(s) Oral three times a day with meals  chlorhexidine 0.12% Liquid 15 milliLiter(s) Oral Mucosa every 12 hours  guaiFENesin   Syrup  (Sugar-Free) 200 milliGRAM(s) Oral every 6 hours PRN  insulin glargine Injectable (LANTUS) 40 Unit(s) SubCutaneous at bedtime  insulin lispro Injectable (HumaLOG) 22 Unit(s) SubCutaneous three times a day with meals  melatonin 3 milliGRAM(s) Oral at bedtime  metoprolol tartrate 25 milliGRAM(s) Oral every 12 hours  multivitamin 1 Tablet(s) Oral daily  mupirocin 2% Ointment 1 Application(s) Topical two times a day  oxycodone    5 mG/acetaminophen 325 mG 1 Tablet(s) Oral every 6 hours PRN  pantoprazole    Tablet 40 milliGRAM(s) Oral before breakfast  polyethylene glycol 3350 17 Gram(s) Oral daily  senna 2 Tablet(s) Oral at bedtime  sodium chloride 0.65% Nasal 1 Spray(s) Both Nostrils three times a day  sodium chloride 0.9% lock flush 10 milliLiter(s) IV Push every 1 hour PRN  sodium chloride 0.9%. 1000 milliLiter(s) IV Continuous <Continuous>  tiotropium 18 MICROgram(s) Capsule 1 Capsule(s) Inhalation daily      Objective:  Vital Signs Last 24 Hrs  T(C): 37.2 (2020 10:45), Max: 37.5 (2020 04:00)  T(F): 99 (2020 10:45), Max: 99.5 (2020 04:00)  HR: 102 (2020 10:45) (73 - 107)  BP: 141/69 (2020 10:45) (125/74 - 147/83)  BP(mean): 97 (2020 10:45) (91 - 109)  RR: 20 (2020 10:45) (14 - 44)  SpO2: 98% (2020 10:45) (96% - 100%)       Eyes:DANIELA, EOMI  Ear/Nose/Throat: no oral lesion, no sinus tenderness on percussion	  Neck:no JVD, no lymphadenopathy, supple  Respiratory: CTA lanre  Cardiovascular: S1S2 RRR, no murmurs   Gastrointestinal:soft, (+) BS, no HSM  Extremities:no e/e/c        LABS:                        8.3    8.07  )-----------( 273      ( 2020 01:17 )             26.8     02-26    133<L>  |  96  |  50<H>  ----------------------------<  128<H>  4.4   |  25  |  1.99<H>    Ca    8.7      2020 01:17  Phos  4.8     02-26  Mg     2.1     02-26    TPro  6.1  /  Alb  2.7<L>  /  TBili  0.4  /  DBili  x   /  AST  23  /  ALT  26  /  AlkPhos  72  02-26    PT/INR - ( 2020 01:17 )   PT: 14.5 sec;   INR: 1.26 ratio         PTT - ( 2020 01:17 )  PTT:30.4 sec  Urinalysis Basic - ( 2020 17:29 )    Color: Light Yellow / Appearance: Clear / S.013 / pH: x  Gluc: x / Ketone: Negative  / Bili: Negative / Urobili: <2 mg/dL   Blood: x / Protein: 100 mg/dL / Nitrite: Negative   Leuk Esterase: Negative / RBC: 5 /HPF / WBC 1 /HPF   Sq Epi: x / Non Sq Epi: 0 /HPF / Bacteria: Negative          MICROBIOLOGY:    RECENT CULTURES:   @ 18:24 Skin sternal wound                No growth     @ 15:07 .Blood Blood                No growth at 5 days.          RESPIRATORY CULTURES:              RADIOLOGY & ADDITIONAL STUDIES:        Pager [phone]  After 5 pm/weekends or if no response :[phone]

## 2020-02-26 NOTE — PROGRESS NOTE ADULT - ASSESSMENT
A/P: 64M s/p sternal debridement and b/l pectoralis advancement flaps (POD1) doing well    - C/w dressings  - KEI care  - Care per CTU  - Will cont to follow    Randy Trevizo  PGY-5  Plastic Surgery  [phone]

## 2020-02-26 NOTE — PROGRESS NOTE ADULT - ASSESSMENT
65yo male with hx of HTN, DM II   s/p C4L on 2/3; PEA arrest on 2/6   + enterococcus Bld  C/S > started ampicillin q8h, ID consulted,  R pigtail for effusion  2/8    Extubated  2/9  2/15 L pigtail effusion  2/17 PRBC   2/18 Tx 2 Diaz  2/19 thomas removed, trial void. Maintain left pigtail for significant output. Pt encouraged to ambulate. Supplemental o2 sat NC weaned to 2L. Blood cultures repeated.  2/20 VSS -Pulmonary consult - appreciated - duonebs ATC q6h & pulmicort bid initiated - ddimer drawn.  pt w/ hx negative LE dopplers   will order non con chest DT as pt has a loculated left effusion that will require tap at IR.  2/21 - NON con Chest CT done - multiple loculated Left pleural effusions w/ patchy consolidation R - rounds made w/ Dr. carl.  ID - consult called re: Ct - WBC 11 afebrile.  +PALMA.  unable to tolerate VQ scan this am.  will d/c bumex & start Torsemide 20mg po daily  d/c planning rehab when medically stable  2/22  Wound care consult leg wounds> shower w/ local skin care  2/23  SW drainage> on Dapto> as per ID will cover SWD  CXR this am   Tighter glycemic control  2/24 ID added cefapime SW drainage purulent, afebrile  2/25 S/p Sternal wound debridement and irrigation with removal of all 6 sternal wires. Purulence encountered and sent for culture. Closure with bilateral pectoralis muscle advancement flaps.  Post op zari for hypotension- weaned off.  Skin/wd  culture from 2/24 neg  Pt transferred to sdu

## 2020-02-26 NOTE — PROGRESS NOTE ADULT - SUBJECTIVE AND OBJECTIVE BOX
I have pain    VITAL SIGNS    Telemetry:  nsr 90    Vital Signs Last 24 Hrs  T(C): 37.2 (20 @ 10:45), Max: 37.5 (20 @ 04:00)  T(F): 99 (20 @ 10:45), Max: 99.5 (20 @ 04:00)  HR: 102 (20 @ 10:45) (73 - 107)  BP: 141/69 (20 @ 10:45) (125/74 - 147/83)  RR: 20 (20 @ 10:45) (14 - 44)  SpO2: 98% (20 @ 10:45) (96% - 100%)                    07:01  -   @ 07:00  --------------------------------------------------------  IN: 1078.8 mL / OUT: 2645 mL / NET: -1566.2 mL     @ 07:01  -   @ 12:38  --------------------------------------------------------  IN: 600 mL / OUT: 455 mL / NET: 145 mL          Daily     Daily Weight in k.1 (2020 03:00)            CAPILLARY BLOOD GLUCOSE  138 (2020 08:00)  136 (2020 07:00)  129 (2020 06:00)  116 (2020 05:00)  117 (2020 04:00)  130 (2020 03:00)  124 (2020 01:00)  129 (2020 00:00)  135 (2020 23:00)  139 (2020 22:00)  131 (2020 21:00)  133 (2020 20:30)      POCT Blood Glucose.: 165 mg/dL (2020 10:18)  POCT Blood Glucose.: 138 mg/dL (2020 07:52)  POCT Blood Glucose.: 136 mg/dL (2020 06:52)  POCT Blood Glucose.: 127 mg/dL (2020 06:08)  POCT Blood Glucose.: 116 mg/dL (2020 04:57)  POCT Blood Glucose.: 117 mg/dL (2020 03:58)  POCT Blood Glucose.: 130 mg/dL (2020 03:06)  POCT Blood Glucose.: 129 mg/dL (2020 23:58)  POCT Blood Glucose.: 134 mg/dL (2020 23:06)  POCT Blood Glucose.: 139 mg/dL (2020 22:02)  POCT Blood Glucose.: 131 mg/dL (2020 21:24)            Drains:     MS       melvin x 2 ss        Coumadin    [ ] YES          [ x ]      NO                                   PHYSICAL EXAM        Neurology: alert and oriented x 3, nonfocal, no gross deficits  CV : s1 s2 RRR  Sternal Wound : vargas pratt occlusive suction dressing in place   Lungs: bibasilar crackles  Abdomen: soft, nontender, nondistended, positive bowel sounds.                       :    voiding        Extremities:   trace    edema   /  -   calve tenderness ,     R leg  incisions cdi, distal wound with dressing in place  LLE wounds cdi          ALBUTerol    90 MICROgram(s) HFA Inhaler 1 Puff(s) Inhalation every 4 hours  albuterol/ipratropium for Nebulization. 3 milliLiter(s) Nebulizer every 6 hours PRN  aMIOdarone    Tablet 200 milliGRAM(s) Oral daily  artificial tears (preservative free) Ophthalmic Solution 1 Drop(s) Both EYES two times a day  aspirin enteric coated 81 milliGRAM(s) Oral daily  atorvastatin 40 milliGRAM(s) Oral at bedtime  buDESOnide    Inhalation Suspension 0.5 milliGRAM(s) Inhalation two times a day  calcium acetate 667 milliGRAM(s) Oral three times a day with meals  cefepime   IVPB 2000 milliGRAM(s) IV Intermittent every 24 hours  chlorhexidine 0.12% Liquid 15 milliLiter(s) Oral Mucosa every 12 hours  DAPTOmycin IVPB 300 milliGRAM(s) IV Intermittent every 24 hours  guaiFENesin   Syrup  (Sugar-Free) 200 milliGRAM(s) Oral every 6 hours PRN  HYDROmorphone   Tablet 2 milliGRAM(s) Oral every 3 hours PRN  insulin glargine Injectable (LANTUS) 40 Unit(s) SubCutaneous at bedtime  insulin lispro Injectable (HumaLOG) 22 Unit(s) SubCutaneous three times a day with meals  melatonin 3 milliGRAM(s) Oral at bedtime  metoprolol tartrate 25 milliGRAM(s) Oral every 12 hours  multivitamin 1 Tablet(s) Oral daily  mupirocin 2% Ointment 1 Application(s) Topical two times a day  pantoprazole    Tablet 40 milliGRAM(s) Oral before breakfast  polyethylene glycol 3350 17 Gram(s) Oral daily  senna 2 Tablet(s) Oral at bedtime  sodium chloride 0.65% Nasal 1 Spray(s) Both Nostrils three times a day  sodium chloride 0.9% lock flush 10 milliLiter(s) IV Push every 1 hour PRN  sodium chloride 0.9%. 1000 milliLiter(s) IV Continuous <Continuous>  tiotropium 18 MICROgram(s) Capsule 1 Capsule(s) Inhalation daily                    Physical Therapy Rec:   Home  [  ]   Home w/ PT  [  ]  Rehab  [  ]  Discussed with Cardiothoracic Team at AM rounds.

## 2020-02-26 NOTE — AIRWAY REMOVAL NOTE  ADULT & PEDS - ARTIFICAL AIRWAY REMOVAL COMMENTS
Written order for extubation verified. The patient was identified by full name and birth date compared to the identification band. RN present during the procedure.
Written order for extubation verified. The patient was identified by full name and birth date compared to the identification band. Present during the procedure was Shavon JACOBSON.
written order of extubation verified, the pt was identified by full name and birth date compared to the identification band, present during the procedure was RN and MD.

## 2020-02-27 LAB
ALBUMIN SERPL ELPH-MCNC: 2.5 G/DL — LOW (ref 3.3–5)
ALP SERPL-CCNC: 90 U/L — SIGNIFICANT CHANGE UP (ref 40–120)
ALT FLD-CCNC: 21 U/L — SIGNIFICANT CHANGE UP (ref 10–45)
ANION GAP SERPL CALC-SCNC: 13 MMOL/L — SIGNIFICANT CHANGE UP (ref 5–17)
AST SERPL-CCNC: 18 U/L — SIGNIFICANT CHANGE UP (ref 10–40)
BILIRUB SERPL-MCNC: 0.4 MG/DL — SIGNIFICANT CHANGE UP (ref 0.2–1.2)
BUN SERPL-MCNC: 48 MG/DL — HIGH (ref 7–23)
CALCIUM SERPL-MCNC: 8.6 MG/DL — SIGNIFICANT CHANGE UP (ref 8.4–10.5)
CHLORIDE SERPL-SCNC: 93 MMOL/L — LOW (ref 96–108)
CO2 SERPL-SCNC: 22 MMOL/L — SIGNIFICANT CHANGE UP (ref 22–31)
CREAT SERPL-MCNC: 1.83 MG/DL — HIGH (ref 0.5–1.3)
GAS PNL BLDA: SIGNIFICANT CHANGE UP
GLUCOSE BLDC GLUCOMTR-MCNC: 118 MG/DL — HIGH (ref 70–99)
GLUCOSE BLDC GLUCOMTR-MCNC: 220 MG/DL — HIGH (ref 70–99)
GLUCOSE BLDC GLUCOMTR-MCNC: 241 MG/DL — HIGH (ref 70–99)
GLUCOSE BLDC GLUCOMTR-MCNC: 91 MG/DL — SIGNIFICANT CHANGE UP (ref 70–99)
GLUCOSE SERPL-MCNC: 268 MG/DL — HIGH (ref 70–99)
HCT VFR BLD CALC: 24.6 % — LOW (ref 39–50)
HGB BLD-MCNC: 7.7 G/DL — LOW (ref 13–17)
MCHC RBC-ENTMCNC: 27.1 PG — SIGNIFICANT CHANGE UP (ref 27–34)
MCHC RBC-ENTMCNC: 31.3 GM/DL — LOW (ref 32–36)
MCV RBC AUTO: 86.6 FL — SIGNIFICANT CHANGE UP (ref 80–100)
NRBC # BLD: 0 /100 WBCS — SIGNIFICANT CHANGE UP (ref 0–0)
PLATELET # BLD AUTO: 252 K/UL — SIGNIFICANT CHANGE UP (ref 150–400)
POTASSIUM SERPL-MCNC: 4.8 MMOL/L — SIGNIFICANT CHANGE UP (ref 3.5–5.3)
POTASSIUM SERPL-SCNC: 4.8 MMOL/L — SIGNIFICANT CHANGE UP (ref 3.5–5.3)
PROT SERPL-MCNC: 6 G/DL — SIGNIFICANT CHANGE UP (ref 6–8.3)
RBC # BLD: 2.84 M/UL — LOW (ref 4.2–5.8)
RBC # FLD: 14.3 % — SIGNIFICANT CHANGE UP (ref 10.3–14.5)
SODIUM SERPL-SCNC: 128 MMOL/L — LOW (ref 135–145)
WBC # BLD: 15.1 K/UL — HIGH (ref 3.8–10.5)
WBC # FLD AUTO: 15.1 K/UL — HIGH (ref 3.8–10.5)

## 2020-02-27 PROCEDURE — 99232 SBSQ HOSP IP/OBS MODERATE 35: CPT | Mod: GC

## 2020-02-27 PROCEDURE — 99232 SBSQ HOSP IP/OBS MODERATE 35: CPT

## 2020-02-27 PROCEDURE — 71045 X-RAY EXAM CHEST 1 VIEW: CPT | Mod: 26

## 2020-02-27 RX ORDER — MODAFINIL 200 MG/1
100 TABLET ORAL DAILY
Refills: 0 | Status: DISCONTINUED | OUTPATIENT
Start: 2020-02-27 | End: 2020-03-05

## 2020-02-27 RX ORDER — INSULIN LISPRO 100/ML
28 VIAL (ML) SUBCUTANEOUS
Refills: 0 | Status: DISCONTINUED | OUTPATIENT
Start: 2020-02-27 | End: 2020-02-28

## 2020-02-27 RX ORDER — INSULIN LISPRO 100/ML
24 VIAL (ML) SUBCUTANEOUS
Refills: 0 | Status: DISCONTINUED | OUTPATIENT
Start: 2020-02-27 | End: 2020-02-27

## 2020-02-27 RX ORDER — INSULIN LISPRO 100/ML
10 VIAL (ML) SUBCUTANEOUS ONCE
Refills: 0 | Status: COMPLETED | OUTPATIENT
Start: 2020-02-27 | End: 2020-02-27

## 2020-02-27 RX ORDER — INSULIN GLARGINE 100 [IU]/ML
48 INJECTION, SOLUTION SUBCUTANEOUS AT BEDTIME
Refills: 0 | Status: DISCONTINUED | OUTPATIENT
Start: 2020-02-27 | End: 2020-02-28

## 2020-02-27 RX ADMIN — Medication 667 MILLIGRAM(S): at 17:26

## 2020-02-27 RX ADMIN — HYDROMORPHONE HYDROCHLORIDE 2 MILLIGRAM(S): 2 INJECTION INTRAMUSCULAR; INTRAVENOUS; SUBCUTANEOUS at 09:35

## 2020-02-27 RX ADMIN — INSULIN GLARGINE 48 UNIT(S): 100 INJECTION, SOLUTION SUBCUTANEOUS at 22:10

## 2020-02-27 RX ADMIN — Medication 0.5 MILLIGRAM(S): at 17:45

## 2020-02-27 RX ADMIN — HYDROMORPHONE HYDROCHLORIDE 2 MILLIGRAM(S): 2 INJECTION INTRAMUSCULAR; INTRAVENOUS; SUBCUTANEOUS at 08:36

## 2020-02-27 RX ADMIN — HYDROMORPHONE HYDROCHLORIDE 2 MILLIGRAM(S): 2 INJECTION INTRAMUSCULAR; INTRAVENOUS; SUBCUTANEOUS at 21:30

## 2020-02-27 RX ADMIN — HYDROMORPHONE HYDROCHLORIDE 2 MILLIGRAM(S): 2 INJECTION INTRAMUSCULAR; INTRAVENOUS; SUBCUTANEOUS at 03:41

## 2020-02-27 RX ADMIN — Medication 0.5 MILLIGRAM(S): at 06:00

## 2020-02-27 RX ADMIN — PANTOPRAZOLE SODIUM 40 MILLIGRAM(S): 20 TABLET, DELAYED RELEASE ORAL at 06:00

## 2020-02-27 RX ADMIN — HYDROMORPHONE HYDROCHLORIDE 2 MILLIGRAM(S): 2 INJECTION INTRAMUSCULAR; INTRAVENOUS; SUBCUTANEOUS at 14:30

## 2020-02-27 RX ADMIN — Medication 25 MILLIGRAM(S): at 17:40

## 2020-02-27 RX ADMIN — Medication 1 DROP(S): at 17:45

## 2020-02-27 RX ADMIN — DAPTOMYCIN 112 MILLIGRAM(S): 500 INJECTION, POWDER, LYOPHILIZED, FOR SOLUTION INTRAVENOUS at 15:45

## 2020-02-27 RX ADMIN — Medication 10 UNIT(S): at 17:40

## 2020-02-27 RX ADMIN — POLYETHYLENE GLYCOL 3350 17 GRAM(S): 17 POWDER, FOR SOLUTION ORAL at 12:48

## 2020-02-27 RX ADMIN — MODAFINIL 100 MILLIGRAM(S): 200 TABLET ORAL at 11:22

## 2020-02-27 RX ADMIN — CHLORHEXIDINE GLUCONATE 15 MILLILITER(S): 213 SOLUTION TOPICAL at 05:59

## 2020-02-27 RX ADMIN — Medication 2: at 12:47

## 2020-02-27 RX ADMIN — MUPIROCIN 1 APPLICATION(S): 20 OINTMENT TOPICAL at 06:00

## 2020-02-27 RX ADMIN — HYDROMORPHONE HYDROCHLORIDE 2 MILLIGRAM(S): 2 INJECTION INTRAMUSCULAR; INTRAVENOUS; SUBCUTANEOUS at 03:11

## 2020-02-27 RX ADMIN — ATORVASTATIN CALCIUM 40 MILLIGRAM(S): 80 TABLET, FILM COATED ORAL at 21:02

## 2020-02-27 RX ADMIN — AMIODARONE HYDROCHLORIDE 200 MILLIGRAM(S): 400 TABLET ORAL at 06:00

## 2020-02-27 RX ADMIN — Medication 1 SPRAY(S): at 06:00

## 2020-02-27 RX ADMIN — HYDROMORPHONE HYDROCHLORIDE 2 MILLIGRAM(S): 2 INJECTION INTRAMUSCULAR; INTRAVENOUS; SUBCUTANEOUS at 13:41

## 2020-02-27 RX ADMIN — Medication 667 MILLIGRAM(S): at 12:48

## 2020-02-27 RX ADMIN — Medication 22 UNIT(S): at 08:24

## 2020-02-27 RX ADMIN — CEFEPIME 100 MILLIGRAM(S): 1 INJECTION, POWDER, FOR SOLUTION INTRAMUSCULAR; INTRAVENOUS at 11:53

## 2020-02-27 RX ADMIN — Medication 2: at 08:24

## 2020-02-27 RX ADMIN — Medication 1 SPRAY(S): at 21:03

## 2020-02-27 RX ADMIN — Medication 1 DROP(S): at 06:00

## 2020-02-27 RX ADMIN — Medication 3 MILLIGRAM(S): at 21:02

## 2020-02-27 RX ADMIN — MUPIROCIN 1 APPLICATION(S): 20 OINTMENT TOPICAL at 17:42

## 2020-02-27 RX ADMIN — Medication 1 TABLET(S): at 11:22

## 2020-02-27 RX ADMIN — Medication 25 MILLIGRAM(S): at 06:00

## 2020-02-27 RX ADMIN — Medication 24 UNIT(S): at 13:02

## 2020-02-27 RX ADMIN — Medication 81 MILLIGRAM(S): at 11:22

## 2020-02-27 RX ADMIN — Medication 1 SPRAY(S): at 13:04

## 2020-02-27 RX ADMIN — SENNA PLUS 2 TABLET(S): 8.6 TABLET ORAL at 21:02

## 2020-02-27 RX ADMIN — HYDROMORPHONE HYDROCHLORIDE 2 MILLIGRAM(S): 2 INJECTION INTRAMUSCULAR; INTRAVENOUS; SUBCUTANEOUS at 21:02

## 2020-02-27 NOTE — PROGRESS NOTE ADULT - SUBJECTIVE AND OBJECTIVE BOX
[facility] DIVISION OF KIDNEY DISEASES AND HYPERTENSION -- FOLLOW UP NOTE  --------------------------------------------------------------------------------  HPI: 63 yo M with HTN, DM II (20 years), admitted with complaints of acute on chronic left sided exertional chest pain. Nephrology team is following for elevated serum creatinine and pre-cardiac catheterization assessment. [facility] no prior records available. On admission (1/25/2020), Scr was 1.85. Patient went for cardiac cath on 1/29/20 which revealed triple vessel disease. Scr had improved to 1.76 on 2/3/20. Pt. underwent CABG on 2/3/20. Scr now increased after CABG and PEA arrest on 2/6/20, had improved to ~2.5. Scr over past few days had improved down to 2.09 on 2/17/20. Scr stable between 1.9-2.1, currently at 1.83 today. Pt. is currently post-op from sternal debridement and pectoralis flap creation.    Pt. seen and examined at bedside this AM. Pt. no longer on diuretics, non oliguric however with Osorio catheter removed. Pt. not conversational and appears in significant pain.    PAST HISTORY  --------------------------------------------------------------------------------  No significant changes to PMH, PSH, FHx, SHx, unless otherwise noted    ALLERGIES & MEDICATIONS  --------------------------------------------------------------------------------  Allergies    No Known Allergies    Intolerances    Standing Inpatient Medications  ALBUTerol    90 MICROgram(s) HFA Inhaler 1 Puff(s) Inhalation every 4 hours  aMIOdarone    Tablet 200 milliGRAM(s) Oral daily  artificial tears (preservative free) Ophthalmic Solution 1 Drop(s) Both EYES two times a day  aspirin enteric coated 81 milliGRAM(s) Oral daily  atorvastatin 40 milliGRAM(s) Oral at bedtime  buDESOnide    Inhalation Suspension 0.5 milliGRAM(s) Inhalation two times a day  calcium acetate 667 milliGRAM(s) Oral three times a day with meals  cefepime   IVPB 2000 milliGRAM(s) IV Intermittent every 24 hours  DAPTOmycin IVPB 300 milliGRAM(s) IV Intermittent every 24 hours  insulin glargine Injectable (LANTUS) 48 Unit(s) SubCutaneous at bedtime  insulin lispro (HumaLOG) corrective regimen sliding scale   SubCutaneous Before meals and at bedtime  insulin lispro Injectable (HumaLOG) 24 Unit(s) SubCutaneous three times a day with meals  melatonin 3 milliGRAM(s) Oral at bedtime  metoprolol tartrate 25 milliGRAM(s) Oral every 12 hours  modafinil 100 milliGRAM(s) Oral daily  multivitamin 1 Tablet(s) Oral daily  mupirocin 2% Ointment 1 Application(s) Topical two times a day  pantoprazole    Tablet 40 milliGRAM(s) Oral before breakfast  polyethylene glycol 3350 17 Gram(s) Oral daily  senna 2 Tablet(s) Oral at bedtime  sodium chloride 0.65% Nasal 1 Spray(s) Both Nostrils three times a day  sodium chloride 0.9%. 1000 milliLiter(s) IV Continuous <Continuous>  tiotropium 18 MICROgram(s) Capsule 1 Capsule(s) Inhalation daily    REVIEW OF SYSTEMS  --------------------------------------------------------------------------------  Unable to obtain.    VITALS/PHYSICAL EXAM  --------------------------------------------------------------------------------  T(C): 37.2 (02-27-20 @ 07:00), Max: 37.8 (02-26-20 @ 23:00)  HR: 84 (02-27-20 @ 07:00) (84 - 96)  BP: 108/61 (02-27-20 @ 07:14) (108/61 - 108/61)  RR: 16 (02-27-20 @ 07:00) (16 - 20)  SpO2: 98% (02-27-20 @ 07:00) (97% - 99%)  Wt(kg): --    02-26-20 @ 07:01  -  02-27-20 @ 07:00  --------------------------------------------------------  IN: 1480 mL / OUT: 1550 mL / NET: -70 mL    02-27-20 @ 07:01  -  02-27-20 @ 10:49  --------------------------------------------------------  IN: 120 mL / OUT: 100 mL / NET: 20 mL    Physical Exam:  	Gen: awake, moderate distress  	HEENT: supple neck  	Pulm: Decreased bibasilar breath sounds  	CV: + central chest dressing from CABG C/D/I, S1S2  	Abd: Soft  	: + Osorio catheter with urine  	Ext: + pitting edema B/L    LABS/STUDIES  --------------------------------------------------------------------------------              7.7    15.10 >-----------<  252      [02-27-20 @ 05:23]              24.6     128  |  93  |  48  ----------------------------<  268      [02-27-20 @ 05:23]  4.8   |  22  |  1.83        Ca     8.6     [02-27-20 @ 05:23]      Mg     2.1     [02-26-20 @ 01:17]      Phos  4.8     [02-26-20 @ 01:17]    Creatinine Trend:  SCr 1.83 [02-27 @ 05:23]  SCr 1.99 [02-26 @ 01:17]  SCr 1.99 [02-25 @ 20:44]  SCr 2.01 [02-25 @ 04:59]  SCr 1.83 [02-24 @ 06:46]

## 2020-02-27 NOTE — PROGRESS NOTE ADULT - SUBJECTIVE AND OBJECTIVE BOX
Chief complaint  Patient is a 64y old  Male who presents with a chief complaint of s/p cabg (27 Feb 2020 10:53)   Review of systems  Patient sitting up in chair, appears fatigued, no hypoglycemia.    Labs and Fingersticks  CAPILLARY BLOOD GLUCOSE      POCT Blood Glucose.: 220 mg/dL (27 Feb 2020 12:38)  POCT Blood Glucose.: 241 mg/dL (27 Feb 2020 07:47)  POCT Blood Glucose.: 156 mg/dL (26 Feb 2020 21:35)  POCT Blood Glucose.: 249 mg/dL (26 Feb 2020 17:10)  POCT Blood Glucose.: 188 mg/dL (26 Feb 2020 14:05)      Anion Gap, Serum: 13 (02-27 @ 05:23)  Anion Gap, Serum: 12 (02-26 @ 01:17)  Anion Gap, Serum: 13 (02-25 @ 20:44)      Calcium, Total Serum: 8.6 (02-27 @ 05:23)  Calcium, Total Serum: 8.7 (02-26 @ 01:17)  Calcium, Total Serum: 8.1 <L> (02-25 @ 20:44)  Albumin, Serum: 2.5 <L> (02-27 @ 05:23)  Albumin, Serum: 2.7 <L> (02-26 @ 01:17)  Albumin, Serum: 2.6 <L> (02-25 @ 20:44)    Alanine Aminotransferase (ALT/SGPT): 21 (02-27 @ 05:23)  Alanine Aminotransferase (ALT/SGPT): 26 (02-26 @ 01:17)  Alanine Aminotransferase (ALT/SGPT): 30 (02-25 @ 20:44)  Alkaline Phosphatase, Serum: 90 (02-27 @ 05:23)  Alkaline Phosphatase, Serum: 72 (02-26 @ 01:17)  Alkaline Phosphatase, Serum: 70 (02-25 @ 20:44)  Aspartate Aminotransferase (AST/SGOT): 18 (02-27 @ 05:23)  Aspartate Aminotransferase (AST/SGOT): 23 (02-26 @ 01:17)  Aspartate Aminotransferase (AST/SGOT): 22 (02-25 @ 20:44)        02-27    128<L>  |  93<L>  |  48<H>  ----------------------------<  268<H>  4.8   |  22  |  1.83<H>    Ca    8.6      27 Feb 2020 05:23  Phos  4.8     02-26  Mg     2.1     02-26    TPro  6.0  /  Alb  2.5<L>  /  TBili  0.4  /  DBili  x   /  AST  18  /  ALT  21  /  AlkPhos  90  02-27                        7.7    15.10 )-----------( 252      ( 27 Feb 2020 05:23 )             24.6     Medications  MEDICATIONS  (STANDING):  ALBUTerol    90 MICROgram(s) HFA Inhaler 1 Puff(s) Inhalation every 4 hours  aMIOdarone    Tablet 200 milliGRAM(s) Oral daily  artificial tears (preservative free) Ophthalmic Solution 1 Drop(s) Both EYES two times a day  aspirin enteric coated 81 milliGRAM(s) Oral daily  atorvastatin 40 milliGRAM(s) Oral at bedtime  buDESOnide    Inhalation Suspension 0.5 milliGRAM(s) Inhalation two times a day  calcium acetate 667 milliGRAM(s) Oral three times a day with meals  cefepime   IVPB 2000 milliGRAM(s) IV Intermittent every 24 hours  DAPTOmycin IVPB 300 milliGRAM(s) IV Intermittent every 24 hours  insulin glargine Injectable (LANTUS) 48 Unit(s) SubCutaneous at bedtime  insulin lispro (HumaLOG) corrective regimen sliding scale   SubCutaneous Before meals and at bedtime  insulin lispro Injectable (HumaLOG) 24 Unit(s) SubCutaneous three times a day with meals  melatonin 3 milliGRAM(s) Oral at bedtime  metoprolol tartrate 25 milliGRAM(s) Oral every 12 hours  modafinil 100 milliGRAM(s) Oral daily  multivitamin 1 Tablet(s) Oral daily  mupirocin 2% Ointment 1 Application(s) Topical two times a day  pantoprazole    Tablet 40 milliGRAM(s) Oral before breakfast  polyethylene glycol 3350 17 Gram(s) Oral daily  senna 2 Tablet(s) Oral at bedtime  sodium chloride 0.65% Nasal 1 Spray(s) Both Nostrils three times a day  sodium chloride 0.9%. 1000 milliLiter(s) (10 mL/Hr) IV Continuous <Continuous>  tiotropium 18 MICROgram(s) Capsule 1 Capsule(s) Inhalation daily      Physical Exam  General: Patient comfortable in bed  Vital Signs Last 12 Hrs  T(F): 97.9 (02-27-20 @ 11:33), Max: 99 (02-27-20 @ 07:00)  HR: 90 (02-27-20 @ 11:33) (84 - 93)  BP: 115/69 (02-27-20 @ 11:33) (108/61 - 115/69)  BP(mean): --  RR: 18 (02-27-20 @ 11:33) (16 - 18)  SpO2: 93% (02-27-20 @ 11:33) (93% - 98%)  Neck: No palpable thyroid nodules.  CVS: S1S2, No murmurs  Respiratory: No wheezing, no crepitations  GI: Abdomen soft, bowel sounds positive  Musculoskeletal:  edema lower extremities.   Skin: No skin rashes, no ecchymosis    Diagnostics Chief complaint  Patient is a 64y old  Male who presents with a chief complaint of s/p cabg (27 Feb 2020 10:53)   Review of systems  Patient sitting up in chair, appears fatigued,  no hypoglycemia.    Labs and Fingersticks  CAPILLARY BLOOD GLUCOSE      POCT Blood Glucose.: 220 mg/dL (27 Feb 2020 12:38)  POCT Blood Glucose.: 241 mg/dL (27 Feb 2020 07:47)  POCT Blood Glucose.: 156 mg/dL (26 Feb 2020 21:35)  POCT Blood Glucose.: 249 mg/dL (26 Feb 2020 17:10)  POCT Blood Glucose.: 188 mg/dL (26 Feb 2020 14:05)      Anion Gap, Serum: 13 (02-27 @ 05:23)  Anion Gap, Serum: 12 (02-26 @ 01:17)  Anion Gap, Serum: 13 (02-25 @ 20:44)      Calcium, Total Serum: 8.6 (02-27 @ 05:23)  Calcium, Total Serum: 8.7 (02-26 @ 01:17)  Calcium, Total Serum: 8.1 <L> (02-25 @ 20:44)  Albumin, Serum: 2.5 <L> (02-27 @ 05:23)  Albumin, Serum: 2.7 <L> (02-26 @ 01:17)  Albumin, Serum: 2.6 <L> (02-25 @ 20:44)    Alanine Aminotransferase (ALT/SGPT): 21 (02-27 @ 05:23)  Alanine Aminotransferase (ALT/SGPT): 26 (02-26 @ 01:17)  Alanine Aminotransferase (ALT/SGPT): 30 (02-25 @ 20:44)  Alkaline Phosphatase, Serum: 90 (02-27 @ 05:23)  Alkaline Phosphatase, Serum: 72 (02-26 @ 01:17)  Alkaline Phosphatase, Serum: 70 (02-25 @ 20:44)  Aspartate Aminotransferase (AST/SGOT): 18 (02-27 @ 05:23)  Aspartate Aminotransferase (AST/SGOT): 23 (02-26 @ 01:17)  Aspartate Aminotransferase (AST/SGOT): 22 (02-25 @ 20:44)        02-27    128<L>  |  93<L>  |  48<H>  ----------------------------<  268<H>  4.8   |  22  |  1.83<H>    Ca    8.6      27 Feb 2020 05:23  Phos  4.8     02-26  Mg     2.1     02-26    TPro  6.0  /  Alb  2.5<L>  /  TBili  0.4  /  DBili  x   /  AST  18  /  ALT  21  /  AlkPhos  90  02-27                        7.7    15.10 )-----------( 252      ( 27 Feb 2020 05:23 )             24.6     Medications  MEDICATIONS  (STANDING):  ALBUTerol    90 MICROgram(s) HFA Inhaler 1 Puff(s) Inhalation every 4 hours  aMIOdarone    Tablet 200 milliGRAM(s) Oral daily  artificial tears (preservative free) Ophthalmic Solution 1 Drop(s) Both EYES two times a day  aspirin enteric coated 81 milliGRAM(s) Oral daily  atorvastatin 40 milliGRAM(s) Oral at bedtime  buDESOnide    Inhalation Suspension 0.5 milliGRAM(s) Inhalation two times a day  calcium acetate 667 milliGRAM(s) Oral three times a day with meals  cefepime   IVPB 2000 milliGRAM(s) IV Intermittent every 24 hours  DAPTOmycin IVPB 300 milliGRAM(s) IV Intermittent every 24 hours  insulin glargine Injectable (LANTUS) 48 Unit(s) SubCutaneous at bedtime  insulin lispro (HumaLOG) corrective regimen sliding scale   SubCutaneous Before meals and at bedtime  insulin lispro Injectable (HumaLOG) 24 Unit(s) SubCutaneous three times a day with meals  melatonin 3 milliGRAM(s) Oral at bedtime  metoprolol tartrate 25 milliGRAM(s) Oral every 12 hours  modafinil 100 milliGRAM(s) Oral daily  multivitamin 1 Tablet(s) Oral daily  mupirocin 2% Ointment 1 Application(s) Topical two times a day  pantoprazole    Tablet 40 milliGRAM(s) Oral before breakfast  polyethylene glycol 3350 17 Gram(s) Oral daily  senna 2 Tablet(s) Oral at bedtime  sodium chloride 0.65% Nasal 1 Spray(s) Both Nostrils three times a day  sodium chloride 0.9%. 1000 milliLiter(s) (10 mL/Hr) IV Continuous <Continuous>  tiotropium 18 MICROgram(s) Capsule 1 Capsule(s) Inhalation daily      Physical Exam  General: Patient comfortable in bed  Vital Signs Last 12 Hrs  T(F): 97.9 (02-27-20 @ 11:33), Max: 99 (02-27-20 @ 07:00)  HR: 90 (02-27-20 @ 11:33) (84 - 93)  BP: 115/69 (02-27-20 @ 11:33) (108/61 - 115/69)  BP(mean): --  RR: 18 (02-27-20 @ 11:33) (16 - 18)  SpO2: 93% (02-27-20 @ 11:33) (93% - 98%)  Neck: No palpable thyroid nodules.  CVS: S1S2, No murmurs  Respiratory: No wheezing, no crepitations  GI: Abdomen soft, bowel sounds positive  Musculoskeletal:  edema lower extremities.   Skin: No skin rashes, no ecchymosis    Diagnostics

## 2020-02-27 NOTE — PROGRESS NOTE ADULT - SUBJECTIVE AND OBJECTIVE BOX
Plastic Surgery Progress Note    SUBJECTIVE:  Doing well. No acute events overnight; pain well controlled, up out of bed this morning.     OBJECTIVE:   ** PHYSICAL EXAM **  GEN: AOx3. NAD.   CHEST: Dressing c/d/i, no collections, JPs ss.    : 1.26 ratio       Vital Signs  T(C): 36.9 (02-27-20 @ 03:21), Max: 37.8 (02-26-20 @ 23:00)  T(F): 98.4 (02-27-20 @ 03:21), Max: 100 (02-26-20 @ 23:00)  HR: 93 (02-27-20 @ 05:59) (89 - 103)  BP: 141/69 (02-26-20 @ 10:45) (134/74 - 141/69)  RR: 18 (02-27-20 @ 05:59) (18 - 27)  SpO2: 97% (02-27-20 @ 05:59) (96% - 99%)      26 Feb 2020 07:01  -  27 Feb 2020 07:00  --------------------------------------------------------  IN:    Oral Fluid: 1380 mL    Solution: 50 mL    Solution: 50 mL  Total IN: 1480 mL    OUT:    Bulb: 145 mL    Bulb: 200 mL    Indwelling Catheter - Urethral: 1205 mL  Total OUT: 1550 mL    Total NET: -70 mL

## 2020-02-27 NOTE — PROGRESS NOTE ADULT - SUBJECTIVE AND OBJECTIVE BOX
CARDIOLOGY FOLLOW UP - Dr. Steel    CC no cp   c/o fatigue       PHYSICAL EXAM:  T(C): 36.8 (02-27-20 @ 14:13), Max: 37.8 (02-26-20 @ 23:00)  HR: 91 (02-27-20 @ 14:13) (84 - 93)  BP: 129/70 (02-27-20 @ 14:13) (108/61 - 129/70)  RR: 18 (02-27-20 @ 14:13) (16 - 20)  SpO2: 94% (02-27-20 @ 14:13) (93% - 99%)  Wt(kg): --  I&O's Summary    26 Feb 2020 07:01  -  27 Feb 2020 07:00  --------------------------------------------------------  IN: 1480 mL / OUT: 1550 mL / NET: -70 mL    27 Feb 2020 07:01  -  27 Feb 2020 15:19  --------------------------------------------------------  IN: 410 mL / OUT: 135 mL / NET: 275 mL        Appearance: Normal	  Cardiovascular: Normal S1 S2,RRR, No JVD, No murmurs  Respiratory: diminished   Gastrointestinal:  Soft, Non-tender, + BS	  Extremities: Normal range of motion, No clubbing, cyanosis or edema        MEDICATIONS  (STANDING):  ALBUTerol    90 MICROgram(s) HFA Inhaler 1 Puff(s) Inhalation every 4 hours  aMIOdarone    Tablet 200 milliGRAM(s) Oral daily  artificial tears (preservative free) Ophthalmic Solution 1 Drop(s) Both EYES two times a day  aspirin enteric coated 81 milliGRAM(s) Oral daily  atorvastatin 40 milliGRAM(s) Oral at bedtime  buDESOnide    Inhalation Suspension 0.5 milliGRAM(s) Inhalation two times a day  calcium acetate 667 milliGRAM(s) Oral three times a day with meals  cefepime   IVPB 2000 milliGRAM(s) IV Intermittent every 24 hours  DAPTOmycin IVPB 300 milliGRAM(s) IV Intermittent every 24 hours  insulin glargine Injectable (LANTUS) 48 Unit(s) SubCutaneous at bedtime  insulin lispro (HumaLOG) corrective regimen sliding scale   SubCutaneous Before meals and at bedtime  insulin lispro Injectable (HumaLOG) 28 Unit(s) SubCutaneous three times a day before meals  melatonin 3 milliGRAM(s) Oral at bedtime  metoprolol tartrate 25 milliGRAM(s) Oral every 12 hours  modafinil 100 milliGRAM(s) Oral daily  multivitamin 1 Tablet(s) Oral daily  mupirocin 2% Ointment 1 Application(s) Topical two times a day  pantoprazole    Tablet 40 milliGRAM(s) Oral before breakfast  polyethylene glycol 3350 17 Gram(s) Oral daily  senna 2 Tablet(s) Oral at bedtime  sodium chloride 0.65% Nasal 1 Spray(s) Both Nostrils three times a day  sodium chloride 0.9%. 1000 milliLiter(s) (10 mL/Hr) IV Continuous <Continuous>  tiotropium 18 MICROgram(s) Capsule 1 Capsule(s) Inhalation daily      TELEMETRY: NSR  	    ECG:  	  RADIOLOGY:   DIAGNOSTIC TESTING:  [ ] Echocardiogram:  [ ]  Catheterization:  [ ] Stress Test:    OTHER: 	    LABS:	 	                                7.7    15.10 )-----------( 252      ( 27 Feb 2020 05:23 )             24.6     02-27    128<L>  |  93<L>  |  48<H>  ----------------------------<  268<H>  4.8   |  22  |  1.83<H>    Ca    8.6      27 Feb 2020 05:23  Phos  4.8     02-26  Mg     2.1     02-26    TPro  6.0  /  Alb  2.5<L>  /  TBili  0.4  /  DBili  x   /  AST  18  /  ALT  21  /  AlkPhos  90  02-27    PT/INR - ( 26 Feb 2020 01:17 )   PT: 14.5 sec;   INR: 1.26 ratio         PTT - ( 26 Feb 2020 01:17 )  PTT:30.4 sec

## 2020-02-27 NOTE — PROGRESS NOTE ADULT - SUBJECTIVE AND OBJECTIVE BOX
Subjective ' hello I am tired "    VITAL SIGNS    Telemetry:  NSR 92     Vital Signs Last 24 Hrs  T(C): 37.2 (20 @ 07:00), Max: 37.8 (20 @ 23:00)  T(F): 99 (20 @ 07:00), Max: 100 (20 @ 23:00)  HR: 84 (20 @ 07:00) (84 - 96)  BP: 108/61 (20 @ 07:14) (108/61 - 108/61)  RR: 16 (20 @ 07:00) (16 - 20)  SpO2: 98% (20 @ 07:00) (97% - 99%)              07:01  -   @ 07:00  --------------------------------------------------------  IN: 1480 mL / OUT: 1550 mL / NET: -70 mL     07:01  -   @ 10:54  --------------------------------------------------------  IN: 120 mL / OUT: 100 mL / NET: 20 mL    Daily Weight in k.1 (2020 07:14)      Bilirubin Total, Serum: 0.4 mg/dL ( @ 05:23)    CAPILLARY BLOOD GLUCOSE      POCT Blood Glucose.: 241 mg/dL (2020 07:47)  POCT Blood Glucose.: 156 mg/dL (2020 21:35)  POCT Blood Glucose.: 249 mg/dL (2020 17:10)  POCT Blood Glucose.: 188 mg/dL (2020 14:05)  POCT Blood Glucose.: 157 mg/dL (2020 12:54)          Drains:  Kei 1 90-/200 KEI 2 40/145  MEDICATIONS  (STANDING):  ALBUTerol    90 MICROgram(s) HFA Inhaler 1 Puff(s) Inhalation every 4 hours  aMIOdarone    Tablet 200 milliGRAM(s) Oral daily  artificial tears (preservative free) Ophthalmic Solution 1 Drop(s) Both EYES two times a day  aspirin enteric coated 81 milliGRAM(s) Oral daily  atorvastatin 40 milliGRAM(s) Oral at bedtime  buDESOnide    Inhalation Suspension 0.5 milliGRAM(s) Inhalation two times a day  calcium acetate 667 milliGRAM(s) Oral three times a day with meals  cefepime   IVPB 2000 milliGRAM(s) IV Intermittent every 24 hours  DAPTOmycin IVPB 300 milliGRAM(s) IV Intermittent every 24 hours  insulin glargine Injectable (LANTUS) 48 Unit(s) SubCutaneous at bedtime  insulin lispro (HumaLOG) corrective regimen sliding scale   SubCutaneous Before meals and at bedtime  insulin lispro Injectable (HumaLOG) 24 Unit(s) SubCutaneous three times a day with meals  melatonin 3 milliGRAM(s) Oral at bedtime  metoprolol tartrate 25 milliGRAM(s) Oral every 12 hours  modafinil 100 milliGRAM(s) Oral daily   multivitamin 1 Tablet(s) Oral daily  mupirocin 2% Ointment 1 Application(s) Topical two times a day  pantoprazole    Tablet 40 milliGRAM(s) Oral before breakfast  polyethylene glycol 3350 17 Gram(s) Oral daily  senna 2 Tablet(s) Oral at bedtime  sodium chloride 0.65% Nasal 1 Spray(s) Both Nostrils three times a day  sodium chloride 0.9%. 1000 milliLiter(s) (10 mL/Hr) IV Continuous <Continuous>  tiotropium 18 MICROgram(s) Capsule 1 Capsule(s) Inhalation daily    MEDICATIONS  (PRN):  albuterol/ipratropium for Nebulization. 3 milliLiter(s) Nebulizer every 6 hours PRN Shortness of Breath and/or Wheezing  guaiFENesin   Syrup  (Sugar-Free) 200 milliGRAM(s) Oral every 6 hours PRN Cough  HYDROmorphone   Tablet 2 milliGRAM(s) Oral every 3 hours PRN Moderate Pain (4 - 6)  sodium chloride 0.9% lock flush 10 milliLiter(s) IV Push every 1 hour PRN Pre/post blood products, medications, blood draw, and to maintain line patency    :                       PHYSICAL EXAM  Neurology: alert and oriented x 3, nonfocal, no gross deficits    CV : S1 S2 RRR    Sternal Wound :  LATHA dressing  KEI #1 and KEI #2 in place      Lungs: b/l breath sounds poor effort diminished in bases on 2 l nc    Abdomen: soft, nontender, nondistended, positive bowel sounds, last bowel movement     :  thomas d/c this am             Extremities:     Left  radial carlos d/c this am  equal strength throughout     b/lle warm well perfused + edema rt shin with dressing CDI                                         Physical Therapy Rec:   Home  [  ]   Home w/ PT  [  ]  Rehab  [  ]    Discussed with Cardiothoracic Team at AM rounds.

## 2020-02-27 NOTE — PROGRESS NOTE ADULT - PROBLEM SELECTOR PLAN 2
Pt. with likely hypervolemic hyponatremia in the setting of volume overload. Na worsened this AM. Recommend starting IV diuretics. Achieve consistent net negative fluid balance.    Kevin Correa  Nephrology Fellow  Cell: [phone] (from 8 am to 5 pm)  (After 5 pm or on weekends please page on-call fellow)

## 2020-02-27 NOTE — PROGRESS NOTE ADULT - ASSESSMENT
Assessment  DMT2: 64y Male with DM T2 with hyperglycemia, A1C 9.2%, was on oral meds and insulin at home, now s/p debridement restarted on basal bolus insulin regimen, blood sugars fluctuating due to inconsistent carb intake, FS now running high and not at target, no hypoglycemic episodes. Patient with sternal wound infection s/p wound debridement, he is noncompliant with low-carb diet, eating meals in addition to food family brings from home in addition to glucerna.  Foot infection: On Tx, stable.  CAD: s/p CABG 2/3, on medications, no chest pain, stable, monitored.  HTN: Controlled,  on antihypertensive medications.  HLD: Controlled, on statin.  CKD: Monitor labs/BMP          Harper Matias MD  Cell: [phone]  Office: [phone] Assessment  DMT2: 64y Male with DM T2 with hyperglycemia, A1C 9.2%, was on oral meds and insulin at home, now s/p debridement restarted on basal bolus insulin  regimen, blood sugars fluctuating due to inconsistent carb intake, FS now running high and not at target, no hypoglycemic episodes. Patient with sternal wound infection s/p wound debridement, he is noncompliant with low-carb diet, eating meals in addition to food family brings from home in addition to glucerna.  Foot infection: On Tx, stable.  CAD: s/p CABG 2/3, on medications, no chest pain, stable, monitored.  HTN: Controlled,  on antihypertensive medications.  HLD: Controlled, on statin.  CKD: Monitor labs/BMP          Harper Matias MD  Cell: [phone]  Office: [phone]

## 2020-02-27 NOTE — CHART NOTE - NSCHARTNOTEFT_GEN_A_CORE
Pt seen for LOS follow-up. Pt currently reports severe pain "100%", RN aware and provided pain medication; pt otherwise not engaging in interview at this time. Spouse at bedside assisting pt with lunch, she reports pt is not eating much but likes the Glucerna.    Pt is a 64 year old male with PMH HTN, T2DM (20 years), admitted with complaints of acute on chronic left sided exertional chest pain. Went for cardiac cath on 1/29/2020 which revealed triple vessel disease. Is now s/p CABG on 2/3/20. Pt noted with sternal wound infection, R leg cellulitis. Is 2/25 s/p debridement/flap, on antibiotics with ID following. Hyperglycemia persists; endo following for glycemic management, insulin regimen increased.     Source: Patient [x]    Family [x] spouse at bedside     other [x] EMR    Diet: Consistent CHO No snacks; Glucerna x 3 daily (660 kcal, 30 grams protein)    Patient reports [ ] nausea  [ ] vomiting [ ] diarrhea [ ] constipation  [ ]chewing problems [ ] swallowing issues  [x] other: severe pain, decreased appetite    PO intake (meals):  < 50% [x] 50-75% [x]   % [ ]  other :  PO intake (supplements): 2-3 containers daily    Source for PO intake [x] Patient [ ] family [x] chart [ ] staff [ ] other    Enteral /Parenteral Nutrition: n/a    Current Weight: 205.2 pounds (current, standing, ). 182.1 pounds 1/27.    Pertinent Medications: MEDICATIONS  (STANDING):  ALBUTerol    90 MICROgram(s) HFA Inhaler 1 Puff(s) Inhalation every 4 hours  aMIOdarone    Tablet 200 milliGRAM(s) Oral daily  artificial tears (preservative free) Ophthalmic Solution 1 Drop(s) Both EYES two times a day  aspirin enteric coated 81 milliGRAM(s) Oral daily  atorvastatin 40 milliGRAM(s) Oral at bedtime  buDESOnide    Inhalation Suspension 0.5 milliGRAM(s) Inhalation two times a day  calcium acetate 667 milliGRAM(s) Oral three times a day with meals  cefepime   IVPB 2000 milliGRAM(s) IV Intermittent every 24 hours  DAPTOmycin IVPB 300 milliGRAM(s) IV Intermittent every 24 hours  insulin glargine Injectable (LANTUS) 48 Unit(s) SubCutaneous at bedtime  insulin lispro (HumaLOG) corrective regimen sliding scale   SubCutaneous Before meals and at bedtime  insulin lispro Injectable (HumaLOG) 24 Unit(s) SubCutaneous three times a day with meals  melatonin 3 milliGRAM(s) Oral at bedtime  metoprolol tartrate 25 milliGRAM(s) Oral every 12 hours  modafinil 100 milliGRAM(s) Oral daily  multivitamin 1 Tablet(s) Oral daily  mupirocin 2% Ointment 1 Application(s) Topical two times a day  pantoprazole    Tablet 40 milliGRAM(s) Oral before breakfast  polyethylene glycol 3350 17 Gram(s) Oral daily  senna 2 Tablet(s) Oral at bedtime  sodium chloride 0.65% Nasal 1 Spray(s) Both Nostrils three times a day  sodium chloride 0.9%. 1000 milliLiter(s) (10 mL/Hr) IV Continuous <Continuous>  tiotropium 18 MICROgram(s) Capsule 1 Capsule(s) Inhalation daily    MEDICATIONS  (PRN):  albuterol/ipratropium for Nebulization. 3 milliLiter(s) Nebulizer every 6 hours PRN Shortness of Breath and/or Wheezing  guaiFENesin   Syrup  (Sugar-Free) 200 milliGRAM(s) Oral every 6 hours PRN Cough  HYDROmorphone   Tablet 2 milliGRAM(s) Oral every 3 hours PRN Moderate Pain (4 - 6)  sodium chloride 0.9% lock flush 10 milliLiter(s) IV Push every 1 hour PRN Pre/post blood products, medications, blood draw, and to maintain line patency    Pertinent Labs:  02-27 Na128 mmol/L<L> Glu 268 mg/dL<H> K+ 4.8 mmol/L Cr  1.83 mg/dL<H> BUN 48 mg/dL<H> 02-26 Phos 4.8 mg/dL<H> 02-27 Alb 2.5 g/dL<L> 02-14 PAB 13 mg/dL<L>      Skin: No pressure injuries       Estimated Needs:   [x] no change since previous assessment  [ ] recalculated:       Previous Nutrition Diagnosis: Increased Nutrient Needs, Food and nutrition-related knowledge deficit         Nutrition Diagnosis is [x] ongoing  [ ] resolved [ ] not applicable          New Nutrition Diagnosis: Inadequate Oral Intake related to severe pain as evidenced by meals intake <50%        Interventions:     1) Recommend continue current consistent CHO diet order to promote glycemic control.   -Currently there is no evidence of diet non-adherence as he has poor appetite, very little food observed on lunch tray.  2) Continue Glucerna x 3 daily for now as meal replacement; if po intake of meal improves can d/c the Glucerna as it should be used only as meal replacement for this patient  3) Consistent CHO diet education has been reinforced  4) Pain management per team. ID following       Monitoring and Evaluation:     [x] PO intake [x] Tolerance to diet prescription [x] weights [x] follow up per protocol    [x] other: RD remains available: Naima Rodriguez MS, RDN, CDN, CDE, CSOWM. #[phone]

## 2020-02-27 NOTE — PROGRESS NOTE ADULT - ASSESSMENT
A/P: 64M s/p sternal debridement and b/l pectoralis advancement flaps on 2/25. Doing well    - C/w dressings  - KEI care  - Care per CT  - Will cont to follow    Primitivo Nugent  Plastic Surgery Resident  [facility]: [phone]  [facility]: [id number]

## 2020-02-27 NOTE — PROGRESS NOTE ADULT - PROBLEM SELECTOR PLAN 1
Pt with  CKD likely  in the setting of long standing DM and HTN. No prior labs for review. Scr since admission has ranged between 1.8-2.1. Scr increased to ~2.50 secondary to hemodynamic injury after CABG and PEA arrest s/p CPR, and worsened to 2.9. Scr currently 1.83 this AM. Non-nephrotic range proteinuria. Pt. likely with diabetic nephropathy.   - Recommend starting IV diuretics. Goal is net negative fluid balance given tense LE edema.  - Pt. on cefepime. Adjust dosage to renal function and degree of infectious process. Cefepime known to cause toxic encephalopathy in patients with decreased renal function. Monitor mental status.  - Monitor labs and urine output.   - Avoid NSAIDs, ACEI/ARBS, RCA and nephrotoxins. Dose medications as per eGFR

## 2020-02-27 NOTE — PROGRESS NOTE ADULT - PROBLEM SELECTOR PLAN 2
Lantus/premeal> keep tight glycemic control with SWI  Diabetic diet  Endocrine following   poor complianc

## 2020-02-27 NOTE — PROGRESS NOTE ADULT - ATTENDING COMMENTS
Had sternal wound surgery  Out of CTU  Very sluggish  L clear  SCr stable  Na 128 diuretics- may change to direct tx

## 2020-02-27 NOTE — PROGRESS NOTE ADULT - PROBLEM SELECTOR PLAN 6
s/p rx of ampicillen as of 2/20-now on Daptomycin/cefepime as per ID-sternal wound debridement 2/25--drain in place

## 2020-02-27 NOTE — PROGRESS NOTE ADULT - ASSESSMENT
63yo male with hx of HTN, DM II   s/p C4L on 2/3; PEA arrest on 2/6   + enterococcus Bld  C/S > started ampicillin q8h, ID consulted,  R pigtail for effusion  2/8    Extubated  2/9  2/15 L pigtail effusion  2/17 PRBC   2/18 Tx 2 Diaz  2/19 thomas removed, trial void. Maintain left pigtail for significant output. Pt encouraged to ambulate. Supplemental o2 sat NC weaned to 2L. Blood cultures repeated.  2/20 VSS -Pulmonary consult - appreciated - duonebs ATC q6h & pulmicort bid initiated - ddimer drawn.  pt w/ hx negative LE dopplers   will order non con chest DT as pt has a loculated left effusion that will require tap at IR.  2/21 - NON con Chest CT done - multiple loculated Left pleural effusions w/ patchy consolidation R - rounds made w/ Dr. carl.  ID - consult called re: Ct - WBC 11 afebrile.  +PALMA.  unable to tolerate VQ scan this am.  will d/c bumex & start Torsemide 20mg po daily  d/c planning rehab when medically stable  2/22  Wound care consult leg wounds> shower w/ local skin care  2/23  SW drainage> on Dapto> as per ID will cover SWD  CXR this am   Tighter glycemic control  2/24 ID added cefapime SW drainage purulent, afebrile  2/25 S/p Sternal wound debridement and irrigation with removal of all 6 sternal wires. Purulence encountered and sent for culture. Closure with bilateral pectoralis muscle advancement flaps.  Post op zari for hypotension- weaned off.  Skin/wd  culture from 2/24 neg  Pt transferred to sdu  2/27 VSS   carlos d/c thomas d/c transfer to floor JPx2 d/c by Plastics  followed by ID on cefepime and daptomycin bc positive for E. faecalis; follow up bc negative to date. Provigil daily in am. Likely dispositions to rehab 65yo male with hx of HTN, DM II   s/p C4L on 2/3; PEA arrest on 2/6   + enterococcus Bld  C/S > started ampicillin q8h, ID consulted,  R pigtail for effusion  2/8    Extubated  2/9  2/15 L pigtail effusion  2/17 PRBC   2/18 Tx 2 Diaz  2/19 thomas removed, trial void. Maintain left pigtail for significant output. Pt encouraged to ambulate. Supplemental o2 sat NC weaned to 2L. Blood cultures repeated.  2/20 VSS -Pulmonary consult - appreciated - duonebs ATC q6h & pulmicort bid initiated - ddimer drawn.  pt w/ hx negative LE dopplers   will order non con chest DT as pt has a loculated left effusion that will require tap at IR.  2/21 - NON con Chest CT done - multiple loculated Left pleural effusions w/ patchy consolidation R - rounds made w/ Dr. carl.  ID - consult called re: Ct - WBC 11 afebrile.  +PALMA.  unable to tolerate VQ scan this am.  will d/c bumex & start Torsemide 20mg po daily  d/c planning rehab when medically stable  2/22  Wound care consult leg wounds> shower w/ local skin care  2/23  SW drainage> on Dapto> as per ID will cover SWD  CXR this am   Tighter glycemic control  2/24 ID added cefapime SW drainage purulent, afebrile  2/25 S/p Sternal wound debridement and irrigation with removal of all 6 sternal wires. Purulence encountered and sent for culture. Closure with bilateral pectoralis muscle advancement flaps.  Post op zari for hypotension- weaned off.  Skin/wd  culture from 2/24 neg  Pt transferred to sdu  2/27 VSS   carlos d/c thomas d/c transfer to floor JPx2  followed  by Plastics  followed by ID on cefepime and daptomycin bc positive for E. faecalis; follow up bc negative to date. Provigil daily in am. Likely dispositions to rehab

## 2020-02-27 NOTE — PROGRESS NOTE ADULT - PROBLEM SELECTOR PLAN 1
Patient with sternal wound infection; Tight glycemic control necessary.  Will increase Lantus to 48u at bedtime.  Will increase Humalog to 24u before each meal and continue Humalog correction scale coverage. Will continue monitoring FS and FU.  Patient and family previously counseled and again repeated necessity for low-carb diet, not to bring food from home; Tight glycemic control necessary for proper wound healing.

## 2020-02-27 NOTE — PROGRESS NOTE ADULT - ASSESSMENT
65yo male with hx of HTN, DM II, presented to the ED with complaints of acute on chronic left sided exertional chest pain. Pt states he has been having left sided pressure and stabbing chest pain radiating to his sternum and right with activity for the past few months. Since admission- s/p cath with severe triple vessel disease, s/p CABG, post op course c/b brief witnessed PEA 2/6 likely hypoxic arrest, s/p intubation. ( CAD, s/p cath with severe triple vessel disease including lesions at the bifurcation of the LAD/diagonal and distal RCA/RPDA/RPL trifurcation.   -s/p CABG x 4, intraop kennedy ef 50%)--s/p ampicillin until 2/18 for enterococcus in blood, extubated 2/9, pigtail right 2/8 and left sided on 2/15-for effusion.  Remains sob-NO h/o resp issues prior to hospitalization.  ***Current sob-multifactorial-CAD/effusions, atelectasis due to pain, mild bronchospasm and debility.  ********************  2/21-slightly better, pig tail cath removed from left chest; CT chest done-loculated left effusion-slight better  2/24-BS issues-less sob-dapto/cefepime as per ID  2/25-for debridement of chest area-sternal wound  2/26-debridement completed--to CTU  2/2783-VAC-[id number] over all

## 2020-02-27 NOTE — PROGRESS NOTE ADULT - PROBLEM SELECTOR PLAN 8
DVT/GI prophylaxis, PT, aspiration precautions
-Chronic  -Ha1c 9.2  -Home Glyburide + Metformin + Insulin 30units QHS  -Hold PO meds  -Endocrinology recs noted  -Readjust insulin requirements to Lantus 30units QHS + premeal 6u before breakfast and 8u before lunch/dinner + SSI  -Monitor Fingersticks
-Chronic  -Ha1c 9.2  -Home Glyburide + Metformin + Insulin 30units QHS  -Hold PO meds  -Fingersticks improving with some readings <100  -Decrease insulin requirements to Lantus 28units QHS + 10units premeal + SSI  -Monitor Fingersticks
DVT ppx HSQ
DVT/GI prophylaxis, PT, aspiration precautions
Transitions of Care Status:  1.  Name of PCP: Dr. Shawn Camara [phone]  2.  PCP Contacted on Admission: [ ] Y    [x ] N    3.  PCP contacted at Discharge: [ ] Y    [ ] N    [ ] N/A  4.  Post-Discharge Appointment Date and Location:  5.  Summary of Handoff given to PCP:

## 2020-02-27 NOTE — PROGRESS NOTE ADULT - ATTENDING COMMENTS
as above-s/p debridement 2/25 of sternal area--drain in place (SDU)="better over all"  multifactorial dyspnea-CAD s/p CABG, PEA arrest, atelectasis due to pain, pleural effusion L-pig tail in place, bronchospasm, ?PE-O2 NC sat above 90%                     Pleural effusion-pig tail removed 2/20-CT chest NC-improved but still loculations on left-f/up 4-6 wks  CAD/CHF-diurese as cr allows-keep K/Mg above  atelectasis-pain control, incentive spirometry, acapella                    ? DVT/PE--s/p repeat venous dopplers-negative; VQ unable  bronchospasm-duoneb q 6, pulmicort .5 bid; out pt PFTs              ID-daptomycin/cefepime as per ID  snore-? osas--out pt SS  DVT/GI prophylaxis, PT, nutrition evaln            PT  Mike Hernandez MD-Pulmonary    [phone]

## 2020-02-27 NOTE — PROGRESS NOTE ADULT - SUBJECTIVE AND OBJECTIVE BOX
CHIEF COMPLAINT: f/up sob, resp failure, pleural effusions, atelectasis-pain in chest--breathing [id number] cough    Interval Events: SDU    REVIEW OF SYSTEMS:  Constitutional: No fevers or chills. No weight loss. + fatigue or generalized malaise.  Eyes: No itching or discharge from the eyes  ENT: No ear pain. No ear discharge. No nasal congestion. No post nasal drip. No epistaxis. No throat pain. No sore throat. No difficulty swallowing.   CV: No chest pain. No palpitations. No lightheadedness or dizziness.   Resp: No dyspnea at rest. + dyspnea on exertion. No orthopnea. No wheezing. No cough. No stridor. No sputum production. No chest pain with respiration.  GI: No nausea. No vomiting. No diarrhea.  MSK: No joint pain or pain in any extremities  Integumentary: No skin lesions. No pedal edema.  Neurological: + gross motor weakness. No sensory changes.  [+ ] All other systems negative  [ ] Unable to assess ROS because ________    OBJECTIVE:  ICU Vital Signs Last 24 Hrs  T(C): 36.9 (27 Feb 2020 03:21), Max: 37.8 (26 Feb 2020 23:00)  T(F): 98.4 (27 Feb 2020 03:21), Max: 100 (26 Feb 2020 23:00)  HR: 91 (27 Feb 2020 03:21) (89 - 103)  BP: 141/69 (26 Feb 2020 10:45) (134/74 - 141/69)  BP(mean): 97 (26 Feb 2020 10:45) (91 - 97)  ABP: 135/51 (27 Feb 2020 03:21) (128/53 - 168/61)  ABP(mean): 77 (27 Feb 2020 03:21) (68 - 93)  RR: 18 (27 Feb 2020 03:21) (18 - 27)  SpO2: 97% (27 Feb 2020 03:21) (96% - 100%)        02-25 @ 07:01  -  02-26 @ 07:00  --------------------------------------------------------  IN: 1078.8 mL / OUT: 2645 mL / NET: -1566.2 mL    02-26 @ 07:01  - 02-27 @ 05:01  --------------------------------------------------------  IN: 1380 mL / OUT: 1550 mL / NET: -170 mL      CAPILLARY BLOOD GLUCOSE  138 (26 Feb 2020 08:00)      POCT Blood Glucose.: 156 mg/dL (26 Feb 2020 21:35)      PHYSICAL EXAM: NAD in bed on O2 NC  General: Awake, alert, oriented X 3.   HEENT: Atraumatic, normocephalic.                 Mallampatti Grade 3                No nasal congestion.                No tonsillar or pharyngeal exudates.  Lymph Nodes: No palpable lymphadenopathy  Neck: No JVD. No carotid bruit.   Respiratory: abnormal chest expansion                         Normal percussion                         Normal and equal air entry                         No wheeze, rhonchi or rales.  Cardiovascular: S1 S2 normal. No murmurs, rubs or gallops.   Abdomen: Soft, non-tender, non-distended. No organomegaly. Normoactive bowel sounds.  Extremities: Warm to touch. Peripheral pulse palpable. + pedal edema.   Skin: No rashes or skin lesions  Neurological: Motor and sensory examination equal and normal in all four extremities.  Psychiatry: Appropriate mood and affect.    HOSPITAL MEDICATIONS:  MEDICATIONS  (STANDING):  ALBUTerol    90 MICROgram(s) HFA Inhaler 1 Puff(s) Inhalation every 4 hours  aMIOdarone    Tablet 200 milliGRAM(s) Oral daily  artificial tears (preservative free) Ophthalmic Solution 1 Drop(s) Both EYES two times a day  aspirin enteric coated 81 milliGRAM(s) Oral daily  atorvastatin 40 milliGRAM(s) Oral at bedtime  buDESOnide    Inhalation Suspension 0.5 milliGRAM(s) Inhalation two times a day  calcium acetate 667 milliGRAM(s) Oral three times a day with meals  cefepime   IVPB 2000 milliGRAM(s) IV Intermittent every 24 hours  chlorhexidine 0.12% Liquid 15 milliLiter(s) Oral Mucosa every 12 hours  DAPTOmycin IVPB 300 milliGRAM(s) IV Intermittent every 24 hours  insulin glargine Injectable (LANTUS) 40 Unit(s) SubCutaneous at bedtime  insulin lispro (HumaLOG) corrective regimen sliding scale   SubCutaneous Before meals and at bedtime  insulin lispro Injectable (HumaLOG) 22 Unit(s) SubCutaneous three times a day with meals  melatonin 3 milliGRAM(s) Oral at bedtime  metoprolol tartrate 25 milliGRAM(s) Oral every 12 hours  multivitamin 1 Tablet(s) Oral daily  mupirocin 2% Ointment 1 Application(s) Topical two times a day  pantoprazole    Tablet 40 milliGRAM(s) Oral before breakfast  polyethylene glycol 3350 17 Gram(s) Oral daily  senna 2 Tablet(s) Oral at bedtime  sodium chloride 0.65% Nasal 1 Spray(s) Both Nostrils three times a day  sodium chloride 0.9%. 1000 milliLiter(s) (10 mL/Hr) IV Continuous <Continuous>  tiotropium 18 MICROgram(s) Capsule 1 Capsule(s) Inhalation daily    MEDICATIONS  (PRN):  albuterol/ipratropium for Nebulization. 3 milliLiter(s) Nebulizer every 6 hours PRN Shortness of Breath and/or Wheezing  guaiFENesin   Syrup  (Sugar-Free) 200 milliGRAM(s) Oral every 6 hours PRN Cough  HYDROmorphone   Tablet 2 milliGRAM(s) Oral every 3 hours PRN Moderate Pain (4 - 6)  sodium chloride 0.9% lock flush 10 milliLiter(s) IV Push every 1 hour PRN Pre/post blood products, medications, blood draw, and to maintain line patency      LABS:                        8.3    8.07  )-----------( 273      ( 26 Feb 2020 01:17 )             26.8     02-26    133<L>  |  96  |  50<H>  ----------------------------<  128<H>  4.4   |  25  |  1.99<H>    Ca    8.7      26 Feb 2020 01:17  Phos  4.8     02-26  Mg     2.1     02-26    TPro  6.1  /  Alb  2.7<L>  /  TBili  0.4  /  DBili  x   /  AST  23  /  ALT  26  /  AlkPhos  72  02-26    PT/INR - ( 26 Feb 2020 01:17 )   PT: 14.5 sec;   INR: 1.26 ratio         PTT - ( 26 Feb 2020 01:17 )  PTT:30.4 sec    Arterial Blood Gas:  02-27 @ 00:46  7.47/35/95/25/98/1.7  ABG lactate: --  Arterial Blood Gas:  02-26 @ 01:44  7.47/34/164/25/100/1.4  ABG lactate: --  Arterial Blood Gas:  02-26 @ 00:52  7.44/38/166/25/99/1.7  ABG lactate: --  Arterial Blood Gas:  02-25 @ 20:40  7.40/44/307/26/100/1.8  ABG lactate: --  Arterial Blood Gas:  02-25 @ 17:54  7.44/38/427/25/100/1.7  ABG lactate: --  Arterial Blood Gas:  02-25 @ 16:30  7.44/39/327/26/100/1.9  ABG lactate: --        MICROBIOLOGY:     RADIOLOGY:  [ ] Reviewed and interpreted by me    Point of Care Ultrasound Findings:    PFT:    EKG:

## 2020-02-27 NOTE — PROGRESS NOTE ADULT - ASSESSMENT
intraop kennedy 2/3/20 ef 50%, nl lv, mild diastolic dysfx stage 1  limited echo 2/4/20: nl LV sys fx , no pericardial effusion     a/p  64 year old man with history of HTN, DM II, admitted with progressive exertional angina, s/p cath with severe triple vessel disease, s/p CABG, post op course c/b brief witnessed PEA likely hypoxic arrest, s/p intubation.     1. CAD, s/p cath with severe triple vessel disease including lesions at the bifurcation of the LAD/diagonal and distal RCA/RPDA/RPL trifurcation.   -s/p CABG x 4, intraop kennedy ef 50%  -cont asa, statin, BB  -s/p PEA arrest, now extubated  -off vasopressors  -LE dopplers, negative for dvt   -repeat echo 2/15 with preserved lv fxn/EF  -s/p reop for SWI-cv stable postop    2. Postop acute diastolic HF  +fluid overload on exam   -chest xray noted, resume oral diuretics per cts     3. PAF  -cont amio, bb  -AC per CTS     4. HTN  -bp stable    5. STEPHANIE/CKD  renal f/u     6. Postop Pleural effusion  -S/P Right chest tube:  removed   -s/p L chest tube, mgmt per CTS  -d-dimer elevated/ Pulm f/u       7. Sepsis -E. Faecalis bacteremia, SW infection, RLE cellulitis   IV abx per [facility], repeat bcx 2/13 neg  s/p debridement   ID, CTS, plastics f/u       dvt ppx

## 2020-02-28 LAB
ANION GAP SERPL CALC-SCNC: 13 MMOL/L — SIGNIFICANT CHANGE UP (ref 5–17)
BUN SERPL-MCNC: 47 MG/DL — HIGH (ref 7–23)
CALCIUM SERPL-MCNC: 8.7 MG/DL — SIGNIFICANT CHANGE UP (ref 8.4–10.5)
CHLORIDE SERPL-SCNC: 94 MMOL/L — LOW (ref 96–108)
CO2 SERPL-SCNC: 20 MMOL/L — LOW (ref 22–31)
CREAT SERPL-MCNC: 1.81 MG/DL — HIGH (ref 0.5–1.3)
GLUCOSE BLDC GLUCOMTR-MCNC: 127 MG/DL — HIGH (ref 70–99)
GLUCOSE BLDC GLUCOMTR-MCNC: 142 MG/DL — HIGH (ref 70–99)
GLUCOSE BLDC GLUCOMTR-MCNC: 154 MG/DL — HIGH (ref 70–99)
GLUCOSE BLDC GLUCOMTR-MCNC: 166 MG/DL — HIGH (ref 70–99)
GLUCOSE BLDC GLUCOMTR-MCNC: 213 MG/DL — HIGH (ref 70–99)
GLUCOSE BLDC GLUCOMTR-MCNC: 59 MG/DL — LOW (ref 70–99)
GLUCOSE BLDC GLUCOMTR-MCNC: 60 MG/DL — LOW (ref 70–99)
GLUCOSE BLDC GLUCOMTR-MCNC: 79 MG/DL — SIGNIFICANT CHANGE UP (ref 70–99)
GLUCOSE BLDC GLUCOMTR-MCNC: 95 MG/DL — SIGNIFICANT CHANGE UP (ref 70–99)
GLUCOSE SERPL-MCNC: 131 MG/DL — HIGH (ref 70–99)
HCT VFR BLD CALC: 25.2 % — LOW (ref 39–50)
HGB BLD-MCNC: 8 G/DL — LOW (ref 13–17)
MCHC RBC-ENTMCNC: 27.6 PG — SIGNIFICANT CHANGE UP (ref 27–34)
MCHC RBC-ENTMCNC: 31.7 GM/DL — LOW (ref 32–36)
MCV RBC AUTO: 86.9 FL — SIGNIFICANT CHANGE UP (ref 80–100)
NRBC # BLD: 0 /100 WBCS — SIGNIFICANT CHANGE UP (ref 0–0)
PLATELET # BLD AUTO: 289 K/UL — SIGNIFICANT CHANGE UP (ref 150–400)
POTASSIUM SERPL-MCNC: 4.7 MMOL/L — SIGNIFICANT CHANGE UP (ref 3.5–5.3)
POTASSIUM SERPL-SCNC: 4.7 MMOL/L — SIGNIFICANT CHANGE UP (ref 3.5–5.3)
RBC # BLD: 2.9 M/UL — LOW (ref 4.2–5.8)
RBC # FLD: 14.1 % — SIGNIFICANT CHANGE UP (ref 10.3–14.5)
SODIUM SERPL-SCNC: 127 MMOL/L — LOW (ref 135–145)
WBC # BLD: 11.3 K/UL — HIGH (ref 3.8–10.5)
WBC # FLD AUTO: 11.3 K/UL — HIGH (ref 3.8–10.5)

## 2020-02-28 PROCEDURE — 71045 X-RAY EXAM CHEST 1 VIEW: CPT | Mod: 26

## 2020-02-28 PROCEDURE — 99232 SBSQ HOSP IP/OBS MODERATE 35: CPT

## 2020-02-28 PROCEDURE — 99232 SBSQ HOSP IP/OBS MODERATE 35: CPT | Mod: GC

## 2020-02-28 RX ORDER — ACETAMINOPHEN 500 MG
650 TABLET ORAL EVERY 6 HOURS
Refills: 0 | Status: DISCONTINUED | OUTPATIENT
Start: 2020-02-28 | End: 2020-03-16

## 2020-02-28 RX ORDER — FUROSEMIDE 40 MG
40 TABLET ORAL DAILY
Refills: 0 | Status: DISCONTINUED | OUTPATIENT
Start: 2020-02-28 | End: 2020-03-06

## 2020-02-28 RX ORDER — SPIRONOLACTONE 25 MG/1
25 TABLET, FILM COATED ORAL DAILY
Refills: 0 | Status: DISCONTINUED | OUTPATIENT
Start: 2020-02-28 | End: 2020-03-16

## 2020-02-28 RX ORDER — DAPTOMYCIN 500 MG/10ML
500 INJECTION, POWDER, LYOPHILIZED, FOR SOLUTION INTRAVENOUS EVERY 24 HOURS
Refills: 0 | Status: DISCONTINUED | OUTPATIENT
Start: 2020-02-28 | End: 2020-03-09

## 2020-02-28 RX ORDER — INSULIN LISPRO 100/ML
24 VIAL (ML) SUBCUTANEOUS
Refills: 0 | Status: DISCONTINUED | OUTPATIENT
Start: 2020-02-28 | End: 2020-03-01

## 2020-02-28 RX ORDER — INSULIN LISPRO 100/ML
25 VIAL (ML) SUBCUTANEOUS
Refills: 0 | Status: DISCONTINUED | OUTPATIENT
Start: 2020-02-28 | End: 2020-02-28

## 2020-02-28 RX ORDER — HEPARIN SODIUM 5000 [USP'U]/ML
5000 INJECTION INTRAVENOUS; SUBCUTANEOUS EVERY 8 HOURS
Refills: 0 | Status: DISCONTINUED | OUTPATIENT
Start: 2020-02-28 | End: 2020-03-16

## 2020-02-28 RX ORDER — INSULIN GLARGINE 100 [IU]/ML
44 INJECTION, SOLUTION SUBCUTANEOUS AT BEDTIME
Refills: 0 | Status: DISCONTINUED | OUTPATIENT
Start: 2020-02-28 | End: 2020-03-01

## 2020-02-28 RX ADMIN — MUPIROCIN 1 APPLICATION(S): 20 OINTMENT TOPICAL at 05:41

## 2020-02-28 RX ADMIN — Medication 1 APPLICATION(S): at 16:37

## 2020-02-28 RX ADMIN — MUPIROCIN 1 APPLICATION(S): 20 OINTMENT TOPICAL at 17:01

## 2020-02-28 RX ADMIN — Medication 81 MILLIGRAM(S): at 07:43

## 2020-02-28 RX ADMIN — Medication 1 TABLET(S): at 10:56

## 2020-02-28 RX ADMIN — Medication 667 MILLIGRAM(S): at 17:00

## 2020-02-28 RX ADMIN — HYDROMORPHONE HYDROCHLORIDE 2 MILLIGRAM(S): 2 INJECTION INTRAMUSCULAR; INTRAVENOUS; SUBCUTANEOUS at 23:28

## 2020-02-28 RX ADMIN — Medication 40 MILLIGRAM(S): at 07:42

## 2020-02-28 RX ADMIN — HEPARIN SODIUM 5000 UNIT(S): 5000 INJECTION INTRAVENOUS; SUBCUTANEOUS at 21:33

## 2020-02-28 RX ADMIN — Medication 25 MILLIGRAM(S): at 05:40

## 2020-02-28 RX ADMIN — Medication 1 SPRAY(S): at 13:11

## 2020-02-28 RX ADMIN — AMIODARONE HYDROCHLORIDE 200 MILLIGRAM(S): 400 TABLET ORAL at 05:40

## 2020-02-28 RX ADMIN — PANTOPRAZOLE SODIUM 40 MILLIGRAM(S): 20 TABLET, DELAYED RELEASE ORAL at 05:41

## 2020-02-28 RX ADMIN — Medication 1 DROP(S): at 17:00

## 2020-02-28 RX ADMIN — DAPTOMYCIN 120 MILLIGRAM(S): 500 INJECTION, POWDER, LYOPHILIZED, FOR SOLUTION INTRAVENOUS at 13:07

## 2020-02-28 RX ADMIN — ATORVASTATIN CALCIUM 40 MILLIGRAM(S): 80 TABLET, FILM COATED ORAL at 21:32

## 2020-02-28 RX ADMIN — SPIRONOLACTONE 25 MILLIGRAM(S): 25 TABLET, FILM COATED ORAL at 07:42

## 2020-02-28 RX ADMIN — Medication 28 UNIT(S): at 11:55

## 2020-02-28 RX ADMIN — Medication 0.5 MILLIGRAM(S): at 05:41

## 2020-02-28 RX ADMIN — Medication 1 SPRAY(S): at 05:41

## 2020-02-28 RX ADMIN — CEFEPIME 100 MILLIGRAM(S): 1 INJECTION, POWDER, FOR SOLUTION INTRAMUSCULAR; INTRAVENOUS at 10:48

## 2020-02-28 RX ADMIN — Medication 667 MILLIGRAM(S): at 11:55

## 2020-02-28 RX ADMIN — Medication 25 MILLIGRAM(S): at 17:00

## 2020-02-28 RX ADMIN — Medication 667 MILLIGRAM(S): at 07:42

## 2020-02-28 RX ADMIN — HYDROMORPHONE HYDROCHLORIDE 2 MILLIGRAM(S): 2 INJECTION INTRAMUSCULAR; INTRAVENOUS; SUBCUTANEOUS at 04:32

## 2020-02-28 RX ADMIN — Medication 200 MILLIGRAM(S): at 22:54

## 2020-02-28 RX ADMIN — POLYETHYLENE GLYCOL 3350 17 GRAM(S): 17 POWDER, FOR SOLUTION ORAL at 07:44

## 2020-02-28 RX ADMIN — INSULIN GLARGINE 44 UNIT(S): 100 INJECTION, SOLUTION SUBCUTANEOUS at 21:33

## 2020-02-28 RX ADMIN — MODAFINIL 100 MILLIGRAM(S): 200 TABLET ORAL at 10:54

## 2020-02-28 RX ADMIN — HYDROMORPHONE HYDROCHLORIDE 2 MILLIGRAM(S): 2 INJECTION INTRAMUSCULAR; INTRAVENOUS; SUBCUTANEOUS at 22:58

## 2020-02-28 RX ADMIN — Medication 200 MILLIGRAM(S): at 05:40

## 2020-02-28 RX ADMIN — Medication 0.5 MILLIGRAM(S): at 17:00

## 2020-02-28 RX ADMIN — Medication 3 MILLIGRAM(S): at 21:33

## 2020-02-28 RX ADMIN — Medication 28 UNIT(S): at 07:42

## 2020-02-28 RX ADMIN — HYDROMORPHONE HYDROCHLORIDE 2 MILLIGRAM(S): 2 INJECTION INTRAMUSCULAR; INTRAVENOUS; SUBCUTANEOUS at 05:35

## 2020-02-28 RX ADMIN — Medication 1 DROP(S): at 05:41

## 2020-02-28 RX ADMIN — Medication 2: at 21:33

## 2020-02-28 RX ADMIN — HEPARIN SODIUM 5000 UNIT(S): 5000 INJECTION INTRAVENOUS; SUBCUTANEOUS at 13:11

## 2020-02-28 RX ADMIN — SENNA PLUS 2 TABLET(S): 8.6 TABLET ORAL at 21:32

## 2020-02-28 NOTE — PROGRESS NOTE ADULT - ASSESSMENT
A/P: 64M s/p sternal debridement and b/l pectoralis advancement flaps on 2/25. Sternal incision with small dehiscence inferiorly and serous drainage.    - Will change dressing daily: xeroform, gauze, and paper tape  - Continue JPs  - Care per CT  - Will cont to follow    Nick Miles MD PGY3  Plastic Surgery (pg [facility]: [id number], [facility]: [phone])

## 2020-02-28 NOTE — PROGRESS NOTE ADULT - PROBLEM SELECTOR PLAN 1
Patient with sternal wound infection; Tight glycemic control necessary.  Will continue current insulin regimen for now. Will continue monitoring FS and FU.  Patient eats meals with inconsistent carb intake; He is on high-dose premeal insulin, Suggested to RN to inject Humalog after meals to avoid hypoglycemic episode.  Patient and family previously counseled and again repeated necessity for low-carb diet, not to bring food from home; Tight glycemic control necessary for proper wound healing. Patient with sternal wound infection; Tight glycemic control necessary.  Will decrease Lantus to 44 units at bed time.  Will decrease Humalog to 24 units before each meal in addition to Humalog correction scale coverage.  Suggest to start insulin pen injection teaching, may DC home on insulin.  Patient counseled for compliance with consistent low carb diet, exercise.   Patient eats meals with inconsistent carb intake; He is on high-dose premeal insulin, Suggested to RN to inject Humalog after meals to avoid hypoglycemic episode.  Patient and family previously counseled and again repeated necessity for low-carb diet, not to bring food from home; Tight glycemic control necessary for proper wound healing.

## 2020-02-28 NOTE — PROGRESS NOTE ADULT - PROBLEM SELECTOR PLAN 1
Pt with  CKD likely  in the setting of long standing DM and HTN. No prior labs for review. Scr since admission has ranged between 1.8-2.1. Scr increased to ~2.50 secondary to hemodynamic injury after CABG and PEA arrest s/p CPR, and worsened to 2.9. Scr currently 1.81 this AM. Non-nephrotic range proteinuria. Pt. likely with diabetic nephropathy.   - Pt. started on Lasix 40 mg PO daily and spironolactone 25 mg daily by primary team. Consider IV diuretics if LE edema remains tense.   - Pt. on cefepime. Adjust dosage to renal function and degree of infectious process. Cefepime known to cause toxic encephalopathy in patients with decreased renal function. Monitor mental status.  - Monitor labs and urine output.   - Avoid NSAIDs, ACEI/ARBS, RCA and nephrotoxins. Dose medications as per eGFR Pt with  CKD likely  in the setting of long standing DM and HTN. No prior labs for review. Scr since admission has ranged between 1.8-2.1. Scr increased to ~2.50 secondary to hemodynamic injury after CABG and PEA arrest s/p CPR, and worsened to 2.9. Scr currently 1.81 this AM. Non-nephrotic range proteinuria. Pt. likely with diabetic nephropathy.   - Pt. started on Lasix 40 mg PO daily and spironolactone 25 mg daily by primary team. Consider IV diuretics if LE edema remains tense.   - Pt. on cefepime. Adjust dosage to renal function and degree of infectious process. Cefepime known to cause toxic encephalopathy in patients with decreased renal function. Monitor mental status.  - Monitor labs and urine output.   - Avoid NSAIDs, ACEI/ARBS, RCA and nephrotoxins. Dose medications as per eGFR.  - Pt. may have PICC line in dominant arm if long-term access is needed for abx.

## 2020-02-28 NOTE — PROGRESS NOTE ADULT - SUBJECTIVE AND OBJECTIVE BOX
CHIEF COMPLAINT:  f/up sob, resp failure, pleural effusions, atelectasis-pain is still present-no cough of signif, sob related to pain    Interval Events: some ambulation    REVIEW OF SYSTEMS:  Constitutional: No fevers or chills. No weight loss. + fatigue or generalized malaise.  Eyes: No itching or discharge from the eyes  ENT: No ear pain. No ear discharge. No nasal congestion. No post nasal drip. No epistaxis. No throat pain. No sore throat. No difficulty swallowing.   CV: No chest pain. No palpitations. No lightheadedness or dizziness.   Resp: No dyspnea at rest. + dyspnea on exertion. No orthopnea. No wheezing. No cough. No stridor. No sputum production. + chest pain with respiration.  GI: No nausea. No vomiting. No diarrhea.  MSK: No joint pain or pain in any extremities  Integumentary: No skin lesions. + pedal edema.  Neurological: + gross motor weakness. No sensory changes.  [+ ] All other systems negative  [ ] Unable to assess ROS because ________    OBJECTIVE:  ICU Vital Signs Last 24 Hrs  T(C): 36.8 (27 Feb 2020 19:45), Max: 37.2 (27 Feb 2020 07:00)  T(F): 98.2 (27 Feb 2020 19:45), Max: 99 (27 Feb 2020 07:00)  HR: 94 (27 Feb 2020 19:45) (84 - 94)  BP: 128/67 (27 Feb 2020 19:45) (108/61 - 129/70)  BP(mean): --  ABP: 145/47 (27 Feb 2020 07:00) (145/47 - 153/53)  ABP(mean): 70 (27 Feb 2020 07:00) (70 - 82)  RR: 18 (27 Feb 2020 19:45) (16 - 18)  SpO2: 97% (27 Feb 2020 19:45) (93% - 98%)        02-26 @ 07:01  -  02-27 @ 07:00  --------------------------------------------------------  IN: 1480 mL / OUT: 1550 mL / NET: -70 mL    02-27 @ 07:01  - 02-28 @ 05:01  --------------------------------------------------------  IN: 580 mL / OUT: 780 mL / NET: -200 mL      CAPILLARY BLOOD GLUCOSE  138 (26 Feb 2020 08:00)      POCT Blood Glucose.: 166 mg/dL (28 Feb 2020 02:09)      PHYSICAL EXAM: NAD in chair on O2 NC  General: Awake, alert, oriented X 3.   HEENT: Atraumatic, normocephalic.                 Mallampatti Grade 3                No nasal congestion.                No tonsillar or pharyngeal exudates.  Lymph Nodes: No palpable lymphadenopathy  Neck: No JVD. No carotid bruit.   Respiratory: abnormal chest expansion-reduced BS bases                         Normal percussion                         Normal and equal air entry                         No wheeze, rhonchi or rales.  Cardiovascular: S1 S2 normal. No murmurs, rubs or gallops.   Abdomen: Soft, non-tender, non-distended. No organomegaly. Normoactive bowel sounds.  Extremities: Warm to touch. Peripheral pulse palpable. + pedal edema.   Skin: No rashes or skin lesions-chest wound  Neurological: Motor and sensory examination equal and normal in all four extremities.  Psychiatry: Appropriate mood and affect.    HOSPITAL MEDICATIONS:  MEDICATIONS  (STANDING):  ALBUTerol    90 MICROgram(s) HFA Inhaler 1 Puff(s) Inhalation every 4 hours  aMIOdarone    Tablet 200 milliGRAM(s) Oral daily  artificial tears (preservative free) Ophthalmic Solution 1 Drop(s) Both EYES two times a day  aspirin enteric coated 81 milliGRAM(s) Oral daily  atorvastatin 40 milliGRAM(s) Oral at bedtime  buDESOnide    Inhalation Suspension 0.5 milliGRAM(s) Inhalation two times a day  calcium acetate 667 milliGRAM(s) Oral three times a day with meals  cefepime   IVPB 2000 milliGRAM(s) IV Intermittent every 24 hours  DAPTOmycin IVPB 300 milliGRAM(s) IV Intermittent every 24 hours  insulin glargine Injectable (LANTUS) 48 Unit(s) SubCutaneous at bedtime  insulin lispro (HumaLOG) corrective regimen sliding scale   SubCutaneous Before meals and at bedtime  insulin lispro Injectable (HumaLOG) 28 Unit(s) SubCutaneous three times a day before meals  melatonin 3 milliGRAM(s) Oral at bedtime  metoprolol tartrate 25 milliGRAM(s) Oral every 12 hours  modafinil 100 milliGRAM(s) Oral daily  multivitamin 1 Tablet(s) Oral daily  mupirocin 2% Ointment 1 Application(s) Topical two times a day  pantoprazole    Tablet 40 milliGRAM(s) Oral before breakfast  polyethylene glycol 3350 17 Gram(s) Oral daily  senna 2 Tablet(s) Oral at bedtime  sodium chloride 0.65% Nasal 1 Spray(s) Both Nostrils three times a day  sodium chloride 0.9%. 1000 milliLiter(s) (10 mL/Hr) IV Continuous <Continuous>  tiotropium 18 MICROgram(s) Capsule 1 Capsule(s) Inhalation daily    MEDICATIONS  (PRN):  albuterol/ipratropium for Nebulization. 3 milliLiter(s) Nebulizer every 6 hours PRN Shortness of Breath and/or Wheezing  guaiFENesin   Syrup  (Sugar-Free) 200 milliGRAM(s) Oral every 6 hours PRN Cough  HYDROmorphone   Tablet 2 milliGRAM(s) Oral every 3 hours PRN Moderate Pain (4 - 6)  sodium chloride 0.9% lock flush 10 milliLiter(s) IV Push every 1 hour PRN Pre/post blood products, medications, blood draw, and to maintain line patency      LABS:                        7.7    15.10 )-----------( 252      ( 27 Feb 2020 05:23 )             24.6     02-27    128<L>  |  93<L>  |  48<H>  ----------------------------<  268<H>  4.8   |  22  |  1.83<H>    Ca    8.6      27 Feb 2020 05:23    TPro  6.0  /  Alb  2.5<L>  /  TBili  0.4  /  DBili  x   /  AST  18  /  ALT  21  /  AlkPhos  90  02-27        Arterial Blood Gas:  02-27 @ 00:46  7.47/35/95/25/98/1.7  ABG lactate: --        MICROBIOLOGY:     RADIOLOGY:  [ ] Reviewed and interpreted by me    Point of Care Ultrasound Findings:    PFT:    EKG:

## 2020-02-28 NOTE — PROGRESS NOTE ADULT - SUBJECTIVE AND OBJECTIVE BOX
VITAL SIGNS    Telemetry:    Vital Signs Last 24 Hrs  T(C): 36.7 (20 @ 04:16), Max: 36.8 (20 @ 14:13)  T(F): 98.1 (20 @ 04:16), Max: 98.3 (20 @ 14:13)  HR: 88 (20 @ 04:16) (88 - 94)  BP: 129/73 (20 @ 04:16) (115/69 - 129/73)  RR: 18 (20 @ 04:16) (18 - 18)  SpO2: 97% (20 @ 04:16) (93% - 97%)             @ 07:01  -   @ 07:00  --------------------------------------------------------  IN: 580 mL / OUT: 1300 mL / NET: -720 mL     @ 07:01  -   @ 10:35  --------------------------------------------------------  IN: 600 mL / OUT: 0 mL / NET: 600 mL       Daily     Daily Weight in k.9 (2020 07:59)  Admit Wt: Drug Dosing Weight  Height (cm): 172.72 (2020 07:20)  Weight (kg): 81.1 (2020 07:20)  BMI (kg/m2): 27.2 (2020 07:20)  BSA (m2): 1.95 (2020 07:20)      CAPILLARY BLOOD GLUCOSE      POCT Blood Glucose.: 142 mg/dL (2020 07:35)  POCT Blood Glucose.: 166 mg/dL (2020 02:09)  POCT Blood Glucose.: 118 mg/dL (2020 21:48)  POCT Blood Glucose.: 91 mg/dL (2020 17:06)  POCT Blood Glucose.: 220 mg/dL (2020 12:38)          acetaminophen   Tablet .. 650 milliGRAM(s) Oral every 6 hours PRN  ALBUTerol    90 MICROgram(s) HFA Inhaler 1 Puff(s) Inhalation every 4 hours  albuterol/ipratropium for Nebulization. 3 milliLiter(s) Nebulizer every 6 hours PRN  aMIOdarone    Tablet 200 milliGRAM(s) Oral daily  artificial tears (preservative free) Ophthalmic Solution 1 Drop(s) Both EYES two times a day  aspirin enteric coated 81 milliGRAM(s) Oral daily  atorvastatin 40 milliGRAM(s) Oral at bedtime  buDESOnide    Inhalation Suspension 0.5 milliGRAM(s) Inhalation two times a day  calcium acetate 667 milliGRAM(s) Oral three times a day with meals  cefepime   IVPB 2000 milliGRAM(s) IV Intermittent every 24 hours  DAPTOmycin IVPB 500 milliGRAM(s) IV Intermittent every 24 hours  furosemide    Tablet 40 milliGRAM(s) Oral daily  guaiFENesin   Syrup  (Sugar-Free) 200 milliGRAM(s) Oral every 6 hours PRN  heparin  Injectable 5000 Unit(s) SubCutaneous every 8 hours  HYDROmorphone   Tablet 2 milliGRAM(s) Oral every 3 hours PRN  insulin glargine Injectable (LANTUS) 48 Unit(s) SubCutaneous at bedtime  insulin lispro (HumaLOG) corrective regimen sliding scale   SubCutaneous Before meals and at bedtime  insulin lispro Injectable (HumaLOG) 28 Unit(s) SubCutaneous three times a day before meals  melatonin 3 milliGRAM(s) Oral at bedtime  metoprolol tartrate 25 milliGRAM(s) Oral every 12 hours  modafinil 100 milliGRAM(s) Oral daily  multivitamin 1 Tablet(s) Oral daily  mupirocin 2% Ointment 1 Application(s) Topical two times a day  pantoprazole    Tablet 40 milliGRAM(s) Oral before breakfast  polyethylene glycol 3350 17 Gram(s) Oral daily  senna 2 Tablet(s) Oral at bedtime  sodium chloride 0.65% Nasal 1 Spray(s) Both Nostrils three times a day  sodium chloride 0.9% lock flush 10 milliLiter(s) IV Push every 1 hour PRN  spironolactone 25 milliGRAM(s) Oral daily  tiotropium 18 MICROgram(s) Capsule 1 Capsule(s) Inhalation daily      PHYSICAL EXAM    Subjective: "Hi.   Neurology: alert and oriented x 3, nonfocal, no gross deficits  CV : tele:  RSR  Sternal Wound :  CDI with dressing , Stable  Lungs: clear. RR easy, unlabored   Abdomen: soft, nontender, nondistended, positive bowel sounds, bowel movement   Neg N/V/D   :  pt voiding without difficulty   Extremities:   MOFFETT; edema, neg calf tenderness.   PPP bilaterally      PW:  Chest tubes: VITAL SIGNS    Telemetry:  rsr 80's   Vital Signs Last 24 Hrs  T(C): 36.7 (20 @ 04:16), Max: 36.8 (20 @ 14:13)  T(F): 98.1 (20 @ 04:16), Max: 98.3 (20 @ 14:13)  HR: 88 (20 @ 04:16) (88 - 94)  BP: 129/73 (20 @ 04:16) (115/69 - 129/73)  RR: 18 (20 @ 04:16) (18 - 18)  SpO2: 97% (20 @ 04:16) (93% - 97%)             @ 07:01  -   @ 07:00  --------------------------------------------------------  IN: 580 mL / OUT: 1300 mL / NET: -720 mL     @ 07:01  -   @ 10:35  --------------------------------------------------------  IN: 600 mL / OUT: 0 mL / NET: 600 mL       Daily     Daily Weight in k.9 (2020 07:59)  Admit Wt: Drug Dosing Weight  Height (cm): 172.72 (2020 07:20)  Weight (kg): 81.1 (2020 07:20)  BMI (kg/m2): 27.2 (2020 07:20)  BSA (m2): 1.95 (2020 07:20)      CAPILLARY BLOOD GLUCOSE      POCT Blood Glucose.: 142 mg/dL (2020 07:35)  POCT Blood Glucose.: 166 mg/dL (2020 02:09)  POCT Blood Glucose.: 118 mg/dL (2020 21:48)  POCT Blood Glucose.: 91 mg/dL (2020 17:06)  POCT Blood Glucose.: 220 mg/dL (2020 12:38)          acetaminophen   Tablet .. 650 milliGRAM(s) Oral every 6 hours PRN  ALBUTerol    90 MICROgram(s) HFA Inhaler 1 Puff(s) Inhalation every 4 hours  albuterol/ipratropium for Nebulization. 3 milliLiter(s) Nebulizer every 6 hours PRN  aMIOdarone    Tablet 200 milliGRAM(s) Oral daily  artificial tears (preservative free) Ophthalmic Solution 1 Drop(s) Both EYES two times a day  aspirin enteric coated 81 milliGRAM(s) Oral daily  atorvastatin 40 milliGRAM(s) Oral at bedtime  buDESOnide    Inhalation Suspension 0.5 milliGRAM(s) Inhalation two times a day  calcium acetate 667 milliGRAM(s) Oral three times a day with meals  cefepime   IVPB 2000 milliGRAM(s) IV Intermittent every 24 hours  DAPTOmycin IVPB 500 milliGRAM(s) IV Intermittent every 24 hours  furosemide    Tablet 40 milliGRAM(s) Oral daily  guaiFENesin   Syrup  (Sugar-Free) 200 milliGRAM(s) Oral every 6 hours PRN  heparin  Injectable 5000 Unit(s) SubCutaneous every 8 hours  HYDROmorphone   Tablet 2 milliGRAM(s) Oral every 3 hours PRN  insulin glargine Injectable (LANTUS) 48 Unit(s) SubCutaneous at bedtime  insulin lispro (HumaLOG) corrective regimen sliding scale   SubCutaneous Before meals and at bedtime  insulin lispro Injectable (HumaLOG) 28 Unit(s) SubCutaneous three times a day before meals  melatonin 3 milliGRAM(s) Oral at bedtime  metoprolol tartrate 25 milliGRAM(s) Oral every 12 hours  modafinil 100 milliGRAM(s) Oral daily  multivitamin 1 Tablet(s) Oral daily  mupirocin 2% Ointment 1 Application(s) Topical two times a day  pantoprazole    Tablet 40 milliGRAM(s) Oral before breakfast  polyethylene glycol 3350 17 Gram(s) Oral daily  senna 2 Tablet(s) Oral at bedtime  sodium chloride 0.65% Nasal 1 Spray(s) Both Nostrils three times a day  sodium chloride 0.9% lock flush 10 milliLiter(s) IV Push every 1 hour PRN  spironolactone 25 milliGRAM(s) Oral daily  tiotropium 18 MICROgram(s) Capsule 1 Capsule(s) Inhalation daily      PHYSICAL EXAM    Subjective: "Huh."   Neurology: alert and oriented x 3, nonfocal, no gross deficits  CV : tele:  RSR 80's   Sternal Wound :  CDI with dressing; 2 Saran's- SS draining serous sanguinous drainage    Lungs: clear diminished at the bases RR easy, unlabored   Abdomen: soft, nontender, nondistended, positive bowel sounds, + bowel movement   Neg N/V/D   :  pt voiding without difficulty   Extremities:   MOFFETT; +1 LE edema, neg calf tenderness.   PPP bilaterally; + RLE skin ulcers- dressing qd ; left SVG site cdi leandra

## 2020-02-28 NOTE — PROGRESS NOTE ADULT - PROBLEM SELECTOR PLAN 1
ASA statin betablocker CAD  provigil 100 qd   likely disposition to rehab continue postop care   ASA statin b-blockers   provigil 100 qd  diuresis initiated today   dvt prophylaxis  Discharge planning- rehab when stable after picc placement

## 2020-02-28 NOTE — PROGRESS NOTE ADULT - SUBJECTIVE AND OBJECTIVE BOX
CARDIOLOGY FOLLOW UP - Dr. Damián GA " i feel like my sugar is low"  Rn notified FS pending   no other complaints    PHYSICAL EXAM:  T(C): 36.6 (02-28-20 @ 13:08), Max: 36.8 (02-27-20 @ 19:45)  HR: 83 (02-28-20 @ 13:08) (83 - 94)  BP: 109/54 (02-28-20 @ 13:08) (109/54 - 129/73)  RR: 18 (02-28-20 @ 13:08) (18 - 18)  SpO2: 95% (02-28-20 @ 13:08) (95% - 97%)  Wt(kg): --  I&O's Summary    27 Feb 2020 07:01  -  28 Feb 2020 07:00  --------------------------------------------------------  IN: 580 mL / OUT: 1300 mL / NET: -720 mL    28 Feb 2020 07:01  -  28 Feb 2020 15:45  --------------------------------------------------------  IN: 1540 mL / OUT: 1050 mL / NET: 490 mL        Appearance: Normal	  Cardiovascular: Normal S1 S2,RRR, No JVD, No murmurs  Respiratory: diminished   Gastrointestinal:  Soft, Non-tender, + BS	  Extremities: Normal range of motion, No clubbing, ++ b/l le edema , RLE redness  mid sternal wound dsg in place         MEDICATIONS  (STANDING):  ALBUTerol    90 MICROgram(s) HFA Inhaler 1 Puff(s) Inhalation every 4 hours  aMIOdarone    Tablet 200 milliGRAM(s) Oral daily  artificial tears (preservative free) Ophthalmic Solution 1 Drop(s) Both EYES two times a day  aspirin enteric coated 81 milliGRAM(s) Oral daily  atorvastatin 40 milliGRAM(s) Oral at bedtime  buDESOnide    Inhalation Suspension 0.5 milliGRAM(s) Inhalation two times a day  calcium acetate 667 milliGRAM(s) Oral three times a day with meals  DAPTOmycin IVPB 500 milliGRAM(s) IV Intermittent every 24 hours  furosemide    Tablet 40 milliGRAM(s) Oral daily  heparin  Injectable 5000 Unit(s) SubCutaneous every 8 hours  insulin glargine Injectable (LANTUS) 48 Unit(s) SubCutaneous at bedtime  insulin lispro (HumaLOG) corrective regimen sliding scale   SubCutaneous Before meals and at bedtime  insulin lispro Injectable (HumaLOG) 28 Unit(s) SubCutaneous three times a day before meals  melatonin 3 milliGRAM(s) Oral at bedtime  metoprolol tartrate 25 milliGRAM(s) Oral every 12 hours  modafinil 100 milliGRAM(s) Oral daily  multivitamin 1 Tablet(s) Oral daily  mupirocin 2% Ointment 1 Application(s) Topical two times a day  pantoprazole    Tablet 40 milliGRAM(s) Oral before breakfast  polyethylene glycol 3350 17 Gram(s) Oral daily  senna 2 Tablet(s) Oral at bedtime  sodium chloride 0.65% Nasal 1 Spray(s) Both Nostrils three times a day  spironolactone 25 milliGRAM(s) Oral daily  tiotropium 18 MICROgram(s) Capsule 1 Capsule(s) Inhalation daily      TELEMETRY: NSR     ECG:  	  RADIOLOGY:   DIAGNOSTIC TESTING:  [ ] Echocardiogram:  [ ]  Catheterization:  [ ] Stress Test:    OTHER: 	    LABS:	 	                                8.0    11.30 )-----------( 289      ( 28 Feb 2020 06:41 )             25.2     02-28    127<L>  |  94<L>  |  47<H>  ----------------------------<  131<H>  4.7   |  20<L>  |  1.81<H>    Ca    8.7      28 Feb 2020 06:41    TPro  6.0  /  Alb  2.5<L>  /  TBili  0.4  /  DBili  x   /  AST  18  /  ALT  21  /  AlkPhos  90  02-27

## 2020-02-28 NOTE — PROGRESS NOTE ADULT - ATTENDING COMMENTS
Patient with multiple CKD risks as noted in consult which include: DM, HTN, Noted recent events  Elevated creatinine  Non oliguric on diuretics  Hyponatremia  Preservation of renal function will include: optimization of blood pressure and glycemic ontrol, avoiding nephrotoxic agents (NSAID, contrast, tobacco use, nephrotoxic  antimicrobial use), adjustment of medication dosage to creatinine clearance and interventions for maintaining body weight in ideal range. Current medications are reviewed and suggest the following: Continue Furosemide  Monitor Na, K, creatinine, Mg, Daily weights and I/O  Please call with any additional questions or on availability of any new information or reports or change in clinical condition.  Suggest nephrology follow up on discharge to monitor and manage CKD.  Jerome Benson MD  [phone]

## 2020-02-28 NOTE — PROGRESS NOTE ADULT - SUBJECTIVE AND OBJECTIVE BOX
Chief complaint  Patient is a 64y old  Male who presents with a chief complaint of Chest pain (28 Feb 2020 11:22)   Review of systems  Patient sitting up in chair, looks comfortable, no hypoglycemia.    Labs and Fingersticks  CAPILLARY BLOOD GLUCOSE      POCT Blood Glucose.: 95 mg/dL (28 Feb 2020 11:53)  POCT Blood Glucose.: 142 mg/dL (28 Feb 2020 07:35)  POCT Blood Glucose.: 166 mg/dL (28 Feb 2020 02:09)  POCT Blood Glucose.: 118 mg/dL (27 Feb 2020 21:48)  POCT Blood Glucose.: 91 mg/dL (27 Feb 2020 17:06)      Anion Gap, Serum: 13 (02-28 @ 06:41)  Anion Gap, Serum: 13 (02-27 @ 05:23)      Calcium, Total Serum: 8.7 (02-28 @ 06:41)  Calcium, Total Serum: 8.6 (02-27 @ 05:23)  Albumin, Serum: 2.5 <L> (02-27 @ 05:23)    Alanine Aminotransferase (ALT/SGPT): 21 (02-27 @ 05:23)  Alkaline Phosphatase, Serum: 90 (02-27 @ 05:23)  Aspartate Aminotransferase (AST/SGOT): 18 (02-27 @ 05:23)        02-28    127<L>  |  94<L>  |  47<H>  ----------------------------<  131<H>  4.7   |  20<L>  |  1.81<H>    Ca    8.7      28 Feb 2020 06:41    TPro  6.0  /  Alb  2.5<L>  /  TBili  0.4  /  DBili  x   /  AST  18  /  ALT  21  /  AlkPhos  90  02-27                        8.0    11.30 )-----------( 289      ( 28 Feb 2020 06:41 )             25.2     Medications  MEDICATIONS  (STANDING):  ALBUTerol    90 MICROgram(s) HFA Inhaler 1 Puff(s) Inhalation every 4 hours  aMIOdarone    Tablet 200 milliGRAM(s) Oral daily  artificial tears (preservative free) Ophthalmic Solution 1 Drop(s) Both EYES two times a day  aspirin enteric coated 81 milliGRAM(s) Oral daily  atorvastatin 40 milliGRAM(s) Oral at bedtime  buDESOnide    Inhalation Suspension 0.5 milliGRAM(s) Inhalation two times a day  calcium acetate 667 milliGRAM(s) Oral three times a day with meals  cefepime   IVPB 2000 milliGRAM(s) IV Intermittent every 24 hours  DAPTOmycin IVPB 500 milliGRAM(s) IV Intermittent every 24 hours  furosemide    Tablet 40 milliGRAM(s) Oral daily  heparin  Injectable 5000 Unit(s) SubCutaneous every 8 hours  insulin glargine Injectable (LANTUS) 48 Unit(s) SubCutaneous at bedtime  insulin lispro (HumaLOG) corrective regimen sliding scale   SubCutaneous Before meals and at bedtime  insulin lispro Injectable (HumaLOG) 28 Unit(s) SubCutaneous three times a day before meals  melatonin 3 milliGRAM(s) Oral at bedtime  metoprolol tartrate 25 milliGRAM(s) Oral every 12 hours  modafinil 100 milliGRAM(s) Oral daily  multivitamin 1 Tablet(s) Oral daily  mupirocin 2% Ointment 1 Application(s) Topical two times a day  pantoprazole    Tablet 40 milliGRAM(s) Oral before breakfast  polyethylene glycol 3350 17 Gram(s) Oral daily  senna 2 Tablet(s) Oral at bedtime  sodium chloride 0.65% Nasal 1 Spray(s) Both Nostrils three times a day  spironolactone 25 milliGRAM(s) Oral daily  tiotropium 18 MICROgram(s) Capsule 1 Capsule(s) Inhalation daily      Physical Exam  General: Patient comfortable in bed  Vital Signs Last 12 Hrs  T(F): 98.1 (02-28-20 @ 04:16), Max: 98.1 (02-28-20 @ 04:16)  HR: 88 (02-28-20 @ 04:16) (88 - 88)  BP: 129/73 (02-28-20 @ 04:16) (129/73 - 129/73)  BP(mean): --  RR: 18 (02-28-20 @ 04:16) (18 - 18)  SpO2: 97% (02-28-20 @ 04:16) (97% - 97%)  Neck: No palpable thyroid nodules.  CVS: S1S2, No murmurs  Respiratory: No wheezing, no crepitations  GI: Abdomen soft, bowel sounds positive  Musculoskeletal:  edema lower extremities.   Skin: No skin rashes, no ecchymosis    Diagnostics Chief complaint  Patient is a 64y old  Male who presents with a chief complaint of Chest pain (28 Feb 2020 11:22)   Review of systems  Patient sitting up in chair, looks comfortable, had hypoglycemia.    Labs and Fingersticks  CAPILLARY BLOOD GLUCOSE      POCT Blood Glucose.: 95 mg/dL (28 Feb 2020 11:53)  POCT Blood Glucose.: 142 mg/dL (28 Feb 2020 07:35)  POCT Blood Glucose.: 166 mg/dL (28 Feb 2020 02:09)  POCT Blood Glucose.: 118 mg/dL (27 Feb 2020 21:48)  POCT Blood Glucose.: 91 mg/dL (27 Feb 2020 17:06)      Anion Gap, Serum: 13 (02-28 @ 06:41)  Anion Gap, Serum: 13 (02-27 @ 05:23)      Calcium, Total Serum: 8.7 (02-28 @ 06:41)  Calcium, Total Serum: 8.6 (02-27 @ 05:23)  Albumin, Serum: 2.5 <L> (02-27 @ 05:23)    Alanine Aminotransferase (ALT/SGPT): 21 (02-27 @ 05:23)  Alkaline Phosphatase, Serum: 90 (02-27 @ 05:23)  Aspartate Aminotransferase (AST/SGOT): 18 (02-27 @ 05:23)        02-28    127<L>  |  94<L>  |  47<H>  ----------------------------<  131<H>  4.7   |  20<L>  |  1.81<H>    Ca    8.7      28 Feb 2020 06:41    TPro  6.0  /  Alb  2.5<L>  /  TBili  0.4  /  DBili  x   /  AST  18  /  ALT  21  /  AlkPhos  90  02-27                        8.0    11.30 )-----------( 289      ( 28 Feb 2020 06:41 )             25.2     Medications  MEDICATIONS  (STANDING):  ALBUTerol    90 MICROgram(s) HFA Inhaler 1 Puff(s) Inhalation every 4 hours  aMIOdarone    Tablet 200 milliGRAM(s) Oral daily  artificial tears (preservative free) Ophthalmic Solution 1 Drop(s) Both EYES two times a day  aspirin enteric coated 81 milliGRAM(s) Oral daily  atorvastatin 40 milliGRAM(s) Oral at bedtime  buDESOnide    Inhalation Suspension 0.5 milliGRAM(s) Inhalation two times a day  calcium acetate 667 milliGRAM(s) Oral three times a day with meals  cefepime   IVPB 2000 milliGRAM(s) IV Intermittent every 24 hours  DAPTOmycin IVPB 500 milliGRAM(s) IV Intermittent every 24 hours  furosemide    Tablet 40 milliGRAM(s) Oral daily  heparin  Injectable 5000 Unit(s) SubCutaneous every 8 hours  insulin glargine Injectable (LANTUS) 48 Unit(s) SubCutaneous at bedtime  insulin lispro (HumaLOG) corrective regimen sliding scale   SubCutaneous Before meals and at bedtime  insulin lispro Injectable (HumaLOG) 28 Unit(s) SubCutaneous three times a day before meals  melatonin 3 milliGRAM(s) Oral at bedtime  metoprolol tartrate 25 milliGRAM(s) Oral every 12 hours  modafinil 100 milliGRAM(s) Oral daily  multivitamin 1 Tablet(s) Oral daily  mupirocin 2% Ointment 1 Application(s) Topical two times a day  pantoprazole    Tablet 40 milliGRAM(s) Oral before breakfast  polyethylene glycol 3350 17 Gram(s) Oral daily  senna 2 Tablet(s) Oral at bedtime  sodium chloride 0.65% Nasal 1 Spray(s) Both Nostrils three times a day  spironolactone 25 milliGRAM(s) Oral daily  tiotropium 18 MICROgram(s) Capsule 1 Capsule(s) Inhalation daily      Physical Exam  General: Patient comfortable in bed  Vital Signs Last 12 Hrs  T(F): 98.1 (02-28-20 @ 04:16), Max: 98.1 (02-28-20 @ 04:16)  HR: 88 (02-28-20 @ 04:16) (88 - 88)  BP: 129/73 (02-28-20 @ 04:16) (129/73 - 129/73)  BP(mean): --  RR: 18 (02-28-20 @ 04:16) (18 - 18)  SpO2: 97% (02-28-20 @ 04:16) (97% - 97%)  Neck: No palpable thyroid nodules.  CVS: S1S2, No murmurs  Respiratory: No wheezing, no crepitations  GI: Abdomen soft, bowel sounds positive  Musculoskeletal:  edema lower extremities.   Skin: No skin rashes, no ecchymosis    Diagnostics

## 2020-02-28 NOTE — PROGRESS NOTE ADULT - PROBLEM SELECTOR PLAN 2
Pt. with likely hypervolemic hyponatremia in the setting of volume overload. Na worsened this AM. Started on PO diuretics this AM. Consider starting IV diuretics if response to PO diuretics. Achieve consistent net negative fluid balance.    Kevin Correa  Nephrology Fellow  Cell: [phone] (from 8 am to 5 pm)  (After 5 pm or on weekends please page on-call fellow)

## 2020-02-28 NOTE — PROGRESS NOTE ADULT - PROBLEM SELECTOR PLAN 4
ID following  resolved  daptomycin and  cefepime ID following  d/c cefepime and continue dapto 500 iv qd as per Dr. Carrasco- pt will need picc line vs medel for 4 wks abx from date of SWD as per ID; bc 2/19 neg   daily cbc  monitor vs

## 2020-02-28 NOTE — PROGRESS NOTE ADULT - PROBLEM SELECTOR PLAN 6
R/T atelectasis/effusion  Supplemental O2  Nocturnal bipap  Incentive spirometer  Nebs resolved at this time   R/T atelectasis/effusion  Supplemental O2  Nocturnal bipap  Incentive spirometer  bronchodilators

## 2020-02-28 NOTE — PROGRESS NOTE ADULT - ASSESSMENT
Assessment  DMT2: 64y Male with DM T2 with hyperglycemia, A1C 9.2%, was on oral meds and insulin at home, now on basal bolus insulin, increased dose, blood sugars now improving, no hypoglycemic episodes. Patient with sternal wound infection s/p wound debridement, he is noncompliant with low-carb diet, eats meals with inconsistent carb intake. He ambulated with help this AM, now sitting up in chair states he is hungry for lunch, family at the bedside.  Foot infection: On Tx, stable.  CAD: s/p CABG 2/3, on medications, no chest pain, stable, monitored.  HTN: Controlled,  on antihypertensive medications.  HLD: Controlled, on statin.  CKD: Monitor labs/BMP          Harper Matias MD  Cell: [phone]  Office: [phone] Assessment  DMT2: 64y Male with DM T2 with hyperglycemia, A1C 9.2%, was on oral meds and insulin at home, now on basal bolus insulin, increased dose, blood sugars now improving, had hypoglycemic episodes. Patient with sternal wound infection s/p wound debridement, he is noncompliant with low-carb diet, eats meals with inconsistent carb intake. He ambulated with help this AM, now sitting up in chair states he is hungry for lunch, family at the bedside.  Foot infection: On Tx, stable.  CAD: s/p CABG 2/3, on medications, no chest pain, stable, monitored.  HTN: Controlled,  on antihypertensive medications.  HLD: Controlled, on statin.  CKD: Monitor labs/BMP          Harper Matias MD  Cell: [phone]  Office: [phone]

## 2020-02-28 NOTE — PROGRESS NOTE ADULT - ASSESSMENT
intraop kennedy 2/3/20 ef 50%, nl lv, mild diastolic dysfx stage 1  limited echo 2/4/20: nl LV sys fx , no pericardial effusion     a/p  64 year old man with history of HTN, DM II, admitted with progressive exertional angina, s/p cath with severe triple vessel disease, s/p CABG, post op course c/b brief witnessed PEA likely hypoxic arrest, s/p intubation.     1. CAD, s/p cath with severe triple vessel disease including lesions at the bifurcation of the LAD/diagonal and distal RCA/RPDA/RPL trifurcation.   -s/p CABG x 4, intraop kennedy ef 50%  -cont asa, statin, BB  -s/p PEA arrest, now extubated  -off vasopressors  -LE dopplers, negative for dvt   -repeat echo 2/15 with preserved lv fxn/EF  -s/p reop for SWI-cv stable postop    2. Postop acute diastolic HF  +fluid overload on exam   -chest xray 2/27 noted,  oral diuretics per cts     3. PAF  -cont amio, bb  -AC per CTS     4. HTN  -bp stable    5. STEPHANIE/CKD  renal f/u     6. Postop Pleural effusion  -S/P Right chest tube:  removed   -s/p L chest tube, mgmt per CTS  -d-dimer elevated/ Pulm f/u     7. Sepsis -E. Faecalis bacteremia, SW infection, RLE cellulitis   IV abx per [facility], repeat bcx 2/13 neg  s/p debridement   ID, CTS, plastics f/u       dvt ppx intraop kennedy 2/3/20 ef 50%, nl lv, mild diastolic dysfx stage 1  limited echo 2/4/20: nl LV sys fx , no pericardial effusion     a/p  64 year old man with history of HTN, DM II, admitted with progressive exertional angina, s/p cath with severe triple vessel disease, s/p CABG, post op course c/b brief witnessed PEA likely hypoxic arrest, s/p intubation.     1. CAD, s/p cath with severe triple vessel disease including lesions at the bifurcation of the LAD/diagonal and distal RCA/RPDA/RPL trifurcation.   -s/p CABG x 4, cont asa, statin, BB  -s/p PEA arrest   -echo 2/15 with preserved lv fxn/EF      2. Sternal wound infection  -s/p RTOR for SWI  -abx per id     3. Postop acute diastolic HF  -remains overloaded  -diuretics per cts     4. PAF  -cont amio, bb  -AC per CTS     5. HTN  -bp stable    6. STEPHANIE/CKD  -stable, renal f/u     7. Postop Pleural effusion  -S/P Right/Left chest tube     8. Sepsis -E. Faecalis bacteremia, SW infection, RLE cellulitis   -IV abx per [facility], repeat bcx 2/13 neg  -s/p debridement   -ID, CTS, plastics f/u

## 2020-02-28 NOTE — PROGRESS NOTE ADULT - SUBJECTIVE AND OBJECTIVE BOX
[facility] DIVISION OF KIDNEY DISEASES AND HYPERTENSION -- FOLLOW UP NOTE  --------------------------------------------------------------------------------  HPI: 63 yo M with HTN, DM II (20 years), admitted with complaints of acute on chronic left sided exertional chest pain. Nephrology team is following for elevated serum creatinine and pre-cardiac catheterization assessment. [facility] available. On admission (1/25/2020), Scr was 1.85. Patient went for cardiac cath on 1/29/20 which revealed triple vessel disease. Scr had improved to 1.76 on 2/3/20. Pt. underwent CABG on 2/3/20. Scr now increased after CABG and PEA arrest on 2/6/20, had improved to ~2.5. Scr over past few days had improved down to 2.09 on 2/17/20. Scr stable between 1.9-2.1, currently at 1.81 today. Pt. is currently post-op from sternal debridement and pectoralis flap creation.    Pt. seen and examined at bedside this AM. Pt. started on Lasix 40 mg daily and spironolactone 25 mg daily. Pt. not conversational and appears in significant pain.    PAST HISTORY  --------------------------------------------------------------------------------  No significant changes to PMH, PSH, FHx, SHx, unless otherwise noted    ALLERGIES & MEDICATIONS  --------------------------------------------------------------------------------  Allergies    No Known Allergies    Intolerances    Standing Inpatient Medications  ALBUTerol    90 MICROgram(s) HFA Inhaler 1 Puff(s) Inhalation every 4 hours  aMIOdarone    Tablet 200 milliGRAM(s) Oral daily  artificial tears (preservative free) Ophthalmic Solution 1 Drop(s) Both EYES two times a day  aspirin enteric coated 81 milliGRAM(s) Oral daily  atorvastatin 40 milliGRAM(s) Oral at bedtime  buDESOnide    Inhalation Suspension 0.5 milliGRAM(s) Inhalation two times a day  calcium acetate 667 milliGRAM(s) Oral three times a day with meals  cefepime   IVPB 2000 milliGRAM(s) IV Intermittent every 24 hours  DAPTOmycin IVPB 500 milliGRAM(s) IV Intermittent every 24 hours  furosemide    Tablet 40 milliGRAM(s) Oral daily  heparin  Injectable 5000 Unit(s) SubCutaneous every 8 hours  insulin glargine Injectable (LANTUS) 48 Unit(s) SubCutaneous at bedtime  insulin lispro (HumaLOG) corrective regimen sliding scale   SubCutaneous Before meals and at bedtime  insulin lispro Injectable (HumaLOG) 28 Unit(s) SubCutaneous three times a day before meals  melatonin 3 milliGRAM(s) Oral at bedtime  metoprolol tartrate 25 milliGRAM(s) Oral every 12 hours  modafinil 100 milliGRAM(s) Oral daily  multivitamin 1 Tablet(s) Oral daily  mupirocin 2% Ointment 1 Application(s) Topical two times a day  pantoprazole    Tablet 40 milliGRAM(s) Oral before breakfast  polyethylene glycol 3350 17 Gram(s) Oral daily  senna 2 Tablet(s) Oral at bedtime  sodium chloride 0.65% Nasal 1 Spray(s) Both Nostrils three times a day  spironolactone 25 milliGRAM(s) Oral daily  tiotropium 18 MICROgram(s) Capsule 1 Capsule(s) Inhalation daily    REVIEW OF SYSTEMS  --------------------------------------------------------------------------------  Unable to obtain.    VITALS/PHYSICAL EXAM  --------------------------------------------------------------------------------  T(C): 36.7 (02-28-20 @ 04:16), Max: 36.8 (02-27-20 @ 14:13)  HR: 88 (02-28-20 @ 04:16) (88 - 94)  BP: 129/73 (02-28-20 @ 04:16) (115/69 - 129/73)  RR: 18 (02-28-20 @ 04:16) (18 - 18)  SpO2: 97% (02-28-20 @ 04:16) (93% - 97%)  Wt(kg): --    02-27-20 @ 07:01  -  02-28-20 @ 07:00  --------------------------------------------------------  IN: 580 mL / OUT: 1300 mL / NET: -720 mL    02-28-20 @ 07:01  -  02-28-20 @ 10:27  --------------------------------------------------------  IN: 600 mL / OUT: 0 mL / NET: 600 mL    Physical Exam:  	Gen: awake, moderate distress  	HEENT: supple neck  	Pulm: Decreased bibasilar breath sounds  	CV: + central chest dressing from CABG C/D/I, S1S2  	Abd: Soft  	: + Osorio catheter with urine  	Ext: + pitting edema B/L    LABS/STUDIES  --------------------------------------------------------------------------------              8.0    11.30 >-----------<  289      [02-28-20 @ 06:41]              25.2     127  |  94  |  47  ----------------------------<  131      [02-28-20 @ 06:41]  4.7   |  20  |  1.81        Ca     8.7     [02-28-20 @ 06:41]    Creatinine Trend:  SCr 1.81 [02-28 @ 06:41]  SCr 1.83 [02-27 @ 05:23]  SCr 1.99 [02-26 @ 01:17]  SCr 1.99 [02-25 @ 20:44]  SCr 2.01 [02-25 @ 04:59] [facility] DIVISION OF KIDNEY DISEASES AND HYPERTENSION -- FOLLOW UP NOTE  --------------------------------------------------------------------------------  HPI: 63 yo M with HTN, DM II (20 years), admitted with complaints of acute on chronic left sided exertional chest pain. Nephrology team is following for elevated serum creatinine and pre-cardiac catheterization assessment. [facility] [place] [facility] [place] [facility] [facility] prior [facility] available. On admission (1/25/2020), Scr was 1.85. Patient went for cardiac cath on 1/29/20 which revealed triple vessel disease. Scr had improved to 1.76 on 2/3/20. Pt. underwent CABG on 2/3/20. Scr now increased after CABG and PEA arrest on 2/6/20, had improved to ~2.5. Scr over past few days had improved down to 2.09 on 2/17/20. Scr stable between 1.9-2.1, currently at 1.81 today. Pt. is currently post-op from sternal debridement and pectoralis flap creation.    Pt. seen and examined at bedside this AM. Pt. started on Lasix 40 mg daily and spironolactone 25 mg daily. Pt. not conversational and appears in significant pain.    PAST HISTORY  --------------------------------------------------------------------------------  No significant changes to PMH, PSH, FHx, SHx, unless otherwise noted    ALLERGIES & MEDICATIONS  --------------------------------------------------------------------------------  Allergies    No Known Allergies    Intolerances    Standing Inpatient Medications  ALBUTerol    90 MICROgram(s) HFA Inhaler 1 Puff(s) Inhalation every 4 hours  aMIOdarone    Tablet 200 milliGRAM(s) Oral daily  artificial tears (preservative free) Ophthalmic Solution 1 Drop(s) Both EYES two times a day  aspirin enteric coated 81 milliGRAM(s) Oral daily  atorvastatin 40 milliGRAM(s) Oral at bedtime  buDESOnide    Inhalation Suspension 0.5 milliGRAM(s) Inhalation two times a day  calcium acetate 667 milliGRAM(s) Oral three times a day with meals  cefepime   IVPB 2000 milliGRAM(s) IV Intermittent every 24 hours  DAPTOmycin IVPB 500 milliGRAM(s) IV Intermittent every 24 hours  furosemide    Tablet 40 milliGRAM(s) Oral daily  heparin  Injectable 5000 Unit(s) SubCutaneous every 8 hours  insulin glargine Injectable (LANTUS) 48 Unit(s) SubCutaneous at bedtime  insulin lispro (HumaLOG) corrective regimen sliding scale   SubCutaneous Before meals and at bedtime  insulin lispro Injectable (HumaLOG) 28 Unit(s) SubCutaneous three times a day before meals  melatonin 3 milliGRAM(s) Oral at bedtime  metoprolol tartrate 25 milliGRAM(s) Oral every 12 hours  modafinil 100 milliGRAM(s) Oral daily  multivitamin 1 Tablet(s) Oral daily  mupirocin 2% Ointment 1 Application(s) Topical two times a day  pantoprazole    Tablet 40 milliGRAM(s) Oral before breakfast  polyethylene glycol 3350 17 Gram(s) Oral daily  senna 2 Tablet(s) Oral at bedtime  sodium chloride 0.65% Nasal 1 Spray(s) Both Nostrils three times a day  spironolactone 25 milliGRAM(s) Oral daily  tiotropium 18 MICROgram(s) Capsule 1 Capsule(s) Inhalation daily    REVIEW OF SYSTEMS  --------------------------------------------------------------------------------  Unable to obtain.    VITALS/PHYSICAL EXAM  --------------------------------------------------------------------------------  T(C): 36.7 (02-28-20 @ 04:16), Max: 36.8 (02-27-20 @ 14:13)  HR: 88 (02-28-20 @ 04:16) (88 - 94)  BP: 129/73 (02-28-20 @ 04:16) (115/69 - 129/73)  RR: 18 (02-28-20 @ 04:16) (18 - 18)  SpO2: 97% (02-28-20 @ 04:16) (93% - 97%)      02-27-20 @ 07:01  -  02-28-20 @ 07:00  --------------------------------------------------------  IN: 580 mL / OUT: 1300 mL / NET: -720 mL    02-28-20 @ 07:01  -  02-28-20 @ 10:27  --------------------------------------------------------  IN: 600 mL / OUT: 0 mL / NET: 600 mL    Physical Exam:  	Gen: awake, moderate distress  	HEENT: supple neck  	Pulm: Decreased bibasilar breath sounds  	CV: + central chest dressing from CABG C/D/I, S1S2  	Abd: Soft  	: + Osorio catheter with urine  	Ext: + pitting edema B/L    LABS/STUDIES  --------------------------------------------------------------------------------              8.0    11.30 >-----------<  289      [02-28-20 @ 06:41]              25.2     127  |  94  |  47  ----------------------------<  131      [02-28-20 @ 06:41]  4.7   |  20  |  1.81        Ca     8.7     [02-28-20 @ 06:41]    Creatinine Trend:  SCr 1.81 [02-28 @ 06:41]  SCr 1.83 [02-27 @ 05:23]  SCr 1.99 [02-26 @ 01:17]  SCr 1.99 [02-25 @ 20:44]  SCr 2.01 [02-25 @ 04:59]

## 2020-02-28 NOTE — PROGRESS NOTE ADULT - SUBJECTIVE AND OBJECTIVE BOX
Plastic Surgery Progress Note (pg [facility]: [id number], [facility]: [phone])    SUBJECTIVE  The patient was seen and examined this morning during rounds. No acute events overnight. Pain controlled.    OBJECTIVE  ___________________________________________________  VITAL SIGNS / I&O's   Vital Signs Last 24 Hrs  T(C): 36.7 (28 Feb 2020 04:16), Max: 36.8 (27 Feb 2020 14:13)  T(F): 98.1 (28 Feb 2020 04:16), Max: 98.3 (27 Feb 2020 14:13)  HR: 88 (28 Feb 2020 04:16) (88 - 94)  BP: 129/73 (28 Feb 2020 04:16) (115/69 - 129/73)  BP(mean): --  RR: 18 (28 Feb 2020 04:16) (18 - 18)  SpO2: 97% (28 Feb 2020 04:16) (93% - 97%)      27 Feb 2020 07:01  -  28 Feb 2020 07:00  --------------------------------------------------------  IN:    Oral Fluid: 480 mL    Solution: 50 mL    Solution: 50 mL  Total IN: 580 mL    OUT:    Bulb: 85 mL    Bulb: 365 mL    Voided: 850 mL  Total OUT: 1300 mL    Total NET: -720 mL      28 Feb 2020 07:01  -  28 Feb 2020 11:22  --------------------------------------------------------  IN:    Oral Fluid: 1080 mL    Solution: 50 mL  Total IN: 1130 mL    OUT:    Voided: 1000 mL  Total OUT: 1000 mL    Total NET: 130 mL        ___________________________________________________  PHYSICAL EXAM    -- CONSTITUTIONAL: NAD, sitting in bed  -- NEURO: Awake, alert  -- CHEST: Op-site dressing removed. Mid-sternal incision intact except for inferior portion with small dehiscence and serous drainage. No erythema or purulence. Dressed with xeroform, gauze, and paper tape. No collection noted. KEI drains serosanguinous.  -- PULM: Non-labored respirations    ___________________________________________________  LABS                        8.0    11.30 )-----------( 289      ( 28 Feb 2020 06:41 )             25.2     28 Feb 2020 06:41    127    |  94     |  47     ----------------------------<  131    4.7     |  20     |  1.81     Ca    8.7        28 Feb 2020 06:41    TPro  6.0    /  Alb  2.5    /  TBili  0.4    /  DBili  x      /  AST  18     /  ALT  21     /  AlkPhos  90     27 Feb 2020 05:23      CAPILLARY BLOOD GLUCOSE      POCT Blood Glucose.: 142 mg/dL (28 Feb 2020 07:35)  POCT Blood Glucose.: 166 mg/dL (28 Feb 2020 02:09)  POCT Blood Glucose.: 118 mg/dL (27 Feb 2020 21:48)  POCT Blood Glucose.: 91 mg/dL (27 Feb 2020 17:06)  POCT Blood Glucose.: 220 mg/dL (27 Feb 2020 12:38)          ___________________________________________________  MICRO  Recent Cultures:  Specimen Source: .Other Other, 02-26 @ 01:29; Results   Testing in progress; Gram Stain: --; Organism: --  Specimen Source: Skin sternal wound, 02-24 @ 18:24; Results   No growth at 48 hours; Gram Stain: --; Organism: --    ___________________________________________________  MEDICATIONS  (STANDING):  ALBUTerol    90 MICROgram(s) HFA Inhaler 1 Puff(s) Inhalation every 4 hours  aMIOdarone    Tablet 200 milliGRAM(s) Oral daily  artificial tears (preservative free) Ophthalmic Solution 1 Drop(s) Both EYES two times a day  aspirin enteric coated 81 milliGRAM(s) Oral daily  atorvastatin 40 milliGRAM(s) Oral at bedtime  buDESOnide    Inhalation Suspension 0.5 milliGRAM(s) Inhalation two times a day  calcium acetate 667 milliGRAM(s) Oral three times a day with meals  cefepime   IVPB 2000 milliGRAM(s) IV Intermittent every 24 hours  DAPTOmycin IVPB 500 milliGRAM(s) IV Intermittent every 24 hours  furosemide    Tablet 40 milliGRAM(s) Oral daily  heparin  Injectable 5000 Unit(s) SubCutaneous every 8 hours  insulin glargine Injectable (LANTUS) 48 Unit(s) SubCutaneous at bedtime  insulin lispro (HumaLOG) corrective regimen sliding scale   SubCutaneous Before meals and at bedtime  insulin lispro Injectable (HumaLOG) 28 Unit(s) SubCutaneous three times a day before meals  melatonin 3 milliGRAM(s) Oral at bedtime  metoprolol tartrate 25 milliGRAM(s) Oral every 12 hours  modafinil 100 milliGRAM(s) Oral daily  multivitamin 1 Tablet(s) Oral daily  mupirocin 2% Ointment 1 Application(s) Topical two times a day  pantoprazole    Tablet 40 milliGRAM(s) Oral before breakfast  polyethylene glycol 3350 17 Gram(s) Oral daily  senna 2 Tablet(s) Oral at bedtime  sodium chloride 0.65% Nasal 1 Spray(s) Both Nostrils three times a day  spironolactone 25 milliGRAM(s) Oral daily  tiotropium 18 MICROgram(s) Capsule 1 Capsule(s) Inhalation daily    MEDICATIONS  (PRN):  acetaminophen   Tablet .. 650 milliGRAM(s) Oral every 6 hours PRN Mild Pain (1 - 3)  albuterol/ipratropium for Nebulization. 3 milliLiter(s) Nebulizer every 6 hours PRN Shortness of Breath and/or Wheezing  guaiFENesin   Syrup  (Sugar-Free) 200 milliGRAM(s) Oral every 6 hours PRN Cough  HYDROmorphone   Tablet 2 milliGRAM(s) Oral every 3 hours PRN Moderate Pain (4 - 6)  sodium chloride 0.9% lock flush 10 milliLiter(s) IV Push every 1 hour PRN Pre/post blood products, medications, blood draw, and to maintain line patency

## 2020-02-28 NOTE — PROGRESS NOTE ADULT - ASSESSMENT
65yo male with hx of HTN, DM II   s/p C4L on 2/3; PEA arrest on 2/6   + enterococcus Bld  C/S > started ampicillin q8h, ID consulted,  R pigtail for effusion  2/8    Extubated  2/9  2/15 L pigtail effusion  2/17 PRBC   2/18 Tx 2 Diaz  2/19 thomas removed, trial void. Maintain left pigtail for significant output. Pt encouraged to ambulate. Supplemental o2 sat NC weaned to 2L. Blood cultures repeated.  2/20 VSS -Pulmonary consult - appreciated - duonebs ATC q6h & pulmicort bid initiated - ddimer drawn.  pt w/ hx negative LE dopplers   will order non con chest DT as pt has a loculated left effusion that will require tap at IR.  2/21 - NON con Chest CT done - multiple loculated Left pleural effusions w/ patchy consolidation R - rounds made w/ Dr. carl.  ID - consult called re: Ct - WBC 11 afebrile.  +PALMA.  unable to tolerate VQ scan this am.  will d/c bumex & start Torsemide 20mg po daily  d/c planning rehab when medically stable  2/22  Wound care consult leg wounds> shower w/ local skin care  2/23  SW drainage> on Dapto> as per ID will cover SWD  CXR this am   Tighter glycemic control  2/24 ID added cefapime SW drainage purulent, afebrile  2/25 S/p Sternal wound debridement and irrigation with removal of all 6 sternal wires. Purulence encountered and sent for culture. Closure with bilateral pectoralis muscle advancement flaps.  Post op zari for hypotension- weaned off.  Skin/wd  culture from 2/24 neg  Pt transferred to sdu  2/27 VSS   carlos d/c thomas d/c transfer to floor JPx2  followed  by Plastics  followed by ID on cefepime and daptomycin bc positive for E. faecalis; follow up bc negative to date. Provigil daily in am. Likely dispositions to rehab 65yo male with hx of HTN, DM II   s/p C4L on 2/3; PEA arrest on 2/6   + enterococcus Bld  C/S > started ampicillin q8h, ID consulted,  R pigtail for effusion  2/8    Extubated  2/9  2/15 L pigtail effusion  2/17 PRBC   2/18 Tx 2 Diaz  2/19 thomas removed, trial void. Maintain left pigtail for significant output. Pt encouraged to ambulate. Supplemental o2 sat NC weaned to 2L. Blood cultures repeated.  2/20 VSS -Pulmonary consult - appreciated - duonebs ATC q6h & pulmicort bid initiated - ddimer drawn.  pt w/ hx negative LE dopplers   will order non con chest DT as pt has a loculated left effusion that will require tap at IR.  2/21 - NON con Chest CT done - multiple loculated Left pleural effusions w/ patchy consolidation R - rounds made w/ Dr. carl.  ID - consult called re: Ct - WBC 11 afebrile.  +PALMA.  unable to tolerate VQ scan this am.  will d/c bumex & start Torsemide 20mg po daily  d/c planning rehab when medically stable  2/22  Wound care consult leg wounds> shower w/ local skin care  2/23  SW drainage> on Dapto> as per ID will cover SWD  CXR this am   Tighter glycemic control  2/24 ID added cefapime SW drainage purulent, afebrile  2/25 S/p Sternal wound debridement and irrigation with removal of all 6 sternal wires. Purulence encountered and sent for culture. Closure with bilateral pectoralis muscle advancement flaps.  Post op zari for hypotension- weaned off.  Skin/wd  culture from 2/24 neg  Pt transferred to sdu  2/27 VSS   carlos d/c thomas d/c transfer to floor JPx2  followed  by Plastics  followed by ID on cefepime and daptomycin bc positive for E. faecalis; follow up bc 2/19 negative to date. Provigil daily in am  2/28 VSS; abx as per ID - d/c cefepime and continue dapto 500 iv qd as per Dr. Carrasco- pt will need picc line vs medel for 4 wks abx from date of SWD as per ID; bc 2/19 neg   d/w h. renal medel vs cath- await decision  diuresis initiated for hypervolemia; hep sq for dvt prophylaxis  + hypoglycemia- endo following- humalog adjusted    Discharge planning- rehab next week

## 2020-02-28 NOTE — PROGRESS NOTE ADULT - PROBLEM SELECTOR PLAN 3
loculated by CT scan loculated by CT scan  diuretics initiated today 2/28 as per Dr. Mariscal  f/u chest xray in am   monitor o2 sats

## 2020-02-28 NOTE — PROGRESS NOTE ADULT - PROBLEM SELECTOR PLAN 5
ID  Dr Luna finney initiated for RLE cellulitis  Wound care consult ID  Dr Luna finney initiated for RLE cellulitis  Wound care consult  silvadene with dsd qd

## 2020-02-28 NOTE — PROGRESS NOTE ADULT - ATTENDING COMMENTS
as above-s/p debridement 2/25 of sternal area--RENAL--IV diuretics for anasarca  multifactorial dyspnea-CAD s/p CABG, PEA arrest, atelectasis due to pain, pleural effusion L-pig tail out, bronchospasm, ?PE-O2 NC sat above 90%                     Pleural effusion-pig tail removed 2/20-CT chest NC-improved but still loculations on left-f/up 4-6 wks  CAD/CHF-diurese as cr allows-keep K/Mg above  atelectasis-pain control, incentive spirometry, acapella                    ? DVT/PE--s/p repeat venous dopplers-negative; VQ unable  bronchospasm-duoneb q 6, pulmicort .5 bid; out pt PFTs              ID-daptomycin/cefepime as per ID  snore-? osas--out pt SS  DVT/GI prophylaxis, PT, nutrition evaln            PT  Mike Hernandez MD-Pulmonary    [phone]

## 2020-02-28 NOTE — PROGRESS NOTE ADULT - SUBJECTIVE AND OBJECTIVE BOX
infectious diseases progress note:    Patient is a 64y old  Male who presents with a chief complaint of Chest pain (28 Feb 2020 05:01)        Acute ischemic heart disease             Allergies    No Known Allergies    Intolerances        ANTIBIOTICS/RELEVANT:  antimicrobials  cefepime   IVPB 2000 milliGRAM(s) IV Intermittent every 24 hours  DAPTOmycin IVPB 300 milliGRAM(s) IV Intermittent every 24 hours    immunologic:    OTHER:  acetaminophen   Tablet .. 650 milliGRAM(s) Oral every 6 hours PRN  ALBUTerol    90 MICROgram(s) HFA Inhaler 1 Puff(s) Inhalation every 4 hours  albuterol/ipratropium for Nebulization. 3 milliLiter(s) Nebulizer every 6 hours PRN  aMIOdarone    Tablet 200 milliGRAM(s) Oral daily  artificial tears (preservative free) Ophthalmic Solution 1 Drop(s) Both EYES two times a day  aspirin enteric coated 81 milliGRAM(s) Oral daily  atorvastatin 40 milliGRAM(s) Oral at bedtime  buDESOnide    Inhalation Suspension 0.5 milliGRAM(s) Inhalation two times a day  calcium acetate 667 milliGRAM(s) Oral three times a day with meals  furosemide    Tablet 40 milliGRAM(s) Oral daily  guaiFENesin   Syrup  (Sugar-Free) 200 milliGRAM(s) Oral every 6 hours PRN  heparin  Injectable 5000 Unit(s) SubCutaneous every 8 hours  HYDROmorphone   Tablet 2 milliGRAM(s) Oral every 3 hours PRN  insulin glargine Injectable (LANTUS) 48 Unit(s) SubCutaneous at bedtime  insulin lispro (HumaLOG) corrective regimen sliding scale   SubCutaneous Before meals and at bedtime  insulin lispro Injectable (HumaLOG) 28 Unit(s) SubCutaneous three times a day before meals  melatonin 3 milliGRAM(s) Oral at bedtime  metoprolol tartrate 25 milliGRAM(s) Oral every 12 hours  modafinil 100 milliGRAM(s) Oral daily  multivitamin 1 Tablet(s) Oral daily  mupirocin 2% Ointment 1 Application(s) Topical two times a day  pantoprazole    Tablet 40 milliGRAM(s) Oral before breakfast  polyethylene glycol 3350 17 Gram(s) Oral daily  senna 2 Tablet(s) Oral at bedtime  sodium chloride 0.65% Nasal 1 Spray(s) Both Nostrils three times a day  sodium chloride 0.9% lock flush 10 milliLiter(s) IV Push every 1 hour PRN  spironolactone 25 milliGRAM(s) Oral daily  tiotropium 18 MICROgram(s) Capsule 1 Capsule(s) Inhalation daily      Objective:  Vital Signs Last 24 Hrs  T(C): 36.7 (28 Feb 2020 04:16), Max: 36.8 (27 Feb 2020 14:13)  T(F): 98.1 (28 Feb 2020 04:16), Max: 98.3 (27 Feb 2020 14:13)  HR: 88 (28 Feb 2020 04:16) (88 - 94)  BP: 129/73 (28 Feb 2020 04:16) (115/69 - 129/73)  BP(mean): --  RR: 18 (28 Feb 2020 04:16) (18 - 18)  SpO2: 97% (28 Feb 2020 04:16) (93% - 97%)    PHYSICAL EXAM:     Eyes:DANIELA, EOMI  Ear/Nose/Throat: no oral lesion, no sinus tenderness on percussion	  Neck:no JVD, no lymphadenopathy, supple  Respiratory: CTA lanre  Cardiovascular: S1S2 RRR, no murmurs  Gastrointestinal:soft, (+) BS, no HSM  Extremities:n still red but better  superfical ulcers on distal leg.         LABS:                        8.0    11.30 )-----------( 289      ( 28 Feb 2020 06:41 )             25.2     02-28    127<L>  |  94<L>  |  47<H>  ----------------------------<  131<H>  4.7   |  20<L>  |  1.81<H>    Ca    8.7      28 Feb 2020 06:41    TPro  6.0  /  Alb  2.5<L>  /  TBili  0.4  /  DBili  x   /  AST  18  /  ALT  21  /  AlkPhos  90  02-27            MICROBIOLOGY:    RECENT CULTURES:  02-26 @ 01:29 .Other Other                Testing in progress    02-24 @ 18:24 Skin sternal wound                No growth at 48 hours          RESPIRATORY CULTURES:              RADIOLOGY & ADDITIONAL STUDIES:        Pager [phone]  After 5 pm/weekends or if no response :[phone]

## 2020-02-28 NOTE — PROGRESS NOTE ADULT - PROBLEM SELECTOR PLAN 7
2/25 s/p  debridement/flap with plastic 2/25 s/p  debridement/flap with plastic  ID and plastic surgery following  wound care daily   continue 2 KEI's as per plastic surgery   IV abx as per ID

## 2020-02-28 NOTE — PROGRESS NOTE ADULT - ASSESSMENT
63yo male with hx of HTN, DM II, presented to the ED with complaints of acute on chronic left sided exertional chest pain. Pt states he has been having left sided pressure and stabbing chest pain radiating to his sternum and right with activity for the past few months. Since admission- s/p cath with severe triple vessel disease, s/p CABG, post op course c/b brief witnessed PEA 2/6 likely hypoxic arrest, s/p intubation. ( CAD, s/p cath with severe triple vessel disease including lesions at the bifurcation of the LAD/diagonal and distal RCA/RPDA/RPL trifurcation.   -s/p CABG x 4, intraop kennedy ef 50%)--s/p ampicillin until 2/18 for enterococcus in blood, extubated 2/9, pigtail right 2/8 and left sided on 2/15-for effusion.  Remains sob-NO h/o resp issues prior to hospitalization.  ***Current sob-multifactorial-CAD/effusions, atelectasis due to pain, mild bronchospasm and debility.  ********************  2/21-slightly better, pig tail cath removed from left chest; CT chest done-loculated left effusion-slight better  2/24-BS issues-less sob-dapto/cefepime as per ID  2/25-for debridement of chest area-sternal wound  2/26-debridement completed--to CTU  2/2769-JIO-[id number] over all  2/28-chest pain still impacting breathing--plastics/renal endo f/up

## 2020-02-28 NOTE — PROGRESS NOTE ADULT - PROBLEM SELECTOR PLAN 2
Lantus/premeal> keep tight glycemic control with SWI  Diabetic diet  Endocrine following   poor complianc diabetic diet  endo following  continue lantus and humalog adjusted for hypoglycemia  accuchecks ac and hs

## 2020-02-28 NOTE — PROGRESS NOTE ADULT - ASSESSMENT
64 yr old with cri stage 4, DM, PVD, admitted and had CABG, Post op intubated and has bilateral effusions, worsening renal disease and bc positive for E. faecalis; follow up bc negative to date.  Call to see patient for discharge from sternal wound  Presently on treatment for RLE SSTI  CT chest with suspected postsurgical changes     Overall,  1  Sternal wound,  infection plus cellulitis around the svg site on right   - Continue Dapto  dose  to be increased   I think we can dc cefepime   the OR cultures have been negative  needs PICC          -

## 2020-02-29 LAB
ANION GAP SERPL CALC-SCNC: 14 MMOL/L — SIGNIFICANT CHANGE UP (ref 5–17)
BUN SERPL-MCNC: 43 MG/DL — HIGH (ref 7–23)
CALCIUM SERPL-MCNC: 8.6 MG/DL — SIGNIFICANT CHANGE UP (ref 8.4–10.5)
CHLORIDE SERPL-SCNC: 94 MMOL/L — LOW (ref 96–108)
CO2 SERPL-SCNC: 19 MMOL/L — LOW (ref 22–31)
CREAT SERPL-MCNC: 1.7 MG/DL — HIGH (ref 0.5–1.3)
GLUCOSE BLDC GLUCOMTR-MCNC: 105 MG/DL — HIGH (ref 70–99)
GLUCOSE BLDC GLUCOMTR-MCNC: 119 MG/DL — HIGH (ref 70–99)
GLUCOSE BLDC GLUCOMTR-MCNC: 137 MG/DL — HIGH (ref 70–99)
GLUCOSE BLDC GLUCOMTR-MCNC: 252 MG/DL — HIGH (ref 70–99)
GLUCOSE BLDC GLUCOMTR-MCNC: 76 MG/DL — SIGNIFICANT CHANGE UP (ref 70–99)
GLUCOSE SERPL-MCNC: 100 MG/DL — HIGH (ref 70–99)
HCT VFR BLD CALC: 26.7 % — LOW (ref 39–50)
HGB BLD-MCNC: 8.3 G/DL — LOW (ref 13–17)
MCHC RBC-ENTMCNC: 27.4 PG — SIGNIFICANT CHANGE UP (ref 27–34)
MCHC RBC-ENTMCNC: 31.1 GM/DL — LOW (ref 32–36)
MCV RBC AUTO: 88.1 FL — SIGNIFICANT CHANGE UP (ref 80–100)
NRBC # BLD: 0 /100 WBCS — SIGNIFICANT CHANGE UP (ref 0–0)
PLATELET # BLD AUTO: 303 K/UL — SIGNIFICANT CHANGE UP (ref 150–400)
POTASSIUM SERPL-MCNC: 4.8 MMOL/L — SIGNIFICANT CHANGE UP (ref 3.5–5.3)
POTASSIUM SERPL-SCNC: 4.8 MMOL/L — SIGNIFICANT CHANGE UP (ref 3.5–5.3)
RBC # BLD: 3.03 M/UL — LOW (ref 4.2–5.8)
RBC # FLD: 14.1 % — SIGNIFICANT CHANGE UP (ref 10.3–14.5)
SODIUM SERPL-SCNC: 127 MMOL/L — LOW (ref 135–145)
WBC # BLD: 8.77 K/UL — SIGNIFICANT CHANGE UP (ref 3.8–10.5)
WBC # FLD AUTO: 8.77 K/UL — SIGNIFICANT CHANGE UP (ref 3.8–10.5)

## 2020-02-29 PROCEDURE — 99232 SBSQ HOSP IP/OBS MODERATE 35: CPT

## 2020-02-29 PROCEDURE — 71045 X-RAY EXAM CHEST 1 VIEW: CPT | Mod: 26

## 2020-02-29 RX ORDER — INSULIN GLARGINE 100 [IU]/ML
22 INJECTION, SOLUTION SUBCUTANEOUS ONCE
Refills: 0 | Status: COMPLETED | OUTPATIENT
Start: 2020-02-29 | End: 2020-02-29

## 2020-02-29 RX ADMIN — Medication 25 MILLIGRAM(S): at 17:29

## 2020-02-29 RX ADMIN — Medication 1 SPRAY(S): at 05:18

## 2020-02-29 RX ADMIN — Medication 1 SPRAY(S): at 22:10

## 2020-02-29 RX ADMIN — MODAFINIL 100 MILLIGRAM(S): 200 TABLET ORAL at 08:19

## 2020-02-29 RX ADMIN — ATORVASTATIN CALCIUM 40 MILLIGRAM(S): 80 TABLET, FILM COATED ORAL at 22:11

## 2020-02-29 RX ADMIN — MUPIROCIN 1 APPLICATION(S): 20 OINTMENT TOPICAL at 17:30

## 2020-02-29 RX ADMIN — HEPARIN SODIUM 5000 UNIT(S): 5000 INJECTION INTRAVENOUS; SUBCUTANEOUS at 12:34

## 2020-02-29 RX ADMIN — Medication 3: at 17:24

## 2020-02-29 RX ADMIN — Medication 200 MILLIGRAM(S): at 15:38

## 2020-02-29 RX ADMIN — MUPIROCIN 1 APPLICATION(S): 20 OINTMENT TOPICAL at 05:20

## 2020-02-29 RX ADMIN — Medication 81 MILLIGRAM(S): at 08:12

## 2020-02-29 RX ADMIN — Medication 25 MILLIGRAM(S): at 05:19

## 2020-02-29 RX ADMIN — Medication 1 TABLET(S): at 08:12

## 2020-02-29 RX ADMIN — Medication 24 UNIT(S): at 08:08

## 2020-02-29 RX ADMIN — DAPTOMYCIN 120 MILLIGRAM(S): 500 INJECTION, POWDER, LYOPHILIZED, FOR SOLUTION INTRAVENOUS at 12:34

## 2020-02-29 RX ADMIN — Medication 1 DROP(S): at 17:27

## 2020-02-29 RX ADMIN — PANTOPRAZOLE SODIUM 40 MILLIGRAM(S): 20 TABLET, DELAYED RELEASE ORAL at 05:19

## 2020-02-29 RX ADMIN — SENNA PLUS 2 TABLET(S): 8.6 TABLET ORAL at 22:11

## 2020-02-29 RX ADMIN — Medication 0: at 08:07

## 2020-02-29 RX ADMIN — Medication 200 MILLIGRAM(S): at 08:07

## 2020-02-29 RX ADMIN — Medication 667 MILLIGRAM(S): at 17:26

## 2020-02-29 RX ADMIN — AMIODARONE HYDROCHLORIDE 200 MILLIGRAM(S): 400 TABLET ORAL at 05:19

## 2020-02-29 RX ADMIN — Medication 24 UNIT(S): at 17:25

## 2020-02-29 RX ADMIN — HEPARIN SODIUM 5000 UNIT(S): 5000 INJECTION INTRAVENOUS; SUBCUTANEOUS at 22:11

## 2020-02-29 RX ADMIN — INSULIN GLARGINE 22 UNIT(S): 100 INJECTION, SOLUTION SUBCUTANEOUS at 22:44

## 2020-02-29 RX ADMIN — HEPARIN SODIUM 5000 UNIT(S): 5000 INJECTION INTRAVENOUS; SUBCUTANEOUS at 05:18

## 2020-02-29 RX ADMIN — Medication 40 MILLIGRAM(S): at 05:19

## 2020-02-29 RX ADMIN — Medication 1 DROP(S): at 05:20

## 2020-02-29 RX ADMIN — Medication 3 MILLIGRAM(S): at 22:11

## 2020-02-29 RX ADMIN — SPIRONOLACTONE 25 MILLIGRAM(S): 25 TABLET, FILM COATED ORAL at 05:19

## 2020-02-29 RX ADMIN — Medication 0.5 MILLIGRAM(S): at 17:30

## 2020-02-29 RX ADMIN — HYDROMORPHONE HYDROCHLORIDE 2 MILLIGRAM(S): 2 INJECTION INTRAMUSCULAR; INTRAVENOUS; SUBCUTANEOUS at 08:47

## 2020-02-29 RX ADMIN — Medication 0.5 MILLIGRAM(S): at 05:18

## 2020-02-29 RX ADMIN — Medication 667 MILLIGRAM(S): at 12:34

## 2020-02-29 RX ADMIN — Medication 1 SPRAY(S): at 17:29

## 2020-02-29 RX ADMIN — HYDROMORPHONE HYDROCHLORIDE 2 MILLIGRAM(S): 2 INJECTION INTRAMUSCULAR; INTRAVENOUS; SUBCUTANEOUS at 08:17

## 2020-02-29 RX ADMIN — Medication 667 MILLIGRAM(S): at 08:10

## 2020-02-29 NOTE — PROGRESS NOTE ADULT - PROBLEM SELECTOR PLAN 1
continue postop care   ASA statin b-blockers   provigil 100 qd  diuresis initiated today   dvt prophylaxis  Discharge planning- rehab when stable after picc placement ASA statin b-blockers   provigil 100 qd  dvt prophylaxis  Discharge planning- rehab when stable after picc placement Continue ASA 81 daily, metoprolol 25 BID and atorvastatin 40 HS  Titrate up beta-blockers as tolerated   Continue provigil 100 qd  C/W GI prophylaxis on protonix and DVT prophylaxis on SQ Lovenox.   Cough and deep breathe, Incentive Spirometry Q1h, Chest PT.  Ambulate 4x daily as tolerated and with PT.   Discharge planning- rehab when stable after picc placement

## 2020-02-29 NOTE — PROGRESS NOTE ADULT - ASSESSMENT
A/P: 64M s/p sternal debridement and b/l pectoralis advancement flaps on 2/25. Sternal incision with small dehiscence inferiorly and serous drainage.    - Will change dressing daily: aquacell ribbon packed into inferior open portion of wound, gauze, abd and paper tape. Change outer dressing (gauze, abd, paper tape; PRN Saturation)  - Continue JPs  - Care per CT  - Will cont to follow    Ender Mccord MD, PhD  Plastic Surgery Resident, PGY1  [facility]: [phone]  [facility]: [id number]

## 2020-02-29 NOTE — PROGRESS NOTE ADULT - PROBLEM SELECTOR PLAN 1
Will continue current insulin regimen for now. Will continue monitoring FS, log, will Follow up.  Will increase Lantus to 10 units at bed time.  Will increase Humalog to 10 units before each meal in addition to Humalog correction scale coverage.  Patient counseled for compliance with consistent low carb diet, exercise. Will continue current insulin regimen for now. Will continue monitoring FS, log, will Follow up.  Discussed with nurse not to hold insulin injections.  Patient counseled for compliance with consistent low carb diet, exercise.

## 2020-02-29 NOTE — PROGRESS NOTE ADULT - ASSESSMENT
65yo male with hx of HTN, DM II, presented to the ED with complaints of acute on chronic left sided exertional chest pain. Pt states he has been having left sided pressure and stabbing chest pain radiating to his sternum and right with activity for the past few months. Since admission- s/p cath with severe triple vessel disease, s/p CABG, post op course c/b brief witnessed PEA 2/6 likely hypoxic arrest, s/p intubation. ( CAD, s/p cath with severe triple vessel disease including lesions at the bifurcation of the LAD/diagonal and distal RCA/RPDA/RPL trifurcation.   -s/p CABG x 4, intraop kennedy ef 50%)--s/p ampicillin until 2/18 for enterococcus in blood, extubated 2/9, pigtail right 2/8 and left sided on 2/15-for effusion.  Remains sob-NO h/o resp issues prior to hospitalization.  ***Current sob-multifactorial-CAD/effusions, atelectasis due to pain, mild bronchospasm and debility.  ********************  2/21-slightly better, pig tail cath removed from left chest; CT chest done-loculated left effusion-slight better  2/24-BS issues-less sob-dapto/cefepime as per ID  2/25-for debridement of chest area-sternal wound  2/26-debridement completed--to CTU  2/2701-VNJ-xlxclazpc over all  2/28-chest pain still impacting breathing--plastics/renal endo f/up 65yo male with hx of HTN, DM II, presented to the ED with complaints of acute on chronic left sided exertional chest pain. Pt states he has been having left sided pressure and stabbing chest pain radiating to his sternum and right with activity for the past few months. Since admission- s/p cath with severe triple vessel disease, s/p CABG, post op course c/b brief witnessed PEA 2/6 likely hypoxic arrest, s/p intubation. ( CAD, s/p cath with severe triple vessel disease including lesions at the bifurcation of the LAD/diagonal and distal RCA/RPDA/RPL trifurcation.   -s/p CABG x 4, intraop kennedy ef 50%)--s/p ampicillin until 2/18 for enterococcus in blood, extubated 2/9, pigtail right 2/8 and left sided on 2/15-for effusion.  Remains sob-NO h/o resp issues prior to hospitalization.  ***Current sob-multifactorial-CAD/effusions, atelectasis due to pain, mild bronchospasm and debility.  ********************  2/21-slightly better, pig tail cath removed from left chest; CT chest done-loculated left effusion-slight better  2/24-BS issues-less sob-dapto/cefepime as per ID  2/25-for debridement of chest area-sternal wound  2/26-debridement completed--to CTU  2/2749-UJC-xekicxizm over all  2/28-chest pain still impacting breathing--plastics/renal endo f/up  3/1-no changes over night

## 2020-02-29 NOTE — PROGRESS NOTE ADULT - SUBJECTIVE AND OBJECTIVE BOX
CHIEF COMPLAINT:  f/up sob, resp failure, pleural effusions, atelectasis-    Interval Events:    REVIEW OF SYSTEMS:  Constitutional: No fevers or chills. No weight loss. No fatigue or generalized malaise.  Eyes: No itching or discharge from the eyes  ENT: No ear pain. No ear discharge. No nasal congestion. No post nasal drip. No epistaxis. No throat pain. No sore throat. No difficulty swallowing.   CV: No chest pain. No palpitations. No lightheadedness or dizziness.   Resp: No dyspnea at rest. No dyspnea on exertion. No orthopnea. No wheezing. No cough. No stridor. No sputum production. No chest pain with respiration.  GI: No nausea. No vomiting. No diarrhea.  MSK: No joint pain or pain in any extremities  Integumentary: No skin lesions. No pedal edema.  Neurological: No gross motor weakness. No sensory changes.  [ ] All other systems negative  [ ] Unable to assess ROS because ________    OBJECTIVE:  ICU Vital Signs Last 24 Hrs  T(C): 36.7 (28 Feb 2020 19:32), Max: 36.7 (28 Feb 2020 19:32)  T(F): 98.1 (28 Feb 2020 19:32), Max: 98.1 (28 Feb 2020 19:32)  HR: 82 (28 Feb 2020 19:32) (82 - 86)  BP: 138/78 (28 Feb 2020 19:32) (109/54 - 138/78)  BP(mean): --  ABP: --  ABP(mean): --  RR: 18 (28 Feb 2020 19:32) (18 - 18)  SpO2: 94% (28 Feb 2020 19:32) (94% - 95%)        02-27 @ 07:01 - 02-28 @ 07:00  --------------------------------------------------------  IN: 580 mL / OUT: 1300 mL / NET: -720 mL    02-28 @ 07:01 - 02-29 @ 04:59  --------------------------------------------------------  IN: 2180 mL / OUT: 2760 mL / NET: -580 mL      CAPILLARY BLOOD GLUCOSE      POCT Blood Glucose.: 137 mg/dL (29 Feb 2020 02:24)      PHYSICAL EXAM:  General: Awake, alert, oriented X 3.   HEENT: Atraumatic, normocephalic.                 Mallampatti Grade                 No nasal congestion.                No tonsillar or pharyngeal exudates.  Lymph Nodes: No palpable lymphadenopathy  Neck: No JVD. No carotid bruit.   Respiratory: Normal chest expansion                         Normal percussion                         Normal and equal air entry                         No wheeze, rhonchi or rales.  Cardiovascular: S1 S2 normal. No murmurs, rubs or gallops.   Abdomen: Soft, non-tender, non-distended. No organomegaly. Normoactive bowel sounds.  Extremities: Warm to touch. Peripheral pulse palpable. No pedal edema.   Skin: No rashes or skin lesions  Neurological: Motor and sensory examination equal and normal in all four extremities.  Psychiatry: Appropriate mood and affect.    HOSPITAL MEDICATIONS:  MEDICATIONS  (STANDING):  ALBUTerol    90 MICROgram(s) HFA Inhaler 1 Puff(s) Inhalation every 4 hours  aMIOdarone    Tablet 200 milliGRAM(s) Oral daily  artificial tears (preservative free) Ophthalmic Solution 1 Drop(s) Both EYES two times a day  aspirin enteric coated 81 milliGRAM(s) Oral daily  atorvastatin 40 milliGRAM(s) Oral at bedtime  buDESOnide    Inhalation Suspension 0.5 milliGRAM(s) Inhalation two times a day  calcium acetate 667 milliGRAM(s) Oral three times a day with meals  DAPTOmycin IVPB 500 milliGRAM(s) IV Intermittent every 24 hours  furosemide    Tablet 40 milliGRAM(s) Oral daily  heparin  Injectable 5000 Unit(s) SubCutaneous every 8 hours  insulin glargine Injectable (LANTUS) 44 Unit(s) SubCutaneous at bedtime  insulin lispro (HumaLOG) corrective regimen sliding scale   SubCutaneous Before meals and at bedtime  insulin lispro Injectable (HumaLOG) 24 Unit(s) SubCutaneous three times a day before meals  melatonin 3 milliGRAM(s) Oral at bedtime  metoprolol tartrate 25 milliGRAM(s) Oral every 12 hours  modafinil 100 milliGRAM(s) Oral daily  multivitamin 1 Tablet(s) Oral daily  mupirocin 2% Ointment 1 Application(s) Topical two times a day  pantoprazole    Tablet 40 milliGRAM(s) Oral before breakfast  polyethylene glycol 3350 17 Gram(s) Oral daily  senna 2 Tablet(s) Oral at bedtime  silver sulfADIAZINE 1% Cream 1 Application(s) Topical daily  sodium chloride 0.65% Nasal 1 Spray(s) Both Nostrils three times a day  spironolactone 25 milliGRAM(s) Oral daily  tiotropium 18 MICROgram(s) Capsule 1 Capsule(s) Inhalation daily    MEDICATIONS  (PRN):  acetaminophen   Tablet .. 650 milliGRAM(s) Oral every 6 hours PRN Mild Pain (1 - 3)  albuterol/ipratropium for Nebulization. 3 milliLiter(s) Nebulizer every 6 hours PRN Shortness of Breath and/or Wheezing  guaiFENesin   Syrup  (Sugar-Free) 200 milliGRAM(s) Oral every 6 hours PRN Cough  HYDROmorphone   Tablet 2 milliGRAM(s) Oral every 3 hours PRN Moderate Pain (4 - 6)  sodium chloride 0.9% lock flush 10 milliLiter(s) IV Push every 1 hour PRN Pre/post blood products, medications, blood draw, and to maintain line patency      LABS:                        8.0    11.30 )-----------( 289      ( 28 Feb 2020 06:41 )             25.2     02-28    127<L>  |  94<L>  |  47<H>  ----------------------------<  131<H>  4.7   |  20<L>  |  1.81<H>    Ca    8.7      28 Feb 2020 06:41    TPro  6.0  /  Alb  2.5<L>  /  TBili  0.4  /  DBili  x   /  AST  18  /  ALT  21  /  AlkPhos  90  02-27              MICROBIOLOGY:     RADIOLOGY:  [ ] Reviewed and interpreted by me    Point of Care Ultrasound Findings:    PFT:    EKG: CHIEF COMPLAINT:  f/up sob, resp failure, pleural effusions, atelectasis-some cough and finally got sleep    Interval Events: none, OOB    REVIEW OF SYSTEMS:  Constitutional: No fevers or chills. No weight loss. + fatigue or generalized malaise.  Eyes: No itching or discharge from the eyes  ENT: No ear pain. No ear discharge. No nasal congestion. No post nasal drip. No epistaxis. No throat pain. No sore throat. No difficulty swallowing.   CV: No chest pain. No palpitations. No lightheadedness or dizziness.   Resp: No dyspnea at rest. + dyspnea on exertion. No orthopnea. No wheezing. + cough. No stridor. No sputum production. No chest pain with respiration.  GI: No nausea. No vomiting. No diarrhea.  MSK: No joint pain or pain in any extremities  Integumentary: No skin lesions. + pedal edema.  Neurological: No gross motor weakness. No sensory changes.  [+ ] All other systems negative  [ ] Unable to assess ROS because ________    OBJECTIVE:  ICU Vital Signs Last 24 Hrs  T(C): 36.7 (28 Feb 2020 19:32), Max: 36.7 (28 Feb 2020 19:32)  T(F): 98.1 (28 Feb 2020 19:32), Max: 98.1 (28 Feb 2020 19:32)  HR: 82 (28 Feb 2020 19:32) (82 - 86)  BP: 138/78 (28 Feb 2020 19:32) (109/54 - 138/78)  BP(mean): --  ABP: --  ABP(mean): --  RR: 18 (28 Feb 2020 19:32) (18 - 18)  SpO2: 94% (28 Feb 2020 19:32) (94% - 95%)        02-27 @ 07:01  -  02-28 @ 07:00  --------------------------------------------------------  IN: 580 mL / OUT: 1300 mL / NET: -720 mL    02-28 @ 07:01  - 02-29 @ 04:59  --------------------------------------------------------  IN: 2180 mL / OUT: 2760 mL / NET: -580 mL      CAPILLARY BLOOD GLUCOSE      POCT Blood Glucose.: 137 mg/dL (29 Feb 2020 02:24)      PHYSICAL EXAM: NAD in chair on RA  General: Awake, alert, oriented X 3.   HEENT: Atraumatic, normocephalic.                 Mallampatti Grade 3                No nasal congestion.                No tonsillar or pharyngeal exudates.  Lymph Nodes: No palpable lymphadenopathy  Neck: No JVD. No carotid bruit.   Respiratory: abnormal chest expansion-reduced BS bases                         Normal percussion                         Normal and equal air entry                         No wheeze, rhonchi or rales.  Cardiovascular: S1 S2 normal. No murmurs, rubs or gallops.   Abdomen: Soft, non-tender, non-distended. No organomegaly. Normoactive bowel sounds.  Extremities: Warm to touch. Peripheral pulse palpable. + pedal edema.   Skin: No rashes or skin lesions  Neurological: Motor and sensory examination equal and normal in all four extremities.  Psychiatry: Appropriate mood and affect.    HOSPITAL MEDICATIONS:  MEDICATIONS  (STANDING):  ALBUTerol    90 MICROgram(s) HFA Inhaler 1 Puff(s) Inhalation every 4 hours  aMIOdarone    Tablet 200 milliGRAM(s) Oral daily  artificial tears (preservative free) Ophthalmic Solution 1 Drop(s) Both EYES two times a day  aspirin enteric coated 81 milliGRAM(s) Oral daily  atorvastatin 40 milliGRAM(s) Oral at bedtime  buDESOnide    Inhalation Suspension 0.5 milliGRAM(s) Inhalation two times a day  calcium acetate 667 milliGRAM(s) Oral three times a day with meals  DAPTOmycin IVPB 500 milliGRAM(s) IV Intermittent every 24 hours  furosemide    Tablet 40 milliGRAM(s) Oral daily  heparin  Injectable 5000 Unit(s) SubCutaneous every 8 hours  insulin glargine Injectable (LANTUS) 44 Unit(s) SubCutaneous at bedtime  insulin lispro (HumaLOG) corrective regimen sliding scale   SubCutaneous Before meals and at bedtime  insulin lispro Injectable (HumaLOG) 24 Unit(s) SubCutaneous three times a day before meals  melatonin 3 milliGRAM(s) Oral at bedtime  metoprolol tartrate 25 milliGRAM(s) Oral every 12 hours  modafinil 100 milliGRAM(s) Oral daily  multivitamin 1 Tablet(s) Oral daily  mupirocin 2% Ointment 1 Application(s) Topical two times a day  pantoprazole    Tablet 40 milliGRAM(s) Oral before breakfast  polyethylene glycol 3350 17 Gram(s) Oral daily  senna 2 Tablet(s) Oral at bedtime  silver sulfADIAZINE 1% Cream 1 Application(s) Topical daily  sodium chloride 0.65% Nasal 1 Spray(s) Both Nostrils three times a day  spironolactone 25 milliGRAM(s) Oral daily  tiotropium 18 MICROgram(s) Capsule 1 Capsule(s) Inhalation daily    MEDICATIONS  (PRN):  acetaminophen   Tablet .. 650 milliGRAM(s) Oral every 6 hours PRN Mild Pain (1 - 3)  albuterol/ipratropium for Nebulization. 3 milliLiter(s) Nebulizer every 6 hours PRN Shortness of Breath and/or Wheezing  guaiFENesin   Syrup  (Sugar-Free) 200 milliGRAM(s) Oral every 6 hours PRN Cough  HYDROmorphone   Tablet 2 milliGRAM(s) Oral every 3 hours PRN Moderate Pain (4 - 6)  sodium chloride 0.9% lock flush 10 milliLiter(s) IV Push every 1 hour PRN Pre/post blood products, medications, blood draw, and to maintain line patency      LABS:                        8.0    11.30 )-----------( 289      ( 28 Feb 2020 06:41 )             25.2     02-28    127<L>  |  94<L>  |  47<H>  ----------------------------<  131<H>  4.7   |  20<L>  |  1.81<H>    Ca    8.7      28 Feb 2020 06:41    TPro  6.0  /  Alb  2.5<L>  /  TBili  0.4  /  DBili  x   /  AST  18  /  ALT  21  /  AlkPhos  90  02-27              MICROBIOLOGY:     RADIOLOGY:  [ ] Reviewed and interpreted by me    Point of Care Ultrasound Findings:    PFT:    EKG:

## 2020-02-29 NOTE — PROGRESS NOTE ADULT - SUBJECTIVE AND OBJECTIVE BOX
Subjective: Pt states "Hello" denies any CP or SOB. No acute events overnight.     Telemetry: SR 80 - 90   Vital Signs Last 24 Hrs  T(C): 36.5 (20 @ 05:19), Max: 36.7 (20 @ 19:32)  T(F): 97.7 (20 @ 05:19), Max: 98.1 (20 @ 19:32)  HR: 87 (20 @ 05:19) (82 - 87)  BP: 122/73 (20 @ 05:19) (109/54 - 138/78)  RR: 18 (20 @ 05:19) (18 - 18)  SpO2: 95% (20 @ 05:19) (94% - 95%)              @ 07:01  -   @ 07:00  --------------------------------------------------------  IN: 2180 mL / OUT: 2760 mL / NET: -580 mL        Daily Weight in k.4 (2020 07:19)                        8.3    8.77  )-----------( 303      ( 2020 05:45 )             26.7     127<L>  |  94<L>  |  43<H>  ----------------------------<  100<H>  4.8   |  19<L>  |  1.70<H>      CAPILLARY BLOOD GLUCOSE  119 - 137      PHYSICAL EXAM  Neurology: A&Ox3, NAD  CV : RRR+S1S2  Sternal Wound: MSI CDI , Stable  Lungs: Respirations non-labored, B/L BS  Abdomen: Soft, NT/ND, +BSx4Q, last BM   (-)N/V/D  : Voiding without difficulty  Extremities: B/L LE edema, negative calf tenderness, +PP , SVG incision             MEDICATIONS  acetaminophen   Tablet .. 650 milliGRAM(s) Oral every 6 hours PRN  ALBUTerol    90 MICROgram(s) HFA Inhaler 1 Puff(s) Inhalation every 4 hours  albuterol/ipratropium for Nebulization. 3 milliLiter(s) Nebulizer every 6 hours PRN  aMIOdarone    Tablet 200 milliGRAM(s) Oral daily  artificial tears (preservative free) Ophthalmic Solution 1 Drop(s) Both EYES two times a day  aspirin enteric coated 81 milliGRAM(s) Oral daily  atorvastatin 40 milliGRAM(s) Oral at bedtime  buDESOnide    Inhalation Suspension 0.5 milliGRAM(s) Inhalation two times a day  calcium acetate 667 milliGRAM(s) Oral three times a day with meals  DAPTOmycin IVPB 500 milliGRAM(s) IV Intermittent every 24 hours  furosemide    Tablet 40 milliGRAM(s) Oral daily  guaiFENesin   Syrup  (Sugar-Free) 200 milliGRAM(s) Oral every 6 hours PRN  heparin  Injectable 5000 Unit(s) SubCutaneous every 8 hours  HYDROmorphone   Tablet 2 milliGRAM(s) Oral every 3 hours PRN  insulin glargine Injectable (LANTUS) 44 Unit(s) SubCutaneous at bedtime  insulin lispro (HumaLOG) corrective regimen sliding scale   SubCutaneous Before meals and at bedtime  insulin lispro Injectable (HumaLOG) 24 Unit(s) SubCutaneous three times a day before meals  melatonin 3 milliGRAM(s) Oral at bedtime  metoprolol tartrate 25 milliGRAM(s) Oral every 12 hours  modafinil 100 milliGRAM(s) Oral daily  multivitamin 1 Tablet(s) Oral daily  mupirocin 2% Ointment 1 Application(s) Topical two times a day  pantoprazole    Tablet 40 milliGRAM(s) Oral before breakfast  polyethylene glycol 3350 17 Gram(s) Oral daily  senna 2 Tablet(s) Oral at bedtime  silver sulfADIAZINE 1% Cream 1 Application(s) Topical daily  sodium chloride 0.65% Nasal 1 Spray(s) Both Nostrils three times a day  sodium chloride 0.9% lock flush 10 milliLiter(s) IV Push every 1 hour PRN  spironolactone 25 milliGRAM(s) Oral daily  tiotropium 18 MICROgram(s) Capsule 1 Capsule(s) Inhalation daily      Physical Therapy Rec:   Home  [  ]   Home w/ PT  [  ]  Rehab  [ X ]    Discussed with Cardiothoracic Team at AM rounds. Subjective: Pt states "Hello" denies any CP or SOB. No acute events overnight.     Telemetry: SR 80 - 90   Vital Signs Last 24 Hrs  T(C): 36.5 (20 @ 05:19), Max: 36.7 (20 @ 19:32)  T(F): 97.7 (20 @ 05:19), Max: 98.1 (20 @ 19:32)  HR: 87 (20 @ 05:19) (82 - 87)  BP: 122/73 (20 @ 05:19) (109/54 - 138/78)  RR: 18 (20 @ 05:19) (18 - 18)  SpO2: 95% (20 @ 05:19) (94% - 95%)              @ 07:01  -   @ 07:00  --------------------------------------------------------  IN: 2180 mL / OUT: 2760 mL / NET: -580 mL        Daily Weight in k.4 (2020 07:19)                        8.3    8.77  )-----------( 303      ( 2020 05:45 )             26.7     127<L>  |  94<L>  |  43<H>  ----------------------------<  100<H>  4.8   |  19<L>  |  1.70<H>      CAPILLARY BLOOD GLUCOSE  119 - 137      PHYSICAL EXAM  Neurology: A&Ox3, NAD  CV : RRR+S1S2  Sternal Wound: MSI CDI  w/DSD, Stable  B/L KEI drains with serosanguinous drainage  Lungs: Respirations non-labored, B/L BS clear, diminished at bases  Abdomen: Soft, NT/ND, +BSx4Q, last BM  (-)N/V/D  : Voiding without difficulty  Extremities: B/L LE trace edema, negative calf tenderness, +PP, RLE SVG incision CDI LORRI            MEDICATIONS  acetaminophen   Tablet .. 650 milliGRAM(s) Oral every 6 hours PRN  ALBUTerol    90 MICROgram(s) HFA Inhaler 1 Puff(s) Inhalation every 4 hours  albuterol/ipratropium for Nebulization. 3 milliLiter(s) Nebulizer every 6 hours PRN  aMIOdarone    Tablet 200 milliGRAM(s) Oral daily  artificial tears (preservative free) Ophthalmic Solution 1 Drop(s) Both EYES two times a day  aspirin enteric coated 81 milliGRAM(s) Oral daily  atorvastatin 40 milliGRAM(s) Oral at bedtime  buDESOnide    Inhalation Suspension 0.5 milliGRAM(s) Inhalation two times a day  calcium acetate 667 milliGRAM(s) Oral three times a day with meals  DAPTOmycin IVPB 500 milliGRAM(s) IV Intermittent every 24 hours  furosemide    Tablet 40 milliGRAM(s) Oral daily  guaiFENesin   Syrup  (Sugar-Free) 200 milliGRAM(s) Oral every 6 hours PRN  heparin  Injectable 5000 Unit(s) SubCutaneous every 8 hours  HYDROmorphone   Tablet 2 milliGRAM(s) Oral every 3 hours PRN  insulin glargine Injectable (LANTUS) 44 Unit(s) SubCutaneous at bedtime  insulin lispro (HumaLOG) corrective regimen sliding scale   SubCutaneous Before meals and at bedtime  insulin lispro Injectable (HumaLOG) 24 Unit(s) SubCutaneous three times a day before meals  melatonin 3 milliGRAM(s) Oral at bedtime  metoprolol tartrate 25 milliGRAM(s) Oral every 12 hours  modafinil 100 milliGRAM(s) Oral daily  multivitamin 1 Tablet(s) Oral daily  mupirocin 2% Ointment 1 Application(s) Topical two times a day  pantoprazole    Tablet 40 milliGRAM(s) Oral before breakfast  polyethylene glycol 3350 17 Gram(s) Oral daily  senna 2 Tablet(s) Oral at bedtime  silver sulfADIAZINE 1% Cream 1 Application(s) Topical daily  sodium chloride 0.65% Nasal 1 Spray(s) Both Nostrils three times a day  sodium chloride 0.9% lock flush 10 milliLiter(s) IV Push every 1 hour PRN  spironolactone 25 milliGRAM(s) Oral daily  tiotropium 18 MICROgram(s) Capsule 1 Capsule(s) Inhalation daily      Physical Therapy Rec:   Home  [  ]   Home w/ PT  [  ]  Rehab  [ X ]    Discussed with Cardiothoracic Team at AM rounds.

## 2020-02-29 NOTE — PROGRESS NOTE ADULT - PROBLEM SELECTOR PLAN 3
loculated by CT scan  diuretics initiated today 2/28 as per Dr. Mariscal  f/u chest xray in am   monitor o2 sats loculated by CT scan  Continue lasix 40 daily  monitor o2 sats loculated by CT scan  Continue lasix 40 and aldactone 25 daily  Strict I & Os, daily weight

## 2020-02-29 NOTE — PROGRESS NOTE ADULT - SUBJECTIVE AND OBJECTIVE BOX
Chief complaint  Patient is a 64y old  Male who presents with a chief complaint of Chest pain (29 Feb 2020 09:04)   Review of systems  Patient in bed, looks comfortable, no fever, no hypoglycemia.    Labs and Fingersticks  CAPILLARY BLOOD GLUCOSE      POCT Blood Glucose.: 252 mg/dL (29 Feb 2020 16:59)  POCT Blood Glucose.: 76 mg/dL (29 Feb 2020 11:54)  POCT Blood Glucose.: 119 mg/dL (29 Feb 2020 07:50)  POCT Blood Glucose.: 137 mg/dL (29 Feb 2020 02:24)  POCT Blood Glucose.: 213 mg/dL (28 Feb 2020 21:26)      Anion Gap, Serum: 14 (02-29 @ 05:45)  Anion Gap, Serum: 13 (02-28 @ 06:41)      Calcium, Total Serum: 8.6 (02-29 @ 05:45)  Calcium, Total Serum: 8.7 (02-28 @ 06:41)          02-29    127<L>  |  94<L>  |  43<H>  ----------------------------<  100<H>  4.8   |  19<L>  |  1.70<H>    Ca    8.6      29 Feb 2020 05:45                          8.3    8.77  )-----------( 303      ( 29 Feb 2020 05:45 )             26.7     Medications  MEDICATIONS  (STANDING):  ALBUTerol    90 MICROgram(s) HFA Inhaler 1 Puff(s) Inhalation every 4 hours  aMIOdarone    Tablet 200 milliGRAM(s) Oral daily  artificial tears (preservative free) Ophthalmic Solution 1 Drop(s) Both EYES two times a day  aspirin enteric coated 81 milliGRAM(s) Oral daily  atorvastatin 40 milliGRAM(s) Oral at bedtime  buDESOnide    Inhalation Suspension 0.5 milliGRAM(s) Inhalation two times a day  calcium acetate 667 milliGRAM(s) Oral three times a day with meals  DAPTOmycin IVPB 500 milliGRAM(s) IV Intermittent every 24 hours  furosemide    Tablet 40 milliGRAM(s) Oral daily  heparin  Injectable 5000 Unit(s) SubCutaneous every 8 hours  insulin glargine Injectable (LANTUS) 44 Unit(s) SubCutaneous at bedtime  insulin lispro (HumaLOG) corrective regimen sliding scale   SubCutaneous Before meals and at bedtime  insulin lispro Injectable (HumaLOG) 24 Unit(s) SubCutaneous three times a day before meals  melatonin 3 milliGRAM(s) Oral at bedtime  metoprolol tartrate 25 milliGRAM(s) Oral every 12 hours  modafinil 100 milliGRAM(s) Oral daily  multivitamin 1 Tablet(s) Oral daily  mupirocin 2% Ointment 1 Application(s) Topical two times a day  pantoprazole    Tablet 40 milliGRAM(s) Oral before breakfast  polyethylene glycol 3350 17 Gram(s) Oral daily  senna 2 Tablet(s) Oral at bedtime  silver sulfADIAZINE 1% Cream 1 Application(s) Topical daily  sodium chloride 0.65% Nasal 1 Spray(s) Both Nostrils three times a day  spironolactone 25 milliGRAM(s) Oral daily  tiotropium 18 MICROgram(s) Capsule 1 Capsule(s) Inhalation daily      Physical Exam  General: Patient comfortable in bed  Vital Signs Last 12 Hrs  T(F): 97.5 (02-29-20 @ 12:48), Max: 97.5 (02-29-20 @ 12:48)  HR: 98 (02-29-20 @ 17:30) (90 - 98)  BP: 159/70 (02-29-20 @ 17:30) (124/68 - 159/70)  BP(mean): --  RR: 19 (02-29-20 @ 17:30) (18 - 19)  SpO2: 95% (02-29-20 @ 17:30) (94% - 95%)  Neck: No palpable thyroid nodules.  CVS: S1S2, No murmurs  Respiratory: No wheezing, no crepitations  GI: Abdomen soft, bowel sounds positive  Musculoskeletal:  edema lower extremities.   Skin: No skin rashes, no ecchymosis    Diagnostics

## 2020-02-29 NOTE — PROGRESS NOTE ADULT - SUBJECTIVE AND OBJECTIVE BOX
Plastic Surgery Progress Note (pg [id number]: [id number], [facility]: [phone])    SUBJECTIVE  The patient was seen and examined this morning during rounds. No acute events overnight. Pain controlled. dressing changed on midline sternal wound.     OBJECTIVE  ___________________________________________________     ICU Vital Signs Last 24 Hrs  T(C): 36.5 (29 Feb 2020 05:19), Max: 36.7 (28 Feb 2020 19:32)  T(F): 97.7 (29 Feb 2020 05:19), Max: 98.1 (28 Feb 2020 19:32)  HR: 87 (29 Feb 2020 05:19) (82 - 87)  BP: 122/73 (29 Feb 2020 05:19) (109/54 - 138/78)  BP(mean): --  ABP: --  ABP(mean): --  RR: 18 (29 Feb 2020 05:19) (18 - 18)  SpO2: 95% (29 Feb 2020 05:19) (94% - 95%)    I&O's Detail    28 Feb 2020 07:01  -  29 Feb 2020 07:00  --------------------------------------------------------  IN:    Oral Fluid: 2080 mL    Solution: 50 mL    Solution: 50 mL  Total IN: 2180 mL    OUT:    Bulb: 80 mL    Bulb: 80 mL    Voided: 2600 mL  Total OUT: 2760 mL    Total NET: -580 mL          ___________________________________________________  PHYSICAL EXAM    -- CONSTITUTIONAL: NAD, sitting in bed  -- NEURO: Awake, alert  -- CHEST: dressing removed. Mid-sternal incision intact except for inferior portion with small dehiscence and serous purulent drainage. spanning sutures cut.  No erythema. Dressed with gauze, abd, and paper tape. No collection noted. KEI drains serosanguinous.  -- PULM: Non-labored respirations    ___________________________________________________  LABS                            8.3    8.77   )----------(  303       ( 29 Feb 2020 05:45 )               26.7      127    |  94     |  43     ----------------------------<  100        ( 29 Feb 2020 05:45 )  4.8     |  19     |  1.70     Ca    8.6        ( 29 Feb 2020 05:45 )          CAPILLARY BLOOD GLUCOSE

## 2020-02-29 NOTE — PROGRESS NOTE ADULT - ASSESSMENT
65yo male with hx of HTN, DM II   s/p C4L on 2/3; PEA arrest on 2/6   + enterococcus Bld  C/S > started ampicillin q8h, ID consulted,  R pigtail for effusion  2/8    Extubated  2/9  2/15 L pigtail effusion  2/17 PRBC   2/18 Tx 2 Diaz  2/19 thomas removed, trial void. Maintain left pigtail for significant output. Pt encouraged to ambulate. Supplemental o2 sat NC weaned to 2L. Blood cultures repeated.  2/20 VSS -Pulmonary consult - appreciated - duonebs ATC q6h & pulmicort bid initiated - ddimer drawn.  pt w/ hx negative LE dopplers   will order non con chest DT as pt has a loculated left effusion that will require tap at IR.  2/21 - NON con Chest CT done - multiple loculated Left pleural effusions w/ patchy consolidation R - rounds made w/ Dr. carl.  ID - consult called re: Ct - WBC 11 afebrile.  +PALMA.  unable to tolerate VQ scan this am.  will d/c bumex & start Torsemide 20mg po daily  d/c planning rehab when medically stable  2/22  Wound care consult leg wounds> shower w/ local skin care  2/23  SW drainage> on Dapto> as per ID will cover SWD  CXR this am   Tighter glycemic control  2/24 ID added cefapime SW drainage purulent, afebrile  2/25 S/p Sternal wound debridement and irrigation with removal of all 6 sternal wires. Purulence encountered and sent for culture. Closure with bilateral pectoralis muscle advancement flaps.  Post op zari for hypotension- weaned off.  Skin/wd  culture from 2/24 neg  Pt transferred to sdu  2/27 VSS   carlos d/c thomas d/c transfer to floor JPx2  followed  by Plastics  followed by ID on cefepime and daptomycin bc positive for E. faecalis; follow up bc 2/19 negative to date. Provigil daily in am  2/28 VSS; abx as per ID - d/c cefepime and continue dapto 500 iv qd as per Dr. Carrasco- pt will need picc line vs medel for 4 wks abx from date of SWD as per ID; bc 2/19 neg   d/w h. renal medel vs cath- await decision  diuresis initiated for hypervolemia; hep sq for dvt prophylaxis  + hypoglycemia- endo following- humalog adjusted    Discharge planning- rehab next week 63yo male with hx of HTN, DM II   s/p C4L on 2/3; PEA arrest on 2/6   + enterococcus Bld  C/S > started ampicillin q8h, ID consulted,  R pigtail for effusion  2/8    Extubated  2/9  2/15 L pigtail effusion  2/17 PRBC   2/18 Tx 2 Diaz  2/19 thomas removed, trial void. Maintain left pigtail for significant output. Pt encouraged to ambulate. Supplemental o2 sat NC weaned to 2L. Blood cultures repeated.  2/20 VSS -Pulmonary consult - appreciated - duonebs ATC q6h & pulmicort bid initiated - ddimer drawn.  pt w/ hx negative LE dopplers   will order non con chest DT as pt has a loculated left effusion that will require tap at IR.  2/21 - NON con Chest CT done - multiple loculated Left pleural effusions w/ patchy consolidation R - rounds made w/ Dr. carl.  ID - consult called re: Ct - WBC 11 afebrile.  +PALMA.  unable to tolerate VQ scan this am.  will d/c bumex & start Torsemide 20mg po daily  d/c planning rehab when medically stable  2/22  Wound care consult leg wounds> shower w/ local skin care  2/23  SW drainage> on Dapto> as per ID will cover SWD  CXR this am   Tighter glycemic control  2/24 ID added cefapime SW drainage purulent, afebrile  2/25 S/p Sternal wound debridement and irrigation with removal of all 6 sternal wires. Purulence encountered and sent for culture. Closure with bilateral pectoralis muscle advancement flaps.  Post op zari for hypotension- weaned off.  Skin/wd  culture from 2/24 neg  Pt transferred to sdu  2/27 VSS   carlos d/c thomas d/c transfer to floor JPx2  followed  by Plastics  followed by ID on cefepime and daptomycin bc positive for E. faecalis; follow up bc 2/19 negative to date. Provigil daily in am  2/28 VSS; abx as per ID - d/c cefepime and continue dapto 500 iv qd as per Dr. Carrasco- pt will need picc line vs medel for 4 wks abx from date of SWD as per ID; bc 2/19 neg.  d/w h. renal medel vs cath- await decision, diuresis initiated for hypervolemia; hep sq for dvt prophylaxis, + hypoglycemia- endo following- humalog adjusted    Discharge planning- rehab next week   2/29 VSS, distal portion of MSI with small dehiscence and serous purulent drainage - recommended wound vac placement as per Plastics. Left KEI 80ml & Right KEI 80ml/24h. Pending PICC line placement for IV abx.

## 2020-02-29 NOTE — PROGRESS NOTE ADULT - ATTENDING COMMENTS
as above-s/p debridement 2/25 of sternal area--RENAL--IV diuretics for anasarca  multifactorial dyspnea-CAD s/p CABG, PEA arrest, atelectasis due to pain, pleural effusion L-pig tail out, bronchospasm, ?PE-O2 NC sat above 90%                     Pleural effusion-pig tail removed 2/20-CT chest NC-improved but still loculations on left-f/up 4-6 wks  CAD/CHF-diurese as cr allows-keep K/Mg above  atelectasis-pain control, incentive spirometry, acapella                    ? DVT/PE--s/p repeat venous dopplers-negative; VQ unable  bronchospasm-duoneb q 6, pulmicort .5 bid; out pt PFTs              ID-daptomycin/cefepime as per ID  snore-? osas--out pt SS  DVT/GI prophylaxis, PT, nutrition evaln            PT  Mike Hernandez MD-Pulmonary    [phone] as above-s/p debridement 2/25 of sternal area--RENAL--IV diuretics for anasarca; slightly stronger  multifactorial dyspnea-CAD s/p CABG, PEA arrest, atelectasis due to pain, pleural effusion L-pig tail out, bronchospasm, ?PE-O2 NC sat above 90%                     Pleural effusion-pig tail removed 2/20-CT chest NC-improved but still loculations on left-f/up 4-6 wks  CAD/CHF-diurese as cr allows-keep K/Mg above  atelectasis-pain control, incentive spirometry, acapella                    ? DVT/PE--s/p repeat venous dopplers-negative; VQ unable  bronchospasm-duoneb q 6, pulmicort .5 bid; out pt PFTs              ID-daptomycin/cefepime as per ID  snore-? osas--out pt SS  DVT/GI prophylaxis, PT, nutrition evaln            PT  Mike Hernandez MD-Pulmonary    [phone]

## 2020-02-29 NOTE — PROGRESS NOTE ADULT - PROBLEM SELECTOR PLAN 2
diabetic diet  endo following  continue lantus and humalog adjusted for hypoglycemia  accuchecks ac and hs diabetic diet  endocrine following  continue lantus 44 HS and humalog 24 TID   accuchecks ac and hs

## 2020-02-29 NOTE — PROGRESS NOTE ADULT - PROBLEM SELECTOR PLAN 4
ID following  d/c cefepime and continue dapto 500 iv qd as per Dr. Carrasco- pt will need picc line vs medel for 4 wks abx from date of SWD as per ID; bc 2/19 neg   daily cbc  monitor vs ID following  d/c cefepime and continue dapto 500 iv qd as per Dr. Carrasco- pt will need picc line for 4 wks abx from date of SWD as per ID; bc 2/19 neg   daily cbc  monitor vs ID following  Continue dapto 500 iv qd as per Dr. Carrasco- pending picc line for 4 wks abx from date of SWD as per ID; bc 2/19 neg   daily cbc

## 2020-02-29 NOTE — PROGRESS NOTE ADULT - PROBLEM SELECTOR PLAN 5
ID  Dr Luna finney initiated for RLE cellulitis  Wound care consult  silvadene with dsd qd ID following Dr Carrasco  Continue on IV Dapto 500mg daily  Silvadene to RLE wound  Wound care consult

## 2020-02-29 NOTE — PROGRESS NOTE ADULT - PROBLEM SELECTOR PLAN 7
2/25 s/p  debridement/flap with plastic  ID and plastic surgery following  wound care daily   continue 2 KEI's as per plastic surgery   IV abx as per ID 2/25 s/p  debridement/flap with plastic  ID and plastic surgery following  wound care daily   continue 2 KEI's as per plastic surgery   IV abx as per ID  Wound vac to distal pole of MSI

## 2020-02-29 NOTE — PROGRESS NOTE ADULT - ASSESSMENT
Assessment  DMT2: 64y Male with DM T2 with hyperglycemia, A1C 9.2%, was on oral meds and insulin at home, on basal bolus insulin, blood sugars fluctuating, missed his Lunch time insulin injection, no new hypoglycemic episodes. Patient with sternal wound infection s/p wound debridement, he is noncompliant with low-carb diet, eats meals with inconsistent carb intake. Foot infection: On Tx, stable.  CAD: s/p CABG 2/3, on medications, no chest pain, stable, monitored.  HTN: Controlled,  on antihypertensive medications.  HLD: Controlled, on statin.  CKD: Monitor labs/BMP          Harper Matias MD  Cell: [phone]  Office: [phone]

## 2020-02-29 NOTE — PROGRESS NOTE ADULT - PROBLEM SELECTOR PLAN 6
resolved at this time   R/T atelectasis/effusion  Supplemental O2  Nocturnal bipap  Incentive spirometer  bronchodilators 2/25 s/p sternal wound debridement/flap with plastic surgery  ID and plastic surgery following  continue 2 KEI's as per plastic surgery - monitor output  Continue on IV Dapto 500mg daily  Wound vac to distal pole of MSI by PT

## 2020-02-29 NOTE — PHARMACOTHERAPY INTERVENTION NOTE - COMMENTS
Patient on daptomycin for diabetic foot infection. Last CK 2/23/2020 was 113. Ordered CK per pharmacy policy to be drawn 3/1/2020 with AM labs.    Sanam Duong, PharmD  PGY-1 Pharmacy Resident  [phone]

## 2020-03-01 LAB
ANION GAP SERPL CALC-SCNC: 12 MMOL/L — SIGNIFICANT CHANGE UP (ref 5–17)
BUN SERPL-MCNC: 39 MG/DL — HIGH (ref 7–23)
CALCIUM SERPL-MCNC: 8.8 MG/DL — SIGNIFICANT CHANGE UP (ref 8.4–10.5)
CHLORIDE SERPL-SCNC: 97 MMOL/L — SIGNIFICANT CHANGE UP (ref 96–108)
CK SERPL-CCNC: 78 U/L — SIGNIFICANT CHANGE UP (ref 30–200)
CO2 SERPL-SCNC: 23 MMOL/L — SIGNIFICANT CHANGE UP (ref 22–31)
CREAT SERPL-MCNC: 1.75 MG/DL — HIGH (ref 0.5–1.3)
GLUCOSE BLDC GLUCOMTR-MCNC: 112 MG/DL — HIGH (ref 70–99)
GLUCOSE BLDC GLUCOMTR-MCNC: 114 MG/DL — HIGH (ref 70–99)
GLUCOSE BLDC GLUCOMTR-MCNC: 174 MG/DL — HIGH (ref 70–99)
GLUCOSE BLDC GLUCOMTR-MCNC: 238 MG/DL — HIGH (ref 70–99)
GLUCOSE BLDC GLUCOMTR-MCNC: 70 MG/DL — SIGNIFICANT CHANGE UP (ref 70–99)
GLUCOSE BLDC GLUCOMTR-MCNC: 78 MG/DL — SIGNIFICANT CHANGE UP (ref 70–99)
GLUCOSE BLDC GLUCOMTR-MCNC: 85 MG/DL — SIGNIFICANT CHANGE UP (ref 70–99)
GLUCOSE BLDC GLUCOMTR-MCNC: 99 MG/DL — SIGNIFICANT CHANGE UP (ref 70–99)
GLUCOSE SERPL-MCNC: 87 MG/DL — SIGNIFICANT CHANGE UP (ref 70–99)
HCT VFR BLD CALC: 25.9 % — LOW (ref 39–50)
HGB BLD-MCNC: 8.3 G/DL — LOW (ref 13–17)
MCHC RBC-ENTMCNC: 27.6 PG — SIGNIFICANT CHANGE UP (ref 27–34)
MCHC RBC-ENTMCNC: 32 GM/DL — SIGNIFICANT CHANGE UP (ref 32–36)
MCV RBC AUTO: 86 FL — SIGNIFICANT CHANGE UP (ref 80–100)
NRBC # BLD: 0 /100 WBCS — SIGNIFICANT CHANGE UP (ref 0–0)
PLATELET # BLD AUTO: 346 K/UL — SIGNIFICANT CHANGE UP (ref 150–400)
POTASSIUM SERPL-MCNC: 4.9 MMOL/L — SIGNIFICANT CHANGE UP (ref 3.5–5.3)
POTASSIUM SERPL-SCNC: 4.9 MMOL/L — SIGNIFICANT CHANGE UP (ref 3.5–5.3)
RBC # BLD: 3.01 M/UL — LOW (ref 4.2–5.8)
RBC # FLD: 13.9 % — SIGNIFICANT CHANGE UP (ref 10.3–14.5)
SODIUM SERPL-SCNC: 132 MMOL/L — LOW (ref 135–145)
WBC # BLD: 8.79 K/UL — SIGNIFICANT CHANGE UP (ref 3.8–10.5)
WBC # FLD AUTO: 8.79 K/UL — SIGNIFICANT CHANGE UP (ref 3.8–10.5)

## 2020-03-01 PROCEDURE — 99232 SBSQ HOSP IP/OBS MODERATE 35: CPT

## 2020-03-01 RX ORDER — INSULIN GLARGINE 100 [IU]/ML
40 INJECTION, SOLUTION SUBCUTANEOUS AT BEDTIME
Refills: 0 | Status: DISCONTINUED | OUTPATIENT
Start: 2020-03-01 | End: 2020-03-02

## 2020-03-01 RX ORDER — INSULIN LISPRO 100/ML
22 VIAL (ML) SUBCUTANEOUS
Refills: 0 | Status: DISCONTINUED | OUTPATIENT
Start: 2020-03-01 | End: 2020-03-02

## 2020-03-01 RX ORDER — INSULIN GLARGINE 100 [IU]/ML
34 INJECTION, SOLUTION SUBCUTANEOUS AT BEDTIME
Refills: 0 | Status: DISCONTINUED | OUTPATIENT
Start: 2020-03-01 | End: 2020-03-01

## 2020-03-01 RX ORDER — IPRATROPIUM/ALBUTEROL SULFATE 18-103MCG
3 AEROSOL WITH ADAPTER (GRAM) INHALATION EVERY 6 HOURS
Refills: 0 | Status: DISCONTINUED | OUTPATIENT
Start: 2020-03-01 | End: 2020-03-16

## 2020-03-01 RX ORDER — INSULIN LISPRO 100/ML
20 VIAL (ML) SUBCUTANEOUS
Refills: 0 | Status: DISCONTINUED | OUTPATIENT
Start: 2020-03-01 | End: 2020-03-01

## 2020-03-01 RX ADMIN — SPIRONOLACTONE 25 MILLIGRAM(S): 25 TABLET, FILM COATED ORAL at 05:13

## 2020-03-01 RX ADMIN — Medication 81 MILLIGRAM(S): at 11:48

## 2020-03-01 RX ADMIN — Medication 25 MILLIGRAM(S): at 05:14

## 2020-03-01 RX ADMIN — Medication 100 MILLIGRAM(S): at 21:17

## 2020-03-01 RX ADMIN — DAPTOMYCIN 120 MILLIGRAM(S): 500 INJECTION, POWDER, LYOPHILIZED, FOR SOLUTION INTRAVENOUS at 13:23

## 2020-03-01 RX ADMIN — HYDROMORPHONE HYDROCHLORIDE 2 MILLIGRAM(S): 2 INJECTION INTRAMUSCULAR; INTRAVENOUS; SUBCUTANEOUS at 09:35

## 2020-03-01 RX ADMIN — SENNA PLUS 2 TABLET(S): 8.6 TABLET ORAL at 21:17

## 2020-03-01 RX ADMIN — Medication 20 UNIT(S): at 17:46

## 2020-03-01 RX ADMIN — Medication 1 SPRAY(S): at 05:14

## 2020-03-01 RX ADMIN — Medication 1 DROP(S): at 05:13

## 2020-03-01 RX ADMIN — Medication 3 MILLILITER(S): at 17:44

## 2020-03-01 RX ADMIN — Medication 3 MILLIGRAM(S): at 21:17

## 2020-03-01 RX ADMIN — MODAFINIL 100 MILLIGRAM(S): 200 TABLET ORAL at 11:49

## 2020-03-01 RX ADMIN — Medication 3 MILLILITER(S): at 11:55

## 2020-03-01 RX ADMIN — HEPARIN SODIUM 5000 UNIT(S): 5000 INJECTION INTRAVENOUS; SUBCUTANEOUS at 05:13

## 2020-03-01 RX ADMIN — Medication 1 APPLICATION(S): at 13:24

## 2020-03-01 RX ADMIN — Medication 3 MILLILITER(S): at 23:07

## 2020-03-01 RX ADMIN — Medication 100 MILLIGRAM(S): at 05:12

## 2020-03-01 RX ADMIN — Medication 40 MILLIGRAM(S): at 05:13

## 2020-03-01 RX ADMIN — HYDROMORPHONE HYDROCHLORIDE 2 MILLIGRAM(S): 2 INJECTION INTRAMUSCULAR; INTRAVENOUS; SUBCUTANEOUS at 13:20

## 2020-03-01 RX ADMIN — PANTOPRAZOLE SODIUM 40 MILLIGRAM(S): 20 TABLET, DELAYED RELEASE ORAL at 05:13

## 2020-03-01 RX ADMIN — Medication 200 MILLIGRAM(S): at 20:05

## 2020-03-01 RX ADMIN — HEPARIN SODIUM 5000 UNIT(S): 5000 INJECTION INTRAVENOUS; SUBCUTANEOUS at 21:17

## 2020-03-01 RX ADMIN — Medication 1 DROP(S): at 17:48

## 2020-03-01 RX ADMIN — Medication 1 TABLET(S): at 11:50

## 2020-03-01 RX ADMIN — Medication 1 SPRAY(S): at 21:17

## 2020-03-01 RX ADMIN — Medication 0.5 MILLIGRAM(S): at 17:47

## 2020-03-01 RX ADMIN — POLYETHYLENE GLYCOL 3350 17 GRAM(S): 17 POWDER, FOR SOLUTION ORAL at 11:50

## 2020-03-01 RX ADMIN — MUPIROCIN 1 APPLICATION(S): 20 OINTMENT TOPICAL at 05:15

## 2020-03-01 RX ADMIN — HYDROMORPHONE HYDROCHLORIDE 2 MILLIGRAM(S): 2 INJECTION INTRAMUSCULAR; INTRAVENOUS; SUBCUTANEOUS at 09:07

## 2020-03-01 RX ADMIN — Medication 3 MILLILITER(S): at 05:13

## 2020-03-01 RX ADMIN — Medication 0.5 MILLIGRAM(S): at 05:13

## 2020-03-01 RX ADMIN — Medication 1 SPRAY(S): at 13:24

## 2020-03-01 RX ADMIN — Medication 667 MILLIGRAM(S): at 11:51

## 2020-03-01 RX ADMIN — Medication 667 MILLIGRAM(S): at 08:06

## 2020-03-01 RX ADMIN — HYDROMORPHONE HYDROCHLORIDE 2 MILLIGRAM(S): 2 INJECTION INTRAMUSCULAR; INTRAVENOUS; SUBCUTANEOUS at 12:48

## 2020-03-01 RX ADMIN — AMIODARONE HYDROCHLORIDE 200 MILLIGRAM(S): 400 TABLET ORAL at 05:13

## 2020-03-01 RX ADMIN — Medication 25 MILLIGRAM(S): at 17:48

## 2020-03-01 RX ADMIN — Medication 667 MILLIGRAM(S): at 17:44

## 2020-03-01 RX ADMIN — INSULIN GLARGINE 40 UNIT(S): 100 INJECTION, SOLUTION SUBCUTANEOUS at 21:25

## 2020-03-01 RX ADMIN — Medication 24 UNIT(S): at 08:06

## 2020-03-01 RX ADMIN — HEPARIN SODIUM 5000 UNIT(S): 5000 INJECTION INTRAVENOUS; SUBCUTANEOUS at 13:23

## 2020-03-01 RX ADMIN — ATORVASTATIN CALCIUM 40 MILLIGRAM(S): 80 TABLET, FILM COATED ORAL at 21:17

## 2020-03-01 RX ADMIN — Medication 100 MILLIGRAM(S): at 13:23

## 2020-03-01 RX ADMIN — MUPIROCIN 1 APPLICATION(S): 20 OINTMENT TOPICAL at 17:54

## 2020-03-01 RX ADMIN — Medication 2: at 17:45

## 2020-03-01 NOTE — PROGRESS NOTE ADULT - SUBJECTIVE AND OBJECTIVE BOX
CHIEF COMPLAINT:  f/up sob, resp failure, pleural effusions, atelectasis- some cough and chest pain-weakness    Interval Events: ambulated-? wound vac    REVIEW OF SYSTEMS:  Constitutional: No fevers or chills. No weight loss. + fatigue or generalized malaise.  Eyes: No itching or discharge from the eyes  ENT: No ear pain. No ear discharge. No nasal congestion. No post nasal drip. No epistaxis. No throat pain. No sore throat. No difficulty swallowing.   CV: No chest pain. No palpitations. No lightheadedness or dizziness.   Resp: No dyspnea at rest. + dyspnea on exertion. No orthopnea. No wheezing. + cough. No stridor. No sputum production. No chest pain with respiration.  GI: No nausea. No vomiting. No diarrhea.  MSK: No joint pain or pain in any extremities  Integumentary: No skin lesions. + pedal edema.  Neurological: No gross motor weakness. No sensory changes.  [+ ] All other systems negative  [ ] Unable to assess ROS because ________    OBJECTIVE:  ICU Vital Signs Last 24 Hrs  T(C): 36.9 (29 Feb 2020 20:00), Max: 36.9 (29 Feb 2020 20:00)  T(F): 98.4 (29 Feb 2020 20:00), Max: 98.4 (29 Feb 2020 20:00)  HR: 87 (29 Feb 2020 20:00) (87 - 98)  BP: 120/65 (29 Feb 2020 20:00) (120/65 - 159/70)  BP(mean): --  ABP: --  ABP(mean): --  RR: 18 (29 Feb 2020 20:00) (18 - 19)  SpO2: 95% (29 Feb 2020 20:00) (94% - 95%)        02-28 @ 07:01  -  02-29 @ 07:00  --------------------------------------------------------  IN: 2180 mL / OUT: 2760 mL / NET: -580 mL    02-29 @ 07:01  -  03-01 @ 04:55  --------------------------------------------------------  IN: 850 mL / OUT: 3800 mL / NET: -2950 mL      CAPILLARY BLOOD GLUCOSE      POCT Blood Glucose.: 85 mg/dL (01 Mar 2020 01:51)      PHYSICAL EXAM: NAD in chair on  O2  General: Awake, alert, oriented X 3.   HEENT: Atraumatic, normocephalic.                 Mallampatti Grade 3                No nasal congestion.                No tonsillar or pharyngeal exudates.  Lymph Nodes: No palpable lymphadenopathy  Neck: No JVD. No carotid bruit.   Respiratory: abnormal chest expansion-reduced BS bases                         Normal percussion                         Normal and equal air entry                         No wheeze, rhonchi or rales.  Cardiovascular: S1 S2 normal. No murmurs, rubs or gallops.   Abdomen: Soft, non-tender, non-distended. No organomegaly. Normoactive bowel sounds.  Extremities: Warm to touch. Peripheral pulse palpable. + pedal edema.   Skin: No rashes or skin lesions  Neurological: Motor and sensory examination equal and normal in all four extremities.  Psychiatry: Appropriate mood and affect.    HOSPITAL MEDICATIONS:  MEDICATIONS  (STANDING):  ALBUTerol    90 MICROgram(s) HFA Inhaler 1 Puff(s) Inhalation every 4 hours  aMIOdarone    Tablet 200 milliGRAM(s) Oral daily  artificial tears (preservative free) Ophthalmic Solution 1 Drop(s) Both EYES two times a day  aspirin enteric coated 81 milliGRAM(s) Oral daily  atorvastatin 40 milliGRAM(s) Oral at bedtime  buDESOnide    Inhalation Suspension 0.5 milliGRAM(s) Inhalation two times a day  calcium acetate 667 milliGRAM(s) Oral three times a day with meals  DAPTOmycin IVPB 500 milliGRAM(s) IV Intermittent every 24 hours  furosemide    Tablet 40 milliGRAM(s) Oral daily  heparin  Injectable 5000 Unit(s) SubCutaneous every 8 hours  insulin glargine Injectable (LANTUS) 44 Unit(s) SubCutaneous at bedtime  insulin lispro (HumaLOG) corrective regimen sliding scale   SubCutaneous Before meals and at bedtime  insulin lispro Injectable (HumaLOG) 24 Unit(s) SubCutaneous three times a day before meals  melatonin 3 milliGRAM(s) Oral at bedtime  metoprolol tartrate 25 milliGRAM(s) Oral every 12 hours  modafinil 100 milliGRAM(s) Oral daily  multivitamin 1 Tablet(s) Oral daily  mupirocin 2% Ointment 1 Application(s) Topical two times a day  pantoprazole    Tablet 40 milliGRAM(s) Oral before breakfast  polyethylene glycol 3350 17 Gram(s) Oral daily  senna 2 Tablet(s) Oral at bedtime  silver sulfADIAZINE 1% Cream 1 Application(s) Topical daily  sodium chloride 0.65% Nasal 1 Spray(s) Both Nostrils three times a day  spironolactone 25 milliGRAM(s) Oral daily  tiotropium 18 MICROgram(s) Capsule 1 Capsule(s) Inhalation daily    MEDICATIONS  (PRN):  acetaminophen   Tablet .. 650 milliGRAM(s) Oral every 6 hours PRN Mild Pain (1 - 3)  albuterol/ipratropium for Nebulization. 3 milliLiter(s) Nebulizer every 6 hours PRN Shortness of Breath and/or Wheezing  guaiFENesin   Syrup  (Sugar-Free) 200 milliGRAM(s) Oral every 6 hours PRN Cough  HYDROmorphone   Tablet 2 milliGRAM(s) Oral every 3 hours PRN Moderate Pain (4 - 6)  sodium chloride 0.9% lock flush 10 milliLiter(s) IV Push every 1 hour PRN Pre/post blood products, medications, blood draw, and to maintain line patency      LABS:                        8.3    8.79  )-----------( 346      ( 01 Mar 2020 04:44 )             25.9     02-29    127<L>  |  94<L>  |  43<H>  ----------------------------<  100<H>  4.8   |  19<L>  |  1.70<H>    Ca    8.6      29 Feb 2020 05:45                MICROBIOLOGY:     RADIOLOGY:  [ ] Reviewed and interpreted by me    Point of Care Ultrasound Findings:    PFT:    EKG:

## 2020-03-01 NOTE — PROGRESS NOTE ADULT - PROBLEM SELECTOR PLAN 1
Will decrease Lantus to 34u at bedtime.  Will decrease Humalog to 20u before each meal and continue Humalog correction scale coverage. Will continue monitoring FS and FU.  Discussed with nurse not to hold insulin injections.  Patient counseled for compliance with consistent low carb diet, exercise. Will decrease Lantus to 40u at bedtime.  Will decrease Humalog to 22u before each meal and continue Humalog correction scale coverage. Will continue monitoring FS and FU.  Discussed with nurse not to hold insulin injections.  Patient counseled for compliance with consistent low carb diet, exercise.

## 2020-03-01 NOTE — PROGRESS NOTE ADULT - PROBLEM SELECTOR PLAN 4
ID following  Continue dapto 500 iv qd as per Dr. Carrasco- pending picc line for 4 wks abx from date of SWD as per ID; bc 2/19 neg   daily cbc

## 2020-03-01 NOTE — PROGRESS NOTE ADULT - ASSESSMENT
Assessment  DMT2: 64y Male with DM T2 with hyperglycemia, A1C 9.2%, was on oral meds and insulin at home, now on basal bolus insulin, patient received partial basal-dose last night, now refusing lunch-time insulin, blood sugars fluctuating, trending down, no hypoglycemic episodes. Patient with sternal wound infection s/p wound debridement, blood sugars are fluctuating due to inconsistent carb intake as well as inconsistent injections, he is sitting up in chair, appears comfortable, wife at bedside.  Foot infection: On Tx, stable.  CAD: s/p CABG 2/3, on medications, no chest pain, stable, monitored.  HTN: Controlled,  on antihypertensive medications.  HLD: Controlled, on statin.  CKD: Monitor labs/BMP          Harper Matias MD  Cell: [phone]  Office: [phone] Assessment  DMT2: 64y Male with DM T2 with hyperglycemia, A1C 9.2%, was on oral meds and insulin at home, now on basal bolus insulin, patient received partial basal-dose last night, now refused lunch-time insulin, blood sugars fluctuating, no hypoglycemic episodes. Patient with sternal wound infection s/p wound debridement, blood sugars are fluctuating due to inconsistent carb intake as well as inconsistent injections, he is sitting up in chair, appears comfortable, wife at bedside.  Foot infection: On Tx, stable.  CAD: s/p CABG 2/3, on medications, no chest pain, stable, monitored.  HTN: Controlled,  on antihypertensive medications.  HLD: Controlled, on statin.  CKD: Monitor labs/BMP          Harper Matias MD  Cell: [phone]  Office: [phone]

## 2020-03-01 NOTE — PROGRESS NOTE ADULT - SUBJECTIVE AND OBJECTIVE BOX
Subjective: Pt states "Hello" denies any CP or SOB. No acute events overnight.     Telemetry:  SR - ST 80 - 100  Vital Signs Last 24 Hrs  T(C): 36.9 (20 @ 05:15), Max: 36.9 (20 @ 20:00)  T(F): 98.4 (20 @ 05:15), Max: 98.4 (20 @ 20:00)  HR: 75 (20 @ 05:15) (75 - 98)  BP: 119/76 (20 @ 05:15) (119/76 - 159/70)  RR: 18 (20 @ 05:15) (18 - 19)  SpO2: 96% (20 @ 05:15) (94% - 96%)              @ 07:01  -   @ 07:00  --------------------------------------------------------  IN: 930 mL / OUT: 4210 mL / NET: -3280 mL        Daily Weight in k (01 Mar 2020 07:22)                        8.3    8.79  )-----------( 346      ( 01 Mar 2020 04:44 )             25.9     132<L>  |  97  |  39<H>  ----------------------------<  87  4.9   |  23  |  1.75<H>            CAPILLARY BLOOD GLUCOSE  70 - 114              PHYSICAL EXAM  Neurology: A&Ox3, NAD  CV : RRR+S1S2  Sternal Wound: MSI CDI  w/DSD, Stable  B/L KEI drains with serosanguinous drainage  Lungs: Respirations non-labored, B/L BS clear, diminished at bases  Abdomen: Soft, NT/ND, +BSx4Q, last BM  (-)N/V/D  : Voiding without difficulty  Extremities: B/L LE trace edema, negative calf tenderness, +PP, RLE SVG incision CDI LORRI          MEDICATIONS  acetaminophen   Tablet .. 650 milliGRAM(s) Oral every 6 hours PRN  ALBUTerol    90 MICROgram(s) HFA Inhaler 1 Puff(s) Inhalation every 4 hours  albuterol/ipratropium for Nebulization. 3 milliLiter(s) Nebulizer every 6 hours PRN  albuterol/ipratropium for Nebulization. 3 milliLiter(s) Nebulizer every 6 hours  aMIOdarone    Tablet 200 milliGRAM(s) Oral daily  artificial tears (preservative free) Ophthalmic Solution 1 Drop(s) Both EYES two times a day  aspirin enteric coated 81 milliGRAM(s) Oral daily  atorvastatin 40 milliGRAM(s) Oral at bedtime  benzonatate 100 milliGRAM(s) Oral every 8 hours  buDESOnide    Inhalation Suspension 0.5 milliGRAM(s) Inhalation two times a day  calcium acetate 667 milliGRAM(s) Oral three times a day with meals  DAPTOmycin IVPB 500 milliGRAM(s) IV Intermittent every 24 hours  furosemide    Tablet 40 milliGRAM(s) Oral daily  guaiFENesin   Syrup  (Sugar-Free) 200 milliGRAM(s) Oral every 6 hours PRN  heparin  Injectable 5000 Unit(s) SubCutaneous every 8 hours  HYDROmorphone   Tablet 2 milliGRAM(s) Oral every 3 hours PRN  insulin glargine Injectable (LANTUS) 44 Unit(s) SubCutaneous at bedtime  insulin lispro (HumaLOG) corrective regimen sliding scale   SubCutaneous Before meals and at bedtime  insulin lispro Injectable (HumaLOG) 24 Unit(s) SubCutaneous three times a day before meals  melatonin 3 milliGRAM(s) Oral at bedtime  metoprolol tartrate 25 milliGRAM(s) Oral every 12 hours  modafinil 100 milliGRAM(s) Oral daily  multivitamin 1 Tablet(s) Oral daily  mupirocin 2% Ointment 1 Application(s) Topical two times a day  pantoprazole    Tablet 40 milliGRAM(s) Oral before breakfast  polyethylene glycol 3350 17 Gram(s) Oral daily  senna 2 Tablet(s) Oral at bedtime  silver sulfADIAZINE 1% Cream 1 Application(s) Topical daily  sodium chloride 0.65% Nasal 1 Spray(s) Both Nostrils three times a day  sodium chloride 0.9% lock flush 10 milliLiter(s) IV Push every 1 hour PRN  spironolactone 25 milliGRAM(s) Oral daily  tiotropium 18 MICROgram(s) Capsule 1 Capsule(s) Inhalation daily      Physical Therapy Rec:   Home  [  ]   Home w/ PT  [  ]  Rehab  [ X ]    Discussed with Cardiothoracic Team at AM rounds. Subjective: Pt states "Hello" denies any CP or SOB. No acute events overnight.     Telemetry:  SR - ST 80 - 100  Vital Signs Last 24 Hrs  T(C): 36.9 (20 @ 05:15), Max: 36.9 (20 @ 20:00)  T(F): 98.4 (20 @ 05:15), Max: 98.4 (20 @ 20:00)  HR: 75 (20 @ 05:15) (75 - 98)  BP: 119/76 (20 @ 05:15) (119/76 - 159/70)  RR: 18 (20 @ 05:15) (18 - 19)  SpO2: 96% (20 @ 05:15) (94% - 96%)              @ 07:01  -   @ 07:00  --------------------------------------------------------  IN: 930 mL / OUT: 4210 mL / NET: -3280 mL        Daily Weight in k (01 Mar 2020 07:22)                        8.3    8.79  )-----------( 346      ( 01 Mar 2020 04:44 )             25.9     132<L>  |  97  |  39<H>  ----------------------------<  87  4.9   |  23  |  1.75<H>          CAPILLARY BLOOD GLUCOSE  70 - 114      PHYSICAL EXAM  Neurology: A&Ox3, NAD  CV : RRR+S1S2  Sternal Wound: MSI CDI  w/DSD, Stable  B/L KEI drains with serosanguinous drainage  Lungs: Respirations non-labored, B/L BS clear, diminished at bases  Abdomen: Soft, NT/ND, +BSx4Q, last BM 3/1 (-)N/V/D  : Voiding without difficulty  Extremities: B/L LE trace edema, negative calf tenderness, +PP, RLE SVG incision CDI LORRI +ecchymotic                    +RLE wound with silvadene and adaptic dressing         MEDICATIONS  acetaminophen   Tablet .. 650 milliGRAM(s) Oral every 6 hours PRN  ALBUTerol    90 MICROgram(s) HFA Inhaler 1 Puff(s) Inhalation every 4 hours  albuterol/ipratropium for Nebulization. 3 milliLiter(s) Nebulizer every 6 hours PRN  albuterol/ipratropium for Nebulization. 3 milliLiter(s) Nebulizer every 6 hours  aMIOdarone    Tablet 200 milliGRAM(s) Oral daily  artificial tears (preservative free) Ophthalmic Solution 1 Drop(s) Both EYES two times a day  aspirin enteric coated 81 milliGRAM(s) Oral daily  atorvastatin 40 milliGRAM(s) Oral at bedtime  benzonatate 100 milliGRAM(s) Oral every 8 hours  buDESOnide    Inhalation Suspension 0.5 milliGRAM(s) Inhalation two times a day  calcium acetate 667 milliGRAM(s) Oral three times a day with meals  DAPTOmycin IVPB 500 milliGRAM(s) IV Intermittent every 24 hours  furosemide    Tablet 40 milliGRAM(s) Oral daily  guaiFENesin   Syrup  (Sugar-Free) 200 milliGRAM(s) Oral every 6 hours PRN  heparin  Injectable 5000 Unit(s) SubCutaneous every 8 hours  HYDROmorphone   Tablet 2 milliGRAM(s) Oral every 3 hours PRN  insulin glargine Injectable (LANTUS) 44 Unit(s) SubCutaneous at bedtime  insulin lispro (HumaLOG) corrective regimen sliding scale   SubCutaneous Before meals and at bedtime  insulin lispro Injectable (HumaLOG) 24 Unit(s) SubCutaneous three times a day before meals  melatonin 3 milliGRAM(s) Oral at bedtime  metoprolol tartrate 25 milliGRAM(s) Oral every 12 hours  modafinil 100 milliGRAM(s) Oral daily  multivitamin 1 Tablet(s) Oral daily  mupirocin 2% Ointment 1 Application(s) Topical two times a day  pantoprazole    Tablet 40 milliGRAM(s) Oral before breakfast  polyethylene glycol 3350 17 Gram(s) Oral daily  senna 2 Tablet(s) Oral at bedtime  silver sulfADIAZINE 1% Cream 1 Application(s) Topical daily  sodium chloride 0.65% Nasal 1 Spray(s) Both Nostrils three times a day  sodium chloride 0.9% lock flush 10 milliLiter(s) IV Push every 1 hour PRN  spironolactone 25 milliGRAM(s) Oral daily  tiotropium 18 MICROgram(s) Capsule 1 Capsule(s) Inhalation daily      Physical Therapy Rec:   Home  [  ]   Home w/ PT  [  ]  Rehab  [ X ]    Discussed with Cardiothoracic Team at AM rounds.

## 2020-03-01 NOTE — PROGRESS NOTE ADULT - PROBLEM SELECTOR PLAN 1
Continue ASA 81 daily, metoprolol 25 BID and atorvastatin 40 HS  Titrate up beta-blockers as tolerated   Continue provigil 100 qd  C/W GI prophylaxis on protonix and DVT prophylaxis on SQ Lovenox.   Cough and deep breathe, Incentive Spirometry Q1h, Chest PT.  Ambulate 4x daily as tolerated and with PT.   Discharge planning- rehab when stable after picc placement

## 2020-03-01 NOTE — PROGRESS NOTE ADULT - PROBLEM SELECTOR PLAN 2
diabetic diet  endocrine following  continue lantus 44 HS and humalog 24 TID   accuchecks ac and hs Continue diabetic diet  endocrine following, Dr. Matias  continue lantus 34 HS and humalog 20 TID   accuchecks ac and hs

## 2020-03-01 NOTE — PROGRESS NOTE ADULT - PROBLEM SELECTOR PLAN 6
2/25 s/p sternal wound debridement/flap with plastic surgery  ID and plastic surgery following  continue 2 KEI's as per plastic surgery - monitor output  Continue on IV Dapto 500mg daily  Wound vac to distal pole of MSI by PT

## 2020-03-01 NOTE — PROGRESS NOTE ADULT - PROBLEM SELECTOR PLAN 5
ID following Dr Carrasco  Continue on IV Dapto 500mg daily  Silvadene to RLE wound  Wound care consult

## 2020-03-01 NOTE — PROGRESS NOTE ADULT - SUBJECTIVE AND OBJECTIVE BOX
Chief complaint  Patient is a 64y old  Male who presents with a chief complaint of Chest pain (01 Mar 2020 12:39)   Review of systems  Patient sitting up in chair, looks comfortable, no hypoglycemia.    Labs and Fingersticks  CAPILLARY BLOOD GLUCOSE      POCT Blood Glucose.: 174 mg/dL (01 Mar 2020 13:58)  POCT Blood Glucose.: 114 mg/dL (01 Mar 2020 12:15)  POCT Blood Glucose.: 70 mg/dL (01 Mar 2020 11:59)  POCT Blood Glucose.: 78 mg/dL (01 Mar 2020 11:37)  POCT Blood Glucose.: 112 mg/dL (01 Mar 2020 07:55)  POCT Blood Glucose.: 85 mg/dL (01 Mar 2020 01:51)  POCT Blood Glucose.: 105 mg/dL (29 Feb 2020 21:56)  POCT Blood Glucose.: 252 mg/dL (29 Feb 2020 16:59)      Anion Gap, Serum: 12 (03-01 @ 04:44)  Anion Gap, Serum: 14 (02-29 @ 05:45)      Calcium, Total Serum: 8.8 (03-01 @ 04:44)  Calcium, Total Serum: 8.6 (02-29 @ 05:45)          03-01    132<L>  |  97  |  39<H>  ----------------------------<  87  4.9   |  23  |  1.75<H>    Ca    8.8      01 Mar 2020 04:44                          8.3    8.79  )-----------( 346      ( 01 Mar 2020 04:44 )             25.9     Medications  MEDICATIONS  (STANDING):  ALBUTerol    90 MICROgram(s) HFA Inhaler 1 Puff(s) Inhalation every 4 hours  albuterol/ipratropium for Nebulization. 3 milliLiter(s) Nebulizer every 6 hours  aMIOdarone    Tablet 200 milliGRAM(s) Oral daily  artificial tears (preservative free) Ophthalmic Solution 1 Drop(s) Both EYES two times a day  aspirin enteric coated 81 milliGRAM(s) Oral daily  atorvastatin 40 milliGRAM(s) Oral at bedtime  benzonatate 100 milliGRAM(s) Oral every 8 hours  buDESOnide    Inhalation Suspension 0.5 milliGRAM(s) Inhalation two times a day  calcium acetate 667 milliGRAM(s) Oral three times a day with meals  DAPTOmycin IVPB 500 milliGRAM(s) IV Intermittent every 24 hours  furosemide    Tablet 40 milliGRAM(s) Oral daily  heparin  Injectable 5000 Unit(s) SubCutaneous every 8 hours  insulin glargine Injectable (LANTUS) 34 Unit(s) SubCutaneous at bedtime  insulin lispro (HumaLOG) corrective regimen sliding scale   SubCutaneous Before meals and at bedtime  insulin lispro Injectable (HumaLOG) 20 Unit(s) SubCutaneous three times a day before meals  melatonin 3 milliGRAM(s) Oral at bedtime  metoprolol tartrate 25 milliGRAM(s) Oral every 12 hours  modafinil 100 milliGRAM(s) Oral daily  multivitamin 1 Tablet(s) Oral daily  mupirocin 2% Ointment 1 Application(s) Topical two times a day  pantoprazole    Tablet 40 milliGRAM(s) Oral before breakfast  polyethylene glycol 3350 17 Gram(s) Oral daily  senna 2 Tablet(s) Oral at bedtime  silver sulfADIAZINE 1% Cream 1 Application(s) Topical daily  sodium chloride 0.65% Nasal 1 Spray(s) Both Nostrils three times a day  spironolactone 25 milliGRAM(s) Oral daily  tiotropium 18 MICROgram(s) Capsule 1 Capsule(s) Inhalation daily      Physical Exam  General: Patient comfortable in bed  Vital Signs Last 12 Hrs  T(F): 98.1 (03-01-20 @ 14:18), Max: 98.4 (03-01-20 @ 05:15)  HR: 92 (03-01-20 @ 14:18) (75 - 92)  BP: 149/70 (03-01-20 @ 14:18) (119/76 - 149/70)  BP(mean): 90 (03-01-20 @ 05:15) (90 - 90)  RR: 18 (03-01-20 @ 14:18) (18 - 18)  SpO2: 97% (03-01-20 @ 14:18) (96% - 97%)  Neck: No palpable thyroid nodules.  CVS: S1S2, No murmurs  Respiratory: No wheezing, no crepitations  GI: Abdomen soft, bowel sounds positive  Musculoskeletal:  edema lower extremities.   Skin: No skin rashes, no ecchymosis    Diagnostics Chief complaint  Patient is a 64y old  Male who presents with a chief complaint of Chest pain (01 Mar 2020 12:39)   Review of systems  Patient sitting up in chair, looks comfortable,  no hypoglycemia.    Labs and Fingersticks  CAPILLARY BLOOD GLUCOSE      POCT Blood Glucose.: 174 mg/dL (01 Mar 2020 13:58)  POCT Blood Glucose.: 114 mg/dL (01 Mar 2020 12:15)  POCT Blood Glucose.: 70 mg/dL (01 Mar 2020 11:59)  POCT Blood Glucose.: 78 mg/dL (01 Mar 2020 11:37)  POCT Blood Glucose.: 112 mg/dL (01 Mar 2020 07:55)  POCT Blood Glucose.: 85 mg/dL (01 Mar 2020 01:51)  POCT Blood Glucose.: 105 mg/dL (29 Feb 2020 21:56)  POCT Blood Glucose.: 252 mg/dL (29 Feb 2020 16:59)      Anion Gap, Serum: 12 (03-01 @ 04:44)  Anion Gap, Serum: 14 (02-29 @ 05:45)      Calcium, Total Serum: 8.8 (03-01 @ 04:44)  Calcium, Total Serum: 8.6 (02-29 @ 05:45)          03-01    132<L>  |  97  |  39<H>  ----------------------------<  87  4.9   |  23  |  1.75<H>    Ca    8.8      01 Mar 2020 04:44                          8.3    8.79  )-----------( 346      ( 01 Mar 2020 04:44 )             25.9     Medications  MEDICATIONS  (STANDING):  ALBUTerol    90 MICROgram(s) HFA Inhaler 1 Puff(s) Inhalation every 4 hours  albuterol/ipratropium for Nebulization. 3 milliLiter(s) Nebulizer every 6 hours  aMIOdarone    Tablet 200 milliGRAM(s) Oral daily  artificial tears (preservative free) Ophthalmic Solution 1 Drop(s) Both EYES two times a day  aspirin enteric coated 81 milliGRAM(s) Oral daily  atorvastatin 40 milliGRAM(s) Oral at bedtime  benzonatate 100 milliGRAM(s) Oral every 8 hours  buDESOnide    Inhalation Suspension 0.5 milliGRAM(s) Inhalation two times a day  calcium acetate 667 milliGRAM(s) Oral three times a day with meals  DAPTOmycin IVPB 500 milliGRAM(s) IV Intermittent every 24 hours  furosemide    Tablet 40 milliGRAM(s) Oral daily  heparin  Injectable 5000 Unit(s) SubCutaneous every 8 hours  insulin glargine Injectable (LANTUS) 34 Unit(s) SubCutaneous at bedtime  insulin lispro (HumaLOG) corrective regimen sliding scale   SubCutaneous Before meals and at bedtime  insulin lispro Injectable (HumaLOG) 20 Unit(s) SubCutaneous three times a day before meals  melatonin 3 milliGRAM(s) Oral at bedtime  metoprolol tartrate 25 milliGRAM(s) Oral every 12 hours  modafinil 100 milliGRAM(s) Oral daily  multivitamin 1 Tablet(s) Oral daily  mupirocin 2% Ointment 1 Application(s) Topical two times a day  pantoprazole    Tablet 40 milliGRAM(s) Oral before breakfast  polyethylene glycol 3350 17 Gram(s) Oral daily  senna 2 Tablet(s) Oral at bedtime  silver sulfADIAZINE 1% Cream 1 Application(s) Topical daily  sodium chloride 0.65% Nasal 1 Spray(s) Both Nostrils three times a day  spironolactone 25 milliGRAM(s) Oral daily  tiotropium 18 MICROgram(s) Capsule 1 Capsule(s) Inhalation daily      Physical Exam  General: Patient comfortable in bed  Vital Signs Last 12 Hrs  T(F): 98.1 (03-01-20 @ 14:18), Max: 98.4 (03-01-20 @ 05:15)  HR: 92 (03-01-20 @ 14:18) (75 - 92)  BP: 149/70 (03-01-20 @ 14:18) (119/76 - 149/70)  BP(mean): 90 (03-01-20 @ 05:15) (90 - 90)  RR: 18 (03-01-20 @ 14:18) (18 - 18)  SpO2: 97% (03-01-20 @ 14:18) (96% - 97%)  Neck: No palpable thyroid nodules.  CVS: S1S2, No murmurs  Respiratory: No wheezing, no crepitations  GI: Abdomen soft, bowel sounds positive  Musculoskeletal:  edema lower extremities.   Skin: No skin rashes, no ecchymosis    Diagnostics

## 2020-03-01 NOTE — PROGRESS NOTE ADULT - SUBJECTIVE AND OBJECTIVE BOX
MPD arrived. Patient and mother are calm and cooperative. Father has left the bedside.   CARDIOLOGY FOLLOW UP NOTE - DR. CUADRA    Subjective:    + cough  + pleuritic chest pain    PHYSICAL EXAM:  T(C): 36.9 (20 @ 05:15), Max: 36.9 (20 @ 20:00)  HR: 75 (20 @ 05:15) (75 - 98)  BP: 119/76 (20 @ 05:15) (119/76 - 159/70)  RR: 18 (20 @ 05:15) (18 - 19)  SpO2: 96% (20 @ 05:15) (94% - 96%)  Wt(kg): --  I&O's Summary    2020 07:  -  01 Mar 2020 07:00  --------------------------------------------------------  IN: 930 mL / OUT: 4210 mL / NET: -3280 mL    01 Mar 2020 07:  -  01 Mar 2020 09:38  --------------------------------------------------------  IN: 240 mL / OUT: 0 mL / NET: 240 mL      Daily     Daily Weight in k (01 Mar 2020 07:22)    Appearance: Normal	s/p sternotomy   Cardiovascular: Normal S1 S2,RRR, No JVD, No murmurs  Respiratory: Lungs clear to auscultation	dec bs bases  Gastrointestinal:  Soft, Non-tender, + BS	  Extremities: Normal range of motion, edema       Home Medications:  Artificial Tears ophthalmic solution: 1 drop(s) to each affected eye , As Needed (2020 12:55)  Daily Gadiel oral tablet: 1 tab(s) orally once a day (2020 12:55)  glyBURIDE 5 mg oral tablet: 2 tab(s) orally 2 times a day (2020 12:55)  lisinopril 20 mg oral tablet: 1 tab(s) orally once a day (2020 12:55)  metFORMIN 1000 mg oral tablet: 0.5 tab(s) orally 2 times a day (2020 12:55)  Naprosyn 500 mg oral tablet: 1 tab(s) orally , As Needed (2020 11:02)  Tresiba FlexTouch 100 units/mL subcutaneous solution: 30 unit(s) subcutaneous once a day (at bedtime) (2020 12:55)  vitamin B Complex: 1 tab(s) orally once a day (2020 12:55)      MEDICATIONS  (STANDING):  ALBUTerol    90 MICROgram(s) HFA Inhaler 1 Puff(s) Inhalation every 4 hours  albuterol/ipratropium for Nebulization. 3 milliLiter(s) Nebulizer every 6 hours  aMIOdarone    Tablet 200 milliGRAM(s) Oral daily  artificial tears (preservative free) Ophthalmic Solution 1 Drop(s) Both EYES two times a day  aspirin enteric coated 81 milliGRAM(s) Oral daily  atorvastatin 40 milliGRAM(s) Oral at bedtime  benzonatate 100 milliGRAM(s) Oral every 8 hours  buDESOnide    Inhalation Suspension 0.5 milliGRAM(s) Inhalation two times a day  calcium acetate 667 milliGRAM(s) Oral three times a day with meals  DAPTOmycin IVPB 500 milliGRAM(s) IV Intermittent every 24 hours  furosemide    Tablet 40 milliGRAM(s) Oral daily  heparin  Injectable 5000 Unit(s) SubCutaneous every 8 hours  insulin glargine Injectable (LANTUS) 44 Unit(s) SubCutaneous at bedtime  insulin lispro (HumaLOG) corrective regimen sliding scale   SubCutaneous Before meals and at bedtime  insulin lispro Injectable (HumaLOG) 24 Unit(s) SubCutaneous three times a day before meals  melatonin 3 milliGRAM(s) Oral at bedtime  metoprolol tartrate 25 milliGRAM(s) Oral every 12 hours  modafinil 100 milliGRAM(s) Oral daily  multivitamin 1 Tablet(s) Oral daily  mupirocin 2% Ointment 1 Application(s) Topical two times a day  pantoprazole    Tablet 40 milliGRAM(s) Oral before breakfast  polyethylene glycol 3350 17 Gram(s) Oral daily  senna 2 Tablet(s) Oral at bedtime  silver sulfADIAZINE 1% Cream 1 Application(s) Topical daily  sodium chloride 0.65% Nasal 1 Spray(s) Both Nostrils three times a day  spironolactone 25 milliGRAM(s) Oral daily  tiotropium 18 MICROgram(s) Capsule 1 Capsule(s) Inhalation daily      TELEMETRY: 	    ECG:  	  RADIOLOGY:   DIAGNOSTIC TESTING:  [ ] Echocardiogram:  [ ] Catheterization:  [ ] Stress Test:    OTHER: 	    LABS:	 	    CARDIAC MARKERS:                                8.3    8.79  )-----------( 346      ( 01 Mar 2020 04:44 )             25.9         132<L>  |  97  |  39<H>  ----------------------------<  87  4.9   |  23  |  1.75<H>    Ca    8.8      01 Mar 2020 04:44      proBNP:     Lipid Profile:   HgA1c:     Creatinine, Serum: 1.75 mg/dL (20 @ 04:44)  Creatinine, Serum: 1.70 mg/dL (20 @ 05:45)  Creatinine, Serum: 1.81 mg/dL (20 @ 06:41)

## 2020-03-01 NOTE — PROGRESS NOTE ADULT - SUBJECTIVE AND OBJECTIVE BOX
Plastic Surgery Progress Note (pg [facility]: [id number], [facility]: [phone], [facility]: [phone])    SUBJECTIVE:  Doing well. No overnight events.     OBJECTIVE:     ** VITAL SIGNS / I&O's **    Vital Signs Last 24 Hrs  T(C): 36.9 (01 Mar 2020 05:15), Max: 36.9 (29 Feb 2020 20:00)  T(F): 98.4 (01 Mar 2020 05:15), Max: 98.4 (29 Feb 2020 20:00)  HR: 75 (01 Mar 2020 05:15) (75 - 98)  BP: 119/76 (01 Mar 2020 05:15) (119/76 - 159/70)  BP(mean): 90 (01 Mar 2020 05:15) (90 - 90)  RR: 18 (01 Mar 2020 05:15) (18 - 19)  SpO2: 96% (01 Mar 2020 05:15) (94% - 96%)      29 Feb 2020 07:01  -  01 Mar 2020 07:00  --------------------------------------------------------  IN:    Oral Fluid: 930 mL  Total IN: 930 mL    OUT:    Bulb: 110 mL    Bulb: 50 mL    Voided: 4050 mL  Total OUT: 4210 mL    Total NET: -3280 mL      01 Mar 2020 07:01  -  01 Mar 2020 09:06  --------------------------------------------------------  IN:    Oral Fluid: 240 mL  Total IN: 240 mL    OUT:  Total OUT: 0 mL    Total NET: 240 mL          ** PHYSICAL EXAM **    -- CONSTITUTIONAL: AOx3. NAD.   -- CHEST: caudal 2.5cm of incision completely dehisced with copious murky/turbid serous drainage; no erythema of the surrounding skin; remainder of incision c/d/i; KEI ss      **MEDS**  acetaminophen   Tablet .. 650 milliGRAM(s) Oral every 6 hours PRN  ALBUTerol    90 MICROgram(s) HFA Inhaler 1 Puff(s) Inhalation every 4 hours  albuterol/ipratropium for Nebulization. 3 milliLiter(s) Nebulizer every 6 hours PRN  albuterol/ipratropium for Nebulization. 3 milliLiter(s) Nebulizer every 6 hours  aMIOdarone    Tablet 200 milliGRAM(s) Oral daily  artificial tears (preservative free) Ophthalmic Solution 1 Drop(s) Both EYES two times a day  aspirin enteric coated 81 milliGRAM(s) Oral daily  atorvastatin 40 milliGRAM(s) Oral at bedtime  benzonatate 100 milliGRAM(s) Oral every 8 hours  buDESOnide    Inhalation Suspension 0.5 milliGRAM(s) Inhalation two times a day  calcium acetate 667 milliGRAM(s) Oral three times a day with meals  DAPTOmycin IVPB 500 milliGRAM(s) IV Intermittent every 24 hours  furosemide    Tablet 40 milliGRAM(s) Oral daily  guaiFENesin   Syrup  (Sugar-Free) 200 milliGRAM(s) Oral every 6 hours PRN  heparin  Injectable 5000 Unit(s) SubCutaneous every 8 hours  HYDROmorphone   Tablet 2 milliGRAM(s) Oral every 3 hours PRN  insulin glargine Injectable (LANTUS) 44 Unit(s) SubCutaneous at bedtime  insulin lispro (HumaLOG) corrective regimen sliding scale   SubCutaneous Before meals and at bedtime  insulin lispro Injectable (HumaLOG) 24 Unit(s) SubCutaneous three times a day before meals  melatonin 3 milliGRAM(s) Oral at bedtime  metoprolol tartrate 25 milliGRAM(s) Oral every 12 hours  modafinil 100 milliGRAM(s) Oral daily  multivitamin 1 Tablet(s) Oral daily  mupirocin 2% Ointment 1 Application(s) Topical two times a day  pantoprazole    Tablet 40 milliGRAM(s) Oral before breakfast  polyethylene glycol 3350 17 Gram(s) Oral daily  senna 2 Tablet(s) Oral at bedtime  silver sulfADIAZINE 1% Cream 1 Application(s) Topical daily  sodium chloride 0.65% Nasal 1 Spray(s) Both Nostrils three times a day  sodium chloride 0.9% lock flush 10 milliLiter(s) IV Push every 1 hour PRN  spironolactone 25 milliGRAM(s) Oral daily  tiotropium 18 MICROgram(s) Capsule 1 Capsule(s) Inhalation daily      ** LABS **                          8.3    8.79  )-----------( 346      ( 01 Mar 2020 04:44 )             25.9     01 Mar 2020 04:44    132    |  97     |  39     ----------------------------<  87     4.9     |  23     |  1.75     Ca    8.8        01 Mar 2020 04:44        CAPILLARY BLOOD GLUCOSE      POCT Blood Glucose.: 112 mg/dL (01 Mar 2020 07:55)  POCT Blood Glucose.: 85 mg/dL (01 Mar 2020 01:51)  POCT Blood Glucose.: 105 mg/dL (29 Feb 2020 21:56)  POCT Blood Glucose.: 252 mg/dL (29 Feb 2020 16:59)  POCT Blood Glucose.: 76 mg/dL (29 Feb 2020 11:54)    CARDIAC MARKERS ( 01 Mar 2020 04:44 )  x     / x     / 78 U/L / x     / x

## 2020-03-01 NOTE — PROGRESS NOTE ADULT - ASSESSMENT
63yo male with hx of HTN, DM II   s/p C4L on 2/3; PEA arrest on 2/6   + enterococcus Bld  C/S > started ampicillin q8h, ID consulted,  R pigtail for effusion  2/8    Extubated  2/9  2/15 L pigtail effusion  2/17 PRBC   2/18 Tx 2 Diaz  2/19 thomas removed, trial void. Maintain left pigtail for significant output. Pt encouraged to ambulate. Supplemental o2 sat NC weaned to 2L. Blood cultures repeated.  2/20 VSS -Pulmonary consult - appreciated - duonebs ATC q6h & pulmicort bid initiated - ddimer drawn.  pt w/ hx negative LE dopplers   will order non con chest DT as pt has a loculated left effusion that will require tap at IR.  2/21 - NON con Chest CT done - multiple loculated Left pleural effusions w/ patchy consolidation R - rounds made w/ Dr. carl.  ID - consult called re: Ct - WBC 11 afebrile.  +PALMA.  unable to tolerate VQ scan this am.  will d/c bumex & start Torsemide 20mg po daily  d/c planning rehab when medically stable  2/22  Wound care consult leg wounds> shower w/ local skin care  2/23  SW drainage> on Dapto> as per ID will cover SWD  CXR this am   Tighter glycemic control  2/24 ID added cefapime SW drainage purulent, afebrile  2/25 S/p Sternal wound debridement and irrigation with removal of all 6 sternal wires. Purulence encountered and sent for culture. Closure with bilateral pectoralis muscle advancement flaps.  Post op zari for hypotension- weaned off.  Skin/wd  culture from 2/24 neg  Pt transferred to sdu  2/27 VSS   carlos d/c thomas d/c transfer to floor JPx2  followed  by Plastics  followed by ID on cefepime and daptomycin bc positive for E. faecalis; follow up bc 2/19 negative to date. Provigil daily in am  2/28 VSS; abx as per ID - d/c cefepime and continue dapto 500 iv qd as per Dr. Carrasco- pt will need picc line vs medel for 4 wks abx from date of SWD as per ID; bc 2/19 neg.  d/w h. renal medel vs cath- await decision, diuresis initiated for hypervolemia; hep sq for dvt prophylaxis, + hypoglycemia- endo following- humalog adjusted    Discharge planning- rehab next week   2/29 VSS, distal portion of MSI with small dehiscence and serous purulent drainage - recommended wound vac placement as per Plastics. Left KEI 80ml & Right KEI 80ml/24h. Pending PICC line placement for IV abx. 65yo male with hx of HTN, DM II   s/p C4L on 2/3; PEA arrest on 2/6   + enterococcus Bld  C/S > started ampicillin q8h, ID consulted,  R pigtail for effusion  2/8    Extubated  2/9  2/15 L pigtail effusion  2/17 PRBC   2/18 Tx 2 Diaz  2/19 thomas removed, trial void. Maintain left pigtail for significant output. Pt encouraged to ambulate. Supplemental o2 sat NC weaned to 2L. Blood cultures repeated.  2/20 VSS -Pulmonary consult - appreciated - duonebs ATC q6h & pulmicort bid initiated - ddimer drawn.  pt w/ hx negative LE dopplers   will order non con chest DT as pt has a loculated left effusion that will require tap at IR.  2/21 - NON con Chest CT done - multiple loculated Left pleural effusions w/ patchy consolidation R - rounds made w/ Dr. carl.  ID - consult called re: Ct - WBC 11 afebrile.  +PALMA.  unable to tolerate VQ scan this am.  will d/c bumex & start Torsemide 20mg po daily  d/c planning rehab when medically stable  2/22  Wound care consult leg wounds> shower w/ local skin care  2/23  SW drainage> on Dapto> as per ID will cover SWD  CXR this am   Tighter glycemic control  2/24 ID added cefapime SW drainage purulent, afebrile  2/25 S/p Sternal wound debridement and irrigation with removal of all 6 sternal wires. Purulence encountered and sent for culture. Closure with bilateral pectoralis muscle advancement flaps.  Post op zari for hypotension- weaned off.  Skin/wd  culture from 2/24 neg  Pt transferred to sdu  2/27 VSS   carlos d/c thomas d/c transfer to floor JPx2  followed  by Plastics  followed by ID on cefepime and daptomycin bc positive for E. faecalis; follow up bc 2/19 negative to date. Provigil daily in am  2/28 VSS; abx as per ID - d/c cefepime and continue dapto 500 iv qd as per Dr. Carrasco- pt will need picc line vs medel for 4 wks abx from date of SWD as per ID; bc 2/19 neg.  d/w h. renal medel vs cath- await decision, diuresis initiated for hypervolemia; hep sq for dvt prophylaxis, + hypoglycemia- endo following- humalog adjusted    Discharge planning- rehab next week   2/29 VSS, distal portion of MSI with small dehiscence and serous purulent drainage - recommended wound vac placement as per Plastics. Left KEI 80ml & Right KEI 80ml/24h. S/P Right PICC line placement for IV abx.   3/1 Wound Vac placed to sternal wound. Pt ambulated in hallway with rolling walker. Left KEI 50ml & Right KEI 110ml/24h. Plan for discharge to Rehab Mon/Tue.

## 2020-03-01 NOTE — PROGRESS NOTE ADULT - ASSESSMENT
A/P: 64M s/p sternal debridement and b/l pectoralis advancement flaps on 2/25. Sternal incision with small dehiscence inferiorly and serous drainage.    - VAC to be placed to incision today  - Continue JPs  - Care per CT  - Will cont to follow    Randy Trevizo  PGY-5  Plastic Surgery  [phone]

## 2020-03-01 NOTE — PROGRESS NOTE ADULT - ASSESSMENT
63yo male with hx of HTN, DM II, presented to the ED with complaints of acute on chronic left sided exertional chest pain. Pt states he has been having left sided pressure and stabbing chest pain radiating to his sternum and right with activity for the past few months. Since admission- s/p cath with severe triple vessel disease, s/p CABG, post op course c/b brief witnessed PEA 2/6 likely hypoxic arrest, s/p intubation. ( CAD, s/p cath with severe triple vessel disease including lesions at the bifurcation of the LAD/diagonal and distal RCA/RPDA/RPL trifurcation.   -s/p CABG x 4, intraop kennedy ef 50%)--s/p ampicillin until 2/18 for enterococcus in blood, extubated 2/9, pigtail right 2/8 and left sided on 2/15-for effusion.  Remains sob-NO h/o resp issues prior to hospitalization.  ***Current sob-multifactorial-CAD/effusions, atelectasis due to pain, mild bronchospasm and debility.  ********************  2/21-slightly better, pig tail cath removed from left chest; CT chest done-loculated left effusion-slight better  2/24-BS issues-less sob-dapto/cefepime as per ID  2/25-for debridement of chest area-sternal wound  2/26-debridement completed--to CTU  2/2718-PDQ-[id number] over all  2/28-chest pain still impacting breathing--plastics/renal endo f/up  2/29-no changes over night  3/1-cough present-has been off BD

## 2020-03-01 NOTE — PROGRESS NOTE ADULT - ASSESSMENT
intraop kennedy 2/3/20 ef 50%, nl lv, mild diastolic dysfx stage 1  limited echo 2/4/20: nl LV sys fx , no pericardial effusion     a/p  64 year old man with history of HTN, DM II, admitted with progressive exertional angina, s/p cath with severe triple vessel disease, s/p CABG, post op course c/b brief witnessed PEA likely hypoxic arrest, s/p intubation.     1. CAD, s/p cath with severe triple vessel disease including lesions at the bifurcation of the LAD/diagonal and distal RCA/RPDA/RPL trifurcation.   -s/p CABG x 4, cont asa, statin, BB  -s/p PEA arrest   -echo 2/15 with preserved lv fxn/EF    2. Sternal wound infection  -s/p RTOR for SWI  -abx per id, wound vac     3. Postop acute diastolic HF  -stable, diuretics per cts     4. PAF  -cont amio, bb  -AC per CTS     5. HTN  -bp stable    6. STEPHANIE/CKD  -stable, renal f/u     7. Postop Pleural effusion  -S/P Right/Left chest tube     8. Sepsis -E. Faecalis bacteremia, SW infection, RLE cellulitis   -IV abx per CTICU, repeat bcx 2/13 neg  -s/p debridement   -ID, CTS, plastics f/u     d/w family at bedside  dvt ppx  pt

## 2020-03-01 NOTE — PROGRESS NOTE ADULT - ATTENDING COMMENTS
as above-s/p debridement 2/25 of sternal area--RENAL--PO diuretics for anasarca; slightly stronger-cough pronounced  multifactorial dyspnea-CAD s/p CABG, PEA arrest, atelectasis due to pain, pleural effusion L-pig tail out, bronchospasm, ?PE-O2 NC sat above 90%                     Pleural effusion-pig tail removed 2/20-CT chest NC-improved but still loculations on left-f/up 4-6 wks  CAD/CHF-diurese as cr allows-keep K/Mg above  atelectasis-pain control, incentive spirometry, acapella                    ? DVT/PE--s/p repeat venous dopplers-negative; VQ unable  bronchospasm-duoneb q 6, pulmicort .5 bid; add tessalon perles 200 q 8 and mucinex;  out pt PFTs              ID-daptomycin/cefepime as per ID  snore-? osas--out pt SS  DVT/GI prophylaxis, PT, nutrition evaln            PT  Mike Hernandez MD-Pulmonary    [phone]

## 2020-03-02 LAB
ANION GAP SERPL CALC-SCNC: 11 MMOL/L — SIGNIFICANT CHANGE UP (ref 5–17)
BUN SERPL-MCNC: 39 MG/DL — HIGH (ref 7–23)
CALCIUM SERPL-MCNC: 8.6 MG/DL — SIGNIFICANT CHANGE UP (ref 8.4–10.5)
CHLORIDE SERPL-SCNC: 96 MMOL/L — SIGNIFICANT CHANGE UP (ref 96–108)
CO2 SERPL-SCNC: 24 MMOL/L — SIGNIFICANT CHANGE UP (ref 22–31)
CREAT SERPL-MCNC: 1.71 MG/DL — HIGH (ref 0.5–1.3)
CULTURE RESULTS: SIGNIFICANT CHANGE UP
CULTURE RESULTS: SIGNIFICANT CHANGE UP
GLUCOSE BLDC GLUCOMTR-MCNC: 112 MG/DL — HIGH (ref 70–99)
GLUCOSE BLDC GLUCOMTR-MCNC: 123 MG/DL — HIGH (ref 70–99)
GLUCOSE BLDC GLUCOMTR-MCNC: 166 MG/DL — HIGH (ref 70–99)
GLUCOSE BLDC GLUCOMTR-MCNC: 170 MG/DL — HIGH (ref 70–99)
GLUCOSE BLDC GLUCOMTR-MCNC: 210 MG/DL — HIGH (ref 70–99)
GLUCOSE BLDC GLUCOMTR-MCNC: 232 MG/DL — HIGH (ref 70–99)
GLUCOSE BLDC GLUCOMTR-MCNC: 268 MG/DL — HIGH (ref 70–99)
GLUCOSE BLDC GLUCOMTR-MCNC: 80 MG/DL — SIGNIFICANT CHANGE UP (ref 70–99)
GLUCOSE BLDC GLUCOMTR-MCNC: 91 MG/DL — SIGNIFICANT CHANGE UP (ref 70–99)
GLUCOSE SERPL-MCNC: 211 MG/DL — HIGH (ref 70–99)
HCT VFR BLD CALC: 25.9 % — LOW (ref 39–50)
HGB BLD-MCNC: 8 G/DL — LOW (ref 13–17)
MCHC RBC-ENTMCNC: 26.9 PG — LOW (ref 27–34)
MCHC RBC-ENTMCNC: 30.9 GM/DL — LOW (ref 32–36)
MCV RBC AUTO: 87.2 FL — SIGNIFICANT CHANGE UP (ref 80–100)
NRBC # BLD: 0 /100 WBCS — SIGNIFICANT CHANGE UP (ref 0–0)
PLATELET # BLD AUTO: 317 K/UL — SIGNIFICANT CHANGE UP (ref 150–400)
POTASSIUM SERPL-MCNC: 5.1 MMOL/L — SIGNIFICANT CHANGE UP (ref 3.5–5.3)
POTASSIUM SERPL-SCNC: 5.1 MMOL/L — SIGNIFICANT CHANGE UP (ref 3.5–5.3)
RBC # BLD: 2.97 M/UL — LOW (ref 4.2–5.8)
RBC # FLD: 14.3 % — SIGNIFICANT CHANGE UP (ref 10.3–14.5)
SODIUM SERPL-SCNC: 131 MMOL/L — LOW (ref 135–145)
SPECIMEN SOURCE: SIGNIFICANT CHANGE UP
SPECIMEN SOURCE: SIGNIFICANT CHANGE UP
WBC # BLD: 9.61 K/UL — SIGNIFICANT CHANGE UP (ref 3.8–10.5)
WBC # FLD AUTO: 9.61 K/UL — SIGNIFICANT CHANGE UP (ref 3.8–10.5)

## 2020-03-02 PROCEDURE — 99232 SBSQ HOSP IP/OBS MODERATE 35: CPT

## 2020-03-02 PROCEDURE — 99233 SBSQ HOSP IP/OBS HIGH 50: CPT

## 2020-03-02 RX ORDER — CHLORHEXIDINE GLUCONATE 213 G/1000ML
1 SOLUTION TOPICAL
Refills: 0 | Status: DISCONTINUED | OUTPATIENT
Start: 2020-03-02 | End: 2020-03-16

## 2020-03-02 RX ORDER — INSULIN GLARGINE 100 [IU]/ML
50 INJECTION, SOLUTION SUBCUTANEOUS AT BEDTIME
Refills: 0 | Status: DISCONTINUED | OUTPATIENT
Start: 2020-03-02 | End: 2020-03-04

## 2020-03-02 RX ORDER — INSULIN LISPRO 100/ML
20 VIAL (ML) SUBCUTANEOUS
Refills: 0 | Status: DISCONTINUED | OUTPATIENT
Start: 2020-03-02 | End: 2020-03-03

## 2020-03-02 RX ORDER — OXYCODONE AND ACETAMINOPHEN 5; 325 MG/1; MG/1
1 TABLET ORAL EVERY 6 HOURS
Refills: 0 | Status: DISCONTINUED | OUTPATIENT
Start: 2020-03-02 | End: 2020-03-09

## 2020-03-02 RX ORDER — AMITRIPTYLINE HCL 25 MG
10 TABLET ORAL EVERY 8 HOURS
Refills: 0 | Status: DISCONTINUED | OUTPATIENT
Start: 2020-03-02 | End: 2020-03-09

## 2020-03-02 RX ORDER — INSULIN LISPRO 100/ML
24 VIAL (ML) SUBCUTANEOUS
Refills: 0 | Status: DISCONTINUED | OUTPATIENT
Start: 2020-03-02 | End: 2020-03-02

## 2020-03-02 RX ORDER — OXYCODONE AND ACETAMINOPHEN 5; 325 MG/1; MG/1
2 TABLET ORAL EVERY 6 HOURS
Refills: 0 | Status: DISCONTINUED | OUTPATIENT
Start: 2020-03-02 | End: 2020-03-02

## 2020-03-02 RX ADMIN — Medication 667 MILLIGRAM(S): at 18:47

## 2020-03-02 RX ADMIN — HEPARIN SODIUM 5000 UNIT(S): 5000 INJECTION INTRAVENOUS; SUBCUTANEOUS at 05:35

## 2020-03-02 RX ADMIN — Medication 100 MILLIGRAM(S): at 14:10

## 2020-03-02 RX ADMIN — Medication 10 MILLIGRAM(S): at 23:29

## 2020-03-02 RX ADMIN — Medication 81 MILLIGRAM(S): at 12:28

## 2020-03-02 RX ADMIN — ATORVASTATIN CALCIUM 40 MILLIGRAM(S): 80 TABLET, FILM COATED ORAL at 22:37

## 2020-03-02 RX ADMIN — PANTOPRAZOLE SODIUM 40 MILLIGRAM(S): 20 TABLET, DELAYED RELEASE ORAL at 05:36

## 2020-03-02 RX ADMIN — Medication 1 DROP(S): at 18:04

## 2020-03-02 RX ADMIN — INSULIN GLARGINE 50 UNIT(S): 100 INJECTION, SOLUTION SUBCUTANEOUS at 22:27

## 2020-03-02 RX ADMIN — Medication 1 SPRAY(S): at 22:39

## 2020-03-02 RX ADMIN — OXYCODONE AND ACETAMINOPHEN 2 TABLET(S): 5; 325 TABLET ORAL at 05:59

## 2020-03-02 RX ADMIN — Medication 667 MILLIGRAM(S): at 12:27

## 2020-03-02 RX ADMIN — Medication 25 MILLIGRAM(S): at 05:35

## 2020-03-02 RX ADMIN — Medication 100 MILLIGRAM(S): at 22:37

## 2020-03-02 RX ADMIN — Medication 3: at 08:53

## 2020-03-02 RX ADMIN — HYDROMORPHONE HYDROCHLORIDE 2 MILLIGRAM(S): 2 INJECTION INTRAMUSCULAR; INTRAVENOUS; SUBCUTANEOUS at 14:40

## 2020-03-02 RX ADMIN — Medication 3 MILLILITER(S): at 18:04

## 2020-03-02 RX ADMIN — DAPTOMYCIN 120 MILLIGRAM(S): 500 INJECTION, POWDER, LYOPHILIZED, FOR SOLUTION INTRAVENOUS at 16:27

## 2020-03-02 RX ADMIN — Medication 0.5 MILLIGRAM(S): at 18:04

## 2020-03-02 RX ADMIN — Medication 22 UNIT(S): at 09:42

## 2020-03-02 RX ADMIN — HEPARIN SODIUM 5000 UNIT(S): 5000 INJECTION INTRAVENOUS; SUBCUTANEOUS at 14:10

## 2020-03-02 RX ADMIN — Medication 3 MILLILITER(S): at 12:27

## 2020-03-02 RX ADMIN — Medication 24 UNIT(S): at 12:25

## 2020-03-02 RX ADMIN — MODAFINIL 100 MILLIGRAM(S): 200 TABLET ORAL at 12:31

## 2020-03-02 RX ADMIN — Medication 1 TABLET(S): at 12:29

## 2020-03-02 RX ADMIN — POLYETHYLENE GLYCOL 3350 17 GRAM(S): 17 POWDER, FOR SOLUTION ORAL at 12:28

## 2020-03-02 RX ADMIN — Medication 1 APPLICATION(S): at 12:26

## 2020-03-02 RX ADMIN — Medication 200 MILLIGRAM(S): at 22:38

## 2020-03-02 RX ADMIN — Medication 667 MILLIGRAM(S): at 09:43

## 2020-03-02 RX ADMIN — HYDROMORPHONE HYDROCHLORIDE 2 MILLIGRAM(S): 2 INJECTION INTRAMUSCULAR; INTRAVENOUS; SUBCUTANEOUS at 14:13

## 2020-03-02 RX ADMIN — AMIODARONE HYDROCHLORIDE 200 MILLIGRAM(S): 400 TABLET ORAL at 05:36

## 2020-03-02 RX ADMIN — Medication 1 DROP(S): at 05:36

## 2020-03-02 RX ADMIN — Medication 25 MILLIGRAM(S): at 18:03

## 2020-03-02 RX ADMIN — Medication 1: at 22:28

## 2020-03-02 RX ADMIN — MUPIROCIN 1 APPLICATION(S): 20 OINTMENT TOPICAL at 05:35

## 2020-03-02 RX ADMIN — MUPIROCIN 1 APPLICATION(S): 20 OINTMENT TOPICAL at 18:05

## 2020-03-02 RX ADMIN — Medication 20 UNIT(S): at 18:46

## 2020-03-02 RX ADMIN — OXYCODONE AND ACETAMINOPHEN 2 TABLET(S): 5; 325 TABLET ORAL at 06:29

## 2020-03-02 RX ADMIN — Medication 2: at 12:25

## 2020-03-02 RX ADMIN — Medication 10 MILLIGRAM(S): at 14:15

## 2020-03-02 RX ADMIN — HEPARIN SODIUM 5000 UNIT(S): 5000 INJECTION INTRAVENOUS; SUBCUTANEOUS at 22:37

## 2020-03-02 RX ADMIN — Medication 3 MILLIGRAM(S): at 22:37

## 2020-03-02 RX ADMIN — SENNA PLUS 2 TABLET(S): 8.6 TABLET ORAL at 22:39

## 2020-03-02 RX ADMIN — Medication 3 MILLILITER(S): at 05:35

## 2020-03-02 RX ADMIN — SPIRONOLACTONE 25 MILLIGRAM(S): 25 TABLET, FILM COATED ORAL at 05:35

## 2020-03-02 RX ADMIN — Medication 40 MILLIGRAM(S): at 05:35

## 2020-03-02 RX ADMIN — Medication 100 MILLIGRAM(S): at 05:35

## 2020-03-02 RX ADMIN — Medication 0.5 MILLIGRAM(S): at 05:35

## 2020-03-02 RX ADMIN — Medication 1 SPRAY(S): at 05:35

## 2020-03-02 RX ADMIN — Medication 10 MILLIGRAM(S): at 05:35

## 2020-03-02 NOTE — PROGRESS NOTE ADULT - SUBJECTIVE AND OBJECTIVE BOX
Plastic Surgery Progress Note (pg [facility]: [id number], [facility]: [phone], [facility]: [phone])    SUBJECTIVE:  Doing well. No overnight events.     OBJECTIVE:     ** VITAL SIGNS / I&O's **    Vital Signs Last 24 Hrs  T(C): 36.8 (02 Mar 2020 05:22), Max: 37.2 (01 Mar 2020 20:05)  T(F): 98.3 (02 Mar 2020 05:22), Max: 99 (01 Mar 2020 20:05)  HR: 100 (02 Mar 2020 05:22) (88 - 100)  BP: 127/73 (02 Mar 2020 05:22) (126/73 - 149/70)  BP(mean): 91 (02 Mar 2020 05:22) (91 - 91)  RR: 18 (02 Mar 2020 05:22) (18 - 18)  SpO2: 97% (02 Mar 2020 05:22) (96% - 97%)      01 Mar 2020 07:01  -  02 Mar 2020 07:00  --------------------------------------------------------  IN:    Oral Fluid: 760 mL  Total IN: 760 mL    OUT:    Bulb: 50 mL    Bulb: 40 mL    Voided: 2250 mL  Total OUT: 2340 mL    Total NET: -1580 mL          ** PHYSICAL EXAM **    -- CONSTITUTIONAL: AOx3. NAD.   -- CHEST: VAC holding suction, JPs serous      **MEDS**  acetaminophen   Tablet .. 650 milliGRAM(s) Oral every 6 hours PRN  ALBUTerol    90 MICROgram(s) HFA Inhaler 1 Puff(s) Inhalation every 4 hours  albuterol/ipratropium for Nebulization. 3 milliLiter(s) Nebulizer every 6 hours PRN  albuterol/ipratropium for Nebulization. 3 milliLiter(s) Nebulizer every 6 hours  aMIOdarone    Tablet 200 milliGRAM(s) Oral daily  amitriptyline 10 milliGRAM(s) Oral every 8 hours  artificial tears (preservative free) Ophthalmic Solution 1 Drop(s) Both EYES two times a day  aspirin enteric coated 81 milliGRAM(s) Oral daily  atorvastatin 40 milliGRAM(s) Oral at bedtime  benzonatate 100 milliGRAM(s) Oral every 8 hours  buDESOnide    Inhalation Suspension 0.5 milliGRAM(s) Inhalation two times a day  calcium acetate 667 milliGRAM(s) Oral three times a day with meals  DAPTOmycin IVPB 500 milliGRAM(s) IV Intermittent every 24 hours  furosemide    Tablet 40 milliGRAM(s) Oral daily  guaiFENesin   Syrup  (Sugar-Free) 200 milliGRAM(s) Oral every 6 hours PRN  heparin  Injectable 5000 Unit(s) SubCutaneous every 8 hours  HYDROmorphone   Tablet 2 milliGRAM(s) Oral every 3 hours PRN  insulin glargine Injectable (LANTUS) 50 Unit(s) SubCutaneous at bedtime  insulin lispro (HumaLOG) corrective regimen sliding scale   SubCutaneous Before meals and at bedtime  insulin lispro Injectable (HumaLOG) 24 Unit(s) SubCutaneous three times a day with meals  melatonin 3 milliGRAM(s) Oral at bedtime  metoprolol tartrate 25 milliGRAM(s) Oral every 12 hours  modafinil 100 milliGRAM(s) Oral daily  multivitamin 1 Tablet(s) Oral daily  mupirocin 2% Ointment 1 Application(s) Topical two times a day  oxycodone    5 mG/acetaminophen 325 mG 2 Tablet(s) Oral every 6 hours PRN  pantoprazole    Tablet 40 milliGRAM(s) Oral before breakfast  polyethylene glycol 3350 17 Gram(s) Oral daily  senna 2 Tablet(s) Oral at bedtime  silver sulfADIAZINE 1% Cream 1 Application(s) Topical daily  sodium chloride 0.65% Nasal 1 Spray(s) Both Nostrils three times a day  sodium chloride 0.9% lock flush 10 milliLiter(s) IV Push every 1 hour PRN  spironolactone 25 milliGRAM(s) Oral daily  tiotropium 18 MICROgram(s) Capsule 1 Capsule(s) Inhalation daily      ** LABS **                          8.0    9.61  )-----------( 317      ( 02 Mar 2020 06:11 )             25.9     02 Mar 2020 06:11    131    |  96     |  39     ----------------------------<  211    5.1     |  24     |  1.71     Ca    8.6        02 Mar 2020 06:11        CAPILLARY BLOOD GLUCOSE      POCT Blood Glucose.: 232 mg/dL (02 Mar 2020 09:40)  POCT Blood Glucose.: 268 mg/dL (02 Mar 2020 07:49)  POCT Blood Glucose.: 123 mg/dL (02 Mar 2020 02:01)  POCT Blood Glucose.: 99 mg/dL (01 Mar 2020 21:23)  POCT Blood Glucose.: 238 mg/dL (01 Mar 2020 17:31)  POCT Blood Glucose.: 174 mg/dL (01 Mar 2020 13:58)  POCT Blood Glucose.: 114 mg/dL (01 Mar 2020 12:15)  POCT Blood Glucose.: 70 mg/dL (01 Mar 2020 11:59)  POCT Blood Glucose.: 78 mg/dL (01 Mar 2020 11:37)    CARDIAC MARKERS ( 01 Mar 2020 04:44 )  x     / x     / 78 U/L / x     / x

## 2020-03-02 NOTE — PROGRESS NOTE ADULT - SUBJECTIVE AND OBJECTIVE BOX
CHIEF COMPLAINT:  f/up sob, resp failure, pleural effusions, atelectasis- cough is #1 for him --some PALMA    Interval Events: none    REVIEW OF SYSTEMS:  Constitutional: No fevers or chills. No weight loss. + fatigue or generalized malaise.  Eyes: No itching or discharge from the eyes  ENT: No ear pain. No ear discharge. No nasal congestion. No post nasal drip. No epistaxis. No throat pain. No sore throat. No difficulty swallowing.   CV: No chest pain. No palpitations. No lightheadedness or dizziness.   Resp: No dyspnea at rest. No dyspnea on exertion. No orthopnea. No wheezing. + cough. No stridor. No sputum production. No chest pain with respiration.  GI: No nausea. No vomiting. No diarrhea.  MSK: No joint pain or pain in any extremities  Integumentary: No skin lesions. + pedal edema.  Neurological: No gross motor weakness. No sensory changes.  [+] All other systems negative  [ ] Unable to assess ROS because ________    OBJECTIVE:  ICU Vital Signs Last 24 Hrs  T(C): 37.2 (01 Mar 2020 20:05), Max: 37.2 (01 Mar 2020 20:05)  T(F): 99 (01 Mar 2020 20:05), Max: 99 (01 Mar 2020 20:05)  HR: 88 (01 Mar 2020 20:05) (75 - 92)  BP: 126/73 (01 Mar 2020 20:05) (119/76 - 149/70)  BP(mean): 90 (01 Mar 2020 05:15) (90 - 90)  ABP: --  ABP(mean): --  RR: 18 (01 Mar 2020 20:05) (18 - 18)  SpO2: 96% (01 Mar 2020 20:05) (96% - 97%)        02-29 @ 07:01  -  03-01 @ 07:00  --------------------------------------------------------  IN: 930 mL / OUT: 4210 mL / NET: -3280 mL    03-01 @ 07:01  -  03-02 @ 04:47  --------------------------------------------------------  IN: 680 mL / OUT: 2320 mL / NET: -1640 mL      CAPILLARY BLOOD GLUCOSE      POCT Blood Glucose.: 123 mg/dL (02 Mar 2020 02:01)      PHYSICAL EXAM: NAD in  chair on RA  General: Awake, alert, oriented X 3.   HEENT: Atraumatic, normocephalic.                 Mallampatti Grade 3                No nasal congestion.                No tonsillar or pharyngeal exudates.  Lymph Nodes: No palpable lymphadenopathy  Neck: No JVD. No carotid bruit.   Respiratory: abnormal chest expansion-reduced bases                         abnormal percussion                         Normal and equal air entry                         No wheeze, rhonchi or rales.  Cardiovascular: S1 S2 normal. No murmurs, rubs or gallops.   Abdomen: Soft, non-tender, non-distended. No organomegaly. Normoactive bowel sounds.  Extremities: Warm to touch. Peripheral pulse palpable. + pedal edema.   Skin: No rashes or skin lesions  Neurological: Motor and sensory examination equal and normal in all four extremities.  Psychiatry: Appropriate mood and affect.    HOSPITAL MEDICATIONS:  MEDICATIONS  (STANDING):  ALBUTerol    90 MICROgram(s) HFA Inhaler 1 Puff(s) Inhalation every 4 hours  albuterol/ipratropium for Nebulization. 3 milliLiter(s) Nebulizer every 6 hours  aMIOdarone    Tablet 200 milliGRAM(s) Oral daily  amitriptyline 10 milliGRAM(s) Oral every 8 hours  artificial tears (preservative free) Ophthalmic Solution 1 Drop(s) Both EYES two times a day  aspirin enteric coated 81 milliGRAM(s) Oral daily  atorvastatin 40 milliGRAM(s) Oral at bedtime  benzonatate 100 milliGRAM(s) Oral every 8 hours  buDESOnide    Inhalation Suspension 0.5 milliGRAM(s) Inhalation two times a day  calcium acetate 667 milliGRAM(s) Oral three times a day with meals  DAPTOmycin IVPB 500 milliGRAM(s) IV Intermittent every 24 hours  furosemide    Tablet 40 milliGRAM(s) Oral daily  heparin  Injectable 5000 Unit(s) SubCutaneous every 8 hours  insulin glargine Injectable (LANTUS) 40 Unit(s) SubCutaneous at bedtime  insulin lispro (HumaLOG) corrective regimen sliding scale   SubCutaneous Before meals and at bedtime  insulin lispro Injectable (HumaLOG) 22 Unit(s) SubCutaneous three times a day with meals  melatonin 3 milliGRAM(s) Oral at bedtime  metoprolol tartrate 25 milliGRAM(s) Oral every 12 hours  modafinil 100 milliGRAM(s) Oral daily  multivitamin 1 Tablet(s) Oral daily  mupirocin 2% Ointment 1 Application(s) Topical two times a day  pantoprazole    Tablet 40 milliGRAM(s) Oral before breakfast  polyethylene glycol 3350 17 Gram(s) Oral daily  senna 2 Tablet(s) Oral at bedtime  silver sulfADIAZINE 1% Cream 1 Application(s) Topical daily  sodium chloride 0.65% Nasal 1 Spray(s) Both Nostrils three times a day  spironolactone 25 milliGRAM(s) Oral daily  tiotropium 18 MICROgram(s) Capsule 1 Capsule(s) Inhalation daily    MEDICATIONS  (PRN):  acetaminophen   Tablet .. 650 milliGRAM(s) Oral every 6 hours PRN Mild Pain (1 - 3)  albuterol/ipratropium for Nebulization. 3 milliLiter(s) Nebulizer every 6 hours PRN Shortness of Breath and/or Wheezing  guaiFENesin   Syrup  (Sugar-Free) 200 milliGRAM(s) Oral every 6 hours PRN Cough  HYDROmorphone   Tablet 2 milliGRAM(s) Oral every 3 hours PRN Moderate Pain (4 - 6)  oxycodone    5 mG/acetaminophen 325 mG 2 Tablet(s) Oral every 6 hours PRN Severe Pain (7 - 10)  sodium chloride 0.9% lock flush 10 milliLiter(s) IV Push every 1 hour PRN Pre/post blood products, medications, blood draw, and to maintain line patency      LABS:                        8.3    8.79  )-----------( 346      ( 01 Mar 2020 04:44 )             25.9     03-01    132<L>  |  97  |  39<H>  ----------------------------<  87  4.9   |  23  |  1.75<H>    Ca    8.8      01 Mar 2020 04:44                MICROBIOLOGY:     RADIOLOGY:  [ ] Reviewed and interpreted by me    Point of Care Ultrasound Findings:    PFT:    EKG:

## 2020-03-02 NOTE — PROGRESS NOTE ADULT - ATTENDING COMMENTS
as above-s/p debridement 2/25 of sternal area--RENAL--PO diuretics for anasarca; slightly stronger-cough pronounced--? tracheomalacia----would invoke amitriptyline 10 q 8 for control  multifactorial dyspnea-CAD s/p CABG, PEA arrest, atelectasis due to pain, pleural effusion L-pig tail out, bronchospasm, ?PE-O2 NC sat above 90%                     Pleural effusion-pig tail removed 2/20-CT chest NC-improved but still loculations on left-f/up 4-6 wks  CAD/CHF-diurese as cr allows-keep K/Mg above  atelectasis-pain control, incentive spirometry, acapella                    ? DVT/PE--s/p repeat venous dopplers-negative; VQ unable  bronchospasm-duoneb q 6, pulmicort .5 bid; add tessalon perles 200 q 8 and mucinex;  out pt PFTs              ID-daptomycin/cefepime as per ID  snore-? osas--out pt SS  DVT/GI prophylaxis, PT, nutrition evaln            PT  Mike Hernandez MD-Pulmonary    [phone]

## 2020-03-02 NOTE — PROGRESS NOTE ADULT - PROBLEM SELECTOR PLAN 1
Pt with  CKD likely  in the setting of long standing DM and HTN. No prior labs for review. Scr since admission has ranged between 1.8-2.1. Scr increased to ~2.50 secondary to hemodynamic injury after CABG and PEA arrest s/p CPR, and worsened to 2.9. Scr now stable . Non-nephrotic range proteinuria. Pt. likely with diabetic nephropathy.   - continue diuretics for management of hypervolemia  - Pt. on cefepime. Adjust dosage to renal function and degree of infectious process. Cefepime known to cause toxic encephalopathy in patients with decreased renal function. Monitor mental status.  - Monitor labs and urine output.   - Avoid NSAIDs, ACEI/ARBS, RCA and nephrotoxins. Dose medications as per eGFR.  - Pt. may have PICC line in dominant arm if long-term access is needed for abx.

## 2020-03-02 NOTE — PROGRESS NOTE ADULT - SUBJECTIVE AND OBJECTIVE BOX
CARDIOLOGY FOLLOW UP - Dr. Steel    CC c/o generalized ache   c/o  pleuritic cp, + cough   no sob       PHYSICAL EXAM:  T(C): 36.8 (03-02-20 @ 05:22), Max: 37.2 (03-01-20 @ 20:05)  HR: 100 (03-02-20 @ 05:22) (88 - 100)  BP: 127/73 (03-02-20 @ 05:22) (126/73 - 149/70)  RR: 18 (03-02-20 @ 05:22) (18 - 18)  SpO2: 97% (03-02-20 @ 05:22) (96% - 97%)  Wt(kg): --  I&O's Summary    01 Mar 2020 07:01  -  02 Mar 2020 07:00  --------------------------------------------------------  IN: 760 mL / OUT: 2340 mL / NET: -1580 mL    02 Mar 2020 07:01  -  02 Mar 2020 12:55  --------------------------------------------------------  IN: 360 mL / OUT: 0 mL / NET: 360 mL        Appearance: Normal	  Cardiovascular: Normal S1 S2,RRR, No JVD, No murmurs  Respiratory: diminished   Gastrointestinal:  Soft, Non-tender, + BS	  Extremities: + bl le edema, sternal wound vac placement and KEI drains.        MEDICATIONS  (STANDING):  ALBUTerol    90 MICROgram(s) HFA Inhaler 1 Puff(s) Inhalation every 4 hours  albuterol/ipratropium for Nebulization. 3 milliLiter(s) Nebulizer every 6 hours  aMIOdarone    Tablet 200 milliGRAM(s) Oral daily  amitriptyline 10 milliGRAM(s) Oral every 8 hours  artificial tears (preservative free) Ophthalmic Solution 1 Drop(s) Both EYES two times a day  aspirin enteric coated 81 milliGRAM(s) Oral daily  atorvastatin 40 milliGRAM(s) Oral at bedtime  benzonatate 100 milliGRAM(s) Oral every 8 hours  buDESOnide    Inhalation Suspension 0.5 milliGRAM(s) Inhalation two times a day  calcium acetate 667 milliGRAM(s) Oral three times a day with meals  DAPTOmycin IVPB 500 milliGRAM(s) IV Intermittent every 24 hours  furosemide    Tablet 40 milliGRAM(s) Oral daily  heparin  Injectable 5000 Unit(s) SubCutaneous every 8 hours  insulin glargine Injectable (LANTUS) 50 Unit(s) SubCutaneous at bedtime  insulin lispro (HumaLOG) corrective regimen sliding scale   SubCutaneous Before meals and at bedtime  insulin lispro Injectable (HumaLOG) 24 Unit(s) SubCutaneous three times a day with meals  melatonin 3 milliGRAM(s) Oral at bedtime  metoprolol tartrate 25 milliGRAM(s) Oral every 12 hours  modafinil 100 milliGRAM(s) Oral daily  multivitamin 1 Tablet(s) Oral daily  mupirocin 2% Ointment 1 Application(s) Topical two times a day  pantoprazole    Tablet 40 milliGRAM(s) Oral before breakfast  polyethylene glycol 3350 17 Gram(s) Oral daily  senna 2 Tablet(s) Oral at bedtime  silver sulfADIAZINE 1% Cream 1 Application(s) Topical daily  sodium chloride 0.65% Nasal 1 Spray(s) Both Nostrils three times a day  spironolactone 25 milliGRAM(s) Oral daily  tiotropium 18 MICROgram(s) Capsule 1 Capsule(s) Inhalation daily      TELEMETRY: 	    ECG:  	  RADIOLOGY:   DIAGNOSTIC TESTING:  [ ] Echocardiogram:  [ ]  Catheterization:  [ ] Stress Test:    OTHER: 	    LABS:	 	    Creatine Kinase, Serum: 78 U/L [30 - 200] (03-01 @ 04:44)                          8.0    9.61  )-----------( 317      ( 02 Mar 2020 06:11 )             25.9     03-02    131<L>  |  96  |  39<H>  ----------------------------<  211<H>  5.1   |  24  |  1.71<H>    Ca    8.6      02 Mar 2020 06:11

## 2020-03-02 NOTE — PROGRESS NOTE ADULT - PROBLEM SELECTOR PLAN 2
Continue diabetic diet  endocrine following, Dr. Matias  continue lantus 34 HS and humalog 20 TID   accuchecks ac and hs Continue diabetic diet  endocrine following, Dr. Matias  continue lantus 30 HS and humalog 18 TID   accuchecks ac and hs

## 2020-03-02 NOTE — PROGRESS NOTE ADULT - SUBJECTIVE AND OBJECTIVE BOX
[facility] DIVISION OF KIDNEY DISEASES AND HYPERTENSION -- PROGRESS NOTE    Chief complaint: STEPHANIE on CKD    24 hour events/subjective: continues to complain of pain, required percocet this am        PAST HISTORY  --------------------------------------------------------------------------------  No significant changes to PMH, PSH, FHx, SHx, unless otherwise noted    ALLERGIES & MEDICATIONS  --------------------------------------------------------------------------------  Allergies    No Known Allergies    Intolerances      Standing Inpatient Medications  ALBUTerol    90 MICROgram(s) HFA Inhaler 1 Puff(s) Inhalation every 4 hours  albuterol/ipratropium for Nebulization. 3 milliLiter(s) Nebulizer every 6 hours  aMIOdarone    Tablet 200 milliGRAM(s) Oral daily  amitriptyline 10 milliGRAM(s) Oral every 8 hours  artificial tears (preservative free) Ophthalmic Solution 1 Drop(s) Both EYES two times a day  aspirin enteric coated 81 milliGRAM(s) Oral daily  atorvastatin 40 milliGRAM(s) Oral at bedtime  benzonatate 100 milliGRAM(s) Oral every 8 hours  buDESOnide    Inhalation Suspension 0.5 milliGRAM(s) Inhalation two times a day  calcium acetate 667 milliGRAM(s) Oral three times a day with meals  DAPTOmycin IVPB 500 milliGRAM(s) IV Intermittent every 24 hours  furosemide    Tablet 40 milliGRAM(s) Oral daily  heparin  Injectable 5000 Unit(s) SubCutaneous every 8 hours  insulin glargine Injectable (LANTUS) 50 Unit(s) SubCutaneous at bedtime  insulin lispro (HumaLOG) corrective regimen sliding scale   SubCutaneous Before meals and at bedtime  insulin lispro Injectable (HumaLOG) 24 Unit(s) SubCutaneous three times a day with meals  melatonin 3 milliGRAM(s) Oral at bedtime  metoprolol tartrate 25 milliGRAM(s) Oral every 12 hours  modafinil 100 milliGRAM(s) Oral daily  multivitamin 1 Tablet(s) Oral daily  mupirocin 2% Ointment 1 Application(s) Topical two times a day  pantoprazole    Tablet 40 milliGRAM(s) Oral before breakfast  polyethylene glycol 3350 17 Gram(s) Oral daily  senna 2 Tablet(s) Oral at bedtime  silver sulfADIAZINE 1% Cream 1 Application(s) Topical daily  sodium chloride 0.65% Nasal 1 Spray(s) Both Nostrils three times a day  spironolactone 25 milliGRAM(s) Oral daily  tiotropium 18 MICROgram(s) Capsule 1 Capsule(s) Inhalation daily    PRN Inpatient Medications  acetaminophen   Tablet .. 650 milliGRAM(s) Oral every 6 hours PRN  albuterol/ipratropium for Nebulization. 3 milliLiter(s) Nebulizer every 6 hours PRN  guaiFENesin   Syrup  (Sugar-Free) 200 milliGRAM(s) Oral every 6 hours PRN  HYDROmorphone   Tablet 2 milliGRAM(s) Oral every 3 hours PRN  oxycodone    5 mG/acetaminophen 325 mG 2 Tablet(s) Oral every 6 hours PRN  sodium chloride 0.9% lock flush 10 milliLiter(s) IV Push every 1 hour PRN      REVIEW OF SYSTEMS  --------------------------------------------------------------------------------  Constitutional: [ ] Fever [ ] Chills [ ] Fatigue [ ] Weight change   HEENT: [ ] Blurred vision [ ] Eye Pain [ ] Headache [ ] Runny nose [ ] Sore Throat   Respiratory: [ ] Cough [ ] Wheezing [ ] Shortness of breath  Cardiovascular: [ ] Chest Pain [ ] Palpitations [ ] PALMA [ ] PND [ ] Orthopnea  Gastrointestinal: [ ] Abdominal Pain [ ] Diarrhea [ ] Constipation [ ] Hemorrhoids [ ] Nausea [ ] Vomiting  Genitourinary: [ ] Nocturia [ ] Dysuria [ ] Incontinence  Extremities: [x ] LE Swelling [ ] Joint Pain  Neurologic: [ ] Focal deficit [ ] Paresthesias [ ] Syncope  Lymphatic: [ ] Swelling [ ] Lymphadenopathy   Skin: [ ] Rash [ ] Ecchymoses [ ] Wounds [ ] Lesions  Psychiatry: [ ] Depression [ ] Suicidal/Homicidal Ideation [ ] Anxiety [ ] Sleep Disturbances  [x ] 10 point review of systems is otherwise negative except as mentioned above              [ ]Unable to obtain due to   All other systems were reviewed and are negative, except as noted.    VITALS/PHYSICAL EXAM  --------------------------------------------------------------------------------  T(C): 36.8 (03-02-20 @ 05:22), Max: 37.2 (03-01-20 @ 20:05)  HR: 100 (03-02-20 @ 05:22) (88 - 100)  BP: 127/73 (03-02-20 @ 05:22) (126/73 - 149/70)  RR: 18 (03-02-20 @ 05:22) (18 - 18)  SpO2: 97% (03-02-20 @ 05:22) (96% - 97%)  Wt(kg): --        03-01-20 @ 07:01  -  03-02-20 @ 07:00  --------------------------------------------------------  IN: 760 mL / OUT: 2340 mL / NET: -1580 mL    03-02-20 @ 07:01  -  03-02-20 @ 13:05  --------------------------------------------------------  IN: 360 mL / OUT: 0 mL / NET: 360 mL      Physical Exam:  	Gen: sitting in chair  	HEENT: on room air  	Pulm: CTA B/L  	CV: normal S1S2; no rub  	Abd: soft                      Back : unable to examine  	: Kristen thomas  	LE: bilateral LE pitting edema  	Skin: Warm, ulcerations noted over the legs      LABS/STUDIES  --------------------------------------------------------------------------------              8.0    9.61  >-----------<  317      [03-02-20 @ 06:11]              25.9     131  |  96  |  39  ----------------------------<  211      [03-02-20 @ 06:11]  5.1   |  24  |  1.71        Ca     8.6     [03-02-20 @ 06:11]          CK 78      [03-01-20 @ 04:44]    Creatinine Trend:  SCr 1.71 [03-02 @ 06:11]  SCr 1.75 [03-01 @ 04:44]  SCr 1.70 [02-29 @ 05:45]  SCr 1.81 [02-28 @ 06:41]  SCr 1.83 [02-27 @ 05:23]    Urinalysis - [02-24-20 @ 17:29]      Color Light Yellow / Appearance Clear / SG 1.013 / pH 6.5      Gluc Trace / Ketone Negative  / Bili Negative / Urobili <2 mg/dL       Blood Negative / Protein 100 mg/dL / Leuk Est Negative / Nitrite Negative      RBC 5 / WBC 1 / Hyaline 1 / Gran  / Sq Epi  / Non Sq Epi 0 / Bacteria Negative      HbA1c 9.2      [01-26-20 @ 09:03]  TSH 2.37      [02-14-20 @ 21:15]    HCV 0.16, Nonreact      [02-04-20 @ 10:29]

## 2020-03-02 NOTE — PROGRESS NOTE ADULT - ASSESSMENT
intraop kennedy 2/3/20 ef 50%, nl lv, mild diastolic dysfx stage 1  limited echo 2/4/20: nl LV sys fx , no pericardial effusion     a/p  64 year old man with history of HTN, DM II, admitted with progressive exertional angina, s/p cath with severe triple vessel disease, s/p CABG, post op course c/b brief witnessed PEA likely hypoxic arrest, s/p intubation.     1. CAD, s/p cath with severe triple vessel disease including lesions at the bifurcation of the LAD/diagonal and distal RCA/RPDA/RPL trifurcation.   -s/p CABG x 4, cont asa, statin, BB  -s/p PEA arrest   -echo 2/15 with preserved lv fxn/EF    2. Sternal wound infection  -s/p RTOR for SWI  -abx per id, wound vac     3. Postop acute diastolic HF  -stable, diuretics per cts     4. PAF  -cont amio, bb  -AC per CTS     5. HTN  -bp stable    6. STEPHANIE/CKD  -stable, renal f/u     7. Postop Pleural effusion  -S/P Right/Left chest tube     8. Sepsis -E. Faecalis bacteremia, SW infection, RLE cellulitis   -IV abx per [facility], repeat bcx 2/13 neg  -s/p debridement   -ID, CTS, plastics f/u   -pending PICC line     d/w family at bedside  dvt ppx

## 2020-03-02 NOTE — PROGRESS NOTE ADULT - PROBLEM SELECTOR PLAN 2
Pt. with likely hypervolemic hyponatremia in the setting of volume overload. Na stable this AM. Continue diuretics.    Kevin Correa  Nephrology Fellow  Cell: [phone] (from 8 am to 5 pm)  (After 5 pm or on weekends please page on-call fellow) Pt. with likely hypervolemic hyponatremia in the setting of volume overload. Na stable this AM. Continue diuretics.

## 2020-03-02 NOTE — PROGRESS NOTE ADULT - SUBJECTIVE AND OBJECTIVE BOX
Chief complaint  Patient is a 64y old  Male who presents with a chief complaint of Chest pain (02 Mar 2020 13:05)   Review of systems  Patient sitting up in chair, looks comfortable, no hypoglycemia.    Labs and Fingersticks  CAPILLARY BLOOD GLUCOSE      POCT Blood Glucose.: 210 mg/dL (02 Mar 2020 11:37)  POCT Blood Glucose.: 232 mg/dL (02 Mar 2020 09:40)  POCT Blood Glucose.: 268 mg/dL (02 Mar 2020 07:49)  POCT Blood Glucose.: 123 mg/dL (02 Mar 2020 02:01)  POCT Blood Glucose.: 99 mg/dL (01 Mar 2020 21:23)  POCT Blood Glucose.: 238 mg/dL (01 Mar 2020 17:31)      Anion Gap, Serum: 11 (03-02 @ 06:11)  Anion Gap, Serum: 12 (03-01 @ 04:44)      Calcium, Total Serum: 8.6 (03-02 @ 06:11)  Calcium, Total Serum: 8.8 (03-01 @ 04:44)          03-02    131<L>  |  96  |  39<H>  ----------------------------<  211<H>  5.1   |  24  |  1.71<H>    Ca    8.6      02 Mar 2020 06:11                          8.0    9.61  )-----------( 317      ( 02 Mar 2020 06:11 )             25.9     Medications  MEDICATIONS  (STANDING):  ALBUTerol    90 MICROgram(s) HFA Inhaler 1 Puff(s) Inhalation every 4 hours  albuterol/ipratropium for Nebulization. 3 milliLiter(s) Nebulizer every 6 hours  aMIOdarone    Tablet 200 milliGRAM(s) Oral daily  amitriptyline 10 milliGRAM(s) Oral every 8 hours  artificial tears (preservative free) Ophthalmic Solution 1 Drop(s) Both EYES two times a day  aspirin enteric coated 81 milliGRAM(s) Oral daily  atorvastatin 40 milliGRAM(s) Oral at bedtime  benzonatate 100 milliGRAM(s) Oral every 8 hours  buDESOnide    Inhalation Suspension 0.5 milliGRAM(s) Inhalation two times a day  calcium acetate 667 milliGRAM(s) Oral three times a day with meals  DAPTOmycin IVPB 500 milliGRAM(s) IV Intermittent every 24 hours  furosemide    Tablet 40 milliGRAM(s) Oral daily  heparin  Injectable 5000 Unit(s) SubCutaneous every 8 hours  insulin glargine Injectable (LANTUS) 50 Unit(s) SubCutaneous at bedtime  insulin lispro (HumaLOG) corrective regimen sliding scale   SubCutaneous Before meals and at bedtime  insulin lispro Injectable (HumaLOG) 24 Unit(s) SubCutaneous three times a day with meals  melatonin 3 milliGRAM(s) Oral at bedtime  metoprolol tartrate 25 milliGRAM(s) Oral every 12 hours  modafinil 100 milliGRAM(s) Oral daily  multivitamin 1 Tablet(s) Oral daily  mupirocin 2% Ointment 1 Application(s) Topical two times a day  pantoprazole    Tablet 40 milliGRAM(s) Oral before breakfast  polyethylene glycol 3350 17 Gram(s) Oral daily  senna 2 Tablet(s) Oral at bedtime  silver sulfADIAZINE 1% Cream 1 Application(s) Topical daily  sodium chloride 0.65% Nasal 1 Spray(s) Both Nostrils three times a day  spironolactone 25 milliGRAM(s) Oral daily  tiotropium 18 MICROgram(s) Capsule 1 Capsule(s) Inhalation daily      Physical Exam  General: Patient comfortable in bed  Vital Signs Last 12 Hrs  T(F): 97.9 (03-02-20 @ 14:26), Max: 98.3 (03-02-20 @ 05:22)  HR: 91 (03-02-20 @ 14:26) (91 - 100)  BP: 130/74 (03-02-20 @ 14:26) (127/73 - 130/74)  BP(mean): 91 (03-02-20 @ 05:22) (91 - 91)  RR: 18 (03-02-20 @ 14:26) (18 - 18)  SpO2: 96% (03-02-20 @ 14:26) (96% - 97%)  Neck: No palpable thyroid nodules.  CVS: S1S2, No murmurs  Respiratory: No wheezing, no crepitations  GI: Abdomen soft, bowel sounds positive  Musculoskeletal:  edema lower extremities.   Skin: No skin rashes, no ecchymosis    Diagnostics Chief complaint  Patient is a 64y old  Male who presents with a chief complaint of Chest pain (02 Mar 2020 13:05)   Review of systems  Patient sitting up in chair, looks comfortable,  no hypoglycemia.    Labs and Fingersticks  CAPILLARY BLOOD GLUCOSE      POCT Blood Glucose.: 210 mg/dL (02 Mar 2020 11:37)  POCT Blood Glucose.: 232 mg/dL (02 Mar 2020 09:40)  POCT Blood Glucose.: 268 mg/dL (02 Mar 2020 07:49)  POCT Blood Glucose.: 123 mg/dL (02 Mar 2020 02:01)  POCT Blood Glucose.: 99 mg/dL (01 Mar 2020 21:23)  POCT Blood Glucose.: 238 mg/dL (01 Mar 2020 17:31)      Anion Gap, Serum: 11 (03-02 @ 06:11)  Anion Gap, Serum: 12 (03-01 @ 04:44)      Calcium, Total Serum: 8.6 (03-02 @ 06:11)  Calcium, Total Serum: 8.8 (03-01 @ 04:44)          03-02    131<L>  |  96  |  39<H>  ----------------------------<  211<H>  5.1   |  24  |  1.71<H>    Ca    8.6      02 Mar 2020 06:11                          8.0    9.61  )-----------( 317      ( 02 Mar 2020 06:11 )             25.9     Medications  MEDICATIONS  (STANDING):  ALBUTerol    90 MICROgram(s) HFA Inhaler 1 Puff(s) Inhalation every 4 hours  albuterol/ipratropium for Nebulization. 3 milliLiter(s) Nebulizer every 6 hours  aMIOdarone    Tablet 200 milliGRAM(s) Oral daily  amitriptyline 10 milliGRAM(s) Oral every 8 hours  artificial tears (preservative free) Ophthalmic Solution 1 Drop(s) Both EYES two times a day  aspirin enteric coated 81 milliGRAM(s) Oral daily  atorvastatin 40 milliGRAM(s) Oral at bedtime  benzonatate 100 milliGRAM(s) Oral every 8 hours  buDESOnide    Inhalation Suspension 0.5 milliGRAM(s) Inhalation two times a day  calcium acetate 667 milliGRAM(s) Oral three times a day with meals  DAPTOmycin IVPB 500 milliGRAM(s) IV Intermittent every 24 hours  furosemide    Tablet 40 milliGRAM(s) Oral daily  heparin  Injectable 5000 Unit(s) SubCutaneous every 8 hours  insulin glargine Injectable (LANTUS) 50 Unit(s) SubCutaneous at bedtime  insulin lispro (HumaLOG) corrective regimen sliding scale   SubCutaneous Before meals and at bedtime  insulin lispro Injectable (HumaLOG) 24 Unit(s) SubCutaneous three times a day with meals  melatonin 3 milliGRAM(s) Oral at bedtime  metoprolol tartrate 25 milliGRAM(s) Oral every 12 hours  modafinil 100 milliGRAM(s) Oral daily  multivitamin 1 Tablet(s) Oral daily  mupirocin 2% Ointment 1 Application(s) Topical two times a day  pantoprazole    Tablet 40 milliGRAM(s) Oral before breakfast  polyethylene glycol 3350 17 Gram(s) Oral daily  senna 2 Tablet(s) Oral at bedtime  silver sulfADIAZINE 1% Cream 1 Application(s) Topical daily  sodium chloride 0.65% Nasal 1 Spray(s) Both Nostrils three times a day  spironolactone 25 milliGRAM(s) Oral daily  tiotropium 18 MICROgram(s) Capsule 1 Capsule(s) Inhalation daily      Physical Exam  General: Patient comfortable in bed  Vital Signs Last 12 Hrs  T(F): 97.9 (03-02-20 @ 14:26), Max: 98.3 (03-02-20 @ 05:22)  HR: 91 (03-02-20 @ 14:26) (91 - 100)  BP: 130/74 (03-02-20 @ 14:26) (127/73 - 130/74)  BP(mean): 91 (03-02-20 @ 05:22) (91 - 91)  RR: 18 (03-02-20 @ 14:26) (18 - 18)  SpO2: 96% (03-02-20 @ 14:26) (96% - 97%)  Neck: No palpable thyroid nodules.  CVS: S1S2, No murmurs  Respiratory: No wheezing, no crepitations  GI: Abdomen soft, bowel sounds positive  Musculoskeletal:  edema lower extremities.   Skin: No skin rashes, no ecchymosis    Diagnostics

## 2020-03-02 NOTE — PROGRESS NOTE ADULT - SUBJECTIVE AND OBJECTIVE BOX
VITAL SIGNS    Telemetry:  SR   Vital Signs Last 24 Hrs  T(C): 36.8 (20 @ 05:22), Max: 37.2 (20 @ 20:05)  T(F): 98.3 (20 @ 05:22), Max: 99 (20 @ 20:05)  HR: 100 (20 @ 05:22) (88 - 100)  BP: 127/73 (20 @ 05:22) (126/73 - 149/70)  RR: 18 (20 @ 05:22) (18 - 18)  SpO2: 97% (20 @ 05:22) (96% - 97%)             @ 07:01  -   07:00  --------------------------------------------------------  IN: 760 mL / OUT: 2340 mL / NET: -1580 mL       Daily     Daily Weight in k.2 (02 Mar 2020 08:05)  Admit Wt: Drug Dosing Weight  Height (cm): 172.72 (2020 07:20)  Weight (kg): 81.1 (2020 07:20)  BMI (kg/m2): 27.2 (2020 07:20)  BSA (m2): 1.95 (2020 07:20)      CAPILLARY BLOOD GLUCOSE      POCT Blood Glucose.: 232 mg/dL (02 Mar 2020 09:40)  POCT Blood Glucose.: 268 mg/dL (02 Mar 2020 07:49)  POCT Blood Glucose.: 123 mg/dL (02 Mar 2020 02:01)  POCT Blood Glucose.: 99 mg/dL (01 Mar 2020 21:23)  POCT Blood Glucose.: 238 mg/dL (01 Mar 2020 17:31)  POCT Blood Glucose.: 174 mg/dL (01 Mar 2020 13:58)  POCT Blood Glucose.: 114 mg/dL (01 Mar 2020 12:15)  POCT Blood Glucose.: 70 mg/dL (01 Mar 2020 11:59)  POCT Blood Glucose.: 78 mg/dL (01 Mar 2020 11:37)          MEDICATIONS  acetaminophen   Tablet .. 650 milliGRAM(s) Oral every 6 hours PRN  ALBUTerol    90 MICROgram(s) HFA Inhaler 1 Puff(s) Inhalation every 4 hours  albuterol/ipratropium for Nebulization. 3 milliLiter(s) Nebulizer every 6 hours PRN  albuterol/ipratropium for Nebulization. 3 milliLiter(s) Nebulizer every 6 hours  aMIOdarone    Tablet 200 milliGRAM(s) Oral daily  amitriptyline 10 milliGRAM(s) Oral every 8 hours  artificial tears (preservative free) Ophthalmic Solution 1 Drop(s) Both EYES two times a day  aspirin enteric coated 81 milliGRAM(s) Oral daily  atorvastatin 40 milliGRAM(s) Oral at bedtime  benzonatate 100 milliGRAM(s) Oral every 8 hours  buDESOnide    Inhalation Suspension 0.5 milliGRAM(s) Inhalation two times a day  calcium acetate 667 milliGRAM(s) Oral three times a day with meals  DAPTOmycin IVPB 500 milliGRAM(s) IV Intermittent every 24 hours  furosemide    Tablet 40 milliGRAM(s) Oral daily  guaiFENesin   Syrup  (Sugar-Free) 200 milliGRAM(s) Oral every 6 hours PRN  heparin  Injectable 5000 Unit(s) SubCutaneous every 8 hours  HYDROmorphone   Tablet 2 milliGRAM(s) Oral every 3 hours PRN  insulin glargine Injectable (LANTUS) 50 Unit(s) SubCutaneous at bedtime  insulin lispro (HumaLOG) corrective regimen sliding scale   SubCutaneous Before meals and at bedtime  insulin lispro Injectable (HumaLOG) 24 Unit(s) SubCutaneous three times a day with meals  melatonin 3 milliGRAM(s) Oral at bedtime  metoprolol tartrate 25 milliGRAM(s) Oral every 12 hours  modafinil 100 milliGRAM(s) Oral daily  multivitamin 1 Tablet(s) Oral daily  mupirocin 2% Ointment 1 Application(s) Topical two times a day  oxycodone    5 mG/acetaminophen 325 mG 2 Tablet(s) Oral every 6 hours PRN  pantoprazole    Tablet 40 milliGRAM(s) Oral before breakfast  polyethylene glycol 3350 17 Gram(s) Oral daily  senna 2 Tablet(s) Oral at bedtime  silver sulfADIAZINE 1% Cream 1 Application(s) Topical daily  sodium chloride 0.65% Nasal 1 Spray(s) Both Nostrils three times a day  sodium chloride 0.9% lock flush 10 milliLiter(s) IV Push every 1 hour PRN  spironolactone 25 milliGRAM(s) Oral daily  tiotropium 18 MICROgram(s) Capsule 1 Capsule(s) Inhalation daily      >>> <<<  PHYSICAL EXAM  Subjective: NAD  Neurology: alert and oriented x 3, nonfocal, no gross deficits  CV : s1s2  Sternal Wound :  CDI , Stable  Lungs: CAT b/l  Abdomen: soft, NT,ND, ( +)BM  :  voiding  Extremities: -c/c/e      LABS  03-    131<L>  |  96  |  39<H>  ----------------------------<  211<H>  5.1   |  24  |  1.71<H>    Ca    8.6      02 Mar 2020 06:11                                   8.0    9.61  )-----------( 317      ( 02 Mar 2020 06:11 )             25.9                 PAST MEDICAL & SURGICAL HISTORY:  HTN (hypertension)  Diabetes  History of appendectomy: x 30 yrs ago VITAL SIGNS    Telemetry:  SR   Vital Signs Last 24 Hrs  T(C): 36.8 (20 @ 05:22), Max: 37.2 (20 @ 20:05)  T(F): 98.3 (20 @ 05:22), Max: 99 (20 @ 20:05)  HR: 100 (20 @ 05:22) (88 - 100)  BP: 127/73 (20 @ 05:22) (126/73 - 149/70)  RR: 18 (20 @ 05:22) (18 - 18)  SpO2: 97% (20 @ 05:22) (96% - 97%)             @ 07:01  -   07:00  --------------------------------------------------------  IN: 760 mL / OUT: 2340 mL / NET: -1580 mL       Daily     Daily Weight in k.2 (02 Mar 2020 08:05)  Admit Wt: Drug Dosing Weight  Height (cm): 172.72 (2020 07:20)  Weight (kg): 81.1 (2020 07:20)  BMI (kg/m2): 27.2 (2020 07:20)  BSA (m2): 1.95 (2020 07:20)      CAPILLARY BLOOD GLUCOSE      POCT Blood Glucose.: 232 mg/dL (02 Mar 2020 09:40)  POCT Blood Glucose.: 268 mg/dL (02 Mar 2020 07:49)  POCT Blood Glucose.: 123 mg/dL (02 Mar 2020 02:01)  POCT Blood Glucose.: 99 mg/dL (01 Mar 2020 21:23)  POCT Blood Glucose.: 238 mg/dL (01 Mar 2020 17:31)  POCT Blood Glucose.: 174 mg/dL (01 Mar 2020 13:58)  POCT Blood Glucose.: 114 mg/dL (01 Mar 2020 12:15)  POCT Blood Glucose.: 70 mg/dL (01 Mar 2020 11:59)  POCT Blood Glucose.: 78 mg/dL (01 Mar 2020 11:37)          MEDICATIONS  acetaminophen   Tablet .. 650 milliGRAM(s) Oral every 6 hours PRN  ALBUTerol    90 MICROgram(s) HFA Inhaler 1 Puff(s) Inhalation every 4 hours  albuterol/ipratropium for Nebulization. 3 milliLiter(s) Nebulizer every 6 hours PRN  albuterol/ipratropium for Nebulization. 3 milliLiter(s) Nebulizer every 6 hours  aMIOdarone    Tablet 200 milliGRAM(s) Oral daily  amitriptyline 10 milliGRAM(s) Oral every 8 hours  artificial tears (preservative free) Ophthalmic Solution 1 Drop(s) Both EYES two times a day  aspirin enteric coated 81 milliGRAM(s) Oral daily  atorvastatin 40 milliGRAM(s) Oral at bedtime  benzonatate 100 milliGRAM(s) Oral every 8 hours  buDESOnide    Inhalation Suspension 0.5 milliGRAM(s) Inhalation two times a day  calcium acetate 667 milliGRAM(s) Oral three times a day with meals  DAPTOmycin IVPB 500 milliGRAM(s) IV Intermittent every 24 hours  furosemide    Tablet 40 milliGRAM(s) Oral daily  guaiFENesin   Syrup  (Sugar-Free) 200 milliGRAM(s) Oral every 6 hours PRN  heparin  Injectable 5000 Unit(s) SubCutaneous every 8 hours  HYDROmorphone   Tablet 2 milliGRAM(s) Oral every 3 hours PRN  insulin glargine Injectable (LANTUS) 50 Unit(s) SubCutaneous at bedtime  insulin lispro (HumaLOG) corrective regimen sliding scale   SubCutaneous Before meals and at bedtime  insulin lispro Injectable (HumaLOG) 24 Unit(s) SubCutaneous three times a day with meals  melatonin 3 milliGRAM(s) Oral at bedtime  metoprolol tartrate 25 milliGRAM(s) Oral every 12 hours  modafinil 100 milliGRAM(s) Oral daily  multivitamin 1 Tablet(s) Oral daily  mupirocin 2% Ointment 1 Application(s) Topical two times a day  oxycodone    5 mG/acetaminophen 325 mG 2 Tablet(s) Oral every 6 hours PRN  pantoprazole    Tablet 40 milliGRAM(s) Oral before breakfast  polyethylene glycol 3350 17 Gram(s) Oral daily  senna 2 Tablet(s) Oral at bedtime  silver sulfADIAZINE 1% Cream 1 Application(s) Topical daily  sodium chloride 0.65% Nasal 1 Spray(s) Both Nostrils three times a day  sodium chloride 0.9% lock flush 10 milliLiter(s) IV Push every 1 hour PRN  spironolactone 25 milliGRAM(s) Oral daily  tiotropium 18 MICROgram(s) Capsule 1 Capsule(s) Inhalation daily      >>> <<<  PHYSICAL EXAM  Subjective: NAD  Neurology: alert and oriented x 3, nonfocal, no gross deficits  CV : s1s2  Sternal Wound : wound vac to inferior pole of incision JPSSx 2  Lungs: CAT b/l  Abdomen: soft, NT,ND, ( +)BM  :  voiding  Extremities: -c/c/e  rue picc c/d/i    LABS  -    131<L>  |  96  |  39<H>  ----------------------------<  211<H>  5.1   |  24  |  1.71<H>    Ca    8.6      02 Mar 2020 06:11                                   8.0    9.61  )-----------( 317      ( 02 Mar 2020 06:11 )             25.9                 PAST MEDICAL & SURGICAL HISTORY:  HTN (hypertension)  Diabetes  History of appendectomy: x 30 yrs ago

## 2020-03-02 NOTE — PROGRESS NOTE ADULT - ASSESSMENT
Assessment  DMT2: 64y Male with DM T2 with hyperglycemia, A1C 9.2%, was on oral meds and insulin at home, now on basal bolus insulin, adjusted dose yesterday, blood sugars running high and not at target, no hypoglycemic episodes. Patient had oreos early this AM, noncompliant with lowcarb diet and eats meals with inconsistent carb intake despite repeated counseling. Sitting up in chair this afternoon, alert, family at bedside.  Foot infection: On Tx, stable.  CAD: s/p CABG 2/3, on medications, no chest pain, stable, monitored.  HTN: Controlled,  on antihypertensive medications.  HLD: Controlled, on statin.  CKD: Monitor labs/BMP          Harper Matias MD  Cell: [phone]  Office: [phone] Assessment  DMT2: 64y Male with DM T2 with hyperglycemia, A1C 9.2%, was on oral meds and insulin at home, now on basal bolus insulin, adjusted dose yesterday, blood sugars running high and not at target, no hypoglycemic episodes.  Patient had oreos early this AM, noncompliant with lowcarb diet and eats meals with inconsistent carb intake despite repeated counseling. Sitting up in chair this afternoon, alert, family at bedside.  Foot infection: On Tx, stable.  CAD: s/p CABG 2/3, on medications, no chest pain, stable, monitored.  HTN: Controlled,  on antihypertensive medications.  HLD: Controlled, on statin.  CKD: Monitor labs/BMP          Harper Matias MD  Cell: [phone]  Office: [phone]

## 2020-03-02 NOTE — PROGRESS NOTE ADULT - ASSESSMENT
65yo male with hx of HTN, DM II   s/p C4L on 2/3; PEA arrest on 2/6   + enterococcus Bld  C/S > started ampicillin q8h, ID consulted,  R pigtail for effusion  2/8    Extubated  2/9  2/15 L pigtail effusion  2/17 PRBC   2/18 Tx 2 Diaz  2/19 thomas removed, trial void. Maintain left pigtail for significant output. Pt encouraged to ambulate. Supplemental o2 sat NC weaned to 2L. Blood cultures repeated.  2/20 VSS -Pulmonary consult - appreciated - duonebs ATC q6h & pulmicort bid initiated - ddimer drawn.  pt w/ hx negative LE dopplers   will order non con chest DT as pt has a loculated left effusion that will require tap at IR.  2/21 - NON con Chest CT done - multiple loculated Left pleural effusions w/ patchy consolidation R - rounds made w/ Dr. carl.  ID - consult called re: Ct - WBC 11 afebrile.  +PALMA.  unable to tolerate VQ scan this am.  will d/c bumex & start Torsemide 20mg po daily  d/c planning rehab when medically stable  2/22  Wound care consult leg wounds> shower w/ local skin care  2/23  SW drainage> on Dapto> as per ID will cover SWD  CXR this am   Tighter glycemic control  2/24 ID added cefapime SW drainage purulent, afebrile  2/25 S/p Sternal wound debridement and irrigation with removal of all 6 sternal wires. Purulence encountered and sent for culture. Closure with bilateral pectoralis muscle advancement flaps.  Post op zari for hypotension- weaned off.  Skin/wd  culture from 2/24 neg  Pt transferred to sdu  2/27 VSS   carlos d/c thomas d/c transfer to floor JPx2  followed  by Plastics  followed by ID on cefepime and daptomycin bc positive for E. faecalis; follow up bc 2/19 negative to date. Provigil daily in am  2/28 VSS; abx as per ID - d/c cefepime and continue dapto 500 iv qd as per Dr. Carrasco- pt will need picc line vs medel for 4 wks abx from date of SWD as per ID; bc 2/19 neg.  d/w h. renal medel vs cath- await decision, diuresis initiated for hypervolemia; hep sq for dvt prophylaxis, + hypoglycemia- endo following- humalog adjusted    Discharge planning- rehab next week   2/29 VSS, distal portion of MSI with small dehiscence and serous purulent drainage - recommended wound vac placement as per Plastics. Left KEI 80ml & Right KEI 80ml/24h. S/P Right PICC line placement for IV abx.   3/1 Wound Vac placed to sternal wound. Pt ambulated in hallway with rolling walker. Left KEI 50ml & Right KEI 110ml/24h. Plan for discharge to Rehab Mon/Tue.   3/2 Pending patient rehab selection. Maintain sternal wound vac placement and KEI drains. Patient lethargic after percocet. Will hold narcotics 65yo male with hx of HTN, DM II   s/p C4L on 2/3; PEA arrest on 2/6   + enterococcus Bld  C/S > started ampicillin q8h, ID consulted,  R pigtail for effusion  2/8    Extubated  2/9  2/15 L pigtail effusion  2/17 PRBC   2/18 Tx 2 Diaz  2/19 thomas removed, trial void. Maintain left pigtail for significant output. Pt encouraged to ambulate. Supplemental o2 sat NC weaned to 2L. Blood cultures repeated.  2/20 VSS -Pulmonary consult - appreciated - duonebs ATC q6h & pulmicort bid initiated - ddimer drawn.  pt w/ hx negative LE dopplers   will order non con chest DT as pt has a loculated left effusion that will require tap at IR.  2/21 - NON con Chest CT done - multiple loculated Left pleural effusions w/ patchy consolidation R - rounds made w/ Dr. carl.  ID - consult called re: Ct - WBC 11 afebrile.  +PALMA.  unable to tolerate VQ scan this am.  will d/c bumex & start Torsemide 20mg po daily  d/c planning rehab when medically stable  2/22  Wound care consult leg wounds> shower w/ local skin care  2/23  SW drainage> on Dapto> as per ID will cover SWD  CXR this am   Tighter glycemic control  2/24 ID added cefapime SW drainage purulent, afebrile  2/25 S/p Sternal wound debridement and irrigation with removal of all 6 sternal wires. Purulence encountered and sent for culture. Closure with bilateral pectoralis muscle advancement flaps.  Post op zari for hypotension- weaned off.  Skin/wd  culture from 2/24 neg  Pt transferred to sdu  2/27 VSS   carlos d/c thomas d/c transfer to floor JPx2  followed  by Plastics  followed by ID on cefepime and daptomycin bc positive for E. faecalis; follow up bc 2/19 negative to date. Provigil daily in am  2/28 VSS; abx as per ID - d/c cefepime and continue dapto 500 iv qd as per Dr. Carrasco- pt will need picc line vs medel for 4 wks abx from date of SWD as per ID; bc 2/19 neg.  d/w h. renal medel vs cath- await decision, diuresis initiated for hypervolemia; hep sq for dvt prophylaxis, + hypoglycemia- endo following- humalog adjusted    Discharge planning- rehab next week   2/29 VSS, distal portion of MSI with small dehiscence and serous purulent drainage - recommended wound vac placement as per Plastics. Left KEI 80ml & Right KEI 80ml/24h. S/P Right PICC line placement for IV abx.   3/1 Wound Vac placed to sternal wound. Pt ambulated in hallway with rolling walker. Left KEI 50ml & Right KEI 110ml/24h. Plan for discharge to Rehab Mon/Tue.   3/2 Pending patient rehab selection. Maintain sternal wound vac placement and KEI drains. Patient lethargic after percocet. Will hold narcotics . Continue with Daptomycin x 4 weeks until 3/22/20

## 2020-03-02 NOTE — PROGRESS NOTE ADULT - ASSESSMENT
A/P: 64M s/p sternal debridement and b/l pectoralis advancement flaps on 2/25. Sternal incision with dehiscence inferiorly and murky serous drainage, more likely fat necrosis, less concerning for infection given time course, lack of culture growth, afebrile, and lack of upwardly-trending WBC    - C/w VAC, MWF changes  - Continue JPs  - Care per CT  - Will cont to follow    Randy Trevizo  PGY-5  Plastic Surgery  [phone]

## 2020-03-02 NOTE — PROGRESS NOTE ADULT - ASSESSMENT
65yo male with hx of HTN, DM II, presented to the ED with complaints of acute on chronic left sided exertional chest pain. Pt states he has been having left sided pressure and stabbing chest pain radiating to his sternum and right with activity for the past few months. Since admission- s/p cath with severe triple vessel disease, s/p CABG, post op course c/b brief witnessed PEA 2/6 likely hypoxic arrest, s/p intubation. ( CAD, s/p cath with severe triple vessel disease including lesions at the bifurcation of the LAD/diagonal and distal RCA/RPDA/RPL trifurcation.   -s/p CABG x 4, intraop kennedy ef 50%)--s/p ampicillin until 2/18 for enterococcus in blood, extubated 2/9, pigtail right 2/8 and left sided on 2/15-for effusion.  Remains sob-NO h/o resp issues prior to hospitalization.  ***Current sob-multifactorial-CAD/effusions, atelectasis due to pain, mild bronchospasm and debility.  ********************  2/21-slightly better, pig tail cath removed from left chest; CT chest done-loculated left effusion-slight better  2/24-BS issues-less sob-dapto/cefepime as per ID  2/25-for debridement of chest area-sternal wound  2/26-debridement completed--to CTU  2/2784-PIS-rkhxzvxaa over all  2/28-chest pain still impacting breathing--plastics/renal endo f/up  2/29-no changes over night  3/1-cough present-has been off BD  3/2-cough present still

## 2020-03-02 NOTE — PROGRESS NOTE ADULT - ASSESSMENT
64 yr old with cri stage 4, DM, PVD, admitted and had CABG, Post op intubated and has bilateral effusions, worsening renal disease and bc positive for E. faecalis; follow up bc negative to date.  Call to see patient for discharge from sternal wound  Presently on treatment for RLE SSTI  CT chest with suspected postsurgical changes     Overall,  1  Sternal wound,  infection plus cellulitis around the svg site on right   - Continue Dapto        needs PICC          -

## 2020-03-02 NOTE — PROGRESS NOTE ADULT - PROBLEM SELECTOR PLAN 1
Will increase Lantus to 50u at bedtime.  Will increase Humalog to 24u before each meal and continue Humalog correction scale coverage. Will continue monitoring FS and FU.  Discussed with nurse not to hold insulin injections.  Patient counseled for compliance with consistent low carb diet, exercise.

## 2020-03-03 LAB
ANION GAP SERPL CALC-SCNC: 12 MMOL/L — SIGNIFICANT CHANGE UP (ref 5–17)
BUN SERPL-MCNC: 37 MG/DL — HIGH (ref 7–23)
CALCIUM SERPL-MCNC: 8.7 MG/DL — SIGNIFICANT CHANGE UP (ref 8.4–10.5)
CHLORIDE SERPL-SCNC: 96 MMOL/L — SIGNIFICANT CHANGE UP (ref 96–108)
CO2 SERPL-SCNC: 23 MMOL/L — SIGNIFICANT CHANGE UP (ref 22–31)
CREAT SERPL-MCNC: 1.72 MG/DL — HIGH (ref 0.5–1.3)
GLUCOSE BLDC GLUCOMTR-MCNC: 109 MG/DL — HIGH (ref 70–99)
GLUCOSE BLDC GLUCOMTR-MCNC: 114 MG/DL — HIGH (ref 70–99)
GLUCOSE BLDC GLUCOMTR-MCNC: 165 MG/DL — HIGH (ref 70–99)
GLUCOSE BLDC GLUCOMTR-MCNC: 210 MG/DL — HIGH (ref 70–99)
GLUCOSE BLDC GLUCOMTR-MCNC: 233 MG/DL — HIGH (ref 70–99)
GLUCOSE SERPL-MCNC: 154 MG/DL — HIGH (ref 70–99)
HCT VFR BLD CALC: 25.1 % — LOW (ref 39–50)
HGB BLD-MCNC: 7.8 G/DL — LOW (ref 13–17)
MCHC RBC-ENTMCNC: 27.2 PG — SIGNIFICANT CHANGE UP (ref 27–34)
MCHC RBC-ENTMCNC: 31.1 GM/DL — LOW (ref 32–36)
MCV RBC AUTO: 87.5 FL — SIGNIFICANT CHANGE UP (ref 80–100)
NRBC # BLD: 0 /100 WBCS — SIGNIFICANT CHANGE UP (ref 0–0)
PLATELET # BLD AUTO: 319 K/UL — SIGNIFICANT CHANGE UP (ref 150–400)
POTASSIUM SERPL-MCNC: 5 MMOL/L — SIGNIFICANT CHANGE UP (ref 3.5–5.3)
POTASSIUM SERPL-SCNC: 5 MMOL/L — SIGNIFICANT CHANGE UP (ref 3.5–5.3)
RBC # BLD: 2.87 M/UL — LOW (ref 4.2–5.8)
RBC # FLD: 14.3 % — SIGNIFICANT CHANGE UP (ref 10.3–14.5)
SODIUM SERPL-SCNC: 131 MMOL/L — LOW (ref 135–145)
WBC # BLD: 9.74 K/UL — SIGNIFICANT CHANGE UP (ref 3.8–10.5)
WBC # FLD AUTO: 9.74 K/UL — SIGNIFICANT CHANGE UP (ref 3.8–10.5)

## 2020-03-03 PROCEDURE — 99232 SBSQ HOSP IP/OBS MODERATE 35: CPT

## 2020-03-03 RX ORDER — INSULIN LISPRO 100/ML
25 VIAL (ML) SUBCUTANEOUS
Refills: 0 | Status: DISCONTINUED | OUTPATIENT
Start: 2020-03-03 | End: 2020-03-04

## 2020-03-03 RX ADMIN — Medication 0.5 MILLIGRAM(S): at 17:41

## 2020-03-03 RX ADMIN — AMIODARONE HYDROCHLORIDE 200 MILLIGRAM(S): 400 TABLET ORAL at 06:40

## 2020-03-03 RX ADMIN — Medication 100 MILLIGRAM(S): at 22:28

## 2020-03-03 RX ADMIN — Medication 1 SPRAY(S): at 13:48

## 2020-03-03 RX ADMIN — Medication 10 MILLIGRAM(S): at 13:47

## 2020-03-03 RX ADMIN — POLYETHYLENE GLYCOL 3350 17 GRAM(S): 17 POWDER, FOR SOLUTION ORAL at 11:35

## 2020-03-03 RX ADMIN — Medication 1 SPRAY(S): at 08:22

## 2020-03-03 RX ADMIN — Medication 3 MILLILITER(S): at 23:55

## 2020-03-03 RX ADMIN — Medication 650 MILLIGRAM(S): at 11:36

## 2020-03-03 RX ADMIN — Medication 1: at 11:31

## 2020-03-03 RX ADMIN — HYDROMORPHONE HYDROCHLORIDE 2 MILLIGRAM(S): 2 INJECTION INTRAMUSCULAR; INTRAVENOUS; SUBCUTANEOUS at 04:38

## 2020-03-03 RX ADMIN — HYDROMORPHONE HYDROCHLORIDE 2 MILLIGRAM(S): 2 INJECTION INTRAMUSCULAR; INTRAVENOUS; SUBCUTANEOUS at 04:06

## 2020-03-03 RX ADMIN — Medication 667 MILLIGRAM(S): at 11:35

## 2020-03-03 RX ADMIN — SPIRONOLACTONE 25 MILLIGRAM(S): 25 TABLET, FILM COATED ORAL at 08:21

## 2020-03-03 RX ADMIN — PANTOPRAZOLE SODIUM 40 MILLIGRAM(S): 20 TABLET, DELAYED RELEASE ORAL at 06:39

## 2020-03-03 RX ADMIN — Medication 100 MILLIGRAM(S): at 06:39

## 2020-03-03 RX ADMIN — SENNA PLUS 2 TABLET(S): 8.6 TABLET ORAL at 22:28

## 2020-03-03 RX ADMIN — INSULIN GLARGINE 50 UNIT(S): 100 INJECTION, SOLUTION SUBCUTANEOUS at 22:14

## 2020-03-03 RX ADMIN — Medication 200 MILLIGRAM(S): at 04:09

## 2020-03-03 RX ADMIN — HEPARIN SODIUM 5000 UNIT(S): 5000 INJECTION INTRAVENOUS; SUBCUTANEOUS at 06:40

## 2020-03-03 RX ADMIN — Medication 1 DROP(S): at 06:40

## 2020-03-03 RX ADMIN — Medication 20 UNIT(S): at 17:33

## 2020-03-03 RX ADMIN — Medication 667 MILLIGRAM(S): at 08:21

## 2020-03-03 RX ADMIN — Medication 650 MILLIGRAM(S): at 12:00

## 2020-03-03 RX ADMIN — MODAFINIL 100 MILLIGRAM(S): 200 TABLET ORAL at 11:42

## 2020-03-03 RX ADMIN — Medication 2: at 17:32

## 2020-03-03 RX ADMIN — Medication 81 MILLIGRAM(S): at 11:36

## 2020-03-03 RX ADMIN — Medication 2: at 08:13

## 2020-03-03 RX ADMIN — Medication 667 MILLIGRAM(S): at 17:34

## 2020-03-03 RX ADMIN — Medication 25 MILLIGRAM(S): at 17:35

## 2020-03-03 RX ADMIN — Medication 1 TABLET(S): at 11:36

## 2020-03-03 RX ADMIN — Medication 3 MILLILITER(S): at 11:33

## 2020-03-03 RX ADMIN — Medication 40 MILLIGRAM(S): at 06:39

## 2020-03-03 RX ADMIN — Medication 3 MILLILITER(S): at 06:39

## 2020-03-03 RX ADMIN — Medication 0.5 MILLIGRAM(S): at 06:39

## 2020-03-03 RX ADMIN — Medication 1 DROP(S): at 17:35

## 2020-03-03 RX ADMIN — Medication 25 MILLIGRAM(S): at 06:54

## 2020-03-03 RX ADMIN — HEPARIN SODIUM 5000 UNIT(S): 5000 INJECTION INTRAVENOUS; SUBCUTANEOUS at 22:28

## 2020-03-03 RX ADMIN — Medication 1 APPLICATION(S): at 11:38

## 2020-03-03 RX ADMIN — Medication 10 MILLIGRAM(S): at 22:28

## 2020-03-03 RX ADMIN — Medication 10 MILLIGRAM(S): at 06:40

## 2020-03-03 RX ADMIN — CHLORHEXIDINE GLUCONATE 1 APPLICATION(S): 213 SOLUTION TOPICAL at 10:00

## 2020-03-03 RX ADMIN — Medication 3 MILLILITER(S): at 17:34

## 2020-03-03 RX ADMIN — Medication 3 MILLIGRAM(S): at 22:28

## 2020-03-03 RX ADMIN — Medication 200 MILLIGRAM(S): at 13:53

## 2020-03-03 RX ADMIN — Medication 20 UNIT(S): at 08:13

## 2020-03-03 RX ADMIN — ATORVASTATIN CALCIUM 40 MILLIGRAM(S): 80 TABLET, FILM COATED ORAL at 22:28

## 2020-03-03 RX ADMIN — MUPIROCIN 1 APPLICATION(S): 20 OINTMENT TOPICAL at 17:36

## 2020-03-03 RX ADMIN — MUPIROCIN 1 APPLICATION(S): 20 OINTMENT TOPICAL at 06:54

## 2020-03-03 RX ADMIN — DAPTOMYCIN 120 MILLIGRAM(S): 500 INJECTION, POWDER, LYOPHILIZED, FOR SOLUTION INTRAVENOUS at 15:45

## 2020-03-03 RX ADMIN — Medication 100 MILLIGRAM(S): at 13:47

## 2020-03-03 RX ADMIN — HEPARIN SODIUM 5000 UNIT(S): 5000 INJECTION INTRAVENOUS; SUBCUTANEOUS at 13:47

## 2020-03-03 RX ADMIN — Medication 20 UNIT(S): at 11:31

## 2020-03-03 RX ADMIN — Medication 3 MILLILITER(S): at 00:44

## 2020-03-03 NOTE — PROGRESS NOTE ADULT - SUBJECTIVE AND OBJECTIVE BOX
Chief complaint  Patient is a 64y old  Male who presents with a chief complaint of Chest pain (03 Mar 2020 11:12)   Review of systems  Patient sitting up in chair on breathing tx this afternoon, looks comfortable, no hypoglycemia.    Labs and Fingersticks  CAPILLARY BLOOD GLUCOSE      POCT Blood Glucose.: 165 mg/dL (03 Mar 2020 11:26)  POCT Blood Glucose.: 233 mg/dL (03 Mar 2020 07:41)  POCT Blood Glucose.: 114 mg/dL (03 Mar 2020 02:22)  POCT Blood Glucose.: 166 mg/dL (02 Mar 2020 21:39)  POCT Blood Glucose.: 170 mg/dL (02 Mar 2020 18:43)  POCT Blood Glucose.: 112 mg/dL (02 Mar 2020 17:11)  POCT Blood Glucose.: 91 mg/dL (02 Mar 2020 16:50)  POCT Blood Glucose.: 80 mg/dL (02 Mar 2020 16:32)      Anion Gap, Serum: 12 (03-03 @ 06:58)  Anion Gap, Serum: 11 (03-02 @ 06:11)      Calcium, Total Serum: 8.7 (03-03 @ 06:58)  Calcium, Total Serum: 8.6 (03-02 @ 06:11)          03-03    131<L>  |  96  |  37<H>  ----------------------------<  154<H>  5.0   |  23  |  1.72<H>    Ca    8.7      03 Mar 2020 06:58                          7.8    9.74  )-----------( 319      ( 03 Mar 2020 06:57 )             25.1     Medications  MEDICATIONS  (STANDING):  ALBUTerol    90 MICROgram(s) HFA Inhaler 1 Puff(s) Inhalation every 4 hours  albuterol/ipratropium for Nebulization. 3 milliLiter(s) Nebulizer every 6 hours  aMIOdarone    Tablet 200 milliGRAM(s) Oral daily  amitriptyline 10 milliGRAM(s) Oral every 8 hours  artificial tears (preservative free) Ophthalmic Solution 1 Drop(s) Both EYES two times a day  aspirin enteric coated 81 milliGRAM(s) Oral daily  atorvastatin 40 milliGRAM(s) Oral at bedtime  benzonatate 100 milliGRAM(s) Oral every 8 hours  buDESOnide    Inhalation Suspension 0.5 milliGRAM(s) Inhalation two times a day  calcium acetate 667 milliGRAM(s) Oral three times a day with meals  chlorhexidine 2% Cloths 1 Application(s) Topical <User Schedule>  DAPTOmycin IVPB 500 milliGRAM(s) IV Intermittent every 24 hours  furosemide    Tablet 40 milliGRAM(s) Oral daily  heparin  Injectable 5000 Unit(s) SubCutaneous every 8 hours  insulin glargine Injectable (LANTUS) 50 Unit(s) SubCutaneous at bedtime  insulin lispro (HumaLOG) corrective regimen sliding scale   SubCutaneous Before meals and at bedtime  insulin lispro Injectable (HumaLOG) 20 Unit(s) SubCutaneous three times a day before meals  melatonin 3 milliGRAM(s) Oral at bedtime  metoprolol tartrate 25 milliGRAM(s) Oral every 12 hours  modafinil 100 milliGRAM(s) Oral daily  multivitamin 1 Tablet(s) Oral daily  mupirocin 2% Ointment 1 Application(s) Topical two times a day  pantoprazole    Tablet 40 milliGRAM(s) Oral before breakfast  polyethylene glycol 3350 17 Gram(s) Oral daily  senna 2 Tablet(s) Oral at bedtime  silver sulfADIAZINE 1% Cream 1 Application(s) Topical daily  sodium chloride 0.65% Nasal 1 Spray(s) Both Nostrils three times a day  spironolactone 25 milliGRAM(s) Oral daily  tiotropium 18 MICROgram(s) Capsule 1 Capsule(s) Inhalation daily      Physical Exam  General: Patient comfortable in bed  Vital Signs Last 12 Hrs  T(F): 98.1 (03-03-20 @ 12:12), Max: 98.3 (03-03-20 @ 02:00)  HR: 92 (03-03-20 @ 12:12) (88 - 94)  BP: 121/76 (03-03-20 @ 12:12) (115/70 - 121/76)  BP(mean): 86 (03-03-20 @ 05:08) (86 - 86)  RR: 18 (03-03-20 @ 12:12) (18 - 18)  SpO2: 97% (03-03-20 @ 12:12) (95% - 97%)  Neck: No palpable thyroid nodules.  CVS: S1S2, No murmurs  Respiratory: No wheezing, no crepitations  GI: Abdomen soft, bowel sounds positive  Musculoskeletal:  edema lower extremities.   Skin: No skin rashes, no ecchymosis    Diagnostics Chief complaint  Patient is a 64y old  Male who presents with a chief complaint of Chest pain (03 Mar 2020 11:12)   Review of systems  Patient sitting up in chair on breathing tx this afternoon,  looks comfortable, no hypoglycemia.    Labs and Fingersticks  CAPILLARY BLOOD GLUCOSE      POCT Blood Glucose.: 165 mg/dL (03 Mar 2020 11:26)  POCT Blood Glucose.: 233 mg/dL (03 Mar 2020 07:41)  POCT Blood Glucose.: 114 mg/dL (03 Mar 2020 02:22)  POCT Blood Glucose.: 166 mg/dL (02 Mar 2020 21:39)  POCT Blood Glucose.: 170 mg/dL (02 Mar 2020 18:43)  POCT Blood Glucose.: 112 mg/dL (02 Mar 2020 17:11)  POCT Blood Glucose.: 91 mg/dL (02 Mar 2020 16:50)  POCT Blood Glucose.: 80 mg/dL (02 Mar 2020 16:32)      Anion Gap, Serum: 12 (03-03 @ 06:58)  Anion Gap, Serum: 11 (03-02 @ 06:11)      Calcium, Total Serum: 8.7 (03-03 @ 06:58)  Calcium, Total Serum: 8.6 (03-02 @ 06:11)          03-03    131<L>  |  96  |  37<H>  ----------------------------<  154<H>  5.0   |  23  |  1.72<H>    Ca    8.7      03 Mar 2020 06:58                          7.8    9.74  )-----------( 319      ( 03 Mar 2020 06:57 )             25.1     Medications  MEDICATIONS  (STANDING):  ALBUTerol    90 MICROgram(s) HFA Inhaler 1 Puff(s) Inhalation every 4 hours  albuterol/ipratropium for Nebulization. 3 milliLiter(s) Nebulizer every 6 hours  aMIOdarone    Tablet 200 milliGRAM(s) Oral daily  amitriptyline 10 milliGRAM(s) Oral every 8 hours  artificial tears (preservative free) Ophthalmic Solution 1 Drop(s) Both EYES two times a day  aspirin enteric coated 81 milliGRAM(s) Oral daily  atorvastatin 40 milliGRAM(s) Oral at bedtime  benzonatate 100 milliGRAM(s) Oral every 8 hours  buDESOnide    Inhalation Suspension 0.5 milliGRAM(s) Inhalation two times a day  calcium acetate 667 milliGRAM(s) Oral three times a day with meals  chlorhexidine 2% Cloths 1 Application(s) Topical <User Schedule>  DAPTOmycin IVPB 500 milliGRAM(s) IV Intermittent every 24 hours  furosemide    Tablet 40 milliGRAM(s) Oral daily  heparin  Injectable 5000 Unit(s) SubCutaneous every 8 hours  insulin glargine Injectable (LANTUS) 50 Unit(s) SubCutaneous at bedtime  insulin lispro (HumaLOG) corrective regimen sliding scale   SubCutaneous Before meals and at bedtime  insulin lispro Injectable (HumaLOG) 20 Unit(s) SubCutaneous three times a day before meals  melatonin 3 milliGRAM(s) Oral at bedtime  metoprolol tartrate 25 milliGRAM(s) Oral every 12 hours  modafinil 100 milliGRAM(s) Oral daily  multivitamin 1 Tablet(s) Oral daily  mupirocin 2% Ointment 1 Application(s) Topical two times a day  pantoprazole    Tablet 40 milliGRAM(s) Oral before breakfast  polyethylene glycol 3350 17 Gram(s) Oral daily  senna 2 Tablet(s) Oral at bedtime  silver sulfADIAZINE 1% Cream 1 Application(s) Topical daily  sodium chloride 0.65% Nasal 1 Spray(s) Both Nostrils three times a day  spironolactone 25 milliGRAM(s) Oral daily  tiotropium 18 MICROgram(s) Capsule 1 Capsule(s) Inhalation daily      Physical Exam  General: Patient comfortable in bed  Vital Signs Last 12 Hrs  T(F): 98.1 (03-03-20 @ 12:12), Max: 98.3 (03-03-20 @ 02:00)  HR: 92 (03-03-20 @ 12:12) (88 - 94)  BP: 121/76 (03-03-20 @ 12:12) (115/70 - 121/76)  BP(mean): 86 (03-03-20 @ 05:08) (86 - 86)  RR: 18 (03-03-20 @ 12:12) (18 - 18)  SpO2: 97% (03-03-20 @ 12:12) (95% - 97%)  Neck: No palpable thyroid nodules.  CVS: S1S2, No murmurs  Respiratory: No wheezing, no crepitations  GI: Abdomen soft, bowel sounds positive  Musculoskeletal:  edema lower extremities.   Skin: No skin rashes, no ecchymosis    Diagnostics

## 2020-03-03 NOTE — PROGRESS NOTE ADULT - PROBLEM SELECTOR PLAN 1
Will continue current insulin regimen. Will continue monitoring FS and FU.  Discussed with nurse not to hold insulin injections.  Patient counseled for compliance with consistent low carb diet, exercise as tolerated outpatient. Will increase Lantus to 50 units at bed time.  Will increase Humalog to 25 units before each meal in addition to Humalog correction scale coverage.  Patient counseled for compliance with consistent low carb diet, exercise.   Discussed with nurse not to hold insulin injections.  Patient counseled for compliance with consistent low carb diet, exercise as tolerated outpatient.

## 2020-03-03 NOTE — PROGRESS NOTE ADULT - SUBJECTIVE AND OBJECTIVE BOX
CHIEF COMPLAINT:  f/up sob, resp failure, pleural effusions, atelectasis-less cough but pain still exists-some sob w/ pain    Interval Events: wound vac    REVIEW OF SYSTEMS:  Constitutional: No fevers or chills. No weight loss. + fatigue or generalized malaise.  Eyes: No itching or discharge from the eyes  ENT: No ear pain. No ear discharge. No nasal congestion. No post nasal drip. No epistaxis. No throat pain. No sore throat. No difficulty swallowing.   CV: No chest pain. No palpitations. No lightheadedness or dizziness.   Resp: No dyspnea at rest. + dyspnea on exertion. No orthopnea. No wheezing. No cough. No stridor. No sputum production. + chest pain with respiration.  GI: No nausea. No vomiting. No diarrhea.  MSK: No joint pain or pain in any extremities  Integumentary: No skin lesions. + pedal edema.  Neurological: No gross motor weakness. No sensory changes.  [+ ] All other systems negative  [ ] Unable to assess ROS because ________    OBJECTIVE:  ICU Vital Signs Last 24 Hrs  T(C): 36.8 (03 Mar 2020 02:00), Max: 36.8 (02 Mar 2020 05:22)  T(F): 98.3 (03 Mar 2020 02:00), Max: 98.3 (02 Mar 2020 05:22)  HR: 88 (03 Mar 2020 02:00) (88 - 101)  BP: 115/70 (03 Mar 2020 02:00) (115/70 - 135/75)  BP(mean): 91 (02 Mar 2020 05:22) (91 - 91)  ABP: --  ABP(mean): --  RR: 18 (03 Mar 2020 02:00) (18 - 18)  SpO2: 96% (03 Mar 2020 02:00) (93% - 97%)        03-01 @ 07:01  -  03-02 @ 07:00  --------------------------------------------------------  IN: 760 mL / OUT: 2340 mL / NET: -1580 mL    03-02 @ 07:01  -  03-03 @ 04:55  --------------------------------------------------------  IN: 980 mL / OUT: 2220 mL / NET: -1240 mL      CAPILLARY BLOOD GLUCOSE      POCT Blood Glucose.: 114 mg/dL (03 Mar 2020 02:22)      PHYSICAL EXAM: NAD in chair on NC O2  General: Awake, alert, oriented X 3.   HEENT: Atraumatic, normocephalic.                 Mallampatti Grade 3                No nasal congestion.                No tonsillar or pharyngeal exudates.  Lymph Nodes: No palpable lymphadenopathy  Neck: No JVD. No carotid bruit.   Respiratory: abnormal chest expansion                         Normal percussion                         Normal and equal air entry                         No wheeze, rhonchi or rales.  Cardiovascular: S1 S2 normal. No murmurs, rubs or gallops.   Abdomen: Soft, non-tender, non-distended. No organomegaly. Normoactive bowel sounds.  Extremities: Warm to touch. Peripheral pulse palpable. + pedal edema.   Skin: No rashes or skin lesions  Neurological: Motor and sensory examination equal and normal in all four extremities.  Psychiatry: Appropriate mood and affect.    HOSPITAL MEDICATIONS:  MEDICATIONS  (STANDING):  ALBUTerol    90 MICROgram(s) HFA Inhaler 1 Puff(s) Inhalation every 4 hours  albuterol/ipratropium for Nebulization. 3 milliLiter(s) Nebulizer every 6 hours  aMIOdarone    Tablet 200 milliGRAM(s) Oral daily  amitriptyline 10 milliGRAM(s) Oral every 8 hours  artificial tears (preservative free) Ophthalmic Solution 1 Drop(s) Both EYES two times a day  aspirin enteric coated 81 milliGRAM(s) Oral daily  atorvastatin 40 milliGRAM(s) Oral at bedtime  benzonatate 100 milliGRAM(s) Oral every 8 hours  buDESOnide    Inhalation Suspension 0.5 milliGRAM(s) Inhalation two times a day  calcium acetate 667 milliGRAM(s) Oral three times a day with meals  chlorhexidine 2% Cloths 1 Application(s) Topical <User Schedule>  DAPTOmycin IVPB 500 milliGRAM(s) IV Intermittent every 24 hours  furosemide    Tablet 40 milliGRAM(s) Oral daily  heparin  Injectable 5000 Unit(s) SubCutaneous every 8 hours  insulin glargine Injectable (LANTUS) 50 Unit(s) SubCutaneous at bedtime  insulin lispro (HumaLOG) corrective regimen sliding scale   SubCutaneous Before meals and at bedtime  insulin lispro Injectable (HumaLOG) 20 Unit(s) SubCutaneous three times a day before meals  melatonin 3 milliGRAM(s) Oral at bedtime  metoprolol tartrate 25 milliGRAM(s) Oral every 12 hours  modafinil 100 milliGRAM(s) Oral daily  multivitamin 1 Tablet(s) Oral daily  mupirocin 2% Ointment 1 Application(s) Topical two times a day  pantoprazole    Tablet 40 milliGRAM(s) Oral before breakfast  polyethylene glycol 3350 17 Gram(s) Oral daily  senna 2 Tablet(s) Oral at bedtime  silver sulfADIAZINE 1% Cream 1 Application(s) Topical daily  sodium chloride 0.65% Nasal 1 Spray(s) Both Nostrils three times a day  spironolactone 25 milliGRAM(s) Oral daily  tiotropium 18 MICROgram(s) Capsule 1 Capsule(s) Inhalation daily    MEDICATIONS  (PRN):  acetaminophen   Tablet .. 650 milliGRAM(s) Oral every 6 hours PRN Mild Pain (1 - 3)  albuterol/ipratropium for Nebulization. 3 milliLiter(s) Nebulizer every 6 hours PRN Shortness of Breath and/or Wheezing  guaiFENesin   Syrup  (Sugar-Free) 200 milliGRAM(s) Oral every 6 hours PRN Cough  HYDROmorphone   Tablet 2 milliGRAM(s) Oral every 3 hours PRN Moderate Pain (4 - 6)  oxycodone    5 mG/acetaminophen 325 mG 1 Tablet(s) Oral every 6 hours PRN Severe Pain (7 - 10)  sodium chloride 0.9% lock flush 10 milliLiter(s) IV Push every 1 hour PRN Pre/post blood products, medications, blood draw, and to maintain line patency      LABS:                        8.0    9.61  )-----------( 317      ( 02 Mar 2020 06:11 )             25.9     03-02    131<L>  |  96  |  39<H>  ----------------------------<  211<H>  5.1   |  24  |  1.71<H>    Ca    8.6      02 Mar 2020 06:11                MICROBIOLOGY:     RADIOLOGY:  [ ] Reviewed and interpreted by me    Point of Care Ultrasound Findings:    PFT:    EKG:

## 2020-03-03 NOTE — PROGRESS NOTE ADULT - SUBJECTIVE AND OBJECTIVE BOX
VITAL SIGNS-Telemetry:  SR/ST   Vital Signs Last 24 Hrs  T(C): 36.5 (20 @ 05:08), Max: 36.8 (20 @ 02:00)  T(F): 97.7 (20 @ 05:08), Max: 98.3 (20 @ 02:00)  HR: 94 (20 @ 05:08) (88 - 101)  BP: 115/72 (20 @ 05:08) (115/70 - 135/75)  RR: 18 (20 @ 05:08) (18 - 18)  SpO2: 95% (20 @ 05:08) (93% - 96%)          @ 07:01  -   @ 07:00  --------------------------------------------------------  IN: 980 mL / OUT: 2220 mL / NET: -1240 mL     @ 07:01  -   @ 11:13  --------------------------------------------------------  IN: 240 mL / OUT: 1000 mL / NET: -760 mL    Daily     Daily Weight in k.3 (03 Mar 2020 08:11)    CAPILLARY BLOOD GLUCOSE  POCT Blood Glucose.: 233 mg/dL (03 Mar 2020 07:41)  POCT Blood Glucose.: 114 mg/dL (03 Mar 2020 02:22)  POCT Blood Glucose.: 166 mg/dL (02 Mar 2020 21:39)  POCT Blood Glucose.: 170 mg/dL (02 Mar 2020 18:43)  POCT Blood Glucose.: 112 mg/dL (02 Mar 2020 17:11)        Drains:     JENNY Right -(x) Drainage: 10/50cc total               JENNY Left [ x ]  Drainage:    5/20 total        Pacing Wires        [  ]   Settings:                                  Isolated  [  ]  Coumadin    [ ] YES          [  ]      NO         Reason:       PHYSICAL EXAM:  Neurology: alert and oriented x 3, nonfocal, no gross deficits  CV : S1S2  Sternal Wound :  CDI , Stable-vac in place - jenny x 2 -ss  Lungs: CTA  Abdomen: soft, nontender, nondistended, positive bowel sounds, last bowel movement         Extremities:   + pitting edema b/l  RLE reddened w/ slight improvement. no calf tenderness - inc cdi      acetaminophen   Tablet .. 650 milliGRAM(s) Oral every 6 hours PRN  ALBUTerol    90 MICROgram(s) HFA Inhaler 1 Puff(s) Inhalation every 4 hours  albuterol/ipratropium for Nebulization. 3 milliLiter(s) Nebulizer every 6 hours PRN  albuterol/ipratropium for Nebulization. 3 milliLiter(s) Nebulizer every 6 hours  aMIOdarone    Tablet 200 milliGRAM(s) Oral daily  amitriptyline 10 milliGRAM(s) Oral every 8 hours  artificial tears (preservative free) Ophthalmic Solution 1 Drop(s) Both EYES two times a day  aspirin enteric coated 81 milliGRAM(s) Oral daily  atorvastatin 40 milliGRAM(s) Oral at bedtime  benzonatate 100 milliGRAM(s) Oral every 8 hours  buDESOnide    Inhalation Suspension 0.5 milliGRAM(s) Inhalation two times a day  calcium acetate 667 milliGRAM(s) Oral three times a day with meals  chlorhexidine 2% Cloths 1 Application(s) Topical <User Schedule>  DAPTOmycin IVPB 500 milliGRAM(s) IV Intermittent every 24 hours  furosemide    Tablet 40 milliGRAM(s) Oral daily  guaiFENesin   Syrup  (Sugar-Free) 200 milliGRAM(s) Oral every 6 hours PRN  heparin  Injectable 5000 Unit(s) SubCutaneous every 8 hours  HYDROmorphone   Tablet 2 milliGRAM(s) Oral every 3 hours PRN  insulin glargine Injectable (LANTUS) 50 Unit(s) SubCutaneous at bedtime  insulin lispro (HumaLOG) corrective regimen sliding scale   SubCutaneous Before meals and at bedtime  insulin lispro Injectable (HumaLOG) 20 Unit(s) SubCutaneous three times a day before meals  melatonin 3 milliGRAM(s) Oral at bedtime  metoprolol tartrate 25 milliGRAM(s) Oral every 12 hours  modafinil 100 milliGRAM(s) Oral daily  multivitamin 1 Tablet(s) Oral daily  mupirocin 2% Ointment 1 Application(s) Topical two times a day  oxycodone    5 mG/acetaminophen 325 mG 1 Tablet(s) Oral every 6 hours PRN  pantoprazole    Tablet 40 milliGRAM(s) Oral before breakfast  polyethylene glycol 3350 17 Gram(s) Oral daily  senna 2 Tablet(s) Oral at bedtime  silver sulfADIAZINE 1% Cream 1 Application(s) Topical daily  sodium chloride 0.65% Nasal 1 Spray(s) Both Nostrils three times a day  sodium chloride 0.9% lock flush 10 milliLiter(s) IV Push every 1 hour PRN  spironolactone 25 milliGRAM(s) Oral daily  tiotropium 18 MICROgram(s) Capsule 1 Capsule(s) Inhalation daily    Physical Therapy Rec:   Home  [  ]   Home w/ PT  [  ]  Rehab  [x  ]  Discussed with Cardiothoracic Team at AM rounds.

## 2020-03-03 NOTE — PROGRESS NOTE ADULT - ASSESSMENT
65yo male with hx of HTN, DM II, presented to the ED with complaints of acute on chronic left sided exertional chest pain. Pt states he has been having left sided pressure and stabbing chest pain radiating to his sternum and right with activity for the past few months. Since admission- s/p cath with severe triple vessel disease, s/p CABG, post op course c/b brief witnessed PEA 2/6 likely hypoxic arrest, s/p intubation. ( CAD, s/p cath with severe triple vessel disease including lesions at the bifurcation of the LAD/diagonal and distal RCA/RPDA/RPL trifurcation.   -s/p CABG x 4, intraop kennedy ef 50%)--s/p ampicillin until 2/18 for enterococcus in blood, extubated 2/9, pigtail right 2/8 and left sided on 2/15-for effusion.  Remains sob-NO h/o resp issues prior to hospitalization.  ***Current sob-multifactorial-CAD/effusions, atelectasis due to pain, mild bronchospasm and debility.  ********************  2/21-slightly better, pig tail cath removed from left chest; CT chest done-loculated left effusion-slight better  2/24-BS issues-less sob-dapto/cefepime as per ID  2/25-for debridement of chest area-sternal wound  2/26-debridement completed--to CTU  2/2706-YID-aakfyoktx over all  2/28-chest pain still impacting breathing--plastics/renal endo f/up  2/29-no changes over night  3/1-cough present-has been off BD  3/2-cough present still  3/3-improved cough on amitriptyline

## 2020-03-03 NOTE — PROGRESS NOTE ADULT - ASSESSMENT
65yo male with hx of HTN, DM II   s/p C4L on 2/3; PEA arrest on 2/6   + enterococcus Bld  C/S > started ampicillin q8h, ID consulted,  R pigtail for effusion  2/8    Extubated  2/9  2/15 L pigtail effusion  2/17 PRBC   2/18 Tx 2 Diaz  2/19 thomas removed, trial void. Maintain left pigtail for significant output. Pt encouraged to ambulate. Supplemental o2 sat NC weaned to 2L. Blood cultures repeated.  2/20 VSS -Pulmonary consult - appreciated - duonebs ATC q6h & pulmicort bid initiated - ddimer drawn.  pt w/ hx negative LE dopplers   will order non con chest DT as pt has a loculated left effusion that will require tap at IR.  2/21 - NON con Chest CT done - multiple loculated Left pleural effusions w/ patchy consolidation R - rounds made w/ Dr. carl.  ID - consult called re: Ct - WBC 11 afebrile.  +PALMA.  unable to tolerate VQ scan this am.  will d/c bumex & start Torsemide 20mg po daily  d/c planning rehab when medically stable  2/22  Wound care consult leg wounds> shower w/ local skin care  2/23  SW drainage> on Dapto> as per ID will cover SWD  CXR this am   Tighter glycemic control  2/24 ID added cefapime SW drainage purulent, afebrile  2/25 S/p Sternal wound debridement and irrigation with removal of all 6 sternal wires. Purulence encountered and sent for culture. Closure with bilateral pectoralis muscle advancement flaps.  Post op zari for hypotension- weaned off.  Skin/wd  culture from 2/24 neg  Pt transferred to sdu  2/27 VSS   carlos d/c thomas d/c transfer to floor JPx2  followed  by Plastics  followed by ID on cefepime and daptomycin bc positive for E. faecalis; follow up bc 2/19 negative to date. Provigil daily in am  2/28 VSS; abx as per ID - d/c cefepime and continue dapto 500 iv qd as per Dr. Carrasco- pt will need picc line vs medel for 4 wks abx from date of SWD as per ID; bc 2/19 neg.  d/w h. renal medel vs cath- await decision, diuresis initiated for hypervolemia; hep sq for dvt prophylaxis, + hypoglycemia- endo following- humalog adjusted    Discharge planning- rehab next week   2/29 VSS, distal portion of MSI with small dehiscence and serous purulent drainage - recommended wound vac placement as per Plastics. Left KEI 80ml & Right KEI 80ml/24h. S/P Right PICC line placement for IV abx.   3/1 Wound Vac placed to sternal wound. Pt ambulated in hallway with rolling walker. Left KEI 50ml & Right KEI 110ml/24h. Plan for discharge to Rehab Mon/Tue.   3/2 Pending patient rehab selection. Maintain sternal wound vac placement and KEI drains. Patient lethargic after percocet. Will hold narcotics . Continue with Daptomycin x 4 weeks until 3/22/20  3/3 VSS - call to ID re: alternative antibiotic to Daptomycin - Dr. Carrasco to follow up . d/c to rehab

## 2020-03-03 NOTE — PROGRESS NOTE ADULT - ASSESSMENT
intraop kennedy 2/3/20 ef 50%, nl lv, mild diastolic dysfx stage 1  limited echo 2/4/20: nl LV sys fx , no pericardial effusion     a/p  64 year old man with history of HTN, DM II, admitted with progressive exertional angina, s/p cath with severe triple vessel disease, s/p CABG, post op course c/b brief witnessed PEA likely hypoxic arrest, s/p intubation.     1. CAD, s/p cath with severe triple vessel disease including lesions at the bifurcation of the LAD/diagonal and distal RCA/RPDA/RPL trifurcation.   -s/p CABG x 4, cont asa, statin, BB  -s/p PEA arrest   -echo 2/15 with preserved lv fxn/EF    2. Sternal wound infection  -s/p RTOR for SWI  -abx per id, wound vac     3. Postop acute diastolic HF  -diuretics per cts     4. PAF  -cont amio, bb  -AC ?per CTS , currently on asa    5. HTN  -bp stable    6. STEPHANIE/CKD  -stable, renal f/u     7. Postop Pleural effusion  -S/P Right/Left chest tube     8. Sepsis -E. Faecalis bacteremia, SW infection, RLE cellulitis   -IV abx , repeat bcx 2/13 neg, appears more lethargic today , CTS to f/u   -s/p debridement   -ID, plastics f/u   -PICC line       dvt ppx

## 2020-03-03 NOTE — PROGRESS NOTE ADULT - ASSESSMENT
Assessment  DMT2: 64y Male with DM T2 with hyperglycemia, A1C 9.2%, was on oral meds and insulin at home, now on basal bolus insulin, increased dose yesterday, blood sugars improving though still fluctuating, no hypoglycemic episodes. Patient eating meals with inconsistent carb intake, appears comfortable.  Foot infection: On Tx, stable.  CAD: s/p CABG 2/3, on medications, no chest pain, stable, monitored.  HTN: Controlled,  on antihypertensive medications.  HLD: Controlled, on statin.  CKD: Monitor labs/BMP          Harper Matias MD  Cell: [phone]  Office: [phone] Assessment  DMT2: 64y Male with DM T2 with hyperglycemia, A1C 9.2%, was on oral meds and insulin at home, now on basal bolus insulin, increased dose yesterday, blood sugars improving though still fluctuating, no hypoglycemic episodes.  Patient is eating meals with inconsistent carb intake, family at bed side, appears comfortable.  Foot infection: On Tx, stable.  CAD: s/p CABG 2/3, on medications, no chest pain, stable, monitored.  HTN: Controlled,  on antihypertensive medications.  HLD: Controlled, on statin.  CKD: Monitor labs/BMP          Harper Matias MD  Cell: [phone]  Office: [phone]

## 2020-03-03 NOTE — PROGRESS NOTE ADULT - SUBJECTIVE AND OBJECTIVE BOX
CARDIOLOGY FOLLOW UP - Dr. Steel    CC pleuritic cp, generalized weakness, + cough        PHYSICAL EXAM:  T(C): 36.5 (03-03-20 @ 05:08), Max: 36.8 (03-03-20 @ 02:00)  HR: 94 (03-03-20 @ 05:08) (88 - 101)  BP: 115/72 (03-03-20 @ 05:08) (115/70 - 135/75)  RR: 18 (03-03-20 @ 05:08) (18 - 18)  SpO2: 95% (03-03-20 @ 05:08) (93% - 96%)  Wt(kg): --  I&O's Summary    02 Mar 2020 07:01  -  03 Mar 2020 07:00  --------------------------------------------------------  IN: 980 mL / OUT: 2220 mL / NET: -1240 mL    03 Mar 2020 07:01  -  03 Mar 2020 10:53  --------------------------------------------------------  IN: 240 mL / OUT: 1000 mL / NET: -760 mL        Appearance: appears more lethargic today  Cardiovascular: Normal S1 S2,RRR, No JVD, No murmurs  Respiratory: crackles   Gastrointestinal:  Soft, Non-tender, + BS	  Extremities: le edema ++,, sternal wound dressing/ vac placement and KEI drains.        MEDICATIONS  (STANDING):  ALBUTerol    90 MICROgram(s) HFA Inhaler 1 Puff(s) Inhalation every 4 hours  albuterol/ipratropium for Nebulization. 3 milliLiter(s) Nebulizer every 6 hours  aMIOdarone    Tablet 200 milliGRAM(s) Oral daily  amitriptyline 10 milliGRAM(s) Oral every 8 hours  artificial tears (preservative free) Ophthalmic Solution 1 Drop(s) Both EYES two times a day  aspirin enteric coated 81 milliGRAM(s) Oral daily  atorvastatin 40 milliGRAM(s) Oral at bedtime  benzonatate 100 milliGRAM(s) Oral every 8 hours  buDESOnide    Inhalation Suspension 0.5 milliGRAM(s) Inhalation two times a day  calcium acetate 667 milliGRAM(s) Oral three times a day with meals  chlorhexidine 2% Cloths 1 Application(s) Topical <User Schedule>  DAPTOmycin IVPB 500 milliGRAM(s) IV Intermittent every 24 hours  furosemide    Tablet 40 milliGRAM(s) Oral daily  heparin  Injectable 5000 Unit(s) SubCutaneous every 8 hours  insulin glargine Injectable (LANTUS) 50 Unit(s) SubCutaneous at bedtime  insulin lispro (HumaLOG) corrective regimen sliding scale   SubCutaneous Before meals and at bedtime  insulin lispro Injectable (HumaLOG) 20 Unit(s) SubCutaneous three times a day before meals  melatonin 3 milliGRAM(s) Oral at bedtime  metoprolol tartrate 25 milliGRAM(s) Oral every 12 hours  modafinil 100 milliGRAM(s) Oral daily  multivitamin 1 Tablet(s) Oral daily  mupirocin 2% Ointment 1 Application(s) Topical two times a day  pantoprazole    Tablet 40 milliGRAM(s) Oral before breakfast  polyethylene glycol 3350 17 Gram(s) Oral daily  senna 2 Tablet(s) Oral at bedtime  silver sulfADIAZINE 1% Cream 1 Application(s) Topical daily  sodium chloride 0.65% Nasal 1 Spray(s) Both Nostrils three times a day  spironolactone 25 milliGRAM(s) Oral daily  tiotropium 18 MICROgram(s) Capsule 1 Capsule(s) Inhalation daily      TELEMETRY: nsr 	    ECG:  	  RADIOLOGY:   DIAGNOSTIC TESTING:  [ ] Echocardiogram:  [ ]  Catheterization:  [ ] Stress Test:    OTHER: 	    LABS:	 	    Creatine Kinase, Serum: 78 U/L [30 - 200] (03-01 @ 04:44)                          7.8    9.74  )-----------( 319      ( 03 Mar 2020 06:57 )             25.1     03-03    131<L>  |  96  |  37<H>  ----------------------------<  154<H>  5.0   |  23  |  1.72<H>    Ca    8.7      03 Mar 2020 06:58

## 2020-03-03 NOTE — PROGRESS NOTE ADULT - ATTENDING COMMENTS
as above-s/p debridement 2/25 of sternal area--RENAL--PO diuretics for anasarca; slightly stronger-cough pronounced--? tracheomalacia----better on amitriptyline 10 q 8 for better control  multifactorial dyspnea-CAD s/p CABG, PEA arrest, atelectasis due to pain, pleural effusion L-pig tail out, bronchospasm, ?PE-O2 NC sat above 90%                     Pleural effusion-pig tail removed 2/20-CT chest NC-improved but still loculations on left-f/up 4-6 wks  CAD/CHF-diurese as cr allows-keep K/Mg above  atelectasis-pain control, incentive spirometry, acapella                    ? DVT/PE--s/p repeat venous dopplers-negative; VQ unable  bronchospasm-duoneb q 6, pulmicort .5 bid; add tessalon perles 200 q 8 and mucinex;  out pt PFTs              ID-daptomycin as per ID  snore-? osas--out pt SS  DVT/GI prophylaxis, PT, nutrition evaln            PT  Mike Hernandez MD-Pulmonary    [phone]

## 2020-03-03 NOTE — PROGRESS NOTE ADULT - SUBJECTIVE AND OBJECTIVE BOX
infectious diseases progress note:    Patient is a 64y old  Male who presents with a chief complaint of Chest pain (03 Mar 2020 04:55)        Acute ischemic heart disease             Allergies    No Known Allergies    Intolerances        ANTIBIOTICS/RELEVANT:  antimicrobials  DAPTOmycin IVPB 500 milliGRAM(s) IV Intermittent every 24 hours    immunologic:    OTHER:  acetaminophen   Tablet .. 650 milliGRAM(s) Oral every 6 hours PRN  ALBUTerol    90 MICROgram(s) HFA Inhaler 1 Puff(s) Inhalation every 4 hours  albuterol/ipratropium for Nebulization. 3 milliLiter(s) Nebulizer every 6 hours PRN  albuterol/ipratropium for Nebulization. 3 milliLiter(s) Nebulizer every 6 hours  aMIOdarone    Tablet 200 milliGRAM(s) Oral daily  amitriptyline 10 milliGRAM(s) Oral every 8 hours  artificial tears (preservative free) Ophthalmic Solution 1 Drop(s) Both EYES two times a day  aspirin enteric coated 81 milliGRAM(s) Oral daily  atorvastatin 40 milliGRAM(s) Oral at bedtime  benzonatate 100 milliGRAM(s) Oral every 8 hours  buDESOnide    Inhalation Suspension 0.5 milliGRAM(s) Inhalation two times a day  calcium acetate 667 milliGRAM(s) Oral three times a day with meals  chlorhexidine 2% Cloths 1 Application(s) Topical <User Schedule>  furosemide    Tablet 40 milliGRAM(s) Oral daily  guaiFENesin   Syrup  (Sugar-Free) 200 milliGRAM(s) Oral every 6 hours PRN  heparin  Injectable 5000 Unit(s) SubCutaneous every 8 hours  HYDROmorphone   Tablet 2 milliGRAM(s) Oral every 3 hours PRN  insulin glargine Injectable (LANTUS) 50 Unit(s) SubCutaneous at bedtime  insulin lispro (HumaLOG) corrective regimen sliding scale   SubCutaneous Before meals and at bedtime  insulin lispro Injectable (HumaLOG) 20 Unit(s) SubCutaneous three times a day before meals  melatonin 3 milliGRAM(s) Oral at bedtime  metoprolol tartrate 25 milliGRAM(s) Oral every 12 hours  modafinil 100 milliGRAM(s) Oral daily  multivitamin 1 Tablet(s) Oral daily  mupirocin 2% Ointment 1 Application(s) Topical two times a day  oxycodone    5 mG/acetaminophen 325 mG 1 Tablet(s) Oral every 6 hours PRN  pantoprazole    Tablet 40 milliGRAM(s) Oral before breakfast  polyethylene glycol 3350 17 Gram(s) Oral daily  senna 2 Tablet(s) Oral at bedtime  silver sulfADIAZINE 1% Cream 1 Application(s) Topical daily  sodium chloride 0.65% Nasal 1 Spray(s) Both Nostrils three times a day  sodium chloride 0.9% lock flush 10 milliLiter(s) IV Push every 1 hour PRN  spironolactone 25 milliGRAM(s) Oral daily  tiotropium 18 MICROgram(s) Capsule 1 Capsule(s) Inhalation daily      Objective:  Vital Signs Last 24 Hrs  T(C): 36.5 (03 Mar 2020 05:08), Max: 36.8 (03 Mar 2020 02:00)  T(F): 97.7 (03 Mar 2020 05:08), Max: 98.3 (03 Mar 2020 02:00)  HR: 94 (03 Mar 2020 05:08) (88 - 101)  BP: 115/72 (03 Mar 2020 05:08) (115/70 - 135/75)  BP(mean): 86 (03 Mar 2020 05:08) (86 - 86)  RR: 18 (03 Mar 2020 05:08) (18 - 18)  SpO2: 95% (03 Mar 2020 05:08) (93% - 96%)       Eyes:DANIELA, EOMI  Ear/Nose/Throat: no oral lesion, no sinus tenderness on percussion	  Neck:no JVD, no lymphadenopathy, supple  Respiratory: CTA lanre  Cardiovascular: S1S2 RRR, no murmurs  Gastrointestinal:soft, (+) BS, no HSM  Extremities:no e/e/c        LABS:                        7.8    9.74  )-----------( 319      ( 03 Mar 2020 06:57 )             25.1     03-02    131<L>  |  96  |  39<H>  ----------------------------<  211<H>  5.1   |  24  |  1.71<H>    Ca    8.6      02 Mar 2020 06:11              MICROBIOLOGY:    RECENT CULTURES:  02-26 @ 01:29 .Other Other                Testing in progress          RESPIRATORY CULTURES:              RADIOLOGY & ADDITIONAL STUDIES:        Pager [phone]  After 5 pm/weekends or if no response :[phone]

## 2020-03-03 NOTE — PROGRESS NOTE ADULT - ASSESSMENT
64 yr old with cri stage 4, DM, PVD, admitted and had CABG, Post op intubated and has bilateral effusions, worsening renal disease and bc positive for E. faecalis; follow up bc negative to date.  Call to see patient for discharge from sternal wound  Presently on treatment for RLE SSTI  CT chest with suspected postsurgical changes     Overall,  1  Sternal wound,  infection plus cellulitis around the svg site on right   the cellulits of leg is resolved  still with opne wd that has not healed.  - Continue Dapto   plan 4 weeks post flap   weekly cpk     needs PICC          -

## 2020-03-03 NOTE — PROGRESS NOTE ADULT - PROBLEM SELECTOR PLAN 6
2/25 s/p sternal wound debridement/flap with plastic surgery  ID and plastic surgery following  continue 2 KIE's as per plastic surgery - monitor output  Continue on IV Dapto 500mg daily  Wound vac to distal pole of MSI by PT

## 2020-03-03 NOTE — PROGRESS NOTE ADULT - PROBLEM SELECTOR PLAN 2
Continue diabetic diet  endocrine following, Dr. Matias  continue lantus 30 HS and humalog 18 TID   accuchecks ac and hs

## 2020-03-04 DIAGNOSIS — E83.39 OTHER DISORDERS OF PHOSPHORUS METABOLISM: ICD-10-CM

## 2020-03-04 DIAGNOSIS — R05 COUGH: ICD-10-CM

## 2020-03-04 LAB
ANION GAP SERPL CALC-SCNC: 12 MMOL/L — SIGNIFICANT CHANGE UP (ref 5–17)
BUN SERPL-MCNC: 38 MG/DL — HIGH (ref 7–23)
CALCIUM SERPL-MCNC: 8.9 MG/DL — SIGNIFICANT CHANGE UP (ref 8.4–10.5)
CHLORIDE SERPL-SCNC: 97 MMOL/L — SIGNIFICANT CHANGE UP (ref 96–108)
CO2 SERPL-SCNC: 23 MMOL/L — SIGNIFICANT CHANGE UP (ref 22–31)
CREAT SERPL-MCNC: 1.7 MG/DL — HIGH (ref 0.5–1.3)
GLUCOSE BLDC GLUCOMTR-MCNC: 101 MG/DL — HIGH (ref 70–99)
GLUCOSE BLDC GLUCOMTR-MCNC: 131 MG/DL — HIGH (ref 70–99)
GLUCOSE BLDC GLUCOMTR-MCNC: 185 MG/DL — HIGH (ref 70–99)
GLUCOSE BLDC GLUCOMTR-MCNC: 186 MG/DL — HIGH (ref 70–99)
GLUCOSE BLDC GLUCOMTR-MCNC: 204 MG/DL — HIGH (ref 70–99)
GLUCOSE BLDC GLUCOMTR-MCNC: 234 MG/DL — HIGH (ref 70–99)
GLUCOSE BLDC GLUCOMTR-MCNC: 73 MG/DL — SIGNIFICANT CHANGE UP (ref 70–99)
GLUCOSE SERPL-MCNC: 144 MG/DL — HIGH (ref 70–99)
HCT VFR BLD CALC: 26.8 % — LOW (ref 39–50)
HGB BLD-MCNC: 8.2 G/DL — LOW (ref 13–17)
MCHC RBC-ENTMCNC: 26.6 PG — LOW (ref 27–34)
MCHC RBC-ENTMCNC: 30.6 GM/DL — LOW (ref 32–36)
MCV RBC AUTO: 87 FL — SIGNIFICANT CHANGE UP (ref 80–100)
NRBC # BLD: 0 /100 WBCS — SIGNIFICANT CHANGE UP (ref 0–0)
PLATELET # BLD AUTO: 325 K/UL — SIGNIFICANT CHANGE UP (ref 150–400)
POTASSIUM SERPL-MCNC: 4.9 MMOL/L — SIGNIFICANT CHANGE UP (ref 3.5–5.3)
POTASSIUM SERPL-SCNC: 4.9 MMOL/L — SIGNIFICANT CHANGE UP (ref 3.5–5.3)
RBC # BLD: 3.08 M/UL — LOW (ref 4.2–5.8)
RBC # FLD: 14.6 % — HIGH (ref 10.3–14.5)
SODIUM SERPL-SCNC: 132 MMOL/L — LOW (ref 135–145)
WBC # BLD: 8.68 K/UL — SIGNIFICANT CHANGE UP (ref 3.8–10.5)
WBC # FLD AUTO: 8.68 K/UL — SIGNIFICANT CHANGE UP (ref 3.8–10.5)

## 2020-03-04 PROCEDURE — 99233 SBSQ HOSP IP/OBS HIGH 50: CPT

## 2020-03-04 PROCEDURE — 99232 SBSQ HOSP IP/OBS MODERATE 35: CPT

## 2020-03-04 RX ORDER — INSULIN LISPRO 100/ML
20 VIAL (ML) SUBCUTANEOUS
Refills: 0 | Status: DISCONTINUED | OUTPATIENT
Start: 2020-03-04 | End: 2020-03-05

## 2020-03-04 RX ORDER — METOPROLOL TARTRATE 50 MG
2.5 TABLET ORAL EVERY 6 HOURS
Refills: 0 | Status: COMPLETED | OUTPATIENT
Start: 2020-03-04 | End: 2020-03-05

## 2020-03-04 RX ORDER — SODIUM CHLORIDE 9 MG/ML
1000 INJECTION, SOLUTION INTRAVENOUS
Refills: 0 | Status: DISCONTINUED | OUTPATIENT
Start: 2020-03-04 | End: 2020-03-16

## 2020-03-04 RX ORDER — INSULIN GLARGINE 100 [IU]/ML
25 INJECTION, SOLUTION SUBCUTANEOUS ONCE
Refills: 0 | Status: COMPLETED | OUTPATIENT
Start: 2020-03-04 | End: 2020-03-04

## 2020-03-04 RX ORDER — INSULIN GLARGINE 100 [IU]/ML
44 INJECTION, SOLUTION SUBCUTANEOUS AT BEDTIME
Refills: 0 | Status: DISCONTINUED | OUTPATIENT
Start: 2020-03-04 | End: 2020-03-05

## 2020-03-04 RX ORDER — METOPROLOL TARTRATE 50 MG
2.5 TABLET ORAL EVERY 12 HOURS
Refills: 0 | Status: DISCONTINUED | OUTPATIENT
Start: 2020-03-04 | End: 2020-03-04

## 2020-03-04 RX ADMIN — CHLORHEXIDINE GLUCONATE 1 APPLICATION(S): 213 SOLUTION TOPICAL at 11:14

## 2020-03-04 RX ADMIN — OXYCODONE AND ACETAMINOPHEN 1 TABLET(S): 5; 325 TABLET ORAL at 07:43

## 2020-03-04 RX ADMIN — Medication 3 MILLILITER(S): at 05:39

## 2020-03-04 RX ADMIN — HYDROMORPHONE HYDROCHLORIDE 2 MILLIGRAM(S): 2 INJECTION INTRAMUSCULAR; INTRAVENOUS; SUBCUTANEOUS at 11:19

## 2020-03-04 RX ADMIN — Medication 2: at 17:41

## 2020-03-04 RX ADMIN — Medication 25 MILLIGRAM(S): at 05:41

## 2020-03-04 RX ADMIN — MODAFINIL 100 MILLIGRAM(S): 200 TABLET ORAL at 12:25

## 2020-03-04 RX ADMIN — Medication 100 MILLIGRAM(S): at 05:40

## 2020-03-04 RX ADMIN — Medication 2.5 MILLIGRAM(S): at 18:03

## 2020-03-04 RX ADMIN — HYDROMORPHONE HYDROCHLORIDE 2 MILLIGRAM(S): 2 INJECTION INTRAMUSCULAR; INTRAVENOUS; SUBCUTANEOUS at 12:30

## 2020-03-04 RX ADMIN — Medication 3 MILLILITER(S): at 17:42

## 2020-03-04 RX ADMIN — Medication 100 MILLIGRAM(S): at 14:38

## 2020-03-04 RX ADMIN — DAPTOMYCIN 120 MILLIGRAM(S): 500 INJECTION, POWDER, LYOPHILIZED, FOR SOLUTION INTRAVENOUS at 12:25

## 2020-03-04 RX ADMIN — Medication 3 MILLILITER(S): at 11:21

## 2020-03-04 RX ADMIN — SPIRONOLACTONE 25 MILLIGRAM(S): 25 TABLET, FILM COATED ORAL at 05:41

## 2020-03-04 RX ADMIN — Medication 1 SPRAY(S): at 14:39

## 2020-03-04 RX ADMIN — INSULIN GLARGINE 25 UNIT(S): 100 INJECTION, SOLUTION SUBCUTANEOUS at 23:29

## 2020-03-04 RX ADMIN — SODIUM CHLORIDE 30 MILLILITER(S): 9 INJECTION, SOLUTION INTRAVENOUS at 18:04

## 2020-03-04 RX ADMIN — Medication 1 DROP(S): at 05:40

## 2020-03-04 RX ADMIN — Medication 667 MILLIGRAM(S): at 12:26

## 2020-03-04 RX ADMIN — Medication 200 MILLIGRAM(S): at 07:44

## 2020-03-04 RX ADMIN — POLYETHYLENE GLYCOL 3350 17 GRAM(S): 17 POWDER, FOR SOLUTION ORAL at 11:22

## 2020-03-04 RX ADMIN — Medication 1 SPRAY(S): at 05:41

## 2020-03-04 RX ADMIN — Medication 40 MILLIGRAM(S): at 05:40

## 2020-03-04 RX ADMIN — HEPARIN SODIUM 5000 UNIT(S): 5000 INJECTION INTRAVENOUS; SUBCUTANEOUS at 21:18

## 2020-03-04 RX ADMIN — Medication 1 SPRAY(S): at 21:18

## 2020-03-04 RX ADMIN — Medication 1 TABLET(S): at 11:21

## 2020-03-04 RX ADMIN — Medication 0.5 MILLIGRAM(S): at 17:42

## 2020-03-04 RX ADMIN — Medication 1 APPLICATION(S): at 12:26

## 2020-03-04 RX ADMIN — AMIODARONE HYDROCHLORIDE 200 MILLIGRAM(S): 400 TABLET ORAL at 05:40

## 2020-03-04 RX ADMIN — Medication 667 MILLIGRAM(S): at 07:43

## 2020-03-04 RX ADMIN — Medication 25 UNIT(S): at 07:43

## 2020-03-04 RX ADMIN — PANTOPRAZOLE SODIUM 40 MILLIGRAM(S): 20 TABLET, DELAYED RELEASE ORAL at 05:41

## 2020-03-04 RX ADMIN — MUPIROCIN 1 APPLICATION(S): 20 OINTMENT TOPICAL at 17:41

## 2020-03-04 RX ADMIN — Medication 0.5 MILLIGRAM(S): at 05:40

## 2020-03-04 RX ADMIN — MUPIROCIN 1 APPLICATION(S): 20 OINTMENT TOPICAL at 05:41

## 2020-03-04 RX ADMIN — HEPARIN SODIUM 5000 UNIT(S): 5000 INJECTION INTRAVENOUS; SUBCUTANEOUS at 05:40

## 2020-03-04 RX ADMIN — Medication 10 MILLIGRAM(S): at 14:38

## 2020-03-04 RX ADMIN — OXYCODONE AND ACETAMINOPHEN 1 TABLET(S): 5; 325 TABLET ORAL at 08:30

## 2020-03-04 RX ADMIN — Medication 81 MILLIGRAM(S): at 11:20

## 2020-03-04 RX ADMIN — HEPARIN SODIUM 5000 UNIT(S): 5000 INJECTION INTRAVENOUS; SUBCUTANEOUS at 14:39

## 2020-03-04 RX ADMIN — Medication 1 DROP(S): at 17:42

## 2020-03-04 RX ADMIN — Medication 10 MILLIGRAM(S): at 05:40

## 2020-03-04 NOTE — PROGRESS NOTE ADULT - ASSESSMENT
65yo male with hx of HTN, DM II, presented to the ED with complaints of acute on chronic left sided exertional chest pain. Pt states he has been having left sided pressure and stabbing chest pain radiating to his sternum and right with activity for the past few months. Since admission- s/p cath with severe triple vessel disease, s/p CABG, post op course c/b brief witnessed PEA 2/6 likely hypoxic arrest, s/p intubation. ( CAD, s/p cath with severe triple vessel disease including lesions at the bifurcation of the LAD/diagonal and distal RCA/RPDA/RPL trifurcation.   -s/p CABG x 4, intraop kennedy ef 50%)--s/p ampicillin until 2/18 for enterococcus in blood, extubated 2/9, pigtail right 2/8 and left sided on 2/15-for effusion.  Remains sob-NO h/o resp issues prior to hospitalization.  ***Current sob-multifactorial-CAD/effusions, atelectasis due to pain, mild bronchospasm and debility.  ********************  2/21-slightly better, pig tail cath removed from left chest; CT chest done-loculated left effusion-slight better  2/24-BS issues-less sob-dapto/cefepime as per ID  2/25-for debridement of chest area-sternal wound  2/26-debridement completed--to CTU  2/2727-THZ-[id number] over all  2/28-chest pain still impacting breathing--plastics/renal endo f/up  2/29-no changes over night  3/1-cough present-has been off BD  3/2-cough present still  3/3-improved cough on amitriptyline  3/4-cough upon swallowing

## 2020-03-04 NOTE — PROGRESS NOTE ADULT - SUBJECTIVE AND OBJECTIVE BOX
S:  c/o cough    Telemetry:  SR/ST      Vital Signs Last 24 Hrs  T(C): 36.3 (20 @ 13:24), Max: 36.9 (20 @ 04:37)  T(F): 97.3 (20 @ 13:24), Max: 98.4 (20 @ 04:37)  HR: 91 (20 @ 13:24) (88 - 102)  BP: 122/74 (20 @ 13:24) (114/69 - 122/74)  RR: 18 (20 @ 13:24) (18 - 19)  SpO2: 96% (20 @ 13:24) (96% - 99%)                    @ 07:01  -   @ 07:00  --------------------------------------------------------  IN: 850 mL / OUT: 2750 mL / NET: -1900 mL     @ 07:01  -   @ 16:11  --------------------------------------------------------  IN: 950 mL / OUT: 1470 mL / NET: -520 mL         Daily Weight in k.8 (04 Mar 2020 07:53)        POCT Blood Glucose.: 101 mg/dL (04 Mar 2020 12:05)  POCT Blood Glucose.: 131 mg/dL (04 Mar 2020 07:37)  POCT Blood Glucose.: 185 mg/dL (04 Mar 2020 05:04)  POCT Blood Glucose.: 73 mg/dL (04 Mar 2020 01:59)  POCT Blood Glucose.: 109 mg/dL (03 Mar 2020 21:23)  POCT Blood Glucose.: 210 mg/dL (03 Mar 2020 17:27)          Drains:     MS         [  ] Drainage:                 L Pleural  [  ]  Drainage:                R Pleural  [  ]  Drainage:    Pacing Wires        [  ]   Settings:                                  Isolated  [  ]    Coumadin    [ ] YES          [ x ]      NO         Reason:                                 8.2    8.68  )-----------( 325      ( 04 Mar 2020 06:28 )             26.8       03-    132<L>  |  97  |  38<H>  ----------------------------<  144<H>  4.9   |  23  |  1.70<H>    Ca    8.9      04 Mar 2020 06:28            PHYSICAL EXAM:    Neurology: alert and oriented x 3, nonfocal, no gross deficits    CV :  S1S2  heard    Sternal Wound :  VAC  dressing in place    Lungs:  clear to ausc    Abdomen: soft, nontender, nondistended, positive bowel sounds, last bowel movement          Extremities:    right lower ext erythema  allevyan in place  2+ edema.  Left lower ext 2+ edema           acetaminophen   Tablet .. 650 milliGRAM(s) Oral every 6 hours PRN  ALBUTerol    90 MICROgram(s) HFA Inhaler 1 Puff(s) Inhalation every 4 hours  albuterol/ipratropium for Nebulization. 3 milliLiter(s) Nebulizer every 6 hours PRN  albuterol/ipratropium for Nebulization. 3 milliLiter(s) Nebulizer every 6 hours  aMIOdarone    Tablet 200 milliGRAM(s) Oral daily  amitriptyline 10 milliGRAM(s) Oral every 8 hours  artificial tears (preservative free) Ophthalmic Solution 1 Drop(s) Both EYES two times a day  aspirin enteric coated 81 milliGRAM(s) Oral daily  atorvastatin 40 milliGRAM(s) Oral at bedtime  benzonatate 100 milliGRAM(s) Oral every 8 hours  buDESOnide    Inhalation Suspension 0.5 milliGRAM(s) Inhalation two times a day  calcium acetate 667 milliGRAM(s) Oral three times a day with meals  chlorhexidine 2% Cloths 1 Application(s) Topical <User Schedule>  DAPTOmycin IVPB 500 milliGRAM(s) IV Intermittent every 24 hours  furosemide    Tablet 40 milliGRAM(s) Oral daily  guaiFENesin   Syrup  (Sugar-Free) 200 milliGRAM(s) Oral every 6 hours PRN  heparin  Injectable 5000 Unit(s) SubCutaneous every 8 hours  HYDROmorphone   Tablet 2 milliGRAM(s) Oral every 3 hours PRN  insulin glargine Injectable (LANTUS) 44 Unit(s) SubCutaneous at bedtime  insulin lispro (HumaLOG) corrective regimen sliding scale   SubCutaneous Before meals and at bedtime  insulin lispro Injectable (HumaLOG) 20 Unit(s) SubCutaneous three times a day with meals  melatonin 3 milliGRAM(s) Oral at bedtime  metoprolol tartrate 25 milliGRAM(s) Oral every 12 hours  modafinil 100 milliGRAM(s) Oral daily  multivitamin 1 Tablet(s) Oral daily  mupirocin 2% Ointment 1 Application(s) Topical two times a day  oxycodone    5 mG/acetaminophen 325 mG 1 Tablet(s) Oral every 6 hours PRN  pantoprazole    Tablet 40 milliGRAM(s) Oral before breakfast  polyethylene glycol 3350 17 Gram(s) Oral daily  senna 2 Tablet(s) Oral at bedtime  silver sulfADIAZINE 1% Cream 1 Application(s) Topical daily  sodium chloride 0.65% Nasal 1 Spray(s) Both Nostrils three times a day  sodium chloride 0.9% lock flush 10 milliLiter(s) IV Push every 1 hour PRN  spironolactone 25 milliGRAM(s) Oral daily  tiotropium 18 MICROgram(s) Capsule 1 Capsule(s) Inhalation daily                              Physical Therapy Rec:   Home  [  ]   Home w/ PT  [  ]  Rehab  [  ]    Discussed with Cardiothoracic Team at AM rounds.

## 2020-03-04 NOTE — PROGRESS NOTE ADULT - PROBLEM SELECTOR PLAN 1
Continue ASA 81 daily, metoprolol 25 BID and atorvastatin 40 HS  Titrate up beta-blockers as tolerated   Continue provigil 100 qd  C/W GI prophylaxis on protonix and DVT prophylaxis on SQ Lovenox.   Cough and deep breathe, Incentive Spirometry Q1h, Chest PT.  Ambulate 4x daily as tolerated and with PT.   Discharge planning- rehab when medically stable

## 2020-03-04 NOTE — PROGRESS NOTE ADULT - PROBLEM SELECTOR PLAN 5
ID following Dr Carrasco  Continue on IV Dapto 500mg daily.  May switch on Monday  Silvadene to RLE wound  Wound care consult

## 2020-03-04 NOTE — PROGRESS NOTE ADULT - SUBJECTIVE AND OBJECTIVE BOX
Plastic Surgery Progress Note (pg [facility]: [id number], [facility]: [phone], [facility]: [phone])    SUBJECTIVE:  - pt seen and examined during vac change  - Doing well  - Pain well controlled  - No events overnight       OBJECTIVE:   ** PHYSICAL EXAM **    -- CONSTITUTIONAL: AOx3. NAD.   -- CHEST: incisions c/d/i superiorly. Inferiorly area of dehiscences with some fibrinous material at base of wound, debrided fibrinous material w/ healthy granulation tissue below.      Vital Signs  T(C): 36.9 (03-04-20 @ 04:37), Max: 36.9 (03-04-20 @ 04:37)  T(F): 98.4 (03-04-20 @ 04:37), Max: 98.4 (03-04-20 @ 04:37)  HR: 102 (03-04-20 @ 04:37) (88 - 102)  BP: 121/66 (03-04-20 @ 04:37) (114/69 - 125/68)  RR: 19 (03-04-20 @ 04:37) (18 - 19)  SpO2: 96% (03-04-20 @ 04:37) (96% - 99%)      03 Mar 2020 07:01  -  04 Mar 2020 07:00  --------------------------------------------------------  IN:    Oral Fluid: 850 mL  Total IN: 850 mL    OUT:    Bulb: 15 mL    Bulb: 85 mL    VAC (Vacuum Assisted Closure) System: 50 mL    Voided: 2600 mL  Total OUT: 2750 mL    Total NET: -1900 mL      04 Mar 2020 07:01  -  04 Mar 2020 11:57  --------------------------------------------------------  IN:    Oral Fluid: 480 mL  Total IN: 480 mL    OUT:    Bulb: 20 mL    Voided: 1450 mL  Total OUT: 1470 mL    Total NET: -990 mL

## 2020-03-04 NOTE — SWALLOW BEDSIDE ASSESSMENT ADULT - SWALLOW EVAL: DIAGNOSIS
63yo man presents w/ oropharyngeal dysphagia notable for s/s of airway invasion of puree and honey thick liquids. 63yo man presents w/ evidence of oropharyngeal dysphagia notable for s/s of laryngeal penetration/aspiration with puree and honey thick liquids.

## 2020-03-04 NOTE — PROGRESS NOTE ADULT - PROBLEM SELECTOR PLAN 1
Will continue current insulin regimen for now. Will continue monitoring FS, log, and FU.  Patient counseled for compliance with consistent low carb diet; Family advised not to bring food from home.  Discussed with nurse not to hold insulin injections. Will decrease Lantus to 44 units at bed time.  Will decrease Humalog to 20 units before each meal in addition to Humalog correction scale coverage.  Discussed with CT team.  Patient counseled for compliance with consistent low carb diet, exercise.

## 2020-03-04 NOTE — SWALLOW BEDSIDE ASSESSMENT ADULT - PHARYNGEAL PHASE
Multiple swallows/Cough post oral intake/Throat clear post oral intake/Delayed cough post oral intake/Delayed throat clear post oral intake

## 2020-03-04 NOTE — PROGRESS NOTE ADULT - ATTENDING COMMENTS
as above-s/p debridement 2/25 of sternal area--RENAL--PO diuretics for anasarca; slightly stronger-cough pronounced--? tracheomalacia----better on amitriptyline 10 q 8 for better control-? aspiration/swallow dysfunction--FEESST study  multifactorial dyspnea-CAD s/p CABG, PEA arrest, atelectasis due to pain, pleural effusion L-pig tail out, bronchospasm, ?PE-O2 NC sat above 90%                     Pleural effusion-pig tail removed 2/20-CT chest NC-improved but still loculations on left-f/up 4-6 wks  CAD/CHF-diurese as cr allows-keep K/Mg above  atelectasis-pain control, incentive spirometry, acapella                    ? DVT/PE--s/p repeat venous dopplers-negative; VQ unable  bronchospasm-duoneb q 6, pulmicort .5 bid; add tessalon perles 200 q 8 and mucinex;  out pt PFTs              ID-daptomycin as per ID  snore-? osas--out pt SS  DVT/GI prophylaxis, PT, nutrition evaln            PT  Mike Hernandez MD-Pulmonary    [phone]

## 2020-03-04 NOTE — PROGRESS NOTE ADULT - SUBJECTIVE AND OBJECTIVE BOX
[facility] DIVISION OF KIDNEY DISEASES AND HYPERTENSION -- PROGRESS NOTE    Chief complaint: volume overload    24 hour events/subjective: continues to complain of decreased appetite        PAST HISTORY  --------------------------------------------------------------------------------  No significant changes to PMH, PSH, FHx, SHx, unless otherwise noted    ALLERGIES & MEDICATIONS  --------------------------------------------------------------------------------  Allergies    No Known Allergies    Intolerances      Standing Inpatient Medications  ALBUTerol    90 MICROgram(s) HFA Inhaler 1 Puff(s) Inhalation every 4 hours  albuterol/ipratropium for Nebulization. 3 milliLiter(s) Nebulizer every 6 hours  aMIOdarone    Tablet 200 milliGRAM(s) Oral daily  amitriptyline 10 milliGRAM(s) Oral every 8 hours  artificial tears (preservative free) Ophthalmic Solution 1 Drop(s) Both EYES two times a day  aspirin enteric coated 81 milliGRAM(s) Oral daily  atorvastatin 40 milliGRAM(s) Oral at bedtime  benzonatate 100 milliGRAM(s) Oral every 8 hours  buDESOnide    Inhalation Suspension 0.5 milliGRAM(s) Inhalation two times a day  calcium acetate 667 milliGRAM(s) Oral three times a day with meals  chlorhexidine 2% Cloths 1 Application(s) Topical <User Schedule>  DAPTOmycin IVPB 500 milliGRAM(s) IV Intermittent every 24 hours  furosemide    Tablet 40 milliGRAM(s) Oral daily  heparin  Injectable 5000 Unit(s) SubCutaneous every 8 hours  insulin glargine Injectable (LANTUS) 50 Unit(s) SubCutaneous at bedtime  insulin lispro (HumaLOG) corrective regimen sliding scale   SubCutaneous Before meals and at bedtime  insulin lispro Injectable (HumaLOG) 25 Unit(s) SubCutaneous three times a day with meals  melatonin 3 milliGRAM(s) Oral at bedtime  metoprolol tartrate 25 milliGRAM(s) Oral every 12 hours  modafinil 100 milliGRAM(s) Oral daily  multivitamin 1 Tablet(s) Oral daily  mupirocin 2% Ointment 1 Application(s) Topical two times a day  pantoprazole    Tablet 40 milliGRAM(s) Oral before breakfast  polyethylene glycol 3350 17 Gram(s) Oral daily  senna 2 Tablet(s) Oral at bedtime  silver sulfADIAZINE 1% Cream 1 Application(s) Topical daily  sodium chloride 0.65% Nasal 1 Spray(s) Both Nostrils three times a day  spironolactone 25 milliGRAM(s) Oral daily  tiotropium 18 MICROgram(s) Capsule 1 Capsule(s) Inhalation daily    PRN Inpatient Medications  acetaminophen   Tablet .. 650 milliGRAM(s) Oral every 6 hours PRN  albuterol/ipratropium for Nebulization. 3 milliLiter(s) Nebulizer every 6 hours PRN  guaiFENesin   Syrup  (Sugar-Free) 200 milliGRAM(s) Oral every 6 hours PRN  HYDROmorphone   Tablet 2 milliGRAM(s) Oral every 3 hours PRN  oxycodone    5 mG/acetaminophen 325 mG 1 Tablet(s) Oral every 6 hours PRN  sodium chloride 0.9% lock flush 10 milliLiter(s) IV Push every 1 hour PRN      REVIEW OF SYSTEMS : decreased appetite and low PO intake  --------------------------------------------------------------------------------  Constitutional: [ ] Fever [ ] Chills [ ] Fatigue [ ] Weight change   HEENT: [ ] Blurred vision [ ] Eye Pain [ ] Headache [ ] Runny nose [ ] Sore Throat   Respiratory: [ ] Cough [ ] Wheezing [x ] improved Shortness of breath  Cardiovascular: [ ] Chest Pain [ ] Palpitations [ ] PALMA [ ] PND [ ] Orthopnea  Gastrointestinal: [ ] Abdominal Pain [ ] Diarrhea [ ] Constipation [ ] Hemorrhoids [ ] Nausea [ ] Vomiting  Genitourinary: [ ] Nocturia [ ] Dysuria [ ] Incontinence  Extremities: [x ]LE Swelling [ ] Joint Pain  Neurologic: [ ] Focal deficit [ ] Paresthesias [ ] Syncope  Lymphatic: [ ] Swelling [ ] Lymphadenopathy   Skin: [ ] Rash [ ] Ecchymoses [ ] Wounds [ ] Lesions  Psychiatry: [ ] Depression [ ] Suicidal/Homicidal Ideation [ ] Anxiety [ ] Sleep Disturbances  x[ ] 10 point review of systems is otherwise negative except as mentioned above              [ ]Unable to obtain due to   All other systems were reviewed and are negative, except as noted.    VITALS/PHYSICAL EXAM  --------------------------------------------------------------------------------  T(C): 36.9 (03-04-20 @ 04:37), Max: 36.9 (03-04-20 @ 04:37)  HR: 102 (03-04-20 @ 04:37) (88 - 102)  BP: 121/66 (03-04-20 @ 04:37) (114/69 - 125/68)  RR: 19 (03-04-20 @ 04:37) (18 - 19)  SpO2: 96% (03-04-20 @ 04:37) (96% - 99%)  Wt(kg): --        03-03-20 @ 07:01  -  03-04-20 @ 07:00  --------------------------------------------------------  IN: 850 mL / OUT: 2750 mL / NET: -1900 mL    03-04-20 @ 07:01  -  03-04-20 @ 11:12  --------------------------------------------------------  IN: 480 mL / OUT: 1470 mL / NET: -990 mL      Physical Exam:  	Gen: NAD  	HEENT: on room air  	Pulm: decreased breath sounds B/L, exam limited due to body habitus  	CV: normal S1S2; no rub  	Abd: soft                      Back : No sacral edema  		LE: bilateral LE pitting edema  	Skin: Warm, without rashes      LABS/STUDIES  --------------------------------------------------------------------------------              8.2    8.68  >-----------<  325      [03-04-20 @ 06:28]              26.8     132  |  97  |  38  ----------------------------<  144      [03-04-20 @ 06:28]  4.9   |  23  |  1.70        Ca     8.9     [03-04-20 @ 06:28]            Creatinine Trend:  SCr 1.70 [03-04 @ 06:28]  SCr 1.72 [03-03 @ 06:58]  SCr 1.71 [03-02 @ 06:11]  SCr 1.75 [03-01 @ 04:44]  SCr 1.70 [02-29 @ 05:45]    Urinalysis - [02-24-20 @ 17:29]      Color Light Yellow / Appearance Clear / SG 1.013 / pH 6.5      Gluc Trace / Ketone Negative  / Bili Negative / Urobili <2 mg/dL       Blood Negative / Protein 100 mg/dL / Leuk Est Negative / Nitrite Negative      RBC 5 / WBC 1 / Hyaline 1 / Gran  / Sq Epi  / Non Sq Epi 0 / Bacteria Negative      HbA1c 9.2      [01-26-20 @ 09:03]  TSH 2.37      [02-14-20 @ 21:15]    HCV 0.16, Nonreact      [02-04-20 @ 10:29]

## 2020-03-04 NOTE — PROGRESS NOTE ADULT - SUBJECTIVE AND OBJECTIVE BOX
Chief complaint  Patient is a 64y old  Male who presents with a chief complaint of Chest pain (04 Mar 2020 11:54)   Review of systems  Patient sitting up in chair, looks comfortable, no hypoglycemia.    Labs and Fingersticks  CAPILLARY BLOOD GLUCOSE      POCT Blood Glucose.: 101 mg/dL (04 Mar 2020 12:05)  POCT Blood Glucose.: 131 mg/dL (04 Mar 2020 07:37)  POCT Blood Glucose.: 185 mg/dL (04 Mar 2020 05:04)  POCT Blood Glucose.: 73 mg/dL (04 Mar 2020 01:59)  POCT Blood Glucose.: 109 mg/dL (03 Mar 2020 21:23)  POCT Blood Glucose.: 210 mg/dL (03 Mar 2020 17:27)      Anion Gap, Serum: 12 (03-04 @ 06:28)  Anion Gap, Serum: 12 (03-03 @ 06:58)      Calcium, Total Serum: 8.9 (03-04 @ 06:28)  Calcium, Total Serum: 8.7 (03-03 @ 06:58)          03-04    132<L>  |  97  |  38<H>  ----------------------------<  144<H>  4.9   |  23  |  1.70<H>    Ca    8.9      04 Mar 2020 06:28                          8.2    8.68  )-----------( 325      ( 04 Mar 2020 06:28 )             26.8     Medications  MEDICATIONS  (STANDING):  ALBUTerol    90 MICROgram(s) HFA Inhaler 1 Puff(s) Inhalation every 4 hours  albuterol/ipratropium for Nebulization. 3 milliLiter(s) Nebulizer every 6 hours  aMIOdarone    Tablet 200 milliGRAM(s) Oral daily  amitriptyline 10 milliGRAM(s) Oral every 8 hours  artificial tears (preservative free) Ophthalmic Solution 1 Drop(s) Both EYES two times a day  aspirin enteric coated 81 milliGRAM(s) Oral daily  atorvastatin 40 milliGRAM(s) Oral at bedtime  benzonatate 100 milliGRAM(s) Oral every 8 hours  buDESOnide    Inhalation Suspension 0.5 milliGRAM(s) Inhalation two times a day  calcium acetate 667 milliGRAM(s) Oral three times a day with meals  chlorhexidine 2% Cloths 1 Application(s) Topical <User Schedule>  DAPTOmycin IVPB 500 milliGRAM(s) IV Intermittent every 24 hours  furosemide    Tablet 40 milliGRAM(s) Oral daily  heparin  Injectable 5000 Unit(s) SubCutaneous every 8 hours  insulin glargine Injectable (LANTUS) 50 Unit(s) SubCutaneous at bedtime  insulin lispro (HumaLOG) corrective regimen sliding scale   SubCutaneous Before meals and at bedtime  insulin lispro Injectable (HumaLOG) 25 Unit(s) SubCutaneous three times a day with meals  melatonin 3 milliGRAM(s) Oral at bedtime  metoprolol tartrate 25 milliGRAM(s) Oral every 12 hours  modafinil 100 milliGRAM(s) Oral daily  multivitamin 1 Tablet(s) Oral daily  mupirocin 2% Ointment 1 Application(s) Topical two times a day  pantoprazole    Tablet 40 milliGRAM(s) Oral before breakfast  polyethylene glycol 3350 17 Gram(s) Oral daily  senna 2 Tablet(s) Oral at bedtime  silver sulfADIAZINE 1% Cream 1 Application(s) Topical daily  sodium chloride 0.65% Nasal 1 Spray(s) Both Nostrils three times a day  spironolactone 25 milliGRAM(s) Oral daily  tiotropium 18 MICROgram(s) Capsule 1 Capsule(s) Inhalation daily      Physical Exam  General: Patient comfortable in bed  Vital Signs Last 12 Hrs  T(F): 98.4 (03-04-20 @ 04:37), Max: 98.4 (03-04-20 @ 04:37)  HR: 102 (03-04-20 @ 04:37) (102 - 102)  BP: 121/66 (03-04-20 @ 04:37) (121/66 - 121/66)  BP(mean): --  RR: 19 (03-04-20 @ 04:37) (19 - 19)  SpO2: 96% (03-04-20 @ 04:37) (96% - 96%)  Neck: No palpable thyroid nodules.  CVS: S1S2, No murmurs  Respiratory: No wheezing, no crepitations  GI: Abdomen soft, bowel sounds positive  Musculoskeletal:  edema lower extremities.   Skin: No skin rashes, no ecchymosis    Diagnostics Chief complaint  Patient is a 64y old  Male who presents with a chief complaint of Chest pain (04 Mar 2020 11:54)   Review of systems  Patient sitting up in chair, looks comfortable,  no hypoglycemia.    Labs and Fingersticks  CAPILLARY BLOOD GLUCOSE      POCT Blood Glucose.: 101 mg/dL (04 Mar 2020 12:05)  POCT Blood Glucose.: 131 mg/dL (04 Mar 2020 07:37)  POCT Blood Glucose.: 185 mg/dL (04 Mar 2020 05:04)  POCT Blood Glucose.: 73 mg/dL (04 Mar 2020 01:59)  POCT Blood Glucose.: 109 mg/dL (03 Mar 2020 21:23)  POCT Blood Glucose.: 210 mg/dL (03 Mar 2020 17:27)      Anion Gap, Serum: 12 (03-04 @ 06:28)  Anion Gap, Serum: 12 (03-03 @ 06:58)      Calcium, Total Serum: 8.9 (03-04 @ 06:28)  Calcium, Total Serum: 8.7 (03-03 @ 06:58)          03-04    132<L>  |  97  |  38<H>  ----------------------------<  144<H>  4.9   |  23  |  1.70<H>    Ca    8.9      04 Mar 2020 06:28                          8.2    8.68  )-----------( 325      ( 04 Mar 2020 06:28 )             26.8     Medications  MEDICATIONS  (STANDING):  ALBUTerol    90 MICROgram(s) HFA Inhaler 1 Puff(s) Inhalation every 4 hours  albuterol/ipratropium for Nebulization. 3 milliLiter(s) Nebulizer every 6 hours  aMIOdarone    Tablet 200 milliGRAM(s) Oral daily  amitriptyline 10 milliGRAM(s) Oral every 8 hours  artificial tears (preservative free) Ophthalmic Solution 1 Drop(s) Both EYES two times a day  aspirin enteric coated 81 milliGRAM(s) Oral daily  atorvastatin 40 milliGRAM(s) Oral at bedtime  benzonatate 100 milliGRAM(s) Oral every 8 hours  buDESOnide    Inhalation Suspension 0.5 milliGRAM(s) Inhalation two times a day  calcium acetate 667 milliGRAM(s) Oral three times a day with meals  chlorhexidine 2% Cloths 1 Application(s) Topical <User Schedule>  DAPTOmycin IVPB 500 milliGRAM(s) IV Intermittent every 24 hours  furosemide    Tablet 40 milliGRAM(s) Oral daily  heparin  Injectable 5000 Unit(s) SubCutaneous every 8 hours  insulin glargine Injectable (LANTUS) 50 Unit(s) SubCutaneous at bedtime  insulin lispro (HumaLOG) corrective regimen sliding scale   SubCutaneous Before meals and at bedtime  insulin lispro Injectable (HumaLOG) 25 Unit(s) SubCutaneous three times a day with meals  melatonin 3 milliGRAM(s) Oral at bedtime  metoprolol tartrate 25 milliGRAM(s) Oral every 12 hours  modafinil 100 milliGRAM(s) Oral daily  multivitamin 1 Tablet(s) Oral daily  mupirocin 2% Ointment 1 Application(s) Topical two times a day  pantoprazole    Tablet 40 milliGRAM(s) Oral before breakfast  polyethylene glycol 3350 17 Gram(s) Oral daily  senna 2 Tablet(s) Oral at bedtime  silver sulfADIAZINE 1% Cream 1 Application(s) Topical daily  sodium chloride 0.65% Nasal 1 Spray(s) Both Nostrils three times a day  spironolactone 25 milliGRAM(s) Oral daily  tiotropium 18 MICROgram(s) Capsule 1 Capsule(s) Inhalation daily      Physical Exam  General: Patient comfortable in bed  Vital Signs Last 12 Hrs  T(F): 98.4 (03-04-20 @ 04:37), Max: 98.4 (03-04-20 @ 04:37)  HR: 102 (03-04-20 @ 04:37) (102 - 102)  BP: 121/66 (03-04-20 @ 04:37) (121/66 - 121/66)  BP(mean): --  RR: 19 (03-04-20 @ 04:37) (19 - 19)  SpO2: 96% (03-04-20 @ 04:37) (96% - 96%)  Neck: No palpable thyroid nodules.  CVS: S1S2, No murmurs  Respiratory: No wheezing, no crepitations  GI: Abdomen soft, bowel sounds positive  Musculoskeletal:  edema lower extremities.   Skin: No skin rashes, no ecchymosis    Diagnostics

## 2020-03-04 NOTE — SWALLOW BEDSIDE ASSESSMENT ADULT - SWALLOW EVAL: PATIENT/FAMILY GOALS STATEMENT
Pt stated that he does not want to abide w/NPO diet despite being advised by SLP of rationale of diet recommendation and the adverse impact of aspiration regarding health, comfort, and recovery. Pt also reported that he was given cough syrup during hospital stay which made him "cough more;"  likely d/t aspiration of liquid medication. Pt stated that he does not want to abide w/NPO diet despite being advised by SLP of rationale of diet recommendation and the adverse impact of aspiration regarding health, comfort, and recovery. Pt also reported that he was given cough syrup during hospital stay which made him "cough more;"  possibly d/t aspiration of liquid medication.

## 2020-03-04 NOTE — PROGRESS NOTE ADULT - PROBLEM SELECTOR PLAN 2
Continue diabetic diet  endocrine following, Dr. Matias  continue lantus 44HS and humalog 14 TID   accuchecks ac and hs

## 2020-03-04 NOTE — PROGRESS NOTE ADULT - PROBLEM SELECTOR PLAN 4
ID following  Continue dapto 500 iv qd as per Dr. Carrasco- pending picc line for 4 wks abx from date of SWD as per ID; bc 2/19 neg   daily cbc   may switch to another antibiotic Monday 3/9

## 2020-03-04 NOTE — PROGRESS NOTE ADULT - ASSESSMENT
A/P: 64M s/p sternal debridement and b/l pectoralis advancement flaps on 2/25. Sternal incision with dehiscence inferiorly and murky serous drainage, more likely fat necrosis, less concerning for infection given time course, lack of culture growth, afebrile, and lack of upwardly-trending WBC    - C/w VAC, MWF changes  - Continue JPs  - Care per CT  - Will cont to follow    Primitivo Nugent  Plastic Surgery Resident  [facility]: [phone]  [facility]: [id number]

## 2020-03-04 NOTE — PROGRESS NOTE ADULT - SUBJECTIVE AND OBJECTIVE BOX
CHIEF COMPLAINT: f/up sob, resp failure, pleural effusions, atelectasis-cough present-NP in nature-espec when swallowing--some sob and chest pain    Interval Events: ID/plastics     REVIEW OF SYSTEMS:  Constitutional: No fevers or chills. No weight loss. +fatigue or generalized malaise.  Eyes: No itching or discharge from the eyes  ENT: No ear pain. No ear discharge. No nasal congestion. No post nasal drip. No epistaxis. No throat pain. No sore throat. No difficulty swallowing.   CV: No chest pain. No palpitations. No lightheadedness or dizziness.   Resp: No dyspnea at rest. + dyspnea on exertion. No orthopnea. No wheezing. + cough. No stridor. No sputum production. No chest pain with respiration.  GI: No nausea. No vomiting. No diarrhea.  MSK: No joint pain or pain in any extremities  Integumentary: No skin lesions. No pedal edema.  Neurological: No gross motor weakness. + sensory changes.  [+ ] All other systems negative  [ ] Unable to assess ROS because ________    OBJECTIVE:  ICU Vital Signs Last 24 Hrs  T(C): 36.9 (04 Mar 2020 04:37), Max: 36.9 (04 Mar 2020 04:37)  T(F): 98.4 (04 Mar 2020 04:37), Max: 98.4 (04 Mar 2020 04:37)  HR: 102 (04 Mar 2020 04:37) (88 - 102)  BP: 121/66 (04 Mar 2020 04:37) (114/69 - 125/68)  BP(mean): --  ABP: --  ABP(mean): --  RR: 19 (04 Mar 2020 04:37) (18 - 19)  SpO2: 96% (04 Mar 2020 04:37) (96% - 99%)        03-02 @ 07:01  -  03-03 @ 07:00  --------------------------------------------------------  IN: 980 mL / OUT: 2220 mL / NET: -1240 mL    03-03 @ 07:01  -  03-04 @ 05:10  --------------------------------------------------------  IN: 800 mL / OUT: 2725 mL / NET: -1925 mL      CAPILLARY BLOOD GLUCOSE      POCT Blood Glucose.: 185 mg/dL (04 Mar 2020 05:04)      PHYSICAL EXAM: NAD in chair  General: Awake, alert, oriented X 3.   HEENT: Atraumatic, normocephalic.                 Mallampatti Grade 3                No nasal congestion.                No tonsillar or pharyngeal exudates.  Lymph Nodes: No palpable lymphadenopathy  Neck: No JVD. No carotid bruit.   Respiratory: abnormal chest expansion-reduced BS bases                         Normal percussion                         Normal and equal air entry                         No wheeze, rhonchi or rales.  Cardiovascular: S1 S2 normal. No murmurs, rubs or gallops.   Abdomen: Soft, non-tender, non-distended. No organomegaly. Normoactive bowel sounds.  Extremities: Warm to touch. Peripheral pulse palpable. + pedal edema.   Skin: No rashes or skin lesions  Neurological: Motor and sensory examination equal and normal in all four extremities.  Psychiatry: Appropriate mood and affect.    HOSPITAL MEDICATIONS:  MEDICATIONS  (STANDING):  ALBUTerol    90 MICROgram(s) HFA Inhaler 1 Puff(s) Inhalation every 4 hours  albuterol/ipratropium for Nebulization. 3 milliLiter(s) Nebulizer every 6 hours  aMIOdarone    Tablet 200 milliGRAM(s) Oral daily  amitriptyline 10 milliGRAM(s) Oral every 8 hours  artificial tears (preservative free) Ophthalmic Solution 1 Drop(s) Both EYES two times a day  aspirin enteric coated 81 milliGRAM(s) Oral daily  atorvastatin 40 milliGRAM(s) Oral at bedtime  benzonatate 100 milliGRAM(s) Oral every 8 hours  buDESOnide    Inhalation Suspension 0.5 milliGRAM(s) Inhalation two times a day  calcium acetate 667 milliGRAM(s) Oral three times a day with meals  chlorhexidine 2% Cloths 1 Application(s) Topical <User Schedule>  DAPTOmycin IVPB 500 milliGRAM(s) IV Intermittent every 24 hours  furosemide    Tablet 40 milliGRAM(s) Oral daily  heparin  Injectable 5000 Unit(s) SubCutaneous every 8 hours  insulin glargine Injectable (LANTUS) 50 Unit(s) SubCutaneous at bedtime  insulin lispro (HumaLOG) corrective regimen sliding scale   SubCutaneous Before meals and at bedtime  insulin lispro Injectable (HumaLOG) 25 Unit(s) SubCutaneous three times a day with meals  melatonin 3 milliGRAM(s) Oral at bedtime  metoprolol tartrate 25 milliGRAM(s) Oral every 12 hours  modafinil 100 milliGRAM(s) Oral daily  multivitamin 1 Tablet(s) Oral daily  mupirocin 2% Ointment 1 Application(s) Topical two times a day  pantoprazole    Tablet 40 milliGRAM(s) Oral before breakfast  polyethylene glycol 3350 17 Gram(s) Oral daily  senna 2 Tablet(s) Oral at bedtime  silver sulfADIAZINE 1% Cream 1 Application(s) Topical daily  sodium chloride 0.65% Nasal 1 Spray(s) Both Nostrils three times a day  spironolactone 25 milliGRAM(s) Oral daily  tiotropium 18 MICROgram(s) Capsule 1 Capsule(s) Inhalation daily    MEDICATIONS  (PRN):  acetaminophen   Tablet .. 650 milliGRAM(s) Oral every 6 hours PRN Mild Pain (1 - 3)  albuterol/ipratropium for Nebulization. 3 milliLiter(s) Nebulizer every 6 hours PRN Shortness of Breath and/or Wheezing  guaiFENesin   Syrup  (Sugar-Free) 200 milliGRAM(s) Oral every 6 hours PRN Cough  HYDROmorphone   Tablet 2 milliGRAM(s) Oral every 3 hours PRN Moderate Pain (4 - 6)  oxycodone    5 mG/acetaminophen 325 mG 1 Tablet(s) Oral every 6 hours PRN Severe Pain (7 - 10)  sodium chloride 0.9% lock flush 10 milliLiter(s) IV Push every 1 hour PRN Pre/post blood products, medications, blood draw, and to maintain line patency      LABS:                        7.8    9.74  )-----------( 319      ( 03 Mar 2020 06:57 )             25.1     03-03    131<L>  |  96  |  37<H>  ----------------------------<  154<H>  5.0   |  23  |  1.72<H>    Ca    8.7      03 Mar 2020 06:58                MICROBIOLOGY:     RADIOLOGY:  [ ] Reviewed and interpreted by me    Point of Care Ultrasound Findings:    PFT:    EKG:

## 2020-03-04 NOTE — PROGRESS NOTE ADULT - SUBJECTIVE AND OBJECTIVE BOX
CARDIOLOGY FOLLOW UP - Dr. Steel    CC cp with coughing  no increase sob, OOB with PT today        PHYSICAL EXAM:  T(C): 36.9 (03-04-20 @ 04:37), Max: 36.9 (03-04-20 @ 04:37)  HR: 102 (03-04-20 @ 04:37) (88 - 102)  BP: 121/66 (03-04-20 @ 04:37) (114/69 - 125/68)  RR: 19 (03-04-20 @ 04:37) (18 - 19)  SpO2: 96% (03-04-20 @ 04:37) (96% - 99%)  Wt(kg): --  I&O's Summary    03 Mar 2020 07:01  -  04 Mar 2020 07:00  --------------------------------------------------------  IN: 850 mL / OUT: 2750 mL / NET: -1900 mL    04 Mar 2020 07:01  -  04 Mar 2020 11:06  --------------------------------------------------------  IN: 480 mL / OUT: 1470 mL / NET: -990 mL        Appearance: Normal	  Cardiovascular: Normal S1 S2,RRR  Respiratory: Lungs clear to auscultation	  Gastrointestinal:  Soft, Non-tender, + BS	  Extremities: bl le edema ++ , wound vac to sternum         MEDICATIONS  (STANDING):  ALBUTerol    90 MICROgram(s) HFA Inhaler 1 Puff(s) Inhalation every 4 hours  albuterol/ipratropium for Nebulization. 3 milliLiter(s) Nebulizer every 6 hours  aMIOdarone    Tablet 200 milliGRAM(s) Oral daily  amitriptyline 10 milliGRAM(s) Oral every 8 hours  artificial tears (preservative free) Ophthalmic Solution 1 Drop(s) Both EYES two times a day  aspirin enteric coated 81 milliGRAM(s) Oral daily  atorvastatin 40 milliGRAM(s) Oral at bedtime  benzonatate 100 milliGRAM(s) Oral every 8 hours  buDESOnide    Inhalation Suspension 0.5 milliGRAM(s) Inhalation two times a day  calcium acetate 667 milliGRAM(s) Oral three times a day with meals  chlorhexidine 2% Cloths 1 Application(s) Topical <User Schedule>  DAPTOmycin IVPB 500 milliGRAM(s) IV Intermittent every 24 hours  furosemide    Tablet 40 milliGRAM(s) Oral daily  heparin  Injectable 5000 Unit(s) SubCutaneous every 8 hours  insulin glargine Injectable (LANTUS) 50 Unit(s) SubCutaneous at bedtime  insulin lispro (HumaLOG) corrective regimen sliding scale   SubCutaneous Before meals and at bedtime  insulin lispro Injectable (HumaLOG) 25 Unit(s) SubCutaneous three times a day with meals  melatonin 3 milliGRAM(s) Oral at bedtime  metoprolol tartrate 25 milliGRAM(s) Oral every 12 hours  modafinil 100 milliGRAM(s) Oral daily  multivitamin 1 Tablet(s) Oral daily  mupirocin 2% Ointment 1 Application(s) Topical two times a day  pantoprazole    Tablet 40 milliGRAM(s) Oral before breakfast  polyethylene glycol 3350 17 Gram(s) Oral daily  senna 2 Tablet(s) Oral at bedtime  silver sulfADIAZINE 1% Cream 1 Application(s) Topical daily  sodium chloride 0.65% Nasal 1 Spray(s) Both Nostrils three times a day  spironolactone 25 milliGRAM(s) Oral daily  tiotropium 18 MICROgram(s) Capsule 1 Capsule(s) Inhalation daily      TELEMETRY: nsr- sinus tachycadia	    ECG:  	  RADIOLOGY:   DIAGNOSTIC TESTING:  [ ] Echocardiogram:  [ ]  Catheterization:  [ ] Stress Test:    OTHER: 	    LABS:	 	    Creatine Kinase, Serum: 78 U/L [30 - 200] (03-01 @ 04:44)                          8.2    8.68  )-----------( 325      ( 04 Mar 2020 06:28 )             26.8     03-04    132<L>  |  97  |  38<H>  ----------------------------<  144<H>  4.9   |  23  |  1.70<H>    Ca    8.9      04 Mar 2020 06:28

## 2020-03-04 NOTE — PROGRESS NOTE ADULT - ASSESSMENT
intraop kennedy 2/3/20 ef 50%, nl lv, mild diastolic dysfx stage 1  limited echo 2/4/20: nl LV sys fx , no pericardial effusion     a/p  64 year old man with history of HTN, DM II, admitted with progressive exertional angina, s/p cath with severe triple vessel disease, s/p CABG, post op course c/b brief witnessed PEA likely hypoxic arrest, s/p intubation.     1. CAD, s/p cath with severe triple vessel disease including lesions at the bifurcation of the LAD/diagonal and distal RCA/RPDA/RPL trifurcation.   -s/p CABG x 4, cont asa, statin, BB  -s/p PEA arrest   -echo 2/15 with preserved lv fxn/EF    2. Sternal wound infection  -s/p RTOR for SWI  -abx per id, wound vac     3. Postop acute diastolic HF  -diuretics per cts     4. PAF  -cont amio, bb  -AC ?per CTS , currently on asa    5. HTN  -bp stable    6. STEPHANIE/CKD  -stable, renal f/u     7. Postop Pleural effusion  -S/P Right/Left chest tube     8. Sepsis -E. Faecalis bacteremia, SW infection, RLE cellulitis   -IV abx , repeat bcx 2/13 neg, appears more lethargic today , CTS to f/u   -s/p debridement   -ID, plastics f/u     dvt ppx

## 2020-03-04 NOTE — SWALLOW BEDSIDE ASSESSMENT ADULT - SLP GENERAL OBSERVATIONS
Pt received in chair w/ lunch on table. Pt AOx4 and reported pain, especially when coughing.  Pt oriented to purpose of consultation and educated on swallowing conditions post extubation. Pt was observed w/overt  s/s of airway invasion on most conservative PO trials and was advised and agreed on objective testing fir further assess swallowing function at this time. Pt received in chair w/ lunch on table. Pt AOx4 and reported pain, especially when coughing.  Pt oriented to purpose of consultation and educated on swallowing conditions post extubation. + episodes of baseline cough observed, greater post PO trials.

## 2020-03-04 NOTE — PROGRESS NOTE ADULT - ASSESSMENT
63yo male with hx of HTN, DM II   s/p C4L on 2/3; PEA arrest on 2/6   + enterococcus Bld  C/S > started ampicillin q8h, ID consulted,  R pigtail for effusion  2/8    Extubated  2/9  2/15 L pigtail effusion  2/17 PRBC   2/18 Tx 2 Diaz  2/19 thomas removed, trial void. Maintain left pigtail for significant output. Pt encouraged to ambulate. Supplemental o2 sat NC weaned to 2L. Blood cultures repeated.  2/20 VSS -Pulmonary consult - appreciated - duonebs ATC q6h & pulmicort bid initiated - ddimer drawn.  pt w/ hx negative LE dopplers   will order non con chest DT as pt has a loculated left effusion that will require tap at IR.  2/21 - NON con Chest CT done - multiple loculated Left pleural effusions w/ patchy consolidation R - rounds made w/ Dr. carl.  ID - consult called re: Ct - WBC 11 afebrile.  +PALMA.  unable to tolerate VQ scan this am.  will d/c bumex & start Torsemide 20mg po daily  d/c planning rehab when medically stable  2/22  Wound care consult leg wounds> shower w/ local skin care  2/23  SW drainage> on Dapto> as per ID will cover SWD  CXR this am   Tighter glycemic control  2/24 ID added cefapime SW drainage purulent, afebrile  2/25 S/p Sternal wound debridement and irrigation with removal of all 6 sternal wires. Purulence encountered and sent for culture. Closure with bilateral pectoralis muscle advancement flaps.  Post op zari for hypotension- weaned off.  Skin/wd  culture from 2/24 neg  Pt transferred to sdu  2/27 VSS   carlos d/c thomas d/c transfer to floor JPx2  followed  by Plastics  followed by ID on cefepime and daptomycin bc positive for E. faecalis; follow up bc 2/19 negative to date. Provigil daily in am  2/28 VSS; abx as per ID - d/c cefepime and continue dapto 500 iv qd as per Dr. Carrasco- pt will need picc line vs medel for 4 wks abx from date of SWD as per ID; bc 2/19 neg.  d/w h. renal medel vs cath- await decision, diuresis initiated for hypervolemia; hep sq for dvt prophylaxis, + hypoglycemia- endo following- humalog adjusted    Discharge planning- rehab next week   2/29 VSS, distal portion of MSI with small dehiscence and serous purulent drainage - recommended wound vac placement as per Plastics. Left KEI 80ml & Right KEI 80ml/24h. S/P Right PICC line placement for IV abx.   3/1 Wound Vac placed to sternal wound. Pt ambulated in hallway with rolling walker. Left KEI 50ml & Right KEI 110ml/24h. Plan for discharge to Rehab Mon/Tue.   3/2 Pending patient rehab selection. Maintain sternal wound vac placement and KEI drains. Patient lethargic after percocet. Will hold narcotics . Continue with Daptomycin x 4 weeks until 3/22/20  3/3 VSS - call to ID re: alternative antibiotic to Daptomycin - Dr. Carrasco to follow up . d/c to rehab   3/4  HD stable .  + cough--bedside swallowing eval + cough--recs NPO and MBS in am--insulin adjusted by Dr Matias.   Michael Carrasco--daptomycin can be switched on Monday to another antibiotic thru 3/22

## 2020-03-04 NOTE — PROGRESS NOTE ADULT - PROBLEM SELECTOR PLAN 1
Pt with CKD likely  in the setting of long standing DM and HTN. No prior labs for review. Scr since admission has ranged between 1.7-2.1. Scr increased to ~2.50 secondary to hemodynamic injury after CABG and PEA arrest s/p CPR, and peaked to 2.9. Scr now stable. Has Non-nephrotic range proteinuria, w/up for secondary causes of glomerular disease reviewed. Pt. likely with diabetic nephropathy.   - maintaining UO in response to diuretics  - Monitor labs and urine output.   - Avoid NSAIDs, ACEI/ARBS, RCA and nephrotoxins. Dose medications as per eGFR.

## 2020-03-04 NOTE — PROGRESS NOTE ADULT - ASSESSMENT
Assessment  DMT2: 64y Male with DM T2 with hyperglycemia, A1C 9.2%, was on oral meds and insulin at home, now on basal bolus insulin, increased Humalog dose yesterday, blood sugars improving, FS within acceptable range today, no hypoglycemic episodes. Patient is eating partial meals, still eats with inconsistent carb intake, appears comfortable, wound vac being changed.  Foot infection: On Tx, stable.  CAD: s/p CABG 2/3, on medications, no chest pain, stable, monitored.  HTN: Controlled,  on antihypertensive medications.  HLD: Controlled, on statin.  CKD: Monitor labs/BMP          Harper Matias MD  Cell: [phone]  Office: [phone] Assessment  DMT2: 64y Male with DM T2 with hyperglycemia, A1C 9.2%, was on oral meds and insulin at home, now on basal bolus insulin, increased Humalog dose yesterday,  blood sugars improving, FS within acceptable range today, no hypoglycemic episodes. Patient is eating partial meals, still eats with inconsistent carb intake, appears comfortable, wound vac being changed, will be NPO tonight, asking to stop insulin, will adjust dose.  Foot infection: On Tx, stable.  CAD: s/p CABG 2/3, on medications, no chest pain, stable, monitored.  HTN: Controlled,  on antihypertensive medications.  HLD: Controlled, on statin.  CKD: Monitor labs/BMP          Harper Matias MD  Cell: [phone]  Office: [phone]

## 2020-03-04 NOTE — PROGRESS NOTE ADULT - PROBLEM SELECTOR PLAN 2
Pt. with likely hypervolemic hyponatremia in the setting of volume overload. Na stable this AM. Continue diuretics.

## 2020-03-05 LAB
ANION GAP SERPL CALC-SCNC: 13 MMOL/L — SIGNIFICANT CHANGE UP (ref 5–17)
BUN SERPL-MCNC: 35 MG/DL — HIGH (ref 7–23)
CALCIUM SERPL-MCNC: 8.8 MG/DL — SIGNIFICANT CHANGE UP (ref 8.4–10.5)
CHLORIDE SERPL-SCNC: 96 MMOL/L — SIGNIFICANT CHANGE UP (ref 96–108)
CO2 SERPL-SCNC: 24 MMOL/L — SIGNIFICANT CHANGE UP (ref 22–31)
CREAT SERPL-MCNC: 1.71 MG/DL — HIGH (ref 0.5–1.3)
GLUCOSE BLDC GLUCOMTR-MCNC: 101 MG/DL — HIGH (ref 70–99)
GLUCOSE BLDC GLUCOMTR-MCNC: 121 MG/DL — HIGH (ref 70–99)
GLUCOSE BLDC GLUCOMTR-MCNC: 195 MG/DL — HIGH (ref 70–99)
GLUCOSE BLDC GLUCOMTR-MCNC: 214 MG/DL — HIGH (ref 70–99)
GLUCOSE SERPL-MCNC: 119 MG/DL — HIGH (ref 70–99)
HCT VFR BLD CALC: 25.5 % — LOW (ref 39–50)
HGB BLD-MCNC: 7.7 G/DL — LOW (ref 13–17)
MCHC RBC-ENTMCNC: 26.7 PG — LOW (ref 27–34)
MCHC RBC-ENTMCNC: 30.2 GM/DL — LOW (ref 32–36)
MCV RBC AUTO: 88.5 FL — SIGNIFICANT CHANGE UP (ref 80–100)
NRBC # BLD: 0 /100 WBCS — SIGNIFICANT CHANGE UP (ref 0–0)
PLATELET # BLD AUTO: 329 K/UL — SIGNIFICANT CHANGE UP (ref 150–400)
POTASSIUM SERPL-MCNC: 4.6 MMOL/L — SIGNIFICANT CHANGE UP (ref 3.5–5.3)
POTASSIUM SERPL-SCNC: 4.6 MMOL/L — SIGNIFICANT CHANGE UP (ref 3.5–5.3)
RBC # BLD: 2.88 M/UL — LOW (ref 4.2–5.8)
RBC # FLD: 14.6 % — HIGH (ref 10.3–14.5)
SODIUM SERPL-SCNC: 133 MMOL/L — LOW (ref 135–145)
WBC # BLD: 7.31 K/UL — SIGNIFICANT CHANGE UP (ref 3.8–10.5)
WBC # FLD AUTO: 7.31 K/UL — SIGNIFICANT CHANGE UP (ref 3.8–10.5)

## 2020-03-05 PROCEDURE — 74230 X-RAY XM SWLNG FUNCJ C+: CPT | Mod: 26

## 2020-03-05 PROCEDURE — 99232 SBSQ HOSP IP/OBS MODERATE 35: CPT

## 2020-03-05 RX ORDER — GLUCAGON INJECTION, SOLUTION 0.5 MG/.1ML
1 INJECTION, SOLUTION SUBCUTANEOUS ONCE
Refills: 0 | Status: DISCONTINUED | OUTPATIENT
Start: 2020-03-05 | End: 2020-03-16

## 2020-03-05 RX ORDER — INSULIN LISPRO 100/ML
10 VIAL (ML) SUBCUTANEOUS
Refills: 0 | Status: DISCONTINUED | OUTPATIENT
Start: 2020-03-05 | End: 2020-03-06

## 2020-03-05 RX ORDER — DEXTROSE 50 % IN WATER 50 %
25 SYRINGE (ML) INTRAVENOUS ONCE
Refills: 0 | Status: DISCONTINUED | OUTPATIENT
Start: 2020-03-05 | End: 2020-03-16

## 2020-03-05 RX ORDER — INSULIN GLARGINE 100 [IU]/ML
30 INJECTION, SOLUTION SUBCUTANEOUS AT BEDTIME
Refills: 0 | Status: DISCONTINUED | OUTPATIENT
Start: 2020-03-05 | End: 2020-03-06

## 2020-03-05 RX ORDER — DEXTROSE 50 % IN WATER 50 %
12.5 SYRINGE (ML) INTRAVENOUS ONCE
Refills: 0 | Status: DISCONTINUED | OUTPATIENT
Start: 2020-03-05 | End: 2020-03-16

## 2020-03-05 RX ORDER — DEXTROSE 50 % IN WATER 50 %
15 SYRINGE (ML) INTRAVENOUS ONCE
Refills: 0 | Status: DISCONTINUED | OUTPATIENT
Start: 2020-03-05 | End: 2020-03-16

## 2020-03-05 RX ORDER — MODAFINIL 200 MG/1
100 TABLET ORAL
Refills: 0 | Status: DISCONTINUED | OUTPATIENT
Start: 2020-03-06 | End: 2020-03-13

## 2020-03-05 RX ORDER — SODIUM CHLORIDE 9 MG/ML
1000 INJECTION, SOLUTION INTRAVENOUS
Refills: 0 | Status: DISCONTINUED | OUTPATIENT
Start: 2020-03-05 | End: 2020-03-16

## 2020-03-05 RX ADMIN — Medication 3 MILLILITER(S): at 14:17

## 2020-03-05 RX ADMIN — Medication 2.5 MILLIGRAM(S): at 05:49

## 2020-03-05 RX ADMIN — Medication 1 SPRAY(S): at 14:20

## 2020-03-05 RX ADMIN — HEPARIN SODIUM 5000 UNIT(S): 5000 INJECTION INTRAVENOUS; SUBCUTANEOUS at 22:08

## 2020-03-05 RX ADMIN — Medication 667 MILLIGRAM(S): at 14:19

## 2020-03-05 RX ADMIN — SENNA PLUS 2 TABLET(S): 8.6 TABLET ORAL at 22:07

## 2020-03-05 RX ADMIN — Medication 81 MILLIGRAM(S): at 14:18

## 2020-03-05 RX ADMIN — MUPIROCIN 1 APPLICATION(S): 20 OINTMENT TOPICAL at 17:25

## 2020-03-05 RX ADMIN — Medication 1 TABLET(S): at 14:18

## 2020-03-05 RX ADMIN — Medication 10 UNIT(S): at 12:47

## 2020-03-05 RX ADMIN — CHLORHEXIDINE GLUCONATE 1 APPLICATION(S): 213 SOLUTION TOPICAL at 08:08

## 2020-03-05 RX ADMIN — Medication 667 MILLIGRAM(S): at 17:24

## 2020-03-05 RX ADMIN — Medication 2.5 MILLIGRAM(S): at 00:36

## 2020-03-05 RX ADMIN — OXYCODONE AND ACETAMINOPHEN 1 TABLET(S): 5; 325 TABLET ORAL at 23:56

## 2020-03-05 RX ADMIN — Medication 1 APPLICATION(S): at 14:17

## 2020-03-05 RX ADMIN — Medication 1 DROP(S): at 17:24

## 2020-03-05 RX ADMIN — Medication 10 MILLIGRAM(S): at 22:07

## 2020-03-05 RX ADMIN — Medication 100 MILLIGRAM(S): at 22:07

## 2020-03-05 RX ADMIN — HEPARIN SODIUM 5000 UNIT(S): 5000 INJECTION INTRAVENOUS; SUBCUTANEOUS at 14:18

## 2020-03-05 RX ADMIN — OXYCODONE AND ACETAMINOPHEN 1 TABLET(S): 5; 325 TABLET ORAL at 23:08

## 2020-03-05 RX ADMIN — Medication 0.5 MILLIGRAM(S): at 17:25

## 2020-03-05 RX ADMIN — Medication 1 DROP(S): at 05:51

## 2020-03-05 RX ADMIN — MODAFINIL 100 MILLIGRAM(S): 200 TABLET ORAL at 14:00

## 2020-03-05 RX ADMIN — Medication 2: at 22:09

## 2020-03-05 RX ADMIN — Medication 3 MILLIGRAM(S): at 22:08

## 2020-03-05 RX ADMIN — Medication 1: at 12:48

## 2020-03-05 RX ADMIN — Medication 200 MILLIGRAM(S): at 23:17

## 2020-03-05 RX ADMIN — MUPIROCIN 1 APPLICATION(S): 20 OINTMENT TOPICAL at 05:50

## 2020-03-05 RX ADMIN — DAPTOMYCIN 120 MILLIGRAM(S): 500 INJECTION, POWDER, LYOPHILIZED, FOR SOLUTION INTRAVENOUS at 14:16

## 2020-03-05 RX ADMIN — Medication 3 MILLILITER(S): at 17:25

## 2020-03-05 RX ADMIN — ATORVASTATIN CALCIUM 40 MILLIGRAM(S): 80 TABLET, FILM COATED ORAL at 22:07

## 2020-03-05 RX ADMIN — INSULIN GLARGINE 30 UNIT(S): 100 INJECTION, SOLUTION SUBCUTANEOUS at 22:07

## 2020-03-05 RX ADMIN — Medication 1 SPRAY(S): at 05:50

## 2020-03-05 RX ADMIN — HEPARIN SODIUM 5000 UNIT(S): 5000 INJECTION INTRAVENOUS; SUBCUTANEOUS at 05:49

## 2020-03-05 RX ADMIN — Medication 10 MILLIGRAM(S): at 14:17

## 2020-03-05 RX ADMIN — AMIODARONE HYDROCHLORIDE 200 MILLIGRAM(S): 400 TABLET ORAL at 14:16

## 2020-03-05 NOTE — PROGRESS NOTE ADULT - ASSESSMENT
65yo male with hx of HTN, DM II   s/p C4L on 2/3; PEA arrest on 2/6   + enterococcus Bld  C/S > started ampicillin q8h, ID consulted,  R pigtail for effusion  2/8    Extubated  2/9  2/15 L pigtail effusion  2/17 PRBC   2/18 Tx 2 Diaz  2/19 thomas removed, trial void. Maintain left pigtail for significant output. Pt encouraged to ambulate. Supplemental o2 sat NC weaned to 2L. Blood cultures repeated.  2/20 VSS -Pulmonary consult - appreciated - duonebs ATC q6h & pulmicort bid initiated - ddimer drawn.  pt w/ hx negative LE dopplers   will order non con chest DT as pt has a loculated left effusion that will require tap at IR.  2/21 - NON con Chest CT done - multiple loculated Left pleural effusions w/ patchy consolidation R - rounds made w/ Dr. carl.  ID - consult called re: Ct - WBC 11 afebrile.  +PALMA.  unable to tolerate VQ scan this am.  will d/c bumex & start Torsemide 20mg po daily  d/c planning rehab when medically stable  2/22  Wound care consult leg wounds> shower w/ local skin care  2/23  SW drainage> on Dapto> as per ID will cover SWD  CXR this am   Tighter glycemic control  2/24 ID added cefapime SW drainage purulent, afebrile  2/25 S/p Sternal wound debridement and irrigation with removal of all 6 sternal wires. Purulence encountered and sent for culture. Closure with bilateral pectoralis muscle advancement flaps.  Post op zari for hypotension- weaned off.  Skin/wd  culture from 2/24 neg  Pt transferred to sdu  2/27 VSS   carlos d/c thomas d/c transfer to floor JPx2  followed  by Plastics  followed by ID on cefepime and daptomycin bc positive for E. faecalis; follow up bc 2/19 negative to date. Provigil daily in am  2/28 VSS; abx as per ID - d/c cefepime and continue dapto 500 iv qd as per Dr. Carrasco- pt will need picc line vs medel for 4 wks abx from date of SWD as per ID; bc 2/19 neg.  d/w h. renal medel vs cath- await decision, diuresis initiated for hypervolemia; hep sq for dvt prophylaxis, + hypoglycemia- endo following- humalog adjusted    Discharge planning- rehab next week   2/29 VSS, distal portion of MSI with small dehiscence and serous purulent drainage - recommended wound vac placement as per Plastics. Left KEI 80ml & Right KEI 80ml/24h. S/P Right PICC line placement for IV abx.   3/1 Wound Vac placed to sternal wound. Pt ambulated in hallway with rolling walker. Left KEI 50ml & Right KEI 110ml/24h. Plan for discharge to Rehab Mon/Tue.   3/2 Pending patient rehab selection. Maintain sternal wound vac placement and KEI drains. Patient lethargic after percocet. Will hold narcotics . Continue with Daptomycin x 4 weeks until 3/22/20  3/3 VSS - call to ID re: alternative antibiotic to Daptomycin - Dr. aCrrasco to follow up . d/c to rehab   3/4  HD stable .  + cough--bedside swallowing eval + cough--recs NPO and MBS in am--insulin adjusted by Dr Matias.   Dw Dr Carrasco--daptomycin can be switched on Monday to another antibiotic thru 3/22  3/5 VSS - MBS - Per speech pathologist- no gross aspiration ?silent - placed on Dysphagia 3 diet w/ nectar thick liquids. d/w Dr. Mariscal-  As per ID - will switch antibiotic to Zyvoxx on Monday 3/9/2020 - d/c to rehab on Monday on zyvoxx until 3/22

## 2020-03-05 NOTE — PROGRESS NOTE ADULT - SUBJECTIVE AND OBJECTIVE BOX
Chief complaint  Patient is a 64y old  Male who presents with a chief complaint of Chest pain (05 Mar 2020 10:56)   Review of systems  Patient in bed, looks comfortable, no hypoglycemia.    Labs and Fingersticks  CAPILLARY BLOOD GLUCOSE      POCT Blood Glucose.: 101 mg/dL (05 Mar 2020 08:11)  POCT Blood Glucose.: 186 mg/dL (04 Mar 2020 23:26)  POCT Blood Glucose.: 204 mg/dL (04 Mar 2020 21:37)  POCT Blood Glucose.: 234 mg/dL (04 Mar 2020 17:13)  POCT Blood Glucose.: 101 mg/dL (04 Mar 2020 12:05)      Anion Gap, Serum: 13 (03-05 @ 05:39)  Anion Gap, Serum: 12 (03-04 @ 06:28)      Calcium, Total Serum: 8.8 (03-05 @ 05:39)  Calcium, Total Serum: 8.9 (03-04 @ 06:28)          03-05    133<L>  |  96  |  35<H>  ----------------------------<  119<H>  4.6   |  24  |  1.71<H>    Ca    8.8      05 Mar 2020 05:39                          7.7    7.31  )-----------( 329      ( 05 Mar 2020 05:39 )             25.5     Medications  MEDICATIONS  (STANDING):  ALBUTerol    90 MICROgram(s) HFA Inhaler 1 Puff(s) Inhalation every 4 hours  albuterol/ipratropium for Nebulization. 3 milliLiter(s) Nebulizer every 6 hours  aMIOdarone    Tablet 200 milliGRAM(s) Oral daily  amitriptyline 10 milliGRAM(s) Oral every 8 hours  artificial tears (preservative free) Ophthalmic Solution 1 Drop(s) Both EYES two times a day  aspirin enteric coated 81 milliGRAM(s) Oral daily  atorvastatin 40 milliGRAM(s) Oral at bedtime  benzonatate 100 milliGRAM(s) Oral every 8 hours  buDESOnide    Inhalation Suspension 0.5 milliGRAM(s) Inhalation two times a day  calcium acetate 667 milliGRAM(s) Oral three times a day with meals  chlorhexidine 2% Cloths 1 Application(s) Topical <User Schedule>  DAPTOmycin IVPB 500 milliGRAM(s) IV Intermittent every 24 hours  dextrose 5%. 1000 milliLiter(s) (50 mL/Hr) IV Continuous <Continuous>  dextrose 5%. 1000 milliLiter(s) (30 mL/Hr) IV Continuous <Continuous>  dextrose 50% Injectable 12.5 Gram(s) IV Push once  dextrose 50% Injectable 25 Gram(s) IV Push once  dextrose 50% Injectable 25 Gram(s) IV Push once  furosemide    Tablet 40 milliGRAM(s) Oral daily  heparin  Injectable 5000 Unit(s) SubCutaneous every 8 hours  insulin glargine Injectable (LANTUS) 30 Unit(s) SubCutaneous at bedtime  insulin lispro (HumaLOG) corrective regimen sliding scale   SubCutaneous Before meals and at bedtime  insulin lispro Injectable (HumaLOG) 10 Unit(s) SubCutaneous three times a day before meals  melatonin 3 milliGRAM(s) Oral at bedtime  modafinil 100 milliGRAM(s) Oral daily  multivitamin 1 Tablet(s) Oral daily  mupirocin 2% Ointment 1 Application(s) Topical two times a day  pantoprazole    Tablet 40 milliGRAM(s) Oral before breakfast  polyethylene glycol 3350 17 Gram(s) Oral daily  senna 2 Tablet(s) Oral at bedtime  silver sulfADIAZINE 1% Cream 1 Application(s) Topical daily  sodium chloride 0.65% Nasal 1 Spray(s) Both Nostrils three times a day  spironolactone 25 milliGRAM(s) Oral daily  tiotropium 18 MICROgram(s) Capsule 1 Capsule(s) Inhalation daily      Physical Exam  General: Patient comfortable in bed  Vital Signs Last 12 Hrs  T(F): 98.4 (03-05-20 @ 05:00), Max: 98.4 (03-05-20 @ 05:00)  HR: 91 (03-05-20 @ 05:00) (91 - 91)  BP: 110/66 (03-05-20 @ 05:00) (110/66 - 110/66)  BP(mean): --  RR: 18 (03-05-20 @ 05:00) (18 - 18)  SpO2: 96% (03-05-20 @ 05:00) (96% - 96%)  Neck: No palpable thyroid nodules.  CVS: S1S2, No murmurs  Respiratory: No wheezing, no crepitations  GI: Abdomen soft, bowel sounds positive  Musculoskeletal:  edema lower extremities.   Skin: No skin rashes, no ecchymosis    Diagnostics Chief complaint  Patient is a 64y old  Male who presents with a chief complaint  of Chest pain (05 Mar 2020 10:56)   Review of systems  Patient in bed, looks comfortable, no hypoglycemia.    Labs and Fingersticks  CAPILLARY BLOOD GLUCOSE      POCT Blood Glucose.: 101 mg/dL (05 Mar 2020 08:11)  POCT Blood Glucose.: 186 mg/dL (04 Mar 2020 23:26)  POCT Blood Glucose.: 204 mg/dL (04 Mar 2020 21:37)  POCT Blood Glucose.: 234 mg/dL (04 Mar 2020 17:13)  POCT Blood Glucose.: 101 mg/dL (04 Mar 2020 12:05)      Anion Gap, Serum: 13 (03-05 @ 05:39)  Anion Gap, Serum: 12 (03-04 @ 06:28)      Calcium, Total Serum: 8.8 (03-05 @ 05:39)  Calcium, Total Serum: 8.9 (03-04 @ 06:28)          03-05    133<L>  |  96  |  35<H>  ----------------------------<  119<H>  4.6   |  24  |  1.71<H>    Ca    8.8      05 Mar 2020 05:39                          7.7    7.31  )-----------( 329      ( 05 Mar 2020 05:39 )             25.5     Medications  MEDICATIONS  (STANDING):  ALBUTerol    90 MICROgram(s) HFA Inhaler 1 Puff(s) Inhalation every 4 hours  albuterol/ipratropium for Nebulization. 3 milliLiter(s) Nebulizer every 6 hours  aMIOdarone    Tablet 200 milliGRAM(s) Oral daily  amitriptyline 10 milliGRAM(s) Oral every 8 hours  artificial tears (preservative free) Ophthalmic Solution 1 Drop(s) Both EYES two times a day  aspirin enteric coated 81 milliGRAM(s) Oral daily  atorvastatin 40 milliGRAM(s) Oral at bedtime  benzonatate 100 milliGRAM(s) Oral every 8 hours  buDESOnide    Inhalation Suspension 0.5 milliGRAM(s) Inhalation two times a day  calcium acetate 667 milliGRAM(s) Oral three times a day with meals  chlorhexidine 2% Cloths 1 Application(s) Topical <User Schedule>  DAPTOmycin IVPB 500 milliGRAM(s) IV Intermittent every 24 hours  dextrose 5%. 1000 milliLiter(s) (50 mL/Hr) IV Continuous <Continuous>  dextrose 5%. 1000 milliLiter(s) (30 mL/Hr) IV Continuous <Continuous>  dextrose 50% Injectable 12.5 Gram(s) IV Push once  dextrose 50% Injectable 25 Gram(s) IV Push once  dextrose 50% Injectable 25 Gram(s) IV Push once  furosemide    Tablet 40 milliGRAM(s) Oral daily  heparin  Injectable 5000 Unit(s) SubCutaneous every 8 hours  insulin glargine Injectable (LANTUS) 30 Unit(s) SubCutaneous at bedtime  insulin lispro (HumaLOG) corrective regimen sliding scale   SubCutaneous Before meals and at bedtime  insulin lispro Injectable (HumaLOG) 10 Unit(s) SubCutaneous three times a day before meals  melatonin 3 milliGRAM(s) Oral at bedtime  modafinil 100 milliGRAM(s) Oral daily  multivitamin 1 Tablet(s) Oral daily  mupirocin 2% Ointment 1 Application(s) Topical two times a day  pantoprazole    Tablet 40 milliGRAM(s) Oral before breakfast  polyethylene glycol 3350 17 Gram(s) Oral daily  senna 2 Tablet(s) Oral at bedtime  silver sulfADIAZINE 1% Cream 1 Application(s) Topical daily  sodium chloride 0.65% Nasal 1 Spray(s) Both Nostrils three times a day  spironolactone 25 milliGRAM(s) Oral daily  tiotropium 18 MICROgram(s) Capsule 1 Capsule(s) Inhalation daily      Physical Exam  General: Patient comfortable in bed  Vital Signs Last 12 Hrs  T(F): 98.4 (03-05-20 @ 05:00), Max: 98.4 (03-05-20 @ 05:00)  HR: 91 (03-05-20 @ 05:00) (91 - 91)  BP: 110/66 (03-05-20 @ 05:00) (110/66 - 110/66)  BP(mean): --  RR: 18 (03-05-20 @ 05:00) (18 - 18)  SpO2: 96% (03-05-20 @ 05:00) (96% - 96%)  Neck: No palpable thyroid nodules.  CVS: S1S2, No murmurs  Respiratory: No wheezing, no crepitations  GI: Abdomen soft, bowel sounds positive  Musculoskeletal:  edema lower extremities.   Skin: No skin rashes, no ecchymosis    Diagnostics

## 2020-03-05 NOTE — SWALLOW VFSS/MBS ASSESSMENT ADULT - SLP PERTINENT HISTORY OF CURRENT PROBLEM
H&P: 65yo male with hx of HTN, DM II, presented to the ED w/ c/o acute on chronic L sided exertional chest pain. Pt reports L  sided pressure and stabbing chest pain radiating to his sternum and r w/ activity for the past few months. He states each episode last ~ 1-2 mins and subsides upon resting; denies symptoms of radiation to back, arm or jaw. Pt states this time his pain was associated w/ SOB up exertion; denies symptoms of LOC, diaphoresis, palpitations, abd pain, N/V, fever or chills. He denies prior cardiac work up.

## 2020-03-05 NOTE — PROGRESS NOTE ADULT - SUBJECTIVE AND OBJECTIVE BOX
CARDIOLOGY FOLLOW UP - Dr. Steel    CC no new complaints + cough. pleuritic cp, mild thorpe       PHYSICAL EXAM:  T(C): 36.9 (03-05-20 @ 05:00), Max: 36.9 (03-04-20 @ 19:19)  HR: 91 (03-05-20 @ 05:00) (86 - 97)  BP: 110/66 (03-05-20 @ 05:00) (110/66 - 132/76)  RR: 18 (03-05-20 @ 05:00) (18 - 18)  SpO2: 96% (03-05-20 @ 05:00) (95% - 96%)  Wt(kg): --  I&O's Summary    04 Mar 2020 07:01  -  05 Mar 2020 07:00  --------------------------------------------------------  IN: 1340 mL / OUT: 2454 mL / NET: -1114 mL    05 Mar 2020 07:01  -  05 Mar 2020 10:57  --------------------------------------------------------  IN: 0 mL / OUT: 0 mL / NET: 0 mL        Appearance: Normal	  Cardiovascular: Normal S1 S2,RRR   Respiratory:  diminished   Gastrointestinal:  Soft, Non-tender, + BS	  Extremities: bl le ++ edema        MEDICATIONS  (STANDING):  ALBUTerol    90 MICROgram(s) HFA Inhaler 1 Puff(s) Inhalation every 4 hours  albuterol/ipratropium for Nebulization. 3 milliLiter(s) Nebulizer every 6 hours  aMIOdarone    Tablet 200 milliGRAM(s) Oral daily  amitriptyline 10 milliGRAM(s) Oral every 8 hours  artificial tears (preservative free) Ophthalmic Solution 1 Drop(s) Both EYES two times a day  aspirin enteric coated 81 milliGRAM(s) Oral daily  atorvastatin 40 milliGRAM(s) Oral at bedtime  benzonatate 100 milliGRAM(s) Oral every 8 hours  buDESOnide    Inhalation Suspension 0.5 milliGRAM(s) Inhalation two times a day  calcium acetate 667 milliGRAM(s) Oral three times a day with meals  chlorhexidine 2% Cloths 1 Application(s) Topical <User Schedule>  DAPTOmycin IVPB 500 milliGRAM(s) IV Intermittent every 24 hours  dextrose 5%. 1000 milliLiter(s) (30 mL/Hr) IV Continuous <Continuous>  furosemide    Tablet 40 milliGRAM(s) Oral daily  heparin  Injectable 5000 Unit(s) SubCutaneous every 8 hours  insulin lispro (HumaLOG) corrective regimen sliding scale   SubCutaneous Before meals and at bedtime  melatonin 3 milliGRAM(s) Oral at bedtime  modafinil 100 milliGRAM(s) Oral daily  multivitamin 1 Tablet(s) Oral daily  mupirocin 2% Ointment 1 Application(s) Topical two times a day  pantoprazole    Tablet 40 milliGRAM(s) Oral before breakfast  polyethylene glycol 3350 17 Gram(s) Oral daily  senna 2 Tablet(s) Oral at bedtime  silver sulfADIAZINE 1% Cream 1 Application(s) Topical daily  sodium chloride 0.65% Nasal 1 Spray(s) Both Nostrils three times a day  spironolactone 25 milliGRAM(s) Oral daily  tiotropium 18 MICROgram(s) Capsule 1 Capsule(s) Inhalation daily      TELEMETRY: nsr     ECG:  	  RADIOLOGY:   DIAGNOSTIC TESTING:  [ ] Echocardiogram:  [ ]  Catheterization:  [ ] Stress Test:    OTHER: 	    LABS:	 	    Creatine Kinase, Serum: 78 U/L [30 - 200] (03-01 @ 04:44)                          7.7    7.31  )-----------( 329      ( 05 Mar 2020 05:39 )             25.5     03-05    133<L>  |  96  |  35<H>  ----------------------------<  119<H>  4.6   |  24  |  1.71<H>    Ca    8.8      05 Mar 2020 05:39

## 2020-03-05 NOTE — SWALLOW VFSS/MBS ASSESSMENT ADULT - ORAL PHASE
Delayed oral transit time/up to max spillover to the valleculae; trace lingual residue post swallow trace to mild lingual residue mild lingual residue that flows to the valleculae post swallow mild lingual residue that clears with a reswallow

## 2020-03-05 NOTE — PROGRESS NOTE ADULT - ASSESSMENT
Impression:    Right medial lower leg wound    Recommend:  1.) topical therapy: right medial lower leg - cleanse with sterile water, pat dry, apply woundres wound gel, cover with Acticoat Flex 3 mesh, then DSD, secure with allevyn Q 3 days  2.) BLE elevation  3.) Abx per primary team/ID  4.) Glycemic control  5.) Nutrition optimization    Will follow with primary team  [facility]  [address], [address]  [place] [address]  Tel [phone]  Thank you for this consult.  Merna Orozco, NP-C, CWOCN [id number]

## 2020-03-05 NOTE — SWALLOW VFSS/MBS ASSESSMENT ADULT - DIAGNOSTIC IMPRESSIONS
Pt presents with evidence of an oropharyngeal dysphagia notable for laryngeal penetration with thin and nectar-thick liquids. Pt presents with evidence of an oropharyngeal dysphagia notable for mildly impaired oral management with prolonged yet efficient mastication, intermittent premature spillage, and laryngeal penetration with thin liquids. Esophageal phase of swallow notable for intermittent trace retention in the visualized proximal esophagus due to unclear etiology. Can consider GI consult if clinically indicated.    Disorders: reduced lingual strength/ROM/Rate of motion, reduced tongue to palate contact, mildly reduced BOT to posterior pharyngeal wall contact, reduced laryngeal closure, reduced supraglottic sensation.

## 2020-03-05 NOTE — SWALLOW VFSS/MBS ASSESSMENT ADULT - RECOMMENDED CONSISTENCY
Dysphagia III with nectar thick liquids.  MD/team Please enter the following as notes to RN: 1) Supervision with meals, 2) Meds with applesauce, if needed, 3) Small single bites and sips at slow rate, 5) Encourage successive swallows for oral and pharyngeal clearance, 6) Aspiration precautions. Monitor for s/s aspiration/laryngeal penetration. If noted:  D/C p.o. intake, provide non-oral nutrition/hydration/meds

## 2020-03-05 NOTE — PROGRESS NOTE ADULT - PROBLEM SELECTOR PLAN 4
ID following  change to Zyvoxx on 3/9/2020   date of SWD as per ID; bc 2/19 neg   daily cbc   may switch to another antibiotic Monday 3/9

## 2020-03-05 NOTE — PROGRESS NOTE ADULT - ASSESSMENT
63yo male with hx of HTN, DM II, presented to the ED with complaints of acute on chronic left sided exertional chest pain. Pt states he has been having left sided pressure and stabbing chest pain radiating to his sternum and right with activity for the past few months. Since admission- s/p cath with severe triple vessel disease, s/p CABG, post op course c/b brief witnessed PEA 2/6 likely hypoxic arrest, s/p intubation. ( CAD, s/p cath with severe triple vessel disease including lesions at the bifurcation of the LAD/diagonal and distal RCA/RPDA/RPL trifurcation.   -s/p CABG x 4, intraop kennedy ef 50%)--s/p ampicillin until 2/18 for enterococcus in blood, extubated 2/9, pigtail right 2/8 and left sided on 2/15-for effusion.  Remains sob-NO h/o resp issues prior to hospitalization.  ***Current sob-multifactorial-CAD/effusions, atelectasis due to pain, mild bronchospasm and debility.  ********************  2/21-slightly better, pig tail cath removed from left chest; CT chest done-loculated left effusion-slight better  2/24-BS issues-less sob-dapto/cefepime as per ID  2/25-for debridement of chest area-sternal wound  2/26-debridement completed--to CTU  2/2718-DRQ-[id number] over all  2/28-chest pain still impacting breathing--plastics/renal endo f/up  2/29-no changes over night  3/1-cough present-has been off BD  3/2-cough present still  3/3-improved cough on amitriptyline  3/4-cough upon swallowing  3/5-no changes-more ambulation-for [facility]

## 2020-03-05 NOTE — PROGRESS NOTE ADULT - SUBJECTIVE AND OBJECTIVE BOX
VITAL SIGNS-Telemetry:  SR   Vital Signs Last 24 Hrs  T(C): 36.9 (20 @ 05:00), Max: 36.9 (20 @ 19:19)  T(F): 98.4 (20 @ 05:00), Max: 98.4 (20 @ 19:19)  HR: 91 (20 @ 05:00) (86 - 97)  BP: 110/66 (20 @ 05:00) (110/66 - 132/76)  RR: 18 (20 @ 05:00) (18 - 18)  SpO2: 96% (20 @ 05:00) (95% - 96%)          @ 07:01  -   @ 07:00  --------------------------------------------------------  IN: 1340 mL / OUT: 2454 mL / NET: -1114 mL     @ 07:01  -  05 @ 12:21  --------------------------------------------------------  IN: 0 mL / OUT: 0 mL / NET: 0 mL        Daily     Daily Weight in k.2 (05 Mar 2020 11:34)    CAPILLARY BLOOD GLUCOSE  POCT Blood Glucose.: 195 mg/dL (05 Mar 2020 12:14)  POCT Blood Glucose.: 101 mg/dL (05 Mar 2020 08:11)  POCT Blood Glucose.: 186 mg/dL (04 Mar 2020 23:26)  POCT Blood Glucose.: 204 mg/dL (04 Mar 2020 21:37)  POCT Blood Glucose.: 234 mg/dL (04 Mar 2020 17:13)        Drains:   LJP (x)          RJP (x)    PHYSICAL EXAM:  Neurology: alert and oriented x 3, nonfocal, no gross deficits  CV : S1S2  Sternal Wound :  CDI , Stable  Lungs: CTA  Abdomen: soft, nontender, nondistended, positive bowel sounds, last bowel movement         Extremities:     + p. edema b/l - rle cellulitis w/ improvement. inc cdi no calf tenderness    acetaminophen   Tablet .. 650 milliGRAM(s) Oral every 6 hours PRN  ALBUTerol    90 MICROgram(s) HFA Inhaler 1 Puff(s) Inhalation every 4 hours  albuterol/ipratropium for Nebulization. 3 milliLiter(s) Nebulizer every 6 hours PRN  albuterol/ipratropium for Nebulization. 3 milliLiter(s) Nebulizer every 6 hours  aMIOdarone    Tablet 200 milliGRAM(s) Oral daily  amitriptyline 10 milliGRAM(s) Oral every 8 hours  artificial tears (preservative free) Ophthalmic Solution 1 Drop(s) Both EYES two times a day  aspirin enteric coated 81 milliGRAM(s) Oral daily  atorvastatin 40 milliGRAM(s) Oral at bedtime  benzonatate 100 milliGRAM(s) Oral every 8 hours  buDESOnide    Inhalation Suspension 0.5 milliGRAM(s) Inhalation two times a day  calcium acetate 667 milliGRAM(s) Oral three times a day with meals  chlorhexidine 2% Cloths 1 Application(s) Topical <User Schedule>  DAPTOmycin IVPB 500 milliGRAM(s) IV Intermittent every 24 hours  dextrose 40% Gel 15 Gram(s) Oral once PRN  dextrose 5%. 1000 milliLiter(s) IV Continuous <Continuous>  dextrose 5%. 1000 milliLiter(s) IV Continuous <Continuous>  dextrose 50% Injectable 12.5 Gram(s) IV Push once  dextrose 50% Injectable 25 Gram(s) IV Push once  dextrose 50% Injectable 25 Gram(s) IV Push once  furosemide    Tablet 40 milliGRAM(s) Oral daily  glucagon  Injectable 1 milliGRAM(s) IntraMuscular once PRN  guaiFENesin   Syrup  (Sugar-Free) 200 milliGRAM(s) Oral every 6 hours PRN  heparin  Injectable 5000 Unit(s) SubCutaneous every 8 hours  insulin glargine Injectable (LANTUS) 30 Unit(s) SubCutaneous at bedtime  insulin lispro (HumaLOG) corrective regimen sliding scale   SubCutaneous Before meals and at bedtime  insulin lispro Injectable (HumaLOG) 10 Unit(s) SubCutaneous three times a day before meals  melatonin 3 milliGRAM(s) Oral at bedtime  modafinil 100 milliGRAM(s) Oral daily  multivitamin 1 Tablet(s) Oral daily  mupirocin 2% Ointment 1 Application(s) Topical two times a day  oxycodone    5 mG/acetaminophen 325 mG 1 Tablet(s) Oral every 6 hours PRN  pantoprazole    Tablet 40 milliGRAM(s) Oral before breakfast  polyethylene glycol 3350 17 Gram(s) Oral daily  senna 2 Tablet(s) Oral at bedtime  silver sulfADIAZINE 1% Cream 1 Application(s) Topical daily  sodium chloride 0.65% Nasal 1 Spray(s) Both Nostrils three times a day  sodium chloride 0.9% lock flush 10 milliLiter(s) IV Push every 1 hour PRN  spironolactone 25 milliGRAM(s) Oral daily  tiotropium 18 MICROgram(s) Capsule 1 Capsule(s) Inhalation daily      Physical Therapy Rec:   Home  [  ]   Home w/ PT  [  ]  Rehab  [ x ]  Discussed with Cardiothoracic Team at AM rounds.

## 2020-03-05 NOTE — PROGRESS NOTE ADULT - ATTENDING COMMENTS
as above-s/p debridement 2/25 of sternal area--RENAL--PO diuretics for anasarca; slightly stronger-cough pronounced--? tracheomalacia----better on amitriptyline 10 q 8 for better control-? aspiration/swallow dysfunction--FEESST study/MBS  multifactorial dyspnea-CAD s/p CABG, PEA arrest, atelectasis due to pain, pleural effusion L-pig tail out, bronchospasm, ?PE-O2 NC sat above 90%                     Pleural effusion-pig tail removed 2/20-CT chest NC-improved but still loculations on left-f/up 4-6 wks  CAD/CHF-diurese as cr allows-keep K/Mg above  atelectasis-pain control, incentive spirometry, acapella                    ? DVT/PE--s/p repeat venous dopplers-negative; VQ unable  bronchospasm-duoneb q 6, pulmicort .5 bid; add tessalon perles 200 q 8 and mucinex;  out pt PFTs              ID-daptomycin as per ID  snore-? osas--out pt SS  DVT/GI prophylaxis, PT, nutrition evaln            PT  Mike Hernandez MD-Pulmonary    [phone]

## 2020-03-05 NOTE — SWALLOW VFSS/MBS ASSESSMENT ADULT - RECOMMENDED FEEDING/EATING TECHNIQUES
no straws/position upright (90 degrees)/small sips/bites/alternate food with liquid/maintain upright posture during/after eating for 30 mins/oral hygiene

## 2020-03-05 NOTE — PROGRESS NOTE ADULT - PROBLEM SELECTOR PLAN 7
seen by Dr David bowie bedside swallow failed  MBS this am 3/5 - pt. placed on Dysphagia 3 diet w/ nectar thick liquid.   NPO  MBS in am

## 2020-03-05 NOTE — PROGRESS NOTE ADULT - SUBJECTIVE AND OBJECTIVE BOX
Wound Surgery Progress Note:    HPI:  65yo male with hx of HTN, DM II, presented to the ED with complaints of acute on chronic left sided exertional chest pain. Pt states he has been having left sided pressure and stabbing chest pain radiating to his sternum and right with activity for the past few months. He states each episode last approximately 1-2 mins and subsides upon resting. He denies associated symptoms of radiation to his back, arm or jaw. He recently was at a wedding which he could not enjoy because dancing would reproduce to the pain. He states he did not pursue and medical attention until this time. Pt states this time his pain was associated with sob up exertion. He denies symptoms of LOC, diaphoresis, palpitations, abd pain, N/V, fever or chills. He denies prior cardiac work up. (25 Jan 2020 12:57)    Follow up visit to patient for Right lower leg wound management.    PAST MEDICAL & SURGICAL HISTORY:  HTN (hypertension)  Diabetes  History of appendectomy: x 30 yrs ago    REVIEW OF SYSTEMS  Endocrine: DM  Skin: chronic Right lower leg wound	    MEDICATIONS  (STANDING):  ALBUTerol    90 MICROgram(s) HFA Inhaler 1 Puff(s) Inhalation every 4 hours  albuterol/ipratropium for Nebulization. 3 milliLiter(s) Nebulizer every 6 hours  aMIOdarone    Tablet 200 milliGRAM(s) Oral daily  amitriptyline 10 milliGRAM(s) Oral every 8 hours  artificial tears (preservative free) Ophthalmic Solution 1 Drop(s) Both EYES two times a day  aspirin enteric coated 81 milliGRAM(s) Oral daily  atorvastatin 40 milliGRAM(s) Oral at bedtime  benzonatate 100 milliGRAM(s) Oral every 8 hours  buDESOnide    Inhalation Suspension 0.5 milliGRAM(s) Inhalation two times a day  calcium acetate 667 milliGRAM(s) Oral three times a day with meals  chlorhexidine 2% Cloths 1 Application(s) Topical <User Schedule>  DAPTOmycin IVPB 500 milliGRAM(s) IV Intermittent every 24 hours  dextrose 5%. 1000 milliLiter(s) (50 mL/Hr) IV Continuous <Continuous>  dextrose 5%. 1000 milliLiter(s) (30 mL/Hr) IV Continuous <Continuous>  dextrose 50% Injectable 12.5 Gram(s) IV Push once  dextrose 50% Injectable 25 Gram(s) IV Push once  dextrose 50% Injectable 25 Gram(s) IV Push once  furosemide    Tablet 40 milliGRAM(s) Oral daily  heparin  Injectable 5000 Unit(s) SubCutaneous every 8 hours  insulin glargine Injectable (LANTUS) 30 Unit(s) SubCutaneous at bedtime  insulin lispro (HumaLOG) corrective regimen sliding scale   SubCutaneous Before meals and at bedtime  insulin lispro Injectable (HumaLOG) 10 Unit(s) SubCutaneous three times a day before meals  melatonin 3 milliGRAM(s) Oral at bedtime  modafinil 100 milliGRAM(s) Oral daily  multivitamin 1 Tablet(s) Oral daily  mupirocin 2% Ointment 1 Application(s) Topical two times a day  pantoprazole    Tablet 40 milliGRAM(s) Oral before breakfast  polyethylene glycol 3350 17 Gram(s) Oral daily  senna 2 Tablet(s) Oral at bedtime  silver sulfADIAZINE 1% Cream 1 Application(s) Topical daily  sodium chloride 0.65% Nasal 1 Spray(s) Both Nostrils three times a day  spironolactone 25 milliGRAM(s) Oral daily  tiotropium 18 MICROgram(s) Capsule 1 Capsule(s) Inhalation daily    MEDICATIONS  (PRN):  acetaminophen   Tablet .. 650 milliGRAM(s) Oral every 6 hours PRN Mild Pain (1 - 3)  albuterol/ipratropium for Nebulization. 3 milliLiter(s) Nebulizer every 6 hours PRN Shortness of Breath and/or Wheezing  dextrose 40% Gel 15 Gram(s) Oral once PRN Blood Glucose LESS THAN 70 milliGRAM(s)/deciLiter  glucagon  Injectable 1 milliGRAM(s) IntraMuscular once PRN Glucose <70 milliGRAM(s)/deciLiter  guaiFENesin   Syrup  (Sugar-Free) 200 milliGRAM(s) Oral every 6 hours PRN Cough  oxycodone    5 mG/acetaminophen 325 mG 1 Tablet(s) Oral every 6 hours PRN Severe Pain (7 - 10)  sodium chloride 0.9% lock flush 10 milliLiter(s) IV Push every 1 hour PRN Pre/post blood products, medications, blood draw, and to maintain line patency    Allergies    No Known Allergies    Intolerances    SOCIAL HISTORY:  , lives with spouse, Denies smoking, ETOH, drugs    FAMILY HISTORY:  FH: type 2 diabetes    Vital Signs Last 24 Hrs  T(C): 36.8 (05 Mar 2020 12:21), Max: 36.9 (04 Mar 2020 19:19)  T(F): 98.2 (05 Mar 2020 12:21), Max: 98.4 (04 Mar 2020 19:19)  HR: 94 (05 Mar 2020 12:21) (86 - 97)  BP: 142/83 (05 Mar 2020 12:21) (110/66 - 142/83)  BP(mean): --  RR: 18 (05 Mar 2020 12:21) (18 - 18)  SpO2: 93% (05 Mar 2020 12:21) (93% - 96%)    Physical Exam:  General: A&Ox3  Respiratory: no SOB on room air  Gastrointestinal: soft NT/ND   Neurology: sensation grossly intact  Musculoskeletal:  no contractures or deformities  Vascular: BLE edema R>L, DP/PT pulses not manually palpable, BLE equally warm, no acute ischemia noted  Skin: right medial lower leg ulcer - L 6cm x W 5cm x D 0.1cm in an irregular shape, wound bed with adherent, yellow slough covering 80%, moderate serosanguinous drainage, No odor, increased warmth, tenderness, induration, fluctuance  Scattered small areas of hyperpigmented skin on both lower legs  Right medial leg just below the knee with an incision with small amount of eschar.    LABS:  03-05    133<L>  |  96  |  35<H>  ----------------------------<  119<H>  4.6   |  24  |  1.71<H>    Ca    8.8      05 Mar 2020 05:39                            7.7    7.31  )-----------( 329      ( 05 Mar 2020 05:39 )             25.5

## 2020-03-05 NOTE — PROGRESS NOTE ADULT - PROBLEM SELECTOR PLAN 1
Will decrease Lantus to 30 units at bed time.  Will decrease Humalog to 10 units before each meal and continue Humalog correction scale coverage.  Will continue monitoring FS, log, and FU.  Patient counseled for compliance with consistent low carb diet, exercise.

## 2020-03-05 NOTE — PROGRESS NOTE ADULT - ASSESSMENT
intraop kennedy 2/3/20 ef 50%, nl lv, mild diastolic dysfx stage 1  limited echo 2/4/20: nl LV sys fx , no pericardial effusion     a/p  64 year old man with history of HTN, DM II, admitted with progressive exertional angina, s/p cath with severe triple vessel disease, s/p CABG, post op course c/b brief witnessed PEA likely hypoxic arrest, s/p intubation.     1. CAD, s/p cath with severe triple vessel disease including lesions at the bifurcation of the LAD/diagonal and distal RCA/RPDA/RPL trifurcation.   -s/p CABG x 4, cont asa, statin, BB  -s/p PEA arrest   -echo 2/15 with preserved lv fxn/EF    2. Sternal wound infection  -s/p RTOR for SWI  -abx per id, wound vac     3. Postop acute diastolic HF  -diuretics per cts     4. PAF  -cont amio, bb  -AC ?per CTS , currently on asa    5. HTN  -bp stable    6. STEPHANIE/CKD  -stable, renal f/u     7. Postop Pleural effusion  -S/P Right/Left chest tube     8. Sepsis -E. Faecalis bacteremia, SW infection, RLE cellulitis   -IV abx , repeat bcx 2/13 neg  -s/p debridement   -ID, plastics f/u     dvt ppx

## 2020-03-05 NOTE — SWALLOW VFSS/MBS ASSESSMENT ADULT - LARYNGEAL PENETRATION AFTER THE SWALLOW - COUGH
Trace/over laryngeal surface of arytenoids from flowing pharyngeal residue; not fully retrieved; delayed cough

## 2020-03-05 NOTE — SWALLOW VFSS/MBS ASSESSMENT ADULT - SLP GENERAL OBSERVATIONS
Pt encountered in radiology, secure in DEVON chair. Pt awake and alert, cooperative, following directions for the purposes of the exam.

## 2020-03-05 NOTE — PROGRESS NOTE ADULT - SUBJECTIVE AND OBJECTIVE BOX
CHIEF COMPLAINT: f/up sob, resp failure, pleural effusions, atelectasis-cough present-NP--andrew upon swallowing--some tohrpe and chest pain    Interval Events: mult consults    REVIEW OF SYSTEMS:  Constitutional: No fevers or chills. No weight loss. + fatigue or generalized malaise.  Eyes: No itching or discharge from the eyes  ENT: No ear pain. No ear discharge. No nasal congestion. No post nasal drip. No epistaxis. No throat pain. No sore throat. No difficulty swallowing.   CV: No chest pain. No palpitations. No lightheadedness or dizziness.   Resp: No dyspnea at rest. N+dyspnea on exertion. No orthopnea. No wheezing. + cough. No stridor. No sputum production. + chest pain with respiration.  GI: No nausea. No vomiting. No diarrhea.  MSK: No joint pain or pain in any extremities  Integumentary: No skin lesions. + pedal edema.  Neurological: + gross motor weakness. No sensory changes.  [+ ] All other systems negative  [ ] Unable to assess ROS because ________    OBJECTIVE:  ICU Vital Signs Last 24 Hrs  T(C): 36.9 (04 Mar 2020 19:19), Max: 36.9 (04 Mar 2020 19:19)  T(F): 98.4 (04 Mar 2020 19:19), Max: 98.4 (04 Mar 2020 19:19)  HR: 97 (04 Mar 2020 19:19) (86 - 97)  BP: 123/75 (04 Mar 2020 19:19) (122/74 - 132/76)  BP(mean): --  ABP: --  ABP(mean): --  RR: 18 (04 Mar 2020 19:19) (18 - 18)  SpO2: 95% (04 Mar 2020 19:19) (95% - 96%)        03-03 @ 07:01  -  03-04 @ 07:00  --------------------------------------------------------  IN: 850 mL / OUT: 2750 mL / NET: -1900 mL    03-04 @ 07:01  -  03-05 @ 04:48  --------------------------------------------------------  IN: 1100 mL / OUT: 2442 mL / NET: -1342 mL      CAPILLARY BLOOD GLUCOSE      POCT Blood Glucose.: 186 mg/dL (04 Mar 2020 23:26)      PHYSICAL EXAM: NAD in chair  General: Awake, alert, oriented X 3.   HEENT: Atraumatic, normocephalic.                 Mallampatti Grade 3                No nasal congestion.                No tonsillar or pharyngeal exudates.  Lymph Nodes: No palpable lymphadenopathy  Neck: No JVD. No carotid bruit.   Respiratory: abnormal chest expansion-reducedeS bases                         Normal percussion                         Normal and equal air entry                         No wheeze, rhonchi or rales.  Cardiovascular: S1 S2 normal. No murmurs, rubs or gallops.   Abdomen: Soft, non-tender, non-distended. No organomegaly. Normoactive bowel sounds.  Extremities: Warm to touch. Peripheral pulse palpable. + pedal edema.   Skin: No rashes or skin lesions  Neurological: Motor and sensory examination equal and normal in all four extremities.  Psychiatry: Appropriate mood and affect.    HOSPITAL MEDICATIONS:  MEDICATIONS  (STANDING):  ALBUTerol    90 MICROgram(s) HFA Inhaler 1 Puff(s) Inhalation every 4 hours  albuterol/ipratropium for Nebulization. 3 milliLiter(s) Nebulizer every 6 hours  aMIOdarone    Tablet 200 milliGRAM(s) Oral daily  amitriptyline 10 milliGRAM(s) Oral every 8 hours  artificial tears (preservative free) Ophthalmic Solution 1 Drop(s) Both EYES two times a day  aspirin enteric coated 81 milliGRAM(s) Oral daily  atorvastatin 40 milliGRAM(s) Oral at bedtime  benzonatate 100 milliGRAM(s) Oral every 8 hours  buDESOnide    Inhalation Suspension 0.5 milliGRAM(s) Inhalation two times a day  calcium acetate 667 milliGRAM(s) Oral three times a day with meals  chlorhexidine 2% Cloths 1 Application(s) Topical <User Schedule>  DAPTOmycin IVPB 500 milliGRAM(s) IV Intermittent every 24 hours  dextrose 5%. 1000 milliLiter(s) (30 mL/Hr) IV Continuous <Continuous>  furosemide    Tablet 40 milliGRAM(s) Oral daily  heparin  Injectable 5000 Unit(s) SubCutaneous every 8 hours  insulin glargine Injectable (LANTUS) 44 Unit(s) SubCutaneous at bedtime  insulin lispro (HumaLOG) corrective regimen sliding scale   SubCutaneous Before meals and at bedtime  insulin lispro Injectable (HumaLOG) 20 Unit(s) SubCutaneous three times a day with meals  melatonin 3 milliGRAM(s) Oral at bedtime  metoprolol tartrate Injectable 2.5 milliGRAM(s) IV Push every 6 hours  modafinil 100 milliGRAM(s) Oral daily  multivitamin 1 Tablet(s) Oral daily  mupirocin 2% Ointment 1 Application(s) Topical two times a day  pantoprazole    Tablet 40 milliGRAM(s) Oral before breakfast  polyethylene glycol 3350 17 Gram(s) Oral daily  senna 2 Tablet(s) Oral at bedtime  silver sulfADIAZINE 1% Cream 1 Application(s) Topical daily  sodium chloride 0.65% Nasal 1 Spray(s) Both Nostrils three times a day  spironolactone 25 milliGRAM(s) Oral daily  tiotropium 18 MICROgram(s) Capsule 1 Capsule(s) Inhalation daily    MEDICATIONS  (PRN):  acetaminophen   Tablet .. 650 milliGRAM(s) Oral every 6 hours PRN Mild Pain (1 - 3)  albuterol/ipratropium for Nebulization. 3 milliLiter(s) Nebulizer every 6 hours PRN Shortness of Breath and/or Wheezing  guaiFENesin   Syrup  (Sugar-Free) 200 milliGRAM(s) Oral every 6 hours PRN Cough  oxycodone    5 mG/acetaminophen 325 mG 1 Tablet(s) Oral every 6 hours PRN Severe Pain (7 - 10)  sodium chloride 0.9% lock flush 10 milliLiter(s) IV Push every 1 hour PRN Pre/post blood products, medications, blood draw, and to maintain line patency      LABS:                        8.2    8.68  )-----------( 325      ( 04 Mar 2020 06:28 )             26.8     03-04    132<L>  |  97  |  38<H>  ----------------------------<  144<H>  4.9   |  23  |  1.70<H>    Ca    8.9      04 Mar 2020 06:28                MICROBIOLOGY:     RADIOLOGY:  [ ] Reviewed and interpreted by me    Point of Care Ultrasound Findings:    PFT:    EKG:

## 2020-03-05 NOTE — SWALLOW VFSS/MBS ASSESSMENT ADULT - COMMENTS
Course hx:  65yo male with hx of HTN, DM II s/p C4L on 2/3; PEA arrest on 2/6   + enterococcus Bld  C/S > started ampicillin q8h, ID consulted,  R pigtail for effusion    2/8    Extubated    2/15 L pigtail effusion    2/17 PRBC     2/18 Tx 2 Diaz  2/21 - NON con Chest CT - multiple loculated Left pleural effusions w/ patchy consolidation R slightly better; pigtail cath removed.   2/22  Wound care consult leg wounds  2/24 ID added cefapime SW drainage purulent, afebrile  2/25 S/p Sternal wound debridement and irrigation with removal of all 6 sternal wires. Purulence encountered and sent for culture. Closure with bilateral pectoralis muscle advancement flaps.  Pt transferred to sdu  2/27 VSS   carlos d/c william d/c transfer to floor JPx2  followed  by Plastics  followed by ID on cefepime and daptomycin bc positive for E. faecalis. Provigil daily in am  2/28 VSS; abx as per ID 4 wks IV abx from date of SWD as per ID. + hypoglycemia- endo following.    d/c planning- rehab next week   2/29 VSS, distal portion of MSI with small dehiscence and serous purulent drainage - recommended wound vac placement as per Plastics. Pending PICC line placement for IV abx.   Witnessed PEA 2/6 likely hypoxic arrest, s/p intubation. ( CAD, s/p cath with severe triple vessel disease including lesions at the bifurcation of LAD/diagonal and distal RCA/RPDA/RPL trifurcation. HC  significant for multiple intubations.  -s/p CABG x 4, intraop kennedy ef 50%)--s/p ampicillin until 2/18 for enterococcus in blood, extubated 2/9, pigtail right 2/8 and left sided on 2/15-for effusion.  Remains sob-NO h/o resp issues prior to hospitalization.  Current sob-multifactorial-CAD/effusions, atelectasis due to pain, mild bronchospasm and debility.  2/28-chest pain still impacting breathing--plastics/renal endo f/up  3/4-cough upon swallowing

## 2020-03-05 NOTE — SWALLOW VFSS/MBS ASSESSMENT ADULT - ADDITIONAL INFORMATION
+  + small non-obstructive anterior cervical osteophytes + slight calcifications of laryngeal surface of epiglottis, thyroid cartilage, and arytenoid cartilages   + small non-obstructive anterior cervical osteophytes  + high shoulder girdle, which limits visualization of subglottic space and proximal esophagus to a degree

## 2020-03-05 NOTE — SWALLOW VFSS/MBS ASSESSMENT ADULT - NS SWALLOW VFSS REC ASPIR MON
cough/gurgly voice/throat clearing/change of breathing pattern/fever/upper respiratory infection/Monitor for s/s aspiration/laryngeal penetration. If noted:  D/C p.o. intake, provide non-oral nutrition/hydration/meds, and contact this service @ [email or web address]

## 2020-03-06 LAB
ANION GAP SERPL CALC-SCNC: 12 MMOL/L — SIGNIFICANT CHANGE UP (ref 5–17)
BUN SERPL-MCNC: 34 MG/DL — HIGH (ref 7–23)
CALCIUM SERPL-MCNC: 8.6 MG/DL — SIGNIFICANT CHANGE UP (ref 8.4–10.5)
CHLORIDE SERPL-SCNC: 97 MMOL/L — SIGNIFICANT CHANGE UP (ref 96–108)
CO2 SERPL-SCNC: 25 MMOL/L — SIGNIFICANT CHANGE UP (ref 22–31)
CREAT SERPL-MCNC: 1.59 MG/DL — HIGH (ref 0.5–1.3)
GLUCOSE BLDC GLUCOMTR-MCNC: 105 MG/DL — HIGH (ref 70–99)
GLUCOSE BLDC GLUCOMTR-MCNC: 129 MG/DL — HIGH (ref 70–99)
GLUCOSE BLDC GLUCOMTR-MCNC: 173 MG/DL — HIGH (ref 70–99)
GLUCOSE BLDC GLUCOMTR-MCNC: 189 MG/DL — HIGH (ref 70–99)
GLUCOSE SERPL-MCNC: 143 MG/DL — HIGH (ref 70–99)
HCT VFR BLD CALC: 25.7 % — LOW (ref 39–50)
HGB BLD-MCNC: 7.9 G/DL — LOW (ref 13–17)
MCHC RBC-ENTMCNC: 26.9 PG — LOW (ref 27–34)
MCHC RBC-ENTMCNC: 30.7 GM/DL — LOW (ref 32–36)
MCV RBC AUTO: 87.4 FL — SIGNIFICANT CHANGE UP (ref 80–100)
NRBC # BLD: 0 /100 WBCS — SIGNIFICANT CHANGE UP (ref 0–0)
PLATELET # BLD AUTO: 297 K/UL — SIGNIFICANT CHANGE UP (ref 150–400)
POTASSIUM SERPL-MCNC: 4.8 MMOL/L — SIGNIFICANT CHANGE UP (ref 3.5–5.3)
POTASSIUM SERPL-SCNC: 4.8 MMOL/L — SIGNIFICANT CHANGE UP (ref 3.5–5.3)
RBC # BLD: 2.94 M/UL — LOW (ref 4.2–5.8)
RBC # FLD: 14.9 % — HIGH (ref 10.3–14.5)
SODIUM SERPL-SCNC: 134 MMOL/L — LOW (ref 135–145)
WBC # BLD: 7.27 K/UL — SIGNIFICANT CHANGE UP (ref 3.8–10.5)
WBC # FLD AUTO: 7.27 K/UL — SIGNIFICANT CHANGE UP (ref 3.8–10.5)

## 2020-03-06 PROCEDURE — 99232 SBSQ HOSP IP/OBS MODERATE 35: CPT

## 2020-03-06 PROCEDURE — 99024 POSTOP FOLLOW-UP VISIT: CPT

## 2020-03-06 RX ORDER — METOPROLOL TARTRATE 50 MG
12.5 TABLET ORAL
Refills: 0 | Status: DISCONTINUED | OUTPATIENT
Start: 2020-03-06 | End: 2020-03-16

## 2020-03-06 RX ORDER — INSULIN GLARGINE 100 [IU]/ML
40 INJECTION, SOLUTION SUBCUTANEOUS AT BEDTIME
Refills: 0 | Status: DISCONTINUED | OUTPATIENT
Start: 2020-03-06 | End: 2020-03-08

## 2020-03-06 RX ORDER — INSULIN LISPRO 100/ML
14 VIAL (ML) SUBCUTANEOUS
Refills: 0 | Status: DISCONTINUED | OUTPATIENT
Start: 2020-03-06 | End: 2020-03-08

## 2020-03-06 RX ORDER — INSULIN GLARGINE 100 [IU]/ML
30 INJECTION, SOLUTION SUBCUTANEOUS AT BEDTIME
Refills: 0 | Status: DISCONTINUED | OUTPATIENT
Start: 2020-03-06 | End: 2020-03-06

## 2020-03-06 RX ORDER — INSULIN LISPRO 100/ML
10 VIAL (ML) SUBCUTANEOUS
Refills: 0 | Status: DISCONTINUED | OUTPATIENT
Start: 2020-03-06 | End: 2020-03-06

## 2020-03-06 RX ADMIN — AMIODARONE HYDROCHLORIDE 200 MILLIGRAM(S): 400 TABLET ORAL at 05:41

## 2020-03-06 RX ADMIN — SPIRONOLACTONE 25 MILLIGRAM(S): 25 TABLET, FILM COATED ORAL at 05:41

## 2020-03-06 RX ADMIN — Medication 20 MILLIGRAM(S): at 17:54

## 2020-03-06 RX ADMIN — Medication 1 DROP(S): at 05:43

## 2020-03-06 RX ADMIN — Medication 667 MILLIGRAM(S): at 12:41

## 2020-03-06 RX ADMIN — HEPARIN SODIUM 5000 UNIT(S): 5000 INJECTION INTRAVENOUS; SUBCUTANEOUS at 12:44

## 2020-03-06 RX ADMIN — Medication 81 MILLIGRAM(S): at 12:41

## 2020-03-06 RX ADMIN — Medication 0.5 MILLIGRAM(S): at 17:57

## 2020-03-06 RX ADMIN — Medication 12.5 MILLIGRAM(S): at 16:19

## 2020-03-06 RX ADMIN — Medication 10 MILLIGRAM(S): at 05:41

## 2020-03-06 RX ADMIN — INSULIN GLARGINE 40 UNIT(S): 100 INJECTION, SOLUTION SUBCUTANEOUS at 23:03

## 2020-03-06 RX ADMIN — Medication 100 MILLIGRAM(S): at 12:47

## 2020-03-06 RX ADMIN — OXYCODONE AND ACETAMINOPHEN 1 TABLET(S): 5; 325 TABLET ORAL at 09:00

## 2020-03-06 RX ADMIN — Medication 3 MILLILITER(S): at 17:56

## 2020-03-06 RX ADMIN — Medication 3 MILLILITER(S): at 05:42

## 2020-03-06 RX ADMIN — Medication 0.5 MILLIGRAM(S): at 05:42

## 2020-03-06 RX ADMIN — Medication 14 UNIT(S): at 12:41

## 2020-03-06 RX ADMIN — Medication 667 MILLIGRAM(S): at 17:55

## 2020-03-06 RX ADMIN — Medication 10 MILLIGRAM(S): at 12:43

## 2020-03-06 RX ADMIN — HEPARIN SODIUM 5000 UNIT(S): 5000 INJECTION INTRAVENOUS; SUBCUTANEOUS at 23:04

## 2020-03-06 RX ADMIN — ATORVASTATIN CALCIUM 40 MILLIGRAM(S): 80 TABLET, FILM COATED ORAL at 23:03

## 2020-03-06 RX ADMIN — Medication 667 MILLIGRAM(S): at 08:44

## 2020-03-06 RX ADMIN — MODAFINIL 100 MILLIGRAM(S): 200 TABLET ORAL at 08:43

## 2020-03-06 RX ADMIN — CHLORHEXIDINE GLUCONATE 1 APPLICATION(S): 213 SOLUTION TOPICAL at 08:46

## 2020-03-06 RX ADMIN — Medication 1 TABLET(S): at 12:42

## 2020-03-06 RX ADMIN — Medication 1: at 12:17

## 2020-03-06 RX ADMIN — Medication 10 MILLIGRAM(S): at 23:03

## 2020-03-06 RX ADMIN — Medication 100 MILLIGRAM(S): at 23:03

## 2020-03-06 RX ADMIN — PANTOPRAZOLE SODIUM 40 MILLIGRAM(S): 20 TABLET, DELAYED RELEASE ORAL at 07:08

## 2020-03-06 RX ADMIN — Medication 1 DROP(S): at 17:55

## 2020-03-06 RX ADMIN — Medication 10 UNIT(S): at 08:43

## 2020-03-06 RX ADMIN — Medication 200 MILLIGRAM(S): at 07:09

## 2020-03-06 RX ADMIN — Medication 40 MILLIGRAM(S): at 05:41

## 2020-03-06 RX ADMIN — HEPARIN SODIUM 5000 UNIT(S): 5000 INJECTION INTRAVENOUS; SUBCUTANEOUS at 05:44

## 2020-03-06 RX ADMIN — MUPIROCIN 1 APPLICATION(S): 20 OINTMENT TOPICAL at 07:07

## 2020-03-06 RX ADMIN — MUPIROCIN 1 APPLICATION(S): 20 OINTMENT TOPICAL at 17:55

## 2020-03-06 RX ADMIN — Medication 3 MILLIGRAM(S): at 23:03

## 2020-03-06 RX ADMIN — Medication 1: at 08:35

## 2020-03-06 RX ADMIN — POLYETHYLENE GLYCOL 3350 17 GRAM(S): 17 POWDER, FOR SOLUTION ORAL at 12:42

## 2020-03-06 RX ADMIN — Medication 1 SPRAY(S): at 23:02

## 2020-03-06 RX ADMIN — Medication 3 MILLILITER(S): at 12:40

## 2020-03-06 RX ADMIN — Medication 14 UNIT(S): at 17:54

## 2020-03-06 RX ADMIN — Medication 3 MILLILITER(S): at 23:02

## 2020-03-06 RX ADMIN — DAPTOMYCIN 120 MILLIGRAM(S): 500 INJECTION, POWDER, LYOPHILIZED, FOR SOLUTION INTRAVENOUS at 16:14

## 2020-03-06 RX ADMIN — OXYCODONE AND ACETAMINOPHEN 1 TABLET(S): 5; 325 TABLET ORAL at 09:30

## 2020-03-06 RX ADMIN — Medication 100 MILLIGRAM(S): at 05:42

## 2020-03-06 RX ADMIN — SENNA PLUS 2 TABLET(S): 8.6 TABLET ORAL at 23:03

## 2020-03-06 RX ADMIN — Medication 1 SPRAY(S): at 12:44

## 2020-03-06 NOTE — PROGRESS NOTE ADULT - ASSESSMENT
intraop kennedy 2/3/20 ef 50%, nl lv, mild diastolic dysfx stage 1  limited echo 2/4/20: nl LV sys fx , no pericardial effusion     a/p  64 year old man with history of HTN, DM II, admitted with progressive exertional angina, s/p cath with severe triple vessel disease, s/p CABG, post op course c/b brief witnessed PEA likely hypoxic arrest, s/p intubation.     1. CAD, s/p cath with severe triple vessel disease including lesions at the bifurcation of the LAD/diagonal and distal RCA/RPDA/RPL trifurcation.   -s/p CABG x 4, cont asa, statin,  - resume BB per CTs  -s/p PEA arrest   -echo 2/15 with preserved lv fxn/EF    2. Sternal wound infection  -s/p RTOR for SWI  -abx per id, wound vac     3. Postop acute diastolic HF  -diuretics per cts     4. PAF  -cont amio  -AC ?, BB? per CTS , currently on asa    5. HTN  -bp stable    6. STEPHANIE/CKD  -stable, renal f/u     7. Postop Pleural effusion  -S/P Right/Left chest tube     8. Sepsis -E. Faecalis bacteremia, SW infection, RLE cellulitis   -IV abx , repeat bcx 2/13 neg  -s/p debridement   -ID, plastics f/u     dvt ppx

## 2020-03-06 NOTE — PROGRESS NOTE ADULT - PROBLEM SELECTOR PLAN 5
ID following Dr Carrasco  Continue on IV Dapto 500mg daily. will switch to Zyvoxx on Monday til 3/22  Silvadene to RLE wound  Wound care consult

## 2020-03-06 NOTE — PROGRESS NOTE ADULT - PROBLEM SELECTOR PLAN 1
Pt with CKD likely  in the setting of long standing DM and HTN. No prior labs for review. Scr since admission has ranged between 1.7-2.1. Scr increased to ~2.50 secondary to hemodynamic injury after CABG and PEA arrest s/p CPR, and peaked to 2.9. Scr now stable. Has Non-nephrotic range proteinuria, w/up for secondary causes of glomerular disease reviewed. Pt. likely with diabetic nephropathy.   - maintaining UO in response to diuretics  - Scr has been stable  - Monitor labs and urine output.   - Avoid NSAIDs, ACEI/ARBS, RCA and nephrotoxins. Dose medications as per eGFR.

## 2020-03-06 NOTE — PROGRESS NOTE ADULT - ASSESSMENT
Assessment  DMT2: 64y Male with DM T2 with hyperglycemia, A1C 9.2%, was on oral meds and insulin at home, now on basal bolus insulin, blood sugars running high and not at target, no hypoglycemic episodes. Patient is eating meals on dysphagia diet, walked with PT today, now sitting in chair resting, appears comfortable.  Foot infection: On Tx, stable.  CAD: s/p CABG 2/3, on medications, no chest pain, stable, monitored.  HTN: Controlled,  on antihypertensive medications.  HLD: Controlled, on statin.  CKD: Monitor labs/BMP          Harper Matias MD  Cell: [phone]  Office: [phone] Assessment  DMT2: 64y Male with DM T2 with hyperglycemia, A1C 9.2%, was on oral meds and insulin at home, now on basal bolus insulin, blood sugars running  high and not at target, no hypoglycemic episodes. Patient is eating meals on dysphagia diet, walked with PT today, now sitting in chair resting, appears comfortable.  Foot infection: On Tx, stable.  CAD: s/p CABG 2/3, on medications, no chest pain, stable, monitored.  HTN: Controlled,  on antihypertensive medications.  HLD: Controlled, on statin.  CKD: Monitor labs/BMP          Harper Matias MD  Cell: [phone]  Office: [phone]

## 2020-03-06 NOTE — PROGRESS NOTE ADULT - ASSESSMENT
64 yr old with cri stage 4, DM, PVD, admitted and had CABG, Post op intubated and has bilateral effusions, worsening renal disease and bc positive for E. faecalis; follow up bc negative to date.  Call to see patient for discharge from sternal wound  Presently on treatment for RLE SSTI  CT chest with suspected postsurgical changes     Overall,  1  Sternal wound,  infection plus cellulitis around the svg site on right   the cellulits of leg is resolved  still with open wd that has not healed. wds over strerum  - Continue Dapto    ' dapto was rejected at time of discharge. I am concerned about giving prolonged vanco with renal [id number]  discussed with Dr. Mariscal  we can switch to po zyvoxx on Monday        -

## 2020-03-06 NOTE — PROGRESS NOTE ADULT - SUBJECTIVE AND OBJECTIVE BOX
VITAL SIGNS    Telemetry:    Vital Signs Last 24 Hrs  T(C): 36.7 (20 @ 04:15), Max: 36.8 (20 @ 12:21)  T(F): 98 (20 @ 04:15), Max: 98.3 (20 @ 19:21)  HR: 87 (20 @ 04:15) (87 - 104)  BP: 115/75 (20 @ 04:15) (115/75 - 142/83)  RR: 18 (20 @ 04:15) (18 - 18)  SpO2: 93% (20 @ 04:15) (93% - 93%)             @ 07:01  -   @ 07:00  --------------------------------------------------------  IN: 610 mL / OUT: 1928 mL / NET: -1318 mL       Daily     Daily Weight in k.2 (05 Mar 2020 11:34)  Admit Wt: Drug Dosing Weight  Height (cm): 172.72 (2020 07:20)  Weight (kg): 81.1 (2020 07:20)  BMI (kg/m2): 27.2 (2020 07:20)  BSA (m2): 1.95 (2020 07:20)      CAPILLARY BLOOD GLUCOSE      POCT Blood Glucose.: 189 mg/dL (06 Mar 2020 07:39)  POCT Blood Glucose.: 214 mg/dL (05 Mar 2020 21:33)  POCT Blood Glucose.: 121 mg/dL (05 Mar 2020 17:38)  POCT Blood Glucose.: 195 mg/dL (05 Mar 2020 12:14)          acetaminophen   Tablet .. 650 milliGRAM(s) Oral every 6 hours PRN  ALBUTerol    90 MICROgram(s) HFA Inhaler 1 Puff(s) Inhalation every 4 hours  albuterol/ipratropium for Nebulization. 3 milliLiter(s) Nebulizer every 6 hours PRN  albuterol/ipratropium for Nebulization. 3 milliLiter(s) Nebulizer every 6 hours  aMIOdarone    Tablet 200 milliGRAM(s) Oral daily  amitriptyline 10 milliGRAM(s) Oral every 8 hours  artificial tears (preservative free) Ophthalmic Solution 1 Drop(s) Both EYES two times a day  aspirin enteric coated 81 milliGRAM(s) Oral daily  atorvastatin 40 milliGRAM(s) Oral at bedtime  benzonatate 100 milliGRAM(s) Oral every 8 hours  buDESOnide    Inhalation Suspension 0.5 milliGRAM(s) Inhalation two times a day  calcium acetate 667 milliGRAM(s) Oral three times a day with meals  chlorhexidine 2% Cloths 1 Application(s) Topical <User Schedule>  DAPTOmycin IVPB 500 milliGRAM(s) IV Intermittent every 24 hours  dextrose 40% Gel 15 Gram(s) Oral once PRN  dextrose 5%. 1000 milliLiter(s) IV Continuous <Continuous>  dextrose 5%. 1000 milliLiter(s) IV Continuous <Continuous>  dextrose 50% Injectable 12.5 Gram(s) IV Push once  dextrose 50% Injectable 25 Gram(s) IV Push once  dextrose 50% Injectable 25 Gram(s) IV Push once  furosemide    Tablet 40 milliGRAM(s) Oral daily  glucagon  Injectable 1 milliGRAM(s) IntraMuscular once PRN  guaiFENesin   Syrup  (Sugar-Free) 200 milliGRAM(s) Oral every 6 hours PRN  heparin  Injectable 5000 Unit(s) SubCutaneous every 8 hours  insulin glargine Injectable (LANTUS) 30 Unit(s) SubCutaneous at bedtime  insulin lispro (HumaLOG) corrective regimen sliding scale   SubCutaneous Before meals and at bedtime  insulin lispro Injectable (HumaLOG) 10 Unit(s) SubCutaneous three times a day before meals  melatonin 3 milliGRAM(s) Oral at bedtime  modafinil 100 milliGRAM(s) Oral <User Schedule>  multivitamin 1 Tablet(s) Oral daily  mupirocin 2% Ointment 1 Application(s) Topical two times a day  oxycodone    5 mG/acetaminophen 325 mG 1 Tablet(s) Oral every 6 hours PRN  pantoprazole    Tablet 40 milliGRAM(s) Oral before breakfast  polyethylene glycol 3350 17 Gram(s) Oral daily  senna 2 Tablet(s) Oral at bedtime  silver sulfADIAZINE 1% Cream 1 Application(s) Topical daily  sodium chloride 0.65% Nasal 1 Spray(s) Both Nostrils three times a day  sodium chloride 0.9% lock flush 10 milliLiter(s) IV Push every 1 hour PRN  spironolactone 25 milliGRAM(s) Oral daily  tiotropium 18 MICROgram(s) Capsule 1 Capsule(s) Inhalation daily      PHYSICAL EXAM    Subjective: "Hi.   Neurology: alert and oriented x 3, nonfocal, no gross deficits  CV : tele:  RSR  Sternal Wound :  CDI with dressing , Stable  Lungs: clear. RR easy, unlabored   Abdomen: soft, nontender, nondistended, positive bowel sounds, bowel movement   Neg N/V/D   :  pt voiding without difficulty   Extremities:   MOFFETT; edema, neg calf tenderness.   PPP bilaterally      PW:  Chest tubes: VITAL SIGNS    Telemetry: rsr     Vital Signs Last 24 Hrs  T(C): 36.7 (20 @ 04:15), Max: 36.8 (20 @ 12:21)  T(F): 98 (20 @ 04:15), Max: 98.3 (20 @ 19:21)  HR: 87 (20 @ 04:15) (87 - 104)  BP: 115/75 (20 @ 04:15) (115/75 - 142/83)  RR: 18 (20 @ 04:15) (18 - 18)  SpO2: 93% (20 @ 04:15) (93% - 93%)             @ 07:01  -   @ 07:00  --------------------------------------------------------  IN: 610 mL / OUT: 1928 mL / NET: -1318 mL       Daily     Daily Weight in k.2 (05 Mar 2020 11:34)  Admit Wt: Drug Dosing Weight  Height (cm): 172.72 (2020 07:20)  Weight (kg): 81.1 (2020 07:20)  BMI (kg/m2): 27.2 (2020 07:20)  BSA (m2): 1.95 (2020 07:20)      CAPILLARY BLOOD GLUCOSE      POCT Blood Glucose.: 189 mg/dL (06 Mar 2020 07:39)  POCT Blood Glucose.: 214 mg/dL (05 Mar 2020 21:33)  POCT Blood Glucose.: 121 mg/dL (05 Mar 2020 17:38)  POCT Blood Glucose.: 195 mg/dL (05 Mar 2020 12:14)          acetaminophen   Tablet .. 650 milliGRAM(s) Oral every 6 hours PRN  ALBUTerol    90 MICROgram(s) HFA Inhaler 1 Puff(s) Inhalation every 4 hours  albuterol/ipratropium for Nebulization. 3 milliLiter(s) Nebulizer every 6 hours PRN  albuterol/ipratropium for Nebulization. 3 milliLiter(s) Nebulizer every 6 hours  aMIOdarone    Tablet 200 milliGRAM(s) Oral daily  amitriptyline 10 milliGRAM(s) Oral every 8 hours  artificial tears (preservative free) Ophthalmic Solution 1 Drop(s) Both EYES two times a day  aspirin enteric coated 81 milliGRAM(s) Oral daily  atorvastatin 40 milliGRAM(s) Oral at bedtime  benzonatate 100 milliGRAM(s) Oral every 8 hours  buDESOnide    Inhalation Suspension 0.5 milliGRAM(s) Inhalation two times a day  calcium acetate 667 milliGRAM(s) Oral three times a day with meals  chlorhexidine 2% Cloths 1 Application(s) Topical <User Schedule>  DAPTOmycin IVPB 500 milliGRAM(s) IV Intermittent every 24 hours  dextrose 40% Gel 15 Gram(s) Oral once PRN  dextrose 5%. 1000 milliLiter(s) IV Continuous <Continuous>  dextrose 5%. 1000 milliLiter(s) IV Continuous <Continuous>  dextrose 50% Injectable 12.5 Gram(s) IV Push once  dextrose 50% Injectable 25 Gram(s) IV Push once  dextrose 50% Injectable 25 Gram(s) IV Push once  furosemide    Tablet 40 milliGRAM(s) Oral daily  glucagon  Injectable 1 milliGRAM(s) IntraMuscular once PRN  guaiFENesin   Syrup  (Sugar-Free) 200 milliGRAM(s) Oral every 6 hours PRN  heparin  Injectable 5000 Unit(s) SubCutaneous every 8 hours  insulin glargine Injectable (LANTUS) 30 Unit(s) SubCutaneous at bedtime  insulin lispro (HumaLOG) corrective regimen sliding scale   SubCutaneous Before meals and at bedtime  insulin lispro Injectable (HumaLOG) 10 Unit(s) SubCutaneous three times a day before meals  melatonin 3 milliGRAM(s) Oral at bedtime  modafinil 100 milliGRAM(s) Oral <User Schedule>  multivitamin 1 Tablet(s) Oral daily  mupirocin 2% Ointment 1 Application(s) Topical two times a day  oxycodone    5 mG/acetaminophen 325 mG 1 Tablet(s) Oral every 6 hours PRN  pantoprazole    Tablet 40 milliGRAM(s) Oral before breakfast  polyethylene glycol 3350 17 Gram(s) Oral daily  senna 2 Tablet(s) Oral at bedtime  silver sulfADIAZINE 1% Cream 1 Application(s) Topical daily  sodium chloride 0.65% Nasal 1 Spray(s) Both Nostrils three times a day  sodium chloride 0.9% lock flush 10 milliLiter(s) IV Push every 1 hour PRN  spironolactone 25 milliGRAM(s) Oral daily  tiotropium 18 MICROgram(s) Capsule 1 Capsule(s) Inhalation daily      PHYSICAL EXAM    Subjective: "I feel better."   Neurology: alert and oriented x 3, nonfocal, no gross deficits  CV : tele:  RSR   Sternal Wound :  CDI w/ distal vac; 2 jenny's self- suction serous- sanguinous drainage   Lungs: clear diminished at the bases.  RR easy, unlabored   Abdomen: soft, nontender, nondistended, positive bowel sounds, bowel movement   Neg N/V/D   :  pt voiding without difficulty   Extremities:   MOFFETT; +2 LE  edema, neg calf tenderness.   PPP bilaterally; + RLE dressing

## 2020-03-06 NOTE — PROGRESS NOTE ADULT - SUBJECTIVE AND OBJECTIVE BOX
Plastic Surgery Progress Note (pg [facility]: [id number], [facility]: [phone], [facility]: [phone])    SUBJECTIVE:  - pt seen and examined during vac change  - Doing well  - Pain well controlled  - No events overnight       OBJECTIVE:   ** PHYSICAL EXAM **    -- CONSTITUTIONAL: AOx3. NAD.   -- CHEST: incisions c/d/i superiorly. Inferiorly area of dehiscences with some fibrinous material at base of wound and some fat necrosis.  Wound non-malodorous        Vital Signs  T(C): 36.7 (03-06-20 @ 04:15), Max: 36.8 (03-05-20 @ 12:21)  T(F): 98 (03-06-20 @ 04:15), Max: 98.3 (03-05-20 @ 19:21)  HR: 87 (03-06-20 @ 04:15) (87 - 104)  BP: 115/75 (03-06-20 @ 04:15) (115/75 - 142/83)  RR: 18 (03-06-20 @ 04:15) (18 - 18)  SpO2: 93% (03-06-20 @ 04:15) (93% - 93%)      05 Mar 2020 07:01  -  06 Mar 2020 07:00  --------------------------------------------------------  IN:    Oral Fluid: 560 mL    Solution: 50 mL  Total IN: 610 mL    OUT:    Bulb: 8 mL    Bulb: 20 mL    Voided: 1900 mL  Total OUT: 1928 mL    Total NET: -1318 mL      06 Mar 2020 07:01  -  06 Mar 2020 10:35  --------------------------------------------------------  IN:    Oral Fluid: 240 mL  Total IN: 240 mL    OUT:    Voided: 600 mL  Total OUT: 600 mL    Total NET: -360 mL

## 2020-03-06 NOTE — PROGRESS NOTE ADULT - ASSESSMENT
65yo male with hx of HTN, DM II   s/p C4L on 2/3; PEA arrest on 2/6   + enterococcus Bld  C/S > started ampicillin q8h, ID consulted,  R pigtail for effusion  2/8    Extubated  2/9  2/15 L pigtail effusion  2/17 PRBC   2/18 Tx 2 Diaz  2/19 thomas removed, trial void. Maintain left pigtail for significant output. Pt encouraged to ambulate. Supplemental o2 sat NC weaned to 2L. Blood cultures repeated.  2/20 VSS -Pulmonary consult - appreciated - duonebs ATC q6h & pulmicort bid initiated - ddimer drawn.  pt w/ hx negative LE dopplers   will order non con chest DT as pt has a loculated left effusion that will require tap at IR.  2/21 - NON con Chest CT done - multiple loculated Left pleural effusions w/ patchy consolidation R - rounds made w/ Dr. carl.  ID - consult called re: Ct - WBC 11 afebrile.  +PALMA.  unable to tolerate VQ scan this am.  will d/c bumex & start Torsemide 20mg po daily  d/c planning rehab when medically stable  2/22  Wound care consult leg wounds> shower w/ local skin care  2/23  SW drainage> on Dapto> as per ID will cover SWD  CXR this am   Tighter glycemic control  2/24 ID added cefapime SW drainage purulent, afebrile  2/25 S/p Sternal wound debridement and irrigation with removal of all 6 sternal wires. Purulence encountered and sent for culture. Closure with bilateral pectoralis muscle advancement flaps.  Post op zari for hypotension- weaned off.  Skin/wd  culture from 2/24 neg  Pt transferred to sdu  2/27 VSS   carlos d/c thomas d/c transfer to floor JPx2  followed  by Plastics  followed by ID on cefepime and daptomycin bc positive for E. faecalis; follow up bc 2/19 negative to date. Provigil daily in am  2/28 VSS; abx as per ID - d/c cefepime and continue dapto 500 iv qd as per Dr. Carrasco- pt will need picc line vs medel for 4 wks abx from date of SWD as per ID; bc 2/19 neg.  d/w h. renal medel vs cath- await decision, diuresis initiated for hypervolemia; hep sq for dvt prophylaxis, + hypoglycemia- endo following- humalog adjusted    Discharge planning- rehab next week   2/29 VSS, distal portion of MSI with small dehiscence and serous purulent drainage - recommended wound vac placement as per Plastics. Left KEI 80ml & Right KEI 80ml/24h. S/P Right PICC line placement for IV abx.   3/1 Wound Vac placed to sternal wound. Pt ambulated in hallway with rolling walker. Left KEI 50ml & Right KEI 110ml/24h. Plan for discharge to Rehab Mon/Tue.   3/2 Pending patient rehab selection. Maintain sternal wound vac placement and KEI drains. Patient lethargic after percocet. Will hold narcotics . Continue with Daptomycin x 4 weeks until 3/22/20  3/3 VSS - call to ID re: alternative antibiotic to Daptomycin - Dr. Carrasco to follow up . d/c to rehab   3/4  HD stable .  + cough--bedside swallowing eval + cough--recs NPO and MBS in am--insulin adjusted by Dr Matias.   Dw Dr Carrasco--daptomycin can be switched on Monday to another antibiotic thru 3/22  3/5 VSS - MBS - Per speech pathologist- no gross aspiration ?silent - placed on Dysphagia 3 diet w/ nectar thick liquids. d/w Dr. Mariscal-  As per ID - will switch antibiotic to Zyvoxx on Monday 3/9/2020 - d/c to rehab on Monday on zyvoxx until 3/22 63yo male with hx of HTN, DM II   s/p C4L on 2/3; PEA arrest on 2/6   + enterococcus Bld  C/S > started ampicillin q8h, ID consulted,  R pigtail for effusion  2/8    Extubated  2/9  2/15 L pigtail effusion  2/17 PRBC   2/18 Tx 2 Diaz  2/19 thomas removed, trial void. Maintain left pigtail for significant output. Pt encouraged to ambulate. Supplemental o2 sat NC weaned to 2L. Blood cultures repeated.  2/20 VSS -Pulmonary consult - appreciated - duonebs ATC q6h & pulmicort bid initiated - ddimer drawn.  pt w/ hx negative LE dopplers   will order non con chest DT as pt has a loculated left effusion that will require tap at IR.  2/21 - NON con Chest CT done - multiple loculated Left pleural effusions w/ patchy consolidation R - rounds made w/ Dr. carl.  ID - consult called re: Ct - WBC 11 afebrile.  +PALMA.  unable to tolerate VQ scan this am.  will d/c bumex & start Torsemide 20mg po daily  d/c planning rehab when medically stable  2/22  Wound care consult leg wounds> shower w/ local skin care  2/23  SW drainage> on Dapto> as per ID will cover SWD  CXR this am   Tighter glycemic control  2/24 ID added cefapime SW drainage purulent, afebrile  2/25 S/p Sternal wound debridement and irrigation with removal of all 6 sternal wires. Purulence encountered and sent for culture. Closure with bilateral pectoralis muscle advancement flaps.  Post op zari for hypotension- weaned off.  Skin/wd  culture from 2/24 neg  Pt transferred to sdu  2/27 VSS   carlos d/c thomas d/c transfer to floor JPx2  followed  by Plastics  followed by ID on cefepime and daptomycin bc positive for E. faecalis; follow up bc 2/19 negative to date. Provigil daily in am  2/28 VSS; abx as per ID - d/c cefepime and continue dapto 500 iv qd as per Dr. Carrasco- pt will need picc line vs medel for 4 wks abx from date of SWD as per ID; bc 2/19 neg.  d/w h. renal medel vs cath- await decision, diuresis initiated for hypervolemia; hep sq for dvt prophylaxis, + hypoglycemia- endo following- humalog adjusted    Discharge planning- rehab next week   2/29 VSS, distal portion of MSI with small dehiscence and serous purulent drainage - recommended wound vac placement as per Plastics. Left KEI 80ml & Right KEI 80ml/24h. S/P Right PICC line placement for IV abx.   3/1 Wound Vac placed to sternal wound. Pt ambulated in hallway with rolling walker. Left KEI 50ml & Right KEI 110ml/24h. Plan for discharge to Rehab Mon/Tue.   3/2 Pending patient rehab selection. Maintain sternal wound vac placement and KEI drains. Patient lethargic after percocet. Will hold narcotics . Continue with Daptomycin x 4 weeks until 3/22/20  3/3 VSS - call to ID re: alternative antibiotic to Daptomycin - Dr. Carrasco to follow up . d/c to rehab   3/4  HD stable .  + cough--bedside swallowing eval + cough--recs NPO and MBS in am--insulin adjusted by Dr Matias.   Dw Dr Carrasco--daptomycin can be switched on Monday to another antibiotic thru 3/22  3/5 VSS - MBS - Per speech pathologist- no gross aspiration ?silent - placed on Dysphagia 3 diet w/ nectar thick liquids. d/w Dr. Mariscal-  As per ID - will switch antibiotic to Zyvoxx on Monday 3/9/2020 - d/c to rehab on Monday on zyvoxx until 3/22  3/6 VSS; continue diuresis; resume low dose bb; abx as per ID/ vac  Discharge planning- await rehab placement

## 2020-03-06 NOTE — CHART NOTE - NSCHARTNOTEFT_GEN_A_CORE
Nutrition Follow Up Note  Patient seen for: nutrition consult for nutrition assessment  Source: EMR and Pt     As per chart, Pt is a 65 y/o M with PMHx of HTN, T2DM, HLD, CKD, s/p CABGx4 admitted with complaints of acute on chronic left sided exertional chest pain. Underwent cardiac cath on 01/29 - revealed triple vessel disease. Pt is now s/p CABG on 02/03. Pt noted with bilateral effusions, LE wound and sternal wound infection with cellulitis. Pt is s/p debridement/flap being followed by infectious disease. Episodes of hyperglycemia persists; endocrinology following for glycemic management, insulin regimen increased as of today (03/06). Note episode of hypoglycemia on 02/28. Pt s/p MBS (03/05) - SLP recommended dysphagia 3 nectar thick liquids for coughing upon swallow.     Pt reports difficulty speaking and pain at visit, unable to fully engage in interview. Spouse/family not at bedside. Pt expresses poor appetite/PO intake at meals at visit. As per flowsheets, Pt with <50% PO intake on dysphagia 3 diet. Pt tolerating nectar thickened Glucerna 3x/day - observed half consumed at bedside. Pt reports wife will sometimes bring him oatmeal from home. Pt denies any recent GI distress with last BM 1x (03/05) as per flowsheets. Pt made aware of importance of adequate energy and consistent carbohydrate intake at meals in relation to insulin and avoiding hypoglycemia events. Pt educated on utilizing Glucerna as meal replacement for poor appetite.     Diet: Dysphagia 3 with Nectar Thick Liquids and Consistent Carbohydrate   Supplement: Glucerna 3x/day nectar thickened    PO intake: <50%     Source for PO intake: Pt and flowsheets    Weights: 206.7 lbs. (03/06) 203.2 lbs. (03/05) 182.1 lbs. (01/27) Pt currently on diuretics - weight fluctuations likely due to fluid shifts - will continue to monitor.     Pertinent Medications: Dextrose 5 at 30 mL/hr, Lantus, Humalog, Lasix, Senna, Spironolactone, Lipitor, Phoslo, multivitamin, miralax, spiriva, protonix   Pertinent Labs: (03/06) Na 134, K+ 4.8, BUN 34, Creatinine 1.59, Glucsoe 143, GFR 45, Phosphorus 4.8 (02/26), A1C 9.2 (01/26)  CAPILLARY BLOOD GLUCOSE    POCT Blood Glucose.: 173 mg/dL (06 Mar 2020 11:32)  POCT Blood Glucose.: 189 mg/dL (06 Mar 2020 07:39)  POCT Blood Glucose.: 214 mg/dL (05 Mar 2020 21:33)  POCT Blood Glucose.: 121 mg/dL (05 Mar 2020 17:38)    Skin per nursing documentation: sternal wound and L lower extremity   Edema: +2 L+R leg; +1 generalized     Estimated Needs:   [X] no change since previous assessment  [ ] recalculated:     Previous Nutrition Diagnosis: Increased Nutrient Needs, Food and nutrition-related knowledge deficit  Nutrition Diagnosis is: ongoing - addressed with nutritional supplement and nutrition education (unable to provide at this time)    Interventions:   Recommend  1. Continue therapeutic diet as ordered, monitor/adjust as needed. Defer consistency to team.   2. Continue Glucerna 3x/day nectar thickened to help optimize PO intake and glycemic control.   3. Provided diet education as able.   4. As per renal, Recommend monitoring recurrent serum phosphorus level given poor PO and Phoslo  5. Monitor labs, weight, PO intake, GI tolerance, skin integrity, BM.   6. RD remains available upon request and will follow up per protocol.  Sy Guerrero, Dietetic Intern  #[phone] Nutrition Follow Up Note  Patient seen for: nutrition consult for nutrition assessment  Source: EMR and Pt     As per chart, Pt is a 65 y/o M with PMHx of HTN, T2DM, HLD, CKD, s/p CABGx4 admitted with complaints of acute on chronic left sided exertional chest pain. Underwent cardiac cath on 01/29 - revealed triple vessel disease. Pt is now s/p CABG on 02/03. Pt noted with bilateral effusions, LE wound and sternal wound infection with cellulitis. Pt is s/p debridement/flap being followed by infectious disease. Episodes of hyperglycemia persists; endocrinology following for glycemic management, insulin regimen increased as of today (03/06). Note episode of hypoglycemia on 02/28. Pt s/p MBS (03/05) - SLP recommended dysphagia 3 nectar thick liquids for coughing upon swallow.     Pt reports difficulty speaking and pain at visit, unable to fully engage in interview. Spouse/family not at bedside. Pt expresses poor appetite/PO intake at meals at visit. As per flowsheets, Pt with <50% PO intake on dysphagia 3 diet. Pt tolerating nectar thickened Glucerna 3x/day - observed half consumed at bedside. Pt reports wife will sometimes bring him oatmeal from home. Pt denies any recent GI distress with last BM 1x (03/05) as per flowsheets.    Diet: Dysphagia 3 with Nectar Thick Liquids and Consistent Carbohydrate   Supplement: Glucerna 3x/day nectar thickened    PO intake: <50%     Source for PO intake: Pt and flowsheets    Weights: 206.7 lbs. (03/06) 203.2 lbs. (03/05) 182.1 lbs. (01/27) Pt currently on diuretics - weight fluctuations likely due to fluid shifts - will continue to monitor.     Pertinent Medications: Dextrose 5 at 30 mL/hr, Lantus, Humalog, Lasix, Senna, Spironolactone, Lipitor, Phoslo, multivitamin, miralax, spiriva, protonix   Pertinent Labs: (03/06) Na 134, K+ 4.8, BUN 34, Creatinine 1.59, Glucsoe 143, GFR 45, Phosphorus 4.8 (02/26), A1C 9.2 (01/26)  CAPILLARY BLOOD GLUCOSE    POCT Blood Glucose.: 173 mg/dL (06 Mar 2020 11:32)  POCT Blood Glucose.: 189 mg/dL (06 Mar 2020 07:39)  POCT Blood Glucose.: 214 mg/dL (05 Mar 2020 21:33)  POCT Blood Glucose.: 121 mg/dL (05 Mar 2020 17:38)    Skin per nursing documentation: sternal wound and L lower extremity   Edema: +2 L+R leg; +1 generalized     Estimated Needs:   [X] no change since previous assessment  [ ] recalculated:     Previous Nutrition Diagnosis: Increased Nutrient Needs, Food and nutrition-related knowledge deficit  Nutrition Diagnosis is: ongoing - addressed with nutritional supplement and nutrition education (unable to provide at this time)    Interventions:   Recommend  1. Continue therapeutic diet as ordered, monitor/adjust as needed. Defer consistency to team.   2. Continue Glucerna 3x/day nectar thickened to help optimize PO intake and glycemic control.   3. Pt made aware of importance of adequate energy and consistent carbohydrate intake at meals in relation to insulin and avoiding hypoglycemia events. Pt educated on utilizing Glucerna as meal replacement for poor appetite - if good PO intake of meals, instructed Pt not to consume.   4. As per renal, monitor recurrent serum phosphorus level given Phoslo order.   5. Monitor labs, weight, PO intake, GI tolerance, skin integrity, BM.   6. RD remains available upon request and will follow up per protocol.  Sy Guerrero, Dietetic Intern  #[phone]

## 2020-03-06 NOTE — PROGRESS NOTE ADULT - ATTENDING COMMENTS
Pt. with stable renal function, diuretics being managed by cardiology.   Will sign off, please reconsult as needed.     Pt. needs to follow up with nephrology as outpatient, after discharge.     Becky Sharma MD  Cell : [phone]  Office : [phone]

## 2020-03-06 NOTE — PROGRESS NOTE ADULT - PROBLEM SELECTOR PLAN 1
Continue ASA 81 daily, metoprolol 25 BID and atorvastatin 40 HS  Titrate up beta-blockers as tolerated   Continue provigil 100 qd  C/W GI prophylaxis on protonix and DVT prophylaxis on SQ Lovenox.   Cough and deep breathe, Incentive Spirometry Q1h, Chest PT.  Ambulate 4x daily as tolerated and with PT.   Discharge planning- rehab when medically stable Continue ASA 81 daily, metoprolol 25 BID and atorvastatin 40 HS  T  Continue provigil 100 qd  C/W GI prophylaxis on protonix and DVT prophylaxis on SQ Lovenox.   Cough and deep breathe, Incentive Spirometry Q1h, Chest PT.  Ambulate 4x daily as tolerated and with PT.   Discharge planning- rehab when medically stable Continue ASA 81 daily, metoprolol 25 BID and atorvastatin 40 HS  resume lopressor 12.5 bid   Continue provigil 100 qd  C/W GI prophylaxis on protonix and DVT prophylaxis on SQ Lovenox.   Cough and deep breathe, Incentive Spirometry Q1h, Chest PT.  Ambulate 4x daily as tolerated and with PT.   Discharge planning- rehab when medically stable

## 2020-03-06 NOTE — PROGRESS NOTE ADULT - SUBJECTIVE AND OBJECTIVE BOX
[facility] DIVISION OF KIDNEY DISEASES AND HYPERTENSION -- PROGRESS NOTE    Chief complaint: volume overload    24 hour events/subjective: no acute events noted        PAST HISTORY  --------------------------------------------------------------------------------  No significant changes to PMH, PSH, FHx, SHx, unless otherwise noted    ALLERGIES & MEDICATIONS  --------------------------------------------------------------------------------  Allergies    No Known Allergies    Intolerances      Standing Inpatient Medications  ALBUTerol    90 MICROgram(s) HFA Inhaler 1 Puff(s) Inhalation every 4 hours  albuterol/ipratropium for Nebulization. 3 milliLiter(s) Nebulizer every 6 hours  aMIOdarone    Tablet 200 milliGRAM(s) Oral daily  amitriptyline 10 milliGRAM(s) Oral every 8 hours  artificial tears (preservative free) Ophthalmic Solution 1 Drop(s) Both EYES two times a day  aspirin enteric coated 81 milliGRAM(s) Oral daily  atorvastatin 40 milliGRAM(s) Oral at bedtime  benzonatate 100 milliGRAM(s) Oral every 8 hours  buDESOnide    Inhalation Suspension 0.5 milliGRAM(s) Inhalation two times a day  calcium acetate 667 milliGRAM(s) Oral three times a day with meals  chlorhexidine 2% Cloths 1 Application(s) Topical <User Schedule>  DAPTOmycin IVPB 500 milliGRAM(s) IV Intermittent every 24 hours  dextrose 5%. 1000 milliLiter(s) IV Continuous <Continuous>  dextrose 5%. 1000 milliLiter(s) IV Continuous <Continuous>  dextrose 50% Injectable 12.5 Gram(s) IV Push once  dextrose 50% Injectable 25 Gram(s) IV Push once  dextrose 50% Injectable 25 Gram(s) IV Push once  furosemide    Tablet 40 milliGRAM(s) Oral daily  heparin  Injectable 5000 Unit(s) SubCutaneous every 8 hours  insulin glargine Injectable (LANTUS) 30 Unit(s) SubCutaneous at bedtime  insulin lispro (HumaLOG) corrective regimen sliding scale   SubCutaneous Before meals and at bedtime  insulin lispro Injectable (HumaLOG) 10 Unit(s) SubCutaneous three times a day before meals  melatonin 3 milliGRAM(s) Oral at bedtime  modafinil 100 milliGRAM(s) Oral <User Schedule>  multivitamin 1 Tablet(s) Oral daily  mupirocin 2% Ointment 1 Application(s) Topical two times a day  pantoprazole    Tablet 40 milliGRAM(s) Oral before breakfast  polyethylene glycol 3350 17 Gram(s) Oral daily  senna 2 Tablet(s) Oral at bedtime  silver sulfADIAZINE 1% Cream 1 Application(s) Topical daily  sodium chloride 0.65% Nasal 1 Spray(s) Both Nostrils three times a day  spironolactone 25 milliGRAM(s) Oral daily  tiotropium 18 MICROgram(s) Capsule 1 Capsule(s) Inhalation daily    PRN Inpatient Medications  acetaminophen   Tablet .. 650 milliGRAM(s) Oral every 6 hours PRN  albuterol/ipratropium for Nebulization. 3 milliLiter(s) Nebulizer every 6 hours PRN  dextrose 40% Gel 15 Gram(s) Oral once PRN  glucagon  Injectable 1 milliGRAM(s) IntraMuscular once PRN  guaiFENesin   Syrup  (Sugar-Free) 200 milliGRAM(s) Oral every 6 hours PRN  oxycodone    5 mG/acetaminophen 325 mG 1 Tablet(s) Oral every 6 hours PRN  sodium chloride 0.9% lock flush 10 milliLiter(s) IV Push every 1 hour PRN      REVIEW OF SYSTEMS : complains of intermittent pain in the chest  --------------------------------------------------------------------------------  Constitutional: [ ] Fever [ ] Chills [ ] Fatigue [ ] Weight change   HEENT: [ ] Blurred vision [ ] Eye Pain [ ] Headache [ ] Runny nose [ ] Sore Throat   Respiratory: [ ] Cough [ ] Wheezing [ ] Shortness of breath  Cardiovascular: [ ] Chest Pain [ ] Palpitations [ ] PALMA [ ] PND [ ] Orthopnea  Gastrointestinal: [ ] Abdominal Pain [ ] Diarrhea [ ] Constipation [ ] Hemorrhoids [ ] Nausea [ ] Vomiting  Genitourinary: [ ] Nocturia [ ] Dysuria [ ] Incontinence  Extremities: [x ] LE Swelling [ ] Joint Pain  Neurologic: [ ] Focal deficit [ ] Paresthesias [ ] Syncope  Lymphatic: [ ] Swelling [ ] Lymphadenopathy   Skin: [ ] Rash [ ] Ecchymoses [ ] Wounds [ ] Lesions  Psychiatry: [ ] Depression [ ] Suicidal/Homicidal Ideation [ ] Anxiety [ ] Sleep Disturbances  [x ] 10 point review of systems is otherwise negative except as mentioned above              [ ]Unable to obtain due to   All other systems were reviewed and are negative, except as noted.    VITALS/PHYSICAL EXAM  --------------------------------------------------------------------------------  T(C): 36.7 (03-06-20 @ 04:15), Max: 36.8 (03-05-20 @ 12:21)  HR: 87 (03-06-20 @ 04:15) (87 - 104)  BP: 115/75 (03-06-20 @ 04:15) (115/75 - 142/83)  RR: 18 (03-06-20 @ 04:15) (18 - 18)  SpO2: 93% (03-06-20 @ 04:15) (93% - 93%)  Wt(kg): --        03-05-20 @ 07:01  -  03-06-20 @ 07:00  --------------------------------------------------------  IN: 610 mL / OUT: 1928 mL / NET: -1318 mL    03-06-20 @ 07:01  -  03-06-20 @ 11:03  --------------------------------------------------------  IN: 240 mL / OUT: 1075 mL / NET: -835 mL      Physical Exam:  	Gen: NAD,   	HEENT: on room air  	Pulm: CTA B/L, exam limited due to body habitus  	CV: normal S1S2; no rub  	Abd: soft                      Back : unable to assess  	: No thomas  	LE: no edema  	Skin: Warm, bilateral LE with redness and ulcerations  	  LABS/STUDIES  --------------------------------------------------------------------------------              7.9    7.27  >-----------<  297      [03-06-20 @ 06:54]              25.7     134  |  97  |  34  ----------------------------<  143      [03-06-20 @ 06:35]  4.8   |  25  |  1.59        Ca     8.6     [03-06-20 @ 06:35]            Creatinine Trend:  SCr 1.59 [03-06 @ 06:35]  SCr 1.71 [03-05 @ 05:39]  SCr 1.70 [03-04 @ 06:28]  SCr 1.72 [03-03 @ 06:58]  SCr 1.71 [03-02 @ 06:11]    Urinalysis - [02-24-20 @ 17:29]      Color Light Yellow / Appearance Clear / SG 1.013 / pH 6.5      Gluc Trace / Ketone Negative  / Bili Negative / Urobili <2 mg/dL       Blood Negative / Protein 100 mg/dL / Leuk Est Negative / Nitrite Negative      RBC 5 / WBC 1 / Hyaline 1 / Gran  / Sq Epi  / Non Sq Epi 0 / Bacteria Negative      HbA1c 9.2      [01-26-20 @ 09:03]  TSH 2.37      [02-14-20 @ 21:15]

## 2020-03-06 NOTE — PROGRESS NOTE ADULT - ASSESSMENT
A/P: 64M s/p sternal debridement and b/l pectoralis advancement flaps on 2/25. Sternal incision with dehiscence inferiorly and murky serous drainage, more likely fat necrosis, less concerning for infection given time course, lack of culture growth, afebrile, and lack of upwardly-trending WBC    - C/w VAC, MWF changes  - Continue JPs  - Care per CT  - Will cont to follow    Primitivo Nugent  Plastic Surgery Resident  [facility]: [phone]  [facility]: [id number]

## 2020-03-06 NOTE — PROGRESS NOTE ADULT - PROBLEM SELECTOR PLAN 1
Tight glycemic control necessary in the setting of wound infection.  Will increase Lantus to 40 units at bed time.  Will increase Humalog to 14 units before each meal and continue Humalog correction scale coverage.  Will continue monitoring FS, log, and FU.  Patient counseled for compliance with consistent low carb diet, exercise.

## 2020-03-06 NOTE — PROGRESS NOTE ADULT - SUBJECTIVE AND OBJECTIVE BOX
CARDIOLOGY FOLLOW UP - Dr. Steel    CC  + cough, pleuritic cp , OOB with PT today       PHYSICAL EXAM:  T(C): 36.7 (03-06-20 @ 04:15), Max: 36.8 (03-05-20 @ 12:21)  HR: 87 (03-06-20 @ 04:15) (87 - 104)  BP: 115/75 (03-06-20 @ 04:15) (115/75 - 142/83)  RR: 18 (03-06-20 @ 04:15) (18 - 18)  SpO2: 93% (03-06-20 @ 04:15) (93% - 93%)  Wt(kg): --  I&O's Summary    05 Mar 2020 07:01  -  06 Mar 2020 07:00  --------------------------------------------------------  IN: 610 mL / OUT: 1928 mL / NET: -1318 mL    06 Mar 2020 07:01  -  06 Mar 2020 11:36  --------------------------------------------------------  IN: 240 mL / OUT: 1075 mL / NET: -835 mL        Appearance: Normal	  Cardiovascular: Normal S1 S2,RRR,   Respiratory: diminished   Gastrointestinal:  Soft, Non-tender, + BS	  Extremities: Normal range of motion, bl le  edema        MEDICATIONS  (STANDING):  ALBUTerol    90 MICROgram(s) HFA Inhaler 1 Puff(s) Inhalation every 4 hours  albuterol/ipratropium for Nebulization. 3 milliLiter(s) Nebulizer every 6 hours  aMIOdarone    Tablet 200 milliGRAM(s) Oral daily  amitriptyline 10 milliGRAM(s) Oral every 8 hours  artificial tears (preservative free) Ophthalmic Solution 1 Drop(s) Both EYES two times a day  aspirin enteric coated 81 milliGRAM(s) Oral daily  atorvastatin 40 milliGRAM(s) Oral at bedtime  benzonatate 100 milliGRAM(s) Oral every 8 hours  buDESOnide    Inhalation Suspension 0.5 milliGRAM(s) Inhalation two times a day  calcium acetate 667 milliGRAM(s) Oral three times a day with meals  chlorhexidine 2% Cloths 1 Application(s) Topical <User Schedule>  DAPTOmycin IVPB 500 milliGRAM(s) IV Intermittent every 24 hours  dextrose 5%. 1000 milliLiter(s) (50 mL/Hr) IV Continuous <Continuous>  dextrose 5%. 1000 milliLiter(s) (30 mL/Hr) IV Continuous <Continuous>  dextrose 50% Injectable 12.5 Gram(s) IV Push once  dextrose 50% Injectable 25 Gram(s) IV Push once  dextrose 50% Injectable 25 Gram(s) IV Push once  furosemide    Tablet 40 milliGRAM(s) Oral daily  heparin  Injectable 5000 Unit(s) SubCutaneous every 8 hours  insulin glargine Injectable (LANTUS) 40 Unit(s) SubCutaneous at bedtime  insulin lispro (HumaLOG) corrective regimen sliding scale   SubCutaneous Before meals and at bedtime  insulin lispro Injectable (HumaLOG) 14 Unit(s) SubCutaneous three times a day with meals  melatonin 3 milliGRAM(s) Oral at bedtime  modafinil 100 milliGRAM(s) Oral <User Schedule>  multivitamin 1 Tablet(s) Oral daily  mupirocin 2% Ointment 1 Application(s) Topical two times a day  pantoprazole    Tablet 40 milliGRAM(s) Oral before breakfast  polyethylene glycol 3350 17 Gram(s) Oral daily  senna 2 Tablet(s) Oral at bedtime  silver sulfADIAZINE 1% Cream 1 Application(s) Topical daily  sodium chloride 0.65% Nasal 1 Spray(s) Both Nostrils three times a day  spironolactone 25 milliGRAM(s) Oral daily  tiotropium 18 MICROgram(s) Capsule 1 Capsule(s) Inhalation daily      TELEMETRY: nsr- sinus tachycardia hr up 110s 	    ECG:  	  RADIOLOGY:   DIAGNOSTIC TESTING:  [ ] Echocardiogram:  [ ]  Catheterization:  [ ] Stress Test:    OTHER: 	    LABS:	 	    Creatine Kinase, Serum: 78 U/L [30 - 200] (03-01 @ 04:44)                          7.9    7.27  )-----------( 297      ( 06 Mar 2020 06:54 )             25.7     03-06    134<L>  |  97  |  34<H>  ----------------------------<  143<H>  4.8   |  25  |  1.59<H>    Ca    8.6      06 Mar 2020 06:35

## 2020-03-06 NOTE — PROGRESS NOTE ADULT - SUBJECTIVE AND OBJECTIVE BOX
CHIEF COMPLAINT:     Interval Events:    REVIEW OF SYSTEMS:  Constitutional: No fevers or chills. No weight loss. No fatigue or generalized malaise.  Eyes: No itching or discharge from the eyes  ENT: No ear pain. No ear discharge. No nasal congestion. No post nasal drip. No epistaxis. No throat pain. No sore throat. No difficulty swallowing.   CV: No chest pain. No palpitations. No lightheadedness or dizziness.   Resp: No dyspnea at rest. No dyspnea on exertion. No orthopnea. No wheezing. No cough. No stridor. No sputum production. No chest pain with respiration.  GI: No nausea. No vomiting. No diarrhea.  MSK: No joint pain or pain in any extremities  Integumentary: No skin lesions. No pedal edema.  Neurological: No gross motor weakness. No sensory changes.  [ ] All other systems negative  [ ] Unable to assess ROS because ________    OBJECTIVE:  ICU Vital Signs Last 24 Hrs  T(C): 36.8 (05 Mar 2020 19:21), Max: 36.9 (05 Mar 2020 05:00)  T(F): 98.3 (05 Mar 2020 19:21), Max: 98.4 (05 Mar 2020 05:00)  HR: 104 (05 Mar 2020 19:21) (91 - 104)  BP: 126/72 (05 Mar 2020 19:21) (110/66 - 142/83)  BP(mean): --  ABP: --  ABP(mean): --  RR: 18 (05 Mar 2020 19:21) (18 - 18)  SpO2: 93% (05 Mar 2020 19:21) (93% - 96%)        03-04 @ 07:01  -  03-05 @ 07:00  --------------------------------------------------------  IN: 1340 mL / OUT: 2454 mL / NET: -1114 mL    03-05 @ 07:01  -  03-06 @ 04:59  --------------------------------------------------------  IN: 490 mL / OUT: 1928 mL / NET: -1438 mL      CAPILLARY BLOOD GLUCOSE      POCT Blood Glucose.: 214 mg/dL (05 Mar 2020 21:33)      PHYSICAL EXAM:  General: Awake, alert, oriented X 3.   HEENT: Atraumatic, normocephalic.                 Mallampatti Grade                 No nasal congestion.                No tonsillar or pharyngeal exudates.  Lymph Nodes: No palpable lymphadenopathy  Neck: No JVD. No carotid bruit.   Respiratory: Normal chest expansion                         Normal percussion                         Normal and equal air entry                         No wheeze, rhonchi or rales.  Cardiovascular: S1 S2 normal. No murmurs, rubs or gallops.   Abdomen: Soft, non-tender, non-distended. No organomegaly. Normoactive bowel sounds.  Extremities: Warm to touch. Peripheral pulse palpable. No pedal edema.   Skin: No rashes or skin lesions  Neurological: Motor and sensory examination equal and normal in all four extremities.  Psychiatry: Appropriate mood and affect.    HOSPITAL MEDICATIONS:  MEDICATIONS  (STANDING):  ALBUTerol    90 MICROgram(s) HFA Inhaler 1 Puff(s) Inhalation every 4 hours  albuterol/ipratropium for Nebulization. 3 milliLiter(s) Nebulizer every 6 hours  aMIOdarone    Tablet 200 milliGRAM(s) Oral daily  amitriptyline 10 milliGRAM(s) Oral every 8 hours  artificial tears (preservative free) Ophthalmic Solution 1 Drop(s) Both EYES two times a day  aspirin enteric coated 81 milliGRAM(s) Oral daily  atorvastatin 40 milliGRAM(s) Oral at bedtime  benzonatate 100 milliGRAM(s) Oral every 8 hours  buDESOnide    Inhalation Suspension 0.5 milliGRAM(s) Inhalation two times a day  calcium acetate 667 milliGRAM(s) Oral three times a day with meals  chlorhexidine 2% Cloths 1 Application(s) Topical <User Schedule>  DAPTOmycin IVPB 500 milliGRAM(s) IV Intermittent every 24 hours  dextrose 5%. 1000 milliLiter(s) (50 mL/Hr) IV Continuous <Continuous>  dextrose 5%. 1000 milliLiter(s) (30 mL/Hr) IV Continuous <Continuous>  dextrose 50% Injectable 12.5 Gram(s) IV Push once  dextrose 50% Injectable 25 Gram(s) IV Push once  dextrose 50% Injectable 25 Gram(s) IV Push once  furosemide    Tablet 40 milliGRAM(s) Oral daily  heparin  Injectable 5000 Unit(s) SubCutaneous every 8 hours  insulin glargine Injectable (LANTUS) 30 Unit(s) SubCutaneous at bedtime  insulin lispro (HumaLOG) corrective regimen sliding scale   SubCutaneous Before meals and at bedtime  insulin lispro Injectable (HumaLOG) 10 Unit(s) SubCutaneous three times a day before meals  melatonin 3 milliGRAM(s) Oral at bedtime  modafinil 100 milliGRAM(s) Oral <User Schedule>  multivitamin 1 Tablet(s) Oral daily  mupirocin 2% Ointment 1 Application(s) Topical two times a day  pantoprazole    Tablet 40 milliGRAM(s) Oral before breakfast  polyethylene glycol 3350 17 Gram(s) Oral daily  senna 2 Tablet(s) Oral at bedtime  silver sulfADIAZINE 1% Cream 1 Application(s) Topical daily  sodium chloride 0.65% Nasal 1 Spray(s) Both Nostrils three times a day  spironolactone 25 milliGRAM(s) Oral daily  tiotropium 18 MICROgram(s) Capsule 1 Capsule(s) Inhalation daily    MEDICATIONS  (PRN):  acetaminophen   Tablet .. 650 milliGRAM(s) Oral every 6 hours PRN Mild Pain (1 - 3)  albuterol/ipratropium for Nebulization. 3 milliLiter(s) Nebulizer every 6 hours PRN Shortness of Breath and/or Wheezing  dextrose 40% Gel 15 Gram(s) Oral once PRN Blood Glucose LESS THAN 70 milliGRAM(s)/deciLiter  glucagon  Injectable 1 milliGRAM(s) IntraMuscular once PRN Glucose <70 milliGRAM(s)/deciLiter  guaiFENesin   Syrup  (Sugar-Free) 200 milliGRAM(s) Oral every 6 hours PRN Cough  oxycodone    5 mG/acetaminophen 325 mG 1 Tablet(s) Oral every 6 hours PRN Severe Pain (7 - 10)  sodium chloride 0.9% lock flush 10 milliLiter(s) IV Push every 1 hour PRN Pre/post blood products, medications, blood draw, and to maintain line patency      LABS:                        7.7    7.31  )-----------( 329      ( 05 Mar 2020 05:39 )             25.5     03-05    133<L>  |  96  |  35<H>  ----------------------------<  119<H>  4.6   |  24  |  1.71<H>    Ca    8.8      05 Mar 2020 05:39                MICROBIOLOGY:     RADIOLOGY:  [ ] Reviewed and interpreted by me    Point of Care Ultrasound Findings:    PFT:    EKG: CHIEF COMPLAINT: f/up sob, resp failure, pleural effusions, atelectasis-cough present-NP--andrew upon swallowing--better w/ swallow maneuvers    Interval Events: failed swallow study--on D3 diet precautions    REVIEW OF SYSTEMS:  Constitutional: No fevers or chills. No weight loss. + fatigue or generalized malaise.  Eyes: No itching or discharge from the eyes  ENT: No ear pain. No ear discharge. No nasal congestion. No post nasal drip. No epistaxis. No throat pain. No sore throat. No difficulty swallowing.   CV: No chest pain. No palpitations. No lightheadedness or dizziness.   Resp: No dyspnea at rest. + dyspnea on exertion. No orthopnea. No wheezing. + cough. No stridor. No sputum production. No chest pain with respiration.  GI: No nausea. No vomiting. No diarrhea.  MSK: No joint pain or pain in any extremities  Integumentary: No skin lesions. + pedal edema.  Neurological: No gross motor weakness. No sensory changes.  [+ ] All other systems negative  [ ] Unable to assess ROS because ________    OBJECTIVE:  ICU Vital Signs Last 24 Hrs  T(C): 36.8 (05 Mar 2020 19:21), Max: 36.9 (05 Mar 2020 05:00)  T(F): 98.3 (05 Mar 2020 19:21), Max: 98.4 (05 Mar 2020 05:00)  HR: 104 (05 Mar 2020 19:21) (91 - 104)  BP: 126/72 (05 Mar 2020 19:21) (110/66 - 142/83)  BP(mean): --  ABP: --  ABP(mean): --  RR: 18 (05 Mar 2020 19:21) (18 - 18)  SpO2: 93% (05 Mar 2020 19:21) (93% - 96%)        03-04 @ 07:01  -  03-05 @ 07:00  --------------------------------------------------------  IN: 1340 mL / OUT: 2454 mL / NET: -1114 mL    03-05 @ 07:01  -  03-06 @ 04:59  --------------------------------------------------------  IN: 490 mL / OUT: 1928 mL / NET: -1438 mL      CAPILLARY BLOOD GLUCOSE      POCT Blood Glucose.: 214 mg/dL (05 Mar 2020 21:33)      PHYSICAL EXAM: NAD in chair  General: Awake, alert, oriented X 3.   HEENT: Atraumatic, normocephalic.                 Mallampatti Grade 3                No nasal congestion.                No tonsillar or pharyngeal exudates.  Lymph Nodes: No palpable lymphadenopathy  Neck: No JVD. No carotid bruit.   Respiratory: abnormal chest expansion-reduced BS bases                         Normal percussion                         Normal and equal air entry                         No wheeze, rhonchi or rales.  Cardiovascular: S1 S2 normal. No murmurs, rubs or gallops.   Abdomen: Soft, non-tender, non-distended. No organomegaly. Normoactive bowel sounds.  Extremities: Warm to touch. Peripheral pulse palpable. + pedal edema.   Skin: No rashes or skin lesions  Neurological: Motor and sensory examination equal and normal in all four extremities.  Psychiatry: Appropriate mood and affect.    HOSPITAL MEDICATIONS:  MEDICATIONS  (STANDING):  ALBUTerol    90 MICROgram(s) HFA Inhaler 1 Puff(s) Inhalation every 4 hours  albuterol/ipratropium for Nebulization. 3 milliLiter(s) Nebulizer every 6 hours  aMIOdarone    Tablet 200 milliGRAM(s) Oral daily  amitriptyline 10 milliGRAM(s) Oral every 8 hours  artificial tears (preservative free) Ophthalmic Solution 1 Drop(s) Both EYES two times a day  aspirin enteric coated 81 milliGRAM(s) Oral daily  atorvastatin 40 milliGRAM(s) Oral at bedtime  benzonatate 100 milliGRAM(s) Oral every 8 hours  buDESOnide    Inhalation Suspension 0.5 milliGRAM(s) Inhalation two times a day  calcium acetate 667 milliGRAM(s) Oral three times a day with meals  chlorhexidine 2% Cloths 1 Application(s) Topical <User Schedule>  DAPTOmycin IVPB 500 milliGRAM(s) IV Intermittent every 24 hours  dextrose 5%. 1000 milliLiter(s) (50 mL/Hr) IV Continuous <Continuous>  dextrose 5%. 1000 milliLiter(s) (30 mL/Hr) IV Continuous <Continuous>  dextrose 50% Injectable 12.5 Gram(s) IV Push once  dextrose 50% Injectable 25 Gram(s) IV Push once  dextrose 50% Injectable 25 Gram(s) IV Push once  furosemide    Tablet 40 milliGRAM(s) Oral daily  heparin  Injectable 5000 Unit(s) SubCutaneous every 8 hours  insulin glargine Injectable (LANTUS) 30 Unit(s) SubCutaneous at bedtime  insulin lispro (HumaLOG) corrective regimen sliding scale   SubCutaneous Before meals and at bedtime  insulin lispro Injectable (HumaLOG) 10 Unit(s) SubCutaneous three times a day before meals  melatonin 3 milliGRAM(s) Oral at bedtime  modafinil 100 milliGRAM(s) Oral <User Schedule>  multivitamin 1 Tablet(s) Oral daily  mupirocin 2% Ointment 1 Application(s) Topical two times a day  pantoprazole    Tablet 40 milliGRAM(s) Oral before breakfast  polyethylene glycol 3350 17 Gram(s) Oral daily  senna 2 Tablet(s) Oral at bedtime  silver sulfADIAZINE 1% Cream 1 Application(s) Topical daily  sodium chloride 0.65% Nasal 1 Spray(s) Both Nostrils three times a day  spironolactone 25 milliGRAM(s) Oral daily  tiotropium 18 MICROgram(s) Capsule 1 Capsule(s) Inhalation daily    MEDICATIONS  (PRN):  acetaminophen   Tablet .. 650 milliGRAM(s) Oral every 6 hours PRN Mild Pain (1 - 3)  albuterol/ipratropium for Nebulization. 3 milliLiter(s) Nebulizer every 6 hours PRN Shortness of Breath and/or Wheezing  dextrose 40% Gel 15 Gram(s) Oral once PRN Blood Glucose LESS THAN 70 milliGRAM(s)/deciLiter  glucagon  Injectable 1 milliGRAM(s) IntraMuscular once PRN Glucose <70 milliGRAM(s)/deciLiter  guaiFENesin   Syrup  (Sugar-Free) 200 milliGRAM(s) Oral every 6 hours PRN Cough  oxycodone    5 mG/acetaminophen 325 mG 1 Tablet(s) Oral every 6 hours PRN Severe Pain (7 - 10)  sodium chloride 0.9% lock flush 10 milliLiter(s) IV Push every 1 hour PRN Pre/post blood products, medications, blood draw, and to maintain line patency      LABS:                        7.7    7.31  )-----------( 329      ( 05 Mar 2020 05:39 )             25.5     03-05    133<L>  |  96  |  35<H>  ----------------------------<  119<H>  4.6   |  24  |  1.71<H>    Ca    8.8      05 Mar 2020 05:39                MICROBIOLOGY:     RADIOLOGY:  [ ] Reviewed and interpreted by me    Point of Care Ultrasound Findings:    PFT:    EKG:

## 2020-03-06 NOTE — PROGRESS NOTE ADULT - SUBJECTIVE AND OBJECTIVE BOX
infectious diseases progress note:    Patient is a 64y old  Male who presents with a chief complaint of Chest pain (06 Mar 2020 04:59)        Acute ischemic heart disease           Allergies    No Known Allergies    Intolerances        ANTIBIOTICS/RELEVANT:  antimicrobials  DAPTOmycin IVPB 500 milliGRAM(s) IV Intermittent every 24 hours    immunologic:    OTHER:  acetaminophen   Tablet .. 650 milliGRAM(s) Oral every 6 hours PRN  ALBUTerol    90 MICROgram(s) HFA Inhaler 1 Puff(s) Inhalation every 4 hours  albuterol/ipratropium for Nebulization. 3 milliLiter(s) Nebulizer every 6 hours PRN  albuterol/ipratropium for Nebulization. 3 milliLiter(s) Nebulizer every 6 hours  aMIOdarone    Tablet 200 milliGRAM(s) Oral daily  amitriptyline 10 milliGRAM(s) Oral every 8 hours  artificial tears (preservative free) Ophthalmic Solution 1 Drop(s) Both EYES two times a day  aspirin enteric coated 81 milliGRAM(s) Oral daily  atorvastatin 40 milliGRAM(s) Oral at bedtime  benzonatate 100 milliGRAM(s) Oral every 8 hours  buDESOnide    Inhalation Suspension 0.5 milliGRAM(s) Inhalation two times a day  calcium acetate 667 milliGRAM(s) Oral three times a day with meals  chlorhexidine 2% Cloths 1 Application(s) Topical <User Schedule>  dextrose 40% Gel 15 Gram(s) Oral once PRN  dextrose 5%. 1000 milliLiter(s) IV Continuous <Continuous>  dextrose 5%. 1000 milliLiter(s) IV Continuous <Continuous>  dextrose 50% Injectable 12.5 Gram(s) IV Push once  dextrose 50% Injectable 25 Gram(s) IV Push once  dextrose 50% Injectable 25 Gram(s) IV Push once  furosemide    Tablet 40 milliGRAM(s) Oral daily  glucagon  Injectable 1 milliGRAM(s) IntraMuscular once PRN  guaiFENesin   Syrup  (Sugar-Free) 200 milliGRAM(s) Oral every 6 hours PRN  heparin  Injectable 5000 Unit(s) SubCutaneous every 8 hours  insulin glargine Injectable (LANTUS) 30 Unit(s) SubCutaneous at bedtime  insulin lispro (HumaLOG) corrective regimen sliding scale   SubCutaneous Before meals and at bedtime  insulin lispro Injectable (HumaLOG) 10 Unit(s) SubCutaneous three times a day before meals  melatonin 3 milliGRAM(s) Oral at bedtime  modafinil 100 milliGRAM(s) Oral <User Schedule>  multivitamin 1 Tablet(s) Oral daily  mupirocin 2% Ointment 1 Application(s) Topical two times a day  oxycodone    5 mG/acetaminophen 325 mG 1 Tablet(s) Oral every 6 hours PRN  pantoprazole    Tablet 40 milliGRAM(s) Oral before breakfast  polyethylene glycol 3350 17 Gram(s) Oral daily  senna 2 Tablet(s) Oral at bedtime  silver sulfADIAZINE 1% Cream 1 Application(s) Topical daily  sodium chloride 0.65% Nasal 1 Spray(s) Both Nostrils three times a day  sodium chloride 0.9% lock flush 10 milliLiter(s) IV Push every 1 hour PRN  spironolactone 25 milliGRAM(s) Oral daily  tiotropium 18 MICROgram(s) Capsule 1 Capsule(s) Inhalation daily      Objective:  Vital Signs Last 24 Hrs  T(C): 36.7 (06 Mar 2020 04:15), Max: 36.8 (05 Mar 2020 12:21)  T(F): 98 (06 Mar 2020 04:15), Max: 98.3 (05 Mar 2020 19:21)  HR: 87 (06 Mar 2020 04:15) (87 - 104)  BP: 115/75 (06 Mar 2020 04:15) (115/75 - 142/83)  BP(mean): --  RR: 18 (06 Mar 2020 04:15) (18 - 18)  SpO2: 93% (06 Mar 2020 04:15) (93% - 93%)       Ear/Nose/Throat: no oral lesion, no sinus tenderness on percussion	  Neck:no JVD, no lymphadenopathy, supple  Respiratory: CTA lanre  Cardiovascular: S1S2 RRR, no murmurs vac over wd   Gastrointestinal:soft, (+) BS, no HSM  Extremities: cellulits resolved but has non healing ulcers distally at svg site        LABS:                        7.9    7.27  )-----------( 297      ( 06 Mar 2020 06:54 )             25.7     03-06    134<L>  |  97  |  34<H>  ----------------------------<  143<H>  4.8   |  25  |  1.59<H>    Ca    8.6      06 Mar 2020 06:35              MICROBIOLOGY:    RECENT CULTURES:        RESPIRATORY CULTURES:              RADIOLOGY & ADDITIONAL STUDIES:        Pager [phone]  After 5 pm/weekends or if no response :[phone]

## 2020-03-06 NOTE — PROGRESS NOTE ADULT - ATTENDING COMMENTS
as above-s/p debridement 2/25 of sternal area--RENAL--PO diuretics for anasarca; slightly stronger-cough pronounced--? tracheomalacia----better on amitriptyline 10 q 8 for better control-? aspiration/swallow dysfunction--FEESST study/MBS  multifactorial dyspnea-CAD s/p CABG, PEA arrest, atelectasis due to pain, pleural effusion L-pig tail out, bronchospasm, ?PE-O2 NC sat above 90%                     Pleural effusion-pig tail removed 2/20-CT chest NC-improved but still loculations on left-f/up 4-6 wks  CAD/CHF-diurese as cr allows-keep K/Mg above  atelectasis-pain control, incentive spirometry, acapella                    ? DVT/PE--s/p repeat venous dopplers-negative; VQ unable  bronchospasm-duoneb q 6, pulmicort .5 bid; add tessalon perles 200 q 8 and mucinex;  out pt PFTs              ID-daptomycin as per ID  snore-? osas--out pt SS  DVT/GI prophylaxis, PT, nutrition evaln            PT  Mike Hernandez MD-Pulmonary    [phone] as above-s/p debridement 2/25 of sternal area--cough due to dyphagia +/-TBM -now on D3 diet precautions.  multifactorial dyspnea-CAD s/p CABG, PEA arrest, atelectasis due to pain, pleural effusion L-pig tail out, bronchospasm, ?PE-O2 NC sat above 90%                     Pleural effusion-pig tail removed 2/20-CT chest NC-improved but still loculations on left-f/up 4-6 wks  CAD/CHF-diurese as cr allows-keep K/Mg above  atelectasis-pain control, incentive spirometry, acapella                    ? DVT/PE--s/p repeat venous dopplers-negative; VQ unable  bronchospasm-duoneb q 6, pulmicort .5 bid; add tessalon perles 200 q 8 and mucinex;  out pt PFTs              ID-daptomycin as per ID  snore-? osas--out pt SS  DVT/GI prophylaxis, PT, nutrition evaln            PT  Mike Hernandez MD-Pulmonary    [phone]

## 2020-03-06 NOTE — PROGRESS NOTE ADULT - ASSESSMENT
65yo male with hx of HTN, DM II, presented to the ED with complaints of acute on chronic left sided exertional chest pain. Pt states he has been having left sided pressure and stabbing chest pain radiating to his sternum and right with activity for the past few months. Since admission- s/p cath with severe triple vessel disease, s/p CABG, post op course c/b brief witnessed PEA 2/6 likely hypoxic arrest, s/p intubation. ( CAD, s/p cath with severe triple vessel disease including lesions at the bifurcation of the LAD/diagonal and distal RCA/RPDA/RPL trifurcation.   -s/p CABG x 4, intraop kennedy ef 50%)--s/p ampicillin until 2/18 for enterococcus in blood, extubated 2/9, pigtail right 2/8 and left sided on 2/15-for effusion.  Remains sob-NO h/o resp issues prior to hospitalization.  ***Current sob-multifactorial-CAD/effusions, atelectasis due to pain, mild bronchospasm and debility.  ********************  2/21-slightly better, pig tail cath removed from left chest; CT chest done-loculated left effusion-slight better  2/24-BS issues-less sob-dapto/cefepime as per ID  2/25-for debridement of chest area-sternal wound  2/26-debridement completed--to CTU  2/2784-VMA-uhnauvozy over all  2/28-chest pain still impacting breathing--plastics/renal endo f/up  2/29-no changes over night  3/1-cough present-has been off BD  3/2-cough present still  3/3-improved cough on amitriptyline  3/4-cough upon swallowing  3/5-no changes-more ambulation-for [facility] 63yo male with hx of HTN, DM II, presented to the ED with complaints of acute on chronic left sided exertional chest pain. Pt states he has been having left sided pressure and stabbing chest pain radiating to his sternum and right with activity for the past few months. Since admission- s/p cath with severe triple vessel disease, s/p CABG, post op course c/b brief witnessed PEA 2/6 likely hypoxic arrest, s/p intubation. ( CAD, s/p cath with severe triple vessel disease including lesions at the bifurcation of the LAD/diagonal and distal RCA/RPDA/RPL trifurcation.   -s/p CABG x 4, intraop kennedy ef 50%)--s/p ampicillin until 2/18 for enterococcus in blood, extubated 2/9, pigtail right 2/8 and left sided on 2/15-for effusion.  Remains sob-NO h/o resp issues prior to hospitalization.  ***Current sob-multifactorial-CAD/effusions, atelectasis due to pain, mild bronchospasm and debility.  ********************  2/21-slightly better, pig tail cath removed from left chest; CT chest done-loculated left effusion-slight better  2/24-BS issues-less sob-dapto/cefepime as per ID  2/25-for debridement of chest area-sternal wound  2/26-debridement completed--to CTU  2/2735-CMU-flkkqqiql over all  2/28-chest pain still impacting breathing--plastics/renal endo f/up  2/29-no changes over night  3/1-cough present-has been off BD  3/2-cough present still  3/3-improved cough on amitriptyline  3/4-cough upon swallowing  3/5-no changes-more ambulation-for mbs  3/6-failed swallow study--to D3 diet precautions

## 2020-03-06 NOTE — PROGRESS NOTE ADULT - SUBJECTIVE AND OBJECTIVE BOX
Chief complaint  Patient is a 64y old  Male who presents with a chief complaint of Chest pain (06 Mar 2020 11:36)   Review of systems  Patient sitting up in chair, looks comfortable, no hypoglycemia.    Labs and Fingersticks  CAPILLARY BLOOD GLUCOSE      POCT Blood Glucose.: 173 mg/dL (06 Mar 2020 11:32)  POCT Blood Glucose.: 189 mg/dL (06 Mar 2020 07:39)  POCT Blood Glucose.: 214 mg/dL (05 Mar 2020 21:33)  POCT Blood Glucose.: 121 mg/dL (05 Mar 2020 17:38)      Anion Gap, Serum: 12 (03-06 @ 06:35)  Anion Gap, Serum: 13 (03-05 @ 05:39)      Calcium, Total Serum: 8.6 (03-06 @ 06:35)  Calcium, Total Serum: 8.8 (03-05 @ 05:39)          03-06    134<L>  |  97  |  34<H>  ----------------------------<  143<H>  4.8   |  25  |  1.59<H>    Ca    8.6      06 Mar 2020 06:35                          7.9    7.27  )-----------( 297      ( 06 Mar 2020 06:54 )             25.7     Medications  MEDICATIONS  (STANDING):  ALBUTerol    90 MICROgram(s) HFA Inhaler 1 Puff(s) Inhalation every 4 hours  albuterol/ipratropium for Nebulization. 3 milliLiter(s) Nebulizer every 6 hours  aMIOdarone    Tablet 200 milliGRAM(s) Oral daily  amitriptyline 10 milliGRAM(s) Oral every 8 hours  artificial tears (preservative free) Ophthalmic Solution 1 Drop(s) Both EYES two times a day  aspirin enteric coated 81 milliGRAM(s) Oral daily  atorvastatin 40 milliGRAM(s) Oral at bedtime  benzonatate 100 milliGRAM(s) Oral every 8 hours  buDESOnide    Inhalation Suspension 0.5 milliGRAM(s) Inhalation two times a day  calcium acetate 667 milliGRAM(s) Oral three times a day with meals  chlorhexidine 2% Cloths 1 Application(s) Topical <User Schedule>  DAPTOmycin IVPB 500 milliGRAM(s) IV Intermittent every 24 hours  dextrose 5%. 1000 milliLiter(s) (50 mL/Hr) IV Continuous <Continuous>  dextrose 5%. 1000 milliLiter(s) (30 mL/Hr) IV Continuous <Continuous>  dextrose 50% Injectable 12.5 Gram(s) IV Push once  dextrose 50% Injectable 25 Gram(s) IV Push once  dextrose 50% Injectable 25 Gram(s) IV Push once  furosemide    Tablet 40 milliGRAM(s) Oral daily  heparin  Injectable 5000 Unit(s) SubCutaneous every 8 hours  insulin glargine Injectable (LANTUS) 40 Unit(s) SubCutaneous at bedtime  insulin lispro (HumaLOG) corrective regimen sliding scale   SubCutaneous Before meals and at bedtime  insulin lispro Injectable (HumaLOG) 14 Unit(s) SubCutaneous three times a day with meals  melatonin 3 milliGRAM(s) Oral at bedtime  modafinil 100 milliGRAM(s) Oral <User Schedule>  multivitamin 1 Tablet(s) Oral daily  mupirocin 2% Ointment 1 Application(s) Topical two times a day  pantoprazole    Tablet 40 milliGRAM(s) Oral before breakfast  polyethylene glycol 3350 17 Gram(s) Oral daily  senna 2 Tablet(s) Oral at bedtime  silver sulfADIAZINE 1% Cream 1 Application(s) Topical daily  sodium chloride 0.65% Nasal 1 Spray(s) Both Nostrils three times a day  spironolactone 25 milliGRAM(s) Oral daily  tiotropium 18 MICROgram(s) Capsule 1 Capsule(s) Inhalation daily      Physical Exam  General: Patient comfortable in bed  Vital Signs Last 12 Hrs  T(F): 98 (03-06-20 @ 04:15), Max: 98 (03-06-20 @ 04:15)  HR: 87 (03-06-20 @ 04:15) (87 - 87)  BP: 115/75 (03-06-20 @ 04:15) (115/75 - 115/75)  BP(mean): --  RR: 18 (03-06-20 @ 04:15) (18 - 18)  SpO2: 93% (03-06-20 @ 04:15) (93% - 93%)  Neck: No palpable thyroid nodules.  CVS: S1S2, No murmurs  Respiratory: No wheezing, no crepitations  GI: Abdomen soft, bowel sounds positive  Musculoskeletal:  edema lower extremities.   Skin: No skin rashes, no ecchymosis    Diagnostics Chief complaint  Patient is a 64y old  Male who presents with a chief complaint of Chest pain (06 Mar 2020 11:36)   Review of systems  Patient sitting up in chair, looks comfortable,  no hypoglycemia.    Labs and Fingersticks  CAPILLARY BLOOD GLUCOSE      POCT Blood Glucose.: 173 mg/dL (06 Mar 2020 11:32)  POCT Blood Glucose.: 189 mg/dL (06 Mar 2020 07:39)  POCT Blood Glucose.: 214 mg/dL (05 Mar 2020 21:33)  POCT Blood Glucose.: 121 mg/dL (05 Mar 2020 17:38)      Anion Gap, Serum: 12 (03-06 @ 06:35)  Anion Gap, Serum: 13 (03-05 @ 05:39)      Calcium, Total Serum: 8.6 (03-06 @ 06:35)  Calcium, Total Serum: 8.8 (03-05 @ 05:39)          03-06    134<L>  |  97  |  34<H>  ----------------------------<  143<H>  4.8   |  25  |  1.59<H>    Ca    8.6      06 Mar 2020 06:35                          7.9    7.27  )-----------( 297      ( 06 Mar 2020 06:54 )             25.7     Medications  MEDICATIONS  (STANDING):  ALBUTerol    90 MICROgram(s) HFA Inhaler 1 Puff(s) Inhalation every 4 hours  albuterol/ipratropium for Nebulization. 3 milliLiter(s) Nebulizer every 6 hours  aMIOdarone    Tablet 200 milliGRAM(s) Oral daily  amitriptyline 10 milliGRAM(s) Oral every 8 hours  artificial tears (preservative free) Ophthalmic Solution 1 Drop(s) Both EYES two times a day  aspirin enteric coated 81 milliGRAM(s) Oral daily  atorvastatin 40 milliGRAM(s) Oral at bedtime  benzonatate 100 milliGRAM(s) Oral every 8 hours  buDESOnide    Inhalation Suspension 0.5 milliGRAM(s) Inhalation two times a day  calcium acetate 667 milliGRAM(s) Oral three times a day with meals  chlorhexidine 2% Cloths 1 Application(s) Topical <User Schedule>  DAPTOmycin IVPB 500 milliGRAM(s) IV Intermittent every 24 hours  dextrose 5%. 1000 milliLiter(s) (50 mL/Hr) IV Continuous <Continuous>  dextrose 5%. 1000 milliLiter(s) (30 mL/Hr) IV Continuous <Continuous>  dextrose 50% Injectable 12.5 Gram(s) IV Push once  dextrose 50% Injectable 25 Gram(s) IV Push once  dextrose 50% Injectable 25 Gram(s) IV Push once  furosemide    Tablet 40 milliGRAM(s) Oral daily  heparin  Injectable 5000 Unit(s) SubCutaneous every 8 hours  insulin glargine Injectable (LANTUS) 40 Unit(s) SubCutaneous at bedtime  insulin lispro (HumaLOG) corrective regimen sliding scale   SubCutaneous Before meals and at bedtime  insulin lispro Injectable (HumaLOG) 14 Unit(s) SubCutaneous three times a day with meals  melatonin 3 milliGRAM(s) Oral at bedtime  modafinil 100 milliGRAM(s) Oral <User Schedule>  multivitamin 1 Tablet(s) Oral daily  mupirocin 2% Ointment 1 Application(s) Topical two times a day  pantoprazole    Tablet 40 milliGRAM(s) Oral before breakfast  polyethylene glycol 3350 17 Gram(s) Oral daily  senna 2 Tablet(s) Oral at bedtime  silver sulfADIAZINE 1% Cream 1 Application(s) Topical daily  sodium chloride 0.65% Nasal 1 Spray(s) Both Nostrils three times a day  spironolactone 25 milliGRAM(s) Oral daily  tiotropium 18 MICROgram(s) Capsule 1 Capsule(s) Inhalation daily      Physical Exam  General: Patient comfortable in bed  Vital Signs Last 12 Hrs  T(F): 98 (03-06-20 @ 04:15), Max: 98 (03-06-20 @ 04:15)  HR: 87 (03-06-20 @ 04:15) (87 - 87)  BP: 115/75 (03-06-20 @ 04:15) (115/75 - 115/75)  BP(mean): --  RR: 18 (03-06-20 @ 04:15) (18 - 18)  SpO2: 93% (03-06-20 @ 04:15) (93% - 93%)  Neck: No palpable thyroid nodules.  CVS: S1S2, No murmurs  Respiratory: No wheezing, no crepitations  GI: Abdomen soft, bowel sounds positive  Musculoskeletal:  edema lower extremities.   Skin: No skin rashes, no ecchymosis    Diagnostics

## 2020-03-06 NOTE — PROGRESS NOTE ADULT - PROBLEM SELECTOR PLAN 2
Pt. with likely hypervolemic hyponatremia in the setting of volume overload. Na stable. Continue diuretics.

## 2020-03-07 LAB
ALBUMIN SERPL ELPH-MCNC: 2.4 G/DL — LOW (ref 3.3–5)
ALP SERPL-CCNC: 103 U/L — SIGNIFICANT CHANGE UP (ref 40–120)
ALT FLD-CCNC: 52 U/L — HIGH (ref 10–45)
ANION GAP SERPL CALC-SCNC: 14 MMOL/L — SIGNIFICANT CHANGE UP (ref 5–17)
AST SERPL-CCNC: 32 U/L — SIGNIFICANT CHANGE UP (ref 10–40)
BILIRUB SERPL-MCNC: 0.3 MG/DL — SIGNIFICANT CHANGE UP (ref 0.2–1.2)
BUN SERPL-MCNC: 36 MG/DL — HIGH (ref 7–23)
CALCIUM SERPL-MCNC: 8.7 MG/DL — SIGNIFICANT CHANGE UP (ref 8.4–10.5)
CHLORIDE SERPL-SCNC: 98 MMOL/L — SIGNIFICANT CHANGE UP (ref 96–108)
CO2 SERPL-SCNC: 25 MMOL/L — SIGNIFICANT CHANGE UP (ref 22–31)
CREAT SERPL-MCNC: 1.78 MG/DL — HIGH (ref 0.5–1.3)
GLUCOSE BLDC GLUCOMTR-MCNC: 117 MG/DL — HIGH (ref 70–99)
GLUCOSE BLDC GLUCOMTR-MCNC: 132 MG/DL — HIGH (ref 70–99)
GLUCOSE BLDC GLUCOMTR-MCNC: 88 MG/DL — SIGNIFICANT CHANGE UP (ref 70–99)
GLUCOSE BLDC GLUCOMTR-MCNC: 89 MG/DL — SIGNIFICANT CHANGE UP (ref 70–99)
GLUCOSE BLDC GLUCOMTR-MCNC: 99 MG/DL — SIGNIFICANT CHANGE UP (ref 70–99)
GLUCOSE SERPL-MCNC: 109 MG/DL — HIGH (ref 70–99)
HCT VFR BLD CALC: 28 % — LOW (ref 39–50)
HGB BLD-MCNC: 8.4 G/DL — LOW (ref 13–17)
MCHC RBC-ENTMCNC: 26.3 PG — LOW (ref 27–34)
MCHC RBC-ENTMCNC: 30 GM/DL — LOW (ref 32–36)
MCV RBC AUTO: 87.5 FL — SIGNIFICANT CHANGE UP (ref 80–100)
NRBC # BLD: 0 /100 WBCS — SIGNIFICANT CHANGE UP (ref 0–0)
PLATELET # BLD AUTO: 274 K/UL — SIGNIFICANT CHANGE UP (ref 150–400)
POTASSIUM SERPL-MCNC: 4.3 MMOL/L — SIGNIFICANT CHANGE UP (ref 3.5–5.3)
POTASSIUM SERPL-SCNC: 4.3 MMOL/L — SIGNIFICANT CHANGE UP (ref 3.5–5.3)
PROT SERPL-MCNC: 6.2 G/DL — SIGNIFICANT CHANGE UP (ref 6–8.3)
RBC # BLD: 3.2 M/UL — LOW (ref 4.2–5.8)
RBC # FLD: 14.8 % — HIGH (ref 10.3–14.5)
SODIUM SERPL-SCNC: 137 MMOL/L — SIGNIFICANT CHANGE UP (ref 135–145)
WBC # BLD: 9.19 K/UL — SIGNIFICANT CHANGE UP (ref 3.8–10.5)
WBC # FLD AUTO: 9.19 K/UL — SIGNIFICANT CHANGE UP (ref 3.8–10.5)

## 2020-03-07 RX ADMIN — MUPIROCIN 1 APPLICATION(S): 20 OINTMENT TOPICAL at 05:58

## 2020-03-07 RX ADMIN — INSULIN GLARGINE 40 UNIT(S): 100 INJECTION, SOLUTION SUBCUTANEOUS at 22:22

## 2020-03-07 RX ADMIN — Medication 1 SPRAY(S): at 05:57

## 2020-03-07 RX ADMIN — Medication 100 MILLIGRAM(S): at 22:22

## 2020-03-07 RX ADMIN — MUPIROCIN 1 APPLICATION(S): 20 OINTMENT TOPICAL at 18:03

## 2020-03-07 RX ADMIN — Medication 3 MILLILITER(S): at 18:03

## 2020-03-07 RX ADMIN — AMIODARONE HYDROCHLORIDE 200 MILLIGRAM(S): 400 TABLET ORAL at 05:59

## 2020-03-07 RX ADMIN — Medication 1 TABLET(S): at 13:08

## 2020-03-07 RX ADMIN — SENNA PLUS 2 TABLET(S): 8.6 TABLET ORAL at 22:21

## 2020-03-07 RX ADMIN — Medication 667 MILLIGRAM(S): at 13:05

## 2020-03-07 RX ADMIN — HEPARIN SODIUM 5000 UNIT(S): 5000 INJECTION INTRAVENOUS; SUBCUTANEOUS at 22:21

## 2020-03-07 RX ADMIN — HEPARIN SODIUM 5000 UNIT(S): 5000 INJECTION INTRAVENOUS; SUBCUTANEOUS at 13:17

## 2020-03-07 RX ADMIN — Medication 14 UNIT(S): at 13:04

## 2020-03-07 RX ADMIN — Medication 667 MILLIGRAM(S): at 18:03

## 2020-03-07 RX ADMIN — Medication 3 MILLIGRAM(S): at 22:22

## 2020-03-07 RX ADMIN — SPIRONOLACTONE 25 MILLIGRAM(S): 25 TABLET, FILM COATED ORAL at 05:58

## 2020-03-07 RX ADMIN — MODAFINIL 100 MILLIGRAM(S): 200 TABLET ORAL at 08:44

## 2020-03-07 RX ADMIN — Medication 100 MILLIGRAM(S): at 13:05

## 2020-03-07 RX ADMIN — Medication 12.5 MILLIGRAM(S): at 18:03

## 2020-03-07 RX ADMIN — SODIUM CHLORIDE 10 MILLILITER(S): 9 INJECTION INTRAMUSCULAR; INTRAVENOUS; SUBCUTANEOUS at 13:16

## 2020-03-07 RX ADMIN — OXYCODONE AND ACETAMINOPHEN 1 TABLET(S): 5; 325 TABLET ORAL at 10:56

## 2020-03-07 RX ADMIN — CHLORHEXIDINE GLUCONATE 1 APPLICATION(S): 213 SOLUTION TOPICAL at 08:33

## 2020-03-07 RX ADMIN — Medication 10 MILLIGRAM(S): at 13:05

## 2020-03-07 RX ADMIN — Medication 3 MILLILITER(S): at 06:01

## 2020-03-07 RX ADMIN — Medication 0.5 MILLIGRAM(S): at 06:00

## 2020-03-07 RX ADMIN — Medication 10 MILLIGRAM(S): at 05:59

## 2020-03-07 RX ADMIN — Medication 14 UNIT(S): at 18:39

## 2020-03-07 RX ADMIN — Medication 3 MILLILITER(S): at 23:45

## 2020-03-07 RX ADMIN — HEPARIN SODIUM 5000 UNIT(S): 5000 INJECTION INTRAVENOUS; SUBCUTANEOUS at 05:59

## 2020-03-07 RX ADMIN — Medication 12.5 MILLIGRAM(S): at 05:58

## 2020-03-07 RX ADMIN — Medication 1 DROP(S): at 05:58

## 2020-03-07 RX ADMIN — Medication 20 MILLIGRAM(S): at 05:58

## 2020-03-07 RX ADMIN — Medication 3 MILLILITER(S): at 13:17

## 2020-03-07 RX ADMIN — Medication 100 MILLIGRAM(S): at 05:58

## 2020-03-07 RX ADMIN — Medication 1 APPLICATION(S): at 13:05

## 2020-03-07 RX ADMIN — PANTOPRAZOLE SODIUM 40 MILLIGRAM(S): 20 TABLET, DELAYED RELEASE ORAL at 05:58

## 2020-03-07 RX ADMIN — Medication 667 MILLIGRAM(S): at 08:38

## 2020-03-07 RX ADMIN — Medication 10 MILLIGRAM(S): at 22:22

## 2020-03-07 RX ADMIN — Medication 1 DROP(S): at 17:58

## 2020-03-07 RX ADMIN — ATORVASTATIN CALCIUM 40 MILLIGRAM(S): 80 TABLET, FILM COATED ORAL at 22:22

## 2020-03-07 RX ADMIN — Medication 1 SPRAY(S): at 22:19

## 2020-03-07 RX ADMIN — Medication 14 UNIT(S): at 08:36

## 2020-03-07 RX ADMIN — Medication 0.5 MILLIGRAM(S): at 18:04

## 2020-03-07 RX ADMIN — OXYCODONE AND ACETAMINOPHEN 1 TABLET(S): 5; 325 TABLET ORAL at 11:45

## 2020-03-07 RX ADMIN — DAPTOMYCIN 120 MILLIGRAM(S): 500 INJECTION, POWDER, LYOPHILIZED, FOR SOLUTION INTRAVENOUS at 13:05

## 2020-03-07 RX ADMIN — POLYETHYLENE GLYCOL 3350 17 GRAM(S): 17 POWDER, FOR SOLUTION ORAL at 11:02

## 2020-03-07 RX ADMIN — Medication 81 MILLIGRAM(S): at 11:02

## 2020-03-07 NOTE — PHARMACOTHERAPY INTERVENTION NOTE - COMMENTS
Patient on daptomycin for diabetic foot infection. Last CK 03/01/2020 was 78. Ordered CK per pharmacy policy to be drawn 03/08/20 with AM labs.    Siena Delarosa, PharmD  PGY1 Pharmacy Practice Resident  [phone]

## 2020-03-07 NOTE — PROGRESS NOTE ADULT - PROBLEM SELECTOR PLAN 1
Tight glycemic control necessary in the setting of wound infection.  Will continue current insulin regimen for now. Will continue monitoring FS, log, will Follow up.  Patient counseled for compliance with consistent low carb diet, exercise.

## 2020-03-07 NOTE — PROGRESS NOTE ADULT - ASSESSMENT
intraop kennedy 2/3/20 ef 50%, nl lv, mild diastolic dysfx stage 1  limited echo 2/4/20: nl LV sys fx , no pericardial effusion     a/p  64 year old man with history of HTN, DM II, admitted with progressive exertional angina, s/p cath with severe triple vessel disease, s/p CABG, post op course c/b brief witnessed PEA likely hypoxic arrest, s/p intubation.     1. CAD, s/p cath with severe triple vessel disease including lesions at the bifurcation of the LAD/diagonal and distal RCA/RPDA/RPL trifurcation.   -s/p CABG x 4, cont asa, statin,  - continue bb   -s/p PEA arrest   -echo 2/15 with preserved lv fxn/EF    2. Sternal wound infection  -s/p RTOR for SWI  -abx per id, wound vac     3. Postop acute diastolic HF  -diuretics per cts     4. PAF  -cont amio ,bb  -AC ?, currently on asa    5. HTN  -bp stable    6. STEPHANIE/CKD  -stable, renal f/u     7. Postop Pleural effusion  -S/P Right/Left chest tube     8. Sepsis -E. Faecalis bacteremia, SW infection, RLE cellulitis   -IV abx , repeat bcx 2/13 neg  -s/p debridement   -ID, plastics f/u     dvt ppx

## 2020-03-07 NOTE — PROGRESS NOTE ADULT - SUBJECTIVE AND OBJECTIVE BOX
VITAL SIGNS-Telemetry:    Vital Signs Last 24 Hrs  T(C): 36.5 (20 @ 05:08), Max: 36.7 (20 @ 19:08)  T(F): 97.7 (20 @ 05:08), Max: 98 (20 @ 19:08)  HR: 89 (20 @ 05:08) (89 - 92)  BP: 108/68 (20 @ 05:08) (108/68 - 124/72)  RR: 18 (20 @ 05:08) (18 - 18)  SpO2: 94% (20 @ 05:08) (94% - 97%)          @ 07:01  -   @ 07:00  --------------------------------------------------------  IN: 690 mL / OUT: 2935 mL / NET: -2245 mL    Daily     Daily Weight in k.2 (07 Mar 2020 08:56)    CAPILLARY BLOOD GLUCOSE  POCT Blood Glucose.: 117 mg/dL (07 Mar 2020 08:12)  POCT Blood Glucose.: 129 mg/dL (06 Mar 2020 21:42)  POCT Blood Glucose.: 105 mg/dL (06 Mar 2020 17:02)  POCT Blood Glucose.: 173 mg/dL (06 Mar 2020 11:32)        Drains:     jenny x 2 - ss w/ minimal drainage             PHYSICAL EXAM:  Neurology: alert and oriented x 3, nonfocal, no gross deficits  CV : S1S2  Sternal Wound :  VAC in place - JENNY x 2 - ss serosanguinous  Lungs: CTA  Abdomen: soft, nontender, nondistended, positive bowel sounds, last bowel movement         Extremities:    ++ edema b/l L>R - R LE wound w/ Allevyn dressing in place. no calf tenderness     acetaminophen   Tablet .. 650 milliGRAM(s) Oral every 6 hours PRN  ALBUTerol    90 MICROgram(s) HFA Inhaler 1 Puff(s) Inhalation every 4 hours  albuterol/ipratropium for Nebulization. 3 milliLiter(s) Nebulizer every 6 hours PRN  albuterol/ipratropium for Nebulization. 3 milliLiter(s) Nebulizer every 6 hours  aMIOdarone    Tablet 200 milliGRAM(s) Oral daily  amitriptyline 10 milliGRAM(s) Oral every 8 hours  artificial tears (preservative free) Ophthalmic Solution 1 Drop(s) Both EYES two times a day  aspirin enteric coated 81 milliGRAM(s) Oral daily  atorvastatin 40 milliGRAM(s) Oral at bedtime  benzonatate 100 milliGRAM(s) Oral every 8 hours  buDESOnide    Inhalation Suspension 0.5 milliGRAM(s) Inhalation two times a day  calcium acetate 667 milliGRAM(s) Oral three times a day with meals  chlorhexidine 2% Cloths 1 Application(s) Topical <User Schedule>  DAPTOmycin IVPB 500 milliGRAM(s) IV Intermittent every 24 hours  dextrose 40% Gel 15 Gram(s) Oral once PRN  dextrose 5%. 1000 milliLiter(s) IV Continuous <Continuous>  dextrose 5%. 1000 milliLiter(s) IV Continuous <Continuous>  dextrose 50% Injectable 12.5 Gram(s) IV Push once  dextrose 50% Injectable 25 Gram(s) IV Push once  dextrose 50% Injectable 25 Gram(s) IV Push once  glucagon  Injectable 1 milliGRAM(s) IntraMuscular once PRN  guaiFENesin   Syrup  (Sugar-Free) 200 milliGRAM(s) Oral every 6 hours PRN  heparin  Injectable 5000 Unit(s) SubCutaneous every 8 hours  insulin glargine Injectable (LANTUS) 40 Unit(s) SubCutaneous at bedtime  insulin lispro (HumaLOG) corrective regimen sliding scale   SubCutaneous Before meals and at bedtime  insulin lispro Injectable (HumaLOG) 14 Unit(s) SubCutaneous three times a day with meals  melatonin 3 milliGRAM(s) Oral at bedtime  metoprolol tartrate 12.5 milliGRAM(s) Oral two times a day  modafinil 100 milliGRAM(s) Oral <User Schedule>  multivitamin 1 Tablet(s) Oral daily  mupirocin 2% Ointment 1 Application(s) Topical two times a day  oxycodone    5 mG/acetaminophen 325 mG 1 Tablet(s) Oral every 6 hours PRN  pantoprazole    Tablet 40 milliGRAM(s) Oral before breakfast  polyethylene glycol 3350 17 Gram(s) Oral daily  senna 2 Tablet(s) Oral at bedtime  silver sulfADIAZINE 1% Cream 1 Application(s) Topical daily  sodium chloride 0.65% Nasal 1 Spray(s) Both Nostrils three times a day  sodium chloride 0.9% lock flush 10 milliLiter(s) IV Push every 1 hour PRN  spironolactone 25 milliGRAM(s) Oral daily  tiotropium 18 MICROgram(s) Capsule 1 Capsule(s) Inhalation daily  torsemide 20 milliGRAM(s) Oral daily    Physical Therapy Rec:   Home  [  ]   Home w/ PT  [  ]  Rehab  [ x ]  Discussed with Cardiothoracic Team at AM rounds.

## 2020-03-07 NOTE — PROGRESS NOTE ADULT - ASSESSMENT
Assessment  DMT2: 64y Male with DM T2 with hyperglycemia, A1C 9.2%, was on oral meds and insulin at home, on basal bolus insulin, dose was adjusted, blood sugars trending down,, no hypoglycemic episodes. Patient is eating meals on dysphagia diet, feeling tired and sleepy.  Foot infection: On Tx, stable.  CAD: s/p CABG 2/3, on medications, no chest pain, stable, monitored.  HTN: Controlled,  on antihypertensive medications.  HLD: Controlled, on statin.  CKD: Monitor labs/BMP          Harper Matias MD  Cell: [phone]  Office: [phone]

## 2020-03-07 NOTE — PROGRESS NOTE ADULT - ASSESSMENT
63yo male with hx of HTN, DM II   s/p C4L on 2/3; PEA arrest on 2/6   + enterococcus Bld  C/S > started ampicillin q8h, ID consulted,  R pigtail for effusion  2/8    Extubated  2/9  2/15 L pigtail effusion  2/17 PRBC   2/18 Tx 2 Diaz  2/19 thomas removed, trial void. Maintain left pigtail for significant output. Pt encouraged to ambulate. Supplemental o2 sat NC weaned to 2L. Blood cultures repeated.  2/20 VSS -Pulmonary consult - appreciated - duonebs ATC q6h & pulmicort bid initiated - ddimer drawn.  pt w/ hx negative LE dopplers   will order non con chest DT as pt has a loculated left effusion that will require tap at IR.  2/21 - NON con Chest CT done - multiple loculated Left pleural effusions w/ patchy consolidation R - rounds made w/ Dr. carl.  ID - consult called re: Ct - WBC 11 afebrile.  +PALMA.  unable to tolerate VQ scan this am.  will d/c bumex & start Torsemide 20mg po daily  d/c planning rehab when medically stable  2/22  Wound care consult leg wounds> shower w/ local skin care  2/23  SW drainage> on Dapto> as per ID will cover SWD  CXR this am   Tighter glycemic control  2/24 ID added cefapime SW drainage purulent, afebrile  2/25 S/p Sternal wound debridement and irrigation with removal of all 6 sternal wires. Purulence encountered and sent for culture. Closure with bilateral pectoralis muscle advancement flaps.  Post op zari for hypotension- weaned off.  Skin/wd  culture from 2/24 neg  Pt transferred to sdu  2/27 VSS   carlos d/c thomas d/c transfer to floor JPx2  followed  by Plastics  followed by ID on cefepime and daptomycin bc positive for E. faecalis; follow up bc 2/19 negative to date. Provigil daily in am  2/28 VSS; abx as per ID - d/c cefepime and continue dapto 500 iv qd as per Dr. Carrasco- pt will need picc line vs medel for 4 wks abx from date of SWD as per ID; bc 2/19 neg.  d/w h. renal medel vs cath- await decision, diuresis initiated for hypervolemia; hep sq for dvt prophylaxis, + hypoglycemia- endo following- humalog adjusted    Discharge planning- rehab next week   2/29 VSS, distal portion of MSI with small dehiscence and serous purulent drainage - recommended wound vac placement as per Plastics. Left KEI 80ml & Right KEI 80ml/24h. S/P Right PICC line placement for IV abx.   3/1 Wound Vac placed to sternal wound. Pt ambulated in hallway with rolling walker. Left KEI 50ml & Right KEI 110ml/24h. Plan for discharge to Rehab Mon/Tue.   3/2 Pending patient rehab selection. Maintain sternal wound vac placement and KEI drains. Patient lethargic after percocet. Will hold narcotics . Continue with Daptomycin x 4 weeks until 3/22/20  3/3 VSS - call to ID re: alternative antibiotic to Daptomycin - Dr. Carrasco to follow up . d/c to rehab   3/4  HD stable .  + cough--bedside swallowing eval + cough--recs NPO and MBS in am--insulin adjusted by Dr Matias.   Dw Dr Carrasco--daptomycin can be switched on Monday to another antibiotic thru 3/22  3/5 VSS - MBS - Per speech pathologist- no gross aspiration ?silent - placed on Dysphagia 3 diet w/ nectar thick liquids. d/w Dr. Mariscal-  As per ID - will switch antibiotic to Zyvoxx on Monday 3/9/2020 - d/c to rehab on Monday on zyvoxx until 3/22  3/6 VSS; continue diuresis; resume low dose bb; abx as per ID/ vac  3/7/2020 VSS rounds made w/ Dr. Mariscal- right KEI w/ minimal drainage - spoke w/ plastics - will d/c right KEI - d/c plan rehab - switch to Zyvoxx bid po on monday.  Discharge planning- await rehab placement

## 2020-03-07 NOTE — PROGRESS NOTE ADULT - SUBJECTIVE AND OBJECTIVE BOX
Chief complaint  Patient is a 64y old  Male who presents with a chief complaint of Chest pain (07 Mar 2020 07:08)   Review of systems  Patient in bed, looks comfortable, no fever, no hypoglycemia.    Labs and Fingersticks  CAPILLARY BLOOD GLUCOSE      POCT Blood Glucose.: 117 mg/dL (07 Mar 2020 08:12)  POCT Blood Glucose.: 129 mg/dL (06 Mar 2020 21:42)  POCT Blood Glucose.: 105 mg/dL (06 Mar 2020 17:02)  POCT Blood Glucose.: 173 mg/dL (06 Mar 2020 11:32)      Anion Gap, Serum: 14 (03-07 @ 07:06)  Anion Gap, Serum: 12 (03-06 @ 06:35)      Calcium, Total Serum: 8.7 (03-07 @ 07:06)  Calcium, Total Serum: 8.6 (03-06 @ 06:35)  Albumin, Serum: 2.4 <L> (03-07 @ 07:06)    Alanine Aminotransferase (ALT/SGPT): 52 <H> (03-07 @ 07:06)  Alkaline Phosphatase, Serum: 103 (03-07 @ 07:06)  Aspartate Aminotransferase (AST/SGOT): 32 (03-07 @ 07:06)        03-07    137  |  98  |  36<H>  ----------------------------<  109<H>  4.3   |  25  |  1.78<H>    Ca    8.7      07 Mar 2020 07:06    TPro  6.2  /  Alb  2.4<L>  /  TBili  0.3  /  DBili  x   /  AST  32  /  ALT  52<H>  /  AlkPhos  103  03-07                        8.4    9.19  )-----------( 274      ( 07 Mar 2020 07:13 )             28.0     Medications  MEDICATIONS  (STANDING):  ALBUTerol    90 MICROgram(s) HFA Inhaler 1 Puff(s) Inhalation every 4 hours  albuterol/ipratropium for Nebulization. 3 milliLiter(s) Nebulizer every 6 hours  aMIOdarone    Tablet 200 milliGRAM(s) Oral daily  amitriptyline 10 milliGRAM(s) Oral every 8 hours  artificial tears (preservative free) Ophthalmic Solution 1 Drop(s) Both EYES two times a day  aspirin enteric coated 81 milliGRAM(s) Oral daily  atorvastatin 40 milliGRAM(s) Oral at bedtime  benzonatate 100 milliGRAM(s) Oral every 8 hours  buDESOnide    Inhalation Suspension 0.5 milliGRAM(s) Inhalation two times a day  calcium acetate 667 milliGRAM(s) Oral three times a day with meals  chlorhexidine 2% Cloths 1 Application(s) Topical <User Schedule>  DAPTOmycin IVPB 500 milliGRAM(s) IV Intermittent every 24 hours  dextrose 5%. 1000 milliLiter(s) (50 mL/Hr) IV Continuous <Continuous>  dextrose 5%. 1000 milliLiter(s) (30 mL/Hr) IV Continuous <Continuous>  dextrose 50% Injectable 12.5 Gram(s) IV Push once  dextrose 50% Injectable 25 Gram(s) IV Push once  dextrose 50% Injectable 25 Gram(s) IV Push once  heparin  Injectable 5000 Unit(s) SubCutaneous every 8 hours  insulin glargine Injectable (LANTUS) 40 Unit(s) SubCutaneous at bedtime  insulin lispro (HumaLOG) corrective regimen sliding scale   SubCutaneous Before meals and at bedtime  insulin lispro Injectable (HumaLOG) 14 Unit(s) SubCutaneous three times a day with meals  melatonin 3 milliGRAM(s) Oral at bedtime  metoprolol tartrate 12.5 milliGRAM(s) Oral two times a day  modafinil 100 milliGRAM(s) Oral <User Schedule>  multivitamin 1 Tablet(s) Oral daily  mupirocin 2% Ointment 1 Application(s) Topical two times a day  pantoprazole    Tablet 40 milliGRAM(s) Oral before breakfast  polyethylene glycol 3350 17 Gram(s) Oral daily  senna 2 Tablet(s) Oral at bedtime  silver sulfADIAZINE 1% Cream 1 Application(s) Topical daily  sodium chloride 0.65% Nasal 1 Spray(s) Both Nostrils three times a day  spironolactone 25 milliGRAM(s) Oral daily  tiotropium 18 MICROgram(s) Capsule 1 Capsule(s) Inhalation daily  torsemide 20 milliGRAM(s) Oral daily      Physical Exam  General: Patient comfortable in bed  Vital Signs Last 12 Hrs  T(F): 97.7 (03-07-20 @ 05:08), Max: 97.7 (03-07-20 @ 05:08)  HR: 89 (03-07-20 @ 05:08) (89 - 89)  BP: 108/68 (03-07-20 @ 05:08) (108/68 - 108/68)  BP(mean): 81 (03-07-20 @ 05:08) (81 - 81)  RR: 18 (03-07-20 @ 05:08) (18 - 18)  SpO2: 94% (03-07-20 @ 05:08) (94% - 94%)  Neck: No palpable thyroid nodules.  CVS: S1S2, No murmurs  Respiratory: No wheezing, no crepitations  GI: Abdomen soft, bowel sounds positive  Musculoskeletal:  edema lower extremities.   Skin: No skin rashes, no ecchymosis    Diagnostics

## 2020-03-07 NOTE — PROGRESS NOTE ADULT - SUBJECTIVE AND OBJECTIVE BOX
CARDIOLOGY FOLLOW UP - Dr. Steel    CC oob, in chair       PHYSICAL EXAM:  T(C): 36.5 (03-07-20 @ 05:08), Max: 36.7 (03-06-20 @ 19:08)  HR: 89 (03-07-20 @ 05:08) (89 - 92)  BP: 108/68 (03-07-20 @ 05:08) (108/68 - 124/72)  RR: 18 (03-07-20 @ 05:08) (18 - 18)  SpO2: 94% (03-07-20 @ 05:08) (94% - 97%)  Wt(kg): --  I&O's Summary    06 Mar 2020 07:01  -  07 Mar 2020 07:00  --------------------------------------------------------  IN: 690 mL / OUT: 2935 mL / NET: -2245 mL      Appearance: Normal	  Cardiovascular: Normal S1 S2,RRR,   Respiratory: diminished   Gastrointestinal:  Soft, Non-tender, + BS	  Extremities: Normal range of motion, bl le  edema        MEDICATIONS  (STANDING):  ALBUTerol    90 MICROgram(s) HFA Inhaler 1 Puff(s) Inhalation every 4 hours  albuterol/ipratropium for Nebulization. 3 milliLiter(s) Nebulizer every 6 hours  aMIOdarone    Tablet 200 milliGRAM(s) Oral daily  amitriptyline 10 milliGRAM(s) Oral every 8 hours  artificial tears (preservative free) Ophthalmic Solution 1 Drop(s) Both EYES two times a day  aspirin enteric coated 81 milliGRAM(s) Oral daily  atorvastatin 40 milliGRAM(s) Oral at bedtime  benzonatate 100 milliGRAM(s) Oral every 8 hours  buDESOnide    Inhalation Suspension 0.5 milliGRAM(s) Inhalation two times a day  calcium acetate 667 milliGRAM(s) Oral three times a day with meals  chlorhexidine 2% Cloths 1 Application(s) Topical <User Schedule>  DAPTOmycin IVPB 500 milliGRAM(s) IV Intermittent every 24 hours  dextrose 5%. 1000 milliLiter(s) (50 mL/Hr) IV Continuous <Continuous>  dextrose 5%. 1000 milliLiter(s) (30 mL/Hr) IV Continuous <Continuous>  dextrose 50% Injectable 12.5 Gram(s) IV Push once  dextrose 50% Injectable 25 Gram(s) IV Push once  dextrose 50% Injectable 25 Gram(s) IV Push once  heparin  Injectable 5000 Unit(s) SubCutaneous every 8 hours  insulin glargine Injectable (LANTUS) 40 Unit(s) SubCutaneous at bedtime  insulin lispro (HumaLOG) corrective regimen sliding scale   SubCutaneous Before meals and at bedtime  insulin lispro Injectable (HumaLOG) 14 Unit(s) SubCutaneous three times a day with meals  melatonin 3 milliGRAM(s) Oral at bedtime  metoprolol tartrate 12.5 milliGRAM(s) Oral two times a day  modafinil 100 milliGRAM(s) Oral <User Schedule>  multivitamin 1 Tablet(s) Oral daily  mupirocin 2% Ointment 1 Application(s) Topical two times a day  pantoprazole    Tablet 40 milliGRAM(s) Oral before breakfast  polyethylene glycol 3350 17 Gram(s) Oral daily  senna 2 Tablet(s) Oral at bedtime  silver sulfADIAZINE 1% Cream 1 Application(s) Topical daily  sodium chloride 0.65% Nasal 1 Spray(s) Both Nostrils three times a day  spironolactone 25 milliGRAM(s) Oral daily  tiotropium 18 MICROgram(s) Capsule 1 Capsule(s) Inhalation daily  torsemide 20 milliGRAM(s) Oral daily      TELEMETRY: NSR 	    ECG:  	  RADIOLOGY:   DIAGNOSTIC TESTING:  [ ] Echocardiogram:  [ ]  Catheterization:  [ ] Stress Test:    OTHER: 	    LABS:	 	                                7.9    7.27  )-----------( 297      ( 06 Mar 2020 06:54 )             25.7     03-06    134<L>  |  97  |  34<H>  ----------------------------<  143<H>  4.8   |  25  |  1.59<H>    Ca    8.6      06 Mar 2020 06:35

## 2020-03-07 NOTE — PROGRESS NOTE ADULT - PROBLEM SELECTOR PLAN 6
2/25 s/p sternal wound debridement/flap with plastic surgery  ID and plastic surgery following  L KEI's as per plastic surgery - monitor output  RJP removed this am 3/6  Continue on IV Dapto 500mg daily  Wound vac to distal pole of MSI by PT

## 2020-03-07 NOTE — PROGRESS NOTE ADULT - PROBLEM SELECTOR PLAN 1
Continue ASA 81 daily, metoprolol 25 BID and atorvastatin 40 HS  resume lopressor 12.5 bid   Continue provigil 100 qd  C/W GI prophylaxis on protonix and DVT prophylaxis on SQ Lovenox.   Cough and deep breathe, Incentive Spirometry Q1h, Chest PT.  Ambulate 4x daily as tolerated and with PT.   Discharge planning- rehab when medically stable

## 2020-03-08 LAB
ALBUMIN SERPL ELPH-MCNC: 2.7 G/DL — LOW (ref 3.3–5)
ALP SERPL-CCNC: 103 U/L — SIGNIFICANT CHANGE UP (ref 40–120)
ALT FLD-CCNC: 50 U/L — HIGH (ref 10–45)
ANION GAP SERPL CALC-SCNC: 12 MMOL/L — SIGNIFICANT CHANGE UP (ref 5–17)
AST SERPL-CCNC: 28 U/L — SIGNIFICANT CHANGE UP (ref 10–40)
BILIRUB SERPL-MCNC: 0.2 MG/DL — SIGNIFICANT CHANGE UP (ref 0.2–1.2)
BUN SERPL-MCNC: 42 MG/DL — HIGH (ref 7–23)
CALCIUM SERPL-MCNC: 9 MG/DL — SIGNIFICANT CHANGE UP (ref 8.4–10.5)
CHLORIDE SERPL-SCNC: 97 MMOL/L — SIGNIFICANT CHANGE UP (ref 96–108)
CK SERPL-CCNC: 71 U/L — SIGNIFICANT CHANGE UP (ref 30–200)
CO2 SERPL-SCNC: 27 MMOL/L — SIGNIFICANT CHANGE UP (ref 22–31)
CREAT SERPL-MCNC: 2.09 MG/DL — HIGH (ref 0.5–1.3)
GLUCOSE BLDC GLUCOMTR-MCNC: 133 MG/DL — HIGH (ref 70–99)
GLUCOSE BLDC GLUCOMTR-MCNC: 136 MG/DL — HIGH (ref 70–99)
GLUCOSE BLDC GLUCOMTR-MCNC: 76 MG/DL — SIGNIFICANT CHANGE UP (ref 70–99)
GLUCOSE BLDC GLUCOMTR-MCNC: 99 MG/DL — SIGNIFICANT CHANGE UP (ref 70–99)
GLUCOSE SERPL-MCNC: 66 MG/DL — LOW (ref 70–99)
HCT VFR BLD CALC: 27.6 % — LOW (ref 39–50)
HGB BLD-MCNC: 8.4 G/DL — LOW (ref 13–17)
MCHC RBC-ENTMCNC: 26.8 PG — LOW (ref 27–34)
MCHC RBC-ENTMCNC: 30.4 GM/DL — LOW (ref 32–36)
MCV RBC AUTO: 87.9 FL — SIGNIFICANT CHANGE UP (ref 80–100)
NRBC # BLD: 0 /100 WBCS — SIGNIFICANT CHANGE UP (ref 0–0)
PLATELET # BLD AUTO: 258 K/UL — SIGNIFICANT CHANGE UP (ref 150–400)
POTASSIUM SERPL-MCNC: 4.4 MMOL/L — SIGNIFICANT CHANGE UP (ref 3.5–5.3)
POTASSIUM SERPL-SCNC: 4.4 MMOL/L — SIGNIFICANT CHANGE UP (ref 3.5–5.3)
PROT SERPL-MCNC: 6.3 G/DL — SIGNIFICANT CHANGE UP (ref 6–8.3)
RBC # BLD: 3.14 M/UL — LOW (ref 4.2–5.8)
RBC # FLD: 15.1 % — HIGH (ref 10.3–14.5)
SODIUM SERPL-SCNC: 136 MMOL/L — SIGNIFICANT CHANGE UP (ref 135–145)
WBC # BLD: 8.39 K/UL — SIGNIFICANT CHANGE UP (ref 3.8–10.5)
WBC # FLD AUTO: 8.39 K/UL — SIGNIFICANT CHANGE UP (ref 3.8–10.5)

## 2020-03-08 PROCEDURE — 99233 SBSQ HOSP IP/OBS HIGH 50: CPT | Mod: GC

## 2020-03-08 PROCEDURE — 99024 POSTOP FOLLOW-UP VISIT: CPT

## 2020-03-08 PROCEDURE — 71045 X-RAY EXAM CHEST 1 VIEW: CPT | Mod: 26

## 2020-03-08 RX ORDER — INSULIN LISPRO 100/ML
12 VIAL (ML) SUBCUTANEOUS
Refills: 0 | Status: DISCONTINUED | OUTPATIENT
Start: 2020-03-08 | End: 2020-03-09

## 2020-03-08 RX ORDER — INSULIN GLARGINE 100 [IU]/ML
34 INJECTION, SOLUTION SUBCUTANEOUS AT BEDTIME
Refills: 0 | Status: DISCONTINUED | OUTPATIENT
Start: 2020-03-08 | End: 2020-03-10

## 2020-03-08 RX ADMIN — OXYCODONE AND ACETAMINOPHEN 1 TABLET(S): 5; 325 TABLET ORAL at 00:02

## 2020-03-08 RX ADMIN — Medication 10 MILLIGRAM(S): at 21:54

## 2020-03-08 RX ADMIN — CHLORHEXIDINE GLUCONATE 1 APPLICATION(S): 213 SOLUTION TOPICAL at 10:24

## 2020-03-08 RX ADMIN — Medication 12 UNIT(S): at 08:26

## 2020-03-08 RX ADMIN — Medication 1 SPRAY(S): at 06:21

## 2020-03-08 RX ADMIN — Medication 0.5 MILLIGRAM(S): at 17:41

## 2020-03-08 RX ADMIN — HEPARIN SODIUM 5000 UNIT(S): 5000 INJECTION INTRAVENOUS; SUBCUTANEOUS at 06:15

## 2020-03-08 RX ADMIN — Medication 12.5 MILLIGRAM(S): at 06:17

## 2020-03-08 RX ADMIN — Medication 1 DROP(S): at 17:40

## 2020-03-08 RX ADMIN — Medication 12.5 MILLIGRAM(S): at 17:40

## 2020-03-08 RX ADMIN — Medication 200 MILLIGRAM(S): at 00:10

## 2020-03-08 RX ADMIN — ATORVASTATIN CALCIUM 40 MILLIGRAM(S): 80 TABLET, FILM COATED ORAL at 21:54

## 2020-03-08 RX ADMIN — Medication 100 MILLIGRAM(S): at 14:26

## 2020-03-08 RX ADMIN — Medication 667 MILLIGRAM(S): at 17:41

## 2020-03-08 RX ADMIN — HEPARIN SODIUM 5000 UNIT(S): 5000 INJECTION INTRAVENOUS; SUBCUTANEOUS at 21:55

## 2020-03-08 RX ADMIN — SPIRONOLACTONE 25 MILLIGRAM(S): 25 TABLET, FILM COATED ORAL at 06:21

## 2020-03-08 RX ADMIN — Medication 3 MILLILITER(S): at 12:42

## 2020-03-08 RX ADMIN — Medication 3 MILLILITER(S): at 07:23

## 2020-03-08 RX ADMIN — Medication 10 MILLIGRAM(S): at 14:25

## 2020-03-08 RX ADMIN — SENNA PLUS 2 TABLET(S): 8.6 TABLET ORAL at 21:56

## 2020-03-08 RX ADMIN — Medication 667 MILLIGRAM(S): at 08:26

## 2020-03-08 RX ADMIN — Medication 0.5 MILLIGRAM(S): at 07:24

## 2020-03-08 RX ADMIN — Medication 20 MILLIGRAM(S): at 06:21

## 2020-03-08 RX ADMIN — MUPIROCIN 1 APPLICATION(S): 20 OINTMENT TOPICAL at 06:17

## 2020-03-08 RX ADMIN — Medication 1 TABLET(S): at 12:43

## 2020-03-08 RX ADMIN — Medication 12 UNIT(S): at 17:40

## 2020-03-08 RX ADMIN — DAPTOMYCIN 120 MILLIGRAM(S): 500 INJECTION, POWDER, LYOPHILIZED, FOR SOLUTION INTRAVENOUS at 13:33

## 2020-03-08 RX ADMIN — OXYCODONE AND ACETAMINOPHEN 1 TABLET(S): 5; 325 TABLET ORAL at 00:47

## 2020-03-08 RX ADMIN — Medication 100 MILLIGRAM(S): at 21:55

## 2020-03-08 RX ADMIN — INSULIN GLARGINE 34 UNIT(S): 100 INJECTION, SOLUTION SUBCUTANEOUS at 21:55

## 2020-03-08 RX ADMIN — Medication 667 MILLIGRAM(S): at 12:44

## 2020-03-08 RX ADMIN — MODAFINIL 100 MILLIGRAM(S): 200 TABLET ORAL at 08:26

## 2020-03-08 RX ADMIN — Medication 1 APPLICATION(S): at 12:43

## 2020-03-08 RX ADMIN — POLYETHYLENE GLYCOL 3350 17 GRAM(S): 17 POWDER, FOR SOLUTION ORAL at 12:43

## 2020-03-08 RX ADMIN — Medication 100 MILLIGRAM(S): at 06:14

## 2020-03-08 RX ADMIN — AMIODARONE HYDROCHLORIDE 200 MILLIGRAM(S): 400 TABLET ORAL at 06:14

## 2020-03-08 RX ADMIN — Medication 12 UNIT(S): at 12:41

## 2020-03-08 RX ADMIN — HEPARIN SODIUM 5000 UNIT(S): 5000 INJECTION INTRAVENOUS; SUBCUTANEOUS at 14:26

## 2020-03-08 RX ADMIN — Medication 81 MILLIGRAM(S): at 12:42

## 2020-03-08 RX ADMIN — Medication 1 DROP(S): at 06:15

## 2020-03-08 RX ADMIN — Medication 3 MILLIGRAM(S): at 21:54

## 2020-03-08 RX ADMIN — Medication 10 MILLIGRAM(S): at 06:14

## 2020-03-08 RX ADMIN — PANTOPRAZOLE SODIUM 40 MILLIGRAM(S): 20 TABLET, DELAYED RELEASE ORAL at 06:17

## 2020-03-08 RX ADMIN — Medication 3 MILLILITER(S): at 17:41

## 2020-03-08 NOTE — PROGRESS NOTE ADULT - ASSESSMENT
Assessment  DMT2: 64y Male with DM T2 with hyperglycemia, A1C 9.2%, was on oral meds and insulin at home, now on basal bolus insulin, patient had hypoglycemic episode this AM, blood sugars now improving. Patient is eating meals on dysphagia diet, eats with inconsistent carb intake, appears comfortable.  Foot infection: On Tx, stable.  CAD: s/p CABG 2/3, on medications, no chest pain, stable, monitored.  HTN: Controlled,  on antihypertensive medications.  HLD: Controlled, on statin.  CKD: Monitor labs/BMP          Harper Matias MD  Cell: [phone]  Office: [phone] Assessment  DMT2: 64y Male with DM T2 with hyperglycemia, A1C 9.2%, was on oral meds and insulin at home, now on basal bolus insulin, patient had hypoglycemic episode this AM, blood sugars now improving. Patient is eating meals on dysphagia diet,  eats with inconsistent carb intake, appears comfortable.  Foot infection: On Tx, stable.  CAD: s/p CABG 2/3, on medications, no chest pain, stable, monitored.  HTN: Controlled,  on antihypertensive medications.  HLD: Controlled, on statin.  CKD: Monitor labs/BMP          Harper Matias MD  Cell: [phone]  Office: [phone]

## 2020-03-08 NOTE — PROGRESS NOTE ADULT - ATTENDING COMMENTS
as above-s/p debridement 2/25 of sternal area--cough due to dyphagia +/-TBM -now on D3 diet precautions.  multifactorial dyspnea-CAD s/p CABG, PEA arrest, atelectasis due to pain, pleural effusion L-pig tail out, bronchospasm, ?PE-O2 NC sat above 90%                     Pleural effusion-pig tail removed 2/20-CT chest NC-improved but still loculations on left-f/up 4-6 wks  CAD/CHF-diurese as cr allows-keep K/Mg above---diuretic dose hs been adjusted   atelectasis-pain control, incentive spirometry, acapella                    ? DVT/PE--s/p repeat venous dopplers-negative; VQ unable  bronchospasm-duoneb q 6, pulmicort .5 bid; add tessalon perles 200 q 8 and mucinex;  out pt PFTs              ID-daptomycin as per ID  snore-? osas--out pt SS  DVT/GI prophylaxis, PT, nutrition evaln

## 2020-03-08 NOTE — PROGRESS NOTE ADULT - SUBJECTIVE AND OBJECTIVE BOX
CC: no events, no cp/sob    TELEMETRY:     PHYSICAL EXAM:    T(C): 36.8 (03-08-20 @ 04:29), Max: 36.8 (03-08-20 @ 04:29)  HR: 91 (03-08-20 @ 04:29) (91 - 100)  BP: 106/68 (03-08-20 @ 04:29) (100/62 - 111/68)  RR: 18 (03-08-20 @ 04:29) (18 - 18)  SpO2: 96% (03-08-20 @ 04:29) (95% - 97%)  Wt(kg): --  I&O's Summary    07 Mar 2020 06:01  -  08 Mar 2020 07:00  --------------------------------------------------------  IN: 770 mL / OUT: 1407 mL / NET: -637 mL        Appearance: Normal	  Cardiovascular: Normal S1 S2,RRR, No JVD, No murmurs  Respiratory: Lungs clear to auscultation	  Gastrointestinal:  Soft, Non-tender, + BS	  Extremities: Normal range of motion, No clubbing, cyanosis or edema  Vascular: Peripheral pulses palpable 2+ bilaterally     LABS:	 	                          8.4    8.39  )-----------( 258      ( 08 Mar 2020 07:38 )             27.6     03-08    136  |  97  |  42<H>  ----------------------------<  66<L>  4.4   |  27  |  2.09<H>    Ca    9.0      08 Mar 2020 07:43    TPro  6.3  /  Alb  2.7<L>  /  TBili  0.2  /  DBili  x   /  AST  28  /  ALT  50<H>  /  AlkPhos  103  03-08          CARDIAC MARKERS:

## 2020-03-08 NOTE — PROGRESS NOTE ADULT - SUBJECTIVE AND OBJECTIVE BOX
CHIEF COMPLAINT: f/up sob, resp failure, pleural effusions, atelectasis-cough present-NP--andrew upon swallowing--better w/ swallow maneuvers    Interval Events: failed swallow study--on D3 diet precautions    REVIEW OF SYSTEMS:  Constitutional: No fevers or chills. No weight loss. + fatigue or generalized malaise.  Eyes: No itching or discharge from the eyes  ENT: No ear pain. No ear discharge. No nasal congestion. No post nasal drip. No epistaxis. No throat pain. No sore throat. No difficulty swallowing.   CV: No chest pain. No palpitations. No lightheadedness or dizziness.   Resp: No dyspnea at rest. + dyspnea on exertion. No orthopnea. No wheezing. + cough. No stridor. No sputum production. No chest pain with respiration.  GI: No nausea. No vomiting. No diarrhea.  MSK: No joint pain or pain in any extremities  Integumentary: No skin lesions. + pedal edema.  Neurological: No gross motor weakness. No sensory changes.  [+ ] All other systems negative  [ ] Unable to assess ROS because ________    OBJECTIVE:  ICU Vital Signs Last 24 Hrs  T(C): 36.8 (05 Mar 2020 19:21), Max: 36.9 (05 Mar 2020 05:00)  T(F): 98.3 (05 Mar 2020 19:21), Max: 98.4 (05 Mar 2020 05:00)  HR: 104 (05 Mar 2020 19:21) (91 - 104)  BP: 126/72 (05 Mar 2020 19:21) (110/66 - 142/83)  BP(mean): --  ABP: --  ABP(mean): --  RR: 18 (05 Mar 2020 19:21) (18 - 18)  SpO2: 93% (05 Mar 2020 19:21) (93% - 96%)        03-04 @ 07:01  -  03-05 @ 07:00  --------------------------------------------------------  IN: 1340 mL / OUT: 2454 mL / NET: -1114 mL    03-05 @ 07:01  -  03-06 @ 04:59  --------------------------------------------------------  IN: 490 mL / OUT: 1928 mL / NET: -1438 mL      CAPILLARY BLOOD GLUCOSE      POCT Blood Glucose.: 214 mg/dL (05 Mar 2020 21:33)      PHYSICAL EXAM: NAD in chair  General: Awake, alert, oriented X 3.   HEENT: Atraumatic, normocephalic.                 Mallampatti Grade 3                No nasal congestion.                No tonsillar or pharyngeal exudates.  Lymph Nodes: No palpable lymphadenopathy  Neck: No JVD. No carotid bruit.   Respiratory: abnormal chest expansion-reduced BS bases                         Normal percussion                         Normal and equal air entry                         No wheeze, rhonchi or rales.  Cardiovascular: S1 S2 normal. No murmurs, rubs or gallops.   Abdomen: Soft, non-tender, non-distended. No organomegaly. Normoactive bowel sounds.  Extremities: Warm to touch. Peripheral pulse palpable. + pedal edema.   Skin: No rashes or skin lesions  Neurological: Motor and sensory examination equal and normal in all four extremities.  Psychiatry: Appropriate mood and affect.    HOSPITAL MEDICATIONS:  MEDICATIONS  (STANDING):  ALBUTerol    90 MICROgram(s) HFA Inhaler 1 Puff(s) Inhalation every 4 hours  albuterol/ipratropium for Nebulization. 3 milliLiter(s) Nebulizer every 6 hours  aMIOdarone    Tablet 200 milliGRAM(s) Oral daily  amitriptyline 10 milliGRAM(s) Oral every 8 hours  artificial tears (preservative free) Ophthalmic Solution 1 Drop(s) Both EYES two times a day  aspirin enteric coated 81 milliGRAM(s) Oral daily  atorvastatin 40 milliGRAM(s) Oral at bedtime  benzonatate 100 milliGRAM(s) Oral every 8 hours  buDESOnide    Inhalation Suspension 0.5 milliGRAM(s) Inhalation two times a day  calcium acetate 667 milliGRAM(s) Oral three times a day with meals  chlorhexidine 2% Cloths 1 Application(s) Topical <User Schedule>  DAPTOmycin IVPB 500 milliGRAM(s) IV Intermittent every 24 hours  dextrose 5%. 1000 milliLiter(s) (50 mL/Hr) IV Continuous <Continuous>  dextrose 5%. 1000 milliLiter(s) (30 mL/Hr) IV Continuous <Continuous>  dextrose 50% Injectable 12.5 Gram(s) IV Push once  dextrose 50% Injectable 25 Gram(s) IV Push once  dextrose 50% Injectable 25 Gram(s) IV Push once  furosemide    Tablet 40 milliGRAM(s) Oral daily  heparin  Injectable 5000 Unit(s) SubCutaneous every 8 hours  insulin glargine Injectable (LANTUS) 30 Unit(s) SubCutaneous at bedtime  insulin lispro (HumaLOG) corrective regimen sliding scale   SubCutaneous Before meals and at bedtime  insulin lispro Injectable (HumaLOG) 10 Unit(s) SubCutaneous three times a day before meals  melatonin 3 milliGRAM(s) Oral at bedtime  modafinil 100 milliGRAM(s) Oral <User Schedule>  multivitamin 1 Tablet(s) Oral daily  mupirocin 2% Ointment 1 Application(s) Topical two times a day  pantoprazole    Tablet 40 milliGRAM(s) Oral before breakfast  polyethylene glycol 3350 17 Gram(s) Oral daily  senna 2 Tablet(s) Oral at bedtime  silver sulfADIAZINE 1% Cream 1 Application(s) Topical daily  sodium chloride 0.65% Nasal 1 Spray(s) Both Nostrils three times a day  spironolactone 25 milliGRAM(s) Oral daily  tiotropium 18 MICROgram(s) Capsule 1 Capsule(s) Inhalation daily    MEDICATIONS  (PRN):  acetaminophen   Tablet .. 650 milliGRAM(s) Oral every 6 hours PRN Mild Pain (1 - 3)  albuterol/ipratropium for Nebulization. 3 milliLiter(s) Nebulizer every 6 hours PRN Shortness of Breath and/or Wheezing  dextrose 40% Gel 15 Gram(s) Oral once PRN Blood Glucose LESS THAN 70 milliGRAM(s)/deciLiter  glucagon  Injectable 1 milliGRAM(s) IntraMuscular once PRN Glucose <70 milliGRAM(s)/deciLiter  guaiFENesin   Syrup  (Sugar-Free) 200 milliGRAM(s) Oral every 6 hours PRN Cough  oxycodone    5 mG/acetaminophen 325 mG 1 Tablet(s) Oral every 6 hours PRN Severe Pain (7 - 10)  sodium chloride 0.9% lock flush 10 milliLiter(s) IV Push every 1 hour PRN Pre/post blood products, medications, blood draw, and to maintain line patency      LABS:                        7.7    7.31  )-----------( 329      ( 05 Mar 2020 05:39 )             25.5     03-05    133<L>  |  96  |  35<H>  ----------------------------<  119<H>  4.6   |  24  |  1.71<H>    Ca    8.8      05 Mar 2020 05:39                MICROBIOLOGY:     RADIOLOGY:  [ ] Reviewed and interpreted by me    Point of Care Ultrasound Findings:    PFT:  < from: Xray Chest 1 View- PORTABLE-Urgent (03.08.20 @ 10:29) >    EXAM:  XR CHEST PORTABLE URGENT 1V                            PROCEDURE DATE:  03/08/2020            INTERPRETATION:    DATE OF STUDY: 3/8/2020    PRIOR:2/29/20    CLINICAL INDICATION: Cough; assess for effusion.    TECHNIQUE: portable chest.    FINDINGS/  IMPRESSION:   Right PICC line tip overlies SVC.  Mediastinal drain in situ.  Cardiac silhouette not well evaluated.  Interval decrease in partially loculated left pleural effusion.  Lungs are otherwise clear.  No pneumothorax.  No acute bony finding.  .    < end of copied text >  < from: CT Chest No Cont (02.20.20 @ 11:05) >  LOWER NECK: Redemonstrated calcifications within the thyroid gland.      IMPRESSION:     When compared with February 9, 2020, again seen is a left pleural effusion. However, the dependent component appears smaller than on previous exam. Several loculated components are now identified along the left lateral chest wall and along the paramediastinal surface. The largest is noted medially and measures approximately 9 x 4 cm.    Apparent nodule along the mediastinal pleural surface in the left apex without significant change from previous exam. This is seen on series 3 image 37 and measures 2.3 x 1.4 cm.    Respiratory motion is present. Patchy areas of consolidation in the right lung appear worse than on previous examination.    Postsurgical changes as above.    < end of copied text >    EKG:

## 2020-03-08 NOTE — PROGRESS NOTE ADULT - ASSESSMENT
65yo male with hx of HTN, DM II   s/p C4L on 2/3; PEA arrest on 2/6   + enterococcus Bld  C/S > started ampicillin q8h, ID consulted,  R pigtail for effusion  2/8    Extubated  2/9  2/15 L pigtail effusion  2/17 PRBC   2/18 Tx 2 Diaz  2/19 thomas removed, trial void. Maintain left pigtail for significant output. Pt encouraged to ambulate. Supplemental o2 sat NC weaned to 2L. Blood cultures repeated.  2/20 VSS -Pulmonary consult - appreciated - duonebs ATC q6h & pulmicort bid initiated - ddimer drawn.  pt w/ hx negative LE dopplers   will order non con chest DT as pt has a loculated left effusion that will require tap at IR.  2/21 - NON con Chest CT done - multiple loculated Left pleural effusions w/ patchy consolidation R - rounds made w/ Dr. carl.  ID - consult called re: Ct - WBC 11 afebrile.  +PALMA.  unable to tolerate VQ scan this am.  will d/c bumex & start Torsemide 20mg po daily  d/c planning rehab when medically stable  2/22  Wound care consult leg wounds> shower w/ local skin care  2/23  SW drainage> on Dapto> as per ID will cover SWD  CXR this am   Tighter glycemic control  2/24 ID added cefapime SW drainage purulent, afebrile  2/25 S/p Sternal wound debridement and irrigation with removal of all 6 sternal wires. Purulence encountered and sent for culture. Closure with bilateral pectoralis muscle advancement flaps.  Post op zari for hypotension- weaned off.  Skin/wd  culture from 2/24 neg  Pt transferred to sdu  2/27 VSS   carlos d/c thomas d/c transfer to floor JPx2  followed  by Plastics  followed by ID on cefepime and daptomycin bc positive for E. faecalis; follow up bc 2/19 negative to date. Provigil daily in am  2/28 VSS; abx as per ID - d/c cefepime and continue dapto 500 iv qd as per Dr. Carrasco- pt will need picc line vs medel for 4 wks abx from date of SWD as per ID; bc 2/19 neg.  d/w h. renal medel vs cath- await decision, diuresis initiated for hypervolemia; hep sq for dvt prophylaxis, + hypoglycemia- endo following- humalog adjusted    Discharge planning- rehab next week   2/29 VSS, distal portion of MSI with small dehiscence and serous purulent drainage - recommended wound vac placement as per Plastics. Left KEI 80ml & Right KEI 80ml/24h. S/P Right PICC line placement for IV abx.   3/1 Wound Vac placed to sternal wound. Pt ambulated in hallway with rolling walker. Left KEI 50ml & Right KEI 110ml/24h. Plan for discharge to Rehab Mon/Tue.   3/2 Pending patient rehab selection. Maintain sternal wound vac placement and KEI drains. Patient lethargic after percocet. Will hold narcotics . Continue with Daptomycin x 4 weeks until 3/22/20  3/3 VSS - call to ID re: alternative antibiotic to Daptomycin - Dr. Carrasco to follow up . d/c to rehab   3/4  HD stable .  + cough--bedside swallowing eval + cough--recs NPO and MBS in am--insulin adjusted by Dr Matias.   Dw Dr Carrasco--daptomycin can be switched on Monday to another antibiotic thru 3/22  3/5 VSS - MBS - Per speech pathologist- no gross aspiration ?silent - placed on Dysphagia 3 diet w/ nectar thick liquids. d/w Dr. Mariscal-  As per ID - will switch antibiotic to Zyvoxx on Monday 3/9/2020 - d/c to rehab on Monday on zyvoxx until 3/22  3/6 VSS; continue diuresis; resume low dose bb; abx as per ID/ vac  3/7/2020 VSS rounds made w/ Dr. Mariscal- right KEI w/ minimal drainage - spoke w/ plastics - will d/c right KEI - d/c plan rehab - switch to Zyvoxx bid po on monday.  Discharge planning- await rehab placement 63yo male with hx of HTN, DM II   s/p C4L on 2/3; PEA arrest on 2/6   + enterococcus Bld  C/S > started ampicillin q8h, ID consulted,  R pigtail for effusion  2/8    Extubated  2/9  2/15 L pigtail effusion  2/17 PRBC   2/18 Tx 2 Diaz  2/19 thomas removed, trial void. Maintain left pigtail for significant output. Pt encouraged to ambulate. Supplemental o2 sat NC weaned to 2L. Blood cultures repeated.  2/20 VSS -Pulmonary consult - appreciated - duonebs ATC q6h & pulmicort bid initiated - ddimer drawn.  pt w/ hx negative LE dopplers   will order non con chest DT as pt has a loculated left effusion that will require tap at IR.  2/21 - NON con Chest CT done - multiple loculated Left pleural effusions w/ patchy consolidation R - rounds made w/ Dr. carl.  ID - consult called re: Ct - WBC 11 afebrile.  +PALMA.  unable to tolerate VQ scan this am.  will d/c bumex & start Torsemide 20mg po daily  d/c planning rehab when medically stable  2/22  Wound care consult leg wounds> shower w/ local skin care  2/23  SW drainage> on Dapto> as per ID will cover SWD  CXR this am   Tighter glycemic control  2/24 ID added cefapime SW drainage purulent, afebrile  2/25 S/p Sternal wound debridement and irrigation with removal of all 6 sternal wires. Purulence encountered and sent for culture. Closure with bilateral pectoralis muscle advancement flaps.  Post op zari for hypotension- weaned off.  Skin/wd  culture from 2/24 neg  Pt transferred to sdu  2/27 VSS   carlos d/c thomas d/c transfer to floor JPx2  followed  by Plastics  followed by ID on cefepime and daptomycin bc positive for E. faecalis; follow up bc 2/19 negative to date. Provigil daily in am  2/28 VSS; abx as per ID - d/c cefepime and continue dapto 500 iv qd as per Dr. Carrasco- pt will need picc line vs medel for 4 wks abx from date of SWD as per ID; bc 2/19 neg.  d/w h. renal medel vs cath- await decision, diuresis initiated for hypervolemia; hep sq for dvt prophylaxis, + hypoglycemia- endo following- humalog adjusted    Discharge planning- rehab next week   2/29 VSS, distal portion of MSI with small dehiscence and serous purulent drainage - recommended wound vac placement as per Plastics. Left KEI 80ml & Right KEI 80ml/24h. S/P Right PICC line placement for IV abx.   3/1 Wound Vac placed to sternal wound. Pt ambulated in hallway with rolling walker. Left KEI 50ml & Right KEI 110ml/24h. Plan for discharge to Rehab Mon/Tue.   3/2 Pending patient rehab selection. Maintain sternal wound vac placement and KEI drains. Patient lethargic after percocet. Will hold narcotics . Continue with Daptomycin x 4 weeks until 3/22/20  3/3 VSS - call to ID re: alternative antibiotic to Daptomycin - Dr. Carrasco to follow up . d/c to rehab   3/4  HD stable .  + cough--bedside swallowing eval + cough--recs NPO and MBS in am--insulin adjusted by Dr Matias.   Dw Dr Carrasco--daptomycin can be switched on Monday to another antibiotic thru 3/22  3/5 VSS - MBS - Per speech pathologist- no gross aspiration ?silent - placed on Dysphagia 3 diet w/ nectar thick liquids. d/w Dr. Mariscal-  As per ID - will switch antibiotic to Zyvoxx on Monday 3/9/2020 - d/c to rehab on Monday on zyvoxx until 3/22  3/6 VSS; continue diuresis; resume low dose bb; abx as per ID/ vac  3/7/2020 VSS rounds made w/ Dr. Mariscal- right KEI w/ minimal drainage - spoke w/ plastics - will d/c right KEI - d/c plan rehab - switch to Zyvoxx bid po on monday.  3/8 VSS; vac change today + drainage noted at distal vac site; wbc 8 and pt afebrile; abx as per ID; cr 2.0- decrease torsemide 20 qd as per Dr. Mariscal   Discharge planning- await rehab placement

## 2020-03-08 NOTE — PROGRESS NOTE ADULT - PROBLEM SELECTOR PLAN 4
ID following  change to Zyvoxx on 3/9/2020   date of SWD as per ID; bc 2/19 neg   daily cbc   may switch to another antibiotic Monday 3/9 ID following  change to Zyvoxx on 3/9/2020   date of SWD as per ID; bc 2/19 neg   daily cbc   may switch to another antibiotic Monday 3/9  vac to sternum

## 2020-03-08 NOTE — PROGRESS NOTE ADULT - PROBLEM SELECTOR PLAN 1
Tight glycemic control necessary in the setting of wound infection.  Will decrease Lantus to 34u at bedtime.  Will decrease Humalog to 12u before each meal and continue Humalog correction scale coverage. Will continue monitoring FS and FU.  Patient counseled for compliance with consistent low carb diet, exercise.

## 2020-03-08 NOTE — PROGRESS NOTE ADULT - ASSESSMENT
65yo male with hx of HTN, DM II, presented to the ED with complaints of acute on chronic left sided exertional chest pain. Pt states he has been having left sided pressure and stabbing chest pain radiating to his sternum and right with activity for the past few months. Since admission- s/p cath with severe triple vessel disease, s/p CABG, post op course c/b brief witnessed PEA 2/6 likely hypoxic arrest, s/p intubation. ( CAD, s/p cath with severe triple vessel disease including lesions at the bifurcation of the LAD/diagonal and distal RCA/RPDA/RPL trifurcation.   -s/p CABG x 4, intraop kennedy ef 50%)--s/p ampicillin until 2/18 for enterococcus in blood, extubated 2/9, pigtail right 2/8 and left sided on 2/15-for effusion.  Remains sob-NO h/o resp issues prior to hospitalization.  ***Current sob-multifactorial-CAD/effusions, atelectasis due to pain, mild bronchospasm and debility.  ********************  2/21-slightly better, pig tail cath removed from left chest; CT chest done-loculated left effusion-slight better  2/24-BS issues-less sob-dapto/cefepime as per ID  2/25-for debridement of chest area-sternal wound  2/26-debridement completed--to CTU  2/2776-[id number] over all  2/28-chest pain still impacting breathing--plastics/renal endo f/up  2/29-no changes over night  3/1-cough present-has been off BD  3/2-cough present still  3/3-improved cough on amitriptyline  3/4-cough upon swallowing  3/5-no changes-more ambulation-for mbs  3/6-failed swallow study--to D3 diet precautions

## 2020-03-08 NOTE — PROGRESS NOTE ADULT - SUBJECTIVE AND OBJECTIVE BOX
Chief complaint  Patient is a 64y old  Male who presents with a chief complaint of Chest pain (08 Mar 2020 10:12)   Review of systems  Patient in bed, looks comfortable, had hypoglycemia.    Labs and Fingersticks  CAPILLARY BLOOD GLUCOSE      POCT Blood Glucose.: 136 mg/dL (08 Mar 2020 12:07)  POCT Blood Glucose.: 76 mg/dL (08 Mar 2020 08:07)  POCT Blood Glucose.: 89 mg/dL (07 Mar 2020 21:47)  POCT Blood Glucose.: 132 mg/dL (07 Mar 2020 18:37)  POCT Blood Glucose.: 99 mg/dL (07 Mar 2020 16:51)      Anion Gap, Serum: 12 (03-08 @ 07:43)  Anion Gap, Serum: 14 (03-07 @ 07:06)      Calcium, Total Serum: 9.0 (03-08 @ 07:43)  Calcium, Total Serum: 8.7 (03-07 @ 07:06)  Albumin, Serum: 2.7 <L> (03-08 @ 07:43)  Albumin, Serum: 2.4 <L> (03-07 @ 07:06)    Alanine Aminotransferase (ALT/SGPT): 50 <H> (03-08 @ 07:43)  Alanine Aminotransferase (ALT/SGPT): 52 <H> (03-07 @ 07:06)  Alkaline Phosphatase, Serum: 103 (03-08 @ 07:43)  Alkaline Phosphatase, Serum: 103 (03-07 @ 07:06)  Aspartate Aminotransferase (AST/SGOT): 28 (03-08 @ 07:43)  Aspartate Aminotransferase (AST/SGOT): 32 (03-07 @ 07:06)        03-08    136  |  97  |  42<H>  ----------------------------<  66<L>  4.4   |  27  |  2.09<H>    Ca    9.0      08 Mar 2020 07:43    TPro  6.3  /  Alb  2.7<L>  /  TBili  0.2  /  DBili  x   /  AST  28  /  ALT  50<H>  /  AlkPhos  103  03-08                        8.4    8.39  )-----------( 258      ( 08 Mar 2020 07:38 )             27.6     Medications  MEDICATIONS  (STANDING):  ALBUTerol    90 MICROgram(s) HFA Inhaler 1 Puff(s) Inhalation every 4 hours  albuterol/ipratropium for Nebulization. 3 milliLiter(s) Nebulizer every 6 hours  aMIOdarone    Tablet 200 milliGRAM(s) Oral daily  amitriptyline 10 milliGRAM(s) Oral every 8 hours  artificial tears (preservative free) Ophthalmic Solution 1 Drop(s) Both EYES two times a day  aspirin enteric coated 81 milliGRAM(s) Oral daily  atorvastatin 40 milliGRAM(s) Oral at bedtime  benzonatate 100 milliGRAM(s) Oral every 8 hours  buDESOnide    Inhalation Suspension 0.5 milliGRAM(s) Inhalation two times a day  calcium acetate 667 milliGRAM(s) Oral three times a day with meals  chlorhexidine 2% Cloths 1 Application(s) Topical <User Schedule>  DAPTOmycin IVPB 500 milliGRAM(s) IV Intermittent every 24 hours  dextrose 5%. 1000 milliLiter(s) (50 mL/Hr) IV Continuous <Continuous>  dextrose 5%. 1000 milliLiter(s) (30 mL/Hr) IV Continuous <Continuous>  dextrose 50% Injectable 12.5 Gram(s) IV Push once  dextrose 50% Injectable 25 Gram(s) IV Push once  dextrose 50% Injectable 25 Gram(s) IV Push once  heparin  Injectable 5000 Unit(s) SubCutaneous every 8 hours  insulin glargine Injectable (LANTUS) 34 Unit(s) SubCutaneous at bedtime  insulin lispro (HumaLOG) corrective regimen sliding scale   SubCutaneous Before meals and at bedtime  insulin lispro Injectable (HumaLOG) 12 Unit(s) SubCutaneous three times a day with meals  melatonin 3 milliGRAM(s) Oral at bedtime  metoprolol tartrate 12.5 milliGRAM(s) Oral two times a day  modafinil 100 milliGRAM(s) Oral <User Schedule>  multivitamin 1 Tablet(s) Oral daily  pantoprazole    Tablet 40 milliGRAM(s) Oral before breakfast  polyethylene glycol 3350 17 Gram(s) Oral daily  senna 2 Tablet(s) Oral at bedtime  silver sulfADIAZINE 1% Cream 1 Application(s) Topical daily  sodium chloride 0.65% Nasal 1 Spray(s) Both Nostrils three times a day  spironolactone 25 milliGRAM(s) Oral daily  tiotropium 18 MICROgram(s) Capsule 1 Capsule(s) Inhalation daily  torsemide 10 milliGRAM(s) Oral daily      Physical Exam  General: Patient comfortable in bed  Vital Signs Last 12 Hrs  T(F): 98.3 (03-08-20 @ 04:29), Max: 98.3 (03-08-20 @ 04:29)  HR: 91 (03-08-20 @ 04:29) (91 - 91)  BP: 106/68 (03-08-20 @ 04:29) (106/68 - 106/68)  BP(mean): --  RR: 18 (03-08-20 @ 04:29) (18 - 18)  SpO2: 96% (03-08-20 @ 04:29) (96% - 96%)  Neck: No palpable thyroid nodules.  CVS: S1S2, No murmurs  Respiratory: No wheezing, no crepitations  GI: Abdomen soft, bowel sounds positive  Musculoskeletal:  edema lower extremities.   Skin: No skin rashes, no ecchymosis    Diagnostics Chief complaint  Patient is a 64y old  Male who presents with a chief complaint  of Chest pain (08 Mar 2020 10:12)   Review of systems  Patient in bed, looks comfortable, had hypoglycemia.    Labs and Fingersticks  CAPILLARY BLOOD GLUCOSE      POCT Blood Glucose.: 136 mg/dL (08 Mar 2020 12:07)  POCT Blood Glucose.: 76 mg/dL (08 Mar 2020 08:07)  POCT Blood Glucose.: 89 mg/dL (07 Mar 2020 21:47)  POCT Blood Glucose.: 132 mg/dL (07 Mar 2020 18:37)  POCT Blood Glucose.: 99 mg/dL (07 Mar 2020 16:51)      Anion Gap, Serum: 12 (03-08 @ 07:43)  Anion Gap, Serum: 14 (03-07 @ 07:06)      Calcium, Total Serum: 9.0 (03-08 @ 07:43)  Calcium, Total Serum: 8.7 (03-07 @ 07:06)  Albumin, Serum: 2.7 <L> (03-08 @ 07:43)  Albumin, Serum: 2.4 <L> (03-07 @ 07:06)    Alanine Aminotransferase (ALT/SGPT): 50 <H> (03-08 @ 07:43)  Alanine Aminotransferase (ALT/SGPT): 52 <H> (03-07 @ 07:06)  Alkaline Phosphatase, Serum: 103 (03-08 @ 07:43)  Alkaline Phosphatase, Serum: 103 (03-07 @ 07:06)  Aspartate Aminotransferase (AST/SGOT): 28 (03-08 @ 07:43)  Aspartate Aminotransferase (AST/SGOT): 32 (03-07 @ 07:06)        03-08    136  |  97  |  42<H>  ----------------------------<  66<L>  4.4   |  27  |  2.09<H>    Ca    9.0      08 Mar 2020 07:43    TPro  6.3  /  Alb  2.7<L>  /  TBili  0.2  /  DBili  x   /  AST  28  /  ALT  50<H>  /  AlkPhos  103  03-08                        8.4    8.39  )-----------( 258      ( 08 Mar 2020 07:38 )             27.6     Medications  MEDICATIONS  (STANDING):  ALBUTerol    90 MICROgram(s) HFA Inhaler 1 Puff(s) Inhalation every 4 hours  albuterol/ipratropium for Nebulization. 3 milliLiter(s) Nebulizer every 6 hours  aMIOdarone    Tablet 200 milliGRAM(s) Oral daily  amitriptyline 10 milliGRAM(s) Oral every 8 hours  artificial tears (preservative free) Ophthalmic Solution 1 Drop(s) Both EYES two times a day  aspirin enteric coated 81 milliGRAM(s) Oral daily  atorvastatin 40 milliGRAM(s) Oral at bedtime  benzonatate 100 milliGRAM(s) Oral every 8 hours  buDESOnide    Inhalation Suspension 0.5 milliGRAM(s) Inhalation two times a day  calcium acetate 667 milliGRAM(s) Oral three times a day with meals  chlorhexidine 2% Cloths 1 Application(s) Topical <User Schedule>  DAPTOmycin IVPB 500 milliGRAM(s) IV Intermittent every 24 hours  dextrose 5%. 1000 milliLiter(s) (50 mL/Hr) IV Continuous <Continuous>  dextrose 5%. 1000 milliLiter(s) (30 mL/Hr) IV Continuous <Continuous>  dextrose 50% Injectable 12.5 Gram(s) IV Push once  dextrose 50% Injectable 25 Gram(s) IV Push once  dextrose 50% Injectable 25 Gram(s) IV Push once  heparin  Injectable 5000 Unit(s) SubCutaneous every 8 hours  insulin glargine Injectable (LANTUS) 34 Unit(s) SubCutaneous at bedtime  insulin lispro (HumaLOG) corrective regimen sliding scale   SubCutaneous Before meals and at bedtime  insulin lispro Injectable (HumaLOG) 12 Unit(s) SubCutaneous three times a day with meals  melatonin 3 milliGRAM(s) Oral at bedtime  metoprolol tartrate 12.5 milliGRAM(s) Oral two times a day  modafinil 100 milliGRAM(s) Oral <User Schedule>  multivitamin 1 Tablet(s) Oral daily  pantoprazole    Tablet 40 milliGRAM(s) Oral before breakfast  polyethylene glycol 3350 17 Gram(s) Oral daily  senna 2 Tablet(s) Oral at bedtime  silver sulfADIAZINE 1% Cream 1 Application(s) Topical daily  sodium chloride 0.65% Nasal 1 Spray(s) Both Nostrils three times a day  spironolactone 25 milliGRAM(s) Oral daily  tiotropium 18 MICROgram(s) Capsule 1 Capsule(s) Inhalation daily  torsemide 10 milliGRAM(s) Oral daily      Physical Exam  General: Patient comfortable in bed  Vital Signs Last 12 Hrs  T(F): 98.3 (03-08-20 @ 04:29), Max: 98.3 (03-08-20 @ 04:29)  HR: 91 (03-08-20 @ 04:29) (91 - 91)  BP: 106/68 (03-08-20 @ 04:29) (106/68 - 106/68)  BP(mean): --  RR: 18 (03-08-20 @ 04:29) (18 - 18)  SpO2: 96% (03-08-20 @ 04:29) (96% - 96%)  Neck: No palpable thyroid nodules.  CVS: S1S2, No murmurs  Respiratory: No wheezing, no crepitations  GI: Abdomen soft, bowel sounds positive  Musculoskeletal:  edema lower extremities.   Skin: No skin rashes, no ecchymosis    Diagnostics

## 2020-03-08 NOTE — PROGRESS NOTE ADULT - SUBJECTIVE AND OBJECTIVE BOX
VITAL SIGNS    Telemetry:    Vital Signs Last 24 Hrs  T(C): 36.8 (20 @ 04:29), Max: 36.8 (20 @ 04:29)  T(F): 98.3 (20 @ 04:29), Max: 98.3 (20 @ 04:29)  HR: 91 (20 @ 04:29) (91 - 100)  BP: 106/68 (20 @ 04:29) (100/62 - 111/68)  RR: 18 (20 @ 04:29) (18 - 18)  SpO2: 96% (20 @ 04:29) (95% - 97%)             @ 06:01  -   @ 07:00  --------------------------------------------------------  IN: 770 mL / OUT: 1407 mL / NET: -637 mL       Daily     Daily Weight in k.6 (08 Mar 2020 08:15)  Admit Wt: Drug Dosing Weight  Height (cm): 172.72 (2020 07:20)  Weight (kg): 81.1 (2020 07:20)  BMI (kg/m2): 27.2 (2020 07:20)  BSA (m2): 1.95 (2020 07:20)    Bilirubin Total, Serum: 0.2 mg/dL ( @ 07:43)    CAPILLARY BLOOD GLUCOSE      POCT Blood Glucose.: 76 mg/dL (08 Mar 2020 08:07)  POCT Blood Glucose.: 89 mg/dL (07 Mar 2020 21:47)  POCT Blood Glucose.: 132 mg/dL (07 Mar 2020 18:37)  POCT Blood Glucose.: 99 mg/dL (07 Mar 2020 16:51)  POCT Blood Glucose.: 88 mg/dL (07 Mar 2020 12:11)          acetaminophen   Tablet .. 650 milliGRAM(s) Oral every 6 hours PRN  ALBUTerol    90 MICROgram(s) HFA Inhaler 1 Puff(s) Inhalation every 4 hours  albuterol/ipratropium for Nebulization. 3 milliLiter(s) Nebulizer every 6 hours PRN  albuterol/ipratropium for Nebulization. 3 milliLiter(s) Nebulizer every 6 hours  aMIOdarone    Tablet 200 milliGRAM(s) Oral daily  amitriptyline 10 milliGRAM(s) Oral every 8 hours  artificial tears (preservative free) Ophthalmic Solution 1 Drop(s) Both EYES two times a day  aspirin enteric coated 81 milliGRAM(s) Oral daily  atorvastatin 40 milliGRAM(s) Oral at bedtime  benzonatate 100 milliGRAM(s) Oral every 8 hours  buDESOnide    Inhalation Suspension 0.5 milliGRAM(s) Inhalation two times a day  calcium acetate 667 milliGRAM(s) Oral three times a day with meals  chlorhexidine 2% Cloths 1 Application(s) Topical <User Schedule>  DAPTOmycin IVPB 500 milliGRAM(s) IV Intermittent every 24 hours  dextrose 40% Gel 15 Gram(s) Oral once PRN  dextrose 5%. 1000 milliLiter(s) IV Continuous <Continuous>  dextrose 5%. 1000 milliLiter(s) IV Continuous <Continuous>  dextrose 50% Injectable 12.5 Gram(s) IV Push once  dextrose 50% Injectable 25 Gram(s) IV Push once  dextrose 50% Injectable 25 Gram(s) IV Push once  glucagon  Injectable 1 milliGRAM(s) IntraMuscular once PRN  guaiFENesin   Syrup  (Sugar-Free) 200 milliGRAM(s) Oral every 6 hours PRN  heparin  Injectable 5000 Unit(s) SubCutaneous every 8 hours  insulin glargine Injectable (LANTUS) 34 Unit(s) SubCutaneous at bedtime  insulin lispro (HumaLOG) corrective regimen sliding scale   SubCutaneous Before meals and at bedtime  insulin lispro Injectable (HumaLOG) 12 Unit(s) SubCutaneous three times a day with meals  melatonin 3 milliGRAM(s) Oral at bedtime  metoprolol tartrate 12.5 milliGRAM(s) Oral two times a day  modafinil 100 milliGRAM(s) Oral <User Schedule>  multivitamin 1 Tablet(s) Oral daily  oxycodone    5 mG/acetaminophen 325 mG 1 Tablet(s) Oral every 6 hours PRN  pantoprazole    Tablet 40 milliGRAM(s) Oral before breakfast  polyethylene glycol 3350 17 Gram(s) Oral daily  senna 2 Tablet(s) Oral at bedtime  silver sulfADIAZINE 1% Cream 1 Application(s) Topical daily  sodium chloride 0.65% Nasal 1 Spray(s) Both Nostrils three times a day  sodium chloride 0.9% lock flush 10 milliLiter(s) IV Push every 1 hour PRN  spironolactone 25 milliGRAM(s) Oral daily  tiotropium 18 MICROgram(s) Capsule 1 Capsule(s) Inhalation daily  torsemide 10 milliGRAM(s) Oral daily  torsemide 10 milliGRAM(s) Oral daily      PHYSICAL EXAM    Subjective: "Hi.   Neurology: alert and oriented x 3, nonfocal, no gross deficits  CV : tele:  RSR  Sternal Wound :  CDI with dressing , Stable  Lungs: clear. RR easy, unlabored   Abdomen: soft, nontender, nondistended, positive bowel sounds, bowel movement   Neg N/V/D   :  pt voiding without difficulty   Extremities:   MOFFETT; edema, neg calf tenderness.   PPP bilaterally      PW:  Chest tubes: VITAL SIGNS    Telemetry:  rsr    Vital Signs Last 24 Hrs  T(C): 36.8 (20 @ 04:29), Max: 36.8 (20 @ 04:29)  T(F): 98.3 (20 @ 04:29), Max: 98.3 (20 @ 04:29)  HR: 91 (20 @ 04:29) (91 - 100)  BP: 106/68 (20 @ 04:29) (100/62 - 111/68)  RR: 18 (20 @ 04:29) (18 - 18)  SpO2: 96% (20 @ 04:29) (95% - 97%)             @ 06:01  -   @ 07:00  --------------------------------------------------------  IN: 770 mL / OUT: 1407 mL / NET: -637 mL       Daily     Daily Weight in k.6 (08 Mar 2020 08:15)  Admit Wt: Drug Dosing Weight  Height (cm): 172.72 (2020 07:20)  Weight (kg): 81.1 (2020 07:20)  BMI (kg/m2): 27.2 (2020 07:20)  BSA (m2): 1.95 (2020 07:20)    Bilirubin Total, Serum: 0.2 mg/dL ( @ 07:43)    CAPILLARY BLOOD GLUCOSE      POCT Blood Glucose.: 76 mg/dL (08 Mar 2020 08:07)  POCT Blood Glucose.: 89 mg/dL (07 Mar 2020 21:47)  POCT Blood Glucose.: 132 mg/dL (07 Mar 2020 18:37)  POCT Blood Glucose.: 99 mg/dL (07 Mar 2020 16:51)  POCT Blood Glucose.: 88 mg/dL (07 Mar 2020 12:11)          acetaminophen   Tablet .. 650 milliGRAM(s) Oral every 6 hours PRN  ALBUTerol    90 MICROgram(s) HFA Inhaler 1 Puff(s) Inhalation every 4 hours  albuterol/ipratropium for Nebulization. 3 milliLiter(s) Nebulizer every 6 hours PRN  albuterol/ipratropium for Nebulization. 3 milliLiter(s) Nebulizer every 6 hours  aMIOdarone    Tablet 200 milliGRAM(s) Oral daily  amitriptyline 10 milliGRAM(s) Oral every 8 hours  artificial tears (preservative free) Ophthalmic Solution 1 Drop(s) Both EYES two times a day  aspirin enteric coated 81 milliGRAM(s) Oral daily  atorvastatin 40 milliGRAM(s) Oral at bedtime  benzonatate 100 milliGRAM(s) Oral every 8 hours  buDESOnide    Inhalation Suspension 0.5 milliGRAM(s) Inhalation two times a day  calcium acetate 667 milliGRAM(s) Oral three times a day with meals  chlorhexidine 2% Cloths 1 Application(s) Topical <User Schedule>  DAPTOmycin IVPB 500 milliGRAM(s) IV Intermittent every 24 hours  dextrose 40% Gel 15 Gram(s) Oral once PRN  dextrose 5%. 1000 milliLiter(s) IV Continuous <Continuous>  dextrose 5%. 1000 milliLiter(s) IV Continuous <Continuous>  dextrose 50% Injectable 12.5 Gram(s) IV Push once  dextrose 50% Injectable 25 Gram(s) IV Push once  dextrose 50% Injectable 25 Gram(s) IV Push once  glucagon  Injectable 1 milliGRAM(s) IntraMuscular once PRN  guaiFENesin   Syrup  (Sugar-Free) 200 milliGRAM(s) Oral every 6 hours PRN  heparin  Injectable 5000 Unit(s) SubCutaneous every 8 hours  insulin glargine Injectable (LANTUS) 34 Unit(s) SubCutaneous at bedtime  insulin lispro (HumaLOG) corrective regimen sliding scale   SubCutaneous Before meals and at bedtime  insulin lispro Injectable (HumaLOG) 12 Unit(s) SubCutaneous three times a day with meals  melatonin 3 milliGRAM(s) Oral at bedtime  metoprolol tartrate 12.5 milliGRAM(s) Oral two times a day  modafinil 100 milliGRAM(s) Oral <User Schedule>  multivitamin 1 Tablet(s) Oral daily  oxycodone    5 mG/acetaminophen 325 mG 1 Tablet(s) Oral every 6 hours PRN  pantoprazole    Tablet 40 milliGRAM(s) Oral before breakfast  polyethylene glycol 3350 17 Gram(s) Oral daily  senna 2 Tablet(s) Oral at bedtime  silver sulfADIAZINE 1% Cream 1 Application(s) Topical daily  sodium chloride 0.65% Nasal 1 Spray(s) Both Nostrils three times a day  sodium chloride 0.9% lock flush 10 milliLiter(s) IV Push every 1 hour PRN  spironolactone 25 milliGRAM(s) Oral daily  tiotropium 18 MICROgram(s) Capsule 1 Capsule(s) Inhalation daily  torsemide 10 milliGRAM(s) Oral daily  torsemide 10 milliGRAM(s) Oral daily      PHYSICAL EXAM    Subjective: "Huh."   Neurology: alert and oriented x 3, nonfocal, no gross deficits  CV : tele:  RSR    Sternal Wound :  distal vac- + drainage noted under the VAC ;  1 KEI - SS- draining serous- sanguinous drainage   Lungs: clear diminished at the bases. RR easy, unlabored   Abdomen: soft, nontender, nondistended, positive bowel sounds,+ bowel movement;  Neg N/V/D   :  pt voiding without difficulty   Extremities:   MOFFETT; +2 LE edema, neg calf tenderness.   PPP bilaterally; + RLE dressing

## 2020-03-08 NOTE — PROGRESS NOTE ADULT - PROBLEM SELECTOR PLAN 3
loculated by CT scan  Continue lasix 40 and aldactone 25 daily  Strict I & Os, daily weight loculated by CT scan  Strict I & Os, daily weight  torsemide 10 qd

## 2020-03-08 NOTE — PROGRESS NOTE ADULT - PROBLEM SELECTOR PLAN 6
2/25 s/p sternal wound debridement/flap with plastic surgery  ID and plastic surgery following  L KEI's as per plastic surgery - monitor output  RJP removed this am 3/6  Continue on IV Dapto 500mg daily  Wound vac to distal pole of MSI by PT 2/25 s/p sternal wound debridement/flap with plastic surgery  ID and plastic surgery following  L KEI's as per plastic surgery - monitor output  RJP removed this am 3/6  Continue on IV Dapto 500mg daily  Wound vac to distal pole of MSI by PT- change by pt today 3/8

## 2020-03-09 LAB
ANION GAP SERPL CALC-SCNC: 12 MMOL/L — SIGNIFICANT CHANGE UP (ref 5–17)
BUN SERPL-MCNC: 40 MG/DL — HIGH (ref 7–23)
CALCIUM SERPL-MCNC: 8.9 MG/DL — SIGNIFICANT CHANGE UP (ref 8.4–10.5)
CHLORIDE SERPL-SCNC: 97 MMOL/L — SIGNIFICANT CHANGE UP (ref 96–108)
CO2 SERPL-SCNC: 27 MMOL/L — SIGNIFICANT CHANGE UP (ref 22–31)
CREAT SERPL-MCNC: 1.75 MG/DL — HIGH (ref 0.5–1.3)
GLUCOSE BLDC GLUCOMTR-MCNC: 103 MG/DL — HIGH (ref 70–99)
GLUCOSE BLDC GLUCOMTR-MCNC: 112 MG/DL — HIGH (ref 70–99)
GLUCOSE BLDC GLUCOMTR-MCNC: 146 MG/DL — HIGH (ref 70–99)
GLUCOSE BLDC GLUCOMTR-MCNC: 152 MG/DL — HIGH (ref 70–99)
GLUCOSE BLDC GLUCOMTR-MCNC: 196 MG/DL — HIGH (ref 70–99)
GLUCOSE BLDC GLUCOMTR-MCNC: 64 MG/DL — LOW (ref 70–99)
GLUCOSE BLDC GLUCOMTR-MCNC: 68 MG/DL — LOW (ref 70–99)
GLUCOSE SERPL-MCNC: 137 MG/DL — HIGH (ref 70–99)
HCT VFR BLD CALC: 28 % — LOW (ref 39–50)
HGB BLD-MCNC: 8.5 G/DL — LOW (ref 13–17)
MCHC RBC-ENTMCNC: 26.7 PG — LOW (ref 27–34)
MCHC RBC-ENTMCNC: 30.4 GM/DL — LOW (ref 32–36)
MCV RBC AUTO: 88.1 FL — SIGNIFICANT CHANGE UP (ref 80–100)
NRBC # BLD: 0 /100 WBCS — SIGNIFICANT CHANGE UP (ref 0–0)
PLATELET # BLD AUTO: 284 K/UL — SIGNIFICANT CHANGE UP (ref 150–400)
POTASSIUM SERPL-MCNC: 4.6 MMOL/L — SIGNIFICANT CHANGE UP (ref 3.5–5.3)
POTASSIUM SERPL-SCNC: 4.6 MMOL/L — SIGNIFICANT CHANGE UP (ref 3.5–5.3)
RBC # BLD: 3.18 M/UL — LOW (ref 4.2–5.8)
RBC # FLD: 15.3 % — HIGH (ref 10.3–14.5)
SODIUM SERPL-SCNC: 136 MMOL/L — SIGNIFICANT CHANGE UP (ref 135–145)
WBC # BLD: 8.85 K/UL — SIGNIFICANT CHANGE UP (ref 3.8–10.5)
WBC # FLD AUTO: 8.85 K/UL — SIGNIFICANT CHANGE UP (ref 3.8–10.5)

## 2020-03-09 PROCEDURE — 99232 SBSQ HOSP IP/OBS MODERATE 35: CPT

## 2020-03-09 PROCEDURE — 99024 POSTOP FOLLOW-UP VISIT: CPT

## 2020-03-09 RX ORDER — INSULIN LISPRO 100/ML
8 VIAL (ML) SUBCUTANEOUS
Refills: 0 | Status: DISCONTINUED | OUTPATIENT
Start: 2020-03-09 | End: 2020-03-10

## 2020-03-09 RX ORDER — LINEZOLID 600 MG/300ML
600 INJECTION, SOLUTION INTRAVENOUS EVERY 12 HOURS
Refills: 0 | Status: DISCONTINUED | OUTPATIENT
Start: 2020-03-09 | End: 2020-03-16

## 2020-03-09 RX ADMIN — Medication 3 MILLILITER(S): at 23:12

## 2020-03-09 RX ADMIN — Medication 667 MILLIGRAM(S): at 17:50

## 2020-03-09 RX ADMIN — Medication 100 MILLIGRAM(S): at 14:50

## 2020-03-09 RX ADMIN — OXYCODONE AND ACETAMINOPHEN 1 TABLET(S): 5; 325 TABLET ORAL at 06:30

## 2020-03-09 RX ADMIN — OXYCODONE AND ACETAMINOPHEN 1 TABLET(S): 5; 325 TABLET ORAL at 06:01

## 2020-03-09 RX ADMIN — Medication 8 UNIT(S): at 17:49

## 2020-03-09 RX ADMIN — Medication 12.5 MILLIGRAM(S): at 17:08

## 2020-03-09 RX ADMIN — Medication 3 MILLILITER(S): at 01:47

## 2020-03-09 RX ADMIN — AMIODARONE HYDROCHLORIDE 200 MILLIGRAM(S): 400 TABLET ORAL at 06:00

## 2020-03-09 RX ADMIN — Medication 1 DROP(S): at 17:10

## 2020-03-09 RX ADMIN — MODAFINIL 100 MILLIGRAM(S): 200 TABLET ORAL at 08:06

## 2020-03-09 RX ADMIN — Medication 12 UNIT(S): at 08:05

## 2020-03-09 RX ADMIN — SPIRONOLACTONE 25 MILLIGRAM(S): 25 TABLET, FILM COATED ORAL at 06:04

## 2020-03-09 RX ADMIN — Medication 1 TABLET(S): at 12:26

## 2020-03-09 RX ADMIN — CHLORHEXIDINE GLUCONATE 1 APPLICATION(S): 213 SOLUTION TOPICAL at 08:06

## 2020-03-09 RX ADMIN — Medication 1 DROP(S): at 06:52

## 2020-03-09 RX ADMIN — Medication 81 MILLIGRAM(S): at 12:26

## 2020-03-09 RX ADMIN — INSULIN GLARGINE 34 UNIT(S): 100 INJECTION, SOLUTION SUBCUTANEOUS at 21:51

## 2020-03-09 RX ADMIN — ATORVASTATIN CALCIUM 40 MILLIGRAM(S): 80 TABLET, FILM COATED ORAL at 21:42

## 2020-03-09 RX ADMIN — Medication 3 MILLILITER(S): at 06:02

## 2020-03-09 RX ADMIN — Medication 10 MILLIGRAM(S): at 06:00

## 2020-03-09 RX ADMIN — PANTOPRAZOLE SODIUM 40 MILLIGRAM(S): 20 TABLET, DELAYED RELEASE ORAL at 06:52

## 2020-03-09 RX ADMIN — Medication 3 MILLILITER(S): at 17:09

## 2020-03-09 RX ADMIN — Medication 0.5 MILLIGRAM(S): at 17:09

## 2020-03-09 RX ADMIN — Medication 0.5 MILLIGRAM(S): at 06:02

## 2020-03-09 RX ADMIN — Medication 100 MILLIGRAM(S): at 06:00

## 2020-03-09 RX ADMIN — Medication 12.5 MILLIGRAM(S): at 06:00

## 2020-03-09 RX ADMIN — Medication 3 MILLILITER(S): at 12:26

## 2020-03-09 RX ADMIN — SENNA PLUS 2 TABLET(S): 8.6 TABLET ORAL at 21:42

## 2020-03-09 RX ADMIN — Medication 1: at 21:50

## 2020-03-09 RX ADMIN — Medication 1: at 17:50

## 2020-03-09 RX ADMIN — HEPARIN SODIUM 5000 UNIT(S): 5000 INJECTION INTRAVENOUS; SUBCUTANEOUS at 14:49

## 2020-03-09 RX ADMIN — HEPARIN SODIUM 5000 UNIT(S): 5000 INJECTION INTRAVENOUS; SUBCUTANEOUS at 06:02

## 2020-03-09 RX ADMIN — Medication 3 MILLIGRAM(S): at 21:42

## 2020-03-09 RX ADMIN — HEPARIN SODIUM 5000 UNIT(S): 5000 INJECTION INTRAVENOUS; SUBCUTANEOUS at 21:43

## 2020-03-09 RX ADMIN — Medication 1 SPRAY(S): at 14:49

## 2020-03-09 RX ADMIN — Medication 667 MILLIGRAM(S): at 12:26

## 2020-03-09 RX ADMIN — Medication 1 APPLICATION(S): at 12:26

## 2020-03-09 RX ADMIN — Medication 667 MILLIGRAM(S): at 08:05

## 2020-03-09 RX ADMIN — Medication 200 MILLIGRAM(S): at 21:50

## 2020-03-09 RX ADMIN — LINEZOLID 600 MILLIGRAM(S): 600 INJECTION, SOLUTION INTRAVENOUS at 17:09

## 2020-03-09 RX ADMIN — Medication 100 MILLIGRAM(S): at 21:43

## 2020-03-09 RX ADMIN — Medication 10 MILLIGRAM(S): at 06:04

## 2020-03-09 NOTE — PROGRESS NOTE ADULT - PROBLEM SELECTOR PLAN 6
2/25 s/p sternal wound debridement/flap with plastic surgery  ID and plastic surgery following  L KEI's as per plastic surgery - monitor output  RJP removed this am 3/6  Continue on IV Dapto 500mg daily  Wound vac to distal pole of MSI by PT- change by pt today 3/8 2/25 s/p sternal wound debridement/flap with plastic surgery  ID and plastic surgery following  L KEI's as per plastic surgery - monitor output  RJP removed this am 3/6  po zyvox 600 mg po q12   Wound vac to distal pole of MSI by PT- change by pt today 3/8

## 2020-03-09 NOTE — PROGRESS NOTE ADULT - SUBJECTIVE AND OBJECTIVE BOX
Chief complaint  Patient is a 64y old  Male who presents with a chief complaint of Chest pain (09 Mar 2020 11:47)   Review of systems  Patient ambulating with help, looks comfortable, had hypoglycemia.    Labs and Fingersticks  CAPILLARY BLOOD GLUCOSE      POCT Blood Glucose.: 112 mg/dL (09 Mar 2020 13:15)  POCT Blood Glucose.: 103 mg/dL (09 Mar 2020 12:46)  POCT Blood Glucose.: 68 mg/dL (09 Mar 2020 12:20)  POCT Blood Glucose.: 64 mg/dL (09 Mar 2020 11:59)  POCT Blood Glucose.: 146 mg/dL (09 Mar 2020 07:57)  POCT Blood Glucose.: 99 mg/dL (08 Mar 2020 21:53)  POCT Blood Glucose.: 133 mg/dL (08 Mar 2020 17:28)      Anion Gap, Serum: 12 (03-09 @ 06:31)  Anion Gap, Serum: 12 (03-08 @ 07:43)      Calcium, Total Serum: 8.9 (03-09 @ 06:31)  Calcium, Total Serum: 9.0 (03-08 @ 07:43)  Albumin, Serum: 2.7 <L> (03-08 @ 07:43)    Alanine Aminotransferase (ALT/SGPT): 50 <H> (03-08 @ 07:43)  Alkaline Phosphatase, Serum: 103 (03-08 @ 07:43)  Aspartate Aminotransferase (AST/SGOT): 28 (03-08 @ 07:43)        03-09    136  |  97  |  40<H>  ----------------------------<  137<H>  4.6   |  27  |  1.75<H>    Ca    8.9      09 Mar 2020 06:31    TPro  6.3  /  Alb  2.7<L>  /  TBili  0.2  /  DBili  x   /  AST  28  /  ALT  50<H>  /  AlkPhos  103  03-08                        8.5    8.85  )-----------( 284      ( 09 Mar 2020 06:31 )             28.0     Medications  MEDICATIONS  (STANDING):  ALBUTerol    90 MICROgram(s) HFA Inhaler 1 Puff(s) Inhalation every 4 hours  albuterol/ipratropium for Nebulization. 3 milliLiter(s) Nebulizer every 6 hours  aMIOdarone    Tablet 200 milliGRAM(s) Oral daily  artificial tears (preservative free) Ophthalmic Solution 1 Drop(s) Both EYES two times a day  aspirin enteric coated 81 milliGRAM(s) Oral daily  atorvastatin 40 milliGRAM(s) Oral at bedtime  benzonatate 100 milliGRAM(s) Oral every 8 hours  buDESOnide    Inhalation Suspension 0.5 milliGRAM(s) Inhalation two times a day  calcium acetate 667 milliGRAM(s) Oral three times a day with meals  chlorhexidine 2% Cloths 1 Application(s) Topical <User Schedule>  dextrose 5%. 1000 milliLiter(s) (50 mL/Hr) IV Continuous <Continuous>  dextrose 5%. 1000 milliLiter(s) (30 mL/Hr) IV Continuous <Continuous>  dextrose 50% Injectable 12.5 Gram(s) IV Push once  dextrose 50% Injectable 25 Gram(s) IV Push once  dextrose 50% Injectable 25 Gram(s) IV Push once  heparin  Injectable 5000 Unit(s) SubCutaneous every 8 hours  insulin glargine Injectable (LANTUS) 34 Unit(s) SubCutaneous at bedtime  insulin lispro (HumaLOG) corrective regimen sliding scale   SubCutaneous Before meals and at bedtime  insulin lispro Injectable (HumaLOG) 8 Unit(s) SubCutaneous three times a day with meals  linezolid    Tablet 600 milliGRAM(s) Oral every 12 hours  melatonin 3 milliGRAM(s) Oral at bedtime  metoprolol tartrate 12.5 milliGRAM(s) Oral two times a day  modafinil 100 milliGRAM(s) Oral <User Schedule>  multivitamin 1 Tablet(s) Oral daily  pantoprazole    Tablet 40 milliGRAM(s) Oral before breakfast  polyethylene glycol 3350 17 Gram(s) Oral daily  senna 2 Tablet(s) Oral at bedtime  silver sulfADIAZINE 1% Cream 1 Application(s) Topical daily  sodium chloride 0.65% Nasal 1 Spray(s) Both Nostrils three times a day  spironolactone 25 milliGRAM(s) Oral daily  tiotropium 18 MICROgram(s) Capsule 1 Capsule(s) Inhalation daily  torsemide 10 milliGRAM(s) Oral daily      Physical Exam  General: Patient comfortable in bed  Vital Signs Last 12 Hrs  T(F): 97.6 (03-09-20 @ 12:23), Max: 97.7 (03-09-20 @ 04:37)  HR: 98 (03-09-20 @ 12:23) (98 - 98)  BP: 142/79 (03-09-20 @ 12:23) (129/76 - 142/79)  BP(mean): --  RR: 18 (03-09-20 @ 12:23) (18 - 18)  SpO2: 98% (03-09-20 @ 12:23) (96% - 98%)  Neck: No palpable thyroid nodules.  CVS: S1S2, No murmurs  Respiratory: No wheezing, no crepitations  GI: Abdomen soft, bowel sounds positive  Musculoskeletal:  edema lower extremities.   Skin: No skin rashes, no ecchymosis    Diagnostics Chief complaint  Patient is a 64y old  Male who presents with a chief complaint of Chest pain (09 Mar 2020 11:47)   Review of systems  Patient ambulating with help, looks comfortable,  had hypoglycemia.    Labs and Fingersticks  CAPILLARY BLOOD GLUCOSE      POCT Blood Glucose.: 112 mg/dL (09 Mar 2020 13:15)  POCT Blood Glucose.: 103 mg/dL (09 Mar 2020 12:46)  POCT Blood Glucose.: 68 mg/dL (09 Mar 2020 12:20)  POCT Blood Glucose.: 64 mg/dL (09 Mar 2020 11:59)  POCT Blood Glucose.: 146 mg/dL (09 Mar 2020 07:57)  POCT Blood Glucose.: 99 mg/dL (08 Mar 2020 21:53)  POCT Blood Glucose.: 133 mg/dL (08 Mar 2020 17:28)      Anion Gap, Serum: 12 (03-09 @ 06:31)  Anion Gap, Serum: 12 (03-08 @ 07:43)      Calcium, Total Serum: 8.9 (03-09 @ 06:31)  Calcium, Total Serum: 9.0 (03-08 @ 07:43)  Albumin, Serum: 2.7 <L> (03-08 @ 07:43)    Alanine Aminotransferase (ALT/SGPT): 50 <H> (03-08 @ 07:43)  Alkaline Phosphatase, Serum: 103 (03-08 @ 07:43)  Aspartate Aminotransferase (AST/SGOT): 28 (03-08 @ 07:43)        03-09    136  |  97  |  40<H>  ----------------------------<  137<H>  4.6   |  27  |  1.75<H>    Ca    8.9      09 Mar 2020 06:31    TPro  6.3  /  Alb  2.7<L>  /  TBili  0.2  /  DBili  x   /  AST  28  /  ALT  50<H>  /  AlkPhos  103  03-08                        8.5    8.85  )-----------( 284      ( 09 Mar 2020 06:31 )             28.0     Medications  MEDICATIONS  (STANDING):  ALBUTerol    90 MICROgram(s) HFA Inhaler 1 Puff(s) Inhalation every 4 hours  albuterol/ipratropium for Nebulization. 3 milliLiter(s) Nebulizer every 6 hours  aMIOdarone    Tablet 200 milliGRAM(s) Oral daily  artificial tears (preservative free) Ophthalmic Solution 1 Drop(s) Both EYES two times a day  aspirin enteric coated 81 milliGRAM(s) Oral daily  atorvastatin 40 milliGRAM(s) Oral at bedtime  benzonatate 100 milliGRAM(s) Oral every 8 hours  buDESOnide    Inhalation Suspension 0.5 milliGRAM(s) Inhalation two times a day  calcium acetate 667 milliGRAM(s) Oral three times a day with meals  chlorhexidine 2% Cloths 1 Application(s) Topical <User Schedule>  dextrose 5%. 1000 milliLiter(s) (50 mL/Hr) IV Continuous <Continuous>  dextrose 5%. 1000 milliLiter(s) (30 mL/Hr) IV Continuous <Continuous>  dextrose 50% Injectable 12.5 Gram(s) IV Push once  dextrose 50% Injectable 25 Gram(s) IV Push once  dextrose 50% Injectable 25 Gram(s) IV Push once  heparin  Injectable 5000 Unit(s) SubCutaneous every 8 hours  insulin glargine Injectable (LANTUS) 34 Unit(s) SubCutaneous at bedtime  insulin lispro (HumaLOG) corrective regimen sliding scale   SubCutaneous Before meals and at bedtime  insulin lispro Injectable (HumaLOG) 8 Unit(s) SubCutaneous three times a day with meals  linezolid    Tablet 600 milliGRAM(s) Oral every 12 hours  melatonin 3 milliGRAM(s) Oral at bedtime  metoprolol tartrate 12.5 milliGRAM(s) Oral two times a day  modafinil 100 milliGRAM(s) Oral <User Schedule>  multivitamin 1 Tablet(s) Oral daily  pantoprazole    Tablet 40 milliGRAM(s) Oral before breakfast  polyethylene glycol 3350 17 Gram(s) Oral daily  senna 2 Tablet(s) Oral at bedtime  silver sulfADIAZINE 1% Cream 1 Application(s) Topical daily  sodium chloride 0.65% Nasal 1 Spray(s) Both Nostrils three times a day  spironolactone 25 milliGRAM(s) Oral daily  tiotropium 18 MICROgram(s) Capsule 1 Capsule(s) Inhalation daily  torsemide 10 milliGRAM(s) Oral daily      Physical Exam  General: Patient comfortable in bed  Vital Signs Last 12 Hrs  T(F): 97.6 (03-09-20 @ 12:23), Max: 97.7 (03-09-20 @ 04:37)  HR: 98 (03-09-20 @ 12:23) (98 - 98)  BP: 142/79 (03-09-20 @ 12:23) (129/76 - 142/79)  BP(mean): --  RR: 18 (03-09-20 @ 12:23) (18 - 18)  SpO2: 98% (03-09-20 @ 12:23) (96% - 98%)  Neck: No palpable thyroid nodules.  CVS: S1S2, No murmurs  Respiratory: No wheezing, no crepitations  GI: Abdomen soft, bowel sounds positive  Musculoskeletal:  edema lower extremities.   Skin: No skin rashes, no ecchymosis    Diagnostics

## 2020-03-09 NOTE — PROGRESS NOTE ADULT - SUBJECTIVE AND OBJECTIVE BOX
infectious diseases progress note:    Patient is a 64y old  Male who presents with a chief complaint of Chest pain (09 Mar 2020 07:28)        Acute ischemic heart disease           Allergies    No Known Allergies    Intolerances        ANTIBIOTICS/RELEVANT:  antimicrobials  DAPTOmycin IVPB 500 milliGRAM(s) IV Intermittent every 24 hours    immunologic:    OTHER:  acetaminophen   Tablet .. 650 milliGRAM(s) Oral every 6 hours PRN  ALBUTerol    90 MICROgram(s) HFA Inhaler 1 Puff(s) Inhalation every 4 hours  albuterol/ipratropium for Nebulization. 3 milliLiter(s) Nebulizer every 6 hours PRN  albuterol/ipratropium for Nebulization. 3 milliLiter(s) Nebulizer every 6 hours  aMIOdarone    Tablet 200 milliGRAM(s) Oral daily  amitriptyline 10 milliGRAM(s) Oral every 8 hours  artificial tears (preservative free) Ophthalmic Solution 1 Drop(s) Both EYES two times a day  aspirin enteric coated 81 milliGRAM(s) Oral daily  atorvastatin 40 milliGRAM(s) Oral at bedtime  benzonatate 100 milliGRAM(s) Oral every 8 hours  buDESOnide    Inhalation Suspension 0.5 milliGRAM(s) Inhalation two times a day  calcium acetate 667 milliGRAM(s) Oral three times a day with meals  chlorhexidine 2% Cloths 1 Application(s) Topical <User Schedule>  dextrose 40% Gel 15 Gram(s) Oral once PRN  dextrose 5%. 1000 milliLiter(s) IV Continuous <Continuous>  dextrose 5%. 1000 milliLiter(s) IV Continuous <Continuous>  dextrose 50% Injectable 12.5 Gram(s) IV Push once  dextrose 50% Injectable 25 Gram(s) IV Push once  dextrose 50% Injectable 25 Gram(s) IV Push once  glucagon  Injectable 1 milliGRAM(s) IntraMuscular once PRN  guaiFENesin   Syrup  (Sugar-Free) 200 milliGRAM(s) Oral every 6 hours PRN  heparin  Injectable 5000 Unit(s) SubCutaneous every 8 hours  insulin glargine Injectable (LANTUS) 34 Unit(s) SubCutaneous at bedtime  insulin lispro (HumaLOG) corrective regimen sliding scale   SubCutaneous Before meals and at bedtime  insulin lispro Injectable (HumaLOG) 12 Unit(s) SubCutaneous three times a day with meals  melatonin 3 milliGRAM(s) Oral at bedtime  metoprolol tartrate 12.5 milliGRAM(s) Oral two times a day  modafinil 100 milliGRAM(s) Oral <User Schedule>  multivitamin 1 Tablet(s) Oral daily  oxycodone    5 mG/acetaminophen 325 mG 1 Tablet(s) Oral every 6 hours PRN  pantoprazole    Tablet 40 milliGRAM(s) Oral before breakfast  polyethylene glycol 3350 17 Gram(s) Oral daily  senna 2 Tablet(s) Oral at bedtime  silver sulfADIAZINE 1% Cream 1 Application(s) Topical daily  sodium chloride 0.65% Nasal 1 Spray(s) Both Nostrils three times a day  sodium chloride 0.9% lock flush 10 milliLiter(s) IV Push every 1 hour PRN  spironolactone 25 milliGRAM(s) Oral daily  tiotropium 18 MICROgram(s) Capsule 1 Capsule(s) Inhalation daily  torsemide 10 milliGRAM(s) Oral daily      Objective:  Vital Signs Last 24 Hrs  T(C): 36.5 (09 Mar 2020 04:37), Max: 36.8 (08 Mar 2020 20:23)  T(F): 97.7 (09 Mar 2020 04:37), Max: 98.3 (08 Mar 2020 20:23)  HR: 98 (09 Mar 2020 04:37) (98 - 101)  BP: 129/76 (09 Mar 2020 04:37) (113/70 - 129/76)  BP(mean): --  RR: 18 (09 Mar 2020 04:37) (18 - 18)  SpO2: 96% (09 Mar 2020 04:37) (95% - 96%)    PHYSICAL EXAM:     Eyes:DANIELA, EOMI  Ear/Nose/Throat: no oral lesion, no sinus tenderness on percussion	  Neck:no JVD, no lymphadenopathy, supple  Respiratory: CTA lanre  Cardiovascular: S1S2 RRR, no murmurs  Gastrointestinal:soft, (+) BS, no HSM  Extremities:n better  no cellulitis wd open but not infected         LABS:                        8.5    8.85  )-----------( 284      ( 09 Mar 2020 06:31 )             28.0     03-09    136  |  97  |  40<H>  ----------------------------<  137<H>  4.6   |  27  |  1.75<H>    Ca    8.9      09 Mar 2020 06:31    TPro  6.3  /  Alb  2.7<L>  /  TBili  0.2  /  DBili  x   /  AST  28  /  ALT  50<H>  /  AlkPhos  103  03-08            MICROBIOLOGY:    RECENT CULTURES:        RESPIRATORY CULTURES:              RADIOLOGY & ADDITIONAL STUDIES:        Pager [phone]  After 5 pm/weekends or if no response :[phone]

## 2020-03-09 NOTE — PROGRESS NOTE ADULT - SUBJECTIVE AND OBJECTIVE BOX
CARDIOLOGY FOLLOW UP - Dr. Steel    CC no cp/sob  oob in chair       PHYSICAL EXAM:  T(C): 36.4 (03-09-20 @ 12:23), Max: 36.8 (03-08-20 @ 20:23)  HR: 98 (03-09-20 @ 12:23) (98 - 101)  BP: 142/79 (03-09-20 @ 12:23) (116/72 - 142/79)  RR: 18 (03-09-20 @ 12:23) (18 - 18)  SpO2: 98% (03-09-20 @ 12:23) (96% - 98%)  Wt(kg): --  I&O's Summary    08 Mar 2020 07:01  -  09 Mar 2020 07:00  --------------------------------------------------------  IN: 0 mL / OUT: 1863 mL / NET: -1863 mL    09 Mar 2020 07:01  -  09 Mar 2020 15:55  --------------------------------------------------------  IN: 360 mL / OUT: 650 mL / NET: -290 mL        Appearance: Normal	  Cardiovascular: Normal S1 S2,RRR, No JVD, No murmurs  Respiratory: Lungs clear to auscultation	  Gastrointestinal:  Soft, Non-tender, + BS	  Extremities: Normal range of motion, No clubbing, + edema, +wound vac         MEDICATIONS  (STANDING):  ALBUTerol    90 MICROgram(s) HFA Inhaler 1 Puff(s) Inhalation every 4 hours  albuterol/ipratropium for Nebulization. 3 milliLiter(s) Nebulizer every 6 hours  aMIOdarone    Tablet 200 milliGRAM(s) Oral daily  artificial tears (preservative free) Ophthalmic Solution 1 Drop(s) Both EYES two times a day  aspirin enteric coated 81 milliGRAM(s) Oral daily  atorvastatin 40 milliGRAM(s) Oral at bedtime  benzonatate 100 milliGRAM(s) Oral every 8 hours  buDESOnide    Inhalation Suspension 0.5 milliGRAM(s) Inhalation two times a day  calcium acetate 667 milliGRAM(s) Oral three times a day with meals  chlorhexidine 2% Cloths 1 Application(s) Topical <User Schedule>  dextrose 5%. 1000 milliLiter(s) (50 mL/Hr) IV Continuous <Continuous>  dextrose 5%. 1000 milliLiter(s) (30 mL/Hr) IV Continuous <Continuous>  dextrose 50% Injectable 12.5 Gram(s) IV Push once  dextrose 50% Injectable 25 Gram(s) IV Push once  dextrose 50% Injectable 25 Gram(s) IV Push once  heparin  Injectable 5000 Unit(s) SubCutaneous every 8 hours  insulin glargine Injectable (LANTUS) 34 Unit(s) SubCutaneous at bedtime  insulin lispro (HumaLOG) corrective regimen sliding scale   SubCutaneous Before meals and at bedtime  insulin lispro Injectable (HumaLOG) 8 Unit(s) SubCutaneous three times a day with meals  linezolid    Tablet 600 milliGRAM(s) Oral every 12 hours  melatonin 3 milliGRAM(s) Oral at bedtime  metoprolol tartrate 12.5 milliGRAM(s) Oral two times a day  modafinil 100 milliGRAM(s) Oral <User Schedule>  multivitamin 1 Tablet(s) Oral daily  pantoprazole    Tablet 40 milliGRAM(s) Oral before breakfast  polyethylene glycol 3350 17 Gram(s) Oral daily  senna 2 Tablet(s) Oral at bedtime  silver sulfADIAZINE 1% Cream 1 Application(s) Topical daily  sodium chloride 0.65% Nasal 1 Spray(s) Both Nostrils three times a day  spironolactone 25 milliGRAM(s) Oral daily  tiotropium 18 MICROgram(s) Capsule 1 Capsule(s) Inhalation daily  torsemide 10 milliGRAM(s) Oral daily      TELEMETRY: NSR 	    ECG:  	  RADIOLOGY:   DIAGNOSTIC TESTING:  [ ] Echocardiogram:  [ ]  Catheterization:  [ ] Stress Test:    OTHER: 	    LABS:	 	                                8.5    8.85  )-----------( 284      ( 09 Mar 2020 06:31 )             28.0     03-09    136  |  97  |  40<H>  ----------------------------<  137<H>  4.6   |  27  |  1.75<H>    Ca    8.9      09 Mar 2020 06:31    TPro  6.3  /  Alb  2.7<L>  /  TBili  0.2  /  DBili  x   /  AST  28  /  ALT  50<H>  /  AlkPhos  103  03-08

## 2020-03-09 NOTE — PROGRESS NOTE ADULT - ASSESSMENT
Assessment  DMT2: 64y Male with DM T2 with hyperglycemia, A1C 9.2%, was on oral meds and insulin at home, now on basal bolus insulin, decreased dose yesterday, blood sugars trending down, had hypoglycemic episode, did not eat much for breakfast, FS now improving. Patient eats meals with inconsistent carb intake, not eating much currently, appears comfortable.  Foot infection: On Tx, stable.  CAD: s/p CABG 2/3, on medications, no chest pain, stable, monitored.  HTN: Controlled,  on antihypertensive medications.  HLD: Controlled, on statin.  CKD: Monitor labs/BMP          Harper Matias MD  Cell: [phone]  Office: [phone] Assessment  DMT2: 64y Male with DM T2 with hyperglycemia, A1C 9.2%, was on oral meds and insulin at home, now on basal bolus insulin, decreased dose yesterday, blood sugars trending down, had hypoglycemic episode, did not eat much for breakfast after receiving bolus insulin, FS now improving. Patient eats meals with inconsistent carb intake, not eating much currently, appears comfortable.  Foot infection: On Tx, stable.  CAD: s/p CABG 2/3, on medications, no chest pain, stable, monitored.  HTN: Controlled,  on antihypertensive medications.  HLD: Controlled, on statin.  CKD: Monitor labs/BMP          Harper Matias MD  Cell: [phone]  Office: [phone]

## 2020-03-09 NOTE — PROGRESS NOTE ADULT - PROBLEM SELECTOR PLAN 4
ID following  change to Zyvoxx on 3/9/2020   date of SWD as per ID; bc 2/19 neg   daily cbc   may switch to another antibiotic Monday 3/9  vac to sternum

## 2020-03-09 NOTE — PROGRESS NOTE ADULT - PROBLEM SELECTOR PLAN 1
Tight glycemic control necessary in the setting of wound infection.  Will continue Lantus 34u at bedtime.  Will decrease Humalog to 8u before each meal and continue Humalog correction scale coverage. Will continue monitoring FS and FU.  Patient counseled for compliance with consistent low carb diet; Alerted to let RN know if he won't be eating. Humalog should be held in this case to prevent future hypoglycemic episodes.

## 2020-03-09 NOTE — PROGRESS NOTE ADULT - ASSESSMENT
65yo male with hx of HTN, DM II   s/p C4L on 2/3; PEA arrest on 2/6   + enterococcus Bld  C/S > started ampicillin q8h, ID consulted,  R pigtail for effusion  2/8    Extubated  2/9  2/15 L pigtail effusion  2/17 PRBC   2/18 Tx 2 Diaz  2/19 thomas removed, trial void. Maintain left pigtail for significant output. Pt encouraged to ambulate. Supplemental o2 sat NC weaned to 2L. Blood cultures repeated.  2/20 VSS -Pulmonary consult - appreciated - duonebs ATC q6h & pulmicort bid initiated - ddimer drawn.  pt w/ hx negative LE dopplers   will order non con chest DT as pt has a loculated left effusion that will require tap at IR.  2/21 - NON con Chest CT done - multiple loculated Left pleural effusions w/ patchy consolidation R - rounds made w/ Dr. carl.  ID - consult called re: Ct - WBC 11 afebrile.  +PALMA.  unable to tolerate VQ scan this am.  will d/c bumex & start Torsemide 20mg po daily  d/c planning rehab when medically stable  2/22  Wound care consult leg wounds> shower w/ local skin care  2/23  SW drainage> on Dapto> as per ID will cover SWD  CXR this am   Tighter glycemic control  2/24 ID added cefapime SW drainage purulent, afebrile  2/25 S/p Sternal wound debridement and irrigation with removal of all 6 sternal wires. Purulence encountered and sent for culture. Closure with bilateral pectoralis muscle advancement flaps.  Post op zari for hypotension- weaned off.  Skin/wd  culture from 2/24 neg  Pt transferred to sdu  2/27 VSS   carlos d/c thomas d/c transfer to floor JPx2  followed  by Plastics  followed by ID on cefepime and daptomycin bc positive for E. faecalis; follow up bc 2/19 negative to date. Provigil daily in am  2/28 VSS; abx as per ID - d/c cefepime and continue dapto 500 iv qd as per Dr. Carrasco- pt will need picc line vs medel for 4 wks abx from date of SWD as per ID; bc 2/19 neg.  d/w h. renal medel vs cath- await decision, diuresis initiated for hypervolemia; hep sq for dvt prophylaxis, + hypoglycemia- endo following- humalog adjusted    Discharge planning- rehab next week   2/29 VSS, distal portion of MSI with small dehiscence and serous purulent drainage - recommended wound vac placement as per Plastics. Left KEI 80ml & Right KEI 80ml/24h. S/P Right PICC line placement for IV abx.   3/1 Wound Vac placed to sternal wound. Pt ambulated in hallway with rolling walker. Left KEI 50ml & Right KEI 110ml/24h. Plan for discharge to Rehab Mon/Tue.   3/2 Pending patient rehab selection. Maintain sternal wound vac placement and EKI drains. Patient lethargic after percocet. Will hold narcotics . Continue with Daptomycin x 4 weeks until 3/22/20  3/3 VSS - call to ID re: alternative antibiotic to Daptomycin - Dr. Carrasco to follow up . d/c to rehab   3/4  HD stable .  + cough--bedside swallowing eval + cough--recs NPO and MBS in am--insulin adjusted by Dr Matias.   Dw Dr Carrasco--daptomycin can be switched on Monday to another antibiotic thru 3/22  3/5 VSS - MBS - Per speech pathologist- no gross aspiration ?silent - placed on Dysphagia 3 diet w/ nectar thick liquids. d/w Dr. Mariscal-  As per ID - will switch antibiotic to Zyvoxx on Monday 3/9/2020 - d/c to rehab on Monday on zyvoxx until 3/22  3/6 VSS; continue diuresis; resume low dose bb; abx as per ID/ vac  3/7/2020 VSS rounds made w/ Dr. Mariscal- right KEI w/ minimal drainage - spoke w/ plastics - will d/c right KEI - d/c plan rehab - switch to Zyvoxx bid po on monday.  3/8 VSS; vac change today + drainage noted at distal vac site; wbc 8 and pt afebrile; abx as per ID; cr 2.0- decrease torsemide 20 qd as per Dr. Mariscal   Discharge planning- await rehab placement 65yo male with hx of HTN, DM II   s/p C4L on 2/3; PEA arrest on 2/6   + enterococcus Bld  C/S > started ampicillin q8h, ID consulted,  R pigtail for effusion  2/8    Extubated  2/9  2/15 L pigtail effusion  2/17 PRBC   2/18 Tx 2 Diaz  2/19 thomas removed, trial void. Maintain left pigtail for significant output. Pt encouraged to ambulate. Supplemental o2 sat NC weaned to 2L. Blood cultures repeated.  2/20 VSS -Pulmonary consult - appreciated - duonebs ATC q6h & pulmicort bid initiated - ddimer drawn.  pt w/ hx negative LE dopplers   will order non con chest DT as pt has a loculated left effusion that will require tap at IR.  2/21 - NON con Chest CT done - multiple loculated Left pleural effusions w/ patchy consolidation R - rounds made w/ Dr. carl.  ID - consult called re: Ct - WBC 11 afebrile.  +PALMA.  unable to tolerate VQ scan this am.  will d/c bumex & start Torsemide 20mg po daily  d/c planning rehab when medically stable  2/22  Wound care consult leg wounds> shower w/ local skin care  2/23  SW drainage> on Dapto> as per ID will cover SWD  CXR this am   Tighter glycemic control  2/24 ID added cefapime SW drainage purulent, afebrile  2/25 S/p Sternal wound debridement and irrigation with removal of all 6 sternal wires. Purulence encountered and sent for culture. Closure with bilateral pectoralis muscle advancement flaps.  Post op zari for hypotension- weaned off.  Skin/wd  culture from 2/24 neg  Pt transferred to sdu  2/27 VSS   carlos d/c thomas d/c transfer to floor JPx2  followed  by Plastics  followed by ID on cefepime and daptomycin bc positive for E. faecalis; follow up bc 2/19 negative to date. Provigil daily in am  2/28 VSS; abx as per ID - d/c cefepime and continue dapto 500 iv qd as per Dr. Carrasco- pt will need picc line vs medel for 4 wks abx from date of SWD as per ID; bc 2/19 neg.  d/w h. renal medel vs cath- await decision, diuresis initiated for hypervolemia; hep sq for dvt prophylaxis, + hypoglycemia- endo following- humalog adjusted    Discharge planning- rehab next week   2/29 VSS, distal portion of MSI with small dehiscence and serous purulent drainage - recommended wound vac placement as per Plastics. Left KEI 80ml & Right KEI 80ml/24h. S/P Right PICC line placement for IV abx.   3/1 Wound Vac placed to sternal wound. Pt ambulated in hallway with rolling walker. Left KEI 50ml & Right EKI 110ml/24h. Plan for discharge to Rehab Mon/Tue.   3/2 Pending patient rehab selection. Maintain sternal wound vac placement and KEI drains. Patient lethargic after percocet. Will hold narcotics . Continue with Daptomycin x 4 weeks until 3/22/20  3/3 VSS - call to ID re: alternative antibiotic to Daptomycin - Dr. Carrasco to follow up . d/c to rehab   3/4  HD stable .  + cough--bedside swallowing eval + cough--recs NPO and MBS in am--insulin adjusted by Dr Matias.   Dw Dr Carrasco--daptomycin can be switched on Monday to another antibiotic thru 3/22  3/5 VSS - MBS - Per speech pathologist- no gross aspiration ?silent - placed on Dysphagia 3 diet w/ nectar thick liquids. d/w Dr. Mariscal-  As per ID - will switch antibiotic to Zyvoxx on Monday 3/9/2020 - d/c to rehab on Monday on zyvoxx until 3/22  3/6 VSS; continue diuresis; resume low dose bb; abx as per ID/ vac  3/7/2020 VSS rounds made w/ Dr. Mariscal- right KEI w/ minimal drainage - spoke w/ plastics - will d/c right KEI - d/c plan rehab - switch to Zyvoxx bid po on monday.  3/8 VSS; vac change today + drainage noted at distal vac site; wbc 8 and pt afebrile; abx as per ID; cr 2.0- decrease torsemide 20 qd as per Dr. Mariscal   3/9 VSS; wbc 8 and pt afebrile; abx changed to po zyvox 600 mg po q12 as per ID   Discharge planning- await rehab placement

## 2020-03-09 NOTE — PROGRESS NOTE ADULT - SUBJECTIVE AND OBJECTIVE BOX
CHIEF COMPLAINT: f/up sob, resp failure, pleural effusions, atelectasis-cough present-NP--andrew upon swallowing--slightly better w/ swallow maneuvers      Interval Events: ambulating    REVIEW OF SYSTEMS:  Constitutional: No fevers or chills. No weight loss. + fatigue or generalized malaise.  Eyes: No itching or discharge from the eyes  ENT: No ear pain. No ear discharge. No nasal congestion. No post nasal drip. No epistaxis. No throat pain. No sore throat. No difficulty swallowing.   CV: No chest pain. No palpitations. No lightheadedness or dizziness.   Resp: No dyspnea at rest. + dyspnea on exertion. No orthopnea. No wheezing. + cough. No stridor. No sputum production. No chest pain with respiration.  GI: No nausea. No vomiting. No diarrhea.  MSK: No joint pain or pain in any extremities  Integumentary: No skin lesions. + pedal edema.  Neurological: No gross motor weakness. No sensory changes.  [+ ] All other systems negative  [ ] Unable to assess ROS because ________    OBJECTIVE:  ICU Vital Signs Last 24 Hrs  T(C): 36.8 (08 Mar 2020 20:23), Max: 36.8 (08 Mar 2020 20:23)  T(F): 98.3 (08 Mar 2020 20:23), Max: 98.3 (08 Mar 2020 20:23)  HR: 98 (08 Mar 2020 20:23) (98 - 101)  BP: 116/72 (08 Mar 2020 20:23) (113/70 - 126/74)  BP(mean): --  ABP: --  ABP(mean): --  RR: 18 (08 Mar 2020 20:23) (18 - 18)  SpO2: 96% (08 Mar 2020 20:23) (95% - 96%)        03-07 @ 06:01  -  03-08 @ 07:00  --------------------------------------------------------  IN: 770 mL / OUT: 1807 mL / NET: -1037 mL    03-08 @ 07:01  -  03-09 @ 04:42  --------------------------------------------------------  IN: 0 mL / OUT: 1560 mL / NET: -1560 mL      CAPILLARY BLOOD GLUCOSE      POCT Blood Glucose.: 99 mg/dL (08 Mar 2020 21:53)      PHYSICAL EXAM: NAD in chair on RA  General: Awake, alert, oriented X 3.   HEENT: Atraumatic, normocephalic.                 Mallampatti Grade 3                No nasal congestion.                No tonsillar or pharyngeal exudates.  Lymph Nodes: No palpable lymphadenopathy  Neck: No JVD. No carotid bruit.   Respiratory: abnormal chest expansion                         Normal percussion                         Normal and equal air entry                         No wheeze, rhonchi or rales.  Cardiovascular: S1 S2 normal. No murmurs, rubs or gallops.   Abdomen: Soft, non-tender, non-distended. No organomegaly. Normoactive bowel sounds.  Extremities: Warm to touch. Peripheral pulse palpable. + pedal edema.   Skin: No rashes or skin lesions Wound vac in place  Neurological: Motor and sensory examination equal and normal in all four extremities.  Psychiatry: Appropriate mood and affect.    HOSPITAL MEDICATIONS:  MEDICATIONS  (STANDING):  ALBUTerol    90 MICROgram(s) HFA Inhaler 1 Puff(s) Inhalation every 4 hours  albuterol/ipratropium for Nebulization. 3 milliLiter(s) Nebulizer every 6 hours  aMIOdarone    Tablet 200 milliGRAM(s) Oral daily  amitriptyline 10 milliGRAM(s) Oral every 8 hours  artificial tears (preservative free) Ophthalmic Solution 1 Drop(s) Both EYES two times a day  aspirin enteric coated 81 milliGRAM(s) Oral daily  atorvastatin 40 milliGRAM(s) Oral at bedtime  benzonatate 100 milliGRAM(s) Oral every 8 hours  buDESOnide    Inhalation Suspension 0.5 milliGRAM(s) Inhalation two times a day  calcium acetate 667 milliGRAM(s) Oral three times a day with meals  chlorhexidine 2% Cloths 1 Application(s) Topical <User Schedule>  DAPTOmycin IVPB 500 milliGRAM(s) IV Intermittent every 24 hours  dextrose 5%. 1000 milliLiter(s) (50 mL/Hr) IV Continuous <Continuous>  dextrose 5%. 1000 milliLiter(s) (30 mL/Hr) IV Continuous <Continuous>  dextrose 50% Injectable 12.5 Gram(s) IV Push once  dextrose 50% Injectable 25 Gram(s) IV Push once  dextrose 50% Injectable 25 Gram(s) IV Push once  heparin  Injectable 5000 Unit(s) SubCutaneous every 8 hours  insulin glargine Injectable (LANTUS) 34 Unit(s) SubCutaneous at bedtime  insulin lispro (HumaLOG) corrective regimen sliding scale   SubCutaneous Before meals and at bedtime  insulin lispro Injectable (HumaLOG) 12 Unit(s) SubCutaneous three times a day with meals  melatonin 3 milliGRAM(s) Oral at bedtime  metoprolol tartrate 12.5 milliGRAM(s) Oral two times a day  modafinil 100 milliGRAM(s) Oral <User Schedule>  multivitamin 1 Tablet(s) Oral daily  pantoprazole    Tablet 40 milliGRAM(s) Oral before breakfast  polyethylene glycol 3350 17 Gram(s) Oral daily  senna 2 Tablet(s) Oral at bedtime  silver sulfADIAZINE 1% Cream 1 Application(s) Topical daily  sodium chloride 0.65% Nasal 1 Spray(s) Both Nostrils three times a day  spironolactone 25 milliGRAM(s) Oral daily  tiotropium 18 MICROgram(s) Capsule 1 Capsule(s) Inhalation daily  torsemide 10 milliGRAM(s) Oral daily    MEDICATIONS  (PRN):  acetaminophen   Tablet .. 650 milliGRAM(s) Oral every 6 hours PRN Mild Pain (1 - 3)  albuterol/ipratropium for Nebulization. 3 milliLiter(s) Nebulizer every 6 hours PRN Shortness of Breath and/or Wheezing  dextrose 40% Gel 15 Gram(s) Oral once PRN Blood Glucose LESS THAN 70 milliGRAM(s)/deciLiter  glucagon  Injectable 1 milliGRAM(s) IntraMuscular once PRN Glucose <70 milliGRAM(s)/deciLiter  guaiFENesin   Syrup  (Sugar-Free) 200 milliGRAM(s) Oral every 6 hours PRN Cough  oxycodone    5 mG/acetaminophen 325 mG 1 Tablet(s) Oral every 6 hours PRN Severe Pain (7 - 10)  sodium chloride 0.9% lock flush 10 milliLiter(s) IV Push every 1 hour PRN Pre/post blood products, medications, blood draw, and to maintain line patency      LABS:                        8.4    8.39  )-----------( 258      ( 08 Mar 2020 07:38 )             27.6     03-08    136  |  97  |  42<H>  ----------------------------<  66<L>  4.4   |  27  |  2.09<H>    Ca    9.0      08 Mar 2020 07:43    TPro  6.3  /  Alb  2.7<L>  /  TBili  0.2  /  DBili  x   /  AST  28  /  ALT  50<H>  /  AlkPhos  103  03-08              MICROBIOLOGY:     RADIOLOGY:  [ ] Reviewed and interpreted by me    Point of Care Ultrasound Findings:    PFT:    EKG:

## 2020-03-09 NOTE — PROGRESS NOTE ADULT - ASSESSMENT
A/P: 64M s/p sternal debridement and b/l pectoralis advancement flaps on 2/25. Sternal incision with dehiscence inferiorly and murky serous drainage, more likely fat necrosis, less concerning for infection given time course, lack of culture growth, afebrile, and lack of upwardly-trending WBC    - C/w VAC, MWF changes  - Continue JPs  - Care per CT  - Will cont to follow      Plastic Surgery   [facility]: [phone]

## 2020-03-09 NOTE — PROGRESS NOTE ADULT - ASSESSMENT
64 yr old with cri stage 4, DM, PVD, admitted and had CABG, Post op intubated and has bilateral effusions, worsening renal disease and bc positive for E. faecalis; follow up bc negative to date.  Call to see patient for discharge from sternal wound  Presently on treatment for RLE SSTI  CT chest with suspected postsurgical changes     Overall,  1  Sternal wound,  infection plus cellulitis around the svg site on right   the cellulits of leg is resolved  still with open wd that has not healed. wds over strerum  - Continue Dapto    ' dapto was rejected at time of discharge. I am concerned about giving prolonged vanco with renal [id number]  discussed with Dr. Mariscal  we can switch to po zyvoxx  upon discharge  600 mg bid and continue for 61 additional days  needs to be seen in the office and have a cbc q week        -

## 2020-03-09 NOTE — PROGRESS NOTE ADULT - ASSESSMENT
63yo male with hx of HTN, DM II, presented to the ED with complaints of acute on chronic left sided exertional chest pain. Pt states he has been having left sided pressure and stabbing chest pain radiating to his sternum and right with activity for the past few months. Since admission- s/p cath with severe triple vessel disease, s/p CABG, post op course c/b brief witnessed PEA 2/6 likely hypoxic arrest, s/p intubation. ( CAD, s/p cath with severe triple vessel disease including lesions at the bifurcation of the LAD/diagonal and distal RCA/RPDA/RPL trifurcation.   -s/p CABG x 4, intraop kennedy ef 50%)--s/p ampicillin until 2/18 for enterococcus in blood, extubated 2/9, pigtail right 2/8 and left sided on 2/15-for effusion.  Remains sob-NO h/o resp issues prior to hospitalization.  ***Current sob-multifactorial-CAD/effusions, atelectasis due to pain, mild bronchospasm and debility.  ********************  2/21-slightly better, pig tail cath removed from left chest; CT chest done-loculated left effusion-slight better  2/24-BS issues-less sob-dapto/cefepime as per ID  2/25-for debridement of chest area-sternal wound  2/26-debridement completed--to CTU  2/2744-[id number] over all  2/28-chest pain still impacting breathing--plastics/renal endo f/up  2/29-no changes over night  3/1-cough present-has been off BD  3/2-cough present still  3/3-improved cough on amitriptyline  3/4-cough upon swallowing  3/5-no changes-more ambulation-for mbs  3/6-failed swallow study--to D3 diet precautions  3/9-abx to change to zyvox as per ID

## 2020-03-09 NOTE — PROGRESS NOTE ADULT - SUBJECTIVE AND OBJECTIVE BOX
Plastic Surgery Progress Note (pg [facility]: [id number], [facility]: [phone], [facility]: [phone])    SUBJECTIVE:  - Doing well, sitting comfortably in chair  - Pain well controlled  - No events overnight       OBJECTIVE:   ** PHYSICAL EXAM **    -- CONSTITUTIONAL: AOx3. NAD.   -- CHEST: incisions c/d/i superiorly. Inferiorly area of dehiscences with some fibrinous material at base of wound and some fat necrosis.  Wound non-malodorous            Vital Signs:  Vital Signs Last 24 Hrs  T(C): 36.5 (09 Mar 2020 04:37), Max: 36.8 (08 Mar 2020 20:23)  T(F): 97.7 (09 Mar 2020 04:37), Max: 98.3 (08 Mar 2020 20:23)  HR: 98 (09 Mar 2020 04:37) (98 - 101)  BP: 129/76 (09 Mar 2020 04:37) (113/70 - 129/76)  BP(mean): --  RR: 18 (09 Mar 2020 04:37) (18 - 18)  SpO2: 96% (09 Mar 2020 04:37) (95% - 96%)    CAPILLARY BLOOD GLUCOSE      POCT Blood Glucose.: 99 mg/dL (08 Mar 2020 21:53)  POCT Blood Glucose.: 133 mg/dL (08 Mar 2020 17:28)  POCT Blood Glucose.: 136 mg/dL (08 Mar 2020 12:07)  POCT Blood Glucose.: 76 mg/dL (08 Mar 2020 08:07)      I&O's Detail    08 Mar 2020 07:01  -  09 Mar 2020 07:00  --------------------------------------------------------  IN:  Total IN: 0 mL    OUT:    Bulb: 13 mL    Voided: 1850 mL  Total OUT: 1863 mL    Total NET: -1863 mL          MEDICATIONS  (STANDING):  ALBUTerol    90 MICROgram(s) HFA Inhaler 1 Puff(s) Inhalation every 4 hours  albuterol/ipratropium for Nebulization. 3 milliLiter(s) Nebulizer every 6 hours  aMIOdarone    Tablet 200 milliGRAM(s) Oral daily  amitriptyline 10 milliGRAM(s) Oral every 8 hours  artificial tears (preservative free) Ophthalmic Solution 1 Drop(s) Both EYES two times a day  aspirin enteric coated 81 milliGRAM(s) Oral daily  atorvastatin 40 milliGRAM(s) Oral at bedtime  benzonatate 100 milliGRAM(s) Oral every 8 hours  buDESOnide    Inhalation Suspension 0.5 milliGRAM(s) Inhalation two times a day  calcium acetate 667 milliGRAM(s) Oral three times a day with meals  chlorhexidine 2% Cloths 1 Application(s) Topical <User Schedule>  DAPTOmycin IVPB 500 milliGRAM(s) IV Intermittent every 24 hours  dextrose 5%. 1000 milliLiter(s) (50 mL/Hr) IV Continuous <Continuous>  dextrose 5%. 1000 milliLiter(s) (30 mL/Hr) IV Continuous <Continuous>  dextrose 50% Injectable 12.5 Gram(s) IV Push once  dextrose 50% Injectable 25 Gram(s) IV Push once  dextrose 50% Injectable 25 Gram(s) IV Push once  heparin  Injectable 5000 Unit(s) SubCutaneous every 8 hours  insulin glargine Injectable (LANTUS) 34 Unit(s) SubCutaneous at bedtime  insulin lispro (HumaLOG) corrective regimen sliding scale   SubCutaneous Before meals and at bedtime  insulin lispro Injectable (HumaLOG) 12 Unit(s) SubCutaneous three times a day with meals  melatonin 3 milliGRAM(s) Oral at bedtime  metoprolol tartrate 12.5 milliGRAM(s) Oral two times a day  modafinil 100 milliGRAM(s) Oral <User Schedule>  multivitamin 1 Tablet(s) Oral daily  pantoprazole    Tablet 40 milliGRAM(s) Oral before breakfast  polyethylene glycol 3350 17 Gram(s) Oral daily  senna 2 Tablet(s) Oral at bedtime  silver sulfADIAZINE 1% Cream 1 Application(s) Topical daily  sodium chloride 0.65% Nasal 1 Spray(s) Both Nostrils three times a day  spironolactone 25 milliGRAM(s) Oral daily  tiotropium 18 MICROgram(s) Capsule 1 Capsule(s) Inhalation daily  torsemide 10 milliGRAM(s) Oral daily    MEDICATIONS  (PRN):  acetaminophen   Tablet .. 650 milliGRAM(s) Oral every 6 hours PRN Mild Pain (1 - 3)  albuterol/ipratropium for Nebulization. 3 milliLiter(s) Nebulizer every 6 hours PRN Shortness of Breath and/or Wheezing  dextrose 40% Gel 15 Gram(s) Oral once PRN Blood Glucose LESS THAN 70 milliGRAM(s)/deciLiter  glucagon  Injectable 1 milliGRAM(s) IntraMuscular once PRN Glucose <70 milliGRAM(s)/deciLiter  guaiFENesin   Syrup  (Sugar-Free) 200 milliGRAM(s) Oral every 6 hours PRN Cough  oxycodone    5 mG/acetaminophen 325 mG 1 Tablet(s) Oral every 6 hours PRN Severe Pain (7 - 10)  sodium chloride 0.9% lock flush 10 milliLiter(s) IV Push every 1 hour PRN Pre/post blood products, medications, blood draw, and to maintain line patency          Labs:    03-09    136  |  97  |  40<H>  ----------------------------<  137<H>  4.6   |  27  |  1.75<H>    Ca    8.9      09 Mar 2020 06:31    TPro  6.3  /  Alb  2.7<L>  /  TBili  0.2  /  DBili  x   /  AST  28  /  ALT  50<H>  /  AlkPhos  103  03-08    LIVER FUNCTIONS - ( 08 Mar 2020 07:43 )  Alb: 2.7 g/dL / Pro: 6.3 g/dL / ALK PHOS: 103 U/L / ALT: 50 U/L / AST: 28 U/L / GGT: x                                 8.5    8.85  )-----------( 284      ( 09 Mar 2020 06:31 )             28.0         Imaging:

## 2020-03-09 NOTE — PROGRESS NOTE ADULT - PROBLEM SELECTOR PLAN 2
Continue diabetic diet  endocrine following, Dr. Matias  continue lantus 44HS and humalog 14 TID   accuchecks ac and hs Continue diabetic diet  endocrine following, Dr. Matias  continue lantus 34HS and humalog 12TID   accuchecks ac and hs

## 2020-03-09 NOTE — PROGRESS NOTE ADULT - SUBJECTIVE AND OBJECTIVE BOX
VITAL SIGNS    Telemetry:    Vital Signs Last 24 Hrs  T(C): 36.5 (03-09-20 @ 04:37), Max: 36.8 (03-08-20 @ 20:23)  T(F): 97.7 (03-09-20 @ 04:37), Max: 98.3 (03-08-20 @ 20:23)  HR: 98 (03-09-20 @ 04:37) (98 - 101)  BP: 129/76 (03-09-20 @ 04:37) (113/70 - 129/76)  RR: 18 (03-09-20 @ 04:37) (18 - 18)  SpO2: 96% (03-09-20 @ 04:37) (95% - 96%)            03-08 @ 07:01  -  03-09 @ 07:00  --------------------------------------------------------  IN: 0 mL / OUT: 1863 mL / NET: -1863 mL    03-09 @ 07:01  -  03-09 @ 11:47  --------------------------------------------------------  IN: 0 mL / OUT: 0 mL / NET: 0 mL       Daily     Daily   Admit Wt: Drug Dosing Weight  Height (cm): 172.72 (03 Feb 2020 07:20)  Weight (kg): 81.1 (03 Feb 2020 07:20)  BMI (kg/m2): 27.2 (03 Feb 2020 07:20)  BSA (m2): 1.95 (03 Feb 2020 07:20)      CAPILLARY BLOOD GLUCOSE      POCT Blood Glucose.: 146 mg/dL (09 Mar 2020 07:57)  POCT Blood Glucose.: 99 mg/dL (08 Mar 2020 21:53)  POCT Blood Glucose.: 133 mg/dL (08 Mar 2020 17:28)  POCT Blood Glucose.: 136 mg/dL (08 Mar 2020 12:07)          acetaminophen   Tablet .. 650 milliGRAM(s) Oral every 6 hours PRN  ALBUTerol    90 MICROgram(s) HFA Inhaler 1 Puff(s) Inhalation every 4 hours  albuterol/ipratropium for Nebulization. 3 milliLiter(s) Nebulizer every 6 hours PRN  albuterol/ipratropium for Nebulization. 3 milliLiter(s) Nebulizer every 6 hours  aMIOdarone    Tablet 200 milliGRAM(s) Oral daily  artificial tears (preservative free) Ophthalmic Solution 1 Drop(s) Both EYES two times a day  aspirin enteric coated 81 milliGRAM(s) Oral daily  atorvastatin 40 milliGRAM(s) Oral at bedtime  benzonatate 100 milliGRAM(s) Oral every 8 hours  buDESOnide    Inhalation Suspension 0.5 milliGRAM(s) Inhalation two times a day  calcium acetate 667 milliGRAM(s) Oral three times a day with meals  chlorhexidine 2% Cloths 1 Application(s) Topical <User Schedule>  dextrose 40% Gel 15 Gram(s) Oral once PRN  dextrose 5%. 1000 milliLiter(s) IV Continuous <Continuous>  dextrose 5%. 1000 milliLiter(s) IV Continuous <Continuous>  dextrose 50% Injectable 12.5 Gram(s) IV Push once  dextrose 50% Injectable 25 Gram(s) IV Push once  dextrose 50% Injectable 25 Gram(s) IV Push once  glucagon  Injectable 1 milliGRAM(s) IntraMuscular once PRN  guaiFENesin   Syrup  (Sugar-Free) 200 milliGRAM(s) Oral every 6 hours PRN  heparin  Injectable 5000 Unit(s) SubCutaneous every 8 hours  insulin glargine Injectable (LANTUS) 34 Unit(s) SubCutaneous at bedtime  insulin lispro (HumaLOG) corrective regimen sliding scale   SubCutaneous Before meals and at bedtime  insulin lispro Injectable (HumaLOG) 12 Unit(s) SubCutaneous three times a day with meals  linezolid    Tablet 600 milliGRAM(s) Oral every 12 hours  melatonin 3 milliGRAM(s) Oral at bedtime  metoprolol tartrate 12.5 milliGRAM(s) Oral two times a day  modafinil 100 milliGRAM(s) Oral <User Schedule>  multivitamin 1 Tablet(s) Oral daily  oxycodone    5 mG/acetaminophen 325 mG 1 Tablet(s) Oral every 6 hours PRN  pantoprazole    Tablet 40 milliGRAM(s) Oral before breakfast  polyethylene glycol 3350 17 Gram(s) Oral daily  senna 2 Tablet(s) Oral at bedtime  silver sulfADIAZINE 1% Cream 1 Application(s) Topical daily  sodium chloride 0.65% Nasal 1 Spray(s) Both Nostrils three times a day  sodium chloride 0.9% lock flush 10 milliLiter(s) IV Push every 1 hour PRN  spironolactone 25 milliGRAM(s) Oral daily  tiotropium 18 MICROgram(s) Capsule 1 Capsule(s) Inhalation daily  torsemide 10 milliGRAM(s) Oral daily      PHYSICAL EXAM    Subjective: "Hi.   Neurology: alert and oriented x 3, nonfocal, no gross deficits  CV : tele:  RSR  Sternal Wound :  CDI with dressing , Stable  Lungs: clear. RR easy, unlabored   Abdomen: soft, nontender, nondistended, positive bowel sounds, bowel movement   Neg N/V/D   :  pt voiding without difficulty   Extremities:   MOFFETT; edema, neg calf tenderness.   PPP bilaterally      PW:  Chest tubes: VITAL SIGNS    Telemetry:  rsr    Vital Signs Last 24 Hrs  T(C): 36.5 (03-09-20 @ 04:37), Max: 36.8 (03-08-20 @ 20:23)  T(F): 97.7 (03-09-20 @ 04:37), Max: 98.3 (03-08-20 @ 20:23)  HR: 98 (03-09-20 @ 04:37) (98 - 101)  BP: 129/76 (03-09-20 @ 04:37) (113/70 - 129/76)  RR: 18 (03-09-20 @ 04:37) (18 - 18)  SpO2: 96% (03-09-20 @ 04:37) (95% - 96%)            03-08 @ 07:01  -  03-09 @ 07:00  --------------------------------------------------------  IN: 0 mL / OUT: 1863 mL / NET: -1863 mL    03-09 @ 07:01  -  03-09 @ 11:47  --------------------------------------------------------  IN: 0 mL / OUT: 0 mL / NET: 0 mL       Daily     Daily   Admit Wt: Drug Dosing Weight  Height (cm): 172.72 (03 Feb 2020 07:20)  Weight (kg): 81.1 (03 Feb 2020 07:20)  BMI (kg/m2): 27.2 (03 Feb 2020 07:20)  BSA (m2): 1.95 (03 Feb 2020 07:20)      CAPILLARY BLOOD GLUCOSE      POCT Blood Glucose.: 146 mg/dL (09 Mar 2020 07:57)  POCT Blood Glucose.: 99 mg/dL (08 Mar 2020 21:53)  POCT Blood Glucose.: 133 mg/dL (08 Mar 2020 17:28)  POCT Blood Glucose.: 136 mg/dL (08 Mar 2020 12:07)          acetaminophen   Tablet .. 650 milliGRAM(s) Oral every 6 hours PRN  ALBUTerol    90 MICROgram(s) HFA Inhaler 1 Puff(s) Inhalation every 4 hours  albuterol/ipratropium for Nebulization. 3 milliLiter(s) Nebulizer every 6 hours PRN  albuterol/ipratropium for Nebulization. 3 milliLiter(s) Nebulizer every 6 hours  aMIOdarone    Tablet 200 milliGRAM(s) Oral daily  artificial tears (preservative free) Ophthalmic Solution 1 Drop(s) Both EYES two times a day  aspirin enteric coated 81 milliGRAM(s) Oral daily  atorvastatin 40 milliGRAM(s) Oral at bedtime  benzonatate 100 milliGRAM(s) Oral every 8 hours  buDESOnide    Inhalation Suspension 0.5 milliGRAM(s) Inhalation two times a day  calcium acetate 667 milliGRAM(s) Oral three times a day with meals  chlorhexidine 2% Cloths 1 Application(s) Topical <User Schedule>  dextrose 40% Gel 15 Gram(s) Oral once PRN  dextrose 5%. 1000 milliLiter(s) IV Continuous <Continuous>  dextrose 5%. 1000 milliLiter(s) IV Continuous <Continuous>  dextrose 50% Injectable 12.5 Gram(s) IV Push once  dextrose 50% Injectable 25 Gram(s) IV Push once  dextrose 50% Injectable 25 Gram(s) IV Push once  glucagon  Injectable 1 milliGRAM(s) IntraMuscular once PRN  guaiFENesin   Syrup  (Sugar-Free) 200 milliGRAM(s) Oral every 6 hours PRN  heparin  Injectable 5000 Unit(s) SubCutaneous every 8 hours  insulin glargine Injectable (LANTUS) 34 Unit(s) SubCutaneous at bedtime  insulin lispro (HumaLOG) corrective regimen sliding scale   SubCutaneous Before meals and at bedtime  insulin lispro Injectable (HumaLOG) 12 Unit(s) SubCutaneous three times a day with meals  linezolid    Tablet 600 milliGRAM(s) Oral every 12 hours  melatonin 3 milliGRAM(s) Oral at bedtime  metoprolol tartrate 12.5 milliGRAM(s) Oral two times a day  modafinil 100 milliGRAM(s) Oral <User Schedule>  multivitamin 1 Tablet(s) Oral daily  oxycodone    5 mG/acetaminophen 325 mG 1 Tablet(s) Oral every 6 hours PRN  pantoprazole    Tablet 40 milliGRAM(s) Oral before breakfast  polyethylene glycol 3350 17 Gram(s) Oral daily  senna 2 Tablet(s) Oral at bedtime  silver sulfADIAZINE 1% Cream 1 Application(s) Topical daily  sodium chloride 0.65% Nasal 1 Spray(s) Both Nostrils three times a day  sodium chloride 0.9% lock flush 10 milliLiter(s) IV Push every 1 hour PRN  spironolactone 25 milliGRAM(s) Oral daily  tiotropium 18 MICROgram(s) Capsule 1 Capsule(s) Inhalation daily  torsemide 10 milliGRAM(s) Oral daily      PHYSICAL EXAM    Subjective: "I feel tired."   Neurology: alert and oriented x 3, nonfocal, no gross deficits  CV : tele:  RSR    Sternal Wound :  CDI w/ VAC; 1 jenny- SS serous- sanguinous drainage   Lungs: clear. RR easy, unlabored   Abdomen: soft, nontender, nondistended, positive bowel sounds, + bowel movement   Neg N/V/D   :  pt voiding without difficulty   Extremities:   MOFFETT; +2 LE edema, neg calf tenderness.   PPP bilaterally; + RLE dressing

## 2020-03-09 NOTE — PROGRESS NOTE ADULT - ASSESSMENT
intraop kennedy 2/3/20 ef 50%, nl lv, mild diastolic dysfx stage 1  limited echo 2/4/20: nl LV sys fx , no pericardial effusion     a/p  64 year old man with history of HTN, DM II, admitted with progressive exertional angina, s/p cath with severe triple vessel disease, s/p CABG, post op course c/b brief witnessed PEA likely hypoxic arrest, s/p intubation.     1. CAD, s/p cath with severe triple vessel disease including lesions at the bifurcation of the LAD/diagonal and distal RCA/RPDA/RPL trifurcation.   -s/p CABG x 4, cont asa, statin,  -continue bb   -s/p PEA arrest   -echo 2/15 with preserved lv fxn/EF    2. Sternal wound infection  -s/p RTOR for SWI  -abx per id, wound vac     3. Postop acute diastolic HF  -diuretics per cts     4. PAF  -cont amio ,bb  -AC ?, currently on asa    5. HTN  -bp stable    6. STEPHANIE/CKD  -stable, renal f/u     7. Postop Pleural effusion  -S/P Right/Left chest tube     8. Sepsis -E. Faecalis bacteremia, SW infection, RLE cellulitis   -IV abx , repeat bcx 2/13 neg  -s/p debridement   -ID, plastics f/u     dvt ppx  dc planning to rehab, f/u apt. scheduled with jayna grubbs 4/7 and 4:45pm

## 2020-03-10 LAB
ANION GAP SERPL CALC-SCNC: 11 MMOL/L — SIGNIFICANT CHANGE UP (ref 5–17)
BUN SERPL-MCNC: 35 MG/DL — HIGH (ref 7–23)
CALCIUM SERPL-MCNC: 8.8 MG/DL — SIGNIFICANT CHANGE UP (ref 8.4–10.5)
CHLORIDE SERPL-SCNC: 98 MMOL/L — SIGNIFICANT CHANGE UP (ref 96–108)
CO2 SERPL-SCNC: 26 MMOL/L — SIGNIFICANT CHANGE UP (ref 22–31)
CREAT SERPL-MCNC: 1.57 MG/DL — HIGH (ref 0.5–1.3)
GLUCOSE BLDC GLUCOMTR-MCNC: 122 MG/DL — HIGH (ref 70–99)
GLUCOSE BLDC GLUCOMTR-MCNC: 179 MG/DL — HIGH (ref 70–99)
GLUCOSE BLDC GLUCOMTR-MCNC: 182 MG/DL — HIGH (ref 70–99)
GLUCOSE BLDC GLUCOMTR-MCNC: 207 MG/DL — HIGH (ref 70–99)
GLUCOSE BLDC GLUCOMTR-MCNC: 58 MG/DL — LOW (ref 70–99)
GLUCOSE BLDC GLUCOMTR-MCNC: 67 MG/DL — LOW (ref 70–99)
GLUCOSE BLDC GLUCOMTR-MCNC: 69 MG/DL — LOW (ref 70–99)
GLUCOSE BLDC GLUCOMTR-MCNC: 93 MG/DL — SIGNIFICANT CHANGE UP (ref 70–99)
GLUCOSE SERPL-MCNC: 166 MG/DL — HIGH (ref 70–99)
HCT VFR BLD CALC: 27.8 % — LOW (ref 39–50)
HGB BLD-MCNC: 8.3 G/DL — LOW (ref 13–17)
MCHC RBC-ENTMCNC: 26.6 PG — LOW (ref 27–34)
MCHC RBC-ENTMCNC: 29.9 GM/DL — LOW (ref 32–36)
MCV RBC AUTO: 89.1 FL — SIGNIFICANT CHANGE UP (ref 80–100)
NRBC # BLD: 0 /100 WBCS — SIGNIFICANT CHANGE UP (ref 0–0)
PLATELET # BLD AUTO: 274 K/UL — SIGNIFICANT CHANGE UP (ref 150–400)
POTASSIUM SERPL-MCNC: 5.3 MMOL/L — SIGNIFICANT CHANGE UP (ref 3.5–5.3)
POTASSIUM SERPL-SCNC: 5.3 MMOL/L — SIGNIFICANT CHANGE UP (ref 3.5–5.3)
RBC # BLD: 3.12 M/UL — LOW (ref 4.2–5.8)
RBC # FLD: 15.2 % — HIGH (ref 10.3–14.5)
SODIUM SERPL-SCNC: 135 MMOL/L — SIGNIFICANT CHANGE UP (ref 135–145)
WBC # BLD: 10.21 K/UL — SIGNIFICANT CHANGE UP (ref 3.8–10.5)
WBC # FLD AUTO: 10.21 K/UL — SIGNIFICANT CHANGE UP (ref 3.8–10.5)

## 2020-03-10 PROCEDURE — 99232 SBSQ HOSP IP/OBS MODERATE 35: CPT

## 2020-03-10 RX ORDER — OXYCODONE AND ACETAMINOPHEN 5; 325 MG/1; MG/1
1 TABLET ORAL EVERY 6 HOURS
Refills: 0 | Status: DISCONTINUED | OUTPATIENT
Start: 2020-03-10 | End: 2020-03-16

## 2020-03-10 RX ORDER — NYSTATIN 500MM UNIT
500000 POWDER (EA) MISCELLANEOUS EVERY 6 HOURS
Refills: 0 | Status: DISCONTINUED | OUTPATIENT
Start: 2020-03-10 | End: 2020-03-16

## 2020-03-10 RX ORDER — INSULIN LISPRO 100/ML
14 VIAL (ML) SUBCUTANEOUS
Refills: 0 | Status: DISCONTINUED | OUTPATIENT
Start: 2020-03-10 | End: 2020-03-11

## 2020-03-10 RX ORDER — SODIUM CHLORIDE 9 MG/ML
1000 INJECTION, SOLUTION INTRAVENOUS
Refills: 0 | Status: DISCONTINUED | OUTPATIENT
Start: 2020-03-10 | End: 2020-03-16

## 2020-03-10 RX ORDER — INSULIN LISPRO 100/ML
10 VIAL (ML) SUBCUTANEOUS
Refills: 0 | Status: DISCONTINUED | OUTPATIENT
Start: 2020-03-10 | End: 2020-03-10

## 2020-03-10 RX ORDER — INSULIN GLARGINE 100 [IU]/ML
38 INJECTION, SOLUTION SUBCUTANEOUS AT BEDTIME
Refills: 0 | Status: DISCONTINUED | OUTPATIENT
Start: 2020-03-10 | End: 2020-03-11

## 2020-03-10 RX ADMIN — Medication 100 MILLIGRAM(S): at 05:48

## 2020-03-10 RX ADMIN — Medication 100 MILLIGRAM(S): at 22:35

## 2020-03-10 RX ADMIN — Medication 200 MILLIGRAM(S): at 03:48

## 2020-03-10 RX ADMIN — LINEZOLID 600 MILLIGRAM(S): 600 INJECTION, SOLUTION INTRAVENOUS at 17:56

## 2020-03-10 RX ADMIN — Medication 100 MILLIGRAM(S): at 14:04

## 2020-03-10 RX ADMIN — Medication 3 MILLILITER(S): at 22:36

## 2020-03-10 RX ADMIN — HEPARIN SODIUM 5000 UNIT(S): 5000 INJECTION INTRAVENOUS; SUBCUTANEOUS at 05:47

## 2020-03-10 RX ADMIN — AMIODARONE HYDROCHLORIDE 200 MILLIGRAM(S): 400 TABLET ORAL at 05:48

## 2020-03-10 RX ADMIN — Medication 3 MILLILITER(S): at 05:48

## 2020-03-10 RX ADMIN — Medication 1 SPRAY(S): at 14:04

## 2020-03-10 RX ADMIN — HEPARIN SODIUM 5000 UNIT(S): 5000 INJECTION INTRAVENOUS; SUBCUTANEOUS at 14:04

## 2020-03-10 RX ADMIN — Medication 3 MILLILITER(S): at 14:02

## 2020-03-10 RX ADMIN — Medication 667 MILLIGRAM(S): at 14:03

## 2020-03-10 RX ADMIN — Medication 1 SPRAY(S): at 22:34

## 2020-03-10 RX ADMIN — MODAFINIL 100 MILLIGRAM(S): 200 TABLET ORAL at 08:32

## 2020-03-10 RX ADMIN — OXYCODONE AND ACETAMINOPHEN 1 TABLET(S): 5; 325 TABLET ORAL at 02:28

## 2020-03-10 RX ADMIN — CHLORHEXIDINE GLUCONATE 1 APPLICATION(S): 213 SOLUTION TOPICAL at 08:52

## 2020-03-10 RX ADMIN — HEPARIN SODIUM 5000 UNIT(S): 5000 INJECTION INTRAVENOUS; SUBCUTANEOUS at 22:35

## 2020-03-10 RX ADMIN — Medication 1: at 17:53

## 2020-03-10 RX ADMIN — Medication 0.5 MILLIGRAM(S): at 05:48

## 2020-03-10 RX ADMIN — Medication 1 DROP(S): at 05:49

## 2020-03-10 RX ADMIN — PANTOPRAZOLE SODIUM 40 MILLIGRAM(S): 20 TABLET, DELAYED RELEASE ORAL at 05:48

## 2020-03-10 RX ADMIN — Medication 667 MILLIGRAM(S): at 08:23

## 2020-03-10 RX ADMIN — Medication 1 TABLET(S): at 14:02

## 2020-03-10 RX ADMIN — Medication 0: at 22:34

## 2020-03-10 RX ADMIN — Medication 12.5 MILLIGRAM(S): at 05:48

## 2020-03-10 RX ADMIN — LINEZOLID 600 MILLIGRAM(S): 600 INJECTION, SOLUTION INTRAVENOUS at 05:48

## 2020-03-10 RX ADMIN — SENNA PLUS 2 TABLET(S): 8.6 TABLET ORAL at 22:35

## 2020-03-10 RX ADMIN — POLYETHYLENE GLYCOL 3350 17 GRAM(S): 17 POWDER, FOR SOLUTION ORAL at 14:03

## 2020-03-10 RX ADMIN — Medication 3 MILLIGRAM(S): at 22:35

## 2020-03-10 RX ADMIN — Medication 3 MILLILITER(S): at 17:54

## 2020-03-10 RX ADMIN — Medication 1 SPRAY(S): at 05:49

## 2020-03-10 RX ADMIN — SPIRONOLACTONE 25 MILLIGRAM(S): 25 TABLET, FILM COATED ORAL at 05:48

## 2020-03-10 RX ADMIN — ATORVASTATIN CALCIUM 40 MILLIGRAM(S): 80 TABLET, FILM COATED ORAL at 22:35

## 2020-03-10 RX ADMIN — Medication 10 MILLIGRAM(S): at 05:48

## 2020-03-10 RX ADMIN — Medication 12.5 MILLIGRAM(S): at 17:56

## 2020-03-10 RX ADMIN — Medication 500000 UNIT(S): at 18:04

## 2020-03-10 RX ADMIN — Medication 667 MILLIGRAM(S): at 17:54

## 2020-03-10 RX ADMIN — Medication 2: at 08:21

## 2020-03-10 RX ADMIN — Medication 8 UNIT(S): at 08:21

## 2020-03-10 RX ADMIN — OXYCODONE AND ACETAMINOPHEN 1 TABLET(S): 5; 325 TABLET ORAL at 08:53

## 2020-03-10 RX ADMIN — Medication 81 MILLIGRAM(S): at 13:56

## 2020-03-10 RX ADMIN — Medication 14 UNIT(S): at 17:54

## 2020-03-10 RX ADMIN — Medication 0.5 MILLIGRAM(S): at 18:04

## 2020-03-10 RX ADMIN — OXYCODONE AND ACETAMINOPHEN 1 TABLET(S): 5; 325 TABLET ORAL at 03:00

## 2020-03-10 NOTE — PROGRESS NOTE ADULT - ASSESSMENT
65yo male with hx of HTN, DM II, presented to the ED with complaints of acute on chronic left sided exertional chest pain. Pt states he has been having left sided pressure and stabbing chest pain radiating to his sternum and right with activity for the past few months. Since admission- s/p cath with severe triple vessel disease, s/p CABG, post op course c/b brief witnessed PEA 2/6 likely hypoxic arrest, s/p intubation. ( CAD, s/p cath with severe triple vessel disease including lesions at the bifurcation of the LAD/diagonal and distal RCA/RPDA/RPL trifurcation.   -s/p CABG x 4, intraop kennedy ef 50%)--s/p ampicillin until 2/18 for enterococcus in blood, extubated 2/9, pigtail right 2/8 and left sided on 2/15-for effusion.  Remains sob-NO h/o resp issues prior to hospitalization.  ***Current sob-multifactorial-CAD/effusions, atelectasis due to pain, mild bronchospasm and debility.  ********************  2/21-slightly better, pig tail cath removed from left chest; CT chest done-loculated left effusion-slight better  2/24-BS issues-less sob-dapto/cefepime as per ID  2/25-for debridement of chest area-sternal wound  2/26-debridement completed--to CTU  2/2727-LGA-zmlnvmwql over all  2/28-chest pain still impacting breathing--plastics/renal endo f/up  2/29-no changes over night  3/1-cough present-has been off BD  3/2-cough present still  3/3-improved cough on amitriptyline  3/4-cough upon swallowing  3/5-no changes-more ambulation-for mbs  3/6-failed swallow study--to D3 diet precautions  3/9-abx to change to zyvox as per ID 63yo male with hx of HTN, DM II, presented to the ED with complaints of acute on chronic left sided exertional chest pain. Pt states he has been having left sided pressure and stabbing chest pain radiating to his sternum and right with activity for the past few months. Since admission- s/p cath with severe triple vessel disease, s/p CABG, post op course c/b brief witnessed PEA 2/6 likely hypoxic arrest, s/p intubation. ( CAD, s/p cath with severe triple vessel disease including lesions at the bifurcation of the LAD/diagonal and distal RCA/RPDA/RPL trifurcation.   -s/p CABG x 4, intraop kennedy ef 50%)--s/p ampicillin until 2/18 for enterococcus in blood, extubated 2/9, pigtail right 2/8 and left sided on 2/15-for effusion.  Remains sob-NO h/o resp issues prior to hospitalization.  ***Current sob-multifactorial-CAD/effusions, atelectasis due to pain, mild bronchospasm and debility.  ********************  2/21-slightly better, pig tail cath removed from left chest; CT chest done-loculated left effusion-slight better  2/24-BS issues-less sob-dapto/cefepime as per ID  2/25-for debridement of chest area-sternal wound  2/26-debridement completed--to CTU  2/2777-ZKV-smuslcott over all  2/28-chest pain still impacting breathing--plastics/renal endo f/up  2/29-no changes over night  3/1-cough present-has been off BD  3/2-cough present still  3/3-improved cough on amitriptyline  3/4-cough upon swallowing  3/5-no changes-more ambulation-for mbs  3/6-failed swallow study--to D3 diet precautions  3/9-abx to change to zyvox as per ID  3/10-further ambulation-zyvox upon DC and for 61 days

## 2020-03-10 NOTE — PROGRESS NOTE ADULT - ASSESSMENT
Assessment  DMT2: 64y Male with DM T2 with hyperglycemia, A1C 9.2%, was on oral meds and insulin at home, now on basal bolus insulin, blood sugars running high and not at target, no new hypoglycemic episodes. Patient is eating meals with improving appetite as well as glucerna TID, sitting up in chair, appears alert and comfortable, wife at bedside.  Foot infection: On Tx, stable.  CAD: s/p CABG 2/3, on medications, no chest pain, stable, monitored.  HTN: Controlled,  on antihypertensive medications.  HLD: Controlled, on statin.  CKD: Monitor labs/BMP          Harper Matias MD  Cell: [phone]  Office: [phone] Assessment  DMT2: 64y Male with DM T2 with hyperglycemia, A1C 9.2%, was on oral meds and insulin at home, now on basal bolus insulin,  blood sugars running high and not at target, no new hypoglycemic episodes. Patient is eating meals with improving appetite as well as glucerna TID, sitting up in chair, appears alert and comfortable, wife at bedside.  Foot infection: On Tx, stable.  CAD: s/p CABG 2/3, on medications, no chest pain, stable, monitored.  HTN: Controlled,  on antihypertensive medications.  HLD: Controlled, on statin.  CKD: Monitor labs/BMP          Harper Matias MD  Cell: [phone]  Office: [phone]

## 2020-03-10 NOTE — PROGRESS NOTE ADULT - ASSESSMENT
intraop kennedy 2/3/20 ef 50%, nl lv, mild diastolic dysfx stage 1  limited echo 2/4/20: nl LV sys fx , no pericardial effusion     a/p  64 year old man with history of HTN, DM II, admitted with progressive exertional angina, s/p cath with severe triple vessel disease, s/p CABG, post op course c/b brief witnessed PEA likely hypoxic arrest, s/p intubation.     1. CAD, s/p cath with severe triple vessel disease including lesions at the bifurcation of the LAD/diagonal and distal RCA/RPDA/RPL trifurcation.   -s/p CABG x 4, cont asa, statin,  -continue bb   -s/p PEA arrest   -echo 2/15 with preserved lv fxn/EF    2. Sternal wound infection  -s/p RTOR for SWI  -abx per id, wound vac     3. Postop acute diastolic HF  -diuretics per cts     4. PAF  -cont amio ,bb  -AC ?, currently on asa    5. HTN  -bp stable    6. STEPHANIE/CKD  -stable, renal f/u     7. Postop Pleural effusion  -S/P Right/Left chest tube     8. Sepsis -E. Faecalis bacteremia, SW infection, RLE cellulitis   -IV abx , repeat bcx 2/13 neg  -s/p debridement   -ID, plastics f/u     dvt ppx  dc planning to rehab, f/u apt. scheduled with me on 4/7 and 4:45pm

## 2020-03-10 NOTE — PROVIDER CONTACT NOTE (MEDICATION) - ASSESSMENT
Patient stable, denies any discomfort, v/s stable. Patient refused diabetic protocol. Patient stated he is going to have home dinner to bring the sugar level up. Patient ate his home food, Finger- Stick was repeated, it rise up to 122mg/dl. Patient stable, denies any discomfort, v/s stable. Patient refused hypoglycemic protocol. Patient stated he is going to have home dinner to bring the sugar level up. Patient ate his home food, Finger- Stick was repeated, it rise up to 122mg/dl.

## 2020-03-10 NOTE — PROGRESS NOTE ADULT - ATTENDING COMMENTS
as above-s/p debridement 2/25 of sternal area--cough due to dyphagia +/-TBM -now on D3 diet precautions.  multifactorial dyspnea-CAD s/p CABG, PEA arrest, atelectasis due to pain, pleural effusion L-pig tail out, bronchospasm, ?PE-O2 NC sat above 90%                     Pleural effusion-pig tail removed 2/20-CT chest NC-improved but still loculations on left-f/up 4-6 wks  CAD/CHF-diurese as cr allows-keep K/Mg above  atelectasis-pain control, incentive spirometry, acapella                    ? DVT/PE--s/p repeat venous dopplers-negative; VQ unable  bronchospasm-duoneb q 6, pulmicort .5 bid; add tessalon perles 200 q 8 and mucinex;  out pt PFTs      D-daptomycin to zyvox as per ID  snore-? osas--out pt SS  DVT/GI prophylaxis, PT, nutrition evaln            PT      DC planning to rehab.  Mike Hernandez MD-Pulmonary    [phone] as above-s/p debridement 2/25 of sternal area--cough due to dyphagia +/-TBM -now on D3 diet precautions-slightly better  multifactorial dyspnea-CAD s/p CABG, PEA arrest, atelectasis due to pain, pleural effusion L-pig tail out, bronchospasm, ?PE-O2 NC sat above 90%                     Pleural effusion-pig tail removed 2/20-CT chest NC-improved but still loculations on left-f/up 4-6 wks  CAD/CHF-diurese as cr allows-keep K/Mg above  atelectasis-pain control, incentive spirometry, acapella                    ? DVT/PE--s/p repeat venous dopplers-negative; VQ unable  bronchospasm-duoneb q 6, pulmicort .5 bid; add tessalon perles 200 q 8, mucinex;  out pt PFTs      ID-daptomycin to zyvox as per ID (for 61 days)  snore-? osas--out pt SS  DVT/GI prophylaxis, PT, nutrition evaln            PT      DC planning to rehab.  Mike Hernandez MD-Pulmonary    [phone]

## 2020-03-10 NOTE — PROGRESS NOTE ADULT - SUBJECTIVE AND OBJECTIVE BOX
Chief complaint  Patient is a 64y old  Male who presents with a chief complaint of Chest pain (10 Mar 2020 12:00)   Review of systems  Patient sitting up in chair, looks comfortable, no hypoglycemia.    Labs and Fingersticks  CAPILLARY BLOOD GLUCOSE      POCT Blood Glucose.: 179 mg/dL (10 Mar 2020 12:23)  POCT Blood Glucose.: 207 mg/dL (10 Mar 2020 07:34)  POCT Blood Glucose.: 152 mg/dL (09 Mar 2020 21:46)  POCT Blood Glucose.: 196 mg/dL (09 Mar 2020 16:54)      Anion Gap, Serum: 11 (03-10 @ 06:03)  Anion Gap, Serum: 12 (03-09 @ 06:31)      Calcium, Total Serum: 8.8 (03-10 @ 06:03)  Calcium, Total Serum: 8.9 (03-09 @ 06:31)          03-10    135  |  98  |  35<H>  ----------------------------<  166<H>  5.3   |  26  |  1.57<H>    Ca    8.8      10 Mar 2020 06:03                          8.3    10.21 )-----------( 274      ( 10 Mar 2020 06:03 )             27.8     Medications  MEDICATIONS  (STANDING):  ALBUTerol    90 MICROgram(s) HFA Inhaler 1 Puff(s) Inhalation every 4 hours  albuterol/ipratropium for Nebulization. 3 milliLiter(s) Nebulizer every 6 hours  aMIOdarone    Tablet 200 milliGRAM(s) Oral daily  artificial tears (preservative free) Ophthalmic Solution 1 Drop(s) Both EYES two times a day  aspirin enteric coated 81 milliGRAM(s) Oral daily  atorvastatin 40 milliGRAM(s) Oral at bedtime  benzonatate 100 milliGRAM(s) Oral every 8 hours  buDESOnide    Inhalation Suspension 0.5 milliGRAM(s) Inhalation two times a day  calcium acetate 667 milliGRAM(s) Oral three times a day with meals  chlorhexidine 2% Cloths 1 Application(s) Topical <User Schedule>  dextrose 5%. 1000 milliLiter(s) (50 mL/Hr) IV Continuous <Continuous>  dextrose 5%. 1000 milliLiter(s) (30 mL/Hr) IV Continuous <Continuous>  dextrose 50% Injectable 12.5 Gram(s) IV Push once  dextrose 50% Injectable 25 Gram(s) IV Push once  dextrose 50% Injectable 25 Gram(s) IV Push once  heparin  Injectable 5000 Unit(s) SubCutaneous every 8 hours  insulin glargine Injectable (LANTUS) 38 Unit(s) SubCutaneous at bedtime  insulin lispro (HumaLOG) corrective regimen sliding scale   SubCutaneous Before meals and at bedtime  insulin lispro Injectable (HumaLOG) 10 Unit(s) SubCutaneous three times a day with meals  linezolid    Tablet 600 milliGRAM(s) Oral every 12 hours  melatonin 3 milliGRAM(s) Oral at bedtime  metoprolol tartrate 12.5 milliGRAM(s) Oral two times a day  modafinil 100 milliGRAM(s) Oral <User Schedule>  multivitamin 1 Tablet(s) Oral daily  pantoprazole    Tablet 40 milliGRAM(s) Oral before breakfast  polyethylene glycol 3350 17 Gram(s) Oral daily  senna 2 Tablet(s) Oral at bedtime  silver sulfADIAZINE 1% Cream 1 Application(s) Topical daily  sodium chloride 0.65% Nasal 1 Spray(s) Both Nostrils three times a day  spironolactone 25 milliGRAM(s) Oral daily  tiotropium 18 MICROgram(s) Capsule 1 Capsule(s) Inhalation daily  torsemide 10 milliGRAM(s) Oral daily      Physical Exam  General: Patient comfortable in bed  Vital Signs Last 12 Hrs  T(F): 97.7 (03-10-20 @ 12:26), Max: 97.7 (03-10-20 @ 12:26)  HR: 95 (03-10-20 @ 12:26) (95 - 99)  BP: 126/73 (03-10-20 @ 12:26) (126/73 - 137/64)  BP(mean): 88 (03-10-20 @ 05:08) (88 - 88)  RR: 18 (03-10-20 @ 12:26) (18 - 18)  SpO2: 97% (03-10-20 @ 12:26) (96% - 97%)  Neck: No palpable thyroid nodules.  CVS: S1S2, No murmurs  Respiratory: No wheezing, no crepitations  GI: Abdomen soft, bowel sounds positive  Musculoskeletal:  edema lower extremities.   Skin: No skin rashes, no ecchymosis    Diagnostics Chief complaint  Patient is a 64y old  Male who presents with a chief complaint of Chest pain (10 Mar 2020 12:00)   Review of systems  Patient sitting up in chair, looks comfortable,  no hypoglycemia.    Labs and Fingersticks  CAPILLARY BLOOD GLUCOSE      POCT Blood Glucose.: 179 mg/dL (10 Mar 2020 12:23)  POCT Blood Glucose.: 207 mg/dL (10 Mar 2020 07:34)  POCT Blood Glucose.: 152 mg/dL (09 Mar 2020 21:46)  POCT Blood Glucose.: 196 mg/dL (09 Mar 2020 16:54)      Anion Gap, Serum: 11 (03-10 @ 06:03)  Anion Gap, Serum: 12 (03-09 @ 06:31)      Calcium, Total Serum: 8.8 (03-10 @ 06:03)  Calcium, Total Serum: 8.9 (03-09 @ 06:31)    03-10    135  |  98  |  35<H>  ----------------------------<  166<H>  5.3   |  26  |  1.57<H>    Ca    8.8      10 Mar 2020 06:03                          8.3    10.21 )-----------( 274      ( 10 Mar 2020 06:03 )             27.8     Medications  MEDICATIONS  (STANDING):  ALBUTerol    90 MICROgram(s) HFA Inhaler 1 Puff(s) Inhalation every 4 hours  albuterol/ipratropium for Nebulization. 3 milliLiter(s) Nebulizer every 6 hours  aMIOdarone    Tablet 200 milliGRAM(s) Oral daily  artificial tears (preservative free) Ophthalmic Solution 1 Drop(s) Both EYES two times a day  aspirin enteric coated 81 milliGRAM(s) Oral daily  atorvastatin 40 milliGRAM(s) Oral at bedtime  benzonatate 100 milliGRAM(s) Oral every 8 hours  buDESOnide    Inhalation Suspension 0.5 milliGRAM(s) Inhalation two times a day  calcium acetate 667 milliGRAM(s) Oral three times a day with meals  chlorhexidine 2% Cloths 1 Application(s) Topical <User Schedule>  dextrose 5%. 1000 milliLiter(s) (50 mL/Hr) IV Continuous <Continuous>  dextrose 5%. 1000 milliLiter(s) (30 mL/Hr) IV Continuous <Continuous>  dextrose 50% Injectable 12.5 Gram(s) IV Push once  dextrose 50% Injectable 25 Gram(s) IV Push once  dextrose 50% Injectable 25 Gram(s) IV Push once  heparin  Injectable 5000 Unit(s) SubCutaneous every 8 hours  insulin glargine Injectable (LANTUS) 38 Unit(s) SubCutaneous at bedtime  insulin lispro (HumaLOG) corrective regimen sliding scale   SubCutaneous Before meals and at bedtime  insulin lispro Injectable (HumaLOG) 10 Unit(s) SubCutaneous three times a day with meals  linezolid    Tablet 600 milliGRAM(s) Oral every 12 hours  melatonin 3 milliGRAM(s) Oral at bedtime  metoprolol tartrate 12.5 milliGRAM(s) Oral two times a day  modafinil 100 milliGRAM(s) Oral <User Schedule>  multivitamin 1 Tablet(s) Oral daily  pantoprazole    Tablet 40 milliGRAM(s) Oral before breakfast  polyethylene glycol 3350 17 Gram(s) Oral daily  senna 2 Tablet(s) Oral at bedtime  silver sulfADIAZINE 1% Cream 1 Application(s) Topical daily  sodium chloride 0.65% Nasal 1 Spray(s) Both Nostrils three times a day  spironolactone 25 milliGRAM(s) Oral daily  tiotropium 18 MICROgram(s) Capsule 1 Capsule(s) Inhalation daily  torsemide 10 milliGRAM(s) Oral daily      Physical Exam  General: Patient comfortable in bed  Vital Signs Last 12 Hrs  T(F): 97.7 (03-10-20 @ 12:26), Max: 97.7 (03-10-20 @ 12:26)  HR: 95 (03-10-20 @ 12:26) (95 - 99)  BP: 126/73 (03-10-20 @ 12:26) (126/73 - 137/64)  BP(mean): 88 (03-10-20 @ 05:08) (88 - 88)  RR: 18 (03-10-20 @ 12:26) (18 - 18)  SpO2: 97% (03-10-20 @ 12:26) (96% - 97%)  Neck: No palpable thyroid nodules.  CVS: S1S2, No murmurs  Respiratory: No wheezing, no crepitations  GI: Abdomen soft, bowel sounds positive  Musculoskeletal:  edema lower extremities.   Skin: No skin rashes, no ecchymosis    Diagnostics

## 2020-03-10 NOTE — PROGRESS NOTE ADULT - PROBLEM SELECTOR PLAN 6
2/25 s/p sternal wound debridement/flap with plastic surgery  ID and plastic surgery following  L KEI's as per plastic surgery - monitor output  RJP removed this am 3/6  po zyvox 600 mg po q12   Wound vac to distal pole of MSI by PT- change by pt today 3/8

## 2020-03-10 NOTE — PROGRESS NOTE ADULT - ASSESSMENT
64 yr old with cri stage 4, DM, PVD, admitted and had CABG, Post op intubated and has bilateral effusions, worsening renal disease and bc positive for E. faecalis; follow up bc negative to date.  Call to see patient for discharge from sternal wound  Presently on treatment for RLE SSTI  CT chest with suspected postsurgical changes     Overall,  1  Sternal wound,  infection plus cellulitis around the svg site on right   the cellulits of leg is resolved  still with open wd that has not healed. wds over strerum  - Continue Dapto    ' dapto was rejected at time of discharge. I am concerned about giving prolonged vanco with renal [id number]  discussed with Dr. Mariscal  we can switch to po zyvoxx  upon discharge  600 mg bid and continue for 11 days  until March 21   monitor cdc at rehab   -

## 2020-03-10 NOTE — PROGRESS NOTE ADULT - SUBJECTIVE AND OBJECTIVE BOX
CHIEF COMPLAINT: f/up sob, effusions, bronchospasm, dysphagia-cough---    Interval Events:    REVIEW OF SYSTEMS:  Constitutional: No fevers or chills. No weight loss. No fatigue or generalized malaise.  Eyes: No itching or discharge from the eyes  ENT: No ear pain. No ear discharge. No nasal congestion. No post nasal drip. No epistaxis. No throat pain. No sore throat. No difficulty swallowing.   CV: No chest pain. No palpitations. No lightheadedness or dizziness.   Resp: No dyspnea at rest. No dyspnea on exertion. No orthopnea. No wheezing. No cough. No stridor. No sputum production. No chest pain with respiration.  GI: No nausea. No vomiting. No diarrhea.  MSK: No joint pain or pain in any extremities  Integumentary: No skin lesions. No pedal edema.  Neurological: No gross motor weakness. No sensory changes.  [ ] All other systems negative  [ ] Unable to assess ROS because ________    OBJECTIVE:  ICU Vital Signs Last 24 Hrs  T(C): 36.2 (10 Mar 2020 05:08), Max: 37.1 (10 Mar 2020 00:20)  T(F): 97.2 (10 Mar 2020 05:08), Max: 98.8 (10 Mar 2020 00:20)  HR: 99 (10 Mar 2020 05:08) (98 - 105)  BP: 137/64 (10 Mar 2020 05:08) (118/75 - 142/79)  BP(mean): 88 (10 Mar 2020 05:08) (88 - 89)  ABP: --  ABP(mean): --  RR: 18 (10 Mar 2020 05:08) (18 - 18)  SpO2: 96% (10 Mar 2020 05:08) (96% - 98%)        03-08 @ 07:01  -  03-09 @ 07:00  --------------------------------------------------------  IN: 0 mL / OUT: 1863 mL / NET: -1863 mL    03-09 @ 07:01  -  03-10 @ 05:41  --------------------------------------------------------  IN: 360 mL / OUT: 1350 mL / NET: -990 mL      CAPILLARY BLOOD GLUCOSE      POCT Blood Glucose.: 152 mg/dL (09 Mar 2020 21:46)      PHYSICAL EXAM:  General: Awake, alert, oriented X 3.   HEENT: Atraumatic, normocephalic.                 Mallampatti Grade                 No nasal congestion.                No tonsillar or pharyngeal exudates.  Lymph Nodes: No palpable lymphadenopathy  Neck: No JVD. No carotid bruit.   Respiratory: Normal chest expansion                         Normal percussion                         Normal and equal air entry                         No wheeze, rhonchi or rales.  Cardiovascular: S1 S2 normal. No murmurs, rubs or gallops.   Abdomen: Soft, non-tender, non-distended. No organomegaly. Normoactive bowel sounds.  Extremities: Warm to touch. Peripheral pulse palpable. No pedal edema.   Skin: No rashes or skin lesions  Neurological: Motor and sensory examination equal and normal in all four extremities.  Psychiatry: Appropriate mood and affect.    HOSPITAL MEDICATIONS:  MEDICATIONS  (STANDING):  ALBUTerol    90 MICROgram(s) HFA Inhaler 1 Puff(s) Inhalation every 4 hours  albuterol/ipratropium for Nebulization. 3 milliLiter(s) Nebulizer every 6 hours  aMIOdarone    Tablet 200 milliGRAM(s) Oral daily  artificial tears (preservative free) Ophthalmic Solution 1 Drop(s) Both EYES two times a day  aspirin enteric coated 81 milliGRAM(s) Oral daily  atorvastatin 40 milliGRAM(s) Oral at bedtime  benzonatate 100 milliGRAM(s) Oral every 8 hours  buDESOnide    Inhalation Suspension 0.5 milliGRAM(s) Inhalation two times a day  calcium acetate 667 milliGRAM(s) Oral three times a day with meals  chlorhexidine 2% Cloths 1 Application(s) Topical <User Schedule>  dextrose 5%. 1000 milliLiter(s) (50 mL/Hr) IV Continuous <Continuous>  dextrose 5%. 1000 milliLiter(s) (30 mL/Hr) IV Continuous <Continuous>  dextrose 50% Injectable 12.5 Gram(s) IV Push once  dextrose 50% Injectable 25 Gram(s) IV Push once  dextrose 50% Injectable 25 Gram(s) IV Push once  heparin  Injectable 5000 Unit(s) SubCutaneous every 8 hours  insulin glargine Injectable (LANTUS) 34 Unit(s) SubCutaneous at bedtime  insulin lispro (HumaLOG) corrective regimen sliding scale   SubCutaneous Before meals and at bedtime  insulin lispro Injectable (HumaLOG) 8 Unit(s) SubCutaneous three times a day with meals  linezolid    Tablet 600 milliGRAM(s) Oral every 12 hours  melatonin 3 milliGRAM(s) Oral at bedtime  metoprolol tartrate 12.5 milliGRAM(s) Oral two times a day  modafinil 100 milliGRAM(s) Oral <User Schedule>  multivitamin 1 Tablet(s) Oral daily  pantoprazole    Tablet 40 milliGRAM(s) Oral before breakfast  polyethylene glycol 3350 17 Gram(s) Oral daily  senna 2 Tablet(s) Oral at bedtime  silver sulfADIAZINE 1% Cream 1 Application(s) Topical daily  sodium chloride 0.65% Nasal 1 Spray(s) Both Nostrils three times a day  spironolactone 25 milliGRAM(s) Oral daily  tiotropium 18 MICROgram(s) Capsule 1 Capsule(s) Inhalation daily  torsemide 10 milliGRAM(s) Oral daily    MEDICATIONS  (PRN):  acetaminophen   Tablet .. 650 milliGRAM(s) Oral every 6 hours PRN Mild Pain (1 - 3)  albuterol/ipratropium for Nebulization. 3 milliLiter(s) Nebulizer every 6 hours PRN Shortness of Breath and/or Wheezing  dextrose 40% Gel 15 Gram(s) Oral once PRN Blood Glucose LESS THAN 70 milliGRAM(s)/deciLiter  glucagon  Injectable 1 milliGRAM(s) IntraMuscular once PRN Glucose <70 milliGRAM(s)/deciLiter  guaiFENesin   Syrup  (Sugar-Free) 200 milliGRAM(s) Oral every 6 hours PRN Cough  oxycodone    5 mG/acetaminophen 325 mG 1 Tablet(s) Oral every 6 hours PRN Severe Pain (7 - 10)  sodium chloride 0.9% lock flush 10 milliLiter(s) IV Push every 1 hour PRN Pre/post blood products, medications, blood draw, and to maintain line patency      LABS:                        8.5    8.85  )-----------( 284      ( 09 Mar 2020 06:31 )             28.0     03-09    136  |  97  |  40<H>  ----------------------------<  137<H>  4.6   |  27  |  1.75<H>    Ca    8.9      09 Mar 2020 06:31    TPro  6.3  /  Alb  2.7<L>  /  TBili  0.2  /  DBili  x   /  AST  28  /  ALT  50<H>  /  AlkPhos  103  03-08              MICROBIOLOGY:     RADIOLOGY:  [ ] Reviewed and interpreted by me    Point of Care Ultrasound Findings:    PFT:    EKG: CHIEF COMPLAINT: f/up sob, effusions, bronchospasm, dysphagia-cough---some cough and poor sleep    Interval Events: ambulating well    REVIEW OF SYSTEMS:  Constitutional: No fevers or chills. No weight loss. + fatigue or generalized malaise.  Eyes: No itching or discharge from the eyes  ENT: No ear pain. No ear discharge. No nasal congestion. No post nasal drip. No epistaxis. No throat pain. No sore throat. No difficulty swallowing.   CV: No chest pain. No palpitations. No lightheadedness or dizziness.   Resp: No dyspnea at rest. No dyspnea on exertion. No orthopnea. No wheezing. + cough. No stridor. No sputum production. No chest pain with respiration.  GI: No nausea. No vomiting. No diarrhea.  MSK: No joint pain or pain in any extremities  Integumentary: No skin lesions. + pedal edema.  Neurological: No gross motor weakness. No sensory changes.  [+ ] All other systems negative  [ ] Unable to assess ROS because ________    OBJECTIVE:  ICU Vital Signs Last 24 Hrs  T(C): 36.2 (10 Mar 2020 05:08), Max: 37.1 (10 Mar 2020 00:20)  T(F): 97.2 (10 Mar 2020 05:08), Max: 98.8 (10 Mar 2020 00:20)  HR: 99 (10 Mar 2020 05:08) (98 - 105)  BP: 137/64 (10 Mar 2020 05:08) (118/75 - 142/79)  BP(mean): 88 (10 Mar 2020 05:08) (88 - 89)  ABP: --  ABP(mean): --  RR: 18 (10 Mar 2020 05:08) (18 - 18)  SpO2: 96% (10 Mar 2020 05:08) (96% - 98%)        03-08 @ 07:01  -  03-09 @ 07:00  --------------------------------------------------------  IN: 0 mL / OUT: 1863 mL / NET: -1863 mL    03-09 @ 07:01  -  03-10 @ 05:41  --------------------------------------------------------  IN: 360 mL / OUT: 1350 mL / NET: -990 mL      CAPILLARY BLOOD GLUCOSE      POCT Blood Glucose.: 152 mg/dL (09 Mar 2020 21:46)      PHYSICAL EXAM: NAD in chair-good spirits  General: Awake, alert, oriented X 3.   HEENT: Atraumatic, normocephalic.                 Mallampatti Grade 3                No nasal congestion.                No tonsillar or pharyngeal exudates.  Lymph Nodes: No palpable lymphadenopathy  Neck: No JVD. No carotid bruit.   Respiratory: abnormal chest expansion                         Normal percussion                         Normal and equal air entry                         No wheeze, rhonchi or rales.  Cardiovascular: S1 S2 normal. No murmurs, rubs or gallops.   Abdomen: Soft, non-tender, non-distended. No organomegaly. Normoactive bowel sounds.  Extremities: Warm to touch. Peripheral pulse palpable. + pedal edema.   Skin: No rashes or skin lesions  Neurological: Motor and sensory examination equal and normal in all four extremities.  Psychiatry: Appropriate mood and affect.    HOSPITAL MEDICATIONS:  MEDICATIONS  (STANDING):  ALBUTerol    90 MICROgram(s) HFA Inhaler 1 Puff(s) Inhalation every 4 hours  albuterol/ipratropium for Nebulization. 3 milliLiter(s) Nebulizer every 6 hours  aMIOdarone    Tablet 200 milliGRAM(s) Oral daily  artificial tears (preservative free) Ophthalmic Solution 1 Drop(s) Both EYES two times a day  aspirin enteric coated 81 milliGRAM(s) Oral daily  atorvastatin 40 milliGRAM(s) Oral at bedtime  benzonatate 100 milliGRAM(s) Oral every 8 hours  buDESOnide    Inhalation Suspension 0.5 milliGRAM(s) Inhalation two times a day  calcium acetate 667 milliGRAM(s) Oral three times a day with meals  chlorhexidine 2% Cloths 1 Application(s) Topical <User Schedule>  dextrose 5%. 1000 milliLiter(s) (50 mL/Hr) IV Continuous <Continuous>  dextrose 5%. 1000 milliLiter(s) (30 mL/Hr) IV Continuous <Continuous>  dextrose 50% Injectable 12.5 Gram(s) IV Push once  dextrose 50% Injectable 25 Gram(s) IV Push once  dextrose 50% Injectable 25 Gram(s) IV Push once  heparin  Injectable 5000 Unit(s) SubCutaneous every 8 hours  insulin glargine Injectable (LANTUS) 34 Unit(s) SubCutaneous at bedtime  insulin lispro (HumaLOG) corrective regimen sliding scale   SubCutaneous Before meals and at bedtime  insulin lispro Injectable (HumaLOG) 8 Unit(s) SubCutaneous three times a day with meals  linezolid    Tablet 600 milliGRAM(s) Oral every 12 hours  melatonin 3 milliGRAM(s) Oral at bedtime  metoprolol tartrate 12.5 milliGRAM(s) Oral two times a day  modafinil 100 milliGRAM(s) Oral <User Schedule>  multivitamin 1 Tablet(s) Oral daily  pantoprazole    Tablet 40 milliGRAM(s) Oral before breakfast  polyethylene glycol 3350 17 Gram(s) Oral daily  senna 2 Tablet(s) Oral at bedtime  silver sulfADIAZINE 1% Cream 1 Application(s) Topical daily  sodium chloride 0.65% Nasal 1 Spray(s) Both Nostrils three times a day  spironolactone 25 milliGRAM(s) Oral daily  tiotropium 18 MICROgram(s) Capsule 1 Capsule(s) Inhalation daily  torsemide 10 milliGRAM(s) Oral daily    MEDICATIONS  (PRN):  acetaminophen   Tablet .. 650 milliGRAM(s) Oral every 6 hours PRN Mild Pain (1 - 3)  albuterol/ipratropium for Nebulization. 3 milliLiter(s) Nebulizer every 6 hours PRN Shortness of Breath and/or Wheezing  dextrose 40% Gel 15 Gram(s) Oral once PRN Blood Glucose LESS THAN 70 milliGRAM(s)/deciLiter  glucagon  Injectable 1 milliGRAM(s) IntraMuscular once PRN Glucose <70 milliGRAM(s)/deciLiter  guaiFENesin   Syrup  (Sugar-Free) 200 milliGRAM(s) Oral every 6 hours PRN Cough  oxycodone    5 mG/acetaminophen 325 mG 1 Tablet(s) Oral every 6 hours PRN Severe Pain (7 - 10)  sodium chloride 0.9% lock flush 10 milliLiter(s) IV Push every 1 hour PRN Pre/post blood products, medications, blood draw, and to maintain line patency      LABS:                        8.5    8.85  )-----------( 284      ( 09 Mar 2020 06:31 )             28.0     03-09    136  |  97  |  40<H>  ----------------------------<  137<H>  4.6   |  27  |  1.75<H>    Ca    8.9      09 Mar 2020 06:31    TPro  6.3  /  Alb  2.7<L>  /  TBili  0.2  /  DBili  x   /  AST  28  /  ALT  50<H>  /  AlkPhos  103  03-08              MICROBIOLOGY:     RADIOLOGY:  [ ] Reviewed and interpreted by me    Point of Care Ultrasound Findings:    PFT:    EKG:

## 2020-03-10 NOTE — PROGRESS NOTE ADULT - PROBLEM SELECTOR PLAN 4
ID following  AS per ID Zyvoxx po bid to be given until 3/21/20    date of SWD as per ID; bc 2/19 neg   daily cbc   may switch to another antibiotic Monday 3/9  vac to sternum

## 2020-03-10 NOTE — PROGRESS NOTE ADULT - SUBJECTIVE AND OBJECTIVE BOX
CC: no cp/sob, walking w PT, c/o cough    TELEMETRY:     PHYSICAL EXAM:    T(C): 36.2 (03-10-20 @ 05:08), Max: 37.1 (03-10-20 @ 00:20)  HR: 99 (03-10-20 @ 05:08) (98 - 105)  BP: 137/64 (03-10-20 @ 05:08) (118/75 - 142/79)  RR: 18 (03-10-20 @ 05:08) (18 - 18)  SpO2: 96% (03-10-20 @ 05:08) (96% - 98%)  Wt(kg): --  I&O's Summary    09 Mar 2020 07:01  -  10 Mar 2020 07:00  --------------------------------------------------------  IN: 360 mL / OUT: 2325 mL / NET: -1965 mL    10 Mar 2020 07:01  -  10 Mar 2020 12:01  --------------------------------------------------------  IN: 60 mL / OUT: 0 mL / NET: 60 mL        Appearance: Normal	  Cardiovascular: Normal S1 S2,RRR, No JVD, No murmurs  Respiratory: Lungs clear to auscultation	  Gastrointestinal:  Soft, Non-tender, + BS	  Extremities: ++ edema  Vascular: Peripheral pulses palpable 2+ bilaterally     LABS:	 	                          8.3    10.21 )-----------( 274      ( 10 Mar 2020 06:03 )             27.8     03-10    135  |  98  |  35<H>  ----------------------------<  166<H>  5.3   |  26  |  1.57<H>    Ca    8.8      10 Mar 2020 06:03            CARDIAC MARKERS:

## 2020-03-10 NOTE — PROGRESS NOTE ADULT - PROBLEM SELECTOR PLAN 2
Continue diabetic diet  endocrine following, Dr. Matias  continue lantus 34HS and humalog 12TID   accuchecks ac and hs

## 2020-03-10 NOTE — PROGRESS NOTE ADULT - PROBLEM SELECTOR PLAN 1
Tight glycemic control necessary in the setting of wound infection.  Will increase Lantus  to 38u at bedtime.  Will increase Humalog to 10u before each meal and continue Humalog correction scale coverage. Will continue monitoring FS and FU.  Patient counseled for compliance with consistent low carb diet; Alerted to let RN know if he won't be eating. Humalog should be held in this case to prevent future hypoglycemic episodes.

## 2020-03-10 NOTE — PROGRESS NOTE ADULT - ASSESSMENT
65yo male with hx of HTN, DM II   s/p C4L on 2/3; PEA arrest on 2/6   + enterococcus Bld  C/S > started ampicillin q8h, ID consulted,  R pigtail for effusion  2/8    Extubated  2/9  2/15 L pigtail effusion  2/17 PRBC   2/18 Tx 2 Diaz  2/19 thomas removed, trial void. Maintain left pigtail for significant output. Pt encouraged to ambulate. Supplemental o2 sat NC weaned to 2L. Blood cultures repeated.  2/20 VSS -Pulmonary consult - appreciated - duonebs ATC q6h & pulmicort bid initiated - ddimer drawn.  pt w/ hx negative LE dopplers   will order non con chest DT as pt has a loculated left effusion that will require tap at IR.  2/21 - NON con Chest CT done - multiple loculated Left pleural effusions w/ patchy consolidation R - rounds made w/ Dr. carl.  ID - consult called re: Ct - WBC 11 afebrile.  +PALMA.  unable to tolerate VQ scan this am.  will d/c bumex & start Torsemide 20mg po daily  d/c planning rehab when medically stable  2/22  Wound care consult leg wounds> shower w/ local skin care  2/23  SW drainage> on Dapto> as per ID will cover SWD  CXR this am   Tighter glycemic control  2/24 ID added cefapime SW drainage purulent, afebrile  2/25 S/p Sternal wound debridement and irrigation with removal of all 6 sternal wires. Purulence encountered and sent for culture. Closure with bilateral pectoralis muscle advancement flaps.  Post op zari for hypotension- weaned off.  Skin/wd  culture from 2/24 neg  Pt transferred to sdu  2/27 VSS   carlos d/c thomas d/c transfer to floor JPx2  followed  by Plastics  followed by ID on cefepime and daptomycin bc positive for E. faecalis; follow up bc 2/19 negative to date. Provigil daily in am  2/28 VSS; abx as per ID - d/c cefepime and continue dapto 500 iv qd as per Dr. Carrasco- pt will need picc line vs medel for 4 wks abx from date of SWD as per ID; bc 2/19 neg.  d/w h. renal medel vs cath- await decision, diuresis initiated for hypervolemia; hep sq for dvt prophylaxis, + hypoglycemia- endo following- humalog adjusted    Discharge planning- rehab next week   2/29 VSS, distal portion of MSI with small dehiscence and serous purulent drainage - recommended wound vac placement as per Plastics. Left KEI 80ml & Right KEI 80ml/24h. S/P Right PICC line placement for IV abx.   3/1 Wound Vac placed to sternal wound. Pt ambulated in hallway with rolling walker. Left KEI 50ml & Right KEI 110ml/24h. Plan for discharge to Rehab Mon/Tue.   3/2 Pending patient rehab selection. Maintain sternal wound vac placement and KEI drains. Patient lethargic after percocet. Will hold narcotics . Continue with Daptomycin x 4 weeks until 3/22/20  3/3 VSS - call to ID re: alternative antibiotic to Daptomycin - Dr. Carrasco to follow up . d/c to rehab   3/4  HD stable .  + cough--bedside swallowing eval + cough--recs NPO and MBS in am--insulin adjusted by Dr Matias.   Dw Dr Carrasco--daptomycin can be switched on Monday to another antibiotic thru 3/22  3/5 VSS - MBS - Per speech pathologist- no gross aspiration ?silent - placed on Dysphagia 3 diet w/ nectar thick liquids. d/w Dr. Mariscal-  As per ID - will switch antibiotic to Zyvoxx on Monday 3/9/2020 - d/c to rehab on Monday on zyvoxx until 3/22  3/6 VSS; continue diuresis; resume low dose bb; abx as per ID/ vac  3/7/2020 VSS rounds made w/ Dr. Mariscal- right KEI w/ minimal drainage - spoke w/ plastics - will d/c right KEI - d/c plan rehab - switch to Zyvoxx bid po on monday.  3/8 VSS; vac change today + drainage noted at distal vac site; wbc 8 and pt afebrile; abx as per ID; cr 2.0- decrease torsemide 20 qd as per Dr. Mariscal   3/9 VSS; wbc 8 and pt afebrile; abx changed to po zyvox 600 mg po q12 as per ID   Discharge planning- await rehab placement   3/10/2020 - Pt denied from rehab of his choice.  pt may need to remain in hospital until the remainder of his antibiotic course 3/21/2020 as  he is being denied from rehab.  Care Coordinator will continue to apply to rehabs..

## 2020-03-10 NOTE — PROGRESS NOTE ADULT - SUBJECTIVE AND OBJECTIVE BOX
VITAL SIGNS-Telemetry:  SR   Vital Signs Last 24 Hrs  T(C): 36.2 (03-10-20 @ 05:08), Max: 37.1 (03-10-20 @ 00:20)  T(F): 97.2 (03-10-20 @ 05:08), Max: 98.8 (03-10-20 @ 00:20)  HR: 99 (03-10-20 @ 05:08) (98 - 105)  BP: 137/64 (03-10-20 @ 05:08) (118/75 - 142/79)  RR: 18 (03-10-20 @ 05:08) (18 - 18)  SpO2: 96% (03-10-20 @ 05:08) (96% - 98%)          @ 07:01  -  03-10 @ 07:00  --------------------------------------------------------  IN: 360 mL / OUT: 2325 mL / NET: -1965 mL    03-10 @ 07:01  -  03-10 @ 11:40  --------------------------------------------------------  IN: 60 mL / OUT: 0 mL / NET: 60 mL    Daily     Daily Weight in k.5 (10 Mar 2020 08:23)    CAPILLARY BLOOD GLUCOSE  POCT Blood Glucose.: 207 mg/dL (10 Mar 2020 07:34)  POCT Blood Glucose.: 152 mg/dL (09 Mar 2020 21:46)  POCT Blood Glucose.: 196 mg/dL (09 Mar 2020 16:54)  POCT Blood Glucose.: 112 mg/dL (09 Mar 2020 13:15)  POCT Blood Glucose.: 103 mg/dL (09 Mar 2020 12:46)    Drains:     MS KEI x 1         [ x ] Drainage:    scant               PHYSICAL EXAM:  Neurology: alert and oriented x 3, nonfocal, no gross deficits  CV : S1S2  Sternal Wound :  CDI , Stable  Lungs: CTA  Abdomen: soft, nontender, nondistended, positive bowel sounds, last bowel movement   3/9      Extremities:    + pitting edema b/l L ankle w/ dressing in place. RLE inc cdi     acetaminophen   Tablet .. 650 milliGRAM(s) Oral every 6 hours PRN  ALBUTerol    90 MICROgram(s) HFA Inhaler 1 Puff(s) Inhalation every 4 hours  albuterol/ipratropium for Nebulization. 3 milliLiter(s) Nebulizer every 6 hours PRN  albuterol/ipratropium for Nebulization. 3 milliLiter(s) Nebulizer every 6 hours  aMIOdarone    Tablet 200 milliGRAM(s) Oral daily  artificial tears (preservative free) Ophthalmic Solution 1 Drop(s) Both EYES two times a day  aspirin enteric coated 81 milliGRAM(s) Oral daily  atorvastatin 40 milliGRAM(s) Oral at bedtime  benzonatate 100 milliGRAM(s) Oral every 8 hours  buDESOnide    Inhalation Suspension 0.5 milliGRAM(s) Inhalation two times a day  calcium acetate 667 milliGRAM(s) Oral three times a day with meals  chlorhexidine 2% Cloths 1 Application(s) Topical <User Schedule>  dextrose 40% Gel 15 Gram(s) Oral once PRN  dextrose 5%. 1000 milliLiter(s) IV Continuous <Continuous>  dextrose 5%. 1000 milliLiter(s) IV Continuous <Continuous>  dextrose 50% Injectable 12.5 Gram(s) IV Push once  dextrose 50% Injectable 25 Gram(s) IV Push once  dextrose 50% Injectable 25 Gram(s) IV Push once  glucagon  Injectable 1 milliGRAM(s) IntraMuscular once PRN  guaiFENesin   Syrup  (Sugar-Free) 200 milliGRAM(s) Oral every 6 hours PRN  heparin  Injectable 5000 Unit(s) SubCutaneous every 8 hours  insulin glargine Injectable (LANTUS) 38 Unit(s) SubCutaneous at bedtime  insulin lispro (HumaLOG) corrective regimen sliding scale   SubCutaneous Before meals and at bedtime  insulin lispro Injectable (HumaLOG) 10 Unit(s) SubCutaneous three times a day with meals  linezolid    Tablet 600 milliGRAM(s) Oral every 12 hours  melatonin 3 milliGRAM(s) Oral at bedtime  metoprolol tartrate 12.5 milliGRAM(s) Oral two times a day  modafinil 100 milliGRAM(s) Oral <User Schedule>  multivitamin 1 Tablet(s) Oral daily  oxycodone    5 mG/acetaminophen 325 mG 1 Tablet(s) Oral every 6 hours PRN  pantoprazole    Tablet 40 milliGRAM(s) Oral before breakfast  polyethylene glycol 3350 17 Gram(s) Oral daily  senna 2 Tablet(s) Oral at bedtime  silver sulfADIAZINE 1% Cream 1 Application(s) Topical daily  sodium chloride 0.65% Nasal 1 Spray(s) Both Nostrils three times a day  sodium chloride 0.9% lock flush 10 milliLiter(s) IV Push every 1 hour PRN  spironolactone 25 milliGRAM(s) Oral daily  tiotropium 18 MICROgram(s) Capsule 1 Capsule(s) Inhalation daily  torsemide 10 milliGRAM(s) Oral daily    Physical Therapy Rec:   Home  [  ]   Home w/ PT  [  ]  Rehab  [  ]  Discussed with Cardiothoracic Team at AM rounds.

## 2020-03-10 NOTE — PROGRESS NOTE ADULT - SUBJECTIVE AND OBJECTIVE BOX
infectious diseases progress note:    Patient is a 64y old  Male who presents with a chief complaint of Chest pain (10 Mar 2020 05:40)        Acute ischemic heart disease           Allergies    No Known Allergies    Intolerances        ANTIBIOTICS/RELEVANT:  antimicrobials  linezolid    Tablet 600 milliGRAM(s) Oral every 12 hours    immunologic:    OTHER:  acetaminophen   Tablet .. 650 milliGRAM(s) Oral every 6 hours PRN  ALBUTerol    90 MICROgram(s) HFA Inhaler 1 Puff(s) Inhalation every 4 hours  albuterol/ipratropium for Nebulization. 3 milliLiter(s) Nebulizer every 6 hours PRN  albuterol/ipratropium for Nebulization. 3 milliLiter(s) Nebulizer every 6 hours  aMIOdarone    Tablet 200 milliGRAM(s) Oral daily  artificial tears (preservative free) Ophthalmic Solution 1 Drop(s) Both EYES two times a day  aspirin enteric coated 81 milliGRAM(s) Oral daily  atorvastatin 40 milliGRAM(s) Oral at bedtime  benzonatate 100 milliGRAM(s) Oral every 8 hours  buDESOnide    Inhalation Suspension 0.5 milliGRAM(s) Inhalation two times a day  calcium acetate 667 milliGRAM(s) Oral three times a day with meals  chlorhexidine 2% Cloths 1 Application(s) Topical <User Schedule>  dextrose 40% Gel 15 Gram(s) Oral once PRN  dextrose 5%. 1000 milliLiter(s) IV Continuous <Continuous>  dextrose 5%. 1000 milliLiter(s) IV Continuous <Continuous>  dextrose 50% Injectable 12.5 Gram(s) IV Push once  dextrose 50% Injectable 25 Gram(s) IV Push once  dextrose 50% Injectable 25 Gram(s) IV Push once  glucagon  Injectable 1 milliGRAM(s) IntraMuscular once PRN  guaiFENesin   Syrup  (Sugar-Free) 200 milliGRAM(s) Oral every 6 hours PRN  heparin  Injectable 5000 Unit(s) SubCutaneous every 8 hours  insulin glargine Injectable (LANTUS) 34 Unit(s) SubCutaneous at bedtime  insulin lispro (HumaLOG) corrective regimen sliding scale   SubCutaneous Before meals and at bedtime  insulin lispro Injectable (HumaLOG) 8 Unit(s) SubCutaneous three times a day with meals  melatonin 3 milliGRAM(s) Oral at bedtime  metoprolol tartrate 12.5 milliGRAM(s) Oral two times a day  modafinil 100 milliGRAM(s) Oral <User Schedule>  multivitamin 1 Tablet(s) Oral daily  oxycodone    5 mG/acetaminophen 325 mG 1 Tablet(s) Oral every 6 hours PRN  pantoprazole    Tablet 40 milliGRAM(s) Oral before breakfast  polyethylene glycol 3350 17 Gram(s) Oral daily  senna 2 Tablet(s) Oral at bedtime  silver sulfADIAZINE 1% Cream 1 Application(s) Topical daily  sodium chloride 0.65% Nasal 1 Spray(s) Both Nostrils three times a day  sodium chloride 0.9% lock flush 10 milliLiter(s) IV Push every 1 hour PRN  spironolactone 25 milliGRAM(s) Oral daily  tiotropium 18 MICROgram(s) Capsule 1 Capsule(s) Inhalation daily  torsemide 10 milliGRAM(s) Oral daily      Objective:  Vital Signs Last 24 Hrs  T(C): 36.2 (10 Mar 2020 05:08), Max: 37.1 (10 Mar 2020 00:20)  T(F): 97.2 (10 Mar 2020 05:08), Max: 98.8 (10 Mar 2020 00:20)  HR: 99 (10 Mar 2020 05:08) (98 - 105)  BP: 137/64 (10 Mar 2020 05:08) (118/75 - 142/79)  BP(mean): 88 (10 Mar 2020 05:08) (88 - 89)  RR: 18 (10 Mar 2020 05:08) (18 - 18)  SpO2: 96% (10 Mar 2020 05:08) (96% - 98%)     Eyes:DANIELA, EOMI  Ear/Nose/Throat: no oral lesion, no sinus tenderness on percussion	  Neck:no JVD, no lymphadenopathy, supple  Respiratory: CTA lanre  Cardiovascular: S1S2 RRR,  wd ok  Gastrointestinal:soft, (+) BS, no HSM  Extremities:no  vo infection        LABS:                        8.3    10.21 )-----------( 274      ( 10 Mar 2020 06:03 )             27.8     03-10    135  |  98  |  35<H>  ----------------------------<  166<H>  5.3   |  26  |  1.57<H>    Ca    8.8      10 Mar 2020 06:03    TPro  6.3  /  Alb  2.7<L>  /  TBili  0.2  /  DBili  x   /  AST  28  /  ALT  50<H>  /  AlkPhos  103  03-08            MICROBIOLOGY:    RECENT CULTURES:        RESPIRATORY CULTURES:              RADIOLOGY & ADDITIONAL STUDIES:        Pager [phone]  After 5 pm/weekends or if no response :[phone]

## 2020-03-11 LAB
ANION GAP SERPL CALC-SCNC: 10 MMOL/L — SIGNIFICANT CHANGE UP (ref 5–17)
BUN SERPL-MCNC: 30 MG/DL — HIGH (ref 7–23)
CALCIUM SERPL-MCNC: 7.7 MG/DL — LOW (ref 8.4–10.5)
CHLORIDE SERPL-SCNC: 103 MMOL/L — SIGNIFICANT CHANGE UP (ref 96–108)
CO2 SERPL-SCNC: 24 MMOL/L — SIGNIFICANT CHANGE UP (ref 22–31)
CREAT SERPL-MCNC: 1.37 MG/DL — HIGH (ref 0.5–1.3)
GLUCOSE BLDC GLUCOMTR-MCNC: 144 MG/DL — HIGH (ref 70–99)
GLUCOSE BLDC GLUCOMTR-MCNC: 156 MG/DL — HIGH (ref 70–99)
GLUCOSE BLDC GLUCOMTR-MCNC: 181 MG/DL — HIGH (ref 70–99)
GLUCOSE SERPL-MCNC: 139 MG/DL — HIGH (ref 70–99)
HCT VFR BLD CALC: 27.1 % — LOW (ref 39–50)
HGB BLD-MCNC: 8.1 G/DL — LOW (ref 13–17)
MCHC RBC-ENTMCNC: 26.4 PG — LOW (ref 27–34)
MCHC RBC-ENTMCNC: 29.9 GM/DL — LOW (ref 32–36)
MCV RBC AUTO: 88.3 FL — SIGNIFICANT CHANGE UP (ref 80–100)
NRBC # BLD: 0 /100 WBCS — SIGNIFICANT CHANGE UP (ref 0–0)
PLATELET # BLD AUTO: 243 K/UL — SIGNIFICANT CHANGE UP (ref 150–400)
POTASSIUM SERPL-MCNC: 4.3 MMOL/L — SIGNIFICANT CHANGE UP (ref 3.5–5.3)
POTASSIUM SERPL-SCNC: 4.3 MMOL/L — SIGNIFICANT CHANGE UP (ref 3.5–5.3)
RBC # BLD: 3.07 M/UL — LOW (ref 4.2–5.8)
RBC # FLD: 15.2 % — HIGH (ref 10.3–14.5)
SODIUM SERPL-SCNC: 137 MMOL/L — SIGNIFICANT CHANGE UP (ref 135–145)
WBC # BLD: 7.06 K/UL — SIGNIFICANT CHANGE UP (ref 3.8–10.5)
WBC # FLD AUTO: 7.06 K/UL — SIGNIFICANT CHANGE UP (ref 3.8–10.5)

## 2020-03-11 PROCEDURE — 99232 SBSQ HOSP IP/OBS MODERATE 35: CPT

## 2020-03-11 RX ORDER — INSULIN GLARGINE 100 [IU]/ML
28 INJECTION, SOLUTION SUBCUTANEOUS AT BEDTIME
Refills: 0 | Status: DISCONTINUED | OUTPATIENT
Start: 2020-03-11 | End: 2020-03-12

## 2020-03-11 RX ORDER — INSULIN LISPRO 100/ML
10 VIAL (ML) SUBCUTANEOUS
Refills: 0 | Status: DISCONTINUED | OUTPATIENT
Start: 2020-03-11 | End: 2020-03-12

## 2020-03-11 RX ADMIN — LINEZOLID 600 MILLIGRAM(S): 600 INJECTION, SOLUTION INTRAVENOUS at 17:34

## 2020-03-11 RX ADMIN — PANTOPRAZOLE SODIUM 40 MILLIGRAM(S): 20 TABLET, DELAYED RELEASE ORAL at 06:39

## 2020-03-11 RX ADMIN — Medication 0.5 MILLIGRAM(S): at 06:35

## 2020-03-11 RX ADMIN — Medication 1 TABLET(S): at 13:54

## 2020-03-11 RX ADMIN — Medication 1 DROP(S): at 17:35

## 2020-03-11 RX ADMIN — HEPARIN SODIUM 5000 UNIT(S): 5000 INJECTION INTRAVENOUS; SUBCUTANEOUS at 14:01

## 2020-03-11 RX ADMIN — Medication 667 MILLIGRAM(S): at 11:46

## 2020-03-11 RX ADMIN — ATORVASTATIN CALCIUM 40 MILLIGRAM(S): 80 TABLET, FILM COATED ORAL at 21:05

## 2020-03-11 RX ADMIN — SENNA PLUS 2 TABLET(S): 8.6 TABLET ORAL at 21:05

## 2020-03-11 RX ADMIN — CHLORHEXIDINE GLUCONATE 1 APPLICATION(S): 213 SOLUTION TOPICAL at 07:58

## 2020-03-11 RX ADMIN — Medication 667 MILLIGRAM(S): at 17:34

## 2020-03-11 RX ADMIN — Medication 3 MILLILITER(S): at 11:47

## 2020-03-11 RX ADMIN — Medication 10 UNIT(S): at 17:34

## 2020-03-11 RX ADMIN — Medication 1: at 11:45

## 2020-03-11 RX ADMIN — Medication 1 DROP(S): at 06:34

## 2020-03-11 RX ADMIN — Medication 200 MILLIGRAM(S): at 21:04

## 2020-03-11 RX ADMIN — Medication 500000 UNIT(S): at 06:45

## 2020-03-11 RX ADMIN — SPIRONOLACTONE 25 MILLIGRAM(S): 25 TABLET, FILM COATED ORAL at 06:34

## 2020-03-11 RX ADMIN — Medication 100 MILLIGRAM(S): at 14:01

## 2020-03-11 RX ADMIN — OXYCODONE AND ACETAMINOPHEN 1 TABLET(S): 5; 325 TABLET ORAL at 02:45

## 2020-03-11 RX ADMIN — Medication 500000 UNIT(S): at 11:46

## 2020-03-11 RX ADMIN — AMIODARONE HYDROCHLORIDE 200 MILLIGRAM(S): 400 TABLET ORAL at 06:34

## 2020-03-11 RX ADMIN — Medication 10 MILLIGRAM(S): at 06:34

## 2020-03-11 RX ADMIN — Medication 10 UNIT(S): at 11:46

## 2020-03-11 RX ADMIN — Medication 100 MILLIGRAM(S): at 06:34

## 2020-03-11 RX ADMIN — LINEZOLID 600 MILLIGRAM(S): 600 INJECTION, SOLUTION INTRAVENOUS at 06:39

## 2020-03-11 RX ADMIN — HEPARIN SODIUM 5000 UNIT(S): 5000 INJECTION INTRAVENOUS; SUBCUTANEOUS at 06:34

## 2020-03-11 RX ADMIN — Medication 1: at 21:28

## 2020-03-11 RX ADMIN — OXYCODONE AND ACETAMINOPHEN 1 TABLET(S): 5; 325 TABLET ORAL at 21:04

## 2020-03-11 RX ADMIN — Medication 667 MILLIGRAM(S): at 07:55

## 2020-03-11 RX ADMIN — Medication 0.5 MILLIGRAM(S): at 17:35

## 2020-03-11 RX ADMIN — Medication 12.5 MILLIGRAM(S): at 06:39

## 2020-03-11 RX ADMIN — Medication 1 APPLICATION(S): at 11:46

## 2020-03-11 RX ADMIN — Medication 3 MILLIGRAM(S): at 21:05

## 2020-03-11 RX ADMIN — Medication 1 SPRAY(S): at 21:05

## 2020-03-11 RX ADMIN — Medication 1: at 07:55

## 2020-03-11 RX ADMIN — Medication 81 MILLIGRAM(S): at 11:47

## 2020-03-11 RX ADMIN — Medication 3 MILLILITER(S): at 06:34

## 2020-03-11 RX ADMIN — INSULIN GLARGINE 28 UNIT(S): 100 INJECTION, SOLUTION SUBCUTANEOUS at 21:28

## 2020-03-11 RX ADMIN — OXYCODONE AND ACETAMINOPHEN 1 TABLET(S): 5; 325 TABLET ORAL at 02:00

## 2020-03-11 RX ADMIN — MODAFINIL 100 MILLIGRAM(S): 200 TABLET ORAL at 07:53

## 2020-03-11 RX ADMIN — Medication 500000 UNIT(S): at 17:34

## 2020-03-11 RX ADMIN — Medication 14 UNIT(S): at 07:55

## 2020-03-11 RX ADMIN — Medication 1 SPRAY(S): at 06:37

## 2020-03-11 RX ADMIN — POLYETHYLENE GLYCOL 3350 17 GRAM(S): 17 POWDER, FOR SOLUTION ORAL at 11:47

## 2020-03-11 RX ADMIN — Medication 12.5 MILLIGRAM(S): at 17:34

## 2020-03-11 RX ADMIN — Medication 3 MILLILITER(S): at 17:35

## 2020-03-11 RX ADMIN — OXYCODONE AND ACETAMINOPHEN 1 TABLET(S): 5; 325 TABLET ORAL at 21:34

## 2020-03-11 RX ADMIN — Medication 100 MILLIGRAM(S): at 21:05

## 2020-03-11 RX ADMIN — HEPARIN SODIUM 5000 UNIT(S): 5000 INJECTION INTRAVENOUS; SUBCUTANEOUS at 21:04

## 2020-03-11 NOTE — SWALLOW BEDSIDE ASSESSMENT ADULT - SWALLOW EVAL: DIAGNOSIS
65 yo man presents w/o s/s of airway invasion w/PO trials of thin liquids at bedside. D/t pt's hx of dysphagia during HC, objective testing recommended to r/o aspiration of thin liquids. MBS scheduled for tomorrow morning.

## 2020-03-11 NOTE — SWALLOW BEDSIDE ASSESSMENT ADULT - PHARYNGEAL PHASE
Within functional limits palpable hyolaryngeal elevation/excursion; no overt s/s laryngeal penetration/aspiration

## 2020-03-11 NOTE — PROGRESS NOTE ADULT - SUBJECTIVE AND OBJECTIVE BOX
VITAL SIGNS    Telemetry:  SR   Vital Signs Last 24 Hrs  T(C): 36.4 (20 @ 13:35), Max: 36.5 (03-10-20 @ 20:43)  T(F): 97.5 (20 @ 13:35), Max: 97.7 (03-10-20 @ 20:43)  HR: 102 (20 @ 13:35) (91 - 102)  BP: 129/75 (20 @ 13:35) (118/76 - 137/81)  RR: 18 (20 @ 13:35) (18 - 18)  SpO2: 96% (20 @ 13:35) (96% - 97%)            03-10 @ 07:01  -   @ 07:00  --------------------------------------------------------  IN: 720 mL / OUT: 1650 mL / NET: -930 mL     @ 07:01  -   @ 16:44  --------------------------------------------------------  IN: 440 mL / OUT: 0 mL / NET: 440 mL       Daily     Daily Weight in k.7 (11 Mar 2020 08:19)  Admit Wt: Drug Dosing Weight  Height (cm): 172.72 (2020 07:20)  Weight (kg): 81.1 (2020 07:20)  BMI (kg/m2): 27.2 (2020 07:20)  BSA (m2): 1.95 (2020 07:20)      CAPILLARY BLOOD GLUCOSE      POCT Blood Glucose.: 181 mg/dL (11 Mar 2020 11:43)  POCT Blood Glucose.: 156 mg/dL (11 Mar 2020 07:43)  POCT Blood Glucose.: 122 mg/dL (10 Mar 2020 23:16)  POCT Blood Glucose.: 93 mg/dL (10 Mar 2020 22:57)  POCT Blood Glucose.: 67 mg/dL (10 Mar 2020 22:43)  POCT Blood Glucose.: 58 mg/dL (10 Mar 2020 22:22)  POCT Blood Glucose.: 69 mg/dL (10 Mar 2020 21:55)  POCT Blood Glucose.: 182 mg/dL (10 Mar 2020 17:40)          MEDICATIONS  acetaminophen   Tablet .. 650 milliGRAM(s) Oral every 6 hours PRN  ALBUTerol    90 MICROgram(s) HFA Inhaler 1 Puff(s) Inhalation every 4 hours  albuterol/ipratropium for Nebulization. 3 milliLiter(s) Nebulizer every 6 hours PRN  albuterol/ipratropium for Nebulization. 3 milliLiter(s) Nebulizer every 6 hours  aMIOdarone    Tablet 200 milliGRAM(s) Oral daily  artificial tears (preservative free) Ophthalmic Solution 1 Drop(s) Both EYES two times a day  aspirin enteric coated 81 milliGRAM(s) Oral daily  atorvastatin 40 milliGRAM(s) Oral at bedtime  benzonatate 100 milliGRAM(s) Oral every 8 hours  buDESOnide    Inhalation Suspension 0.5 milliGRAM(s) Inhalation two times a day  calcium acetate 667 milliGRAM(s) Oral three times a day with meals  chlorhexidine 2% Cloths 1 Application(s) Topical <User Schedule>  dextrose 40% Gel 15 Gram(s) Oral once PRN  dextrose 5%. 1000 milliLiter(s) IV Continuous <Continuous>  dextrose 5%. 1000 milliLiter(s) IV Continuous <Continuous>  dextrose 5%. 1000 milliLiter(s) IV Continuous <Continuous>  dextrose 50% Injectable 12.5 Gram(s) IV Push once  dextrose 50% Injectable 25 Gram(s) IV Push once  dextrose 50% Injectable 25 Gram(s) IV Push once  glucagon  Injectable 1 milliGRAM(s) IntraMuscular once PRN  guaiFENesin   Syrup  (Sugar-Free) 200 milliGRAM(s) Oral every 6 hours PRN  heparin  Injectable 5000 Unit(s) SubCutaneous every 8 hours  insulin glargine Injectable (LANTUS) 28 Unit(s) SubCutaneous at bedtime  insulin lispro (HumaLOG) corrective regimen sliding scale   SubCutaneous Before meals and at bedtime  insulin lispro Injectable (HumaLOG) 10 Unit(s) SubCutaneous three times a day with meals  linezolid    Tablet 600 milliGRAM(s) Oral every 12 hours  melatonin 3 milliGRAM(s) Oral at bedtime  metoprolol tartrate 12.5 milliGRAM(s) Oral two times a day  modafinil 100 milliGRAM(s) Oral <User Schedule>  multivitamin 1 Tablet(s) Oral daily  nystatin    Suspension 044997 Unit(s) Oral every 6 hours  oxycodone    5 mG/acetaminophen 325 mG 1 Tablet(s) Oral every 6 hours PRN  pantoprazole    Tablet 40 milliGRAM(s) Oral before breakfast  polyethylene glycol 3350 17 Gram(s) Oral daily  senna 2 Tablet(s) Oral at bedtime  silver sulfADIAZINE 1% Cream 1 Application(s) Topical daily  sodium chloride 0.65% Nasal 1 Spray(s) Both Nostrils three times a day  sodium chloride 0.9% lock flush 10 milliLiter(s) IV Push every 1 hour PRN  spironolactone 25 milliGRAM(s) Oral daily  tiotropium 18 MICROgram(s) Capsule 1 Capsule(s) Inhalation daily  torsemide 10 milliGRAM(s) Oral daily      Interval History: Pt complains of dry cough. Denies any chest pain or SOB.     PHYSICAL EXAM  General: WN, WD, NAD  Neurology: alert and oriented x 3, nonfocal, no gross deficits  CV :s1, s2  Sternal Wound :  CDI , Stable; (+) wound vac to sternum  Lungs: CTA B/L  Abdomen: soft, NT,ND, (+ )Bowel sounds  :  voiding  Extremities: Right lower leg wound healing.        LABS      137  |  103  |  30<H>  ----------------------------<  139<H>  4.3   |  24  |  1.37<H>    Ca    7.7<L>      11 Mar 2020 05:54                                   8.1    7.06  )-----------( 243      ( 11 Mar 2020 05:54 )             27.1                 PAST MEDICAL & SURGICAL HISTORY:  HTN (hypertension)  Diabetes  History of appendectomy: x 30 yrs ago

## 2020-03-11 NOTE — SWALLOW BEDSIDE ASSESSMENT ADULT - SLP PERTINENT HISTORY OF CURRENT PROBLEM
H&P: 65yo male with hx of HTN, DM II, presented to the ED w/ c/o acute on chronic L sided exertional chest pain. Pt reports L  sided pressure and stabbing chest pain radiating to his sternum and r w/ activity for the past few months. He states each episode last ~ 1-2 mins and subsides upon resting; denies symptoms of radiation to back, arm or jaw. Pt states this time his pain was associated w/ SOB up exertion; denies symptoms of LOC, diaphoresis, palpitations, abd pain, N/V, fever or chills. He denies prior cardiac work up.
H&P: 63yo male with hx of HTN, DM II, presented to the ED w/ c/o acute on chronic L sided exertional chest pain. Pt reports L  sided pressure and stabbing chest pain radiating to his sternum and r w/ activity for the past few months. He states each episode last ~ 1-2 mins and subsides upon resting; denies symptoms of radiation to back, arm or jaw. Pt states this time his pain was associated w/ SOB up exertion; denies symptoms of LOC, diaphoresis, palpitations, abd pain, N/V, fever or chills. He denies prior cardiac work up.

## 2020-03-11 NOTE — SWALLOW BEDSIDE ASSESSMENT ADULT - COMMENTS
Course hx:  63yo male with hx of HTN, DM II s/p C4L on 2/3; PEA arrest on 2/6   + enterococcus Bld  C/S > started ampicillin q8h, ID consulted,  R pigtail for effusion    2/8    Extubated    2/15 L pigtail effusion    2/17 PRBC     2/18 Tx 2 Diaz  2/21 - NON con Chest CT - multiple loculated Left pleural effusions w/ patchy consolidation R slightly better; pigtail cath removed.   2/22  Wound care consult leg wounds  2/24 ID added cefapime SW drainage purulent, afebrile  2/25 S/p Sternal wound debridement and irrigation with removal of all 6 sternal wires. Purulence encountered and sent for culture. Closure with bilateral pectoralis muscle advancement flaps.  Pt transferred to sdu  2/27 VSS   carlos d/c william d/c transfer to floor JPx2  followed  by Plastics  followed by ID on cefepime and daptomycin bc positive for E. faecalis. Provigil daily in am  2/28 VSS; abx as per ID 4 wks IV abx from date of SWD as per ID. + hypoglycemia- endo following.    d/c planning- rehab next week   2/29 VSS, distal portion of MSI with small dehiscence and serous purulent drainage - recommended wound vac placement as per Plastics. Pending PICC line placement for IV abx.   Witnessed PEA 2/6 likely hypoxic arrest, s/p intubation. ( CAD, s/p cath with severe triple vessel disease including lesions at the bifurcation of LAD/diagonal and distal RCA/RPDA/RPL trifurcation. HC  significant for multiple intubations.  -s/p CABG x 4, intraop kennedy ef 50%)--s/p ampicillin until 2/18 for enterococcus in blood, extubated 2/9, pigtail right 2/8 and left sided on 2/15-for effusion.  Remains sob-NO h/o resp issues prior to hospitalization.  Current sob-multifactorial-CAD/effusions, atelectasis due to pain, mild bronchospasm and debility.  2/28-chest pain still impacting breathing--plastics/renal endo f/up  3/4-cough upon swallowing
Course hx:  63yo male with hx of HTN, DM II s/p C4L on 2/3; PEA arrest on 2/6   + enterococcus Bld  C/S > started ampicillin q8h, ID consulted,  R pigtail for effusion    2/8    Extubated    2/15 L pigtail effusion    2/17 PRBC     2/18 Tx 2 Diaz  2/21 - NON con Chest CT - multiple loculated Left pleural effusions w/ patchy consolidation R slightly better; pigtail cath removed.   2/22  Wound care consult leg wounds  2/24 ID added cefapime SW drainage purulent, afebrile  2/25 S/p Sternal wound debridement and irrigation with removal of all 6 sternal wires. Purulence encountered and sent for culture. Closure with bilateral pectoralis muscle advancement flaps.  Pt transferred to sdu  2/27 VSS   carlos d/c william d/c transfer to floor JPx2  followed  by Plastics  followed by ID on cefepime and daptomycin bc positive for E. faecalis. Provigil daily in am  2/28 VSS; abx as per ID 4 wks IV abx from date of SWD as per ID. + hypoglycemia- endo following.    d/c planning- rehab next week   2/29 VSS, distal portion of MSI with small dehiscence and serous purulent drainage - recommended wound vac placement as per Plastics. Pending PICC line placement for IV abx.   Witnessed PEA 2/6 likely hypoxic arrest, s/p intubation. ( CAD, s/p cath with severe triple vessel disease including lesions at the bifurcation of LAD/diagonal and distal RCA/RPDA/RPL trifurcation. HC  significant for multiple intubations.  -s/p CABG x 4, intraop kennedy ef 50%)--s/p ampicillin until 2/18 for enterococcus in blood, extubated 2/9, pigtail right 2/8 and left sided on 2/15-for effusion.  Remains sob-NO h/o resp issues prior to hospitalization.  Current sob-multifactorial-CAD/effusions, atelectasis due to pain, mild bronchospasm and debility.  2/28-chest pain still impacting breathing--plastics/renal endo f/up  3/4-cough upon swallowing

## 2020-03-11 NOTE — SWALLOW BEDSIDE ASSESSMENT ADULT - SLP GENERAL OBSERVATIONS
Pt encountered sleeping in chair; easily aroused by auditory stimuli; AOx4. Pt educated on purpose of follow up regarding swallowing/diet recommendation. Pt observed to be in pain while coughing, and was provided w/ brief behavioral cough suppressing techniques to minimize urge to cough. Pt agreed to participate in f/u, was repositioned in chair, and provided trials of thin liquids.

## 2020-03-11 NOTE — PROGRESS NOTE ADULT - ASSESSMENT
65yo male with hx of HTN, DM II   s/p C4L on 2/3; PEA arrest on 2/6   + enterococcus Bld  C/S > started ampicillin q8h, ID consulted,  R pigtail for effusion  2/8    Extubated  2/9  2/15 L pigtail effusion  2/17 PRBC   2/18 Tx 2 Diaz  2/19 thomas removed, trial void. Maintain left pigtail for significant output. Pt encouraged to ambulate. Supplemental o2 sat NC weaned to 2L. Blood cultures repeated.  2/20 VSS -Pulmonary consult - appreciated - duonebs ATC q6h & pulmicort bid initiated - ddimer drawn.  pt w/ hx negative LE dopplers   will order non con chest DT as pt has a loculated left effusion that will require tap at IR.  2/21 - NON con Chest CT done - multiple loculated Left pleural effusions w/ patchy consolidation R - rounds made w/ Dr. carl.  ID - consult called re: Ct - WBC 11 afebrile.  +PALMA.  unable to tolerate VQ scan this am.  will d/c bumex & start Torsemide 20mg po daily  d/c planning rehab when medically stable  2/22  Wound care consult leg wounds> shower w/ local skin care  2/23  SW drainage> on Dapto> as per ID will cover SWD  CXR this am   Tighter glycemic control  2/24 ID added cefapime SW drainage purulent, afebrile  2/25 S/p Sternal wound debridement and irrigation with removal of all 6 sternal wires. Purulence encountered and sent for culture. Closure with bilateral pectoralis muscle advancement flaps.  Post op zari for hypotension- weaned off.  Skin/wd  culture from 2/24 neg  Pt transferred to sdu  2/27 VSS   carlos d/c thomas d/c transfer to floor JPx2  followed  by Plastics  followed by ID on cefepime and daptomycin bc positive for E. faecalis; follow up bc 2/19 negative to date. Provigil daily in am  2/28 VSS; abx as per ID - d/c cefepime and continue dapto 500 iv qd as per Dr. Carrasco- pt will need picc line vs medel for 4 wks abx from date of SWD as per ID; bc 2/19 neg.  d/w h. renal medel vs cath- await decision, diuresis initiated for hypervolemia; hep sq for dvt prophylaxis, + hypoglycemia- endo following- humalog adjusted    Discharge planning- rehab next week   2/29 VSS, distal portion of MSI with small dehiscence and serous purulent drainage - recommended wound vac placement as per Plastics. Left KEI 80ml & Right KEI 80ml/24h. S/P Right PICC line placement for IV abx.   3/1 Wound Vac placed to sternal wound. Pt ambulated in hallway with rolling walker. Left KEI 50ml & Right KEI 110ml/24h. Plan for discharge to Rehab Mon/Tue.   3/2 Pending patient rehab selection. Maintain sternal wound vac placement and KEI drains. Patient lethargic after percocet. Will hold narcotics . Continue with Daptomycin x 4 weeks until 3/22/20  3/3 VSS - call to ID re: alternative antibiotic to Daptomycin - Dr. Carrasco to follow up . d/c to rehab   3/4  HD stable .  + cough--bedside swallowing eval + cough--recs NPO and MBS in am--insulin adjusted by Dr Matias.   Dw Dr Carrasco--daptomycin can be switched on Monday to another antibiotic thru 3/22  3/5 VSS - MBS - Per speech pathologist- no gross aspiration ?silent - placed on Dysphagia 3 diet w/ nectar thick liquids. d/w Dr. Mariscal-  As per ID - will switch antibiotic to Zyvoxx on Monday 3/9/2020 - d/c to rehab on Monday on zyvoxx until 3/22  3/6 VSS; continue diuresis; resume low dose bb; abx as per ID/ vac  3/7/2020 VSS rounds made w/ Dr. Mariscal- right KEI w/ minimal drainage - spoke w/ plastics - will d/c right KEI - d/c plan rehab - switch to Zyvoxx bid po on monday.  3/8 VSS; vac change today + drainage noted at distal vac site; wbc 8 and pt afebrile; abx as per ID; cr 2.0- decrease torsemide 20 qd as per Dr. Mariscal   3/9 VSS; wbc 8 and pt afebrile; abx changed to po zyvox 600 mg po q12 as per ID   Discharge planning- await rehab placement   3/10/2020 - Pt denied from rehab of his choice.  pt may need to remain in hospital until the remainder of his antibiotic course 3/21/2020 as  he is being denied from rehab.  Care Coordinator will continue to apply to rehabs..  3/11 VSS; plastics recommending removal of VAC post healing of sternum from inside. Next VAC change on Friday. MBS tomorrow 9 AM w/ S/S. Anticipating d/c on Monday

## 2020-03-11 NOTE — SWALLOW BEDSIDE ASSESSMENT ADULT - SWALLOW EVAL: RECOMMENDED FEEDING/EATING TECHNIQUES
maintain upright posture during/after eating for 30 mins/allow for swallow between intakes/alternate food with liquid/position upright (90 degrees)/small sips/bites/oral hygiene

## 2020-03-11 NOTE — PROGRESS NOTE ADULT - SUBJECTIVE AND OBJECTIVE BOX
CHIEF COMPLAINT: f/up resp failure, sob, effusions, cough-dysphagia, RADS, ? TBM--slighlty better-but still coughing    Interval Events: none    REVIEW OF SYSTEMS:  Constitutional: No fevers or chills. No weight loss. + fatigue or generalized malaise.  Eyes: No itching or discharge from the eyes  ENT: No ear pain. No ear discharge. No nasal congestion. No post nasal drip. No epistaxis. No throat pain. No sore throat. No difficulty swallowing.   CV: No chest pain. No palpitations. No lightheadedness or dizziness.   Resp: No dyspnea at rest. No dyspnea on exertion. No orthopnea. No wheezing. + cough. No stridor. No sputum production. No chest pain with respiration.  GI: No nausea. No vomiting. No diarrhea.  MSK: No joint pain or pain in any extremities  Integumentary: No skin lesions. + pedal edema.  Neurological: No gross motor weakness. No sensory changes.  [+ ] All other systems negative  [ ] Unable to assess ROS because ________    OBJECTIVE:  ICU Vital Signs Last 24 Hrs  T(C): 36.4 (11 Mar 2020 04:57), Max: 36.5 (10 Mar 2020 12:26)  T(F): 97.5 (11 Mar 2020 04:57), Max: 97.7 (10 Mar 2020 12:26)  HR: 91 (11 Mar 2020 04:57) (84 - 95)  BP: 118/76 (11 Mar 2020 04:57) (117/64 - 137/81)  BP(mean): 90 (11 Mar 2020 04:57) (90 - 90)  ABP: --  ABP(mean): --  RR: 18 (11 Mar 2020 04:57) (18 - 18)  SpO2: 97% (11 Mar 2020 04:57) (96% - 97%)        03-09 @ 07:01  -  03-10 @ 07:00  --------------------------------------------------------  IN: 360 mL / OUT: 2325 mL / NET: -1965 mL    03-10 @ 07:01 - 03-11 @ 05:37  --------------------------------------------------------  IN: 720 mL / OUT: 1650 mL / NET: -930 mL      CAPILLARY BLOOD GLUCOSE      POCT Blood Glucose.: 122 mg/dL (10 Mar 2020 23:16)      PHYSICAL EXAM: NAD in chair on RA  General: Awake, alert, oriented X 3.   HEENT: Atraumatic, normocephalic.                 Mallampatti Grade 3                No nasal congestion.                No tonsillar or pharyngeal exudates.  Lymph Nodes: No palpable lymphadenopathy  Neck: No JVD. No carotid bruit.   Respiratory: abnormal chest expansion                         Normal percussion                         Normal and equal air entry                         No wheeze, rhonchi or rales.  Cardiovascular: S1 S2 normal. No murmurs, rubs or gallops.   Abdomen: Soft, non-tender, non-distended. No organomegaly. Normoactive bowel sounds.  Extremities: Warm to touch. Peripheral pulse palpable. + pedal edema.   Skin: No rashes or skin lesions  Neurological: Motor and sensory examination equal and normal in all four extremities.  Psychiatry: Appropriate mood and affect.    HOSPITAL MEDICATIONS:  MEDICATIONS  (STANDING):  ALBUTerol    90 MICROgram(s) HFA Inhaler 1 Puff(s) Inhalation every 4 hours  albuterol/ipratropium for Nebulization. 3 milliLiter(s) Nebulizer every 6 hours  aMIOdarone    Tablet 200 milliGRAM(s) Oral daily  artificial tears (preservative free) Ophthalmic Solution 1 Drop(s) Both EYES two times a day  aspirin enteric coated 81 milliGRAM(s) Oral daily  atorvastatin 40 milliGRAM(s) Oral at bedtime  benzonatate 100 milliGRAM(s) Oral every 8 hours  buDESOnide    Inhalation Suspension 0.5 milliGRAM(s) Inhalation two times a day  calcium acetate 667 milliGRAM(s) Oral three times a day with meals  chlorhexidine 2% Cloths 1 Application(s) Topical <User Schedule>  dextrose 5%. 1000 milliLiter(s) (50 mL/Hr) IV Continuous <Continuous>  dextrose 5%. 1000 milliLiter(s) (30 mL/Hr) IV Continuous <Continuous>  dextrose 5%. 1000 milliLiter(s) (50 mL/Hr) IV Continuous <Continuous>  dextrose 50% Injectable 12.5 Gram(s) IV Push once  dextrose 50% Injectable 25 Gram(s) IV Push once  dextrose 50% Injectable 25 Gram(s) IV Push once  heparin  Injectable 5000 Unit(s) SubCutaneous every 8 hours  insulin glargine Injectable (LANTUS) 38 Unit(s) SubCutaneous at bedtime  insulin lispro (HumaLOG) corrective regimen sliding scale   SubCutaneous Before meals and at bedtime  insulin lispro Injectable (HumaLOG) 14 Unit(s) SubCutaneous three times a day with meals  linezolid    Tablet 600 milliGRAM(s) Oral every 12 hours  melatonin 3 milliGRAM(s) Oral at bedtime  metoprolol tartrate 12.5 milliGRAM(s) Oral two times a day  modafinil 100 milliGRAM(s) Oral <User Schedule>  multivitamin 1 Tablet(s) Oral daily  nystatin    Suspension 687343 Unit(s) Oral every 6 hours  pantoprazole    Tablet 40 milliGRAM(s) Oral before breakfast  polyethylene glycol 3350 17 Gram(s) Oral daily  senna 2 Tablet(s) Oral at bedtime  silver sulfADIAZINE 1% Cream 1 Application(s) Topical daily  sodium chloride 0.65% Nasal 1 Spray(s) Both Nostrils three times a day  spironolactone 25 milliGRAM(s) Oral daily  tiotropium 18 MICROgram(s) Capsule 1 Capsule(s) Inhalation daily  torsemide 10 milliGRAM(s) Oral daily    MEDICATIONS  (PRN):  acetaminophen   Tablet .. 650 milliGRAM(s) Oral every 6 hours PRN Mild Pain (1 - 3)  albuterol/ipratropium for Nebulization. 3 milliLiter(s) Nebulizer every 6 hours PRN Shortness of Breath and/or Wheezing  dextrose 40% Gel 15 Gram(s) Oral once PRN Blood Glucose LESS THAN 70 milliGRAM(s)/deciLiter  glucagon  Injectable 1 milliGRAM(s) IntraMuscular once PRN Glucose <70 milliGRAM(s)/deciLiter  guaiFENesin   Syrup  (Sugar-Free) 200 milliGRAM(s) Oral every 6 hours PRN Cough  oxycodone    5 mG/acetaminophen 325 mG 1 Tablet(s) Oral every 6 hours PRN Severe Pain (7 - 10)  sodium chloride 0.9% lock flush 10 milliLiter(s) IV Push every 1 hour PRN Pre/post blood products, medications, blood draw, and to maintain line patency      LABS:                        8.3    10.21 )-----------( 274      ( 10 Mar 2020 06:03 )             27.8     03-10    135  |  98  |  35<H>  ----------------------------<  166<H>  5.3   |  26  |  1.57<H>    Ca    8.8      10 Mar 2020 06:03                MICROBIOLOGY:     RADIOLOGY:  [ ] Reviewed and interpreted by me    Point of Care Ultrasound Findings:    PFT:    EKG:

## 2020-03-11 NOTE — SWALLOW BEDSIDE ASSESSMENT ADULT - ADDITIONAL RECOMMENDATIONS
Maintain good oral hygiene.   This service will continue to follow.
Maintain good oral hygiene.   This service will continue to follow.

## 2020-03-11 NOTE — PROGRESS NOTE ADULT - ATTENDING COMMENTS
as above-s/p debridement 2/25 of sternal area--cough due to dyphagia +/-TBM -now on D3 diet precautions-slightly better  multifactorial dyspnea-CAD s/p CABG, PEA arrest, atelectasis due to pain, pleural effusion L-pig tail out, bronchospasm, ?PE-O2 NC sat above 90%                     Pleural effusion-pig tail removed 2/20-CT chest NC-improved but still loculations on left-f/up 4-6 wks  CAD/CHF-diurese as cr allows-keep K/Mg above  atelectasis-pain control, incentive spirometry, acapella                    ? DVT/PE--s/p repeat venous dopplers-negative; VQ unable  bronchospasm-duoneb q 6, pulmicort .5 bid; add tessalon perles 200 q 8, mucinex;  out pt PFTs      ID-daptomycin to zyvox as per ID (for 61 days)  snore-? osas--out pt SS  DVT/GI prophylaxis, PT, nutrition evaln            PT      DC planning to rehab.-in limbo due to rejections  Mike Hernandez MD-Pulmonary    [phone]

## 2020-03-11 NOTE — SWALLOW BEDSIDE ASSESSMENT ADULT - SWALLOW EVAL: RECOMMENDED DIET
Continue Dysphagia III with nectar thick liquids.
NPO, with non-oral nutrition/hydration/medications.

## 2020-03-11 NOTE — SWALLOW BEDSIDE ASSESSMENT ADULT - ASR SWALLOW ASPIRATION MONITOR
pneumonia/throat clearing/upper respiratory infection/change of breathing pattern/gurgly voice/cough/fever pneumonia/throat clearing/upper respiratory infection/change of breathing pattern/cough/gurgly voice/Monitor for s/s aspiration/laryngeal penetration. If noted:  D/C p.o. intake, provide non-oral nutrition/hydration/meds, and contact this service @ [phone]/fever

## 2020-03-11 NOTE — PROGRESS NOTE ADULT - SUBJECTIVE AND OBJECTIVE BOX
Chief complaint  Patient is a 64y old  Male who presents with a chief complaint of Chest pain (11 Mar 2020 10:33)   Review of systems  Patient sitting up in chair, looks comfortable,  had hypoglycemic episode last night.    Labs and Fingersticks  CAPILLARY BLOOD GLUCOSE      POCT Blood Glucose.: 181 mg/dL (11 Mar 2020 11:43)  POCT Blood Glucose.: 156 mg/dL (11 Mar 2020 07:43)  POCT Blood Glucose.: 122 mg/dL (10 Mar 2020 23:16)  POCT Blood Glucose.: 93 mg/dL (10 Mar 2020 22:57)  POCT Blood Glucose.: 67 mg/dL (10 Mar 2020 22:43)  POCT Blood Glucose.: 58 mg/dL (10 Mar 2020 22:22)  POCT Blood Glucose.: 69 mg/dL (10 Mar 2020 21:55)  POCT Blood Glucose.: 182 mg/dL (10 Mar 2020 17:40)      Anion Gap, Serum: 10 (03-11 @ 05:54)  Anion Gap, Serum: 11 (03-10 @ 06:03)      Calcium, Total Serum: 7.7 <L> (03-11 @ 05:54)  Calcium, Total Serum: 8.8 (03-10 @ 06:03)          03-11    137  |  103  |  30<H>  ----------------------------<  139<H>  4.3   |  24  |  1.37<H>    Ca    7.7<L>      11 Mar 2020 05:54                          8.1    7.06  )-----------( 243      ( 11 Mar 2020 05:54 )             27.1     Medications  MEDICATIONS  (STANDING):  ALBUTerol    90 MICROgram(s) HFA Inhaler 1 Puff(s) Inhalation every 4 hours  albuterol/ipratropium for Nebulization. 3 milliLiter(s) Nebulizer every 6 hours  aMIOdarone    Tablet 200 milliGRAM(s) Oral daily  artificial tears (preservative free) Ophthalmic Solution 1 Drop(s) Both EYES two times a day  aspirin enteric coated 81 milliGRAM(s) Oral daily  atorvastatin 40 milliGRAM(s) Oral at bedtime  benzonatate 100 milliGRAM(s) Oral every 8 hours  buDESOnide    Inhalation Suspension 0.5 milliGRAM(s) Inhalation two times a day  calcium acetate 667 milliGRAM(s) Oral three times a day with meals  chlorhexidine 2% Cloths 1 Application(s) Topical <User Schedule>  dextrose 5%. 1000 milliLiter(s) (50 mL/Hr) IV Continuous <Continuous>  dextrose 5%. 1000 milliLiter(s) (30 mL/Hr) IV Continuous <Continuous>  dextrose 5%. 1000 milliLiter(s) (50 mL/Hr) IV Continuous <Continuous>  dextrose 50% Injectable 12.5 Gram(s) IV Push once  dextrose 50% Injectable 25 Gram(s) IV Push once  dextrose 50% Injectable 25 Gram(s) IV Push once  heparin  Injectable 5000 Unit(s) SubCutaneous every 8 hours  insulin glargine Injectable (LANTUS) 28 Unit(s) SubCutaneous at bedtime  insulin lispro (HumaLOG) corrective regimen sliding scale   SubCutaneous Before meals and at bedtime  insulin lispro Injectable (HumaLOG) 10 Unit(s) SubCutaneous three times a day with meals  linezolid    Tablet 600 milliGRAM(s) Oral every 12 hours  melatonin 3 milliGRAM(s) Oral at bedtime  metoprolol tartrate 12.5 milliGRAM(s) Oral two times a day  modafinil 100 milliGRAM(s) Oral <User Schedule>  multivitamin 1 Tablet(s) Oral daily  nystatin    Suspension 129323 Unit(s) Oral every 6 hours  pantoprazole    Tablet 40 milliGRAM(s) Oral before breakfast  polyethylene glycol 3350 17 Gram(s) Oral daily  senna 2 Tablet(s) Oral at bedtime  silver sulfADIAZINE 1% Cream 1 Application(s) Topical daily  sodium chloride 0.65% Nasal 1 Spray(s) Both Nostrils three times a day  spironolactone 25 milliGRAM(s) Oral daily  tiotropium 18 MICROgram(s) Capsule 1 Capsule(s) Inhalation daily  torsemide 10 milliGRAM(s) Oral daily      Physical Exam  General: Patient comfortable in bed  Vital Signs Last 12 Hrs  T(F): 97.5 (03-11-20 @ 13:35), Max: 97.5 (03-11-20 @ 04:57)  HR: 102 (03-11-20 @ 13:35) (91 - 102)  BP: 129/75 (03-11-20 @ 13:35) (118/76 - 129/75)  BP(mean): 90 (03-11-20 @ 04:57) (90 - 90)  RR: 18 (03-11-20 @ 13:35) (18 - 18)  SpO2: 96% (03-11-20 @ 13:35) (96% - 97%)  Neck: No palpable thyroid nodules.  CVS: S1S2, No murmurs  Respiratory: No wheezing, no crepitations  GI: Abdomen soft, bowel sounds positive  Musculoskeletal:  edema lower extremities.   Skin: No skin rashes, no ecchymosis    Diagnostics Chief complaint  Patient is a 64y old  Male who presents with a chief complaint of Chest pain (11 Mar 2020 10:33)   Review of systems  Patient sitting up in chair, looks comfortable,   had hypoglycemic episode last night.    Labs and Fingersticks  CAPILLARY BLOOD GLUCOSE      POCT Blood Glucose.: 181 mg/dL (11 Mar 2020 11:43)  POCT Blood Glucose.: 156 mg/dL (11 Mar 2020 07:43)  POCT Blood Glucose.: 122 mg/dL (10 Mar 2020 23:16)  POCT Blood Glucose.: 93 mg/dL (10 Mar 2020 22:57)  POCT Blood Glucose.: 67 mg/dL (10 Mar 2020 22:43)  POCT Blood Glucose.: 58 mg/dL (10 Mar 2020 22:22)  POCT Blood Glucose.: 69 mg/dL (10 Mar 2020 21:55)  POCT Blood Glucose.: 182 mg/dL (10 Mar 2020 17:40)      Anion Gap, Serum: 10 (03-11 @ 05:54)  Anion Gap, Serum: 11 (03-10 @ 06:03)      Calcium, Total Serum: 7.7 <L> (03-11 @ 05:54)  Calcium, Total Serum: 8.8 (03-10 @ 06:03)          03-11    137  |  103  |  30<H>  ----------------------------<  139<H>  4.3   |  24  |  1.37<H>    Ca    7.7<L>      11 Mar 2020 05:54                          8.1    7.06  )-----------( 243      ( 11 Mar 2020 05:54 )             27.1     Medications  MEDICATIONS  (STANDING):  ALBUTerol    90 MICROgram(s) HFA Inhaler 1 Puff(s) Inhalation every 4 hours  albuterol/ipratropium for Nebulization. 3 milliLiter(s) Nebulizer every 6 hours  aMIOdarone    Tablet 200 milliGRAM(s) Oral daily  artificial tears (preservative free) Ophthalmic Solution 1 Drop(s) Both EYES two times a day  aspirin enteric coated 81 milliGRAM(s) Oral daily  atorvastatin 40 milliGRAM(s) Oral at bedtime  benzonatate 100 milliGRAM(s) Oral every 8 hours  buDESOnide    Inhalation Suspension 0.5 milliGRAM(s) Inhalation two times a day  calcium acetate 667 milliGRAM(s) Oral three times a day with meals  chlorhexidine 2% Cloths 1 Application(s) Topical <User Schedule>  dextrose 5%. 1000 milliLiter(s) (50 mL/Hr) IV Continuous <Continuous>  dextrose 5%. 1000 milliLiter(s) (30 mL/Hr) IV Continuous <Continuous>  dextrose 5%. 1000 milliLiter(s) (50 mL/Hr) IV Continuous <Continuous>  dextrose 50% Injectable 12.5 Gram(s) IV Push once  dextrose 50% Injectable 25 Gram(s) IV Push once  dextrose 50% Injectable 25 Gram(s) IV Push once  heparin  Injectable 5000 Unit(s) SubCutaneous every 8 hours  insulin glargine Injectable (LANTUS) 28 Unit(s) SubCutaneous at bedtime  insulin lispro (HumaLOG) corrective regimen sliding scale   SubCutaneous Before meals and at bedtime  insulin lispro Injectable (HumaLOG) 10 Unit(s) SubCutaneous three times a day with meals  linezolid    Tablet 600 milliGRAM(s) Oral every 12 hours  melatonin 3 milliGRAM(s) Oral at bedtime  metoprolol tartrate 12.5 milliGRAM(s) Oral two times a day  modafinil 100 milliGRAM(s) Oral <User Schedule>  multivitamin 1 Tablet(s) Oral daily  nystatin    Suspension 655556 Unit(s) Oral every 6 hours  pantoprazole    Tablet 40 milliGRAM(s) Oral before breakfast  polyethylene glycol 3350 17 Gram(s) Oral daily  senna 2 Tablet(s) Oral at bedtime  silver sulfADIAZINE 1% Cream 1 Application(s) Topical daily  sodium chloride 0.65% Nasal 1 Spray(s) Both Nostrils three times a day  spironolactone 25 milliGRAM(s) Oral daily  tiotropium 18 MICROgram(s) Capsule 1 Capsule(s) Inhalation daily  torsemide 10 milliGRAM(s) Oral daily      Physical Exam  General: Patient comfortable in bed  Vital Signs Last 12 Hrs  T(F): 97.5 (03-11-20 @ 13:35), Max: 97.5 (03-11-20 @ 04:57)  HR: 102 (03-11-20 @ 13:35) (91 - 102)  BP: 129/75 (03-11-20 @ 13:35) (118/76 - 129/75)  BP(mean): 90 (03-11-20 @ 04:57) (90 - 90)  RR: 18 (03-11-20 @ 13:35) (18 - 18)  SpO2: 96% (03-11-20 @ 13:35) (96% - 97%)  Neck: No palpable thyroid nodules.  CVS: S1S2, No murmurs  Respiratory: No wheezing, no crepitations  GI: Abdomen soft, bowel sounds positive  Musculoskeletal:  edema lower extremities.   Skin: No skin rashes, no ecchymosis    Diagnostics

## 2020-03-11 NOTE — SWALLOW BEDSIDE ASSESSMENT ADULT - SWALLOW EVAL: PATIENT/FAMILY GOALS STATEMENT
Pt states that cough has been causing him pain. Pt denies dysphagia on current diet level, and endorses that swallow has been better since last consult.

## 2020-03-11 NOTE — PROGRESS NOTE ADULT - ASSESSMENT
Assessment  DMT2: 64y Male with DM T2 with hyperglycemia, A1C 9.2%, was on oral meds and insulin at home, now on basal bolus insulin, patient had hypoglycemic episode last night, Lantus was held, blood sugars fluctuating due to inconsistent carb intake, no new hypoglycemic episodes. Patient is eating meals with fluctuating appetite, sitting up in chair, appears alert and comfortable, wife at bedside.  Foot infection: On Tx, stable.  CAD: s/p CABG 2/3, on medications, no chest pain, stable, monitored.  HTN: Controlled,  on antihypertensive medications.  HLD: Controlled, on statin.  CKD: Monitor labs/BMP          Harper Matias MD  Cell: [phone]  Office: [phone]

## 2020-03-11 NOTE — PROGRESS NOTE ADULT - SUBJECTIVE AND OBJECTIVE BOX
CARDIOLOGY FOLLOW UP - Dr. Steel    CC no cp or sob       PHYSICAL EXAM:  T(C): 36.4 (03-11-20 @ 04:57), Max: 36.5 (03-10-20 @ 12:26)  HR: 91 (03-11-20 @ 04:57) (84 - 95)  BP: 118/76 (03-11-20 @ 04:57) (117/64 - 137/81)  RR: 18 (03-11-20 @ 04:57) (18 - 18)  SpO2: 97% (03-11-20 @ 04:57) (96% - 97%)  Wt(kg): --  I&O's Summary    10 Mar 2020 07:01  -  11 Mar 2020 07:00  --------------------------------------------------------  IN: 720 mL / OUT: 1650 mL / NET: -930 mL    11 Mar 2020 07:01  -  11 Mar 2020 10:34  --------------------------------------------------------  IN: 200 mL / OUT: 0 mL / NET: 200 mL        Appearance: Normal	  Cardiovascular: Normal S1 S2,RRR   Respiratory: diminished  Gastrointestinal:  Soft, Non-tender, + BS	  Extremities: Normal range of motion, bl le ++  edema        MEDICATIONS  (STANDING):  ALBUTerol    90 MICROgram(s) HFA Inhaler 1 Puff(s) Inhalation every 4 hours  albuterol/ipratropium for Nebulization. 3 milliLiter(s) Nebulizer every 6 hours  aMIOdarone    Tablet 200 milliGRAM(s) Oral daily  artificial tears (preservative free) Ophthalmic Solution 1 Drop(s) Both EYES two times a day  aspirin enteric coated 81 milliGRAM(s) Oral daily  atorvastatin 40 milliGRAM(s) Oral at bedtime  benzonatate 100 milliGRAM(s) Oral every 8 hours  buDESOnide    Inhalation Suspension 0.5 milliGRAM(s) Inhalation two times a day  calcium acetate 667 milliGRAM(s) Oral three times a day with meals  chlorhexidine 2% Cloths 1 Application(s) Topical <User Schedule>  dextrose 5%. 1000 milliLiter(s) (50 mL/Hr) IV Continuous <Continuous>  dextrose 5%. 1000 milliLiter(s) (30 mL/Hr) IV Continuous <Continuous>  dextrose 5%. 1000 milliLiter(s) (50 mL/Hr) IV Continuous <Continuous>  dextrose 50% Injectable 12.5 Gram(s) IV Push once  dextrose 50% Injectable 25 Gram(s) IV Push once  dextrose 50% Injectable 25 Gram(s) IV Push once  heparin  Injectable 5000 Unit(s) SubCutaneous every 8 hours  insulin glargine Injectable (LANTUS) 38 Unit(s) SubCutaneous at bedtime  insulin lispro (HumaLOG) corrective regimen sliding scale   SubCutaneous Before meals and at bedtime  insulin lispro Injectable (HumaLOG) 10 Unit(s) SubCutaneous three times a day with meals  linezolid    Tablet 600 milliGRAM(s) Oral every 12 hours  melatonin 3 milliGRAM(s) Oral at bedtime  metoprolol tartrate 12.5 milliGRAM(s) Oral two times a day  modafinil 100 milliGRAM(s) Oral <User Schedule>  multivitamin 1 Tablet(s) Oral daily  nystatin    Suspension 822481 Unit(s) Oral every 6 hours  pantoprazole    Tablet 40 milliGRAM(s) Oral before breakfast  polyethylene glycol 3350 17 Gram(s) Oral daily  senna 2 Tablet(s) Oral at bedtime  silver sulfADIAZINE 1% Cream 1 Application(s) Topical daily  sodium chloride 0.65% Nasal 1 Spray(s) Both Nostrils three times a day  spironolactone 25 milliGRAM(s) Oral daily  tiotropium 18 MICROgram(s) Capsule 1 Capsule(s) Inhalation daily  torsemide 10 milliGRAM(s) Oral daily      TELEMETRY: nsr 	    ECG:  	  RADIOLOGY:   DIAGNOSTIC TESTING:  [ ] Echocardiogram:  [ ]  Catheterization:  [ ] Stress Test:    OTHER: 	    LABS:	 	    Creatine Kinase, Serum: 71 U/L [30 - 200] (03-08 @ 07:43)                          8.1    7.06  )-----------( 243      ( 11 Mar 2020 05:54 )             27.1     03-11    137  |  103  |  30<H>  ----------------------------<  139<H>  4.3   |  24  |  1.37<H>    Ca    7.7<L>      11 Mar 2020 05:54

## 2020-03-11 NOTE — PROGRESS NOTE ADULT - ASSESSMENT
A/P: 64M s/p sternal debridement and b/l pectoralis advancement flaps on 2/25. Sternal incision with dehiscence inferiorly and murky serous drainage, more likely fat necrosis, less concerning for infection given time course, lack of culture growth, afebrile, and lack of upwardly-trending WBC    - C/w VAC, MWF changes  - Continue JPs  - Care per CT  - Will cont to follow      Plastic Surgery   [facility]: [phone]

## 2020-03-11 NOTE — PROGRESS NOTE ADULT - ASSESSMENT
65yo male with hx of HTN, DM II, presented to the ED with complaints of acute on chronic left sided exertional chest pain. Pt states he has been having left sided pressure and stabbing chest pain radiating to his sternum and right with activity for the past few months. Since admission- s/p cath with severe triple vessel disease, s/p CABG, post op course c/b brief witnessed PEA 2/6 likely hypoxic arrest, s/p intubation. ( CAD, s/p cath with severe triple vessel disease including lesions at the bifurcation of the LAD/diagonal and distal RCA/RPDA/RPL trifurcation.   -s/p CABG x 4, intraop kennedy ef 50%)--s/p ampicillin until 2/18 for enterococcus in blood, extubated 2/9, pigtail right 2/8 and left sided on 2/15-for effusion.  Remains sob-NO h/o resp issues prior to hospitalization.  ***Current sob-multifactorial-CAD/effusions, atelectasis due to pain, mild bronchospasm and debility.  ********************  2/21-slightly better, pig tail cath removed from left chest; CT chest done-loculated left effusion-slight better  2/24-BS issues-less sob-dapto/cefepime as per ID  2/25-for debridement of chest area-sternal wound  2/26-debridement completed--to CTU  2/2790-PLV-gavilozzt over all  2/28-chest pain still impacting breathing--plastics/renal endo f/up  2/29-no changes over night  3/1-cough present-has been off BD  3/2-cough present still  3/3-improved cough on amitriptyline  3/4-cough upon swallowing  3/5-no changes-more ambulation-for mbs  3/6-failed swallow study--to D3 diet precautions  3/9-abx to change to zyvox as per ID  3/10-further ambulation-zyvox upon DC and for 61 days  3/11-cough remains-slighlty better

## 2020-03-11 NOTE — PROGRESS NOTE ADULT - ATTENDING COMMENTS
Patient seen and examined, agree with the above assessment and plan by AUDIE Templeton.  CV status remains stable  diuresis per CTS  vac per CTS/Plastics  DCP

## 2020-03-11 NOTE — PROGRESS NOTE ADULT - PROBLEM SELECTOR PLAN 7
seen by Dr David bowie bedside swallow failed  Reapeat MBS 3/12    pt. placed on Dysphagia 3 diet w/ nectar thick liquid.   NPO  MBS in am

## 2020-03-11 NOTE — PROGRESS NOTE ADULT - SUBJECTIVE AND OBJECTIVE BOX
Plastic Surgery Progress Note (pg [facility]: [id number], [facility]: [phone], [facility]: [phone])    SUBJECTIVE:  - Doing well, sitting comfortably in chair  - VAC changed yesterday due to leaking  - Pain well controlled  - No events overnight       OBJECTIVE:           Vital Signs:  Vital Signs Last 24 Hrs  T(C): 36.4 (11 Mar 2020 04:57), Max: 36.5 (10 Mar 2020 12:26)  T(F): 97.5 (11 Mar 2020 04:57), Max: 97.7 (10 Mar 2020 12:26)  HR: 91 (11 Mar 2020 04:57) (84 - 95)  BP: 118/76 (11 Mar 2020 04:57) (117/64 - 137/81)  BP(mean): 90 (11 Mar 2020 04:57) (90 - 90)  RR: 18 (11 Mar 2020 04:57) (18 - 18)  SpO2: 97% (11 Mar 2020 04:57) (96% - 97%)    CAPILLARY BLOOD GLUCOSE      POCT Blood Glucose.: 156 mg/dL (11 Mar 2020 07:43)  POCT Blood Glucose.: 122 mg/dL (10 Mar 2020 23:16)  POCT Blood Glucose.: 93 mg/dL (10 Mar 2020 22:57)  POCT Blood Glucose.: 67 mg/dL (10 Mar 2020 22:43)  POCT Blood Glucose.: 58 mg/dL (10 Mar 2020 22:22)  POCT Blood Glucose.: 69 mg/dL (10 Mar 2020 21:55)  POCT Blood Glucose.: 182 mg/dL (10 Mar 2020 17:40)  POCT Blood Glucose.: 179 mg/dL (10 Mar 2020 12:23)      I&O's Detail    10 Mar 2020 07:01  -  11 Mar 2020 07:00  --------------------------------------------------------  IN:    Oral Fluid: 720 mL  Total IN: 720 mL    OUT:    Voided: 1650 mL  Total OUT: 1650 mL    Total NET: -930 mL          MEDICATIONS  (STANDING):  ALBUTerol    90 MICROgram(s) HFA Inhaler 1 Puff(s) Inhalation every 4 hours  albuterol/ipratropium for Nebulization. 3 milliLiter(s) Nebulizer every 6 hours  aMIOdarone    Tablet 200 milliGRAM(s) Oral daily  artificial tears (preservative free) Ophthalmic Solution 1 Drop(s) Both EYES two times a day  aspirin enteric coated 81 milliGRAM(s) Oral daily  atorvastatin 40 milliGRAM(s) Oral at bedtime  benzonatate 100 milliGRAM(s) Oral every 8 hours  buDESOnide    Inhalation Suspension 0.5 milliGRAM(s) Inhalation two times a day  calcium acetate 667 milliGRAM(s) Oral three times a day with meals  chlorhexidine 2% Cloths 1 Application(s) Topical <User Schedule>  dextrose 5%. 1000 milliLiter(s) (50 mL/Hr) IV Continuous <Continuous>  dextrose 5%. 1000 milliLiter(s) (30 mL/Hr) IV Continuous <Continuous>  dextrose 5%. 1000 milliLiter(s) (50 mL/Hr) IV Continuous <Continuous>  dextrose 50% Injectable 12.5 Gram(s) IV Push once  dextrose 50% Injectable 25 Gram(s) IV Push once  dextrose 50% Injectable 25 Gram(s) IV Push once  heparin  Injectable 5000 Unit(s) SubCutaneous every 8 hours  insulin glargine Injectable (LANTUS) 38 Unit(s) SubCutaneous at bedtime  insulin lispro (HumaLOG) corrective regimen sliding scale   SubCutaneous Before meals and at bedtime  insulin lispro Injectable (HumaLOG) 10 Unit(s) SubCutaneous three times a day with meals  linezolid    Tablet 600 milliGRAM(s) Oral every 12 hours  melatonin 3 milliGRAM(s) Oral at bedtime  metoprolol tartrate 12.5 milliGRAM(s) Oral two times a day  modafinil 100 milliGRAM(s) Oral <User Schedule>  multivitamin 1 Tablet(s) Oral daily  nystatin    Suspension 998762 Unit(s) Oral every 6 hours  pantoprazole    Tablet 40 milliGRAM(s) Oral before breakfast  polyethylene glycol 3350 17 Gram(s) Oral daily  senna 2 Tablet(s) Oral at bedtime  silver sulfADIAZINE 1% Cream 1 Application(s) Topical daily  sodium chloride 0.65% Nasal 1 Spray(s) Both Nostrils three times a day  spironolactone 25 milliGRAM(s) Oral daily  tiotropium 18 MICROgram(s) Capsule 1 Capsule(s) Inhalation daily  torsemide 10 milliGRAM(s) Oral daily    MEDICATIONS  (PRN):  acetaminophen   Tablet .. 650 milliGRAM(s) Oral every 6 hours PRN Mild Pain (1 - 3)  albuterol/ipratropium for Nebulization. 3 milliLiter(s) Nebulizer every 6 hours PRN Shortness of Breath and/or Wheezing  dextrose 40% Gel 15 Gram(s) Oral once PRN Blood Glucose LESS THAN 70 milliGRAM(s)/deciLiter  glucagon  Injectable 1 milliGRAM(s) IntraMuscular once PRN Glucose <70 milliGRAM(s)/deciLiter  guaiFENesin   Syrup  (Sugar-Free) 200 milliGRAM(s) Oral every 6 hours PRN Cough  oxycodone    5 mG/acetaminophen 325 mG 1 Tablet(s) Oral every 6 hours PRN Severe Pain (7 - 10)  sodium chloride 0.9% lock flush 10 milliLiter(s) IV Push every 1 hour PRN Pre/post blood products, medications, blood draw, and to maintain line patency          Labs:    03-11    137  |  103  |  30<H>  ----------------------------<  139<H>  4.3   |  24  |  1.37<H>    Ca    7.7<L>      11 Mar 2020 05:54                              8.1    7.06  )-----------( 243      ( 11 Mar 2020 05:54 )             27.1         Imaging:            ** PHYSICAL EXAM **    -- CONSTITUTIONAL: AOx3. NAD.   -- CHEST: incisions c/d/i superiorly. Inferiorly area of dehiscences with some fibrinous material at base of wound and some fat necrosis.  Wound non-malodorous

## 2020-03-11 NOTE — PROGRESS NOTE ADULT - ASSESSMENT
intraop kennedy 2/3/20 ef 50%, nl lv, mild diastolic dysfx stage 1  limited echo 2/4/20: nl LV sys fx , no pericardial effusion     a/p  64 year old man with history of HTN, DM II, admitted with progressive exertional angina, s/p cath with severe triple vessel disease, s/p CABG, post op course c/b brief witnessed PEA likely hypoxic arrest, s/p intubation.     1. CAD, s/p cath with severe triple vessel disease including lesions at the bifurcation of the LAD/diagonal and distal RCA/RPDA/RPL trifurcation.   -s/p CABG x 4, cont asa, statin,  -continue bb   -s/p PEA arrest   -echo 2/15 with preserved lv fxn/EF    2. Sternal wound infection  -s/p RTOR for SWI  -abx per id, wound vac     3. Postop acute diastolic HF  -diuretics per cts     4. PAF  -cont amio ,bb  -AC ?, currently on asa    5. HTN  -bp stable    6. STEPHANIE/CKD  -stable, renal f/u     7. Postop Pleural effusion  -S/P Right/Left chest tube     8. Sepsis -E. Faecalis bacteremia, SW infection, RLE cellulitis   -IV abx , repeat bcx 2/13 neg  -s/p debridement   -ID, plastics f/u     dvt ppx  dc planning to rehab, f/u apt. scheduled on 4/7 and 4:45pm

## 2020-03-11 NOTE — PROGRESS NOTE ADULT - PROBLEM SELECTOR PLAN 1
Tight glycemic control necessary in the setting of wound infection.  Will decrease Lantus  to 28u at bedtime.  Will continue Humalog 10u before each meal as well as Humalog correction scale coverage. Will continue monitoring FS and FU.  Patient counseled for compliance with consistent low carb diet; Alerted to let RN know if he won't be eating. Humalog should be held in this case to prevent future hypoglycemic episodes.

## 2020-03-12 LAB
ANION GAP SERPL CALC-SCNC: 10 MMOL/L — SIGNIFICANT CHANGE UP (ref 5–17)
BUN SERPL-MCNC: 32 MG/DL — HIGH (ref 7–23)
CALCIUM SERPL-MCNC: 8.7 MG/DL — SIGNIFICANT CHANGE UP (ref 8.4–10.5)
CHLORIDE SERPL-SCNC: 99 MMOL/L — SIGNIFICANT CHANGE UP (ref 96–108)
CO2 SERPL-SCNC: 27 MMOL/L — SIGNIFICANT CHANGE UP (ref 22–31)
CREAT SERPL-MCNC: 1.55 MG/DL — HIGH (ref 0.5–1.3)
GLUCOSE BLDC GLUCOMTR-MCNC: 119 MG/DL — HIGH (ref 70–99)
GLUCOSE BLDC GLUCOMTR-MCNC: 144 MG/DL — HIGH (ref 70–99)
GLUCOSE BLDC GLUCOMTR-MCNC: 166 MG/DL — HIGH (ref 70–99)
GLUCOSE BLDC GLUCOMTR-MCNC: 175 MG/DL — HIGH (ref 70–99)
GLUCOSE BLDC GLUCOMTR-MCNC: 186 MG/DL — HIGH (ref 70–99)
GLUCOSE BLDC GLUCOMTR-MCNC: 235 MG/DL — HIGH (ref 70–99)
GLUCOSE BLDC GLUCOMTR-MCNC: 84 MG/DL — SIGNIFICANT CHANGE UP (ref 70–99)
GLUCOSE SERPL-MCNC: 122 MG/DL — HIGH (ref 70–99)
HCT VFR BLD CALC: 27.7 % — LOW (ref 39–50)
HGB BLD-MCNC: 8.3 G/DL — LOW (ref 13–17)
MCHC RBC-ENTMCNC: 26.3 PG — LOW (ref 27–34)
MCHC RBC-ENTMCNC: 30 GM/DL — LOW (ref 32–36)
MCV RBC AUTO: 87.7 FL — SIGNIFICANT CHANGE UP (ref 80–100)
NRBC # BLD: 0 /100 WBCS — SIGNIFICANT CHANGE UP (ref 0–0)
PLATELET # BLD AUTO: 243 K/UL — SIGNIFICANT CHANGE UP (ref 150–400)
POTASSIUM SERPL-MCNC: 4.7 MMOL/L — SIGNIFICANT CHANGE UP (ref 3.5–5.3)
POTASSIUM SERPL-SCNC: 4.7 MMOL/L — SIGNIFICANT CHANGE UP (ref 3.5–5.3)
RBC # BLD: 3.16 M/UL — LOW (ref 4.2–5.8)
RBC # FLD: 15.2 % — HIGH (ref 10.3–14.5)
SODIUM SERPL-SCNC: 136 MMOL/L — SIGNIFICANT CHANGE UP (ref 135–145)
WBC # BLD: 6.2 K/UL — SIGNIFICANT CHANGE UP (ref 3.8–10.5)
WBC # FLD AUTO: 6.2 K/UL — SIGNIFICANT CHANGE UP (ref 3.8–10.5)

## 2020-03-12 PROCEDURE — 74230 X-RAY XM SWLNG FUNCJ C+: CPT | Mod: 26

## 2020-03-12 PROCEDURE — 99232 SBSQ HOSP IP/OBS MODERATE 35: CPT

## 2020-03-12 RX ORDER — INSULIN LISPRO 100/ML
12 VIAL (ML) SUBCUTANEOUS
Refills: 0 | Status: DISCONTINUED | OUTPATIENT
Start: 2020-03-12 | End: 2020-03-14

## 2020-03-12 RX ORDER — INSULIN GLARGINE 100 [IU]/ML
32 INJECTION, SOLUTION SUBCUTANEOUS AT BEDTIME
Refills: 0 | Status: DISCONTINUED | OUTPATIENT
Start: 2020-03-12 | End: 2020-03-16

## 2020-03-12 RX ADMIN — Medication 3 MILLIGRAM(S): at 22:55

## 2020-03-12 RX ADMIN — CHLORHEXIDINE GLUCONATE 1 APPLICATION(S): 213 SOLUTION TOPICAL at 10:49

## 2020-03-12 RX ADMIN — Medication 10 UNIT(S): at 08:05

## 2020-03-12 RX ADMIN — Medication 3 MILLILITER(S): at 23:02

## 2020-03-12 RX ADMIN — ATORVASTATIN CALCIUM 40 MILLIGRAM(S): 80 TABLET, FILM COATED ORAL at 22:55

## 2020-03-12 RX ADMIN — Medication 12.5 MILLIGRAM(S): at 06:12

## 2020-03-12 RX ADMIN — Medication 0.5 MILLIGRAM(S): at 06:10

## 2020-03-12 RX ADMIN — Medication 10 MILLIGRAM(S): at 06:11

## 2020-03-12 RX ADMIN — Medication 667 MILLIGRAM(S): at 08:06

## 2020-03-12 RX ADMIN — Medication 1 DROP(S): at 06:11

## 2020-03-12 RX ADMIN — Medication 667 MILLIGRAM(S): at 12:00

## 2020-03-12 RX ADMIN — POLYETHYLENE GLYCOL 3350 17 GRAM(S): 17 POWDER, FOR SOLUTION ORAL at 12:00

## 2020-03-12 RX ADMIN — Medication 3 MILLILITER(S): at 11:59

## 2020-03-12 RX ADMIN — Medication 100 MILLIGRAM(S): at 22:55

## 2020-03-12 RX ADMIN — Medication 1 DROP(S): at 17:44

## 2020-03-12 RX ADMIN — Medication 1 SPRAY(S): at 22:56

## 2020-03-12 RX ADMIN — HEPARIN SODIUM 5000 UNIT(S): 5000 INJECTION INTRAVENOUS; SUBCUTANEOUS at 13:51

## 2020-03-12 RX ADMIN — Medication 3 MILLILITER(S): at 06:10

## 2020-03-12 RX ADMIN — LINEZOLID 600 MILLIGRAM(S): 600 INJECTION, SOLUTION INTRAVENOUS at 17:45

## 2020-03-12 RX ADMIN — SENNA PLUS 2 TABLET(S): 8.6 TABLET ORAL at 22:55

## 2020-03-12 RX ADMIN — Medication 1: at 22:56

## 2020-03-12 RX ADMIN — Medication 500000 UNIT(S): at 23:02

## 2020-03-12 RX ADMIN — Medication 12.5 MILLIGRAM(S): at 17:45

## 2020-03-12 RX ADMIN — SPIRONOLACTONE 25 MILLIGRAM(S): 25 TABLET, FILM COATED ORAL at 06:12

## 2020-03-12 RX ADMIN — Medication 1 TABLET(S): at 12:00

## 2020-03-12 RX ADMIN — HEPARIN SODIUM 5000 UNIT(S): 5000 INJECTION INTRAVENOUS; SUBCUTANEOUS at 06:10

## 2020-03-12 RX ADMIN — Medication 81 MILLIGRAM(S): at 12:00

## 2020-03-12 RX ADMIN — Medication 667 MILLIGRAM(S): at 17:45

## 2020-03-12 RX ADMIN — Medication 2: at 11:59

## 2020-03-12 RX ADMIN — Medication 12 UNIT(S): at 12:00

## 2020-03-12 RX ADMIN — Medication 500000 UNIT(S): at 17:46

## 2020-03-12 RX ADMIN — Medication 12 UNIT(S): at 17:30

## 2020-03-12 RX ADMIN — PANTOPRAZOLE SODIUM 40 MILLIGRAM(S): 20 TABLET, DELAYED RELEASE ORAL at 06:11

## 2020-03-12 RX ADMIN — INSULIN GLARGINE 32 UNIT(S): 100 INJECTION, SOLUTION SUBCUTANEOUS at 22:54

## 2020-03-12 RX ADMIN — Medication 500000 UNIT(S): at 12:00

## 2020-03-12 RX ADMIN — Medication 3 MILLILITER(S): at 17:45

## 2020-03-12 RX ADMIN — HEPARIN SODIUM 5000 UNIT(S): 5000 INJECTION INTRAVENOUS; SUBCUTANEOUS at 22:55

## 2020-03-12 RX ADMIN — MODAFINIL 100 MILLIGRAM(S): 200 TABLET ORAL at 08:05

## 2020-03-12 RX ADMIN — Medication 1: at 08:05

## 2020-03-12 RX ADMIN — AMIODARONE HYDROCHLORIDE 200 MILLIGRAM(S): 400 TABLET ORAL at 06:11

## 2020-03-12 RX ADMIN — Medication 100 MILLIGRAM(S): at 06:11

## 2020-03-12 RX ADMIN — Medication 0.5 MILLIGRAM(S): at 17:45

## 2020-03-12 RX ADMIN — Medication 500000 UNIT(S): at 06:10

## 2020-03-12 RX ADMIN — Medication 100 MILLIGRAM(S): at 13:51

## 2020-03-12 RX ADMIN — Medication 1 SPRAY(S): at 06:12

## 2020-03-12 RX ADMIN — Medication 1 APPLICATION(S): at 12:00

## 2020-03-12 RX ADMIN — LINEZOLID 600 MILLIGRAM(S): 600 INJECTION, SOLUTION INTRAVENOUS at 06:11

## 2020-03-12 NOTE — PROVIDER CONTACT NOTE (OTHER) - RECOMMENDATIONS
Provider patient  a snack and recheck FS in 30 min. Provide patient a snack and recheck FS in 30 min.

## 2020-03-12 NOTE — PROGRESS NOTE ADULT - SUBJECTIVE AND OBJECTIVE BOX
CARDIOLOGY FOLLOW UP - Dr. Steel    CC no cp/sob     PHYSICAL EXAM:  T(C): 36.7 (03-12-20 @ 04:31), Max: 36.7 (03-11-20 @ 19:51)  HR: 89 (03-12-20 @ 04:31) (88 - 102)  BP: 113/69 (03-12-20 @ 04:31) (113/69 - 129/75)  RR: 19 (03-12-20 @ 04:31) (18 - 19)  SpO2: 99% (03-12-20 @ 04:31) (96% - 99%)  Wt(kg): --  I&O's Summary    11 Mar 2020 07:01  -  12 Mar 2020 07:00  --------------------------------------------------------  IN: 760 mL / OUT: 1465 mL / NET: -705 mL    12 Mar 2020 07:01  -  12 Mar 2020 12:13  --------------------------------------------------------  IN: 220 mL / OUT: 300 mL / NET: -80 mL      Appearance: Normal	  Cardiovascular: Normal S1 S2,RRR , (+) Wound VAC  Respiratory: diminished  Gastrointestinal:  Soft, Non-tender, + BS	  Extremities: Normal range of motion, bl le + edema      MEDICATIONS  (STANDING):  ALBUTerol    90 MICROgram(s) HFA Inhaler 1 Puff(s) Inhalation every 4 hours  albuterol/ipratropium for Nebulization. 3 milliLiter(s) Nebulizer every 6 hours  aMIOdarone    Tablet 200 milliGRAM(s) Oral daily  artificial tears (preservative free) Ophthalmic Solution 1 Drop(s) Both EYES two times a day  aspirin enteric coated 81 milliGRAM(s) Oral daily  atorvastatin 40 milliGRAM(s) Oral at bedtime  benzonatate 100 milliGRAM(s) Oral every 8 hours  buDESOnide    Inhalation Suspension 0.5 milliGRAM(s) Inhalation two times a day  calcium acetate 667 milliGRAM(s) Oral three times a day with meals  chlorhexidine 2% Cloths 1 Application(s) Topical <User Schedule>  dextrose 5%. 1000 milliLiter(s) (50 mL/Hr) IV Continuous <Continuous>  dextrose 5%. 1000 milliLiter(s) (30 mL/Hr) IV Continuous <Continuous>  dextrose 5%. 1000 milliLiter(s) (50 mL/Hr) IV Continuous <Continuous>  dextrose 50% Injectable 12.5 Gram(s) IV Push once  dextrose 50% Injectable 25 Gram(s) IV Push once  dextrose 50% Injectable 25 Gram(s) IV Push once  heparin  Injectable 5000 Unit(s) SubCutaneous every 8 hours  insulin glargine Injectable (LANTUS) 32 Unit(s) SubCutaneous at bedtime  insulin lispro (HumaLOG) corrective regimen sliding scale   SubCutaneous Before meals and at bedtime  insulin lispro Injectable (HumaLOG) 12 Unit(s) SubCutaneous three times a day with meals  linezolid    Tablet 600 milliGRAM(s) Oral every 12 hours  melatonin 3 milliGRAM(s) Oral at bedtime  metoprolol tartrate 12.5 milliGRAM(s) Oral two times a day  modafinil 100 milliGRAM(s) Oral <User Schedule>  multivitamin 1 Tablet(s) Oral daily  nystatin    Suspension 550783 Unit(s) Oral every 6 hours  pantoprazole    Tablet 40 milliGRAM(s) Oral before breakfast  polyethylene glycol 3350 17 Gram(s) Oral daily  senna 2 Tablet(s) Oral at bedtime  silver sulfADIAZINE 1% Cream 1 Application(s) Topical daily  sodium chloride 0.65% Nasal 1 Spray(s) Both Nostrils three times a day  spironolactone 25 milliGRAM(s) Oral daily  tiotropium 18 MICROgram(s) Capsule 1 Capsule(s) Inhalation daily  torsemide 10 milliGRAM(s) Oral daily      TELEMETRY: NSR, 18 beats WCT, r/o PAF  	    ECG:  	  RADIOLOGY:   DIAGNOSTIC TESTING:  [ ] Echocardiogram:  [ ]  Catheterization:  [ ] Stress Test:    OTHER: 	    LABS:	 	                                8.3    6.20  )-----------( 243      ( 12 Mar 2020 06:26 )             27.7     03-12    136  |  99  |  32<H>  ----------------------------<  122<H>  4.7   |  27  |  1.55<H>    Ca    8.7      12 Mar 2020 06:26

## 2020-03-12 NOTE — PROVIDER CONTACT NOTE (OTHER) - BACKGROUND
Patient is admitted acute ischemic heart disease. Patient had CFL procedure on 2/03. Patient has a history of HTN, DM, and CAD. Patient is admitted for acute ischemic heart disease. Patient had CFL procedure on 2/03. Patient has a history of HTN, DM, and CAD.

## 2020-03-12 NOTE — SWALLOW VFSS/MBS ASSESSMENT ADULT - DIAGNOSTIC IMPRESSIONS
Pt presents with a mild oropharyngeal dysphagia notable for ... However, oropharyngeal swallow sequence is grossly functional for a Regular texture diet, with no laryngeal penetration/aspiration noted across trials. Pt presents with a mild oropharyngeal dysphagia notable for mildly prolonged mastication intermittent uncontrolled A-P spillover, and trace pharyngeal residue. However, oropharyngeal swallow sequence is grossly functional for a Regular texture diet, with no laryngeal penetration/aspiration noted across trials. Esophageal phase of swallow notable for intermittent trace retention, and intermittent mild air distention, possibly due to tiny anterior esophageal filling defect vs. other. Can consider GI consult to further assess, if clinically indicated.

## 2020-03-12 NOTE — PROVIDER CONTACT NOTE (OTHER) - ACTION/TREATMENT ORDERED:
PA aware, will keep attempting to get another access
Continue to monitor FS until above 100. Pre meal insulin lowered. Continue to monitor patient
NP made aware- okay to give lantus, continue to monitor
Snack provided to patient and recheck FS in 30 as ordered by Bronson Holm (AUDIE). Will continue to monitor.

## 2020-03-12 NOTE — PROGRESS NOTE ADULT - ASSESSMENT
65yo male with hx of HTN, DM II   s/p C4L on 2/3; PEA arrest on 2/6   + enterococcus Bld  C/S > started ampicillin q8h, ID consulted,  R pigtail for effusion  2/8    Extubated  2/9  2/15 L pigtail effusion  2/17 PRBC   2/18 Tx 2 Diaz  2/19 thomas removed, trial void. Maintain left pigtail for significant output. Pt encouraged to ambulate. Supplemental o2 sat NC weaned to 2L. Blood cultures repeated.  2/20 VSS -Pulmonary consult - appreciated - duonebs ATC q6h & pulmicort bid initiated - ddimer drawn.  pt w/ hx negative LE dopplers   will order non con chest DT as pt has a loculated left effusion that will require tap at IR.  2/21 - NON con Chest CT done - multiple loculated Left pleural effusions w/ patchy consolidation R - rounds made w/ Dr. carl.  ID - consult called re: Ct - WBC 11 afebrile.  +PALMA.  unable to tolerate VQ scan this am.  will d/c bumex & start Torsemide 20mg po daily  d/c planning rehab when medically stable  2/22  Wound care consult leg wounds> shower w/ local skin care  2/23  SW drainage> on Dapto> as per ID will cover SWD  CXR this am   Tighter glycemic control  2/24 ID added cefapime SW drainage purulent, afebrile  2/25 S/p Sternal wound debridement and irrigation with removal of all 6 sternal wires. Purulence encountered and sent for culture. Closure with bilateral pectoralis muscle advancement flaps.  Post op zari for hypotension- weaned off.  Skin/wd  culture from 2/24 neg  Pt transferred to sdu  2/27 VSS   carlos d/c thomas d/c transfer to floor JPx2  followed  by Plastics  followed by ID on cefepime and daptomycin bc positive for E. faecalis; follow up bc 2/19 negative to date. Provigil daily in am  2/28 VSS; abx as per ID - d/c cefepime and continue dapto 500 iv qd as per Dr. Carrasco- pt will need picc line vs medel for 4 wks abx from date of SWD as per ID; bc 2/19 neg.  d/w h. renal medel vs cath- await decision, diuresis initiated for hypervolemia; hep sq for dvt prophylaxis, + hypoglycemia- endo following- humalog adjusted    Discharge planning- rehab next week   2/29 VSS, distal portion of MSI with small dehiscence and serous purulent drainage - recommended wound vac placement as per Plastics. Left KEI 80ml & Right KEI 80ml/24h. S/P Right PICC line placement for IV abx.   3/1 Wound Vac placed to sternal wound. Pt ambulated in hallway with rolling walker. Left KEI 50ml & Right KEI 110ml/24h. Plan for discharge to Rehab Mon/Tue.   3/2 Pending patient rehab selection. Maintain sternal wound vac placement and KEI drains. Patient lethargic after percocet. Will hold narcotics . Continue with Daptomycin x 4 weeks until 3/22/20  3/3 VSS - call to ID re: alternative antibiotic to Daptomycin - Dr. Carrasco to follow up . d/c to rehab   3/4  HD stable .  + cough--bedside swallowing eval + cough--recs NPO and MBS in am--insulin adjusted by Dr Matias.   Dw Dr Carrasco--daptomycin can be switched on Monday to another antibiotic thru 3/22  3/5 VSS - MBS - Per speech pathologist- no gross aspiration ?silent - placed on Dysphagia 3 diet w/ nectar thick liquids. d/w Dr. Mariscal-  As per ID - will switch antibiotic to Zyvoxx on Monday 3/9/2020 - d/c to rehab on Monday on zyvoxx until 3/22  3/6 VSS; continue diuresis; resume low dose bb; abx as per ID/ vac  3/7/2020 VSS rounds made w/ Dr. Mariscal- right KEI w/ minimal drainage - spoke w/ plastics - will d/c right KEI - d/c plan rehab - switch to Zyvoxx bid po on monday.  3/8 VSS; vac change today + drainage noted at distal vac site; wbc 8 and pt afebrile; abx as per ID; cr 2.0- decrease torsemide 20 qd as per Dr. Mariscal   3/9 VSS; wbc 8 and pt afebrile; abx changed to po zyvox 600 mg po q12 as per ID   Discharge planning- await rehab placement   3/10/2020 - Pt denied from rehab of his choice.  pt may need to remain in hospital until the remainder of his antibiotic course 3/21/2020 as  he is being denied from rehab.  Care Coordinator will continue to apply to rehabs..  3/11 VSS; plastics recommending removal of VAC post healing of sternum from inside. Next VAC change on Friday. MBS tomorrow 9 AM w/ S/S. Anticipating d/c on Monday  3/12 VSS; MBS today- started on regular diet. VAC change tomorrow. Continue abx until 3/22. Anticipating d/c on Monday to home

## 2020-03-12 NOTE — SWALLOW VFSS/MBS ASSESSMENT ADULT - ADDITIONAL RECOMMENDATIONS
Maintain good oral hygiene.   Restorative swallow therapy. Will continue to follow while patient is in-house, as schedule permits. Maintain good oral hygiene.

## 2020-03-12 NOTE — PROGRESS NOTE ADULT - PROBLEM SELECTOR PLAN 1
Tight glycemic control necessary in the setting of wound infection.  Will increase Lantus to 32u at bedtime.  Will increase Humalog 12u before each meal as well as Humalog correction scale coverage. Will continue monitoring FS and FU.  Patient counseled for compliance with consistent low carb diet; Alerted to let RN know if he won't be eating. Humalog should be held in this case to prevent future hypoglycemic episodes.

## 2020-03-12 NOTE — SWALLOW VFSS/MBS ASSESSMENT ADULT - ORAL PHASE
Delayed oral transit time/trace lingual residue post swallow intermittent trace to mild uncontrolled spillover to the hypopharynx; mild oral residue that flows to the hypopharynx post swallow trace to mild lingual residue that clears with a reswallow

## 2020-03-12 NOTE — SWALLOW VFSS/MBS ASSESSMENT ADULT - NS SWALLOW VFSS REC ASPIR MON
pneumonia/throat clearing/cough/gurgly voice/change of breathing pattern/upper respiratory infection/Monitor for s/s aspiration/laryngeal penetration. If noted:  D/C p.o. intake, provide non-oral nutrition/hydration/meds, and contact this service @ [phone]/fever

## 2020-03-12 NOTE — SWALLOW VFSS/MBS ASSESSMENT ADULT - RECOMMENDED CONSISTENCY
Upgrade to Regular texture diet   MD/team Please enter the following as notes to RN: 1) Supervision with meals, 2) Meds with applesauce, if needed, 3) Small single bites and sips at slow rate, 5) Encourage successive swallows for oral and pharyngeal clearance, 6) Aspiration precautions. Monitor for s/s aspiration/laryngeal penetration. If noted:  D/C p.o. intake, provide non-oral nutrition/hydration/meds

## 2020-03-12 NOTE — SWALLOW VFSS/MBS ASSESSMENT ADULT - COMMENTS
Course hx:  63yo male with hx of HTN, DM II s/p C4L on 2/3; PEA arrest on 2/6   + enterococcus Bld  C/S > started ampicillin q8h, ID consulted,  R pigtail for effusion    2/8    Extubated    2/15 L pigtail effusion    2/17 PRBC     2/18 Tx 2 Diaz  2/21 - NON con Chest CT - multiple loculated Left pleural effusions w/ patchy consolidation R slightly better; pigtail cath removed.   2/22  Wound care consult leg wounds  2/24 ID added cefapime SW drainage purulent, afebrile  2/25 S/p Sternal wound debridement and irrigation with removal of all 6 sternal wires. Purulence encountered and sent for culture. Closure with bilateral pectoralis muscle advancement flaps.  Pt transferred to sdu  2/27 VSS   carlos d/c william d/c transfer to floor JPx2  followed  by Plastics  followed by ID on cefepime and daptomycin bc positive for E. faecalis. Provigil daily in am  2/28 VSS; abx as per ID 4 wks IV abx from date of SWD as per ID. + hypoglycemia- endo following.    d/c planning- rehab next week   2/29 VSS, distal portion of MSI with small dehiscence and serous purulent drainage - recommended wound vac placement as per Plastics. Pending PICC line placement for IV abx.   Witnessed PEA 2/6 likely hypoxic arrest, s/p intubation. ( CAD, s/p cath with severe triple vessel disease including lesions at the bifurcation of LAD/diagonal and distal RCA/RPDA/RPL trifurcation. HC  significant for multiple intubations.  -s/p CABG x 4, intraop kennedy ef 50%)--s/p ampicillin until 2/18 for enterococcus in blood, extubated 2/9, pigtail right 2/8 and left sided on 2/15-for effusion.  Remains sob-NO h/o resp issues prior to hospitalization.  Current sob-multifactorial-CAD/effusions, atelectasis due to pain, mild bronchospasm and debility.  2/28-chest pain still impacting breathing--plastics/renal endo f/up  3/4-cough upon swallowing

## 2020-03-12 NOTE — SWALLOW VFSS/MBS ASSESSMENT ADULT - SLP PERTINENT HISTORY OF CURRENT PROBLEM
H&P: 63yo male with hx of HTN, DM II, presented to the ED w/ c/o acute on chronic L sided exertional chest pain. Pt reports L  sided pressure and stabbing chest pain radiating to his sternum and r w/ activity for the past few months. He states each episode last ~ 1-2 mins and subsides upon resting; denies symptoms of radiation to back, arm or jaw. Pt states this time his pain was associated w/ SOB up exertion; denies symptoms of LOC, diaphoresis, palpitations, abd pain, N/V, fever or chills. He denies prior cardiac work up.

## 2020-03-12 NOTE — SWALLOW VFSS/MBS ASSESSMENT ADULT - ADDITIONAL INFORMATION
+ slight calcifications of laryngeal surface of epiglottis, thyroid cartilage, and arytenoid cartilages   + small non-obstructive anterior cervical osteophytes  + high shoulder girdle, which limits visualization of subglottic space and proximal esophagus to a degree  ? tiny anterior esophageal filling defect

## 2020-03-12 NOTE — PROGRESS NOTE ADULT - ASSESSMENT
Assessment  DMT2: 64y Male with DM T2 with hyperglycemia, A1C 9.2%, was on oral meds and insulin at home, now on basal bolus insulin, blood sugars fluctuating due to inconsistent carb intake, FS now trending up, no new hypoglycemic episodes. Patient eats meals with fluctuating appetite, sitting up in chair, appears comfortable, advanced diet today.  Foot infection: On Tx, stable.  CAD: s/p CABG 2/3, on medications, no chest pain, stable, monitored.  HTN: Controlled,  on antihypertensive medications.  HLD: Controlled, on statin.  CKD: Monitor labs/BMP          Harper Matias MD  Cell: [phone]  Office: [phone] Assessment  DMT2: 64y Male with DM T2 with hyperglycemia, A1C 9.2%, was on oral meds and insulin at home, now on basal bolus insulin,  blood sugars fluctuating due to inconsistent carb intake, FS now trending up, no new hypoglycemic episodes. Patient eats meals with fluctuating appetite, sitting up in chair, appears comfortable, advanced diet today.  Foot infection: On Tx, stable.  CAD: s/p CABG 2/3, on medications, no chest pain, stable, monitored.  HTN: Controlled,  on antihypertensive medications.  HLD: Controlled, on statin.  CKD: Monitor labs/BMP          Harper Matias MD  Cell: [phone]  Office: [phone]

## 2020-03-12 NOTE — PROGRESS NOTE ADULT - SUBJECTIVE AND OBJECTIVE BOX
VITAL SIGNS    Telemetry: SR   Vital Signs Last 24 Hrs  T(C): 36.7 (20 @ 04:31), Max: 36.7 (20 @ 19:51)  T(F): 98 (20 @ 04:31), Max: 98 (20 @ 19:51)  HR: 89 (20 04:31) (88 - 102)  BP: 113/69 (20 @ 04:31) (113/69 - 129/75)  RR: 19 (20 @ 04:31) (18 - 19)  SpO2: 99% (20 @ 04:31) (96% - 99%)             07:01  -   @ 07:00  --------------------------------------------------------  IN: 760 mL / OUT: 1465 mL / NET: -705 mL     @ 07:01  -   11:31  --------------------------------------------------------  IN: 220 mL / OUT: 300 mL / NET: -80 mL       Daily     Daily Weight in k.6 (12 Mar 2020 10:06)  Admit Wt: Drug Dosing Weight  Height (cm): 172.72 (2020 07:20)  Weight (kg): 81.1 (2020 07:20)  BMI (kg/m2): 27.2 (2020 07:20)  BSA (m2): 1.95 (2020 07:20)      CAPILLARY BLOOD GLUCOSE      POCT Blood Glucose.: 166 mg/dL (12 Mar 2020 07:42)  POCT Blood Glucose.: 119 mg/dL (12 Mar 2020 01:46)  POCT Blood Glucose.: 175 mg/dL (11 Mar 2020 21:17)  POCT Blood Glucose.: 144 mg/dL (11 Mar 2020 17:26)  POCT Blood Glucose.: 181 mg/dL (11 Mar 2020 11:43)          MEDICATIONS  acetaminophen   Tablet .. 650 milliGRAM(s) Oral every 6 hours PRN  ALBUTerol    90 MICROgram(s) HFA Inhaler 1 Puff(s) Inhalation every 4 hours  albuterol/ipratropium for Nebulization. 3 milliLiter(s) Nebulizer every 6 hours PRN  albuterol/ipratropium for Nebulization. 3 milliLiter(s) Nebulizer every 6 hours  aMIOdarone    Tablet 200 milliGRAM(s) Oral daily  artificial tears (preservative free) Ophthalmic Solution 1 Drop(s) Both EYES two times a day  aspirin enteric coated 81 milliGRAM(s) Oral daily  atorvastatin 40 milliGRAM(s) Oral at bedtime  benzonatate 100 milliGRAM(s) Oral every 8 hours  buDESOnide    Inhalation Suspension 0.5 milliGRAM(s) Inhalation two times a day  calcium acetate 667 milliGRAM(s) Oral three times a day with meals  chlorhexidine 2% Cloths 1 Application(s) Topical <User Schedule>  dextrose 40% Gel 15 Gram(s) Oral once PRN  dextrose 5%. 1000 milliLiter(s) IV Continuous <Continuous>  dextrose 5%. 1000 milliLiter(s) IV Continuous <Continuous>  dextrose 5%. 1000 milliLiter(s) IV Continuous <Continuous>  dextrose 50% Injectable 12.5 Gram(s) IV Push once  dextrose 50% Injectable 25 Gram(s) IV Push once  dextrose 50% Injectable 25 Gram(s) IV Push once  glucagon  Injectable 1 milliGRAM(s) IntraMuscular once PRN  guaiFENesin   Syrup  (Sugar-Free) 200 milliGRAM(s) Oral every 6 hours PRN  heparin  Injectable 5000 Unit(s) SubCutaneous every 8 hours  insulin glargine Injectable (LANTUS) 28 Unit(s) SubCutaneous at bedtime  insulin lispro (HumaLOG) corrective regimen sliding scale   SubCutaneous Before meals and at bedtime  insulin lispro Injectable (HumaLOG) 10 Unit(s) SubCutaneous three times a day with meals  linezolid    Tablet 600 milliGRAM(s) Oral every 12 hours  melatonin 3 milliGRAM(s) Oral at bedtime  metoprolol tartrate 12.5 milliGRAM(s) Oral two times a day  modafinil 100 milliGRAM(s) Oral <User Schedule>  multivitamin 1 Tablet(s) Oral daily  nystatin    Suspension 779199 Unit(s) Oral every 6 hours  oxycodone    5 mG/acetaminophen 325 mG 1 Tablet(s) Oral every 6 hours PRN  pantoprazole    Tablet 40 milliGRAM(s) Oral before breakfast  polyethylene glycol 3350 17 Gram(s) Oral daily  senna 2 Tablet(s) Oral at bedtime  silver sulfADIAZINE 1% Cream 1 Application(s) Topical daily  sodium chloride 0.65% Nasal 1 Spray(s) Both Nostrils three times a day  sodium chloride 0.9% lock flush 10 milliLiter(s) IV Push every 1 hour PRN  spironolactone 25 milliGRAM(s) Oral daily  tiotropium 18 MICROgram(s) Capsule 1 Capsule(s) Inhalation daily  torsemide 10 milliGRAM(s) Oral daily      Interval History: Pt denies any complaints at this time.     PHYSICAL EXAM  General: WN, WD, NAD  Neurology: alert and oriented x 3, nonfocal, no gross deficits  CV : s1, s2  Sternal Wound :  CDI , Stable (+) Wound VAC to mid pole of sternum  Lungs: CTA B/L  Abdomen: soft, NT,ND, ( +)Bowel sounds  :  voiding  Extremities:  Rt lower leg healing wound, neg calve tenderness b/l. PP 2+ B/L     LABS      136  |  99  |  32<H>  ----------------------------<  122<H>  4.7   |  27  |  1.55<H>    Ca    8.7      12 Mar 2020 06:26                                   8.3    6.20  )-----------( 243      ( 12 Mar 2020 06:26 )             27.7                 PAST MEDICAL & SURGICAL HISTORY:  HTN (hypertension)  Diabetes  History of appendectomy: x 30 yrs ago

## 2020-03-12 NOTE — SWALLOW VFSS/MBS ASSESSMENT ADULT - RECOMMENDED FEEDING/EATING TECHNIQUES
no straws/oral hygiene/alternate food with liquid/small sips/bites/maintain upright posture during/after eating for 30 mins/position upright (90 degrees)

## 2020-03-12 NOTE — PROGRESS NOTE ADULT - ASSESSMENT
65yo male with hx of HTN, DM II, presented to the ED with complaints of acute on chronic left sided exertional chest pain. Pt states he has been having left sided pressure and stabbing chest pain radiating to his sternum and right with activity for the past few months. Since admission- s/p cath with severe triple vessel disease, s/p CABG, post op course c/b brief witnessed PEA 2/6 likely hypoxic arrest, s/p intubation. ( CAD, s/p cath with severe triple vessel disease including lesions at the bifurcation of the LAD/diagonal and distal RCA/RPDA/RPL trifurcation.   -s/p CABG x 4, intraop kennedy ef 50%)--s/p ampicillin until 2/18 for enterococcus in blood, extubated 2/9, pigtail right 2/8 and left sided on 2/15-for effusion.  Remains sob-NO h/o resp issues prior to hospitalization.  ***Current sob-multifactorial-CAD/effusions, atelectasis due to pain, mild bronchospasm and debility.  ********************  2/21-slightly better, pig tail cath removed from left chest; CT chest done-loculated left effusion-slight better  2/24-BS issues-less sob-dapto/cefepime as per ID  2/25-for debridement of chest area-sternal wound  2/26-debridement completed--to CTU  2/2726-[facility]-fwsnsetar over all  2/28-chest pain still impacting breathing--plastics/renal endo f/up  2/29-no changes over night  3/1-cough present-has been off BD  3/2-cough present still  3/3-improved cough on amitriptyline  3/4-cough upon swallowing  3/5-no changes-more ambulation-for mbs  3/6-failed swallow study--to D3 diet precautions  3/9-abx to change to zyvox as per ID  3/10-further ambulation-zyvox upon DC and for 61 days  3/11-cough remains-slighlty better  3/12-slightly better-less cough/ambulating

## 2020-03-12 NOTE — PROGRESS NOTE ADULT - SUBJECTIVE AND OBJECTIVE BOX
Chief complaint  Patient is a 64y old  Male who presents with a chief complaint of Chest pain (12 Mar 2020 12:12)   Review of systems  Patient in chair, looks comfortable, no hypoglycemia.    Labs and Fingersticks  CAPILLARY BLOOD GLUCOSE      POCT Blood Glucose.: 235 mg/dL (12 Mar 2020 11:56)  POCT Blood Glucose.: 166 mg/dL (12 Mar 2020 07:42)  POCT Blood Glucose.: 119 mg/dL (12 Mar 2020 01:46)  POCT Blood Glucose.: 175 mg/dL (11 Mar 2020 21:17)  POCT Blood Glucose.: 144 mg/dL (11 Mar 2020 17:26)      Anion Gap, Serum: 10 (03-12 @ 06:26)  Anion Gap, Serum: 10 (03-11 @ 05:54)      Calcium, Total Serum: 8.7 (03-12 @ 06:26)  Calcium, Total Serum: 7.7 <L> (03-11 @ 05:54)          03-12    136  |  99  |  32<H>  ----------------------------<  122<H>  4.7   |  27  |  1.55<H>    Ca    8.7      12 Mar 2020 06:26                          8.3    6.20  )-----------( 243      ( 12 Mar 2020 06:26 )             27.7     Medications  MEDICATIONS  (STANDING):  ALBUTerol    90 MICROgram(s) HFA Inhaler 1 Puff(s) Inhalation every 4 hours  albuterol/ipratropium for Nebulization. 3 milliLiter(s) Nebulizer every 6 hours  aMIOdarone    Tablet 200 milliGRAM(s) Oral daily  artificial tears (preservative free) Ophthalmic Solution 1 Drop(s) Both EYES two times a day  aspirin enteric coated 81 milliGRAM(s) Oral daily  atorvastatin 40 milliGRAM(s) Oral at bedtime  benzonatate 100 milliGRAM(s) Oral every 8 hours  buDESOnide    Inhalation Suspension 0.5 milliGRAM(s) Inhalation two times a day  calcium acetate 667 milliGRAM(s) Oral three times a day with meals  chlorhexidine 2% Cloths 1 Application(s) Topical <User Schedule>  dextrose 5%. 1000 milliLiter(s) (50 mL/Hr) IV Continuous <Continuous>  dextrose 5%. 1000 milliLiter(s) (30 mL/Hr) IV Continuous <Continuous>  dextrose 5%. 1000 milliLiter(s) (50 mL/Hr) IV Continuous <Continuous>  dextrose 50% Injectable 12.5 Gram(s) IV Push once  dextrose 50% Injectable 25 Gram(s) IV Push once  dextrose 50% Injectable 25 Gram(s) IV Push once  heparin  Injectable 5000 Unit(s) SubCutaneous every 8 hours  insulin glargine Injectable (LANTUS) 32 Unit(s) SubCutaneous at bedtime  insulin lispro (HumaLOG) corrective regimen sliding scale   SubCutaneous Before meals and at bedtime  insulin lispro Injectable (HumaLOG) 12 Unit(s) SubCutaneous three times a day with meals  linezolid    Tablet 600 milliGRAM(s) Oral every 12 hours  melatonin 3 milliGRAM(s) Oral at bedtime  metoprolol tartrate 12.5 milliGRAM(s) Oral two times a day  modafinil 100 milliGRAM(s) Oral <User Schedule>  multivitamin 1 Tablet(s) Oral daily  nystatin    Suspension 507810 Unit(s) Oral every 6 hours  pantoprazole    Tablet 40 milliGRAM(s) Oral before breakfast  polyethylene glycol 3350 17 Gram(s) Oral daily  senna 2 Tablet(s) Oral at bedtime  silver sulfADIAZINE 1% Cream 1 Application(s) Topical daily  sodium chloride 0.65% Nasal 1 Spray(s) Both Nostrils three times a day  spironolactone 25 milliGRAM(s) Oral daily  tiotropium 18 MICROgram(s) Capsule 1 Capsule(s) Inhalation daily  torsemide 10 milliGRAM(s) Oral daily      Physical Exam  General: Patient comfortable in bed  Vital Signs Last 12 Hrs  T(F): 97.7 (03-12-20 @ 12:14), Max: 98 (03-12-20 @ 04:31)  HR: 90 (03-12-20 @ 12:14) (89 - 90)  BP: 119/70 (03-12-20 @ 12:14) (113/69 - 119/70)  BP(mean): --  RR: 18 (03-12-20 @ 12:14) (18 - 19)  SpO2: 96% (03-12-20 @ 12:14) (96% - 99%)  Neck: No palpable thyroid nodules.  CVS: S1S2, No murmurs  Respiratory: No wheezing, no crepitations  GI: Abdomen soft, bowel sounds positive  Musculoskeletal:  edema lower extremities.   Skin: No skin rashes, no ecchymosis    Diagnostics Chief complaint  Patient is a 64y old  Male who presents with a chief complaint of Chest pain (12 Mar 2020 12:12)   Review of systems  Patient in chair, looks comfortable,  no hypoglycemia.    Labs and Fingersticks  CAPILLARY BLOOD GLUCOSE      POCT Blood Glucose.: 235 mg/dL (12 Mar 2020 11:56)  POCT Blood Glucose.: 166 mg/dL (12 Mar 2020 07:42)  POCT Blood Glucose.: 119 mg/dL (12 Mar 2020 01:46)  POCT Blood Glucose.: 175 mg/dL (11 Mar 2020 21:17)  POCT Blood Glucose.: 144 mg/dL (11 Mar 2020 17:26)      Anion Gap, Serum: 10 (03-12 @ 06:26)  Anion Gap, Serum: 10 (03-11 @ 05:54)      Calcium, Total Serum: 8.7 (03-12 @ 06:26)  Calcium, Total Serum: 7.7 <L> (03-11 @ 05:54)          03-12    136  |  99  |  32<H>  ----------------------------<  122<H>  4.7   |  27  |  1.55<H>    Ca    8.7      12 Mar 2020 06:26                          8.3    6.20  )-----------( 243      ( 12 Mar 2020 06:26 )             27.7     Medications  MEDICATIONS  (STANDING):  ALBUTerol    90 MICROgram(s) HFA Inhaler 1 Puff(s) Inhalation every 4 hours  albuterol/ipratropium for Nebulization. 3 milliLiter(s) Nebulizer every 6 hours  aMIOdarone    Tablet 200 milliGRAM(s) Oral daily  artificial tears (preservative free) Ophthalmic Solution 1 Drop(s) Both EYES two times a day  aspirin enteric coated 81 milliGRAM(s) Oral daily  atorvastatin 40 milliGRAM(s) Oral at bedtime  benzonatate 100 milliGRAM(s) Oral every 8 hours  buDESOnide    Inhalation Suspension 0.5 milliGRAM(s) Inhalation two times a day  calcium acetate 667 milliGRAM(s) Oral three times a day with meals  chlorhexidine 2% Cloths 1 Application(s) Topical <User Schedule>  dextrose 5%. 1000 milliLiter(s) (50 mL/Hr) IV Continuous <Continuous>  dextrose 5%. 1000 milliLiter(s) (30 mL/Hr) IV Continuous <Continuous>  dextrose 5%. 1000 milliLiter(s) (50 mL/Hr) IV Continuous <Continuous>  dextrose 50% Injectable 12.5 Gram(s) IV Push once  dextrose 50% Injectable 25 Gram(s) IV Push once  dextrose 50% Injectable 25 Gram(s) IV Push once  heparin  Injectable 5000 Unit(s) SubCutaneous every 8 hours  insulin glargine Injectable (LANTUS) 32 Unit(s) SubCutaneous at bedtime  insulin lispro (HumaLOG) corrective regimen sliding scale   SubCutaneous Before meals and at bedtime  insulin lispro Injectable (HumaLOG) 12 Unit(s) SubCutaneous three times a day with meals  linezolid    Tablet 600 milliGRAM(s) Oral every 12 hours  melatonin 3 milliGRAM(s) Oral at bedtime  metoprolol tartrate 12.5 milliGRAM(s) Oral two times a day  modafinil 100 milliGRAM(s) Oral <User Schedule>  multivitamin 1 Tablet(s) Oral daily  nystatin    Suspension 179772 Unit(s) Oral every 6 hours  pantoprazole    Tablet 40 milliGRAM(s) Oral before breakfast  polyethylene glycol 3350 17 Gram(s) Oral daily  senna 2 Tablet(s) Oral at bedtime  silver sulfADIAZINE 1% Cream 1 Application(s) Topical daily  sodium chloride 0.65% Nasal 1 Spray(s) Both Nostrils three times a day  spironolactone 25 milliGRAM(s) Oral daily  tiotropium 18 MICROgram(s) Capsule 1 Capsule(s) Inhalation daily  torsemide 10 milliGRAM(s) Oral daily      Physical Exam  General: Patient comfortable in bed  Vital Signs Last 12 Hrs  T(F): 97.7 (03-12-20 @ 12:14), Max: 98 (03-12-20 @ 04:31)  HR: 90 (03-12-20 @ 12:14) (89 - 90)  BP: 119/70 (03-12-20 @ 12:14) (113/69 - 119/70)  BP(mean): --  RR: 18 (03-12-20 @ 12:14) (18 - 19)  SpO2: 96% (03-12-20 @ 12:14) (96% - 99%)  Neck: No palpable thyroid nodules.  CVS: S1S2, No murmurs  Respiratory: No wheezing, no crepitations  GI: Abdomen soft, bowel sounds positive  Musculoskeletal:  edema lower extremities.   Skin: No skin rashes, no ecchymosis    Diagnostics

## 2020-03-12 NOTE — PROGRESS NOTE ADULT - ATTENDING COMMENTS
as above-s/p debridement 2/25 of sternal area--cough due to dyphagia +/-TBM -now on D3 diet precautions-slightly better  multifactorial dyspnea-CAD s/p CABG, PEA arrest, atelectasis due to pain, pleural effusion L-pig tail out, bronchospasm, ?PE-O2 NC sat above 90%                     Pleural effusion-pig tail removed 2/20-CT chest NC-improved but still loculations on left-f/up 4-6 wks  CAD/CHF-diurese as cr allows-keep K/Mg above  atelectasis-pain control, incentive spirometry, acapella                    ? DVT/PE--s/p repeat venous dopplers-negative; VQ unable  bronchospasm-duoneb q 6, pulmicort .5 bid; add tessalon perles 200 q 8, mucinex;  out pt PFTs      ID-daptomycin to zyvox as per ID (for 61 days)  snore-? osas--out pt SS  DVT/GI prophylaxis, PT, nutrition evaln            PT      DC planning to rehab.-in limbo due to rejections-insurance  Mike Hernandez MD-Pulmonary    [phone]

## 2020-03-12 NOTE — PROGRESS NOTE ADULT - ASSESSMENT
intraop kennedy 2/3/20 ef 50%, nl lv, mild diastolic dysfx stage 1  limited echo 2/4/20: nl LV sys fx , no pericardial effusion     a/p  64 year old man with history of HTN, DM II, admitted with progressive exertional angina, s/p cath with severe triple vessel disease, s/p CABG, post op course c/b brief witnessed PEA likely hypoxic arrest, s/p intubation.     1. CAD, s/p cath with severe triple vessel disease including lesions at the bifurcation of the LAD/diagonal and distal RCA/RPDA/RPL trifurcation.   -s/p CABG x 4, cont asa, statin  -continue bb   -s/p PEA arrest   -echo 2/15 with preserved lv fxn/EF    2. Sternal wound infection  -s/p RTOR for SWI  -abx per id, wound vac     3. Postop acute diastolic HF  -diuretics per cts     4. PAF  -cont amio ,bb  -AC ?, currently on asa    5. HTN  -bp stable    6. STEPHANIE/CKD  -stable, renal f/u     7. Postop Pleural effusion  -S/P Right/Left chest tube     8. Sepsis -E. Faecalis bacteremia, SW infection, RLE cellulitis   -IV abx , repeat bcx 2/13 neg  -s/p debridement   -ID, plastics f/u     dvt ppx  dc planning to rehab, f/u apt. scheduled with Dr. Glen Steel on 4/7 and 4:45pm

## 2020-03-12 NOTE — PROGRESS NOTE ADULT - SUBJECTIVE AND OBJECTIVE BOX
CHIEF COMPLAINT:  f/up resp failure, sob, effusions, cough-dysphagia, RADS, ? TBM-better slightly-less cough and sob    Interval Events: none    REVIEW OF SYSTEMS:  Constitutional: No fevers or chills. No weight loss. + fatigue or generalized malaise.  Eyes: No itching or discharge from the eyes  ENT: No ear pain. No ear discharge. No nasal congestion. No post nasal drip. No epistaxis. No throat pain. No sore throat. No difficulty swallowing.   CV: No chest pain. No palpitations. No lightheadedness or dizziness.   Resp: No dyspnea at rest. + dyspnea on exertion. No orthopnea. No wheezing. + cough. No stridor. No sputum production. No chest pain with respiration.  GI: No nausea. No vomiting. No diarrhea.  MSK: No joint pain or pain in any extremities  Integumentary: No skin lesions. + pedal edema.  Neurological: + gross motor weakness. No sensory changes.  [+ ] All other systems negative  [ ] Unable to assess ROS because ________    OBJECTIVE:  ICU Vital Signs Last 24 Hrs  T(C): 36.7 (12 Mar 2020 04:31), Max: 36.7 (11 Mar 2020 19:51)  T(F): 98 (12 Mar 2020 04:31), Max: 98 (11 Mar 2020 19:51)  HR: 89 (12 Mar 2020 04:31) (88 - 102)  BP: 113/69 (12 Mar 2020 04:31) (113/69 - 129/75)  BP(mean): --  ABP: --  ABP(mean): --  RR: 19 (12 Mar 2020 04:31) (18 - 19)  SpO2: 99% (12 Mar 2020 04:31) (96% - 99%)        03-10 @ 07:01  -  03-11 @ 07:00  --------------------------------------------------------  IN: 720 mL / OUT: 1650 mL / NET: -930 mL    03-11 @ 07:01  -  03-12 @ 05:09  --------------------------------------------------------  IN: 640 mL / OUT: 1090 mL / NET: -450 mL      CAPILLARY BLOOD GLUCOSE      POCT Blood Glucose.: 144 mg/dL (11 Mar 2020 17:26)      PHYSICAL EXAM: NAD in chair  General: Awake, alert, oriented X 3.   HEENT: Atraumatic, normocephalic.                 Mallampatti Grade 3                No nasal congestion.                No tonsillar or pharyngeal exudates.  Lymph Nodes: No palpable lymphadenopathy  Neck: No JVD. No carotid bruit.   Respiratory: abnormal chest expansion-reduced Bases                         Normal percussion                         Normal and equal air entry                         No wheeze, rhonchi or rales.  Cardiovascular: S1 S2 normal. No murmurs, rubs or gallops.   Abdomen: Soft, non-tender, non-distended. No organomegaly. Normoactive bowel sounds.  Extremities: Warm to touch. Peripheral pulse palpable. + pedal edema.   Skin: No rashes or skin lesions  Neurological: Motor and sensory examination equal and normal in all four extremities.  Psychiatry: Appropriate mood and affect.    HOSPITAL MEDICATIONS:  MEDICATIONS  (STANDING):  ALBUTerol    90 MICROgram(s) HFA Inhaler 1 Puff(s) Inhalation every 4 hours  albuterol/ipratropium for Nebulization. 3 milliLiter(s) Nebulizer every 6 hours  aMIOdarone    Tablet 200 milliGRAM(s) Oral daily  artificial tears (preservative free) Ophthalmic Solution 1 Drop(s) Both EYES two times a day  aspirin enteric coated 81 milliGRAM(s) Oral daily  atorvastatin 40 milliGRAM(s) Oral at bedtime  benzonatate 100 milliGRAM(s) Oral every 8 hours  buDESOnide    Inhalation Suspension 0.5 milliGRAM(s) Inhalation two times a day  calcium acetate 667 milliGRAM(s) Oral three times a day with meals  chlorhexidine 2% Cloths 1 Application(s) Topical <User Schedule>  dextrose 5%. 1000 milliLiter(s) (50 mL/Hr) IV Continuous <Continuous>  dextrose 5%. 1000 milliLiter(s) (30 mL/Hr) IV Continuous <Continuous>  dextrose 5%. 1000 milliLiter(s) (50 mL/Hr) IV Continuous <Continuous>  dextrose 50% Injectable 12.5 Gram(s) IV Push once  dextrose 50% Injectable 25 Gram(s) IV Push once  dextrose 50% Injectable 25 Gram(s) IV Push once  heparin  Injectable 5000 Unit(s) SubCutaneous every 8 hours  insulin glargine Injectable (LANTUS) 28 Unit(s) SubCutaneous at bedtime  insulin lispro (HumaLOG) corrective regimen sliding scale   SubCutaneous Before meals and at bedtime  insulin lispro Injectable (HumaLOG) 10 Unit(s) SubCutaneous three times a day with meals  linezolid    Tablet 600 milliGRAM(s) Oral every 12 hours  melatonin 3 milliGRAM(s) Oral at bedtime  metoprolol tartrate 12.5 milliGRAM(s) Oral two times a day  modafinil 100 milliGRAM(s) Oral <User Schedule>  multivitamin 1 Tablet(s) Oral daily  nystatin    Suspension 757194 Unit(s) Oral every 6 hours  pantoprazole    Tablet 40 milliGRAM(s) Oral before breakfast  polyethylene glycol 3350 17 Gram(s) Oral daily  senna 2 Tablet(s) Oral at bedtime  silver sulfADIAZINE 1% Cream 1 Application(s) Topical daily  sodium chloride 0.65% Nasal 1 Spray(s) Both Nostrils three times a day  spironolactone 25 milliGRAM(s) Oral daily  tiotropium 18 MICROgram(s) Capsule 1 Capsule(s) Inhalation daily  torsemide 10 milliGRAM(s) Oral daily    MEDICATIONS  (PRN):  acetaminophen   Tablet .. 650 milliGRAM(s) Oral every 6 hours PRN Mild Pain (1 - 3)  albuterol/ipratropium for Nebulization. 3 milliLiter(s) Nebulizer every 6 hours PRN Shortness of Breath and/or Wheezing  dextrose 40% Gel 15 Gram(s) Oral once PRN Blood Glucose LESS THAN 70 milliGRAM(s)/deciLiter  glucagon  Injectable 1 milliGRAM(s) IntraMuscular once PRN Glucose <70 milliGRAM(s)/deciLiter  guaiFENesin   Syrup  (Sugar-Free) 200 milliGRAM(s) Oral every 6 hours PRN Cough  oxycodone    5 mG/acetaminophen 325 mG 1 Tablet(s) Oral every 6 hours PRN Severe Pain (7 - 10)  sodium chloride 0.9% lock flush 10 milliLiter(s) IV Push every 1 hour PRN Pre/post blood products, medications, blood draw, and to maintain line patency      LABS:                        8.1    7.06  )-----------( 243      ( 11 Mar 2020 05:54 )             27.1     03-11    137  |  103  |  30<H>  ----------------------------<  139<H>  4.3   |  24  |  1.37<H>    Ca    7.7<L>      11 Mar 2020 05:54                MICROBIOLOGY:     RADIOLOGY:  [ ] Reviewed and interpreted by me    Point of Care Ultrasound Findings:    PFT:    EKG:

## 2020-03-13 LAB
ANION GAP SERPL CALC-SCNC: 13 MMOL/L — SIGNIFICANT CHANGE UP (ref 5–17)
BASOPHILS # BLD AUTO: 0.06 K/UL — SIGNIFICANT CHANGE UP (ref 0–0.2)
BASOPHILS NFR BLD AUTO: 1 % — SIGNIFICANT CHANGE UP (ref 0–2)
BUN SERPL-MCNC: 30 MG/DL — HIGH (ref 7–23)
CALCIUM SERPL-MCNC: 9 MG/DL — SIGNIFICANT CHANGE UP (ref 8.4–10.5)
CHLORIDE SERPL-SCNC: 97 MMOL/L — SIGNIFICANT CHANGE UP (ref 96–108)
CO2 SERPL-SCNC: 26 MMOL/L — SIGNIFICANT CHANGE UP (ref 22–31)
CREAT SERPL-MCNC: 1.56 MG/DL — HIGH (ref 0.5–1.3)
EOSINOPHIL # BLD AUTO: 0.31 K/UL — SIGNIFICANT CHANGE UP (ref 0–0.5)
EOSINOPHIL NFR BLD AUTO: 5.4 % — SIGNIFICANT CHANGE UP (ref 0–6)
GLUCOSE BLDC GLUCOMTR-MCNC: 114 MG/DL — HIGH (ref 70–99)
GLUCOSE BLDC GLUCOMTR-MCNC: 130 MG/DL — HIGH (ref 70–99)
GLUCOSE BLDC GLUCOMTR-MCNC: 152 MG/DL — HIGH (ref 70–99)
GLUCOSE BLDC GLUCOMTR-MCNC: 168 MG/DL — HIGH (ref 70–99)
GLUCOSE BLDC GLUCOMTR-MCNC: 171 MG/DL — HIGH (ref 70–99)
GLUCOSE SERPL-MCNC: 104 MG/DL — HIGH (ref 70–99)
HCT VFR BLD CALC: 28.2 % — LOW (ref 39–50)
HGB BLD-MCNC: 8.5 G/DL — LOW (ref 13–17)
IMM GRANULOCYTES NFR BLD AUTO: 0.5 % — SIGNIFICANT CHANGE UP (ref 0–1.5)
LYMPHOCYTES # BLD AUTO: 1.86 K/UL — SIGNIFICANT CHANGE UP (ref 1–3.3)
LYMPHOCYTES # BLD AUTO: 32.3 % — SIGNIFICANT CHANGE UP (ref 13–44)
MCHC RBC-ENTMCNC: 26.5 PG — LOW (ref 27–34)
MCHC RBC-ENTMCNC: 30.1 GM/DL — LOW (ref 32–36)
MCV RBC AUTO: 87.9 FL — SIGNIFICANT CHANGE UP (ref 80–100)
MONOCYTES # BLD AUTO: 0.56 K/UL — SIGNIFICANT CHANGE UP (ref 0–0.9)
MONOCYTES NFR BLD AUTO: 9.7 % — SIGNIFICANT CHANGE UP (ref 2–14)
NEUTROPHILS # BLD AUTO: 2.93 K/UL — SIGNIFICANT CHANGE UP (ref 1.8–7.4)
NEUTROPHILS NFR BLD AUTO: 51.1 % — SIGNIFICANT CHANGE UP (ref 43–77)
NRBC # BLD: 0 /100 WBCS — SIGNIFICANT CHANGE UP (ref 0–0)
PLATELET # BLD AUTO: 260 K/UL — SIGNIFICANT CHANGE UP (ref 150–400)
POTASSIUM SERPL-MCNC: 4.6 MMOL/L — SIGNIFICANT CHANGE UP (ref 3.5–5.3)
POTASSIUM SERPL-SCNC: 4.6 MMOL/L — SIGNIFICANT CHANGE UP (ref 3.5–5.3)
RBC # BLD: 3.21 M/UL — LOW (ref 4.2–5.8)
RBC # FLD: 15 % — HIGH (ref 10.3–14.5)
SODIUM SERPL-SCNC: 136 MMOL/L — SIGNIFICANT CHANGE UP (ref 135–145)
WBC # BLD: 5.75 K/UL — SIGNIFICANT CHANGE UP (ref 3.8–10.5)
WBC # FLD AUTO: 5.75 K/UL — SIGNIFICANT CHANGE UP (ref 3.8–10.5)

## 2020-03-13 PROCEDURE — 99232 SBSQ HOSP IP/OBS MODERATE 35: CPT

## 2020-03-13 RX ORDER — MODAFINIL 200 MG/1
100 TABLET ORAL
Refills: 0 | Status: DISCONTINUED | OUTPATIENT
Start: 2020-03-14 | End: 2020-03-16

## 2020-03-13 RX ORDER — FUROSEMIDE 40 MG
20 TABLET ORAL ONCE
Refills: 0 | Status: COMPLETED | OUTPATIENT
Start: 2020-03-13 | End: 2020-03-13

## 2020-03-13 RX ADMIN — SPIRONOLACTONE 25 MILLIGRAM(S): 25 TABLET, FILM COATED ORAL at 05:23

## 2020-03-13 RX ADMIN — CHLORHEXIDINE GLUCONATE 1 APPLICATION(S): 213 SOLUTION TOPICAL at 07:01

## 2020-03-13 RX ADMIN — Medication 667 MILLIGRAM(S): at 08:21

## 2020-03-13 RX ADMIN — Medication 1 DROP(S): at 05:24

## 2020-03-13 RX ADMIN — Medication 12 UNIT(S): at 08:21

## 2020-03-13 RX ADMIN — HEPARIN SODIUM 5000 UNIT(S): 5000 INJECTION INTRAVENOUS; SUBCUTANEOUS at 13:00

## 2020-03-13 RX ADMIN — Medication 100 MILLIGRAM(S): at 21:06

## 2020-03-13 RX ADMIN — Medication 12 UNIT(S): at 17:33

## 2020-03-13 RX ADMIN — Medication 100 MILLIGRAM(S): at 05:23

## 2020-03-13 RX ADMIN — SENNA PLUS 2 TABLET(S): 8.6 TABLET ORAL at 21:06

## 2020-03-13 RX ADMIN — Medication 667 MILLIGRAM(S): at 12:43

## 2020-03-13 RX ADMIN — Medication 1 TABLET(S): at 12:44

## 2020-03-13 RX ADMIN — Medication 1: at 08:21

## 2020-03-13 RX ADMIN — Medication 12.5 MILLIGRAM(S): at 17:34

## 2020-03-13 RX ADMIN — Medication 500000 UNIT(S): at 05:24

## 2020-03-13 RX ADMIN — Medication 1 SPRAY(S): at 21:12

## 2020-03-13 RX ADMIN — INSULIN GLARGINE 32 UNIT(S): 100 INJECTION, SOLUTION SUBCUTANEOUS at 21:57

## 2020-03-13 RX ADMIN — HEPARIN SODIUM 5000 UNIT(S): 5000 INJECTION INTRAVENOUS; SUBCUTANEOUS at 05:24

## 2020-03-13 RX ADMIN — AMIODARONE HYDROCHLORIDE 200 MILLIGRAM(S): 400 TABLET ORAL at 05:23

## 2020-03-13 RX ADMIN — Medication 500000 UNIT(S): at 12:52

## 2020-03-13 RX ADMIN — POLYETHYLENE GLYCOL 3350 17 GRAM(S): 17 POWDER, FOR SOLUTION ORAL at 12:44

## 2020-03-13 RX ADMIN — OXYCODONE AND ACETAMINOPHEN 1 TABLET(S): 5; 325 TABLET ORAL at 10:15

## 2020-03-13 RX ADMIN — LINEZOLID 600 MILLIGRAM(S): 600 INJECTION, SOLUTION INTRAVENOUS at 17:34

## 2020-03-13 RX ADMIN — Medication 1 SPRAY(S): at 05:24

## 2020-03-13 RX ADMIN — HEPARIN SODIUM 5000 UNIT(S): 5000 INJECTION INTRAVENOUS; SUBCUTANEOUS at 21:06

## 2020-03-13 RX ADMIN — Medication 81 MILLIGRAM(S): at 12:44

## 2020-03-13 RX ADMIN — Medication 12.5 MILLIGRAM(S): at 05:23

## 2020-03-13 RX ADMIN — Medication 667 MILLIGRAM(S): at 17:35

## 2020-03-13 RX ADMIN — Medication 1 DROP(S): at 17:35

## 2020-03-13 RX ADMIN — Medication 0.5 MILLIGRAM(S): at 05:25

## 2020-03-13 RX ADMIN — LINEZOLID 600 MILLIGRAM(S): 600 INJECTION, SOLUTION INTRAVENOUS at 05:23

## 2020-03-13 RX ADMIN — Medication 3 MILLIGRAM(S): at 21:06

## 2020-03-13 RX ADMIN — Medication 200 MILLIGRAM(S): at 21:07

## 2020-03-13 RX ADMIN — ATORVASTATIN CALCIUM 40 MILLIGRAM(S): 80 TABLET, FILM COATED ORAL at 21:06

## 2020-03-13 RX ADMIN — Medication 10 MILLIGRAM(S): at 05:23

## 2020-03-13 RX ADMIN — Medication 1: at 21:57

## 2020-03-13 RX ADMIN — PANTOPRAZOLE SODIUM 40 MILLIGRAM(S): 20 TABLET, DELAYED RELEASE ORAL at 05:24

## 2020-03-13 RX ADMIN — Medication 12 UNIT(S): at 12:42

## 2020-03-13 RX ADMIN — Medication 3 MILLILITER(S): at 05:25

## 2020-03-13 RX ADMIN — Medication 20 MILLIGRAM(S): at 12:52

## 2020-03-13 RX ADMIN — Medication 500000 UNIT(S): at 17:36

## 2020-03-13 NOTE — PROGRESS NOTE ADULT - ASSESSMENT
63yo male with hx of HTN, DM II, presented to the ED with complaints of acute on chronic left sided exertional chest pain. Pt states he has been having left sided pressure and stabbing chest pain radiating to his sternum and right with activity for the past few months. Since admission- s/p cath with severe triple vessel disease, s/p CABG, post op course c/b brief witnessed PEA 2/6 likely hypoxic arrest, s/p intubation. ( CAD, s/p cath with severe triple vessel disease including lesions at the bifurcation of the LAD/diagonal and distal RCA/RPDA/RPL trifurcation.   -s/p CABG x 4, intraop kennedy ef 50%)--s/p ampicillin until 2/18 for enterococcus in blood, extubated 2/9, pigtail right 2/8 and left sided on 2/15-for effusion.  Remains sob-NO h/o resp issues prior to hospitalization.  ***Current sob-multifactorial-CAD/effusions, atelectasis due to pain, mild bronchospasm and debility.  ********************  2/21-slightly better, pig tail cath removed from left chest; CT chest done-loculated left effusion-slight better  2/24-BS issues-less sob-dapto/cefepime as per ID  2/25-for debridement of chest area-sternal wound  2/26-debridement completed--to CTU  2/2700-HZR-ektvnbfea over all  2/28-chest pain still impacting breathing--plastics/renal endo f/up  2/29-no changes over night  3/1-cough present-has been off BD  3/2-cough present still  3/3-improved cough on amitriptyline  3/4-cough upon swallowing  3/5-no changes-more ambulation-for mbs  3/6-failed swallow study--to D3 diet precautions  3/9-abx to change to zyvox as per ID  3/10-further ambulation-zyvox upon DC and for 61 days  3/11-cough remains-slighlty better  3/12-slightly better-less cough/ambulating 63yo male with hx of HTN, DM II, presented to the ED with complaints of acute on chronic left sided exertional chest pain. Pt states he has been having left sided pressure and stabbing chest pain radiating to his sternum and right with activity for the past few months. Since admission- s/p cath with severe triple vessel disease, s/p CABG, post op course c/b brief witnessed PEA 2/6 likely hypoxic arrest, s/p intubation. ( CAD, s/p cath with severe triple vessel disease including lesions at the bifurcation of the LAD/diagonal and distal RCA/RPDA/RPL trifurcation.   -s/p CABG x 4, intraop kennedy ef 50%)--s/p ampicillin until 2/18 for enterococcus in blood, extubated 2/9, pigtail right 2/8 and left sided on 2/15-for effusion.  Remains sob-NO h/o resp issues prior to hospitalization.  ***Current sob-multifactorial-CAD/effusions, atelectasis due to pain, mild bronchospasm and debility.  ********************  2/21-slightly better, pig tail cath removed from left chest; CT chest done-loculated left effusion-slight better  2/24-BS issues-less sob-dapto/cefepime as per ID  2/25-for debridement of chest area-sternal wound  2/26-debridement completed--to CTU  2/2772-RIR-dqgjjszol over all    2/28-chest pain still impacting breathing--plastics/renal endo f/up  2/29-no changes over night  3/1-cough present-has been off BD       3/2-cough present still,   3/3-improved cough on amitriptyline  3/4-cough upon swallowing     3/5-no changes-more ambulation-for mbs  3/6-failed swallow study--to D3 diet precautions     3/9-abx to change to zyvox as per ID  3/10-further ambulation-zyvox upon DC and for 61 days     3/11-cough remains-slighlty better  3/12-slightly better-less cough/ambulating   3/13-no changes over night

## 2020-03-13 NOTE — PROGRESS NOTE ADULT - ASSESSMENT
intraop kennedy 2/3/20 ef 50%, nl lv, mild diastolic dysfx stage 1  limited echo 2/4/20: nl LV sys fx , no pericardial effusion     a/p  64 year old man with history of HTN, DM II, admitted with progressive exertional angina, s/p cath with severe triple vessel disease, s/p CABG, post op course c/b brief witnessed PEA likely hypoxic arrest, s/p intubation.     1. CAD, s/p cath with severe triple vessel disease including lesions at the bifurcation of the LAD/diagonal and distal RCA/RPDA/RPL trifurcation.   -s/p CABG x 4, cont asa, statin  -continue bb   -s/p PEA arrest   -echo 2/15 with preserved lv fxn/EF    2. Sternal wound infection  -s/p RTOR for SWI  -abx per id, wound vac     3. Postop acute diastolic HF  -diuretics per cts     4. PAF  -cont amio ,bb  -AC ?, currently on asa    5. HTN  -bp stable    6. STEPHANIE/CKD  -stable, renal f/u     7. Postop Pleural effusion  -S/P Right/Left chest tube     8. Sepsis -E. Faecalis bacteremia, SW infection, RLE cellulitis   -IV abx , repeat bcx 2/13 neg  -s/p debridement   -ID, plastics f/u     dvt ppx  dc planning per primary team, f/u apt. scheduled with Dr. Glen Steel on 4/7 and 4:45pm

## 2020-03-13 NOTE — PROGRESS NOTE ADULT - PROBLEM SELECTOR PLAN 1
Tight glycemic control necessary in the setting of wound infection.  Will continue current insulin regimen for now. Will continue monitoring FS and FU.  Patient counseled for compliance with consistent low carb diet; Alerted to let RN know if he won't be eating. Humalog should be held in this case to prevent future hypoglycemic episodes.

## 2020-03-13 NOTE — PROGRESS NOTE ADULT - ASSESSMENT
Assessment  DMT2: 64y Male with DM T2 with hyperglycemia, A1C 9.2%, was on oral meds and insulin at home, now on basal bolus insulin, increased dose yesterday, blood sugars improving, FS within acceptable range today, no hypoglycemic episodes. Patient sitting up in chair, alert and comfortable, states his appetite is improving and he can ambulate on his own.  Foot infection: On Tx, stable.  CAD: s/p CABG 2/3, on medications, no chest pain, stable, monitored.  HTN: Controlled,  on antihypertensive medications.  HLD: Controlled, on statin.  CKD: Monitor labs/BMP          Harper Matias MD  Cell: [phone]  Office: [phone]

## 2020-03-13 NOTE — PROGRESS NOTE ADULT - SUBJECTIVE AND OBJECTIVE BOX
Chief complaint  Patient is a 64y old  Male who presents with a chief complaint of Chest pain (13 Mar 2020 12:16)   Review of systems  Patient sitting up in chair, looks comfortable, no hypoglycemia.    Labs and Fingersticks  CAPILLARY BLOOD GLUCOSE      POCT Blood Glucose.: 130 mg/dL (13 Mar 2020 12:02)  POCT Blood Glucose.: 171 mg/dL (13 Mar 2020 08:04)  POCT Blood Glucose.: 168 mg/dL (13 Mar 2020 02:06)  POCT Blood Glucose.: 186 mg/dL (12 Mar 2020 22:46)  POCT Blood Glucose.: 84 mg/dL (12 Mar 2020 21:44)  POCT Blood Glucose.: 144 mg/dL (12 Mar 2020 16:42)      Anion Gap, Serum: 13 (03-13 @ 06:31)  Anion Gap, Serum: 10 (03-12 @ 06:26)      Calcium, Total Serum: 9.0 (03-13 @ 06:31)  Calcium, Total Serum: 8.7 (03-12 @ 06:26)          03-13    136  |  97  |  30<H>  ----------------------------<  104<H>  4.6   |  26  |  1.56<H>    Ca    9.0      13 Mar 2020 06:31                          8.5    5.75  )-----------( 260      ( 13 Mar 2020 06:31 )             28.2     Medications  MEDICATIONS  (STANDING):  ALBUTerol    90 MICROgram(s) HFA Inhaler 1 Puff(s) Inhalation every 4 hours  albuterol/ipratropium for Nebulization. 3 milliLiter(s) Nebulizer every 6 hours  aMIOdarone    Tablet 200 milliGRAM(s) Oral daily  artificial tears (preservative free) Ophthalmic Solution 1 Drop(s) Both EYES two times a day  aspirin enteric coated 81 milliGRAM(s) Oral daily  atorvastatin 40 milliGRAM(s) Oral at bedtime  benzonatate 100 milliGRAM(s) Oral every 8 hours  buDESOnide    Inhalation Suspension 0.5 milliGRAM(s) Inhalation two times a day  calcium acetate 667 milliGRAM(s) Oral three times a day with meals  chlorhexidine 2% Cloths 1 Application(s) Topical <User Schedule>  dextrose 5%. 1000 milliLiter(s) (50 mL/Hr) IV Continuous <Continuous>  dextrose 5%. 1000 milliLiter(s) (30 mL/Hr) IV Continuous <Continuous>  dextrose 5%. 1000 milliLiter(s) (50 mL/Hr) IV Continuous <Continuous>  dextrose 50% Injectable 12.5 Gram(s) IV Push once  dextrose 50% Injectable 25 Gram(s) IV Push once  dextrose 50% Injectable 25 Gram(s) IV Push once  heparin  Injectable 5000 Unit(s) SubCutaneous every 8 hours  insulin glargine Injectable (LANTUS) 32 Unit(s) SubCutaneous at bedtime  insulin lispro (HumaLOG) corrective regimen sliding scale   SubCutaneous Before meals and at bedtime  insulin lispro Injectable (HumaLOG) 12 Unit(s) SubCutaneous three times a day with meals  linezolid    Tablet 600 milliGRAM(s) Oral every 12 hours  melatonin 3 milliGRAM(s) Oral at bedtime  metoprolol tartrate 12.5 milliGRAM(s) Oral two times a day  multivitamin 1 Tablet(s) Oral daily  nystatin    Suspension [id number] Unit(s) Oral every 6 hours  pantoprazole    Tablet 40 milliGRAM(s) Oral before breakfast  polyethylene glycol 3350 17 Gram(s) Oral daily  senna 2 Tablet(s) Oral at bedtime  silver sulfADIAZINE 1% Cream 1 Application(s) Topical daily  sodium chloride 0.65% Nasal 1 Spray(s) Both Nostrils three times a day  spironolactone 25 milliGRAM(s) Oral daily  tiotropium 18 MICROgram(s) Capsule 1 Capsule(s) Inhalation daily  torsemide 10 milliGRAM(s) Oral daily      Physical Exam  General: Patient comfortable in bed  Vital Signs Last 12 Hrs  T(F): 98.1 (03-13-20 @ 04:41), Max: 98.1 (03-13-20 @ 04:41)  HR: 94 (03-13-20 @ 04:41) (94 - 94)  BP: 121/75 (03-13-20 @ 04:41) (121/75 - 121/75)  BP(mean): --  RR: 18 (03-13-20 @ 04:41) (18 - 18)  SpO2: 97% (03-13-20 @ 04:41) (97% - 97%)  Neck: No palpable thyroid nodules.  CVS: S1S2, No murmurs  Respiratory: No wheezing, no crepitations  GI: Abdomen soft, bowel sounds positive  Musculoskeletal:  edema lower extremities.   Skin: No skin rashes, no ecchymosis    Diagnostics

## 2020-03-13 NOTE — PROGRESS NOTE ADULT - ATTENDING COMMENTS
as above-s/p debridement 2/25 of sternal area--cough due to dyphagia +/-TBM -now on D3 diet precautions-slightly better  multifactorial dyspnea-CAD s/p CABG, PEA arrest, atelectasis due to pain, pleural effusion L-pig tail out, bronchospasm, ?PE-O2 NC sat above 90%                     Pleural effusion-pig tail removed 2/20-CT chest NC-improved but still loculations on left-f/up 4-6 wks  CAD/CHF-diurese as cr allows-keep K/Mg above  atelectasis-pain control, incentive spirometry, acapella                    ? DVT/PE--s/p repeat venous dopplers-negative; VQ unable  bronchospasm-duoneb q 6, pulmicort .5 bid; add tessalon perles 200 q 8, mucinex;  out pt PFTs      ID-daptomycin to zyvox as per ID (for 61 days)  snore-? osas--out pt SS  DVT/GI prophylaxis, PT, nutrition evaln            PT      DC planning to rehab.-in limbo due to rejections-insurance  Mike Hernandez MD-Pulmonary    [phone] as above-s/p debridement 2/25 of sternal area--cough due to dyphagia +/-TBM -now on D3 diet precautions-slightly better  multifactorial dyspnea-CAD s/p CABG, PEA arrest, atelectasis due to pain, pleural effusion L-pig tail out, bronchospasm, ?PE-O2 NC sat above 90%                     Pleural effusion-pig tail removed 2/20-CT chest NC-improved but still loculations on left-f/up 4-6 wks  CAD/CHF-diurese as cr allows-keep K/Mg above  atelectasis-pain control, incentive spirometry, acapella                    ? DVT/PE--s/p repeat venous dopplers-negative; VQ unable  bronchospasm-duoneb q 6, pulmicort .5 bid; add tessalon perles 200 q 8, mucinex;  out pt PFTs       ID-daptomycin to zyvox as per ID (for 61 days)  snore-? osas--out pt SS  DVT/GI prophylaxis, PT, nutrition evaln            PT      DC planning to rehab.-in limbo due to rejections-insurance VS. HOME-? 3/16  Mike Hernandez MD-Pulmonary    [phone]

## 2020-03-13 NOTE — PROGRESS NOTE ADULT - SUBJECTIVE AND OBJECTIVE BOX
CHIEF COMPLAINT:  f/up resp failure, sob, effusions, cough-dysphagia, RADS, ? TBM-    Interval Events:    REVIEW OF SYSTEMS:  Constitutional: No fevers or chills. No weight loss. No fatigue or generalized malaise.  Eyes: No itching or discharge from the eyes  ENT: No ear pain. No ear discharge. No nasal congestion. No post nasal drip. No epistaxis. No throat pain. No sore throat. No difficulty swallowing.   CV: No chest pain. No palpitations. No lightheadedness or dizziness.   Resp: No dyspnea at rest. No dyspnea on exertion. No orthopnea. No wheezing. No cough. No stridor. No sputum production. No chest pain with respiration.  GI: No nausea. No vomiting. No diarrhea.  MSK: No joint pain or pain in any extremities  Integumentary: No skin lesions. No pedal edema.  Neurological: No gross motor weakness. No sensory changes.  [ ] All other systems negative  [ ] Unable to assess ROS because ________    OBJECTIVE:  ICU Vital Signs Last 24 Hrs  T(C): 36.7 (13 Mar 2020 04:41), Max: 36.7 (12 Mar 2020 19:26)  T(F): 98.1 (13 Mar 2020 04:41), Max: 98.1 (13 Mar 2020 04:41)  HR: 94 (13 Mar 2020 04:41) (90 - 99)  BP: 121/75 (13 Mar 2020 04:41) (113/70 - 130/84)  BP(mean): --  ABP: --  ABP(mean): --  RR: 18 (13 Mar 2020 04:41) (18 - 18)  SpO2: 97% (13 Mar 2020 04:41) (96% - 98%)        03-11 @ 07:01  -  03-12 @ 07:00  --------------------------------------------------------  IN: 760 mL / OUT: 1465 mL / NET: -705 mL    03-12 @ 07:01  -  03-13 @ 05:44  --------------------------------------------------------  IN: 695 mL / OUT: 2350 mL / NET: -1655 mL      CAPILLARY BLOOD GLUCOSE      POCT Blood Glucose.: 168 mg/dL (13 Mar 2020 02:06)      PHYSICAL EXAM:  General: Awake, alert, oriented X 3.   HEENT: Atraumatic, normocephalic.                 Mallampatti Grade                 No nasal congestion.                No tonsillar or pharyngeal exudates.  Lymph Nodes: No palpable lymphadenopathy  Neck: No JVD. No carotid bruit.   Respiratory: Normal chest expansion                         Normal percussion                         Normal and equal air entry                         No wheeze, rhonchi or rales.  Cardiovascular: S1 S2 normal. No murmurs, rubs or gallops.   Abdomen: Soft, non-tender, non-distended. No organomegaly. Normoactive bowel sounds.  Extremities: Warm to touch. Peripheral pulse palpable. No pedal edema.   Skin: No rashes or skin lesions  Neurological: Motor and sensory examination equal and normal in all four extremities.  Psychiatry: Appropriate mood and affect.    HOSPITAL MEDICATIONS:  MEDICATIONS  (STANDING):  ALBUTerol    90 MICROgram(s) HFA Inhaler 1 Puff(s) Inhalation every 4 hours  albuterol/ipratropium for Nebulization. 3 milliLiter(s) Nebulizer every 6 hours  aMIOdarone    Tablet 200 milliGRAM(s) Oral daily  artificial tears (preservative free) Ophthalmic Solution 1 Drop(s) Both EYES two times a day  aspirin enteric coated 81 milliGRAM(s) Oral daily  atorvastatin 40 milliGRAM(s) Oral at bedtime  benzonatate 100 milliGRAM(s) Oral every 8 hours  buDESOnide    Inhalation Suspension 0.5 milliGRAM(s) Inhalation two times a day  calcium acetate 667 milliGRAM(s) Oral three times a day with meals  chlorhexidine 2% Cloths 1 Application(s) Topical <User Schedule>  dextrose 5%. 1000 milliLiter(s) (50 mL/Hr) IV Continuous <Continuous>  dextrose 5%. 1000 milliLiter(s) (30 mL/Hr) IV Continuous <Continuous>  dextrose 5%. 1000 milliLiter(s) (50 mL/Hr) IV Continuous <Continuous>  dextrose 50% Injectable 12.5 Gram(s) IV Push once  dextrose 50% Injectable 25 Gram(s) IV Push once  dextrose 50% Injectable 25 Gram(s) IV Push once  heparin  Injectable 5000 Unit(s) SubCutaneous every 8 hours  insulin glargine Injectable (LANTUS) 32 Unit(s) SubCutaneous at bedtime  insulin lispro (HumaLOG) corrective regimen sliding scale   SubCutaneous Before meals and at bedtime  insulin lispro Injectable (HumaLOG) 12 Unit(s) SubCutaneous three times a day with meals  linezolid    Tablet 600 milliGRAM(s) Oral every 12 hours  melatonin 3 milliGRAM(s) Oral at bedtime  metoprolol tartrate 12.5 milliGRAM(s) Oral two times a day  modafinil 100 milliGRAM(s) Oral <User Schedule>  multivitamin 1 Tablet(s) Oral daily  nystatin    Suspension 535424 Unit(s) Oral every 6 hours  pantoprazole    Tablet 40 milliGRAM(s) Oral before breakfast  polyethylene glycol 3350 17 Gram(s) Oral daily  senna 2 Tablet(s) Oral at bedtime  silver sulfADIAZINE 1% Cream 1 Application(s) Topical daily  sodium chloride 0.65% Nasal 1 Spray(s) Both Nostrils three times a day  spironolactone 25 milliGRAM(s) Oral daily  tiotropium 18 MICROgram(s) Capsule 1 Capsule(s) Inhalation daily  torsemide 10 milliGRAM(s) Oral daily    MEDICATIONS  (PRN):  acetaminophen   Tablet .. 650 milliGRAM(s) Oral every 6 hours PRN Mild Pain (1 - 3)  albuterol/ipratropium for Nebulization. 3 milliLiter(s) Nebulizer every 6 hours PRN Shortness of Breath and/or Wheezing  dextrose 40% Gel 15 Gram(s) Oral once PRN Blood Glucose LESS THAN 70 milliGRAM(s)/deciLiter  glucagon  Injectable 1 milliGRAM(s) IntraMuscular once PRN Glucose <70 milliGRAM(s)/deciLiter  guaiFENesin   Syrup  (Sugar-Free) 200 milliGRAM(s) Oral every 6 hours PRN Cough  oxycodone    5 mG/acetaminophen 325 mG 1 Tablet(s) Oral every 6 hours PRN Severe Pain (7 - 10)  sodium chloride 0.9% lock flush 10 milliLiter(s) IV Push every 1 hour PRN Pre/post blood products, medications, blood draw, and to maintain line patency      LABS:                        8.3    6.20  )-----------( 243      ( 12 Mar 2020 06:26 )             27.7     03-12    136  |  99  |  32<H>  ----------------------------<  122<H>  4.7   |  27  |  1.55<H>    Ca    8.7      12 Mar 2020 06:26                MICROBIOLOGY:     RADIOLOGY:  [ ] Reviewed and interpreted by me    Point of Care Ultrasound Findings:    PFT:    EKG: CHIEF COMPLAINT:  f/up resp failure, sob, effusions, cough-dysphagia, RADS, ? TBM-some cough present coming from neck area-less sob over all    Interval Events: ambulating well    REVIEW OF SYSTEMS:  Constitutional: No fevers or chills. No weight loss. + fatigue or generalized malaise.  Eyes: No itching or discharge from the eyes  ENT: No ear pain. No ear discharge. No nasal congestion. No post nasal drip. No epistaxis. No throat pain. No sore throat. No difficulty swallowing.   CV: No chest pain. No palpitations. No lightheadedness or dizziness.   Resp: No dyspnea at rest. No dyspnea on exertion. No orthopnea. No wheezing. + cough. No stridor. No sputum production. No chest pain with respiration.  GI: No nausea. No vomiting. No diarrhea.  MSK: No joint pain or pain in any extremities  Integumentary: No skin lesions. + pedal edema.  Neurological: No gross motor weakness. No sensory changes.  [+ ] All other systems negative  [ ] Unable to assess ROS because ________    OBJECTIVE:  ICU Vital Signs Last 24 Hrs  T(C): 36.7 (13 Mar 2020 04:41), Max: 36.7 (12 Mar 2020 19:26)  T(F): 98.1 (13 Mar 2020 04:41), Max: 98.1 (13 Mar 2020 04:41)  HR: 94 (13 Mar 2020 04:41) (90 - 99)  BP: 121/75 (13 Mar 2020 04:41) (113/70 - 130/84)  BP(mean): --  ABP: --  ABP(mean): --  RR: 18 (13 Mar 2020 04:41) (18 - 18)  SpO2: 97% (13 Mar 2020 04:41) (96% - 98%)        03-11 @ 07:01  -  03-12 @ 07:00  --------------------------------------------------------  IN: 760 mL / OUT: 1465 mL / NET: -705 mL    03-12 @ 07:01  -  03-13 @ 05:44  --------------------------------------------------------  IN: 695 mL / OUT: 2350 mL / NET: -1655 mL      CAPILLARY BLOOD GLUCOSE      POCT Blood Glucose.: 168 mg/dL (13 Mar 2020 02:06)      PHYSICAL EXAM: NAD in chair on RA  General: Awake, alert, oriented X 3.   HEENT: Atraumatic, normocephalic.                 Mallampatti Grade 3                No nasal congestion.                No tonsillar or pharyngeal exudates.  Lymph Nodes: No palpable lymphadenopathy  Neck: No JVD. No carotid bruit.   Respiratory: abnormal chest expansion-reduced BS bases                         Normal percussion                         Normal and equal air entry                         No wheeze, rhonchi or rales.  Cardiovascular: S1 S2 normal. No murmurs, rubs or gallops.   Abdomen: Soft, non-tender, non-distended. No organomegaly. Normoactive bowel sounds.  Extremities: Warm to touch. Peripheral pulse palpable. + pedal edema.   Skin: No rashes or skin lesions  Neurological: Motor and sensory examination equal and normal in all four extremities.  Psychiatry: Appropriate mood and affect.    HOSPITAL MEDICATIONS:  MEDICATIONS  (STANDING):  ALBUTerol    90 MICROgram(s) HFA Inhaler 1 Puff(s) Inhalation every 4 hours  albuterol/ipratropium for Nebulization. 3 milliLiter(s) Nebulizer every 6 hours  aMIOdarone    Tablet 200 milliGRAM(s) Oral daily  artificial tears (preservative free) Ophthalmic Solution 1 Drop(s) Both EYES two times a day  aspirin enteric coated 81 milliGRAM(s) Oral daily  atorvastatin 40 milliGRAM(s) Oral at bedtime  benzonatate 100 milliGRAM(s) Oral every 8 hours  buDESOnide    Inhalation Suspension 0.5 milliGRAM(s) Inhalation two times a day  calcium acetate 667 milliGRAM(s) Oral three times a day with meals  chlorhexidine 2% Cloths 1 Application(s) Topical <User Schedule>  dextrose 5%. 1000 milliLiter(s) (50 mL/Hr) IV Continuous <Continuous>  dextrose 5%. 1000 milliLiter(s) (30 mL/Hr) IV Continuous <Continuous>  dextrose 5%. 1000 milliLiter(s) (50 mL/Hr) IV Continuous <Continuous>  dextrose 50% Injectable 12.5 Gram(s) IV Push once  dextrose 50% Injectable 25 Gram(s) IV Push once  dextrose 50% Injectable 25 Gram(s) IV Push once  heparin  Injectable 5000 Unit(s) SubCutaneous every 8 hours  insulin glargine Injectable (LANTUS) 32 Unit(s) SubCutaneous at bedtime  insulin lispro (HumaLOG) corrective regimen sliding scale   SubCutaneous Before meals and at bedtime  insulin lispro Injectable (HumaLOG) 12 Unit(s) SubCutaneous three times a day with meals  linezolid    Tablet 600 milliGRAM(s) Oral every 12 hours  melatonin 3 milliGRAM(s) Oral at bedtime  metoprolol tartrate 12.5 milliGRAM(s) Oral two times a day  modafinil 100 milliGRAM(s) Oral <User Schedule>  multivitamin 1 Tablet(s) Oral daily  nystatin    Suspension 243173 Unit(s) Oral every 6 hours  pantoprazole    Tablet 40 milliGRAM(s) Oral before breakfast  polyethylene glycol 3350 17 Gram(s) Oral daily  senna 2 Tablet(s) Oral at bedtime  silver sulfADIAZINE 1% Cream 1 Application(s) Topical daily  sodium chloride 0.65% Nasal 1 Spray(s) Both Nostrils three times a day  spironolactone 25 milliGRAM(s) Oral daily  tiotropium 18 MICROgram(s) Capsule 1 Capsule(s) Inhalation daily  torsemide 10 milliGRAM(s) Oral daily    MEDICATIONS  (PRN):  acetaminophen   Tablet .. 650 milliGRAM(s) Oral every 6 hours PRN Mild Pain (1 - 3)  albuterol/ipratropium for Nebulization. 3 milliLiter(s) Nebulizer every 6 hours PRN Shortness of Breath and/or Wheezing  dextrose 40% Gel 15 Gram(s) Oral once PRN Blood Glucose LESS THAN 70 milliGRAM(s)/deciLiter  glucagon  Injectable 1 milliGRAM(s) IntraMuscular once PRN Glucose <70 milliGRAM(s)/deciLiter  guaiFENesin   Syrup  (Sugar-Free) 200 milliGRAM(s) Oral every 6 hours PRN Cough  oxycodone    5 mG/acetaminophen 325 mG 1 Tablet(s) Oral every 6 hours PRN Severe Pain (7 - 10)  sodium chloride 0.9% lock flush 10 milliLiter(s) IV Push every 1 hour PRN Pre/post blood products, medications, blood draw, and to maintain line patency      LABS:                        8.3    6.20  )-----------( 243      ( 12 Mar 2020 06:26 )             27.7     03-12    136  |  99  |  32<H>  ----------------------------<  122<H>  4.7   |  27  |  1.55<H>    Ca    8.7      12 Mar 2020 06:26                MICROBIOLOGY:     RADIOLOGY:  [ ] Reviewed and interpreted by me    Point of Care Ultrasound Findings:    PFT:    EKG:

## 2020-03-13 NOTE — PROGRESS NOTE ADULT - PROBLEM SELECTOR PLAN 6
2/25 s/p sternal wound debridement/flap with plastic surgery  ID and plastic surgery following  po zyvox 600 mg po q12   Wound vac to distal pole of MSI by PT- change by pt today 3/8

## 2020-03-13 NOTE — PROGRESS NOTE ADULT - SUBJECTIVE AND OBJECTIVE BOX
VITAL SIGNS    Telemetry:  SR   Vital Signs Last 24 Hrs  T(C): 36.7 (20 @ 04:41), Max: 36.7 (20 @ 19:26)  T(F): 98.1 (20 @ 04:41), Max: 98.1 (20 @ 04:41)  HR: 94 (20 @ 04:41) (94 - 99)  BP: 121/75 (20 @ 04:41) (113/70 - 130/84)  RR: 18 (20 @ 04:41) (18 - 18)  SpO2: 97% (20 @ 04:41) (97% - 98%)             @ 07:01  -   @ 07:00  --------------------------------------------------------  IN: 815 mL / OUT: 2450 mL / NET: -1635 mL     @ 07:01  -   @ 12:16  --------------------------------------------------------  IN: 240 mL / OUT: 0 mL / NET: 240 mL       Daily     Daily Weight in k.9 (13 Mar 2020 10:08)  Admit Wt: Drug Dosing Weight  Height (cm): 172.72 (2020 07:20)  Weight (kg): 81.1 (2020 07:20)  BMI (kg/m2): 27.2 (2020 07:20)  BSA (m2): 1.95 (2020 07:20)      CAPILLARY BLOOD GLUCOSE      POCT Blood Glucose.: 130 mg/dL (13 Mar 2020 12:02)  POCT Blood Glucose.: 171 mg/dL (13 Mar 2020 08:04)  POCT Blood Glucose.: 168 mg/dL (13 Mar 2020 02:06)  POCT Blood Glucose.: 186 mg/dL (12 Mar 2020 22:46)  POCT Blood Glucose.: 84 mg/dL (12 Mar 2020 21:44)  POCT Blood Glucose.: 144 mg/dL (12 Mar 2020 16:42)          MEDICATIONS  acetaminophen   Tablet .. 650 milliGRAM(s) Oral every 6 hours PRN  ALBUTerol    90 MICROgram(s) HFA Inhaler 1 Puff(s) Inhalation every 4 hours  albuterol/ipratropium for Nebulization. 3 milliLiter(s) Nebulizer every 6 hours PRN  albuterol/ipratropium for Nebulization. 3 milliLiter(s) Nebulizer every 6 hours  aMIOdarone    Tablet 200 milliGRAM(s) Oral daily  artificial tears (preservative free) Ophthalmic Solution 1 Drop(s) Both EYES two times a day  aspirin enteric coated 81 milliGRAM(s) Oral daily  atorvastatin 40 milliGRAM(s) Oral at bedtime  benzonatate 100 milliGRAM(s) Oral every 8 hours  buDESOnide    Inhalation Suspension 0.5 milliGRAM(s) Inhalation two times a day  calcium acetate 667 milliGRAM(s) Oral three times a day with meals  chlorhexidine 2% Cloths 1 Application(s) Topical <User Schedule>  dextrose 40% Gel 15 Gram(s) Oral once PRN  dextrose 5%. 1000 milliLiter(s) IV Continuous <Continuous>  dextrose 5%. 1000 milliLiter(s) IV Continuous <Continuous>  dextrose 5%. 1000 milliLiter(s) IV Continuous <Continuous>  dextrose 50% Injectable 12.5 Gram(s) IV Push once  dextrose 50% Injectable 25 Gram(s) IV Push once  dextrose 50% Injectable 25 Gram(s) IV Push once  glucagon  Injectable 1 milliGRAM(s) IntraMuscular once PRN  guaiFENesin   Syrup  (Sugar-Free) 200 milliGRAM(s) Oral every 6 hours PRN  heparin  Injectable 5000 Unit(s) SubCutaneous every 8 hours  insulin glargine Injectable (LANTUS) 32 Unit(s) SubCutaneous at bedtime  insulin lispro (HumaLOG) corrective regimen sliding scale   SubCutaneous Before meals and at bedtime  insulin lispro Injectable (HumaLOG) 12 Unit(s) SubCutaneous three times a day with meals  linezolid    Tablet 600 milliGRAM(s) Oral every 12 hours  melatonin 3 milliGRAM(s) Oral at bedtime  metoprolol tartrate 12.5 milliGRAM(s) Oral two times a day  multivitamin 1 Tablet(s) Oral daily  nystatin    Suspension 446646 Unit(s) Oral every 6 hours  oxycodone    5 mG/acetaminophen 325 mG 1 Tablet(s) Oral every 6 hours PRN  pantoprazole    Tablet 40 milliGRAM(s) Oral before breakfast  polyethylene glycol 3350 17 Gram(s) Oral daily  senna 2 Tablet(s) Oral at bedtime  silver sulfADIAZINE 1% Cream 1 Application(s) Topical daily  sodium chloride 0.65% Nasal 1 Spray(s) Both Nostrils three times a day  sodium chloride 0.9% lock flush 10 milliLiter(s) IV Push every 1 hour PRN  spironolactone 25 milliGRAM(s) Oral daily  tiotropium 18 MICROgram(s) Capsule 1 Capsule(s) Inhalation daily  torsemide 10 milliGRAM(s) Oral daily      Interval History: Pt denies any chest pain or sob. States he will feeling better overall. Cough better    PHYSICAL EXAM  General: WN, WD, NAD  Neurology: alert and oriented x 3, nonfocal, no gross deficits  CV :s1, s2  Sternal Wound :  CDI , Stable (+) VAC  Lungs: CTA B/L  Abdomen: soft, NT,ND, (+ )Bowel sounds  :  voiding  Extremities: RLE wound healing, LE edema B/L. PP 2+ B/L    LABS      136  |  97  |  30<H>  ----------------------------<  104<H>  4.6   |  26  |  1.56<H>    Ca    9.0      13 Mar 2020 06:31                                   8.5    5.75  )-----------( 260      ( 13 Mar 2020 06:31 )             28.2                 PAST MEDICAL & SURGICAL HISTORY:  HTN (hypertension)  Diabetes  History of appendectomy: x 30 yrs ago

## 2020-03-13 NOTE — PROGRESS NOTE ADULT - ASSESSMENT
65yo male with hx of HTN, DM II   s/p C4L on 2/3; PEA arrest on 2/6   + enterococcus Bld  C/S > started ampicillin q8h, ID consulted,  R pigtail for effusion  2/8    Extubated  2/9  2/15 L pigtail effusion  2/17 PRBC   2/18 Tx 2 Diaz  2/19 thomas removed, trial void. Maintain left pigtail for significant output. Pt encouraged to ambulate. Supplemental o2 sat NC weaned to 2L. Blood cultures repeated.  2/20 VSS -Pulmonary consult - appreciated - duonebs ATC q6h & pulmicort bid initiated - ddimer drawn.  pt w/ hx negative LE dopplers   will order non con chest DT as pt has a loculated left effusion that will require tap at IR.  2/21 - NON con Chest CT done - multiple loculated Left pleural effusions w/ patchy consolidation R - rounds made w/ Dr. carl.  ID - consult called re: Ct - WBC 11 afebrile.  +PALMA.  unable to tolerate VQ scan this am.  will d/c bumex & start Torsemide 20mg po daily  d/c planning rehab when medically stable  2/22  Wound care consult leg wounds> shower w/ local skin care  2/23  SW drainage> on Dapto> as per ID will cover SWD  CXR this am   Tighter glycemic control  2/24 ID added cefapime SW drainage purulent, afebrile  2/25 S/p Sternal wound debridement and irrigation with removal of all 6 sternal wires. Purulence encountered and sent for culture. Closure with bilateral pectoralis muscle advancement flaps.  Post op zari for hypotension- weaned off.  Skin/wd  culture from 2/24 neg  Pt transferred to sdu  2/27 VSS   carlos d/c thomas d/c transfer to floor JPx2  followed  by Plastics  followed by ID on cefepime and daptomycin bc positive for E. faecalis; follow up bc 2/19 negative to date. Provigil daily in am  2/28 VSS; abx as per ID - d/c cefepime and continue dapto 500 iv qd as per Dr. Carrasco- pt will need picc line vs medel for 4 wks abx from date of SWD as per ID; bc 2/19 neg.  d/w h. renal medel vs cath- await decision, diuresis initiated for hypervolemia; hep sq for dvt prophylaxis, + hypoglycemia- endo following- humalog adjusted    Discharge planning- rehab next week   2/29 VSS, distal portion of MSI with small dehiscence and serous purulent drainage - recommended wound vac placement as per Plastics. Left KEI 80ml & Right KEI 80ml/24h. S/P Right PICC line placement for IV abx.   3/1 Wound Vac placed to sternal wound. Pt ambulated in hallway with rolling walker. Left KEI 50ml & Right KEI 110ml/24h. Plan for discharge to Rehab Mon/Tue.   3/2 Pending patient rehab selection. Maintain sternal wound vac placement and KEI drains. Patient lethargic after percocet. Will hold narcotics . Continue with Daptomycin x 4 weeks until 3/22/20  3/3 VSS - call to ID re: alternative antibiotic to Daptomycin - Dr. Carrasco to follow up . d/c to rehab   3/4  HD stable .  + cough--bedside swallowing eval + cough--recs NPO and MBS in am--insulin adjusted by Dr Matias.   Dw Dr Carrasco--daptomycin can be switched on Monday to another antibiotic thru 3/22  3/5 VSS - MBS - Per speech pathologist- no gross aspiration ?silent - placed on Dysphagia 3 diet w/ nectar thick liquids. d/w Dr. Mariscal-  As per ID - will switch antibiotic to Zyvoxx on Monday 3/9/2020 - d/c to rehab on Monday on zyvoxx until 3/22  3/6 VSS; continue diuresis; resume low dose bb; abx as per ID/ vac  3/7/2020 VSS rounds made w/ Dr. Mariscal- right KEI w/ minimal drainage - spoke w/ plastics - will d/c right KEI - d/c plan rehab - switch to Zyvoxx bid po on monday.  3/8 VSS; vac change today + drainage noted at distal vac site; wbc 8 and pt afebrile; abx as per ID; cr 2.0- decrease torsemide 20 qd as per Dr. Mariscal   3/9 VSS; wbc 8 and pt afebrile; abx changed to po zyvox 600 mg po q12 as per ID   Discharge planning- await rehab placement   3/10/2020 - Pt denied from rehab of his choice.  pt may need to remain in hospital until the remainder of his antibiotic course 3/21/2020 as  he is being denied from rehab.  Care Coordinator will continue to apply to rehabs..  3/11 VSS; plastics recommending removal of VAC post healing of sternum from inside. Next VAC change on Friday. MBS tomorrow 9 AM w/ S/S. Anticipating d/c on Monday  3/12 VSS; MBS today- started on regular diet. VAC change tomorrow. Continue abx until 3/22.   3/13 VSS; Pt tolerating regular diet well. Increase in LE edema B/L. Lasix 20 IV x 1. Continue torsemide 10 QD. VAC change today. F/U plastics. Anticipating d/c on Monday to home

## 2020-03-13 NOTE — PROGRESS NOTE ADULT - ATTENDING COMMENTS
Agree with above NP note.  cv stable  cont current tx Agree with above NP note.  cv stable  cont current tx per cts  d/c planning

## 2020-03-13 NOTE — PROGRESS NOTE ADULT - SUBJECTIVE AND OBJECTIVE BOX
CARDIOLOGY FOLLOW UP - Dr. Steel    CC no cp or sob   still w cough      PHYSICAL EXAM:  T(C): 36.7 (03-13-20 @ 04:41), Max: 36.7 (03-12-20 @ 19:26)  HR: 94 (03-13-20 @ 04:41) (94 - 99)  BP: 121/75 (03-13-20 @ 04:41) (113/70 - 130/84)  RR: 18 (03-13-20 @ 04:41) (18 - 18)  SpO2: 97% (03-13-20 @ 04:41) (97% - 98%)  Wt(kg): --  I&O's Summary    12 Mar 2020 07:01  -  13 Mar 2020 07:00  --------------------------------------------------------  IN: 815 mL / OUT: 2450 mL / NET: -1635 mL    13 Mar 2020 07:01  -  13 Mar 2020 14:13  --------------------------------------------------------  IN: 240 mL / OUT: 0 mL / NET: 240 mL        Appearance: Normal	  Cardiovascular: Normal S1 S2,RRR, No JVD, No murmurs  Respiratory: diminished   Gastrointestinal:  Soft, Non-tender, + BS	  Extremities: bl le sina        MEDICATIONS  (STANDING):  ALBUTerol    90 MICROgram(s) HFA Inhaler 1 Puff(s) Inhalation every 4 hours  albuterol/ipratropium for Nebulization. 3 milliLiter(s) Nebulizer every 6 hours  aMIOdarone    Tablet 200 milliGRAM(s) Oral daily  artificial tears (preservative free) Ophthalmic Solution 1 Drop(s) Both EYES two times a day  aspirin enteric coated 81 milliGRAM(s) Oral daily  atorvastatin 40 milliGRAM(s) Oral at bedtime  benzonatate 100 milliGRAM(s) Oral every 8 hours  buDESOnide    Inhalation Suspension 0.5 milliGRAM(s) Inhalation two times a day  calcium acetate 667 milliGRAM(s) Oral three times a day with meals  chlorhexidine 2% Cloths 1 Application(s) Topical <User Schedule>  dextrose 5%. 1000 milliLiter(s) (50 mL/Hr) IV Continuous <Continuous>  dextrose 5%. 1000 milliLiter(s) (30 mL/Hr) IV Continuous <Continuous>  dextrose 5%. 1000 milliLiter(s) (50 mL/Hr) IV Continuous <Continuous>  dextrose 50% Injectable 12.5 Gram(s) IV Push once  dextrose 50% Injectable 25 Gram(s) IV Push once  dextrose 50% Injectable 25 Gram(s) IV Push once  heparin  Injectable 5000 Unit(s) SubCutaneous every 8 hours  insulin glargine Injectable (LANTUS) 32 Unit(s) SubCutaneous at bedtime  insulin lispro (HumaLOG) corrective regimen sliding scale   SubCutaneous Before meals and at bedtime  insulin lispro Injectable (HumaLOG) 12 Unit(s) SubCutaneous three times a day with meals  linezolid    Tablet 600 milliGRAM(s) Oral every 12 hours  melatonin 3 milliGRAM(s) Oral at bedtime  metoprolol tartrate 12.5 milliGRAM(s) Oral two times a day  multivitamin 1 Tablet(s) Oral daily  nystatin    Suspension 971736 Unit(s) Oral every 6 hours  pantoprazole    Tablet 40 milliGRAM(s) Oral before breakfast  polyethylene glycol 3350 17 Gram(s) Oral daily  senna 2 Tablet(s) Oral at bedtime  silver sulfADIAZINE 1% Cream 1 Application(s) Topical daily  sodium chloride 0.65% Nasal 1 Spray(s) Both Nostrils three times a day  spironolactone 25 milliGRAM(s) Oral daily  tiotropium 18 MICROgram(s) Capsule 1 Capsule(s) Inhalation daily  torsemide 10 milliGRAM(s) Oral daily      TELEMETRY: nsr 	    ECG:  	  RADIOLOGY:   DIAGNOSTIC TESTING:  [ ] Echocardiogram:  [ ]  Catheterization:  [ ] Stress Test:    OTHER: 	    LABS:	 	    Creatine Kinase, Serum: 71 U/L [30 - 200] (03-08 @ 07:43)                          8.5    5.75  )-----------( 260      ( 13 Mar 2020 06:31 )             28.2     03-13    136  |  97  |  30<H>  ----------------------------<  104<H>  4.6   |  26  |  1.56<H>    Ca    9.0      13 Mar 2020 06:31

## 2020-03-14 LAB
ANION GAP SERPL CALC-SCNC: 11 MMOL/L — SIGNIFICANT CHANGE UP (ref 5–17)
BUN SERPL-MCNC: 28 MG/DL — HIGH (ref 7–23)
CALCIUM SERPL-MCNC: 8.5 MG/DL — SIGNIFICANT CHANGE UP (ref 8.4–10.5)
CHLORIDE SERPL-SCNC: 95 MMOL/L — LOW (ref 96–108)
CO2 SERPL-SCNC: 26 MMOL/L — SIGNIFICANT CHANGE UP (ref 22–31)
CREAT SERPL-MCNC: 1.58 MG/DL — HIGH (ref 0.5–1.3)
GLUCOSE BLDC GLUCOMTR-MCNC: 118 MG/DL — HIGH (ref 70–99)
GLUCOSE BLDC GLUCOMTR-MCNC: 127 MG/DL — HIGH (ref 70–99)
GLUCOSE BLDC GLUCOMTR-MCNC: 142 MG/DL — HIGH (ref 70–99)
GLUCOSE BLDC GLUCOMTR-MCNC: 173 MG/DL — HIGH (ref 70–99)
GLUCOSE BLDC GLUCOMTR-MCNC: 62 MG/DL — LOW (ref 70–99)
GLUCOSE BLDC GLUCOMTR-MCNC: 63 MG/DL — LOW (ref 70–99)
GLUCOSE BLDC GLUCOMTR-MCNC: 90 MG/DL — SIGNIFICANT CHANGE UP (ref 70–99)
GLUCOSE SERPL-MCNC: 159 MG/DL — HIGH (ref 70–99)
HCT VFR BLD CALC: 28.8 % — LOW (ref 39–50)
HGB BLD-MCNC: 9 G/DL — LOW (ref 13–17)
MCHC RBC-ENTMCNC: 27.2 PG — SIGNIFICANT CHANGE UP (ref 27–34)
MCHC RBC-ENTMCNC: 31.3 GM/DL — LOW (ref 32–36)
MCV RBC AUTO: 87 FL — SIGNIFICANT CHANGE UP (ref 80–100)
NRBC # BLD: 0 /100 WBCS — SIGNIFICANT CHANGE UP (ref 0–0)
PLATELET # BLD AUTO: 265 K/UL — SIGNIFICANT CHANGE UP (ref 150–400)
POTASSIUM SERPL-MCNC: 4.6 MMOL/L — SIGNIFICANT CHANGE UP (ref 3.5–5.3)
POTASSIUM SERPL-SCNC: 4.6 MMOL/L — SIGNIFICANT CHANGE UP (ref 3.5–5.3)
RBC # BLD: 3.31 M/UL — LOW (ref 4.2–5.8)
RBC # FLD: 15.1 % — HIGH (ref 10.3–14.5)
SODIUM SERPL-SCNC: 132 MMOL/L — LOW (ref 135–145)
WBC # BLD: 5.6 K/UL — SIGNIFICANT CHANGE UP (ref 3.8–10.5)
WBC # FLD AUTO: 5.6 K/UL — SIGNIFICANT CHANGE UP (ref 3.8–10.5)

## 2020-03-14 PROCEDURE — 32554 ASPIRATE PLEURA W/O IMAGING: CPT | Mod: LT,78

## 2020-03-14 PROCEDURE — 71045 X-RAY EXAM CHEST 1 VIEW: CPT | Mod: 26,76

## 2020-03-14 RX ORDER — INSULIN LISPRO 100/ML
10 VIAL (ML) SUBCUTANEOUS
Refills: 0 | Status: DISCONTINUED | OUTPATIENT
Start: 2020-03-14 | End: 2020-03-16

## 2020-03-14 RX ORDER — HYDROMORPHONE HYDROCHLORIDE 2 MG/ML
0.25 INJECTION INTRAMUSCULAR; INTRAVENOUS; SUBCUTANEOUS ONCE
Refills: 0 | Status: DISCONTINUED | OUTPATIENT
Start: 2020-03-14 | End: 2020-03-14

## 2020-03-14 RX ORDER — INSULIN GLARGINE 100 [IU]/ML
14 INJECTION, SOLUTION SUBCUTANEOUS ONCE
Refills: 0 | Status: COMPLETED | OUTPATIENT
Start: 2020-03-14 | End: 2020-03-14

## 2020-03-14 RX ADMIN — ATORVASTATIN CALCIUM 40 MILLIGRAM(S): 80 TABLET, FILM COATED ORAL at 22:02

## 2020-03-14 RX ADMIN — Medication 1 TABLET(S): at 14:43

## 2020-03-14 RX ADMIN — PANTOPRAZOLE SODIUM 40 MILLIGRAM(S): 20 TABLET, DELAYED RELEASE ORAL at 06:14

## 2020-03-14 RX ADMIN — SENNA PLUS 2 TABLET(S): 8.6 TABLET ORAL at 22:02

## 2020-03-14 RX ADMIN — Medication 500000 UNIT(S): at 06:14

## 2020-03-14 RX ADMIN — Medication 3 MILLILITER(S): at 06:14

## 2020-03-14 RX ADMIN — Medication 1 SPRAY(S): at 06:51

## 2020-03-14 RX ADMIN — OXYCODONE AND ACETAMINOPHEN 1 TABLET(S): 5; 325 TABLET ORAL at 09:55

## 2020-03-14 RX ADMIN — SPIRONOLACTONE 25 MILLIGRAM(S): 25 TABLET, FILM COATED ORAL at 06:14

## 2020-03-14 RX ADMIN — Medication 100 MILLIGRAM(S): at 06:14

## 2020-03-14 RX ADMIN — Medication 667 MILLIGRAM(S): at 14:43

## 2020-03-14 RX ADMIN — INSULIN GLARGINE 14 UNIT(S): 100 INJECTION, SOLUTION SUBCUTANEOUS at 22:01

## 2020-03-14 RX ADMIN — Medication 500000 UNIT(S): at 14:43

## 2020-03-14 RX ADMIN — OXYCODONE AND ACETAMINOPHEN 1 TABLET(S): 5; 325 TABLET ORAL at 22:02

## 2020-03-14 RX ADMIN — Medication 3 MILLIGRAM(S): at 22:02

## 2020-03-14 RX ADMIN — Medication 667 MILLIGRAM(S): at 17:41

## 2020-03-14 RX ADMIN — Medication 12 UNIT(S): at 08:35

## 2020-03-14 RX ADMIN — Medication 12.5 MILLIGRAM(S): at 17:42

## 2020-03-14 RX ADMIN — MODAFINIL 100 MILLIGRAM(S): 200 TABLET ORAL at 08:35

## 2020-03-14 RX ADMIN — Medication 0.5 MILLIGRAM(S): at 17:41

## 2020-03-14 RX ADMIN — Medication 100 MILLIGRAM(S): at 22:02

## 2020-03-14 RX ADMIN — HEPARIN SODIUM 5000 UNIT(S): 5000 INJECTION INTRAVENOUS; SUBCUTANEOUS at 14:44

## 2020-03-14 RX ADMIN — Medication 12.5 MILLIGRAM(S): at 06:14

## 2020-03-14 RX ADMIN — OXYCODONE AND ACETAMINOPHEN 1 TABLET(S): 5; 325 TABLET ORAL at 10:25

## 2020-03-14 RX ADMIN — HEPARIN SODIUM 5000 UNIT(S): 5000 INJECTION INTRAVENOUS; SUBCUTANEOUS at 06:15

## 2020-03-14 RX ADMIN — HEPARIN SODIUM 5000 UNIT(S): 5000 INJECTION INTRAVENOUS; SUBCUTANEOUS at 22:02

## 2020-03-14 RX ADMIN — HYDROMORPHONE HYDROCHLORIDE 0.25 MILLIGRAM(S): 2 INJECTION INTRAMUSCULAR; INTRAVENOUS; SUBCUTANEOUS at 14:49

## 2020-03-14 RX ADMIN — Medication 3 MILLILITER(S): at 14:43

## 2020-03-14 RX ADMIN — Medication 81 MILLIGRAM(S): at 14:43

## 2020-03-14 RX ADMIN — Medication 1 DROP(S): at 06:15

## 2020-03-14 RX ADMIN — Medication 10 UNIT(S): at 17:42

## 2020-03-14 RX ADMIN — Medication 10 MILLIGRAM(S): at 06:14

## 2020-03-14 RX ADMIN — LINEZOLID 600 MILLIGRAM(S): 600 INJECTION, SOLUTION INTRAVENOUS at 17:41

## 2020-03-14 RX ADMIN — Medication 1: at 08:35

## 2020-03-14 RX ADMIN — Medication 667 MILLIGRAM(S): at 08:35

## 2020-03-14 RX ADMIN — AMIODARONE HYDROCHLORIDE 200 MILLIGRAM(S): 400 TABLET ORAL at 06:14

## 2020-03-14 RX ADMIN — LINEZOLID 600 MILLIGRAM(S): 600 INJECTION, SOLUTION INTRAVENOUS at 06:14

## 2020-03-14 RX ADMIN — Medication 3 MILLILITER(S): at 17:42

## 2020-03-14 RX ADMIN — HYDROMORPHONE HYDROCHLORIDE 0.25 MILLIGRAM(S): 2 INJECTION INTRAMUSCULAR; INTRAVENOUS; SUBCUTANEOUS at 14:44

## 2020-03-14 RX ADMIN — Medication 12 UNIT(S): at 14:43

## 2020-03-14 RX ADMIN — Medication 0.5 MILLIGRAM(S): at 06:15

## 2020-03-14 RX ADMIN — Medication 500000 UNIT(S): at 17:41

## 2020-03-14 NOTE — PROGRESS NOTE ADULT - PROBLEM SELECTOR PLAN 2
Continue diabetic diet  endocrine following, Dr. Matias  continue Lantus 34HS and Humalog 12TID   accuchecks ac and hs Continue diabetic diet  endocrine following, Dr. Matias  continue Lantus 32 HS and Humalog 10 units TID   accuchecks ac and hs

## 2020-03-14 NOTE — PROGRESS NOTE ADULT - ASSESSMENT
Assessment  DMT2: 64y Male with DM T2 with hyperglycemia, A1C 9.2%, was on oral meds and insulin at home, now on basal bolus insulin, increased dose yesterday, blood sugars improving, no hypoglycemic episodes eating and ambulating.  Foot infection: On Tx, stable.  CAD: s/p CABG 2/3, on medications, no chest pain, stable, monitored.  HTN: Controlled,  on antihypertensive medications.  HLD: Controlled, on statin.  CKD: Monitor labs/BMP          Harper Matias MD  Cell: [phone]  Office: [phone]

## 2020-03-14 NOTE — PROCEDURE NOTE - NSINFORMCONSENT_GEN_A_CORE
Benefits, risks, and possible complications of procedure explained to patient/caregiver who verbalized understanding and gave written consent.
This was an emergent procedure.

## 2020-03-14 NOTE — PROGRESS NOTE ADULT - PROBLEM SELECTOR PLAN 3
3/14 Left pleural effusion --> s/p left thoracentesis yielding 350cc.  Strict I & Os, daily weight  torsemide 10 qd, Aldactone 25QD

## 2020-03-14 NOTE — PROGRESS NOTE ADULT - SUBJECTIVE AND OBJECTIVE BOX
VITAL SIGNS    Subjective: "I still have a dry cough." Denies CP, palpitation, SOB, PALMA, HA, dizziness, N/V/D, fever or chills.  No acute event noted overnight.     Telemetry:  NSR      Vital Signs Last 24 Hrs  T(C): 37.1 (03-14-20 @ 13:15), Max: 37.1 (03-14-20 @ 13:15)  T(F): 98.8 (03-14-20 @ 13:15), Max: 98.8 (03-14-20 @ 13:15)  HR: 67 (03-14-20 @ 13:15) (67 - 95)  BP: 112/80 (03-14-20 @ 13:15) (112/80 - 132/82)  RR: 18 (03-14-20 @ 13:15) (18 - 18)  SpO2: 96% (03-14-20 @ 13:15) (96% - 98%)           03-13 @ 07:01  -  03-14 @ 07:00  --------------------------------------------------------  IN: 1040 mL / OUT: 2400 mL / NET: -1360 mL    03-14 @ 07:01  -  03-14 @ 17:34  --------------------------------------------------------  IN: 540 mL / OUT: 1000 mL / NET: -460 mL    CAPILLARY BLOOD GLUCOSE    POCT Blood Glucose.: 142 mg/dL (14 Mar 2020 16:43)  POCT Blood Glucose.: 118 mg/dL (14 Mar 2020 13:08)  POCT Blood Glucose.: 90 mg/dL (14 Mar 2020 12:20)  POCT Blood Glucose.: 62 mg/dL (14 Mar 2020 12:06)  POCT Blood Glucose.: 63 mg/dL (14 Mar 2020 12:05)  POCT Blood Glucose.: 173 mg/dL (14 Mar 2020 07:45)  POCT Blood Glucose.: 152 mg/dL (13 Mar 2020 21:46)        PHYSICAL EXAM    Neurology: alert and oriented x 3, nonfocal, no gross deficits    CV: (+) S1 and S2, No murmurs, rubs, gallops or clicks     Sternal Wound:  MSI with mid incision --> wound vac in place --> negative suction -->CDI , sternum stable    Lungs: CTA B/L; Diminished at base; Left greater than the right.      Abdomen: soft, nontender, nondistended, positive bowel sounds, (+) Flatus; (+) BM 3/13    :  Voiding               Extremities:  B/L LE (+) 1-2 edema; negative calf tenderness; (+) 2 DP palpable; RLE mid/ anterior shin area with DSD in place C/D/I. RUE PICC line in place with occlusive dressing C/D/I         acetaminophen   Tablet .. 650 milliGRAM(s) Oral every 6 hours PRN  ALBUTerol  90 MICROgram(s) HFA Inhaler 1 Puff(s) Inhalation every 4 hours  albuterol/ipratropium for Nebulization. 3 milliLiter(s) Nebulizer every 6 hours PRN  albuterol/ipratropium for Nebulization. 3 milliLiter(s) Nebulizer every 6 hours  aMIOdarone Tablet 200 milliGRAM(s) Oral daily  artificial tears (preservative free) Ophthalmic Solution 1 Drop(s) Both EYES two times a day  aspirin enteric coated 81 milliGRAM(s) Oral daily  atorvastatin 40 milliGRAM(s) Oral at bedtime  benzonatate 100 milliGRAM(s) Oral every 8 hours  buDESOnide Inhalation Suspension 0.5 milliGRAM(s) Inhalation two times a day  calcium acetate 667 milliGRAM(s) Oral three times a day with meals  chlorhexidine 2% Cloths 1 Application(s) Topical <User Schedule>  guaiFENesin   Syrup  (Sugar-Free) 200 milliGRAM(s) Oral every 6 hours PRN  heparin  Injectable 5000 Unit(s) SubCutaneous every 8 hours  insulin glargine Injectable (LANTUS) 32 Unit(s) SubCutaneous at bedtime  insulin lispro (HumaLOG) corrective regimen sliding scale   SubCutaneous Before meals and at bedtime  insulin lispro Injectable (HumaLOG) 10 Unit(s) SubCutaneous three times a day with meals  linezolid Tablet 600 milliGRAM(s) Oral every 12 hours  melatonin 3 milliGRAM(s) Oral at bedtime  metoprolol tartrate 12.5 milliGRAM(s) Oral two times a day  modafinil 100 milliGRAM(s) Oral <User Schedule>  multivitamin 1 Tablet(s) Oral daily  nystatin  Suspension 985033 Unit(s) Oral every 6 hours  oxycodone 5 mG/acetaminophen 325 mG 1 Tablet(s) Oral every 6 hours PRN  pantoprazole Tablet 40 milliGRAM(s) Oral before breakfast  polyethylene glycol 3350 17 Gram(s) Oral daily  senna 2 Tablet(s) Oral at bedtime  silver sulfADIAZINE 1% Cream 1 Application(s) Topical daily  sodium chloride 0.65% Nasal 1 Spray(s) Both Nostrils three times a day  sodium chloride 0.9% lock flush 10 milliLiter(s) IV Push every 1 hour PRN  spironolactone 25 milliGRAM(s) Oral daily  tiotropium 18 MICROgram(s) Capsule 1 Capsule(s) Inhalation daily  torsemide 10 milliGRAM(s) Oral daily    Physical Therapy Rec:   Home  [  ]   Home w/ PT  [ X ]  Rehab  [  ]    Discussed with Cardiothoracic Team at AM rounds.

## 2020-03-14 NOTE — PROGRESS NOTE ADULT - SUBJECTIVE AND OBJECTIVE BOX
Chief complaint  Patient is a 65y old  Male who presents with a chief complaint of Chest pain (14 Mar 2020 07:47)   Review of systems  Patient in bed, looks comfortable, no fever, had hypoglycemia.    Labs and Fingersticks  CAPILLARY BLOOD GLUCOSE      POCT Blood Glucose.: 118 mg/dL (14 Mar 2020 13:08)  POCT Blood Glucose.: 90 mg/dL (14 Mar 2020 12:20)  POCT Blood Glucose.: 62 mg/dL (14 Mar 2020 12:06)  POCT Blood Glucose.: 63 mg/dL (14 Mar 2020 12:05)  POCT Blood Glucose.: 173 mg/dL (14 Mar 2020 07:45)  POCT Blood Glucose.: 152 mg/dL (13 Mar 2020 21:46)  POCT Blood Glucose.: 114 mg/dL (13 Mar 2020 16:57)      Anion Gap, Serum: 11 (03-14 @ 06:04)  Anion Gap, Serum: 13 (03-13 @ 06:31)      Calcium, Total Serum: 8.5 (03-14 @ 06:04)  Calcium, Total Serum: 9.0 (03-13 @ 06:31)          03-14    132<L>  |  95<L>  |  28<H>  ----------------------------<  159<H>  4.6   |  26  |  1.58<H>    Ca    8.5      14 Mar 2020 06:04                          9.0    5.60  )-----------( 265      ( 14 Mar 2020 06:04 )             28.8     Medications  MEDICATIONS  (STANDING):  ALBUTerol    90 MICROgram(s) HFA Inhaler 1 Puff(s) Inhalation every 4 hours  albuterol/ipratropium for Nebulization. 3 milliLiter(s) Nebulizer every 6 hours  aMIOdarone    Tablet 200 milliGRAM(s) Oral daily  artificial tears (preservative free) Ophthalmic Solution 1 Drop(s) Both EYES two times a day  aspirin enteric coated 81 milliGRAM(s) Oral daily  atorvastatin 40 milliGRAM(s) Oral at bedtime  benzonatate 100 milliGRAM(s) Oral every 8 hours  buDESOnide    Inhalation Suspension 0.5 milliGRAM(s) Inhalation two times a day  calcium acetate 667 milliGRAM(s) Oral three times a day with meals  chlorhexidine 2% Cloths 1 Application(s) Topical <User Schedule>  dextrose 5%. 1000 milliLiter(s) (50 mL/Hr) IV Continuous <Continuous>  dextrose 5%. 1000 milliLiter(s) (30 mL/Hr) IV Continuous <Continuous>  dextrose 5%. 1000 milliLiter(s) (50 mL/Hr) IV Continuous <Continuous>  dextrose 50% Injectable 12.5 Gram(s) IV Push once  dextrose 50% Injectable 25 Gram(s) IV Push once  dextrose 50% Injectable 25 Gram(s) IV Push once  heparin  Injectable 5000 Unit(s) SubCutaneous every 8 hours  insulin glargine Injectable (LANTUS) 32 Unit(s) SubCutaneous at bedtime  insulin lispro (HumaLOG) corrective regimen sliding scale   SubCutaneous Before meals and at bedtime  insulin lispro Injectable (HumaLOG) 10 Unit(s) SubCutaneous three times a day with meals  linezolid    Tablet 600 milliGRAM(s) Oral every 12 hours  melatonin 3 milliGRAM(s) Oral at bedtime  metoprolol tartrate 12.5 milliGRAM(s) Oral two times a day  modafinil 100 milliGRAM(s) Oral <User Schedule>  multivitamin 1 Tablet(s) Oral daily  nystatin    Suspension 556293 Unit(s) Oral every 6 hours  pantoprazole    Tablet 40 milliGRAM(s) Oral before breakfast  polyethylene glycol 3350 17 Gram(s) Oral daily  senna 2 Tablet(s) Oral at bedtime  silver sulfADIAZINE 1% Cream 1 Application(s) Topical daily  sodium chloride 0.65% Nasal 1 Spray(s) Both Nostrils three times a day  spironolactone 25 milliGRAM(s) Oral daily  tiotropium 18 MICROgram(s) Capsule 1 Capsule(s) Inhalation daily  torsemide 10 milliGRAM(s) Oral daily      Physical Exam  General: Patient comfortable in bed  Vital Signs Last 12 Hrs  T(F): 98.8 (03-14-20 @ 13:15), Max: 98.8 (03-14-20 @ 13:15)  HR: 67 (03-14-20 @ 13:15) (67 - 95)  BP: 112/80 (03-14-20 @ 13:15) (112/80 - 132/82)  BP(mean): 98 (03-14-20 @ 05:00) (98 - 98)  RR: 18 (03-14-20 @ 13:15) (18 - 18)  SpO2: 96% (03-14-20 @ 13:15) (96% - 98%)  Neck: No palpable thyroid nodules.  CVS: S1S2, No murmurs  Respiratory: No wheezing, no crepitations  GI: Abdomen soft, bowel sounds positive  Musculoskeletal:  edema lower extremities.   Skin: No skin rashes, no ecchymosis    Diagnostics

## 2020-03-14 NOTE — PROGRESS NOTE ADULT - PROBLEM SELECTOR PLAN 1
Continue ASA 81 daily, metoprolol 25 BID and atorvastatin 40 HS  Continue on lopressor 12.5 bid   Continue provigil 100 qd  C/W GI prophylaxis on protonix and DVT prophylaxis on SQ Lovenox.   Cough and deep breathe, Incentive Spirometry Q1h, Chest PT.  Ambulate 4x daily as tolerated and with PT.   Discharge planning- rehab when medically stable

## 2020-03-14 NOTE — PROCEDURE NOTE - NSSITEPREP_SKIN_A_CORE
chlorhexidine
chlorhexidine/Adherence to aseptic technique: hand hygiene prior to donning barriers (gown, gloves), don cap and mask, sterile drape over patient
chlorhexidine
chlorhexidine
Adherence to aseptic technique: hand hygiene prior to donning barriers (gown, gloves), don cap and mask, sterile drape over patient/chlorhexidine
chlorhexidine
chlorhexidine

## 2020-03-14 NOTE — PROCEDURE NOTE - NSTIMEOUT_GEN_A_CORE
Patient's first and last name, , procedure, and correct site confirmed prior to the start of procedure.
Statement Selected

## 2020-03-14 NOTE — PROGRESS NOTE ADULT - ASSESSMENT
63yo male with hx of HTN, DM II   s/p C4L on 2/3; PEA arrest on 2/6   + enterococcus Bld  C/S > started ampicillin q8h, ID consulted,  R pigtail for effusion  2/8    Extubated  2/9  2/15 L pigtail effusion  2/17 PRBC   2/18 Tx 2 Diaz  2/19 thomas removed, trial void. Maintain left pigtail for significant output. Pt encouraged to ambulate. Supplemental o2 sat NC weaned to 2L. Blood cultures repeated.  2/20 VSS -Pulmonary consult - appreciated - duonebs ATC q6h & pulmicort bid initiated - ddimer drawn.  pt w/ hx negative LE dopplers   will order non con chest DT as pt has a loculated left effusion that will require tap at IR.  2/21 - NON con Chest CT done - multiple loculated Left pleural effusions w/ patchy consolidation R - rounds made w/ Dr. carl.  ID - consult called re: Ct - WBC 11 afebrile.  +PALMA.  unable to tolerate VQ scan this am.  will d/c bumex & start Torsemide 20mg po daily  d/c planning rehab when medically stable  2/22  Wound care consult leg wounds> shower w/ local skin care  2/23  SW drainage> on Dapto> as per ID will cover SWD  CXR this am   Tighter glycemic control  2/24 ID added cefapime SW drainage purulent, afebrile  2/25 S/p Sternal wound debridement and irrigation with removal of all 6 sternal wires. Purulence encountered and sent for culture. Closure with bilateral pectoralis muscle advancement flaps.  Post op zari for hypotension- weaned off.  Skin/wd  culture from 2/24 neg  Pt transferred to sdu  2/27 VSS   carlos d/c thomas d/c transfer to floor JPx2  followed  by Plastics  followed by ID on cefepime and daptomycin bc positive for E. faecalis; follow up bc 2/19 negative to date. Provigil daily in am  2/28 VSS; abx as per ID - d/c cefepime and continue dapto 500 iv qd as per Dr. Carrasco- pt will need picc line vs medel for 4 wks abx from date of SWD as per ID; bc 2/19 neg.  d/w h. renal medel vs cath- await decision, diuresis initiated for hypervolemia; hep sq for dvt prophylaxis, + hypoglycemia- endo following- humalog adjusted    Discharge planning- rehab next week   2/29 VSS, distal portion of MSI with small dehiscence and serous purulent drainage - recommended wound vac placement as per Plastics. Left KEI 80ml & Right KEI 80ml/24h. S/P Right PICC line placement for IV abx.   3/1 Wound Vac placed to sternal wound. Pt ambulated in hallway with rolling walker. Left KEI 50ml & Right KEI 110ml/24h. Plan for discharge to Rehab Mon/Tue.   3/2 Pending patient rehab selection. Maintain sternal wound vac placement and KEI drains. Patient lethargic after percocet. Will hold narcotics . Continue with Daptomycin x 4 weeks until 3/22/20  3/3 VSS - call to ID re: alternative antibiotic to Daptomycin - Dr. Carrasco to follow up . d/c to rehab   3/4  HD stable .  + cough--bedside swallowing eval + cough--recs NPO and MBS in am--insulin adjusted by Dr Matias.   Dw Dr Carrasco--daptomycin can be switched on Monday to another antibiotic thru 3/22  3/5 VSS - MBS - Per speech pathologist- no gross aspiration ?silent - placed on Dysphagia 3 diet w/ nectar thick liquids. d/w Dr. Mariscal-  As per ID - will switch antibiotic to Zyvox on Monday 3/9/2020 - d/c to rehab on Monday on Zyvox until 3/22  3/6 VSS; continue diuresis; resume low dose bb; abx as per ID/ vac  3/7/2020 VSS rounds made w/ Dr. Mariscal- right KEI w/ minimal drainage - spoke w/ plastics - will d/c right KEI - d/c plan rehab - switch to Zyvox bid po on Monday  3/8 VSS; vac change today + drainage noted at distal vac site; wbc 8 and pt afebrile; abx as per ID; cr 2.0- decrease torsemide 20 qd as per Dr. Mariscal   3/9 VSS; wbc 8 and pt afebrile; abx changed to po Zyvox 600 mg po q12 as per ID   Discharge planning- await rehab placement   3/10/2020 - Pt denied from rehab of his choice.  pt may need to remain in hospital until the remainder of his antibiotic course 3/21/2020 as  he is being denied from rehab.  Care Coordinator will continue to apply to rehabs..  3/11 VSS; plastics recommending removal of VAC post healing of sternum from inside. Next VAC change on Friday. MBS tomorrow 9 AM w/ S/S. Anticipating d/c on Monday  3/12 VSS; MBS today- started on regular diet. VAC change tomorrow. Continue abx until 3/22.   3/13 VSS; Pt tolerating regular diet well. Increase in LE edema B/L. Lasix 20 IV x 1. Continue torsemide 10 QD. VAC change today. F/U plastics. Anticipating d/c on Monday to home  3/14 VVS; Patient reporting worsening cough --> Left pleural effusion --> s/p left thoracentesis yielding 350cc. Continue with current medication regimen.   Plan for discharge home on Monday 3/17

## 2020-03-14 NOTE — PROCEDURE NOTE - NSANESTHESIA_GEN_A_CORE
1% lidocaine
no anesthesia administered
no anesthesia administered
1% lidocaine

## 2020-03-14 NOTE — PROCEDURE NOTE - PROCEDURE DATE TIME, MLM
11-Feb-2020 12:00
16-Feb-2020 13:30
07-Feb-2020
07-Feb-2020
07-Feb-2020 09:11
09-Feb-2020 19:30
14-Mar-2020 14:00

## 2020-03-14 NOTE — PROCEDURE NOTE - NSPROCDETAILS_GEN_ALL_CORE
thoracostomy tube placed percutaneously/sterile dressing applied/Seldinger technique
ultrasound assessment of effusion (localization)/location identified, draped/prepped, sterile technique used, needle inserted/introduced/Seldinger technique
dressing applied/Seldinger technique/sterile dressing applied/secured in place/thoracostomy tube placed percutaneously
location identified, draped/prepped, sterile technique used, needle inserted/introduced/connected to a pressurized flush line/sutured in place/all materials/supplies accounted for at end of procedure/positive blood return obtained via catheter/hemostasis with direct pressure, dressing applied/Seldinger technique
sterile dressing applied/guidewire recovered/sterile technique, catheter placed/lumen(s) aspirated and flushed/ultrasound guidance
ultrasound assessment of fluid (location)/Seldinger technique/secured in place/percutaneous
location identified, draped/prepped, sterile technique used, needle inserted/introduced/connected to a pressurized flush line/Seldinger technique/all materials/supplies accounted for at end of procedure/sutured in place

## 2020-03-14 NOTE — PROCEDURE NOTE - NSPOSTPRCRAD_GEN_A_CORE
no pneumothorax
chest tube in correct position
chest tube in correct position
central line located in the superior vena cava/central line located in the/depth of insertion/post-procedure radiography performed

## 2020-03-14 NOTE — PROCEDURE NOTE - NSINDICATIONS_GEN_A_CORE
pleural effusion
critical illness/venous access/hemodynamic monitoring/emergency venous access
monitoring purposes/arterial puncture to obtain ABG's/critical patient
pleural effusion
pleural effusion
critical patient/arterial puncture to obtain ABG's

## 2020-03-14 NOTE — PROGRESS NOTE ADULT - SUBJECTIVE AND OBJECTIVE BOX
CARDIOLOGY FOLLOW UP - Dr. Steel    CC no cp/sob  oob in chair resting comfortably        PHYSICAL EXAM:  T(C): 36.6 (03-14-20 @ 05:00), Max: 36.7 (03-13-20 @ 14:17)  HR: 95 (03-14-20 @ 05:00) (90 - 96)  BP: 132/82 (03-14-20 @ 05:00) (119/70 - 134/80)  RR: 18 (03-14-20 @ 05:00) (18 - 18)  SpO2: 98% (03-14-20 @ 05:00) (96% - 98%)  Wt(kg): --  I&O's Summary    13 Mar 2020 07:01  -  14 Mar 2020 07:00  --------------------------------------------------------  IN: 1040 mL / OUT: 2400 mL / NET: -1360 mL      Appearance: Normal	  Cardiovascular: Normal S1 S2,RRR, No JVD, No murmurs  Respiratory: diminished   Gastrointestinal:  Soft, Non-tender, + BS	  Extremities: bl le edema        MEDICATIONS  (STANDING):  ALBUTerol    90 MICROgram(s) HFA Inhaler 1 Puff(s) Inhalation every 4 hours  albuterol/ipratropium for Nebulization. 3 milliLiter(s) Nebulizer every 6 hours  aMIOdarone    Tablet 200 milliGRAM(s) Oral daily  artificial tears (preservative free) Ophthalmic Solution 1 Drop(s) Both EYES two times a day  aspirin enteric coated 81 milliGRAM(s) Oral daily  atorvastatin 40 milliGRAM(s) Oral at bedtime  benzonatate 100 milliGRAM(s) Oral every 8 hours  buDESOnide    Inhalation Suspension 0.5 milliGRAM(s) Inhalation two times a day  calcium acetate 667 milliGRAM(s) Oral three times a day with meals  chlorhexidine 2% Cloths 1 Application(s) Topical <User Schedule>  dextrose 5%. 1000 milliLiter(s) (50 mL/Hr) IV Continuous <Continuous>  dextrose 5%. 1000 milliLiter(s) (30 mL/Hr) IV Continuous <Continuous>  dextrose 5%. 1000 milliLiter(s) (50 mL/Hr) IV Continuous <Continuous>  dextrose 50% Injectable 12.5 Gram(s) IV Push once  dextrose 50% Injectable 25 Gram(s) IV Push once  dextrose 50% Injectable 25 Gram(s) IV Push once  heparin  Injectable 5000 Unit(s) SubCutaneous every 8 hours  insulin glargine Injectable (LANTUS) 32 Unit(s) SubCutaneous at bedtime  insulin lispro (HumaLOG) corrective regimen sliding scale   SubCutaneous Before meals and at bedtime  insulin lispro Injectable (HumaLOG) 12 Unit(s) SubCutaneous three times a day with meals  linezolid    Tablet 600 milliGRAM(s) Oral every 12 hours  melatonin 3 milliGRAM(s) Oral at bedtime  metoprolol tartrate 12.5 milliGRAM(s) Oral two times a day  modafinil 100 milliGRAM(s) Oral <User Schedule>  multivitamin 1 Tablet(s) Oral daily  nystatin    Suspension 134262 Unit(s) Oral every 6 hours  pantoprazole    Tablet 40 milliGRAM(s) Oral before breakfast  polyethylene glycol 3350 17 Gram(s) Oral daily  senna 2 Tablet(s) Oral at bedtime  silver sulfADIAZINE 1% Cream 1 Application(s) Topical daily  sodium chloride 0.65% Nasal 1 Spray(s) Both Nostrils three times a day  spironolactone 25 milliGRAM(s) Oral daily  tiotropium 18 MICROgram(s) Capsule 1 Capsule(s) Inhalation daily  torsemide 10 milliGRAM(s) Oral daily      TELEMETRY: NSR 	    ECG:  	  RADIOLOGY:   DIAGNOSTIC TESTING:  [ ] Echocardiogram:  [ ]  Catheterization:  [ ] Stress Test:    OTHER: 	    LABS:	 	                                9.0    5.60  )-----------( 265      ( 14 Mar 2020 06:04 )             28.8     03-14    132<L>  |  95<L>  |  28<H>  ----------------------------<  159<H>  4.6   |  26  |  1.58<H>    Ca    8.5      14 Mar 2020 06:04

## 2020-03-15 LAB
ANION GAP SERPL CALC-SCNC: 10 MMOL/L — SIGNIFICANT CHANGE UP (ref 5–17)
BUN SERPL-MCNC: 28 MG/DL — HIGH (ref 7–23)
CALCIUM SERPL-MCNC: 8.8 MG/DL — SIGNIFICANT CHANGE UP (ref 8.4–10.5)
CHLORIDE SERPL-SCNC: 97 MMOL/L — SIGNIFICANT CHANGE UP (ref 96–108)
CO2 SERPL-SCNC: 26 MMOL/L — SIGNIFICANT CHANGE UP (ref 22–31)
CREAT SERPL-MCNC: 1.72 MG/DL — HIGH (ref 0.5–1.3)
GLUCOSE BLDC GLUCOMTR-MCNC: 133 MG/DL — HIGH (ref 70–99)
GLUCOSE BLDC GLUCOMTR-MCNC: 134 MG/DL — HIGH (ref 70–99)
GLUCOSE BLDC GLUCOMTR-MCNC: 148 MG/DL — HIGH (ref 70–99)
GLUCOSE BLDC GLUCOMTR-MCNC: 214 MG/DL — HIGH (ref 70–99)
GLUCOSE SERPL-MCNC: 134 MG/DL — HIGH (ref 70–99)
HCT VFR BLD CALC: 27.3 % — LOW (ref 39–50)
HGB BLD-MCNC: 8.4 G/DL — LOW (ref 13–17)
MCHC RBC-ENTMCNC: 26.9 PG — LOW (ref 27–34)
MCHC RBC-ENTMCNC: 30.8 GM/DL — LOW (ref 32–36)
MCV RBC AUTO: 87.5 FL — SIGNIFICANT CHANGE UP (ref 80–100)
NRBC # BLD: 0 /100 WBCS — SIGNIFICANT CHANGE UP (ref 0–0)
PLATELET # BLD AUTO: 205 K/UL — SIGNIFICANT CHANGE UP (ref 150–400)
POTASSIUM SERPL-MCNC: 4.7 MMOL/L — SIGNIFICANT CHANGE UP (ref 3.5–5.3)
POTASSIUM SERPL-SCNC: 4.7 MMOL/L — SIGNIFICANT CHANGE UP (ref 3.5–5.3)
RBC # BLD: 3.12 M/UL — LOW (ref 4.2–5.8)
RBC # FLD: 15.3 % — HIGH (ref 10.3–14.5)
SODIUM SERPL-SCNC: 133 MMOL/L — LOW (ref 135–145)
WBC # BLD: 5.06 K/UL — SIGNIFICANT CHANGE UP (ref 3.8–10.5)
WBC # FLD AUTO: 5.06 K/UL — SIGNIFICANT CHANGE UP (ref 3.8–10.5)

## 2020-03-15 RX ADMIN — Medication 81 MILLIGRAM(S): at 10:54

## 2020-03-15 RX ADMIN — ATORVASTATIN CALCIUM 40 MILLIGRAM(S): 80 TABLET, FILM COATED ORAL at 22:31

## 2020-03-15 RX ADMIN — HEPARIN SODIUM 5000 UNIT(S): 5000 INJECTION INTRAVENOUS; SUBCUTANEOUS at 13:14

## 2020-03-15 RX ADMIN — HEPARIN SODIUM 5000 UNIT(S): 5000 INJECTION INTRAVENOUS; SUBCUTANEOUS at 22:30

## 2020-03-15 RX ADMIN — POLYETHYLENE GLYCOL 3350 17 GRAM(S): 17 POWDER, FOR SOLUTION ORAL at 10:55

## 2020-03-15 RX ADMIN — LINEZOLID 600 MILLIGRAM(S): 600 INJECTION, SOLUTION INTRAVENOUS at 18:00

## 2020-03-15 RX ADMIN — OXYCODONE AND ACETAMINOPHEN 1 TABLET(S): 5; 325 TABLET ORAL at 23:42

## 2020-03-15 RX ADMIN — Medication 1 DROP(S): at 05:29

## 2020-03-15 RX ADMIN — Medication 100 MILLIGRAM(S): at 05:30

## 2020-03-15 RX ADMIN — Medication 100 MILLIGRAM(S): at 22:31

## 2020-03-15 RX ADMIN — LINEZOLID 600 MILLIGRAM(S): 600 INJECTION, SOLUTION INTRAVENOUS at 05:30

## 2020-03-15 RX ADMIN — Medication 3 MILLILITER(S): at 23:46

## 2020-03-15 RX ADMIN — HEPARIN SODIUM 5000 UNIT(S): 5000 INJECTION INTRAVENOUS; SUBCUTANEOUS at 05:30

## 2020-03-15 RX ADMIN — Medication 12.5 MILLIGRAM(S): at 18:01

## 2020-03-15 RX ADMIN — Medication 10 UNIT(S): at 13:11

## 2020-03-15 RX ADMIN — INSULIN GLARGINE 32 UNIT(S): 100 INJECTION, SOLUTION SUBCUTANEOUS at 22:30

## 2020-03-15 RX ADMIN — CHLORHEXIDINE GLUCONATE 1 APPLICATION(S): 213 SOLUTION TOPICAL at 06:42

## 2020-03-15 RX ADMIN — Medication 500000 UNIT(S): at 05:30

## 2020-03-15 RX ADMIN — Medication 10 UNIT(S): at 10:49

## 2020-03-15 RX ADMIN — PANTOPRAZOLE SODIUM 40 MILLIGRAM(S): 20 TABLET, DELAYED RELEASE ORAL at 05:30

## 2020-03-15 RX ADMIN — Medication 1 APPLICATION(S): at 10:54

## 2020-03-15 RX ADMIN — SPIRONOLACTONE 25 MILLIGRAM(S): 25 TABLET, FILM COATED ORAL at 05:30

## 2020-03-15 RX ADMIN — Medication 667 MILLIGRAM(S): at 10:55

## 2020-03-15 RX ADMIN — Medication 2: at 13:11

## 2020-03-15 RX ADMIN — MODAFINIL 100 MILLIGRAM(S): 200 TABLET ORAL at 10:24

## 2020-03-15 RX ADMIN — Medication 12.5 MILLIGRAM(S): at 05:30

## 2020-03-15 RX ADMIN — Medication 10 MILLIGRAM(S): at 05:30

## 2020-03-15 RX ADMIN — Medication 10 UNIT(S): at 18:01

## 2020-03-15 RX ADMIN — Medication 667 MILLIGRAM(S): at 18:00

## 2020-03-15 RX ADMIN — Medication 1 TABLET(S): at 10:54

## 2020-03-15 RX ADMIN — Medication 3 MILLIGRAM(S): at 22:31

## 2020-03-15 RX ADMIN — Medication 500000 UNIT(S): at 00:01

## 2020-03-15 RX ADMIN — Medication 200 MILLIGRAM(S): at 23:42

## 2020-03-15 RX ADMIN — AMIODARONE HYDROCHLORIDE 200 MILLIGRAM(S): 400 TABLET ORAL at 05:30

## 2020-03-15 RX ADMIN — Medication 200 MILLIGRAM(S): at 05:30

## 2020-03-15 RX ADMIN — OXYCODONE AND ACETAMINOPHEN 1 TABLET(S): 5; 325 TABLET ORAL at 00:01

## 2020-03-15 NOTE — PROGRESS NOTE ADULT - ASSESSMENT
63yo male with hx of HTN, DM II   s/p C4L on 2/3; PEA arrest on 2/6   + enterococcus Bld  C/S > started ampicillin q8h, ID consulted,  R pigtail for effusion  2/8    Extubated  2/9  2/15 L pigtail effusion  2/17 PRBC   2/18 Tx 2 Diaz  2/19 thomas removed, trial void. Maintain left pigtail for significant output. Pt encouraged to ambulate. Supplemental o2 sat NC weaned to 2L. Blood cultures repeated.  2/20 VSS -Pulmonary consult - appreciated - duonebs ATC q6h & pulmicort bid initiated - ddimer drawn.  pt w/ hx negative LE dopplers   will order non con chest DT as pt has a loculated left effusion that will require tap at IR.  2/21 - NON con Chest CT done - multiple loculated Left pleural effusions w/ patchy consolidation R - rounds made w/ Dr. carl.  ID - consult called re: Ct - WBC 11 afebrile.  +PALMA.  unable to tolerate VQ scan this am.  will d/c bumex & start Torsemide 20mg po daily  d/c planning rehab when medically stable  2/22  Wound care consult leg wounds> shower w/ local skin care  2/23  SW drainage> on Dapto> as per ID will cover SWD  CXR this am   Tighter glycemic control  2/24 ID added cefapime SW drainage purulent, afebrile  2/25 S/p Sternal wound debridement and irrigation with removal of all 6 sternal wires. Purulence encountered and sent for culture. Closure with bilateral pectoralis muscle advancement flaps.  Post op zari for hypotension- weaned off.  Skin/wd  culture from 2/24 neg  Pt transferred to sdu  2/27 VSS   carlos d/c thomas d/c transfer to floor JPx2  followed  by Plastics  followed by ID on cefepime and daptomycin bc positive for E. faecalis; follow up bc 2/19 negative to date. Provigil daily in am  2/28 VSS; abx as per ID - d/c cefepime and continue dapto 500 iv qd as per Dr. Carrasco- pt will need picc line vs medel for 4 wks abx from date of SWD as per ID; bc 2/19 neg.  d/w h. renal medel vs cath- await decision, diuresis initiated for hypervolemia; hep sq for dvt prophylaxis, + hypoglycemia- endo following- humalog adjusted    Discharge planning- rehab next week   2/29 VSS, distal portion of MSI with small dehiscence and serous purulent drainage - recommended wound vac placement as per Plastics. Left KEI 80ml & Right KEI 80ml/24h. S/P Right PICC line placement for IV abx.   3/1 Wound Vac placed to sternal wound. Pt ambulated in hallway with rolling walker. Left KEI 50ml & Right KEI 110ml/24h. Plan for discharge to Rehab Mon/Tue.   3/2 Pending patient rehab selection. Maintain sternal wound vac placement and KEI drains. Patient lethargic after percocet. Will hold narcotics . Continue with Daptomycin x 4 weeks until 3/22/20  3/3 VSS - call to ID re: alternative antibiotic to Daptomycin - Dr. Carrasco to follow up . d/c to rehab   3/4  HD stable .  + cough--bedside swallowing eval + cough--recs NPO and MBS in am--insulin adjusted by Dr Matias.   Dw Dr Carrasco--daptomycin can be switched on Monday to another antibiotic thru 3/22  3/5 VSS - MBS - Per speech pathologist- no gross aspiration ?silent - placed on Dysphagia 3 diet w/ nectar thick liquids. d/w Dr. Mariscal-  As per ID - will switch antibiotic to Zyvox on Monday 3/9/2020 - d/c to rehab on Monday on Zyvox until 3/22  3/6 VSS; continue diuresis; resume low dose bb; abx as per ID/ vac  3/7/2020 VSS rounds made w/ Dr. Mariscal- right KEI w/ minimal drainage - spoke w/ plastics - will d/c right KEI - d/c plan rehab - switch to Zyvox bid po on Monday  3/8 VSS; vac change today + drainage noted at distal vac site; wbc 8 and pt afebrile; abx as per ID; cr 2.0- decrease torsemide 20 qd as per Dr. Mariscal   3/9 VSS; wbc 8 and pt afebrile; abx changed to po Zyvox 600 mg po q12 as per ID   Discharge planning- await rehab placement   3/10/2020 - Pt denied from rehab of his choice.  pt may need to remain in hospital until the remainder of his antibiotic course 3/21/2020 as  he is being denied from rehab.  Care Coordinator will continue to apply to rehabs..  3/11 VSS; plastics recommending removal of VAC post healing of sternum from inside. Next VAC change on Friday. MBS tomorrow 9 AM w/ S/S. Anticipating d/c on Monday  3/12 VSS; MBS today- started on regular diet. VAC change tomorrow. Continue abx until 3/22.   3/13 VSS; Pt tolerating regular diet well. Increase in LE edema B/L. Lasix 20 IV x 1. Continue torsemide 10 QD. VAC change today. F/U plastics. Anticipating d/c on Monday to home  3/14 VVS; Patient reporting worsening cough --> Left pleural effusion --> s/p left thoracentesis yielding 350cc. Continue with current medication regimen.   3/15 HD stable, no respiratory distress, oob and ambulating. CXR stable s/p thoracentesis. Residual effusion most likely loculated.   Plan for discharge home on Monday 3/17  home w/ PT

## 2020-03-15 NOTE — PROGRESS NOTE ADULT - ASSESSMENT
intraop kennedy 2/3/20 ef 50%, nl lv, mild diastolic dysfx stage 1  limited echo 2/4/20: nl LV sys fx , no pericardial effusion     a/p  64 year old man with history of HTN, DM II, admitted with progressive exertional angina, s/p cath with severe triple vessel disease, s/p CABG, post op course c/b brief witnessed PEA likely hypoxic arrest, s/p intubation.     1. CAD, s/p cath with severe triple vessel disease including lesions at the bifurcation of the LAD/diagonal and distal RCA/RPDA/RPL trifurcation.   -s/p CABG x 4, cont asa, statin  -continue bb   -s/p PEA arrest   -echo 2/15 with preserved lv fxn/EF    2. Sternal wound infection  -s/p RTOR for SWI  -abx per id, wound vac     3. Postop acute diastolic HF  -diuretics per cts     4. PAF  -cont amio ,bb  -AC ?, currently on asa    5. HTN  -bp stable    6. STEPHANIE/CKD  -stable, renal f/u     7. Postop Pleural effusion  -S/P Right/Left chest tube  -3/14 cxr with Left pleural effusion, s/p left thoracentesis w/ 350cc outpt.     8. Sepsis -E. Faecalis bacteremia, SW infection, RLE cellulitis   -IV abx , repeat bcx 2/13 neg  -s/p debridement   -ID, plastics f/u     dvt ppx  dc planning per primary team, f/u apt. scheduled with Dr. Glen Steel on 4/7 and 4:45pm

## 2020-03-15 NOTE — PROGRESS NOTE ADULT - ASSESSMENT
Assessment  DMT2: 64y Male with DM T2 with hyperglycemia, A1C 9.2%, was on oral meds and insulin at home, on basal bolus insulin now, blood sugars better controlled now, no hypoglycemic episodes,  eating and ambulating, wants to go home.  Foot infection: On Tx, stable.  CAD: s/p CABG 2/3, on medications, no chest pain, stable, monitored.  HTN: Controlled,  on antihypertensive medications.  HLD: Controlled, on statin.  CKD: Monitor labs/BMP          Harper Matias MD  Cell: [phone]  Office: [phone]

## 2020-03-15 NOTE — PROGRESS NOTE ADULT - SUBJECTIVE AND OBJECTIVE BOX
Interval Hx; Events Overnight:  SUBJECTIVE: "  I am doing okay, I am walking alot today"    LABS:                8.4                  133  | 26   | 28           5.06  >-----------< 205     ------------------------< 134                   27.3                 4.7  | 97   | 1.72                                         Ca 8.8   Mg x     Ph x              VITAL SIGNS    Telemetry:  NSR 80s     Daily     Daily Weight in k (15 Mar 2020 10:32)      Vital Signs Last 24 Hrs  T(C): 36.3 (03-15-20 @ 12:31), Max: 37.1 (20 @ 13:15)  T(F): 97.3 (03-15-20 @ 12:31), Max: 98.8 (20 @ 13:15)  HR: 92 (03-15-20 @ 12:31) (67 - 92)  BP: 116/73 (03-15-20 @ 12:31) (112/80 - 145/70)  RR: 18 (03-15-20 @ 12:31) (18 - 20)  SpO2: 98% (03-15-20 @ 12:31) (96% - 100%)             I&O's Detail    14 Mar 2020 07:  -  15 Mar 2020 07:00  --------------------------------------------------------  IN:    Oral Fluid: 780 mL  Total IN: 780 mL    OUT:    Voided: 2550 mL  Total OUT: 2550 mL    Total NET: -1770 mL      15 Mar 2020 07:01  -  15 Mar 2020 12:54  --------------------------------------------------------  IN:  Total IN: 0 mL    OUT:  Total OUT: 0 mL    Total NET: 0 mL                    GLUCOSE  CAPILLARY BLOOD GLUCOSE      POCT Blood Glucose.: 148 mg/dL (15 Mar 2020 07:11)  POCT Blood Glucose.: 127 mg/dL (14 Mar 2020 21:43)  POCT Blood Glucose.: 142 mg/dL (14 Mar 2020 16:43)  POCT Blood Glucose.: 118 mg/dL (14 Mar 2020 13:08)                  PHYSICAL EXAM    Neurology: alert and oriented x 3, nonfocal, no gross deficits    CV: (+) S1 and S2, No murmurs, rubs, gallops or clicks     Sternal Wound:  MSI with mid incision --> wound vac in place --> negative suction -->CDI , sternum stable    Lungs: CTA B/L; Diminished at base; Left greater than the right.      Abdomen: soft, nontender, nondistended, positive bowel sounds, (+) Flatus; (+) BM     :  Voiding               Extremities:  B/L LE (+) 1-2 edema; negative calf tenderness; (+) 2 DP palpable; RLE mid/ anterior shin area with DSD in place C/D/I. RUE PICC line in place with occlusive dressing C/D/I         +PICC    MEDICATIONS  acetaminophen   Tablet .. 650 milliGRAM(s) Oral every 6 hours PRN  ALBUTerol    90 MICROgram(s) HFA Inhaler 1 Puff(s) Inhalation every 4 hours  albuterol/ipratropium for Nebulization. 3 milliLiter(s) Nebulizer every 6 hours PRN  albuterol/ipratropium for Nebulization. 3 milliLiter(s) Nebulizer every 6 hours  aMIOdarone    Tablet 200 milliGRAM(s) Oral daily  artificial tears (preservative free) Ophthalmic Solution 1 Drop(s) Both EYES two times a day  aspirin enteric coated 81 milliGRAM(s) Oral daily  atorvastatin 40 milliGRAM(s) Oral at bedtime  benzonatate 100 milliGRAM(s) Oral every 8 hours  buDESOnide    Inhalation Suspension 0.5 milliGRAM(s) Inhalation two times a day  calcium acetate 667 milliGRAM(s) Oral three times a day with meals  chlorhexidine 2% Cloths 1 Application(s) Topical <User Schedule>  dextrose 40% Gel 15 Gram(s) Oral once PRN  dextrose 5%. 1000 milliLiter(s) IV Continuous <Continuous>  dextrose 5%. 1000 milliLiter(s) IV Continuous <Continuous>  dextrose 5%. 1000 milliLiter(s) IV Continuous <Continuous>  dextrose 50% Injectable 12.5 Gram(s) IV Push once  dextrose 50% Injectable 25 Gram(s) IV Push once  dextrose 50% Injectable 25 Gram(s) IV Push once  glucagon  Injectable 1 milliGRAM(s) IntraMuscular once PRN  guaiFENesin   Syrup  (Sugar-Free) 200 milliGRAM(s) Oral every 6 hours PRN  heparin  Injectable 5000 Unit(s) SubCutaneous every 8 hours  insulin glargine Injectable (LANTUS) 32 Unit(s) SubCutaneous at bedtime  insulin lispro (HumaLOG) corrective regimen sliding scale   SubCutaneous Before meals and at bedtime  insulin lispro Injectable (HumaLOG) 10 Unit(s) SubCutaneous three times a day with meals  linezolid    Tablet 600 milliGRAM(s) Oral every 12 hours  melatonin 3 milliGRAM(s) Oral at bedtime  metoprolol tartrate 12.5 milliGRAM(s) Oral two times a day  modafinil 100 milliGRAM(s) Oral <User Schedule>  multivitamin 1 Tablet(s) Oral daily  nystatin    Suspension 279437 Unit(s) Oral every 6 hours  oxycodone    5 mG/acetaminophen 325 mG 1 Tablet(s) Oral every 6 hours PRN  pantoprazole    Tablet 40 milliGRAM(s) Oral before breakfast  polyethylene glycol 3350 17 Gram(s) Oral daily  senna 2 Tablet(s) Oral at bedtime  silver sulfADIAZINE 1% Cream 1 Application(s) Topical daily  sodium chloride 0.65% Nasal 1 Spray(s) Both Nostrils three times a day  sodium chloride 0.9% lock flush 10 milliLiter(s) IV Push every 1 hour PRN  spironolactone 25 milliGRAM(s) Oral daily  tiotropium 18 MICROgram(s) Capsule 1 Capsule(s) Inhalation daily  torsemide 10 milliGRAM(s) Oral daily

## 2020-03-15 NOTE — PROGRESS NOTE ADULT - SUBJECTIVE AND OBJECTIVE BOX
CARDIOLOGY FOLLOW UP - Dr. Steel    CC  s/p L thoracentesis yesterday 350cc drainage    PHYSICAL EXAM:  T(C): 37 (03-15-20 @ 05:23), Max: 37.1 (03-14-20 @ 13:15)  HR: 84 (03-15-20 @ 05:23) (67 - 90)  BP: 123/74 (03-15-20 @ 05:23) (112/80 - 145/70)  RR: 20 (03-15-20 @ 05:23) (18 - 20)  SpO2: 96% (03-15-20 @ 05:23) (96% - 100%)  Wt(kg): --  I&O's Summary    14 Mar 2020 07:01  -  15 Mar 2020 07:00  --------------------------------------------------------  IN: 780 mL / OUT: 2550 mL / NET: -1770 mL        Appearance: Normal	  Cardiovascular: Normal S1 S2,RRR, No JVD, No murmurs, +sternal wound, + wound vac   Respiratory: DIMINISHED   Gastrointestinal:  Soft, Non-tender, + BS	  Extremities: Normal range of motion, No clubbing,b/l +1-2 le edema         MEDICATIONS  (STANDING):  ALBUTerol    90 MICROgram(s) HFA Inhaler 1 Puff(s) Inhalation every 4 hours  albuterol/ipratropium for Nebulization. 3 milliLiter(s) Nebulizer every 6 hours  aMIOdarone    Tablet 200 milliGRAM(s) Oral daily  artificial tears (preservative free) Ophthalmic Solution 1 Drop(s) Both EYES two times a day  aspirin enteric coated 81 milliGRAM(s) Oral daily  atorvastatin 40 milliGRAM(s) Oral at bedtime  benzonatate 100 milliGRAM(s) Oral every 8 hours  buDESOnide    Inhalation Suspension 0.5 milliGRAM(s) Inhalation two times a day  calcium acetate 667 milliGRAM(s) Oral three times a day with meals  chlorhexidine 2% Cloths 1 Application(s) Topical <User Schedule>  dextrose 5%. 1000 milliLiter(s) (50 mL/Hr) IV Continuous <Continuous>  dextrose 5%. 1000 milliLiter(s) (30 mL/Hr) IV Continuous <Continuous>  dextrose 5%. 1000 milliLiter(s) (50 mL/Hr) IV Continuous <Continuous>  dextrose 50% Injectable 12.5 Gram(s) IV Push once  dextrose 50% Injectable 25 Gram(s) IV Push once  dextrose 50% Injectable 25 Gram(s) IV Push once  heparin  Injectable 5000 Unit(s) SubCutaneous every 8 hours  insulin glargine Injectable (LANTUS) 32 Unit(s) SubCutaneous at bedtime  insulin lispro (HumaLOG) corrective regimen sliding scale   SubCutaneous Before meals and at bedtime  insulin lispro Injectable (HumaLOG) 10 Unit(s) SubCutaneous three times a day with meals  linezolid    Tablet 600 milliGRAM(s) Oral every 12 hours  melatonin 3 milliGRAM(s) Oral at bedtime  metoprolol tartrate 12.5 milliGRAM(s) Oral two times a day  modafinil 100 milliGRAM(s) Oral <User Schedule>  multivitamin 1 Tablet(s) Oral daily  nystatin    Suspension 880127 Unit(s) Oral every 6 hours  pantoprazole    Tablet 40 milliGRAM(s) Oral before breakfast  polyethylene glycol 3350 17 Gram(s) Oral daily  senna 2 Tablet(s) Oral at bedtime  silver sulfADIAZINE 1% Cream 1 Application(s) Topical daily  sodium chloride 0.65% Nasal 1 Spray(s) Both Nostrils three times a day  spironolactone 25 milliGRAM(s) Oral daily  tiotropium 18 MICROgram(s) Capsule 1 Capsule(s) Inhalation daily  torsemide 10 milliGRAM(s) Oral daily      TELEMETRY: 	    ECG:  	  RADIOLOGY:   DIAGNOSTIC TESTING:  [ ] Echocardiogram:  [ ]  Catheterization:  [ ] Stress Test:    OTHER: 	    LABS:	 	                                8.4    5.06  )-----------( 205      ( 15 Mar 2020 05:45 )             27.3     03-15    133<L>  |  97  |  28<H>  ----------------------------<  134<H>  4.7   |  26  |  1.72<H>    Ca    8.8      15 Mar 2020 05:45

## 2020-03-15 NOTE — PROGRESS NOTE ADULT - PROBLEM SELECTOR PLAN 2
Continue diabetic diet  endocrine following, Dr. Matias  continue Lantus 32 HS and Humalog 10 units TID   accuchecks ac and hs

## 2020-03-15 NOTE — PROGRESS NOTE ADULT - PROBLEM SELECTOR PROBLEM 7
Sternal wound infection
Cough
Essential hypertension
Prophylactic measure
Snoring
Sternal wound infection
Type 2 diabetes mellitus with other specified complication, with long-term current use of insulin
Essential hypertension

## 2020-03-15 NOTE — PROGRESS NOTE ADULT - SUBJECTIVE AND OBJECTIVE BOX
Chief complaint  Patient is a 65y old  Male who presents with a chief complaint of Chest pain (15 Mar 2020 07:58)   Review of systems  Patient in bed, looks comfortable, no fever, no hypoglycemia.    Labs and Fingersticks  CAPILLARY BLOOD GLUCOSE      POCT Blood Glucose.: 148 mg/dL (15 Mar 2020 07:11)  POCT Blood Glucose.: 127 mg/dL (14 Mar 2020 21:43)  POCT Blood Glucose.: 142 mg/dL (14 Mar 2020 16:43)  POCT Blood Glucose.: 118 mg/dL (14 Mar 2020 13:08)  POCT Blood Glucose.: 90 mg/dL (14 Mar 2020 12:20)  POCT Blood Glucose.: 62 mg/dL (14 Mar 2020 12:06)  POCT Blood Glucose.: 63 mg/dL (14 Mar 2020 12:05)      Anion Gap, Serum: 10 (03-15 @ 05:45)  Anion Gap, Serum: 11 (03-14 @ 06:04)      Calcium, Total Serum: 8.8 (03-15 @ 05:45)  Calcium, Total Serum: 8.5 (03-14 @ 06:04)          03-15    133<L>  |  97  |  28<H>  ----------------------------<  134<H>  4.7   |  26  |  1.72<H>    Ca    8.8      15 Mar 2020 05:45                          8.4    5.06  )-----------( 205      ( 15 Mar 2020 05:45 )             27.3     Medications  MEDICATIONS  (STANDING):  ALBUTerol    90 MICROgram(s) HFA Inhaler 1 Puff(s) Inhalation every 4 hours  albuterol/ipratropium for Nebulization. 3 milliLiter(s) Nebulizer every 6 hours  aMIOdarone    Tablet 200 milliGRAM(s) Oral daily  artificial tears (preservative free) Ophthalmic Solution 1 Drop(s) Both EYES two times a day  aspirin enteric coated 81 milliGRAM(s) Oral daily  atorvastatin 40 milliGRAM(s) Oral at bedtime  benzonatate 100 milliGRAM(s) Oral every 8 hours  buDESOnide    Inhalation Suspension 0.5 milliGRAM(s) Inhalation two times a day  calcium acetate 667 milliGRAM(s) Oral three times a day with meals  chlorhexidine 2% Cloths 1 Application(s) Topical <User Schedule>  dextrose 5%. 1000 milliLiter(s) (50 mL/Hr) IV Continuous <Continuous>  dextrose 5%. 1000 milliLiter(s) (30 mL/Hr) IV Continuous <Continuous>  dextrose 5%. 1000 milliLiter(s) (50 mL/Hr) IV Continuous <Continuous>  dextrose 50% Injectable 12.5 Gram(s) IV Push once  dextrose 50% Injectable 25 Gram(s) IV Push once  dextrose 50% Injectable 25 Gram(s) IV Push once  heparin  Injectable 5000 Unit(s) SubCutaneous every 8 hours  insulin glargine Injectable (LANTUS) 32 Unit(s) SubCutaneous at bedtime  insulin lispro (HumaLOG) corrective regimen sliding scale   SubCutaneous Before meals and at bedtime  insulin lispro Injectable (HumaLOG) 10 Unit(s) SubCutaneous three times a day with meals  linezolid    Tablet 600 milliGRAM(s) Oral every 12 hours  melatonin 3 milliGRAM(s) Oral at bedtime  metoprolol tartrate 12.5 milliGRAM(s) Oral two times a day  modafinil 100 milliGRAM(s) Oral <User Schedule>  multivitamin 1 Tablet(s) Oral daily  nystatin    Suspension 019025 Unit(s) Oral every 6 hours  pantoprazole    Tablet 40 milliGRAM(s) Oral before breakfast  polyethylene glycol 3350 17 Gram(s) Oral daily  senna 2 Tablet(s) Oral at bedtime  silver sulfADIAZINE 1% Cream 1 Application(s) Topical daily  sodium chloride 0.65% Nasal 1 Spray(s) Both Nostrils three times a day  spironolactone 25 milliGRAM(s) Oral daily  tiotropium 18 MICROgram(s) Capsule 1 Capsule(s) Inhalation daily  torsemide 10 milliGRAM(s) Oral daily      Physical Exam  General: Patient comfortable in bed  Vital Signs Last 12 Hrs  T(F): 98.6 (03-15-20 @ 05:23), Max: 98.6 (03-15-20 @ 05:23)  HR: 84 (03-15-20 @ 05:23) (84 - 84)  BP: 123/74 (03-15-20 @ 05:23) (123/74 - 123/74)  BP(mean): --  RR: 20 (03-15-20 @ 05:23) (20 - 20)  SpO2: 96% (03-15-20 @ 05:23) (96% - 96%)  Neck: No palpable thyroid nodules.  CVS: S1S2, No murmurs  Respiratory: No wheezing, no crepitations  GI: Abdomen soft, bowel sounds positive  Musculoskeletal:  edema lower extremities.   Skin: No skin rashes, no ecchymosis    Diagnostics

## 2020-03-15 NOTE — PROGRESS NOTE ADULT - PROBLEM SELECTOR PLAN 5
ID following Dr Carrasco  IV dapto switched to PO zyvox   PO Zyvox on Monday til 3/22  Silvadene to RLE wound  Wound care consult

## 2020-03-16 ENCOUNTER — TRANSCRIPTION ENCOUNTER (OUTPATIENT)
Age: 65
End: 2020-03-16

## 2020-03-16 VITALS
DIASTOLIC BLOOD PRESSURE: 66 MMHG | TEMPERATURE: 98 F | HEART RATE: 93 BPM | OXYGEN SATURATION: 97 % | SYSTOLIC BLOOD PRESSURE: 122 MMHG | RESPIRATION RATE: 18 BRPM

## 2020-03-16 LAB
ANION GAP SERPL CALC-SCNC: 11 MMOL/L — SIGNIFICANT CHANGE UP (ref 5–17)
BASOPHILS # BLD AUTO: 0.07 K/UL — SIGNIFICANT CHANGE UP (ref 0–0.2)
BASOPHILS NFR BLD AUTO: 1.1 % — SIGNIFICANT CHANGE UP (ref 0–2)
BUN SERPL-MCNC: 31 MG/DL — HIGH (ref 7–23)
CALCIUM SERPL-MCNC: 9 MG/DL — SIGNIFICANT CHANGE UP (ref 8.4–10.5)
CHLORIDE SERPL-SCNC: 96 MMOL/L — SIGNIFICANT CHANGE UP (ref 96–108)
CO2 SERPL-SCNC: 25 MMOL/L — SIGNIFICANT CHANGE UP (ref 22–31)
CREAT SERPL-MCNC: 1.66 MG/DL — HIGH (ref 0.5–1.3)
EOSINOPHIL # BLD AUTO: 0.44 K/UL — SIGNIFICANT CHANGE UP (ref 0–0.5)
EOSINOPHIL NFR BLD AUTO: 7.2 % — HIGH (ref 0–6)
GLUCOSE BLDC GLUCOMTR-MCNC: 191 MG/DL — HIGH (ref 70–99)
GLUCOSE BLDC GLUCOMTR-MCNC: 204 MG/DL — HIGH (ref 70–99)
GLUCOSE SERPL-MCNC: 124 MG/DL — HIGH (ref 70–99)
HCT VFR BLD CALC: 25.9 % — LOW (ref 39–50)
HGB BLD-MCNC: 8.1 G/DL — LOW (ref 13–17)
IMM GRANULOCYTES NFR BLD AUTO: 0.3 % — SIGNIFICANT CHANGE UP (ref 0–1.5)
LYMPHOCYTES # BLD AUTO: 2.01 K/UL — SIGNIFICANT CHANGE UP (ref 1–3.3)
LYMPHOCYTES # BLD AUTO: 32.8 % — SIGNIFICANT CHANGE UP (ref 13–44)
MCHC RBC-ENTMCNC: 27 PG — SIGNIFICANT CHANGE UP (ref 27–34)
MCHC RBC-ENTMCNC: 31.3 GM/DL — LOW (ref 32–36)
MCV RBC AUTO: 86.3 FL — SIGNIFICANT CHANGE UP (ref 80–100)
MONOCYTES # BLD AUTO: 0.58 K/UL — SIGNIFICANT CHANGE UP (ref 0–0.9)
MONOCYTES NFR BLD AUTO: 9.5 % — SIGNIFICANT CHANGE UP (ref 2–14)
NEUTROPHILS # BLD AUTO: 3 K/UL — SIGNIFICANT CHANGE UP (ref 1.8–7.4)
NEUTROPHILS NFR BLD AUTO: 49.1 % — SIGNIFICANT CHANGE UP (ref 43–77)
NRBC # BLD: 0 /100 WBCS — SIGNIFICANT CHANGE UP (ref 0–0)
PLATELET # BLD AUTO: 203 K/UL — SIGNIFICANT CHANGE UP (ref 150–400)
POTASSIUM SERPL-MCNC: 4.5 MMOL/L — SIGNIFICANT CHANGE UP (ref 3.5–5.3)
POTASSIUM SERPL-SCNC: 4.5 MMOL/L — SIGNIFICANT CHANGE UP (ref 3.5–5.3)
RBC # BLD: 3 M/UL — LOW (ref 4.2–5.8)
RBC # FLD: 15.3 % — HIGH (ref 10.3–14.5)
SODIUM SERPL-SCNC: 132 MMOL/L — LOW (ref 135–145)
WBC # BLD: 6.12 K/UL — SIGNIFICANT CHANGE UP (ref 3.8–10.5)
WBC # FLD AUTO: 6.12 K/UL — SIGNIFICANT CHANGE UP (ref 3.8–10.5)

## 2020-03-16 PROCEDURE — 82553 CREATINE MB FRACTION: CPT

## 2020-03-16 PROCEDURE — P9016: CPT

## 2020-03-16 PROCEDURE — 87070 CULTURE OTHR SPECIMN AEROBIC: CPT

## 2020-03-16 PROCEDURE — P9037: CPT

## 2020-03-16 PROCEDURE — 87150 DNA/RNA AMPLIFIED PROBE: CPT

## 2020-03-16 PROCEDURE — 82947 ASSAY GLUCOSE BLOOD QUANT: CPT

## 2020-03-16 PROCEDURE — 86747 PARVOVIRUS ANTIBODY: CPT

## 2020-03-16 PROCEDURE — 93880 EXTRACRANIAL BILAT STUDY: CPT

## 2020-03-16 PROCEDURE — 97606 NEG PRS WND THER DME>50 SQCM: CPT

## 2020-03-16 PROCEDURE — 85027 COMPLETE CBC AUTOMATED: CPT

## 2020-03-16 PROCEDURE — A9567: CPT

## 2020-03-16 PROCEDURE — 82565 ASSAY OF CREATININE: CPT

## 2020-03-16 PROCEDURE — 92610 EVALUATE SWALLOWING FUNCTION: CPT

## 2020-03-16 PROCEDURE — 86923 COMPATIBILITY TEST ELECTRIC: CPT

## 2020-03-16 PROCEDURE — 74230 X-RAY XM SWLNG FUNCJ C+: CPT

## 2020-03-16 PROCEDURE — 85610 PROTHROMBIN TIME: CPT

## 2020-03-16 PROCEDURE — A9540: CPT

## 2020-03-16 PROCEDURE — 94660 CPAP INITIATION&MGMT: CPT

## 2020-03-16 PROCEDURE — 71045 X-RAY EXAM CHEST 1 VIEW: CPT | Mod: 26

## 2020-03-16 PROCEDURE — 86036 ANCA SCREEN EACH ANTIBODY: CPT

## 2020-03-16 PROCEDURE — 85730 THROMBOPLASTIN TIME PARTIAL: CPT

## 2020-03-16 PROCEDURE — 94003 VENT MGMT INPAT SUBQ DAY: CPT

## 2020-03-16 PROCEDURE — 84560 ASSAY OF URINE/URIC ACID: CPT

## 2020-03-16 PROCEDURE — 82550 ASSAY OF CK (CPK): CPT

## 2020-03-16 PROCEDURE — 71046 X-RAY EXAM CHEST 2 VIEWS: CPT

## 2020-03-16 PROCEDURE — 87641 MR-STAPH DNA AMP PROBE: CPT

## 2020-03-16 PROCEDURE — 71250 CT THORAX DX C-: CPT

## 2020-03-16 PROCEDURE — C8924: CPT

## 2020-03-16 PROCEDURE — 36569 INSJ PICC 5 YR+ W/O IMAGING: CPT

## 2020-03-16 PROCEDURE — 84156 ASSAY OF PROTEIN URINE: CPT

## 2020-03-16 PROCEDURE — 99232 SBSQ HOSP IP/OBS MODERATE 35: CPT

## 2020-03-16 PROCEDURE — 86901 BLOOD TYPING SEROLOGIC RH(D): CPT

## 2020-03-16 PROCEDURE — 93306 TTE W/DOPPLER COMPLETE: CPT

## 2020-03-16 PROCEDURE — 97116 GAIT TRAINING THERAPY: CPT

## 2020-03-16 PROCEDURE — 84132 ASSAY OF SERUM POTASSIUM: CPT

## 2020-03-16 PROCEDURE — 81001 URINALYSIS AUTO W/SCOPE: CPT

## 2020-03-16 PROCEDURE — 83930 ASSAY OF BLOOD OSMOLALITY: CPT

## 2020-03-16 PROCEDURE — 86780 TREPONEMA PALLIDUM: CPT

## 2020-03-16 PROCEDURE — 76700 US EXAM ABDOM COMPLETE: CPT

## 2020-03-16 PROCEDURE — C1751: CPT

## 2020-03-16 PROCEDURE — 85396 CLOTTING ASSAY WHOLE BLOOD: CPT

## 2020-03-16 PROCEDURE — 86255 FLUORESCENT ANTIBODY SCREEN: CPT

## 2020-03-16 PROCEDURE — 80053 COMPREHEN METABOLIC PANEL: CPT

## 2020-03-16 PROCEDURE — 94640 AIRWAY INHALATION TREATMENT: CPT

## 2020-03-16 PROCEDURE — 85014 HEMATOCRIT: CPT

## 2020-03-16 PROCEDURE — 82803 BLOOD GASES ANY COMBINATION: CPT

## 2020-03-16 PROCEDURE — 85384 FIBRINOGEN ACTIVITY: CPT

## 2020-03-16 PROCEDURE — 84300 ASSAY OF URINE SODIUM: CPT

## 2020-03-16 PROCEDURE — 99153 MOD SED SAME PHYS/QHP EA: CPT

## 2020-03-16 PROCEDURE — 87340 HEPATITIS B SURFACE AG IA: CPT

## 2020-03-16 PROCEDURE — C1889: CPT

## 2020-03-16 PROCEDURE — P9011: CPT

## 2020-03-16 PROCEDURE — 93005 ELECTROCARDIOGRAM TRACING: CPT

## 2020-03-16 PROCEDURE — 85379 FIBRIN DEGRADATION QUANT: CPT

## 2020-03-16 PROCEDURE — 84443 ASSAY THYROID STIM HORMONE: CPT

## 2020-03-16 PROCEDURE — P9017: CPT

## 2020-03-16 PROCEDURE — 86334 IMMUNOFIX E-PHORESIS SERUM: CPT

## 2020-03-16 PROCEDURE — P9012: CPT

## 2020-03-16 PROCEDURE — 78452 HT MUSCLE IMAGE SPECT MULT: CPT

## 2020-03-16 PROCEDURE — C1894: CPT

## 2020-03-16 PROCEDURE — 36430 TRANSFUSION BLD/BLD COMPNT: CPT

## 2020-03-16 PROCEDURE — 93017 CV STRESS TEST TRACING ONLY: CPT

## 2020-03-16 PROCEDURE — 86803 HEPATITIS C AB TEST: CPT

## 2020-03-16 PROCEDURE — 86850 RBC ANTIBODY SCREEN: CPT

## 2020-03-16 PROCEDURE — 99152 MOD SED SAME PHYS/QHP 5/>YRS: CPT

## 2020-03-16 PROCEDURE — 84436 ASSAY OF TOTAL THYROXINE: CPT

## 2020-03-16 PROCEDURE — 76770 US EXAM ABDO BACK WALL COMP: CPT

## 2020-03-16 PROCEDURE — 87186 SC STD MICRODIL/AGAR DIL: CPT

## 2020-03-16 PROCEDURE — 83605 ASSAY OF LACTIC ACID: CPT

## 2020-03-16 PROCEDURE — 84134 ASSAY OF PREALBUMIN: CPT

## 2020-03-16 PROCEDURE — 71045 X-RAY EXAM CHEST 1 VIEW: CPT

## 2020-03-16 PROCEDURE — 97161 PT EVAL LOW COMPLEX 20 MIN: CPT

## 2020-03-16 PROCEDURE — C1729: CPT

## 2020-03-16 PROCEDURE — 92611 MOTION FLUOROSCOPY/SWALLOW: CPT

## 2020-03-16 PROCEDURE — 83880 ASSAY OF NATRIURETIC PEPTIDE: CPT

## 2020-03-16 PROCEDURE — C1769: CPT

## 2020-03-16 PROCEDURE — 93454 CORONARY ARTERY ANGIO S&I: CPT

## 2020-03-16 PROCEDURE — 93321 DOPPLER ECHO F-UP/LMTD STD: CPT

## 2020-03-16 PROCEDURE — C1887: CPT

## 2020-03-16 PROCEDURE — 87640 STAPH A DNA AMP PROBE: CPT

## 2020-03-16 PROCEDURE — 36415 COLL VENOUS BLD VENIPUNCTURE: CPT

## 2020-03-16 PROCEDURE — 74176 CT ABD & PELVIS W/O CONTRAST: CPT

## 2020-03-16 PROCEDURE — 84540 ASSAY OF URINE/UREA-N: CPT

## 2020-03-16 PROCEDURE — 84100 ASSAY OF PHOSPHORUS: CPT

## 2020-03-16 PROCEDURE — 93970 EXTREMITY STUDY: CPT

## 2020-03-16 PROCEDURE — 97605 NEG PRS WND THER DME<=50SQCM: CPT

## 2020-03-16 PROCEDURE — 78582 LUNG VENTILAT&PERFUS IMAGING: CPT

## 2020-03-16 PROCEDURE — 84480 ASSAY TRIIODOTHYRONINE (T3): CPT

## 2020-03-16 PROCEDURE — P9045: CPT

## 2020-03-16 PROCEDURE — 86038 ANTINUCLEAR ANTIBODIES: CPT

## 2020-03-16 PROCEDURE — 82435 ASSAY OF BLOOD CHLORIDE: CPT

## 2020-03-16 PROCEDURE — 87522 HEPATITIS C REVRS TRNSCRPJ: CPT

## 2020-03-16 PROCEDURE — 87040 BLOOD CULTURE FOR BACTERIA: CPT

## 2020-03-16 PROCEDURE — 82962 GLUCOSE BLOOD TEST: CPT

## 2020-03-16 PROCEDURE — 80048 BASIC METABOLIC PNL TOTAL CA: CPT

## 2020-03-16 PROCEDURE — 84484 ASSAY OF TROPONIN QUANT: CPT

## 2020-03-16 PROCEDURE — 86160 COMPLEMENT ANTIGEN: CPT

## 2020-03-16 PROCEDURE — 82330 ASSAY OF CALCIUM: CPT

## 2020-03-16 PROCEDURE — C8929: CPT

## 2020-03-16 PROCEDURE — 87102 FUNGUS ISOLATION CULTURE: CPT

## 2020-03-16 PROCEDURE — 97530 THERAPEUTIC ACTIVITIES: CPT

## 2020-03-16 PROCEDURE — 83935 ASSAY OF URINE OSMOLALITY: CPT

## 2020-03-16 PROCEDURE — P9047: CPT

## 2020-03-16 PROCEDURE — 84295 ASSAY OF SERUM SODIUM: CPT

## 2020-03-16 PROCEDURE — 83516 IMMUNOASSAY NONANTIBODY: CPT

## 2020-03-16 PROCEDURE — 84133 ASSAY OF URINE POTASSIUM: CPT

## 2020-03-16 PROCEDURE — P9041: CPT

## 2020-03-16 PROCEDURE — 83036 HEMOGLOBIN GLYCOSYLATED A1C: CPT

## 2020-03-16 PROCEDURE — 94002 VENT MGMT INPAT INIT DAY: CPT

## 2020-03-16 PROCEDURE — A9500: CPT

## 2020-03-16 PROCEDURE — 86900 BLOOD TYPING SEROLOGIC ABO: CPT

## 2020-03-16 PROCEDURE — 82570 ASSAY OF URINE CREATININE: CPT

## 2020-03-16 PROCEDURE — 82436 ASSAY OF URINE CHLORIDE: CPT

## 2020-03-16 PROCEDURE — 83735 ASSAY OF MAGNESIUM: CPT

## 2020-03-16 PROCEDURE — 99285 EMERGENCY DEPT VISIT HI MDM: CPT | Mod: 25

## 2020-03-16 PROCEDURE — 86891 AUTOLOGOUS BLOOD OP SALVAGE: CPT

## 2020-03-16 RX ORDER — ASPIRIN/CALCIUM CARB/MAGNESIUM 324 MG
1 TABLET ORAL
Qty: 30 | Refills: 0
Start: 2020-03-16 | End: 2020-04-14

## 2020-03-16 RX ORDER — ATORVASTATIN CALCIUM 80 MG/1
1 TABLET, FILM COATED ORAL
Qty: 30 | Refills: 0
Start: 2020-03-16 | End: 2020-04-14

## 2020-03-16 RX ORDER — PANTOPRAZOLE SODIUM 20 MG/1
1 TABLET, DELAYED RELEASE ORAL
Qty: 30 | Refills: 0
Start: 2020-03-16 | End: 2020-04-14

## 2020-03-16 RX ORDER — INSULIN LISPRO 100/ML
10 VIAL (ML) SUBCUTANEOUS
Qty: 1 | Refills: 0
Start: 2020-03-16

## 2020-03-16 RX ORDER — LISINOPRIL 2.5 MG/1
1 TABLET ORAL
Qty: 0 | Refills: 0 | DISCHARGE

## 2020-03-16 RX ORDER — INSULIN DEGLUDEC 100 U/ML
30 INJECTION, SOLUTION SUBCUTANEOUS
Qty: 0 | Refills: 0 | DISCHARGE

## 2020-03-16 RX ORDER — POLYETHYLENE GLYCOL 3350 17 G/17G
17 POWDER, FOR SOLUTION ORAL
Qty: 0 | Refills: 0 | DISCHARGE
Start: 2020-03-16

## 2020-03-16 RX ORDER — AMIODARONE HYDROCHLORIDE 400 MG/1
1 TABLET ORAL
Qty: 30 | Refills: 0
Start: 2020-03-16 | End: 2020-04-14

## 2020-03-16 RX ORDER — MODAFINIL 200 MG/1
1 TABLET ORAL
Qty: 30 | Refills: 0
Start: 2020-03-16 | End: 2020-04-14

## 2020-03-16 RX ORDER — LINEZOLID 600 MG/300ML
1 INJECTION, SOLUTION INTRAVENOUS
Qty: 14 | Refills: 0
Start: 2020-03-16 | End: 2020-03-22

## 2020-03-16 RX ORDER — METFORMIN HYDROCHLORIDE 850 MG/1
0.5 TABLET ORAL
Qty: 0 | Refills: 0 | DISCHARGE

## 2020-03-16 RX ORDER — SPIRONOLACTONE 25 MG/1
1 TABLET, FILM COATED ORAL
Qty: 30 | Refills: 0
Start: 2020-03-16 | End: 2020-04-14

## 2020-03-16 RX ORDER — CALCIUM ACETATE 667 MG
1 TABLET ORAL
Qty: 90 | Refills: 0
Start: 2020-03-16 | End: 2020-04-14

## 2020-03-16 RX ORDER — ACETAMINOPHEN 500 MG
2 TABLET ORAL
Qty: 0 | Refills: 0 | DISCHARGE
Start: 2020-03-16

## 2020-03-16 RX ORDER — SENNA PLUS 8.6 MG/1
2 TABLET ORAL
Qty: 0 | Refills: 0 | DISCHARGE
Start: 2020-03-16

## 2020-03-16 RX ORDER — METOPROLOL TARTRATE 50 MG
0.5 TABLET ORAL
Qty: 30 | Refills: 0
Start: 2020-03-16 | End: 2020-04-14

## 2020-03-16 RX ORDER — GLYBURIDE 5 MG
2 TABLET ORAL
Qty: 0 | Refills: 0 | DISCHARGE

## 2020-03-16 RX ORDER — ENOXAPARIN SODIUM 100 MG/ML
32 INJECTION SUBCUTANEOUS
Qty: 1 | Refills: 0
Start: 2020-03-16

## 2020-03-16 RX ADMIN — LINEZOLID 600 MILLIGRAM(S): 600 INJECTION, SOLUTION INTRAVENOUS at 06:19

## 2020-03-16 RX ADMIN — Medication 12.5 MILLIGRAM(S): at 06:21

## 2020-03-16 RX ADMIN — Medication 500000 UNIT(S): at 12:01

## 2020-03-16 RX ADMIN — SPIRONOLACTONE 25 MILLIGRAM(S): 25 TABLET, FILM COATED ORAL at 06:20

## 2020-03-16 RX ADMIN — Medication 1: at 07:48

## 2020-03-16 RX ADMIN — PANTOPRAZOLE SODIUM 40 MILLIGRAM(S): 20 TABLET, DELAYED RELEASE ORAL at 06:21

## 2020-03-16 RX ADMIN — Medication 10 MILLIGRAM(S): at 06:19

## 2020-03-16 RX ADMIN — Medication 100 MILLIGRAM(S): at 06:20

## 2020-03-16 RX ADMIN — Medication 10 UNIT(S): at 11:58

## 2020-03-16 RX ADMIN — OXYCODONE AND ACETAMINOPHEN 1 TABLET(S): 5; 325 TABLET ORAL at 06:19

## 2020-03-16 RX ADMIN — HEPARIN SODIUM 5000 UNIT(S): 5000 INJECTION INTRAVENOUS; SUBCUTANEOUS at 06:20

## 2020-03-16 RX ADMIN — Medication 81 MILLIGRAM(S): at 12:02

## 2020-03-16 RX ADMIN — Medication 10 UNIT(S): at 07:48

## 2020-03-16 RX ADMIN — OXYCODONE AND ACETAMINOPHEN 1 TABLET(S): 5; 325 TABLET ORAL at 06:40

## 2020-03-16 RX ADMIN — Medication 1 TABLET(S): at 12:01

## 2020-03-16 RX ADMIN — Medication 200 MILLIGRAM(S): at 06:21

## 2020-03-16 RX ADMIN — Medication 500000 UNIT(S): at 06:19

## 2020-03-16 RX ADMIN — Medication 1 DROP(S): at 06:19

## 2020-03-16 RX ADMIN — AMIODARONE HYDROCHLORIDE 200 MILLIGRAM(S): 400 TABLET ORAL at 06:20

## 2020-03-16 RX ADMIN — OXYCODONE AND ACETAMINOPHEN 1 TABLET(S): 5; 325 TABLET ORAL at 10:27

## 2020-03-16 RX ADMIN — Medication 650 MILLIGRAM(S): at 12:03

## 2020-03-16 RX ADMIN — Medication 1 APPLICATION(S): at 11:58

## 2020-03-16 RX ADMIN — Medication 2: at 11:57

## 2020-03-16 RX ADMIN — MODAFINIL 100 MILLIGRAM(S): 200 TABLET ORAL at 07:55

## 2020-03-16 RX ADMIN — Medication 667 MILLIGRAM(S): at 12:01

## 2020-03-16 RX ADMIN — Medication 667 MILLIGRAM(S): at 07:49

## 2020-03-16 RX ADMIN — Medication 3 MILLILITER(S): at 12:01

## 2020-03-16 RX ADMIN — CHLORHEXIDINE GLUCONATE 1 APPLICATION(S): 213 SOLUTION TOPICAL at 13:18

## 2020-03-16 RX ADMIN — OXYCODONE AND ACETAMINOPHEN 1 TABLET(S): 5; 325 TABLET ORAL at 01:00

## 2020-03-16 NOTE — PROGRESS NOTE ADULT - PROBLEM SELECTOR PROBLEM 3
Essential hypertension
Pleural effusion, left
Stable angina
CKD (chronic kidney disease), stage III
Essential hypertension
Hyperphosphatemia
Hyperphosphatemia
Osorio catheter in place
Pleural effusion, left
STEPHANIE (acute kidney injury)
Stable angina

## 2020-03-16 NOTE — PROGRESS NOTE ADULT - NSHPATTENDINGPLANDISCUSS_GEN_ALL_CORE
pt
pulm
NP
ctu
medicine NP
medicine NP
House staff and son at bedside
NP
ctu
House staff and son at bedside
NP and son at bedside
pt/NP
ctu
House staff
CTS team
CTS
Dr. Mariscal
Dr. Marsical
pt/NP

## 2020-03-16 NOTE — PROGRESS NOTE ADULT - PROBLEM/PLAN-1
DISPLAY PLAN FREE TEXT

## 2020-03-16 NOTE — DISCHARGE NOTE PROVIDER - CARE PROVIDER_API CALL
Michel Mariscal)  Thoracic and Cardiac Surgery  [address] [place] [address]  Phone: [phone]  Fax: [phone]  Follow Up Time:     Cory Steel)  Cardiovascular Disease; Interventional Cardiology; Nuclear Cardiology  [address], [address]  [place] [address]  Phone: [phone]  Fax: [phone]  Follow Up Time:     Harper Matias)  EndocrinologyMetabDiabetes; Internal Medicine  [address] [place] [address]  Phone: [phone]  Fax: [phone]  Follow Up Time:     Lui Ramirez)  Plastic Surgery  [address], [address]  [place] [address]  Phone: [phone]  Fax: [phone]  Follow Up Time:     Kilo Carrasco)  Infectious Disease; Internal Medicine  [address] [place] [address]  Phone: [phone]  Fax: [phone]  Follow Up Time:

## 2020-03-16 NOTE — PROGRESS NOTE ADULT - PROBLEM SELECTOR PROBLEM 1
Type 2 diabetes mellitus with other specified complication, with long-term current use of insulin
STEPHANIE (acute kidney injury)
S/P CABG x 4
Triple vessel disease of the heart
CAD (coronary artery disease)
CAD (coronary artery disease)
Other chest pain
S/P CABG x 4
SOB (shortness of breath)
STEPHANIE (acute kidney injury)
Triple vessel disease of the heart
Type 2 diabetes mellitus with other specified complication, with long-term current use of insulin

## 2020-03-16 NOTE — DISCHARGE NOTE PROVIDER - HOSPITAL COURSE
63yo male with hx of HTN, DM II     s/p C4L on 2/3; PEA arrest on 2/6   + enterococcus Bld  C/S > started ampicillin q8h, ID consulted,  R pigtail for effusion  2/8    Extubated  2/9    2/15 L pigtail effusion  2/17 PRBC   2/18 Tx 2 Diaz    2/19 thomas removed, trial void. Maintain left pigtail for significant output. Pt encouraged to ambulate. Supplemental o2 sat NC weaned to 2L. Blood cultures repeated.    2/20 VSS -Pulmonary consult - appreciated - duonebs ATC q6h & pulmicort bid initiated - ddimer drawn.  pt w/ hx negative LE dopplers     will order non con chest DT as pt has a loculated left effusion that will require tap at IR.    2/21 - NON con Chest CT done - multiple loculated Left pleural effusions w/ patchy consolidation R - rounds made w/ Dr. carl.  ID - consult called re: Ct - WBC 11 afebrile.  +PALMA.  unable to tolerate VQ scan this am.  will d/c bumex & start Torsemide 20mg po daily    d/c planning rehab when medically stable    2/22  Wound care consult leg wounds> shower w/ local skin care    2/23  SW drainage> on Dapto> as per ID will cover SWD  CXR this am   Tighter glycemic control    2/24 ID added cefapime SW drainage purulent, afebrile    2/25 S/p Sternal wound debridement and irrigation with removal of all 6 sternal wires. Purulence encountered and sent for culture. Closure with bilateral pectoralis muscle advancement flaps.    Post op zari for hypotension- weaned off.    Skin/wd  culture from 2/24 neg    Pt transferred to sdu    2/27 VSS   carlos d/c thomas d/c transfer to floor JPx2  followed  by Plastics  followed by ID on cefepime and daptomycin bc positive for E. faecalis; follow up bc 2/19 negative to date. Provigil daily in am    2/28 VSS; abx as per ID - d/c cefepime and continue dapto 500 iv qd as per Dr. Carrasco- pt will need picc line vs medel for 4 wks abx from date of SWD as per ID; bc 2/19 neg.  d/w h. renal medel vs cath- await decision, diuresis initiated for hypervolemia; hep sq for dvt prophylaxis, + hypoglycemia- endo following- humalog adjusted      Discharge planning- rehab next week     2/29 VSS, distal portion of MSI with small dehiscence and serous purulent drainage - recommended wound vac placement as per Plastics. Left KEI 80ml & Right KEI 80ml/24h. S/P Right PICC line placement for IV abx.     3/1 Wound Vac placed to sternal wound. Pt ambulated in hallway with rolling walker. Left KEI 50ml & Right KEI 110ml/24h. Plan for discharge to Rehab Mon/Tue.     3/2 Pending patient rehab selection. Maintain sternal wound vac placement and KEI drains. Patient lethargic after percocet. Will hold narcotics . Continue with Daptomycin x 4 weeks until 3/22/20    3/3 VSS - call to ID re: alternative antibiotic to Daptomycin - Dr. Carrasco to follow up . d/c to rehab     3/4  HD stable .  + cough--bedside swallowing eval + cough--recs NPO and MBS in am--insulin adjusted by Dr Matias.   Dw Dr Carrasco--daptomycin can be switched on Monday to another antibiotic thru 3/22    3/5 VSS - MBS - Per speech pathologist- no gross aspiration ?silent - placed on Dysphagia 3 diet w/ nectar thick liquids. d/w Dr. Mariscal-  As per ID - will switch antibiotic to Zyvox on Monday 3/9/2020 - d/c to rehab on Monday on Zyvox until 3/22    3/6 VSS; continue diuresis; resume low dose bb; abx as per ID/ vac    3/7/2020 VSS rounds made w/ Dr. Mariscal- right KEI w/ minimal drainage - spoke w/ plastics - will d/c right KEI - d/c plan rehab - switch to Zyvox bid po on Monday    3/8 VSS; vac change today + drainage noted at distal vac site; wbc 8 and pt afebrile; abx as per ID; cr 2.0- decrease torsemide 20 qd as per Dr. Mariscal     3/9 VSS; wbc 8 and pt afebrile; abx changed to po Zyvox 600 mg po q12 as per ID     Discharge planning- await rehab placement     3/10/2020 - Pt denied from rehab of his choice.  pt may need to remain in hospital until the remainder of his antibiotic course 3/21/2020 as  he is being denied from rehab.  Care Coordinator will continue to apply to rehabs..    3/11 VSS; plastics recommending removal of VAC post healing of sternum from inside. Next VAC change on Friday. MBS tomorrow 9 AM w/ S/S. Anticipating d/c on Monday    3/12 VSS; MBS today- started on regular diet. VAC change tomorrow. Continue abx until 3/22.     3/13 VSS; Pt tolerating regular diet well. Increase in LE edema B/L. Lasix 20 IV x 1. Continue torsemide 10 QD. VAC change today. F/U plastics. Anticipating d/c on Monday to home    3/14 VVS; Patient reporting worsening cough --> Left pleural effusion --> s/p left thoracentesis yielding 350cc. Continue with current medication regimen.     3/15 HD stable, no respiratory distress, oob and ambulating. CXR stable s/p thoracentesis. Residual effusion most likely loculated.     3/16 VSS; Discharge pt home today as per DR. Dooley; d/c picc; PO zyvox until 3/22/20; continue vac as per plastic surgery- pt to f/u in office in 2 weeks with plastic surgery

## 2020-03-16 NOTE — DISCHARGE NOTE PROVIDER - NSDCACTIVITY_GEN_ALL_CORE
Walking - Indoors allowed/Do not make important decisions/Stairs allowed/No heavy lifting/straining/Walking - Outdoors allowed/Sex allowed/Do not drive or operate machinery/Showering allowed

## 2020-03-16 NOTE — PROGRESS NOTE ADULT - SUBJECTIVE AND OBJECTIVE BOX
Chief complaint  Patient is a 65y old  Male who presents with a chief complaint of s/p 2/3/2020 open heart surgery: quadruple bypass   2/23/2020 sternal wound debridement with flap (16 Mar 2020 11:42)   Review of systems  Patient in bed, looks comfortable, no fever, no hypoglycemia.    Labs and Fingersticks  CAPILLARY BLOOD GLUCOSE      POCT Blood Glucose.: 204 mg/dL (16 Mar 2020 11:53)  POCT Blood Glucose.: 191 mg/dL (16 Mar 2020 07:45)  POCT Blood Glucose.: 133 mg/dL (15 Mar 2020 22:27)  POCT Blood Glucose.: 134 mg/dL (15 Mar 2020 17:28)      Anion Gap, Serum: 11 (03-16 @ 07:14)  Anion Gap, Serum: 10 (03-15 @ 05:45)      Calcium, Total Serum: 9.0 (03-16 @ 07:14)  Calcium, Total Serum: 8.8 (03-15 @ 05:45)          03-16    132<L>  |  96  |  31<H>  ----------------------------<  124<H>  4.5   |  25  |  1.66<H>    Ca    9.0      16 Mar 2020 07:14                          8.1    6.12  )-----------( 203      ( 16 Mar 2020 07:14 )             25.9     Medications  MEDICATIONS  (STANDING):  ALBUTerol    90 MICROgram(s) HFA Inhaler 1 Puff(s) Inhalation every 4 hours  albuterol/ipratropium for Nebulization. 3 milliLiter(s) Nebulizer every 6 hours  aMIOdarone    Tablet 200 milliGRAM(s) Oral daily  artificial tears (preservative free) Ophthalmic Solution 1 Drop(s) Both EYES two times a day  aspirin enteric coated 81 milliGRAM(s) Oral daily  atorvastatin 40 milliGRAM(s) Oral at bedtime  benzonatate 100 milliGRAM(s) Oral every 8 hours  buDESOnide    Inhalation Suspension 0.5 milliGRAM(s) Inhalation two times a day  calcium acetate 667 milliGRAM(s) Oral three times a day with meals  chlorhexidine 2% Cloths 1 Application(s) Topical <User Schedule>  dextrose 5%. 1000 milliLiter(s) (50 mL/Hr) IV Continuous <Continuous>  dextrose 5%. 1000 milliLiter(s) (30 mL/Hr) IV Continuous <Continuous>  dextrose 5%. 1000 milliLiter(s) (50 mL/Hr) IV Continuous <Continuous>  dextrose 50% Injectable 12.5 Gram(s) IV Push once  dextrose 50% Injectable 25 Gram(s) IV Push once  dextrose 50% Injectable 25 Gram(s) IV Push once  heparin  Injectable 5000 Unit(s) SubCutaneous every 8 hours  insulin glargine Injectable (LANTUS) 32 Unit(s) SubCutaneous at bedtime  insulin lispro (HumaLOG) corrective regimen sliding scale   SubCutaneous Before meals and at bedtime  insulin lispro Injectable (HumaLOG) 10 Unit(s) SubCutaneous three times a day with meals  linezolid    Tablet 600 milliGRAM(s) Oral every 12 hours  melatonin 3 milliGRAM(s) Oral at bedtime  metoprolol tartrate 12.5 milliGRAM(s) Oral two times a day  modafinil 100 milliGRAM(s) Oral <User Schedule>  multivitamin 1 Tablet(s) Oral daily  nystatin    Suspension 391170 Unit(s) Oral every 6 hours  pantoprazole    Tablet 40 milliGRAM(s) Oral before breakfast  polyethylene glycol 3350 17 Gram(s) Oral daily  senna 2 Tablet(s) Oral at bedtime  silver sulfADIAZINE 1% Cream 1 Application(s) Topical daily  sodium chloride 0.65% Nasal 1 Spray(s) Both Nostrils three times a day  spironolactone 25 milliGRAM(s) Oral daily  tiotropium 18 MICROgram(s) Capsule 1 Capsule(s) Inhalation daily  torsemide 10 milliGRAM(s) Oral daily      Physical Exam  General: Patient comfortable in bed  Vital Signs Last 12 Hrs  T(F): 97.9 (03-16-20 @ 12:47), Max: 98.2 (03-16-20 @ 05:00)  HR: 93 (03-16-20 @ 12:47) (93 - 93)  BP: 122/66 (03-16-20 @ 12:47) (113/68 - 122/66)  BP(mean): 83 (03-16-20 @ 06:23) (83 - 88)  RR: 18 (03-16-20 @ 12:47) (18 - 18)  SpO2: 97% (03-16-20 @ 12:47) (94% - 97%)  Neck: No palpable thyroid nodules.  CVS: S1S2, No murmurs  Respiratory: No wheezing, no crepitations  GI: Abdomen soft, bowel sounds positive  Musculoskeletal:  edema lower extremities.   Skin: No skin rashes, no ecchymosis    Diagnostics

## 2020-03-16 NOTE — PROGRESS NOTE ADULT - REASON FOR ADMISSION
CABG
CABG
Chest pain
Chest pain  2/3  C4 LIMA
Chest pain
Chest pain  2/3  C4LIMA
Chest pain  2/3 C4LIMA
Chest pain  2/3 C4LIMA  SWI
s/p cabg
Chest pain

## 2020-03-16 NOTE — PROGRESS NOTE ADULT - ATTENDING COMMENTS
as above-s/p debridement 2/25 of sternal area--cough due to dyphagia +/-TBM -now s/p repeat left thoracentesis  multifactorial dyspnea-CAD s/p CABG, PEA arrest, atelectasis due to pain, pleural effusion L-pig tail out, bronchospasm, ?PE-O2 NC sat above 90%                     Pleural effusion-pig tail removed 2/20-CT chest NC-improved but still loculations on left-f/up 4-6 wks-s/p thoro 3/ out  CAD/CHF-diurese as cr allows-keep K/Mg above  atelectasis-pain control, incentive spirometry, acapella                    ? DVT/PE--s/p repeat venous dopplers-negative; VQ unable  bronchospasm-duoneb q 6, pulmicort .5 bid; add tessalon perles 200 q 8, mucinex;  out pt PFTs       ID-daptomycin to zyvox as per ID   snore-? osas--out pt SS  DVT/GI prophylaxis, PT, nutrition evaln            PT      DC planning to rehab.-in limbo due to rejections-insurance VS. HOME-? 3/17  Mike Hernandez MD-Pulmonary    [phone]

## 2020-03-16 NOTE — PROGRESS NOTE ADULT - ASSESSMENT
Assessment  DMT2: 64y Male with DM T2 with hyperglycemia, A1C 9.2%, on basal bolus insulin, blood sugars fluctuating due to inconsistent food intake, no hypoglycemic episodes,  eating and ambulating, Planing DC home today.  Foot infection: On Tx, stable.  CAD: s/p CABG 2/3, on medications, no chest pain, stable, monitored.  HTN: Controlled,  on antihypertensive medications.  HLD: Controlled, on statin.  CKD: Monitor labs/BMP          Harper Matias MD  Cell: [phone]  Office: [phone]

## 2020-03-16 NOTE — DISCHARGE NOTE PROVIDER - NSDCFUADDINST_GEN_ALL_CORE_FT
call DR. Mariscal for fever > 101  refer to cardiac surgery do and don't checklist  no driving until cleared by Dr. Mariscal  vac changes every monday, wednesday and friday   wound care to right lower extremity chronic ulcer daily   check blood sugar levels before meals and at bedtime- record levels

## 2020-03-16 NOTE — DISCHARGE NOTE NURSING/CASE MANAGEMENT/SOCIAL WORK - PATIENT PORTAL LINK FT
You can access the FollowMyHealth Patient Portal offered by [facility] by registering at the following website: [email or web address]. By joining [facility]’s FollowMyHealth portal, you will also be able to view your health information using other applications (apps) compatible with our system.

## 2020-03-16 NOTE — DISCHARGE NOTE PROVIDER - PROVIDER TOKENS
PROVIDER:[TOKEN:[[id number]]],PROVIDER:[TOKEN:[[id number]]],PROVIDER:[TOKEN:[[id number]]],PROVIDER:[TOKEN:[[id number]]],PROVIDER:[TOKEN:[[id number]]]

## 2020-03-16 NOTE — PROGRESS NOTE ADULT - SUBJECTIVE AND OBJECTIVE BOX
CHIEF COMPLAINT:  f/up resp failure, sob, effusions, cough-dysphagia, RADS, ? TBM-still c/o cough but I have not witnessed it and nursing is unimpressed    Interval Events: ambulating    REVIEW OF SYSTEMS:  Constitutional: No fevers or chills. No weight loss. + fatigue or generalized malaise.  Eyes: No itching or discharge from the eyes  ENT: No ear pain. No ear discharge. No nasal congestion. No post nasal drip. No epistaxis. No throat pain. No sore throat. No difficulty swallowing.   CV: No chest pain. No palpitations. No lightheadedness or dizziness.   Resp: No dyspnea at rest. No dyspnea on exertion. No orthopnea. No wheezing. + cough. No stridor. No sputum production. No chest pain with respiration.  GI: No nausea. No vomiting. No diarrhea.  MSK: No joint pain or pain in any extremities  Integumentary: No skin lesions. + pedal edema.  Neurological: No gross motor weakness. No sensory changes.  [+ ] All other systems negative  [ ] Unable to assess ROS because ________    OBJECTIVE:  ICU Vital Signs Last 24 Hrs  T(C): 36.8 (16 Mar 2020 05:00), Max: 36.9 (15 Mar 2020 19:49)  T(F): 98.2 (16 Mar 2020 05:00), Max: 98.5 (15 Mar 2020 19:49)  HR: 93 (16 Mar 2020 05:00) (92 - 95)  BP: 117/73 (16 Mar 2020 05:00) (116/73 - 120/71)  BP(mean): 88 (16 Mar 2020 05:00) (87 - 88)  ABP: --  ABP(mean): --  RR: 18 (16 Mar 2020 05:00) (18 - 18)  SpO2: 94% (16 Mar 2020 05:00) (94% - 99%)        03-14 @ 07:01  -  03-15 @ 07:00  --------------------------------------------------------  IN: 780 mL / OUT: 2550 mL / NET: -1770 mL    03-15 @ 07:01 - 03-16 @ 05:31  --------------------------------------------------------  IN: 1360 mL / OUT: 1450 mL / NET: -90 mL      CAPILLARY BLOOD GLUCOSE      POCT Blood Glucose.: 133 mg/dL (15 Mar 2020 22:27)      PHYSICAL EXAM: NAD in chair-on RA  General: Awake, alert, oriented X 3.   HEENT: Atraumatic, normocephalic.                 Mallampatti Grade 3                No nasal congestion.                No tonsillar or pharyngeal exudates.  Lymph Nodes: No palpable lymphadenopathy  Neck: No JVD. No carotid bruit.   Respiratory: abnormal chest expansion-reduced BS bases                         Normal percussion                         Normal and equal air entry                         No wheeze, rhonchi or rales.  Cardiovascular: S1 S2 normal. No murmurs, rubs or gallops.   Abdomen: Soft, non-tender, non-distended. No organomegaly. Normoactive bowel sounds.  Extremities: Warm to touch. Peripheral pulse palpable. + pedal edema.   Skin: No rashes or skin lesions  Neurological: Motor and sensory examination equal and normal in all four extremities.  Psychiatry: Appropriate mood and affect.    HOSPITAL MEDICATIONS:  MEDICATIONS  (STANDING):  ALBUTerol    90 MICROgram(s) HFA Inhaler 1 Puff(s) Inhalation every 4 hours  albuterol/ipratropium for Nebulization. 3 milliLiter(s) Nebulizer every 6 hours  aMIOdarone    Tablet 200 milliGRAM(s) Oral daily  artificial tears (preservative free) Ophthalmic Solution 1 Drop(s) Both EYES two times a day  aspirin enteric coated 81 milliGRAM(s) Oral daily  atorvastatin 40 milliGRAM(s) Oral at bedtime  benzonatate 100 milliGRAM(s) Oral every 8 hours  buDESOnide    Inhalation Suspension 0.5 milliGRAM(s) Inhalation two times a day  calcium acetate 667 milliGRAM(s) Oral three times a day with meals  chlorhexidine 2% Cloths 1 Application(s) Topical <User Schedule>  dextrose 5%. 1000 milliLiter(s) (50 mL/Hr) IV Continuous <Continuous>  dextrose 5%. 1000 milliLiter(s) (30 mL/Hr) IV Continuous <Continuous>  dextrose 5%. 1000 milliLiter(s) (50 mL/Hr) IV Continuous <Continuous>  dextrose 50% Injectable 12.5 Gram(s) IV Push once  dextrose 50% Injectable 25 Gram(s) IV Push once  dextrose 50% Injectable 25 Gram(s) IV Push once  heparin  Injectable 5000 Unit(s) SubCutaneous every 8 hours  insulin glargine Injectable (LANTUS) 32 Unit(s) SubCutaneous at bedtime  insulin lispro (HumaLOG) corrective regimen sliding scale   SubCutaneous Before meals and at bedtime  insulin lispro Injectable (HumaLOG) 10 Unit(s) SubCutaneous three times a day with meals  linezolid    Tablet 600 milliGRAM(s) Oral every 12 hours  melatonin 3 milliGRAM(s) Oral at bedtime  metoprolol tartrate 12.5 milliGRAM(s) Oral two times a day  modafinil 100 milliGRAM(s) Oral <User Schedule>  multivitamin 1 Tablet(s) Oral daily  nystatin    Suspension 509468 Unit(s) Oral every 6 hours  pantoprazole    Tablet 40 milliGRAM(s) Oral before breakfast  polyethylene glycol 3350 17 Gram(s) Oral daily  senna 2 Tablet(s) Oral at bedtime  silver sulfADIAZINE 1% Cream 1 Application(s) Topical daily  sodium chloride 0.65% Nasal 1 Spray(s) Both Nostrils three times a day  spironolactone 25 milliGRAM(s) Oral daily  tiotropium 18 MICROgram(s) Capsule 1 Capsule(s) Inhalation daily  torsemide 10 milliGRAM(s) Oral daily    MEDICATIONS  (PRN):  acetaminophen   Tablet .. 650 milliGRAM(s) Oral every 6 hours PRN Mild Pain (1 - 3)  albuterol/ipratropium for Nebulization. 3 milliLiter(s) Nebulizer every 6 hours PRN Shortness of Breath and/or Wheezing  dextrose 40% Gel 15 Gram(s) Oral once PRN Blood Glucose LESS THAN 70 milliGRAM(s)/deciLiter  glucagon  Injectable 1 milliGRAM(s) IntraMuscular once PRN Glucose <70 milliGRAM(s)/deciLiter  guaiFENesin   Syrup  (Sugar-Free) 200 milliGRAM(s) Oral every 6 hours PRN Cough  oxycodone    5 mG/acetaminophen 325 mG 1 Tablet(s) Oral every 6 hours PRN Severe Pain (7 - 10)  sodium chloride 0.9% lock flush 10 milliLiter(s) IV Push every 1 hour PRN Pre/post blood products, medications, blood draw, and to maintain line patency      LABS:                        8.4    5.06  )-----------( 205      ( 15 Mar 2020 05:45 )             27.3     03-15    133<L>  |  97  |  28<H>  ----------------------------<  134<H>  4.7   |  26  |  1.72<H>    Ca    8.8      15 Mar 2020 05:45                MICROBIOLOGY:     RADIOLOGY:  [ ] Reviewed and interpreted by me    Point of Care Ultrasound Findings:    PFT:    EKG:

## 2020-03-16 NOTE — PROGRESS NOTE ADULT - SUBJECTIVE AND OBJECTIVE BOX
Plastic Surgery Progress Note (pg [facility]: [id number], [facility]: [phone], [facility]: [phone])    SUBJECTIVE:  - Doing well, sitting comfortably in chair  - plan for VAC change prior to discharge today  - Pain well controlled, no issues      OBJECTIVE:     Exam:    Neuro: Alert  GA: well-appearing  Pulm: breathing comfortably  Chest: incisions c/d/i superiorly. Inferiorly area with good granulation tissue and wound gradually closing down. Wound non-malodorous    Vital Signs Last 24 Hrs  T(C): 36.8 (16 Mar 2020 05:00), Max: 36.9 (15 Mar 2020 19:49)  T(F): 98.2 (16 Mar 2020 05:00), Max: 98.5 (15 Mar 2020 19:49)  HR: 93 (16 Mar 2020 06:23) (92 - 95)  BP: 113/68 (16 Mar 2020 06:23) (113/68 - 120/71)  BP(mean): 83 (16 Mar 2020 06:23) (83 - 88)  RR: 18 (16 Mar 2020 05:00) (18 - 18)  SpO2: 94% (16 Mar 2020 05:00) (94% - 99%)    I&O's Detail    15 Mar 2020 07:01  -  16 Mar 2020 07:00  --------------------------------------------------------  IN:    Oral Fluid: 1360 mL  Total IN: 1360 mL    OUT:    VAC (Vacuum Assisted Closure) System: 250 mL    Voided: 1450 mL  Total OUT: 1700 mL    Total NET: -340 mL      MEDICATIONS  (STANDING):  ALBUTerol    90 MICROgram(s) HFA Inhaler 1 Puff(s) Inhalation every 4 hours  albuterol/ipratropium for Nebulization. 3 milliLiter(s) Nebulizer every 6 hours  aMIOdarone    Tablet 200 milliGRAM(s) Oral daily  artificial tears (preservative free) Ophthalmic Solution 1 Drop(s) Both EYES two times a day  aspirin enteric coated 81 milliGRAM(s) Oral daily  atorvastatin 40 milliGRAM(s) Oral at bedtime  benzonatate 100 milliGRAM(s) Oral every 8 hours  buDESOnide    Inhalation Suspension 0.5 milliGRAM(s) Inhalation two times a day  calcium acetate 667 milliGRAM(s) Oral three times a day with meals  chlorhexidine 2% Cloths 1 Application(s) Topical <User Schedule>  dextrose 5%. 1000 milliLiter(s) (50 mL/Hr) IV Continuous <Continuous>  dextrose 5%. 1000 milliLiter(s) (30 mL/Hr) IV Continuous <Continuous>  dextrose 5%. 1000 milliLiter(s) (50 mL/Hr) IV Continuous <Continuous>  dextrose 50% Injectable 12.5 Gram(s) IV Push once  dextrose 50% Injectable 25 Gram(s) IV Push once  dextrose 50% Injectable 25 Gram(s) IV Push once  heparin  Injectable 5000 Unit(s) SubCutaneous every 8 hours  insulin glargine Injectable (LANTUS) 32 Unit(s) SubCutaneous at bedtime  insulin lispro (HumaLOG) corrective regimen sliding scale   SubCutaneous Before meals and at bedtime  insulin lispro Injectable (HumaLOG) 10 Unit(s) SubCutaneous three times a day with meals  linezolid    Tablet 600 milliGRAM(s) Oral every 12 hours  melatonin 3 milliGRAM(s) Oral at bedtime  metoprolol tartrate 12.5 milliGRAM(s) Oral two times a day  modafinil 100 milliGRAM(s) Oral <User Schedule>  multivitamin 1 Tablet(s) Oral daily  nystatin    Suspension 992929 Unit(s) Oral every 6 hours  pantoprazole    Tablet 40 milliGRAM(s) Oral before breakfast  polyethylene glycol 3350 17 Gram(s) Oral daily  senna 2 Tablet(s) Oral at bedtime  silver sulfADIAZINE 1% Cream 1 Application(s) Topical daily  sodium chloride 0.65% Nasal 1 Spray(s) Both Nostrils three times a day  spironolactone 25 milliGRAM(s) Oral daily  tiotropium 18 MICROgram(s) Capsule 1 Capsule(s) Inhalation daily  torsemide 10 milliGRAM(s) Oral daily    MEDICATIONS  (PRN):  acetaminophen   Tablet .. 650 milliGRAM(s) Oral every 6 hours PRN Mild Pain (1 - 3)  albuterol/ipratropium for Nebulization. 3 milliLiter(s) Nebulizer every 6 hours PRN Shortness of Breath and/or Wheezing  dextrose 40% Gel 15 Gram(s) Oral once PRN Blood Glucose LESS THAN 70 milliGRAM(s)/deciLiter  glucagon  Injectable 1 milliGRAM(s) IntraMuscular once PRN Glucose <70 milliGRAM(s)/deciLiter  guaiFENesin   Syrup  (Sugar-Free) 200 milliGRAM(s) Oral every 6 hours PRN Cough  oxycodone    5 mG/acetaminophen 325 mG 1 Tablet(s) Oral every 6 hours PRN Severe Pain (7 - 10)  sodium chloride 0.9% lock flush 10 milliLiter(s) IV Push every 1 hour PRN Pre/post blood products, medications, blood draw, and to maintain line patency      LABS:                        8.1    6.12  )-----------( 203      ( 16 Mar 2020 07:14 )             25.9     03-15    133<L>  |  97  |  28<H>  ----------------------------<  134<H>  4.7   |  26  |  1.72<H>    Ca    8.8      15 Mar 2020 05:45

## 2020-03-16 NOTE — DISCHARGE NOTE PROVIDER - NSDCHHNEEDSERVICE_GEN_ALL_CORE
Wound care and assessment/Medication teaching and assessment/Observation and assessment/Teaching and training/Other, specify...

## 2020-03-16 NOTE — PROGRESS NOTE ADULT - SUBJECTIVE AND OBJECTIVE BOX
SUBJECTIVE: Pt seen, chart reviewed.    Pain: [ x] YES [ ] NO  Pain (0-10):    5         Pain Control Adequate: [x ] YES [ ] NO  SOB: [ ]YES [x ] NO  Chest Discomfort: [ ] YES [x ] NO    Nausea: [ ] YES [x ] NO           Vomiting: [ ] YES [x ] NO  Flatus: [ ] YES [x ] NO             Bowel Movement: [ ] YES [x ] NO     Void: [ ]YES [ x]No    aspirin enteric coated 81 milliGRAM(s) Oral daily  heparin  Injectable 5000 Unit(s) SubCutaneous every 8 hours  linezolid    Tablet 600 milliGRAM(s) Oral every 12 hours  nystatin    Suspension 643186 Unit(s) Oral every 6 hours      Physical Exam:  Vital Signs Last 24 Hrs  T(C): 36.8 (16 Mar 2020 05:00), Max: 36.9 (15 Mar 2020 19:49)  T(F): 98.2 (16 Mar 2020 05:00), Max: 98.5 (15 Mar 2020 19:49)  HR: 93 (16 Mar 2020 06:23) (92 - 95)  BP: 113/68 (16 Mar 2020 06:23) (113/68 - 120/71)  BP(mean): 83 (16 Mar 2020 06:23) (83 - 88)  RR: 18 (16 Mar 2020 05:00) (18 - 18)  SpO2: 94% (16 Mar 2020 05:00) (94% - 99%)  Bed: Versa Care P500  Boots: ZFlow boots  General Appearance:  NAD, A&Ox3  Skin: pt with a 5 x 3 x3, with 4cm undermining at 10-2 with yellow slough at base over sternotomy site  right   Abd: ND/NT Soft ND/NT  Extremties; without gross deformity, without crepitus  Neuro: A and o x 3 speech clear and appropriate no loss of sensation in bilateral legs  Vasc: Thin warm, DP/PT palp, edema to bilateral legs 4+ to right      LABS:                        8.1    6.12  )-----------( 203      ( 16 Mar 2020 07:14 )             25.9         RADIOLOGY & ADDITIONAL STUDIES:  CULTURES:    A/P    Venous Stasis Wound to RLE  Continue Silvadene to the wound cover with Allevyn  Delayed healing Sternotomy site continue with woun    con't offloading  con't Nuturition  con't Pericare  Con't per Medicine

## 2020-03-16 NOTE — PROGRESS NOTE ADULT - PROBLEM SELECTOR PROBLEM 2
Triple vessel disease of the heart
Diabetes
Other chest pain
CAD (coronary artery disease)
Chest tube in place
Chest tube in place
Diabetes
Hyponatremia
Other chest pain
Stable angina
Triple vessel disease of the heart
Type 2 diabetes mellitus with other specified complication, with long-term current use of insulin

## 2020-03-16 NOTE — PROGRESS NOTE ADULT - ASSESSMENT
A/P: 65M s/p sternal debridement and b/l pectoralis advancement flaps on 2/25. Sternal incision with dehiscence inferiorly and murky serous drainage, more likely fat necrosis, since vac'ed and doing well    - C/w VAC on discharge  - f/u with Dr. Wright 1-2 weeks after discharge  - Care per primary  - Will cont to follow      Plastic Surgery   [facility]: [phone]

## 2020-03-16 NOTE — DISCHARGE NOTE PROVIDER - CARE PROVIDERS DIRECT ADDRESSES
[email or web address],DirectAddress_Unknown,DirectAddress_[email or web address]

## 2020-03-16 NOTE — PROGRESS NOTE ADULT - PROBLEM SELECTOR PROBLEM 5
CKD (chronic kidney disease), stage III
Cellulitis of leg, right
Bronchospasm
CKD (chronic kidney disease), stage III
Cellulitis of leg, right
Enterococcal bacteremia
Essential hypertension
Hyperkalemia
STEPHANIE (acute kidney injury)
STEPHANIE (acute kidney injury)

## 2020-03-16 NOTE — DISCHARGE NOTE PROVIDER - NSDCPNSUBOBJ_GEN_ALL_CORE
VSS    tele: rsr     midsternal incision cdi w/ vac    lungs clear, RR easy unlabored    + bs nt nd + bm; neg n/v/d/    LE: neg calf tenderness, trace LE edema, SVG site cdi leandra; RLE chronic venous stasis ulcer cdi with dressing    trace LE edema noted; + right picc line         D/c picc prior to discharge today    Discharge pt home today as per Dr. Dooley     antibiotics til 3/22 as per ID    continue wound vac as per plastic surgery

## 2020-03-16 NOTE — DISCHARGE NOTE PROVIDER - NSDCMRMEDTOKEN_GEN_ALL_CORE_FT
1 box of insulin pen needles : s/p OHS    DM2  acetaminophen 325 mg oral tablet: 2 tab(s) orally every 6 hours, As needed, Mild Pain (1 - 3)  amiodarone 200 mg oral tablet: 1 tab(s) orally once a day  Artificial Tears ophthalmic solution: 1 drop(s) to each affected eye , As Needed  aspirin 81 mg oral delayed release tablet: 1 tab(s) orally once a day  atorvastatin 40 mg oral tablet: 1 tab(s) orally once a day (at bedtime)  calcium acetate 667 mg oral tablet: 1 tab(s) orally 3 times a day take with meals   Daily Gadiel oral tablet: 1 tab(s) orally once a day  HumaLOG KwikPen 100 units/mL injectable solution: 10 unit(s) subcutaneous 3 times a day before meals   Lantus Solostar Pen 100 units/mL subcutaneous solution: 32 unit(s) subcutaneous once a day (at bedtime)  linezolid 600 mg oral tablet: 1 tab(s) orally every 12 hours - take for 7 more days then stop   metoprolol tartrate 25 mg oral tablet: 0.5 tab(s) = 12.5 mg orally 2 times a day   modafinil 100 mg oral tablet: 1 tab(s) orally once a day MDD:1  oxycodone-acetaminophen 5 mg-325 mg oral tablet: 1 tab(s) orally every 6 hours, As needed, Severe Pain (7 - 10) MDD:4  pantoprazole 40 mg oral delayed release tablet: 1 tab(s) orally once a day (before a meal)  polyethylene glycol 3350 oral powder for reconstitution: 17 gram(s) orally once a day  senna oral tablet: 2 tab(s) orally once a day (at bedtime)  Silvadene 1% topical cream: Apply topically to affected area - right lower extremity chronic venous ulcer daily   spironolactone 25 mg oral tablet: 1 tab(s) orally once a day  torsemide 10 mg oral tablet: 1 tab(s) orally once a day

## 2020-03-16 NOTE — DISCHARGE NOTE PROVIDER - NSDCCPCAREPLAN_GEN_ALL_CORE_FT
PRINCIPAL DISCHARGE DIAGNOSIS  Diagnosis: S/P CABG x 4  Assessment and Plan of Treatment: shower daily  weigh yourself daily  continue current prescriptions as ordered  increase activity as tolerated   no added salt; low fat; low cholesterol, low salt diabetic diet.   check blood sugar levels before meals and at bedtime- record levels  vac changes every monday, wedneday, and friday   follow up with Cardiologist, DR. Steel, in 1-2 weeks. Call to schedule appointment.  follow up with cardiac surgeon, DR. Quintero, on April 1st 12:15 pm. CAll to confirm appointment. [phone]  follow up with endocrinologist, DR. Matias, in 1-2 weeks. CAll to schedule appointment.   follow up with infectious disease, DR. Carrasco, after antibiotics completed on 3/22/20.   follow up with Dr. Busch, plastic surgeon, in 2 weeks to follow up sternal wound. CAll to schedule appointment.

## 2020-03-16 NOTE — PROGRESS NOTE ADULT - ASSESSMENT
Venous Stasis Wound to RLE  Continue Silvadene to the wound cover with Allevyn  Delayed healing Sternotomy site continue with woun    con't offloading  con't Nuturition  con't Pericare  Con't per Medicine Venous Stasis Wound to RLE  Continue Silvadene to the wound cover with Allevyn ordered by someone other than wound care please reconsult as needed  Delayed healing Sternotomy site continue with woun    con't offloading  con't Nuturition  con't Pericare  Con't per Medicine

## 2020-03-16 NOTE — PROGRESS NOTE ADULT - PROBLEM SELECTOR PLAN 1
Tight glycemic control necessary in the setting of wound infection.  Will continue current insulin regimen for now. Will continue monitoring FS and FU.  Patient counseled for compliance with consistent low carb diet;  Suggest to DC home on current insulin regimen, FU 1 week.

## 2020-03-16 NOTE — PROGRESS NOTE ADULT - PROBLEM SELECTOR PROBLEM 4
Hyperlipidemia
Enterococcal bacteremia
Atelectasis of both lungs
CKD (chronic kidney disease), stage III
Enterococcal bacteremia
Hyperkalemia
Hyperlipidemia
Pleural effusion, left
Prophylactic measure
STEPHANIE (acute kidney injury)
Type 2 diabetes mellitus with other specified complication, with long-term current use of insulin
Type 2 diabetes mellitus with other specified complication, with long-term current use of insulin
CKD (chronic kidney disease), stage III

## 2020-03-16 NOTE — PROGRESS NOTE ADULT - ASSESSMENT
65yo male with hx of HTN, DM II, presented to the ED with complaints of acute on chronic left sided exertional chest pain. Pt states he has been having left sided pressure and stabbing chest pain radiating to his sternum and right with activity for the past few months. Since admission- s/p cath with severe triple vessel disease, s/p CABG, post op course c/b brief witnessed PEA 2/6 likely hypoxic arrest, s/p intubation. ( CAD, s/p cath with severe triple vessel disease including lesions at the bifurcation of the LAD/diagonal and distal RCA/RPDA/RPL trifurcation.   -s/p CABG x 4, intraop kennedy ef 50%)--s/p ampicillin until 2/18 for enterococcus in blood, extubated 2/9, pigtail right 2/8 and left sided on 2/15-for effusion.  Remains sob-NO h/o resp issues prior to hospitalization.  ***Current sob-multifactorial-CAD/effusions, atelectasis due to pain, mild bronchospasm and debility.  ********************  2/21-slightly better, pig tail cath removed from left chest; CT chest done-loculated left effusion-slight better  2/24-BS issues-less sob-dapto/cefepime as per ID  2/25-for debridement of chest area-sternal wound  2/26-debridement completed--to CTU  2/2757-AFJ-yzpjhxmxm over all    2/28-chest pain still impacting breathing--plastics/renal endo f/up  2/29-no changes over night  3/1-cough present-has been off BD       3/2-cough present still,   3/3-improved cough on amitriptyline  3/4-cough upon swallowing     3/5-no changes-more ambulation-for mbs  3/6-failed swallow study--to D3 diet precautions     3/9-abx to change to zyvox as per ID  3/10-further ambulation-zyvox upon DC and for 61 days     3/11-cough remains-slighlty better  3/12-slightly better-less cough/ambulating   3/13-no changes over night  3/16-no changes-repeat thoracentesis on left 350 out--loculated residual

## 2020-03-16 NOTE — DISCHARGE NOTE PROVIDER - NSDCHHNEEDSERVICEOTHER_GEN_ALL_CORE_FT
s/p open heart surgery   wound care to RLE daily   vac to midsternal incision - change every monday, wednesday and friday

## 2020-03-18 ENCOUNTER — APPOINTMENT (OUTPATIENT)
Dept: CARE COORDINATION | Facility: HOME HEALTH | Age: 65
End: 2020-03-18
Payer: MEDICAID

## 2020-03-18 DIAGNOSIS — Z82.49 FAMILY HISTORY OF ISCHEMIC HEART DISEASE AND OTHER DISEASES OF THE CIRCULATORY SYSTEM: ICD-10-CM

## 2020-03-18 DIAGNOSIS — Z84.89 FAMILY HISTORY OF OTHER SPECIFIED CONDITIONS: ICD-10-CM

## 2020-03-18 DIAGNOSIS — Z83.3 FAMILY HISTORY OF DIABETES MELLITUS: ICD-10-CM

## 2020-03-18 DIAGNOSIS — Z86.79 PERSONAL HISTORY OF OTHER DISEASES OF THE CIRCULATORY SYSTEM: ICD-10-CM

## 2020-03-18 PROCEDURE — 99024 POSTOP FOLLOW-UP VISIT: CPT

## 2020-03-19 VITALS
DIASTOLIC BLOOD PRESSURE: 70 MMHG | RESPIRATION RATE: 16 BRPM | HEART RATE: 95 BPM | SYSTOLIC BLOOD PRESSURE: 118 MMHG | OXYGEN SATURATION: 95 %

## 2020-03-19 PROBLEM — Z84.89: Status: ACTIVE | Noted: 2020-03-19

## 2020-03-19 PROBLEM — Z83.3 FAMILY HISTORY OF DIABETES MELLITUS: Status: ACTIVE | Noted: 2020-03-19

## 2020-03-19 PROBLEM — Z82.49 FAMILY HISTORY OF CORONARY ARTERY DISEASE: Status: ACTIVE | Noted: 2020-03-19

## 2020-03-19 PROBLEM — Z86.79 HISTORY OF HYPERTENSION: Status: RESOLVED | Noted: 2020-03-19 | Resolved: 2020-03-19

## 2020-03-19 RX ORDER — ACETAMINOPHEN 325 MG/1
TABLET, FILM COATED ORAL
Refills: 0 | Status: ACTIVE | COMMUNITY

## 2020-03-19 RX ORDER — INSULIN GLARGINE 100 [IU]/ML
INJECTION, SOLUTION SUBCUTANEOUS
Refills: 0 | Status: ACTIVE | COMMUNITY

## 2020-03-19 RX ORDER — INSULIN LISPRO 100 [IU]/ML
INJECTION, SOLUTION INTRAVENOUS; SUBCUTANEOUS
Refills: 0 | Status: ACTIVE | COMMUNITY

## 2020-03-19 RX ORDER — PANTOPRAZOLE 40 MG/1
40 TABLET, DELAYED RELEASE ORAL
Refills: 0 | Status: ACTIVE | COMMUNITY

## 2020-03-19 RX ORDER — METOPROLOL TARTRATE 25 MG/1
25 TABLET, FILM COATED ORAL TWICE DAILY
Qty: 30 | Refills: 3 | Status: ACTIVE | COMMUNITY

## 2020-03-19 RX ORDER — POLYVINYL ALCOHOL 14 MG/ML
1.4 SOLUTION/ DROPS OPHTHALMIC
Refills: 0 | Status: ACTIVE | COMMUNITY

## 2020-03-19 RX ORDER — ATORVASTATIN CALCIUM 40 MG/1
40 TABLET, FILM COATED ORAL
Refills: 0 | Status: ACTIVE | COMMUNITY

## 2020-03-19 RX ORDER — TORSEMIDE 10 MG/1
10 TABLET ORAL
Refills: 0 | Status: ACTIVE | COMMUNITY

## 2020-03-19 RX ORDER — ASPIRIN 81 MG
81 TABLET, DELAYED RELEASE (ENTERIC COATED) ORAL
Refills: 0 | Status: ACTIVE | COMMUNITY

## 2020-03-19 RX ORDER — SPIRONOLACTONE 25 MG/1
25 TABLET ORAL
Refills: 0 | Status: ACTIVE | COMMUNITY

## 2020-03-19 NOTE — HISTORY OF PRESENT ILLNESS
[FreeTextEntry1] : FOLLOW YOUR HEART HOME VISIT-[facility] [employer]\par Home Visit made to Mr. PRESLEY ] . A  patient of Dr Craig   S/P C4L 2/3,sternal wire debridement and flap with plastics 2/23 , 63yo male with hx of HTN, DM II \par Visiting patient for post discharge transitional care management and post surgical follow up. \par \par phone call placed to pt prior to arrival ;\par COVID-19 Telephone Screening:\par - Screening Question 1: Does patient or any of his or her household contacts have any of the following symptoms: fever, trouble breathing, rash, vomiting, OR diarrhea?  [no], post surgical cough present\par - Screening Question 2:  Did the patient or any of his or her household contacts travel internationally (including but not limited to [place], [place], [place], [place], [place]) in the last 14 days or has the patient recently had contact with a person who is suspected or confirmed to have infection with the novel coronavirus (COVID-19)? [No]\par \par Patient COVID-19 screening as follows: \par Patient denies fever, cough, trouble breathing, rash, vomiting and diarrhea. Patient has not travelled to [place], [place], [place], or [place] in the last 14 days. Patient has not been in contact with someone who has traveled to [place], [place], [place], [place] or [place] in the last 14 days. Face to Face Visit scheduled for today Date: 3/18 at between 1-2 pm\par \par Homecare RN visited pt as well due to VAC alarming. sternal incision with a lot of drainage, so larger sponge to be placed for better suction via device.\par Pt states still experiencing weakness but has been improving day by day\par ambulating around the house with walker\par  Still has cough that is bothersome, cough present during whole post op course.  Was given tessalon pearls in hospital and robitussin, however no script was sent home for sough suppression \par Wife planning to make all follow up appointments today, importantly plastic surgery \par \par Blood sugars have been improving, nothing above 200 today\par \par \par \par

## 2020-03-19 NOTE — PHYSICAL EXAM
[Sclera] : the sclera and conjunctiva were normal [PERRL With Normal Accommodation] : pupils were equal in size, round, and reactive to light [Extraocular Movements] : extraocular movements were intact [Neck Appearance] : the appearance of the neck was normal [Neck Cervical Mass (___cm)] : no neck mass was observed [Jugular Venous Distention Increased] : there was no jugular-venous distention [Thyroid Diffuse Enlargement] : the thyroid was not enlarged [Thyroid Nodule] : there were no palpable thyroid nodules [Respiration, Rhythm And Depth] : normal respiratory rhythm and effort [Decreased Breath Sounds] : decreased breath sounds [Heart Rate And Rhythm] : heart rate was normal and rhythm regular [Heart Sounds] : normal S1 and S2 [1+] : left 1+ [Bowel Sounds] : normal bowel sounds [Abdomen Soft] : soft [Abdomen Tenderness] : non-tender [] : no hepato-splenomegaly [Abdomen Mass (___ Cm)] : no abdominal mass palpated [Penis Abnormality] : the penis was normal [Scrotum] : the scrotum was normal [Testes Swelling] : the testicles were not swollen [Testes Mass (___cm)] : there were no testicular masses [No CVA Tenderness] : no ~M costovertebral angle tenderness [FreeTextEntry1] : MTI-open, vac in place-draining sero sang/sang fluid.  RT LE harves site scab- blister to inner ankle  [Sensation] : the sensory exam was normal to light touch and pinprick [No Focal Deficits] : no focal deficits [Oriented To Time, Place, And Person] : oriented to person, place, and time

## 2020-03-19 NOTE — REVIEW OF SYSTEMS
[Fever] : no fever [Chills] : no chills [Feeling Poorly] : feeling poorly [Feeling Tired] : feeling tired [As Noted in HPI] : as noted in HPI [Cough] : cough [Negative] : Heme/Lymph [FreeTextEntry4] : hoarsed voiced

## 2020-03-19 NOTE — ASSESSMENT
CLINICAL PHARMACY NOTE: MEDS TO [address] Select Patient?: No  Total # of Prescriptions Filled: 5   The following medications were delivered to the patient:  · Aspirin 81mg  · Atorvastatin 80mg  · prasugrel 10mg  · Chantix starting pack  · Metoprolol tartrate 25mg  Total # of Interventions Completed: 0  Time Spent (min): 30    Additional Documentation:  Patient not discharging today. Nurse, Greyson Sanchez, to hold medications until patient is discharged. [FreeTextEntry1] : 63yo male with hx of HTN, DM II \par s/p C4L on 2/3; PEA arrest on 2/6 + enterococcus Bld, 2/23/2020 sternal wound debridement with flap \par 	 \par

## 2020-03-23 RX ORDER — BLOOD-GLUCOSE METER
KIT MISCELLANEOUS
Qty: 100 | Refills: 1 | Status: ACTIVE | COMMUNITY
Start: 2020-03-23 | End: 1900-01-01

## 2020-03-23 RX ORDER — LANCETS 33 GAUGE
EACH MISCELLANEOUS
Qty: 100 | Refills: 1 | Status: ACTIVE | COMMUNITY
Start: 2020-03-23 | End: 1900-01-01

## 2020-03-24 ENCOUNTER — APPOINTMENT (OUTPATIENT)
Dept: INFECTIOUS DISEASE | Facility: CLINIC | Age: 65
End: 2020-03-24

## 2020-03-25 LAB
CULTURE RESULTS: SIGNIFICANT CHANGE UP
CULTURE RESULTS: SIGNIFICANT CHANGE UP
SPECIMEN SOURCE: SIGNIFICANT CHANGE UP
SPECIMEN SOURCE: SIGNIFICANT CHANGE UP

## 2020-03-30 ENCOUNTER — OUTPATIENT (OUTPATIENT)
Dept: OUTPATIENT SERVICES | Facility: HOSPITAL | Age: 65
LOS: 1 days | End: 2020-03-30
Payer: MEDICARE

## 2020-03-30 ENCOUNTER — APPOINTMENT (OUTPATIENT)
Dept: INFECTIOUS DISEASE | Facility: CLINIC | Age: 65
End: 2020-03-30
Payer: MEDICAID

## 2020-03-30 VITALS
SYSTOLIC BLOOD PRESSURE: 110 MMHG | BODY MASS INDEX: 26.37 KG/M2 | HEART RATE: 82 BPM | OXYGEN SATURATION: 100 % | DIASTOLIC BLOOD PRESSURE: 68 MMHG | TEMPERATURE: 97.6 F | HEIGHT: 68 IN | WEIGHT: 174 LBS

## 2020-03-30 DIAGNOSIS — Z98.890 OTHER SPECIFIED POSTPROCEDURAL STATES: ICD-10-CM

## 2020-03-30 DIAGNOSIS — B97.89 OTHER VIRAL AGENTS AS THE CAUSE OF DISEASES CLASSIFIED ELSEWHERE: ICD-10-CM

## 2020-03-30 DIAGNOSIS — Z95.1 PRESENCE OF AORTOCORONARY BYPASS GRAFT: ICD-10-CM

## 2020-03-30 DIAGNOSIS — Z90.49 ACQUIRED ABSENCE OF OTHER SPECIFIED PARTS OF DIGESTIVE TRACT: Chronic | ICD-10-CM

## 2020-03-30 DIAGNOSIS — Z00.00 ENCOUNTER FOR GENERAL ADULT MEDICAL EXAMINATION W/OUT ABNORMAL FINDINGS: ICD-10-CM

## 2020-03-30 PROCEDURE — 99214 OFFICE O/P EST MOD 30 MIN: CPT

## 2020-03-30 PROCEDURE — G0463: CPT

## 2020-03-30 RX ORDER — MODAFINIL 100 MG/1
100 TABLET ORAL
Refills: 0 | Status: DISCONTINUED | COMMUNITY
End: 2020-03-30

## 2020-03-30 NOTE — HISTORY OF PRESENT ILLNESS
[FreeTextEntry1] : 65 year old s/p prolonged hospitalization; admitted 1/25 for symptomatic CAD, underwent CABG 2/3, post-op course complicated by enterococcemia for which he received 10 days if IV ampicillin, later developed SWI as well as SVG harvest site infection, underwent sternal debridement on 2/25, no organisms grew from culture; received Daptomycin and Cefepime, converted to Linezoli for discharge, discharged on 3/16 with one week course of Linezolid. Patient presents now for ID follow-up.\par \par April 1 sees Dr. Mariscal.\par Plastic Surgeon appt. pending\par Sternal Vac still in place.Tube with serosanguinos drainage, no purulence.\par No pain in center of chest other than when coughs.\par Finished Zyvoxx 1 week ago. No fever, no chills, no night sweats.\par Leg incision healing per patient\par Has PALMA but is improved compared to hospital. Able to ambulate around home without SOB, although very winded walking from parking lot to office. \par

## 2020-03-30 NOTE — ASSESSMENT
[FreeTextEntry1] : Sternal wound infection s/p CABG, wound overall looks well but still with some tenderness in one aspect and exposed area of bone. Also with residual warmth and erythema at SVG harvest site. Patient received a total of 4 weeks of treatment from time of Sternal debridement, in light of residual tenderness, exposed bone and evidence of active RLE infection, will resume linezolid for a minimum of two weeks.\par \par To arrange for telehealth visit in 1 week when nurse is changing Vac dressing to evaluate full wound.\par \par CBC/CMP today on Linezolid; to have another set checked on 4/9 when sees endocrinologist.

## 2020-03-30 NOTE — PHYSICAL EXAM
[Respiration, Rhythm And Depth] : normal respiratory rhythm and effort [Heart Rate And Rhythm] : heart rate was normal and rhythm regular [Heart Sounds] : normal S1 and S2 [Heart Sounds Gallop] : no gallops [Murmurs] : no murmurs [Bowel Sounds] : normal bowel sounds [Abdomen Soft] : soft [Abdomen Tenderness] : non-tender [] : no hepato-splenomegaly [FreeTextEntry1] : RLE with warm woody erythema along superomedial aspect of leg slightly below upper SVG harvest site. +tender. Clean superficial ulcer at distal aspect of harvest site. +small granulation.

## 2020-03-31 DIAGNOSIS — Z98.890 OTHER SPECIFIED POSTPROCEDURAL STATES: ICD-10-CM

## 2020-03-31 DIAGNOSIS — R05 COUGH: ICD-10-CM

## 2020-03-31 DIAGNOSIS — Z95.1 PRESENCE OF AORTOCORONARY BYPASS GRAFT: ICD-10-CM

## 2020-03-31 DIAGNOSIS — S21.101A UNSPECIFIED OPEN WOUND OF RIGHT FRONT WALL OF THORAX WITHOUT PENETRATION INTO THORACIC CAVITY, INITIAL ENCOUNTER: ICD-10-CM

## 2020-03-31 DIAGNOSIS — L03.115 CELLULITIS OF RIGHT LOWER LIMB: ICD-10-CM

## 2020-03-31 RX ORDER — BLOOD-GLUCOSE METER
KIT MISCELLANEOUS
Qty: 1 | Refills: 0 | Status: ACTIVE | COMMUNITY
Start: 2020-03-31 | End: 1900-01-01

## 2020-04-01 ENCOUNTER — APPOINTMENT (OUTPATIENT)
Dept: CARDIOTHORACIC SURGERY | Facility: CLINIC | Age: 65
End: 2020-04-01

## 2020-04-22 ENCOUNTER — APPOINTMENT (OUTPATIENT)
Dept: INFECTIOUS DISEASE | Facility: CLINIC | Age: 65
End: 2020-04-22
Payer: MEDICAID

## 2020-04-22 ENCOUNTER — OUTPATIENT (OUTPATIENT)
Dept: OUTPATIENT SERVICES | Facility: HOSPITAL | Age: 65
LOS: 1 days | End: 2020-04-22
Payer: MEDICARE

## 2020-04-22 DIAGNOSIS — L03.115 CELLULITIS OF RIGHT LOWER LIMB: ICD-10-CM

## 2020-04-22 DIAGNOSIS — Z90.49 ACQUIRED ABSENCE OF OTHER SPECIFIED PARTS OF DIGESTIVE TRACT: Chronic | ICD-10-CM

## 2020-04-22 DIAGNOSIS — B97.89 OTHER VIRAL AGENTS AS THE CAUSE OF DISEASES CLASSIFIED ELSEWHERE: ICD-10-CM

## 2020-04-22 LAB
ALBUMIN SERPL ELPH-MCNC: 3.3 G/DL
ALP BLD-CCNC: 118 U/L
ALT SERPL-CCNC: 21 U/L
ANION GAP SERPL CALC-SCNC: 15 MMOL/L
AST SERPL-CCNC: 15 U/L
BASOPHILS # BLD AUTO: 0.12 K/UL
BASOPHILS NFR BLD AUTO: 1 %
BILIRUB SERPL-MCNC: 0.2 MG/DL
BUN SERPL-MCNC: 41 MG/DL
CALCIUM SERPL-MCNC: 9.5 MG/DL
CHLORIDE SERPL-SCNC: 100 MMOL/L
CO2 SERPL-SCNC: 24 MMOL/L
CREAT SERPL-MCNC: 1.72 MG/DL
EOSINOPHIL # BLD AUTO: 0.9 K/UL
EOSINOPHIL NFR BLD AUTO: 7.6 %
HCT VFR BLD CALC: 30.4 %
HGB BLD-MCNC: 9.4 G/DL
IMM GRANULOCYTES NFR BLD AUTO: 1.4 %
LYMPHOCYTES # BLD AUTO: 3.81 K/UL
LYMPHOCYTES NFR BLD AUTO: 32.2 %
MAN DIFF?: NORMAL
MCHC RBC-ENTMCNC: 27.2 PG
MCHC RBC-ENTMCNC: 30.9 GM/DL
MCV RBC AUTO: 87.9 FL
MONOCYTES # BLD AUTO: 0.95 K/UL
MONOCYTES NFR BLD AUTO: 8 %
NEUTROPHILS # BLD AUTO: 5.87 K/UL
NEUTROPHILS NFR BLD AUTO: 49.8 %
PLATELET # BLD AUTO: 214 K/UL
POTASSIUM SERPL-SCNC: 5.3 MMOL/L
PROT SERPL-MCNC: 6.3 G/DL
RBC # BLD: 3.46 M/UL
RBC # FLD: 16.9 %
SODIUM SERPL-SCNC: 138 MMOL/L
WBC # FLD AUTO: 11.82 K/UL

## 2020-04-22 PROCEDURE — G0463: CPT

## 2020-04-22 PROCEDURE — 99213 OFFICE O/P EST LOW 20 MIN: CPT | Mod: 95

## 2020-04-22 NOTE — HISTORY OF PRESENT ILLNESS
[Home] : at home, [unfilled] , at the time of the visit. [Medical Office: ([facility])___] : at the medical office located in  [Spouse] : spouse [Family Member] : family member [Patient] : the patient [Other:____] : [unfilled] [FreeTextEntry1] : Televisit to evaluate sternal wound and SVG [place] sites after completion of antibiotics. \par \par Wound care nurse reports significant serous drainage from sternal wound, not cloudy, no purulence. Does not note any exposed bone. Denies erythema around the wound.\par \par Wound care nurse reports patient has some rhinorrhea and mild SOB, but SOB began two days ago after missing diuretics and nurse had heard lower lung field crackles; is improved now that back on diuretics plus received one extra dose. No fever. Patient has been confined to home, but patient's son has been going to supermarket, etc. Son has been asymptomatic. [FreeTextEntry2] : Patient and Son.

## 2020-04-22 NOTE — ASSESSMENT
[FreeTextEntry1] : Marked improvement in sternal wound with partial closure, significant drainage which is serous and non-purulent. Wound edges look healthy. LE cellulitis resolved.\par \par Advised that no need for further antibiotics unless wound appearance changes, purulence or erythema develops, of does not continue to close. \par \par Advised to monitor cough which may be result of fluid overload given patient's lack of outside exposure (although some does go out of the house), although also has rhinorrhea. Advised to monitor and call back if worsens or fails to improve. Nurse to visit again on 4/24 and will be in contact if there are issues.

## 2020-04-22 NOTE — PHYSICAL EXAM
[FreeTextEntry1] : RLE saphenous vein harvest sites with some hyperpigmentation changes around wound an in region of skin connecting wounds. No further erythema. No drainage.

## 2020-04-24 DIAGNOSIS — L03.115 CELLULITIS OF RIGHT LOWER LIMB: ICD-10-CM

## 2020-04-24 DIAGNOSIS — S21.101A UNSPECIFIED OPEN WOUND OF RIGHT FRONT WALL OF THORAX WITHOUT PENETRATION INTO THORACIC CAVITY, INITIAL ENCOUNTER: ICD-10-CM

## 2020-04-24 DIAGNOSIS — R05 COUGH: ICD-10-CM

## 2020-04-29 ENCOUNTER — APPOINTMENT (OUTPATIENT)
Dept: PULMONOLOGY | Facility: CLINIC | Age: 65
End: 2020-04-29
Payer: MEDICARE

## 2020-04-29 VITALS
SYSTOLIC BLOOD PRESSURE: 100 MMHG | HEART RATE: 102 BPM | DIASTOLIC BLOOD PRESSURE: 80 MMHG | RESPIRATION RATE: 17 BRPM | TEMPERATURE: 99.4 F | BODY MASS INDEX: 25.76 KG/M2 | OXYGEN SATURATION: 98 % | HEIGHT: 68 IN | WEIGHT: 170 LBS

## 2020-04-29 PROCEDURE — 94618 PULMONARY STRESS TESTING: CPT

## 2020-04-29 PROCEDURE — 71046 X-RAY EXAM CHEST 2 VIEWS: CPT

## 2020-04-29 PROCEDURE — 99215 OFFICE O/P EST HI 40 MIN: CPT | Mod: 25

## 2020-04-29 RX ORDER — BUDESONIDE 0.5 MG/2ML
0.5 INHALANT ORAL TWICE DAILY
Qty: 60 | Refills: 3 | Status: ACTIVE | COMMUNITY
Start: 2020-04-29 | End: 1900-01-01

## 2020-04-29 RX ORDER — LEVALBUTEROL HYDROCHLORIDE 0.63 MG/3ML
0.63 SOLUTION RESPIRATORY (INHALATION)
Qty: 120 | Refills: 3 | Status: ACTIVE | COMMUNITY
Start: 2020-04-29 | End: 1900-01-01

## 2020-04-29 NOTE — REASON FOR VISIT
[Initial] : an initial visit [TextBox_44] : Restrictive Dysfunction, RADS / Asthma, GERD, Laryngomalacia, TBM, Cough

## 2020-04-29 NOTE — PHYSICAL EXAM
[No Acute Distress] : no acute distress [Normal Oropharynx] : normal oropharynx [III] : Mallampati Class: III [Normal Appearance] : normal appearance [No Neck Mass] : no neck mass [Normal Rate/Rhythm] : normal rate/rhythm [Normal S1, S2] : normal s1, s2 [No Murmurs] : no murmurs [Clear to Auscultation Bilaterally] : clear to auscultation bilaterally [No Resp Distress] : no resp distress [Benign] : benign [No Abnormalities] : no abnormalities [Normal Gait] : normal gait [No Clubbing] : no clubbing [No Cyanosis] : no cyanosis [FROM] : FROM [Normal Color/ Pigmentation] : normal color/ pigmentation [No Focal Deficits] : no focal deficits [Oriented x3] : oriented x3 [Normal Affect] : normal affect [TextBox_2] : Wearing Oxygen [TextBox_68] : I:E 1:3, mild expiratory wheeze  [TextBox_80] : Wound VAC in place, Fractured ribs due to CPR.  [TextBox_105] : 1+ LE Edema

## 2020-04-29 NOTE — HISTORY OF PRESENT ILLNESS
[TextBox_4] : Mr. FRANKLIN PRESLEY is a 65 year old male coming into the office for an initial evaluation. His chief complaint is Cough\par -At the hospital he underwent respiratory failure following a open heart surgery. \par -at the hospital, he went into Cardiac Arrest.\par -he has fractured ribs due to CPR. \par -prolonged hospital stay, currently still has Wound VAC in place. \par -still is coughing a lot.\par -Before the surgery, he did not have any cough.\par -cough is non productive. \par -during his hospital stay, he had a lot of chest pain.\par -His sleep is on and off. \par -does not snore but used to snore before the surgery. \par -has been sleeping on his back after the surgery. \par -energy level is not good.\par -has SOB, orthopnea, and SOB in the middle of the night. \par -has SOB when he is talking. \par -saying 10 words together is a lot for him. , causes him to feel very SOB. \par -wheezes sometimes. \par -reports some rhinorrhea \par -no reflux. \par \par -He denies any diarrhea, constipation, dysphagia, dizziness, sour taste in the mouth, leg swelling, leg pain, itchy eyes, itchy ears, heartburn, reflux, myalgias or arthralgias.

## 2020-04-29 NOTE — PROCEDURE
[FreeTextEntry1] : CXR revealed a cardiomegaly ; s/p median sternotomy, left pleural thickening, there was no evidence of infiltrate or effusion-- A normal chest radiograph. \par \par 6 minute walk test reveals a low saturation of 95% with slight dyspnea; walked 97.8 meters. Patient had paused during the 6 minutes because patient become extremely fatigued.

## 2020-04-29 NOTE — ADDENDUM
[FreeTextEntry1] : Documented by Ivan Kwong acting as a scribe for Dr. Mike Hernandez on 04/29/2020 \par \par All medical record entries made by the Scribe were at my, Dr. Mike Hernandez's, direction and personally dictated by me on 04/29/2020 . I have reviewed the chart and agree that the record accurately reflects my personal performance of the history, physical exam, assessment and plan. I have also personally directed, reviewed, and agree with the discharge instructions.

## 2020-04-29 NOTE — ASSESSMENT
[FreeTextEntry1] : Mr. FRANKLIN PRESLEY is a 65 year year old male who has a history of  DM, Coronary artery disease, pleural thickening, s/p median sternotomy complicated by cardiac arrest and fractured ribs.  who now comes in for pulmonary evaluation. SOB and Cough \par \par The patient's SOB is felt to be multifactorial:\par -pulmonary \par    - Restrictive Dysfunction due to healing rib fractured and median sternotomy \par    - Tracheomalacia \par - Poor breathing mechanics\par - Overweight/ Out-of Shape\par - ?Cardiac Disease - Dr. Steel \par \par Problem 1: Restrictive Dysfunction\par -Due to healing rib fracture and median sternotomy. \par \par Problem 2: Tracheomalacia \par -Complete Dynamic CT \par -Add Amitriptyline 10 mg Q8H; to take QHS for the first week, then increase to Q8H \par \par -Tracheomalacia is usually acquired in adults and common causes include damage by tracheostomy or endotracheal intubation damaging the tracheal cartilage with increase risk with multiple intubations, prolonged intubation, and concurrent high dose steroid therapy; external chest wall trauma and surgery; chronic compression of the trachea by benign etiologies (eg, benign mediastinal goiter) or malignancy; relapsing polychondritis; or recurrent infection. Tracheomalacia can be asymptomatic, however signs or symptoms can develop as the severity of the airway narrowing progresses with major symptoms include dyspnea, cough, and sputum retention. Other symptoms include severe paroxysms of coughing, wheezing or stridor, barking cough and may be exacerbated by forced expiration, cough, and valsalva maneuver. Tracheomalacia is diagnosed by a bronchoscopic visualization of dynamic airway collapse on dynamic chest CT. Therapy is warranted in symptomatic patients with severe tracheomalacia and includes surgical repair as tracheobronchoplasty. The patient was referred to Dr. Bulmaro Wing or Dr. Omar Gordon, at [facility] for a surgical consult.\par \par Problem 3: Chronic Cough\par DDx\par -Tracheomalacia \par RADS / Asthma \par PND \par reflux \par \par -Any cough greater than three weeks duration-differential diagnosis includes-asthma, upper airway cough syndrome, post nasal drip syndrome, gastroesophageal reflux, laryngopharyngeal reflux, cardiac disease (congestive heart failure, medicines, effects, etc), medication effects (b-blockers, ace inhibitors, ARBs, glaucoma meds, etc.), smoking, infectious, multifactorial, etc. \par \par Problem 4: RADS / Asthma \par -Add Xopenex (0.63) TID via nebulizer \par -Add Budesonide (1.0 mg 0.5 mg) BID \par \par -Asthma is believed to be caused by inherited (genetic) and environmental factor, but its exact cause is unknown. Asthma may be triggered by allergens, lung infections, or irritants in the air. Asthma triggers are different for each person \par \par Problem 5: PND\par -No medication needed at the time\par \par -Environmental measures for allergies were encouraged including mattress and pillow cover, air purifier, and environmental controls. \par \par Problem 6: GERD / Laryngomalacia\par -Recommended to see ENT Dr. Sy Marion\par -Add Protonix 40mg qAM before breakfast \par -Add Pepcid 40mg QHS \par \par - Things to avoid including overeating, spicy foods, tight clothing, eating within three hours of bed, this list is not all inclusive.\par \par - For treatment of reflux, possible options discussed including diet control, H2 blockers, PPIs, as well as coating motility agents discussed as treatment options. Timing of meals and proximity of last meal to sleep were discussed. If symptoms persist, a formal gastrointestinal evaluation is needed. \par \par Problem 7: Amiodarone \par -PFTs/TFTs/LFTs; 2-3 times per year\par - Amiodarone/bleomycin/methotrexate and other drugs have been associated with pulmonary parenchymal damage. These drugs require pulmonary function testing including DLCO regularly and possible CT/CXR if respiratory complaints develop. PFTs should be performed at 6 month intervals. \par \par Problem 8: Overweight\par - Weight loss, exercise, and diet control were discussed and are highly encouraged. Treatment options were given such as, aqua therapy, and contacting a nutritionist. Recommended to use the elliptical, stationary bike, less use of treadmill. Mindful eating was explained to the patient Obesity is associated with worsening asthma, shortness of breath, and potential for cardiac disease, diabetes, and other underlying medical conditions.\par \par Problem 9: Poor Breathing Mechanics\par - Proper breathing techniques were reviewed with an emphasis of exhalation. Patient instructed to breath in for 1 second and out for four seconds. Patient was encouraged to not talk while walking.\par \par Problem 10: Health maintenance\par -s/p flu shot\par -recommended strep pneumonia vaccines: Prevnar-13 vaccine, followed by Pneumo vaccine 23 one year following (after the age of 65)\par -recommended early intervention for URIs\par -recommended regular osteoporosis evaluations\par -recommended early dermatological evaluations\par -recommended after the age of 50 to the age of 70, colonoscopy every 5 years\par \par f/u in 6-8 weeks\par pt is encouraged to call or fax the office with any questions or concerns.

## 2020-05-11 ENCOUNTER — INPATIENT (INPATIENT)
Facility: HOSPITAL | Age: 65
LOS: 15 days | Discharge: HOME | DRG: 857 | End: 2020-05-27
Attending: INTERNAL MEDICINE | Admitting: INTERNAL MEDICINE
Payer: COMMERCIAL

## 2020-05-11 VITALS
OXYGEN SATURATION: 98 % | SYSTOLIC BLOOD PRESSURE: 138 MMHG | HEIGHT: 68 IN | TEMPERATURE: 98 F | WEIGHT: 169.09 LBS | RESPIRATION RATE: 19 BRPM | HEART RATE: 104 BPM | DIASTOLIC BLOOD PRESSURE: 76 MMHG

## 2020-05-11 DIAGNOSIS — R55 SYNCOPE AND COLLAPSE: ICD-10-CM

## 2020-05-11 DIAGNOSIS — Z90.49 ACQUIRED ABSENCE OF OTHER SPECIFIED PARTS OF DIGESTIVE TRACT: Chronic | ICD-10-CM

## 2020-05-11 DIAGNOSIS — Z95.1 PRESENCE OF AORTOCORONARY BYPASS GRAFT: Chronic | ICD-10-CM

## 2020-05-11 LAB
ALBUMIN SERPL ELPH-MCNC: 2.8 G/DL — LOW (ref 3.3–5)
ALP SERPL-CCNC: 86 U/L — SIGNIFICANT CHANGE UP (ref 40–120)
ALT FLD-CCNC: 11 U/L — SIGNIFICANT CHANGE UP (ref 10–45)
ANION GAP SERPL CALC-SCNC: 16 MMOL/L — SIGNIFICANT CHANGE UP (ref 5–17)
APTT BLD: 34.3 SEC — SIGNIFICANT CHANGE UP (ref 27.5–36.3)
AST SERPL-CCNC: 9 U/L — LOW (ref 10–40)
BASE EXCESS BLDV CALC-SCNC: 0.2 MMOL/L — SIGNIFICANT CHANGE UP (ref -2–2)
BILIRUB SERPL-MCNC: 0.4 MG/DL — SIGNIFICANT CHANGE UP (ref 0.2–1.2)
BUN SERPL-MCNC: 41 MG/DL — HIGH (ref 7–23)
CA-I SERPL-SCNC: 1.23 MMOL/L — SIGNIFICANT CHANGE UP (ref 1.12–1.3)
CALCIUM SERPL-MCNC: 9.6 MG/DL — SIGNIFICANT CHANGE UP (ref 8.4–10.5)
CHLORIDE BLDV-SCNC: 95 MMOL/L — LOW (ref 96–108)
CHLORIDE SERPL-SCNC: 90 MMOL/L — LOW (ref 96–108)
CO2 BLDV-SCNC: 27 MMOL/L — SIGNIFICANT CHANGE UP (ref 22–30)
CO2 SERPL-SCNC: 22 MMOL/L — SIGNIFICANT CHANGE UP (ref 22–31)
CREAT SERPL-MCNC: 1.57 MG/DL — HIGH (ref 0.5–1.3)
GAS PNL BLDV: 129 MMOL/L — LOW (ref 135–145)
GAS PNL BLDV: SIGNIFICANT CHANGE UP
GAS PNL BLDV: SIGNIFICANT CHANGE UP
GLUCOSE BLDC GLUCOMTR-MCNC: 228 MG/DL — HIGH (ref 70–99)
GLUCOSE BLDV-MCNC: 302 MG/DL — HIGH (ref 70–99)
GLUCOSE SERPL-MCNC: 311 MG/DL — HIGH (ref 70–99)
HCO3 BLDV-SCNC: 26 MMOL/L — SIGNIFICANT CHANGE UP (ref 21–29)
HCT VFR BLD CALC: 29 % — LOW (ref 39–50)
HCT VFR BLDA CALC: 29 % — LOW (ref 39–50)
HGB BLD CALC-MCNC: 9.5 G/DL — LOW (ref 13–17)
HGB BLD-MCNC: 9.1 G/DL — LOW (ref 13–17)
INR BLD: 1.23 RATIO — HIGH (ref 0.88–1.16)
LACTATE BLDV-MCNC: 1.9 MMOL/L — SIGNIFICANT CHANGE UP (ref 0.7–2)
LIDOCAIN IGE QN: 23 U/L — SIGNIFICANT CHANGE UP (ref 7–60)
MAGNESIUM SERPL-MCNC: 1.9 MG/DL — SIGNIFICANT CHANGE UP (ref 1.6–2.6)
MCHC RBC-ENTMCNC: 25.6 PG — LOW (ref 27–34)
MCHC RBC-ENTMCNC: 31.4 GM/DL — LOW (ref 32–36)
MCV RBC AUTO: 81.7 FL — SIGNIFICANT CHANGE UP (ref 80–100)
NRBC # BLD: 0 /100 WBCS — SIGNIFICANT CHANGE UP (ref 0–0)
NT-PROBNP SERPL-SCNC: 1000 PG/ML — HIGH (ref 0–300)
PCO2 BLDV: 50 MMHG — SIGNIFICANT CHANGE UP (ref 35–50)
PH BLDV: 7.33 — LOW (ref 7.35–7.45)
PHOSPHATE SERPL-MCNC: 3.6 MG/DL — SIGNIFICANT CHANGE UP (ref 2.5–4.5)
PLATELET # BLD AUTO: 311 K/UL — SIGNIFICANT CHANGE UP (ref 150–400)
PO2 BLDV: <20 MMHG — LOW (ref 25–45)
POTASSIUM BLDV-SCNC: 4.8 MMOL/L — SIGNIFICANT CHANGE UP (ref 3.5–5.3)
POTASSIUM SERPL-MCNC: 5.2 MMOL/L — SIGNIFICANT CHANGE UP (ref 3.5–5.3)
POTASSIUM SERPL-SCNC: 5.2 MMOL/L — SIGNIFICANT CHANGE UP (ref 3.5–5.3)
PROT SERPL-MCNC: 7.1 G/DL — SIGNIFICANT CHANGE UP (ref 6–8.3)
PROTHROM AB SERPL-ACNC: 14.1 SEC — HIGH (ref 10–12.9)
RBC # BLD: 3.55 M/UL — LOW (ref 4.2–5.8)
RBC # FLD: 16 % — HIGH (ref 10.3–14.5)
SAO2 % BLDV: 12 % — LOW (ref 67–88)
SARS-COV-2 RNA SPEC QL NAA+PROBE: SIGNIFICANT CHANGE UP
SODIUM SERPL-SCNC: 128 MMOL/L — LOW (ref 135–145)
TROPONIN T, HIGH SENSITIVITY RESULT: 118 NG/L — HIGH (ref 0–51)
TROPONIN T, HIGH SENSITIVITY RESULT: 132 NG/L — HIGH (ref 0–51)
WBC # BLD: 12.72 K/UL — HIGH (ref 3.8–10.5)
WBC # FLD AUTO: 12.72 K/UL — HIGH (ref 3.8–10.5)

## 2020-05-11 PROCEDURE — 71045 X-RAY EXAM CHEST 1 VIEW: CPT | Mod: 26

## 2020-05-11 PROCEDURE — 70450 CT HEAD/BRAIN W/O DYE: CPT | Mod: 26

## 2020-05-11 PROCEDURE — 99285 EMERGENCY DEPT VISIT HI MDM: CPT

## 2020-05-11 PROCEDURE — 93010 ELECTROCARDIOGRAM REPORT: CPT

## 2020-05-11 RX ORDER — ACETAMINOPHEN 500 MG
650 TABLET ORAL EVERY 6 HOURS
Refills: 0 | Status: DISCONTINUED | OUTPATIENT
Start: 2020-05-11 | End: 2020-05-15

## 2020-05-11 RX ORDER — INSULIN LISPRO 100/ML
10 VIAL (ML) SUBCUTANEOUS
Refills: 0 | Status: DISCONTINUED | OUTPATIENT
Start: 2020-05-11 | End: 2020-05-12

## 2020-05-11 RX ORDER — AMIODARONE HYDROCHLORIDE 400 MG/1
200 TABLET ORAL DAILY
Refills: 0 | Status: DISCONTINUED | OUTPATIENT
Start: 2020-05-11 | End: 2020-05-15

## 2020-05-11 RX ORDER — SODIUM CHLORIDE 9 MG/ML
1000 INJECTION, SOLUTION INTRAVENOUS
Refills: 0 | Status: DISCONTINUED | OUTPATIENT
Start: 2020-05-11 | End: 2020-05-15

## 2020-05-11 RX ORDER — SENNA PLUS 8.6 MG/1
2 TABLET ORAL AT BEDTIME
Refills: 0 | Status: DISCONTINUED | OUTPATIENT
Start: 2020-05-11 | End: 2020-05-15

## 2020-05-11 RX ORDER — ASPIRIN/CALCIUM CARB/MAGNESIUM 324 MG
81 TABLET ORAL DAILY
Refills: 0 | Status: DISCONTINUED | OUTPATIENT
Start: 2020-05-11 | End: 2020-05-15

## 2020-05-11 RX ORDER — INSULIN GLARGINE 100 [IU]/ML
30 INJECTION, SOLUTION SUBCUTANEOUS AT BEDTIME
Refills: 0 | Status: DISCONTINUED | OUTPATIENT
Start: 2020-05-11 | End: 2020-05-12

## 2020-05-11 RX ORDER — DEXTROSE 50 % IN WATER 50 %
12.5 SYRINGE (ML) INTRAVENOUS ONCE
Refills: 0 | Status: DISCONTINUED | OUTPATIENT
Start: 2020-05-11 | End: 2020-05-15

## 2020-05-11 RX ORDER — DEXTROSE 50 % IN WATER 50 %
25 SYRINGE (ML) INTRAVENOUS ONCE
Refills: 0 | Status: DISCONTINUED | OUTPATIENT
Start: 2020-05-11 | End: 2020-05-15

## 2020-05-11 RX ORDER — ATORVASTATIN CALCIUM 80 MG/1
40 TABLET, FILM COATED ORAL AT BEDTIME
Refills: 0 | Status: DISCONTINUED | OUTPATIENT
Start: 2020-05-11 | End: 2020-05-15

## 2020-05-11 RX ORDER — CEFAZOLIN SODIUM 1 G
1000 VIAL (EA) INJECTION EVERY 8 HOURS
Refills: 0 | Status: DISCONTINUED | OUTPATIENT
Start: 2020-05-11 | End: 2020-05-13

## 2020-05-11 RX ORDER — DEXTROSE 50 % IN WATER 50 %
15 SYRINGE (ML) INTRAVENOUS ONCE
Refills: 0 | Status: DISCONTINUED | OUTPATIENT
Start: 2020-05-11 | End: 2020-05-15

## 2020-05-11 RX ORDER — INSULIN LISPRO 100/ML
VIAL (ML) SUBCUTANEOUS AT BEDTIME
Refills: 0 | Status: DISCONTINUED | OUTPATIENT
Start: 2020-05-11 | End: 2020-05-12

## 2020-05-11 RX ORDER — POLYETHYLENE GLYCOL 3350 17 G/17G
17 POWDER, FOR SOLUTION ORAL DAILY
Refills: 0 | Status: DISCONTINUED | OUTPATIENT
Start: 2020-05-11 | End: 2020-05-15

## 2020-05-11 RX ORDER — PANTOPRAZOLE SODIUM 20 MG/1
40 TABLET, DELAYED RELEASE ORAL
Refills: 0 | Status: DISCONTINUED | OUTPATIENT
Start: 2020-05-11 | End: 2020-05-15

## 2020-05-11 RX ORDER — INSULIN LISPRO 100/ML
VIAL (ML) SUBCUTANEOUS
Refills: 0 | Status: DISCONTINUED | OUTPATIENT
Start: 2020-05-11 | End: 2020-05-12

## 2020-05-11 RX ORDER — ENOXAPARIN SODIUM 100 MG/ML
40 INJECTION SUBCUTANEOUS DAILY
Refills: 0 | Status: DISCONTINUED | OUTPATIENT
Start: 2020-05-11 | End: 2020-05-15

## 2020-05-11 RX ORDER — GLUCAGON INJECTION, SOLUTION 0.5 MG/.1ML
1 INJECTION, SOLUTION SUBCUTANEOUS ONCE
Refills: 0 | Status: DISCONTINUED | OUTPATIENT
Start: 2020-05-11 | End: 2020-05-15

## 2020-05-11 RX ORDER — SPIRONOLACTONE 25 MG/1
25 TABLET, FILM COATED ORAL DAILY
Refills: 0 | Status: DISCONTINUED | OUTPATIENT
Start: 2020-05-11 | End: 2020-05-12

## 2020-05-11 RX ORDER — METOPROLOL TARTRATE 50 MG
12.5 TABLET ORAL
Refills: 0 | Status: DISCONTINUED | OUTPATIENT
Start: 2020-05-11 | End: 2020-05-15

## 2020-05-11 NOTE — H&P ADULT - ASSESSMENT
65 m with    Syncope  - telemetry  - cardiac enzymes  - cardiology evaluation called    S/P CABG  - CT sx evaluation    Sternal wound  - Plastic surgery evaluation Dr. Wright  - wound and blood cultures  - Ancef empiric  - ID evaluation called re need for antibiotics.    Diabetes control  - ADA diet  - BS control  - Endocrine evaluation    CKD  - follow    HTN control    Further action as per clinical course     Miguel Angel Duke MD pager [phone]

## 2020-05-11 NOTE — ED PROVIDER NOTE - PROGRESS NOTE DETAILS
Case d/w CT surgery team aware of pt will see in ED - Julio Mario PA-C Pt seen by CT surgery - no acute CT surgery intervention at this time but will follow case during admission. Recommended plastics involvement for wound vac change - Julio Mario PA-C

## 2020-05-11 NOTE — CHART NOTE - NSCHARTNOTEFT_GEN_A_CORE
CTS notified about Mr. Elia Bertrand who is s/p CABGx4 on 2/3 complicated by PEA arrest on 2/6. On 2/25, s/p Sternal wound debridement and irrigation with removal of all 6 sternal wires w/ Plastic surgery Dr. Wright. Intraop, purulence encountered and sent for culture. Closure with bilateral pectoralis muscle advancement flaps. Pt was discharged on 3/16 with a wound vac on the sternal wound and on PO Zyvox which he completed.     Pt reports that he was not able to follow up with plastic surgery and that the wound care nurse have been managing his wound vac at home since discharge. He reports having the wound vac therapy since discharge from the hospital due to the drainage from the wound. There was a concern from the visiting nurse / wound care nurse that the wound was not healing properly.     Pt now presents to ED s/p syncopal episode and fall. Pt is for admission for syncopal work up. CT head negative for ICH. CTS notified for sternal wound. Pt assessed in the ED, pt in no acute distress. Vitals in ED, HR TT893h, -130/ 70s, O2 sat 99% on room air, TMAX 98.7. Pt appears non-toxic, no edema in lower extremities, surgical incision on lower extremities well approximated and healed. + dehisced wound noted on distal sternal wound with moderate drainage covered w/ dry gauze. No sternal clicking or rubs observed during cough. Sternum appears stable. Endorses soreness from wound but no pain at rest. Of note, WBC 12. Denies any other symptoms from home. CXR shows small left loculated pleural effusion now decreased in size from previous exam.     Would suggest plastic surgery to help manage wound and evaluate need for continuing wound vac therapy  Wound care specialty consult thereafter  Of note, last documented wound assessment from wound care specialty on 3/16:  "5 x 3 x3, with 4cm undermining at 10-2 with yellow slough at base over sternotomy site."   All sternal wires removed on 2/25   F/u blood cultures x2 sent from ED CTS notified about Mr. Elia Bertrand who is s/p CABGx4 on 2/3 complicated by PEA arrest on 2/6. On 2/25, s/p Sternal wound debridement and irrigation with removal of all 6 sternal wires w/ Plastic surgery Dr. Wright. Intraop, purulence encountered and sent for culture. Closure with bilateral pectoralis muscle advancement flaps. Pt was discharged on 3/16 with a wound vac on the sternal wound and on PO Zyvox which he completed.     Pt reports that he was not able to follow up with plastic surgery and that the wound care nurse have been managing his wound vac at home since discharge. He reports having the wound vac therapy since discharge from the hospital due to the drainage from the wound. There was a concern from the visiting nurse / wound care nurse that the wound was not healing properly.     Pt now presents to ED s/p syncopal episode and fall. Pt is for admission for syncopal work up. CT head negative for ICH. CTS notified for sternal wound. Pt assessed in the ED, pt in no acute distress. Vitals in ED, HR HP165m, -130/ 70s, O2 sat 99% on room air, TMAX 98.7. Pt appears non-toxic, no edema in lower extremities, surgical incision on lower extremities well approximated and healed. + dehisced wound noted on distal sternal wound with moderate drainage covered w/ dry gauze. No sternal clicking or rubs observed during cough. Sternum appears stable. Endorses soreness from wound but no pain at rest. Of note, WBC 12. Denies any other symptoms from home. CXR shows small left loculated pleural effusion now decreased in size from previous exam.     Would suggest plastic surgery to help manage wound and evaluate need for continuing wound vac therapy  Wound care specialty consult thereafter  Of note, last documented wound assessment from wound care specialty on 3/16:  "5 x 3 x3, with 4cm undermining at 10-2 with yellow slough at base over sternotomy site."   All sternal wires removed on 2/25   F/u blood cultures x2 sent from ED  Would resume home meds and pt would benefit from tighter blood glucose control for wound healing   Pt known by Dr. Matias, endocrinology/ following from previous admission. - notified - and will follow during this admission.     CTS #[id number]

## 2020-05-11 NOTE — H&P ADULT - NSHPPHYSICALEXAM_GEN_ALL_CORE
PHYSICAL EXAMINATION:  Vital Signs Last 24 Hrs  T(C): 37.1 (11 May 2020 19:21), Max: 37.1 (11 May 2020 19:21)  T(F): 98.7 (11 May 2020 19:21), Max: 98.7 (11 May 2020 19:21)  HR: 100 (11 May 2020 19:21) (100 - 104)  BP: 127/77 (11 May 2020 19:21) (127/77 - 144/74)  BP(mean): --  RR: 19 (11 May 2020 19:21) (19 - 22)  SpO2: 100% (11 May 2020 19:21) (98% - 100%)  CAPILLARY BLOOD GLUCOSE      POCT Blood Glucose.: 228 mg/dL (11 May 2020 22:04)  POCT Blood Glucose.: 348 mg/dL (11 May 2020 15:59)      GENERAL: NAD, well-groomed, well-developed  HEAD:  atraumatic, normocephalic  EYES: sclera anicteric  ENMT: mucous membranes moist  NECK: supple, No JVD  CHEST/LUNG: clear to auscultation bilaterally; no rales, rhonchi, or wheezing b/l Sternal wound with some drainage.  HEART: normal S1, S2  ABDOMEN: BS+, soft, ND, NT   EXTREMITIES:  pulses palpable; no clubbing, cyanosis, or edema b/l LEs  NEURO: awake, alert, interactive; moves all extremities  SKIN: no rashes or lesions

## 2020-05-11 NOTE — ED PROVIDER NOTE - OBJECTIVE STATEMENT
65y m PMHx CABG Feb 2020 complicated by PEA arrest, chest wall infection w/empyema, DM, HTN p/w syncope. Pt brought in by EMS reports a syncopal episode in the shower. Pt states he felt dizzy and woke up in the shower, hit the back of his head. He also reports his wound care nurse was concerned for increase in discharge from his sternal wound. CP is located at the sternal wound, nonradiating. Pt admits to decreased appetite, SOB and chest pain. No known COVID contacts. Denies fever, chills, NVDC.   CT surgeon Dr Mariscal 65y m PMHx CABG Feb 2020 complicated by PEA arrest, thoracotomy site infection w/empyema, DM, HTN p/w syncope. Pt brought in by EMS reports a syncopal episode in the shower. Pt states he felt dizzy and woke up in the shower, hit the back of his head. He also reports his wound care nurse was concerned for increase in discharge from his sternal wound. CP is located at the sternal wound, nonradiating. Pt admits to decreased appetite, SOB and chest pain. No known COVID contacts. Denies fever, chills, NVDC.   CT surgeon Dr Mariscal

## 2020-05-11 NOTE — H&P ADULT - HISTORY OF PRESENT ILLNESS
65y m PMHx CABG Feb 2020 complicated by PEA arrest, thoracotomy site infection w/empyema, DM, HTN p/w syncope. Pt brought in by EMS reports a syncopal episode in the shower. Pt states he felt dizzy and woke up in the shower, hit the back of his head. He also reports his wound care nurse was concerned for increase in discharge from his sternal wound. CP is located at the sternal wound, nonradiating. Pt admits to decreased appetite, SOB and chest pain. No known COVID contacts. Denies fever, chills, NVDC

## 2020-05-11 NOTE — H&P ADULT - NSHPREVIEWOFSYSTEMS_GEN_ALL_CORE
REVIEW OF SYSTEMS:    CONSTITUTIONAL: + weakness, no fevers or chills  EYES/ENT: No visual changes;  No vertigo or throat pain   NECK: No pain or stiffness  RESPIRATORY: No cough, wheezing, hemoptysis; No shortness of breath  CARDIOVASCULAR: + chest pain no palpitations  GASTROINTESTINAL: No abdominal or epigastric pain. No nausea, vomiting, or hematemesis; No diarrhea or constipation. No melena or hematochezia.  GENITOURINARY: No dysuria, frequency or hematuria  NEUROLOGICAL: No numbness or weakness  SKIN: No itching, burning, rashes, or lesions  Sternal wound discharge  All other review of systems is negative unless indicated above.

## 2020-05-11 NOTE — H&P ADULT - NSHPLABSRESULTS_GEN_ALL_CORE
9.1    12.72 )-----------( 311      ( 11 May 2020 16:47 )             29.0       05-11    128<L>  |  90<L>  |  41<H>  ----------------------------<  311<H>  5.2   |  22  |  1.57<H>    Ca    9.6      11 May 2020 16:47  Phos  3.6     05-11  Mg     1.9     05-11    TPro  7.1  /  Alb  2.8<L>  /  TBili  0.4  /  DBili  x   /  AST  9<L>  /  ALT  11  /  AlkPhos  86  05-11            EKG SR NSST/T ( no new changes)    < from: CT Head No Cont (05.11.20 @ 16:59) >    IMPRESSION:    No acute intracranial hemorrhage or mass effect. No displaced calvarial fracture.     < end of copied text >    < from: Xray Chest 1 View AP/PA (05.11.20 @ 16:24) >      INTERPRETATION:  Small loculated left pleural effusion, decreased since prior study.    < end of copied text >          PT/INR - ( 11 May 2020 16:47 )   PT: 14.1 sec;   INR: 1.23 ratio         PTT - ( 11 May 2020 16:47 )  PTT:34.3 sec    Lactate Trend            CAPILLARY BLOOD GLUCOSE      POCT Blood Glucose.: 228 mg/dL (11 May 2020 22:04)

## 2020-05-11 NOTE — H&P ADULT - NSICDXPASTSURGICALHX_GEN_ALL_CORE_FT
PAST SURGICAL HISTORY:  History of appendectomy x 30 yrs ago    S/P CABG (coronary artery bypass graft)

## 2020-05-11 NOTE — ED PROVIDER NOTE - CLINICAL SUMMARY MEDICAL DECISION MAKING FREE TEXT BOX
Unwitnessed syncope in patient c pmh/psh high risk for cardiac dysrrhythmia.  Hemodynamically stable on exam.  Also c sternal skin wound that appears chronic c granulomatous tissue, no obvious s/s of infection but reported to be worsened as per wound care nurse this AM.  Will require labs, EKG/tele, CT head given reported head strike, and admission.  CT surgery c/s.  --BMM

## 2020-05-11 NOTE — ED ADULT NURSE NOTE - NSIMPLEMENTINTERV_GEN_ALL_ED
Implemented All Fall with Harm Risk Interventions:  [place] to call system. Call bell, personal items and telephone within reach. Instruct patient to call for assistance. Room bathroom lighting operational. Non-slip footwear when patient is off stretcher. Physically safe environment: no spills, clutter or unnecessary equipment. Stretcher in lowest position, wheels locked, appropriate side rails in place. Provide visual cue, wrist band, yellow gown, etc. Monitor gait and stability. Monitor for mental status changes and reorient to person, place, and time. Review medications for side effects contributing to fall risk. Reinforce activity limits and safety measures with patient and family. Provide visual clues: red socks.

## 2020-05-11 NOTE — ED ADULT NURSE REASSESSMENT NOTE - NS ED NURSE REASSESS COMMENT FT1
Report received from KAVON Mauricio. Patient A&ox4, breathing spontaneous and unlabored on room air, skin color normal for ethnicity, in no distress at this time, denies pain. Sinus rhythm on cardiac monitor. IV patent, no redness, drainage or swelling at site. Patient aware they are being admitted to the hospital. Will be notified when bed is available. Patient/family has no further questions at this time. Repeated blood work as MD order. Bed locked and in lowest position with side rails up for safety at this time.

## 2020-05-11 NOTE — ED ADULT NURSE NOTE - OBJECTIVE STATEMENT
· Chief Complaint: The patient is a 65y Male complaining of syncope.  · HPI Objective Statement: 65y m PMHx CABG Feb 2020 complicated by PEA arrest, chest wall infection w/empyema, DM, HTN p/w syncope. Pt brought in by EMS reports a syncopal episode in the shower. Pt states he felt dizzy and woke up in the shower, hit the back of his head. He also reports his wound care nurse was concerned for increase in discharge from his sternal wound. CP is located at the sternal wound, nonradiating. Pt admits to decreased appetite, SOB and chest pain. No known COVID contacts. Denies fever, chills, NVDC.   CT surgeon Dr Mariscal 65 yr old male to ED via EMS s/p syncope in the shower today. Pt awake alert and orientedx3 Resp slight labored, worsening with SOB. Has had neg Covid test. Done x 2 weeks ago. Had CABG 2/3/20 . Pt also noted to have at post op site redness with drainage with pain at sternal site. Denies fever or chills. Denies abd pain . Denies N/V/D. H/O HTN . Pt c/o feeling dizzy in shower. AT time of discharge from hospital pt noted increased discharge from wound.  concerned for increase in discharge from his sternal wound.  Denies decreased appetite, SOB and chest pain. Denies recent travel or exposure to COVID. Made comfortable. 65 yr old male to ED via EMS s/p syncope in the shower today. Pt awake alert and orientedx3 Resp slight labored, worsening with SOB. Has had neg Covid test. Done x 2 weeks ago. Had CABG 2/3/20 . Pt also noted to have at post op site redness with drainage with pain at sternal site. Denies fever or chills. Denies abd pain . Denies N/V/D. H/O HTN . Pt c/o feeling dizzy in shower. AT time of discharge from hospital pt noted increased discharge from wound.  concerned for increase in discharge from his sternal wound. C/o decreased appetite.  Denies recent travel or exposure to COVID. Made comfortable. Home care nurse delonte pt this am and did not like the wound.

## 2020-05-12 ENCOUNTER — APPOINTMENT (OUTPATIENT)
Dept: CT IMAGING | Facility: IMAGING CENTER | Age: 65
End: 2020-05-12

## 2020-05-12 ENCOUNTER — OUTPATIENT (OUTPATIENT)
Dept: OUTPATIENT SERVICES | Facility: HOSPITAL | Age: 65
LOS: 1 days | End: 2020-05-12
Payer: MEDICAID

## 2020-05-12 DIAGNOSIS — J39.8 OTHER SPECIFIED DISEASES OF UPPER RESPIRATORY TRACT: ICD-10-CM

## 2020-05-12 DIAGNOSIS — I10 ESSENTIAL (PRIMARY) HYPERTENSION: ICD-10-CM

## 2020-05-12 DIAGNOSIS — Z95.1 PRESENCE OF AORTOCORONARY BYPASS GRAFT: Chronic | ICD-10-CM

## 2020-05-12 DIAGNOSIS — R55 SYNCOPE AND COLLAPSE: ICD-10-CM

## 2020-05-12 DIAGNOSIS — Z90.49 ACQUIRED ABSENCE OF OTHER SPECIFIED PARTS OF DIGESTIVE TRACT: Chronic | ICD-10-CM

## 2020-05-12 DIAGNOSIS — T81.30XA DISRUPTION OF WOUND, UNSPECIFIED, INITIAL ENCOUNTER: ICD-10-CM

## 2020-05-12 DIAGNOSIS — N18.9 CHRONIC KIDNEY DISEASE, UNSPECIFIED: ICD-10-CM

## 2020-05-12 DIAGNOSIS — E11.9 TYPE 2 DIABETES MELLITUS WITHOUT COMPLICATIONS: ICD-10-CM

## 2020-05-12 LAB
A1C WITH ESTIMATED AVERAGE GLUCOSE RESULT: 7.9 % — HIGH (ref 4–5.6)
ANION GAP SERPL CALC-SCNC: 16 MMOL/L — SIGNIFICANT CHANGE UP (ref 5–17)
BUN SERPL-MCNC: 39 MG/DL — HIGH (ref 7–23)
CALCIUM SERPL-MCNC: 9.6 MG/DL — SIGNIFICANT CHANGE UP (ref 8.4–10.5)
CHLORIDE SERPL-SCNC: 93 MMOL/L — LOW (ref 96–108)
CK MB CFR SERPL CALC: 2 NG/ML — SIGNIFICANT CHANGE UP (ref 0–6.7)
CK SERPL-CCNC: 35 U/L — SIGNIFICANT CHANGE UP (ref 30–200)
CO2 SERPL-SCNC: 22 MMOL/L — SIGNIFICANT CHANGE UP (ref 22–31)
CREAT SERPL-MCNC: 1.49 MG/DL — HIGH (ref 0.5–1.3)
ESTIMATED AVERAGE GLUCOSE: 180 MG/DL — HIGH (ref 68–114)
GLUCOSE BLDC GLUCOMTR-MCNC: 192 MG/DL — HIGH (ref 70–99)
GLUCOSE BLDC GLUCOMTR-MCNC: 283 MG/DL — HIGH (ref 70–99)
GLUCOSE BLDC GLUCOMTR-MCNC: 288 MG/DL — HIGH (ref 70–99)
GLUCOSE BLDC GLUCOMTR-MCNC: 321 MG/DL — HIGH (ref 70–99)
GLUCOSE SERPL-MCNC: 207 MG/DL — HIGH (ref 70–99)
HCT VFR BLD CALC: 28.8 % — LOW (ref 39–50)
HGB BLD-MCNC: 8.7 G/DL — LOW (ref 13–17)
MCHC RBC-ENTMCNC: 24.9 PG — LOW (ref 27–34)
MCHC RBC-ENTMCNC: 30.2 GM/DL — LOW (ref 32–36)
MCV RBC AUTO: 82.3 FL — SIGNIFICANT CHANGE UP (ref 80–100)
NRBC # BLD: 0 /100 WBCS — SIGNIFICANT CHANGE UP (ref 0–0)
PLATELET # BLD AUTO: 287 K/UL — SIGNIFICANT CHANGE UP (ref 150–400)
POTASSIUM SERPL-MCNC: 5.1 MMOL/L — SIGNIFICANT CHANGE UP (ref 3.5–5.3)
POTASSIUM SERPL-SCNC: 5.1 MMOL/L — SIGNIFICANT CHANGE UP (ref 3.5–5.3)
RBC # BLD: 3.5 M/UL — LOW (ref 4.2–5.8)
RBC # FLD: 16 % — HIGH (ref 10.3–14.5)
SODIUM SERPL-SCNC: 131 MMOL/L — LOW (ref 135–145)
TROPONIN T, HIGH SENSITIVITY RESULT: 110 NG/L — HIGH (ref 0–51)
TROPONIN T, HIGH SENSITIVITY RESULT: 137 NG/L — HIGH (ref 0–51)
WBC # BLD: 13.24 K/UL — HIGH (ref 3.8–10.5)
WBC # FLD AUTO: 13.24 K/UL — HIGH (ref 3.8–10.5)

## 2020-05-12 PROCEDURE — 99233 SBSQ HOSP IP/OBS HIGH 50: CPT

## 2020-05-12 PROCEDURE — 93321 DOPPLER ECHO F-UP/LMTD STD: CPT | Mod: 26

## 2020-05-12 PROCEDURE — 93308 TTE F-UP OR LMTD: CPT | Mod: 26

## 2020-05-12 PROCEDURE — 71250 CT THORAX DX C-: CPT | Mod: 26

## 2020-05-12 RX ORDER — INSULIN LISPRO 100/ML
8 VIAL (ML) SUBCUTANEOUS
Refills: 0 | Status: DISCONTINUED | OUTPATIENT
Start: 2020-05-12 | End: 2020-05-13

## 2020-05-12 RX ORDER — HYDROCORTISONE 1 %
1 OINTMENT (GRAM) TOPICAL ONCE
Refills: 0 | Status: COMPLETED | OUTPATIENT
Start: 2020-05-12 | End: 2020-05-12

## 2020-05-12 RX ORDER — INSULIN LISPRO 100/ML
VIAL (ML) SUBCUTANEOUS
Refills: 0 | Status: DISCONTINUED | OUTPATIENT
Start: 2020-05-12 | End: 2020-05-15

## 2020-05-12 RX ORDER — INSULIN LISPRO 100/ML
VIAL (ML) SUBCUTANEOUS AT BEDTIME
Refills: 0 | Status: DISCONTINUED | OUTPATIENT
Start: 2020-05-12 | End: 2020-05-15

## 2020-05-12 RX ORDER — INSULIN GLARGINE 100 [IU]/ML
20 INJECTION, SOLUTION SUBCUTANEOUS AT BEDTIME
Refills: 0 | Status: DISCONTINUED | OUTPATIENT
Start: 2020-05-12 | End: 2020-05-13

## 2020-05-12 RX ADMIN — Medication 81 MILLIGRAM(S): at 12:57

## 2020-05-12 RX ADMIN — ATORVASTATIN CALCIUM 40 MILLIGRAM(S): 80 TABLET, FILM COATED ORAL at 21:12

## 2020-05-12 RX ADMIN — INSULIN GLARGINE 20 UNIT(S): 100 INJECTION, SOLUTION SUBCUTANEOUS at 21:46

## 2020-05-12 RX ADMIN — SENNA PLUS 2 TABLET(S): 8.6 TABLET ORAL at 21:12

## 2020-05-12 RX ADMIN — Medication 2: at 08:47

## 2020-05-12 RX ADMIN — Medication 4: at 18:06

## 2020-05-12 RX ADMIN — AMIODARONE HYDROCHLORIDE 200 MILLIGRAM(S): 400 TABLET ORAL at 05:54

## 2020-05-12 RX ADMIN — Medication 10 MILLIGRAM(S): at 05:53

## 2020-05-12 RX ADMIN — ENOXAPARIN SODIUM 40 MILLIGRAM(S): 100 INJECTION SUBCUTANEOUS at 12:57

## 2020-05-12 RX ADMIN — SPIRONOLACTONE 25 MILLIGRAM(S): 25 TABLET, FILM COATED ORAL at 05:54

## 2020-05-12 RX ADMIN — Medication 12.5 MILLIGRAM(S): at 05:54

## 2020-05-12 RX ADMIN — Medication 10 UNIT(S): at 08:48

## 2020-05-12 RX ADMIN — Medication 10 UNIT(S): at 12:58

## 2020-05-12 RX ADMIN — Medication 8 UNIT(S): at 18:06

## 2020-05-12 RX ADMIN — POLYETHYLENE GLYCOL 3350 17 GRAM(S): 17 POWDER, FOR SOLUTION ORAL at 12:57

## 2020-05-12 RX ADMIN — Medication 1 APPLICATION(S): at 05:53

## 2020-05-12 RX ADMIN — Medication 100 MILLIGRAM(S): at 23:33

## 2020-05-12 RX ADMIN — Medication 100 MILLIGRAM(S): at 12:58

## 2020-05-12 RX ADMIN — Medication 1: at 21:46

## 2020-05-12 RX ADMIN — Medication 6: at 12:58

## 2020-05-12 RX ADMIN — Medication 100 MILLIGRAM(S): at 05:54

## 2020-05-12 RX ADMIN — Medication 12.5 MILLIGRAM(S): at 17:23

## 2020-05-12 RX ADMIN — Medication 100 MILLIGRAM(S): at 21:13

## 2020-05-12 RX ADMIN — Medication 650 MILLIGRAM(S): at 21:13

## 2020-05-12 RX ADMIN — PANTOPRAZOLE SODIUM 40 MILLIGRAM(S): 20 TABLET, DELAYED RELEASE ORAL at 05:55

## 2020-05-12 NOTE — PROGRESS NOTE ADULT - SUBJECTIVE AND OBJECTIVE BOX
Patient is a 65y old  Male who presents with a chief complaint of syncope (12 May 2020 12:57)      SUBJECTIVE / OVERNIGHT EVENTS: Comfortable without new complaints.   Review of Systems  chest pain no  palpitations no  sob no  nausea no  headache no    MEDICATIONS  (STANDING):  aMIOdarone    Tablet 200 milliGRAM(s) Oral daily  aspirin enteric coated 81 milliGRAM(s) Oral daily  atorvastatin 40 milliGRAM(s) Oral at bedtime  ceFAZolin   IVPB 1000 milliGRAM(s) IV Intermittent every 8 hours  dextrose 5%. 1000 milliLiter(s) (50 mL/Hr) IV Continuous <Continuous>  dextrose 50% Injectable 12.5 Gram(s) IV Push once  dextrose 50% Injectable 25 Gram(s) IV Push once  dextrose 50% Injectable 25 Gram(s) IV Push once  enoxaparin Injectable 40 milliGRAM(s) SubCutaneous daily  insulin glargine Injectable (LANTUS) 20 Unit(s) SubCutaneous at bedtime  insulin lispro (HumaLOG) corrective regimen sliding scale   SubCutaneous three times a day before meals  insulin lispro (HumaLOG) corrective regimen sliding scale   SubCutaneous at bedtime  insulin lispro Injectable (HumaLOG) 8 Unit(s) SubCutaneous three times a day before meals  metoprolol tartrate 12.5 milliGRAM(s) Oral two times a day  pantoprazole    Tablet 40 milliGRAM(s) Oral before breakfast  polyethylene glycol 3350 17 Gram(s) Oral daily  senna 2 Tablet(s) Oral at bedtime  spironolactone 25 milliGRAM(s) Oral daily  torsemide 10 milliGRAM(s) Oral daily    MEDICATIONS  (PRN):  acetaminophen   Tablet .. 650 milliGRAM(s) Oral every 6 hours PRN Temp greater or equal to 38.5C (101.3F), Mild Pain (1 - 3)  dextrose 40% Gel 15 Gram(s) Oral once PRN Blood Glucose LESS THAN 70 milliGRAM(s)/deciliter  glucagon  Injectable 1 milliGRAM(s) IntraMuscular once PRN Glucose LESS THAN 70 milligrams/deciliter      Vital Signs Last 24 Hrs  T(C): 36.7 (12 May 2020 16:08), Max: 37.1 (11 May 2020 19:21)  T(F): 98.1 (12 May 2020 16:08), Max: 98.7 (11 May 2020 19:21)  HR: 99 (12 May 2020 16:08) (99 - 105)  BP: 123/73 (12 May 2020 16:08) (114/74 - 129/86)  BP(mean): --  RR: 19 (12 May 2020 16:08) (19 - 19)  SpO2: 100% (12 May 2020 16:08) (100% - 100%)    PHYSICAL EXAM:  GENERAL: NAD, well-developed  HEAD:  Atraumatic, Normocephalic  EYES: EOMI, PERRLA, conjunctiva and sclera clear  NECK: Supple, No JVD  CHEST/LUNG: Clear to auscultation bilaterally; No wheeze Sternal wound with drainage.  HEART: Regular rate and rhythm; No murmurs, rubs, or gallops  ABDOMEN: Soft, Nontender, Nondistended; Bowel sounds present  EXTREMITIES:  2+ Peripheral Pulses, No clubbing, cyanosis, or edema  PSYCH: AAOx3  NEUROLOGY: non-focal  SKIN: No rashes or lesions    LABS:                        8.7    13.24 )-----------( 287      ( 12 May 2020 07:17 )             28.8     05-12    131<L>  |  93<L>  |  39<H>  ----------------------------<  207<H>  5.1   |  22  |  1.49<H>    Ca    9.6      12 May 2020 07:17  Phos  3.6     05-11  Mg     1.9     05-11    TPro  7.1  /  Alb  2.8<L>  /  TBili  0.4  /  DBili  x   /  AST  9<L>  /  ALT  11  /  AlkPhos  86  05-11    PT/INR - ( 11 May 2020 16:47 )   PT: 14.1 sec;   INR: 1.23 ratio         PTT - ( 11 May 2020 16:47 )  PTT:34.3 sec  CARDIAC MARKERS ( 12 May 2020 00:43 )  x     / x     / 35 U/L / x     / 2.0 ng/mL            RADIOLOGY & ADDITIONAL TESTS:    Imaging Personally Reviewed:    Consultant(s) Notes Reviewed:      Care Discussed with Consultants/Other Providers:

## 2020-05-12 NOTE — CONSULT NOTE ADULT - ASSESSMENT
65M s/p CABG 2/3/20, course complicated by PEA arrest 2/6, E faecalis bacteremia /8, sternal wound infection s/p debridement 2/25 without growth and Right SVG site cellulitis treated with Linezolid through 3/21/20.   Admitted 5/11/20 for syncope in the shower     Chapito Delarosa MD   Infectious Disease   Pager [phone]   After 5PM and on weekends please page fellow on call or call [phone] 65M s/p CABG 2/3/20, course complicated by PEA arrest, E faecalis bacteremia and infections of the sternal and right leg SVG sites, now admitted 5/11/20 for syncope.   No bacteria grew from the sternal OR debridement 2/25; hard bone without overt infection and sternal wires removed.   Received Linezolid through 3/21/20, then another two weeks starting 3/30.   The right leg cellulitis resolved but he's had increased serous drainage from the sternal site. I don't think a bacterial infection is driving this amount of serous drainage (there's a lot) in the absence of signs of local inflammation or systemic illness. Where is the fluid coming from? Is this lymphatic?     Suggest  -CT chest to reassess wound and for fluid source   -f/u wound and blood cultures   -would wait on antibiotics given clinical stability and unclear picture     Spoke with primary team     Chapito Delarosa MD   Infectious Disease   Pager [phone]   After 5PM and on weekends please page fellow on call or call [phone] 65M s/p CABG 2/3/20, course complicated by PEA arrest, E faecalis bacteremia and infections of the sternal and right leg SVG sites, now admitted 5/11/20 for syncope.   No bacteria grew from the sternal OR debridement 2/25; hard bone without overt infection and sternal wires removed.   Received Linezolid through 3/21/20, then another two weeks starting 3/30.   The right leg cellulitis resolved but he's had increased serous drainage from the sternal site. I don't think a bacterial infection is driving this amount of serous drainage (there's a lot) in the absence of signs of local inflammation or systemic illness. Where is the fluid coming from? Is this lymphatic?     Suggest  -CT chest to reassess wound and for fluid source   -f/u wound and blood cultures   -would wait on antibiotics given clinical stability and unclear picture   -local wound care, appreciate plastic surgery input     Spoke with primary team     Chapito Delarosa MD   Infectious Disease   Pager [phone]   After 5PM and on weekends please page fellow on call or call [phone] 65M s/p CABG 2/3/20, course complicated by PEA arrest, E faecalis bacteremia and infections of the sternal and right leg SVG sites, now admitted 5/11/20 for syncope.   No bacteria grew from the sternal OR debridement 2/25; hard bone without overt infection and sternal wires removed.   Received Linezolid through 3/21/20, then another two weeks starting 3/30.   The right leg cellulitis resolved but he's had increased serous drainage from the sternal site. I don't think a bacterial infection is driving this amount of serous drainage (there's a lot) in the absence of signs of local inflammation or systemic illness. Where is the fluid coming from? Is this lymphatic?     Suggest  -CT chest to reassess wound and for fluid source   -f/u wound and blood cultures   -would wait on antibiotics given clinical stability and unclear picture   -local wound care, appreciate plastic surgery input   -I ordered an ESR and CRP for tomorrow     Spoke with primary team     Chapito Delarosa MD   Infectious Disease   Pager [phone]   After 5PM and on weekends please page fellow on call or call [phone]

## 2020-05-12 NOTE — PHYSICAL THERAPY INITIAL EVALUATION ADULT - PLANNED THERAPY INTERVENTIONS, PT EVAL
bed mobility training/GOALS: Pt will negotiate 12 steps with unilateral handrail & step to pattern with independence in 4wks/gait training/transfer training bed mobility training/GOALS: Pt will negotiate 12 steps with unilateral handrail & step to pattern with independence in 4wks; Negative Pressure Wound Therapy/transfer training/gait training

## 2020-05-12 NOTE — CONSULT NOTE ADULT - PROBLEM SELECTOR RECOMMENDATION 9
Will decrease Lantus to 20u at bedtime.  Will decrease Humalog to 8u before each meal and continue Humalog correction scale coverage. Will continue monitoring FS and FU.  Patient counseled for compliance with consistent low carb diet and exercise as tolerated outpatient.

## 2020-05-12 NOTE — CONSULT NOTE ADULT - SUBJECTIVE AND OBJECTIVE BOX
HPI:  65M s/p CABG 2/3/20, course complicated by PEA arrest 2/6, E faecalis bacteremia /8, sternal wound infection s/p debridement 2/25 without growth and Right SVG site cellulitis treated with Linezolid through 3/21/20.   Admitted 5/11/20 for syncope in the shower         PAST MEDICAL & SURGICAL HISTORY:  HTN (hypertension)  Diabetes  S/P CABG (coronary artery bypass graft)  History of appendectomy: x 30 yrs ago      Allergies    No Known Allergies    Intolerances        ANTIMICROBIALS:  ceFAZolin   IVPB 1000 every 8 hours      OTHER MEDS:  acetaminophen   Tablet .. 650 milliGRAM(s) Oral every 6 hours PRN  aMIOdarone    Tablet 200 milliGRAM(s) Oral daily  aspirin enteric coated 81 milliGRAM(s) Oral daily  atorvastatin 40 milliGRAM(s) Oral at bedtime  dextrose 40% Gel 15 Gram(s) Oral once PRN  dextrose 5%. 1000 milliLiter(s) IV Continuous <Continuous>  dextrose 50% Injectable 12.5 Gram(s) IV Push once  dextrose 50% Injectable 25 Gram(s) IV Push once  dextrose 50% Injectable 25 Gram(s) IV Push once  enoxaparin Injectable 40 milliGRAM(s) SubCutaneous daily  glucagon  Injectable 1 milliGRAM(s) IntraMuscular once PRN  insulin glargine Injectable (LANTUS) 30 Unit(s) SubCutaneous at bedtime  insulin lispro (HumaLOG) corrective regimen sliding scale   SubCutaneous three times a day before meals  insulin lispro (HumaLOG) corrective regimen sliding scale   SubCutaneous at bedtime  insulin lispro Injectable (HumaLOG) 10 Unit(s) SubCutaneous before breakfast  insulin lispro Injectable (HumaLOG) 10 Unit(s) SubCutaneous before lunch  insulin lispro Injectable (HumaLOG) 10 Unit(s) SubCutaneous before dinner  metoprolol tartrate 12.5 milliGRAM(s) Oral two times a day  pantoprazole    Tablet 40 milliGRAM(s) Oral before breakfast  polyethylene glycol 3350 17 Gram(s) Oral daily  senna 2 Tablet(s) Oral at bedtime  spironolactone 25 milliGRAM(s) Oral daily  torsemide 10 milliGRAM(s) Oral daily      SOCIAL HISTORY:    FAMILY HISTORY:  FH: type 2 diabetes      ROS:  Unobtainable because:   All other systems negative   Constitutional: no fever, no chills, no weight loss, no night sweats  Eye: no vision changes  ENT: no sore throat, no rhinorrhea  Cardiovascular: no chest pain, no palpitation  Respiratory: no SOB, no cough  GI:  no abd pain, no vomiting, no diarrhea  urinary: no dysuria, no hematuria, no flank pain  musculoskeletal: no joint pain, no joint swelling  skin: no rash  neurology: no headache, no change in mental status  psych: no anxiety    Physical Exam:  General: awake, alert, non toxic  Head: atraumatic, normocephalic  Eyes: normal sclera and conjunctiva  ENT: no oropharyngeal lesions, no LAD, neck supple  Cardio: regular rate and rhythm   Respiratory: nonlabored on room air, clear bilaterally, no wheezing  abd: soft, bowel sounds present, not tender  : no suprapubic tenderness, no thomas  Musculoskeletal: no joint swelling, no edema  Skin: no rash  vascular: no phlebitis  Neurologic: no focal deficits  psych: normal affect       Drug Dosing Weight  Height (cm): 172.72 (11 May 2020 15:22)  Weight (kg): 76.7 (11 May 2020 15:22)  BMI (kg/m2): 25.7 (11 May 2020 15:22)  BSA (m2): 1.9 (11 May 2020 15:22)    Vital Signs Last 24 Hrs  T(F): 98 (05-12-20 @ 05:10), Max: 98.7 (05-11-20 @ 19:21)    Vital Signs Last 24 Hrs  HR: 105 (05-12-20 @ 05:10) (100 - 105)  BP: 129/86 (05-12-20 @ 05:10) (127/77 - 144/74)  RR: 19 (05-12-20 @ 05:10)  SpO2: 100% (05-12-20 @ 05:10) (98% - 100%)  Wt(kg): --                          8.7    13.24 )-----------( 287      ( 12 May 2020 07:17 )             28.8       05-12    131<L>  |  93<L>  |  39<H>  ----------------------------<  207<H>  5.1   |  22  |  1.49<H>    Ca    9.6      12 May 2020 07:17  Phos  3.6     05-11  Mg     1.9     05-11    TPro  7.1  /  Alb  2.8<L>  /  TBili  0.4  /  DBili  x   /  AST  9<L>  /  ALT  11  /  AlkPhos  86  05-11          MICROBIOLOGY:  Blood and wound cultures in lab     RADIOLOGY:  Images below reviewed personally  CT Head No Cont (05.11.20 @ 16:59)   No acute intracranial hemorrhage or mass effect. No displaced calvarial fracture.     Xray Chest 1 View AP/PA (05.11.20 @ 16:24)   Small loculated left pleural effusion, decreased since prior study.  The right lung is clear.  The cardiomediastinal silhouette is enlarged.  The osseous structures are unremarkable. HPI:  65M s/p CABG 2/3/20, course complicated by PEA arrest 2/6, Enterococcus faecalis bacteremia 2/8, sternal wound infection s/p debridement 2/25 (no growth) and cellulitis of the right SVG site which were treated with Linezolid through 3/21/20.   On follow up 3/30, Linezolid was restarted due to some residual tenderness around the sternal site and erythema at the right leg.   Since then he's had increased amounts of serous drainage from the sternal wound which had a woundvac on. There was no purulence, erythema or fevers or chills.   Now admitted 5/11/20 for syncope in the shower and hitting the back of his head.   Feels ok now. Main complaint is ongoing fatigue and dyspnea since discharge.     PAST MEDICAL & SURGICAL HISTORY:  HTN (hypertension)  Diabetes  S/P CABG (coronary artery bypass graft)  History of appendectomy: x 30 yrs ago      Allergies    No Known Allergies    Intolerances    ANTIMICROBIALS:  ceFAZolin   IVPB 1000 every 8 hours      OTHER MEDS:  acetaminophen   Tablet .. 650 milliGRAM(s) Oral every 6 hours PRN  aMIOdarone    Tablet 200 milliGRAM(s) Oral daily  aspirin enteric coated 81 milliGRAM(s) Oral daily  atorvastatin 40 milliGRAM(s) Oral at bedtime  dextrose 40% Gel 15 Gram(s) Oral once PRN  dextrose 5%. 1000 milliLiter(s) IV Continuous <Continuous>  dextrose 50% Injectable 12.5 Gram(s) IV Push once  dextrose 50% Injectable 25 Gram(s) IV Push once  dextrose 50% Injectable 25 Gram(s) IV Push once  enoxaparin Injectable 40 milliGRAM(s) SubCutaneous daily  glucagon  Injectable 1 milliGRAM(s) IntraMuscular once PRN  insulin glargine Injectable (LANTUS) 30 Unit(s) SubCutaneous at bedtime  insulin lispro (HumaLOG) corrective regimen sliding scale   SubCutaneous three times a day before meals  insulin lispro (HumaLOG) corrective regimen sliding scale   SubCutaneous at bedtime  insulin lispro Injectable (HumaLOG) 10 Unit(s) SubCutaneous before breakfast  insulin lispro Injectable (HumaLOG) 10 Unit(s) SubCutaneous before lunch  insulin lispro Injectable (HumaLOG) 10 Unit(s) SubCutaneous before dinner  metoprolol tartrate 12.5 milliGRAM(s) Oral two times a day  pantoprazole    Tablet 40 milliGRAM(s) Oral before breakfast  polyethylene glycol 3350 17 Gram(s) Oral daily  senna 2 Tablet(s) Oral at bedtime  spironolactone 25 milliGRAM(s) Oral daily  torsemide 10 milliGRAM(s) Oral daily    SOCIAL HISTORY: Never smoker. Seldom alcohol. .     FAMILY HISTORY:  Father with DM  No heart disease     ROS:  All other systems negative   Constitutional: no fever, no chills. fatigue   Eye: no vision changes  ENT: no sore throat, no rhinorrhea  Cardiovascular: no chest pain but lots of drainage from the wound. no palpitation  Respiratory: SOB on exertion   GI:  no abd pain, no vomiting, no diarrhea  urinary: no dysuria, no hematuria, no flank pain  musculoskeletal: no joint pain, no joint swelling  skin: no rash  neurology: no headache, no change in mental status  psych: no anxiety    Physical Exam:  General: awake, alert, non toxic  Head: atraumatic, normocephalic  Eyes: normal sclera and conjunctiva  ENT: no oropharyngeal lesions, neck supple  Cardio: regular rate and rhythm   Respiratory: nonlabored on room air, grossly clear bilaterally, no wheezing  abd: soft, bowel sounds present, not tender  : no thmoas  Musculoskeletal: no joint swelling, no edema  Skin: sternal wound around xiphoid process with large opening about 4-5cm, copious thin serous liquid flowing out from the wound, no clear purulence, no surrounding cellulitis, unable to see base of wound due to fluid output. right leg SVG site without local inflammation   vascular: no phlebitis  Neurologic: no focal deficits  psych: normal affect       Drug Dosing Weight  Height (cm): 172.72 (11 May 2020 15:22)  Weight (kg): 76.7 (11 May 2020 15:22)  BMI (kg/m2): 25.7 (11 May 2020 15:22)  BSA (m2): 1.9 (11 May 2020 15:22)    Vital Signs Last 24 Hrs  T(F): 98 (05-12-20 @ 05:10), Max: 98.7 (05-11-20 @ 19:21)    Vital Signs Last 24 Hrs  HR: 105 (05-12-20 @ 05:10) (100 - 105)  BP: 129/86 (05-12-20 @ 05:10) (127/77 - 144/74)  RR: 19 (05-12-20 @ 05:10)  SpO2: 100% (05-12-20 @ 05:10) (98% - 100%)  Wt(kg): --                          8.7    13.24 )-----------( 287      ( 12 May 2020 07:17 )             28.8       05-12    131<L>  |  93<L>  |  39<H>  ----------------------------<  207<H>  5.1   |  22  |  1.49<H>    Ca    9.6      12 May 2020 07:17  Phos  3.6     05-11  Mg     1.9     05-11    TPro  7.1  /  Alb  2.8<L>  /  TBili  0.4  /  DBili  x   /  AST  9<L>  /  ALT  11  /  AlkPhos  86  05-11          MICROBIOLOGY:  Blood and wound cultures in lab     RADIOLOGY:  Images below reviewed personally  CT Head No Cont (05.11.20 @ 16:59)   No acute intracranial hemorrhage or mass effect. No displaced calvarial fracture.     Xray Chest 1 View AP/PA (05.11.20 @ 16:24)   Small loculated left pleural effusion, decreased since prior study.  The right lung is clear.  The cardiomediastinal silhouette is enlarged.  The osseous structures are unremarkable.

## 2020-05-12 NOTE — CONSULT NOTE ADULT - SUBJECTIVE AND OBJECTIVE BOX
CARDIOLOGY CONSULT - Dr. Steel     CHIEF COMPLAINT: syncope     HPI:  65y m PMHx CABG 2020 complicated by PEA arrest, thoracotomy site infection w/empyema, DM, HTN p/w syncope. Pt brought in by EMS reports a syncopal episode in the shower. Pt states he felt dizzy and woke up in the shower, hit the back of his head. Pt. endorses decreased PO intake throughout the day, decreased appetite. He also reports his wound care nurse was concerned for increase in discharge from his sternal wound. C/o of chest discomfort, CP is located at the sternal wound, nonradiating. Pt. C/o of unchanged SOB. No known COVID contacts. Denies fever, chills, NVDC.       PAST MEDICAL & SURGICAL HISTORY:  HTN (hypertension)  Diabetes  S/P CABG (coronary artery bypass graft)  History of appendectomy: x 30 yrs ago          PREVIOUS DIAGNOSTIC TESTING:    [ ] Echocardiogram: < from: TTE with Doppler (w/Cont) (02.15.20 @ 14:00) >  ------------------------------------------------------------------------  Conclusions:  1. Endocardial visualization enhanced with intravenous  injection of Ultrasonic Enhancing Agent (Definity). Normal  left ventricular systolic function. No segmental wall  motion abnormalities.  2. Normal right ventricular size and systolic function.    < end of copied text >    [ ]  Catheterization: < from: Cardiac Cath Lab - Adult (20 @ 14:29) >    [facility]  Department of Cardiology  [address] [place] [address]  [phone]  Cath Lab Report -- Comprehensive Report  Patient: FRANKLIN PRESLEY  Study date: 2020  Account number: [id number]  MR number: [id number]  : 1955  Gender: Male  Race: O  Case Physician(s):  Glen Steel M.D.  Referring Physician:  INDICATIONS: Unstable angina - CCS3.  HISTORY: There was no prior cardiac history. The patient has hypertension,  oral hypoglycemic-treated diabetes, and renal failure (not requiring  dialysis).  PROCEDURE:  --  Left coronary angiography.  --  Right coronary angiography.  TECHNIQUE: The risks and alternatives of the procedures and conscious  sedation were explained to the patient and informed consent was obtained.  Cardiac catheterization performed electively.  Local anesthetic given. Right femoral artery access. Left coronary artery  angiography. The vessel was injected utilizing a catheter. Right coronary  artery angiography. The vessel was injected utilizing a catheter.  RADIATION EXPOSURE: 2.6 min.  CONTRAST GIVEN: Omnipaque 31 ml.  MEDICATIONS GIVEN: Midazolam, 1 mg, IV. Fentanyl, 25 mcg, IV.  CORONARY VESSELS: The coronary circulation is right dominant.  LM:   --  LM: Normal.  LAD:   --  Proximal LAD: There was a discrete 80 % stenosis.  --  Distal LAD: There was a 60 % stenosis.  CX:   --  OM1: There was a 80 % stenosis.  RI:   --  Ramus intermedius: The vessel was small to medium sized.  Angiography showed severe atherosclerosis.  RCA:   --  Distal RCA: There was a 95 % stenosis.  COMPLICATIONS: There were no complications.  DIAGNOSTIC IMPRESSIONS: Severe triple vessel disease including lesions at  the bifurcation of the LAD/diagonal and distal RCA/RPDA/RPL trifurcation.  DIAGNOSTIC RECOMMENDATIONS: CTS evaluation for possible CABG versus high  risk/multivessel PCI.  Continue current medical management.  INTERVENTIONAL IMPRESSIONS: Severe triple vessel disease including lesions  at the bifurcation of the LAD/diagonal and distal RCA/RPDA/RPL  trifurcation.  INTERVENTIONAL RECOMMENDATIONS: CTS evaluation for possible CABG versus  high risk/multivessel PCI.  Continue current medical management.  DISPOSITION: The patient left the catheterization laboratory instable  condition.  Prepared and signed by  Glen Steel M.D.    < end of copied text >    [ ] Stress Test:  < from: Nuclear Stress Test-Pharmacologic (20 @ 11:41) >  IMPRESSIONS:Abnormal Study  * Chest Pain: No chest pain with administration of  Regadenoson.  However, during the initial attempt at  exercise stress, patient complained of 7/10 chest pressure  starting at 4:34 min of exercise and persisting 7:41 min  in recovery.  * Symptom: neck discomfort, abd discomfort,SOB.  * HR Response: Appropriate.  * BP Response: Appropriate.  * Heart Rhythm: Sinus Rhythm - 79 BPM.  * Conduction defects: Right axis deviation.  * Baseline ECG: Nonspecific ST-T wave abnormality.  * ECG Changes: No significant ischemic ST segmentchanges  beyond baseline abnormalities.  * Arrhythmia: None.  * Review of raw data shows: Significant motion artfact.  * The left ventricle was normal in size. There are large,  mild to moderate defects in the inferolateral, basal  inferior, and basal inferoseptal walls that are mostly  fixed suggestive of infarct with mild luis-infarct  ischemia.  * There are medium-sized, mild to moderate defects in the  anteroseptal and apical walls that are mostly fixed  suggestive of infarct with mild luis-infarct ischemia.  * Post-stress gated wall motion analysis was performed  (LVEF = 55 %;LVEDV = 76 ml.) revealing hypokinesis of the  inferolateral, basal inferior, basal inferoseptal, and  anteroseptal walls and reduced systolic thickening of the  apex.  *** No previous Nuclear/Stress exam.    < end of copied text >  	    MEDICATIONS:  MEDICATIONS  (STANDING):  aMIOdarone    Tablet 200 milliGRAM(s) Oral daily  aspirin enteric coated 81 milliGRAM(s) Oral daily  atorvastatin 40 milliGRAM(s) Oral at bedtime  ceFAZolin   IVPB 1000 milliGRAM(s) IV Intermittent every 8 hours  dextrose 5%. 1000 milliLiter(s) (50 mL/Hr) IV Continuous <Continuous>  dextrose 50% Injectable 12.5 Gram(s) IV Push once  dextrose 50% Injectable 25 Gram(s) IV Push once  dextrose 50% Injectable 25 Gram(s) IV Push once  enoxaparin Injectable 40 milliGRAM(s) SubCutaneous daily  insulin glargine Injectable (LANTUS) 30 Unit(s) SubCutaneous at bedtime  insulin lispro (HumaLOG) corrective regimen sliding scale   SubCutaneous three times a day before meals  insulin lispro (HumaLOG) corrective regimen sliding scale   SubCutaneous at bedtime  insulin lispro Injectable (HumaLOG) 10 Unit(s) SubCutaneous before breakfast  insulin lispro Injectable (HumaLOG) 10 Unit(s) SubCutaneous before lunch  insulin lispro Injectable (HumaLOG) 10 Unit(s) SubCutaneous before dinner  metoprolol tartrate 12.5 milliGRAM(s) Oral two times a day  pantoprazole    Tablet 40 milliGRAM(s) Oral before breakfast  polyethylene glycol 3350 17 Gram(s) Oral daily  senna 2 Tablet(s) Oral at bedtime  spironolactone 25 milliGRAM(s) Oral daily  torsemide 10 milliGRAM(s) Oral daily      FAMILY HISTORY:  FH: type 2 diabetes      SOCIAL HISTORY:    [ ] Non-smoker  [ x] former Smoker  [ ] Alcohol    Allergies    No Known Allergies    Intolerances    	    REVIEW OF SYSTEMS:  CONSTITUTIONAL: No fever, weight loss, or fatigue  EYES: No eye pain, visual disturbances, or discharge  ENMT:  No difficulty hearing, tinnitus, vertigo; No sinus or throat pain  NECK: No pain or stiffness  RESPIRATORY: No cough, wheezing, chills or hemoptysis; +Shortness of Breath  CARDIOVASCULAR: No chest pain, palpitations, passing out, dizziness, or leg swelling  GASTROINTESTINAL: No abdominal or epigastric pain. No nausea, vomiting, or hematemesis; No diarrhea or constipation. No melena or hematochezia.  GENITOURINARY: No dysuria, frequency, hematuria, or incontinence  NEUROLOGICAL: No headaches, memory loss, loss of strength, numbness, or tremors  SKIN: No itching, burning, rashes, or lesions   	    [x ] All others negative	  [ ] Unable to obtain    PHYSICAL EXAM:  T(C): 36.7 (20 @ 05:10), Max: 37.1 (20 @ 19:21)  HR: 105 (20 @ 05:10) (100 - 105)  BP: 114/74 (20 @ 12:15) (114/74 - 144/74)  RR: 19 (20 @ 05:10) (19 - 22)  SpO2: 100% (20 @ 05:10) (98% - 100%)  Wt(kg): --  I&O's Summary    12 May 2020 07:01  -  12 May 2020 12:28  --------------------------------------------------------  IN: 0 mL / OUT: 550 mL / NET: -550 mL        Appearance: Normal	  Psychiatry: A & O x 3, Mood & affect appropriate  HEENT:   Normal oral mucosa, PERRL, EOMI	  Lymphatic: No lymphadenopathy  Cardiovascular: Normal S1 S2,RRR, No JVD, No murmurs, +mid sternal surgical incision, +wound packing/dsg in place   Respiratory: diminished   Gastrointestinal:  Soft, Non-tender, + BS	  Skin: No rashes, No ecchymoses, No cyanosis	  Neurologic: Non-focal  Extremities: Normal range of motion, No clubbing, cyanosis or edema  Vascular: Peripheral pulses palpable 2+ bilaterally    TELEMETRY: NSR  	    ECG:  	, incomplete RBBB  RADIOLOGY: < from: Xray Chest 1 View AP/PA (20 @ 16:24) >    IMPRESSION:   Small loculated left pleural effusion, decreased since prior study.    < end of copied text >    OTHER: 	  	  LABS:	 	    CARDIAC MARKERS:                                  8.7    13.24 )-----------( 287      ( 12 May 2020 07:17 )             28.8         131<L>  |  93<L>  |  39<H>  ----------------------------<  207<H>  5.1   |  22  |  1.49<H>    Ca    9.6      12 May 2020 07:17  Phos  3.6       Mg     1.9         TPro  7.1  /  Alb  2.8<L>  /  TBili  0.4  /  DBili  x   /  AST  9<L>  /  ALT  11  /  AlkPhos  86  -    PT/INR - ( 11 May 2020 16:47 )   PT: 14.1 sec;   INR: 1.23 ratio         PTT - ( 11 May 2020 16:47 )  PTT:34.3 sec  proBNP: Serum Pro-Brain Natriuretic Peptide: 1000 pg/mL ( @ 16:47)    Lipid Profile:   HgA1c:   TSH:

## 2020-05-12 NOTE — CONSULT NOTE ADULT - SUBJECTIVE AND OBJECTIVE BOX
HPI:  65y m PMHx CABG Feb 2020 complicated by PEA arrest, thoracotomy site infection w/empyema, DM, HTN p/w syncope. Pt brought in by EMS reports a syncopal episode in the shower. Pt states he felt dizzy and woke up in the shower, hit the back of his head. He also reports his wound care nurse was concerned for increase in discharge from his sternal wound. CP is located at the sternal wound, nonradiating. Pt admits to decreased appetite, SOB and chest pain. No known COVID contacts. Denies fever, chills, NVDC (11 May 2020 22:04)  Patient known from previous hospital admission; Has history of diabetes, A1C 7.9%, was on insulin at home, no recent hypoglycemic episodes, no polyuria polydipsia. Patient follows up with PCP for diabetes management.  Endo was consulted for glycemic control.    PAST MEDICAL & SURGICAL HISTORY:  HTN (hypertension)  Diabetes  S/P CABG (coronary artery bypass graft)  History of appendectomy: x 30 yrs ago      FAMILY HISTORY:  FH: type 2 diabetes      Social History:    Outpatient Medications:    MEDICATIONS  (STANDING):  aMIOdarone    Tablet 200 milliGRAM(s) Oral daily  aspirin enteric coated 81 milliGRAM(s) Oral daily  atorvastatin 40 milliGRAM(s) Oral at bedtime  ceFAZolin   IVPB 1000 milliGRAM(s) IV Intermittent every 8 hours  dextrose 5%. 1000 milliLiter(s) (50 mL/Hr) IV Continuous <Continuous>  dextrose 50% Injectable 12.5 Gram(s) IV Push once  dextrose 50% Injectable 25 Gram(s) IV Push once  dextrose 50% Injectable 25 Gram(s) IV Push once  enoxaparin Injectable 40 milliGRAM(s) SubCutaneous daily  insulin glargine Injectable (LANTUS) 30 Unit(s) SubCutaneous at bedtime  insulin lispro (HumaLOG) corrective regimen sliding scale   SubCutaneous three times a day before meals  insulin lispro (HumaLOG) corrective regimen sliding scale   SubCutaneous at bedtime  insulin lispro Injectable (HumaLOG) 10 Unit(s) SubCutaneous before breakfast  insulin lispro Injectable (HumaLOG) 10 Unit(s) SubCutaneous before lunch  insulin lispro Injectable (HumaLOG) 10 Unit(s) SubCutaneous before dinner  metoprolol tartrate 12.5 milliGRAM(s) Oral two times a day  pantoprazole    Tablet 40 milliGRAM(s) Oral before breakfast  polyethylene glycol 3350 17 Gram(s) Oral daily  senna 2 Tablet(s) Oral at bedtime  spironolactone 25 milliGRAM(s) Oral daily  torsemide 10 milliGRAM(s) Oral daily    MEDICATIONS  (PRN):  acetaminophen   Tablet .. 650 milliGRAM(s) Oral every 6 hours PRN Temp greater or equal to 38.5C (101.3F), Mild Pain (1 - 3)  dextrose 40% Gel 15 Gram(s) Oral once PRN Blood Glucose LESS THAN 70 milliGRAM(s)/deciliter  glucagon  Injectable 1 milliGRAM(s) IntraMuscular once PRN Glucose LESS THAN 70 milligrams/deciliter      Allergies    No Known Allergies    Intolerances      Review of Systems:  Constitutional: No fever, no chills  Eyes: No blurry vision  Neuro: No tremors  HEENT: No pain, no neck swelling  Cardiovascular: No chest pain, no palpitations  Respiratory: Has SOB, no cough  GI: No nausea, vomiting, abdominal pain  : No dysuria  Skin: no rash  MSK: Has leg swelling.  Psych: no depression  Endocrine: no polyuria, polydipsia    ALL OTHER SYSTEMS REVIEWED AND NEGATIVE    UNABLE TO OBTAIN    PHYSICAL EXAM:  VITALS: T(C): 36.7 (05-12-20 @ 05:10)  T(F): 98 (05-12-20 @ 05:10), Max: 98.7 (05-11-20 @ 19:21)  HR: 105 (05-12-20 @ 05:10) (100 - 105)  BP: 114/74 (05-12-20 @ 12:15) (114/74 - 144/74)  RR:  (19 - 22)  SpO2:  (98% - 100%)  Wt(kg): --  GENERAL: NAD, well-groomed, well-developed  EYES: No proptosis, no lid lag  HEENT:  Atraumatic, Normocephalic  THYROID: Normal size, no palpable nodules  RESPIRATORY: Clear to auscultation bilaterally; No rales, rhonchi, wheezing  CARDIOVASCULAR: Si S2, No murmurs;  GI: Soft, non distended, normal bowel sounds  SKIN: Dry, intact, No rashes or lesions  MUSCULOSKELETAL: Has BL lower extremity edema.  NEURO:  no tremor, sensation decreased in feet BL,    POCT Blood Glucose.: 288 mg/dL (05-12-20 @ 12:51)  POCT Blood Glucose.: 192 mg/dL (05-12-20 @ 08:35)  POCT Blood Glucose.: 228 mg/dL (05-11-20 @ 22:04)  POCT Blood Glucose.: 348 mg/dL (05-11-20 @ 15:59)                            8.7    13.24 )-----------( 287      ( 12 May 2020 07:17 )             28.8       05-12    131<L>  |  93<L>  |  39<H>  ----------------------------<  207<H>  5.1   |  22  |  1.49<H>    EGFR if : 56<L>  EGFR if non : 49<L>    Ca    9.6      05-12  Mg     1.9     05-11  Phos  3.6     05-11    TPro  7.1  /  Alb  2.8<L>  /  TBili  0.4  /  DBili  x   /  AST  9<L>  /  ALT  11  /  AlkPhos  86  05-11      Thyroid Function Tests:              Radiology:

## 2020-05-12 NOTE — CONSULT NOTE ADULT - SUBJECTIVE AND OBJECTIVE BOX
Plastic Surgery Consult Note  ([phone])    HPI:  65y m PMHx CABG Feb 2020 complicated by PEA arrest, sternal infection s/p sternal debridement with muscle flap reconstruction, DM, HTN p/w syncope. Pt brought in by EMS reports a syncopal episode in the shower. Pt states he felt dizzy and woke up in the shower, hit the back of his head. Pt had been on wound vac and he also reports his wound care nurse was concerned for increase in discharge from his sternal wound. CP is located at the sternal wound, nonradiating. Pt admits to decreased appetite, SOB and chest pain. No known COVID contacts. Denies fever, chills, NVDC (11 May 2020 22:04)      PAST MEDICAL & SURGICAL HISTORY:  HTN (hypertension)  Diabetes  S/P CABG (coronary artery bypass graft)  History of appendectomy: x 30 yrs ago      Allergies    No Known Allergies    Intolerances        Home Medications:  benzonatate 100 mg oral capsule: 1 cap(s) orally 3 times a day, As Needed (11 May 2020 20:55)  polyethylene glycol 3350 oral powder for reconstitution: 17 gram(s) orally once a day (11 May 2020 20:55)  senna oral tablet: 2 tab(s) orally once a day (at bedtime) (11 May 2020 20:55)      MEDICATIONS  (STANDING):  aMIOdarone    Tablet 200 milliGRAM(s) Oral daily  aspirin enteric coated 81 milliGRAM(s) Oral daily  atorvastatin 40 milliGRAM(s) Oral at bedtime  ceFAZolin   IVPB 1000 milliGRAM(s) IV Intermittent every 8 hours  dextrose 5%. 1000 milliLiter(s) (50 mL/Hr) IV Continuous <Continuous>  dextrose 50% Injectable 12.5 Gram(s) IV Push once  dextrose 50% Injectable 25 Gram(s) IV Push once  dextrose 50% Injectable 25 Gram(s) IV Push once  enoxaparin Injectable 40 milliGRAM(s) SubCutaneous daily  insulin glargine Injectable (LANTUS) 30 Unit(s) SubCutaneous at bedtime  insulin lispro (HumaLOG) corrective regimen sliding scale   SubCutaneous three times a day before meals  insulin lispro (HumaLOG) corrective regimen sliding scale   SubCutaneous at bedtime  insulin lispro Injectable (HumaLOG) 10 Unit(s) SubCutaneous before breakfast  insulin lispro Injectable (HumaLOG) 10 Unit(s) SubCutaneous before lunch  insulin lispro Injectable (HumaLOG) 10 Unit(s) SubCutaneous before dinner  metoprolol tartrate 12.5 milliGRAM(s) Oral two times a day  pantoprazole    Tablet 40 milliGRAM(s) Oral before breakfast  polyethylene glycol 3350 17 Gram(s) Oral daily  senna 2 Tablet(s) Oral at bedtime  spironolactone 25 milliGRAM(s) Oral daily  torsemide 10 milliGRAM(s) Oral daily      SOCIAL HISTORY:    FAMILY HISTORY:  FH: type 2 diabetes      ___________________________________________  REVIEW OF SYSTEMS:    Constitutional: No fevers, chills, no recent weight loss  ENMT: No changes in hearing, no changes in vision, no sore throat, no cough  Respiratory: No shortness of breath  Cardiovascular: No chest pain, palpitations  Gastrointestinal: No abdominal pain, no diarrhea/constipation  Genitourinary: No dysuria, frequency, or urgency    Extremities: No joint swelling, no limited range of movement  Neurological: No paresthesia  Skin: No rashes    ___________________________________________  OBJECTIVE:  Vital Signs Last 24 Hrs  T(C): 36.7 (12 May 2020 05:10), Max: 37.1 (11 May 2020 19:21)  T(F): 98 (12 May 2020 05:10), Max: 98.7 (11 May 2020 19:21)  HR: 105 (12 May 2020 05:10) (100 - 105)  BP: 129/86 (12 May 2020 05:10) (127/77 - 144/74)  BP(mean): --  RR: 19 (12 May 2020 05:10) (19 - 22)  SpO2: 100% (12 May 2020 05:10) (98% - 100%)CAPILLARY BLOOD GLUCOSE      POCT Blood Glucose.: 192 mg/dL (12 May 2020 08:35)    I&O's Detail    12 May 2020 07:01  -  12 May 2020 11:54  --------------------------------------------------------  IN:  Total IN: 0 mL    OUT:    Voided: 550 mL  Total OUT: 550 mL    Total NET: -550 mL          PHYSICAL EXAM:    General: Alert, NAD  Chest:  inferior sternal wound 3 x 4 cm with drainage, nontender, no erythema  _________________________________  LABS:  CBC Full  -  ( 12 May 2020 07:17 )  WBC Count : 13.24 K/uL  RBC Count : 3.50 M/uL  Hemoglobin : 8.7 g/dL  Hematocrit : 28.8 %  Platelet Count - Automated : 287 K/uL  Mean Cell Volume : 82.3 fl  Mean Cell Hemoglobin : 24.9 pg  Mean Cell Hemoglobin Concentration : 30.2 gm/dL  Auto Neutrophil # : x  Auto Lymphocyte # : x  Auto Monocyte # : x  Auto Eosinophil # : x  Auto Basophil # : x  Auto Neutrophil % : x  Auto Lymphocyte % : x  Auto Monocyte % : x  Auto Eosinophil % : x  Auto Basophil % : x    05-12    131<L>  |  93<L>  |  39<H>  ----------------------------<  207<H>  5.1   |  22  |  1.49<H>    Ca    9.6      12 May 2020 07:17  Phos  3.6     05-11  Mg     1.9     05-11    TPro  7.1  /  Alb  2.8<L>  /  TBili  0.4  /  DBili  x   /  AST  9<L>  /  ALT  11  /  AlkPhos  86  05-11    LIVER FUNCTIONS - ( 11 May 2020 16:47 )  Alb: 2.8 g/dL / Pro: 7.1 g/dL / ALK PHOS: 86 U/L / ALT: 11 U/L / AST: 9 U/L / GGT: x           PT/INR - ( 11 May 2020 16:47 )   PT: 14.1 sec;   INR: 1.23 ratio         PTT - ( 11 May 2020 16:47 )  PTT:34.3 sec        ____________________________________________  MICRO:    ____________________________________________  RADIOLOGY:

## 2020-05-12 NOTE — CONSULT NOTE ADULT - ASSESSMENT
Assessment  DMT2: 65y Male with DM T2 with hyperglycemia, A1C 7.9%, was on insulin at home, now on insulin, Lantus not given last night, blood sugars running high and not at target, no hypoglycemic episodes. Patient appears alert and comfortable, reports lack of appetite.  CAD: s/p CABG previous admission, stable, monitored.  Sternal Wound: On IV ABx, FU Plastics/ID.  HTN: Controlled,  on antihypertensive medications.  CKD: Monitor labs/BMP.          Harper Matias MD  Cell: [phone]  Office: [phone] Assessment  DMT2: 65y Male with DM T2 with hyperglycemia,  A1C 7.9%, was on insulin at home, now on insulin, Lantus not given last night, blood sugars running high and not at target, no hypoglycemic episodes. Patient appears alert and comfortable, reports lack of appetite.  CAD: s/p CABG previous admission, stable, monitored.  Sternal Wound: On IV ABx, FU Plastics/ID.  HTN: Controlled,  on antihypertensive medications.  CKD: Monitor labs/BMP.          Harper Matias MD  Cell: [phone]  Office: [phone]

## 2020-05-12 NOTE — PROGRESS NOTE ADULT - ASSESSMENT
65 m with    Syncope  - telemetry  - cardiology evaluation     Orthostatic hypotension  - DC aldactone  - follow    S/P CABG  - CT sx evaluation noted    Sternal wound  - Plastic surgery evaluation noted  - wound and blood cultures pending   - Ancef empiric  - ID evaluation   - CT chest    Diabetes control  - ADA diet  - BS control  - Endocrine evaluation    CKD  - follow    HTN control    Miguel Angel Duke MD pager [phone]

## 2020-05-12 NOTE — CONSULT NOTE ADULT - ASSESSMENT
Sternal wound with increased drainage  wound vac application to control collection of drainage  discussed with pt possibility of further operative management

## 2020-05-12 NOTE — CONSULT NOTE ADULT - ASSESSMENT
65y m PMHx CABG Feb 2020 complicated by PEA arrest, thoracotomy site infection w/empyema, DM, HTN p/w syncope, sternal wound infection.     1. Syncope   cv stable, EKG unchanged from prior   HST elevated w/ normal CK/CKMB, demand ischemia in setting of STEPHANIE/CKD, possible wound infection   CT head negative for ICH  check orthostatics   check echo   continue tele monitoring   Endo eval for uncontrolled DM     2. Sternal wound infection s/p RTOR for SWI    plan for wound vac application  IV abx per ID   f/u blood cx   possible further operative management discussed with patient per plastics   ID, Plastic surgery f/u     3. CAD s/p CABG x 4 , s/p PEA arrest   cv stable   cont asa, statin, bb   repeat echo pending     4. Chronic diastolic CHF  euvolemic on exam with unchanged SOB   continue diuretics as ordered     5. PAF  remains NSR  continue amio, bb    6. HTN  bp stable    7. STEPHANIE/CKD   continue to trend creat     8. Pleural effusion, hx  CXR this admission with small left loculated pleural effusion now decreased in size from previous exam.    dvt ppx 65y m PMHx CABG Feb 2020 complicated by PEA arrest, thoracotomy site infection w/empyema, DM, HTN p/w syncope, sternal wound infection.     1. Syncope   likely 2/2 to +orthostatics  cv stable, EKG unchanged from prior   HST elevated w/ normal CK/CKMB, demand ischemia in setting of STEPHANIE/CKD, possible wound infection   CT head negative for ICH  check echo   continue tele monitoring   encourage PO intake   Endo eval for hx DM     2. Sternal wound infection s/p RTOR for SWI    plan for wound vac application  IV abx per ID   f/u blood cx   possible further operative management discussed with patient per plastics   ID, Plastic surgery f/u     3. CAD s/p CABG x 4 , s/p PEA arrest   cv stable   cont asa, statin, bb   repeat echo pending     4. Chronic diastolic CHF  euvolemic on exam with unchanged SOB   continue diuretics as ordered     5. PAF  remains NSR  continue amio, bb    6. HTN  bp stable    7. STEPHANIE/CKD   continue to trend creat     8. Pleural effusion, hx  CXR this admission with small left loculated pleural effusion now decreased in size from previous exam.    dvt ppx

## 2020-05-13 LAB
ANION GAP SERPL CALC-SCNC: 18 MMOL/L — HIGH (ref 5–17)
BUN SERPL-MCNC: 40 MG/DL — HIGH (ref 7–23)
CALCIUM SERPL-MCNC: 9.7 MG/DL — SIGNIFICANT CHANGE UP (ref 8.4–10.5)
CHLORIDE SERPL-SCNC: 95 MMOL/L — LOW (ref 96–108)
CO2 SERPL-SCNC: 21 MMOL/L — LOW (ref 22–31)
CREAT SERPL-MCNC: 1.68 MG/DL — HIGH (ref 0.5–1.3)
CRP SERPL-MCNC: 18.03 MG/DL — HIGH (ref 0–0.4)
ERYTHROCYTE [SEDIMENTATION RATE] IN BLOOD: 120 MM/HR — HIGH (ref 0–20)
GLUCOSE BLDC GLUCOMTR-MCNC: 125 MG/DL — HIGH (ref 70–99)
GLUCOSE BLDC GLUCOMTR-MCNC: 184 MG/DL — HIGH (ref 70–99)
GLUCOSE BLDC GLUCOMTR-MCNC: 251 MG/DL — HIGH (ref 70–99)
GLUCOSE BLDC GLUCOMTR-MCNC: 362 MG/DL — HIGH (ref 70–99)
GLUCOSE SERPL-MCNC: 142 MG/DL — HIGH (ref 70–99)
HCT VFR BLD CALC: 29.9 % — LOW (ref 39–50)
HGB BLD-MCNC: 9 G/DL — LOW (ref 13–17)
MCHC RBC-ENTMCNC: 24.8 PG — LOW (ref 27–34)
MCHC RBC-ENTMCNC: 30.1 GM/DL — LOW (ref 32–36)
MCV RBC AUTO: 82.4 FL — SIGNIFICANT CHANGE UP (ref 80–100)
NRBC # BLD: 0 /100 WBCS — SIGNIFICANT CHANGE UP (ref 0–0)
PLATELET # BLD AUTO: 329 K/UL — SIGNIFICANT CHANGE UP (ref 150–400)
POTASSIUM SERPL-MCNC: 4.2 MMOL/L — SIGNIFICANT CHANGE UP (ref 3.5–5.3)
POTASSIUM SERPL-SCNC: 4.2 MMOL/L — SIGNIFICANT CHANGE UP (ref 3.5–5.3)
RBC # BLD: 3.63 M/UL — LOW (ref 4.2–5.8)
RBC # FLD: 15.9 % — HIGH (ref 10.3–14.5)
SARS-COV-2 RNA SPEC QL NAA+PROBE: SIGNIFICANT CHANGE UP
SODIUM SERPL-SCNC: 134 MMOL/L — LOW (ref 135–145)
WBC # BLD: 9.91 K/UL — SIGNIFICANT CHANGE UP (ref 3.8–10.5)
WBC # FLD AUTO: 9.91 K/UL — SIGNIFICANT CHANGE UP (ref 3.8–10.5)

## 2020-05-13 PROCEDURE — 99233 SBSQ HOSP IP/OBS HIGH 50: CPT

## 2020-05-13 RX ORDER — INSULIN GLARGINE 100 [IU]/ML
23 INJECTION, SOLUTION SUBCUTANEOUS AT BEDTIME
Refills: 0 | Status: DISCONTINUED | OUTPATIENT
Start: 2020-05-13 | End: 2020-05-13

## 2020-05-13 RX ORDER — INSULIN GLARGINE 100 [IU]/ML
20 INJECTION, SOLUTION SUBCUTANEOUS ONCE
Refills: 0 | Status: COMPLETED | OUTPATIENT
Start: 2020-05-13 | End: 2020-05-13

## 2020-05-13 RX ORDER — BACITRACIN ZINC 500 UNIT/G
1 OINTMENT IN PACKET (EA) TOPICAL DAILY
Refills: 0 | Status: DISCONTINUED | OUTPATIENT
Start: 2020-05-13 | End: 2020-05-15

## 2020-05-13 RX ORDER — DAPTOMYCIN 500 MG/10ML
500 INJECTION, POWDER, LYOPHILIZED, FOR SOLUTION INTRAVENOUS EVERY 24 HOURS
Refills: 0 | Status: DISCONTINUED | OUTPATIENT
Start: 2020-05-13 | End: 2020-05-14

## 2020-05-13 RX ORDER — INSULIN LISPRO 100/ML
11 VIAL (ML) SUBCUTANEOUS
Refills: 0 | Status: DISCONTINUED | OUTPATIENT
Start: 2020-05-13 | End: 2020-05-14

## 2020-05-13 RX ORDER — HYDROCORTISONE 1 %
1 OINTMENT (GRAM) TOPICAL DAILY
Refills: 0 | Status: DISCONTINUED | OUTPATIENT
Start: 2020-05-13 | End: 2020-05-15

## 2020-05-13 RX ADMIN — Medication 100 MILLIGRAM(S): at 05:40

## 2020-05-13 RX ADMIN — Medication 11 UNIT(S): at 18:03

## 2020-05-13 RX ADMIN — Medication 10 MILLIGRAM(S): at 05:40

## 2020-05-13 RX ADMIN — INSULIN GLARGINE 20 UNIT(S): 100 INJECTION, SOLUTION SUBCUTANEOUS at 23:29

## 2020-05-13 RX ADMIN — Medication 81 MILLIGRAM(S): at 11:45

## 2020-05-13 RX ADMIN — ATORVASTATIN CALCIUM 40 MILLIGRAM(S): 80 TABLET, FILM COATED ORAL at 21:34

## 2020-05-13 RX ADMIN — PANTOPRAZOLE SODIUM 40 MILLIGRAM(S): 20 TABLET, DELAYED RELEASE ORAL at 05:40

## 2020-05-13 RX ADMIN — Medication 12.5 MILLIGRAM(S): at 05:40

## 2020-05-13 RX ADMIN — Medication 5: at 13:11

## 2020-05-13 RX ADMIN — Medication 100 MILLIGRAM(S): at 21:34

## 2020-05-13 RX ADMIN — AMIODARONE HYDROCHLORIDE 200 MILLIGRAM(S): 400 TABLET ORAL at 05:40

## 2020-05-13 RX ADMIN — Medication 3: at 08:56

## 2020-05-13 RX ADMIN — DAPTOMYCIN 120 MILLIGRAM(S): 500 INJECTION, POWDER, LYOPHILIZED, FOR SOLUTION INTRAVENOUS at 16:00

## 2020-05-13 RX ADMIN — SENNA PLUS 2 TABLET(S): 8.6 TABLET ORAL at 21:34

## 2020-05-13 RX ADMIN — Medication 1: at 18:02

## 2020-05-13 RX ADMIN — Medication 100 MILLIGRAM(S): at 13:36

## 2020-05-13 RX ADMIN — Medication 8 UNIT(S): at 08:56

## 2020-05-13 RX ADMIN — Medication 12.5 MILLIGRAM(S): at 18:03

## 2020-05-13 RX ADMIN — ENOXAPARIN SODIUM 40 MILLIGRAM(S): 100 INJECTION SUBCUTANEOUS at 11:45

## 2020-05-13 RX ADMIN — Medication 1 APPLICATION(S): at 18:03

## 2020-05-13 RX ADMIN — POLYETHYLENE GLYCOL 3350 17 GRAM(S): 17 POWDER, FOR SOLUTION ORAL at 11:45

## 2020-05-13 RX ADMIN — Medication 11 UNIT(S): at 13:12

## 2020-05-13 NOTE — PROGRESS NOTE ADULT - ASSESSMENT
Assessment  DMT2: 65y Male with DM T2 with hyperglycemia, A1C 7.9%, was on insulin at home, now on basal bolus insulin, adjusted dose yesterday, blood sugars still running high and not at target, no hypoglycemic episodes. Patient is eating meals, had [place] toast for breakfast, planning possible wound vac placement today.  CAD: s/p CABG previous admission, stable, monitored.  Sternal Wound: On IV ABx, FU Plastics/ID.  HTN: Controlled,  on antihypertensive medications.  CKD: Monitor labs/BMP.          Harper Matias MD  Cell: [phone]  Office: [phone] Assessment  DMT2: 65y Male with DM T2 with hyperglycemia,  A1C 7.9%, was on insulin at home, now on basal bolus insulin, adjusted dose yesterday, blood sugars still running high and not at target, no hypoglycemic episodes. Patient is eating meals, had [place] toast for breakfast, planning possible wound vac placement today.  CAD: s/p CABG previous admission, stable, monitored.  Sternal Wound: On IV  ABx, FU Plastics/ID.  HTN: Controlled,  on antihypertensive medications.  CKD: Monitor labs/BMP.          Harper Matias MD  Cell: [phone]  Office: [phone]

## 2020-05-13 NOTE — PROGRESS NOTE ADULT - SUBJECTIVE AND OBJECTIVE BOX
Follow Up: Wound drainage     Interval History/ROS: Afebrile. Woundvac placed. No pain to his chest. Breathing is fine. He's upset that he was served so many carbs when he's a diabetic and that his eggs are as "cold as a dog's nose". [facility] He also wants bacitracin for the bump on the back of his head from falling. No diarrhea. No dysuria. No chills.     Allergies  No Known Allergies    ANTIMICROBIALS:  DAPTOmycin IVPB 500 every 24 hours      OTHER MEDS:  acetaminophen   Tablet .. 650 milliGRAM(s) Oral every 6 hours PRN  aMIOdarone    Tablet 200 milliGRAM(s) Oral daily  aspirin enteric coated 81 milliGRAM(s) Oral daily  atorvastatin 40 milliGRAM(s) Oral at bedtime  benzonatate 100 milliGRAM(s) Oral three times a day PRN  dextrose 40% Gel 15 Gram(s) Oral once PRN  dextrose 5%. 1000 milliLiter(s) IV Continuous <Continuous>  dextrose 50% Injectable 12.5 Gram(s) IV Push once  dextrose 50% Injectable 25 Gram(s) IV Push once  dextrose 50% Injectable 25 Gram(s) IV Push once  enoxaparin Injectable 40 milliGRAM(s) SubCutaneous daily  glucagon  Injectable 1 milliGRAM(s) IntraMuscular once PRN  insulin glargine Injectable (LANTUS) 23 Unit(s) SubCutaneous at bedtime  insulin lispro (HumaLOG) corrective regimen sliding scale   SubCutaneous three times a day before meals  insulin lispro (HumaLOG) corrective regimen sliding scale   SubCutaneous at bedtime  insulin lispro Injectable (HumaLOG) 11 Unit(s) SubCutaneous three times a day before meals  metoprolol tartrate 12.5 milliGRAM(s) Oral two times a day  pantoprazole    Tablet 40 milliGRAM(s) Oral before breakfast  polyethylene glycol 3350 17 Gram(s) Oral daily  senna 2 Tablet(s) Oral at bedtime  torsemide 10 milliGRAM(s) Oral daily      Vital Signs Last 24 Hrs  T(C): 36.7 (13 May 2020 11:45), Max: 37.6 (12 May 2020 21:04)  T(F): 98.1 (13 May 2020 11:45), Max: 99.6 (12 May 2020 21:04)  HR: 90 (13 May 2020 11:45) (90 - 100)  BP: 114/67 (13 May 2020 11:45) (114/67 - 137/78)  BP(mean): --  RR: 18 (13 May 2020 11:45) (18 - 19)  SpO2: 100% (13 May 2020 11:45) (100% - 100%)    Physical Exam:  General: awake, alert, fatigued but non toxic  Head: atraumatic, normocephalic. left occipital region hematoma with minor abrasion, no signs of infection   Eye: normal sclera and conjunctiva  Cardio: regular rate. sternal wound with woundvac in place   Respiratory: nonlabored on nasal cannula   Neurologic: no focal deficit  psych: frustrated                           9.0    9.91  )-----------( 329      ( 13 May 2020 06:30 )             29.9       05-13    134<L>  |  95<L>  |  40<H>  ----------------------------<  142<H>  4.2   |  21<L>  |  1.68<H>    Ca    9.7      13 May 2020 06:30  Phos  3.6     05-11  Mg     1.9     05-11    TPro  7.1  /  Alb  2.8<L>  /  TBili  0.4  /  DBili  x   /  AST  9<L>  /  ALT  11  /  AlkPhos  86  05-11    MICROBIOLOGY:  Culture - Blood (collected 05-12-20 @ 03:18)  Source: .Blood Blood  Preliminary Report (05-13-20 @ 04:01):    No growth to date.    Culture - Blood (collected 05-12-20 @ 03:17)  Source: .Blood Blood  Preliminary Report (05-13-20 @ 04:01):    No growth to date.    Culture - Surgical Swab (collected 05-12-20 @ 03:08)  Source: .Surgical Swab sternal wound  Preliminary Report (05-13-20 @ 10:02):    Numerous Staphylococcus aureus    Culture - Blood (collected 05-11-20 @ 22:20)  Source: .Blood Blood-Peripheral  Preliminary Report (05-12-20 @ 23:01):    No growth to date.    Culture - Blood (collected 05-11-20 @ 22:20)  Source: .Blood Blood-Peripheral  Preliminary Report (05-12-20 @ 23:01):    No growth to date.    RADIOLOGY:  Images below reviewed personally  CT Chest No Cont (05.12.20 @ 20:09)   No subcutaneous collection. Trace hematoma within the anterior mediastinum. No drainable collection.

## 2020-05-13 NOTE — PROGRESS NOTE ADULT - SUBJECTIVE AND OBJECTIVE BOX
Patient is a 65y old  Male who presents with a chief complaint of syncope (13 May 2020 15:04)      SUBJECTIVE / OVERNIGHT EVENTS: No new complaints.   Review of Systems  chest pain no  palpitations no  sob no  nausea no  headache no    MEDICATIONS  (STANDING):  aMIOdarone    Tablet 200 milliGRAM(s) Oral daily  aspirin enteric coated 81 milliGRAM(s) Oral daily  atorvastatin 40 milliGRAM(s) Oral at bedtime  BACItracin   Ointment 1 Application(s) Topical daily  DAPTOmycin IVPB 500 milliGRAM(s) IV Intermittent every 24 hours  dextrose 5%. 1000 milliLiter(s) (50 mL/Hr) IV Continuous <Continuous>  dextrose 50% Injectable 12.5 Gram(s) IV Push once  dextrose 50% Injectable 25 Gram(s) IV Push once  dextrose 50% Injectable 25 Gram(s) IV Push once  enoxaparin Injectable 40 milliGRAM(s) SubCutaneous daily  insulin glargine Injectable (LANTUS) 23 Unit(s) SubCutaneous at bedtime  insulin lispro (HumaLOG) corrective regimen sliding scale   SubCutaneous three times a day before meals  insulin lispro (HumaLOG) corrective regimen sliding scale   SubCutaneous at bedtime  insulin lispro Injectable (HumaLOG) 11 Unit(s) SubCutaneous three times a day before meals  metoprolol tartrate 12.5 milliGRAM(s) Oral two times a day  pantoprazole    Tablet 40 milliGRAM(s) Oral before breakfast  polyethylene glycol 3350 17 Gram(s) Oral daily  senna 2 Tablet(s) Oral at bedtime  torsemide 10 milliGRAM(s) Oral daily    MEDICATIONS  (PRN):  acetaminophen   Tablet .. 650 milliGRAM(s) Oral every 6 hours PRN Temp greater or equal to 38.5C (101.3F), Mild Pain (1 - 3)  benzonatate 100 milliGRAM(s) Oral three times a day PRN Cough  dextrose 40% Gel 15 Gram(s) Oral once PRN Blood Glucose LESS THAN 70 milliGRAM(s)/deciliter  glucagon  Injectable 1 milliGRAM(s) IntraMuscular once PRN Glucose LESS THAN 70 milligrams/deciliter  hydrocortisone 1% Cream 1 Application(s) Topical daily PRN Itching      Vital Signs Last 24 Hrs  T(C): 36.7 (13 May 2020 11:45), Max: 37.6 (12 May 2020 21:04)  T(F): 98.1 (13 May 2020 11:45), Max: 99.6 (12 May 2020 21:04)  HR: 90 (13 May 2020 11:45) (90 - 100)  BP: 114/67 (13 May 2020 11:45) (114/67 - 137/78)  BP(mean): --  RR: 18 (13 May 2020 11:45) (18 - 19)  SpO2: 100% (13 May 2020 11:45) (100% - 100%)    PHYSICAL EXAM:  GENERAL: NAD, well-developed  HEAD:  Atraumatic, Normocephalic  EYES: EOMI, PERRLA, conjunctiva and sclera clear  NECK: Supple, No JVD  CHEST/LUNG: Clear to auscultation bilaterally; No wheeze Sternal wound with VAC in place  HEART: Regular rate and rhythm; No murmurs, rubs, or gallops  ABDOMEN: Soft, Nontender, Nondistended; Bowel sounds present  EXTREMITIES:  2+ Peripheral Pulses, No clubbing, cyanosis, or edema  PSYCH: AAOx3  NEUROLOGY: non-focal  SKIN: No rashes or lesions    LABS:                        9.0    9.91  )-----------( 329      ( 13 May 2020 06:30 )             29.9     05-13    134<L>  |  95<L>  |  40<H>  ----------------------------<  142<H>  4.2   |  21<L>  |  1.68<H>    Ca    9.7      13 May 2020 06:30        CARDIAC MARKERS ( 12 May 2020 00:43 )  x     / x     / 35 U/L / x     / 2.0 ng/mL          Culture - Blood (collected 12 May 2020 03:18)  Source: .Blood Blood  Preliminary Report (13 May 2020 04:01):    No growth to date.    Culture - Blood (collected 12 May 2020 03:17)  Source: .Blood Blood  Preliminary Report (13 May 2020 04:01):    No growth to date.    Culture - Surgical Swab (collected 12 May 2020 03:08)  Source: .Surgical Swab sternal wound  Preliminary Report (13 May 2020 10:02):    Numerous Staphylococcus aureus    Culture - Blood (collected 11 May 2020 22:20)  Source: .Blood Blood-Peripheral  Preliminary Report (12 May 2020 23:01):    No growth to date.    Culture - Blood (collected 11 May 2020 22:20)  Source: .Blood Blood-Peripheral  Preliminary Report (12 May 2020 23:01):    No growth to date.        RADIOLOGY & ADDITIONAL TESTS:    Imaging Personally Reviewed:  < from: CT Chest No Cont (05.12.20 @ 20:09) >  IMPRESSION:     No subcutaneous collection. Trace hematoma within the anterior mediastinum. No drainable collection.    < end of copied text >    Consultant(s) Notes Reviewed:      Care Discussed with Consultants/Other Providers:

## 2020-05-13 NOTE — CONSULT NOTE ADULT - SUBJECTIVE AND OBJECTIVE BOX
General Surgery Consult  Consulting surgical team: Red team surgery  Consulting attending: Dr. Sy Austin    HPI:  65M PMH CABG (2/2020) complicated by PEA arrest, thoracotomy site infection w/empyema, DM, HTN, initially presented 5/11 with syncope. Patient states that he had felt dizzy and woke up in the shower. Upon arrival to [facility], pt also reported increasing discharge from his sternal wound, associated with nonradiating sternal pain.     Since admission, patient has been followed by PRS for nonhealing sternal chest wound. General surgery consulted for assistance with omental flap placement.     PAST MEDICAL HISTORY:  HTN (hypertension)  Diabetes      PAST SURGICAL HISTORY:  S/P CABG (coronary artery bypass graft)  History of appendectomy      MEDICATIONS:  acetaminophen   Tablet .. 650 milliGRAM(s) Oral every 6 hours PRN  aMIOdarone    Tablet 200 milliGRAM(s) Oral daily  aspirin enteric coated 81 milliGRAM(s) Oral daily  atorvastatin 40 milliGRAM(s) Oral at bedtime  benzonatate 100 milliGRAM(s) Oral three times a day PRN  DAPTOmycin IVPB 500 milliGRAM(s) IV Intermittent every 24 hours  dextrose 40% Gel 15 Gram(s) Oral once PRN  dextrose 5%. 1000 milliLiter(s) IV Continuous <Continuous>  dextrose 50% Injectable 12.5 Gram(s) IV Push once  dextrose 50% Injectable 25 Gram(s) IV Push once  dextrose 50% Injectable 25 Gram(s) IV Push once  enoxaparin Injectable 40 milliGRAM(s) SubCutaneous daily  glucagon  Injectable 1 milliGRAM(s) IntraMuscular once PRN  insulin glargine Injectable (LANTUS) 23 Unit(s) SubCutaneous at bedtime  insulin lispro (HumaLOG) corrective regimen sliding scale   SubCutaneous three times a day before meals  insulin lispro (HumaLOG) corrective regimen sliding scale   SubCutaneous at bedtime  insulin lispro Injectable (HumaLOG) 11 Unit(s) SubCutaneous three times a day before meals  metoprolol tartrate 12.5 milliGRAM(s) Oral two times a day  pantoprazole    Tablet 40 milliGRAM(s) Oral before breakfast  polyethylene glycol 3350 17 Gram(s) Oral daily  senna 2 Tablet(s) Oral at bedtime  torsemide 10 milliGRAM(s) Oral daily      ALLERGIES:  No Known Allergies      VITALS & I/Os:  Vital Signs Last 24 Hrs  T(C): 36.7 (13 May 2020 11:45), Max: 37.6 (12 May 2020 21:04)  T(F): 98.1 (13 May 2020 11:45), Max: 99.6 (12 May 2020 21:04)  HR: 90 (13 May 2020 11:45) (90 - 100)  BP: 114/67 (13 May 2020 11:45) (114/67 - 137/78)  BP(mean): --  RR: 18 (13 May 2020 11:45) (18 - 19)  SpO2: 100% (13 May 2020 11:45) (100% - 100%)    I&O's Summary    12 May 2020 07:01  -  13 May 2020 07:00  --------------------------------------------------------  IN: 700 mL / OUT: 1750 mL / NET: -1050 mL    13 May 2020 07:01  -  13 May 2020 15:06  --------------------------------------------------------  IN: 590 mL / OUT: 0 mL / NET: 590 mL        PHYSICAL EXAM:  General: Laying in bed, in no acute distress  Respiratory: Nonlabored  Chest: Sternal wound with gauze intact, minimal strikethrough  Abdominal: Soft, nondistended, nontender  Extremities: Warm    LABS:                        9.0    9.91  )-----------( 329      ( 13 May 2020 06:30 )             29.9     05-13    134<L>  |  95<L>  |  40<H>  ----------------------------<  142<H>  4.2   |  21<L>  |  1.68<H>    Ca    9.7      13 May 2020 06:30  Phos  3.6     05-11  Mg     1.9     05-11    TPro  7.1  /  Alb  2.8<L>  /  TBili  0.4  /  DBili  x   /  AST  9<L>  /  ALT  11  /  AlkPhos  86  05-11    Lactate:    PT/INR - ( 11 May 2020 16:47 )   PT: 14.1 sec;   INR: 1.23 ratio         PTT - ( 11 May 2020 16:47 )  PTT:34.3 sec    CARDIAC MARKERS ( 12 May 2020 00:43 )  x     / x     / 35 U/L / x     / 2.0 ng/mL            IMAGING:

## 2020-05-13 NOTE — PROGRESS NOTE ADULT - PROBLEM SELECTOR PLAN 1
Tight glycemic control necessary in the setting of sternal wound.  Will increase Lantus to 23u at bedtime.  Will increase Humalog to 11u before each meal and continue Humalog correction scale coverage. Will continue monitoring FS and FU.  Patient counseled for compliance with consistent low carb diet and exercise as tolerated outpatient.

## 2020-05-13 NOTE — PROGRESS NOTE ADULT - ASSESSMENT
65y m PMHx CABG Feb 2020 complicated by PEA arrest, thoracotomy site infection w/empyema, DM, HTN p/w syncope, sternal wound infection.     1. Syncope   likely 2/2 to +orthostatics  cv stable, EKG unchanged from prior   HST elevated w/ normal CK/CKMB, demand ischemia in setting of STEPHANIE/CKD, possible wound infection   CT head negative for ICH  echo with overall preserved LVEF, despite segmental wall motion abnormalities, no LV thrombus   continue tele monitoring   encourage PO intake   Endo f/u for hx DM     2. Sternal wound infection s/p RTOR for SWI    plan for possible wound vac application today   IV abx per ID   blood cx neg    ID, Plastic surgery f/u     3. CAD s/p CABG x 4 , s/p PEA arrest   cv stable   cont asa, statin, bb     4. Chronic diastolic CHF  euvolemic on exam with unchanged SOB   continue diuretics as ordered   repeat echo with preserved LVEF     5. PAF  remains NSR  continue amio, bb    6. HTN  bp stable    7. STEPHANIE/CKD   continue to trend creat     8. Pleural effusion, hx  CXR this admission with small left loculated pleural effusion now decreased in size from previous exam.    dvt ppx

## 2020-05-13 NOTE — CHART NOTE - NSCHARTNOTEFT_GEN_A_CORE
Patient refusing 23 units of lantus. Able to convince patient to take 20 units of lantus with a snack before bedtime.

## 2020-05-13 NOTE — PROGRESS NOTE ADULT - ASSESSMENT
65M s/p CABG 2/3/20, course complicated by PEA arrest, E faecalis bacteremia and infections of the sternal and right leg SVG sites, now admitted 5/11/20 for syncope.   No bacteria grew from the sternal OR debridement 2/25.   In total he received about six weeks of antibiotics which would have treated an osteomyelitis although there was low concern - hard bone noted in OR and sternal wires were removed.   Despite this his wound drainage has significantly increased.   There's numerous Staph aureus growing from the fluid and the elevated inflammatory markers (none prior) could point to an ongoing infection. However, the fluid is serous without obvious purulence and finding Staph aureus from a nonsterile site in the absence of local inflammation or systemic illness is hard to interpret. It's a lot of fluid to be caused by just bacteria. Lymphatic leak? CT chest without drainable collection.     Suggest  -f/u Staph aureus sensitivities - should be tomorrow per micro   -f/u blood cultures - negative to date   -I changed Ancef to Daptomycin 500mg IV q24h (6mg/kg) pending sensitivities  -weekly CPK while on Daptomycin (35 on 5/12)   -I'm unclear how long his course should be but trending inflammatory markers may help   -agree with plans for debridement, I think this would give him the best chance of healing   -topical bacitracin to scalp lesion from fall per patient request; it doesn't look infected   -patient's wife asked for regular updates     Spoke with primary team     Chapito Delarosa MD   Infectious Disease   Pager [phone]   After 5PM and on weekends please page fellow on call or call [phone] 65M s/p CABG 2/3/20, course complicated by PEA arrest, E faecalis bacteremia and infections of the sternal and right leg SVG sites, now admitted 5/11/20 for syncope.   No bacteria grew from the sternal OR debridement 2/25.   In total he received about six weeks of antibiotics which would have treated an osteomyelitis although there was low concern - hard bone noted in OR and sternal wires were removed.   Despite this his wound drainage has significantly increased.   There's numerous Staph aureus growing from the fluid and the elevated inflammatory markers (none prior) could point to an ongoing infection. However, the fluid is serous without obvious purulence and finding Staph aureus from a nonsterile site in the absence of local inflammation or systemic illness is hard to interpret. It's a lot of fluid to be caused by just bacteria. Lymphatic leak? CT chest without drainable collection.     Suggest  -f/u Staph aureus sensitivities - should be tomorrow per micro   -f/u blood cultures - negative to date   -I changed Ancef to Daptomycin 500mg IV q24h (6mg/kg) pending sensitivities, favor avoiding Vancomycin in renal insufficiency   -weekly CPK while on Daptomycin (35 on 5/12)   -I'm unclear how long his course should be but trending inflammatory markers may help   -agree with plans for debridement, I think this would give him the best chance of healing   -topical bacitracin to scalp lesion from fall per patient request; it doesn't look infected   -patient's wife asked for regular updates     Spoke with primary team     Chapito Delarosa MD   Infectious Disease   Pager [phone]   After 5PM and on weekends please page fellow on call or call [phone]

## 2020-05-13 NOTE — PROGRESS NOTE ADULT - SUBJECTIVE AND OBJECTIVE BOX
Chief complaint  Patient is a 65y old  Male who presents with a chief complaint of syncope (13 May 2020 10:23)   Review of systems  Patient sitting up in chair, looks comfortable, no hypoglycemic episodes.    Labs and Fingersticks  CAPILLARY BLOOD GLUCOSE      POCT Blood Glucose.: 251 mg/dL (13 May 2020 08:53)  POCT Blood Glucose.: 283 mg/dL (12 May 2020 21:27)  POCT Blood Glucose.: 321 mg/dL (12 May 2020 17:31)  POCT Blood Glucose.: 288 mg/dL (12 May 2020 12:51)      Anion Gap, Serum: 18 <H> (05-13 @ 06:30)  Anion Gap, Serum: 16 (05-12 @ 07:17)  Anion Gap, Serum: 16 (05-11 @ 16:47)      Calcium, Total Serum: 9.7 (05-13 @ 06:30)  Calcium, Total Serum: 9.6 (05-12 @ 07:17)  Calcium, Total Serum: 9.6 (05-11 @ 16:47)  Albumin, Serum: 2.8 <L> (05-11 @ 16:47)    Alanine Aminotransferase (ALT/SGPT): 11 (05-11 @ 16:47)  Alkaline Phosphatase, Serum: 86 (05-11 @ 16:47)  Aspartate Aminotransferase (AST/SGOT): 9 <L> (05-11 @ 16:47)        05-13    134<L>  |  95<L>  |  40<H>  ----------------------------<  142<H>  4.2   |  21<L>  |  1.68<H>    Ca    9.7      13 May 2020 06:30  Phos  3.6     05-11  Mg     1.9     05-11    TPro  7.1  /  Alb  2.8<L>  /  TBili  0.4  /  DBili  x   /  AST  9<L>  /  ALT  11  /  AlkPhos  86  05-11                        9.0    9.91  )-----------( 329      ( 13 May 2020 06:30 )             29.9     Medications  MEDICATIONS  (STANDING):  aMIOdarone    Tablet 200 milliGRAM(s) Oral daily  aspirin enteric coated 81 milliGRAM(s) Oral daily  atorvastatin 40 milliGRAM(s) Oral at bedtime  ceFAZolin   IVPB 1000 milliGRAM(s) IV Intermittent every 8 hours  dextrose 5%. 1000 milliLiter(s) (50 mL/Hr) IV Continuous <Continuous>  dextrose 50% Injectable 12.5 Gram(s) IV Push once  dextrose 50% Injectable 25 Gram(s) IV Push once  dextrose 50% Injectable 25 Gram(s) IV Push once  enoxaparin Injectable 40 milliGRAM(s) SubCutaneous daily  insulin glargine Injectable (LANTUS) 23 Unit(s) SubCutaneous at bedtime  insulin lispro (HumaLOG) corrective regimen sliding scale   SubCutaneous three times a day before meals  insulin lispro (HumaLOG) corrective regimen sliding scale   SubCutaneous at bedtime  insulin lispro Injectable (HumaLOG) 11 Unit(s) SubCutaneous three times a day before meals  metoprolol tartrate 12.5 milliGRAM(s) Oral two times a day  pantoprazole    Tablet 40 milliGRAM(s) Oral before breakfast  polyethylene glycol 3350 17 Gram(s) Oral daily  senna 2 Tablet(s) Oral at bedtime  torsemide 10 milliGRAM(s) Oral daily      Physical Exam  General: Patient comfortable in bed  Vital Signs Last 12 Hrs  T(F): 98.1 (05-13-20 @ 11:45), Max: 98.1 (05-13-20 @ 11:45)  HR: 90 (05-13-20 @ 11:45) (90 - 91)  BP: 114/67 (05-13-20 @ 11:45) (114/67 - 137/78)  BP(mean): --  RR: 18 (05-13-20 @ 11:45) (18 - 18)  SpO2: 100% (05-13-20 @ 11:45) (100% - 100%)  Neck: No palpable thyroid nodules.  CVS: S1S2, No murmurs  Respiratory: No wheezing, no crepitations  GI: Abdomen soft, bowel sounds positive  Musculoskeletal:  edema lower extremities.   Skin: No skin rashes, no ecchymosis    Diagnostics

## 2020-05-13 NOTE — CONSULT NOTE ADULT - ASSESSMENT
65M PMH CABG (2/2020) complicated by PEA arrest, thoracotomy site infection w/empyema, DM, HTN, initially presented 5/11 with syncope, found to also have nonhealing sternal chest wound    - Plan for OR Friday (5/15) for omental flap in coordination with PRS  - Care per primary team  - Discussed with attending Dr. Norman Katz PGY3  Red surgery  [id number]

## 2020-05-13 NOTE — PROGRESS NOTE ADULT - SUBJECTIVE AND OBJECTIVE BOX
CARDIOLOGY FOLLOW UP - Dr. Steel    CC no acute events  no acute complaints   pending decision on wound vac placement today       PHYSICAL EXAM:  T(C): 36.4 (05-13-20 @ 05:14), Max: 37.6 (05-12-20 @ 21:04)  HR: 91 (05-13-20 @ 05:14) (91 - 100)  BP: 137/78 (05-13-20 @ 05:14) (114/74 - 137/78)  RR: 18 (05-13-20 @ 05:14) (18 - 19)  SpO2: 100% (05-13-20 @ 05:14) (100% - 100%)  Wt(kg): --  I&O's Summary    12 May 2020 07:01  -  13 May 2020 07:00  --------------------------------------------------------  IN: 700 mL / OUT: 1750 mL / NET: -1050 mL        Appearance: Normal	  Cardiovascular: Normal S1 S2,RRR, No JVD, No murmurs, anterior chest wall wound +dsg in place   Respiratory: diminished   Gastrointestinal:  Soft, Non-tender, + BS	  Extremities: Normal range of motion, No clubbing, cyanosis or edema        MEDICATIONS  (STANDING):  aMIOdarone    Tablet 200 milliGRAM(s) Oral daily  aspirin enteric coated 81 milliGRAM(s) Oral daily  atorvastatin 40 milliGRAM(s) Oral at bedtime  ceFAZolin   IVPB 1000 milliGRAM(s) IV Intermittent every 8 hours  dextrose 5%. 1000 milliLiter(s) (50 mL/Hr) IV Continuous <Continuous>  dextrose 50% Injectable 12.5 Gram(s) IV Push once  dextrose 50% Injectable 25 Gram(s) IV Push once  dextrose 50% Injectable 25 Gram(s) IV Push once  enoxaparin Injectable 40 milliGRAM(s) SubCutaneous daily  insulin glargine Injectable (LANTUS) 23 Unit(s) SubCutaneous at bedtime  insulin lispro (HumaLOG) corrective regimen sliding scale   SubCutaneous three times a day before meals  insulin lispro (HumaLOG) corrective regimen sliding scale   SubCutaneous at bedtime  insulin lispro Injectable (HumaLOG) 11 Unit(s) SubCutaneous three times a day before meals  metoprolol tartrate 12.5 milliGRAM(s) Oral two times a day  pantoprazole    Tablet 40 milliGRAM(s) Oral before breakfast  polyethylene glycol 3350 17 Gram(s) Oral daily  senna 2 Tablet(s) Oral at bedtime  torsemide 10 milliGRAM(s) Oral daily      TELEMETRY: nsr 	    ECG:  	  RADIOLOGY:   DIAGNOSTIC TESTING:  [ ] Echocardiogram:  [ ]  Catheterization:  [ ] Stress Test:    OTHER: 	    LABS:	 	                                9.0    9.91  )-----------( 329      ( 13 May 2020 06:30 )             29.9     05-13    134<L>  |  95<L>  |  40<H>  ----------------------------<  142<H>  4.2   |  21<L>  |  1.68<H>    Ca    9.7      13 May 2020 06:30  Phos  3.6     05-11  Mg     1.9     05-11    TPro  7.1  /  Alb  2.8<L>  /  TBili  0.4  /  DBili  x   /  AST  9<L>  /  ALT  11  /  AlkPhos  86  05-11    PT/INR - ( 11 May 2020 16:47 )   PT: 14.1 sec;   INR: 1.23 ratio         PTT - ( 11 May 2020 16:47 )  PTT:34.3 sec

## 2020-05-13 NOTE — PROGRESS NOTE ADULT - ASSESSMENT
65 m with    Syncope  - telemetry  - cardiology evaluation     Orthostatic hypotension  - DC aldactone  - follow    S/P CABG  - CT sx evaluation noted    Sternal wound  - Plastic surgery follow  - wound and blood cultures pending   - Daptomycin  - ID follow  - debridement and omental flap pending     Scalp abrasion  - Bacitracin requested by patient    Diabetes control  - ADA diet  - BS control  - Endocrine evaluation noted    CKD  - follow    HTN control    Miguel Angel Duke MD pager [phone]

## 2020-05-13 NOTE — PROGRESS NOTE ADULT - ASSESSMENT
65y m PMHx CABG Feb 2020 complicated by PEA arrest, sternal infection s/p sternal debridement with muscle flap reconstruction, DM, HTN p/w syncope    Plan:  - Potential vac placement today 5/13    Plastic Surgery  [phone]

## 2020-05-13 NOTE — PROGRESS NOTE ADULT - SUBJECTIVE AND OBJECTIVE BOX
PLASTIC SURGERY DAILY PROGRESS NOTE:    Interval:  No acute events overnight endorsed.    Subjective:  Patient seen and examined this am. Counselled regarding potential vac placement or surgery    Vital Signs Last 24 Hrs  T(C): 36.4 (13 May 2020 05:14), Max: 37.6 (12 May 2020 21:04)  T(F): 97.5 (13 May 2020 05:14), Max: 99.6 (12 May 2020 21:04)  HR: 91 (13 May 2020 05:14) (91 - 100)  BP: 137/78 (13 May 2020 05:14) (114/74 - 137/78)  RR: 18 (13 May 2020 05:14) (18 - 19)  SpO2: 100% (13 May 2020 05:14) (100% - 100%)    Exam:  Gen: NAD, resting in bed, alert and responding appropriately  Resp: Airway patent, non-labored respirations  Chest: Anterior chest wound guaze replaced abd intact  Abd: Soft, ND, NTTP x 4 quadrants, no rebound or guarding.   Ext: No edema, WWP  Neuro: AAOx3, no focal deficits    I&O's Detail    12 May 2020 07:01  -  13 May 2020 07:00  --------------------------------------------------------  IN:    Oral Fluid: 600 mL    Solution: 100 mL  Total IN: 700 mL    OUT:    Voided: 1750 mL  Total OUT: 1750 mL    Total NET: -1050 mL          Daily     Daily Weight in k.8 (13 May 2020 06:41)    MEDICATIONS  (STANDING):  aMIOdarone    Tablet 200 milliGRAM(s) Oral daily  aspirin enteric coated 81 milliGRAM(s) Oral daily  atorvastatin 40 milliGRAM(s) Oral at bedtime  ceFAZolin   IVPB 1000 milliGRAM(s) IV Intermittent every 8 hours  dextrose 5%. 1000 milliLiter(s) (50 mL/Hr) IV Continuous <Continuous>  dextrose 50% Injectable 12.5 Gram(s) IV Push once  dextrose 50% Injectable 25 Gram(s) IV Push once  dextrose 50% Injectable 25 Gram(s) IV Push once  enoxaparin Injectable 40 milliGRAM(s) SubCutaneous daily  insulin glargine Injectable (LANTUS) 20 Unit(s) SubCutaneous at bedtime  insulin lispro (HumaLOG) corrective regimen sliding scale   SubCutaneous three times a day before meals  insulin lispro (HumaLOG) corrective regimen sliding scale   SubCutaneous at bedtime  insulin lispro Injectable (HumaLOG) 8 Unit(s) SubCutaneous three times a day before meals  metoprolol tartrate 12.5 milliGRAM(s) Oral two times a day  pantoprazole    Tablet 40 milliGRAM(s) Oral before breakfast  polyethylene glycol 3350 17 Gram(s) Oral daily  senna 2 Tablet(s) Oral at bedtime  torsemide 10 milliGRAM(s) Oral daily    MEDICATIONS  (PRN):  acetaminophen   Tablet .. 650 milliGRAM(s) Oral every 6 hours PRN Temp greater or equal to 38.5C (101.3F), Mild Pain (1 - 3)  benzonatate 100 milliGRAM(s) Oral three times a day PRN Cough  dextrose 40% Gel 15 Gram(s) Oral once PRN Blood Glucose LESS THAN 70 milliGRAM(s)/deciliter  glucagon  Injectable 1 milliGRAM(s) IntraMuscular once PRN Glucose LESS THAN 70 milligrams/deciliter      LABS:                        9.0    9.91  )-----------( 329      ( 13 May 2020 06:30 )             29.9     05-13    134<L>  |  95<L>  |  40<H>  ----------------------------<  142<H>  4.2   |  21<L>  |  1.68<H>    Ca    9.7      13 May 2020 06:30  Phos  3.6     05-11  Mg     1.9     05-11    TPro  7.1  /  Alb  2.8<L>  /  TBili  0.4  /  DBili  x   /  AST  9<L>  /  ALT  11  /  AlkPhos  86  05-11    PT/INR - ( 11 May 2020 16:47 )   PT: 14.1 sec;   INR: 1.23 ratio         PTT - ( 11 May 2020 16:47 )  PTT:34.3 sec      SUSANNAH Delarosa, PGY-1  Plastic Surgery  [phone]

## 2020-05-14 ENCOUNTER — TRANSCRIPTION ENCOUNTER (OUTPATIENT)
Age: 65
End: 2020-05-14

## 2020-05-14 LAB
-  AMPICILLIN/SULBACTAM: SIGNIFICANT CHANGE UP
-  CEFAZOLIN: SIGNIFICANT CHANGE UP
-  CLINDAMYCIN: SIGNIFICANT CHANGE UP
-  DAPTOMYCIN: SIGNIFICANT CHANGE UP
-  ERYTHROMYCIN: SIGNIFICANT CHANGE UP
-  GENTAMICIN: SIGNIFICANT CHANGE UP
-  LINEZOLID: SIGNIFICANT CHANGE UP
-  OXACILLIN: SIGNIFICANT CHANGE UP
-  PENICILLIN: SIGNIFICANT CHANGE UP
-  RIFAMPIN: SIGNIFICANT CHANGE UP
-  TETRACYCLINE: SIGNIFICANT CHANGE UP
-  TRIMETHOPRIM/SULFAMETHOXAZOLE: SIGNIFICANT CHANGE UP
-  VANCOMYCIN: SIGNIFICANT CHANGE UP
ANION GAP SERPL CALC-SCNC: 13 MMOL/L — SIGNIFICANT CHANGE UP (ref 5–17)
APTT BLD: 34.8 SEC — SIGNIFICANT CHANGE UP (ref 27.5–36.3)
BLD GP AB SCN SERPL QL: NEGATIVE — SIGNIFICANT CHANGE UP
BUN SERPL-MCNC: 42 MG/DL — HIGH (ref 7–23)
CALCIUM SERPL-MCNC: 9.2 MG/DL — SIGNIFICANT CHANGE UP (ref 8.4–10.5)
CHLORIDE SERPL-SCNC: 94 MMOL/L — LOW (ref 96–108)
CO2 SERPL-SCNC: 23 MMOL/L — SIGNIFICANT CHANGE UP (ref 22–31)
CREAT SERPL-MCNC: 1.58 MG/DL — HIGH (ref 0.5–1.3)
GLUCOSE BLDC GLUCOMTR-MCNC: 122 MG/DL — HIGH (ref 70–99)
GLUCOSE BLDC GLUCOMTR-MCNC: 153 MG/DL — HIGH (ref 70–99)
GLUCOSE BLDC GLUCOMTR-MCNC: 218 MG/DL — HIGH (ref 70–99)
GLUCOSE BLDC GLUCOMTR-MCNC: 293 MG/DL — HIGH (ref 70–99)
GLUCOSE BLDC GLUCOMTR-MCNC: 349 MG/DL — HIGH (ref 70–99)
GLUCOSE SERPL-MCNC: 236 MG/DL — HIGH (ref 70–99)
GRAM STN FLD: SIGNIFICANT CHANGE UP
HCT VFR BLD CALC: 28.2 % — LOW (ref 39–50)
HGB BLD-MCNC: 8.6 G/DL — LOW (ref 13–17)
INR BLD: 1.15 RATIO — SIGNIFICANT CHANGE UP (ref 0.88–1.16)
MAGNESIUM SERPL-MCNC: 2 MG/DL — SIGNIFICANT CHANGE UP (ref 1.6–2.6)
MCHC RBC-ENTMCNC: 24.9 PG — LOW (ref 27–34)
MCHC RBC-ENTMCNC: 30.5 GM/DL — LOW (ref 32–36)
MCV RBC AUTO: 81.7 FL — SIGNIFICANT CHANGE UP (ref 80–100)
METHOD TYPE: SIGNIFICANT CHANGE UP
METHOD TYPE: SIGNIFICANT CHANGE UP
MRSA SPEC QL CULT: SIGNIFICANT CHANGE UP
NRBC # BLD: 0 /100 WBCS — SIGNIFICANT CHANGE UP (ref 0–0)
PHOSPHATE SERPL-MCNC: 3.6 MG/DL — SIGNIFICANT CHANGE UP (ref 2.5–4.5)
PLATELET # BLD AUTO: 336 K/UL — SIGNIFICANT CHANGE UP (ref 150–400)
POTASSIUM SERPL-MCNC: 4.2 MMOL/L — SIGNIFICANT CHANGE UP (ref 3.5–5.3)
POTASSIUM SERPL-SCNC: 4.2 MMOL/L — SIGNIFICANT CHANGE UP (ref 3.5–5.3)
PROTHROM AB SERPL-ACNC: 13.2 SEC — HIGH (ref 10–12.9)
RBC # BLD: 3.45 M/UL — LOW (ref 4.2–5.8)
RBC # FLD: 15.8 % — HIGH (ref 10.3–14.5)
RH IG SCN BLD-IMP: POSITIVE — SIGNIFICANT CHANGE UP
SODIUM SERPL-SCNC: 130 MMOL/L — LOW (ref 135–145)
WBC # BLD: 8.77 K/UL — SIGNIFICANT CHANGE UP (ref 3.8–10.5)
WBC # FLD AUTO: 8.77 K/UL — SIGNIFICANT CHANGE UP (ref 3.8–10.5)

## 2020-05-14 PROCEDURE — 99233 SBSQ HOSP IP/OBS HIGH 50: CPT

## 2020-05-14 PROCEDURE — 93306 TTE W/DOPPLER COMPLETE: CPT | Mod: 26

## 2020-05-14 RX ORDER — INSULIN GLARGINE 100 [IU]/ML
26 INJECTION, SOLUTION SUBCUTANEOUS AT BEDTIME
Refills: 0 | Status: DISCONTINUED | OUTPATIENT
Start: 2020-05-14 | End: 2020-05-15

## 2020-05-14 RX ORDER — INSULIN LISPRO 100/ML
13 VIAL (ML) SUBCUTANEOUS
Refills: 0 | Status: DISCONTINUED | OUTPATIENT
Start: 2020-05-14 | End: 2020-05-14

## 2020-05-14 RX ORDER — DIPHENHYDRAMINE HCL 50 MG
25 CAPSULE ORAL ONCE
Refills: 0 | Status: COMPLETED | OUTPATIENT
Start: 2020-05-14 | End: 2020-05-14

## 2020-05-14 RX ORDER — INSULIN GLARGINE 100 [IU]/ML
26 INJECTION, SOLUTION SUBCUTANEOUS AT BEDTIME
Refills: 0 | Status: DISCONTINUED | OUTPATIENT
Start: 2020-05-14 | End: 2020-05-14

## 2020-05-14 RX ORDER — DAPTOMYCIN 500 MG/10ML
650 INJECTION, POWDER, LYOPHILIZED, FOR SOLUTION INTRAVENOUS EVERY 24 HOURS
Refills: 0 | Status: DISCONTINUED | OUTPATIENT
Start: 2020-05-14 | End: 2020-05-15

## 2020-05-14 RX ORDER — INSULIN LISPRO 100/ML
15 VIAL (ML) SUBCUTANEOUS
Refills: 0 | Status: DISCONTINUED | OUTPATIENT
Start: 2020-05-14 | End: 2020-05-15

## 2020-05-14 RX ORDER — BUDESONIDE, MICRONIZED 100 %
0.5 POWDER (GRAM) MISCELLANEOUS
Refills: 0 | Status: DISCONTINUED | OUTPATIENT
Start: 2020-05-14 | End: 2020-05-15

## 2020-05-14 RX ORDER — ALBUTEROL 90 UG/1
2 AEROSOL, METERED ORAL EVERY 6 HOURS
Refills: 0 | Status: DISCONTINUED | OUTPATIENT
Start: 2020-05-14 | End: 2020-05-15

## 2020-05-14 RX ORDER — DEXTROSE MONOHYDRATE, SODIUM CHLORIDE, AND POTASSIUM CHLORIDE 50; .745; 4.5 G/1000ML; G/1000ML; G/1000ML
1000 INJECTION, SOLUTION INTRAVENOUS
Refills: 0 | Status: DISCONTINUED | OUTPATIENT
Start: 2020-05-14 | End: 2020-05-14

## 2020-05-14 RX ORDER — LORATADINE 10 MG/1
10 TABLET ORAL DAILY
Refills: 0 | Status: DISCONTINUED | OUTPATIENT
Start: 2020-05-14 | End: 2020-05-15

## 2020-05-14 RX ORDER — SODIUM CHLORIDE 9 MG/ML
1000 INJECTION INTRAMUSCULAR; INTRAVENOUS; SUBCUTANEOUS
Refills: 0 | Status: DISCONTINUED | OUTPATIENT
Start: 2020-05-14 | End: 2020-05-15

## 2020-05-14 RX ADMIN — Medication 25 MILLIGRAM(S): at 05:46

## 2020-05-14 RX ADMIN — Medication 1 APPLICATION(S): at 11:57

## 2020-05-14 RX ADMIN — INSULIN GLARGINE 26 UNIT(S): 100 INJECTION, SOLUTION SUBCUTANEOUS at 22:05

## 2020-05-14 RX ADMIN — Medication 81 MILLIGRAM(S): at 11:57

## 2020-05-14 RX ADMIN — LORATADINE 10 MILLIGRAM(S): 10 TABLET ORAL at 20:02

## 2020-05-14 RX ADMIN — ENOXAPARIN SODIUM 40 MILLIGRAM(S): 100 INJECTION SUBCUTANEOUS at 11:57

## 2020-05-14 RX ADMIN — Medication 3: at 12:41

## 2020-05-14 RX ADMIN — Medication 15 UNIT(S): at 17:26

## 2020-05-14 RX ADMIN — Medication 0.5 MILLIGRAM(S): at 21:38

## 2020-05-14 RX ADMIN — DAPTOMYCIN 126 MILLIGRAM(S): 500 INJECTION, POWDER, LYOPHILIZED, FOR SOLUTION INTRAVENOUS at 15:23

## 2020-05-14 RX ADMIN — AMIODARONE HYDROCHLORIDE 200 MILLIGRAM(S): 400 TABLET ORAL at 05:42

## 2020-05-14 RX ADMIN — PANTOPRAZOLE SODIUM 40 MILLIGRAM(S): 20 TABLET, DELAYED RELEASE ORAL at 05:42

## 2020-05-14 RX ADMIN — ATORVASTATIN CALCIUM 40 MILLIGRAM(S): 80 TABLET, FILM COATED ORAL at 21:38

## 2020-05-14 RX ADMIN — Medication 1: at 17:26

## 2020-05-14 RX ADMIN — Medication 100 MILLIGRAM(S): at 21:38

## 2020-05-14 RX ADMIN — Medication 10 MILLIGRAM(S): at 05:42

## 2020-05-14 RX ADMIN — Medication 4: at 08:55

## 2020-05-14 RX ADMIN — Medication 100 MILLIGRAM(S): at 12:01

## 2020-05-14 RX ADMIN — SENNA PLUS 2 TABLET(S): 8.6 TABLET ORAL at 21:38

## 2020-05-14 RX ADMIN — Medication 12.5 MILLIGRAM(S): at 05:42

## 2020-05-14 RX ADMIN — Medication 12.5 MILLIGRAM(S): at 17:31

## 2020-05-14 RX ADMIN — Medication 11 UNIT(S): at 12:41

## 2020-05-14 RX ADMIN — POLYETHYLENE GLYCOL 3350 17 GRAM(S): 17 POWDER, FOR SOLUTION ORAL at 11:57

## 2020-05-14 RX ADMIN — ALBUTEROL 2 PUFF(S): 90 AEROSOL, METERED ORAL at 17:28

## 2020-05-14 RX ADMIN — Medication 11 UNIT(S): at 08:55

## 2020-05-14 NOTE — CONSULT NOTE ADULT - ASSESSMENT
65y Male with history of DM presents with syncope concern for sternal wound infection. Nephrology consulted for elevated Scr.    1) CKD-3: Baseline Scr 1.5 as per prior labs. Renal function at baseline. Patient advised to follow up as an outpatient for CKD work up. Avoid nephrotoxins. Monitor electrolytes.    2) HTN with CKD: BP controlled. Metoprolol as per cardiology. Monitor BP.    3) LE edema: Patient appears euvolemic. Continue with torsemide 10 mg daily. Monitor UO.    4) Hyponatremia: Mild. Change IVF from hypotonic (D5 1/2 NS with KCL) to isotonic (NS) and decrease rate to 75 ml/hour. Monitor FS while NPO. Monitor serum sodium.

## 2020-05-14 NOTE — PROGRESS NOTE ADULT - SUBJECTIVE AND OBJECTIVE BOX
Follow Up: Sternal wound, MRSA     Interval History/ROS: MRSA grew in blood cultures. Having a hard time catching his breath and speaking after going for a walk. Chest feels fine, woundvac is working. No fevers or chills. Had a bowel movement, no diarrhea. No dysuria. Not coughing.     Allergies  No Known Allergies        ANTIMICROBIALS:  DAPTOmycin IVPB 650 every 24 hours      OTHER MEDS:  acetaminophen   Tablet .. 650 milliGRAM(s) Oral every 6 hours PRN  ALBUTerol    90 MICROgram(s) HFA Inhaler 2 Puff(s) Inhalation every 6 hours PRN  aMIOdarone    Tablet 200 milliGRAM(s) Oral daily  aspirin enteric coated 81 milliGRAM(s) Oral daily  atorvastatin 40 milliGRAM(s) Oral at bedtime  BACItracin   Ointment 1 Application(s) Topical daily  benzonatate 100 milliGRAM(s) Oral three times a day PRN  buDESOnide    Inhalation Suspension 0.5 milliGRAM(s) Inhalation two times a day  dextrose 40% Gel 15 Gram(s) Oral once PRN  dextrose 5%. 1000 milliLiter(s) IV Continuous <Continuous>  dextrose 50% Injectable 12.5 Gram(s) IV Push once  dextrose 50% Injectable 25 Gram(s) IV Push once  dextrose 50% Injectable 25 Gram(s) IV Push once  enoxaparin Injectable 40 milliGRAM(s) SubCutaneous daily  glucagon  Injectable 1 milliGRAM(s) IntraMuscular once PRN  hydrocortisone 1% Cream 1 Application(s) Topical daily PRN  insulin glargine Injectable (LANTUS) 26 Unit(s) SubCutaneous at bedtime  insulin lispro (HumaLOG) corrective regimen sliding scale   SubCutaneous three times a day before meals  insulin lispro (HumaLOG) corrective regimen sliding scale   SubCutaneous at bedtime  insulin lispro Injectable (HumaLOG) 15 Unit(s) SubCutaneous three times a day before meals  metoprolol tartrate 12.5 milliGRAM(s) Oral two times a day  pantoprazole    Tablet 40 milliGRAM(s) Oral before breakfast  polyethylene glycol 3350 17 Gram(s) Oral daily  senna 2 Tablet(s) Oral at bedtime  torsemide 10 milliGRAM(s) Oral daily      Vital Signs Last 24 Hrs  T(C): 36.5 (14 May 2020 05:06), Max: 36.7 (13 May 2020 20:39)  T(F): 97.7 (14 May 2020 05:06), Max: 98.1 (13 May 2020 20:39)  HR: 92 (14 May 2020 12:29) (90 - 94)  BP: 119/73 (14 May 2020 12:29) (118/67 - 139/82)  BP(mean): --  RR: 18 (14 May 2020 05:06) (18 - 18)  SpO2: 100% (14 May 2020 05:06) (100% - 100%)    Physical Exam:  General: awake, alert, non toxic, fatigued   Head: atraumatic, normocephalic  Eye: normal sclera and conjunctiva  Cardio: regular rate  Respiratory: nonlabored on nasal cannula   Skin: sternal woundvac in place  Neurologic: no focal deficit  psych: normal affect                          8.6    8.77  )-----------( 336      ( 14 May 2020 06:46 )             28.2       05-14    130<L>  |  94<L>  |  42<H>  ----------------------------<  236<H>  4.2   |  23  |  1.58<H>    Ca    9.2      14 May 2020 06:46  Phos  3.6     05-14  Mg     2.0     05-14            MICROBIOLOGY:  Culture - Blood (collected 05-12-20 @ 03:18)  Source: .Blood Blood  Preliminary Report (05-13-20 @ 04:01):    No growth to date.    Culture - Blood (collected 05-12-20 @ 03:17)  Source: .Blood Blood  Gram Stain (05-14-20 @ 00:21):    Growth in aerobic bottle: Gram Positive Cocci in Clusters  Preliminary Report (05-14-20 @ 00:21):    Growth in aerobic bottle: Gram Positive Cocci in Clusters      -  Methicillin resistant Staphylococcus aureus (MRSA): Detec    Culture - Surgical Swab (collected 05-12-20 @ 03:08)  Source: .Surgical Swab sternal wound  Preliminary Report (05-14-20 @ 11:07):    Numerous Methicillin resistant Staphylococcus aureus  Organism: Methicillin resistant Staphylococcus aureus (05-14-20 @ 10:53)  Organism: Methicillin resistant Staphylococcus aureus (05-14-20 @ 10:53)      -  Ampicillin/Sulbactam: R <=8/4      -  Cefazolin: R <=4      -  Clindamycin: S <=0.25      -  Daptomycin: S 0.5      -  Erythromycin: R >4      -  Gentamicin: S <=1 Should not be used as monotherapy      -  Linezolid: S 2      -  Oxacillin: R >2      -  Penicillin: R >8      -  RIF- Rifampin: S <=1 Should not be used as monotherapy      -  Tetra/Doxy: S <=1      -  Trimethoprim/Sulfamethoxazole: S <=0.5/9.5      -  Vancomycin: S 1      Method Type: REINALDO    Culture - Blood (collected 05-11-20 @ 22:20)  Source: .Blood Blood-Peripheral  Preliminary Report (05-12-20 @ 23:01):    No growth to date.    Culture - Blood (collected 05-11-20 @ 22:20)  Source: .Blood Blood-Peripheral  Preliminary Report (05-12-20 @ 23:01):    No growth to date.    RADIOLOGY:  Images below reviewed personally  CT Chest No Cont (05.12.20 @ 20:09)   No subcutaneous collection. Trace hematoma within the anterior mediastinum. No drainable collection.

## 2020-05-14 NOTE — CHART NOTE - NSCHARTNOTEFT_GEN_A_CORE
Medicine NP Episodic Note    Positive blood culture - aerobic blood culture grew gram positive clusters - MRSA. Patient already on daptomycin. Repeat blood cultures ordered for AM. Fu sensitivities. Nursing staff aware that contact isolation is recommended with patients chest wound.    Vital Signs Last 24 Hrs  T(C): 36.7 (13 May 2020 20:39), Max: 36.7 (13 May 2020 11:45)  T(F): 98.1 (13 May 2020 20:39), Max: 98.1 (13 May 2020 11:45)  HR: 90 (13 May 2020 20:39) (90 - 91)  BP: 118/67 (13 May 2020 20:39) (114/67 - 137/78)  BP(mean): --  RR: 18 (13 May 2020 20:39) (18 - 18)  SpO2: 100% (13 May 2020 20:39) (100% - 100%)      Labs:                          9.0    9.91  )-----------( 329      ( 13 May 2020 06:30 )             29.9     05-13    134<L>  |  95<L>  |  40<H>  ----------------------------<  142<H>  4.2   |  21<L>  |  1.68<H>    Ca    9.7      13 May 2020 06:30            Follow up with Attending in AM.    Jakub Webster NP  Department of Medicine  [facility]

## 2020-05-14 NOTE — PROGRESS NOTE ADULT - SUBJECTIVE AND OBJECTIVE BOX
Chief complaint  Patient is a 65y old  Male who presents with a chief complaint of syncope (14 May 2020 11:30)   Review of systems  Patient sitting up in chair, looks comfortable, no hypoglycemic episodes.    Labs and Fingersticks  CAPILLARY BLOOD GLUCOSE      POCT Blood Glucose.: 293 mg/dL (14 May 2020 12:38)  POCT Blood Glucose.: 349 mg/dL (14 May 2020 08:35)  POCT Blood Glucose.: 218 mg/dL (14 May 2020 01:23)  POCT Blood Glucose.: 125 mg/dL (13 May 2020 21:50)  POCT Blood Glucose.: 184 mg/dL (13 May 2020 17:43)      Anion Gap, Serum: 13 (05-14 @ 06:46)  Anion Gap, Serum: 18 <H> (05-13 @ 06:30)      Calcium, Total Serum: 9.2 (05-14 @ 06:46)  Calcium, Total Serum: 9.7 (05-13 @ 06:30)          05-14    130<L>  |  94<L>  |  42<H>  ----------------------------<  236<H>  4.2   |  23  |  1.58<H>    Ca    9.2      14 May 2020 06:46  Phos  3.6     05-14  Mg     2.0     05-14                          8.6    8.77  )-----------( 336      ( 14 May 2020 06:46 )             28.2     Medications  MEDICATIONS  (STANDING):  aMIOdarone    Tablet 200 milliGRAM(s) Oral daily  aspirin enteric coated 81 milliGRAM(s) Oral daily  atorvastatin 40 milliGRAM(s) Oral at bedtime  BACItracin   Ointment 1 Application(s) Topical daily  buDESOnide    Inhalation Suspension 0.5 milliGRAM(s) Inhalation two times a day  DAPTOmycin IVPB 650 milliGRAM(s) IV Intermittent every 24 hours  dextrose 5%. 1000 milliLiter(s) (50 mL/Hr) IV Continuous <Continuous>  dextrose 50% Injectable 12.5 Gram(s) IV Push once  dextrose 50% Injectable 25 Gram(s) IV Push once  dextrose 50% Injectable 25 Gram(s) IV Push once  enoxaparin Injectable 40 milliGRAM(s) SubCutaneous daily  insulin glargine Injectable (LANTUS) 26 Unit(s) SubCutaneous at bedtime  insulin lispro (HumaLOG) corrective regimen sliding scale   SubCutaneous three times a day before meals  insulin lispro (HumaLOG) corrective regimen sliding scale   SubCutaneous at bedtime  insulin lispro Injectable (HumaLOG) 13 Unit(s) SubCutaneous three times a day before meals  metoprolol tartrate 12.5 milliGRAM(s) Oral two times a day  pantoprazole    Tablet 40 milliGRAM(s) Oral before breakfast  polyethylene glycol 3350 17 Gram(s) Oral daily  senna 2 Tablet(s) Oral at bedtime  torsemide 10 milliGRAM(s) Oral daily      Physical Exam  General: Patient comfortable in bed  Vital Signs Last 12 Hrs  T(F): 97.7 (05-14-20 @ 05:06), Max: 97.7 (05-14-20 @ 05:06)  HR: 92 (05-14-20 @ 12:29) (92 - 94)  BP: 119/73 (05-14-20 @ 12:29) (119/73 - 139/82)  BP(mean): --  RR: 18 (05-14-20 @ 05:06) (18 - 18)  SpO2: 100% (05-14-20 @ 05:06) (100% - 100%)  Neck: No palpable thyroid nodules.  CVS: S1S2, No murmurs  Respiratory: No wheezing, no crepitations  GI: Abdomen soft, bowel sounds positive  Musculoskeletal:  edema lower extremities.   Skin: No skin rashes, no ecchymosis    Diagnostics

## 2020-05-14 NOTE — CHART NOTE - NSCHARTNOTEFT_GEN_A_CORE
Preop Dx: nonhealing sternal wound  Surgeon: Dr. Sy Asutin  Procedure: lap possible open omental flap harvest    Vital Signs Last 24 Hrs  T(C): 36.5 (14 May 2020 05:06), Max: 36.7 (13 May 2020 11:45)  T(F): 97.7 (14 May 2020 05:06), Max: 98.1 (13 May 2020 11:45)  HR: 94 (14 May 2020 05:06) (90 - 94)  BP: 139/82 (14 May 2020 05:06) (114/67 - 139/82)  BP(mean): --  RR: 18 (14 May 2020 05:06) (18 - 18)  SpO2: 100% (14 May 2020 05:06) (100% - 100%)                        8.6    8.77  )-----------( 336      ( 14 May 2020 06:46 )             28.2     05-14    130<L>  |  94<L>  |  42<H>  ----------------------------<  236<H>  4.2   |  23  |  1.58<H>    Ca    9.2      14 May 2020 06:46  Phos  3.6     05-14  Mg     2.0     05-14      PT/INR - ( 14 May 2020 06:46 )   PT: 13.2 sec;   INR: 1.15 ratio         PTT - ( 14 May 2020 06:46 )  PTT:34.8 sec  Daily     Daily Weight in k.9 (14 May 2020 05:06)    EKG:   Ventricular Rate 98 BPM  Atrial Rate 98 BPM  P-R Interval 146 ms  QRS Duration 132 ms  Q-T Interval 364 ms  QTC Calculation(Bezet) 464 ms  P Axis 29 degrees  R Axis 80 degrees  T Axis -9 degrees  Diagnosis Line NORMAL SINUS RHYTHM  NON-SPECIFIC INTRA-VENTRICULAR CONDUCTION BLOCK  NONSPECIFIC T WAVE ABNORMALITY  ABNORMAL ECG    CXR:   EXAM:  XR CHEST AP OR PA 1V                        PROCEDURE DATE:  2020    INTERPRETATION:  CLINICAL INFORMATION: Chest pain.    EXAM: Frontal chest radiograph dated 2020.    COMPARISON: Chest radiograph from 3/16/2020.  FINDINGS:  Small loculated left pleural effusion, decreased since prior study.  The right lung is clear.  The cardiomediastinal silhouette is enlarged.  The osseous structures are unremarkable.    IMPRESSION:   Small loculated left pleural effusion, decreased since prior study.        Type and Screen:   Type + Screen (20 @ 06:00)    ABO Interpretation: O    Rh Interpretation: Positive    Antibody Screen: Negative            A/P: 65M PMH CABG (2020) complicated by PEA arrest, thoracotomy site infection w/empyema, DM, HTN, initially presented  with syncope, now bacteremic MRSA source likely nonhealing sternal chest wound.      - OR 5/15/2020 for omental flap harvest with plastics reconstruction of sternal wound with Dr. Austin  - NPO past midnight, except medications  - IVF while NPO  - Consent signed and in chart  - Medical clearance for OR     Gen Surg [id number]

## 2020-05-14 NOTE — PROGRESS NOTE ADULT - PROBLEM SELECTOR PLAN 1
Tight glycemic control necessary in the setting of sternal wound infection.  Will increase Lantus to 26u at bedtime.  Will increase Humalog to 13u before each meal and continue Humalog correction scale coverage. Will continue monitoring FS and FU.  Patient counseled for compliance with insulin injections, consistent low carb diet, and exercise as tolerated outpatient. Tight glycemic control necessary in the setting of sternal wound infection.  Will increase Lantus to 26u at bedtime.  Will increase Humalog to 15u before each meal and continue Humalog correction scale coverage. Will continue monitoring FS and FU.  Patient counseled for compliance with insulin injections, consistent low carb diet, and exercise as tolerated outpatient.

## 2020-05-14 NOTE — PROGRESS NOTE ADULT - ASSESSMENT
65M s/p CABG 2/3/20, course complicated by PEA arrest, E faecalis bacteremia and local infections of the sternal and right leg SVG sites, now admitted 5/11/20 for syncope.   No bacteria grew from the sternal OR debridement 2/25 and he received in total about six weeks of antibiotics which would have treated an osteomyelitis although there was low concern - hard bone noted in OR and sternal wires were removed.   Despite this his wound drainage has significantly increased, now growing MRSA and with low-grade bacteremia.   High ESR/CRP but no obvious purulence or local inflammation and it's a lot of fluid to be caused by just bacteria. Lymphatic leak? CT chest without drainable collection.     Suggest  -f/u repeat blood cultures   -agree with TTE   -I increased Daptomycin to 650mg IV q24h (8mg/kg) given bacteremia, favor avoiding Vancomycin in renal insufficiency   -weekly CPK while on Daptomycin   -plan on at least 4 weeks of IV antibiotics   -weekly ESR and CRP   -agree with plans for debridement and reconstruction tomorrow   -patient's wife asked for regular updates     Chapito Delarosa MD   Infectious Disease   Pager [phone]   After 5PM and on weekends please page fellow on call or call [phone]

## 2020-05-14 NOTE — CONSULT NOTE ADULT - SUBJECTIVE AND OBJECTIVE BOX
[facility] NEPHROLOGY- CONSULTATION NOTE    65y Male with history of below presents with syncope concern for sternal wound infection. Nephrology consulted for elevated Scr.    Patient appears to have a history of CKD-3 with baseline Scr 1.5 as per prior labs. Patient with h/o IDDM and currently does not follow with a nephrologist.    REVIEW OF SYSTEMS:  Gen: no changes in weight  HEENT: no rhinorrhea  Neck: no sore throat  Cards: no chest pain  Resp: no dyspnea  GI: no nausea or vomiting or diarrhea  : no dysuria or hematuria  Vascular: no LE edema  Derm: no rashes  Neuro: no numbness/tingling    No Known Allergies      Home Medications Reviewed  Hospital Medications:   MEDICATIONS  (STANDING):  aMIOdarone    Tablet 200 milliGRAM(s) Oral daily  aspirin enteric coated 81 milliGRAM(s) Oral daily  atorvastatin 40 milliGRAM(s) Oral at bedtime  BACItracin   Ointment 1 Application(s) Topical daily  buDESOnide    Inhalation Suspension 0.5 milliGRAM(s) Inhalation two times a day  DAPTOmycin IVPB 650 milliGRAM(s) IV Intermittent every 24 hours  dextrose 5% + sodium chloride 0.45% with potassium chloride 20 mEq/L 1000 milliLiter(s) (100 mL/Hr) IV Continuous <Continuous>  dextrose 5%. 1000 milliLiter(s) (50 mL/Hr) IV Continuous <Continuous>  dextrose 50% Injectable 12.5 Gram(s) IV Push once  dextrose 50% Injectable 25 Gram(s) IV Push once  dextrose 50% Injectable 25 Gram(s) IV Push once  enoxaparin Injectable 40 milliGRAM(s) SubCutaneous daily  insulin glargine Injectable (LANTUS) 26 Unit(s) SubCutaneous at bedtime  insulin lispro (HumaLOG) corrective regimen sliding scale   SubCutaneous three times a day before meals  insulin lispro (HumaLOG) corrective regimen sliding scale   SubCutaneous at bedtime  insulin lispro Injectable (HumaLOG) 15 Unit(s) SubCutaneous three times a day before meals  loratadine 10 milliGRAM(s) Oral daily  metoprolol tartrate 12.5 milliGRAM(s) Oral two times a day  pantoprazole    Tablet 40 milliGRAM(s) Oral before breakfast  polyethylene glycol 3350 17 Gram(s) Oral daily  senna 2 Tablet(s) Oral at bedtime  torsemide 10 milliGRAM(s) Oral daily      PAST MEDICAL & SURGICAL HISTORY:  HTN (hypertension)  Diabetes  S/P CABG (coronary artery bypass graft)  History of appendectomy: x 30 yrs ago      FAMILY HISTORY:  FH: type 2 diabetes      SOCIAL HISTORY:  Denies toxic substance use     VITALS:  T(F): 97.7 (05-14-20 @ 14:36), Max: 98.1 (05-13-20 @ 20:39)  HR: 91 (05-14-20 @ 15:42)  BP: 114/68 (05-14-20 @ 15:42)  RR: 19 (05-14-20 @ 14:36)  SpO2: 100% (05-14-20 @ 14:36)  Wt(kg): --    05-13 @ 07:01  -  05-14 @ 07:00  --------------------------------------------------------  IN: 590 mL / OUT: 800 mL / NET: -210 mL    05-14 @ 07:01  -  05-14 @ 16:10  --------------------------------------------------------  IN: 600 mL / OUT: 0 mL / NET: 600 mL          PHYSICAL EXAM:  Gen: NAD, calm  HEENT: MMM  Neck: no JVD  Cards: RRR, +S1/S2, no M/G/R  Resp: CTA B/L  GI: soft, NT/ND, NABS  : no CVA tenderness  Vascular: no LE edema B/L  Derm: no rashes  Neuro: non-focal    LABS:  05-14    130<L>  |  94<L>  |  42<H>  ----------------------------<  236<H>  4.2   |  23  |  1.58<H>    Ca    9.2      14 May 2020 06:46  Phos  3.6     05-14  Mg     2.0     05-14      Creatinine Trend: 1.58 <--, 1.68 <--, 1.49 <--, 1.57 <--                        8.6    8.77  )-----------( 336      ( 14 May 2020 06:46 )             28.2     Urine Studies:        RADIOLOGY & ADDITIONAL STUDIES:  < from: CT Chest No Cont (05.12.20 @ 20:09) >    IMPRESSION:     No subcutaneous collection. Trace hematoma within the anterior mediastinum. No drainable collection.    < end of copied text >

## 2020-05-14 NOTE — PROGRESS NOTE ADULT - ASSESSMENT
65M PMH CABG (2/2020) complicated by PEA arrest, thoracotomy site infection w/empyema, DM, HTN, initially presented 5/11 with syncope, found to also have nonhealing sternal chest wound    - Plan for OR Friday (5/15) for omental flap in coordination with PRS  - Care per primary team  - NPO after midnight  - IVF  - Pre-op labs ordered    Red surgery  [id number] 65M PMH CABG (2/2020) complicated by PEA arrest, thoracotomy site infection w/empyema, DM, HTN, initially presented 5/11 with syncope, found to also have nonhealing sternal chest wound    - Plan for OR Friday (5/15) for omental flap in coordination with PRS  - Care per primary team  - NPO after midnight  - IVF  - Pre-op labs ordered  - consent signed in chart    Red surgery  [id number]

## 2020-05-14 NOTE — PROGRESS NOTE ADULT - ASSESSMENT
Assessment  DMT2: 65y Male with DM T2 with hyperglycemia, A1C 7.9%, was on insulin at home, now on basal bolus insulin, increased dose yesterday, patient refused full-dose Lantus last night, received partial-dose, blood sugars running high and not at target, no hypoglycemic episodes. Patient is eating meals with inconsistent carb intake, chest wound MRSA+, planning OR tomorrow.  CAD: s/p CABG previous admission, stable, monitored.  Sternal Wound: On IV  ABx, FU Plastics/ID.  HTN: Controlled,  on antihypertensive medications.  CKD: Monitor labs/BMP.          Harper Matias MD  Cell: [phone]  Office: [phone] Assessment  DMT2: 65y Male with DM T2 with hyperglycemia, A1C 7.9%, was on insulin at home, now on basal bolus insulin, increased  dose yesterday, patient refused full-dose Lantus last night, received partial-dose, blood sugars running high and not at target, no hypoglycemic episodes. Patient is eating meals with inconsistent carb intake, chest wound MRSA+, planning OR tomorrow.  CAD: s/p CABG previous admission, stable, monitored.  Sternal Wound: On IV  ABx, FU Plastics/ID.  HTN: Controlled,  on antihypertensive medications.  CKD: Monitor labs/BMP.          Harper Matias MD  Cell: [phone]  Office: [phone]

## 2020-05-14 NOTE — PROGRESS NOTE ADULT - ASSESSMENT
limited echo 5/12/20:EF 55-60%. overall preserved lv fx, despite segmental wall motion abnormalities, no LV thrombus   The apical anterior wall, the apical lateral wall, the basal anterior wall,the mid anterior wall, and the midanterolateral wall are hypokinetic.      a/p  65y m PMHx CABG Feb 2020 complicated by PEA arrest, thoracotomy site infection w/empyema, DM, HTN p/w syncope, sternal wound infection.     1. Syncope   likely 2/2 to +orthostatics  cv stable, EKG unchanged from prior   HST elevated w/ normal CK/CKMB, demand ischemia in setting of STEPHANIE/CKD, possible wound infection   CT head negative for ICH  echo with overall preserved LVEF, despite segmental wall motion abnormalities, no LV thrombus   continue tele monitoring   encourage PO intake , check repeat orthostatics     2. Sternal wound infection s/p RTOR for SWI    surg f/u noted: plan for for omental flap harvest with plastics reconstruction of sternal wound tomorrow   IV abx per ID   blood cx neg    ID, f/u    3. CAD s/p CABG x 4 , s/p PEA arrest   cv stable   cont asa, statin, bb     4. Chronic diastolic CHF  euvolemic on exam with unchanged SOB   continue diuretics as ordered   repeat echo with preserved LVEF     5. PAF  remains NSR  continue amio, bb    6. HTN  bp stable    7. STEPHANIE/CKD   continue to trend creat     8. Pleural effusion, hx  CXR this admission with small left loculated pleural effusion now decreased in size from previous exam.    dvt ppx

## 2020-05-14 NOTE — PROGRESS NOTE ADULT - SUBJECTIVE AND OBJECTIVE BOX
CARDIOLOGY FOLLOW UP - Dr. Steel    CC no cp or sob   dizziness resolved       PHYSICAL EXAM:  T(C): 36.5 (05-14-20 @ 14:36), Max: 36.7 (05-13-20 @ 20:39)  HR: 91 (05-14-20 @ 15:42) (90 - 94)  BP: 114/68 (05-14-20 @ 15:42) (114/68 - 139/82)  RR: 19 (05-14-20 @ 14:36) (18 - 19)  SpO2: 100% (05-14-20 @ 14:36) (100% - 100%)  Wt(kg): --  I&O's Summary    13 May 2020 07:01  -  14 May 2020 07:00  --------------------------------------------------------  IN: 590 mL / OUT: 800 mL / NET: -210 mL    14 May 2020 07:01  -  14 May 2020 15:49  --------------------------------------------------------  IN: 600 mL / OUT: 0 mL / NET: 600 mL        Appearance: Normal	  Cardiovascular: Normal S1 S2,RRR, No JVD, No murmurs  Respiratory: diminished   Gastrointestinal:  Soft, Non-tender, + BS	  Extremities: Normal range of motion, No clubbing, cyanosis or edema        MEDICATIONS  (STANDING):  aMIOdarone    Tablet 200 milliGRAM(s) Oral daily  aspirin enteric coated 81 milliGRAM(s) Oral daily  atorvastatin 40 milliGRAM(s) Oral at bedtime  BACItracin   Ointment 1 Application(s) Topical daily  buDESOnide    Inhalation Suspension 0.5 milliGRAM(s) Inhalation two times a day  DAPTOmycin IVPB 650 milliGRAM(s) IV Intermittent every 24 hours  dextrose 5%. 1000 milliLiter(s) (50 mL/Hr) IV Continuous <Continuous>  dextrose 50% Injectable 12.5 Gram(s) IV Push once  dextrose 50% Injectable 25 Gram(s) IV Push once  dextrose 50% Injectable 25 Gram(s) IV Push once  enoxaparin Injectable 40 milliGRAM(s) SubCutaneous daily  insulin glargine Injectable (LANTUS) 26 Unit(s) SubCutaneous at bedtime  insulin lispro (HumaLOG) corrective regimen sliding scale   SubCutaneous three times a day before meals  insulin lispro (HumaLOG) corrective regimen sliding scale   SubCutaneous at bedtime  insulin lispro Injectable (HumaLOG) 15 Unit(s) SubCutaneous three times a day before meals  loratadine 10 milliGRAM(s) Oral daily  metoprolol tartrate 12.5 milliGRAM(s) Oral two times a day  pantoprazole    Tablet 40 milliGRAM(s) Oral before breakfast  polyethylene glycol 3350 17 Gram(s) Oral daily  senna 2 Tablet(s) Oral at bedtime  torsemide 10 milliGRAM(s) Oral daily      TELEMETRY: nsr 	    ECG:  	  RADIOLOGY:   DIAGNOSTIC TESTING:  [ ] Echocardiogram:  [ ]  Catheterization:  [ ] Stress Test:    OTHER: 	    LABS:	 	    Troponin T, High Sensitivity Result: 137 ng/L [0 - 51] (05-12 @ 07:17)  Creatine Kinase, Serum: 35 U/L [30 - 200] (05-12 @ 00:43)  CKMB Units: 2.0 ng/mL [0.0 - 6.7] (05-12 @ 00:43)  Troponin T, High Sensitivity Result: 110 ng/L [0 - 51] (05-12 @ 00:43)  Troponin T, High Sensitivity Result: 118 ng/L [0 - 51] (05-11 @ 19:33)  Troponin T, High Sensitivity Result: 132 ng/L [0 - 51] (05-11 @ 16:47)                          8.6    8.77  )-----------( 336      ( 14 May 2020 06:46 )             28.2     05-14    130<L>  |  94<L>  |  42<H>  ----------------------------<  236<H>  4.2   |  23  |  1.58<H>    Ca    9.2      14 May 2020 06:46  Phos  3.6     05-14  Mg     2.0     05-14      PT/INR - ( 14 May 2020 06:46 )   PT: 13.2 sec;   INR: 1.15 ratio         PTT - ( 14 May 2020 06:46 )  PTT:34.8 sec

## 2020-05-14 NOTE — PROGRESS NOTE ADULT - SUBJECTIVE AND OBJECTIVE BOX
Patient is a 65y old  Male who presents with a chief complaint of syncope (14 May 2020 01:56)      SUBJECTIVE / OVERNIGHT EVENTS: weak. c/o itching on back. No rash.  Review of Systems  chest pain no  palpitations no  sob no  nausea no  headache no    MEDICATIONS  (STANDING):  aMIOdarone    Tablet 200 milliGRAM(s) Oral daily  aspirin enteric coated 81 milliGRAM(s) Oral daily  atorvastatin 40 milliGRAM(s) Oral at bedtime  BACItracin   Ointment 1 Application(s) Topical daily  DAPTOmycin IVPB 650 milliGRAM(s) IV Intermittent every 24 hours  dextrose 5%. 1000 milliLiter(s) (50 mL/Hr) IV Continuous <Continuous>  dextrose 50% Injectable 12.5 Gram(s) IV Push once  dextrose 50% Injectable 25 Gram(s) IV Push once  dextrose 50% Injectable 25 Gram(s) IV Push once  enoxaparin Injectable 40 milliGRAM(s) SubCutaneous daily  insulin glargine Injectable (LANTUS) 26 Unit(s) SubCutaneous at bedtime  insulin lispro (HumaLOG) corrective regimen sliding scale   SubCutaneous three times a day before meals  insulin lispro (HumaLOG) corrective regimen sliding scale   SubCutaneous at bedtime  insulin lispro Injectable (HumaLOG) 11 Unit(s) SubCutaneous three times a day before meals  metoprolol tartrate 12.5 milliGRAM(s) Oral two times a day  pantoprazole    Tablet 40 milliGRAM(s) Oral before breakfast  polyethylene glycol 3350 17 Gram(s) Oral daily  senna 2 Tablet(s) Oral at bedtime  torsemide 10 milliGRAM(s) Oral daily    MEDICATIONS  (PRN):  acetaminophen   Tablet .. 650 milliGRAM(s) Oral every 6 hours PRN Temp greater or equal to 38.5C (101.3F), Mild Pain (1 - 3)  benzonatate 100 milliGRAM(s) Oral three times a day PRN Cough  dextrose 40% Gel 15 Gram(s) Oral once PRN Blood Glucose LESS THAN 70 milliGRAM(s)/deciliter  glucagon  Injectable 1 milliGRAM(s) IntraMuscular once PRN Glucose LESS THAN 70 milligrams/deciliter  hydrocortisone 1% Cream 1 Application(s) Topical daily PRN Itching      Vital Signs Last 24 Hrs  T(C): 36.5 (14 May 2020 05:06), Max: 36.7 (13 May 2020 11:45)  T(F): 97.7 (14 May 2020 05:06), Max: 98.1 (13 May 2020 11:45)  HR: 94 (14 May 2020 05:06) (90 - 94)  BP: 139/82 (14 May 2020 05:06) (114/67 - 139/82)  BP(mean): --  RR: 18 (14 May 2020 05:06) (18 - 18)  SpO2: 100% (14 May 2020 05:06) (100% - 100%)    PHYSICAL EXAM:  GENERAL: NAD, well-developed  HEAD:  Atraumatic, Normocephalic  EYES: EOMI, PERRLA, conjunctiva and sclera clear  NECK: Supple, No JVD  CHEST/LUNG: Clear to auscultation bilaterally; No wheeze Wound with VAC.  HEART: Regular rate and rhythm; No murmurs, rubs, or gallops  ABDOMEN: Soft, Nontender, Nondistended; Bowel sounds present  EXTREMITIES:  2+ Peripheral Pulses, No clubbing, cyanosis, or edema  PSYCH: AAOx3  NEUROLOGY: non-focal  SKIN: No rashes or lesions    LABS:                        8.6    8.77  )-----------( 336      ( 14 May 2020 06:46 )             28.2     05-14    130<L>  |  94<L>  |  42<H>  ----------------------------<  236<H>  4.2   |  23  |  1.58<H>    Ca    9.2      14 May 2020 06:46  Phos  3.6     05-14  Mg     2.0     05-14      PT/INR - ( 14 May 2020 06:46 )   PT: 13.2 sec;   INR: 1.15 ratio         PTT - ( 14 May 2020 06:46 )  PTT:34.8 sec          Culture - Blood (collected 12 May 2020 03:18)  Source: .Blood Blood  Preliminary Report (13 May 2020 04:01):    No growth to date.    Culture - Blood (collected 12 May 2020 03:17)  Source: .Blood Blood  Gram Stain (14 May 2020 00:21):    Growth in aerobic bottle: Gram Positive Cocci in Clusters  Preliminary Report (14 May 2020 00:21):    Growth in aerobic bottle: Gram Positive Cocci in Clusters    "Due to technical problems, Proteus sp. will Not be reported as part of    the BCID panel until further notice"    ***Blood Panel PCR results on this specimen are available    approximately 3 hours after the Gram stain result.***    Gram stain, PCR, and/or culture results may not always    correspond due to difference in methodologies.    ************************************************************    This PCR assay was performed using [employer].    The following targets are tested for: Enterococcus,    vancomycin resistant enterococci, Listeria monocytogenes,    coagulase negative staphylococci, S. aureus,    methicillin resistant S. aureus, Streptococcus agalactiae    (Group B), S. pneumoniae, S.pyogenes (Group A),    Acinetobacter baumannii, Enterobacter cloacae, E. coli,    Klebsiella oxytoca, K. pneumoniae, Proteus sp.,    Serratia marcescens, Haemophilus influenzae,    Neisseria meningitidis, Pseudomonas aeruginosa, Candida    albicans, C. glabrata, C krusei, C parapsilosis,    C. tropicalis and the KPC resistance gene.  Organism: Blood Culture PCR (14 May 2020 01:57)  Organism: Blood Culture PCR (14 May 2020 01:57)    Culture - Surgical Swab (collected 12 May 2020 03:08)  Source: .Surgical Swab sternal wound  Preliminary Report (14 May 2020 11:07):    Numerous Methicillin resistant Staphylococcus aureus  Organism: Methicillin resistant Staphylococcus aureus (14 May 2020 10:53)  Organism: Methicillin resistant Staphylococcus aureus (14 May 2020 10:53)    Culture - Blood (collected 11 May 2020 22:20)  Source: .Blood Blood-Peripheral  Preliminary Report (12 May 2020 23:01):    No growth to date.    Culture - Blood (collected 11 May 2020 22:20)  Source: .Blood Blood-Peripheral  Preliminary Report (12 May 2020 23:01):    No growth to date.        RADIOLOGY & ADDITIONAL TESTS:    Imaging Personally Reviewed:    Consultant(s) Notes Reviewed:      Care Discussed with Consultants/Other Providers:

## 2020-05-14 NOTE — CONSULT NOTE ADULT - ATTENDING COMMENTS
[facility] NEPHROLOGY  Justin Campoverde M.D.  Tyler Dueñas D.O.  Margy Tidwell M.D.  Tatiana Cesar, MSN, ANP-C    Telephone: [phone]  Facsimile: [phone]    [address] [place] [address]
pt seen and examined.  agree with note above.  d/w pt and wife by phone plan for lap omental flap harvest with PRS on friday 5/15.
Patient seen and examined, agree with the above assessment and plan by AUDIE Templeton.  pt very well known to me, prior history of recent CABG w complicated postop course returning with syncope in setting of likely dehydration and a recurrent sternal wound infection  repeat ECHO  plastics followup-wound vac-possible OR  IV abx per ID  endo eval  renal followup
Patient seen and exam today at bedside. Chart, labs, vitals, radiology reviewed. Above H&P reviewed and edited where appropriate.  Agree with history and physical exam. Agree with assessment and plan.

## 2020-05-14 NOTE — PROGRESS NOTE ADULT - ASSESSMENT
65 m with    Syncope  - telemetry  - cardiology evaluation     Orthostatic hypotension  - DC aldactone  - follow    S/P CABG  - CT sx evaluation noted    Sternal wound/ Bacteremia  - Plastic surgery follow  - MRSA  - Isolation  - Daptomycin  - ID follow  - Echocardiogram to r/o Vegetation  - debridement and omental flap pending     Scalp abrasion  - Bacitracin requested by patient    Diabetes control  - ADA diet  - BS control  - Endocrine evaluation noted    CKD  - follow    HTN control    Miguel Angel Duke MD pager [phone]

## 2020-05-14 NOTE — PROGRESS NOTE ADULT - SUBJECTIVE AND OBJECTIVE BOX
SURGERY DAILY PROGRESS NOTE:       SUBJECTIVE/ROS: Patient examined at bedside. No acute events overnight  Denies nausea, vomiting, chest pain, shortness of breath         MEDICATIONS  (STANDING):  aMIOdarone    Tablet 200 milliGRAM(s) Oral daily  aspirin enteric coated 81 milliGRAM(s) Oral daily  atorvastatin 40 milliGRAM(s) Oral at bedtime  BACItracin   Ointment 1 Application(s) Topical daily  DAPTOmycin IVPB 500 milliGRAM(s) IV Intermittent every 24 hours  dextrose 5%. 1000 milliLiter(s) (50 mL/Hr) IV Continuous <Continuous>  dextrose 50% Injectable 12.5 Gram(s) IV Push once  dextrose 50% Injectable 25 Gram(s) IV Push once  dextrose 50% Injectable 25 Gram(s) IV Push once  enoxaparin Injectable 40 milliGRAM(s) SubCutaneous daily  insulin lispro (HumaLOG) corrective regimen sliding scale   SubCutaneous three times a day before meals  insulin lispro (HumaLOG) corrective regimen sliding scale   SubCutaneous at bedtime  insulin lispro Injectable (HumaLOG) 11 Unit(s) SubCutaneous three times a day before meals  metoprolol tartrate 12.5 milliGRAM(s) Oral two times a day  pantoprazole    Tablet 40 milliGRAM(s) Oral before breakfast  polyethylene glycol 3350 17 Gram(s) Oral daily  senna 2 Tablet(s) Oral at bedtime  torsemide 10 milliGRAM(s) Oral daily    MEDICATIONS  (PRN):  acetaminophen   Tablet .. 650 milliGRAM(s) Oral every 6 hours PRN Temp greater or equal to 38.5C (101.3F), Mild Pain (1 - 3)  benzonatate 100 milliGRAM(s) Oral three times a day PRN Cough  dextrose 40% Gel 15 Gram(s) Oral once PRN Blood Glucose LESS THAN 70 milliGRAM(s)/deciliter  glucagon  Injectable 1 milliGRAM(s) IntraMuscular once PRN Glucose LESS THAN 70 milligrams/deciliter  hydrocortisone 1% Cream 1 Application(s) Topical daily PRN Itching      OBJECTIVE:    Vital Signs Last 24 Hrs  T(C): 36.7 (13 May 2020 20:39), Max: 36.7 (13 May 2020 11:45)  T(F): 98.1 (13 May 2020 20:39), Max: 98.1 (13 May 2020 11:45)  HR: 90 (13 May 2020 20:39) (90 - 91)  BP: 118/67 (13 May 2020 20:39) (114/67 - 137/78)  BP(mean): --  RR: 18 (13 May 2020 20:39) (18 - 18)  SpO2: 100% (13 May 2020 20:39) (100% - 100%)        I&O's Detail    12 May 2020 07:01  -  13 May 2020 07:00  --------------------------------------------------------  IN:    Oral Fluid: 600 mL    Solution: 100 mL  Total IN: 700 mL    OUT:    Voided: 1750 mL  Total OUT: 1750 mL    Total NET: -1050 mL      13 May 2020 07:01  -  14 May 2020 01:56  --------------------------------------------------------  IN:    Oral Fluid: 590 mL  Total IN: 590 mL    OUT:  Total OUT: 0 mL    Total NET: 590 mL          Daily     Daily Weight in k.8 (13 May 2020 06:41)    LABS:                        9.0    9.91  )-----------( 329      ( 13 May 2020 06:30 )             29.9     05-13    134<L>  |  95<L>  |  40<H>  ----------------------------<  142<H>  4.2   |  21<L>  |  1.68<H>    Ca    9.7      13 May 2020 06:30                        PHYSICAL EXAM:  General: Laying in bed, in no acute distress  Respiratory: Nonlabored  Chest: Sternal wound with gauze intact, minimal strikethrough  Abdominal: Soft, nondistended, nontender  Extremities: Warm

## 2020-05-15 LAB
-  AMIKACIN: SIGNIFICANT CHANGE UP
-  AMPICILLIN/SULBACTAM: SIGNIFICANT CHANGE UP
-  AMPICILLIN/SULBACTAM: SIGNIFICANT CHANGE UP
-  CEFAZOLIN: SIGNIFICANT CHANGE UP
-  CEFEPIME: SIGNIFICANT CHANGE UP
-  CEFTAZIDIME: SIGNIFICANT CHANGE UP
-  CIPROFLOXACIN: SIGNIFICANT CHANGE UP
-  CLINDAMYCIN: SIGNIFICANT CHANGE UP
-  DAPTOMYCIN: SIGNIFICANT CHANGE UP
-  ERYTHROMYCIN: SIGNIFICANT CHANGE UP
-  GENTAMICIN: SIGNIFICANT CHANGE UP
-  GENTAMICIN: SIGNIFICANT CHANGE UP
-  IMIPENEM: SIGNIFICANT CHANGE UP
-  LEVOFLOXACIN: SIGNIFICANT CHANGE UP
-  LINEZOLID: SIGNIFICANT CHANGE UP
-  MEROPENEM: SIGNIFICANT CHANGE UP
-  OXACILLIN: SIGNIFICANT CHANGE UP
-  PENICILLIN: SIGNIFICANT CHANGE UP
-  PIPERACILLIN/TAZOBACTAM: SIGNIFICANT CHANGE UP
-  RIFAMPIN: SIGNIFICANT CHANGE UP
-  TETRACYCLINE: SIGNIFICANT CHANGE UP
-  TOBRAMYCIN: SIGNIFICANT CHANGE UP
-  TRIMETHOPRIM/SULFAMETHOXAZOLE: SIGNIFICANT CHANGE UP
-  TRIMETHOPRIM/SULFAMETHOXAZOLE: SIGNIFICANT CHANGE UP
-  VANCOMYCIN: SIGNIFICANT CHANGE UP
ANION GAP SERPL CALC-SCNC: 14 MMOL/L — SIGNIFICANT CHANGE UP (ref 5–17)
APTT BLD: 34 SEC — SIGNIFICANT CHANGE UP (ref 27.5–36.3)
BUN SERPL-MCNC: 40 MG/DL — HIGH (ref 7–23)
CALCIUM SERPL-MCNC: 9.5 MG/DL — SIGNIFICANT CHANGE UP (ref 8.4–10.5)
CHLORIDE SERPL-SCNC: 94 MMOL/L — LOW (ref 96–108)
CO2 SERPL-SCNC: 24 MMOL/L — SIGNIFICANT CHANGE UP (ref 22–31)
CREAT SERPL-MCNC: 1.78 MG/DL — HIGH (ref 0.5–1.3)
CULTURE RESULTS: SIGNIFICANT CHANGE UP
GAS PNL BLDA: SIGNIFICANT CHANGE UP
GLUCOSE BLDC GLUCOMTR-MCNC: 156 MG/DL — HIGH (ref 70–99)
GLUCOSE BLDC GLUCOMTR-MCNC: 159 MG/DL — HIGH (ref 70–99)
GLUCOSE BLDC GLUCOMTR-MCNC: 185 MG/DL — HIGH (ref 70–99)
GLUCOSE BLDC GLUCOMTR-MCNC: 205 MG/DL — HIGH (ref 70–99)
GLUCOSE BLDC GLUCOMTR-MCNC: 218 MG/DL — HIGH (ref 70–99)
GLUCOSE BLDC GLUCOMTR-MCNC: 241 MG/DL — HIGH (ref 70–99)
GLUCOSE BLDC GLUCOMTR-MCNC: 244 MG/DL — HIGH (ref 70–99)
GLUCOSE BLDC GLUCOMTR-MCNC: 287 MG/DL — HIGH (ref 70–99)
GLUCOSE BLDC GLUCOMTR-MCNC: 290 MG/DL — HIGH (ref 70–99)
GLUCOSE SERPL-MCNC: 217 MG/DL — HIGH (ref 70–99)
HCT VFR BLD CALC: 26 % — LOW (ref 39–50)
HGB BLD-MCNC: 8.2 G/DL — LOW (ref 13–17)
INR BLD: 1.09 RATIO — SIGNIFICANT CHANGE UP (ref 0.88–1.16)
MAGNESIUM SERPL-MCNC: 2 MG/DL — SIGNIFICANT CHANGE UP (ref 1.6–2.6)
MCHC RBC-ENTMCNC: 25.2 PG — LOW (ref 27–34)
MCHC RBC-ENTMCNC: 31.5 GM/DL — LOW (ref 32–36)
MCV RBC AUTO: 80 FL — SIGNIFICANT CHANGE UP (ref 80–100)
METHOD TYPE: SIGNIFICANT CHANGE UP
NRBC # BLD: 0 /100 WBCS — SIGNIFICANT CHANGE UP (ref 0–0)
ORGANISM # SPEC MICROSCOPIC CNT: SIGNIFICANT CHANGE UP
PHOSPHATE SERPL-MCNC: 3.3 MG/DL — SIGNIFICANT CHANGE UP (ref 2.5–4.5)
PLATELET # BLD AUTO: 352 K/UL — SIGNIFICANT CHANGE UP (ref 150–400)
POTASSIUM SERPL-MCNC: 4.9 MMOL/L — SIGNIFICANT CHANGE UP (ref 3.5–5.3)
POTASSIUM SERPL-SCNC: 4.9 MMOL/L — SIGNIFICANT CHANGE UP (ref 3.5–5.3)
PROTHROM AB SERPL-ACNC: 12.6 SEC — SIGNIFICANT CHANGE UP (ref 10–12.9)
RBC # BLD: 3.25 M/UL — LOW (ref 4.2–5.8)
RBC # FLD: 15.8 % — HIGH (ref 10.3–14.5)
SODIUM SERPL-SCNC: 132 MMOL/L — LOW (ref 135–145)
SPECIMEN SOURCE: SIGNIFICANT CHANGE UP
WBC # BLD: 11.73 K/UL — HIGH (ref 3.8–10.5)
WBC # FLD AUTO: 11.73 K/UL — HIGH (ref 3.8–10.5)

## 2020-05-15 PROCEDURE — 84132 ASSAY OF SERUM POTASSIUM: CPT

## 2020-05-15 PROCEDURE — 71045 X-RAY EXAM CHEST 1 VIEW: CPT | Mod: 26,77

## 2020-05-15 PROCEDURE — 82565 ASSAY OF CREATININE: CPT

## 2020-05-15 PROCEDURE — 82330 ASSAY OF CALCIUM: CPT

## 2020-05-15 PROCEDURE — 83605 ASSAY OF LACTIC ACID: CPT

## 2020-05-15 PROCEDURE — 82435 ASSAY OF BLOOD CHLORIDE: CPT

## 2020-05-15 PROCEDURE — 85014 HEMATOCRIT: CPT

## 2020-05-15 PROCEDURE — 99232 SBSQ HOSP IP/OBS MODERATE 35: CPT

## 2020-05-15 PROCEDURE — 71045 X-RAY EXAM CHEST 1 VIEW: CPT | Mod: 26

## 2020-05-15 PROCEDURE — 82803 BLOOD GASES ANY COMBINATION: CPT

## 2020-05-15 PROCEDURE — 84295 ASSAY OF SERUM SODIUM: CPT

## 2020-05-15 PROCEDURE — 82947 ASSAY GLUCOSE BLOOD QUANT: CPT

## 2020-05-15 RX ORDER — INSULIN LISPRO 100/ML
VIAL (ML) SUBCUTANEOUS
Refills: 0 | Status: DISCONTINUED | OUTPATIENT
Start: 2020-05-15 | End: 2020-05-27

## 2020-05-15 RX ORDER — BACITRACIN ZINC 500 UNIT/G
1 OINTMENT IN PACKET (EA) TOPICAL DAILY
Refills: 0 | Status: DISCONTINUED | OUTPATIENT
Start: 2020-05-15 | End: 2020-05-27

## 2020-05-15 RX ORDER — BUDESONIDE, MICRONIZED 100 %
0.5 POWDER (GRAM) MISCELLANEOUS
Refills: 0 | Status: DISCONTINUED | OUTPATIENT
Start: 2020-05-15 | End: 2020-05-27

## 2020-05-15 RX ORDER — INSULIN GLARGINE 100 [IU]/ML
30 INJECTION, SOLUTION SUBCUTANEOUS AT BEDTIME
Refills: 0 | Status: DISCONTINUED | OUTPATIENT
Start: 2020-05-15 | End: 2020-05-15

## 2020-05-15 RX ORDER — ENOXAPARIN SODIUM 100 MG/ML
40 INJECTION SUBCUTANEOUS DAILY
Refills: 0 | Status: DISCONTINUED | OUTPATIENT
Start: 2020-05-15 | End: 2020-05-27

## 2020-05-15 RX ORDER — AMIODARONE HYDROCHLORIDE 400 MG/1
200 TABLET ORAL DAILY
Refills: 0 | Status: DISCONTINUED | OUTPATIENT
Start: 2020-05-15 | End: 2020-05-27

## 2020-05-15 RX ORDER — DEXTROSE 50 % IN WATER 50 %
15 SYRINGE (ML) INTRAVENOUS ONCE
Refills: 0 | Status: DISCONTINUED | OUTPATIENT
Start: 2020-05-15 | End: 2020-05-27

## 2020-05-15 RX ORDER — SODIUM CHLORIDE 9 MG/ML
1000 INJECTION, SOLUTION INTRAVENOUS
Refills: 0 | Status: DISCONTINUED | OUTPATIENT
Start: 2020-05-15 | End: 2020-05-17

## 2020-05-15 RX ORDER — INSULIN GLARGINE 100 [IU]/ML
26 INJECTION, SOLUTION SUBCUTANEOUS AT BEDTIME
Refills: 0 | Status: DISCONTINUED | OUTPATIENT
Start: 2020-05-15 | End: 2020-05-17

## 2020-05-15 RX ORDER — VANCOMYCIN HCL 1 G
VIAL (EA) INTRAVENOUS
Refills: 0 | Status: DISCONTINUED | OUTPATIENT
Start: 2020-05-15 | End: 2020-05-16

## 2020-05-15 RX ORDER — LORATADINE 10 MG/1
10 TABLET ORAL DAILY
Refills: 0 | Status: DISCONTINUED | OUTPATIENT
Start: 2020-05-15 | End: 2020-05-27

## 2020-05-15 RX ORDER — INSULIN LISPRO 100/ML
9 VIAL (ML) SUBCUTANEOUS
Refills: 0 | Status: DISCONTINUED | OUTPATIENT
Start: 2020-05-15 | End: 2020-05-17

## 2020-05-15 RX ORDER — POLYETHYLENE GLYCOL 3350 17 G/17G
17 POWDER, FOR SOLUTION ORAL DAILY
Refills: 0 | Status: DISCONTINUED | OUTPATIENT
Start: 2020-05-15 | End: 2020-05-27

## 2020-05-15 RX ORDER — SODIUM CHLORIDE 9 MG/ML
1000 INJECTION INTRAMUSCULAR; INTRAVENOUS; SUBCUTANEOUS
Refills: 0 | Status: DISCONTINUED | OUTPATIENT
Start: 2020-05-15 | End: 2020-05-15

## 2020-05-15 RX ORDER — GLUCAGON INJECTION, SOLUTION 0.5 MG/.1ML
1 INJECTION, SOLUTION SUBCUTANEOUS ONCE
Refills: 0 | Status: DISCONTINUED | OUTPATIENT
Start: 2020-05-15 | End: 2020-05-27

## 2020-05-15 RX ORDER — SODIUM CHLORIDE 9 MG/ML
1000 INJECTION, SOLUTION INTRAVENOUS
Refills: 0 | Status: DISCONTINUED | OUTPATIENT
Start: 2020-05-15 | End: 2020-05-27

## 2020-05-15 RX ORDER — VANCOMYCIN HCL 1 G
1000 VIAL (EA) INTRAVENOUS EVERY 24 HOURS
Refills: 0 | Status: DISCONTINUED | OUTPATIENT
Start: 2020-05-16 | End: 2020-05-16

## 2020-05-15 RX ORDER — METOPROLOL TARTRATE 50 MG
12.5 TABLET ORAL
Refills: 0 | Status: DISCONTINUED | OUTPATIENT
Start: 2020-05-15 | End: 2020-05-27

## 2020-05-15 RX ORDER — ACETAMINOPHEN 500 MG
650 TABLET ORAL EVERY 6 HOURS
Refills: 0 | Status: DISCONTINUED | OUTPATIENT
Start: 2020-05-15 | End: 2020-05-27

## 2020-05-15 RX ORDER — HYDROMORPHONE HYDROCHLORIDE 2 MG/ML
0.5 INJECTION INTRAMUSCULAR; INTRAVENOUS; SUBCUTANEOUS
Refills: 0 | Status: DISCONTINUED | OUTPATIENT
Start: 2020-05-15 | End: 2020-05-15

## 2020-05-15 RX ORDER — DEXTROSE 50 % IN WATER 50 %
25 SYRINGE (ML) INTRAVENOUS ONCE
Refills: 0 | Status: DISCONTINUED | OUTPATIENT
Start: 2020-05-15 | End: 2020-05-27

## 2020-05-15 RX ORDER — DEXTROSE 50 % IN WATER 50 %
12.5 SYRINGE (ML) INTRAVENOUS ONCE
Refills: 0 | Status: DISCONTINUED | OUTPATIENT
Start: 2020-05-15 | End: 2020-05-27

## 2020-05-15 RX ORDER — ATORVASTATIN CALCIUM 80 MG/1
40 TABLET, FILM COATED ORAL AT BEDTIME
Refills: 0 | Status: DISCONTINUED | OUTPATIENT
Start: 2020-05-15 | End: 2020-05-27

## 2020-05-15 RX ORDER — DAPTOMYCIN 500 MG/10ML
450 INJECTION, POWDER, LYOPHILIZED, FOR SOLUTION INTRAVENOUS EVERY 24 HOURS
Refills: 0 | Status: DISCONTINUED | OUTPATIENT
Start: 2020-05-15 | End: 2020-05-15

## 2020-05-15 RX ORDER — SENNA PLUS 8.6 MG/1
2 TABLET ORAL AT BEDTIME
Refills: 0 | Status: DISCONTINUED | OUTPATIENT
Start: 2020-05-15 | End: 2020-05-27

## 2020-05-15 RX ORDER — INSULIN LISPRO 100/ML
6 VIAL (ML) SUBCUTANEOUS ONCE
Refills: 0 | Status: COMPLETED | OUTPATIENT
Start: 2020-05-15 | End: 2020-05-15

## 2020-05-15 RX ORDER — INSULIN GLARGINE 100 [IU]/ML
26 INJECTION, SOLUTION SUBCUTANEOUS AT BEDTIME
Refills: 0 | Status: DISCONTINUED | OUTPATIENT
Start: 2020-05-15 | End: 2020-05-15

## 2020-05-15 RX ORDER — HYDROMORPHONE HYDROCHLORIDE 2 MG/ML
1 INJECTION INTRAMUSCULAR; INTRAVENOUS; SUBCUTANEOUS
Refills: 0 | Status: DISCONTINUED | OUTPATIENT
Start: 2020-05-15 | End: 2020-05-15

## 2020-05-15 RX ORDER — INSULIN LISPRO 100/ML
15 VIAL (ML) SUBCUTANEOUS
Refills: 0 | Status: DISCONTINUED | OUTPATIENT
Start: 2020-05-15 | End: 2020-05-15

## 2020-05-15 RX ORDER — PANTOPRAZOLE SODIUM 20 MG/1
40 TABLET, DELAYED RELEASE ORAL
Refills: 0 | Status: DISCONTINUED | OUTPATIENT
Start: 2020-05-15 | End: 2020-05-27

## 2020-05-15 RX ORDER — ALBUTEROL 90 UG/1
2 AEROSOL, METERED ORAL EVERY 6 HOURS
Refills: 0 | Status: DISCONTINUED | OUTPATIENT
Start: 2020-05-15 | End: 2020-05-27

## 2020-05-15 RX ORDER — ASPIRIN/CALCIUM CARB/MAGNESIUM 324 MG
81 TABLET ORAL DAILY
Refills: 0 | Status: DISCONTINUED | OUTPATIENT
Start: 2020-05-15 | End: 2020-05-27

## 2020-05-15 RX ORDER — INSULIN HUMAN 100 [IU]/ML
6 INJECTION, SOLUTION SUBCUTANEOUS
Qty: 100 | Refills: 0 | Status: DISCONTINUED | OUTPATIENT
Start: 2020-05-15 | End: 2020-05-15

## 2020-05-15 RX ORDER — INSULIN LISPRO 100/ML
VIAL (ML) SUBCUTANEOUS AT BEDTIME
Refills: 0 | Status: DISCONTINUED | OUTPATIENT
Start: 2020-05-15 | End: 2020-05-27

## 2020-05-15 RX ORDER — VANCOMYCIN HCL 1 G
1000 VIAL (EA) INTRAVENOUS ONCE
Refills: 0 | Status: COMPLETED | OUTPATIENT
Start: 2020-05-15 | End: 2020-05-15

## 2020-05-15 RX ADMIN — HYDROMORPHONE HYDROCHLORIDE 0.5 MILLIGRAM(S): 2 INJECTION INTRAMUSCULAR; INTRAVENOUS; SUBCUTANEOUS at 16:15

## 2020-05-15 RX ADMIN — Medication 250 MILLIGRAM(S): at 18:55

## 2020-05-15 RX ADMIN — Medication 12.5 MILLIGRAM(S): at 05:27

## 2020-05-15 RX ADMIN — DAPTOMYCIN 118 MILLIGRAM(S): 500 INJECTION, POWDER, LYOPHILIZED, FOR SOLUTION INTRAVENOUS at 17:24

## 2020-05-15 RX ADMIN — PANTOPRAZOLE SODIUM 40 MILLIGRAM(S): 20 TABLET, DELAYED RELEASE ORAL at 05:27

## 2020-05-15 RX ADMIN — Medication 100 MILLIGRAM(S): at 05:32

## 2020-05-15 RX ADMIN — AMIODARONE HYDROCHLORIDE 200 MILLIGRAM(S): 400 TABLET ORAL at 05:27

## 2020-05-15 RX ADMIN — Medication 10 MILLIGRAM(S): at 05:27

## 2020-05-15 RX ADMIN — HYDROMORPHONE HYDROCHLORIDE 0.5 MILLIGRAM(S): 2 INJECTION INTRAMUSCULAR; INTRAVENOUS; SUBCUTANEOUS at 15:05

## 2020-05-15 RX ADMIN — Medication 12.5 MILLIGRAM(S): at 18:53

## 2020-05-15 RX ADMIN — SODIUM CHLORIDE 75 MILLILITER(S): 9 INJECTION INTRAMUSCULAR; INTRAVENOUS; SUBCUTANEOUS at 00:03

## 2020-05-15 RX ADMIN — Medication 6 UNIT(S): at 21:29

## 2020-05-15 RX ADMIN — Medication 0.5 MILLIGRAM(S): at 19:00

## 2020-05-15 NOTE — PROGRESS NOTE ADULT - ASSESSMENT
65M s/p CABG 2/3/20, course complicated by PEA arrest, E faecalis bacteremia and local infections of the sternal and right leg SVG sites, now admitted 5/11/20 for syncope.   No growth from sternal OR debridement 2/25 and he received in total about six weeks of antibiotics which would have treated an osteomyelitis although there was low concern (hard bone in OR, sternal wires removed).   Despite this his wound drainage has significantly increased, now growing MRSA and with low-grade bacteremia. Repeat blood cultures 5/14 negative to date.   Unclear if he needs another six weeks for osteomyelitis. High ESR/CRP. No aggressive osseous lesion on CT.     Suggest  -f/u repeat blood cultures - negative to date   -would pursue OLGA as TTE is nondiagnostic with his wuond   -renal function is stable, change Daptomycin to Vancomycin 1GM IV q24h and monitor trough before 4th dose and renal function closely   -plan on at least 4 weeks of IV antibiotics   -weekly ESR and CRP   -patient's wife asked for regular updates     Spoke with primary team     Chapito Delarosa MD   Infectious Disease   Pager [phone]   After 5PM and on weekends please page fellow on call or call [phone]

## 2020-05-15 NOTE — PROGRESS NOTE ADULT - SUBJECTIVE AND OBJECTIVE BOX
CARDIOLOGY FOLLOW UP - Dr. Steel    CC seen in pacu , on insulin gtt, in NAD  s/p debridement of sternal wound, reconstruction, Laparoscopic lysis of abdominal adhesions   c/o pain at surg site     PHYSICAL EXAM:  T(C): 36.1 (05-15-20 @ 14:50), Max: 37.1 (05-14-20 @ 20:57)  HR: 83 (05-15-20 @ 15:45) (82 - 107)  BP: 147/73 (05-15-20 @ 15:45) (131/70 - 149/83)  RR: 16 (05-15-20 @ 15:45) (15 - 18)  SpO2: 100% (05-15-20 @ 15:45) (98% - 100%)  Wt(kg): --  I&O's Summary    14 May 2020 07:01  -  15 May 2020 07:00  --------------------------------------------------------  IN: 850 mL / OUT: 925 mL / NET: -75 mL        Appearance: Normal	  Cardiovascular: Normal S1 S2,RRR   Respiratory: Lungs clear to auscultation	  Gastrointestinal:  Soft, Non-tender, + BS	  Extremities: Normal range of motion, No clubbing, cyanosis or edema        MEDICATIONS  (STANDING):  aMIOdarone    Tablet 200 milliGRAM(s) Oral daily  aspirin enteric coated 81 milliGRAM(s) Oral daily  atorvastatin 40 milliGRAM(s) Oral at bedtime  BACItracin   Ointment 1 Application(s) Topical daily  buDESOnide    Inhalation Suspension 0.5 milliGRAM(s) Inhalation two times a day  DAPTOmycin IVPB 450 milliGRAM(s) IV Intermittent every 24 hours  dextrose 5%. 1000 milliLiter(s) (50 mL/Hr) IV Continuous <Continuous>  dextrose 50% Injectable 12.5 Gram(s) IV Push once  dextrose 50% Injectable 25 Gram(s) IV Push once  dextrose 50% Injectable 25 Gram(s) IV Push once  enoxaparin Injectable 40 milliGRAM(s) SubCutaneous daily  insulin glargine Injectable (LANTUS) 26 Unit(s) SubCutaneous at bedtime  insulin lispro (HumaLOG) corrective regimen sliding scale   SubCutaneous three times a day before meals  insulin lispro (HumaLOG) corrective regimen sliding scale   SubCutaneous at bedtime  insulin lispro Injectable (HumaLOG) 9 Unit(s) SubCutaneous three times a day before meals  insulin regular Infusion 6 Unit(s)/Hr (6 mL/Hr) IV Continuous <Continuous>  loratadine 10 milliGRAM(s) Oral daily  metoprolol tartrate 12.5 milliGRAM(s) Oral two times a day  pantoprazole    Tablet 40 milliGRAM(s) Oral before breakfast  polyethylene glycol 3350 17 Gram(s) Oral daily  senna 2 Tablet(s) Oral at bedtime  sodium chloride 0.9%. 1000 milliLiter(s) (75 mL/Hr) IV Continuous <Continuous>  torsemide 10 milliGRAM(s) Oral daily      TELEMETRY: nsr	    ECG:  	  RADIOLOGY:   DIAGNOSTIC TESTING:  [ ] Echocardiogram:  [ ]  Catheterization:  [ ] Stress Test:    OTHER: 	    LABS:	 	    Troponin T, High Sensitivity Result: 137 ng/L [0 - 51] (05-12 @ 07:17)  Creatine Kinase, Serum: 35 U/L [30 - 200] (05-12 @ 00:43)  CKMB Units: 2.0 ng/mL [0.0 - 6.7] (05-12 @ 00:43)  Troponin T, High Sensitivity Result: 110 ng/L [0 - 51] (05-12 @ 00:43)  Troponin T, High Sensitivity Result: 118 ng/L [0 - 51] (05-11 @ 19:33)  Troponin T, High Sensitivity Result: 132 ng/L [0 - 51] (05-11 @ 16:47)                          8.2    11.73 )-----------( 352      ( 15 May 2020 06:52 )             26.0     05-15    132<L>  |  94<L>  |  40<H>  ----------------------------<  217<H>  4.9   |  24  |  1.78<H>    Ca    9.5      15 May 2020 06:52  Phos  3.3     05-15  Mg     2.0     05-15      PT/INR - ( 15 May 2020 06:52 )   PT: 12.6 sec;   INR: 1.09 ratio         PTT - ( 15 May 2020 06:52 )  PTT:34.0 sec CARDIOLOGY FOLLOW UP - Dr. Steel    CC seen in pacu , on insulin gtt, in NAD  s/p debridement of sternal wound, reconstruction, Laparoscopic lysis of abdominal adhesions   c/o pain at surg site     PHYSICAL EXAM:  T(C): 36.1 (05-15-20 @ 14:50), Max: 37.1 (05-14-20 @ 20:57)  HR: 83 (05-15-20 @ 15:45) (82 - 107)  BP: 147/73 (05-15-20 @ 15:45) (131/70 - 149/83)  RR: 16 (05-15-20 @ 15:45) (15 - 18)  SpO2: 100% (05-15-20 @ 15:45) (98% - 100%)  Wt(kg): --  I&O's Summary    14 May 2020 07:01  -  15 May 2020 07:00  --------------------------------------------------------  IN: 850 mL / OUT: 925 mL / NET: -75 mL        Appearance: Normal	  Cardiovascular: Normal S1 S2,RRR   Respiratory: Lungs clear to auscultation	  Gastrointestinal:  Soft, Non-tender, + BS	  Extremities: Normal range of motion, No clubbing, cyanosis or edema        MEDICATIONS  (STANDING):  aMIOdarone    Tablet 200 milliGRAM(s) Oral daily  aspirin enteric coated 81 milliGRAM(s) Oral daily  atorvastatin 40 milliGRAM(s) Oral at bedtime  BACItracin   Ointment 1 Application(s) Topical daily  buDESOnide    Inhalation Suspension 0.5 milliGRAM(s) Inhalation two times a day  DAPTOmycin IVPB 450 milliGRAM(s) IV Intermittent every 24 hours  dextrose 5%. 1000 milliLiter(s) (50 mL/Hr) IV Continuous <Continuous>  dextrose 50% Injectable 12.5 Gram(s) IV Push once  dextrose 50% Injectable 25 Gram(s) IV Push once  dextrose 50% Injectable 25 Gram(s) IV Push once  enoxaparin Injectable 40 milliGRAM(s) SubCutaneous daily  insulin glargine Injectable (LANTUS) 26 Unit(s) SubCutaneous at bedtime  insulin lispro (HumaLOG) corrective regimen sliding scale   SubCutaneous three times a day before meals  insulin lispro (HumaLOG) corrective regimen sliding scale   SubCutaneous at bedtime  insulin lispro Injectable (HumaLOG) 9 Unit(s) SubCutaneous three times a day before meals  insulin regular Infusion 6 Unit(s)/Hr (6 mL/Hr) IV Continuous <Continuous>  loratadine 10 milliGRAM(s) Oral daily  metoprolol tartrate 12.5 milliGRAM(s) Oral two times a day  pantoprazole    Tablet 40 milliGRAM(s) Oral before breakfast  polyethylene glycol 3350 17 Gram(s) Oral daily  senna 2 Tablet(s) Oral at bedtime  sodium chloride 0.9%. 1000 milliLiter(s) (75 mL/Hr) IV Continuous <Continuous>  torsemide 10 milliGRAM(s) Oral daily      TELEMETRY: nsr	    ECG:  	  RADIOLOGY:   DIAGNOSTIC TESTING:  [ ] Echocardiogram:  < from: Transthoracic Echocardiogram (05.14.20 @ 12:37) >  ------------------------------------------------------------------------  CONCLUSIONS:  Limited study to r/o Endocarditis  Pt has very limited windows due to edema and wound vac.  Unable to exclude valvular vegetations inthe setting of a  technically difficult study.  OLGA could be obtained for further evaluation of valvular  vegetations, if clinical suspicion of endocarditis is high.    ------------------------------------------------------------------------  Confirmed on  5/14/2020 - 17:58:46 by Sabiha Al M.D.  -----    < end of copied text >    [ ]  Catheterization:  [ ] Stress Test:    OTHER: 	    LABS:	 	    Troponin T, High Sensitivity Result: 137 ng/L [0 - 51] (05-12 @ 07:17)  Creatine Kinase, Serum: 35 U/L [30 - 200] (05-12 @ 00:43)  CKMB Units: 2.0 ng/mL [0.0 - 6.7] (05-12 @ 00:43)  Troponin T, High Sensitivity Result: 110 ng/L [0 - 51] (05-12 @ 00:43)  Troponin T, High Sensitivity Result: 118 ng/L [0 - 51] (05-11 @ 19:33)  Troponin T, High Sensitivity Result: 132 ng/L [0 - 51] (05-11 @ 16:47)                          8.2    11.73 )-----------( 352      ( 15 May 2020 06:52 )             26.0     05-15    132<L>  |  94<L>  |  40<H>  ----------------------------<  217<H>  4.9   |  24  |  1.78<H>    Ca    9.5      15 May 2020 06:52  Phos  3.3     05-15  Mg     2.0     05-15      PT/INR - ( 15 May 2020 06:52 )   PT: 12.6 sec;   INR: 1.09 ratio         PTT - ( 15 May 2020 06:52 )  PTT:34.0 sec

## 2020-05-15 NOTE — BRIEF OPERATIVE NOTE - OPERATION/FINDINGS
Laparoscopic entry obtained, adhesions noted throughout abdomen, Lysis of Adhesions performed.   Omental flap created, passed to sternal for reconstruction via substernal incision.
debridement of sternal wound, reconstruction with lap omentum (Norman), and FTSG of inferior portion

## 2020-05-15 NOTE — CHART NOTE - NSCHARTNOTEFT_GEN_A_CORE
SURGERY POST-OP NOTE    S/P     Subjective: Laparoscopic lysis of abdominal adhesions, creation of omental flap  Patient doing well. Pain is well controlled. Denies nausea, emesis, no SOB on 2L NC.     Objective:   Vital Signs  T(C): 36.1 (05-15 @ 14:50), Max: 37.1 (05-14 @ 20:57)  HR: 76 (05-15 @ 19:30) (76 - 107)  BP: 139/81 (05-15 @ 19:30) (128/73 - 149/83)  RR: 15 (05-15 @ 19:30) (14 - 18)  SpO2: 100% (05-15 @ 19:30) (98% - 100%)  05-14-20 @ 07:01  -  05-15-20 @ 07:00  --------------------------------------------------------  IN: 850 mL / OUT: 925 mL / NET: -75 mL    05-15-20 @ 07:01  -  05-15-20 @ 20:06  --------------------------------------------------------  IN: 806 mL / OUT: 205 mL / NET: 601 mL        Physical Exam:  General: alert and oriented, NAD  Resp: airway patent, respirations unlabored, currently getting duoneb treatment   CVS: regular rate and rhythm  Chest: KEI x2 with sanguinous output. Vac in place, suction appropriate  Abdomen: soft, appropriately tender, some distention. Dressings C/D/I. JPx2 in place.  Extremities: no edema  Skin: warm, dry, appropriate color                          8.2    11.73 )-----------( 352      ( 15 May 2020 06:52 )             26.0   05-15    132<L>  |  94<L>  |  40<H>  ----------------------------<  217<H>  4.9   |  24  |  1.78<H>    Ca    9.5      15 May 2020 06:52  Phos  3.3     05-15  Mg     2.0     05-15        Assessment:  65M PMH CABG (2/2020) complicated by PEA arrest, thoracotomy site infection w/empyema, DM, HTN, initially presented 5/11 with syncope, found to also have non-healing sternal chest wound now s/p laparoscopic lysis of adhesions and creation of omental flap, followed by plastics removal of hardware and closure.    Plan:  - Pain control prn  - F/U AM labs  - can resume diet as tolerated    Surgery Red [id number]

## 2020-05-15 NOTE — PROGRESS NOTE ADULT - ASSESSMENT
65 m with    Syncope  - telemetry  - cardiology follow     Orthostatic hypotension  - DC aldactone  - follow    S/P CABG  - CT sx evaluation noted    Sternal wound, s/p reconstruction and omental flap  - MRSA bacteremia  - Vancomycin  - ID follow  - postop care    Bacteremia  - OLGA to r/o endocarditis.  - ID follow    Scalp abrasion  - Bacitracin requested by patient    Diabetes control  - ADA diet  - BS control  - Endocrine follow    CKD  - follow    HTN control    Miguel Angel Duke MD pager [phone]

## 2020-05-15 NOTE — PROGRESS NOTE ADULT - PROBLEM SELECTOR PLAN 1
Patient in OR; Tight glycemic control necessary in the setting of sternal wound infection.  Will continue Lantus 26u at bedtime.  Patient will probably not be eating full meals postop; Will decrease Humalog to 9u before each meal and continue Humalog correction scale coverage. Will continue monitoring FS and FU.

## 2020-05-15 NOTE — BRIEF OPERATIVE NOTE - NSICDXBRIEFPROCEDURE_GEN_ALL_CORE_FT
PROCEDURES:  Laparoscopic lysis of abdominal adhesions 15-May-2020 15:18:16  Barron Broderick  Laparoscopic creation of omental flap 15-May-2020 15:17:24  Barron Broderick
PROCEDURES:  Sternal debridement with flap 15-May-2020 14:58:16  Randy Miles  Debridement and closure, wound, sternum, with sternal hardware removal, adult 15-May-2020 14:58:06  Randy Miles

## 2020-05-15 NOTE — PROGRESS NOTE ADULT - SUBJECTIVE AND OBJECTIVE BOX
Chief complaint  Patient is a 65y old  Male who presents with a chief complaint of syncope (15 May 2020 08:32)   Review of systems  Patient in OR, no hypoglycemic episodes.    Labs and Fingersticks  CAPILLARY BLOOD GLUCOSE      POCT Blood Glucose.: 244 mg/dL (15 May 2020 09:18)  POCT Blood Glucose.: 122 mg/dL (14 May 2020 21:43)  POCT Blood Glucose.: 153 mg/dL (14 May 2020 17:20)      Anion Gap, Serum: 14 (05-15 @ 06:52)  Anion Gap, Serum: 13 (05-14 @ 06:46)      Calcium, Total Serum: 9.5 (05-15 @ 06:52)  Calcium, Total Serum: 9.2 (05-14 @ 06:46)          05-15    132<L>  |  94<L>  |  40<H>  ----------------------------<  217<H>  4.9   |  24  |  1.78<H>    Ca    9.5      15 May 2020 06:52  Phos  3.3     05-15  Mg     2.0     05-15                          8.2    11.73 )-----------( 352      ( 15 May 2020 06:52 )             26.0     Medications  MEDICATIONS  (STANDING):      Physical Exam  Vital Signs Last 12 Hrs  T(F): 98.1 (05-15-20 @ 05:12), Max: 98.1 (05-15-20 @ 05:12)  HR: 107 (05-15-20 @ 05:12) (107 - 107)  BP: 143/82 (05-15-20 @ 05:12) (143/82 - 143/82)  BP(mean): --  RR: 18 (05-15-20 @ 05:12) (18 - 18)  SpO2: 99% (05-15-20 @ 05:12) (99% - 99%)    Diagnostics Chief complaint  Patient is a 65y old  Male who presents with a chief complaint of syncope (15 May 2020 08:32)   Review of systems  Patient in OR, no hypoglycemic episodes.    Labs and Fingersticks  CAPILLARY BLOOD GLUCOSE    POCT Blood Glucose.: 244 mg/dL (15 May 2020 09:18)  POCT Blood Glucose.: 122 mg/dL (14 May 2020 21:43)  POCT Blood Glucose.: 153 mg/dL (14 May 2020 17:20)      Anion Gap, Serum: 14 (05-15 @ 06:52)  Anion Gap, Serum: 13 (05-14 @ 06:46)      Calcium, Total Serum: 9.5 (05-15 @ 06:52)  Calcium, Total Serum: 9.2 (05-14 @ 06:46)          05-15    132<L>  |  94<L>  |  40<H>  ----------------------------<  217<H>  4.9   |  24  |  1.78<H>    Ca    9.5      15 May 2020 06:52  Phos  3.3     05-15  Mg     2.0     05-15                          8.2    11.73 )-----------( 352      ( 15 May 2020 06:52 )             26.0     Medications  MEDICATIONS  (STANDING):      Physical Exam  Vital Signs Last 12 Hrs  T(F): 98.1 (05-15-20 @ 05:12), Max: 98.1 (05-15-20 @ 05:12)  HR: 107 (05-15-20 @ 05:12) (107 - 107)  BP: 143/82 (05-15-20 @ 05:12) (143/82 - 143/82)  BP(mean): --  RR: 18 (05-15-20 @ 05:12) (18 - 18)  SpO2: 99% (05-15-20 @ 05:12) (99% - 99%)    Diagnostics

## 2020-05-15 NOTE — PROGRESS NOTE ADULT - SUBJECTIVE AND OBJECTIVE BOX
Patient seen and examined. No overnight events    SUBJECTIVE    OVERNIGHT EVENTS:    10-point review of systems completed and negative except as noted above.      OBJECTIVE    MEDICATIONS  acetaminophen   Tablet .. 650 milliGRAM(s) Oral every 6 hours PRN  ALBUTerol    90 MICROgram(s) HFA Inhaler 2 Puff(s) Inhalation every 6 hours PRN  aMIOdarone    Tablet 200 milliGRAM(s) Oral daily  aspirin enteric coated 81 milliGRAM(s) Oral daily  atorvastatin 40 milliGRAM(s) Oral at bedtime  BACItracin   Ointment 1 Application(s) Topical daily  benzonatate 100 milliGRAM(s) Oral three times a day PRN  buDESOnide    Inhalation Suspension 0.5 milliGRAM(s) Inhalation two times a day  DAPTOmycin IVPB 650 milliGRAM(s) IV Intermittent every 24 hours  dextrose 40% Gel 15 Gram(s) Oral once PRN  dextrose 5%. 1000 milliLiter(s) IV Continuous <Continuous>  dextrose 50% Injectable 12.5 Gram(s) IV Push once  dextrose 50% Injectable 25 Gram(s) IV Push once  dextrose 50% Injectable 25 Gram(s) IV Push once  enoxaparin Injectable 40 milliGRAM(s) SubCutaneous daily  glucagon  Injectable 1 milliGRAM(s) IntraMuscular once PRN  hydrocortisone 1% Cream 1 Application(s) Topical daily PRN  insulin glargine Injectable (LANTUS) 26 Unit(s) SubCutaneous at bedtime  insulin lispro (HumaLOG) corrective regimen sliding scale   SubCutaneous three times a day before meals  insulin lispro (HumaLOG) corrective regimen sliding scale   SubCutaneous at bedtime  insulin lispro Injectable (HumaLOG) 15 Unit(s) SubCutaneous three times a day before meals  loratadine 10 milliGRAM(s) Oral daily  metoprolol tartrate 12.5 milliGRAM(s) Oral two times a day  pantoprazole    Tablet 40 milliGRAM(s) Oral before breakfast  polyethylene glycol 3350 17 Gram(s) Oral daily  senna 2 Tablet(s) Oral at bedtime  sodium chloride 0.9%. 1000 milliLiter(s) IV Continuous <Continuous>  torsemide 10 milliGRAM(s) Oral daily      PHYSICAL EXAM  T(C): 37.1 (05-14-20 @ 20:57), Max: 37.1 (05-14-20 @ 20:57)  HR: 93 (05-14-20 @ 20:57) (91 - 94)  BP: 135/78 (05-14-20 @ 20:57) (114/68 - 139/82)  RR: 18 (05-14-20 @ 20:57) (18 - 19)  SpO2: 100% (05-14-20 @ 20:57) (100% - 100%)    05-13-20 @ 07:01  -  05-14-20 @ 07:00  --------------------------------------------------------  IN: 590 mL / OUT: 800 mL / NET: -210 mL    05-14-20 @ 07:01  -  05-15-20 @ 00:16  --------------------------------------------------------  IN: 850 mL / OUT: 0 mL / NET: 850 mL        PHYSICAL EXAM:  General: Laying in bed, in no acute distress  Respiratory: Nonlabored  Chest: Sternal wound with gauze intact, minimal strikethrough  Abdominal: Soft, nondistended, nontender  Extremities: Warm      LABS                        8.6    8.77  )-----------( 336      ( 14 May 2020 06:46 )             28.2     05-14    130<L>  |  94<L>  |  42<H>  ----------------------------<  236<H>  4.2   |  23  |  1.58<H>    Ca    9.2      14 May 2020 06:46  Phos  3.6     05-14  Mg     2.0     05-14      PT/INR - ( 14 May 2020 06:46 )   PT: 13.2 sec;   INR: 1.15 ratio         PTT - ( 14 May 2020 06:46 )  PTT:34.8 sec      RADIOLOGY & ADDITIONAL STUDIES Surgery Progress Note    SUBJECTIVE    Patient seen and examined. No issues, ready for OR    OBJECTIVE    PHYSICAL EXAM:  General: Laying in bed, in no acute distress  Respiratory: Nonlabored  Chest: Sternal wound with dressing intact, minimal strikethrough  Abdominal: Soft, nondistended, nontender  Extremities: Warm    Vital Signs Last 24 Hrs  T(C): 36.7 (15 May 2020 05:12), Max: 37.1 (14 May 2020 20:57)  T(F): 98.1 (15 May 2020 05:12), Max: 98.8 (14 May 2020 20:57)  HR: 107 (15 May 2020 05:12) (91 - 107)  BP: 143/82 (15 May 2020 05:12) (114/68 - 143/82)  BP(mean): --  RR: 18 (15 May 2020 05:12) (18 - 19)  SpO2: 99% (15 May 2020 05:12) (99% - 100%)    I&O's Detail    14 May 2020 07:01  -  15 May 2020 07:00  --------------------------------------------------------  IN:    Oral Fluid: 850 mL  Total IN: 850 mL    OUT:    Voided: 925 mL  Total OUT: 925 mL    Total NET: -75 mL      MEDICATIONS  (STANDING):  aMIOdarone    Tablet 200 milliGRAM(s) Oral daily  aspirin enteric coated 81 milliGRAM(s) Oral daily  atorvastatin 40 milliGRAM(s) Oral at bedtime  BACItracin   Ointment 1 Application(s) Topical daily  buDESOnide    Inhalation Suspension 0.5 milliGRAM(s) Inhalation two times a day  DAPTOmycin IVPB 650 milliGRAM(s) IV Intermittent every 24 hours  dextrose 5%. 1000 milliLiter(s) (50 mL/Hr) IV Continuous <Continuous>  dextrose 50% Injectable 12.5 Gram(s) IV Push once  dextrose 50% Injectable 25 Gram(s) IV Push once  dextrose 50% Injectable 25 Gram(s) IV Push once  enoxaparin Injectable 40 milliGRAM(s) SubCutaneous daily  insulin glargine Injectable (LANTUS) 26 Unit(s) SubCutaneous at bedtime  insulin lispro (HumaLOG) corrective regimen sliding scale   SubCutaneous three times a day before meals  insulin lispro (HumaLOG) corrective regimen sliding scale   SubCutaneous at bedtime  insulin lispro Injectable (HumaLOG) 15 Unit(s) SubCutaneous three times a day before meals  loratadine 10 milliGRAM(s) Oral daily  metoprolol tartrate 12.5 milliGRAM(s) Oral two times a day  pantoprazole    Tablet 40 milliGRAM(s) Oral before breakfast  polyethylene glycol 3350 17 Gram(s) Oral daily  senna 2 Tablet(s) Oral at bedtime  sodium chloride 0.9%. 1000 milliLiter(s) (75 mL/Hr) IV Continuous <Continuous>  torsemide 10 milliGRAM(s) Oral daily    MEDICATIONS  (PRN):  acetaminophen   Tablet .. 650 milliGRAM(s) Oral every 6 hours PRN Temp greater or equal to 38.5C (101.3F), Mild Pain (1 - 3)  ALBUTerol    90 MICROgram(s) HFA Inhaler 2 Puff(s) Inhalation every 6 hours PRN Shortness of Breath and/or Wheezing  benzonatate 100 milliGRAM(s) Oral three times a day PRN Cough  dextrose 40% Gel 15 Gram(s) Oral once PRN Blood Glucose LESS THAN 70 milliGRAM(s)/deciliter  glucagon  Injectable 1 milliGRAM(s) IntraMuscular once PRN Glucose LESS THAN 70 milligrams/deciliter  hydrocortisone 1% Cream 1 Application(s) Topical daily PRN Itching      LABS:                        8.6    8.77  )-----------( 336      ( 14 May 2020 06:46 )             28.2     05-14    130<L>  |  94<L>  |  42<H>  ----------------------------<  236<H>  4.2   |  23  |  1.58<H>    Ca    9.2      14 May 2020 06:46  Phos  3.6     05-14  Mg     2.0     05-14      PT/INR - ( 14 May 2020 06:46 )   PT: 13.2 sec;   INR: 1.15 ratio         PTT - ( 14 May 2020 06:46 )  PTT:34.8 sec

## 2020-05-15 NOTE — PROGRESS NOTE ADULT - ASSESSMENT
65y m PMHx CABG Feb 2020 complicated by PEA arrest, sternal infection s/p sternal debridement with muscle flap reconstruction, DM, HTN p/w syncope    Plan:  - Potential OR today 5/15    Plastic Surgery  [phone]

## 2020-05-15 NOTE — PROGRESS NOTE ADULT - SUBJECTIVE AND OBJECTIVE BOX
Patient is a 65y old  Male who presents with a chief complaint of syncope (15 May 2020 18:37)      SUBJECTIVE / OVERNIGHT EVENTS: seen in PACU. Postop.  Review of Systems  chest pain no  palpitations no  sob no  nausea no  headache no    MEDICATIONS  (STANDING):  aMIOdarone    Tablet 200 milliGRAM(s) Oral daily  aspirin enteric coated 81 milliGRAM(s) Oral daily  atorvastatin 40 milliGRAM(s) Oral at bedtime  BACItracin   Ointment 1 Application(s) Topical daily  buDESOnide    Inhalation Suspension 0.5 milliGRAM(s) Inhalation two times a day  dextrose 5%. 1000 milliLiter(s) (50 mL/Hr) IV Continuous <Continuous>  dextrose 50% Injectable 12.5 Gram(s) IV Push once  dextrose 50% Injectable 25 Gram(s) IV Push once  dextrose 50% Injectable 25 Gram(s) IV Push once  enoxaparin Injectable 40 milliGRAM(s) SubCutaneous daily  insulin glargine Injectable (LANTUS) 26 Unit(s) SubCutaneous at bedtime  insulin lispro (HumaLOG) corrective regimen sliding scale   SubCutaneous three times a day before meals  insulin lispro (HumaLOG) corrective regimen sliding scale   SubCutaneous at bedtime  insulin lispro Injectable (HumaLOG) 9 Unit(s) SubCutaneous three times a day before meals  insulin regular Infusion 6 Unit(s)/Hr (6 mL/Hr) IV Continuous <Continuous>  lactated ringers. 1000 milliLiter(s) (125 mL/Hr) IV Continuous <Continuous>  loratadine 10 milliGRAM(s) Oral daily  metoprolol tartrate 12.5 milliGRAM(s) Oral two times a day  pantoprazole    Tablet 40 milliGRAM(s) Oral before breakfast  polyethylene glycol 3350 17 Gram(s) Oral daily  senna 2 Tablet(s) Oral at bedtime  torsemide 10 milliGRAM(s) Oral daily  vancomycin  IVPB        MEDICATIONS  (PRN):  acetaminophen   Tablet .. 650 milliGRAM(s) Oral every 6 hours PRN Temp greater or equal to 38.5C (101.3F), Mild Pain (1 - 3)  ALBUTerol    90 MICROgram(s) HFA Inhaler 2 Puff(s) Inhalation every 6 hours PRN Shortness of Breath and/or Wheezing  benzonatate 100 milliGRAM(s) Oral three times a day PRN Cough  dextrose 40% Gel 15 Gram(s) Oral once PRN Blood Glucose LESS THAN 70 milliGRAM(s)/deciliter  glucagon  Injectable 1 milliGRAM(s) IntraMuscular once PRN Glucose LESS THAN 70 milligrams/deciliter  HYDROmorphone  Injectable 0.5 milliGRAM(s) IV Push every 10 minutes PRN Moderate Pain (4 - 6)  HYDROmorphone  Injectable 1 milliGRAM(s) IV Push every 10 minutes PRN Severe Pain (7 - 10)      Vital Signs Last 24 Hrs  T(C): 36.1 (15 May 2020 14:50), Max: 37.1 (14 May 2020 20:57)  T(F): 97 (15 May 2020 14:50), Max: 98.8 (14 May 2020 20:57)  HR: 78 (15 May 2020 18:00) (77 - 107)  BP: 131/74 (15 May 2020 18:00) (128/73 - 149/83)  BP(mean): 97 (15 May 2020 18:00) (93 - 109)  RR: 14 (15 May 2020 18:00) (14 - 18)  SpO2: 100% (15 May 2020 18:00) (98% - 100%)    PHYSICAL EXAM:  GENERAL: NAD, well-developed  HEAD:  Atraumatic, Normocephalic  EYES: EOMI, PERRLA, conjunctiva and sclera clear  NECK: Supple, No JVD  CHEST/LUNG: Clear to auscultation bilaterally; No wheeze Wound with clean dressing.  HEART: Regular rate and rhythm; No murmurs, rubs, or gallops  ABDOMEN: Soft, Nontender, Nondistended; Bowel sounds present  EXTREMITIES:  2+ Peripheral Pulses, No clubbing, cyanosis, or edema  PSYCH: AAOx3  NEUROLOGY: non-focal  SKIN: No rashes or lesions    LABS:                        8.2    11.73 )-----------( 352      ( 15 May 2020 06:52 )             26.0     05-15    132<L>  |  94<L>  |  40<H>  ----------------------------<  217<H>  4.9   |  24  |  1.78<H>    Ca    9.5      15 May 2020 06:52  Phos  3.3     05-15  Mg     2.0     05-15      PT/INR - ( 15 May 2020 06:52 )   PT: 12.6 sec;   INR: 1.09 ratio         PTT - ( 15 May 2020 06:52 )  PTT:34.0 sec          Culture - Blood (collected 14 May 2020 09:04)  Source: .Blood Blood-Peripheral  Preliminary Report (15 May 2020 10:01):    No growth to date.    Culture - Blood (collected 14 May 2020 09:04)  Source: .Blood Blood-Peripheral  Preliminary Report (15 May 2020 10:01):    No growth to date.        RADIOLOGY & ADDITIONAL TESTS:    Imaging Personally Reviewed:    Consultant(s) Notes Reviewed:      Care Discussed with Consultants/Other Providers:

## 2020-05-15 NOTE — BRIEF OPERATIVE NOTE - NSICDXBRIEFPREOP_GEN_ALL_CORE_FT
PRE-OP DIAGNOSIS:  Sternal wound dehiscence 15-May-2020 14:58:28  Randy Miles
PRE-OP DIAGNOSIS:  Sternal wound dehiscence 15-May-2020 14:58:28  Randy Miles

## 2020-05-15 NOTE — PRE-ANESTHESIA EVALUATION ADULT - NSANTHAPLANRD_GEN_ALL_CORE
OK to discharge home per Balwinder Wynn CNM. Reviewed discharge instructions with patient. VSS. Patient signed electronically. Ambulatory off unit. general

## 2020-05-15 NOTE — PROGRESS NOTE ADULT - ASSESSMENT
limited echo 5/12/20:EF 55-60%. overall preserved lv fx, despite segmental wall motion abnormalities, no LV thrombus  The apical anterior wall, the apical lateral wall, the basal anterior wall,the mid anterior wall, and the midanterolateral wall are hypokinetic.  echo 5/14/20:  Limited study to r/o Endocarditis Pt has very limited windows due to edema and wound vac. Unable to exclude valvular vegetations in the setting of a  technically difficult study. OLGA could be obtained for further evaluation of valvular vegetations, if clinical suspicion of endocarditis is high.        a/p  65y m PMHx CABG Feb 2020 complicated by PEA arrest, thoracotomy site infection w/empyema, DM, HTN p/w syncope, sternal wound infection.     1. Syncope   likely 2/2 to +orthostatics  cv stable, EKG unchanged from prior   HST elevated w/ normal CK/CKMB, demand ischemia in setting of STEPHANIE/CKD, possible wound infection   CT head negative for ICH  echo with overall preserved LVEF, despite segmental wall motion abnormalities, no LV thrombus   encourage PO intake    2. Sternal wound infection s/p RTOR for SWI    s/p debridement of sternal wound, reconstruction, Laparoscopic lysis of abdominal adhesions   IV abx per ID   blood cx neg    ID, f/u    3. CAD s/p CABG x 4 , s/p PEA arrest   cv stable   cont asa, statin, bb     4. Chronic diastolic CHF  euvolemic on exam with unchanged SOB   continue diuretics as ordered   repeat echo with preserved LVEF     5. PAF  remains NSR  continue amio, bb    6. HTN  bp stable    7. STEPHANIE/CKD   continue to trend creat     8. Pleural effusion, hx  CXR this admission with small left loculated pleural effusion now decreased in size from previous exam.    dvt ppx limited echo 5/12/20:EF 55-60%. overall preserved lv fx, despite segmental wall motion abnormalities, no LV thrombus  The apical anterior wall, the apical lateral wall, the basal anterior wall,the mid anterior wall, and the midanterolateral wall are hypokinetic.  echo 5/14/20:  Limited study to r/o Endocarditis Pt has very limited windows due to edema and wound vac. Unable to exclude valvular vegetations in the setting of a  technically difficult study. OLGA could be obtained for further evaluation of valvular vegetations, if clinical suspicion of endocarditis is high.        a/p  65y m PMHx CABG Feb 2020 complicated by PEA arrest, thoracotomy site infection w/empyema, DM, HTN p/w syncope, sternal wound infection.     1. Syncope   likely 2/2 to +orthostatics  cv stable, EKG unchanged from prior   HST elevated w/ normal CK/CKMB, demand ischemia in setting of STEPHANIE/CKD, possible wound infection   CT head negative for ICH  echo with overall preserved LVEF, despite segmental wall motion abnormalities, no LV thrombus   encourage PO intake    2. Sternal wound infection s/p RTOR for SWI    s/p debridement of sternal wound, reconstruction, Laparoscopic lysis of abdominal adhesions   IV abx per ID   blood cx neg    repeat echo unable to r/o endocarditis  plan for olga   ID, f/u    3. CAD s/p CABG x 4 , s/p PEA arrest   cv stable   cont asa, statin, bb     4. Chronic diastolic CHF  euvolemic on exam with unchanged SOB   continue diuretics as ordered   repeat echo with preserved LVEF     5. PAF  remains NSR  continue amio, bb    6. HTN  bp stable    7. STEPHANIE/CKD   continue to trend creat     8. Pleural effusion, hx  CXR this admission with small left loculated pleural effusion now decreased in size from previous exam.    dvt ppx

## 2020-05-15 NOTE — PROGRESS NOTE ADULT - ASSESSMENT
65M PMH CABG (2/2020) complicated by PEA arrest, thoracotomy site infection w/empyema, DM, HTN, initially presented 5/11 with syncope, found to also have nonhealing sternal chest wound    - Plan for OR Friday (5/15) for omental flap in coordination with PRS  - Care per primary team  - NPO after midnight  - IVF  - Pre-op labs ordered    Red surgery  [id number] 65M PMH CABG (2/2020) complicated by PEA arrest, thoracotomy site infection w/empyema, DM, HTN, initially presented 5/11 with syncope, found to also have non-healing sternal chest wound    - Plan for OR (5/15) for omental flap in coordination with PRS  - Care per primary team  - NPO after midnight  - IVF    Red surgery  [id number]

## 2020-05-15 NOTE — PROGRESS NOTE ADULT - SUBJECTIVE AND OBJECTIVE BOX
Follow Up: MRSA bacteremia wound infection     Interval History/ROS: s/p OR for debridement and reconstruction today. Afebrile. Repeat blood cultures negative. Seen in PACU. Tells me his plans to take better care of himself in the future.     Allergies  No Known Allergies        ANTIMICROBIALS:  DAPTOmycin IVPB 450 every 24 hours      OTHER MEDS:  acetaminophen   Tablet .. 650 milliGRAM(s) Oral every 6 hours PRN  ALBUTerol    90 MICROgram(s) HFA Inhaler 2 Puff(s) Inhalation every 6 hours PRN  aMIOdarone    Tablet 200 milliGRAM(s) Oral daily  aspirin enteric coated 81 milliGRAM(s) Oral daily  atorvastatin 40 milliGRAM(s) Oral at bedtime  BACItracin   Ointment 1 Application(s) Topical daily  benzonatate 100 milliGRAM(s) Oral three times a day PRN  buDESOnide    Inhalation Suspension 0.5 milliGRAM(s) Inhalation two times a day  dextrose 40% Gel 15 Gram(s) Oral once PRN  dextrose 5%. 1000 milliLiter(s) IV Continuous <Continuous>  dextrose 50% Injectable 12.5 Gram(s) IV Push once  dextrose 50% Injectable 25 Gram(s) IV Push once  dextrose 50% Injectable 25 Gram(s) IV Push once  enoxaparin Injectable 40 milliGRAM(s) SubCutaneous daily  glucagon  Injectable 1 milliGRAM(s) IntraMuscular once PRN  HYDROmorphone  Injectable 0.5 milliGRAM(s) IV Push every 10 minutes PRN  HYDROmorphone  Injectable 1 milliGRAM(s) IV Push every 10 minutes PRN  insulin glargine Injectable (LANTUS) 26 Unit(s) SubCutaneous at bedtime  insulin lispro (HumaLOG) corrective regimen sliding scale   SubCutaneous three times a day before meals  insulin lispro (HumaLOG) corrective regimen sliding scale   SubCutaneous at bedtime  insulin lispro Injectable (HumaLOG) 9 Unit(s) SubCutaneous three times a day before meals  insulin regular Infusion 6 Unit(s)/Hr IV Continuous <Continuous>  lactated ringers. 1000 milliLiter(s) IV Continuous <Continuous>  loratadine 10 milliGRAM(s) Oral daily  metoprolol tartrate 12.5 milliGRAM(s) Oral two times a day  pantoprazole    Tablet 40 milliGRAM(s) Oral before breakfast  polyethylene glycol 3350 17 Gram(s) Oral daily  senna 2 Tablet(s) Oral at bedtime  torsemide 10 milliGRAM(s) Oral daily      Vital Signs Last 24 Hrs  T(C): 36.1 (15 May 2020 14:50), Max: 37.1 (14 May 2020 20:57)  T(F): 97 (15 May 2020 14:50), Max: 98.8 (14 May 2020 20:57)  HR: 78 (15 May 2020 18:00) (77 - 107)  BP: 131/74 (15 May 2020 18:00) (128/73 - 149/83)  BP(mean): 97 (15 May 2020 18:00) (93 - 109)  RR: 14 (15 May 2020 18:00) (14 - 18)  SpO2: 100% (15 May 2020 18:00) (98% - 100%)    Physical Exam:  General: awake, alert, non toxic  Head: atraumatic, normocephalic  Eye: normal sclera and conjunctiva  Cardio: regular rate. sternal wound dressed with woundvac   Respiratory: nonlabored on nasal cannula   abd: soft, bowel sounds present, no tenderness  Skin: no rash  Neurologic: no focal deficit  psych: normal affect                          8.2    11.73 )-----------( 352      ( 15 May 2020 06:52 )             26.0       05-15    132<L>  |  94<L>  |  40<H>  ----------------------------<  217<H>  4.9   |  24  |  1.78<H>    Ca    9.5      15 May 2020 06:52  Phos  3.3     05-15  Mg     2.0     05-15      MICROBIOLOGY:  Culture - Blood (collected 05-14-20 @ 09:04)  Source: .Blood Blood-Peripheral  Preliminary Report (05-15-20 @ 10:01):    No growth to date.    Culture - Blood (collected 05-14-20 @ 09:04)  Source: .Blood Blood-Peripheral  Preliminary Report (05-15-20 @ 10:01):    No growth to date.    Culture - Blood (collected 05-12-20 @ 03:18)  Source: .Blood Blood  Preliminary Report (05-13-20 @ 04:01):    No growth to date.    Culture - Blood (collected 05-12-20 @ 03:17)  Source: .Blood Blood  Gram Stain (05-14-20 @ 00:21):    Growth in aerobic bottle: Gram Positive Cocci in Clusters  Final Report (05-15-20 @ 16:16):    Growth in aerobic bottle: Methicillin resistant Staphylococcus aureus      -  Ampicillin/Sulbactam: R <=8/4      -  Cefazolin: R <=4      -  Clindamycin: S <=0.25      -  Daptomycin: S <=0.25      -  Erythromycin: R >4      -  Gentamicin: S <=1 Should not be used as monotherapy      -  Linezolid: S 2      -  Oxacillin: R >2      -  Penicillin: R >8      -  RIF- Rifampin: S <=1 Should not be used as monotherapy      -  Tetra/Doxy: S <=1      -  Trimethoprim/Sulfamethoxazole: S <=0.5/9.5      -  Vancomycin: S 1    Culture - Surgical Swab (collected 05-12-20 @ 03:08)  Source: .Surgical Swab sternal wound  Preliminary Report (05-15-20 @ 15:08):    Numerous Methicillin resistant Staphylococcus aureus      -  Ampicillin/Sulbactam: R <=8/4      -  Cefazolin: R <=4      -  Clindamycin: S <=0.25      -  Daptomycin: S 0.5      -  Erythromycin: R >4      -  Gentamicin: S <=1 Should not be used as monotherapy      -  Linezolid: S 2      -  Oxacillin: R >2      -  Penicillin: R >8      -  RIF- Rifampin: S <=1 Should not be used as monotherapy      -  Tetra/Doxy: S <=1      -  Trimethoprim/Sulfamethoxazole: S <=0.5/9.5      -  Vancomycin: S 1      Method Type: REINALDO    Growth in fluid media only Acinetobacter baumannii      -  Imipenem: S      -  Piperacillin/Tazobactam: S      Method Type: KB      -  Amikacin: S <=16      -  Ampicillin/Sulbactam: S <=4/2      -  Cefepime: S <=2      -  Ceftazidime: S 4      -  Ciprofloxacin: S <=0.25      -  Gentamicin: S 4      -  Levofloxacin: S <=0.5      -  Meropenem: S <=1      -  Tobramycin: S <=2      -  Trimethoprim/Sulfamethoxazole: S <=0.5/9.5      Method Type: REINALDO    Culture - Blood (collected 05-11-20 @ 22:20)  Source: .Blood Blood-Peripheral  Preliminary Report (05-12-20 @ 23:01):    No growth to date.    Culture - Blood (collected 05-11-20 @ 22:20)  Source: .Blood Blood-Peripheral  Preliminary Report (05-12-20 @ 23:01):    No growth to date.    RADIOLOGY:  Images below reviewed personally  Xray Chest 1 View- PORTABLE-Urgent (05.15.20 @ 16:33)   1.  Lungs are clear.  2.  Right-sided catheter in the SVC    Transthoracic Echocardiogram (05.14.20 @ 12:37)   Limited study to r/o Endocarditis  Pt has very limited windows due to edema and wound vac.  Unable to exclude valvular vegetations inthe setting of a  technically difficult study.  OLGA could be obtained for further evaluation of valvular  vegetations, if clinical suspicion of endocarditis is high.Study quality: Uninterpretable    CT Chest No Cont (05.12.20 @ 20:09)   No subcutaneous collection. Trace hematoma within the anterior mediastinum. No drainable collection.No aggressive osseous lesion.

## 2020-05-15 NOTE — BRIEF OPERATIVE NOTE - NSICDXBRIEFPOSTOP_GEN_ALL_CORE_FT
POST-OP DIAGNOSIS:  Sternal wound dehiscence 15-May-2020 14:58:38  Randy Miles
POST-OP DIAGNOSIS:  Sternal wound dehiscence 15-May-2020 14:58:38  Randy Miles

## 2020-05-15 NOTE — PROGRESS NOTE ADULT - ASSESSMENT
Assessment  DMT2: 65y Male with DM T2 with hyperglycemia, A1C 7.9%, was on insulin at home, now on basal bolus insulin, increased dose yesterday, blood sugars still running high and not at target, no hypoglycemic episodes, currently in OR.  CAD: s/p CABG previous admission, stable, monitored.  Sternal Wound: On IV  ABx, FU Plastics/ID.  HTN: Controlled,  on antihypertensive medications.  CKD: Monitor labs/BMP.          Harper Matias MD  Cell: [phone]  Office: [phone] Assessment  DMT2: 65y Male with DM T2 with hyperglycemia, A1C 7.9%,  was on insulin at home, now on basal bolus insulin, increased dose yesterday, blood sugars still running high and not at target, no hypoglycemic episodes, currently in OR.  CAD: s/p CABG previous admission, stable, monitored.  Sternal Wound: On IV  ABx, FU Plastics/ID.  HTN: Controlled,  on antihypertensive medications.  CKD: Monitor labs/BMP.          Harper Matias MD  Cell: [phone]  Office: [phone]

## 2020-05-16 LAB
ANION GAP SERPL CALC-SCNC: 13 MMOL/L — SIGNIFICANT CHANGE UP (ref 5–17)
BUN SERPL-MCNC: 38 MG/DL — HIGH (ref 7–23)
CALCIUM SERPL-MCNC: 8.6 MG/DL — SIGNIFICANT CHANGE UP (ref 8.4–10.5)
CHLORIDE SERPL-SCNC: 96 MMOL/L — SIGNIFICANT CHANGE UP (ref 96–108)
CO2 SERPL-SCNC: 20 MMOL/L — LOW (ref 22–31)
CREAT SERPL-MCNC: 1.51 MG/DL — HIGH (ref 0.5–1.3)
CULTURE RESULTS: SIGNIFICANT CHANGE UP
GLUCOSE BLDC GLUCOMTR-MCNC: 122 MG/DL — HIGH (ref 70–99)
GLUCOSE BLDC GLUCOMTR-MCNC: 160 MG/DL — HIGH (ref 70–99)
GLUCOSE BLDC GLUCOMTR-MCNC: 205 MG/DL — HIGH (ref 70–99)
GLUCOSE BLDC GLUCOMTR-MCNC: 222 MG/DL — HIGH (ref 70–99)
GLUCOSE SERPL-MCNC: 144 MG/DL — HIGH (ref 70–99)
GRAM STN FLD: SIGNIFICANT CHANGE UP
HCT VFR BLD CALC: 31.7 % — LOW (ref 39–50)
HGB BLD-MCNC: 9.9 G/DL — LOW (ref 13–17)
MCHC RBC-ENTMCNC: 26.5 PG — LOW (ref 27–34)
MCHC RBC-ENTMCNC: 31.2 GM/DL — LOW (ref 32–36)
MCV RBC AUTO: 84.8 FL — SIGNIFICANT CHANGE UP (ref 80–100)
NRBC # BLD: 0 /100 WBCS — SIGNIFICANT CHANGE UP (ref 0–0)
ORGANISM # SPEC MICROSCOPIC CNT: SIGNIFICANT CHANGE UP
OSMOLALITY UR: 257 MOS/KG — LOW (ref 300–900)
PLATELET # BLD AUTO: 332 K/UL — SIGNIFICANT CHANGE UP (ref 150–400)
POTASSIUM SERPL-MCNC: 4.6 MMOL/L — SIGNIFICANT CHANGE UP (ref 3.5–5.3)
POTASSIUM SERPL-SCNC: 4.6 MMOL/L — SIGNIFICANT CHANGE UP (ref 3.5–5.3)
RBC # BLD: 3.74 M/UL — LOW (ref 4.2–5.8)
RBC # FLD: 15.6 % — HIGH (ref 10.3–14.5)
SODIUM SERPL-SCNC: 129 MMOL/L — LOW (ref 135–145)
SODIUM UR-SCNC: <35 MMOL/L — SIGNIFICANT CHANGE UP
SPECIMEN SOURCE: SIGNIFICANT CHANGE UP
WBC # BLD: 11.68 K/UL — HIGH (ref 3.8–10.5)
WBC # FLD AUTO: 11.68 K/UL — HIGH (ref 3.8–10.5)

## 2020-05-16 RX ORDER — HYDROMORPHONE HYDROCHLORIDE 2 MG/ML
1 INJECTION INTRAMUSCULAR; INTRAVENOUS; SUBCUTANEOUS EVERY 6 HOURS
Refills: 0 | Status: DISCONTINUED | OUTPATIENT
Start: 2020-05-16 | End: 2020-05-16

## 2020-05-16 RX ORDER — VANCOMYCIN HCL 1 G
1000 VIAL (EA) INTRAVENOUS EVERY 24 HOURS
Refills: 0 | Status: DISCONTINUED | OUTPATIENT
Start: 2020-05-16 | End: 2020-05-19

## 2020-05-16 RX ORDER — OXYCODONE AND ACETAMINOPHEN 5; 325 MG/1; MG/1
1 TABLET ORAL EVERY 6 HOURS
Refills: 0 | Status: DISCONTINUED | OUTPATIENT
Start: 2020-05-16 | End: 2020-05-23

## 2020-05-16 RX ADMIN — Medication 100 MILLIGRAM(S): at 06:54

## 2020-05-16 RX ADMIN — Medication 9 UNIT(S): at 17:57

## 2020-05-16 RX ADMIN — Medication 250 MILLIGRAM(S): at 17:04

## 2020-05-16 RX ADMIN — OXYCODONE AND ACETAMINOPHEN 1 TABLET(S): 5; 325 TABLET ORAL at 17:04

## 2020-05-16 RX ADMIN — LORATADINE 10 MILLIGRAM(S): 10 TABLET ORAL at 09:33

## 2020-05-16 RX ADMIN — Medication 2: at 13:12

## 2020-05-16 RX ADMIN — AMIODARONE HYDROCHLORIDE 200 MILLIGRAM(S): 400 TABLET ORAL at 06:31

## 2020-05-16 RX ADMIN — INSULIN GLARGINE 26 UNIT(S): 100 INJECTION, SOLUTION SUBCUTANEOUS at 22:01

## 2020-05-16 RX ADMIN — Medication 0.5 MILLIGRAM(S): at 17:04

## 2020-05-16 RX ADMIN — Medication 1 APPLICATION(S): at 14:54

## 2020-05-16 RX ADMIN — Medication 81 MILLIGRAM(S): at 09:33

## 2020-05-16 RX ADMIN — PANTOPRAZOLE SODIUM 40 MILLIGRAM(S): 20 TABLET, DELAYED RELEASE ORAL at 09:33

## 2020-05-16 RX ADMIN — ENOXAPARIN SODIUM 40 MILLIGRAM(S): 100 INJECTION SUBCUTANEOUS at 09:33

## 2020-05-16 RX ADMIN — Medication 1: at 09:32

## 2020-05-16 RX ADMIN — Medication 9 UNIT(S): at 09:33

## 2020-05-16 RX ADMIN — HYDROMORPHONE HYDROCHLORIDE 1 MILLIGRAM(S): 2 INJECTION INTRAMUSCULAR; INTRAVENOUS; SUBCUTANEOUS at 00:40

## 2020-05-16 RX ADMIN — SENNA PLUS 2 TABLET(S): 8.6 TABLET ORAL at 00:14

## 2020-05-16 RX ADMIN — ATORVASTATIN CALCIUM 40 MILLIGRAM(S): 80 TABLET, FILM COATED ORAL at 00:13

## 2020-05-16 RX ADMIN — Medication 2: at 17:57

## 2020-05-16 RX ADMIN — Medication 100 MILLIGRAM(S): at 22:00

## 2020-05-16 RX ADMIN — INSULIN GLARGINE 26 UNIT(S): 100 INJECTION, SOLUTION SUBCUTANEOUS at 00:13

## 2020-05-16 RX ADMIN — Medication 9 UNIT(S): at 13:52

## 2020-05-16 RX ADMIN — Medication 12.5 MILLIGRAM(S): at 17:04

## 2020-05-16 RX ADMIN — Medication 0.5 MILLIGRAM(S): at 06:31

## 2020-05-16 RX ADMIN — Medication 12.5 MILLIGRAM(S): at 06:31

## 2020-05-16 RX ADMIN — ATORVASTATIN CALCIUM 40 MILLIGRAM(S): 80 TABLET, FILM COATED ORAL at 22:00

## 2020-05-16 RX ADMIN — Medication 10 MILLIGRAM(S): at 06:54

## 2020-05-16 NOTE — PROGRESS NOTE ADULT - SUBJECTIVE AND OBJECTIVE BOX
Patient is a 65y old  Male who presents with a chief complaint of syncope (16 May 2020 13:34)      SUBJECTIVE / OVERNIGHT EVENTS: Comfortable without new complaints.   Review of Systems  chest pain no  palpitations no  sob no  nausea no  headache no    MEDICATIONS  (STANDING):  aMIOdarone    Tablet 200 milliGRAM(s) Oral daily  aspirin enteric coated 81 milliGRAM(s) Oral daily  atorvastatin 40 milliGRAM(s) Oral at bedtime  BACItracin   Ointment 1 Application(s) Topical daily  buDESOnide    Inhalation Suspension 0.5 milliGRAM(s) Inhalation two times a day  dextrose 5%. 1000 milliLiter(s) (50 mL/Hr) IV Continuous <Continuous>  dextrose 50% Injectable 12.5 Gram(s) IV Push once  dextrose 50% Injectable 25 Gram(s) IV Push once  dextrose 50% Injectable 25 Gram(s) IV Push once  enoxaparin Injectable 40 milliGRAM(s) SubCutaneous daily  insulin glargine Injectable (LANTUS) 26 Unit(s) SubCutaneous at bedtime  insulin lispro (HumaLOG) corrective regimen sliding scale   SubCutaneous three times a day before meals  insulin lispro (HumaLOG) corrective regimen sliding scale   SubCutaneous at bedtime  insulin lispro Injectable (HumaLOG) 9 Unit(s) SubCutaneous three times a day before meals  lactated ringers. 1000 milliLiter(s) (125 mL/Hr) IV Continuous <Continuous>  loratadine 10 milliGRAM(s) Oral daily  metoprolol tartrate 12.5 milliGRAM(s) Oral two times a day  pantoprazole    Tablet 40 milliGRAM(s) Oral before breakfast  polyethylene glycol 3350 17 Gram(s) Oral daily  senna 2 Tablet(s) Oral at bedtime  torsemide 10 milliGRAM(s) Oral daily  vancomycin  IVPB 1000 milliGRAM(s) IV Intermittent every 24 hours    MEDICATIONS  (PRN):  acetaminophen   Tablet .. 650 milliGRAM(s) Oral every 6 hours PRN Temp greater or equal to 38.5C (101.3F), Mild Pain (1 - 3)  ALBUTerol    90 MICROgram(s) HFA Inhaler 2 Puff(s) Inhalation every 6 hours PRN Shortness of Breath and/or Wheezing  benzonatate 100 milliGRAM(s) Oral three times a day PRN Cough  dextrose 40% Gel 15 Gram(s) Oral once PRN Blood Glucose LESS THAN 70 milliGRAM(s)/deciliter  glucagon  Injectable 1 milliGRAM(s) IntraMuscular once PRN Glucose LESS THAN 70 milligrams/deciliter  oxycodone    5 mG/acetaminophen 325 mG 1 Tablet(s) Oral every 6 hours PRN Moderate Pain (4 - 6)      Vital Signs Last 24 Hrs  T(C): 36.7 (16 May 2020 04:19), Max: 36.7 (16 May 2020 04:19)  T(F): 98.1 (16 May 2020 04:19), Max: 98.1 (16 May 2020 04:19)  HR: 86 (16 May 2020 04:19) (71 - 86)  BP: 143/79 (16 May 2020 04:19) (110/65 - 149/85)  BP(mean): 93 (15 May 2020 21:00) (93 - 110)  RR: 18 (16 May 2020 04:19) (14 - 18)  SpO2: 96% (16 May 2020 04:19) (96% - 100%)    PHYSICAL EXAM:  GENERAL: NAD  HEAD:  Atraumatic, Normocephalic  EYES: EOMI, PERRLA, conjunctiva and sclera clear  NECK: Supple, No JVD  CHEST/LUNG: Clear to auscultation bilaterally; No wheeze VAC on sternal wound.  HEART: Regular rate and rhythm; No murmurs, rubs, or gallops  ABDOMEN: Soft, Nontender, Nondistended; Bowel sounds present  EXTREMITIES:  2+ Peripheral Pulses, No clubbing, cyanosis, or edema  PSYCH: AAOx3  NEUROLOGY: non-focal  SKIN: No rashes or lesions    LABS:                        9.9    11.68 )-----------( 332      ( 16 May 2020 06:59 )             31.7     05-16    129<L>  |  96  |  38<H>  ----------------------------<  144<H>  4.6   |  20<L>  |  1.51<H>    Ca    8.6      16 May 2020 06:59  Phos  3.3     05-15  Mg     2.0     05-15      PT/INR - ( 15 May 2020 06:52 )   PT: 12.6 sec;   INR: 1.09 ratio         PTT - ( 15 May 2020 06:52 )  PTT:34.0 sec          Culture - Tissue with Gram Stain (collected 16 May 2020 03:05)  Source: .Tissue Other  Gram Stain (16 May 2020 07:25):    Rare polymorphonuclear leukocytes seen per low power field    No organisms seen per oil power field    Culture - Blood (collected 14 May 2020 09:04)  Source: .Blood Blood-Peripheral  Preliminary Report (15 May 2020 10:01):    No growth to date.    Culture - Blood (collected 14 May 2020 09:04)  Source: .Blood Blood-Peripheral  Preliminary Report (15 May 2020 10:01):    No growth to date.        RADIOLOGY & ADDITIONAL TESTS:    Imaging Personally Reviewed:    Consultant(s) Notes Reviewed:      Care Discussed with Consultants/Other Providers:

## 2020-05-16 NOTE — PROGRESS NOTE ADULT - PROBLEM SELECTOR PLAN 1
Will continue current insulin regimen for now. Will continue monitoring FS, log, will Follow up.  Patient again counseled for compliance with consistent low carb diet and tight sugar control for wound healing.

## 2020-05-16 NOTE — PROGRESS NOTE ADULT - SUBJECTIVE AND OBJECTIVE BOX
Chief complaint  Patient is a 65y old  Male who presents with a chief complaint of syncope (16 May 2020 09:12)   Review of systems  Patient in bed, looks comfortable, no fever, no hypoglycemia.    Labs and Fingersticks  CAPILLARY BLOOD GLUCOSE    POCT Blood Glucose.: 160 mg/dL (16 May 2020 08:40)  POCT Blood Glucose.: 156 mg/dL (15 May 2020 22:22)  POCT Blood Glucose.: 185 mg/dL (15 May 2020 20:56)  POCT Blood Glucose.: 218 mg/dL (15 May 2020 20:02)  POCT Blood Glucose.: 159 mg/dL (15 May 2020 19:05)  POCT Blood Glucose.: 205 mg/dL (15 May 2020 17:58)  POCT Blood Glucose.: 241 mg/dL (15 May 2020 16:56)  POCT Blood Glucose.: 287 mg/dL (15 May 2020 15:57)  POCT Blood Glucose.: 290 mg/dL (15 May 2020 14:52)      Anion Gap, Serum: 13 (05-16 @ 06:59)  Anion Gap, Serum: 14 (05-15 @ 06:52)      Calcium, Total Serum: 8.6 (05-16 @ 06:59)  Calcium, Total Serum: 9.5 (05-15 @ 06:52)          05-16    129<L>  |  96  |  38<H>  ----------------------------<  144<H>  4.6   |  20<L>  |  1.51<H>    Ca    8.6      16 May 2020 06:59  Phos  3.3     05-15  Mg     2.0     05-15                          9.9    11.68 )-----------( 332      ( 16 May 2020 06:59 )             31.7     Medications  MEDICATIONS  (STANDING):  aMIOdarone    Tablet 200 milliGRAM(s) Oral daily  aspirin enteric coated 81 milliGRAM(s) Oral daily  atorvastatin 40 milliGRAM(s) Oral at bedtime  BACItracin   Ointment 1 Application(s) Topical daily  buDESOnide    Inhalation Suspension 0.5 milliGRAM(s) Inhalation two times a day  dextrose 5%. 1000 milliLiter(s) (50 mL/Hr) IV Continuous <Continuous>  dextrose 50% Injectable 12.5 Gram(s) IV Push once  dextrose 50% Injectable 25 Gram(s) IV Push once  dextrose 50% Injectable 25 Gram(s) IV Push once  enoxaparin Injectable 40 milliGRAM(s) SubCutaneous daily  insulin glargine Injectable (LANTUS) 26 Unit(s) SubCutaneous at bedtime  insulin lispro (HumaLOG) corrective regimen sliding scale   SubCutaneous three times a day before meals  insulin lispro (HumaLOG) corrective regimen sliding scale   SubCutaneous at bedtime  insulin lispro Injectable (HumaLOG) 9 Unit(s) SubCutaneous three times a day before meals  lactated ringers. 1000 milliLiter(s) (125 mL/Hr) IV Continuous <Continuous>  loratadine 10 milliGRAM(s) Oral daily  metoprolol tartrate 12.5 milliGRAM(s) Oral two times a day  pantoprazole    Tablet 40 milliGRAM(s) Oral before breakfast  polyethylene glycol 3350 17 Gram(s) Oral daily  senna 2 Tablet(s) Oral at bedtime  torsemide 10 milliGRAM(s) Oral daily  vancomycin  IVPB 1000 milliGRAM(s) IV Intermittent every 24 hours  vancomycin  IVPB          Physical Exam  Culture - Tissue with Gram Stain (collected 05-16-20 @ 03:05)  Source: .Tissue Other  Gram Stain (05-16-20 @ 07:25):    Rare polymorphonuclear leukocytes seen per low power field    No organisms seen per oil power field      General: Patient comfortable in bed  Vital Signs Last 12 Hrs  T(F): 98.1 (05-16-20 @ 04:19), Max: 98.1 (05-16-20 @ 04:19)  HR: 86 (05-16-20 @ 04:19) (86 - 86)  BP: 143/79 (05-16-20 @ 04:19) (143/79 - 143/79)  BP(mean): --  RR: 18 (05-16-20 @ 04:19) (18 - 18)  SpO2: 96% (05-16-20 @ 04:19) (96% - 96%)  Neck: No palpable thyroid nodules.  CVS: S1S2, No murmurs  Respiratory: No wheezing, no crepitations  GI: Abdomen soft, bowel sounds positive  Musculoskeletal:  edema lower extremities.   Skin: No skin rashes, no ecchymosis    Diagnostics

## 2020-05-16 NOTE — PROGRESS NOTE ADULT - ASSESSMENT
65M PMH CABG (2/2020) complicated by PEA arrest, thoracotomy site infection w/empyema, DM, HTN, initially presented 5/11 with syncope, found to also have non-healing sternal chest wound now s/p laparoscopic lysis of adhesions and creation of omental flap, followed by plastics removal of hardware and closure.    Plan:  - Pain control prn  - F/U AM labs  - can resume diet as tolerated    Surgery Red [id number]. 65M with PMH of CABG (2/2020) complicated by PEA arrest, thoracotomy site infection w/empyema, DM, HTN, initially presented 5/11 with syncope, found to also have non-healing sternal chest wound now s/p laparoscopic lysis of adhesions and creation of omental flap, followed by plastics removal of hardware and closure.    Plan:  - Pain control prn  - F/U AM labs  - can resume diet as tolerated  - appreciate care per primary    Surgery Red [id number]

## 2020-05-16 NOTE — PROGRESS NOTE ADULT - SUBJECTIVE AND OBJECTIVE BOX
Plastic Surgery Progress Note (pg [facility]: [id number], [facility]: [phone])    SUBJECTIVE:  Patient seen and examined at bedside this AM. No acute events overnight. AF/VSS. Pain well controlled. Eating breakfast in bed this AM. Vac holding well.     OBJECTIVE:     ** VITAL SIGNS / I&O's **    Vital Signs Last 24 Hrs  T(C): 36.7 (16 May 2020 04:19), Max: 36.7 (16 May 2020 04:19)  T(F): 98.1 (16 May 2020 04:19), Max: 98.1 (16 May 2020 04:19)  HR: 86 (16 May 2020 04:19) (71 - 86)  BP: 143/79 (16 May 2020 04:19) (128/72 - 149/85)  BP(mean): 93 (15 May 2020 21:00) (93 - 110)  RR: 18 (16 May 2020 04:19) (14 - 18)  SpO2: 96% (16 May 2020 04:19) (96% - 100%)      15 May 2020 07:01  -  16 May 2020 07:00  --------------------------------------------------------  IN:    insulin regular Infusion: 37 mL    lactated ringers.: 450 mL    sodium chloride 0.9%: 100 mL    Solution: 300 mL  Total IN: 887 mL    OUT:    Bulb: 60 mL    Bulb: 20 mL    Indwelling Catheter - Urethral: 305 mL  Total OUT: 385 mL    Total NET: 502 mL          ** PHYSICAL EXAM **    -- CONSTITUTIONAL: NAD.   -- CHEST: Incision vac in place  -- ABDOMEN: Minimal distension, mildly tender, KEI x2 SS, Donor site dressing c/d/i.         **MEDS**  acetaminophen   Tablet .. 650 milliGRAM(s) Oral every 6 hours PRN  ALBUTerol    90 MICROgram(s) HFA Inhaler 2 Puff(s) Inhalation every 6 hours PRN  aMIOdarone    Tablet 200 milliGRAM(s) Oral daily  aspirin enteric coated 81 milliGRAM(s) Oral daily  atorvastatin 40 milliGRAM(s) Oral at bedtime  BACItracin   Ointment 1 Application(s) Topical daily  benzonatate 100 milliGRAM(s) Oral three times a day PRN  buDESOnide    Inhalation Suspension 0.5 milliGRAM(s) Inhalation two times a day  dextrose 40% Gel 15 Gram(s) Oral once PRN  dextrose 5%. 1000 milliLiter(s) IV Continuous <Continuous>  dextrose 50% Injectable 12.5 Gram(s) IV Push once  dextrose 50% Injectable 25 Gram(s) IV Push once  dextrose 50% Injectable 25 Gram(s) IV Push once  enoxaparin Injectable 40 milliGRAM(s) SubCutaneous daily  glucagon  Injectable 1 milliGRAM(s) IntraMuscular once PRN  HYDROmorphone  Injectable 1 milliGRAM(s) IV Push every 6 hours  insulin glargine Injectable (LANTUS) 26 Unit(s) SubCutaneous at bedtime  insulin lispro (HumaLOG) corrective regimen sliding scale   SubCutaneous three times a day before meals  insulin lispro (HumaLOG) corrective regimen sliding scale   SubCutaneous at bedtime  insulin lispro Injectable (HumaLOG) 9 Unit(s) SubCutaneous three times a day before meals  lactated ringers. 1000 milliLiter(s) IV Continuous <Continuous>  loratadine 10 milliGRAM(s) Oral daily  metoprolol tartrate 12.5 milliGRAM(s) Oral two times a day  pantoprazole    Tablet 40 milliGRAM(s) Oral before breakfast  polyethylene glycol 3350 17 Gram(s) Oral daily  senna 2 Tablet(s) Oral at bedtime  torsemide 10 milliGRAM(s) Oral daily  vancomycin  IVPB 1000 milliGRAM(s) IV Intermittent every 24 hours  vancomycin  IVPB          ** LABS **                          9.9    11.68 )-----------( 332      ( 16 May 2020 06:59 )             31.7     16 May 2020 06:59    129    |  96     |  38     ----------------------------<  144    4.6     |  20     |  1.51     Ca    8.6        16 May 2020 06:59  Phos  3.3       15 May 2020 06:52  Mg     2.0       15 May 2020 06:52      PT/INR - ( 15 May 2020 06:52 )   PT: 12.6 sec;   INR: 1.09 ratio         PTT - ( 15 May 2020 06:52 )  PTT:34.0 sec  CAPILLARY BLOOD GLUCOSE      POCT Blood Glucose.: 160 mg/dL (16 May 2020 08:40)  POCT Blood Glucose.: 156 mg/dL (15 May 2020 22:22)  POCT Blood Glucose.: 185 mg/dL (15 May 2020 20:56)  POCT Blood Glucose.: 218 mg/dL (15 May 2020 20:02)  POCT Blood Glucose.: 159 mg/dL (15 May 2020 19:05)  POCT Blood Glucose.: 205 mg/dL (15 May 2020 17:58)  POCT Blood Glucose.: 241 mg/dL (15 May 2020 16:56)  POCT Blood Glucose.: 287 mg/dL (15 May 2020 15:57)  POCT Blood Glucose.: 290 mg/dL (15 May 2020 14:52)  POCT Blood Glucose.: 244 mg/dL (15 May 2020 09:18)

## 2020-05-16 NOTE — PROGRESS NOTE ADULT - SUBJECTIVE AND OBJECTIVE BOX
[facility] NEPHROLOGY- PROGRESS NOTE    65y Male with history of DM presents with syncope concern for sternal wound infection. Nephrology consulted for elevated Scr.    REVIEW OF SYSTEMS:  Gen: no changes in weight  Cards: no chest pain  Resp: no dyspnea  GI: no nausea or vomiting or diarrhea  Vascular: no LE edema    No Known Allergies      Hospital Medications: Medications reviewed    VITALS:  T(F): 98.1 (05-16-20 @ 04:19), Max: 98.1 (05-16-20 @ 04:19)  HR: 86 (05-16-20 @ 04:19)  BP: 143/79 (05-16-20 @ 04:19)  RR: 18 (05-16-20 @ 04:19)  SpO2: 96% (05-16-20 @ 04:19)  Wt(kg): --  Height (cm): 172.72 (05-15 @ 10:16)  Weight (kg): 76.7 (05-15 @ 10:16)  BMI (kg/m2): 25.7 (05-15 @ 10:16)  BSA (m2): 1.9 (05-15 @ 10:16)    05-15 @ 07:01  -  05-16 @ 07:00  --------------------------------------------------------  IN: 887 mL / OUT: 385 mL / NET: 502 mL    05-16 @ 07:01  -  05-16 @ 13:35  --------------------------------------------------------  IN: 240 mL / OUT: 900 mL / NET: -660 mL        PHYSICAL EXAM:    Gen: NAD, calm  Cards: RRR, +S1/S2, no M/G/R  Resp: CTA B/L  GI: soft, NT/ND, NABS  Vascular: no LE edema B/L    LABS:  05-16    129<L>  |  96  |  38<H>  ----------------------------<  144<H>  4.6   |  20<L>  |  1.51<H>    Ca    8.6      16 May 2020 06:59  Phos  3.3     05-15  Mg     2.0     05-15      Creatinine Trend: 1.51 <--, 1.78 <--, 1.58 <--, 1.68 <--, 1.49 <--, 1.57 <--                        9.9    11.68 )-----------( 332      ( 16 May 2020 06:59 )             31.7     Urine Studies:        RADIOLOGY & ADDITIONAL STUDIES:  < from: Xray Chest 1 View- PORTABLE-Urgent (05.15.20 @ 16:33) >  IMPRESSION:    1.  Lungs are clear.  2.  Right-sided catheter in the SVC    < end of copied text >

## 2020-05-16 NOTE — PROGRESS NOTE ADULT - ASSESSMENT
limited echo 5/12/20:EF 55-60%. overall preserved lv fx, despite segmental wall motion abnormalities, no LV thrombus  The apical anterior wall, the apical lateral wall, the basal anterior wall,the mid anterior wall, and the midanterolateral wall are hypokinetic.  echo 5/14/20:  Limited study to r/o Endocarditis Pt has very limited windows due to edema and wound vac. Unable to exclude valvular vegetations in the setting of a  technically difficult study. OLGA could be obtained for further evaluation of valvular vegetations, if clinical suspicion of endocarditis is high.        a/p  65y m PMHx CABG Feb 2020 complicated by PEA arrest, thoracotomy site infection w/empyema, DM, HTN p/w syncope, sternal wound infection.     1. Syncope   likely 2/2 to +orthostatics  cv stable, EKG unchanged from prior   HST elevated w/ normal CK/CKMB, demand ischemia in setting of STEPHANIE/CKD, possible wound infection   CT head negative for ICH  echo with overall preserved LVEF, despite segmental wall motion abnormalities, no LV thrombus   encourage PO intake    2. Sternal wound infection s/p RTOR for SWI    s/p debridement of sternal wound, reconstruction, Laparoscopic lysis of abdominal adhesions   IV abx per ID   blood cx neg    repeat echo unable to r/o endocarditis  plan for olga   ID, f/u    3. CAD s/p CABG x 4 , s/p PEA arrest   cv stable   cont asa, statin, bb     4. Chronic diastolic CHF  euvolemic on exam with unchanged SOB   continue diuretics as ordered   repeat echo with preserved LVEF     5. PAF  remains NSR  continue amio, bb    6. HTN  bp stable    7. STEPHANIE/CKD   continue to trend creat     8. Pleural effusion, hx  CXR this admission with small left loculated pleural effusion now decreased in size from previous exam.    dvt ppx

## 2020-05-16 NOTE — PROGRESS NOTE ADULT - ASSESSMENT
65y Male with history of DM presents with syncope concern for sternal wound infection. Nephrology consulted for elevated Scr.    1) CKD-3: Baseline Scr 1.5 as per prior labs. Renal function at baseline. Patient advised to follow up as an outpatient for CKD work up. Avoid nephrotoxins. Monitor electrolytes.    2) HTN with CKD: BP controlled. Metoprolol as per cardiology. Monitor BP.    3) LE edema: Patient appears euvolemic. Continue with torsemide 10 mg daily and decrease IVF as patient tolerates PO. Monitor UO.    4) Hyponatremia: Mild. Change vancomycin from D5W to NS base to decrease free water intake. Check hyponatremia work up. Monitor serum sodium.

## 2020-05-16 NOTE — PROGRESS NOTE ADULT - ASSESSMENT
65y m PMHx CABG Feb 2020 complicated by PEA arrest, sternal infection s/p sternal debridement with muscle flap reconstruction, DM, HTN p/w increased drainage from sternal wound s/p omental flap and incision closure w/ FTSG 5/15    Plan:  - c/w wound vac for 5 days post op  - c/w drains  - diet per general surgery     Plastic Surgery  [phone]

## 2020-05-16 NOTE — PROGRESS NOTE ADULT - ASSESSMENT
65 m with    Syncope  - telemetry  - cardiology follow     Orthostatic hypotension  - DC aldactone  - follow    S/P CABG  - CT sx evaluation noted    Sternal wound, s/p reconstruction and omental flap  - MRSA bacteremia  - Vancomycin  - ID follow  - postop care    Bacteremia  - OLGA to r/o endocarditis.  - ID follow    Scalp abrasion  - Bacitracin requested by patient    Diabetes control  - ADA diet  - BS control  - Endocrine follow    CKD  - follow    HTN control    Miguel Angel Duke MD pager [phone]

## 2020-05-16 NOTE — PROGRESS NOTE ADULT - ASSESSMENT
Assessment  DMT2: 65y Male with DM T2 with hyperglycemia,  A1C 7.9%,  was on insulin at home, now on basal bolus insulin, blood sugars  improving, no hypoglycemic episodes,  non compliant with low carb , had apple juice, regular soda in tray.  CAD: s/p CABG previous admission, stable, monitored.  Sternal Wound: On IV  ABx, FU Plastics/ID.  HTN: Controlled,  on antihypertensive medications.  CKD: Monitor labs/BMP.          Harper Matias MD  Cell: [phone]  Office: [phone]

## 2020-05-16 NOTE — PROGRESS NOTE ADULT - SUBJECTIVE AND OBJECTIVE BOX
SURGERY DAILY PROGRESS NOTE:       SUBJECTIVE/ROS: Patient examined at bedside. Patient POD 1 s/p creation of omental flap and debridement and closure of sternal wound  Denies nausea, vomiting, chest pain, shortness of breath         MEDICATIONS  (STANDING):  aMIOdarone    Tablet 200 milliGRAM(s) Oral daily  aspirin enteric coated 81 milliGRAM(s) Oral daily  atorvastatin 40 milliGRAM(s) Oral at bedtime  BACItracin   Ointment 1 Application(s) Topical daily  buDESOnide    Inhalation Suspension 0.5 milliGRAM(s) Inhalation two times a day  dextrose 5%. 1000 milliLiter(s) (50 mL/Hr) IV Continuous <Continuous>  dextrose 50% Injectable 12.5 Gram(s) IV Push once  dextrose 50% Injectable 25 Gram(s) IV Push once  dextrose 50% Injectable 25 Gram(s) IV Push once  enoxaparin Injectable 40 milliGRAM(s) SubCutaneous daily  HYDROmorphone  Injectable 1 milliGRAM(s) IV Push every 6 hours  insulin glargine Injectable (LANTUS) 26 Unit(s) SubCutaneous at bedtime  insulin lispro (HumaLOG) corrective regimen sliding scale   SubCutaneous three times a day before meals  insulin lispro (HumaLOG) corrective regimen sliding scale   SubCutaneous at bedtime  insulin lispro Injectable (HumaLOG) 9 Unit(s) SubCutaneous three times a day before meals  lactated ringers. 1000 milliLiter(s) (125 mL/Hr) IV Continuous <Continuous>  loratadine 10 milliGRAM(s) Oral daily  metoprolol tartrate 12.5 milliGRAM(s) Oral two times a day  pantoprazole    Tablet 40 milliGRAM(s) Oral before breakfast  polyethylene glycol 3350 17 Gram(s) Oral daily  senna 2 Tablet(s) Oral at bedtime  torsemide 10 milliGRAM(s) Oral daily  vancomycin  IVPB 1000 milliGRAM(s) IV Intermittent every 24 hours  vancomycin  IVPB        MEDICATIONS  (PRN):  acetaminophen   Tablet .. 650 milliGRAM(s) Oral every 6 hours PRN Temp greater or equal to 38.5C (101.3F), Mild Pain (1 - 3)  ALBUTerol    90 MICROgram(s) HFA Inhaler 2 Puff(s) Inhalation every 6 hours PRN Shortness of Breath and/or Wheezing  benzonatate 100 milliGRAM(s) Oral three times a day PRN Cough  dextrose 40% Gel 15 Gram(s) Oral once PRN Blood Glucose LESS THAN 70 milliGRAM(s)/deciliter  glucagon  Injectable 1 milliGRAM(s) IntraMuscular once PRN Glucose LESS THAN 70 milligrams/deciliter      OBJECTIVE:    Vital Signs Last 24 Hrs  T(C): 36.7 (16 May 2020 04:19), Max: 36.7 (16 May 2020 04:19)  T(F): 98.1 (16 May 2020 04:19), Max: 98.1 (16 May 2020 04:19)  HR: 86 (16 May 2020 04:19) (71 - 86)  BP: 143/79 (16 May 2020 04:19) (128/72 - 149/85)  BP(mean): 93 (15 May 2020 21:00) (93 - 110)  RR: 18 (16 May 2020 04:19) (14 - 18)  SpO2: 96% (16 May 2020 04:19) (96% - 100%)        I&O's Detail    15 May 2020 07:01  -  16 May 2020 07:00  --------------------------------------------------------  IN:    insulin regular Infusion: 37 mL    lactated ringers.: 450 mL    sodium chloride 0.9%: 100 mL    Solution: 300 mL  Total IN: 887 mL    OUT:    Bulb: 60 mL    Bulb: 20 mL    Indwelling Catheter - Urethral: 305 mL  Total OUT: 385 mL    Total NET: 502 mL          Daily Height in cm: 172.72 (15 May 2020 10:16)    Daily Weight in k.2 (16 May 2020 04:19)    LABS:                        9.9    11.68 )-----------( 332      ( 16 May 2020 06:59 )             31.7     05-16    129<L>  |  96  |  38<H>  ----------------------------<  144<H>  4.6   |  20<L>  |  1.51<H>    Ca    8.6      16 May 2020 06:59  Phos  3.3     05-15  Mg     2.0     05-15      PT/INR - ( 15 May 2020 06:52 )   PT: 12.6 sec;   INR: 1.09 ratio         PTT - ( 15 May 2020 06:52 )  PTT:34.0 sec                  PHYSICAL EXAM:  General: alert and oriented, NAD  Resp: airway patent, respirations unlabored, currently getting duoneb treatment   CVS: regular rate and rhythm  Chest: KEI x2 with sanguinous output. Vac in place, suction appropriate  Abdomen: soft, appropriately tender, some distention. Dressings C/D/I. JPx2 in place.  Extremities: no edema  Skin: warm, dry, appropriate color SURGERY DAILY PROGRESS NOTE:       SUBJECTIVE/ROS: Patient examined at bedside. Patient s/p creation of omental flap and debridement and closure of sternal wound  Denies nausea, vomiting, chest pain, shortness of breath     OBJECTIVE:    PHYSICAL EXAM:  General: alert and oriented, NAD  Resp: airway patent, respirations unlabored  Chest: KEI x2 with s/s output. Vac in place, holding suction. Soft  Abdomen: soft, appropriately minimally tender, non-distended. Dressings C/D/I.    Vital Signs Last 24 Hrs  T(C): 36.7 (16 May 2020 04:19), Max: 36.7 (16 May 2020 04:19)  T(F): 98.1 (16 May 2020 04:19), Max: 98.1 (16 May 2020 04:19)  HR: 86 (16 May 2020 04:19) (71 - 86)  BP: 143/79 (16 May 2020 04:19) (128/72 - 149/85)  BP(mean): 93 (15 May 2020 21:00) (93 - 110)  RR: 18 (16 May 2020 04:19) (14 - 18)  SpO2: 96% (16 May 2020 04:19) (96% - 100%)    I&O's Detail    15 May 2020 07:01  -  16 May 2020 07:00  --------------------------------------------------------  IN:    insulin regular Infusion: 37 mL    lactated ringers.: 450 mL    sodium chloride 0.9%: 100 mL    Solution: 300 mL  Total IN: 887 mL    OUT:    Bulb: 60 mL    Bulb: 20 mL    Indwelling Catheter - Urethral: 305 mL  Total OUT: 385 mL    Total NET: 502 mL      16 May 2020 07:01  -  16 May 2020 13:54  --------------------------------------------------------  IN:    Oral Fluid: 240 mL  Total IN: 240 mL    OUT:    Indwelling Catheter - Urethral: 900 mL  Total OUT: 900 mL    Total NET: -660 mL      MEDICATIONS  (STANDING):  aMIOdarone    Tablet 200 milliGRAM(s) Oral daily  aspirin enteric coated 81 milliGRAM(s) Oral daily  atorvastatin 40 milliGRAM(s) Oral at bedtime  BACItracin   Ointment 1 Application(s) Topical daily  buDESOnide    Inhalation Suspension 0.5 milliGRAM(s) Inhalation two times a day  dextrose 5%. 1000 milliLiter(s) (50 mL/Hr) IV Continuous <Continuous>  dextrose 50% Injectable 12.5 Gram(s) IV Push once  dextrose 50% Injectable 25 Gram(s) IV Push once  dextrose 50% Injectable 25 Gram(s) IV Push once  enoxaparin Injectable 40 milliGRAM(s) SubCutaneous daily  insulin glargine Injectable (LANTUS) 26 Unit(s) SubCutaneous at bedtime  insulin lispro (HumaLOG) corrective regimen sliding scale   SubCutaneous three times a day before meals  insulin lispro (HumaLOG) corrective regimen sliding scale   SubCutaneous at bedtime  insulin lispro Injectable (HumaLOG) 9 Unit(s) SubCutaneous three times a day before meals  lactated ringers. 1000 milliLiter(s) (125 mL/Hr) IV Continuous <Continuous>  loratadine 10 milliGRAM(s) Oral daily  metoprolol tartrate 12.5 milliGRAM(s) Oral two times a day  pantoprazole    Tablet 40 milliGRAM(s) Oral before breakfast  polyethylene glycol 3350 17 Gram(s) Oral daily  senna 2 Tablet(s) Oral at bedtime  torsemide 10 milliGRAM(s) Oral daily  vancomycin  IVPB 1000 milliGRAM(s) IV Intermittent every 24 hours    MEDICATIONS  (PRN):  acetaminophen   Tablet .. 650 milliGRAM(s) Oral every 6 hours PRN Temp greater or equal to 38.5C (101.3F), Mild Pain (1 - 3)  ALBUTerol    90 MICROgram(s) HFA Inhaler 2 Puff(s) Inhalation every 6 hours PRN Shortness of Breath and/or Wheezing  benzonatate 100 milliGRAM(s) Oral three times a day PRN Cough  dextrose 40% Gel 15 Gram(s) Oral once PRN Blood Glucose LESS THAN 70 milliGRAM(s)/deciliter  glucagon  Injectable 1 milliGRAM(s) IntraMuscular once PRN Glucose LESS THAN 70 milligrams/deciliter  oxycodone    5 mG/acetaminophen 325 mG 1 Tablet(s) Oral every 6 hours PRN Moderate Pain (4 - 6)      LABS:                        9.9    11.68 )-----------( 332      ( 16 May 2020 06:59 )             31.7     05-16    129<L>  |  96  |  38<H>  ----------------------------<  144<H>  4.6   |  20<L>  |  1.51<H>    Ca    8.6      16 May 2020 06:59  Phos  3.3     05-15  Mg     2.0     05-15      PT/INR - ( 15 May 2020 06:52 )   PT: 12.6 sec;   INR: 1.09 ratio         PTT - ( 15 May 2020 06:52 )  PTT:34.0 sec

## 2020-05-16 NOTE — PROGRESS NOTE ADULT - SUBJECTIVE AND OBJECTIVE BOX
CARDIOLOGY FOLLOW UP - Dr. Steel    CC no cp/sob   "i feel great"    PHYSICAL EXAM:  T(C): 36.7 (05-16-20 @ 04:19), Max: 36.7 (05-16-20 @ 04:19)  HR: 86 (05-16-20 @ 04:19) (71 - 86)  BP: 143/79 (05-16-20 @ 04:19) (128/72 - 149/85)  RR: 18 (05-16-20 @ 04:19) (14 - 18)  SpO2: 96% (05-16-20 @ 04:19) (96% - 100%)  Wt(kg): --  I&O's Summary    15 May 2020 07:01  -  16 May 2020 07:00  --------------------------------------------------------  IN: 887 mL / OUT: 385 mL / NET: 502 mL        Appearance: Normal	  Cardiovascular: Normal S1 S2,RRR , +wound vac  Respiratory: Lungs clear to auscultation	  Gastrointestinal:  Soft, Non-tender, + BS	  Extremities: Normal range of motion, No clubbing, cyanosis or edema        MEDICATIONS  (STANDING):  aMIOdarone    Tablet 200 milliGRAM(s) Oral daily  aspirin enteric coated 81 milliGRAM(s) Oral daily  atorvastatin 40 milliGRAM(s) Oral at bedtime  BACItracin   Ointment 1 Application(s) Topical daily  buDESOnide    Inhalation Suspension 0.5 milliGRAM(s) Inhalation two times a day  dextrose 5%. 1000 milliLiter(s) (50 mL/Hr) IV Continuous <Continuous>  dextrose 50% Injectable 12.5 Gram(s) IV Push once  dextrose 50% Injectable 25 Gram(s) IV Push once  dextrose 50% Injectable 25 Gram(s) IV Push once  enoxaparin Injectable 40 milliGRAM(s) SubCutaneous daily  HYDROmorphone  Injectable 1 milliGRAM(s) IV Push every 6 hours  insulin glargine Injectable (LANTUS) 26 Unit(s) SubCutaneous at bedtime  insulin lispro (HumaLOG) corrective regimen sliding scale   SubCutaneous three times a day before meals  insulin lispro (HumaLOG) corrective regimen sliding scale   SubCutaneous at bedtime  insulin lispro Injectable (HumaLOG) 9 Unit(s) SubCutaneous three times a day before meals  lactated ringers. 1000 milliLiter(s) (125 mL/Hr) IV Continuous <Continuous>  loratadine 10 milliGRAM(s) Oral daily  metoprolol tartrate 12.5 milliGRAM(s) Oral two times a day  pantoprazole    Tablet 40 milliGRAM(s) Oral before breakfast  polyethylene glycol 3350 17 Gram(s) Oral daily  senna 2 Tablet(s) Oral at bedtime  torsemide 10 milliGRAM(s) Oral daily  vancomycin  IVPB 1000 milliGRAM(s) IV Intermittent every 24 hours  vancomycin  IVPB          TELEMETRY: nsr  	    ECG:  	  RADIOLOGY:   DIAGNOSTIC TESTING:  [ ] Echocardiogram:  [ ]  Catheterization:  [ ] Stress Test:    OTHER: 	    LABS:	 	                                9.9    11.68 )-----------( 332      ( 16 May 2020 06:59 )             31.7     05-16    129<L>  |  96  |  38<H>  ----------------------------<  144<H>  4.6   |  20<L>  |  1.51<H>    Ca    8.6      16 May 2020 06:59  Phos  3.3     05-15  Mg     2.0     05-15      PT/INR - ( 15 May 2020 06:52 )   PT: 12.6 sec;   INR: 1.09 ratio         PTT - ( 15 May 2020 06:52 )  PTT:34.0 sec

## 2020-05-17 LAB
ALBUMIN SERPL ELPH-MCNC: 2.3 G/DL — LOW (ref 3.3–5)
ALP SERPL-CCNC: 78 U/L — SIGNIFICANT CHANGE UP (ref 40–120)
ALT FLD-CCNC: 15 U/L — SIGNIFICANT CHANGE UP (ref 10–45)
ANION GAP SERPL CALC-SCNC: 13 MMOL/L — SIGNIFICANT CHANGE UP (ref 5–17)
AST SERPL-CCNC: 17 U/L — SIGNIFICANT CHANGE UP (ref 10–40)
BASOPHILS # BLD AUTO: 0.1 K/UL — SIGNIFICANT CHANGE UP (ref 0–0.2)
BASOPHILS NFR BLD AUTO: 1 % — SIGNIFICANT CHANGE UP (ref 0–2)
BILIRUB SERPL-MCNC: 0.2 MG/DL — SIGNIFICANT CHANGE UP (ref 0.2–1.2)
BUN SERPL-MCNC: 40 MG/DL — HIGH (ref 7–23)
CALCIUM SERPL-MCNC: 8.8 MG/DL — SIGNIFICANT CHANGE UP (ref 8.4–10.5)
CHLORIDE SERPL-SCNC: 97 MMOL/L — SIGNIFICANT CHANGE UP (ref 96–108)
CO2 SERPL-SCNC: 24 MMOL/L — SIGNIFICANT CHANGE UP (ref 22–31)
CORTIS AM PEAK SERPL-MCNC: 14.2 UG/DL — SIGNIFICANT CHANGE UP (ref 6–18.4)
CREAT SERPL-MCNC: 1.77 MG/DL — HIGH (ref 0.5–1.3)
CULTURE RESULTS: SIGNIFICANT CHANGE UP
EOSINOPHIL # BLD AUTO: 0.64 K/UL — HIGH (ref 0–0.5)
EOSINOPHIL NFR BLD AUTO: 6.6 % — HIGH (ref 0–6)
GLUCOSE BLDC GLUCOMTR-MCNC: 220 MG/DL — HIGH (ref 70–99)
GLUCOSE BLDC GLUCOMTR-MCNC: 238 MG/DL — HIGH (ref 70–99)
GLUCOSE BLDC GLUCOMTR-MCNC: 249 MG/DL — HIGH (ref 70–99)
GLUCOSE BLDC GLUCOMTR-MCNC: 277 MG/DL — HIGH (ref 70–99)
GLUCOSE SERPL-MCNC: 233 MG/DL — HIGH (ref 70–99)
HCT VFR BLD CALC: 28.4 % — LOW (ref 39–50)
HGB BLD-MCNC: 8.8 G/DL — LOW (ref 13–17)
IMM GRANULOCYTES NFR BLD AUTO: 0.7 % — SIGNIFICANT CHANGE UP (ref 0–1.5)
LYMPHOCYTES # BLD AUTO: 1.85 K/UL — SIGNIFICANT CHANGE UP (ref 1–3.3)
LYMPHOCYTES # BLD AUTO: 19 % — SIGNIFICANT CHANGE UP (ref 13–44)
MCHC RBC-ENTMCNC: 26.5 PG — LOW (ref 27–34)
MCHC RBC-ENTMCNC: 31 GM/DL — LOW (ref 32–36)
MCV RBC AUTO: 85.5 FL — SIGNIFICANT CHANGE UP (ref 80–100)
MONOCYTES # BLD AUTO: 0.9 K/UL — SIGNIFICANT CHANGE UP (ref 0–0.9)
MONOCYTES NFR BLD AUTO: 9.3 % — SIGNIFICANT CHANGE UP (ref 2–14)
NEUTROPHILS # BLD AUTO: 6.16 K/UL — SIGNIFICANT CHANGE UP (ref 1.8–7.4)
NEUTROPHILS NFR BLD AUTO: 63.4 % — SIGNIFICANT CHANGE UP (ref 43–77)
NRBC # BLD: 0 /100 WBCS — SIGNIFICANT CHANGE UP (ref 0–0)
OSMOLALITY SERPL: 297 MOSMOL/KG — SIGNIFICANT CHANGE UP (ref 280–301)
PLATELET # BLD AUTO: 331 K/UL — SIGNIFICANT CHANGE UP (ref 150–400)
POTASSIUM SERPL-MCNC: 4.9 MMOL/L — SIGNIFICANT CHANGE UP (ref 3.5–5.3)
POTASSIUM SERPL-SCNC: 4.9 MMOL/L — SIGNIFICANT CHANGE UP (ref 3.5–5.3)
PROT SERPL-MCNC: 6.1 G/DL — SIGNIFICANT CHANGE UP (ref 6–8.3)
RBC # BLD: 3.32 M/UL — LOW (ref 4.2–5.8)
RBC # FLD: 15.9 % — HIGH (ref 10.3–14.5)
SODIUM SERPL-SCNC: 134 MMOL/L — LOW (ref 135–145)
SPECIMEN SOURCE: SIGNIFICANT CHANGE UP
TSH SERPL-MCNC: 1.95 UIU/ML — SIGNIFICANT CHANGE UP (ref 0.27–4.2)
WBC # BLD: 9.72 K/UL — SIGNIFICANT CHANGE UP (ref 3.8–10.5)
WBC # FLD AUTO: 9.72 K/UL — SIGNIFICANT CHANGE UP (ref 3.8–10.5)

## 2020-05-17 RX ORDER — SODIUM CHLORIDE 9 MG/ML
1000 INJECTION, SOLUTION INTRAVENOUS
Refills: 0 | Status: DISCONTINUED | OUTPATIENT
Start: 2020-05-17 | End: 2020-05-18

## 2020-05-17 RX ORDER — INSULIN LISPRO 100/ML
12 VIAL (ML) SUBCUTANEOUS
Refills: 0 | Status: DISCONTINUED | OUTPATIENT
Start: 2020-05-17 | End: 2020-05-20

## 2020-05-17 RX ORDER — INSULIN GLARGINE 100 [IU]/ML
28 INJECTION, SOLUTION SUBCUTANEOUS AT BEDTIME
Refills: 0 | Status: DISCONTINUED | OUTPATIENT
Start: 2020-05-17 | End: 2020-05-18

## 2020-05-17 RX ADMIN — Medication 1: at 21:51

## 2020-05-17 RX ADMIN — Medication 250 MILLIGRAM(S): at 16:48

## 2020-05-17 RX ADMIN — Medication 0.5 MILLIGRAM(S): at 06:08

## 2020-05-17 RX ADMIN — Medication 9 UNIT(S): at 09:10

## 2020-05-17 RX ADMIN — Medication 100 MILLIGRAM(S): at 06:44

## 2020-05-17 RX ADMIN — INSULIN GLARGINE 28 UNIT(S): 100 INJECTION, SOLUTION SUBCUTANEOUS at 21:48

## 2020-05-17 RX ADMIN — ENOXAPARIN SODIUM 40 MILLIGRAM(S): 100 INJECTION SUBCUTANEOUS at 12:03

## 2020-05-17 RX ADMIN — Medication 2: at 17:23

## 2020-05-17 RX ADMIN — POLYETHYLENE GLYCOL 3350 17 GRAM(S): 17 POWDER, FOR SOLUTION ORAL at 12:03

## 2020-05-17 RX ADMIN — SODIUM CHLORIDE 75 MILLILITER(S): 9 INJECTION, SOLUTION INTRAVENOUS at 13:53

## 2020-05-17 RX ADMIN — Medication 81 MILLIGRAM(S): at 12:03

## 2020-05-17 RX ADMIN — Medication 10 MILLIGRAM(S): at 06:09

## 2020-05-17 RX ADMIN — PANTOPRAZOLE SODIUM 40 MILLIGRAM(S): 20 TABLET, DELAYED RELEASE ORAL at 06:09

## 2020-05-17 RX ADMIN — OXYCODONE AND ACETAMINOPHEN 1 TABLET(S): 5; 325 TABLET ORAL at 16:47

## 2020-05-17 RX ADMIN — Medication 0.5 MILLIGRAM(S): at 16:48

## 2020-05-17 RX ADMIN — Medication 12 UNIT(S): at 17:23

## 2020-05-17 RX ADMIN — Medication 12.5 MILLIGRAM(S): at 06:09

## 2020-05-17 RX ADMIN — Medication 1 APPLICATION(S): at 12:02

## 2020-05-17 RX ADMIN — Medication 12.5 MILLIGRAM(S): at 16:47

## 2020-05-17 RX ADMIN — OXYCODONE AND ACETAMINOPHEN 1 TABLET(S): 5; 325 TABLET ORAL at 06:13

## 2020-05-17 RX ADMIN — Medication 2: at 12:49

## 2020-05-17 RX ADMIN — Medication 2: at 09:10

## 2020-05-17 RX ADMIN — SENNA PLUS 2 TABLET(S): 8.6 TABLET ORAL at 21:51

## 2020-05-17 RX ADMIN — Medication 9 UNIT(S): at 12:49

## 2020-05-17 RX ADMIN — Medication 100 MILLIGRAM(S): at 21:53

## 2020-05-17 RX ADMIN — ATORVASTATIN CALCIUM 40 MILLIGRAM(S): 80 TABLET, FILM COATED ORAL at 21:51

## 2020-05-17 RX ADMIN — LORATADINE 10 MILLIGRAM(S): 10 TABLET ORAL at 12:02

## 2020-05-17 RX ADMIN — AMIODARONE HYDROCHLORIDE 200 MILLIGRAM(S): 400 TABLET ORAL at 06:09

## 2020-05-17 NOTE — PROGRESS NOTE ADULT - ASSESSMENT
Assessment  DMT2: 65y Male with DM T2 with hyperglycemia,  A1C 7.9%,  was on insulin at home, now on basal bolus insulin, dose was adjusted, blood sugars still running high, no hypoglycemic episodes, absolutely  non compliant with low carb , had apple, drinking regular soda.  CAD: s/p CABG previous admission, stable, monitored.  Sternal Wound: On IV  ABx, FU Plastics/ID.  HTN: Controlled,  on antihypertensive medications.  CKD: Monitor labs/BMP.          Harper Matias MD  Cell: [phone]  Office: [phone]

## 2020-05-17 NOTE — PHYSICAL THERAPY INITIAL EVALUATION ADULT - TRANSFER TRAINING, PT EVAL
GOAL: pt will perform sit<>stand independently with RW in 2 weeks
GOALS: Pt will perform all transfers with least restrictive AD & independence in 4wks

## 2020-05-17 NOTE — PROGRESS NOTE ADULT - ASSESSMENT
65M with PMH of CABG (2/2020) complicated by PEA arrest, thoracotomy site infection w/empyema, DM, HTN, initially presented 5/11 with syncope, found to also have non-healing sternal chest wound now s/p laparoscopic lysis of adhesions and creation of omental flap, followed by plastics removal of hardware and closure.    Plan:  - Pain control prn  - F/U AM labs  - diet as tolerated  - appreciate care per primary  - surgery will sign off, please call with any issues    Surgery Red [id number] 65M with PMH of CABG (2/2020) complicated by PEA arrest, thoracotomy site infection w/empyema, DM, HTN, initially presented 5/11 with syncope, found to also have non-healing sternal chest wound now s/p laparoscopic lysis of adhesions and creation of omental flap, followed by plastics removal of hardware and closure.    Plan:  - Pain control prn  - F/U AM labs  - diet as tolerated  - appreciate care per primary  - Please follow up outpatient with Dr Austin 2 weeks after discharge  - Contact red team surgery with any questions or concerns     Surgery Red [id number]

## 2020-05-17 NOTE — PROGRESS NOTE ADULT - ASSESSMENT
65 m with    Syncope  - telemetry  - cardiology follow     Orthostatic hypotension  - DC aldactone  - follow    S/P CABG  - CT sx evaluation noted    Sternal wound, s/p reconstruction and omental flap  - MRSA bacteremia  - Vancomycin  - ID follow  - postop care    Bacteremia  - OLGA to r/o endocarditis.  - ID follow    Scalp abrasion  - Bacitracin requested by patient    Diabetes control  - ADA diet  - BS control  - Endocrine follow    CKD  - follow    HTN control    Miguel Angel Duke MD pager [phone]

## 2020-05-17 NOTE — PROGRESS NOTE ADULT - SUBJECTIVE AND OBJECTIVE BOX
SURGERY DAILY PROGRESS NOTE:       SUBJECTIVE/ROS: Patient examined at bedside. Doing well and pain controlled. No issues this AM     OBJECTIVE:    PHYSICAL EXAM:  General: alert and oriented, NAD  Resp: airway patent, respirations unlabored  Chest: KEI x2 with s/s output. Vac in place, holding suction. Soft  Abdomen: soft, appropriately minimally tender, non-distended. Dressings C/D/I.    Vital Signs Last 24 Hrs  T(C): 36.6 (17 May 2020 05:10), Max: 36.8 (16 May 2020 20:12)  T(F): 97.9 (17 May 2020 05:10), Max: 98.2 (16 May 2020 20:12)  HR: 101 (17 May 2020 05:10) (87 - 101)  BP: 123/79 (17 May 2020 05:10) (110/73 - 123/79)  BP(mean): --  RR: 18 (17 May 2020 05:10) (18 - 18)  SpO2: 98% (17 May 2020 05:10) (98% - 98%)    I&O's Detail    15 May 2020 07:01  -  16 May 2020 07:00  --------------------------------------------------------  IN:    insulin regular Infusion: 37 mL    lactated ringers.: 450 mL    sodium chloride 0.9%: 100 mL    Solution: 300 mL  Total IN: 887 mL    OUT:    Bulb: 60 mL    Bulb: 20 mL    Indwelling Catheter - Urethral: 305 mL  Total OUT: 385 mL    Total NET: 502 mL      16 May 2020 07:01  -  17 May 2020 06:31  --------------------------------------------------------  IN:    Oral Fluid: 1020 mL  Total IN: 1020 mL    OUT:    Bulb: 50 mL    Bulb: 47 mL    Indwelling Catheter - Urethral: 900 mL    Voided: 1725 mL  Total OUT: 2722 mL    Total NET: -1702 mL      MEDICATIONS  (STANDING):  aMIOdarone    Tablet 200 milliGRAM(s) Oral daily  aspirin enteric coated 81 milliGRAM(s) Oral daily  atorvastatin 40 milliGRAM(s) Oral at bedtime  BACItracin   Ointment 1 Application(s) Topical daily  buDESOnide    Inhalation Suspension 0.5 milliGRAM(s) Inhalation two times a day  dextrose 5%. 1000 milliLiter(s) (50 mL/Hr) IV Continuous <Continuous>  dextrose 50% Injectable 12.5 Gram(s) IV Push once  dextrose 50% Injectable 25 Gram(s) IV Push once  dextrose 50% Injectable 25 Gram(s) IV Push once  enoxaparin Injectable 40 milliGRAM(s) SubCutaneous daily  insulin glargine Injectable (LANTUS) 26 Unit(s) SubCutaneous at bedtime  insulin lispro (HumaLOG) corrective regimen sliding scale   SubCutaneous three times a day before meals  insulin lispro (HumaLOG) corrective regimen sliding scale   SubCutaneous at bedtime  insulin lispro Injectable (HumaLOG) 9 Unit(s) SubCutaneous three times a day before meals  lactated ringers. 1000 milliLiter(s) (125 mL/Hr) IV Continuous <Continuous>  loratadine 10 milliGRAM(s) Oral daily  metoprolol tartrate 12.5 milliGRAM(s) Oral two times a day  pantoprazole    Tablet 40 milliGRAM(s) Oral before breakfast  polyethylene glycol 3350 17 Gram(s) Oral daily  senna 2 Tablet(s) Oral at bedtime  torsemide 10 milliGRAM(s) Oral daily  vancomycin  IVPB 1000 milliGRAM(s) IV Intermittent every 24 hours    MEDICATIONS  (PRN):  acetaminophen   Tablet .. 650 milliGRAM(s) Oral every 6 hours PRN Temp greater or equal to 38.5C (101.3F), Mild Pain (1 - 3)  ALBUTerol    90 MICROgram(s) HFA Inhaler 2 Puff(s) Inhalation every 6 hours PRN Shortness of Breath and/or Wheezing  benzonatate 100 milliGRAM(s) Oral three times a day PRN Cough  dextrose 40% Gel 15 Gram(s) Oral once PRN Blood Glucose LESS THAN 70 milliGRAM(s)/deciliter  glucagon  Injectable 1 milliGRAM(s) IntraMuscular once PRN Glucose LESS THAN 70 milligrams/deciliter  oxycodone    5 mG/acetaminophen 325 mG 1 Tablet(s) Oral every 6 hours PRN Moderate Pain (4 - 6)      LABS:                        9.9    11.68 )-----------( 332      ( 16 May 2020 06:59 )             31.7     05-16    129<L>  |  96  |  38<H>  ----------------------------<  144<H>  4.6   |  20<L>  |  1.51<H>    Ca    8.6      16 May 2020 06:59  Phos  3.3     05-15  Mg     2.0     05-15      PT/INR - ( 15 May 2020 06:52 )   PT: 12.6 sec;   INR: 1.09 ratio         PTT - ( 15 May 2020 06:52 )  PTT:34.0 sec

## 2020-05-17 NOTE — PHYSICAL THERAPY INITIAL EVALUATION ADULT - PHYSICAL ASSIST/NONPHYSICAL ASSIST: GAIT, REHAB EVAL
nonverbal cues (demo/gestures)/1 person assist/verbal cues
nonverbal cues (demo/gestures)/verbal cues

## 2020-05-17 NOTE — PROGRESS NOTE ADULT - SUBJECTIVE AND OBJECTIVE BOX
[facility] NEPHROLOGY- PROGRESS NOTE    65y Male with history of DM presents with syncope concern for sternal wound infection. Nephrology consulted for elevated Scr.    REVIEW OF SYSTEMS:  Gen: no changes in weight  Cards: no chest pain  Resp: no dyspnea  GI: no nausea or vomiting or diarrhea  Vascular: no LE edema    No Known Allergies      Hospital Medications: Medications reviewed      VITALS:  T(F): 97.5 (05-17-20 @ 12:20), Max: 98.2 (05-16-20 @ 20:12)  HR: 83 (05-17-20 @ 12:20)  BP: 112/64 (05-17-20 @ 12:20)  RR: 16 (05-17-20 @ 12:20)  SpO2: 97% (05-17-20 @ 12:20)  Wt(kg): --    05-16 @ 07:01  -  05-17 @ 07:00  --------------------------------------------------------  IN: 1020 mL / OUT: 3212 mL / NET: -2192 mL    05-17 @ 07:01  -  05-17 @ 15:51  --------------------------------------------------------  IN: 360 mL / OUT: 0 mL / NET: 360 mL        PHYSICAL EXAM:    Gen: NAD, calm  Cards: RRR, +S1/S2, no M/G/R  Resp: CTA B/L  GI: soft, NT/ND, NABS  Vascular: no LE edema B/L        LABS:  05-17    134<L>  |  97  |  40<H>  ----------------------------<  233<H>  4.9   |  24  |  1.77<H>    Ca    8.8      17 May 2020 06:43    TPro  6.1  /  Alb  2.3<L>  /  TBili  0.2  /  DBili      /  AST  17  /  ALT  15  /  AlkPhos  78  05-17    Creatinine Trend: 1.77 <--, 1.51 <--, 1.78 <--, 1.58 <--, 1.68 <--, 1.49 <--, 1.57 <--                        8.8    9.72  )-----------( 331      ( 17 May 2020 06:43 )             28.4     Urine Studies:    Osmolality, Random Urine: 257 mos/kg (05-16 @ 19:34)  Sodium, Random Urine: <35 mmol/L (05-16 @ 19:34)

## 2020-05-17 NOTE — PROGRESS NOTE ADULT - ASSESSMENT
65y Male with history of DM presents with syncope concern for sternal wound infection. Nephrology consulted for elevated Scr.    1) CKD-3: Baseline Scr 1.5 as per prior labs. Renal function near baseline. Patient advised to follow up as an outpatient for CKD work up. Avoid nephrotoxins. Monitor electrolytes.    2) HTN with CKD: BP controlled. Metoprolol as per cardiology. Monitor BP.    3) LE edema: Patient appears euvolemic. Continue with torsemide 10 mg daily and discontinue IVF as patient tolerates PO. Monitor UO.    4) Hyponatremia: Resolving and secondary to poor solute intake as per urine studies. Monitor serum sodium.

## 2020-05-17 NOTE — PROGRESS NOTE ADULT - SUBJECTIVE AND OBJECTIVE BOX
CARDIOLOGY FOLLOW UP - Dr. Steel    CC no cp/sob       PHYSICAL EXAM:  T(C): 36.6 (05-17-20 @ 05:10), Max: 36.8 (05-16-20 @ 20:12)  HR: 101 (05-17-20 @ 05:10) (87 - 101)  BP: 123/79 (05-17-20 @ 05:10) (110/73 - 123/79)  RR: 18 (05-17-20 @ 05:10) (18 - 18)  SpO2: 98% (05-17-20 @ 05:10) (98% - 98%)  Wt(kg): --  I&O's Summary    16 May 2020 07:01  -  17 May 2020 07:00  --------------------------------------------------------  IN: 1020 mL / OUT: 3212 mL / NET: -2192 mL        Appearance: Normal	  Cardiovascular: Normal S1 S2,RRR, No JVD, No murmurs ,+wound vac   Respiratory: diminished   Gastrointestinal:  Soft, Non-tender, + BS	  Extremities: Normal range of motion, No clubbing, cyanosis or edema        MEDICATIONS  (STANDING):  aMIOdarone    Tablet 200 milliGRAM(s) Oral daily  aspirin enteric coated 81 milliGRAM(s) Oral daily  atorvastatin 40 milliGRAM(s) Oral at bedtime  BACItracin   Ointment 1 Application(s) Topical daily  buDESOnide    Inhalation Suspension 0.5 milliGRAM(s) Inhalation two times a day  dextrose 5%. 1000 milliLiter(s) (50 mL/Hr) IV Continuous <Continuous>  dextrose 50% Injectable 12.5 Gram(s) IV Push once  dextrose 50% Injectable 25 Gram(s) IV Push once  dextrose 50% Injectable 25 Gram(s) IV Push once  enoxaparin Injectable 40 milliGRAM(s) SubCutaneous daily  insulin glargine Injectable (LANTUS) 26 Unit(s) SubCutaneous at bedtime  insulin lispro (HumaLOG) corrective regimen sliding scale   SubCutaneous three times a day before meals  insulin lispro (HumaLOG) corrective regimen sliding scale   SubCutaneous at bedtime  insulin lispro Injectable (HumaLOG) 9 Unit(s) SubCutaneous three times a day before meals  lactated ringers. 1000 milliLiter(s) (75 mL/Hr) IV Continuous <Continuous>  loratadine 10 milliGRAM(s) Oral daily  metoprolol tartrate 12.5 milliGRAM(s) Oral two times a day  pantoprazole    Tablet 40 milliGRAM(s) Oral before breakfast  polyethylene glycol 3350 17 Gram(s) Oral daily  senna 2 Tablet(s) Oral at bedtime  torsemide 10 milliGRAM(s) Oral daily  vancomycin  IVPB 1000 milliGRAM(s) IV Intermittent every 24 hours      TELEMETRY: nsr  	    ECG:  	  RADIOLOGY:   DIAGNOSTIC TESTING:  [ ] Echocardiogram:  [ ]  Catheterization:  [ ] Stress Test:    OTHER: 	    LABS:	 	                                8.8    9.72  )-----------( 331      ( 17 May 2020 06:43 )             28.4     05-17    134<L>  |  97  |  40<H>  ----------------------------<  233<H>  4.9   |  24  |  1.77<H>    Ca    8.8      17 May 2020 06:43    TPro  6.1  /  Alb  2.3<L>  /  TBili  0.2  /  DBili  x   /  AST  17  /  ALT  15  /  AlkPhos  78  05-17

## 2020-05-17 NOTE — PHYSICAL THERAPY INITIAL EVALUATION ADULT - PHYSICAL ASSIST/NONPHYSICAL ASSIST: SIT/STAND, REHAB EVAL
verbal cues/1 person assist/nonverbal cues (demo/gestures)
nonverbal cues (demo/gestures)/verbal cues

## 2020-05-17 NOTE — PHYSICAL THERAPY INITIAL EVALUATION ADULT - BED MOBILITY TRAINING, PT EVAL
GOAL: pt will perform all aspects of bed mobility independently in 2 weeks
GOALS: Pt will perform bed mobility with independence in 4wks

## 2020-05-17 NOTE — PHYSICAL THERAPY INITIAL EVALUATION ADULT - DIAGNOSIS, PT EVAL
impaired balance, strength, endurance for functional mobility
impaired balance, strength, endurance for functional mobility

## 2020-05-17 NOTE — PHYSICAL THERAPY INITIAL EVALUATION ADULT - PERTINENT HX OF CURRENT PROBLEM, REHAB EVAL
Pt is 65M admitted 5/11/20 PMHx CABG Feb 2020 c/b PEA arrest, thoracotomy site infection w/empyema, DM, HTN; p/w syncope. Pt brought in by EMS reports a syncopal episode in the shower. Pt states he felt dizzy and woke up in the shower, hit the back of his head. He also reports his wound care nurse was concerned for increase in discharge from his sternal wound. CP is located at the sternal wound, nonradiating. Pt admits to decreased appetite, SOB and chest pain.
Pt is 65M admitted 5/11/20 PMHx CABG Feb 2020 c/b PEA arrest, thoracotomy site infection w/empyema, DM, HTN; p/w syncope. Pt brought in by EMS reports a syncopal episode in the shower. Pt states he felt dizzy and woke up in the shower, hit the back of his head. He also reports his wound care nurse was concerned for increase in discharge from his sternal wound. CP is located at the sternal wound, nonradiating. Pt now s/p laparoscopic lysis of adhesions and creation of omental flap

## 2020-05-17 NOTE — PHYSICAL THERAPY INITIAL EVALUATION ADULT - STRENGTHENING, PT EVAL
“Patient's name, , procedure and correct site were confirmed during the [place] Timeout.” GOAL: Pt will improve strength to at least a 4+/5 in order to improve safety with functional mobility in 2 weeks

## 2020-05-17 NOTE — PROGRESS NOTE ADULT - ASSESSMENT
65M with PMH of CABG (2/2020) complicated by PEA arrest, thoracotomy site infection w/empyema, DM, HTN, initially presented 5/11 with syncope, found to also have non-healing sternal chest wound now s/p laparoscopic lysis of adhesions and creation of omental flap, followed by plastics removal of hardware and closure.    Plan:  - Pain control prn  - F/U AM labs  - can resume diet as tolerated  - appreciate care per primary  - Follow up with Dr. Austin outpatient 2 weeks from discharge     Surgery Red [id number] 65y m PMHx CABG Feb 2020 complicated by PEA arrest, sternal infection s/p sternal debridement with muscle flap reconstruction, DM, HTN p/w increased drainage from sternal wound s/p omental flap and incision closure w/ FTSG 5/15    Plan:  - c/w wound vac for 5 days post op 5/20, PRS will remove, if dc prior to will dc w/ wound vac  - c/w drains  - diet per general surgery     Plastic Surgery  [phone]

## 2020-05-17 NOTE — PHYSICAL THERAPY INITIAL EVALUATION ADULT - DISCHARGE DISPOSITION, PT EVAL
outpatient PT
home w/ assist/outpatient PT/home with outpatient PT; pt owns RW & commode; assist from family as needed/home

## 2020-05-17 NOTE — PHYSICAL THERAPY INITIAL EVALUATION ADULT - GAIT TRAINING, PT EVAL
GOAL: pt will amb 200ft independently with RW in 2 weeks
GOALS: Pt will ambulate 150ft with least restrictive AD & independence in 4wks

## 2020-05-17 NOTE — PROGRESS NOTE ADULT - SUBJECTIVE AND OBJECTIVE BOX
Plastic Surgery Progress Note (pg [facility]: [id number], [facility]: [phone])    SUBJECTIVE:  Patient seen and examined at bedside this AM. No acute events overnight. AF/VSS. Pain well controlled.     OBJECTIVE:     ** VITAL SIGNS / I&O's **    Vital Signs Last 24 Hrs  T(C): 36.6 (17 May 2020 05:10), Max: 36.8 (16 May 2020 20:12)  T(F): 97.9 (17 May 2020 05:10), Max: 98.2 (16 May 2020 20:12)  HR: 101 (17 May 2020 05:10) (87 - 101)  BP: 123/79 (17 May 2020 05:10) (110/73 - 123/79)  BP(mean): --  RR: 18 (17 May 2020 05:10) (18 - 18)  SpO2: 98% (17 May 2020 05:10) (98% - 98%)      16 May 2020 07:01  -  17 May 2020 07:00  --------------------------------------------------------  IN:    Oral Fluid: 1020 mL  Total IN: 1020 mL    OUT:    Bulb: 50 mL    Bulb: 47 mL    Indwelling Catheter - Urethral: 900 mL    Voided: 2125 mL  Total OUT: 3122 mL    Total NET: -2102 mL          ** PHYSICAL EXAM **    -- CONSTITUTIONAL: NAD.   -- HEENT:  -- NECK:   -- CHEST:  -- ABDOMEN:   -- EXTREMITIES:       **MEDS**  acetaminophen   Tablet .. 650 milliGRAM(s) Oral every 6 hours PRN  ALBUTerol    90 MICROgram(s) HFA Inhaler 2 Puff(s) Inhalation every 6 hours PRN  aMIOdarone    Tablet 200 milliGRAM(s) Oral daily  aspirin enteric coated 81 milliGRAM(s) Oral daily  atorvastatin 40 milliGRAM(s) Oral at bedtime  BACItracin   Ointment 1 Application(s) Topical daily  benzonatate 100 milliGRAM(s) Oral three times a day PRN  buDESOnide    Inhalation Suspension 0.5 milliGRAM(s) Inhalation two times a day  dextrose 40% Gel 15 Gram(s) Oral once PRN  dextrose 5%. 1000 milliLiter(s) IV Continuous <Continuous>  dextrose 50% Injectable 12.5 Gram(s) IV Push once  dextrose 50% Injectable 25 Gram(s) IV Push once  dextrose 50% Injectable 25 Gram(s) IV Push once  enoxaparin Injectable 40 milliGRAM(s) SubCutaneous daily  glucagon  Injectable 1 milliGRAM(s) IntraMuscular once PRN  insulin glargine Injectable (LANTUS) 26 Unit(s) SubCutaneous at bedtime  insulin lispro (HumaLOG) corrective regimen sliding scale   SubCutaneous three times a day before meals  insulin lispro (HumaLOG) corrective regimen sliding scale   SubCutaneous at bedtime  insulin lispro Injectable (HumaLOG) 9 Unit(s) SubCutaneous three times a day before meals  lactated ringers. 1000 milliLiter(s) IV Continuous <Continuous>  loratadine 10 milliGRAM(s) Oral daily  metoprolol tartrate 12.5 milliGRAM(s) Oral two times a day  oxycodone    5 mG/acetaminophen 325 mG 1 Tablet(s) Oral every 6 hours PRN  pantoprazole    Tablet 40 milliGRAM(s) Oral before breakfast  polyethylene glycol 3350 17 Gram(s) Oral daily  senna 2 Tablet(s) Oral at bedtime  torsemide 10 milliGRAM(s) Oral daily  vancomycin  IVPB 1000 milliGRAM(s) IV Intermittent every 24 hours      ** LABS **                          8.8    9.72  )-----------( 331      ( 17 May 2020 06:43 )             28.4     16 May 2020 06:59    129    |  96     |  38     ----------------------------<  144    4.6     |  20     |  1.51     Ca    8.6        16 May 2020 06:59        CAPILLARY BLOOD GLUCOSE      POCT Blood Glucose.: 122 mg/dL (16 May 2020 21:27)  POCT Blood Glucose.: 222 mg/dL (16 May 2020 17:14)  POCT Blood Glucose.: 205 mg/dL (16 May 2020 12:37)  POCT Blood Glucose.: 160 mg/dL (16 May 2020 08:40) Plastic Surgery Progress Note (pg [facility]: [id number], [facility]: [phone])    SUBJECTIVE:  Patient seen and examined at bedside this AM. No acute events overnight. AF/VSS. Pain well controlled.     OBJECTIVE:     ** VITAL SIGNS / I&O's **    Vital Signs Last 24 Hrs  T(C): 36.6 (17 May 2020 05:10), Max: 36.8 (16 May 2020 20:12)  T(F): 97.9 (17 May 2020 05:10), Max: 98.2 (16 May 2020 20:12)  HR: 101 (17 May 2020 05:10) (87 - 101)  BP: 123/79 (17 May 2020 05:10) (110/73 - 123/79)  BP(mean): --  RR: 18 (17 May 2020 05:10) (18 - 18)  SpO2: 98% (17 May 2020 05:10) (98% - 98%)      16 May 2020 07:01  -  17 May 2020 07:00  --------------------------------------------------------  IN:    Oral Fluid: 1020 mL  Total IN: 1020 mL    OUT:    Bulb: 50 mL    Bulb: 47 mL    Indwelling Catheter - Urethral: 900 mL    Voided: 2125 mL  Total OUT: 3122 mL    Total NET: -2102 mL          ** PHYSICAL EXAM **    -- CONSTITUTIONAL: NAD.   -- HEENT:  -- NECK:   -- CHEST:  -- ABDOMEN:   -- EXTREMITIES:       **MEDS**  acetaminophen   Tablet .. 650 milliGRAM(s) Oral every 6 hours PRN  ALBUTerol    90 MICROgram(s) HFA Inhaler 2 Puff(s) Inhalation every 6 hours PRN  aMIOdarone    Tablet 200 milliGRAM(s) Oral daily  aspirin enteric coated 81 milliGRAM(s) Oral daily  atorvastatin 40 milliGRAM(s) Oral at bedtime  BACItracin   Ointment 1 Application(s) Topical daily  benzonatate 100 milliGRAM(s) Oral three times a day PRN  buDESOnide    Inhalation Suspension 0.5 milliGRAM(s) Inhalation two times a day  dextrose 40% Gel 15 Gram(s) Oral once PRN  dextrose 5%. 1000 milliLiter(s) IV Continuous <Continuous>  dextrose 50% Injectable 12.5 Gram(s) IV Push once  dextrose 50% Injectable 25 Gram(s) IV Push once  dextrose 50% Injectable 25 Gram(s) IV Push once  enoxaparin Injectable 40 milliGRAM(s) SubCutaneous daily  glucagon  Injectable 1 milliGRAM(s) IntraMuscular once PRN  insulin glargine Injectable (LANTUS) 26 Unit(s) SubCutaneous at bedtime  insulin lispro (HumaLOG) corrective regimen sliding scale   SubCutaneous three times a day before meals  insulin lispro (HumaLOG) corrective regimen sliding scale   SubCutaneous at bedtime  insulin lispro Injectable (HumaLOG) 9 Unit(s) SubCutaneous three times a day before meals  lactated ringers. 1000 milliLiter(s) IV Continuous <Continuous>  loratadine 10 milliGRAM(s) Oral daily  metoprolol tartrate 12.5 milliGRAM(s) Oral two times a day  oxycodone    5 mG/acetaminophen 325 mG 1 Tablet(s) Oral every 6 hours PRN  pantoprazole    Tablet 40 milliGRAM(s) Oral before breakfast  polyethylene glycol 3350 17 Gram(s) Oral daily  senna 2 Tablet(s) Oral at bedtime  torsemide 10 milliGRAM(s) Oral daily  vancomycin  IVPB 1000 milliGRAM(s) IV Intermittent every 24 hours      ** LABS **                          8.8    9.72  )-----------( 331      ( 17 May 2020 06:43 )             28.4     16 May 2020 06:59    129    |  96     |  38     ----------------------------<  144    4.6     |  20     |  1.51     Ca    8.6        16 May 2020 06:59        CAPILLARY BLOOD GLUCOSE      POCT Blood Glucose.: 122 mg/dL (16 May 2020 21:27)  POCT Blood Glucose.: 222 mg/dL (16 May 2020 17:14)  POCT Blood Glucose.: 205 mg/dL (16 May 2020 12:37)  POCT Blood Glucose.: 160 mg/dL (16 May 2020 08:40)      PHYSICAL EXAM:  General: alert and oriented, NAD  Resp: airway patent, respirations unlabored  Chest: KEI x2 with s/s output. Vac in place, holding suction. Soft  Abdomen: soft, appropriately minimally tender, non-distended. Dressings C/D/I. Plastic Surgery Progress Note (pg [facility]: [id number], [facility]: [phone])    SUBJECTIVE:  Patient seen and examined at bedside this AM. No acute events overnight. AF/VSS. Has not been out of bed yet. Had some shortness of breath this AM, pulse ox checked and sating 94% on RA.     OBJECTIVE:     ** VITAL SIGNS / I&O's **    Vital Signs Last 24 Hrs  T(C): 36.6 (17 May 2020 05:10), Max: 36.8 (16 May 2020 20:12)  T(F): 97.9 (17 May 2020 05:10), Max: 98.2 (16 May 2020 20:12)  HR: 101 (17 May 2020 05:10) (87 - 101)  BP: 123/79 (17 May 2020 05:10) (110/73 - 123/79)  BP(mean): --  RR: 18 (17 May 2020 05:10) (18 - 18)  SpO2: 98% (17 May 2020 05:10) (98% - 98%)      16 May 2020 07:01  -  17 May 2020 07:00  --------------------------------------------------------  IN:    Oral Fluid: 1020 mL  Total IN: 1020 mL    OUT:    Bulb: 50 mL    Bulb: 47 mL    Indwelling Catheter - Urethral: 900 mL    Voided: 2125 mL  Total OUT: 3122 mL    Total NET: -2102 mL          ** PHYSICAL EXAM **    -- CONSTITUTIONAL: NAD.   -- CHEST: Incision vac in place and holding seal   -- ABDOMEN: Minimal distension, minimally tender, KEI x2 SS, Donor site dressing c/d/i.       **MEDS**  acetaminophen   Tablet .. 650 milliGRAM(s) Oral every 6 hours PRN  ALBUTerol    90 MICROgram(s) HFA Inhaler 2 Puff(s) Inhalation every 6 hours PRN  aMIOdarone    Tablet 200 milliGRAM(s) Oral daily  aspirin enteric coated 81 milliGRAM(s) Oral daily  atorvastatin 40 milliGRAM(s) Oral at bedtime  BACItracin   Ointment 1 Application(s) Topical daily  benzonatate 100 milliGRAM(s) Oral three times a day PRN  buDESOnide    Inhalation Suspension 0.5 milliGRAM(s) Inhalation two times a day  dextrose 40% Gel 15 Gram(s) Oral once PRN  dextrose 5%. 1000 milliLiter(s) IV Continuous <Continuous>  dextrose 50% Injectable 12.5 Gram(s) IV Push once  dextrose 50% Injectable 25 Gram(s) IV Push once  dextrose 50% Injectable 25 Gram(s) IV Push once  enoxaparin Injectable 40 milliGRAM(s) SubCutaneous daily  glucagon  Injectable 1 milliGRAM(s) IntraMuscular once PRN  insulin glargine Injectable (LANTUS) 26 Unit(s) SubCutaneous at bedtime  insulin lispro (HumaLOG) corrective regimen sliding scale   SubCutaneous three times a day before meals  insulin lispro (HumaLOG) corrective regimen sliding scale   SubCutaneous at bedtime  insulin lispro Injectable (HumaLOG) 9 Unit(s) SubCutaneous three times a day before meals  lactated ringers. 1000 milliLiter(s) IV Continuous <Continuous>  loratadine 10 milliGRAM(s) Oral daily  metoprolol tartrate 12.5 milliGRAM(s) Oral two times a day  oxycodone    5 mG/acetaminophen 325 mG 1 Tablet(s) Oral every 6 hours PRN  pantoprazole    Tablet 40 milliGRAM(s) Oral before breakfast  polyethylene glycol 3350 17 Gram(s) Oral daily  senna 2 Tablet(s) Oral at bedtime  torsemide 10 milliGRAM(s) Oral daily  vancomycin  IVPB 1000 milliGRAM(s) IV Intermittent every 24 hours      ** LABS **                          8.8    9.72  )-----------( 331      ( 17 May 2020 06:43 )             28.4     16 May 2020 06:59    129    |  96     |  38     ----------------------------<  144    4.6     |  20     |  1.51     Ca    8.6        16 May 2020 06:59        CAPILLARY BLOOD GLUCOSE      POCT Blood Glucose.: 122 mg/dL (16 May 2020 21:27)  POCT Blood Glucose.: 222 mg/dL (16 May 2020 17:14)  POCT Blood Glucose.: 205 mg/dL (16 May 2020 12:37)  POCT Blood Glucose.: 160 mg/dL (16 May 2020 08:40)      PHYSICAL EXAM:  General: alert and oriented, NAD  Resp: airway patent, respirations unlabored  Chest: KEI x2 with s/s output. Vac in place, holding suction. Soft  Abdomen: soft, appropriately minimally tender, non-distended. Dressings C/D/I.

## 2020-05-17 NOTE — PROGRESS NOTE ADULT - SUBJECTIVE AND OBJECTIVE BOX
Chief complaint  Patient is a 65y old  Male who presents with a chief complaint of syncope (17 May 2020 16:03)   Review of systems  Patient in bed, looks comfortable, no fever, no hypoglycemia.    Labs and Fingersticks  CAPILLARY BLOOD GLUCOSE      POCT Blood Glucose.: 238 mg/dL (17 May 2020 12:44)  POCT Blood Glucose.: 220 mg/dL (17 May 2020 09:00)  POCT Blood Glucose.: 122 mg/dL (16 May 2020 21:27)  POCT Blood Glucose.: 222 mg/dL (16 May 2020 17:14)      Anion Gap, Serum: 13 (05-17 @ 06:43)  Anion Gap, Serum: 13 (05-16 @ 06:59)      Calcium, Total Serum: 8.8 (05-17 @ 06:43)  Calcium, Total Serum: 8.6 (05-16 @ 06:59)  Albumin, Serum: 2.3 <L> (05-17 @ 06:43)    Alanine Aminotransferase (ALT/SGPT): 15 (05-17 @ 06:43)  Alkaline Phosphatase, Serum: 78 (05-17 @ 06:43)  Aspartate Aminotransferase (AST/SGOT): 17 (05-17 @ 06:43)        05-17    134<L>  |  97  |  40<H>  ----------------------------<  233<H>  4.9   |  24  |  1.77<H>    Ca    8.8      17 May 2020 06:43    TPro  6.1  /  Alb  2.3<L>  /  TBili  0.2  /  DBili  x   /  AST  17  /  ALT  15  /  AlkPhos  78  05-17                        8.8    9.72  )-----------( 331      ( 17 May 2020 06:43 )             28.4     Medications  MEDICATIONS  (STANDING):  aMIOdarone    Tablet 200 milliGRAM(s) Oral daily  aspirin enteric coated 81 milliGRAM(s) Oral daily  atorvastatin 40 milliGRAM(s) Oral at bedtime  BACItracin   Ointment 1 Application(s) Topical daily  buDESOnide    Inhalation Suspension 0.5 milliGRAM(s) Inhalation two times a day  dextrose 5%. 1000 milliLiter(s) (50 mL/Hr) IV Continuous <Continuous>  dextrose 50% Injectable 12.5 Gram(s) IV Push once  dextrose 50% Injectable 25 Gram(s) IV Push once  dextrose 50% Injectable 25 Gram(s) IV Push once  enoxaparin Injectable 40 milliGRAM(s) SubCutaneous daily  insulin glargine Injectable (LANTUS) 28 Unit(s) SubCutaneous at bedtime  insulin lispro (HumaLOG) corrective regimen sliding scale   SubCutaneous three times a day before meals  insulin lispro (HumaLOG) corrective regimen sliding scale   SubCutaneous at bedtime  insulin lispro Injectable (HumaLOG) 12 Unit(s) SubCutaneous three times a day with meals  lactated ringers. 1000 milliLiter(s) (75 mL/Hr) IV Continuous <Continuous>  loratadine 10 milliGRAM(s) Oral daily  metoprolol tartrate 12.5 milliGRAM(s) Oral two times a day  pantoprazole    Tablet 40 milliGRAM(s) Oral before breakfast  polyethylene glycol 3350 17 Gram(s) Oral daily  senna 2 Tablet(s) Oral at bedtime  torsemide 10 milliGRAM(s) Oral daily  vancomycin  IVPB 1000 milliGRAM(s) IV Intermittent every 24 hours      Physical Exam  General: Patient comfortable in bed  Vital Signs Last 12 Hrs  T(F): 97.5 (05-17-20 @ 12:20), Max: 97.9 (05-17-20 @ 05:10)  HR: 83 (05-17-20 @ 12:20) (83 - 101)  BP: 112/64 (05-17-20 @ 12:20) (112/64 - 123/79)  BP(mean): --  RR: 16 (05-17-20 @ 12:20) (16 - 18)  SpO2: 97% (05-17-20 @ 12:20) (97% - 98%)  Neck: No palpable thyroid nodules.  CVS: S1S2, No murmurs  Respiratory: No wheezing, no crepitations  GI: Abdomen soft, bowel sounds positive  Musculoskeletal:  edema lower extremities.   Skin: No skin rashes, no ecchymosis    Diagnostics

## 2020-05-17 NOTE — PROGRESS NOTE ADULT - PROBLEM SELECTOR PLAN 1
Will increase Lantus to 28 units at bed time.  Will increase Humalog to 12 units before each meal in addition to Humalog correction scale coverage.  Patient counseled for compliance with consistent low carb diet.

## 2020-05-17 NOTE — PHYSICAL THERAPY INITIAL EVALUATION ADULT - GAIT DEVIATIONS NOTED, PT EVAL
decreased weight-shifting ability/decreased ti/decreased step length
decreased weight-shifting ability/limited due to symptomatic orthostatic BP/decreased velocity of limb motion/decreased ti/decreased step length

## 2020-05-17 NOTE — PHYSICAL THERAPY INITIAL EVALUATION ADULT - MANUAL MUSCLE TESTING RESULTS, REHAB EVAL
functionally at least 3+/5 t/o/grossly assessed due to
functionally at least 3+/5 t/o/grossly assessed due to

## 2020-05-17 NOTE — PHYSICAL THERAPY INITIAL EVALUATION ADULT - GENERAL OBSERVATIONS, REHAB EVAL
Pt received semisupine in bed, A&Ox4, following all commands, pleasant & willing to participate, admitted s/p syncopal episode,
Pt received semisupine in bed, A&Ox4, following all commands, pleasant & willing to participate, admitted s/p syncopal episode, +sternal wound, BS reviewed, orthostatic assessed, (+) - see vitals flowsheet.

## 2020-05-18 DIAGNOSIS — R49.0 DYSPHONIA: ICD-10-CM

## 2020-05-18 LAB
ALBUMIN SERPL ELPH-MCNC: 2.4 G/DL — LOW (ref 3.3–5)
ALP SERPL-CCNC: 73 U/L — SIGNIFICANT CHANGE UP (ref 40–120)
ALT FLD-CCNC: 11 U/L — SIGNIFICANT CHANGE UP (ref 10–45)
ANION GAP SERPL CALC-SCNC: 10 MMOL/L — SIGNIFICANT CHANGE UP (ref 5–17)
AST SERPL-CCNC: 11 U/L — SIGNIFICANT CHANGE UP (ref 10–40)
BASOPHILS # BLD AUTO: 0.1 K/UL — SIGNIFICANT CHANGE UP (ref 0–0.2)
BASOPHILS NFR BLD AUTO: 1.1 % — SIGNIFICANT CHANGE UP (ref 0–2)
BILIRUB SERPL-MCNC: 0.2 MG/DL — SIGNIFICANT CHANGE UP (ref 0.2–1.2)
BUN SERPL-MCNC: 29 MG/DL — HIGH (ref 7–23)
CALCIUM SERPL-MCNC: 9 MG/DL — SIGNIFICANT CHANGE UP (ref 8.4–10.5)
CHLORIDE SERPL-SCNC: 99 MMOL/L — SIGNIFICANT CHANGE UP (ref 96–108)
CO2 SERPL-SCNC: 25 MMOL/L — SIGNIFICANT CHANGE UP (ref 22–31)
CREAT SERPL-MCNC: 1.38 MG/DL — HIGH (ref 0.5–1.3)
EOSINOPHIL # BLD AUTO: 1.01 K/UL — HIGH (ref 0–0.5)
EOSINOPHIL NFR BLD AUTO: 11.1 % — HIGH (ref 0–6)
GLUCOSE BLDC GLUCOMTR-MCNC: 129 MG/DL — HIGH (ref 70–99)
GLUCOSE BLDC GLUCOMTR-MCNC: 176 MG/DL — HIGH (ref 70–99)
GLUCOSE BLDC GLUCOMTR-MCNC: 191 MG/DL — HIGH (ref 70–99)
GLUCOSE BLDC GLUCOMTR-MCNC: 196 MG/DL — HIGH (ref 70–99)
GLUCOSE SERPL-MCNC: 168 MG/DL — HIGH (ref 70–99)
HCT VFR BLD CALC: 28.5 % — LOW (ref 39–50)
HGB BLD-MCNC: 8.8 G/DL — LOW (ref 13–17)
IMM GRANULOCYTES NFR BLD AUTO: 1.1 % — SIGNIFICANT CHANGE UP (ref 0–1.5)
LYMPHOCYTES # BLD AUTO: 1.77 K/UL — SIGNIFICANT CHANGE UP (ref 1–3.3)
LYMPHOCYTES # BLD AUTO: 19.5 % — SIGNIFICANT CHANGE UP (ref 13–44)
MCHC RBC-ENTMCNC: 26.4 PG — LOW (ref 27–34)
MCHC RBC-ENTMCNC: 30.9 GM/DL — LOW (ref 32–36)
MCV RBC AUTO: 85.6 FL — SIGNIFICANT CHANGE UP (ref 80–100)
MONOCYTES # BLD AUTO: 0.93 K/UL — HIGH (ref 0–0.9)
MONOCYTES NFR BLD AUTO: 10.2 % — SIGNIFICANT CHANGE UP (ref 2–14)
NEUTROPHILS # BLD AUTO: 5.19 K/UL — SIGNIFICANT CHANGE UP (ref 1.8–7.4)
NEUTROPHILS NFR BLD AUTO: 57 % — SIGNIFICANT CHANGE UP (ref 43–77)
NRBC # BLD: 0 /100 WBCS — SIGNIFICANT CHANGE UP (ref 0–0)
PLATELET # BLD AUTO: 308 K/UL — SIGNIFICANT CHANGE UP (ref 150–400)
POTASSIUM SERPL-MCNC: 4.2 MMOL/L — SIGNIFICANT CHANGE UP (ref 3.5–5.3)
POTASSIUM SERPL-SCNC: 4.2 MMOL/L — SIGNIFICANT CHANGE UP (ref 3.5–5.3)
PROT SERPL-MCNC: 6 G/DL — SIGNIFICANT CHANGE UP (ref 6–8.3)
RBC # BLD: 3.33 M/UL — LOW (ref 4.2–5.8)
RBC # FLD: 15.8 % — HIGH (ref 10.3–14.5)
SARS-COV-2 RNA SPEC QL NAA+PROBE: SIGNIFICANT CHANGE UP
SODIUM SERPL-SCNC: 134 MMOL/L — LOW (ref 135–145)
VANCOMYCIN TROUGH SERPL-MCNC: 10.7 UG/ML — SIGNIFICANT CHANGE UP (ref 10–20)
WBC # BLD: 9.1 K/UL — SIGNIFICANT CHANGE UP (ref 3.8–10.5)
WBC # FLD AUTO: 9.1 K/UL — SIGNIFICANT CHANGE UP (ref 3.8–10.5)

## 2020-05-18 PROCEDURE — 71045 X-RAY EXAM CHEST 1 VIEW: CPT | Mod: 26

## 2020-05-18 PROCEDURE — 99232 SBSQ HOSP IP/OBS MODERATE 35: CPT

## 2020-05-18 PROCEDURE — 99222 1ST HOSP IP/OBS MODERATE 55: CPT

## 2020-05-18 RX ORDER — INSULIN GLARGINE 100 [IU]/ML
30 INJECTION, SOLUTION SUBCUTANEOUS AT BEDTIME
Refills: 0 | Status: DISCONTINUED | OUTPATIENT
Start: 2020-05-18 | End: 2020-05-20

## 2020-05-18 RX ADMIN — Medication 12.5 MILLIGRAM(S): at 06:09

## 2020-05-18 RX ADMIN — Medication 12 UNIT(S): at 17:48

## 2020-05-18 RX ADMIN — Medication 1: at 09:32

## 2020-05-18 RX ADMIN — PANTOPRAZOLE SODIUM 40 MILLIGRAM(S): 20 TABLET, DELAYED RELEASE ORAL at 06:09

## 2020-05-18 RX ADMIN — Medication 1: at 17:48

## 2020-05-18 RX ADMIN — ENOXAPARIN SODIUM 40 MILLIGRAM(S): 100 INJECTION SUBCUTANEOUS at 12:31

## 2020-05-18 RX ADMIN — Medication 10 MILLIGRAM(S): at 06:09

## 2020-05-18 RX ADMIN — Medication 250 MILLIGRAM(S): at 18:11

## 2020-05-18 RX ADMIN — POLYETHYLENE GLYCOL 3350 17 GRAM(S): 17 POWDER, FOR SOLUTION ORAL at 12:34

## 2020-05-18 RX ADMIN — AMIODARONE HYDROCHLORIDE 200 MILLIGRAM(S): 400 TABLET ORAL at 06:09

## 2020-05-18 RX ADMIN — ATORVASTATIN CALCIUM 40 MILLIGRAM(S): 80 TABLET, FILM COATED ORAL at 22:56

## 2020-05-18 RX ADMIN — Medication 100 MILLIGRAM(S): at 19:28

## 2020-05-18 RX ADMIN — Medication 0.5 MILLIGRAM(S): at 06:09

## 2020-05-18 RX ADMIN — Medication 100 MILLIGRAM(S): at 06:09

## 2020-05-18 RX ADMIN — SODIUM CHLORIDE 75 MILLILITER(S): 9 INJECTION, SOLUTION INTRAVENOUS at 07:34

## 2020-05-18 RX ADMIN — Medication 12 UNIT(S): at 12:31

## 2020-05-18 RX ADMIN — Medication 1 APPLICATION(S): at 12:31

## 2020-05-18 RX ADMIN — LORATADINE 10 MILLIGRAM(S): 10 TABLET ORAL at 12:32

## 2020-05-18 RX ADMIN — Medication 12.5 MILLIGRAM(S): at 17:48

## 2020-05-18 RX ADMIN — Medication 81 MILLIGRAM(S): at 12:32

## 2020-05-18 RX ADMIN — Medication 1: at 12:30

## 2020-05-18 RX ADMIN — Medication 0.5 MILLIGRAM(S): at 17:48

## 2020-05-18 RX ADMIN — SENNA PLUS 2 TABLET(S): 8.6 TABLET ORAL at 22:56

## 2020-05-18 RX ADMIN — INSULIN GLARGINE 30 UNIT(S): 100 INJECTION, SOLUTION SUBCUTANEOUS at 22:56

## 2020-05-18 NOTE — DIETITIAN INITIAL EVALUATION ADULT. - ADD RECOMMEND
1. Continue current diet as tolerated. 2) Recommend addition of Glucerna TID to supplement PO intake. 3) Provided diet education, reinforce as needed. 4) RD to remain available and follow-up as medically appropriate.

## 2020-05-18 NOTE — PROGRESS NOTE ADULT - ASSESSMENT
limited echo 5/12/20:EF 55-60%. overall preserved lv fx, despite segmental wall motion abnormalities, no LV thrombus  The apical anterior wall, the apical lateral wall, the basal anterior wall,the mid anterior wall, and the midanterolateral wall are hypokinetic.  echo 5/14/20:  Limited study to r/o Endocarditis Pt has very limited windows due to edema and wound vac. Unable to exclude valvular vegetations in the setting of a  technically difficult study. OLGA could be obtained for further evaluation of valvular vegetations, if clinical suspicion of endocarditis is high.        a/p  65y m PMHx CABG Feb 2020 complicated by PEA arrest, thoracotomy site infection w/empyema, DM, HTN p/w syncope, sternal wound infection.     1. Syncope   likely 2/2 to +orthostatics  cv stable, EKG unchanged from prior   HST elevated w/ normal CK/CKMB, demand ischemia in setting of STEPHANIE/CKD, possible wound infection   CT head negative for ICH  echo with overall preserved LVEF, despite segmental wall motion abnormalities, no LV thrombus   encourage PO intake    2. Sternal wound infection s/p RTOR for SWI    s/p debridement of sternal wound, reconstruction, Laparoscopic lysis of abdominal adhesions   IV abx per ID   blood cx neg    repeat echo unable to r/o endocarditis  olga cancelled per department, clinically stable and management will not change, already on IV abx   ID, f/u    3. CAD s/p CABG x 4 , s/p PEA arrest   cv stable   cont asa, statin, bb     4. Chronic diastolic CHF  euvolemic on exam with unchanged SOB   continue diuretics as ordered   repeat echo with preserved LVEF     5. PAF  remains NSR  continue amio, bb    6. HTN  bp stable    7. STEPHANIE/CKD   continue to trend creat     8. Pleural effusion, hx  CXR this admission with small left loculated pleural effusion now decreased in size from previous exam.  persistent cough, ENT eval    dvt ppx

## 2020-05-18 NOTE — PROGRESS NOTE ADULT - SUBJECTIVE AND OBJECTIVE BOX
[facility] NEPHROLOGY- PROGRESS NOTE    65y Male with history of DM presents with syncope concern for sternal wound infection. Nephrology consulted for elevated Scr.    REVIEW OF SYSTEMS:  Gen: no changes in weight  Cards: no chest pain  Resp: + dyspnea  GI: no nausea or vomiting or diarrhea  Vascular: no LE edema    No Known Allergies      Hospital Medications: Medications reviewed      VITALS:  T(F): 97.7 (05-18-20 @ 15:13), Max: 97.8 (05-17-20 @ 20:13)  HR: 88 (05-18-20 @ 15:13)  BP: 134/76 (05-18-20 @ 15:13)  RR: 17 (05-18-20 @ 15:13)  SpO2: 98% (05-18-20 @ 15:13)  Wt(kg): --    05-17 @ 07:01  -  05-18 @ 07:00  --------------------------------------------------------  IN: 2520 mL / OUT: 2500 mL / NET: 20 mL    05-18 @ 07:01  -  05-18 @ 17:29  --------------------------------------------------------  IN: 480 mL / OUT: 300 mL / NET: 180 mL        PHYSICAL EXAM:    Gen: NAD, calm  Cards: RRR, +S1/S2, no M/G/R  Resp: CTA B/L  GI: soft, NT/ND, NABS  Vascular: no LE edema B/L        LABS:  05-18    134<L>  |  99  |  29<H>  ----------------------------<  168<H>  4.2   |  25  |  1.38<H>    Ca    9.0      18 May 2020 06:44    TPro  6.0  /  Alb  2.4<L>  /  TBili  0.2  /  DBili      /  AST  11  /  ALT  11  /  AlkPhos  73  05-18    Creatinine Trend: 1.38 <--, 1.77 <--, 1.51 <--, 1.78 <--, 1.58 <--, 1.68 <--, 1.49 <--                        8.8    9.10  )-----------( 308      ( 18 May 2020 06:45 )             28.5     Urine Studies:    Osmolality, Random Urine: 257 mos/kg (05-16 @ 19:34)  Sodium, Random Urine: <35 mmol/L (05-16 @ 19:34)        < from: Xray Chest 1 View- PORTABLE-Routine (05.18.20 @ 14:02) >  FINDINGS/  IMPRESSION:    Drainage catheter projects over the mid chest wall. Right IJ central line tip within SVC.    Clear lungs. No pleural effusion or pneumothorax.    < end of copied text >

## 2020-05-18 NOTE — CONSULT NOTE ADULT - PROBLEM SELECTOR RECOMMENDATION 9
-Pt deferring laryngoscopy at this time  -Please obtain a speech and swallow consult to assess patient for possible aspiration at this time  -Will revisit patient tomorrow

## 2020-05-18 NOTE — CONSULT NOTE ADULT - ASSESSMENT
64 yo male w recent CABG in Feb 2020 complicated by surgical site infection c/o hoarseness since the procedure. Pt DEFERRING laryngoscopy at this time. Risks including aspiration discussed in detail

## 2020-05-18 NOTE — PROGRESS NOTE ADULT - ASSESSMENT
65M s/p CABG 2/3/20, course complicated by PEA arrest, E faecalis bacteremia and local infections of the sternal and right leg SVG sites, now admitted 5/11/20 for syncope.   In total he received about six weeks of antibiotics although no growth from blood or sternal OR debridement 2/25 and low intraoperative concern for osteomyelitis.   His wound drainage increased significantly now growing MRSA and with low-grade bacteremia. Repeat blood cultures 5/14 negative to date.   s/p OR 5/15 for debridement with findings of a seroma cavity beneath the pectoralis, reconstruction with omentum.   Clinically well   I have a low suspicion for endocarditis but an echo is still standard of care and this occurred after a long course of antibiotics.     Suggest  -f/u repeat blood cultures - negative to date   -echocardiogram - TTE is fine if it can be done after the woundvac is removed but if not, would pursue OLGA   -continue Vancomycin 1GM IV q24h, check trough before this afternoon's dose around 4PM   -plan on at least 4 weeks of IV antibiotics, maybe 6 given elevated ESR/CRP although no aggressive osseous lesions on CT  -I ordered a repeat ESR and CRP for tomorrow     Spoke with primary team     Chapito Delarosa MD   Infectious Disease   Pager [phone]   After 5PM and on weekends please page fellow on call or call [phone]

## 2020-05-18 NOTE — PROGRESS NOTE ADULT - ASSESSMENT
65y Male with history of DM presents with syncope concern for sternal wound infection. Nephrology consulted for elevated Scr.    1) CKD-3: Baseline Scr 1.5 as per prior labs. Renal function @ baseline. Patient advised to follow up as an outpatient for CKD work up. Avoid nephrotoxins. Monitor electrolytes.    2) HTN with CKD: BP controlled. Metoprolol as per cardiology. Monitor BP.    3) LE edema: Patient appears euvolemic. Continue with torsemide 10 mg daily and discontinue IVF given complaints of dyspnea. CXR clear. Monitor UO.    4) Hyponatremia: Resolving and secondary to poor solute intake as per urine studies. Monitor serum sodium.

## 2020-05-18 NOTE — DIETITIAN INITIAL EVALUATION ADULT. - PERTINENT LABORATORY DATA
POCT Blood Glucose.: 191 mg/dL (05-18-20 @ 12:16)  POCT Blood Glucose.: 176 mg/dL (05-18-20 @ 09:00)  POCT Blood Glucose.: 277 mg/dL (05-17-20 @ 21:47)  POCT Blood Glucose.: 249 mg/dL (05-17-20 @ 17:18)  HbA1c: 7.9% (5/12)      POCT Blood Glucose.: 191 mg/dL (18 May 2020 12:16)

## 2020-05-18 NOTE — DIETITIAN INITIAL EVALUATION ADULT. - SIGNS/SYMPTOMS
pt report of fair PO intake  and  pt with 7% wt loss x 4 months as evidenced by pt with multiple surgical incisions

## 2020-05-18 NOTE — DIETITIAN INITIAL EVALUATION ADULT. - OTHER INFO
Pertinent information: 65y m PMHx T2DM, HTN, CABG Feb 2020 complicated by PEA arrest, sternal infection s/p sternal debridement with muscle flap reconstruction, DM, HTN p/w increased drainage from sternal wound s/p omental flap and incision closure w/ FTSG 5/15.     Pt reports decreased PO intake and appetite since previous surgery in February. States he was eating smaller portions but still consuming 3 meals per day. At baseline pt with good PO intake. Pt denies chewing/swallowing difficulty, nausea, vomiting, diarrhea, constipation at home. Reports itchiness when consuming blueberries unsure if true allergy. Denies micronutrient supplementation. Pt noted with T2DM, checks BG 3x daily, takes Humalog and Lantus. HbA1c: 9.2% (1/26/20)--> 7.9% (5/12) --improvement in glycemic control, decreased PO intake also likely contributing.     Weights: pt reports UBW as ~ 180lbs back in January, weight per previous RD was 182lbs (1/28/20). Dosing weight today is ~169lbs. Pt attributes some weight change likely due to fluids as well. Overall with 7% wt loss x 4 months, will continue to monitor.     Since admission pt reports appetite has been improving, feels better after last surgery on Friday. Consumed ~ 70% of lunch, drinking Danactive supplement. Discussed benefit of probiotic drink for gut health while on antibiotics. Pt amendable to receiving Glucerna Shakes to supplement PO intake. Discussed with patient importance of PO intake and prioritizing sources of protein to maintain lean body mass and promote wound healing. Also reviewed importance of glycemic control to help with wound healing. No acute GI distress, last BM 5/17 per report.   Pt reports understanding with no nutrition related questions at this time. Pt made aware RD remains available as needed.

## 2020-05-18 NOTE — PROGRESS NOTE ADULT - SUBJECTIVE AND OBJECTIVE BOX
Patient is a 65y old  Male who presents with a chief complaint of syncope (18 May 2020 17:28)      SUBJECTIVE / OVERNIGHT EVENTS: c/o hoarseness, loss of voice.    Review of Systems  chest pain no  palpitations no  sob no  nausea no  headache no    MEDICATIONS  (STANDING):  aMIOdarone    Tablet 200 milliGRAM(s) Oral daily  aspirin enteric coated 81 milliGRAM(s) Oral daily  atorvastatin 40 milliGRAM(s) Oral at bedtime  BACItracin   Ointment 1 Application(s) Topical daily  buDESOnide    Inhalation Suspension 0.5 milliGRAM(s) Inhalation two times a day  dextrose 5%. 1000 milliLiter(s) (50 mL/Hr) IV Continuous <Continuous>  dextrose 50% Injectable 12.5 Gram(s) IV Push once  dextrose 50% Injectable 25 Gram(s) IV Push once  dextrose 50% Injectable 25 Gram(s) IV Push once  enoxaparin Injectable 40 milliGRAM(s) SubCutaneous daily  insulin glargine Injectable (LANTUS) 30 Unit(s) SubCutaneous at bedtime  insulin lispro (HumaLOG) corrective regimen sliding scale   SubCutaneous three times a day before meals  insulin lispro (HumaLOG) corrective regimen sliding scale   SubCutaneous at bedtime  insulin lispro Injectable (HumaLOG) 12 Unit(s) SubCutaneous three times a day with meals  loratadine 10 milliGRAM(s) Oral daily  metoprolol tartrate 12.5 milliGRAM(s) Oral two times a day  pantoprazole    Tablet 40 milliGRAM(s) Oral before breakfast  polyethylene glycol 3350 17 Gram(s) Oral daily  senna 2 Tablet(s) Oral at bedtime  torsemide 10 milliGRAM(s) Oral daily  vancomycin  IVPB 1000 milliGRAM(s) IV Intermittent every 24 hours    MEDICATIONS  (PRN):  acetaminophen   Tablet .. 650 milliGRAM(s) Oral every 6 hours PRN Temp greater or equal to 38.5C (101.3F), Mild Pain (1 - 3)  ALBUTerol    90 MICROgram(s) HFA Inhaler 2 Puff(s) Inhalation every 6 hours PRN Shortness of Breath and/or Wheezing  benzonatate 100 milliGRAM(s) Oral three times a day PRN Cough  dextrose 40% Gel 15 Gram(s) Oral once PRN Blood Glucose LESS THAN 70 milliGRAM(s)/deciliter  glucagon  Injectable 1 milliGRAM(s) IntraMuscular once PRN Glucose LESS THAN 70 milligrams/deciliter  oxycodone    5 mG/acetaminophen 325 mG 1 Tablet(s) Oral every 6 hours PRN Moderate Pain (4 - 6)      Vital Signs Last 24 Hrs  T(C): 36.5 (18 May 2020 15:13), Max: 36.5 (18 May 2020 15:13)  T(F): 97.7 (18 May 2020 15:13), Max: 97.7 (18 May 2020 15:13)  HR: 88 (18 May 2020 15:13) (87 - 88)  BP: 134/76 (18 May 2020 15:13) (134/76 - 152/78)  BP(mean): --  RR: 17 (18 May 2020 15:13) (16 - 17)  SpO2: 98% (18 May 2020 15:13) (96% - 98%)    PHYSICAL EXAM:  GENERAL: NAD, well-developed  HEAD:  Atraumatic, Normocephalic  EYES: EOMI, PERRLA, conjunctiva and sclera clear  NECK: Supple, No JVD  CHEST/LUNG: Clear to auscultation bilaterally; No wheeze Sternal wound clean with VAC  HEART: Regular rate and rhythm; No murmurs, rubs, or gallops  ABDOMEN: Soft, Nontender, Nondistended; Bowel sounds present  EXTREMITIES:  2+ Peripheral Pulses, No clubbing, cyanosis, or edema  PSYCH: AAOx3  NEUROLOGY: non-focal  SKIN: No rashes or lesions    LABS:                        8.8    9.10  )-----------( 308      ( 18 May 2020 06:45 )             28.5     05-18    134<L>  |  99  |  29<H>  ----------------------------<  168<H>  4.2   |  25  |  1.38<H>    Ca    9.0      18 May 2020 06:44    TPro  6.0  /  Alb  2.4<L>  /  TBili  0.2  /  DBili  x   /  AST  11  /  ALT  11  /  AlkPhos  73  05-18              Culture - Tissue with Gram Stain (collected 16 May 2020 03:05)  Source: .Tissue Other  Gram Stain (16 May 2020 07:25):    Rare polymorphonuclear leukocytes seen per low power field    No organisms seen per oil power field  Preliminary Report (17 May 2020 08:11):    No growth    Culture - Fungal, Tissue (collected 16 May 2020 03:05)  Source: .Tissue sternal wound culture  Preliminary Report (18 May 2020 10:49):    Testing in progress        RADIOLOGY & ADDITIONAL TESTS:    Imaging Personally Reviewed:    Consultant(s) Notes Reviewed:      Care Discussed with Consultants/Other Providers:

## 2020-05-18 NOTE — PROGRESS NOTE ADULT - SUBJECTIVE AND OBJECTIVE BOX
Follow Up: MRSA wound infection and bacteremia     Interval History/ROS: Feels ok. Afebrile. No pain. Main complaint is losing his voice after talking. No diarrhea. No dysuria.     Allergies  No Known Allergies    ANTIMICROBIALS:  vancomycin  IVPB 1000 every 24 hours      OTHER MEDS:  acetaminophen   Tablet .. 650 milliGRAM(s) Oral every 6 hours PRN  ALBUTerol    90 MICROgram(s) HFA Inhaler 2 Puff(s) Inhalation every 6 hours PRN  aMIOdarone    Tablet 200 milliGRAM(s) Oral daily  aspirin enteric coated 81 milliGRAM(s) Oral daily  atorvastatin 40 milliGRAM(s) Oral at bedtime  BACItracin   Ointment 1 Application(s) Topical daily  benzonatate 100 milliGRAM(s) Oral three times a day PRN  buDESOnide    Inhalation Suspension 0.5 milliGRAM(s) Inhalation two times a day  dextrose 40% Gel 15 Gram(s) Oral once PRN  dextrose 5%. 1000 milliLiter(s) IV Continuous <Continuous>  dextrose 50% Injectable 12.5 Gram(s) IV Push once  dextrose 50% Injectable 25 Gram(s) IV Push once  dextrose 50% Injectable 25 Gram(s) IV Push once  enoxaparin Injectable 40 milliGRAM(s) SubCutaneous daily  glucagon  Injectable 1 milliGRAM(s) IntraMuscular once PRN  insulin glargine Injectable (LANTUS) 28 Unit(s) SubCutaneous at bedtime  insulin lispro (HumaLOG) corrective regimen sliding scale   SubCutaneous three times a day before meals  insulin lispro (HumaLOG) corrective regimen sliding scale   SubCutaneous at bedtime  insulin lispro Injectable (HumaLOG) 12 Unit(s) SubCutaneous three times a day with meals  loratadine 10 milliGRAM(s) Oral daily  metoprolol tartrate 12.5 milliGRAM(s) Oral two times a day  oxycodone    5 mG/acetaminophen 325 mG 1 Tablet(s) Oral every 6 hours PRN  pantoprazole    Tablet 40 milliGRAM(s) Oral before breakfast  polyethylene glycol 3350 17 Gram(s) Oral daily  senna 2 Tablet(s) Oral at bedtime  torsemide 10 milliGRAM(s) Oral daily      Vital Signs Last 24 Hrs  T(C): 36.4 (18 May 2020 05:15), Max: 36.6 (17 May 2020 20:13)  T(F): 97.6 (18 May 2020 05:15), Max: 97.8 (17 May 2020 20:13)  HR: 87 (18 May 2020 05:15) (82 - 87)  BP: 152/78 (18 May 2020 05:15) (115/67 - 152/78)  BP(mean): --  RR: 16 (18 May 2020 05:15) (16 - 16)  SpO2: 96% (18 May 2020 05:15) (96% - 97%)    Physical Exam:  General: awake, alert, fatigued but non toxic  Head: atraumatic, normocephalic  Eye: normal sclera and conjunctiva  Cardio: regular rate and rhythm   Respiratory: nonlabored on nasal cannula   abd: soft, no tenderness   vascular: right IJ CVC, right arm peripheral IV, no phlebitis   Skin: no rash  Neurologic: no focal deficit  psych: normal affect                          8.8    9.10  )-----------( 308      ( 18 May 2020 06:45 )             28.5       05-18    134<L>  |  99  |  29<H>  ----------------------------<  168<H>  4.2   |  25  |  1.38<H>    Ca    9.0      18 May 2020 06:44    TPro  6.0  /  Alb  2.4<L>  /  TBili  0.2  /  DBili  x   /  AST  11  /  ALT  11  /  AlkPhos  73  05-18          MICROBIOLOGY:  Culture - Tissue with Gram Stain (collected 05-16-20 @ 03:05)  Source: .Tissue Other  Gram Stain (05-16-20 @ 07:25):    Rare polymorphonuclear leukocytes seen per low power field    No organisms seen per oil power field  Preliminary Report (05-17-20 @ 08:11):    No growth    Culture - Fungal, Tissue (collected 05-16-20 @ 03:05)  Source: .Tissue sternal wound culture  Preliminary Report (05-18-20 @ 10:49):    Testing in progress    Culture - Blood (collected 05-14-20 @ 09:04)  Source: .Blood Blood-Peripheral  Preliminary Report (05-15-20 @ 10:01):    No growth to date.    Culture - Blood (collected 05-14-20 @ 09:04)  Source: .Blood Blood-Peripheral  Preliminary Report (05-15-20 @ 10:01):    No growth to date.    Culture - Blood (collected 05-12-20 @ 03:18)  Source: .Blood Blood  Final Report (05-17-20 @ 04:00):    No Growth Final    Culture - Blood (collected 05-12-20 @ 03:17)  Source: .Blood Blood  Gram Stain (05-14-20 @ 00:21):    Growth in aerobic bottle: Gram Positive Cocci in Clusters  Final Report (05-15-20 @ 16:16):    Growth in aerobic bottle: Methicillin resistant Staphylococcus aureus    "Due to technical problems, Proteus sp. will Not be reported as part of    the BCID panel until further notice"    ***Blood Panel PCR results on this specimen are available    approximately 3 hours after the Gram stain result.***    Gram stain, PCR, and/or culture results may not always    correspond due to difference in methodologies.    ************************************************************    This PCR assay was performed using [employer].    The following targets are tested for: Enterococcus,    vancomycin resistant enterococci, Listeria monocytogenes,    coagulase negative staphylococci, S. aureus,    methicillin resistant S. aureus, Streptococcus agalactiae    (Group B), S. pneumoniae, S. pyogenes (Group A),    Acinetobacter baumannii, Enterobacter cloacae, E. coli,    Klebsiella oxytoca, K. pneumoniae, Proteus sp.,    Serratia marcescens, Haemophilus influenzae,    Neisseria meningitidis, Pseudomonas aeruginosa, Candida    albicans, C. glabrata, C krusei, C parapsilosis,    C. tropicalis and the KPC resistance gene.  Organism: Blood Culture PCR  Methicillin resistant Staphylococcus aureus (05-15-20 @ 16:16)  Organism: Methicillin resistant Staphylococcus aureus (05-15-20 @ 16:16)      -  Ampicillin/Sulbactam: R <=8/4      -  Cefazolin: R <=4      -  Clindamycin: S <=0.25      -  Daptomycin: S <=0.25      -  Erythromycin: R >4      -  Gentamicin: S <=1 Should not be used as monotherapy      -  Linezolid: S 2      -  Oxacillin: R >2      -  Penicillin: R >8      -  RIF- Rifampin: S <=1 Should not be used as monotherapy      -  Tetra/Doxy: S <=1      -  Trimethoprim/Sulfamethoxazole: S <=0.5/9.5      -  Vancomycin: S 1      Method Type: REINALDO  Organism: Blood Culture PCR (05-15-20 @ 16:16)      -  Methicillin resistant Staphylococcus aureus (MRSA): Detec      Method Type: PCR    Culture - Surgical Swab (collected 05-12-20 @ 03:08)  Source: .Surgical Swab sternal wound  Final Report (05-16-20 @ 12:44):    Numerous Methicillin resistant Staphylococcus aureus    Growth in fluid media only Acinetobacter baumannii  Organism: Methicillin resistant Staphylococcus aureus  Acinetobacter baumannii (05-16-20 @ 12:44)  Organism: Acinetobacter baumannii (05-16-20 @ 12:44)      -  Imipenem: S      -  Piperacillin/Tazobactam: S      Method Type: KB  Organism: Acinetobacter baumannii (05-16-20 @ 12:44)      -  Amikacin: S <=16      -  Ampicillin/Sulbactam: S <=4/2      -  Cefepime: S <=2      -  Ceftazidime: S 4      -  Ciprofloxacin: S <=0.25      -  Gentamicin: S 4      -  Levofloxacin: S <=0.5      -  Meropenem: S <=1      -  Tobramycin: S <=2      -  Trimethoprim/Sulfamethoxazole: S <=0.5/9.5      Method Type: REINALDO  Organism: Methicillin resistant Staphylococcus aureus (05-16-20 @ 12:44)      -  Ampicillin/Sulbactam: R <=8/4      -  Cefazolin: R <=4      -  Clindamycin: S <=0.25      -  Daptomycin: S 0.5      -  Erythromycin: R >4      -  Gentamicin: S <=1 Should not be used as monotherapy      -  Linezolid: S 2      -  Oxacillin: R >2      -  Penicillin: R >8      -  RIF- Rifampin: S <=1 Should not be used as monotherapy      -  Tetra/Doxy: S <=1      -  Trimethoprim/Sulfamethoxazole: S <=0.5/9.5      -  Vancomycin: S 1      Method Type: REINALDO    Culture - Blood (collected 05-11-20 @ 22:20)  Source: .Blood Blood-Peripheral  Final Report (05-16-20 @ 23:00):    No Growth Final    Culture - Blood (collected 05-11-20 @ 22:20)  Source: .Blood Blood-Peripheral  Final Report (05-16-20 @ 23:00):    No Growth Final    RADIOLOGY:  Images below reviewed personally  Xray Chest 1 View- PORTABLE-Urgent (05.15.20 @ 16:33)   1.  Lungs are clear.  2.  Right-sided catheter in the SVC Follow Up: MRSA wound infection and bacteremia     Interval History/ROS: Feels ok. Afebrile. No pain. Main complaint is losing his voice after talking. No diarrhea. No dysuria.     Allergies  No Known Allergies    ANTIMICROBIALS:  vancomycin  IVPB 1000 every 24 hours      OTHER MEDS:  acetaminophen   Tablet .. 650 milliGRAM(s) Oral every 6 hours PRN  ALBUTerol    90 MICROgram(s) HFA Inhaler 2 Puff(s) Inhalation every 6 hours PRN  aMIOdarone    Tablet 200 milliGRAM(s) Oral daily  aspirin enteric coated 81 milliGRAM(s) Oral daily  atorvastatin 40 milliGRAM(s) Oral at bedtime  BACItracin   Ointment 1 Application(s) Topical daily  benzonatate 100 milliGRAM(s) Oral three times a day PRN  buDESOnide    Inhalation Suspension 0.5 milliGRAM(s) Inhalation two times a day  dextrose 40% Gel 15 Gram(s) Oral once PRN  dextrose 5%. 1000 milliLiter(s) IV Continuous <Continuous>  dextrose 50% Injectable 12.5 Gram(s) IV Push once  dextrose 50% Injectable 25 Gram(s) IV Push once  dextrose 50% Injectable 25 Gram(s) IV Push once  enoxaparin Injectable 40 milliGRAM(s) SubCutaneous daily  glucagon  Injectable 1 milliGRAM(s) IntraMuscular once PRN  insulin glargine Injectable (LANTUS) 28 Unit(s) SubCutaneous at bedtime  insulin lispro (HumaLOG) corrective regimen sliding scale   SubCutaneous three times a day before meals  insulin lispro (HumaLOG) corrective regimen sliding scale   SubCutaneous at bedtime  insulin lispro Injectable (HumaLOG) 12 Unit(s) SubCutaneous three times a day with meals  loratadine 10 milliGRAM(s) Oral daily  metoprolol tartrate 12.5 milliGRAM(s) Oral two times a day  oxycodone    5 mG/acetaminophen 325 mG 1 Tablet(s) Oral every 6 hours PRN  pantoprazole    Tablet 40 milliGRAM(s) Oral before breakfast  polyethylene glycol 3350 17 Gram(s) Oral daily  senna 2 Tablet(s) Oral at bedtime  torsemide 10 milliGRAM(s) Oral daily      Vital Signs Last 24 Hrs  T(C): 36.4 (18 May 2020 05:15), Max: 36.6 (17 May 2020 20:13)  T(F): 97.6 (18 May 2020 05:15), Max: 97.8 (17 May 2020 20:13)  HR: 87 (18 May 2020 05:15) (82 - 87)  BP: 152/78 (18 May 2020 05:15) (115/67 - 152/78)  BP(mean): --  RR: 16 (18 May 2020 05:15) (16 - 16)  SpO2: 96% (18 May 2020 05:15) (96% - 97%)    Physical Exam:  General: awake, alert, fatigued but non toxic  Head: atraumatic, normocephalic  Eye: normal sclera and conjunctiva  Cardio: regular rate and rhythm. sternal wound vac. one drain in each side of anterior chest   Respiratory: nonlabored on nasal cannula   abd: soft, no tenderness   vascular: right IJ CVC, right arm peripheral IV, no phlebitis   Skin: no rash  Neurologic: no focal deficit  psych: normal affect                          8.8    9.10  )-----------( 308      ( 18 May 2020 06:45 )             28.5       05-18    134<L>  |  99  |  29<H>  ----------------------------<  168<H>  4.2   |  25  |  1.38<H>    Ca    9.0      18 May 2020 06:44    TPro  6.0  /  Alb  2.4<L>  /  TBili  0.2  /  DBili  x   /  AST  11  /  ALT  11  /  AlkPhos  73  05-18          MICROBIOLOGY:  Culture - Tissue with Gram Stain (collected 05-16-20 @ 03:05)  Source: .Tissue Other  Gram Stain (05-16-20 @ 07:25):    Rare polymorphonuclear leukocytes seen per low power field    No organisms seen per oil power field  Preliminary Report (05-17-20 @ 08:11):    No growth    Culture - Fungal, Tissue (collected 05-16-20 @ 03:05)  Source: .Tissue sternal wound culture  Preliminary Report (05-18-20 @ 10:49):    Testing in progress    Culture - Blood (collected 05-14-20 @ 09:04)  Source: .Blood Blood-Peripheral  Preliminary Report (05-15-20 @ 10:01):    No growth to date.    Culture - Blood (collected 05-14-20 @ 09:04)  Source: .Blood Blood-Peripheral  Preliminary Report (05-15-20 @ 10:01):    No growth to date.    Culture - Blood (collected 05-12-20 @ 03:18)  Source: .Blood Blood  Final Report (05-17-20 @ 04:00):    No Growth Final    Culture - Blood (collected 05-12-20 @ 03:17)  Source: .Blood Blood  Gram Stain (05-14-20 @ 00:21):    Growth in aerobic bottle: Gram Positive Cocci in Clusters  Final Report (05-15-20 @ 16:16):    Growth in aerobic bottle: Methicillin resistant Staphylococcus aureus    "Due to technical problems, Proteus sp. will Not be reported as part of    the BCID panel until further notice"    ***Blood Panel PCR results on this specimen are available    approximately 3 hours after the Gram stain result.***    Gram stain, PCR, and/or culture results may not always    correspond due to difference in methodologies.    ************************************************************    This PCR assay was performed using [employer].    The following targets are tested for: Enterococcus,    vancomycin resistant enterococci, Listeria monocytogenes,    coagulase negative staphylococci, S. aureus,    methicillin resistant S. aureus, Streptococcus agalactiae    (Group B), S. pneumoniae, S. pyogenes (Group A),    Acinetobacter baumannii, Enterobacter cloacae, E. coli,    Klebsiella oxytoca, K. pneumoniae, Proteus sp.,    Serratia marcescens, Haemophilus influenzae,    Neisseria meningitidis, Pseudomonas aeruginosa, Candida    albicans, C. glabrata, C krusei, C parapsilosis,    C. tropicalis and the KPC resistance gene.  Organism: Blood Culture PCR  Methicillin resistant Staphylococcus aureus (05-15-20 @ 16:16)  Organism: Methicillin resistant Staphylococcus aureus (05-15-20 @ 16:16)      -  Ampicillin/Sulbactam: R <=8/4      -  Cefazolin: R <=4      -  Clindamycin: S <=0.25      -  Daptomycin: S <=0.25      -  Erythromycin: R >4      -  Gentamicin: S <=1 Should not be used as monotherapy      -  Linezolid: S 2      -  Oxacillin: R >2      -  Penicillin: R >8      -  RIF- Rifampin: S <=1 Should not be used as monotherapy      -  Tetra/Doxy: S <=1      -  Trimethoprim/Sulfamethoxazole: S <=0.5/9.5      -  Vancomycin: S 1      Method Type: REINALDO  Organism: Blood Culture PCR (05-15-20 @ 16:16)      -  Methicillin resistant Staphylococcus aureus (MRSA): Detec      Method Type: PCR    Culture - Surgical Swab (collected 05-12-20 @ 03:08)  Source: .Surgical Swab sternal wound  Final Report (05-16-20 @ 12:44):    Numerous Methicillin resistant Staphylococcus aureus    Growth in fluid media only Acinetobacter baumannii  Organism: Methicillin resistant Staphylococcus aureus  Acinetobacter baumannii (05-16-20 @ 12:44)  Organism: Acinetobacter baumannii (05-16-20 @ 12:44)      -  Imipenem: S      -  Piperacillin/Tazobactam: S      Method Type: KB  Organism: Acinetobacter baumannii (05-16-20 @ 12:44)      -  Amikacin: S <=16      -  Ampicillin/Sulbactam: S <=4/2      -  Cefepime: S <=2      -  Ceftazidime: S 4      -  Ciprofloxacin: S <=0.25      -  Gentamicin: S 4      -  Levofloxacin: S <=0.5      -  Meropenem: S <=1      -  Tobramycin: S <=2      -  Trimethoprim/Sulfamethoxazole: S <=0.5/9.5      Method Type: REINALDO  Organism: Methicillin resistant Staphylococcus aureus (05-16-20 @ 12:44)      -  Ampicillin/Sulbactam: R <=8/4      -  Cefazolin: R <=4      -  Clindamycin: S <=0.25      -  Daptomycin: S 0.5      -  Erythromycin: R >4      -  Gentamicin: S <=1 Should not be used as monotherapy      -  Linezolid: S 2      -  Oxacillin: R >2      -  Penicillin: R >8      -  RIF- Rifampin: S <=1 Should not be used as monotherapy      -  Tetra/Doxy: S <=1      -  Trimethoprim/Sulfamethoxazole: S <=0.5/9.5      -  Vancomycin: S 1      Method Type: REINALDO    Culture - Blood (collected 05-11-20 @ 22:20)  Source: .Blood Blood-Peripheral  Final Report (05-16-20 @ 23:00):    No Growth Final    Culture - Blood (collected 05-11-20 @ 22:20)  Source: .Blood Blood-Peripheral  Final Report (05-16-20 @ 23:00):    No Growth Final    RADIOLOGY:  Images below reviewed personally  Xray Chest 1 View- PORTABLE-Urgent (05.15.20 @ 16:33)   1.  Lungs are clear.  2.  Right-sided catheter in the SVC

## 2020-05-18 NOTE — PROGRESS NOTE ADULT - ASSESSMENT
65 m with    Syncope  - telemetry  - cardiology follow     Orthostatic hypotension  - DC aldactone  - follow    S/P CABG  - CT sx evaluation noted    Sternal wound, s/p reconstruction and omental flap  - MRSA bacteremia  - Vancomycin  - ID follow  - postop care    Bacteremia  - OLGA to r/o endocarditis.  - ID follow    Scalp abrasion  - Bacitracin requested by patient    Diabetes control  - ADA diet  - BS control  - Endocrine follow    CKD  - follow    HTN control    Hoarseness  - ENT evaluation     Miguel Angel Duke MD pager [phone]

## 2020-05-18 NOTE — DIETITIAN INITIAL EVALUATION ADULT. - PHYSICAL APPEARANCE
well nourished/other (specify) Ht: 68inches, Wt: ~169lbs, BMI: 25.7kg/m2, IBW: 154lbs +/- 10%, %IBW: 110%  Edema: none, Skin: free of pressure injuries per nursing flow sheets, noted with surgical incisions    Deferred Nutrition focused physical exam at this time in setting of Covid pandemic.

## 2020-05-18 NOTE — PROGRESS NOTE ADULT - PROBLEM SELECTOR PLAN 1
Will continue current insulin regimen for now. Will continue monitoring FS, log, and FU.  Patient counseled for compliance with consistent low carb diet and exercise as tolerated outpatient. Will increase Lantus to 30u at bedtime.  Will continue Humalog 12u before each meal as well as Humalog correction scale coverage. Will continue monitoring FS, log, and FU.  Patient counseled for compliance with consistent low carb diet and exercise as tolerated outpatient.

## 2020-05-18 NOTE — DIETITIAN INITIAL EVALUATION ADULT. - ETIOLOGY
related to decreased appetite s/p surgery related to increased physiological demand for nutrients in setting of wound healing

## 2020-05-18 NOTE — PROGRESS NOTE ADULT - SUBJECTIVE AND OBJECTIVE BOX
CARDIOLOGY FOLLOW UP - Dr. Steel    CC no cp or sob        PHYSICAL EXAM:  T(C): 36.4 (05-18-20 @ 05:15), Max: 36.6 (05-17-20 @ 20:13)  HR: 87 (05-18-20 @ 05:15) (82 - 87)  BP: 152/78 (05-18-20 @ 05:15) (112/64 - 152/78)  RR: 16 (05-18-20 @ 05:15) (16 - 16)  SpO2: 96% (05-18-20 @ 05:15) (96% - 97%)  Wt(kg): --  I&O's Summary    17 May 2020 07:01  -  18 May 2020 07:00  --------------------------------------------------------  IN: 2520 mL / OUT: 2500 mL / NET: 20 mL        Appearance: Normal	  Cardiovascular: Normal S1 S2,RRR   Respiratory:  diminished   Gastrointestinal:  Soft, Non-tender, + BS	  Extremities: Normal range of motion, No clubbing, cyanosis or edema  chest : + wound  vac      MEDICATIONS  (STANDING):  aMIOdarone    Tablet 200 milliGRAM(s) Oral daily  aspirin enteric coated 81 milliGRAM(s) Oral daily  atorvastatin 40 milliGRAM(s) Oral at bedtime  BACItracin   Ointment 1 Application(s) Topical daily  buDESOnide    Inhalation Suspension 0.5 milliGRAM(s) Inhalation two times a day  dextrose 5%. 1000 milliLiter(s) (50 mL/Hr) IV Continuous <Continuous>  dextrose 50% Injectable 12.5 Gram(s) IV Push once  dextrose 50% Injectable 25 Gram(s) IV Push once  dextrose 50% Injectable 25 Gram(s) IV Push once  enoxaparin Injectable 40 milliGRAM(s) SubCutaneous daily  insulin glargine Injectable (LANTUS) 28 Unit(s) SubCutaneous at bedtime  insulin lispro (HumaLOG) corrective regimen sliding scale   SubCutaneous three times a day before meals  insulin lispro (HumaLOG) corrective regimen sliding scale   SubCutaneous at bedtime  insulin lispro Injectable (HumaLOG) 12 Unit(s) SubCutaneous three times a day with meals  lactated ringers. 1000 milliLiter(s) (75 mL/Hr) IV Continuous <Continuous>  loratadine 10 milliGRAM(s) Oral daily  metoprolol tartrate 12.5 milliGRAM(s) Oral two times a day  pantoprazole    Tablet 40 milliGRAM(s) Oral before breakfast  polyethylene glycol 3350 17 Gram(s) Oral daily  senna 2 Tablet(s) Oral at bedtime  torsemide 10 milliGRAM(s) Oral daily  vancomycin  IVPB 1000 milliGRAM(s) IV Intermittent every 24 hours      TELEMETRY: nsr	    ECG:  	  RADIOLOGY:   DIAGNOSTIC TESTING:  [ ] Echocardiogram:  [ ]  Catheterization:  [ ] Stress Test:    OTHER: 	    LABS:	 	    Troponin T, High Sensitivity Result: 137 ng/L [0 - 51] (05-12 @ 07:17)  Creatine Kinase, Serum: 35 U/L [30 - 200] (05-12 @ 00:43)  CKMB Units: 2.0 ng/mL [0.0 - 6.7] (05-12 @ 00:43)  Troponin T, High Sensitivity Result: 110 ng/L [0 - 51] (05-12 @ 00:43)  Troponin T, High Sensitivity Result: 118 ng/L [0 - 51] (05-11 @ 19:33)  Troponin T, High Sensitivity Result: 132 ng/L [0 - 51] (05-11 @ 16:47)                          8.8    9.10  )-----------( 308      ( 18 May 2020 06:45 )             28.5     05-18    134<L>  |  99  |  29<H>  ----------------------------<  168<H>  4.2   |  25  |  1.38<H>    Ca    9.0      18 May 2020 06:44    TPro  6.0  /  Alb  2.4<L>  /  TBili  0.2  /  DBili  x   /  AST  11  /  ALT  11  /  AlkPhos  73  05-18            1.

## 2020-05-18 NOTE — PROGRESS NOTE ADULT - ASSESSMENT
65y m PMHx CABG Feb 2020 complicated by PEA arrest, sternal infection s/p sternal debridement with muscle flap reconstruction, DM, HTN p/w increased drainage from sternal wound s/p omental flap and incision closure w/ FTSG 5/15    Plan:  - c/w wound vac for 5 days post op 5/20, PRS will remove, if dc prior to will dc w/ wound vac  - c/w drains  - diet per general surgery   - Care per primary team    Plastic Surgery  [phone]

## 2020-05-18 NOTE — PROGRESS NOTE ADULT - ASSESSMENT
Assessment  DMT2: 65y Male with DM T2 with hyperglycemia,  A1C 7.9%,  was on insulin at home, now on basal bolus insulin, AM Humalog was held due to NPO status, blood sugars improving though still not at target, no hypoglycemic episodes. OLGA was cancelled today, will be eating lunch, appears alert and comfortable.  CAD: s/p CABG previous admission, stable, monitored.  Sternal Wound: On IV  ABx, FU Plastics/ID.  HTN: Controlled,  on antihypertensive medications.  CKD: Monitor labs/BMP.          Harper Matias MD  Cell: [phone]  Office: [phone] Assessment  DMT2: 65y Male with DM T2 with hyperglycemia,  A1C 7.9%, was on insulin at home, now on basal bolus insulin, AM Humalog was held due to NPO status, blood sugars improving though still not at target, no hypoglycemic episodes. OLGA was cancelled today, will be eating lunch, appears alert and comfortable.  CAD: s/p CABG previous admission, stable, monitored.  Sternal Wound: On IV  ABx, FU Plastics/ID.  HTN: Controlled,  on antihypertensive medications.  CKD: Monitor labs/BMP.          Harper Matias MD  Cell: [phone]  Office: [phone] Assessment  DMT2: 65y Male with DM T2 with hyperglycemia, A1C 7.9%, was on insulin at home, now on basal bolus insulin, AM Humalog was held due to NPO status, blood sugars improving though still not at target, no hypoglycemic episodes. OLGA was cancelled today, will be eating lunch, appears alert and comfortable.  CAD: s/p CABG previous admission, stable, monitored.  Sternal Wound: On IV  ABx, FU Plastics/ID.  HTN: Controlled,  on antihypertensive medications.  CKD: Monitor labs/BMP.          Harper Matias MD  Cell: [phone]  Office: [phone]

## 2020-05-18 NOTE — CONSULT NOTE ADULT - SUBJECTIVE AND OBJECTIVE BOX
CC: Hoarseness    HPI: 65 year old male PMH CABG (2/2020) complicated by PEA arrest, thoracotomy site infection w/empyema, DM, HTN, initially presented 5/11 with syncope, found to also have non-healing sternal chest wound now s/p laparoscopic lysis of adhesions and creation of omental flap, followed by plastics removal of hardware and closure.  ENT consulted as pt c/o hoarseness since his CABG in Feb 2020. States he will be talking for a while and then lose his voice. Pt denies coughing with liquids or dysphagia to solids. Pt has not been seen by speech and swallow during this visit.       PAST MEDICAL & SURGICAL HISTORY:  HTN (hypertension)  Diabetes  S/P CABG (coronary artery bypass graft)  History of appendectomy: x 30 yrs ago    Allergies    Blueberries (Rash)  Drug Allergies Not Recorded    Intolerances      MEDICATIONS  (STANDING):  aMIOdarone    Tablet 200 milliGRAM(s) Oral daily  aspirin enteric coated 81 milliGRAM(s) Oral daily  atorvastatin 40 milliGRAM(s) Oral at bedtime  BACItracin   Ointment 1 Application(s) Topical daily  buDESOnide    Inhalation Suspension 0.5 milliGRAM(s) Inhalation two times a day  dextrose 5%. 1000 milliLiter(s) (50 mL/Hr) IV Continuous <Continuous>  dextrose 50% Injectable 12.5 Gram(s) IV Push once  dextrose 50% Injectable 25 Gram(s) IV Push once  dextrose 50% Injectable 25 Gram(s) IV Push once  enoxaparin Injectable 40 milliGRAM(s) SubCutaneous daily  insulin glargine Injectable (LANTUS) 30 Unit(s) SubCutaneous at bedtime  insulin lispro (HumaLOG) corrective regimen sliding scale   SubCutaneous three times a day before meals  insulin lispro (HumaLOG) corrective regimen sliding scale   SubCutaneous at bedtime  insulin lispro Injectable (HumaLOG) 12 Unit(s) SubCutaneous three times a day with meals  loratadine 10 milliGRAM(s) Oral daily  metoprolol tartrate 12.5 milliGRAM(s) Oral two times a day  pantoprazole    Tablet 40 milliGRAM(s) Oral before breakfast  polyethylene glycol 3350 17 Gram(s) Oral daily  senna 2 Tablet(s) Oral at bedtime  torsemide 10 milliGRAM(s) Oral daily  vancomycin  IVPB 1000 milliGRAM(s) IV Intermittent every 24 hours    MEDICATIONS  (PRN):  acetaminophen   Tablet .. 650 milliGRAM(s) Oral every 6 hours PRN Temp greater or equal to 38.5C (101.3F), Mild Pain (1 - 3)  ALBUTerol    90 MICROgram(s) HFA Inhaler 2 Puff(s) Inhalation every 6 hours PRN Shortness of Breath and/or Wheezing  benzonatate 100 milliGRAM(s) Oral three times a day PRN Cough  dextrose 40% Gel 15 Gram(s) Oral once PRN Blood Glucose LESS THAN 70 milliGRAM(s)/deciliter  glucagon  Injectable 1 milliGRAM(s) IntraMuscular once PRN Glucose LESS THAN 70 milligrams/deciliter  oxycodone    5 mG/acetaminophen 325 mG 1 Tablet(s) Oral every 6 hours PRN Moderate Pain (4 - 6)      Social History: Never smoked    Family history:   FH: type 2 diabetes      ROS:   ENT: all negative except as noted in HPI   Skin: No itching, dryness, rash, changes to hair, or skin masses  CV: denies palpitations  Pulm: denies SOB, cough, hemoptysis  GI: denies change in appetite, indigestion, n/v  : denies pertinent urinary symptoms, urgency  Neuro: denies numbness/tingling, loss of sensation  Psych: denies anxiety  MS: denies muscle weakness, instability  Heme: denies easy bruising or bleeding  Endo: denies heat/cold intolerance, excessive sweating  Vascular: denies LE edema    Vital Signs Last 24 Hrs  T(C): 36.5 (18 May 2020 15:13), Max: 36.6 (17 May 2020 20:13)  T(F): 97.7 (18 May 2020 15:13), Max: 97.8 (17 May 2020 20:13)  HR: 88 (18 May 2020 15:13) (82 - 88)  BP: 134/76 (18 May 2020 15:13) (115/67 - 152/78)  BP(mean): --  RR: 17 (18 May 2020 15:13) (16 - 17)  SpO2: 98% (18 May 2020 15:13) (96% - 98%)                          8.8    9.10  )-----------( 308      ( 18 May 2020 06:45 )             28.5    05-18    134<L>  |  99  |  29<H>  ----------------------------<  168<H>  4.2   |  25  |  1.38<H>    Ca    9.0      18 May 2020 06:44    TPro  6.0  /  Alb  2.4<L>  /  TBili  0.2  /  DBili  x   /  AST  11  /  ALT  11  /  AlkPhos  73  05-18       PHYSICAL EXAM:  Gen: NAD, OOB in chair, weak voice quality  Skin: No rashes, bruises, or lesions  Head: Normocephalic, Atraumatic  Face: no edema, erythema, or fluctuance. Parotid glands soft without mass  Eyes: no scleral injection  Nose: Nares bilaterally patent, no discharge  Mouth: No Stridor / Drooling / Trismus.  Mucosa moist, tongue/uvula midline, oropharynx clear  Neck: Flat, supple, no lymphadenopathy, trachea midline, no masses, sternal wound dressing intact  Lymphatic: No lymphadenopathy  Resp: breathing easily, no stridor  CV: no peripheral edema/cyanosis  GI: nondistended   Peripheral vascular: no JVD or edema  Neuro: facial nerve intact, no facial droop    Fiberoptic Indirect laryngoscopy:    Risks and benefits of laryngoscopy discussed with patient including the risk of aspiration if one of the vocal cords are immobilized. Pt deferring the exam at this time as he feels he is afraid to gag or sneeze or cough after the procedure he just went through.       IMAGING/ADDITIONAL STUDIES:     EXAM: CT CHEST   PROCEDURE DATE: 05/12/2020     INTERPRETATION: CLINICAL INFORMATION: Sternal wound with heavy drainage,     COMPARISON: CT chest 2/20/2020     PROCEDURE:   CT of the Chest was performed without intravenous contrast.   Sagittal and coronal reformats were performed.     FINDINGS:     AIRWAYS, LUNGS and PLEURA: Patent central airways. Lungs are clear.     Trace left pleural fluid/pleural thickening.     MEDIASTINUM AND ALINA: No lymphadenopathy. Calcified mediastinal and hilar   lymph nodes. Minimal hematoma an air bubbles within the anterior   mediastinum. No no drainable collection.     HEART AND VESSELS: The heart is enlarged. Status post CABG. No pericardial   effusion. Thoracic aorta and pulmonary artery are normal in diameter.     VISUALIZED UPPER ABDOMEN: Within normal limits.     CHEST WALL AND BONES: No aggressive osseous lesion. Gynecomastia.   Subcutaneous air within the anterior chest wall. Sternotomy dehiscence.   There is mild step off of the sternotomy fragments. No subcutaneous   collection.     LOWER NECK: Within normal limits.     IMPRESSION:   No subcutaneous collection. Trace hematoma within the anterior mediastinum.   No drainable collection.       EXAM: CT BRAIN     PROCEDURE DATE: 05/11/2020     INTERPRETATION: CLINICAL INFORMATION: Head trauma. On anticoagulation.     TECHNIQUE: Noncontrast axial CT images were acquired through the head.   Two-dimensional sagittal and coronal reformats were generated.     COMPARISON STUDY: CT head 10/6/2010.     FINDINGS:     There is no hydrocephalus, mass effect, vasogenic edema, midline shift, or   intracranial hemorrhage. There is no CT evidence of acute territorial   infarct.     There is no displaced calvarial fracture. Mild midline extracalvarial   occipital soft tissue swelling.     The visualized paranasal sinuses and mastoid air cells are clear.     IMPRESSION:     No acute intracranial hemorrhage or mass effect. No displaced calvarial   fracture.

## 2020-05-19 LAB
ANION GAP SERPL CALC-SCNC: 14 MMOL/L — SIGNIFICANT CHANGE UP (ref 5–17)
BUN SERPL-MCNC: 23 MG/DL — SIGNIFICANT CHANGE UP (ref 7–23)
CALCIUM SERPL-MCNC: 9.1 MG/DL — SIGNIFICANT CHANGE UP (ref 8.4–10.5)
CHLORIDE SERPL-SCNC: 100 MMOL/L — SIGNIFICANT CHANGE UP (ref 96–108)
CO2 SERPL-SCNC: 25 MMOL/L — SIGNIFICANT CHANGE UP (ref 22–31)
CREAT SERPL-MCNC: 1.31 MG/DL — HIGH (ref 0.5–1.3)
CRP SERPL-MCNC: 4.83 MG/DL — HIGH (ref 0–0.4)
CULTURE RESULTS: SIGNIFICANT CHANGE UP
CULTURE RESULTS: SIGNIFICANT CHANGE UP
ERYTHROCYTE [SEDIMENTATION RATE] IN BLOOD: 120 MM/HR — HIGH (ref 0–20)
GLUCOSE BLDC GLUCOMTR-MCNC: 106 MG/DL — HIGH (ref 70–99)
GLUCOSE BLDC GLUCOMTR-MCNC: 140 MG/DL — HIGH (ref 70–99)
GLUCOSE BLDC GLUCOMTR-MCNC: 145 MG/DL — HIGH (ref 70–99)
GLUCOSE BLDC GLUCOMTR-MCNC: 192 MG/DL — HIGH (ref 70–99)
GLUCOSE SERPL-MCNC: 125 MG/DL — HIGH (ref 70–99)
HCT VFR BLD CALC: 28.3 % — LOW (ref 39–50)
HGB BLD-MCNC: 9.2 G/DL — LOW (ref 13–17)
MCHC RBC-ENTMCNC: 28.2 PG — SIGNIFICANT CHANGE UP (ref 27–34)
MCHC RBC-ENTMCNC: 32.5 GM/DL — SIGNIFICANT CHANGE UP (ref 32–36)
MCV RBC AUTO: 86.8 FL — SIGNIFICANT CHANGE UP (ref 80–100)
NRBC # BLD: 0 /100 WBCS — SIGNIFICANT CHANGE UP (ref 0–0)
PLATELET # BLD AUTO: 300 K/UL — SIGNIFICANT CHANGE UP (ref 150–400)
POTASSIUM SERPL-MCNC: 4 MMOL/L — SIGNIFICANT CHANGE UP (ref 3.5–5.3)
POTASSIUM SERPL-SCNC: 4 MMOL/L — SIGNIFICANT CHANGE UP (ref 3.5–5.3)
RBC # BLD: 3.26 M/UL — LOW (ref 4.2–5.8)
RBC # FLD: 16.1 % — HIGH (ref 10.3–14.5)
SODIUM SERPL-SCNC: 139 MMOL/L — SIGNIFICANT CHANGE UP (ref 135–145)
SPECIMEN SOURCE: SIGNIFICANT CHANGE UP
SPECIMEN SOURCE: SIGNIFICANT CHANGE UP
WBC # BLD: 7.4 K/UL — SIGNIFICANT CHANGE UP (ref 3.8–10.5)
WBC # FLD AUTO: 7.4 K/UL — SIGNIFICANT CHANGE UP (ref 3.8–10.5)

## 2020-05-19 PROCEDURE — 99232 SBSQ HOSP IP/OBS MODERATE 35: CPT

## 2020-05-19 RX ORDER — VANCOMYCIN HCL 1 G
1250 VIAL (EA) INTRAVENOUS EVERY 24 HOURS
Refills: 0 | Status: DISCONTINUED | OUTPATIENT
Start: 2020-05-19 | End: 2020-05-21

## 2020-05-19 RX ORDER — VANCOMYCIN HCL 1 G
1500 VIAL (EA) INTRAVENOUS EVERY 24 HOURS
Refills: 0 | Status: DISCONTINUED | OUTPATIENT
Start: 2020-05-19 | End: 2020-05-19

## 2020-05-19 RX ADMIN — Medication 10 MILLIGRAM(S): at 06:23

## 2020-05-19 RX ADMIN — Medication 1 APPLICATION(S): at 12:55

## 2020-05-19 RX ADMIN — ALBUTEROL 2 PUFF(S): 90 AEROSOL, METERED ORAL at 17:29

## 2020-05-19 RX ADMIN — Medication 12 UNIT(S): at 12:56

## 2020-05-19 RX ADMIN — ATORVASTATIN CALCIUM 40 MILLIGRAM(S): 80 TABLET, FILM COATED ORAL at 20:44

## 2020-05-19 RX ADMIN — OXYCODONE AND ACETAMINOPHEN 1 TABLET(S): 5; 325 TABLET ORAL at 20:43

## 2020-05-19 RX ADMIN — Medication 0.5 MILLIGRAM(S): at 06:23

## 2020-05-19 RX ADMIN — Medication 12.5 MILLIGRAM(S): at 17:27

## 2020-05-19 RX ADMIN — Medication 166.67 MILLIGRAM(S): at 18:37

## 2020-05-19 RX ADMIN — PANTOPRAZOLE SODIUM 40 MILLIGRAM(S): 20 TABLET, DELAYED RELEASE ORAL at 06:23

## 2020-05-19 RX ADMIN — Medication 12.5 MILLIGRAM(S): at 06:23

## 2020-05-19 RX ADMIN — Medication 12 UNIT(S): at 17:41

## 2020-05-19 RX ADMIN — Medication 1: at 17:41

## 2020-05-19 RX ADMIN — Medication 81 MILLIGRAM(S): at 12:55

## 2020-05-19 RX ADMIN — LORATADINE 10 MILLIGRAM(S): 10 TABLET ORAL at 12:56

## 2020-05-19 RX ADMIN — ENOXAPARIN SODIUM 40 MILLIGRAM(S): 100 INJECTION SUBCUTANEOUS at 12:55

## 2020-05-19 RX ADMIN — Medication 100 MILLIGRAM(S): at 06:25

## 2020-05-19 RX ADMIN — Medication 0.5 MILLIGRAM(S): at 17:40

## 2020-05-19 RX ADMIN — AMIODARONE HYDROCHLORIDE 200 MILLIGRAM(S): 400 TABLET ORAL at 06:23

## 2020-05-19 RX ADMIN — SENNA PLUS 2 TABLET(S): 8.6 TABLET ORAL at 20:44

## 2020-05-19 RX ADMIN — Medication 12 UNIT(S): at 09:40

## 2020-05-19 RX ADMIN — INSULIN GLARGINE 30 UNIT(S): 100 INJECTION, SOLUTION SUBCUTANEOUS at 20:46

## 2020-05-19 RX ADMIN — Medication 100 MILLIGRAM(S): at 20:44

## 2020-05-19 NOTE — SWALLOW BEDSIDE ASSESSMENT ADULT - ASPIRATION PRECAUTIONS
per most recent MBS recommendations on 3/12/yes yes/per most recent MBS recommendations on 3/12/2020

## 2020-05-19 NOTE — PROGRESS NOTE ADULT - SUBJECTIVE AND OBJECTIVE BOX
ENT ISSUE/POD: Weak voice    HPI: Followed up with Mr. Bertrand today. Says he is tolerating his regular diet fine. Was cleared by speech and swallow for a regular diet. No new complaints        PAST MEDICAL & SURGICAL HISTORY:  HTN (hypertension)  Diabetes  S/P CABG (coronary artery bypass graft)  History of appendectomy: x 30 yrs ago    Allergies    Blueberries (Rash)  No Known Drug Allergies    Intolerances      MEDICATIONS  (STANDING):  aMIOdarone    Tablet 200 milliGRAM(s) Oral daily  aspirin enteric coated 81 milliGRAM(s) Oral daily  atorvastatin 40 milliGRAM(s) Oral at bedtime  BACItracin   Ointment 1 Application(s) Topical daily  buDESOnide    Inhalation Suspension 0.5 milliGRAM(s) Inhalation two times a day  dextrose 5%. 1000 milliLiter(s) (50 mL/Hr) IV Continuous <Continuous>  dextrose 50% Injectable 12.5 Gram(s) IV Push once  dextrose 50% Injectable 25 Gram(s) IV Push once  dextrose 50% Injectable 25 Gram(s) IV Push once  enoxaparin Injectable 40 milliGRAM(s) SubCutaneous daily  insulin glargine Injectable (LANTUS) 30 Unit(s) SubCutaneous at bedtime  insulin lispro (HumaLOG) corrective regimen sliding scale   SubCutaneous three times a day before meals  insulin lispro (HumaLOG) corrective regimen sliding scale   SubCutaneous at bedtime  insulin lispro Injectable (HumaLOG) 12 Unit(s) SubCutaneous three times a day with meals  loratadine 10 milliGRAM(s) Oral daily  metoprolol tartrate 12.5 milliGRAM(s) Oral two times a day  pantoprazole    Tablet 40 milliGRAM(s) Oral before breakfast  polyethylene glycol 3350 17 Gram(s) Oral daily  senna 2 Tablet(s) Oral at bedtime  torsemide 10 milliGRAM(s) Oral daily  vancomycin  IVPB 1500 milliGRAM(s) IV Intermittent every 24 hours    MEDICATIONS  (PRN):  acetaminophen   Tablet .. 650 milliGRAM(s) Oral every 6 hours PRN Temp greater or equal to 38.5C (101.3F), Mild Pain (1 - 3)  ALBUTerol    90 MICROgram(s) HFA Inhaler 2 Puff(s) Inhalation every 6 hours PRN Shortness of Breath and/or Wheezing  benzonatate 100 milliGRAM(s) Oral three times a day PRN Cough  dextrose 40% Gel 15 Gram(s) Oral once PRN Blood Glucose LESS THAN 70 milliGRAM(s)/deciliter  glucagon  Injectable 1 milliGRAM(s) IntraMuscular once PRN Glucose LESS THAN 70 milligrams/deciliter  oxycodone    5 mG/acetaminophen 325 mG 1 Tablet(s) Oral every 6 hours PRN Moderate Pain (4 - 6)    ROS:   ENT: all negative except as noted in HPI   Pulm: denies SOB, cough, hemoptysis  Neuro: denies numbness/tingling, loss of sensation  Endo: denies heat/cold intolerance, excessive sweating      Vital Signs Last 24 Hrs  T(C): 36.3 (19 May 2020 13:05), Max: 36.9 (18 May 2020 22:07)  T(F): 97.3 (19 May 2020 13:05), Max: 98.4 (18 May 2020 22:07)  HR: 81 (19 May 2020 13:05) (81 - 88)  BP: 112/68 (19 May 2020 13:05) (112/68 - 151/86)  BP(mean): --  RR: 18 (19 May 2020 13:05) (18 - 18)  SpO2: 100% (19 May 2020 13:05) (96% - 100%)                          9.2    7.40  )-----------( 300      ( 19 May 2020 06:39 )             28.3    05-19    139  |  100  |  23  ----------------------------<  125<H>  4.0   |  25  |  1.31<H>    Ca    9.1      19 May 2020 06:39    TPro  6.0  /  Alb  2.4<L>  /  TBili  0.2  /  DBili  x   /  AST  11  /  ALT  11  /  AlkPhos  73  05-18       PHYSICAL EXAM:  Gen: NAD  Skin: No rashes, bruises, or lesions  Head: Normocephalic, Atraumatic  Face: no edema, erythema, or fluctuance. Parotid glands soft without mass  Eyes: no scleral injection  Nose: Nares bilaterally patent, no discharge  Mouth: No Stridor / Drooling / Trismus.  Mucosa moist, tongue/uvula midline, oropharynx clear  Neck: Flat, supple, no lymphadenopathy, trachea midline, no masses  Lymphatic: No lymphadenopathy  Resp: breathing easily, no stridor  Neuro: facial nerve intact, no facial droop ENT ISSUE/POD: Weak voice    HPI: Followed up with Mr. Bertrand today. Says he is tolerating his regular diet fine. Was cleared by speech and swallow for a regular diet. No new complaints. Notes his voice becomes weak after using it for a while but he is not interested in scope to eval it further.    PAST MEDICAL & SURGICAL HISTORY:  HTN (hypertension)  Diabetes  S/P CABG (coronary artery bypass graft)  History of appendectomy: x 30 yrs ago    Allergies    Blueberries (Rash)  No Known Drug Allergies    Intolerances      MEDICATIONS  (STANDING):  aMIOdarone    Tablet 200 milliGRAM(s) Oral daily  aspirin enteric coated 81 milliGRAM(s) Oral daily  atorvastatin 40 milliGRAM(s) Oral at bedtime  BACItracin   Ointment 1 Application(s) Topical daily  buDESOnide    Inhalation Suspension 0.5 milliGRAM(s) Inhalation two times a day  dextrose 5%. 1000 milliLiter(s) (50 mL/Hr) IV Continuous <Continuous>  dextrose 50% Injectable 12.5 Gram(s) IV Push once  dextrose 50% Injectable 25 Gram(s) IV Push once  dextrose 50% Injectable 25 Gram(s) IV Push once  enoxaparin Injectable 40 milliGRAM(s) SubCutaneous daily  insulin glargine Injectable (LANTUS) 30 Unit(s) SubCutaneous at bedtime  insulin lispro (HumaLOG) corrective regimen sliding scale   SubCutaneous three times a day before meals  insulin lispro (HumaLOG) corrective regimen sliding scale   SubCutaneous at bedtime  insulin lispro Injectable (HumaLOG) 12 Unit(s) SubCutaneous three times a day with meals  loratadine 10 milliGRAM(s) Oral daily  metoprolol tartrate 12.5 milliGRAM(s) Oral two times a day  pantoprazole    Tablet 40 milliGRAM(s) Oral before breakfast  polyethylene glycol 3350 17 Gram(s) Oral daily  senna 2 Tablet(s) Oral at bedtime  torsemide 10 milliGRAM(s) Oral daily  vancomycin  IVPB 1500 milliGRAM(s) IV Intermittent every 24 hours    MEDICATIONS  (PRN):  acetaminophen   Tablet .. 650 milliGRAM(s) Oral every 6 hours PRN Temp greater or equal to 38.5C (101.3F), Mild Pain (1 - 3)  ALBUTerol    90 MICROgram(s) HFA Inhaler 2 Puff(s) Inhalation every 6 hours PRN Shortness of Breath and/or Wheezing  benzonatate 100 milliGRAM(s) Oral three times a day PRN Cough  dextrose 40% Gel 15 Gram(s) Oral once PRN Blood Glucose LESS THAN 70 milliGRAM(s)/deciliter  glucagon  Injectable 1 milliGRAM(s) IntraMuscular once PRN Glucose LESS THAN 70 milligrams/deciliter  oxycodone    5 mG/acetaminophen 325 mG 1 Tablet(s) Oral every 6 hours PRN Moderate Pain (4 - 6)    ROS:   ENT: all negative except as noted in HPI   Pulm: denies SOB, cough, hemoptysis  Neuro: denies numbness/tingling, loss of sensation  Endo: denies heat/cold intolerance, excessive sweating      Vital Signs Last 24 Hrs  T(C): 36.3 (19 May 2020 13:05), Max: 36.9 (18 May 2020 22:07)  T(F): 97.3 (19 May 2020 13:05), Max: 98.4 (18 May 2020 22:07)  HR: 81 (19 May 2020 13:05) (81 - 88)  BP: 112/68 (19 May 2020 13:05) (112/68 - 151/86)  BP(mean): --  RR: 18 (19 May 2020 13:05) (18 - 18)  SpO2: 100% (19 May 2020 13:05) (96% - 100%)                          9.2    7.40  )-----------( 300      ( 19 May 2020 06:39 )             28.3    05-19    139  |  100  |  23  ----------------------------<  125<H>  4.0   |  25  |  1.31<H>    Ca    9.1      19 May 2020 06:39    TPro  6.0  /  Alb  2.4<L>  /  TBili  0.2  /  DBili  x   /  AST  11  /  ALT  11  /  AlkPhos  73  05-18       PHYSICAL EXAM:  Gen: NAD  Skin: No rashes, bruises, or lesions  Head: Normocephalic, Atraumatic  Face: no edema, erythema, or fluctuance. Parotid glands soft without mass  Eyes: no scleral injection  Nose: Nares bilaterally patent, no discharge  Mouth: No Stridor / Drooling / Trismus.  Mucosa moist, tongue/uvula midline, oropharynx clear  Neck: Flat, supple, no lymphadenopathy, trachea midline, no masses  Lymphatic: No lymphadenopathy  Resp: breathing easily, no stridor  Neuro: facial nerve intact, no facial droop

## 2020-05-19 NOTE — PROGRESS NOTE ADULT - SUBJECTIVE AND OBJECTIVE BOX
Follow Up: MRSA bacteremia    Interval History/ROS: Feels ok. Afebrile. Voice still comes and goes. No diarrhea. No dysuria.     Allergies  Blueberries (Rash)  Drug Allergies Not Recorded        ANTIMICROBIALS:  vancomycin  IVPB 1000 every 24 hours      OTHER MEDS:  acetaminophen   Tablet .. 650 milliGRAM(s) Oral every 6 hours PRN  ALBUTerol    90 MICROgram(s) HFA Inhaler 2 Puff(s) Inhalation every 6 hours PRN  aMIOdarone    Tablet 200 milliGRAM(s) Oral daily  aspirin enteric coated 81 milliGRAM(s) Oral daily  atorvastatin 40 milliGRAM(s) Oral at bedtime  BACItracin   Ointment 1 Application(s) Topical daily  benzonatate 100 milliGRAM(s) Oral three times a day PRN  buDESOnide    Inhalation Suspension 0.5 milliGRAM(s) Inhalation two times a day  dextrose 40% Gel 15 Gram(s) Oral once PRN  dextrose 5%. 1000 milliLiter(s) IV Continuous <Continuous>  dextrose 50% Injectable 12.5 Gram(s) IV Push once  dextrose 50% Injectable 25 Gram(s) IV Push once  dextrose 50% Injectable 25 Gram(s) IV Push once  enoxaparin Injectable 40 milliGRAM(s) SubCutaneous daily  glucagon  Injectable 1 milliGRAM(s) IntraMuscular once PRN  insulin glargine Injectable (LANTUS) 30 Unit(s) SubCutaneous at bedtime  insulin lispro (HumaLOG) corrective regimen sliding scale   SubCutaneous three times a day before meals  insulin lispro (HumaLOG) corrective regimen sliding scale   SubCutaneous at bedtime  insulin lispro Injectable (HumaLOG) 12 Unit(s) SubCutaneous three times a day with meals  loratadine 10 milliGRAM(s) Oral daily  metoprolol tartrate 12.5 milliGRAM(s) Oral two times a day  oxycodone    5 mG/acetaminophen 325 mG 1 Tablet(s) Oral every 6 hours PRN  pantoprazole    Tablet 40 milliGRAM(s) Oral before breakfast  polyethylene glycol 3350 17 Gram(s) Oral daily  senna 2 Tablet(s) Oral at bedtime  torsemide 10 milliGRAM(s) Oral daily      Vital Signs Last 24 Hrs  T(C): 36.3 (19 May 2020 13:05), Max: 36.9 (18 May 2020 22:07)  T(F): 97.3 (19 May 2020 13:05), Max: 98.4 (18 May 2020 22:07)  HR: 81 (19 May 2020 13:05) (81 - 88)  BP: 112/68 (19 May 2020 13:05) (112/68 - 151/86)  BP(mean): --  RR: 18 (19 May 2020 13:05) (17 - 18)  SpO2: 100% (19 May 2020 13:05) (96% - 100%)    Physical Exam:  General: awake, alert, non toxic  Head: atraumatic, normocephalic  Eye: normal sclera and conjunctiva  Cardio: regular rate. sternal woundvac and drains bilateral anterior chest   Respiratory: nonlabored on room air, grossly clear bilaterally  abd: soft, bowel sounds present, no tenderness  Neurologic: no focal deficit  psych: normal affect                          9.2    7.40  )-----------( 300      ( 19 May 2020 06:39 )             28.3       05-19    139  |  100  |  23  ----------------------------<  125<H>  4.0   |  25  |  1.31<H>    Ca    9.1      19 May 2020 06:39    TPro  6.0  /  Alb  2.4<L>  /  TBili  0.2  /  DBili  x   /  AST  11  /  ALT  11  /  AlkPhos  73  05-18          MICROBIOLOGY:  Vancomycin Level, Trough: 10.7 ug/mL (05-18-20 @ 17:36)    Culture - Tissue with Gram Stain (collected 05-16-20 @ 03:05)  Source: .Tissue Other  Gram Stain (05-16-20 @ 07:25):    Rare polymorphonuclear leukocytes seen per low power field    No organisms seen per oil power field  Preliminary Report (05-17-20 @ 08:11):    No growth    Culture - Fungal, Tissue (collected 05-16-20 @ 03:05)  Source: .Tissue sternal wound culture  Preliminary Report (05-18-20 @ 10:49):    Testing in progress    Culture - Blood (collected 05-14-20 @ 09:04)  Source: .Blood Blood-Peripheral  Final Report (05-19-20 @ 10:00):    No Growth Final    Culture - Blood (collected 05-14-20 @ 09:04)  Source: .Blood Blood-Peripheral  Final Report (05-19-20 @ 10:00):    No Growth Final       RADIOLOGY:  Images below reviewed personally  Xray Chest 1 View- PORTABLE-Routine (05.18.20 @ 14:02)   Drainage catheter projects over the mid chest wall. Right IJ central line tip within SVC.  Clear lungs. No pleural effusion or pneumothorax.

## 2020-05-19 NOTE — PROGRESS NOTE ADULT - SUBJECTIVE AND OBJECTIVE BOX
Patient is a 65y old  Male who presents with a chief complaint of syncope (19 May 2020 17:04)      SUBJECTIVE / OVERNIGHT EVENTS: No new complaints. Weak.  Review of Systems  chest pain no  palpitations no  sob no  nausea no  headache no    MEDICATIONS  (STANDING):  aMIOdarone    Tablet 200 milliGRAM(s) Oral daily  aspirin enteric coated 81 milliGRAM(s) Oral daily  atorvastatin 40 milliGRAM(s) Oral at bedtime  BACItracin   Ointment 1 Application(s) Topical daily  buDESOnide    Inhalation Suspension 0.5 milliGRAM(s) Inhalation two times a day  dextrose 5%. 1000 milliLiter(s) (50 mL/Hr) IV Continuous <Continuous>  dextrose 50% Injectable 12.5 Gram(s) IV Push once  dextrose 50% Injectable 25 Gram(s) IV Push once  dextrose 50% Injectable 25 Gram(s) IV Push once  enoxaparin Injectable 40 milliGRAM(s) SubCutaneous daily  insulin glargine Injectable (LANTUS) 30 Unit(s) SubCutaneous at bedtime  insulin lispro (HumaLOG) corrective regimen sliding scale   SubCutaneous three times a day before meals  insulin lispro (HumaLOG) corrective regimen sliding scale   SubCutaneous at bedtime  insulin lispro Injectable (HumaLOG) 12 Unit(s) SubCutaneous three times a day with meals  loratadine 10 milliGRAM(s) Oral daily  metoprolol tartrate 12.5 milliGRAM(s) Oral two times a day  pantoprazole    Tablet 40 milliGRAM(s) Oral before breakfast  polyethylene glycol 3350 17 Gram(s) Oral daily  senna 2 Tablet(s) Oral at bedtime  torsemide 10 milliGRAM(s) Oral daily  vancomycin  IVPB 1250 milliGRAM(s) IV Intermittent every 24 hours    MEDICATIONS  (PRN):  acetaminophen   Tablet .. 650 milliGRAM(s) Oral every 6 hours PRN Temp greater or equal to 38.5C (101.3F), Mild Pain (1 - 3)  ALBUTerol    90 MICROgram(s) HFA Inhaler 2 Puff(s) Inhalation every 6 hours PRN Shortness of Breath and/or Wheezing  benzonatate 100 milliGRAM(s) Oral three times a day PRN Cough  dextrose 40% Gel 15 Gram(s) Oral once PRN Blood Glucose LESS THAN 70 milliGRAM(s)/deciliter  glucagon  Injectable 1 milliGRAM(s) IntraMuscular once PRN Glucose LESS THAN 70 milligrams/deciliter  oxycodone    5 mG/acetaminophen 325 mG 1 Tablet(s) Oral every 6 hours PRN Moderate Pain (4 - 6)      Vital Signs Last 24 Hrs  T(C): 36.9 (19 May 2020 20:57), Max: 36.9 (18 May 2020 22:07)  T(F): 98.5 (19 May 2020 20:57), Max: 98.5 (19 May 2020 20:57)  HR: 86 (19 May 2020 20:57) (81 - 88)  BP: 143/80 (19 May 2020 20:57) (112/68 - 151/86)  BP(mean): --  RR: 18 (19 May 2020 20:57) (18 - 18)  SpO2: 98% (19 May 2020 20:57) (96% - 100%)    PHYSICAL EXAM:  GENERAL: NAD, well-developed  HEAD:  Atraumatic, Normocephalic  EYES: EOMI, PERRLA, conjunctiva and sclera clear  NECK: Supple, No JVD  CHEST/LUNG: Clear to auscultation bilaterally; No wheeze Sternal wound with VAC  HEART: Regular rate and rhythm; No murmurs, rubs, or gallops  ABDOMEN: Soft, Nontender, Nondistended; Bowel sounds present  EXTREMITIES:  2+ Peripheral Pulses, No clubbing, cyanosis, or edema  PSYCH: AAOx3  NEUROLOGY: non-focal  SKIN: No rashes or lesions    LABS:                        9.2    7.40  )-----------( 300      ( 19 May 2020 06:39 )             28.3     05-19    139  |  100  |  23  ----------------------------<  125<H>  4.0   |  25  |  1.31<H>    Ca    9.1      19 May 2020 06:39    TPro  6.0  /  Alb  2.4<L>  /  TBili  0.2  /  DBili  x   /  AST  11  /  ALT  11  /  AlkPhos  73  05-18                RADIOLOGY & ADDITIONAL TESTS:    Imaging Personally Reviewed:    Consultant(s) Notes Reviewed:      Care Discussed with Consultants/Other Providers:

## 2020-05-19 NOTE — CHART NOTE - NSCHARTNOTEFT_GEN_A_CORE
Right TLC removed, pt tolerated procedure, no complication. VSS /77, HR 85, O2 Sat 96% on room.  Continue to monitor.

## 2020-05-19 NOTE — SWALLOW BEDSIDE ASSESSMENT ADULT - SWALLOW EVAL: RECOMMENDED FEEDING/EATING TECHNIQUES
maintain upright posture during/after eating for 30 mins/alternate food with liquid/allow for swallow between intakes/position upright (90 degrees)

## 2020-05-19 NOTE — SWALLOW BEDSIDE ASSESSMENT ADULT - SLP PERTINENT HISTORY OF CURRENT PROBLEM
65y m PMHx CABG Feb 2020 complicated by PEA arrest, thoracotomy site infection w/empyema, DM, HTN p/w syncope. Pt brought in by EMS reports a syncopal episode in the shower. Pt states he felt dizzy and woke up in the shower, hit the back of his head. He also reports his wound care nurse was concerned for increase in discharge from his sternal wound. CP is located at the sternal wound, nonradiating. Pt admits to decreased appetite, SOB and chest pain. Syncope likely 2/2 to +orthostatics. CT head negative for ICH. CXR this admission with small left loculated pleural effusion now decreased in size from previous exam. 5/14: Positive blood culture - aerobic blood culture grew gram positive clusters - MRSA, source likely nonhealing sternal chest wound.. Patient already on daptomycin. 5/15: s/p debridement of sternal wound, reconstruction, Laparoscopic lysis of abdominal adhesions, creation of omental flap. Repeat blood cultures negative.

## 2020-05-19 NOTE — PROGRESS NOTE ADULT - SUBJECTIVE AND OBJECTIVE BOX
[facility] NEPHROLOGY- PROGRESS NOTE    65y Male with history of DM presents with syncope concern for sternal wound infection. Nephrology consulted for elevated Scr.    REVIEW OF SYSTEMS:  Gen: no changes in weight  Cards: no chest pain  Resp: + dyspnea resolved  GI: no nausea or vomiting or diarrhea  Vascular: no LE edema    No Known Allergies      Hospital Medications: Medications reviewed        VITALS:  T(F): 97.3 (05-19-20 @ 13:05), Max: 98.4 (05-18-20 @ 22:07)  HR: 81 (05-19-20 @ 13:05)  BP: 112/68 (05-19-20 @ 13:05)  RR: 18 (05-19-20 @ 13:05)  SpO2: 100% (05-19-20 @ 13:05)  Wt(kg): --    05-18 @ 07:01  -  05-19 @ 07:00  --------------------------------------------------------  IN: 480 mL / OUT: 1935 mL / NET: -1455 mL    05-19 @ 07:01  -  05-19 @ 14:25  --------------------------------------------------------  IN: 370 mL / OUT: 900 mL / NET: -530 mL        PHYSICAL EXAM:    Gen: NAD, calm  Cards: RRR, +S1/S2, no M/G/R  Resp: CTA B/L  GI: soft, NT/ND, NABS  Vascular: no LE edema B/L        LABS:  05-19    139  |  100  |  23  ----------------------------<  125<H>  4.0   |  25  |  1.31<H>    Ca    9.1      19 May 2020 06:39    TPro  6.0  /  Alb  2.4<L>  /  TBili  0.2  /  DBili      /  AST  11  /  ALT  11  /  AlkPhos  73  05-18    Creatinine Trend: 1.31 <--, 1.38 <--, 1.77 <--, 1.51 <--, 1.78 <--, 1.58 <--, 1.68 <--                        9.2    7.40  )-----------( 300      ( 19 May 2020 06:39 )             28.3     Urine Studies:    Osmolality, Random Urine: 257 mos/kg (05-16 @ 19:34)  Sodium, Random Urine: <35 mmol/L (05-16 @ 19:34)

## 2020-05-19 NOTE — PROGRESS NOTE ADULT - ASSESSMENT
Assessment  DMT2: 65y Male with DM T2 with hyperglycemia, A1C 7.9%, was on insulin at home, now on basal bolus insulin, increased basal-dose yesterday, blood sugars improving and trending within acceptable range, no hypoglycemic episodes. Patient eating meals, appears well and comfortable.  CAD: s/p CABG previous admission, stable, monitored.  Sternal Wound: On IV  ABx, FU Plastics/ID.  HTN: Controlled,  on antihypertensive medications.  CKD: Monitor labs/BMP.          Harper Matias MD  Cell: [phone]  Office: [phone] Assessment  DMT2: 65y Male with DM T2 with hyperglycemia, A1C 7.9%,  was on insulin at home, now on basal bolus insulin, increased basal-dose yesterday, blood sugars improving and trending within acceptable range, no hypoglycemic episodes. Patient eating meals, appears well and comfortable.  CAD: s/p CABG previous admission, stable, monitored.  Sternal Wound: On IV  ABx, FU Plastics/ID.  HTN: Controlled,  on antihypertensive medications.  CKD: Monitor labs/BMP.          Harper Matias MD  Cell: [phone]  Office: [phone]

## 2020-05-19 NOTE — SWALLOW BEDSIDE ASSESSMENT ADULT - COMMENTS
Pt. known to this service, seen during prior hospitalization. Initially seen for MBS on 3/5, recommending Dysphagia 3 with honey thick liquids, seen for repeat MBS on 3/12 -> Pt presented with a mild oropharyngeal dysphagia notable for mildly prolonged mastication intermittent uncontrolled A-P spillover, and trace pharyngeal residue. However, oropharyngeal swallow sequence grossly functional for a Regular texture diet, with no laryngeal penetration/aspiration noted across trials. Esophageal phase of swallow was notable for intermittent trace retention, and intermittent mild air distention, possibly due to tiny anterior esophageal filling defect vs. other. Recommendations: Regular diet, consider GI consult. Per Nephro, CKD-3: Baseline Scr 1.5 as per prior labs. Renal function at baseline. Patient advised to follow up as an outpatient for CKD work up. : blood sugars still running high, no hypoglycemic episodes, absolutely  non compliant with low carb , had apple, drinking regular soda. : ENT consulted as pt c/o hoarseness since his CABG in 2020. States he will be talking for a while and then lose his voice. Pt DEFERRING laryngoscopy at this time, speech&swallow eval recommended to r/o aspiration.   Pt. known to this service, seen during prior hospitalization. Initially seen for MBS on 3/5, recommending Dysphagia 3 with honey thick liquids, seen for repeat MBS on 3/12 -> Pt presented with a mild oropharyngeal dysphagia notable for mildly prolonged mastication intermittent uncontrolled A-P spillover, and trace pharyngeal residue. However, oropharyngeal swallow sequence grossly functional for a Regular texture diet, with no laryngeal penetration/aspiration noted across trials. Esophageal phase of swallow was notable for intermittent trace retention, and intermittent mild air distention, possibly due to tiny anterior esophageal filling defect vs. other. Recommendations: Regular diet, consider GI consult. Per Nephro, CKD-3: Baseline Scr 1.5 as per prior labs. Renal function at baseline. Patient advised to follow up as an outpatient for CKD work up. : blood sugars still running high, no hypoglycemic episodes, absolutely  non compliant with low carb , had apple, drinking regular soda. : ENT consulted as pt c/o hoarseness since his CABG in 2020. States he will be talking for a while and then lose his voice. Pt DEFERRING laryngoscopy at this time, speech&swallow eval recommended to r/o aspiration.   Pt. known to this service, seen during prior hospitalization. Initially seen for MBS on 3/5, recommending Dysphagia 3 with nectar thick liquids, seen for repeat MBS on 3/12 -> Pt presented with a mild oropharyngeal dysphagia notable for mildly prolonged mastication intermittent uncontrolled A-P spillover, and trace pharyngeal residue. However, oropharyngeal swallow sequence grossly functional for a Regular texture diet, with no laryngeal penetration/aspiration noted across trials. Esophageal phase of swallow was notable for intermittent trace retention, and intermittent mild air distention, possibly due to tiny anterior esophageal filling defect vs. other. Recommendations: Regular diet, consider GI consult. Per Nephro, CKD-3: Baseline Scr 1.5 as per prior labs. Renal function at baseline. Patient advised to follow up as an outpatient for CKD work up. : blood sugars still running high, no hypoglycemic episodes, absolutely  non compliant with low carb , had apple, drinking regular soda. : ENT consulted as pt c/o hoarseness since his CABG in 2020. States he will be talking for a while and then lose his voice. Pt DEFERRING laryngoscopy at this time, speech&swallow eval recommended to r/o aspiration.   Pt. known to this service, seen during prior hospitalization. Initially seen for MBS on 3/5/2020, recommending Dysphagia 3 with nectar thick liquids, seen for repeat MBS on 3/12/2020 -> Pt presented with a mild oropharyngeal dysphagia notable for mildly prolonged mastication intermittent uncontrolled A-P spillover, and trace pharyngeal residue. However, oropharyngeal swallow sequence grossly functional for a Regular texture diet, with no laryngeal penetration/aspiration noted across trials. Esophageal phase of swallow was notable for intermittent trace retention, and intermittent mild air distention, possibly due to tiny anterior esophageal filling defect vs. other. Recommendations: Regular diet, consider GI consult.

## 2020-05-19 NOTE — SWALLOW BEDSIDE ASSESSMENT ADULT - SLP GENERAL OBSERVATIONS
Pt. seated upright in bed, A&Ox4, + command following. Mild intermittent dysphonic vocal quality. Pt. seated upright in bed, A&Ox4, + command following. Mild intermittent dysphonic vocal quality Pt. seated upright in bed, A&Ox4, + command following. C/o "hoarseness" though no dysponia noted. Pt. able to phonate adequately during spontaneous speech, but states he "can't do it" during structured tasks. Pt. endorses feeling like he runs out of air during longer utterances, instructed pt. on using smaller breath groups and pt. stated understanding. Pt. does not endorse any difficulty swallowing since previous hospitalization.

## 2020-05-19 NOTE — PROGRESS NOTE ADULT - SUBJECTIVE AND OBJECTIVE BOX
Plastic Surgery Progress Note (pg [facility]: [id number], [facility]: [phone])    SUBJECTIVE:  Patient seen and examined at bedside this AM. No acute events overnight. AF/VSS. Pain well controlled.     OBJECTIVE:     ** VITAL SIGNS / I&O's **    Vital Signs Last 24 Hrs  T(C): 36.9 (19 May 2020 04:22), Max: 36.9 (18 May 2020 22:07)  T(F): 98.4 (19 May 2020 04:22), Max: 98.4 (18 May 2020 22:07)  HR: 81 (19 May 2020 04:22) (81 - 88)  BP: 151/86 (19 May 2020 04:22) (134/76 - 151/86)  BP(mean): --  RR: 18 (19 May 2020 04:22) (17 - 18)  SpO2: 96% (19 May 2020 04:22) (96% - 99%)      18 May 2020 07:01  -  19 May 2020 07:00  --------------------------------------------------------  IN:    Oral Fluid: 480 mL  Total IN: 480 mL    OUT:    Bulb: 15 mL    Bulb: 70 mL    Voided: 1850 mL  Total OUT: 1935 mL    Total NET: -1455 mL          PHYSICAL EXAM:  General: alert and oriented, NAD  Resp: airway patent, respirations unlabored  Chest: KEI x2 with s/s output. Vac in place, holding suction. Soft  Abdomen: soft, non-distended. steri strips over donor site       **MEDS**  acetaminophen   Tablet .. 650 milliGRAM(s) Oral every 6 hours PRN  ALBUTerol    90 MICROgram(s) HFA Inhaler 2 Puff(s) Inhalation every 6 hours PRN  aMIOdarone    Tablet 200 milliGRAM(s) Oral daily  aspirin enteric coated 81 milliGRAM(s) Oral daily  atorvastatin 40 milliGRAM(s) Oral at bedtime  BACItracin   Ointment 1 Application(s) Topical daily  benzonatate 100 milliGRAM(s) Oral three times a day PRN  buDESOnide    Inhalation Suspension 0.5 milliGRAM(s) Inhalation two times a day  dextrose 40% Gel 15 Gram(s) Oral once PRN  dextrose 5%. 1000 milliLiter(s) IV Continuous <Continuous>  dextrose 50% Injectable 12.5 Gram(s) IV Push once  dextrose 50% Injectable 25 Gram(s) IV Push once  dextrose 50% Injectable 25 Gram(s) IV Push once  enoxaparin Injectable 40 milliGRAM(s) SubCutaneous daily  glucagon  Injectable 1 milliGRAM(s) IntraMuscular once PRN  insulin glargine Injectable (LANTUS) 30 Unit(s) SubCutaneous at bedtime  insulin lispro (HumaLOG) corrective regimen sliding scale   SubCutaneous three times a day before meals  insulin lispro (HumaLOG) corrective regimen sliding scale   SubCutaneous at bedtime  insulin lispro Injectable (HumaLOG) 12 Unit(s) SubCutaneous three times a day with meals  loratadine 10 milliGRAM(s) Oral daily  metoprolol tartrate 12.5 milliGRAM(s) Oral two times a day  oxycodone    5 mG/acetaminophen 325 mG 1 Tablet(s) Oral every 6 hours PRN  pantoprazole    Tablet 40 milliGRAM(s) Oral before breakfast  polyethylene glycol 3350 17 Gram(s) Oral daily  senna 2 Tablet(s) Oral at bedtime  torsemide 10 milliGRAM(s) Oral daily  vancomycin  IVPB 1000 milliGRAM(s) IV Intermittent every 24 hours      ** LABS **                          9.2    7.40  )-----------( 300      ( 19 May 2020 06:39 )             28.3     19 May 2020 06:39    139    |  100    |  23     ----------------------------<  125    4.0     |  25     |  1.31     Ca    9.1        19 May 2020 06:39    TPro  6.0    /  Alb  2.4    /  TBili  0.2    /  DBili  x      /  AST  11     /  ALT  11     /  AlkPhos  73     18 May 2020 06:44      CAPILLARY BLOOD GLUCOSE      POCT Blood Glucose.: 129 mg/dL (18 May 2020 22:48)  POCT Blood Glucose.: 196 mg/dL (18 May 2020 17:26)  POCT Blood Glucose.: 191 mg/dL (18 May 2020 12:16)  POCT Blood Glucose.: 176 mg/dL (18 May 2020 09:00)

## 2020-05-19 NOTE — PROGRESS NOTE ADULT - ASSESSMENT
limited echo 5/12/20:EF 55-60%. overall preserved lv fx, despite segmental wall motion abnormalities, no LV thrombus  The apical anterior wall, the apical lateral wall, the basal anterior wall,the mid anterior wall, and the midanterolateral wall are hypokinetic.  echo 5/14/20:  Limited study to r/o Endocarditis Pt has very limited windows due to edema and wound vac. Unable to exclude valvular vegetations in the setting of a  technically difficult study. OLGA could be obtained for further evaluation of valvular vegetations, if clinical suspicion of endocarditis is high.        a/p  65y m PMHx CABG Feb 2020 complicated by PEA arrest, thoracotomy site infection w/empyema, DM, HTN p/w syncope, sternal wound infection.     1. Syncope   likely 2/2 to +orthostatics  cv stable, EKG unchanged from prior   HST elevated w/ normal CK/CKMB, demand ischemia in setting of STEPHANIE/CKD, possible wound infection   CT head negative for ICH  echo with overall preserved LVEF, despite segmental wall motion abnormalities, no LV thrombus   encourage PO intake    2. Sternal wound infection s/p RTOR for SWI    s/p debridement of sternal wound, reconstruction, Laparoscopic lysis of abdominal adhesions   IV abx per ID   blood cx neg    repeat echo unable to r/o endocarditis  olga cancelled per department, clinically stable and management will not change, already on IV abx   ?? to check OLGA after wound vac is resumed per ID.   ID, f/u    3. CAD s/p CABG x 4 , s/p PEA arrest   cv stable   cont asa, statin, bb     4. Chronic diastolic CHF  euvolemic on exam with unchanged SOB   continue diuretics as ordered   repeat echo with preserved LVEF     5. PAF  remains NSR  continue amio, bb    6. HTN  bp stable    7. STEPHANIE/CKD   continue to trend creat     8. Pleural effusion, hx  CXR this admission with small left loculated pleural effusion now decreased in size from previous exam.  persistent cough, ENT eval    dvt ppx

## 2020-05-19 NOTE — PROGRESS NOTE ADULT - SUBJECTIVE AND OBJECTIVE BOX
CARDIOLOGY FOLLOW UP - Dr. Steel    CC no cp/sob  oob with no acute complaints       PHYSICAL EXAM:  T(C): 36.9 (05-19-20 @ 04:22), Max: 36.9 (05-18-20 @ 22:07)  HR: 81 (05-19-20 @ 04:22) (81 - 88)  BP: 151/86 (05-19-20 @ 04:22) (134/76 - 151/86)  RR: 18 (05-19-20 @ 04:22) (17 - 18)  SpO2: 96% (05-19-20 @ 04:22) (96% - 99%)  Wt(kg): --  I&O's Summary    18 May 2020 07:01  -  19 May 2020 07:00  --------------------------------------------------------  IN: 480 mL / OUT: 1935 mL / NET: -1455 mL        Appearance: Normal	  Cardiovascular: Normal S1 S2,RRR, No JVD, No murmurs, KEI x2 , +wound vac   Respiratory: diminished   Gastrointestinal:  Soft, Non-tender, + BS	  Extremities: Normal range of motion, No clubbing, cyanosis or edema        MEDICATIONS  (STANDING):  aMIOdarone    Tablet 200 milliGRAM(s) Oral daily  aspirin enteric coated 81 milliGRAM(s) Oral daily  atorvastatin 40 milliGRAM(s) Oral at bedtime  BACItracin   Ointment 1 Application(s) Topical daily  buDESOnide    Inhalation Suspension 0.5 milliGRAM(s) Inhalation two times a day  dextrose 5%. 1000 milliLiter(s) (50 mL/Hr) IV Continuous <Continuous>  dextrose 50% Injectable 12.5 Gram(s) IV Push once  dextrose 50% Injectable 25 Gram(s) IV Push once  dextrose 50% Injectable 25 Gram(s) IV Push once  enoxaparin Injectable 40 milliGRAM(s) SubCutaneous daily  insulin glargine Injectable (LANTUS) 30 Unit(s) SubCutaneous at bedtime  insulin lispro (HumaLOG) corrective regimen sliding scale   SubCutaneous three times a day before meals  insulin lispro (HumaLOG) corrective regimen sliding scale   SubCutaneous at bedtime  insulin lispro Injectable (HumaLOG) 12 Unit(s) SubCutaneous three times a day with meals  loratadine 10 milliGRAM(s) Oral daily  metoprolol tartrate 12.5 milliGRAM(s) Oral two times a day  pantoprazole    Tablet 40 milliGRAM(s) Oral before breakfast  polyethylene glycol 3350 17 Gram(s) Oral daily  senna 2 Tablet(s) Oral at bedtime  torsemide 10 milliGRAM(s) Oral daily  vancomycin  IVPB 1000 milliGRAM(s) IV Intermittent every 24 hours      TELEMETRY:  70-90 	    ECG:  	  RADIOLOGY:   DIAGNOSTIC TESTING:  [ ] Echocardiogram:  [ ]  Catheterization:  [ ] Stress Test:    OTHER: 	    LABS:	 	                                9.2    7.40  )-----------( 300      ( 19 May 2020 06:39 )             28.3     05-19    139  |  100  |  23  ----------------------------<  125<H>  4.0   |  25  |  1.31<H>    Ca    9.1      19 May 2020 06:39    TPro  6.0  /  Alb  2.4<L>  /  TBili  0.2  /  DBili  x   /  AST  11  /  ALT  11  /  AlkPhos  73  05-18

## 2020-05-19 NOTE — PROGRESS NOTE ADULT - PROBLEM SELECTOR PLAN 1
-Diet per S&S  -Call ENT prior to pts DC if voice does not improve at all. Likely 2/2 deconditioning  -Reconsult ENT should any further issues arise

## 2020-05-19 NOTE — PROGRESS NOTE ADULT - SUBJECTIVE AND OBJECTIVE BOX
Chief complaint  Patient is a 65y old  Male who presents with a chief complaint of syncope (19 May 2020 11:15)   Review of systems  Patient sitting up in chair, alert and comfortable, no hypoglycemic episodes.    Labs and Fingersticks  CAPILLARY BLOOD GLUCOSE      POCT Blood Glucose.: 140 mg/dL (19 May 2020 12:48)  POCT Blood Glucose.: 106 mg/dL (19 May 2020 08:59)  POCT Blood Glucose.: 129 mg/dL (18 May 2020 22:48)  POCT Blood Glucose.: 196 mg/dL (18 May 2020 17:26)      Anion Gap, Serum: 14 (05-19 @ 06:39)  Anion Gap, Serum: 10 (05-18 @ 06:44)      Calcium, Total Serum: 9.1 (05-19 @ 06:39)  Calcium, Total Serum: 9.0 (05-18 @ 06:44)  Albumin, Serum: 2.4 <L> (05-18 @ 06:44)    Alanine Aminotransferase (ALT/SGPT): 11 (05-18 @ 06:44)  Alkaline Phosphatase, Serum: 73 (05-18 @ 06:44)  Aspartate Aminotransferase (AST/SGOT): 11 (05-18 @ 06:44)        05-19    139  |  100  |  23  ----------------------------<  125<H>  4.0   |  25  |  1.31<H>    Ca    9.1      19 May 2020 06:39    TPro  6.0  /  Alb  2.4<L>  /  TBili  0.2  /  DBili  x   /  AST  11  /  ALT  11  /  AlkPhos  73  05-18                        9.2    7.40  )-----------( 300      ( 19 May 2020 06:39 )             28.3     Medications  MEDICATIONS  (STANDING):  aMIOdarone    Tablet 200 milliGRAM(s) Oral daily  aspirin enteric coated 81 milliGRAM(s) Oral daily  atorvastatin 40 milliGRAM(s) Oral at bedtime  BACItracin   Ointment 1 Application(s) Topical daily  buDESOnide    Inhalation Suspension 0.5 milliGRAM(s) Inhalation two times a day  dextrose 5%. 1000 milliLiter(s) (50 mL/Hr) IV Continuous <Continuous>  dextrose 50% Injectable 12.5 Gram(s) IV Push once  dextrose 50% Injectable 25 Gram(s) IV Push once  dextrose 50% Injectable 25 Gram(s) IV Push once  enoxaparin Injectable 40 milliGRAM(s) SubCutaneous daily  insulin glargine Injectable (LANTUS) 30 Unit(s) SubCutaneous at bedtime  insulin lispro (HumaLOG) corrective regimen sliding scale   SubCutaneous three times a day before meals  insulin lispro (HumaLOG) corrective regimen sliding scale   SubCutaneous at bedtime  insulin lispro Injectable (HumaLOG) 12 Unit(s) SubCutaneous three times a day with meals  loratadine 10 milliGRAM(s) Oral daily  metoprolol tartrate 12.5 milliGRAM(s) Oral two times a day  pantoprazole    Tablet 40 milliGRAM(s) Oral before breakfast  polyethylene glycol 3350 17 Gram(s) Oral daily  senna 2 Tablet(s) Oral at bedtime  torsemide 10 milliGRAM(s) Oral daily  vancomycin  IVPB 1000 milliGRAM(s) IV Intermittent every 24 hours      Physical Exam  General: Patient comfortable in bed  Vital Signs Last 12 Hrs  T(F): 97.3 (05-19-20 @ 13:05), Max: 98.4 (05-19-20 @ 04:22)  HR: 81 (05-19-20 @ 13:05) (81 - 81)  BP: 112/68 (05-19-20 @ 13:05) (112/68 - 151/86)  BP(mean): --  RR: 18 (05-19-20 @ 13:05) (18 - 18)  SpO2: 100% (05-19-20 @ 13:05) (96% - 100%)  Neck: No palpable thyroid nodules.  CVS: S1S2, No murmurs  Respiratory: No wheezing, no crepitations  GI: Abdomen soft, bowel sounds positive  Musculoskeletal:  edema lower extremities.   Skin: No skin rashes, no ecchymosis    Diagnostics

## 2020-05-19 NOTE — PROGRESS NOTE ADULT - ASSESSMENT
65y Male with history of DM presents with syncope concern for sternal wound infection. Nephrology consulted for elevated Scr.    1) CKD-3: Baseline Scr 1.5 as per prior labs. Renal function below baseline likely due to IVF (now discontinued). Patient advised to follow up as an outpatient for CKD work up. Avoid nephrotoxins. Monitor electrolytes.    2) HTN with CKD: BP controlled. Metoprolol as per cardiology. Monitor BP.    3) LE edema: Patient appears euvolemic. Continue with torsemide 10 mg daily. Monitor UO.    4) Hyponatremia: Resolved and secondary to poor solute intake as per urine studies. Monitor serum sodium.

## 2020-05-19 NOTE — PROGRESS NOTE ADULT - PROBLEM SELECTOR PLAN 1
Will continue current insulin regimen for now. Will continue monitoring FS, log, and FU.  Patient was on insulin at home, knows how to do injections. Suggest DC on current insulin regimen and FU endo 4 weeks.  Patient counseled for compliance with insulin injections, consistent low carb diet, and exercise as tolerated outpatient.

## 2020-05-19 NOTE — PROGRESS NOTE ADULT - ASSESSMENT
65 m with    Syncope  - telemetry  - cardiology follow     Orthostatic hypotension  - DC aldactone  - follow    S/P CABG  - CT sx evaluation noted    Sternal wound, s/p reconstruction and omental flap  - MRSA bacteremia  - Vancomycin  - ID follow  - postop care    Bacteremia  - OLGA to r/o endocarditis pending .  - ID follow    Scalp abrasion healing    Diabetes control  - ADA diet  - BS control  - Endocrine follow    CKD  - follow    HTN control    Hoarseness  - ENT evaluation noted    PT    Miguel Angel Duke MD pager [phone]

## 2020-05-19 NOTE — PROGRESS NOTE ADULT - ASSESSMENT
65M s/p CABG 2/3/20, course complicated by PEA arrest, E faecalis bacteremia and local infections of the sternal and right leg SVG sites, now admitted 5/11/20 for syncope.   In total he received about six weeks of antibiotics although no growth from blood or sternal OR debridement 2/25 and low intraoperative concern for osteomyelitis.   His wound drainage increased significantly now growing MRSA and with low-grade bacteremia. Repeat blood cultures 5/14 negative to date.   s/p OR 5/15 for debridement with findings of a seroma cavity beneath the pectoralis, reconstruction with omentum.   Clinically well   I have a low suspicion for endocarditis but an echo is standard of care, his infection occurred after a long course of antibiotics and endocarditis would warrant 6 weeks of antibiotics.     Suggest  -increase Vancomycin to 1.25GM IV q24h and check trough in a few days   -plan on at least 4 weeks of IV antibiotics, maybe 6 given elevated ESR/CRP although no aggressive osseous lesions on CT  -echocardiogram - OLGA if TTE limited by wound     Spoke with primary team     Chapito Delarosa MD   Infectious Disease   Pager [phone]   After 5PM and on weekends please page fellow on call or call [phone]

## 2020-05-19 NOTE — SWALLOW BEDSIDE ASSESSMENT ADULT - ASR SWALLOW ASPIRATION MONITOR
upper respiratory infection/change of breathing pattern/fever/pneumonia/throat clearing/gurgly voice/cough cough/gurgly voice/pneumonia/upper respiratory infection/fever/change of breathing pattern

## 2020-05-19 NOTE — PROGRESS NOTE ADULT - ASSESSMENT
65y m PMHx CABG Feb 2020 complicated by PEA arrest, sternal infection s/p sternal debridement with muscle flap reconstruction, DM, HTN p/w increased drainage from sternal wound s/p omental flap and incision closure w/ FTSG 5/15    Plan:  - c/w wound vac for 5 days post op 5/20, PRS will remove, if dc prior to will dc w/ wound vac  - c/w drains  - diet per general surgery   - Care per primary team    Plastic Surgery  [phone]

## 2020-05-19 NOTE — SWALLOW BEDSIDE ASSESSMENT ADULT - SWALLOW EVAL: DIAGNOSIS
Pt. is a 64 y/o male with syncope, s/p CABG Feb 2020, s/p debridement of sternal wound, laparoscopic lysis of abdominal adhesions, creation of omental flap. Pt. presents with: 1) Functional oropharyngeal swallow for a regular diet. While slightly prolonged mastication was noted with solid consistency, oral management was adequate and no overt signs of penetration/aspiration were noted across all consistencies. 2) Mild intermittent dysphonia Pt. is a 66 y/o male with syncope, s/p CABG Feb 2020, s/p debridement of sternal wound, laparoscopic lysis of abdominal adhesions, creation of omental flap. Pt. presents with: 1) Functional oropharyngeal swallow for a regular diet. While slightly prolonged mastication was noted with solid consistency, overall oral management was adequate and no overt signs of penetration/aspiration were seen across all consistencies. 2) Mild intermittent dysphonia. Pt. states hoarseness worsens during long conversations. Suspect related to reduced respiratory support Pt. is a 64 y/o male with syncope, s/p CABG Feb 2020, s/p debridement of sternal wound, laparoscopic lysis of abdominal adhesions, creation of omental flap. Pt. presents with: 1) Functional oropharyngeal swallow for a regular diet. While slightly prolonged mastication was noted with solid consistency, overall oral management was adequate and no overt signs of penetration/aspiration were seen across all consistencies. 2) c/o "hoarseness", though endorses feeling as if he is running out of air, suspect related to reduced respiratory support during extended utterances. No dysphonia noted during this assessment. Pt. is a 66 y/o male with syncope, s/p CABG Feb 2020, s/p debridement of sternal wound, laparoscopic lysis of abdominal adhesions, creation of omental flap. Pt. presents with: 1) Functional oropharyngeal swallow for a regular diet. While slightly prolonged mastication was noted with solid consistency, overall oral management was adequate and no overt signs of penetration/aspiration were seen across all consistencies. 2) c/o "hoarseness", though endorses feeling as if he is running out of air. Suspect related to reduced respiratory support during extended utterances. No dysphonia noted during this assessment.

## 2020-05-19 NOTE — PROGRESS NOTE ADULT - ASSESSMENT
64 yo male w weak voice likely 2/2 deconditioning, no concern for aspiration per S&S. Pt deferring laryngoscopy at this time.

## 2020-05-20 LAB
ANION GAP SERPL CALC-SCNC: 11 MMOL/L — SIGNIFICANT CHANGE UP (ref 5–17)
BUN SERPL-MCNC: 28 MG/DL — HIGH (ref 7–23)
CALCIUM SERPL-MCNC: 9.1 MG/DL — SIGNIFICANT CHANGE UP (ref 8.4–10.5)
CHLORIDE SERPL-SCNC: 99 MMOL/L — SIGNIFICANT CHANGE UP (ref 96–108)
CO2 SERPL-SCNC: 25 MMOL/L — SIGNIFICANT CHANGE UP (ref 22–31)
CREAT SERPL-MCNC: 1.63 MG/DL — HIGH (ref 0.5–1.3)
GLUCOSE BLDC GLUCOMTR-MCNC: 126 MG/DL — HIGH (ref 70–99)
GLUCOSE BLDC GLUCOMTR-MCNC: 151 MG/DL — HIGH (ref 70–99)
GLUCOSE BLDC GLUCOMTR-MCNC: 230 MG/DL — HIGH (ref 70–99)
GLUCOSE BLDC GLUCOMTR-MCNC: 247 MG/DL — HIGH (ref 70–99)
GLUCOSE SERPL-MCNC: 237 MG/DL — HIGH (ref 70–99)
HCT VFR BLD CALC: 28.2 % — LOW (ref 39–50)
HGB BLD-MCNC: 8.7 G/DL — LOW (ref 13–17)
MCHC RBC-ENTMCNC: 26.3 PG — LOW (ref 27–34)
MCHC RBC-ENTMCNC: 30.9 GM/DL — LOW (ref 32–36)
MCV RBC AUTO: 85.2 FL — SIGNIFICANT CHANGE UP (ref 80–100)
NRBC # BLD: 0 /100 WBCS — SIGNIFICANT CHANGE UP (ref 0–0)
PLATELET # BLD AUTO: 318 K/UL — SIGNIFICANT CHANGE UP (ref 150–400)
POTASSIUM SERPL-MCNC: 4.3 MMOL/L — SIGNIFICANT CHANGE UP (ref 3.5–5.3)
POTASSIUM SERPL-SCNC: 4.3 MMOL/L — SIGNIFICANT CHANGE UP (ref 3.5–5.3)
RBC # BLD: 3.31 M/UL — LOW (ref 4.2–5.8)
RBC # FLD: 15.7 % — HIGH (ref 10.3–14.5)
SODIUM SERPL-SCNC: 135 MMOL/L — SIGNIFICANT CHANGE UP (ref 135–145)
VANCOMYCIN TROUGH SERPL-MCNC: 13.6 UG/ML — SIGNIFICANT CHANGE UP (ref 10–20)
WBC # BLD: 8.16 K/UL — SIGNIFICANT CHANGE UP (ref 3.8–10.5)
WBC # FLD AUTO: 8.16 K/UL — SIGNIFICANT CHANGE UP (ref 3.8–10.5)

## 2020-05-20 PROCEDURE — 93321 DOPPLER ECHO F-UP/LMTD STD: CPT | Mod: 26

## 2020-05-20 PROCEDURE — 93308 TTE F-UP OR LMTD: CPT | Mod: 26

## 2020-05-20 PROCEDURE — 99232 SBSQ HOSP IP/OBS MODERATE 35: CPT

## 2020-05-20 RX ORDER — INSULIN GLARGINE 100 [IU]/ML
35 INJECTION, SOLUTION SUBCUTANEOUS AT BEDTIME
Refills: 0 | Status: DISCONTINUED | OUTPATIENT
Start: 2020-05-20 | End: 2020-05-21

## 2020-05-20 RX ORDER — INSULIN LISPRO 100/ML
14 VIAL (ML) SUBCUTANEOUS
Refills: 0 | Status: DISCONTINUED | OUTPATIENT
Start: 2020-05-20 | End: 2020-05-21

## 2020-05-20 RX ORDER — HYDROCORTISONE 1 %
1 OINTMENT (GRAM) TOPICAL
Refills: 0 | Status: DISCONTINUED | OUTPATIENT
Start: 2020-05-20 | End: 2020-05-27

## 2020-05-20 RX ADMIN — Medication 166.67 MILLIGRAM(S): at 20:58

## 2020-05-20 RX ADMIN — SENNA PLUS 2 TABLET(S): 8.6 TABLET ORAL at 20:59

## 2020-05-20 RX ADMIN — Medication 12 UNIT(S): at 09:09

## 2020-05-20 RX ADMIN — OXYCODONE AND ACETAMINOPHEN 1 TABLET(S): 5; 325 TABLET ORAL at 20:59

## 2020-05-20 RX ADMIN — INSULIN GLARGINE 35 UNIT(S): 100 INJECTION, SOLUTION SUBCUTANEOUS at 21:01

## 2020-05-20 RX ADMIN — LORATADINE 10 MILLIGRAM(S): 10 TABLET ORAL at 13:19

## 2020-05-20 RX ADMIN — Medication 100 MILLIGRAM(S): at 06:29

## 2020-05-20 RX ADMIN — Medication 1 APPLICATION(S): at 21:01

## 2020-05-20 RX ADMIN — Medication 1: at 17:36

## 2020-05-20 RX ADMIN — Medication 10 MILLIGRAM(S): at 06:31

## 2020-05-20 RX ADMIN — Medication 0.5 MILLIGRAM(S): at 17:35

## 2020-05-20 RX ADMIN — ATORVASTATIN CALCIUM 40 MILLIGRAM(S): 80 TABLET, FILM COATED ORAL at 20:59

## 2020-05-20 RX ADMIN — PANTOPRAZOLE SODIUM 40 MILLIGRAM(S): 20 TABLET, DELAYED RELEASE ORAL at 06:29

## 2020-05-20 RX ADMIN — Medication 0.5 MILLIGRAM(S): at 06:30

## 2020-05-20 RX ADMIN — Medication 100 MILLIGRAM(S): at 20:59

## 2020-05-20 RX ADMIN — Medication 14 UNIT(S): at 17:36

## 2020-05-20 RX ADMIN — Medication 12.5 MILLIGRAM(S): at 06:29

## 2020-05-20 RX ADMIN — Medication 2: at 09:10

## 2020-05-20 RX ADMIN — Medication 1 APPLICATION(S): at 13:20

## 2020-05-20 RX ADMIN — AMIODARONE HYDROCHLORIDE 200 MILLIGRAM(S): 400 TABLET ORAL at 06:29

## 2020-05-20 RX ADMIN — ENOXAPARIN SODIUM 40 MILLIGRAM(S): 100 INJECTION SUBCUTANEOUS at 13:19

## 2020-05-20 RX ADMIN — Medication 12.5 MILLIGRAM(S): at 17:35

## 2020-05-20 RX ADMIN — Medication 2: at 13:19

## 2020-05-20 RX ADMIN — Medication 81 MILLIGRAM(S): at 13:19

## 2020-05-20 NOTE — PROGRESS NOTE ADULT - SUBJECTIVE AND OBJECTIVE BOX
Patient is a 65y old  Male who presents with a chief complaint of syncope (20 May 2020 13:21)      SUBJECTIVE / OVERNIGHT EVENTS: weak.   Review of Systems  chest pain no  palpitations no  sob no  nausea no  headache no    MEDICATIONS  (STANDING):  aMIOdarone    Tablet 200 milliGRAM(s) Oral daily  aspirin enteric coated 81 milliGRAM(s) Oral daily  atorvastatin 40 milliGRAM(s) Oral at bedtime  BACItracin   Ointment 1 Application(s) Topical daily  buDESOnide    Inhalation Suspension 0.5 milliGRAM(s) Inhalation two times a day  dextrose 5%. 1000 milliLiter(s) (50 mL/Hr) IV Continuous <Continuous>  dextrose 50% Injectable 12.5 Gram(s) IV Push once  dextrose 50% Injectable 25 Gram(s) IV Push once  dextrose 50% Injectable 25 Gram(s) IV Push once  enoxaparin Injectable 40 milliGRAM(s) SubCutaneous daily  hydrocortisone 1% Cream 1 Application(s) Topical two times a day  insulin glargine Injectable (LANTUS) 35 Unit(s) SubCutaneous at bedtime  insulin lispro (HumaLOG) corrective regimen sliding scale   SubCutaneous three times a day before meals  insulin lispro (HumaLOG) corrective regimen sliding scale   SubCutaneous at bedtime  insulin lispro Injectable (HumaLOG) 14 Unit(s) SubCutaneous three times a day with meals  loratadine 10 milliGRAM(s) Oral daily  metoprolol tartrate 12.5 milliGRAM(s) Oral two times a day  pantoprazole    Tablet 40 milliGRAM(s) Oral before breakfast  polyethylene glycol 3350 17 Gram(s) Oral daily  senna 2 Tablet(s) Oral at bedtime  torsemide 10 milliGRAM(s) Oral daily  vancomycin  IVPB 1250 milliGRAM(s) IV Intermittent every 24 hours    MEDICATIONS  (PRN):  acetaminophen   Tablet .. 650 milliGRAM(s) Oral every 6 hours PRN Temp greater or equal to 38.5C (101.3F), Mild Pain (1 - 3)  ALBUTerol    90 MICROgram(s) HFA Inhaler 2 Puff(s) Inhalation every 6 hours PRN Shortness of Breath and/or Wheezing  benzonatate 100 milliGRAM(s) Oral three times a day PRN Cough  dextrose 40% Gel 15 Gram(s) Oral once PRN Blood Glucose LESS THAN 70 milliGRAM(s)/deciliter  glucagon  Injectable 1 milliGRAM(s) IntraMuscular once PRN Glucose LESS THAN 70 milligrams/deciliter  oxycodone    5 mG/acetaminophen 325 mG 1 Tablet(s) Oral every 6 hours PRN Moderate Pain (4 - 6)      Vital Signs Last 24 Hrs  T(C): 36.5 (20 May 2020 13:06), Max: 36.9 (19 May 2020 20:57)  T(F): 97.7 (20 May 2020 13:06), Max: 98.5 (19 May 2020 20:57)  HR: 85 (20 May 2020 17:33) (82 - 90)  BP: 132/68 (20 May 2020 17:33) (122/69 - 143/80)  BP(mean): --  RR: 19 (20 May 2020 13:06) (18 - 19)  SpO2: 99% (20 May 2020 13:06) (94% - 99%)    PHYSICAL EXAM:  GENERAL: NAD  HEAD:  Atraumatic, Normocephalic  EYES: EOMI, PERRLA, conjunctiva and sclera clear  NECK: Supple, No JVD  CHEST/LUNG: Clear to auscultation bilaterally; No wheeze Sternal wound clean.   HEART: Regular rate and rhythm; No murmurs, rubs, or gallops  ABDOMEN: Soft, Nontender, Nondistended; Bowel sounds present KEI  EXTREMITIES:  2+ Peripheral Pulses, No clubbing, cyanosis, or edema  PSYCH: AAOx3  NEUROLOGY: non-focal  SKIN: No rashes or lesions    LABS:                        8.7    8.16  )-----------( 318      ( 20 May 2020 06:31 )             28.2     05-20    135  |  99  |  28<H>  ----------------------------<  237<H>  4.3   |  25  |  1.63<H>    Ca    9.1      20 May 2020 06:31            < from: Limited Transthoracic Echo (05.20.20 @ 13:53) >  Conclusions:  1. Endocardium not well visualized; grossly normal left  ventricular systolic function.  Limited windows unable to rule out endocarditis.  Consider  OLGA if clinically indicated.  *** Compared with echocardiogram of 5/14/2020, no  significant changes noted.  ------------------------------------    < end of copied text >        RADIOLOGY & ADDITIONAL TESTS:    Imaging Personally Reviewed:    Consultant(s) Notes Reviewed:      Care Discussed with Consultants/Other Providers:

## 2020-05-20 NOTE — PROGRESS NOTE ADULT - ASSESSMENT
limited echo 5/12/20:EF 55-60%. overall preserved lv fx, despite segmental wall motion abnormalities, no LV thrombus  The apical anterior wall, the apical lateral wall, the basal anterior wall,the mid anterior wall, and the midanterolateral wall are hypokinetic.  echo 5/14/20:  Limited study to r/o Endocarditis Pt has very limited windows due to edema and wound vac. Unable to exclude valvular vegetations in the setting of a  technically difficult study. OLGA could be obtained for further evaluation of valvular vegetations, if clinical suspicion of endocarditis is high.        a/p  65y m PMHx CABG Feb 2020 complicated by PEA arrest, thoracotomy site infection w/empyema, DM, HTN p/w syncope, sternal wound infection.     1. Syncope   likely 2/2 to +orthostatics  cv stable, EKG unchanged from prior   HST elevated w/ normal CK/CKMB, demand ischemia in setting of STEPHANIE/CKD, possible wound infection   CT head negative for ICH  echo with overall preserved LVEF, despite segmental wall motion abnormalities, no LV thrombus   encourage PO intake    2. Sternal wound infection s/p RTOR for SWI    s/p debridement of sternal wound, reconstruction, Laparoscopic lysis of abdominal adhesions   IV abx per ID   blood cx neg    repeat echo unable to r/o endocarditis  olga cancelled per department, clinically stable and management will not change, already on IV abx   ?? to check OLGA after wound vac is removed per ID.   ID, f/u    3. CAD s/p CABG x 4 , s/p PEA arrest   cv stable   cont asa, statin, bb     4. Chronic diastolic CHF  euvolemic on exam with unchanged SOB   continue diuretics as ordered   repeat echo with preserved LVEF     5. PAF  remains NSR  continue amio, bb    6. HTN  bp stable    7. STEPHANIE/CKD   continue to trend creat     8. Pleural effusion, hx  CXR this admission with small left loculated pleural effusion now decreased in size from previous exam.  ENT f/u noted - pt. refusing scope at this time, outpt. f/u     dvt ppx

## 2020-05-20 NOTE — PROGRESS NOTE ADULT - PROBLEM SELECTOR PLAN 1
Will increase Lantus to 35u at bedtime.  Will increase Humalog to 14u before each meal and continue Humalog correction scale coverage. Will continue monitoring FS and FU.  Patient was on insulin at home, knows how to do injections. Suggest DC on current insulin regimen and FU endo 4 weeks.  Patient counseled for compliance with insulin injections, consistent low carb diet, and exercise as tolerated outpatient.

## 2020-05-20 NOTE — PROGRESS NOTE ADULT - ASSESSMENT
65M s/p CABG 2/3/20, course complicated by PEA arrest, E faecalis bacteremia and local infections of the sternal and right leg SVG sites, now admitted 5/11/20 for syncope.   In total he received about six weeks of antibiotics although no growth from blood or sternal OR debridement 2/25 and low intraoperative concern for osteomyelitis.   His wound drainage increased significantly now growing MRSA and with low-grade bacteremia. Repeat blood cultures 5/14 negative.   Elevated ESR/CRP although no aggressive osseous lesions on CT.   s/p OR 5/15 for debridement with findings of a seroma cavity beneath the pectoralis, reconstruction with omentum.   Clinically well   I have a low suspicion for endocarditis but would still pursue an echo.     Suggest  -order a TTE, if inadequate study due to sternal wound then pursue OLGA   -continue Vancomycin 1.25GM IV q24h and recheck trough tomorrow   -plan on 6 weeks of IV antibiotics     Spoke with primary team     Chapito Delarosa MD   Infectious Disease   Pager [phone]   After 5PM and on weekends please page fellow on call or call [phone]

## 2020-05-20 NOTE — PROGRESS NOTE ADULT - SUBJECTIVE AND OBJECTIVE BOX
CARDIOLOGY FOLLOW UP - Dr. Steel    CC no cp/sob   pt. oob to chair  states he refused scope offered by ENT  he is eating and drinking normally   voice just become "weak" after talking   wound vac removed     PHYSICAL EXAM:  T(C): 36.8 (05-20-20 @ 04:38), Max: 36.9 (05-19-20 @ 20:57)  HR: 90 (05-20-20 @ 04:38) (81 - 90)  BP: 130/66 (05-20-20 @ 04:38) (112/68 - 143/80)  RR: 19 (05-20-20 @ 04:38) (18 - 19)  SpO2: 94% (05-20-20 @ 04:38) (94% - 100%)  Wt(kg): --  I&O's Summary    19 May 2020 07:01  -  20 May 2020 07:00  --------------------------------------------------------  IN: 905 mL / OUT: 2015 mL / NET: -1110 mL        Appearance: Normal   Cardiovascular: Normal S1 S2,RRR, No JVD, No murmurs  Respiratory: Lungs clear to auscultation	  Gastrointestinal:  Soft, Non-tender, + BS	  Extremities: Normal range of motion, No clubbing, cyanosis or edema        MEDICATIONS  (STANDING):  aMIOdarone    Tablet 200 milliGRAM(s) Oral daily  aspirin enteric coated 81 milliGRAM(s) Oral daily  atorvastatin 40 milliGRAM(s) Oral at bedtime  BACItracin   Ointment 1 Application(s) Topical daily  buDESOnide    Inhalation Suspension 0.5 milliGRAM(s) Inhalation two times a day  dextrose 5%. 1000 milliLiter(s) (50 mL/Hr) IV Continuous <Continuous>  dextrose 50% Injectable 12.5 Gram(s) IV Push once  dextrose 50% Injectable 25 Gram(s) IV Push once  dextrose 50% Injectable 25 Gram(s) IV Push once  enoxaparin Injectable 40 milliGRAM(s) SubCutaneous daily  insulin glargine Injectable (LANTUS) 30 Unit(s) SubCutaneous at bedtime  insulin lispro (HumaLOG) corrective regimen sliding scale   SubCutaneous three times a day before meals  insulin lispro (HumaLOG) corrective regimen sliding scale   SubCutaneous at bedtime  insulin lispro Injectable (HumaLOG) 12 Unit(s) SubCutaneous three times a day with meals  loratadine 10 milliGRAM(s) Oral daily  metoprolol tartrate 12.5 milliGRAM(s) Oral two times a day  pantoprazole    Tablet 40 milliGRAM(s) Oral before breakfast  polyethylene glycol 3350 17 Gram(s) Oral daily  senna 2 Tablet(s) Oral at bedtime  torsemide 10 milliGRAM(s) Oral daily  vancomycin  IVPB 1250 milliGRAM(s) IV Intermittent every 24 hours      TELEMETRY: NSR  	    ECG:  	  RADIOLOGY:   DIAGNOSTIC TESTING:  [ ] Echocardiogram:  [ ]  Catheterization:  [ ] Stress Test:    OTHER: 	    LABS:	 	                                8.7    8.16  )-----------( 318      ( 20 May 2020 06:31 )             28.2     05-20    135  |  99  |  28<H>  ----------------------------<  237<H>  4.3   |  25  |  1.63<H>    Ca    9.1      20 May 2020 06:31

## 2020-05-20 NOTE — PROGRESS NOTE ADULT - SUBJECTIVE AND OBJECTIVE BOX
Chief complaint  Patient is a 65y old  Male who presents with a chief complaint of syncope (20 May 2020 11:58)   Review of systems  Patient in bed, looks comfortable, no hypoglycemic episodes.    Labs and Fingersticks  CAPILLARY BLOOD GLUCOSE      POCT Blood Glucose.: 247 mg/dL (20 May 2020 12:44)  POCT Blood Glucose.: 230 mg/dL (20 May 2020 08:45)  POCT Blood Glucose.: 145 mg/dL (19 May 2020 21:22)  POCT Blood Glucose.: 192 mg/dL (19 May 2020 17:39)      Anion Gap, Serum: 11 (05-20 @ 06:31)  Anion Gap, Serum: 14 (05-19 @ 06:39)      Calcium, Total Serum: 9.1 (05-20 @ 06:31)  Calcium, Total Serum: 9.1 (05-19 @ 06:39)          05-20    135  |  99  |  28<H>  ----------------------------<  237<H>  4.3   |  25  |  1.63<H>    Ca    9.1      20 May 2020 06:31                          8.7    8.16  )-----------( 318      ( 20 May 2020 06:31 )             28.2     Medications  MEDICATIONS  (STANDING):  aMIOdarone    Tablet 200 milliGRAM(s) Oral daily  aspirin enteric coated 81 milliGRAM(s) Oral daily  atorvastatin 40 milliGRAM(s) Oral at bedtime  BACItracin   Ointment 1 Application(s) Topical daily  buDESOnide    Inhalation Suspension 0.5 milliGRAM(s) Inhalation two times a day  dextrose 5%. 1000 milliLiter(s) (50 mL/Hr) IV Continuous <Continuous>  dextrose 50% Injectable 12.5 Gram(s) IV Push once  dextrose 50% Injectable 25 Gram(s) IV Push once  dextrose 50% Injectable 25 Gram(s) IV Push once  enoxaparin Injectable 40 milliGRAM(s) SubCutaneous daily  insulin glargine Injectable (LANTUS) 35 Unit(s) SubCutaneous at bedtime  insulin lispro (HumaLOG) corrective regimen sliding scale   SubCutaneous three times a day before meals  insulin lispro (HumaLOG) corrective regimen sliding scale   SubCutaneous at bedtime  insulin lispro Injectable (HumaLOG) 14 Unit(s) SubCutaneous three times a day with meals  loratadine 10 milliGRAM(s) Oral daily  metoprolol tartrate 12.5 milliGRAM(s) Oral two times a day  pantoprazole    Tablet 40 milliGRAM(s) Oral before breakfast  polyethylene glycol 3350 17 Gram(s) Oral daily  senna 2 Tablet(s) Oral at bedtime  torsemide 10 milliGRAM(s) Oral daily  vancomycin  IVPB 1250 milliGRAM(s) IV Intermittent every 24 hours      Physical Exam  General: Patient comfortable in bed  Vital Signs Last 12 Hrs  T(F): 98.2 (05-20-20 @ 04:38), Max: 98.2 (05-20-20 @ 04:38)  HR: 90 (05-20-20 @ 04:38) (90 - 90)  BP: 130/66 (05-20-20 @ 04:38) (130/66 - 130/66)  BP(mean): --  RR: 19 (05-20-20 @ 04:38) (19 - 19)  SpO2: 94% (05-20-20 @ 04:38) (94% - 94%)  Neck: No palpable thyroid nodules.  CVS: S1S2, No murmurs  Respiratory: No wheezing, no crepitations  GI: Abdomen soft, bowel sounds positive  Musculoskeletal:  edema lower extremities.   Skin: No skin rashes, no ecchymosis    Diagnostics

## 2020-05-20 NOTE — PROGRESS NOTE ADULT - ASSESSMENT
Assessment  DMT2: 65y Male with DM T2 with hyperglycemia, A1C 7.9%, was on insulin at home, now on basal bolus insulin, blood sugars trending up, FS running high and not at target, no hypoglycemic episodes. Patient appears comfortable, eating meals, diet was advanced.  CAD: s/p CABG previous admission, stable, monitored.  Sternal Wound: On IV  ABx, FU Plastics/ID.  HTN: Controlled,  on antihypertensive medications.  CKD: Monitor labs/BMP.          Harper Matias MD  Cell: [phone]  Office: [phone] Assessment  DMT2: 65y Male with DM T2 with hyperglycemia, A1C 7.9%,  was on insulin at home, now on basal bolus insulin, blood sugars trending up, FS running high and not at target, no hypoglycemic episodes. Patient appears comfortable, eating meals, diet was advanced.  CAD: s/p CABG previous admission, stable, monitored.  Sternal Wound: On IV  ABx, FU Plastics/ID.  HTN: Controlled,  on antihypertensive medications.  CKD: Monitor labs/BMP.          Harper Matias MD  Cell: [phone]  Office: [phone]

## 2020-05-20 NOTE — PROGRESS NOTE ADULT - SUBJECTIVE AND OBJECTIVE BOX
[facility] NEPHROLOGY- PROGRESS NOTE    65y Male with history of DM presents with syncope concern for sternal wound infection. Nephrology consulted for elevated Scr.    REVIEW OF SYSTEMS:  Gen: no changes in weight  Cards: no chest pain  Resp: + dyspnea resolved  GI: no nausea or vomiting or diarrhea  Vascular: no LE edema    No Known Allergies      Hospital Medications: Medications reviewed        VITALS:  T(F): 98.2 (05-20-20 @ 04:38), Max: 98.5 (05-19-20 @ 20:57)  HR: 82 (05-20-20 @ 13:06)  BP: 122/69 (05-20-20 @ 13:06)  RR: 19 (05-20-20 @ 13:06)  SpO2: 99% (05-20-20 @ 13:06)  Wt(kg): --    05-19 @ 07:01  -  05-20 @ 07:00  --------------------------------------------------------  IN: 905 mL / OUT: 2015 mL / NET: -1110 mL          PHYSICAL EXAM:    Gen: NAD, calm  Cards: RRR, +S1/S2, no M/G/R  Resp: CTA B/L  GI: soft, NT/ND, NABS  Vascular: no LE edema B/L        LABS:  05-20    135  |  99  |  28<H>  ----------------------------<  237<H>  4.3   |  25  |  1.63<H>    Ca    9.1      20 May 2020 06:31      Creatinine Trend: 1.63 <--, 1.31 <--, 1.38 <--, 1.77 <--, 1.51 <--, 1.78 <--, 1.58 <--                        8.7    8.16  )-----------( 318      ( 20 May 2020 06:31 )             28.2     Urine Studies:    Osmolality, Random Urine: 257 mos/kg (05-16 @ 19:34)  Sodium, Random Urine: <35 mmol/L (05-16 @ 19:34)

## 2020-05-20 NOTE — PROGRESS NOTE ADULT - ASSESSMENT
65 m with    Syncope  - telemetry  - cardiology follow     Orthostatic hypotension  - DC aldactone  - follow    S/P CABG  - CT sx evaluation noted    Sternal wound, s/p reconstruction and omental flap  - MRSA bacteremia  - Vancomycin  - ID follow  - postop care    Bacteremia  - OLGA cancelled. Echo with no vegetations.  - ID follow    Scalp abrasion healing    Diabetes control  - ADA diet  - BS control  - Endocrine follow    CKD  - follow    HTN control    Hoarseness  - ENT evaluation noted    PT    Miguel Angel Duke MD pager [phone]

## 2020-05-20 NOTE — PROGRESS NOTE ADULT - ASSESSMENT
65y Male with history of DM presents with syncope concern for sternal wound infection. Nephrology consulted for elevated Scr.    1) CKD-3: Baseline Scr 1.5 as per prior labs. Scr mildly increased today off of IVF but remains near baseline. Patient advised to follow up as an outpatient for CKD work up. Avoid nephrotoxins. Monitor electrolytes.    2) HTN with CKD: BP controlled. Metoprolol as per cardiology. Monitor BP.    3) LE edema: Patient appears euvolemic. Continue with torsemide 10 mg daily however will discontinue if Scr continues to increase. Monitor UO.    4) Hyponatremia: Resolved and secondary to poor solute intake as per urine studies. Monitor serum sodium and may need to change vancomycin to NS base if serum sodium decreases.

## 2020-05-20 NOTE — PROGRESS NOTE ADULT - ASSESSMENT
65y m PMHx CABG Feb 2020 complicated by PEA arrest, sternal infection s/p sternal debridement with muscle flap reconstruction, DM, HTN p/w increased drainage from sternal wound s/p omental flap and incision closure w/ FTSG 5/15    Plan:  - c/w drains  - diet per general surgery   - Care per primary team    Plastic Surgery  [phone]

## 2020-05-20 NOTE — PROGRESS NOTE ADULT - SUBJECTIVE AND OBJECTIVE BOX
Plastic Surgery Progress Note (pg [facility]: [id number], [facility]: [phone])    SUBJECTIVE:  Patient seen and examined at bedside this AM. No acute events overnight. AF/VSS. Pain well controlled.     OBJECTIVE:     ** VITAL SIGNS / I&O's **  Vital Signs Last 24 Hrs  T(C): 36.8 (20 May 2020 04:38), Max: 36.9 (19 May 2020 20:57)  T(F): 98.2 (20 May 2020 04:38), Max: 98.5 (19 May 2020 20:57)  HR: 90 (20 May 2020 04:38) (81 - 90)  BP: 130/66 (20 May 2020 04:38) (112/68 - 143/80)  RR: 19 (20 May 2020 04:38) (18 - 19)  SpO2: 94% (20 May 2020 04:38) (94% - 100%)    PHYSICAL EXAM:  General: alert and oriented, NAD  Resp: airway patent, respirations unlabored  Chest: KEI x2 with s/s output. Vac removed. Soft  Abdomen: soft, non-distended. steri strips over donor site     ** LABS **             8.7    8.16  )-----------( 318      ( 20 May 2020 06:31 )             28.2     05-20    135  |  99  |  28<H>  ----------------------------<  237<H>  4.3   |  25  |  1.63<H>    Ca    9.1      20 May 2020 06:31

## 2020-05-20 NOTE — PROGRESS NOTE ADULT - SUBJECTIVE AND OBJECTIVE BOX
Follow Up: MRSA wound and bacteremia    Interval History/ROS: Feels ok, afebrile, a little bit stronger. Woundvac was removed this morning. No diarrhea. No dysuria.     Allergies  Blueberries (Rash)  No Known Drug Allergies    ANTIMICROBIALS:  vancomycin  IVPB 1250 every 24 hours      OTHER MEDS:  acetaminophen   Tablet .. 650 milliGRAM(s) Oral every 6 hours PRN  ALBUTerol    90 MICROgram(s) HFA Inhaler 2 Puff(s) Inhalation every 6 hours PRN  aMIOdarone    Tablet 200 milliGRAM(s) Oral daily  aspirin enteric coated 81 milliGRAM(s) Oral daily  atorvastatin 40 milliGRAM(s) Oral at bedtime  BACItracin   Ointment 1 Application(s) Topical daily  benzonatate 100 milliGRAM(s) Oral three times a day PRN  buDESOnide    Inhalation Suspension 0.5 milliGRAM(s) Inhalation two times a day  dextrose 40% Gel 15 Gram(s) Oral once PRN  dextrose 5%. 1000 milliLiter(s) IV Continuous <Continuous>  dextrose 50% Injectable 12.5 Gram(s) IV Push once  dextrose 50% Injectable 25 Gram(s) IV Push once  dextrose 50% Injectable 25 Gram(s) IV Push once  enoxaparin Injectable 40 milliGRAM(s) SubCutaneous daily  glucagon  Injectable 1 milliGRAM(s) IntraMuscular once PRN  insulin glargine Injectable (LANTUS) 35 Unit(s) SubCutaneous at bedtime  insulin lispro (HumaLOG) corrective regimen sliding scale   SubCutaneous three times a day before meals  insulin lispro (HumaLOG) corrective regimen sliding scale   SubCutaneous at bedtime  insulin lispro Injectable (HumaLOG) 14 Unit(s) SubCutaneous three times a day with meals  loratadine 10 milliGRAM(s) Oral daily  metoprolol tartrate 12.5 milliGRAM(s) Oral two times a day  oxycodone    5 mG/acetaminophen 325 mG 1 Tablet(s) Oral every 6 hours PRN  pantoprazole    Tablet 40 milliGRAM(s) Oral before breakfast  polyethylene glycol 3350 17 Gram(s) Oral daily  senna 2 Tablet(s) Oral at bedtime  torsemide 10 milliGRAM(s) Oral daily      Vital Signs Last 24 Hrs  T(C): 36.8 (20 May 2020 04:38), Max: 36.9 (19 May 2020 20:57)  T(F): 98.2 (20 May 2020 04:38), Max: 98.5 (19 May 2020 20:57)  HR: 82 (20 May 2020 13:06) (82 - 90)  BP: 122/69 (20 May 2020 13:06) (122/69 - 143/80)  BP(mean): --  RR: 19 (20 May 2020 13:06) (18 - 19)  SpO2: 99% (20 May 2020 13:06) (94% - 99%)    Physical Exam:  General: awake, alert, non toxic  Head: atraumatic, normocephalic  Eye: normal sclera and conjunctiva  Cardio: regular rate. sternal woundvac removed, site clean dry intact, no purulence or signs of local inflammation, additional dressing around the xiphoid. two chest KEI drains with serosanguinous fluid.   Respiratory: nonlabored   abd: soft, bowel sounds present, no tenderness  vascular: no phlebitis   Skin: no rash  Neurologic: no focal deficit  psych: normal affect                          8.7    8.16  )-----------( 318      ( 20 May 2020 06:31 )             28.2       05-20    135  |  99  |  28<H>  ----------------------------<  237<H>  4.3   |  25  |  1.63<H>    Ca    9.1      20 May 2020 06:31      MICROBIOLOGY:  Culture - Tissue with Gram Stain (collected 05-16-20 @ 03:05)  Source: .Tissue Other  Gram Stain (05-16-20 @ 07:25):    Rare polymorphonuclear leukocytes seen per low power field    No organisms seen per oil power field  Preliminary Report (05-17-20 @ 08:11):    No growth    Culture - Fungal, Tissue (collected 05-16-20 @ 03:05)  Source: .Tissue sternal wound culture  Preliminary Report (05-18-20 @ 10:49):    Testing in progress    Culture - Blood (collected 05-14-20 @ 09:04)  Source: .Blood Blood-Peripheral  Final Report (05-19-20 @ 10:00):    No Growth Final    Culture - Blood (collected 05-14-20 @ 09:04)  Source: .Blood Blood-Peripheral  Final Report (05-19-20 @ 10:00):    No Growth Final    RADIOLOGY:  Images below reviewed personally  Xray Chest 1 View- PORTABLE-Routine (05.18.20 @ 14:02)   Drainage catheter projects over the mid chest wall. Right IJ central line tip within SVC.  Clear lungs. No pleural effusion or pneumothorax.

## 2020-05-21 LAB
ANION GAP SERPL CALC-SCNC: 10 MMOL/L — SIGNIFICANT CHANGE UP (ref 5–17)
BUN SERPL-MCNC: 32 MG/DL — HIGH (ref 7–23)
CALCIUM SERPL-MCNC: 9 MG/DL — SIGNIFICANT CHANGE UP (ref 8.4–10.5)
CHLORIDE SERPL-SCNC: 97 MMOL/L — SIGNIFICANT CHANGE UP (ref 96–108)
CO2 SERPL-SCNC: 25 MMOL/L — SIGNIFICANT CHANGE UP (ref 22–31)
CREAT SERPL-MCNC: 1.47 MG/DL — HIGH (ref 0.5–1.3)
CULTURE RESULTS: SIGNIFICANT CHANGE UP
GLUCOSE BLDC GLUCOMTR-MCNC: 100 MG/DL — HIGH (ref 70–99)
GLUCOSE BLDC GLUCOMTR-MCNC: 134 MG/DL — HIGH (ref 70–99)
GLUCOSE BLDC GLUCOMTR-MCNC: 226 MG/DL — HIGH (ref 70–99)
GLUCOSE BLDC GLUCOMTR-MCNC: 248 MG/DL — HIGH (ref 70–99)
GLUCOSE SERPL-MCNC: 244 MG/DL — HIGH (ref 70–99)
HCT VFR BLD CALC: 29 % — LOW (ref 39–50)
HGB BLD-MCNC: 8.9 G/DL — LOW (ref 13–17)
MCHC RBC-ENTMCNC: 26.2 PG — LOW (ref 27–34)
MCHC RBC-ENTMCNC: 30.7 GM/DL — LOW (ref 32–36)
MCV RBC AUTO: 85.3 FL — SIGNIFICANT CHANGE UP (ref 80–100)
NRBC # BLD: 0 /100 WBCS — SIGNIFICANT CHANGE UP (ref 0–0)
PLATELET # BLD AUTO: 303 K/UL — SIGNIFICANT CHANGE UP (ref 150–400)
POTASSIUM SERPL-MCNC: 4.1 MMOL/L — SIGNIFICANT CHANGE UP (ref 3.5–5.3)
POTASSIUM SERPL-SCNC: 4.1 MMOL/L — SIGNIFICANT CHANGE UP (ref 3.5–5.3)
RBC # BLD: 3.4 M/UL — LOW (ref 4.2–5.8)
RBC # FLD: 15.8 % — HIGH (ref 10.3–14.5)
SODIUM SERPL-SCNC: 132 MMOL/L — LOW (ref 135–145)
SPECIMEN SOURCE: SIGNIFICANT CHANGE UP
VANCOMYCIN TROUGH SERPL-MCNC: 10.3 UG/ML — SIGNIFICANT CHANGE UP (ref 10–20)
WBC # BLD: 8.69 K/UL — SIGNIFICANT CHANGE UP (ref 3.8–10.5)
WBC # FLD AUTO: 8.69 K/UL — SIGNIFICANT CHANGE UP (ref 3.8–10.5)

## 2020-05-21 PROCEDURE — 99232 SBSQ HOSP IP/OBS MODERATE 35: CPT

## 2020-05-21 PROCEDURE — 71045 X-RAY EXAM CHEST 1 VIEW: CPT | Mod: 26

## 2020-05-21 RX ORDER — INSULIN GLARGINE 100 [IU]/ML
25 INJECTION, SOLUTION SUBCUTANEOUS ONCE
Refills: 0 | Status: COMPLETED | OUTPATIENT
Start: 2020-05-21 | End: 2020-05-21

## 2020-05-21 RX ORDER — INSULIN GLARGINE 100 [IU]/ML
40 INJECTION, SOLUTION SUBCUTANEOUS AT BEDTIME
Refills: 0 | Status: DISCONTINUED | OUTPATIENT
Start: 2020-05-21 | End: 2020-05-22

## 2020-05-21 RX ORDER — VANCOMYCIN HCL 1 G
1250 VIAL (EA) INTRAVENOUS EVERY 24 HOURS
Refills: 0 | Status: DISCONTINUED | OUTPATIENT
Start: 2020-05-21 | End: 2020-05-27

## 2020-05-21 RX ORDER — INSULIN LISPRO 100/ML
16 VIAL (ML) SUBCUTANEOUS
Refills: 0 | Status: DISCONTINUED | OUTPATIENT
Start: 2020-05-21 | End: 2020-05-23

## 2020-05-21 RX ADMIN — Medication 14 UNIT(S): at 13:08

## 2020-05-21 RX ADMIN — Medication 166.67 MILLIGRAM(S): at 23:21

## 2020-05-21 RX ADMIN — INSULIN GLARGINE 25 UNIT(S): 100 INJECTION, SOLUTION SUBCUTANEOUS at 22:58

## 2020-05-21 RX ADMIN — ENOXAPARIN SODIUM 40 MILLIGRAM(S): 100 INJECTION SUBCUTANEOUS at 13:07

## 2020-05-21 RX ADMIN — Medication 0.5 MILLIGRAM(S): at 06:05

## 2020-05-21 RX ADMIN — Medication 14 UNIT(S): at 09:00

## 2020-05-21 RX ADMIN — SENNA PLUS 2 TABLET(S): 8.6 TABLET ORAL at 22:01

## 2020-05-21 RX ADMIN — Medication 12.5 MILLIGRAM(S): at 18:22

## 2020-05-21 RX ADMIN — LORATADINE 10 MILLIGRAM(S): 10 TABLET ORAL at 13:07

## 2020-05-21 RX ADMIN — Medication 2: at 09:00

## 2020-05-21 RX ADMIN — Medication 100 MILLIGRAM(S): at 22:01

## 2020-05-21 RX ADMIN — PANTOPRAZOLE SODIUM 40 MILLIGRAM(S): 20 TABLET, DELAYED RELEASE ORAL at 06:03

## 2020-05-21 RX ADMIN — Medication 81 MILLIGRAM(S): at 13:07

## 2020-05-21 RX ADMIN — Medication 0.5 MILLIGRAM(S): at 22:01

## 2020-05-21 RX ADMIN — Medication 1 APPLICATION(S): at 06:05

## 2020-05-21 RX ADMIN — Medication 16 UNIT(S): at 18:22

## 2020-05-21 RX ADMIN — Medication 12.5 MILLIGRAM(S): at 06:03

## 2020-05-21 RX ADMIN — Medication 10 MILLIGRAM(S): at 06:04

## 2020-05-21 RX ADMIN — Medication 1 APPLICATION(S): at 22:05

## 2020-05-21 RX ADMIN — Medication 1 APPLICATION(S): at 13:08

## 2020-05-21 RX ADMIN — Medication 2: at 13:08

## 2020-05-21 RX ADMIN — AMIODARONE HYDROCHLORIDE 200 MILLIGRAM(S): 400 TABLET ORAL at 06:03

## 2020-05-21 RX ADMIN — ATORVASTATIN CALCIUM 40 MILLIGRAM(S): 80 TABLET, FILM COATED ORAL at 22:00

## 2020-05-21 RX ADMIN — Medication 100 MILLIGRAM(S): at 06:04

## 2020-05-21 NOTE — PROGRESS NOTE ADULT - SUBJECTIVE AND OBJECTIVE BOX
Plastic Surgery Progress Note (pg [facility]: [id number], [facility]: [phone])    SUBJECTIVE:  Patient seen and examined at bedside this AM. No acute events overnight. AF/VSS. Pain well controlled.     OBJECTIVE:   Vital Signs Last 24 Hrs  T(C): 36.7 (21 May 2020 05:04), Max: 36.8 (20 May 2020 20:58)  T(F): 98.1 (21 May 2020 05:04), Max: 98.3 (20 May 2020 20:58)  HR: 95 (21 May 2020 05:04) (80 - 95)  BP: 167/88 (21 May 2020 05:04) (120/67 - 167/88)  RR: 18 (21 May 2020 05:04) (18 - 19)  SpO2: 97% (21 May 2020 05:04) (97% - 99%)    PHYSICAL EXAM:  General: alert and oriented, NAD  Resp: airway patent, respirations unlabored  Chest: KEI x2 with s/s output. Dressing changed. Soft  Abdomen: soft, non-distended. steri strips over donor site     ** LABS **             8.9    8.69  )-----------( 303      ( 21 May 2020 07:06 )             29.0     05-21    132<L>  |  97  |  32<H>  ----------------------------<  244<H>  4.1   |  25  |  1.47<H>    Ca    9.0      21 May 2020 07:06

## 2020-05-21 NOTE — PROGRESS NOTE ADULT - ASSESSMENT
65M s/p CABG 2/3/20, course complicated by PEA arrest, E faecalis bacteremia and local infections of the sternal and right leg SVG sites, now admitted 5/11/20 for syncope.   In total he received about six weeks of antibiotics although no growth from blood or sternal OR debridement 2/25 and low intraoperative concern for osteomyelitis.   However, wound drainage increased significantly now with MRSA and low-grade bacteremia, cleared 5/14.   s/p OR 5/15 debridement, reconstruction with omentum   Would treat as osteomyelitis given elevated ESR/CRP although no aggressive osseous lesions on CT.   I have a low suspicion for endocarditis but would still pursue an echo (TTE poor study).   Clinically well.     Suggest  -OLGA   -PICC line  -Vancomycin 1.25GM IV q24h through Fri 6/26 for a six-week course with weekly CBC, BUN, creatinine, Vancomycin trough, ESR, CRP faxed to [phone]    Spoke with primary team     Chapito Delarosa MD   Infectious Disease   Pager [phone]   After 5PM and on weekends please page fellow on call or call [phone]

## 2020-05-21 NOTE — PROGRESS NOTE ADULT - SUBJECTIVE AND OBJECTIVE BOX
Chief complaint  Patient is a 65y old  Male who presents with a chief complaint of syncope (21 May 2020 11:30)   Review of systems  Patient in chair, looks comfortable, no hypoglycemic episodes.    Labs and Fingersticks  CAPILLARY BLOOD GLUCOSE      POCT Blood Glucose.: 226 mg/dL (21 May 2020 12:42)  POCT Blood Glucose.: 248 mg/dL (21 May 2020 08:37)  POCT Blood Glucose.: 126 mg/dL (20 May 2020 21:31)  POCT Blood Glucose.: 151 mg/dL (20 May 2020 17:28)      Anion Gap, Serum: 10 (05-21 @ 07:06)  Anion Gap, Serum: 11 (05-20 @ 06:31)      Calcium, Total Serum: 9.0 (05-21 @ 07:06)  Calcium, Total Serum: 9.1 (05-20 @ 06:31)          05-21    132<L>  |  97  |  32<H>  ----------------------------<  244<H>  4.1   |  25  |  1.47<H>    Ca    9.0      21 May 2020 07:06                          8.9    8.69  )-----------( 303      ( 21 May 2020 07:06 )             29.0     Medications  MEDICATIONS  (STANDING):  aMIOdarone    Tablet 200 milliGRAM(s) Oral daily  aspirin enteric coated 81 milliGRAM(s) Oral daily  atorvastatin 40 milliGRAM(s) Oral at bedtime  BACItracin   Ointment 1 Application(s) Topical daily  buDESOnide    Inhalation Suspension 0.5 milliGRAM(s) Inhalation two times a day  dextrose 5%. 1000 milliLiter(s) (50 mL/Hr) IV Continuous <Continuous>  dextrose 50% Injectable 12.5 Gram(s) IV Push once  dextrose 50% Injectable 25 Gram(s) IV Push once  dextrose 50% Injectable 25 Gram(s) IV Push once  enoxaparin Injectable 40 milliGRAM(s) SubCutaneous daily  hydrocortisone 1% Cream 1 Application(s) Topical two times a day  insulin glargine Injectable (LANTUS) 40 Unit(s) SubCutaneous at bedtime  insulin lispro (HumaLOG) corrective regimen sliding scale   SubCutaneous three times a day before meals  insulin lispro (HumaLOG) corrective regimen sliding scale   SubCutaneous at bedtime  insulin lispro Injectable (HumaLOG) 16 Unit(s) SubCutaneous three times a day with meals  loratadine 10 milliGRAM(s) Oral daily  metoprolol tartrate 12.5 milliGRAM(s) Oral two times a day  pantoprazole    Tablet 40 milliGRAM(s) Oral before breakfast  polyethylene glycol 3350 17 Gram(s) Oral daily  senna 2 Tablet(s) Oral at bedtime  torsemide 10 milliGRAM(s) Oral daily  vancomycin  IVPB 1250 milliGRAM(s) IV Intermittent every 24 hours      Physical Exam  General: Patient comfortable in bed  Vital Signs Last 12 Hrs  T(F): 97.6 (05-21-20 @ 12:29), Max: 98.1 (05-21-20 @ 05:04)  HR: 78 (05-21-20 @ 12:29) (78 - 95)  BP: 127/69 (05-21-20 @ 12:29) (127/69 - 167/88)  BP(mean): --  RR: 18 (05-21-20 @ 12:29) (18 - 18)  SpO2: 99% (05-21-20 @ 12:29) (97% - 99%)  Neck: No palpable thyroid nodules.  CVS: S1S2, No murmurs  Respiratory: No wheezing, no crepitations  GI: Abdomen soft, bowel sounds positive  Musculoskeletal:  edema lower extremities.   Skin: No skin rashes, no ecchymosis    Diagnostics

## 2020-05-21 NOTE — PROGRESS NOTE ADULT - ASSESSMENT
limited echo 5/12/20:EF 55-60%. overall preserved lv fx, despite segmental wall motion abnormalities, no LV thrombus  The apical anterior wall, the apical lateral wall, the basal anterior wall,the mid anterior wall, and the midanterolateral wall are hypokinetic.  echo 5/14/20:  Limited study to r/o Endocarditis Pt has very limited windows due to edema and wound vac. Unable to exclude valvular vegetations in the setting of a  technically difficult study. OLGA could be obtained for further evaluation of valvular vegetations, if clinical suspicion of endocarditis is high.        a/p  65y m PMHx CABG Feb 2020 complicated by PEA arrest, thoracotomy site infection w/empyema, DM, HTN p/w syncope, sternal wound infection.     1. Syncope   likely 2/2 to +orthostatics  cv stable, EKG unchanged from prior   HST elevated w/ normal CK/CKMB, demand ischemia in setting of STEPHANIE/CKD, possible wound infection   CT head negative for ICH  echo with overall preserved LVEF, despite segmental wall motion abnormalities, no LV thrombus   encourage PO intake    2. Sternal wound infection s/p RTOR for SWI    s/p debridement of sternal wound, reconstruction, Laparoscopic lysis of abdominal adhesions   IV abx per ID   blood cx neg    repeat echo unable to r/o endocarditis  olga cancelled per department, clinically stable and management will not change, already on IV abx   defere OLGA for now, repeat bcx negative, no obvious vegetations seen on TTE   ID, f/u    3. CAD s/p CABG x 4 , s/p PEA arrest   cv stable   cont asa, statin, bb     4. Chronic diastolic CHF  euvolemic on exam with unchanged SOB   continue diuretics as ordered   repeat echo with preserved LVEF     5. PAF  remains NSR  continue amio, bb    6. HTN  bp stable    7. STEPHANIE/CKD   continue to trend creat     8. Pleural effusion, hx  CXR this admission with small left loculated pleural effusion now decreased in size from previous exam.  ENT f/u noted - pt. refusing scope at this time, outpt. f/u     dvt ppx limited echo 5/12/20:EF 55-60%. overall preserved lv fx, despite segmental wall motion abnormalities, no LV thrombus  The apical anterior wall, the apical lateral wall, the basal anterior wall,the mid anterior wall, and the midanterolateral wall are hypokinetic.  echo 5/14/20:  Limited study to r/o Endocarditis Pt has very limited windows due to edema and wound vac. Unable to exclude valvular vegetations in the setting of a  technically difficult study. OLGA could be obtained for further evaluation of valvular vegetations, if clinical suspicion of endocarditis is high.        a/p  65y m PMHx CABG Feb 2020 complicated by PEA arrest, thoracotomy site infection w/empyema, DM, HTN p/w syncope, sternal wound infection.     1. Syncope   likely 2/2 to +orthostatics  cv stable, EKG unchanged from prior   HST elevated w/ normal CK/CKMB, demand ischemia in setting of STEPHANIE/CKD, possible wound infection   CT head negative for ICH  echo with overall preserved LVEF, despite segmental wall motion abnormalities, no LV thrombus   encourage PO intake    2. Sternal wound infection s/p RTOR for SWI    s/p debridement of sternal wound, reconstruction, Laparoscopic lysis of abdominal adhesions   IV abx per ID   blood cx neg    repeat echo unable to r/o endocarditis  olga cancelled per department, clinically stable and management will not change, already on IV abx   repeat bcx negative, no obvious vegetations seen on TTE   ID, f/u    3. CAD s/p CABG x 4 , s/p PEA arrest   cv stable   cont asa, statin, bb     4. Chronic diastolic CHF  euvolemic on exam with unchanged SOB   continue diuretics as ordered   repeat echo with preserved LVEF     5. PAF  remains NSR  continue amio, bb    6. HTN  bp stable    7. STEPHANIE/CKD   continue to trend creat     8. Pleural effusion, hx  CXR this admission with small left loculated pleural effusion now decreased in size from previous exam.  ENT f/u noted - pt. refusing scope at this time, outpt. f/u     dvt ppx

## 2020-05-21 NOTE — PROGRESS NOTE ADULT - PROBLEM SELECTOR PLAN 1
Will increase Lantus to 40u at bedtime.  Will increase Humalog to 16u before each meal and continue Humalog correction scale coverage. Will continue monitoring FS and FU.  Patient was on insulin at home, knows how to do injections. Suggest DC on current insulin regimen and FU endo 4 weeks.  Patient counseled for compliance with insulin injections, consistent low carb diet, and exercise as tolerated outpatient.

## 2020-05-21 NOTE — PROGRESS NOTE ADULT - SUBJECTIVE AND OBJECTIVE BOX
CARDIOLOGY FOLLOW UP - Dr. Steel    CC oob in chair, no cp/sob       PHYSICAL EXAM:  T(C): 36.7 (05-21-20 @ 05:04), Max: 36.8 (05-20-20 @ 20:58)  HR: 95 (05-21-20 @ 05:04) (80 - 95)  BP: 167/88 (05-21-20 @ 05:04) (120/67 - 167/88)  RR: 18 (05-21-20 @ 05:04) (18 - 19)  SpO2: 97% (05-21-20 @ 05:04) (97% - 99%)  Wt(kg): --  I&O's Summary    20 May 2020 07:01  -  21 May 2020 07:00  --------------------------------------------------------  IN: 1010 mL / OUT: 560 mL / NET: 450 mL    21 May 2020 07:01  -  21 May 2020 11:30  --------------------------------------------------------  IN: 350 mL / OUT: 55 mL / NET: 295 mL        Appearance: Normal	  Cardiovascular: Normal S1 S2,RRR, No JVD, No murmurs  Respiratory: diminished   Gastrointestinal:  Soft, Non-tender, + BS	  Extremities: Normal range of motion, No clubbing, cyanosis or edema        MEDICATIONS  (STANDING):  aMIOdarone    Tablet 200 milliGRAM(s) Oral daily  aspirin enteric coated 81 milliGRAM(s) Oral daily  atorvastatin 40 milliGRAM(s) Oral at bedtime  BACItracin   Ointment 1 Application(s) Topical daily  buDESOnide    Inhalation Suspension 0.5 milliGRAM(s) Inhalation two times a day  dextrose 5%. 1000 milliLiter(s) (50 mL/Hr) IV Continuous <Continuous>  dextrose 50% Injectable 12.5 Gram(s) IV Push once  dextrose 50% Injectable 25 Gram(s) IV Push once  dextrose 50% Injectable 25 Gram(s) IV Push once  enoxaparin Injectable 40 milliGRAM(s) SubCutaneous daily  hydrocortisone 1% Cream 1 Application(s) Topical two times a day  insulin glargine Injectable (LANTUS) 35 Unit(s) SubCutaneous at bedtime  insulin lispro (HumaLOG) corrective regimen sliding scale   SubCutaneous three times a day before meals  insulin lispro (HumaLOG) corrective regimen sliding scale   SubCutaneous at bedtime  insulin lispro Injectable (HumaLOG) 14 Unit(s) SubCutaneous three times a day with meals  loratadine 10 milliGRAM(s) Oral daily  metoprolol tartrate 12.5 milliGRAM(s) Oral two times a day  pantoprazole    Tablet 40 milliGRAM(s) Oral before breakfast  polyethylene glycol 3350 17 Gram(s) Oral daily  senna 2 Tablet(s) Oral at bedtime  torsemide 10 milliGRAM(s) Oral daily  vancomycin  IVPB 1250 milliGRAM(s) IV Intermittent every 24 hours      TELEMETRY: NSR 	    ECG:  	  RADIOLOGY:   DIAGNOSTIC TESTING:  [ ] Echocardiogram:  [ ]  Catheterization:  [ ] Stress Test:    OTHER: 	    LABS:	 	                                8.9    8.69  )-----------( 303      ( 21 May 2020 07:06 )             29.0     05-21    132<L>  |  97  |  32<H>  ----------------------------<  244<H>  4.1   |  25  |  1.47<H>    Ca    9.0      21 May 2020 07:06

## 2020-05-21 NOTE — PROGRESS NOTE ADULT - ASSESSMENT
65y Male with history of DM presents with syncope concern for sternal wound infection. Nephrology consulted for elevated Scr.    1) CKD-3: Baseline Scr 1.5 as per prior labs. Renal function at baseline. Patient advised to follow up as an outpatient for CKD work up. Avoid nephrotoxins. Monitor electrolytes.    2) HTN with CKD: BP controlled. Metoprolol as per cardiology. Monitor BP.    3) LE edema: Patient appears euvolemic. Continue with torsemide 10 mg daily. Monitor UO.    4) Hyponatremia: Mild and secondary to poor solute intake as per urine studies. Change vancomycin to NS base.

## 2020-05-21 NOTE — PROGRESS NOTE ADULT - SUBJECTIVE AND OBJECTIVE BOX
Patient is a 65y old  Male who presents with a chief complaint of syncope (21 May 2020 14:58)      SUBJECTIVE / OVERNIGHT EVENTS: No new complaints.   Review of Systems  chest pain no  palpitations no  sob no  nausea no  headache no    MEDICATIONS  (STANDING):  aMIOdarone    Tablet 200 milliGRAM(s) Oral daily  aspirin enteric coated 81 milliGRAM(s) Oral daily  atorvastatin 40 milliGRAM(s) Oral at bedtime  BACItracin   Ointment 1 Application(s) Topical daily  buDESOnide    Inhalation Suspension 0.5 milliGRAM(s) Inhalation two times a day  dextrose 5%. 1000 milliLiter(s) (50 mL/Hr) IV Continuous <Continuous>  dextrose 50% Injectable 12.5 Gram(s) IV Push once  dextrose 50% Injectable 25 Gram(s) IV Push once  dextrose 50% Injectable 25 Gram(s) IV Push once  enoxaparin Injectable 40 milliGRAM(s) SubCutaneous daily  hydrocortisone 1% Cream 1 Application(s) Topical two times a day  insulin glargine Injectable (LANTUS) 40 Unit(s) SubCutaneous at bedtime  insulin lispro (HumaLOG) corrective regimen sliding scale   SubCutaneous three times a day before meals  insulin lispro (HumaLOG) corrective regimen sliding scale   SubCutaneous at bedtime  insulin lispro Injectable (HumaLOG) 16 Unit(s) SubCutaneous three times a day with meals  loratadine 10 milliGRAM(s) Oral daily  metoprolol tartrate 12.5 milliGRAM(s) Oral two times a day  pantoprazole    Tablet 40 milliGRAM(s) Oral before breakfast  polyethylene glycol 3350 17 Gram(s) Oral daily  senna 2 Tablet(s) Oral at bedtime  torsemide 10 milliGRAM(s) Oral daily  vancomycin  IVPB 1250 milliGRAM(s) IV Intermittent every 24 hours    MEDICATIONS  (PRN):  acetaminophen   Tablet .. 650 milliGRAM(s) Oral every 6 hours PRN Temp greater or equal to 38.5C (101.3F), Mild Pain (1 - 3)  ALBUTerol    90 MICROgram(s) HFA Inhaler 2 Puff(s) Inhalation every 6 hours PRN Shortness of Breath and/or Wheezing  benzonatate 100 milliGRAM(s) Oral three times a day PRN Cough  dextrose 40% Gel 15 Gram(s) Oral once PRN Blood Glucose LESS THAN 70 milliGRAM(s)/deciliter  glucagon  Injectable 1 milliGRAM(s) IntraMuscular once PRN Glucose LESS THAN 70 milligrams/deciliter  oxycodone    5 mG/acetaminophen 325 mG 1 Tablet(s) Oral every 6 hours PRN Moderate Pain (4 - 6)      Vital Signs Last 24 Hrs  T(C): 36.4 (21 May 2020 12:29), Max: 36.8 (20 May 2020 20:58)  T(F): 97.6 (21 May 2020 12:29), Max: 98.3 (20 May 2020 20:58)  HR: 81 (21 May 2020 14:16) (78 - 95)  BP: 111/65 (21 May 2020 14:16) (111/65 - 167/88)  BP(mean): --  RR: 18 (21 May 2020 12:29) (18 - 18)  SpO2: 99% (21 May 2020 12:29) (97% - 99%)    PHYSICAL EXAM:  GENERAL: NAD, well-developed  HEAD:  Atraumatic, Normocephalic  EYES: EOMI, PERRLA, conjunctiva and sclera clear  NECK: Supple, No JVD  CHEST/LUNG: Clear to auscultation bilaterally; No wheeze Sternal wound with clean dressing  HEART: Regular rate and rhythm; No murmurs, rubs, or gallops  ABDOMEN: Soft, Nontender, Nondistended; Bowel sounds present  EXTREMITIES:  2+ Peripheral Pulses, No clubbing, cyanosis, or edema  PSYCH: AAOx3  NEUROLOGY: non-focal  SKIN: No rashes or lesions    LABS:                        8.9    8.69  )-----------( 303      ( 21 May 2020 07:06 )             29.0     05-21    132<L>  |  97  |  32<H>  ----------------------------<  244<H>  4.1   |  25  |  1.47<H>    Ca    9.0      21 May 2020 07:06                  RADIOLOGY & ADDITIONAL TESTS:    Imaging Personally Reviewed:    Consultant(s) Notes Reviewed:      Care Discussed with Consultants/Other Providers:

## 2020-05-21 NOTE — CHART NOTE - NSCHARTNOTEFT_GEN_A_CORE
Patient NPO after midnight    Lantus decreased to 25 units as he will be NPO after midnight    Shari Stein NP  spectralink [phone]

## 2020-05-21 NOTE — PROGRESS NOTE ADULT - ASSESSMENT
65y m PMHx CABG Feb 2020 complicated by PEA arrest, sternal infection s/p sternal debridement with muscle flap reconstruction, DM, HTN p/w increased drainage from sternal wound s/p omental flap and incision closure w/ FTSG 5/15    Plan:  - Monitor drains  - diet per general surgery   - Care per primary team    Plastic Surgery  [phone]

## 2020-05-21 NOTE — PROGRESS NOTE ADULT - SUBJECTIVE AND OBJECTIVE BOX
Follow Up: MRSA wound bacteremia    Interval History/ROS: TTE inconclusive. Afebrile. Feels fine. No pain. No diarrhea. Breathing is alright.     Allergies  Blueberries (Rash)  No Known Drug Allergies    ANTIMICROBIALS:  vancomycin  IVPB 1250 every 24 hours      OTHER MEDS:  acetaminophen   Tablet .. 650 milliGRAM(s) Oral every 6 hours PRN  ALBUTerol    90 MICROgram(s) HFA Inhaler 2 Puff(s) Inhalation every 6 hours PRN  aMIOdarone    Tablet 200 milliGRAM(s) Oral daily  aspirin enteric coated 81 milliGRAM(s) Oral daily  atorvastatin 40 milliGRAM(s) Oral at bedtime  BACItracin   Ointment 1 Application(s) Topical daily  benzonatate 100 milliGRAM(s) Oral three times a day PRN  buDESOnide    Inhalation Suspension 0.5 milliGRAM(s) Inhalation two times a day  dextrose 40% Gel 15 Gram(s) Oral once PRN  dextrose 5%. 1000 milliLiter(s) IV Continuous <Continuous>  dextrose 50% Injectable 12.5 Gram(s) IV Push once  dextrose 50% Injectable 25 Gram(s) IV Push once  dextrose 50% Injectable 25 Gram(s) IV Push once  enoxaparin Injectable 40 milliGRAM(s) SubCutaneous daily  glucagon  Injectable 1 milliGRAM(s) IntraMuscular once PRN  hydrocortisone 1% Cream 1 Application(s) Topical two times a day  insulin glargine Injectable (LANTUS) 40 Unit(s) SubCutaneous at bedtime  insulin lispro (HumaLOG) corrective regimen sliding scale   SubCutaneous three times a day before meals  insulin lispro (HumaLOG) corrective regimen sliding scale   SubCutaneous at bedtime  insulin lispro Injectable (HumaLOG) 16 Unit(s) SubCutaneous three times a day with meals  loratadine 10 milliGRAM(s) Oral daily  metoprolol tartrate 12.5 milliGRAM(s) Oral two times a day  oxycodone    5 mG/acetaminophen 325 mG 1 Tablet(s) Oral every 6 hours PRN  pantoprazole    Tablet 40 milliGRAM(s) Oral before breakfast  polyethylene glycol 3350 17 Gram(s) Oral daily  senna 2 Tablet(s) Oral at bedtime  torsemide 10 milliGRAM(s) Oral daily      Vital Signs Last 24 Hrs  T(C): 36.4 (21 May 2020 12:29), Max: 36.8 (20 May 2020 20:58)  T(F): 97.6 (21 May 2020 12:29), Max: 98.3 (20 May 2020 20:58)  HR: 81 (21 May 2020 14:16) (78 - 95)  BP: 111/65 (21 May 2020 14:16) (111/65 - 167/88)  BP(mean): --  RR: 18 (21 May 2020 12:29) (18 - 18)  SpO2: 99% (21 May 2020 12:29) (97% - 99%)    Physical Exam:  General: awake, alert, non toxic  Head: atraumatic, normocephalic  Eye: normal sclera and conjunctiva  Cardio: regular rate and rhythm   Respiratory: nonlabored on room air, clear bilaterally  abd: soft, bowel sounds present, no tenderness  Skin: sternal wound dressed. two KEI anterior chest drains with serosanguinous fluid   Neurologic: no focal deficit  psych: normal affect                          8.9    8.69  )-----------( 303      ( 21 May 2020 07:06 )             29.0       05-21    132<L>  |  97  |  32<H>  ----------------------------<  244<H>  4.1   |  25  |  1.47<H>    Ca    9.0      21 May 2020 07:06      MICROBIOLOGY:  Vancomycin Level, Trough: 13.6 ug/mL (05-20-20 @ 18:02)    Culture - Tissue with Gram Stain (collected 05-16-20 @ 03:05)  Source: .Tissue Other  Gram Stain (05-16-20 @ 07:25):    Rare polymorphonuclear leukocytes seen per low power field    No organisms seen per oil power field  Final Report (05-21-20 @ 12:21):    No growth at 5 days    Culture - Fungal, Tissue (collected 05-16-20 @ 03:05)  Source: .Tissue sternal wound culture  Preliminary Report (05-18-20 @ 10:49):    Testing in progress    RADIOLOGY:  Images below reviewed personally  Xray Chest 1 View- PORTABLE-Routine (05.18.20 @ 14:02)   Drainage catheter projects over the mid chest wall. Right IJ central line tip within SVC.  Clear lungs. No pleural effusion or pneumothorax.

## 2020-05-21 NOTE — PROGRESS NOTE ADULT - ASSESSMENT
Assessment  DMT2: 65y Male with DM T2 with hyperglycemia, A1C 7.9%, was on insulin at home, now on basal bolus insulin, increased dose yesterday, blood sugars still running high and not at target, no hypoglycemic episodes. Patient appears comfortable, eating full meals in addition to glucerna.  CAD: s/p CABG previous admission, stable, monitored.  Sternal Wound: On IV  ABx, FU Plastics/ID.  HTN: Controlled,  on antihypertensive medications.  CKD: Monitor labs/BMP.          Harper Matias MD  Cell: [phone]  Office: [phone] Assessment  DMT2: 65y Male with DM T2 with hyperglycemia, A1C 7.9%,  was on insulin at home, now on basal bolus insulin, increased dose yesterday, blood sugars still running high and not at target, no hypoglycemic episodes. Patient appears comfortable, eating full meals in addition to glucerna.  CAD: s/p CABG previous admission, stable, monitored.  Sternal Wound: On IV  ABx, FU Plastics/ID.  HTN: Controlled,  on antihypertensive medications.  CKD: Monitor labs/BMP.          Harper Matias MD  Cell: [phone]  Office: [phone]

## 2020-05-21 NOTE — PROGRESS NOTE ADULT - SUBJECTIVE AND OBJECTIVE BOX
[facility] NEPHROLOGY- PROGRESS NOTE    65y Male with history of DM presents with syncope concern for sternal wound infection. Nephrology consulted for elevated Scr.    REVIEW OF SYSTEMS:  Gen: no changes in weight  Cards: no chest pain  Resp: + dyspnea resolved  GI: no nausea or vomiting or diarrhea  Vascular: no LE edema    No Known Allergies      Hospital Medications: Medications reviewed        VITALS:  T(F): 97.6 (05-21-20 @ 12:29), Max: 98.3 (05-20-20 @ 20:58)  HR: 81 (05-21-20 @ 14:16)  BP: 111/65 (05-21-20 @ 14:16)  RR: 18 (05-21-20 @ 12:29)  SpO2: 99% (05-21-20 @ 12:29)  Wt(kg): --    05-20 @ 07:01  -  05-21 @ 07:00  --------------------------------------------------------  IN: 1010 mL / OUT: 560 mL / NET: 450 mL    05-21 @ 07:01  -  05-21 @ 14:48  --------------------------------------------------------  IN: 700 mL / OUT: 80 mL / NET: 620 mL          PHYSICAL EXAM:    Gen: NAD, calm  Cards: RRR, +S1/S2, no M/G/R  Resp: CTA B/L  GI: soft, NT/ND, NABS  Vascular: no LE edema B/L          LABS:  05-21    132<L>  |  97  |  32<H>  ----------------------------<  244<H>  4.1   |  25  |  1.47<H>    Ca    9.0      21 May 2020 07:06      Creatinine Trend: 1.47 <--, 1.63 <--, 1.31 <--, 1.38 <--, 1.77 <--, 1.51 <--, 1.78 <--                        8.9    8.69  )-----------( 303      ( 21 May 2020 07:06 )             29.0     Urine Studies:    Osmolality, Random Urine: 257 mos/kg (05-16 @ 19:34)  Sodium, Random Urine: <35 mmol/L (05-16 @ 19:34)

## 2020-05-21 NOTE — PROGRESS NOTE ADULT - ASSESSMENT
65 m with    Syncope  - telemetry  - cardiology follow     Orthostatic hypotension  - DC aldactone  - follow    S/P CABG  - CT sx evaluation noted    Sternal wound, s/p reconstruction and omental flap  - MRSA bacteremia  - Vancomycin  - ID follow  - postop care  - PICC pending     Bacteremia  - OLGA reordered as per ID recommendations . Echo with no vegetations.  - ID follow    Scalp abrasion healing    Diabetes control  - ADA diet  - BS control  - Endocrine follow    CKD  - follow    HTN control    Hoarseness  - ENT evaluation noted    PT    DCP home.    Miguel Angel Duke MD pager [phone]

## 2020-05-22 LAB
ANION GAP SERPL CALC-SCNC: 10 MMOL/L — SIGNIFICANT CHANGE UP (ref 5–17)
BUN SERPL-MCNC: 36 MG/DL — HIGH (ref 7–23)
CALCIUM SERPL-MCNC: 9.1 MG/DL — SIGNIFICANT CHANGE UP (ref 8.4–10.5)
CHLORIDE SERPL-SCNC: 100 MMOL/L — SIGNIFICANT CHANGE UP (ref 96–108)
CO2 SERPL-SCNC: 25 MMOL/L — SIGNIFICANT CHANGE UP (ref 22–31)
CREAT SERPL-MCNC: 1.61 MG/DL — HIGH (ref 0.5–1.3)
GLUCOSE BLDC GLUCOMTR-MCNC: 102 MG/DL — HIGH (ref 70–99)
GLUCOSE BLDC GLUCOMTR-MCNC: 183 MG/DL — HIGH (ref 70–99)
GLUCOSE BLDC GLUCOMTR-MCNC: 207 MG/DL — HIGH (ref 70–99)
GLUCOSE BLDC GLUCOMTR-MCNC: 217 MG/DL — HIGH (ref 70–99)
GLUCOSE BLDC GLUCOMTR-MCNC: 221 MG/DL — HIGH (ref 70–99)
GLUCOSE SERPL-MCNC: 199 MG/DL — HIGH (ref 70–99)
HCT VFR BLD CALC: 27.4 % — LOW (ref 39–50)
HGB BLD-MCNC: 8.5 G/DL — LOW (ref 13–17)
INR BLD: 0.99 RATIO — SIGNIFICANT CHANGE UP (ref 0.88–1.16)
MCHC RBC-ENTMCNC: 26.4 PG — LOW (ref 27–34)
MCHC RBC-ENTMCNC: 31 GM/DL — LOW (ref 32–36)
MCV RBC AUTO: 85.1 FL — SIGNIFICANT CHANGE UP (ref 80–100)
NRBC # BLD: 0 /100 WBCS — SIGNIFICANT CHANGE UP (ref 0–0)
PLATELET # BLD AUTO: 295 K/UL — SIGNIFICANT CHANGE UP (ref 150–400)
POTASSIUM SERPL-MCNC: 4.2 MMOL/L — SIGNIFICANT CHANGE UP (ref 3.5–5.3)
POTASSIUM SERPL-SCNC: 4.2 MMOL/L — SIGNIFICANT CHANGE UP (ref 3.5–5.3)
PROTHROM AB SERPL-ACNC: 11.4 SEC — SIGNIFICANT CHANGE UP (ref 10–12.9)
RBC # BLD: 3.22 M/UL — LOW (ref 4.2–5.8)
RBC # FLD: 15.8 % — HIGH (ref 10.3–14.5)
SODIUM SERPL-SCNC: 135 MMOL/L — SIGNIFICANT CHANGE UP (ref 135–145)
VANCOMYCIN TROUGH SERPL-MCNC: 21.4 UG/ML — HIGH (ref 10–20)
WBC # BLD: 8.01 K/UL — SIGNIFICANT CHANGE UP (ref 3.8–10.5)
WBC # FLD AUTO: 8.01 K/UL — SIGNIFICANT CHANGE UP (ref 3.8–10.5)

## 2020-05-22 PROCEDURE — 99232 SBSQ HOSP IP/OBS MODERATE 35: CPT

## 2020-05-22 RX ORDER — INSULIN GLARGINE 100 [IU]/ML
30 INJECTION, SOLUTION SUBCUTANEOUS AT BEDTIME
Refills: 0 | Status: DISCONTINUED | OUTPATIENT
Start: 2020-05-22 | End: 2020-05-23

## 2020-05-22 RX ADMIN — Medication 1 APPLICATION(S): at 20:23

## 2020-05-22 RX ADMIN — Medication 10 MILLIGRAM(S): at 05:21

## 2020-05-22 RX ADMIN — Medication 12.5 MILLIGRAM(S): at 05:21

## 2020-05-22 RX ADMIN — Medication 16 UNIT(S): at 17:59

## 2020-05-22 RX ADMIN — INSULIN GLARGINE 30 UNIT(S): 100 INJECTION, SOLUTION SUBCUTANEOUS at 22:48

## 2020-05-22 RX ADMIN — LORATADINE 10 MILLIGRAM(S): 10 TABLET ORAL at 13:33

## 2020-05-22 RX ADMIN — Medication 12.5 MILLIGRAM(S): at 17:59

## 2020-05-22 RX ADMIN — Medication 0.5 MILLIGRAM(S): at 05:21

## 2020-05-22 RX ADMIN — Medication 0.5 MILLIGRAM(S): at 20:22

## 2020-05-22 RX ADMIN — Medication 2: at 18:00

## 2020-05-22 RX ADMIN — Medication 2: at 09:40

## 2020-05-22 RX ADMIN — AMIODARONE HYDROCHLORIDE 200 MILLIGRAM(S): 400 TABLET ORAL at 05:20

## 2020-05-22 RX ADMIN — SENNA PLUS 2 TABLET(S): 8.6 TABLET ORAL at 20:23

## 2020-05-22 RX ADMIN — Medication 81 MILLIGRAM(S): at 13:33

## 2020-05-22 RX ADMIN — Medication 1 APPLICATION(S): at 13:33

## 2020-05-22 RX ADMIN — ATORVASTATIN CALCIUM 40 MILLIGRAM(S): 80 TABLET, FILM COATED ORAL at 20:23

## 2020-05-22 RX ADMIN — Medication 1 APPLICATION(S): at 05:21

## 2020-05-22 RX ADMIN — Medication 166.67 MILLIGRAM(S): at 20:22

## 2020-05-22 RX ADMIN — Medication 16 UNIT(S): at 09:40

## 2020-05-22 RX ADMIN — PANTOPRAZOLE SODIUM 40 MILLIGRAM(S): 20 TABLET, DELAYED RELEASE ORAL at 05:20

## 2020-05-22 RX ADMIN — Medication 1: at 13:33

## 2020-05-22 RX ADMIN — ENOXAPARIN SODIUM 40 MILLIGRAM(S): 100 INJECTION SUBCUTANEOUS at 13:32

## 2020-05-22 RX ADMIN — Medication 16 UNIT(S): at 13:33

## 2020-05-22 NOTE — PROGRESS NOTE ADULT - SUBJECTIVE AND OBJECTIVE BOX
Chief complaint  Patient is a 65y old  Male who presents with a chief complaint of syncope (22 May 2020 10:42)   Review of systems  Patient in bed, looks comfortable, no hypoglycemic episodes.    Labs and Fingersticks  CAPILLARY BLOOD GLUCOSE      POCT Blood Glucose.: 217 mg/dL (22 May 2020 08:50)  POCT Blood Glucose.: 221 mg/dL (22 May 2020 05:00)  POCT Blood Glucose.: 100 mg/dL (21 May 2020 22:13)  POCT Blood Glucose.: 134 mg/dL (21 May 2020 18:16)  POCT Blood Glucose.: 226 mg/dL (21 May 2020 12:42)      Anion Gap, Serum: 10 (05-22 @ 06:52)  Anion Gap, Serum: 10 (05-21 @ 07:06)      Calcium, Total Serum: 9.1 (05-22 @ 06:52)  Calcium, Total Serum: 9.0 (05-21 @ 07:06)          05-22    135  |  100  |  36<H>  ----------------------------<  199<H>  4.2   |  25  |  1.61<H>    Ca    9.1      22 May 2020 06:52                          8.5    8.01  )-----------( 295      ( 22 May 2020 06:52 )             27.4     Medications  MEDICATIONS  (STANDING):  aMIOdarone    Tablet 200 milliGRAM(s) Oral daily  aspirin enteric coated 81 milliGRAM(s) Oral daily  atorvastatin 40 milliGRAM(s) Oral at bedtime  BACItracin   Ointment 1 Application(s) Topical daily  buDESOnide    Inhalation Suspension 0.5 milliGRAM(s) Inhalation two times a day  dextrose 5%. 1000 milliLiter(s) (50 mL/Hr) IV Continuous <Continuous>  dextrose 50% Injectable 12.5 Gram(s) IV Push once  dextrose 50% Injectable 25 Gram(s) IV Push once  dextrose 50% Injectable 25 Gram(s) IV Push once  enoxaparin Injectable 40 milliGRAM(s) SubCutaneous daily  hydrocortisone 1% Cream 1 Application(s) Topical two times a day  insulin glargine Injectable (LANTUS) 40 Unit(s) SubCutaneous at bedtime  insulin lispro (HumaLOG) corrective regimen sliding scale   SubCutaneous three times a day before meals  insulin lispro (HumaLOG) corrective regimen sliding scale   SubCutaneous at bedtime  insulin lispro Injectable (HumaLOG) 16 Unit(s) SubCutaneous three times a day with meals  loratadine 10 milliGRAM(s) Oral daily  metoprolol tartrate 12.5 milliGRAM(s) Oral two times a day  pantoprazole    Tablet 40 milliGRAM(s) Oral before breakfast  polyethylene glycol 3350 17 Gram(s) Oral daily  senna 2 Tablet(s) Oral at bedtime  torsemide 10 milliGRAM(s) Oral daily  vancomycin  IVPB 1250 milliGRAM(s) IV Intermittent every 24 hours      Physical Exam  General: Patient comfortable in bed  Vital Signs Last 12 Hrs  T(F): 97.7 (05-22-20 @ 05:15), Max: 97.7 (05-22-20 @ 05:15)  HR: 82 (05-22-20 @ 05:15) (82 - 82)  BP: 154/83 (05-22-20 @ 05:15) (154/83 - 154/83)  BP(mean): --  RR: 18 (05-22-20 @ 05:15) (18 - 18)  SpO2: 97% (05-22-20 @ 05:15) (97% - 97%)  Neck: No palpable thyroid nodules.  CVS: S1S2, No murmurs  Respiratory: No wheezing, no crepitations  GI: Abdomen soft, bowel sounds positive  Musculoskeletal:  edema lower extremities.   Skin: No skin rashes, no ecchymosis    Diagnostics

## 2020-05-22 NOTE — PROGRESS NOTE ADULT - ASSESSMENT
65y Male with history of DM presents with syncope concern for sternal wound infection. Nephrology consulted for elevated Scr.    1) CKD-3: Baseline Scr 1.5 as per prior labs. Renal function near baseline. Patient advised to follow up as an outpatient for CKD work up. Avoid nephrotoxins. Monitor electrolytes.    2) HTN with CKD: BP controlled. Metoprolol as per cardiology. Monitor BP.    3) LE edema: Patient appears euvolemic. Continue with torsemide 10 mg daily. Monitor UO.    4) Hyponatremia: Mild and secondary to poor solute intake as per urine studies now resolved after vancomycin changed to NS base.

## 2020-05-22 NOTE — PROGRESS NOTE ADULT - ASSESSMENT
limited echo 5/12/20:EF 55-60%. overall preserved lv fx, despite segmental wall motion abnormalities, no LV thrombus  The apical anterior wall, the apical lateral wall, the basal anterior wall,the mid anterior wall, and the midanterolateral wall are hypokinetic.  echo 5/14/20:  Limited study to r/o Endocarditis Pt has very limited windows due to edema and wound vac. Unable to exclude valvular vegetations in the setting of a  technically difficult study. OLGA could be obtained for further evaluation of valvular vegetations, if clinical suspicion of endocarditis is high.        a/p  65y m PMHx CABG Feb 2020 complicated by PEA arrest, thoracotomy site infection w/empyema, DM, HTN p/w syncope, sternal wound infection.     1. Syncope   likely 2/2 to +orthostatics  cv stable, EKG unchanged from prior   HST elevated w/ normal CK/CKMB, demand ischemia in setting of STEPHANIE/CKD, possible wound infection   CT head negative for ICH  echo with overall preserved LVEF, despite segmental wall motion abnormalities, no LV thrombus   encourage PO intake    2. Sternal wound infection s/p RTOR for SWI    s/p debridement of sternal wound, reconstruction, Laparoscopic lysis of abdominal adhesions   IV abx per ID   blood cx neg    repeat echo unable to r/o endocarditis  plan for OLGA per ID to r/o endocarditis   ID, f/u    3. CAD s/p CABG x 4 , s/p PEA arrest   cv stable   cont asa, statin, bb     4. Chronic diastolic CHF  euvolemic on exam with unchanged SOB   continue diuretics as ordered   repeat echo with preserved LVEF     5. PAF  remains NSR  continue amio, bb    6. HTN  bp stable    7. STEPHANIE/CKD   continue to trend creat     8. Pleural effusion, hx  CXR this admission with small left loculated pleural effusion now decreased in size from previous exam.  ENT f/u noted - pt. refusing scope at this time, outpt. f/u     dvt ppx

## 2020-05-22 NOTE — PROGRESS NOTE ADULT - SUBJECTIVE AND OBJECTIVE BOX
Follow Up: MRSA wound bacteremia    Interval History/ROS: Feels ok. No fevers. OLGA was canceled pending a repeat COVID test. KEI drains are still putting out a lot and he doesn't feel comfortable leaving yet anyways. No diarrhea. Abdominal soreness improving.     Allergies  Blueberries (Rash)  No Known Drug Allergies        ANTIMICROBIALS:  vancomycin  IVPB 1250 every 24 hours      OTHER MEDS:  acetaminophen   Tablet .. 650 milliGRAM(s) Oral every 6 hours PRN  ALBUTerol    90 MICROgram(s) HFA Inhaler 2 Puff(s) Inhalation every 6 hours PRN  aMIOdarone    Tablet 200 milliGRAM(s) Oral daily  aspirin enteric coated 81 milliGRAM(s) Oral daily  atorvastatin 40 milliGRAM(s) Oral at bedtime  BACItracin   Ointment 1 Application(s) Topical daily  benzonatate 100 milliGRAM(s) Oral three times a day PRN  buDESOnide    Inhalation Suspension 0.5 milliGRAM(s) Inhalation two times a day  dextrose 40% Gel 15 Gram(s) Oral once PRN  dextrose 5%. 1000 milliLiter(s) IV Continuous <Continuous>  dextrose 50% Injectable 12.5 Gram(s) IV Push once  dextrose 50% Injectable 25 Gram(s) IV Push once  dextrose 50% Injectable 25 Gram(s) IV Push once  enoxaparin Injectable 40 milliGRAM(s) SubCutaneous daily  glucagon  Injectable 1 milliGRAM(s) IntraMuscular once PRN  hydrocortisone 1% Cream 1 Application(s) Topical two times a day  insulin glargine Injectable (LANTUS) 30 Unit(s) SubCutaneous at bedtime  insulin lispro (HumaLOG) corrective regimen sliding scale   SubCutaneous three times a day before meals  insulin lispro (HumaLOG) corrective regimen sliding scale   SubCutaneous at bedtime  insulin lispro Injectable (HumaLOG) 16 Unit(s) SubCutaneous three times a day with meals  loratadine 10 milliGRAM(s) Oral daily  metoprolol tartrate 12.5 milliGRAM(s) Oral two times a day  oxycodone    5 mG/acetaminophen 325 mG 1 Tablet(s) Oral every 6 hours PRN  pantoprazole    Tablet 40 milliGRAM(s) Oral before breakfast  polyethylene glycol 3350 17 Gram(s) Oral daily  senna 2 Tablet(s) Oral at bedtime  torsemide 10 milliGRAM(s) Oral daily      Vital Signs Last 24 Hrs  T(C): 34.6 (22 May 2020 13:10), Max: 37.1 (21 May 2020 21:46)  T(F): 94.3 (22 May 2020 13:10), Max: 98.8 (21 May 2020 21:46)  HR: 76 (22 May 2020 13:10) (76 - 98)  BP: 107/64 (22 May 2020 13:10) (107/64 - 154/83)  BP(mean): --  RR: 18 (22 May 2020 05:15) (18 - 18)  SpO2: 97% (22 May 2020 05:15) (96% - 97%)    Physical Exam:  General: awake, alert, fatigued but less so and non toxic  Head: atraumatic, normocephalic  Eye: normal sclera and conjunctiva  Cardio: regular rate   Respiratory: nonlabored on room air, grossly clear bilaterally, no wheezing  abd: soft, bowel sounds present, no tenderness  vascular: right arm PICC, no phlebitis   Neurologic: no focal deficit  psych: normal affect                          8.5    8.01  )-----------( 295      ( 22 May 2020 06:52 )             27.4       05-22    135  |  100  |  36<H>  ----------------------------<  199<H>  4.2   |  25  |  1.61<H>    Ca    9.1      22 May 2020 06:52      MICROBIOLOGY:  Vancomycin Level, Trough: 21.4 ug/mL (05-22-20 @ 06:52)  Vancomycin Level, Trough: 10.3 ug/mL (05-21-20 @ 22:22)    Culture - Tissue with Gram Stain (collected 05-16-20 @ 03:05)  Source: .Tissue Other  Gram Stain (05-16-20 @ 07:25):    Rare polymorphonuclear leukocytes seen per low power field    No organisms seen per oil power field  Final Report (05-21-20 @ 12:21):    No growth at 5 days    Culture - Fungal, Tissue (collected 05-16-20 @ 03:05)  Source: .Tissue sternal wound culture  Preliminary Report (05-18-20 @ 10:49):    Testing in progress    RADIOLOGY:  Images below reviewed personally  Xray Chest 1 View- PORTABLE-Urgent (05.21.20 @ 18:40)   Right PICC line in satisfactory position. Mediastinal drainage catheter unchanged.  No acute infiltrate.  Enlarged cardiac silhouette.    CT Chest No Cont (05.12.20 @ 20:09)   No subcutaneous collection. Trace hematoma within the anterior mediastinum. No drainable collection.

## 2020-05-22 NOTE — PROGRESS NOTE ADULT - ASSESSMENT
65 m with    Syncope  - telemetry  - cardiology follow     Orthostatic hypotension  - off aldactone  - follow    S/P CABG  - CT sx evaluation noted    Sternal wound, s/p reconstruction and omental flap  - MRSA bacteremia  - Vancomycin  - ID follow  - postop care  - PICC     Bacteremia  - OLGA reordered as per ID recommendations . Echo with no vegetations.  - ID follow    Scalp abrasion healing    Diabetes control  - ADA diet  - BS control  - Endocrine follow    CKD  - follow    HTN control    Hoarseness  - ENT evaluation noted    PT    DCP home.     Miguel Angel Duke MD pager [phone]

## 2020-05-22 NOTE — PROGRESS NOTE ADULT - ASSESSMENT
65y m PMHx CABG Feb 2020 complicated by PEA arrest, sternal infection s/p sternal debridement with muscle flap reconstruction, DM, HTN p/w increased drainage from sternal wound s/p omental flap and incision closure w/ FTSG 5/15    Plan:  - Monitor drains  - diet per general surgery   - Care per primary team    Plastic Surgery  [phone]

## 2020-05-22 NOTE — PROGRESS NOTE ADULT - ASSESSMENT
65M s/p CABG 2/3/20, course complicated by PEA arrest, E faecalis bacteremia and local infections of the sternal and right leg SVG sites, now admitted 5/11/20 for syncope.   In total he received about six weeks of antibiotics although no growth from blood or sternal OR debridement 2/25 and low intraoperative concern for osteomyelitis.   However, wound drainage increased significantly now with MRSA and low-grade bacteremia, cleared 5/14.   s/p OR 5/15 debridement, reconstruction with omentum   Would treat as osteomyelitis given elevated ESR/CRP although no aggressive osseous lesions on CT.   I have a low suspicion for endocarditis but would still pursue an echo (TTE poor study).   s/p PICC line 5/22  Clinically well    Suggest  -OLGA - delayed until Tuesday 5/26 to repeat COVID PCR (has three negative tests)   -continue Vancomycin 1.25GM IV q24h   -check a trough on Sunday  -no objection to discharge pending OLGA   -antibiotics through Fri 6/26 for a six-week course with weekly CBC, BUN, creatinine, Vancomycin trough, ESR, CRP faxed to [phone]    Spoke with primary team     Chapito Delarosa MD   Infectious Disease   Pager [phone]   After 5PM and on weekends please page fellow on call or call [phone] 65M s/p CABG 2/3/20, course complicated by PEA arrest, E faecalis bacteremia and local infections of the sternal and right leg SVG sites, now admitted 5/11/20 for syncope.   In total he received about six weeks of antibiotics although no growth from blood or sternal OR debridement 2/25 and low intraoperative concern for osteomyelitis.   However, wound drainage increased significantly now with MRSA and low-grade bacteremia, cleared 5/14.   s/p OR 5/15 debridement, reconstruction with omentum   Would treat as osteomyelitis given elevated ESR/CRP although no aggressive osseous lesions on CT.   I have a low suspicion for endocarditis but would still pursue an echo (TTE poor study).   s/p PICC line 5/22  Clinically well    Suggest  -OLGA - delayed until Tuesday 5/26 to repeat COVID PCR (has three negative tests)   -continue Vancomycin 1.25GM IV q24h   -check a trough on Sunday; level of 21 this morning was too early and not a trough   -no objection to discharge pending OLGA   -antibiotics through Fri 6/26 for a six-week course with weekly CBC, BUN, creatinine, Vancomycin trough, ESR, CRP faxed to [phone]    Spoke with primary team     Chapito Delarosa MD   Infectious Disease   Pager [phone]   After 5PM and on weekends please page fellow on call or call [phone]

## 2020-05-22 NOTE — PROGRESS NOTE ADULT - SUBJECTIVE AND OBJECTIVE BOX
Patient is a 65y old  Male who presents with a chief complaint of syncope (22 May 2020 15:23)      SUBJECTIVE / OVERNIGHT EVENTS: No new complaints. Weak.  Review of Systems  chest pain no  palpitations no  sob no  nausea no  headache no    MEDICATIONS  (STANDING):  aMIOdarone    Tablet 200 milliGRAM(s) Oral daily  aspirin enteric coated 81 milliGRAM(s) Oral daily  atorvastatin 40 milliGRAM(s) Oral at bedtime  BACItracin   Ointment 1 Application(s) Topical daily  buDESOnide    Inhalation Suspension 0.5 milliGRAM(s) Inhalation two times a day  dextrose 5%. 1000 milliLiter(s) (50 mL/Hr) IV Continuous <Continuous>  dextrose 50% Injectable 12.5 Gram(s) IV Push once  dextrose 50% Injectable 25 Gram(s) IV Push once  dextrose 50% Injectable 25 Gram(s) IV Push once  enoxaparin Injectable 40 milliGRAM(s) SubCutaneous daily  hydrocortisone 1% Cream 1 Application(s) Topical two times a day  insulin glargine Injectable (LANTUS) 30 Unit(s) SubCutaneous at bedtime  insulin lispro (HumaLOG) corrective regimen sliding scale   SubCutaneous three times a day before meals  insulin lispro (HumaLOG) corrective regimen sliding scale   SubCutaneous at bedtime  insulin lispro Injectable (HumaLOG) 16 Unit(s) SubCutaneous three times a day with meals  loratadine 10 milliGRAM(s) Oral daily  metoprolol tartrate 12.5 milliGRAM(s) Oral two times a day  pantoprazole    Tablet 40 milliGRAM(s) Oral before breakfast  polyethylene glycol 3350 17 Gram(s) Oral daily  senna 2 Tablet(s) Oral at bedtime  torsemide 10 milliGRAM(s) Oral daily  vancomycin  IVPB 1250 milliGRAM(s) IV Intermittent every 24 hours    MEDICATIONS  (PRN):  acetaminophen   Tablet .. 650 milliGRAM(s) Oral every 6 hours PRN Temp greater or equal to 38.5C (101.3F), Mild Pain (1 - 3)  ALBUTerol    90 MICROgram(s) HFA Inhaler 2 Puff(s) Inhalation every 6 hours PRN Shortness of Breath and/or Wheezing  benzonatate 100 milliGRAM(s) Oral three times a day PRN Cough  dextrose 40% Gel 15 Gram(s) Oral once PRN Blood Glucose LESS THAN 70 milliGRAM(s)/deciliter  glucagon  Injectable 1 milliGRAM(s) IntraMuscular once PRN Glucose LESS THAN 70 milligrams/deciliter  oxycodone    5 mG/acetaminophen 325 mG 1 Tablet(s) Oral every 6 hours PRN Moderate Pain (4 - 6)      Vital Signs Last 24 Hrs  T(C): 34.6 (22 May 2020 13:10), Max: 37.1 (21 May 2020 21:46)  T(F): 94.3 (22 May 2020 13:10), Max: 98.8 (21 May 2020 21:46)  HR: 81 (22 May 2020 17:13) (76 - 98)  BP: 125/68 (22 May 2020 17:13) (107/64 - 154/83)  BP(mean): --  RR: 18 (22 May 2020 05:15) (18 - 18)  SpO2: 97% (22 May 2020 05:15) (96% - 97%)    PHYSICAL EXAM:  GENERAL: NAD, well-developed  HEAD:  Atraumatic, Normocephalic  EYES: EOMI, PERRLA, conjunctiva and sclera clear  NECK: Supple, No JVD  CHEST/LUNG: Clear to auscultation bilaterally; No wheeze Sternal wound with clean dressing.  HEART: Regular rate and rhythm; No murmurs, rubs, or gallops  ABDOMEN: Soft, Nontender, Nondistended; Bowel sounds present KEI in place  EXTREMITIES:  2+ Peripheral Pulses, No clubbing, cyanosis, or edema R arm PICC  PSYCH: AAOx3  NEUROLOGY: non-focal  SKIN: No rashes or lesions    LABS:                        8.5    8.01  )-----------( 295      ( 22 May 2020 06:52 )             27.4     05-22    135  |  100  |  36<H>  ----------------------------<  199<H>  4.2   |  25  |  1.61<H>    Ca    9.1      22 May 2020 06:52      PT/INR - ( 22 May 2020 06:52 )   PT: 11.4 sec;   INR: 0.99 ratio                     RADIOLOGY & ADDITIONAL TESTS:    Imaging Personally Reviewed:    Consultant(s) Notes Reviewed:      Care Discussed with Consultants/Other Providers:

## 2020-05-22 NOTE — PROGRESS NOTE ADULT - SUBJECTIVE AND OBJECTIVE BOX
CARDIOLOGY FOLLOW UP - Dr. Steel    CC no cp/sob   OLGA cancelled, need recent covid testing prior to procedure   PHYSICAL EXAM:  T(C): 36.5 (05-22-20 @ 05:15), Max: 37.1 (05-21-20 @ 21:46)  HR: 82 (05-22-20 @ 05:15) (78 - 98)  BP: 154/83 (05-22-20 @ 05:15) (111/65 - 154/83)  RR: 18 (05-22-20 @ 05:15) (18 - 18)  SpO2: 97% (05-22-20 @ 05:15) (96% - 99%)  Wt(kg): --  I&O's Summary    21 May 2020 07:01  -  22 May 2020 07:00  --------------------------------------------------------  IN: 700 mL / OUT: 892.5 mL / NET: -192.5 mL        Appearance: Normal	  Cardiovascular: Normal S1 S2,RRR, No JVD, No murmurs  Respiratory: Lungs clear to auscultation	  Gastrointestinal:  Soft, Non-tender, + BS	  Extremities: Normal range of motion, No clubbing, cyanosis or edema        MEDICATIONS  (STANDING):  aMIOdarone    Tablet 200 milliGRAM(s) Oral daily  aspirin enteric coated 81 milliGRAM(s) Oral daily  atorvastatin 40 milliGRAM(s) Oral at bedtime  BACItracin   Ointment 1 Application(s) Topical daily  buDESOnide    Inhalation Suspension 0.5 milliGRAM(s) Inhalation two times a day  dextrose 5%. 1000 milliLiter(s) (50 mL/Hr) IV Continuous <Continuous>  dextrose 50% Injectable 12.5 Gram(s) IV Push once  dextrose 50% Injectable 25 Gram(s) IV Push once  dextrose 50% Injectable 25 Gram(s) IV Push once  enoxaparin Injectable 40 milliGRAM(s) SubCutaneous daily  hydrocortisone 1% Cream 1 Application(s) Topical two times a day  insulin glargine Injectable (LANTUS) 40 Unit(s) SubCutaneous at bedtime  insulin lispro (HumaLOG) corrective regimen sliding scale   SubCutaneous three times a day before meals  insulin lispro (HumaLOG) corrective regimen sliding scale   SubCutaneous at bedtime  insulin lispro Injectable (HumaLOG) 16 Unit(s) SubCutaneous three times a day with meals  loratadine 10 milliGRAM(s) Oral daily  metoprolol tartrate 12.5 milliGRAM(s) Oral two times a day  pantoprazole    Tablet 40 milliGRAM(s) Oral before breakfast  polyethylene glycol 3350 17 Gram(s) Oral daily  senna 2 Tablet(s) Oral at bedtime  torsemide 10 milliGRAM(s) Oral daily  vancomycin  IVPB 1250 milliGRAM(s) IV Intermittent every 24 hours      TELEMETRY: NSR 	    ECG:  	  RADIOLOGY:   DIAGNOSTIC TESTING:  [ ] Echocardiogram:  [ ]  Catheterization:  [ ] Stress Test:    OTHER: 	    LABS:	 	                                8.5    8.01  )-----------( 295      ( 22 May 2020 06:52 )             27.4     05-22    135  |  100  |  36<H>  ----------------------------<  199<H>  4.2   |  25  |  1.61<H>    Ca    9.1      22 May 2020 06:52      PT/INR - ( 22 May 2020 06:52 )   PT: 11.4 sec;   INR: 0.99 ratio

## 2020-05-22 NOTE — PROGRESS NOTE ADULT - SUBJECTIVE AND OBJECTIVE BOX
Plastic Surgery Progress Note (pg [facility]: [id number], [facility]: [phone])    SUBJECTIVE:  Patient seen and examined at bedside this AM. No acute events overnight. AF/VSS. Pain well controlled.     OBJECTIVE:   Vital Signs Last 24 Hrs  T(C): 36.5 (22 May 2020 05:15), Max: 37.1 (21 May 2020 21:46)  T(F): 97.7 (22 May 2020 05:15), Max: 98.8 (21 May 2020 21:46)  HR: 82 (22 May 2020 05:15) (78 - 98)  BP: 154/83 (22 May 2020 05:15) (111/65 - 154/83)  RR: 18 (22 May 2020 05:15) (18 - 18)  SpO2: 97% (22 May 2020 05:15) (96% - 99%)    PHYSICAL EXAM:  General: alert and oriented, NAD  Resp: airway patent, respirations unlabored  Chest: KEI x2 with s/s output. Dressing changed. Soft  Abdomen: soft, non-distended. steri strips over donor site     ** LABS **             8.5    8.01  )-----------( 295      ( 22 May 2020 06:52 )             27.4     05-22    135  |  100  |  36<H>  ----------------------------<  199<H>  4.2   |  25  |  1.61<H>    Ca    9.1      22 May 2020 06:52

## 2020-05-22 NOTE — PROGRESS NOTE ADULT - SUBJECTIVE AND OBJECTIVE BOX
[facility] NEPHROLOGY- PROGRESS NOTE    65y Male with history of DM presents with syncope concern for sternal wound infection. Nephrology consulted for elevated Scr.    REVIEW OF SYSTEMS:  Gen: no changes in weight  Cards: no chest pain  Resp: + dyspnea resolved  GI: no nausea or vomiting or diarrhea  Vascular: no LE edema    No Known Allergies      Hospital Medications: Medications reviewed        VITALS:  T(F): 94.3 (05-22-20 @ 13:10), Max: 98.8 (05-21-20 @ 21:46)  HR: 76 (05-22-20 @ 13:10)  BP: 107/64 (05-22-20 @ 13:10)  RR: 18 (05-22-20 @ 05:15)  SpO2: 97% (05-22-20 @ 05:15)  Wt(kg): --    05-21 @ 07:01  -  05-22 @ 07:00  --------------------------------------------------------  IN: 700 mL / OUT: 892.5 mL / NET: -192.5 mL          PHYSICAL EXAM:    Gen: NAD, calm  Cards: RRR, +S1/S2, no M/G/R  Resp: CTA B/L  GI: soft, NT/ND, NABS  Vascular: no LE edema B/L          LABS:  05-22    135  |  100  |  36<H>  ----------------------------<  199<H>  4.2   |  25  |  1.61<H>    Ca    9.1      22 May 2020 06:52      Creatinine Trend: 1.61 <--, 1.47 <--, 1.63 <--, 1.31 <--, 1.38 <--, 1.77 <--, 1.51 <--                        8.5    8.01  )-----------( 295      ( 22 May 2020 06:52 )             27.4     Urine Studies:    Osmolality, Random Urine: 257 mos/kg (05-16 @ 19:34)  Sodium, Random Urine: <35 mmol/L (05-16 @ 19:34)

## 2020-05-22 NOTE — PROGRESS NOTE ADULT - PROBLEM SELECTOR PLAN 1
Will decrease Lantus to 30u at bedtime.  Will continue Humalog 16u before each meal as well as Humalog correction scale coverage. Will continue monitoring FS and FU.  Patient was on insulin at home, knows how to do injections. Suggest DC on current insulin regimen and FU endo 4 weeks.  Patient counseled for compliance with insulin injections, consistent low carb diet, and exercise as tolerated outpatient.

## 2020-05-22 NOTE — PROGRESS NOTE ADULT - ASSESSMENT
Assessment  DMT2: 65y Male with DM T2 with hyperglycemia, A1C 7.9%, was on insulin at home, now on basal bolus insulin, increased dose yesterday, blood sugars fluctuating, FS running high this AM, Lantus dose was held and patient drank full glucerna overnight. Patient appears comfortable, eats full meals in addition to glucerna, noncompliant with low-carb diet.  CAD: s/p CABG previous admission, stable, monitored.  Sternal Wound: On IV  ABx, FU Plastics/ID.  HTN: Controlled,  on antihypertensive medications.  CKD: Monitor labs/BMP.          Harper Matias MD  Cell: [phone]  Office: [phone] Assessment  DMT2: 65y Male with DM T2 with hyperglycemia, A1C 7.9%, was on insulin at home, now on basal bolus insulin, increased dose yesterday, blood sugars fluctuating,  FS running high this AM, Lantus dose was held and patient drank full glucerna overnight. Patient appears comfortable, eats full meals in addition to glucerna, noncompliant with low-carb diet.  CAD: s/p CABG previous admission, stable, monitored.  Sternal Wound: On IV  ABx, FU Plastics/ID.  HTN: Controlled,  on antihypertensive medications.  CKD: Monitor labs/BMP.          Harper Matias MD  Cell: [phone]  Office: [phone]

## 2020-05-23 LAB
ANION GAP SERPL CALC-SCNC: 10 MMOL/L — SIGNIFICANT CHANGE UP (ref 5–17)
BUN SERPL-MCNC: 37 MG/DL — HIGH (ref 7–23)
CALCIUM SERPL-MCNC: 9.3 MG/DL — SIGNIFICANT CHANGE UP (ref 8.4–10.5)
CHLORIDE SERPL-SCNC: 97 MMOL/L — SIGNIFICANT CHANGE UP (ref 96–108)
CO2 SERPL-SCNC: 27 MMOL/L — SIGNIFICANT CHANGE UP (ref 22–31)
CREAT SERPL-MCNC: 1.53 MG/DL — HIGH (ref 0.5–1.3)
GLUCOSE BLDC GLUCOMTR-MCNC: 122 MG/DL — HIGH (ref 70–99)
GLUCOSE BLDC GLUCOMTR-MCNC: 140 MG/DL — HIGH (ref 70–99)
GLUCOSE BLDC GLUCOMTR-MCNC: 210 MG/DL — HIGH (ref 70–99)
GLUCOSE BLDC GLUCOMTR-MCNC: 226 MG/DL — HIGH (ref 70–99)
GLUCOSE BLDC GLUCOMTR-MCNC: 227 MG/DL — HIGH (ref 70–99)
GLUCOSE SERPL-MCNC: 168 MG/DL — HIGH (ref 70–99)
HCT VFR BLD CALC: 29.1 % — LOW (ref 39–50)
HGB BLD-MCNC: 8.9 G/DL — LOW (ref 13–17)
MCHC RBC-ENTMCNC: 26.4 PG — LOW (ref 27–34)
MCHC RBC-ENTMCNC: 30.6 GM/DL — LOW (ref 32–36)
MCV RBC AUTO: 86.4 FL — SIGNIFICANT CHANGE UP (ref 80–100)
NRBC # BLD: 0 /100 WBCS — SIGNIFICANT CHANGE UP (ref 0–0)
PLATELET # BLD AUTO: 307 K/UL — SIGNIFICANT CHANGE UP (ref 150–400)
POTASSIUM SERPL-MCNC: 4.3 MMOL/L — SIGNIFICANT CHANGE UP (ref 3.5–5.3)
POTASSIUM SERPL-SCNC: 4.3 MMOL/L — SIGNIFICANT CHANGE UP (ref 3.5–5.3)
RBC # BLD: 3.37 M/UL — LOW (ref 4.2–5.8)
RBC # FLD: 15.9 % — HIGH (ref 10.3–14.5)
SODIUM SERPL-SCNC: 134 MMOL/L — LOW (ref 135–145)
WBC # BLD: 8.95 K/UL — SIGNIFICANT CHANGE UP (ref 3.8–10.5)
WBC # FLD AUTO: 8.95 K/UL — SIGNIFICANT CHANGE UP (ref 3.8–10.5)

## 2020-05-23 RX ORDER — PETROLATUM,WHITE
1 JELLY (GRAM) TOPICAL DAILY
Refills: 0 | Status: DISCONTINUED | OUTPATIENT
Start: 2020-05-23 | End: 2020-05-27

## 2020-05-23 RX ORDER — INSULIN GLARGINE 100 [IU]/ML
34 INJECTION, SOLUTION SUBCUTANEOUS AT BEDTIME
Refills: 0 | Status: DISCONTINUED | OUTPATIENT
Start: 2020-05-23 | End: 2020-05-24

## 2020-05-23 RX ORDER — INSULIN LISPRO 100/ML
18 VIAL (ML) SUBCUTANEOUS
Refills: 0 | Status: DISCONTINUED | OUTPATIENT
Start: 2020-05-23 | End: 2020-05-24

## 2020-05-23 RX ADMIN — Medication 16 UNIT(S): at 09:10

## 2020-05-23 RX ADMIN — Medication 81 MILLIGRAM(S): at 11:09

## 2020-05-23 RX ADMIN — Medication 1 APPLICATION(S): at 11:17

## 2020-05-23 RX ADMIN — Medication 10 MILLIGRAM(S): at 05:03

## 2020-05-23 RX ADMIN — AMIODARONE HYDROCHLORIDE 200 MILLIGRAM(S): 400 TABLET ORAL at 05:03

## 2020-05-23 RX ADMIN — ENOXAPARIN SODIUM 40 MILLIGRAM(S): 100 INJECTION SUBCUTANEOUS at 11:10

## 2020-05-23 RX ADMIN — Medication 166.67 MILLIGRAM(S): at 20:50

## 2020-05-23 RX ADMIN — Medication 2: at 13:22

## 2020-05-23 RX ADMIN — Medication 1 APPLICATION(S): at 16:42

## 2020-05-23 RX ADMIN — Medication 2: at 09:09

## 2020-05-23 RX ADMIN — Medication 12.5 MILLIGRAM(S): at 16:41

## 2020-05-23 RX ADMIN — Medication 100 MILLIGRAM(S): at 20:51

## 2020-05-23 RX ADMIN — ATORVASTATIN CALCIUM 40 MILLIGRAM(S): 80 TABLET, FILM COATED ORAL at 20:51

## 2020-05-23 RX ADMIN — Medication 2: at 17:35

## 2020-05-23 RX ADMIN — Medication 0.5 MILLIGRAM(S): at 05:11

## 2020-05-23 RX ADMIN — Medication 100 MILLIGRAM(S): at 05:03

## 2020-05-23 RX ADMIN — PANTOPRAZOLE SODIUM 40 MILLIGRAM(S): 20 TABLET, DELAYED RELEASE ORAL at 05:04

## 2020-05-23 RX ADMIN — Medication 18 UNIT(S): at 17:35

## 2020-05-23 RX ADMIN — Medication 16 UNIT(S): at 13:22

## 2020-05-23 RX ADMIN — OXYCODONE AND ACETAMINOPHEN 1 TABLET(S): 5; 325 TABLET ORAL at 20:49

## 2020-05-23 RX ADMIN — Medication 0.5 MILLIGRAM(S): at 16:41

## 2020-05-23 RX ADMIN — Medication 12.5 MILLIGRAM(S): at 05:03

## 2020-05-23 RX ADMIN — LORATADINE 10 MILLIGRAM(S): 10 TABLET ORAL at 11:10

## 2020-05-23 RX ADMIN — Medication 1 APPLICATION(S): at 11:10

## 2020-05-23 NOTE — PROGRESS NOTE ADULT - ASSESSMENT
Assessment  DMT2: 65y Male with DM T2 with hyperglycemia, A1C 7.9%, was on insulin at home, now on basal bolus insulin, blood sugars running high. Patient appears comfortable, eats full meals in addition to glucerna, noncompliant with low-carb diet.  CAD: s/p CABG previous admission, stable, monitored.  Sternal Wound: On IV  ABx, FU Plastics/ID.  HTN: Controlled,  on antihypertensive medications.  CKD: Monitor labs/BMP.          Harper Matias MD  Cell: [phone]  Office: [phone]

## 2020-05-23 NOTE — PROGRESS NOTE ADULT - SUBJECTIVE AND OBJECTIVE BOX
[facility] NEPHROLOGY- PROGRESS NOTE    65y Male with history of DM presents with syncope concern for sternal wound infection. Nephrology consulted for elevated Scr.    Pt feels okay, but is concerned about the continued drainage from KEI drains/sternal wound.    REVIEW OF SYSTEMS:  Gen: no changes in weight  Cards: no chest pain  Resp: no SOB  GI: no nausea or vomiting or diarrhea  Vascular: no LE edema    No Known Allergies      Hospital Medications: Medications reviewed    VITALS:  T(F): 98.5 (05-23-20 @ 04:28), Max: 98.5 (05-23-20 @ 04:28)  HR: 78 (05-23-20 @ 05:40)  BP: 157/91 (05-23-20 @ 05:40)  RR: 18 (05-23-20 @ 04:28)  SpO2: 98% (05-23-20 @ 04:28)  Wt(kg): --    05-22 @ 07:01  -  05-23 @ 07:00  --------------------------------------------------------  IN: 0 mL / OUT: 746 mL / NET: -746 mL        PHYSICAL EXAM:  Gen: NAD, calm  Cards: RRR, +S1/S2, no M/G/R  Resp: CTA B/L  GI: soft, NT/ND, NABS  Vascular: no LE edema B/L        LABS:  05-23  134 <--, 135 <--, 132 <--, 135 <--, 139 <--, 134 <--, 134 <--  134<L>  |  97  |  37<H>  ----------------------------<  168<H>  4.3   |  27  |  1.53<H>    Ca    9.3      23 May 2020 07:03      Creatinine Trend: 1.53 <--, 1.61 <--, 1.47 <--, 1.63 <--, 1.31 <--, 1.38 <--, 1.77 <--                        8.9    8.95  )-----------( 307      ( 23 May 2020 07:03 )             29.1     Urine Studies:    Osmolality, Random Urine: 257 mos/kg (05-16 @ 19:34)  Sodium, Random Urine: <35 mmol/L (05-16 @ 19:34)

## 2020-05-23 NOTE — PROGRESS NOTE ADULT - ASSESSMENT
65y m PMHx CABG Feb 2020 complicated by PEA arrest, sternal infection s/p sternal debridement with muscle flap reconstruction, DM, HTN p/w increased drainage from sternal wound s/p omental flap and incision closure w/ FTSG 5/15    Plan:  - Monitor drains  - Care per primary team    Plastic Surgery  [phone]

## 2020-05-23 NOTE — PROGRESS NOTE ADULT - SUBJECTIVE AND OBJECTIVE BOX
Plastic Surgery Progress Note (pg [facility]: [id number], [facility]: [phone])    SUBJECTIVE:  Patient seen and examined at bedside this AM. No acute events overnight. AF/VSS. Pain well controlled.     OBJECTIVE:   Vital Signs Last 24 Hrs  T(C): 36.9 (23 May 2020 04:28), Max: 36.9 (23 May 2020 04:28)  T(F): 98.5 (23 May 2020 04:28), Max: 98.5 (23 May 2020 04:28)  HR: 78 (23 May 2020 05:40) (76 - 86)  BP: 157/91 (23 May 2020 05:40) (107/64 - 157/91)  RR: 18 (23 May 2020 04:28) (18 - 18)  SpO2: 98% (23 May 2020 04:28) (98% - 98%)    PHYSICAL EXAM:  General: alert and oriented, NAD  Resp: airway patent, respirations unlabored  Chest: KEI x2 with s/s output. Dressing changed. Soft  Abdomen: soft, non-distended. steri strips over donor site     ** LABS **             8.9    8.95  )-----------( 307      ( 23 May 2020 07:03 )             29.1     05-23    134<L>  |  97  |  37<H>  ----------------------------<  168<H>  4.3   |  27  |  1.53<H>    Ca    9.3      23 May 2020 07:03

## 2020-05-23 NOTE — PROGRESS NOTE ADULT - ASSESSMENT
65 m with    Syncope  - telemetry  - cardiology follow     Orthostatic hypotension  - off aldactone  - follow    S/P CABG  - CT sx evaluation noted    Sternal wound, s/p reconstruction and omental flap  - MRSA bacteremia  - Vancomycin  - ID follow  - postop care  - PICC     Surgery follow re KEI management.    Bacteremia  - OLGA reordered as per ID recommendations . Echo with no vegetations.  - ID follow    Scalp abrasion healing    Diabetes control  - ADA diet  - BS control  - Endocrine follow    CKD  - follow    HTN control    Hoarseness  - ENT evaluation noted    PT    DCP home.     Miguel Angel Duke MD pager [phone]

## 2020-05-23 NOTE — PROGRESS NOTE ADULT - SUBJECTIVE AND OBJECTIVE BOX
Patient is a 65y old  Male who presents with a chief complaint of syncope (23 May 2020 11:04)      SUBJECTIVE / OVERNIGHT EVENTS: Comfortable without new complaints.   Review of Systems  chest pain no  palpitations no  sob no  nausea no  headache no    MEDICATIONS  (STANDING):  aMIOdarone    Tablet 200 milliGRAM(s) Oral daily  AQUAPHOR (petrolatum Ointment) 1 Application(s) Topical daily  aspirin enteric coated 81 milliGRAM(s) Oral daily  atorvastatin 40 milliGRAM(s) Oral at bedtime  BACItracin   Ointment 1 Application(s) Topical daily  buDESOnide    Inhalation Suspension 0.5 milliGRAM(s) Inhalation two times a day  dextrose 5%. 1000 milliLiter(s) (50 mL/Hr) IV Continuous <Continuous>  dextrose 50% Injectable 12.5 Gram(s) IV Push once  dextrose 50% Injectable 25 Gram(s) IV Push once  dextrose 50% Injectable 25 Gram(s) IV Push once  enoxaparin Injectable 40 milliGRAM(s) SubCutaneous daily  hydrocortisone 1% Cream 1 Application(s) Topical two times a day  insulin glargine Injectable (LANTUS) 34 Unit(s) SubCutaneous at bedtime  insulin lispro (HumaLOG) corrective regimen sliding scale   SubCutaneous three times a day before meals  insulin lispro (HumaLOG) corrective regimen sliding scale   SubCutaneous at bedtime  insulin lispro Injectable (HumaLOG) 18 Unit(s) SubCutaneous three times a day with meals  loratadine 10 milliGRAM(s) Oral daily  metoprolol tartrate 12.5 milliGRAM(s) Oral two times a day  pantoprazole    Tablet 40 milliGRAM(s) Oral before breakfast  polyethylene glycol 3350 17 Gram(s) Oral daily  senna 2 Tablet(s) Oral at bedtime  torsemide 10 milliGRAM(s) Oral daily  vancomycin  IVPB 1250 milliGRAM(s) IV Intermittent every 24 hours    MEDICATIONS  (PRN):  acetaminophen   Tablet .. 650 milliGRAM(s) Oral every 6 hours PRN Temp greater or equal to 38.5C (101.3F), Mild Pain (1 - 3)  ALBUTerol    90 MICROgram(s) HFA Inhaler 2 Puff(s) Inhalation every 6 hours PRN Shortness of Breath and/or Wheezing  benzonatate 100 milliGRAM(s) Oral three times a day PRN Cough  dextrose 40% Gel 15 Gram(s) Oral once PRN Blood Glucose LESS THAN 70 milliGRAM(s)/deciliter  glucagon  Injectable 1 milliGRAM(s) IntraMuscular once PRN Glucose LESS THAN 70 milligrams/deciliter  oxycodone    5 mG/acetaminophen 325 mG 1 Tablet(s) Oral every 6 hours PRN Moderate Pain (4 - 6)      Vital Signs Last 24 Hrs  T(C): 36.3 (23 May 2020 12:13), Max: 36.9 (23 May 2020 04:28)  T(F): 97.3 (23 May 2020 12:13), Max: 98.5 (23 May 2020 04:28)  HR: 87 (23 May 2020 12:13) (78 - 87)  BP: 119/73 (23 May 2020 12:13) (119/73 - 157/91)  BP(mean): --  RR: 18 (23 May 2020 04:28) (18 - 18)  SpO2: 98% (23 May 2020 04:28) (98% - 98%)    PHYSICAL EXAM:  GENERAL: NAD, well-developed  HEAD:  Atraumatic, Normocephalic  EYES: EOMI, PERRLA, conjunctiva and sclera clear  NECK: Supple, No JVD  CHEST/LUNG: Clear to auscultation bilaterally; No wheeze Sternal wound healing.  HEART: Regular rate and rhythm; No murmurs, rubs, or gallops  ABDOMEN: Soft, Nontender, Nondistended; Bowel sounds present KEI  EXTREMITIES:  2+ Peripheral Pulses, No clubbing, cyanosis, or edema  PSYCH: AAOx3  NEUROLOGY: non-focal  SKIN: No rashes or lesions    LABS:                        8.9    8.95  )-----------( 307      ( 23 May 2020 07:03 )             29.1     05-23    134<L>  |  97  |  37<H>  ----------------------------<  168<H>  4.3   |  27  |  1.53<H>    Ca    9.3      23 May 2020 07:03      PT/INR - ( 22 May 2020 06:52 )   PT: 11.4 sec;   INR: 0.99 ratio                     RADIOLOGY & ADDITIONAL TESTS:    Imaging Personally Reviewed:    Consultant(s) Notes Reviewed:      Care Discussed with Consultants/Other Providers:

## 2020-05-23 NOTE — PROGRESS NOTE ADULT - SUBJECTIVE AND OBJECTIVE BOX
Chief complaint  Patient is a 65y old  Male who presents with a chief complaint of syncope (23 May 2020 16:05)   Review of systems  Patient in bed, looks comfortable, no fever, no hypoglycemia.    Labs and Fingersticks  CAPILLARY BLOOD GLUCOSE      POCT Blood Glucose.: 210 mg/dL (23 May 2020 17:10)  POCT Blood Glucose.: 227 mg/dL (23 May 2020 12:38)  POCT Blood Glucose.: 226 mg/dL (23 May 2020 08:33)  POCT Blood Glucose.: 140 mg/dL (23 May 2020 02:09)  POCT Blood Glucose.: 102 mg/dL (22 May 2020 21:24)  POCT Blood Glucose.: 207 mg/dL (22 May 2020 17:41)      Anion Gap, Serum: 10 (05-23 @ 07:03)  Anion Gap, Serum: 10 (05-22 @ 06:52)      Calcium, Total Serum: 9.3 (05-23 @ 07:03)  Calcium, Total Serum: 9.1 (05-22 @ 06:52)          05-23    134<L>  |  97  |  37<H>  ----------------------------<  168<H>  4.3   |  27  |  1.53<H>    Ca    9.3      23 May 2020 07:03                          8.9    8.95  )-----------( 307      ( 23 May 2020 07:03 )             29.1     Medications  MEDICATIONS  (STANDING):  aMIOdarone    Tablet 200 milliGRAM(s) Oral daily  AQUAPHOR (petrolatum Ointment) 1 Application(s) Topical daily  aspirin enteric coated 81 milliGRAM(s) Oral daily  atorvastatin 40 milliGRAM(s) Oral at bedtime  BACItracin   Ointment 1 Application(s) Topical daily  buDESOnide    Inhalation Suspension 0.5 milliGRAM(s) Inhalation two times a day  dextrose 5%. 1000 milliLiter(s) (50 mL/Hr) IV Continuous <Continuous>  dextrose 50% Injectable 12.5 Gram(s) IV Push once  dextrose 50% Injectable 25 Gram(s) IV Push once  dextrose 50% Injectable 25 Gram(s) IV Push once  enoxaparin Injectable 40 milliGRAM(s) SubCutaneous daily  hydrocortisone 1% Cream 1 Application(s) Topical two times a day  insulin glargine Injectable (LANTUS) 34 Unit(s) SubCutaneous at bedtime  insulin lispro (HumaLOG) corrective regimen sliding scale   SubCutaneous three times a day before meals  insulin lispro (HumaLOG) corrective regimen sliding scale   SubCutaneous at bedtime  insulin lispro Injectable (HumaLOG) 18 Unit(s) SubCutaneous three times a day with meals  loratadine 10 milliGRAM(s) Oral daily  metoprolol tartrate 12.5 milliGRAM(s) Oral two times a day  pantoprazole    Tablet 40 milliGRAM(s) Oral before breakfast  polyethylene glycol 3350 17 Gram(s) Oral daily  senna 2 Tablet(s) Oral at bedtime  torsemide 10 milliGRAM(s) Oral daily  vancomycin  IVPB 1250 milliGRAM(s) IV Intermittent every 24 hours      Physical Exam  General: Patient comfortable in bed  Vital Signs Last 12 Hrs  T(F): 97.3 (05-23-20 @ 12:13), Max: 97.3 (05-23-20 @ 12:13)  HR: 87 (05-23-20 @ 12:13) (78 - 87)  BP: 119/73 (05-23-20 @ 12:13) (119/73 - 157/91)  BP(mean): --  RR: --  SpO2: --  Neck: No palpable thyroid nodules.  CVS: S1S2, No murmurs  Respiratory: No wheezing, no crepitations  GI: Abdomen soft, bowel sounds positive  Musculoskeletal:  edema lower extremities.   Skin: No skin rashes, no ecchymosis    Diagnostics

## 2020-05-23 NOTE — PROGRESS NOTE ADULT - PROBLEM SELECTOR PLAN 1
"Pulse 130  Temp 98  F (36.7  C) (Axillary)  Ht 2' 8.1\" (0.815 m)  Wt 22 lb 12 oz (10.3 kg)  SpO2 99%  BMI 15.52 kg/m2    " Will decrease Lantus to 34u at bedtime.  Will continue Humalog 18u before each meal as well as Humalog correction scale coverage. Will continue monitoring FS and FU.  Patient was on insulin at home, knows how to do injections. Suggest DC on current insulin regimen and FU endo 4 weeks.  Patient counseled for compliance with insulin injections, consistent low carb diet, and exercise as tolerated outpatient.

## 2020-05-23 NOTE — PROGRESS NOTE ADULT - SUBJECTIVE AND OBJECTIVE BOX
CARDIOLOGY FOLLOW UP - Dr. Steel    CC no cp or sob        PHYSICAL EXAM:  T(C): 36.9 (05-23-20 @ 04:28), Max: 36.9 (05-23-20 @ 04:28)  HR: 78 (05-23-20 @ 05:40) (76 - 86)  BP: 157/91 (05-23-20 @ 05:40) (107/64 - 157/91)  RR: 18 (05-23-20 @ 04:28) (18 - 18)  SpO2: 98% (05-23-20 @ 04:28) (98% - 98%)  Wt(kg): --  I&O's Summary    22 May 2020 07:01  -  23 May 2020 07:00  --------------------------------------------------------  IN: 0 mL / OUT: 746 mL / NET: -746 mL        Appearance: Normal	  Cardiovascular: Normal S1 S2,RRR  Respiratory: Lungs clear to auscultation	  Gastrointestinal:  Soft, Non-tender, + BS	  Extremities: Normal range of motion, No clubbing, cyanosis or edema        MEDICATIONS  (STANDING):  aMIOdarone    Tablet 200 milliGRAM(s) Oral daily  AQUAPHOR (petrolatum Ointment) 1 Application(s) Topical daily  aspirin enteric coated 81 milliGRAM(s) Oral daily  atorvastatin 40 milliGRAM(s) Oral at bedtime  BACItracin   Ointment 1 Application(s) Topical daily  buDESOnide    Inhalation Suspension 0.5 milliGRAM(s) Inhalation two times a day  dextrose 5%. 1000 milliLiter(s) (50 mL/Hr) IV Continuous <Continuous>  dextrose 50% Injectable 12.5 Gram(s) IV Push once  dextrose 50% Injectable 25 Gram(s) IV Push once  dextrose 50% Injectable 25 Gram(s) IV Push once  enoxaparin Injectable 40 milliGRAM(s) SubCutaneous daily  hydrocortisone 1% Cream 1 Application(s) Topical two times a day  insulin glargine Injectable (LANTUS) 30 Unit(s) SubCutaneous at bedtime  insulin lispro (HumaLOG) corrective regimen sliding scale   SubCutaneous three times a day before meals  insulin lispro (HumaLOG) corrective regimen sliding scale   SubCutaneous at bedtime  insulin lispro Injectable (HumaLOG) 16 Unit(s) SubCutaneous three times a day with meals  loratadine 10 milliGRAM(s) Oral daily  metoprolol tartrate 12.5 milliGRAM(s) Oral two times a day  pantoprazole    Tablet 40 milliGRAM(s) Oral before breakfast  polyethylene glycol 3350 17 Gram(s) Oral daily  senna 2 Tablet(s) Oral at bedtime  torsemide 10 milliGRAM(s) Oral daily  vancomycin  IVPB 1250 milliGRAM(s) IV Intermittent every 24 hours      TELEMETRY: 	nsr pvc     ECG:  	  RADIOLOGY:   DIAGNOSTIC TESTING:  [ ] Echocardiogram:  [ ]  Catheterization:  [ ] Stress Test:    OTHER: 	    LABS:	 	                            8.9    8.95  )-----------( 307      ( 23 May 2020 07:03 )             29.1     05-23    134<L>  |  97  |  37<H>  ----------------------------<  168<H>  4.3   |  27  |  1.53<H>    Ca    9.3      23 May 2020 07:03      PT/INR - ( 22 May 2020 06:52 )   PT: 11.4 sec;   INR: 0.99 ratio

## 2020-05-24 LAB
ALBUMIN SERPL ELPH-MCNC: 2.7 G/DL — LOW (ref 3.3–5)
ALP SERPL-CCNC: 84 U/L — SIGNIFICANT CHANGE UP (ref 40–120)
ALT FLD-CCNC: 8 U/L — LOW (ref 10–45)
ANION GAP SERPL CALC-SCNC: 10 MMOL/L — SIGNIFICANT CHANGE UP (ref 5–17)
AST SERPL-CCNC: 9 U/L — LOW (ref 10–40)
BASOPHILS # BLD AUTO: 0.09 K/UL — SIGNIFICANT CHANGE UP (ref 0–0.2)
BASOPHILS NFR BLD AUTO: 1.1 % — SIGNIFICANT CHANGE UP (ref 0–2)
BILIRUB SERPL-MCNC: 0.3 MG/DL — SIGNIFICANT CHANGE UP (ref 0.2–1.2)
BUN SERPL-MCNC: 36 MG/DL — HIGH (ref 7–23)
CALCIUM SERPL-MCNC: 8.7 MG/DL — SIGNIFICANT CHANGE UP (ref 8.4–10.5)
CHLORIDE SERPL-SCNC: 98 MMOL/L — SIGNIFICANT CHANGE UP (ref 96–108)
CO2 SERPL-SCNC: 27 MMOL/L — SIGNIFICANT CHANGE UP (ref 22–31)
CREAT SERPL-MCNC: 1.5 MG/DL — HIGH (ref 0.5–1.3)
EOSINOPHIL # BLD AUTO: 0.74 K/UL — HIGH (ref 0–0.5)
EOSINOPHIL NFR BLD AUTO: 9.4 % — HIGH (ref 0–6)
GLUCOSE BLDC GLUCOMTR-MCNC: 119 MG/DL — HIGH (ref 70–99)
GLUCOSE BLDC GLUCOMTR-MCNC: 127 MG/DL — HIGH (ref 70–99)
GLUCOSE BLDC GLUCOMTR-MCNC: 215 MG/DL — HIGH (ref 70–99)
GLUCOSE BLDC GLUCOMTR-MCNC: 226 MG/DL — HIGH (ref 70–99)
GLUCOSE BLDC GLUCOMTR-MCNC: 231 MG/DL — HIGH (ref 70–99)
GLUCOSE SERPL-MCNC: 177 MG/DL — HIGH (ref 70–99)
HCT VFR BLD CALC: 26.8 % — LOW (ref 39–50)
HGB BLD-MCNC: 8.7 G/DL — LOW (ref 13–17)
IMM GRANULOCYTES NFR BLD AUTO: 1.1 % — SIGNIFICANT CHANGE UP (ref 0–1.5)
LYMPHOCYTES # BLD AUTO: 1.74 K/UL — SIGNIFICANT CHANGE UP (ref 1–3.3)
LYMPHOCYTES # BLD AUTO: 22 % — SIGNIFICANT CHANGE UP (ref 13–44)
MCHC RBC-ENTMCNC: 27.6 PG — SIGNIFICANT CHANGE UP (ref 27–34)
MCHC RBC-ENTMCNC: 32.5 GM/DL — SIGNIFICANT CHANGE UP (ref 32–36)
MCV RBC AUTO: 85.1 FL — SIGNIFICANT CHANGE UP (ref 80–100)
MONOCYTES # BLD AUTO: 0.79 K/UL — SIGNIFICANT CHANGE UP (ref 0–0.9)
MONOCYTES NFR BLD AUTO: 10 % — SIGNIFICANT CHANGE UP (ref 2–14)
NEUTROPHILS # BLD AUTO: 4.45 K/UL — SIGNIFICANT CHANGE UP (ref 1.8–7.4)
NEUTROPHILS NFR BLD AUTO: 56.4 % — SIGNIFICANT CHANGE UP (ref 43–77)
NRBC # BLD: 0 /100 WBCS — SIGNIFICANT CHANGE UP (ref 0–0)
PLATELET # BLD AUTO: 254 K/UL — SIGNIFICANT CHANGE UP (ref 150–400)
POTASSIUM SERPL-MCNC: 4.1 MMOL/L — SIGNIFICANT CHANGE UP (ref 3.5–5.3)
POTASSIUM SERPL-SCNC: 4.1 MMOL/L — SIGNIFICANT CHANGE UP (ref 3.5–5.3)
PROT SERPL-MCNC: 6.5 G/DL — SIGNIFICANT CHANGE UP (ref 6–8.3)
RBC # BLD: 3.15 M/UL — LOW (ref 4.2–5.8)
RBC # FLD: 16.1 % — HIGH (ref 10.3–14.5)
SARS-COV-2 RNA SPEC QL NAA+PROBE: SIGNIFICANT CHANGE UP
SODIUM SERPL-SCNC: 135 MMOL/L — SIGNIFICANT CHANGE UP (ref 135–145)
VANCOMYCIN TROUGH SERPL-MCNC: 16.6 UG/ML — SIGNIFICANT CHANGE UP (ref 10–20)
WBC # BLD: 7.9 K/UL — SIGNIFICANT CHANGE UP (ref 3.8–10.5)
WBC # FLD AUTO: 7.9 K/UL — SIGNIFICANT CHANGE UP (ref 3.8–10.5)

## 2020-05-24 RX ORDER — INSULIN LISPRO 100/ML
20 VIAL (ML) SUBCUTANEOUS
Refills: 0 | Status: DISCONTINUED | OUTPATIENT
Start: 2020-05-24 | End: 2020-05-25

## 2020-05-24 RX ORDER — INSULIN GLARGINE 100 [IU]/ML
36 INJECTION, SOLUTION SUBCUTANEOUS AT BEDTIME
Refills: 0 | Status: DISCONTINUED | OUTPATIENT
Start: 2020-05-24 | End: 2020-05-25

## 2020-05-24 RX ORDER — OXYCODONE AND ACETAMINOPHEN 5; 325 MG/1; MG/1
1 TABLET ORAL EVERY 6 HOURS
Refills: 0 | Status: DISCONTINUED | OUTPATIENT
Start: 2020-05-24 | End: 2020-05-27

## 2020-05-24 RX ADMIN — Medication 100 MILLIGRAM(S): at 11:50

## 2020-05-24 RX ADMIN — INSULIN GLARGINE 36 UNIT(S): 100 INJECTION, SOLUTION SUBCUTANEOUS at 21:56

## 2020-05-24 RX ADMIN — Medication 12.5 MILLIGRAM(S): at 05:33

## 2020-05-24 RX ADMIN — Medication 0.5 MILLIGRAM(S): at 16:48

## 2020-05-24 RX ADMIN — Medication 1 APPLICATION(S): at 16:45

## 2020-05-24 RX ADMIN — Medication 18 UNIT(S): at 17:33

## 2020-05-24 RX ADMIN — Medication 100 MILLIGRAM(S): at 21:08

## 2020-05-24 RX ADMIN — PANTOPRAZOLE SODIUM 40 MILLIGRAM(S): 20 TABLET, DELAYED RELEASE ORAL at 05:33

## 2020-05-24 RX ADMIN — Medication 2: at 09:01

## 2020-05-24 RX ADMIN — Medication 12.5 MILLIGRAM(S): at 16:48

## 2020-05-24 RX ADMIN — Medication 166.67 MILLIGRAM(S): at 21:07

## 2020-05-24 RX ADMIN — Medication 1 APPLICATION(S): at 11:48

## 2020-05-24 RX ADMIN — Medication 2: at 13:10

## 2020-05-24 RX ADMIN — SENNA PLUS 2 TABLET(S): 8.6 TABLET ORAL at 21:07

## 2020-05-24 RX ADMIN — Medication 10 MILLIGRAM(S): at 05:35

## 2020-05-24 RX ADMIN — Medication 18 UNIT(S): at 09:01

## 2020-05-24 RX ADMIN — Medication 81 MILLIGRAM(S): at 11:48

## 2020-05-24 RX ADMIN — ENOXAPARIN SODIUM 40 MILLIGRAM(S): 100 INJECTION SUBCUTANEOUS at 11:47

## 2020-05-24 RX ADMIN — POLYETHYLENE GLYCOL 3350 17 GRAM(S): 17 POWDER, FOR SOLUTION ORAL at 11:47

## 2020-05-24 RX ADMIN — AMIODARONE HYDROCHLORIDE 200 MILLIGRAM(S): 400 TABLET ORAL at 05:33

## 2020-05-24 RX ADMIN — Medication 0.5 MILLIGRAM(S): at 05:33

## 2020-05-24 RX ADMIN — Medication 2: at 17:32

## 2020-05-24 RX ADMIN — Medication 18 UNIT(S): at 13:11

## 2020-05-24 RX ADMIN — Medication 1 APPLICATION(S): at 11:49

## 2020-05-24 RX ADMIN — ATORVASTATIN CALCIUM 40 MILLIGRAM(S): 80 TABLET, FILM COATED ORAL at 21:07

## 2020-05-24 RX ADMIN — LORATADINE 10 MILLIGRAM(S): 10 TABLET ORAL at 11:48

## 2020-05-24 NOTE — PROGRESS NOTE ADULT - ASSESSMENT
65y m PMHx CABG Feb 2020 complicated by PEA arrest, sternal infection s/p sternal debridement with muscle flap reconstruction, DM, HTN p/w increased drainage from sternal wound s/p omental flap and incision closure w/ FTSG 5/15    Plan:  - Monitor drains  - Care per primary team    Plastic Surgery  [phone]

## 2020-05-24 NOTE — PROGRESS NOTE ADULT - SUBJECTIVE AND OBJECTIVE BOX
CARDIOLOGY FOLLOW UP - Dr. Steel    CC no cp or sob        PHYSICAL EXAM:  T(C): 36.6 (05-24-20 @ 04:12), Max: 36.8 (05-23-20 @ 20:26)  HR: 87 (05-24-20 @ 04:12) (77 - 87)  BP: 149/84 (05-24-20 @ 04:12) (119/73 - 149/84)  RR: 18 (05-24-20 @ 04:12) (18 - 18)  SpO2: 96% (05-24-20 @ 04:12) (96% - 98%)  Wt(kg): --  I&O's Summary    23 May 2020 07:01  -  24 May 2020 07:00  --------------------------------------------------------  IN: 1280 mL / OUT: 1375 mL / NET: -95 mL        Appearance: Normal	  Cardiovascular: Normal S1 S2,RRR, No JVD, No murmurs  Respiratory: Lungs clear to auscultation	  Gastrointestinal:  Soft, Non-tender, + BS	  Extremities: Normal range of motion, No clubbing, cyanosis or edema        MEDICATIONS  (STANDING):  aMIOdarone    Tablet 200 milliGRAM(s) Oral daily  AQUAPHOR (petrolatum Ointment) 1 Application(s) Topical daily  aspirin enteric coated 81 milliGRAM(s) Oral daily  atorvastatin 40 milliGRAM(s) Oral at bedtime  BACItracin   Ointment 1 Application(s) Topical daily  buDESOnide    Inhalation Suspension 0.5 milliGRAM(s) Inhalation two times a day  dextrose 5%. 1000 milliLiter(s) (50 mL/Hr) IV Continuous <Continuous>  dextrose 50% Injectable 12.5 Gram(s) IV Push once  dextrose 50% Injectable 25 Gram(s) IV Push once  dextrose 50% Injectable 25 Gram(s) IV Push once  enoxaparin Injectable 40 milliGRAM(s) SubCutaneous daily  hydrocortisone 1% Cream 1 Application(s) Topical two times a day  insulin glargine Injectable (LANTUS) 34 Unit(s) SubCutaneous at bedtime  insulin lispro (HumaLOG) corrective regimen sliding scale   SubCutaneous three times a day before meals  insulin lispro (HumaLOG) corrective regimen sliding scale   SubCutaneous at bedtime  insulin lispro Injectable (HumaLOG) 18 Unit(s) SubCutaneous three times a day with meals  loratadine 10 milliGRAM(s) Oral daily  metoprolol tartrate 12.5 milliGRAM(s) Oral two times a day  pantoprazole    Tablet 40 milliGRAM(s) Oral before breakfast  polyethylene glycol 3350 17 Gram(s) Oral daily  senna 2 Tablet(s) Oral at bedtime  torsemide 10 milliGRAM(s) Oral daily  vancomycin  IVPB 1250 milliGRAM(s) IV Intermittent every 24 hours      TELEMETRY: nsr 	    ECG:  	  RADIOLOGY:   DIAGNOSTIC TESTING:  [ ] Echocardiogram:  [ ]  Catheterization:  [ ] Stress Test:    OTHER: 	    LABS:	 	                            8.7    7.90  )-----------( 254      ( 24 May 2020 06:17 )             26.8     05-24    135  |  98  |  36<H>  ----------------------------<  177<H>  4.1   |  27  |  1.50<H>    Ca    8.7      24 May 2020 06:17    TPro  6.5  /  Alb  2.7<L>  /  TBili  0.3  /  DBili  x   /  AST  9<L>  /  ALT  8<L>  /  AlkPhos  84  05-24            1.

## 2020-05-24 NOTE — PROGRESS NOTE ADULT - SUBJECTIVE AND OBJECTIVE BOX
Patient is a 65y old  Male who presents with a chief complaint of syncope (24 May 2020 10:06)      SUBJECTIVE / OVERNIGHT EVENTS: Comfortable without new complaints.   Review of Systems  chest pain no  palpitations no  sob no  nausea no  headache no    MEDICATIONS  (STANDING):  aMIOdarone    Tablet 200 milliGRAM(s) Oral daily  AQUAPHOR (petrolatum Ointment) 1 Application(s) Topical daily  aspirin enteric coated 81 milliGRAM(s) Oral daily  atorvastatin 40 milliGRAM(s) Oral at bedtime  BACItracin   Ointment 1 Application(s) Topical daily  buDESOnide    Inhalation Suspension 0.5 milliGRAM(s) Inhalation two times a day  dextrose 5%. 1000 milliLiter(s) (50 mL/Hr) IV Continuous <Continuous>  dextrose 50% Injectable 12.5 Gram(s) IV Push once  dextrose 50% Injectable 25 Gram(s) IV Push once  dextrose 50% Injectable 25 Gram(s) IV Push once  enoxaparin Injectable 40 milliGRAM(s) SubCutaneous daily  hydrocortisone 1% Cream 1 Application(s) Topical two times a day  insulin glargine Injectable (LANTUS) 34 Unit(s) SubCutaneous at bedtime  insulin lispro (HumaLOG) corrective regimen sliding scale   SubCutaneous three times a day before meals  insulin lispro (HumaLOG) corrective regimen sliding scale   SubCutaneous at bedtime  insulin lispro Injectable (HumaLOG) 18 Unit(s) SubCutaneous three times a day with meals  loratadine 10 milliGRAM(s) Oral daily  metoprolol tartrate 12.5 milliGRAM(s) Oral two times a day  pantoprazole    Tablet 40 milliGRAM(s) Oral before breakfast  polyethylene glycol 3350 17 Gram(s) Oral daily  senna 2 Tablet(s) Oral at bedtime  torsemide 10 milliGRAM(s) Oral daily  vancomycin  IVPB 1250 milliGRAM(s) IV Intermittent every 24 hours    MEDICATIONS  (PRN):  acetaminophen   Tablet .. 650 milliGRAM(s) Oral every 6 hours PRN Temp greater or equal to 38.5C (101.3F), Mild Pain (1 - 3)  ALBUTerol    90 MICROgram(s) HFA Inhaler 2 Puff(s) Inhalation every 6 hours PRN Shortness of Breath and/or Wheezing  benzonatate 100 milliGRAM(s) Oral three times a day PRN Cough  dextrose 40% Gel 15 Gram(s) Oral once PRN Blood Glucose LESS THAN 70 milliGRAM(s)/deciliter  glucagon  Injectable 1 milliGRAM(s) IntraMuscular once PRN Glucose LESS THAN 70 milligrams/deciliter      Vital Signs Last 24 Hrs  T(C): 36.4 (24 May 2020 12:13), Max: 36.8 (23 May 2020 20:26)  T(F): 97.6 (24 May 2020 12:13), Max: 98.3 (23 May 2020 20:26)  HR: 81 (24 May 2020 12:13) (77 - 87)  BP: 123/71 (24 May 2020 12:13) (123/71 - 149/84)  BP(mean): --  RR: 18 (24 May 2020 12:13) (18 - 18)  SpO2: 100% (24 May 2020 12:13) (96% - 100%)    PHYSICAL EXAM:  GENERAL: NAD, well-developed  HEAD:  Atraumatic, Normocephalic  EYES: EOMI, PERRLA, conjunctiva and sclera clear  NECK: Supple, No JVD  CHEST/LUNG:  Clear to auscultation bilaterally; No wheeze Wound with clean dressing JPX2  HEART: Regular rate and rhythm; No murmurs, rubs, or gallops  ABDOMEN: Soft, Nontender, Nondistended; Bowel sounds present  EXTREMITIES:  2+ Peripheral Pulses, No clubbing, cyanosis, or edema  PSYCH: AAOx3  NEUROLOGY: non-focal  SKIN: No rashes or lesions    LABS:                        8.7    7.90  )-----------( 254      ( 24 May 2020 06:17 )             26.8     05-24    135  |  98  |  36<H>  ----------------------------<  177<H>  4.1   |  27  |  1.50<H>    Ca    8.7      24 May 2020 06:17    TPro  6.5  /  Alb  2.7<L>  /  TBili  0.3  /  DBili  x   /  AST  9<L>  /  ALT  8<L>  /  AlkPhos  84  05-24                RADIOLOGY & ADDITIONAL TESTS:    Imaging Personally Reviewed:    Consultant(s) Notes Reviewed:      Care Discussed with Consultants/Other Providers:

## 2020-05-24 NOTE — PROGRESS NOTE ADULT - PROBLEM SELECTOR PLAN 1
Will decrease Lantus to 36u at bedtime.  Will continue Humalog 20u before each meal as well as Humalog correction scale coverage. Will continue monitoring FS and FU.  Patient was on insulin at home, knows how to do injections. Suggest DC on current insulin regimen and FU endo 4 weeks.  Patient counseled for compliance with insulin injections, consistent low carb diet, and exercise as tolerated outpatient.

## 2020-05-24 NOTE — PROGRESS NOTE ADULT - SUBJECTIVE AND OBJECTIVE BOX
Chief complaint  Patient is a 65y old  Male who presents with a chief complaint of syncope (24 May 2020 12:26)   Review of systems  Patient in bed, looks comfortable, no fever, no hypoglycemia.    Labs and Fingersticks  CAPILLARY BLOOD GLUCOSE      POCT Blood Glucose.: 226 mg/dL (24 May 2020 17:31)  POCT Blood Glucose.: 231 mg/dL (24 May 2020 12:57)  POCT Blood Glucose.: 215 mg/dL (24 May 2020 08:54)  POCT Blood Glucose.: 119 mg/dL (24 May 2020 01:20)  POCT Blood Glucose.: 122 mg/dL (23 May 2020 21:35)      Anion Gap, Serum: 10 (05-24 @ 06:17)  Anion Gap, Serum: 10 (05-23 @ 07:03)      Calcium, Total Serum: 8.7 (05-24 @ 06:17)  Calcium, Total Serum: 9.3 (05-23 @ 07:03)  Albumin, Serum: 2.7 <L> (05-24 @ 06:17)    Alanine Aminotransferase (ALT/SGPT): 8 <L> (05-24 @ 06:17)  Alkaline Phosphatase, Serum: 84 (05-24 @ 06:17)  Aspartate Aminotransferase (AST/SGOT): 9 <L> (05-24 @ 06:17)        05-24    135  |  98  |  36<H>  ----------------------------<  177<H>  4.1   |  27  |  1.50<H>    Ca    8.7      24 May 2020 06:17    TPro  6.5  /  Alb  2.7<L>  /  TBili  0.3  /  DBili  x   /  AST  9<L>  /  ALT  8<L>  /  AlkPhos  84  05-24                        8.7    7.90  )-----------( 254      ( 24 May 2020 06:17 )             26.8     Medications  MEDICATIONS  (STANDING):  aMIOdarone    Tablet 200 milliGRAM(s) Oral daily  AQUAPHOR (petrolatum Ointment) 1 Application(s) Topical daily  aspirin enteric coated 81 milliGRAM(s) Oral daily  atorvastatin 40 milliGRAM(s) Oral at bedtime  BACItracin   Ointment 1 Application(s) Topical daily  buDESOnide    Inhalation Suspension 0.5 milliGRAM(s) Inhalation two times a day  dextrose 5%. 1000 milliLiter(s) (50 mL/Hr) IV Continuous <Continuous>  dextrose 50% Injectable 12.5 Gram(s) IV Push once  dextrose 50% Injectable 25 Gram(s) IV Push once  dextrose 50% Injectable 25 Gram(s) IV Push once  enoxaparin Injectable 40 milliGRAM(s) SubCutaneous daily  hydrocortisone 1% Cream 1 Application(s) Topical two times a day  insulin glargine Injectable (LANTUS) 34 Unit(s) SubCutaneous at bedtime  insulin lispro (HumaLOG) corrective regimen sliding scale   SubCutaneous three times a day before meals  insulin lispro (HumaLOG) corrective regimen sliding scale   SubCutaneous at bedtime  insulin lispro Injectable (HumaLOG) 20 Unit(s) SubCutaneous three times a day with meals  loratadine 10 milliGRAM(s) Oral daily  metoprolol tartrate 12.5 milliGRAM(s) Oral two times a day  pantoprazole    Tablet 40 milliGRAM(s) Oral before breakfast  polyethylene glycol 3350 17 Gram(s) Oral daily  senna 2 Tablet(s) Oral at bedtime  torsemide 10 milliGRAM(s) Oral daily  vancomycin  IVPB 1250 milliGRAM(s) IV Intermittent every 24 hours      Physical Exam  General: Patient comfortable in bed  Vital Signs Last 12 Hrs  T(F): 97.6 (05-24-20 @ 12:13), Max: 97.6 (05-24-20 @ 12:13)  HR: 81 (05-24-20 @ 12:13) (81 - 81)  BP: 123/71 (05-24-20 @ 12:13) (123/71 - 123/71)  BP(mean): --  RR: 18 (05-24-20 @ 12:13) (18 - 18)  SpO2: 100% (05-24-20 @ 12:13) (100% - 100%)  Neck: No palpable thyroid nodules.  CVS: S1S2, No murmurs  Respiratory: No wheezing, no crepitations  GI: Abdomen soft, bowel sounds positive  Musculoskeletal:  edema lower extremities.   Skin: No skin rashes, no ecchymosis    Diagnostics

## 2020-05-24 NOTE — PROGRESS NOTE ADULT - ASSESSMENT
Assessment  DMT2: 65y Male with DM T2 with hyperglycemia, A1C 7.9%, was on insulin at home, now on basal bolus insulin, blood sugars still running high. Patient appears comfortable, eats full meals in addition to glucerna, noncompliant with low-carb diet.  CAD: s/p CABG previous admission, stable, monitored.  Sternal Wound: On IV  ABx, FU Plastics/ID.  HTN: Controlled,  on antihypertensive medications.  CKD: Monitor labs/BMP.          Harper Matias MD  Cell: [phone]  Office: [phone]

## 2020-05-24 NOTE — PROGRESS NOTE ADULT - ASSESSMENT
65 m with    Syncope  - telemetry  - cardiology follow     Orthostatic hypotension  - off aldactone  - follow    S/P CABG  - CT sx evaluation noted    Sternal wound, s/p reconstruction and omental flap  - MRSA bacteremia  - Vancomycin  - KEI as per Plastic Surgery   - ID follow  - postop care  - PICC     Surgery follow re KEI management.    Bacteremia  - OLGA reordered as per ID recommendations . Echo with no vegetations.  - ID follow    Scalp abrasion healing    Diabetes control  - ADA diet  - BS control  - Endocrine follow    CKD  - nephrology follow    HTN control    Hoarseness improving   - ENT evaluation noted    PT    DCP home.     Miguel Angel Duke MD pager [phone]

## 2020-05-24 NOTE — PROGRESS NOTE ADULT - SUBJECTIVE AND OBJECTIVE BOX
Plastic Surgery Progress Note (pg [facility]: [id number], [facility]: [phone])    SUBJECTIVE:  Patient seen and examined at bedside this AM. No acute events overnight. AF/VSS. Pain well controlled.     OBJECTIVE:     ICU Vital Signs Last 24 Hrs  T(C): 36.6 (24 May 2020 04:12), Max: 36.8 (23 May 2020 20:26)  T(F): 97.9 (24 May 2020 04:12), Max: 98.3 (23 May 2020 20:26)  HR: 87 (24 May 2020 04:12) (77 - 87)  BP: 149/84 (24 May 2020 04:12) (119/73 - 149/84)  BP(mean): --  ABP: --  ABP(mean): --  RR: 18 (24 May 2020 04:12) (18 - 18)  SpO2: 96% (24 May 2020 04:12) (96% - 98%)      PHYSICAL EXAM:  General: alert and oriented, NAD  Resp: airway patent, respirations unlabored  Chest: KEI x2 with s/s output. Dressing changed. Soft  Abdomen: soft, non-distended. steri strips over donor site     ** LABS **                        8.7    7.90   )----------(  254       ( 24 May 2020 06:17 )               26.8      135    |  98     |  36     ----------------------------<  177        ( 24 May 2020 06:17 )  4.1     |  27     |  1.50     Ca    8.7        ( 24 May 2020 06:17 )    TPro  6.5    /  Alb  2.7    /  TBili  0.3    /  DBili  x      /  AST  9      /  ALT  8      /  AlkPhos  84     ( 24 May 2020 06:17 )        CAPILLARY BLOOD GLUCOSE

## 2020-05-24 NOTE — PROGRESS NOTE ADULT - SUBJECTIVE AND OBJECTIVE BOX
Pt interviewed and examined. Full note to follow. [facility] NEPHROLOGY- PROGRESS NOTE    65y Male with history of DM presents with syncope concern for sternal wound infection. Nephrology consulted for elevated Scr.    Pt feels okay, but is concerned about the continued drainage from KEI drains/sternal wound.  No complaints today    REVIEW OF SYSTEMS:  Gen: no changes in weight  Cards: no chest pain  Resp: no SOB  GI: no nausea or vomiting or diarrhea  Vascular: no LE edema    No Known Allergies      Hospital Medications: Medications reviewed    VITALS:  T(F): 97.6 (05-24-20 @ 20:22), Max: 97.9 (05-24-20 @ 04:12)  HR: 76 (05-24-20 @ 20:22)  BP: 124/65 (05-24-20 @ 20:22)  RR: 18 (05-24-20 @ 20:22)  SpO2: 98% (05-24-20 @ 20:22)  Wt(kg): --    05-23 @ 07:01  -  05-24 @ 07:00  --------------------------------------------------------  IN: 1280 mL / OUT: 1375 mL / NET: -95 mL    05-24 @ 07:01  -  05-24 @ 22:19  --------------------------------------------------------  IN: 440 mL / OUT: 0 mL / NET: 440 mL        PHYSICAL EXAM:  Gen: NAD, calm  Cards: RRR, +S1/S2, no M/G/R  Resp: CTA B/L  GI: soft, NT/ND, NABS  Vascular: no LE edema B/L    LABS:  05-23  134 <--, 135 <--, 132 <--, 135 <--, 139 <--, 134 <--, 134 <--  134<L>  |  97  |  37<H>  ----------------------------<  168<H>  4.3   |  27  |  1.53<H>    Ca    9.3      23 May 2020 07:03      Creatinine Trend: 1.53 <--, 1.61 <--, 1.47 <--, 1.63 <--, 1.31 <--, 1.38 <--, 1.77 <--                        8.9    8.95  )-----------( 307      ( 23 May 2020 07:03 )             29.1     Urine Studies:    Osmolality, Random Urine: 257 mos/kg (05-16 @ 19:34)  Sodium, Random Urine: <35 mmol/L (05-16 @ 19:34)

## 2020-05-25 LAB
ANION GAP SERPL CALC-SCNC: 9 MMOL/L — SIGNIFICANT CHANGE UP (ref 5–17)
BUN SERPL-MCNC: 37 MG/DL — HIGH (ref 7–23)
CALCIUM SERPL-MCNC: 9 MG/DL — SIGNIFICANT CHANGE UP (ref 8.4–10.5)
CHLORIDE SERPL-SCNC: 100 MMOL/L — SIGNIFICANT CHANGE UP (ref 96–108)
CO2 SERPL-SCNC: 25 MMOL/L — SIGNIFICANT CHANGE UP (ref 22–31)
CREAT SERPL-MCNC: 1.52 MG/DL — HIGH (ref 0.5–1.3)
GLUCOSE BLDC GLUCOMTR-MCNC: 133 MG/DL — HIGH (ref 70–99)
GLUCOSE BLDC GLUCOMTR-MCNC: 165 MG/DL — HIGH (ref 70–99)
GLUCOSE BLDC GLUCOMTR-MCNC: 216 MG/DL — HIGH (ref 70–99)
GLUCOSE BLDC GLUCOMTR-MCNC: 222 MG/DL — HIGH (ref 70–99)
GLUCOSE SERPL-MCNC: 180 MG/DL — HIGH (ref 70–99)
HCT VFR BLD CALC: 27.1 % — LOW (ref 39–50)
HGB BLD-MCNC: 8.8 G/DL — LOW (ref 13–17)
MCHC RBC-ENTMCNC: 27.8 PG — SIGNIFICANT CHANGE UP (ref 27–34)
MCHC RBC-ENTMCNC: 32.5 GM/DL — SIGNIFICANT CHANGE UP (ref 32–36)
MCV RBC AUTO: 85.5 FL — SIGNIFICANT CHANGE UP (ref 80–100)
NRBC # BLD: 0 /100 WBCS — SIGNIFICANT CHANGE UP (ref 0–0)
PLATELET # BLD AUTO: 232 K/UL — SIGNIFICANT CHANGE UP (ref 150–400)
POTASSIUM SERPL-MCNC: 4.1 MMOL/L — SIGNIFICANT CHANGE UP (ref 3.5–5.3)
POTASSIUM SERPL-SCNC: 4.1 MMOL/L — SIGNIFICANT CHANGE UP (ref 3.5–5.3)
RBC # BLD: 3.17 M/UL — LOW (ref 4.2–5.8)
RBC # FLD: 15.9 % — HIGH (ref 10.3–14.5)
SODIUM SERPL-SCNC: 134 MMOL/L — LOW (ref 135–145)
WBC # BLD: 8.52 K/UL — SIGNIFICANT CHANGE UP (ref 3.8–10.5)
WBC # FLD AUTO: 8.52 K/UL — SIGNIFICANT CHANGE UP (ref 3.8–10.5)

## 2020-05-25 RX ORDER — INSULIN GLARGINE 100 [IU]/ML
40 INJECTION, SOLUTION SUBCUTANEOUS AT BEDTIME
Refills: 0 | Status: DISCONTINUED | OUTPATIENT
Start: 2020-05-25 | End: 2020-05-27

## 2020-05-25 RX ORDER — DIPHENHYDRAMINE HCL 50 MG
25 CAPSULE ORAL ONCE
Refills: 0 | Status: COMPLETED | OUTPATIENT
Start: 2020-05-25 | End: 2020-05-25

## 2020-05-25 RX ORDER — INSULIN LISPRO 100/ML
22 VIAL (ML) SUBCUTANEOUS
Refills: 0 | Status: DISCONTINUED | OUTPATIENT
Start: 2020-05-25 | End: 2020-05-27

## 2020-05-25 RX ADMIN — Medication 2: at 09:00

## 2020-05-25 RX ADMIN — Medication 0.5 MILLIGRAM(S): at 17:00

## 2020-05-25 RX ADMIN — Medication 2: at 13:22

## 2020-05-25 RX ADMIN — Medication 10 MILLIGRAM(S): at 05:57

## 2020-05-25 RX ADMIN — LORATADINE 10 MILLIGRAM(S): 10 TABLET ORAL at 11:31

## 2020-05-25 RX ADMIN — Medication 12.5 MILLIGRAM(S): at 17:00

## 2020-05-25 RX ADMIN — Medication 166.67 MILLIGRAM(S): at 20:30

## 2020-05-25 RX ADMIN — ATORVASTATIN CALCIUM 40 MILLIGRAM(S): 80 TABLET, FILM COATED ORAL at 21:03

## 2020-05-25 RX ADMIN — Medication 22 UNIT(S): at 13:23

## 2020-05-25 RX ADMIN — ENOXAPARIN SODIUM 40 MILLIGRAM(S): 100 INJECTION SUBCUTANEOUS at 11:30

## 2020-05-25 RX ADMIN — AMIODARONE HYDROCHLORIDE 200 MILLIGRAM(S): 400 TABLET ORAL at 05:57

## 2020-05-25 RX ADMIN — Medication 22 UNIT(S): at 17:25

## 2020-05-25 RX ADMIN — PANTOPRAZOLE SODIUM 40 MILLIGRAM(S): 20 TABLET, DELAYED RELEASE ORAL at 05:57

## 2020-05-25 RX ADMIN — Medication 1 APPLICATION(S): at 17:03

## 2020-05-25 RX ADMIN — POLYETHYLENE GLYCOL 3350 17 GRAM(S): 17 POWDER, FOR SOLUTION ORAL at 11:31

## 2020-05-25 RX ADMIN — SENNA PLUS 2 TABLET(S): 8.6 TABLET ORAL at 21:03

## 2020-05-25 RX ADMIN — Medication 12.5 MILLIGRAM(S): at 05:57

## 2020-05-25 RX ADMIN — Medication 25 MILLIGRAM(S): at 17:24

## 2020-05-25 RX ADMIN — Medication 81 MILLIGRAM(S): at 11:30

## 2020-05-25 RX ADMIN — Medication 1: at 17:25

## 2020-05-25 RX ADMIN — Medication 1 APPLICATION(S): at 11:30

## 2020-05-25 RX ADMIN — Medication 20 UNIT(S): at 09:03

## 2020-05-25 RX ADMIN — Medication 100 MILLIGRAM(S): at 21:04

## 2020-05-25 RX ADMIN — INSULIN GLARGINE 40 UNIT(S): 100 INJECTION, SOLUTION SUBCUTANEOUS at 21:38

## 2020-05-25 RX ADMIN — Medication 0.5 MILLIGRAM(S): at 05:57

## 2020-05-25 RX ADMIN — Medication 1 APPLICATION(S): at 11:29

## 2020-05-25 RX ADMIN — Medication 650 MILLIGRAM(S): at 21:41

## 2020-05-25 RX ADMIN — Medication 100 MILLIGRAM(S): at 05:57

## 2020-05-25 NOTE — PROGRESS NOTE ADULT - ASSESSMENT
Assessment  DMT2: 65y Male with DM T2 with hyperglycemia, A1C 7.9%, was on insulin at home, now on basal bolus insulin, adjusted dose, blood sugars still running high and not at target, no hypoglycemic episodes. Patient eats full meals with good appetite in addition to glucerna, noncompliant with low-carb diet, alert and comfortable.  CAD: s/p CABG previous admission, stable, monitored.  Sternal Wound: On IV  ABx, FU Plastics/ID.  HTN: Controlled,  on antihypertensive medications.  CKD: Monitor labs/BMP.          Harper Matias MD  Cell: [phone]  Office: [phone] Assessment  DMT2: 65y Male with DM T2 with hyperglycemia, A1C 7.9%, was on insulin at home, now on basal bolus insulin, adjusted dose,  blood sugars still running high and not at target, no hypoglycemic episodes. Patient eats full meals with good appetite in addition to glucerna, noncompliant with low-carb diet, alert and comfortable.  CAD: s/p CABG previous admission, stable, monitored.  Sternal Wound: On IV  ABx, FU Plastics/ID.  HTN: Controlled,  on antihypertensive medications.  CKD: Monitor labs/BMP.          Harper Matias MD  Cell: [phone]  Office: [phone]

## 2020-05-25 NOTE — PROGRESS NOTE ADULT - PROBLEM SELECTOR PLAN 1
Will increase Lantus to 40u at bedtime.  Will increase Humalog to 22u before each meal and continue Humalog correction scale coverage. Will continue monitoring FS and FU.  Patient was on insulin at home, knows how to do injections. Suggest DC on current insulin regimen and FU endo 4 weeks.  Patient counseled for compliance with insulin injections, consistent low carb diet, and exercise as tolerated outpatient.

## 2020-05-25 NOTE — PROGRESS NOTE ADULT - SUBJECTIVE AND OBJECTIVE BOX
[facility] NEPHROLOGY- PROGRESS NOTE    65y Male with history of DM presents with syncope concern for sternal wound infection. Nephrology consulted for elevated Scr.    Pt feels okay, but is concerned about the continued drainage from KEI drains/sternal wound.  No complaints today    REVIEW OF SYSTEMS:  Gen: no changes in weight  Cards: no chest pain  Resp: no SOB  GI: no nausea or vomiting or diarrhea  Vascular: no LE edema    No Known Allergies      Hospital Medications: Medications reviewed    VITALS:  T(F): 97.6 (05-25-20 @ 04:38), Max: 97.6 (05-24-20 @ 12:13)  HR: 81 (05-25-20 @ 04:38)  BP: 157/83 (05-25-20 @ 04:38)  RR: 18 (05-25-20 @ 04:38)  SpO2: 98% (05-25-20 @ 04:38)  Wt(kg): --    05-24 @ 07:01  -  05-25 @ 07:00  --------------------------------------------------------  IN: 690 mL / OUT: 590 mL / NET: 100 mL      PHYSICAL EXAM:  Gen: NAD, calm  Cards: RRR, +S1/S2, no M/G/R  Resp: CTA B/L  GI: soft, NT/ND, NABS  Vascular: no LE edema B/L      LABS:  05-25  134 <--, 135 <--, 134 <--, 135 <--, 132 <--, 135 <--, 139 <--  134<L>  |  100  |  37<H>  ----------------------------<  180<H>  4.1   |  25  |  1.52<H>    Ca    9.0      25 May 2020 06:15    TPro  6.5  /  Alb  2.7<L>  /  TBili  0.3  /  DBili      /  AST  9<L>  /  ALT  8<L>  /  AlkPhos  84  05-24    Creatinine Trend: 1.52 <--, 1.50 <--, 1.53 <--, 1.61 <--, 1.47 <--, 1.63 <--, 1.31 <--                        8.8    8.52  )-----------( 232      ( 25 May 2020 06:15 )             27.1

## 2020-05-25 NOTE — PROGRESS NOTE ADULT - SUBJECTIVE AND OBJECTIVE BOX
CC: no new complaints    TELEMETRY: NSR    PHYSICAL EXAM:    T(C): 36.4 (05-25-20 @ 04:38), Max: 36.4 (05-24-20 @ 20:22)  HR: 81 (05-25-20 @ 04:38) (76 - 81)  BP: 157/83 (05-25-20 @ 04:38) (124/65 - 157/83)  RR: 18 (05-25-20 @ 04:38) (18 - 18)  SpO2: 98% (05-25-20 @ 04:38) (98% - 98%)  Wt(kg): --  I&O's Summary    24 May 2020 07:01  -  25 May 2020 07:00  --------------------------------------------------------  IN: 690 mL / OUT: 590 mL / NET: 100 mL    25 May 2020 07:01  -  25 May 2020 12:32  --------------------------------------------------------  IN: 240 mL / OUT: 0 mL / NET: 240 mL        Appearance: Normal	  Cardiovascular: Normal S1 S2,RRR, No JVD, No murmurs  Respiratory: Lungs clear to auscultation	  Gastrointestinal:  Soft, Non-tender, + BS	  Extremities: Normal range of motion, No clubbing, cyanosis or edema  Vascular: Peripheral pulses palpable 2+ bilaterally     LABS:	 	                          8.8    8.52  )-----------( 232      ( 25 May 2020 06:15 )             27.1     05-25    134<L>  |  100  |  37<H>  ----------------------------<  180<H>  4.1   |  25  |  1.52<H>    Ca    9.0      25 May 2020 06:15    TPro  6.5  /  Alb  2.7<L>  /  TBili  0.3  /  DBili  x   /  AST  9<L>  /  ALT  8<L>  /  AlkPhos  84  05-24          CARDIAC MARKERS:

## 2020-05-25 NOTE — PROGRESS NOTE ADULT - ASSESSMENT
65y m PMHx CABG Feb 2020 complicated by PEA arrest, sternal infection s/p sternal debridement with muscle flap reconstruction, DM, HTN p/w increased drainage from sternal wound s/p omental flap and incision closure w/ FTSG 5/15    Plan:  - Monitor drains  - Care per primary team    Plastic Surgery  [phone]

## 2020-05-25 NOTE — PROGRESS NOTE ADULT - SUBJECTIVE AND OBJECTIVE BOX
Plastic Surgery Progress Note (pg [facility]: [id number], [facility]: [phone])    SUBJECTIVE:  Patient seen and examined at bedside this AM. No acute events overnight. AF/VSS. Pain well controlled.     OBJECTIVE:   Vital Signs Last 24 Hrs  T(C): 36.4 (25 May 2020 04:38), Max: 36.4 (24 May 2020 12:13)  T(F): 97.6 (25 May 2020 04:38), Max: 97.6 (24 May 2020 12:13)  HR: 81 (25 May 2020 04:38) (76 - 81)  BP: 157/83 (25 May 2020 04:38) (123/71 - 157/83)  RR: 18 (25 May 2020 04:38) (18 - 18)  SpO2: 98% (25 May 2020 04:38) (98% - 100%)    PHYSICAL EXAM:  General: alert and oriented, NAD  Resp: airway patent, respirations unlabored  Chest: KEI x2 with s/s output stripped. Dressing changed. Soft R superior lateral dehiscence  Abdomen: soft, non-distended. steri strips over donor site     ** LABS **             8.8    8.52  )-----------( 232      ( 25 May 2020 06:15 )             27.1     05-25    134<L>  |  100  |  37<H>  ----------------------------<  180<H>  4.1   |  25  |  1.52<H>    Ca    9.0      25 May 2020 06:15    TPro  6.5  /  Alb  2.7<L>  /  TBili  0.3  /  DBili  x   /  AST  9<L>  /  ALT  8<L>  /  AlkPhos  84  05-24

## 2020-05-25 NOTE — PROGRESS NOTE ADULT - ASSESSMENT
65 m with    Syncope  - telemetry  - cardiology follow     Orthostatic hypotension  - off aldactone  - follow    S/P CABG  - CT sx evaluation noted    Sternal wound, s/p reconstruction and omental flap  - MRSA bacteremia  - Vancomycin  - KEI as per Plastic Surgery . One removed.  - ID follow  - postop care  - PICC     Surgery follow re KEI management.    Bacteremia  - OLGA reordered as per ID recommendations . Echo with no vegetations.  - ID follow    Scalp abrasion healing    Diabetes control  - ADA diet  - BS control  - Endocrine follow    CKD  - nephrology follow    HTN control    Hoarseness improving   - ENT evaluation noted    PT    DCP home.     Miguel Angel Duke MD pager [phone]

## 2020-05-25 NOTE — PROGRESS NOTE ADULT - SUBJECTIVE AND OBJECTIVE BOX
Patient is a 65y old  Male who presents with a chief complaint of syncope (25 May 2020 12:48)      SUBJECTIVE / OVERNIGHT EVENTS: Comfortable without new complaints.   Review of Systems  chest pain no  palpitations no  sob no  nausea no  headache no    MEDICATIONS  (STANDING):  aMIOdarone    Tablet 200 milliGRAM(s) Oral daily  AQUAPHOR (petrolatum Ointment) 1 Application(s) Topical daily  aspirin enteric coated 81 milliGRAM(s) Oral daily  atorvastatin 40 milliGRAM(s) Oral at bedtime  BACItracin   Ointment 1 Application(s) Topical daily  buDESOnide    Inhalation Suspension 0.5 milliGRAM(s) Inhalation two times a day  dextrose 5%. 1000 milliLiter(s) (50 mL/Hr) IV Continuous <Continuous>  dextrose 50% Injectable 12.5 Gram(s) IV Push once  dextrose 50% Injectable 25 Gram(s) IV Push once  dextrose 50% Injectable 25 Gram(s) IV Push once  enoxaparin Injectable 40 milliGRAM(s) SubCutaneous daily  hydrocortisone 1% Cream 1 Application(s) Topical two times a day  insulin glargine Injectable (LANTUS) 40 Unit(s) SubCutaneous at bedtime  insulin lispro (HumaLOG) corrective regimen sliding scale   SubCutaneous three times a day before meals  insulin lispro (HumaLOG) corrective regimen sliding scale   SubCutaneous at bedtime  insulin lispro Injectable (HumaLOG) 22 Unit(s) SubCutaneous three times a day with meals  loratadine 10 milliGRAM(s) Oral daily  metoprolol tartrate 12.5 milliGRAM(s) Oral two times a day  pantoprazole    Tablet 40 milliGRAM(s) Oral before breakfast  polyethylene glycol 3350 17 Gram(s) Oral daily  senna 2 Tablet(s) Oral at bedtime  torsemide 10 milliGRAM(s) Oral daily  vancomycin  IVPB 1250 milliGRAM(s) IV Intermittent every 24 hours    MEDICATIONS  (PRN):  acetaminophen   Tablet .. 650 milliGRAM(s) Oral every 6 hours PRN Temp greater or equal to 38.5C (101.3F), Mild Pain (1 - 3)  ALBUTerol    90 MICROgram(s) HFA Inhaler 2 Puff(s) Inhalation every 6 hours PRN Shortness of Breath and/or Wheezing  benzonatate 100 milliGRAM(s) Oral three times a day PRN Cough  dextrose 40% Gel 15 Gram(s) Oral once PRN Blood Glucose LESS THAN 70 milliGRAM(s)/deciliter  glucagon  Injectable 1 milliGRAM(s) IntraMuscular once PRN Glucose LESS THAN 70 milligrams/deciliter  oxycodone    5 mG/acetaminophen 325 mG 1 Tablet(s) Oral every 6 hours PRN Moderate Pain (4 - 6)      Vital Signs Last 24 Hrs  T(C): 36.4 (25 May 2020 15:11), Max: 36.4 (24 May 2020 20:22)  T(F): 97.6 (25 May 2020 15:11), Max: 97.6 (24 May 2020 20:22)  HR: 88 (25 May 2020 15:39) (76 - 88)  BP: 127/73 (25 May 2020 15:39) (123/77 - 157/83)  BP(mean): --  RR: 18 (25 May 2020 15:39) (18 - 18)  SpO2: 99% (25 May 2020 15:39) (98% - 99%)    PHYSICAL EXAM:  GENERAL: NAD, well-developed  HEAD:  Atraumatic, Normocephalic  EYES: EOMI, PERRLA, conjunctiva and sclera clear  NECK: Supple, No JVD  CHEST/LUNG: Clear to auscultation bilaterally; No wheeze Wound healing. JPX1  HEART: Regular rate and rhythm; No murmurs, rubs, or gallops  ABDOMEN: Soft, Nontender, Nondistended; Bowel sounds present  EXTREMITIES:  2+ Peripheral Pulses, No clubbing, cyanosis, or edema  PSYCH: AAOx3  NEUROLOGY: non-focal  SKIN: No rashes or lesions    LABS:                        8.8    8.52  )-----------( 232      ( 25 May 2020 06:15 )             27.1     05-25    134<L>  |  100  |  37<H>  ----------------------------<  180<H>  4.1   |  25  |  1.52<H>    Ca    9.0      25 May 2020 06:15    TPro  6.5  /  Alb  2.7<L>  /  TBili  0.3  /  DBili  x   /  AST  9<L>  /  ALT  8<L>  /  AlkPhos  84  05-24                RADIOLOGY & ADDITIONAL TESTS:    Imaging Personally Reviewed:    Consultant(s) Notes Reviewed:      Care Discussed with Consultants/Other Providers:

## 2020-05-25 NOTE — PROGRESS NOTE ADULT - SUBJECTIVE AND OBJECTIVE BOX
Chief complaint  Patient is a 65y old  Male who presents with a chief complaint of syncope (25 May 2020 12:32)   Review of systems  Patient in chair, looks comfortable, no hypoglycemic episodes.    Labs and Fingersticks  CAPILLARY BLOOD GLUCOSE      POCT Blood Glucose.: 216 mg/dL (25 May 2020 08:58)  POCT Blood Glucose.: 127 mg/dL (24 May 2020 21:54)  POCT Blood Glucose.: 226 mg/dL (24 May 2020 17:31)  POCT Blood Glucose.: 231 mg/dL (24 May 2020 12:57)      Anion Gap, Serum: 9 (05-25 @ 06:15)  Anion Gap, Serum: 10 (05-24 @ 06:17)      Calcium, Total Serum: 9.0 (05-25 @ 06:15)  Calcium, Total Serum: 8.7 (05-24 @ 06:17)  Albumin, Serum: 2.7 <L> (05-24 @ 06:17)    Alanine Aminotransferase (ALT/SGPT): 8 <L> (05-24 @ 06:17)  Alkaline Phosphatase, Serum: 84 (05-24 @ 06:17)  Aspartate Aminotransferase (AST/SGOT): 9 <L> (05-24 @ 06:17)        05-25    134<L>  |  100  |  37<H>  ----------------------------<  180<H>  4.1   |  25  |  1.52<H>    Ca    9.0      25 May 2020 06:15    TPro  6.5  /  Alb  2.7<L>  /  TBili  0.3  /  DBili  x   /  AST  9<L>  /  ALT  8<L>  /  AlkPhos  84  05-24                        8.8    8.52  )-----------( 232      ( 25 May 2020 06:15 )             27.1     Medications  MEDICATIONS  (STANDING):  aMIOdarone    Tablet 200 milliGRAM(s) Oral daily  AQUAPHOR (petrolatum Ointment) 1 Application(s) Topical daily  aspirin enteric coated 81 milliGRAM(s) Oral daily  atorvastatin 40 milliGRAM(s) Oral at bedtime  BACItracin   Ointment 1 Application(s) Topical daily  buDESOnide    Inhalation Suspension 0.5 milliGRAM(s) Inhalation two times a day  dextrose 5%. 1000 milliLiter(s) (50 mL/Hr) IV Continuous <Continuous>  dextrose 50% Injectable 12.5 Gram(s) IV Push once  dextrose 50% Injectable 25 Gram(s) IV Push once  dextrose 50% Injectable 25 Gram(s) IV Push once  enoxaparin Injectable 40 milliGRAM(s) SubCutaneous daily  hydrocortisone 1% Cream 1 Application(s) Topical two times a day  insulin glargine Injectable (LANTUS) 36 Unit(s) SubCutaneous at bedtime  insulin lispro (HumaLOG) corrective regimen sliding scale   SubCutaneous three times a day before meals  insulin lispro (HumaLOG) corrective regimen sliding scale   SubCutaneous at bedtime  insulin lispro Injectable (HumaLOG) 20 Unit(s) SubCutaneous three times a day with meals  loratadine 10 milliGRAM(s) Oral daily  metoprolol tartrate 12.5 milliGRAM(s) Oral two times a day  pantoprazole    Tablet 40 milliGRAM(s) Oral before breakfast  polyethylene glycol 3350 17 Gram(s) Oral daily  senna 2 Tablet(s) Oral at bedtime  torsemide 10 milliGRAM(s) Oral daily  vancomycin  IVPB 1250 milliGRAM(s) IV Intermittent every 24 hours      Physical Exam  General: Patient comfortable in bed  Vital Signs Last 12 Hrs  T(F): 97.6 (05-25-20 @ 04:38), Max: 97.6 (05-25-20 @ 04:38)  HR: 81 (05-25-20 @ 04:38) (81 - 81)  BP: 157/83 (05-25-20 @ 04:38) (157/83 - 157/83)  BP(mean): --  RR: 18 (05-25-20 @ 04:38) (18 - 18)  SpO2: 98% (05-25-20 @ 04:38) (98% - 98%)  Neck: No palpable thyroid nodules.  CVS: S1S2, No murmurs  Respiratory: No wheezing, no crepitations  GI: Abdomen soft, bowel sounds positive  Musculoskeletal:  edema lower extremities.   Skin: No skin rashes, no ecchymosis    Diagnostics

## 2020-05-26 ENCOUNTER — TRANSCRIPTION ENCOUNTER (OUTPATIENT)
Age: 65
End: 2020-05-26

## 2020-05-26 LAB
ANION GAP SERPL CALC-SCNC: 10 MMOL/L — SIGNIFICANT CHANGE UP (ref 5–17)
BUN SERPL-MCNC: 42 MG/DL — HIGH (ref 7–23)
CALCIUM SERPL-MCNC: 8.8 MG/DL — SIGNIFICANT CHANGE UP (ref 8.4–10.5)
CHLORIDE SERPL-SCNC: 100 MMOL/L — SIGNIFICANT CHANGE UP (ref 96–108)
CO2 SERPL-SCNC: 26 MMOL/L — SIGNIFICANT CHANGE UP (ref 22–31)
CREAT SERPL-MCNC: 1.65 MG/DL — HIGH (ref 0.5–1.3)
GLUCOSE BLDC GLUCOMTR-MCNC: 167 MG/DL — HIGH (ref 70–99)
GLUCOSE BLDC GLUCOMTR-MCNC: 180 MG/DL — HIGH (ref 70–99)
GLUCOSE BLDC GLUCOMTR-MCNC: 183 MG/DL — HIGH (ref 70–99)
GLUCOSE BLDC GLUCOMTR-MCNC: 98 MG/DL — SIGNIFICANT CHANGE UP (ref 70–99)
GLUCOSE SERPL-MCNC: 201 MG/DL — HIGH (ref 70–99)
POTASSIUM SERPL-MCNC: 4.3 MMOL/L — SIGNIFICANT CHANGE UP (ref 3.5–5.3)
POTASSIUM SERPL-SCNC: 4.3 MMOL/L — SIGNIFICANT CHANGE UP (ref 3.5–5.3)
SODIUM SERPL-SCNC: 136 MMOL/L — SIGNIFICANT CHANGE UP (ref 135–145)

## 2020-05-26 PROCEDURE — 99232 SBSQ HOSP IP/OBS MODERATE 35: CPT

## 2020-05-26 PROCEDURE — 93320 DOPPLER ECHO COMPLETE: CPT | Mod: 26

## 2020-05-26 PROCEDURE — 93312 ECHO TRANSESOPHAGEAL: CPT | Mod: 26

## 2020-05-26 PROCEDURE — 93325 DOPPLER ECHO COLOR FLOW MAPG: CPT | Mod: 26

## 2020-05-26 RX ADMIN — PANTOPRAZOLE SODIUM 40 MILLIGRAM(S): 20 TABLET, DELAYED RELEASE ORAL at 05:07

## 2020-05-26 RX ADMIN — Medication 22 UNIT(S): at 17:44

## 2020-05-26 RX ADMIN — Medication 1: at 13:44

## 2020-05-26 RX ADMIN — Medication 100 MILLIGRAM(S): at 05:06

## 2020-05-26 RX ADMIN — Medication 1 APPLICATION(S): at 16:31

## 2020-05-26 RX ADMIN — Medication 0.5 MILLIGRAM(S): at 05:07

## 2020-05-26 RX ADMIN — INSULIN GLARGINE 40 UNIT(S): 100 INJECTION, SOLUTION SUBCUTANEOUS at 22:41

## 2020-05-26 RX ADMIN — Medication 10 MILLIGRAM(S): at 05:07

## 2020-05-26 RX ADMIN — ATORVASTATIN CALCIUM 40 MILLIGRAM(S): 80 TABLET, FILM COATED ORAL at 22:42

## 2020-05-26 RX ADMIN — SENNA PLUS 2 TABLET(S): 8.6 TABLET ORAL at 22:42

## 2020-05-26 RX ADMIN — POLYETHYLENE GLYCOL 3350 17 GRAM(S): 17 POWDER, FOR SOLUTION ORAL at 16:32

## 2020-05-26 RX ADMIN — Medication 1 APPLICATION(S): at 16:30

## 2020-05-26 RX ADMIN — Medication 12.5 MILLIGRAM(S): at 05:06

## 2020-05-26 RX ADMIN — Medication 1: at 17:44

## 2020-05-26 RX ADMIN — Medication 12.5 MILLIGRAM(S): at 17:44

## 2020-05-26 RX ADMIN — Medication 0.5 MILLIGRAM(S): at 17:44

## 2020-05-26 RX ADMIN — Medication 22 UNIT(S): at 13:44

## 2020-05-26 RX ADMIN — Medication 100 MILLIGRAM(S): at 17:47

## 2020-05-26 RX ADMIN — Medication 166.67 MILLIGRAM(S): at 20:38

## 2020-05-26 RX ADMIN — ENOXAPARIN SODIUM 40 MILLIGRAM(S): 100 INJECTION SUBCUTANEOUS at 16:30

## 2020-05-26 RX ADMIN — AMIODARONE HYDROCHLORIDE 200 MILLIGRAM(S): 400 TABLET ORAL at 05:07

## 2020-05-26 RX ADMIN — Medication 81 MILLIGRAM(S): at 13:45

## 2020-05-26 NOTE — PROGRESS NOTE ADULT - ASSESSMENT
65 m with    Syncope  - telemetry  - cardiology follow     Orthostatic hypotension  - off aldactone  - follow    S/P CABG  - CT sx evaluation noted    Sternal wound, s/p reconstruction and omental flap  - MRSA bacteremia  - Vancomycin  - KEI as per Plastic Surgery . One removed.  - ID follow  - postop care  - PICC     Surgery follow re KEI management.    Bacteremia  - OLGA with no vegetations.  - ID follow    Scalp abrasion healing    Diabetes control  - ADA diet  - BS control  - Endocrine follow    CKD  - nephrology follow    HTN control    Hoarseness improving   - ENT evaluation noted    PT    DCP home.     Miguel Angel Duke MD pager [phone]

## 2020-05-26 NOTE — DISCHARGE NOTE PROVIDER - NSDCFUADDAPPT_GEN_ALL_CORE_FT
[FreeTextEntry1] : 10-year-old girl status post excision of a massive left bronchogenic cyst. 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 She has done well since surgery. She has occasional left shoulder pain but this has improved a lot over the past month. She does not have any decreased motor function in the left upper extremity and then the office she has no pain. She occasionally has some pain with deep breathing and overnight but otherwise well. I suspect these are all normal postoperative changes however if this continues particular the shoulder pain we will reimage her chest with a CAT scan. A recent chest x-ray was performed which was completely normal. I reassured the patient and her mother and will followup with me if the pain persists. f/u with your PMD in 1 week

## 2020-05-26 NOTE — DISCHARGE NOTE PROVIDER - HOSPITAL COURSE
65M s/p CABG 2/3/20, course complicated by PEA arrest, E faecalis bacteremia and local infections of the sternal and right leg SVG sites, now admitted 5/11/20 for syncope.     In total he received about six weeks of antibiotics although no growth from blood or sternal OR debridement 2/25 and low intraoperative concern for osteomyelitis.     However, wound drainage increased significantly now with MRSA and low-grade bacteremia, cleared 5/14.     s/p OR 5/15 debridement, reconstruction with omentum     Would treat as osteomyelitis given elevated ESR/CRP although no aggressive osseous lesions on CT. OLGA 5/26 without vegetations     s/p PICC line 5/22. DC on vancomycin 1250 mg daily via PICC through 6/26 with weekly CBC, BUN, creatinine, Vancomycin trough, ESR, CRP faxed to [phone] 65M s/p CABG 2/3/20, course complicated by PEA arrest, E faecalis bacteremia and local infections of the sternal and right leg SVG sites, now admitted 5/11/20 for syncope.     In total he received about six weeks of antibiotics although no growth from blood or sternal OR debridement 2/25 and low intraoperative concern for osteomyelitis.     However, wound drainage increased significantly now with MRSA and low-grade bacteremia, cleared 5/14.     s/p OR 5/15 debridement, reconstruction with omentum     Would treat as osteomyelitis given elevated ESR/CRP although no aggressive osseous lesions on CT. OLGA 5/26 without vegetations     s/p PICC line 5/22. DC on vancomycin 1250 mg daily via PICC through 6/26 with weekly CBC, BUN, creatinine, Vancomycin trough, ESR, CRP faxed to [phone]    Syncope likely 2/2 to +orthostatics. Had cardiology consult    cv stable, EKG unchanged from prior     HST elevated w/ normal CK/CKMB, demand ischemia in setting of STEPHANIE/CKD, possible wound infection     CT head negative for ICH    echo with overall preserved LVEF, despite segmental wall motion abnormalities, no LV thrombus 65M s/p CABG 2/3/20, course complicated by PEA arrest, E faecalis bacteremia and local infections of the sternal and right leg SVG sites, now admitted 5/11/20 for syncope.     In total he received about six weeks of antibiotics although no growth from blood or sternal OR debridement 2/25 and low intraoperative concern for osteomyelitis.     However, wound drainage increased significantly now with MRSA and low-grade bacteremia, cleared 5/14.     s/p OR 5/15 debridement, reconstruction with omentum     Would treat as osteomyelitis given elevated ESR/CRP although no aggressive osseous lesions on CT. OLGA 5/26 without vegetations     s/p PICC line 5/22. DC on vancomycin 1250 mg daily via PICC through 6/26 with weekly CBC, BUN, creatinine, Vancomycin trough, ESR, CRP faxed to [phone]    Syncope likely 2/2 to +orthostatics. Had cardiology consult    cv stable, EKG unchanged from prior     HST elevated w/ normal CK/CKMB, demand ischemia in setting of STEPHANIE/CKD, possible wound infection     CT head negative for ICH    echo with overall preserved LVEF, despite segmental wall motion abnormalities, no LV thrombus     Wound vac and KEI drain was removed by plastic. To continue with sternal wound care and to see Dr Wright in 1 week of discharge. 65M s/p CABG 2/3/20, course complicated by PEA arrest, E faecalis bacteremia and local infections of the sternal and right leg SVG sites, now admitted 5/11/20 for syncope.     In total he received about six weeks of antibiotics although no growth from blood or sternal OR debridement 2/25 and low intraoperative concern for osteomyelitis.     However, wound drainage increased significantly now with MRSA and low-grade bacteremia, cleared 5/14.     s/p OR 5/15 debridement, reconstruction with omentum     Would treat as osteomyelitis given elevated ESR/CRP although no aggressive osseous lesions on CT. OLGA 5/26 without vegetations     s/p PICC line 5/22. DC on vancomycin 1250 mg daily via PICC through 6/26 with weekly CBC, BUN, creatinine, Vancomycin trough, ESR, CRP faxed to [phone]    Syncope likely 2/2 to +orthostatics. Had cardiology consult    cv stable, EKG unchanged from prior     HST elevated w/ normal CK/CKMB, demand ischemia in setting of STEPHANIE/CKD, possible wound infection     CT head negative for ICH    echo with overall preserved LVEF, despite segmental wall motion abnormalities, no LV thrombus     Wound vac and KEI drain was removed by plastic. To continue with sternal wound care and to see Dr Wright in 1 week of discharge.    Cleared for discharge as per Dr Duke, EDIE med rec reviewed with Dr Duke

## 2020-05-26 NOTE — PROGRESS NOTE ADULT - ASSESSMENT
65y Male with history of DM presents with syncope concern for sternal wound infection. Nephrology consulted for elevated Scr.    1) CKD-3: Baseline Scr 1.5 as per prior labs. Renal function near baseline. Patient advised to follow up as an outpatient for CKD work up. Avoid nephrotoxins. Monitor electrolytes.    2) HTN with CKD: BP controlled. Metoprolol as per cardiology. Monitor BP.    3) LE edema: Patient appears euvolemic. Continue with torsemide 10 mg daily. Monitor UO.    4) Hyponatremia: Mild and secondary to poor solute intake as per urine studies now resolved after vancomycin changed to NS base. Monitor serum sodium.

## 2020-05-26 NOTE — PROGRESS NOTE ADULT - ASSESSMENT
65y m PMHx CABG Feb 2020 complicated by PEA arrest, sternal infection s/p sternal debridement with muscle flap reconstruction, DM, HTN p/w increased drainage from sternal wound s/p omental flap and incision closure w/ FTSG 5/15    Plan:  - Monitor drain  - Fu OLGA  - Care per primary team    Plastic Surgery  [phone]

## 2020-05-26 NOTE — DISCHARGE NOTE PROVIDER - CARE PROVIDER_API CALL
Ludwin Wright  PLASTIC SURGERY  [address] [place] [address]  Phone: [phone]  Fax: [phone]  Follow Up Time:     Chapito Delarosa  INTERNAL MEDICINE  [address] [place] [address] [place] [address]  Phone: [phone]  Fax: [phone]  Follow Up Time: Ludwin Wright  PLASTIC SURGERY  [address] [facility] [address]  [place] [address]  Phone: [phone]  Fax: [phone]  Follow Up Time:     Chapito Delarosa  INTERNAL MEDICINE  [address] [place] [address] [place] [address]  Phone: [phone]  Fax: [phone]  Follow Up Time:     Tyler Dueñas  INTERNAL MEDICINE  [address] [place] [address]  Phone: [phone]  Fax: [phone]  Follow Up Time:

## 2020-05-26 NOTE — PROGRESS NOTE ADULT - SUBJECTIVE AND OBJECTIVE BOX
Chief complaint  Patient is a 65y old  Male who presents with a chief complaint of syncope (26 May 2020 13:36)   Review of systems  Patient in chair, looks comfortable, no hypoglycemic episodes.    Labs and Fingersticks  CAPILLARY BLOOD GLUCOSE      POCT Blood Glucose.: 180 mg/dL (26 May 2020 12:50)  POCT Blood Glucose.: 167 mg/dL (26 May 2020 08:40)  POCT Blood Glucose.: 133 mg/dL (25 May 2020 21:15)  POCT Blood Glucose.: 165 mg/dL (25 May 2020 17:14)      Anion Gap, Serum: 10 (05-26 @ 06:49)  Anion Gap, Serum: 9 (05-25 @ 06:15)      Calcium, Total Serum: 8.8 (05-26 @ 06:49)  Calcium, Total Serum: 9.0 (05-25 @ 06:15)          05-26    136  |  100  |  42<H>  ----------------------------<  201<H>  4.3   |  26  |  1.65<H>    Ca    8.8      26 May 2020 06:49                          8.8    8.52  )-----------( 232      ( 25 May 2020 06:15 )             27.1     Medications  MEDICATIONS  (STANDING):  aMIOdarone    Tablet 200 milliGRAM(s) Oral daily  AQUAPHOR (petrolatum Ointment) 1 Application(s) Topical daily  aspirin enteric coated 81 milliGRAM(s) Oral daily  atorvastatin 40 milliGRAM(s) Oral at bedtime  BACItracin   Ointment 1 Application(s) Topical daily  buDESOnide    Inhalation Suspension 0.5 milliGRAM(s) Inhalation two times a day  dextrose 5%. 1000 milliLiter(s) (50 mL/Hr) IV Continuous <Continuous>  dextrose 50% Injectable 12.5 Gram(s) IV Push once  dextrose 50% Injectable 25 Gram(s) IV Push once  dextrose 50% Injectable 25 Gram(s) IV Push once  enoxaparin Injectable 40 milliGRAM(s) SubCutaneous daily  hydrocortisone 1% Cream 1 Application(s) Topical two times a day  insulin glargine Injectable (LANTUS) 40 Unit(s) SubCutaneous at bedtime  insulin lispro (HumaLOG) corrective regimen sliding scale   SubCutaneous three times a day before meals  insulin lispro (HumaLOG) corrective regimen sliding scale   SubCutaneous at bedtime  insulin lispro Injectable (HumaLOG) 22 Unit(s) SubCutaneous three times a day with meals  loratadine 10 milliGRAM(s) Oral daily  metoprolol tartrate 12.5 milliGRAM(s) Oral two times a day  pantoprazole    Tablet 40 milliGRAM(s) Oral before breakfast  polyethylene glycol 3350 17 Gram(s) Oral daily  senna 2 Tablet(s) Oral at bedtime  torsemide 10 milliGRAM(s) Oral daily  vancomycin  IVPB 1250 milliGRAM(s) IV Intermittent every 24 hours      Physical Exam  General: Patient comfortable in bed  Vital Signs Last 12 Hrs  T(F): 98.4 (05-26-20 @ 04:25), Max: 98.4 (05-26-20 @ 04:25)  HR: 81 (05-26-20 @ 04:25) (81 - 81)  BP: 121/79 (05-26-20 @ 04:25) (121/79 - 121/79)  BP(mean): --  RR: 18 (05-26-20 @ 04:25) (18 - 18)  SpO2: 97% (05-26-20 @ 04:25) (97% - 97%)  Neck: No palpable thyroid nodules.  CVS: S1S2, No murmurs  Respiratory: No wheezing, no crepitations  GI: Abdomen soft, bowel sounds positive  Musculoskeletal:  edema lower extremities.   Skin: No skin rashes, no ecchymosis    Diagnostics

## 2020-05-26 NOTE — PROGRESS NOTE ADULT - ASSESSMENT
limited echo 5/12/20:EF 55-60%. overall preserved lv fx, despite segmental wall motion abnormalities, no LV thrombus  The apical anterior wall, the apical lateral wall, the basal anterior wall,the mid anterior wall, and the midanterolateral wall are hypokinetic.  echo 5/14/20:  Limited study to r/o Endocarditis Pt has very limited windows due to edema and wound vac. Unable to exclude valvular vegetations in the setting of a  technically difficult study. OLGA could be obtained for further evaluation of valvular vegetations, if clinical suspicion of endocarditis is high.        a/p  65y m PMHx CABG Feb 2020 complicated by PEA arrest, thoracotomy site infection w/empyema, DM, HTN p/w syncope, sternal wound infection.     1. Syncope   likely 2/2 to +orthostatics  cv stable, EKG unchanged from prior   HST elevated w/ normal CK/CKMB, demand ischemia in setting of STEPHANIE/CKD, possible wound infection   CT head negative for ICH  echo with overall preserved LVEF, despite segmental wall motion abnormalities, no LV thrombus   encourage PO intake    2. Sternal wound infection s/p RTOR for SWI    s/p debridement of sternal wound, reconstruction, Laparoscopic lysis of abdominal adhesions   IV abx per ID   blood cx neg    repeat echo unable to r/o endocarditis  OLGA this morning without vegetations.  s/p PICC line 5/22  ID, f/u    3. CAD s/p CABG x 4 , s/p PEA arrest   cv stable   cont asa, statin, bb     4. Chronic diastolic CHF  euvolemic on exam with unchanged SOB   continue diuretics as ordered   repeat echo with preserved LVEF     5. PAF  remains NSR  continue amio, bb    6. HTN  bp stable    7. STEPHANIE/CKD   continue to trend creat     8. Pleural effusion, hx  CXR this admission with small left loculated pleural effusion now decreased in size from previous exam.  ENT f/u noted - pt. refusing scope at this time, outpt. f/u     dvt ppx   no cv objection to dc planning

## 2020-05-26 NOTE — DISCHARGE NOTE PROVIDER - NSDCFUSCHEDAPPT_GEN_ALL_CORE_FT
FRANKLIN PRESLEY ; 06/16/2020 ; NPP PulmMed [address] [facility]  FRANKLIN PRESLEY ; 07/02/2020 ; NPP Med [address] [facility] FRANKLIN PRESLEY ; 06/16/2020 ; NPP PulmMed [address] [facility]  FRANKLIN PRESLEY ; 07/02/2020 ; NPP Med [address] [facility] FRANKLIN PRESLEY ; 06/16/2020 ; NPP PulmMed [address] [facility]  FRANKLIN PRESLEY ; 07/02/2020 ; NPP Med [address] [facility] FRANKLIN PRESLEY ; 06/16/2020 ; NPP PulmMed [address] [facility]  FRANKLIN PRESLEY ; 07/02/2020 ; NPP Med [address] [facility] FRANKLIN PRESLEY ; 06/16/2020 ; NPP PulmMed [address] [facility]  FRANKLIN PRESLEY ; 07/02/2020 ; NPP Med [address] [facility] FRANKLIN PRESLEY ; 06/16/2020 ; NPP PulmMed [address] [facility]  FRANKLIN PRESLEY ; 07/02/2020 ; NPP Med [address] [facility]

## 2020-05-26 NOTE — DISCHARGE NOTE PROVIDER - NSDCCPCAREPLAN_GEN_ALL_CORE_FT
PRINCIPAL DISCHARGE DIAGNOSIS  Diagnosis: Syncope  Assessment and Plan of Treatment:       SECONDARY DISCHARGE DIAGNOSES  Diagnosis: HTN (hypertension)  Assessment and Plan of Treatment: HTN (hypertension)    Diagnosis: Diabetes  Assessment and Plan of Treatment: Diabetes    Diagnosis: Wound dehiscence  Assessment and Plan of Treatment:     Diagnosis: CKD (chronic kidney disease)  Assessment and Plan of Treatment: CKD (chronic kidney disease) PRINCIPAL DISCHARGE DIAGNOSIS  Diagnosis: Syncope  Assessment and Plan of Treatment:   likely secondary to orthostatics hypotension   EKG unchanged from prior   Troponin was  elevated w/ normal CK/CKMB,likely  demand ischemia in setting of STEPHANIE/CKD, and from wound infection   CT head negative   echo with overall preserved LVEF  F/U with cardiology      SECONDARY DISCHARGE DIAGNOSES  Diagnosis: Wound dehiscence  Assessment and Plan of Treatment: S/P sternal wound debridement with skin graft on 5/15 with wound vac and KEI drain bilateral, which are removed now. Cleanse the wound with saline, pat dry, apply aquaphor, then xeroform gauze , cover with DSD,  secure with paper tape daily. F/U with Dr Joe Caban. Call for appointment. Continue antibiotic as recommended through PICC line  F/U with ID. Call for appointment      Diagnosis: HTN (hypertension)  Assessment and Plan of Treatment: Low salt diet  Activity as tolerated.  Take all medication as prescribed.  Follow up with your medical doctor for routine blood pressure monitoring at your next visit.  Notify your doctor if you have any of the following symptoms:   Dizziness, Lightheadedness, Blurry vision, Headache, Chest pain, Shortness of breath      Diagnosis: Diabetes  Assessment and Plan of Treatment: HgA1C this admission.  Make sure you get your HgA1c checked every three months.  If you take oral diabetes medications, check your blood glucose two times a day.  If you take insulin, check your blood glucose before meals and at bedtime.  It's important not to skip any meals.  Keep a log of your blood glucose results and always take it with you to your doctor appointments.  Keep a list of your current medications including injectables and over the counter medications and bring this medication list with you to all your doctor appointments.  If you have not seen your ophthalmologist this year call for appointment.  Check your feet daily for redness, sores, or openings. Do not self treat. If no improvement in two days call your primary care physician for an appointment.  Low blood sugar (hypoglycemia) is a blood sugar below 70mg/dl. Check your blood sugar if you feel signs/symptoms of hypoglycemia. If your blood sugar is below 70 take 15 grams of carbohydrates (ex 4 oz of apple juice, 3-4 glucose tablets, or 4-6 oz of regular soda) wait 15 minutes and repeat blood sugar to make sure it comes up above 70.  If your blood sugar is above 70 and you are due for a meal, have a meal.  If you are not due for a meal have a snack.  This snack helps keeps your blood sugar at a safe range.      Diagnosis: CKD (chronic kidney disease)  Assessment and Plan of Treatment: Avoid taking (NSAIDs) - (ex: Ibuprofen, Advil, Celebrex, Naprosyn)  Avoid taking any nephrotoxic agents (can harm kidneys) - Intravenous contrast for diagnostic testing, combination cold medications.  Have all medications adjusted for your renal function by your Health Care Provider.  Blood pressure control is important.  Take all medication as prescribed. PRINCIPAL DISCHARGE DIAGNOSIS  Diagnosis: Syncope  Assessment and Plan of Treatment:   likely secondary to orthostatics hypotension   EKG unchanged from prior   Troponin was  elevated w/ normal CK/CKMB,likely  demand ischemia in setting of STEPHANIE/CKD, and from wound infection   CT head negative   echo with overall preserved LVEF  F/U with cardiology      SECONDARY DISCHARGE DIAGNOSES  Diagnosis: Wound dehiscence  Assessment and Plan of Treatment: S/P sternal wound debridement with skin graft on 5/15 with wound vac and KEI drain bilateral, which are removed now. Cleanse the wound with saline, pat dry, apply aquaphor, then xeroform gauze , cover with DSD,  secure with paper tape daily. F/U with plastic surgery. Call for appointment. Continue antibiotic as recommended through PICC line  F/U with ID. Call for appointment      Diagnosis: HTN (hypertension)  Assessment and Plan of Treatment: Low salt diet  Activity as tolerated.  Take all medication as prescribed.  Follow up with your medical doctor for routine blood pressure monitoring at your next visit.  Notify your doctor if you have any of the following symptoms:   Dizziness, Lightheadedness, Blurry vision, Headache, Chest pain, Shortness of breath      Diagnosis: Diabetes  Assessment and Plan of Treatment: HgA1C this admission.  Make sure you get your HgA1c checked every three months.  If you take oral diabetes medications, check your blood glucose two times a day.  If you take insulin, check your blood glucose before meals and at bedtime.  It's important not to skip any meals.  Keep a log of your blood glucose results and always take it with you to your doctor appointments.  Keep a list of your current medications including injectables and over the counter medications and bring this medication list with you to all your doctor appointments.  If you have not seen your ophthalmologist this year call for appointment.  Check your feet daily for redness, sores, or openings. Do not self treat. If no improvement in two days call your primary care physician for an appointment.  Low blood sugar (hypoglycemia) is a blood sugar below 70mg/dl. Check your blood sugar if you feel signs/symptoms of hypoglycemia. If your blood sugar is below 70 take 15 grams of carbohydrates (ex 4 oz of apple juice, 3-4 glucose tablets, or 4-6 oz of regular soda) wait 15 minutes and repeat blood sugar to make sure it comes up above 70.  If your blood sugar is above 70 and you are due for a meal, have a meal.  If you are not due for a meal have a snack.  This snack helps keeps your blood sugar at a safe range.      Diagnosis: CKD (chronic kidney disease)  Assessment and Plan of Treatment: Avoid taking (NSAIDs) - (ex: Ibuprofen, Advil, Celebrex, Naprosyn)  Avoid taking any nephrotoxic agents (can harm kidneys) - Intravenous contrast for diagnostic testing, combination cold medications.  Have all medications adjusted for your renal function by your Health Care Provider.  Blood pressure control is important.  Take all medication as prescribed. PRINCIPAL DISCHARGE DIAGNOSIS  Diagnosis: Syncope  Assessment and Plan of Treatment: likely secondary to orthostatics hypotension   EKG unchanged from prior   Troponin was  elevated w/ normal CK/CKMB,likely  demand ischemia in setting of STEPHANIE/CKD, and from wound infection   CT head negative for any acute pathology  echo with overall preserved LV function  F/U with cardiology      SECONDARY DISCHARGE DIAGNOSES  Diagnosis: Wound dehiscence  Assessment and Plan of Treatment: S/P sternal wound debridement with skin graft on 5/15 with wound vac and KEI drain bilateral, which are removed now. Cleanse the wound with saline, pat dry, apply aquaphor, then xeroform gauze , cover with DSD,  secure with paper tape daily. F/U with plastic surgery in 1 week of discharge.  Call for appointment. Continue antibiotic as recommended through PICC line  F/U with ID. Call for appointment      Diagnosis: HTN (hypertension)  Assessment and Plan of Treatment: Low salt diet  Activity as tolerated.  Take all medication as prescribed.  Follow up with your medical doctor for routine blood pressure monitoring at your next visit.  Notify your doctor if you have any of the following symptoms:   Dizziness, Lightheadedness, Blurry vision, Headache, Chest pain, Shortness of breath      Diagnosis: Diabetes  Assessment and Plan of Treatment: HgA1C this admission.  Make sure you get your HgA1c checked every three months.  If you take oral diabetes medications, check your blood glucose two times a day.  If you take insulin, check your blood glucose before meals and at bedtime.  It's important not to skip any meals.  Keep a log of your blood glucose results and always take it with you to your doctor appointments.  Keep a list of your current medications including injectables and over the counter medications and bring this medication list with you to all your doctor appointments.  If you have not seen your ophthalmologist this year call for appointment.  Check your feet daily for redness, sores, or openings. Do not self treat. If no improvement in two days call your primary care physician for an appointment.  Low blood sugar (hypoglycemia) is a blood sugar below 70mg/dl. Check your blood sugar if you feel signs/symptoms of hypoglycemia. If your blood sugar is below 70 take 15 grams of carbohydrates (ex 4 oz of apple juice, 3-4 glucose tablets, or 4-6 oz of regular soda) wait 15 minutes and repeat blood sugar to make sure it comes up above 70.  If your blood sugar is above 70 and you are due for a meal, have a meal.  If you are not due for a meal have a snack.  This snack helps keeps your blood sugar at a safe range.      Diagnosis: CKD (chronic kidney disease)  Assessment and Plan of Treatment: Avoid taking (NSAIDs) - (ex: Ibuprofen, Advil, Celebrex, Naprosyn)  Avoid taking any nephrotoxic agents (can harm kidneys) - Intravenous contrast for diagnostic testing, combination cold medications.  Have all medications adjusted for your renal function by your Health Care Provider.  Blood pressure control is important.  Take all medication as prescribed. PRINCIPAL DISCHARGE DIAGNOSIS  Diagnosis: Syncope  Assessment and Plan of Treatment: likely secondary to orthostatics hypotension   EKG unchanged from prior   Troponin was  elevated w/ normal CK/CKMB,likely  demand ischemia in setting of STEPHANIE/CKD, and from wound infection   CT head negative for any acute pathology  echo with overall preserved LV function  F/U with cardiology      SECONDARY DISCHARGE DIAGNOSES  Diagnosis: Wound dehiscence  Assessment and Plan of Treatment: S/P sternal wound debridement with skin graft on 5/15 with wound vac and KEI drain bilateral, which are removed now. Cleanse the wound with saline, pat dry, apply aquaphor, then xeroform gauze , cover with DSD,  secure with paper tape daily. F/U with plastic surgery in 1 week of discharge.  Call for appointment. Continue antibiotic as recommended through PICC line  F/U with ID. Call for appointment      Diagnosis: HTN (hypertension)  Assessment and Plan of Treatment: Low salt diet  Activity as tolerated.  Take all medication as prescribed.  Follow up with your medical doctor for routine blood pressure monitoring at your next visit.  Notify your doctor if you have any of the following symptoms:   Dizziness, Lightheadedness, Blurry vision, Headache, Chest pain, Shortness of breath      Diagnosis: Diabetes  Assessment and Plan of Treatment: HgA1C this admission 7.9  Make sure you get your HgA1c checked every three months.  If you take oral diabetes medications, check your blood glucose two times a day.  If you take insulin, check your blood glucose before meals and at bedtime.  It's important not to skip any meals.  Keep a log of your blood glucose results and always take it with you to your doctor appointments.  Keep a list of your current medications including injectables and over the counter medications and bring this medication list with you to all your doctor appointments.  If you have not seen your ophthalmologist this year call for appointment.  Check your feet daily for redness, sores, or openings. Do not self treat. If no improvement in two days call your primary care physician for an appointment.  Low blood sugar (hypoglycemia) is a blood sugar below 70mg/dl. Check your blood sugar if you feel signs/symptoms of hypoglycemia. If your blood sugar is below 70 take 15 grams of carbohydrates (ex 4 oz of apple juice, 3-4 glucose tablets, or 4-6 oz of regular soda) wait 15 minutes and repeat blood sugar to make sure it comes up above 70.  If your blood sugar is above 70 and you are due for a meal, have a meal.  If you are not due for a meal have a snack.  This snack helps keeps your blood sugar at a safe range.      Diagnosis: CKD (chronic kidney disease)  Assessment and Plan of Treatment: Avoid taking (NSAIDs) - (ex: Ibuprofen, Advil, Celebrex, Naprosyn)  Avoid taking any nephrotoxic agents (can harm kidneys) - Intravenous contrast for diagnostic testing, combination cold medications.  Have all medications adjusted for your renal function by your Health Care Provider.  Blood pressure control is important.  Take all medication as prescribed.  F/U with kidney doctor, you may choose the doctor who saw you here  Dr Dueñas

## 2020-05-26 NOTE — PROGRESS NOTE ADULT - ASSESSMENT
Assessment  DMT2: 65y Male with DM T2 with hyperglycemia, A1C 7.9%, was on insulin at home, now on basal bolus insulin, increased dose yesterday, blood sugars improving, no hypoglycemic episodes. Patient was NPO overnight for OLGA, feels hungry, will now be eating lunch, appears comfortable.  CAD: s/p CABG previous admission, stable, monitored.  Sternal Wound: On IV  ABx, FU Plastics/ID.  HTN: Controlled,  on antihypertensive medications.  CKD: Monitor labs/BMP.          Harper Matias MD  Cell: [phone]  Office: [phone] Assessment  DMT2: 65y Male with DM T2 with hyperglycemia, A1C 7.9%,   was on insulin at home, now on basal bolus insulin, increased dose yesterday, blood sugars improving, no hypoglycemic episodes. Patient was NPO overnight for OLGA, feels hungry, will now be eating lunch, appears comfortable.  CAD: s/p CABG previous admission, stable, monitored.  Sternal Wound: On IV  ABx, FU Plastics/ID.  HTN: Controlled,  on antihypertensive medications.  CKD: Monitor labs/BMP.          Harper Matias MD  Cell: [phone]  Office: [phone]

## 2020-05-26 NOTE — DISCHARGE NOTE PROVIDER - PROVIDER TOKENS
PROVIDER:[TOKEN:[[id number]]],PROVIDER:[TOKEN:[[id number]]] PROVIDER:[TOKEN:[[id number]]],PROVIDER:[TOKEN:[[id number]]],PROVIDER:[TOKEN:[[id number]]]

## 2020-05-26 NOTE — PROGRESS NOTE ADULT - SUBJECTIVE AND OBJECTIVE BOX
Patient is a 65y old  Male who presents with a chief complaint of syncope (26 May 2020 16:45)      SUBJECTIVE / OVERNIGHT EVENTS: No new complaints.   Review of Systems  chest pain no  palpitations no  sob no  nausea no  headache no    MEDICATIONS  (STANDING):  aMIOdarone    Tablet 200 milliGRAM(s) Oral daily  AQUAPHOR (petrolatum Ointment) 1 Application(s) Topical daily  aspirin enteric coated 81 milliGRAM(s) Oral daily  atorvastatin 40 milliGRAM(s) Oral at bedtime  BACItracin   Ointment 1 Application(s) Topical daily  buDESOnide    Inhalation Suspension 0.5 milliGRAM(s) Inhalation two times a day  dextrose 5%. 1000 milliLiter(s) (50 mL/Hr) IV Continuous <Continuous>  dextrose 50% Injectable 12.5 Gram(s) IV Push once  dextrose 50% Injectable 25 Gram(s) IV Push once  dextrose 50% Injectable 25 Gram(s) IV Push once  enoxaparin Injectable 40 milliGRAM(s) SubCutaneous daily  hydrocortisone 1% Cream 1 Application(s) Topical two times a day  insulin glargine Injectable (LANTUS) 40 Unit(s) SubCutaneous at bedtime  insulin lispro (HumaLOG) corrective regimen sliding scale   SubCutaneous three times a day before meals  insulin lispro (HumaLOG) corrective regimen sliding scale   SubCutaneous at bedtime  insulin lispro Injectable (HumaLOG) 22 Unit(s) SubCutaneous three times a day with meals  loratadine 10 milliGRAM(s) Oral daily  metoprolol tartrate 12.5 milliGRAM(s) Oral two times a day  pantoprazole    Tablet 40 milliGRAM(s) Oral before breakfast  polyethylene glycol 3350 17 Gram(s) Oral daily  senna 2 Tablet(s) Oral at bedtime  torsemide 10 milliGRAM(s) Oral daily  vancomycin  IVPB 1250 milliGRAM(s) IV Intermittent every 24 hours    MEDICATIONS  (PRN):  acetaminophen   Tablet .. 650 milliGRAM(s) Oral every 6 hours PRN Temp greater or equal to 38.5C (101.3F), Mild Pain (1 - 3)  ALBUTerol    90 MICROgram(s) HFA Inhaler 2 Puff(s) Inhalation every 6 hours PRN Shortness of Breath and/or Wheezing  benzonatate 100 milliGRAM(s) Oral three times a day PRN Cough  dextrose 40% Gel 15 Gram(s) Oral once PRN Blood Glucose LESS THAN 70 milliGRAM(s)/deciliter  glucagon  Injectable 1 milliGRAM(s) IntraMuscular once PRN Glucose LESS THAN 70 milligrams/deciliter  oxycodone    5 mG/acetaminophen 325 mG 1 Tablet(s) Oral every 6 hours PRN Moderate Pain (4 - 6)      Vital Signs Last 24 Hrs  T(C): 36.8 (26 May 2020 12:30), Max: 36.9 (26 May 2020 04:25)  T(F): 98.2 (26 May 2020 12:30), Max: 98.4 (26 May 2020 04:25)  HR: 85 (26 May 2020 17:39) (80 - 88)  BP: 128/75 (26 May 2020 12:30) (121/79 - 128/75)  BP(mean): --  RR: 18 (26 May 2020 12:30) (18 - 18)  SpO2: 99% (26 May 2020 12:30) (97% - 99%)    PHYSICAL EXAM:  GENERAL: NAD, well-developed  HEAD:  Atraumatic, Normocephalic  EYES: EOMI, PERRLA, conjunctiva and sclera clear  NECK: Supple, No JVD  CHEST/LUNG: Clear to auscultation bilaterally; No wheeze Sternal wound healing. KEI  HEART: Regular rate and rhythm; No murmurs, rubs, or gallops  ABDOMEN: Soft, Nontender, Nondistended; Bowel sounds present  EXTREMITIES:  2+ Peripheral Pulses, No clubbing, cyanosis, or edema  PSYCH: AAOx3  NEUROLOGY: non-focal  SKIN: No rashes or lesions    LABS:                        8.8    8.52  )-----------( 232      ( 25 May 2020 06:15 )             27.1     05-26    136  |  100  |  42<H>  ----------------------------<  201<H>  4.3   |  26  |  1.65<H>    Ca    8.8      26 May 2020 06:49        < from: Transesophageal Echocardiogram w/o TTE (05.26.20 @ 10:09) >  Conclusions:  1. Normal mitral valve. Mild mitral regurgitation.  2. There is a thickening of the non-coronary cusp of the  aortic valve.  Mild-moderate aortic regurgitation.  3. No left atrial or left atrial appendage thrombus.  4. Mild global left ventricular systolic dysfunction.  5. Normal right ventricular size and systolic function.  6. No vegetations seen on cardiac valves.    < end of copied text >            RADIOLOGY & ADDITIONAL TESTS:    Imaging Personally Reviewed:    Consultant(s) Notes Reviewed:      Care Discussed with Consultants/Other Providers:

## 2020-05-26 NOTE — PROGRESS NOTE ADULT - PROBLEM SELECTOR PLAN 1
Will continue current insulin regimen for now. Will continue monitoring FS and FU.  Patient was on insulin at home, knows how to do injections. Suggest DC on current insulin regimen and FU endo 4 weeks.  Patient counseled for compliance with insulin injections, consistent low carb diet, and exercise as tolerated outpatient.

## 2020-05-26 NOTE — PROGRESS NOTE ADULT - SUBJECTIVE AND OBJECTIVE BOX
Plastic Surgery Progress Note (pg [facility]: [id number], [facility]: [phone])    SUBJECTIVE:  Patient seen and examined at bedside this AM. No acute events overnight. AF/VSS. Pain well controlled.     OBJECTIVE:   Vital Signs Last 24 Hrs  T(C): 36.9 (26 May 2020 04:25), Max: 36.9 (26 May 2020 04:25)  T(F): 98.4 (26 May 2020 04:25), Max: 98.4 (26 May 2020 04:25)  HR: 81 (26 May 2020 04:25) (80 - 88)  BP: 121/79 (26 May 2020 04:25) (121/79 - 127/73)  RR: 18 (26 May 2020 04:25) (18 - 18)  SpO2: 97% (26 May 2020 04:25) (97% - 99%)    PHYSICAL EXAM:  General: alert and oriented, NAD  Resp: airway patent, respirations unlabored  Chest: KEI with s/s output stripped. Dressing changed. Soft R superior lateral dehiscence  Abdomen: soft, non-distended. steri strips over donor site     ** LABS **             8.8    8.52  )-----------( 232      ( 25 May 2020 06:15 )             27.1     05-26    136  |  100  |  42<H>  ----------------------------<  201<H>  4.3   |  26  |  1.65<H>    Ca    8.8      26 May 2020 06:49

## 2020-05-26 NOTE — DISCHARGE NOTE PROVIDER - NSDCMRMEDTOKEN_GEN_ALL_CORE_FT
amiodarone 200 mg oral tablet: 1 tab(s) orally once a day  aspirin 81 mg oral delayed release tablet: 1 tab(s) orally once a day  atorvastatin 40 mg oral tablet: 1 tab(s) orally once a day (at bedtime)  benzonatate 100 mg oral capsule: 1 cap(s) orally 3 times a day, As Needed  HumaLOG KwikPen 100 units/mL injectable solution: 10 unit(s) subcutaneous 3 times a day before meals   Lantus Solostar Pen 100 units/mL subcutaneous solution: 32 unit(s) subcutaneous once a day (at bedtime)  metoprolol tartrate 25 mg oral tablet: 0.5 tab(s) = 12.5 mg orally 2 times a day   pantoprazole 40 mg oral delayed release tablet: 1 tab(s) orally once a day (before a meal)  polyethylene glycol 3350 oral powder for reconstitution: 17 gram(s) orally once a day  senna oral tablet: 2 tab(s) orally once a day (at bedtime)  spironolactone 25 mg oral tablet: 1 tab(s) orally once a day  torsemide 10 mg oral tablet: 1 tab(s) orally once a day albuterol 90 mcg/inh inhalation aerosol: 2 puff(s) inhaled every 6 hours, As needed, Shortness of Breath and/or Wheezing  amiodarone 200 mg oral tablet: 1 tab(s) orally once a day  aspirin 81 mg oral delayed release tablet: 1 tab(s) orally once a day  atorvastatin 40 mg oral tablet: 1 tab(s) orally once a day (at bedtime)  benzonatate 100 mg oral capsule: 1 cap(s) orally 3 times a day, As Needed  HumaLOG KwikPen 100 units/mL injectable solution: 10 unit(s) subcutaneous 3 times a day before meals   Lantus Solostar Pen 100 units/mL subcutaneous solution: 32 unit(s) subcutaneous once a day (at bedtime)  metoprolol tartrate 25 mg oral tablet: 0.5 tab(s) = 12.5 mg orally 2 times a day   pantoprazole 40 mg oral delayed release tablet: 1 tab(s) orally once a day (before a meal)  petrolatum topical ointment: 1 application topically once a day  polyethylene glycol 3350 oral powder for reconstitution: 17 gram(s) orally once a day  senna oral tablet: 2 tab(s) orally once a day (at bedtime)  spironolactone 25 mg oral tablet: 1 tab(s) orally once a day  torsemide 10 mg oral tablet: 1 tab(s) orally once a day  vancomycin 1.25 g intravenous injection: 1.25 gram(s) intravenous every 24 hours  through 6/26 albuterol 90 mcg/inh inhalation aerosol: 2 puff(s) inhaled every 6 hours, As needed, Shortness of Breath and/or Wheezing  amiodarone 200 mg oral tablet: 1 tab(s) orally once a day  aspirin 81 mg oral delayed release tablet: 1 tab(s) orally once a day  atorvastatin 40 mg oral tablet: 1 tab(s) orally once a day (at bedtime)  benzonatate 100 mg oral capsule: 1 cap(s) orally 3 times a day, As Needed  budesonide: 0.5 milligram(s) inhaled 2 times a day  HumaLOG KwikPen 100 units/mL injectable solution: 22 unit(s) subcutaneous 3 times a day  before meals   Lantus Solostar Pen 100 units/mL subcutaneous solution: 40 unit(s) subcutaneous once a day (at bedtime)  loratadine 10 mg oral tablet: 1 tab(s) orally once a day, As Needed  metoprolol tartrate 25 mg oral tablet: 0.5 tab(s) = 12.5 mg orally 2 times a day   pantoprazole 40 mg oral delayed release tablet: 1 tab(s) orally once a day (before a meal)  petrolatum topical ointment: 1 application topically once a day  polyethylene glycol 3350 oral powder for reconstitution: 17 gram(s) orally once a day  senna oral tablet: 2 tab(s) orally once a day (at bedtime)  torsemide 10 mg oral tablet: 1 tab(s) orally once a day  vancomycin 1.25 g intravenous injection: 1.25 gram(s) intravenous every 24 hours  through 6/26

## 2020-05-26 NOTE — PROGRESS NOTE ADULT - ASSESSMENT
65M s/p CABG 2/3/20, course complicated by PEA arrest, E faecalis bacteremia and local infections of the sternal and right leg SVG sites, now admitted 5/11/20 for syncope.   In total he received about six weeks of antibiotics although no growth from blood or sternal OR debridement 2/25 and low intraoperative concern for osteomyelitis.   However, wound drainage increased significantly now with MRSA and low-grade bacteremia, cleared 5/14.   s/p OR 5/15 debridement, reconstruction with omentum   Would treat as osteomyelitis given elevated ESR/CRP although no aggressive osseous lesions on CT.   OLGA 5/26 without vegetations   s/p PICC line 5/22  Doing well    Suggest  -no objection to discharge on Vancomycin 1.25GM IV q24h through Fri 6/26 for a six-week course with weekly CBC, BUN, creatinine, Vancomycin trough, ESR, CRP faxed to [phone]    Spoke with primary team     Chapito Delarosa MD   Infectious Disease   Pager [phone]   After 5PM and on weekends please page fellow on call or call [phone]

## 2020-05-26 NOTE — PROGRESS NOTE ADULT - SUBJECTIVE AND OBJECTIVE BOX
Follow Up: MRSA wound bacteremia    Interval History/ROS: Feels better today, more strong, breathing improved. No diarrhea. He's eating. OLGA this morning without vegetations.    Allergies  Blueberries (Rash)  No Known Drug Allergies    ANTIMICROBIALS:  vancomycin  IVPB 1250 every 24 hours      OTHER MEDS:  acetaminophen   Tablet .. 650 milliGRAM(s) Oral every 6 hours PRN  ALBUTerol    90 MICROgram(s) HFA Inhaler 2 Puff(s) Inhalation every 6 hours PRN  aMIOdarone    Tablet 200 milliGRAM(s) Oral daily  AQUAPHOR (petrolatum Ointment) 1 Application(s) Topical daily  aspirin enteric coated 81 milliGRAM(s) Oral daily  atorvastatin 40 milliGRAM(s) Oral at bedtime  BACItracin   Ointment 1 Application(s) Topical daily  benzonatate 100 milliGRAM(s) Oral three times a day PRN  buDESOnide    Inhalation Suspension 0.5 milliGRAM(s) Inhalation two times a day  dextrose 40% Gel 15 Gram(s) Oral once PRN  dextrose 5%. 1000 milliLiter(s) IV Continuous <Continuous>  dextrose 50% Injectable 12.5 Gram(s) IV Push once  dextrose 50% Injectable 25 Gram(s) IV Push once  dextrose 50% Injectable 25 Gram(s) IV Push once  enoxaparin Injectable 40 milliGRAM(s) SubCutaneous daily  glucagon  Injectable 1 milliGRAM(s) IntraMuscular once PRN  hydrocortisone 1% Cream 1 Application(s) Topical two times a day  insulin glargine Injectable (LANTUS) 40 Unit(s) SubCutaneous at bedtime  insulin lispro (HumaLOG) corrective regimen sliding scale   SubCutaneous three times a day before meals  insulin lispro (HumaLOG) corrective regimen sliding scale   SubCutaneous at bedtime  insulin lispro Injectable (HumaLOG) 22 Unit(s) SubCutaneous three times a day with meals  loratadine 10 milliGRAM(s) Oral daily  metoprolol tartrate 12.5 milliGRAM(s) Oral two times a day  oxycodone    5 mG/acetaminophen 325 mG 1 Tablet(s) Oral every 6 hours PRN  pantoprazole    Tablet 40 milliGRAM(s) Oral before breakfast  polyethylene glycol 3350 17 Gram(s) Oral daily  senna 2 Tablet(s) Oral at bedtime  torsemide 10 milliGRAM(s) Oral daily      Vital Signs Last 24 Hrs  T(C): 36.9 (26 May 2020 04:25), Max: 36.9 (26 May 2020 04:25)  T(F): 98.4 (26 May 2020 04:25), Max: 98.4 (26 May 2020 04:25)  HR: 81 (26 May 2020 04:25) (80 - 88)  BP: 121/79 (26 May 2020 04:25) (121/79 - 127/73)  BP(mean): --  RR: 18 (26 May 2020 04:25) (18 - 18)  SpO2: 97% (26 May 2020 04:25) (97% - 99%)    Physical Exam:  General: awake, alert, non toxic  Head: atraumatic, normocephalic  Eye: normal sclera and conjunctiva  Cardio: regular rate   Respiratory: nonlabored on room air  abd: soft, no tenderness  vascular: right arm PICC. no phlebitis   Skin: inferior aspect of sternal wound (xiphoid) dressed, otherwise intact without signs of local inflammation. right anterior chest KEI drain with bloody serous output, some debris. left one removed, site does not appear infected.   Neurologic: no focal deficit  psych: normal affect                          8.8    8.52  )-----------( 232      ( 25 May 2020 06:15 )             27.1       05-26    136  |  100  |  42<H>  ----------------------------<  201<H>  4.3   |  26  |  1.65<H>    Ca    8.8      26 May 2020 06:49      MICROBIOLOGY:    RADIOLOGY:  Transesophageal Echocardiogram w/o TTE (05.26.20 @ 10:09)   Conclusions:  1. Normal mitral valve. Mild mitral regurgitation.  2. There is a thickening of the non-coronary cusp of the  aortic valve.  Mild-moderate aortic regurgitation.  3. No left atrial or left atrial appendage thrombus.  4. Mild global left ventricular systolic dysfunction.  5. Normal right ventricular size and systolic function.  6. No vegetations seen on cardiac valves.    Images below reviewed personally  Xray Chest 1 View- PORTABLE-Urgent (05.21.20 @ 18:40)   Right PICC line in satisfactory position. Mediastinal drainage catheter unchanged.  No acute infiltrate.  Enlarged cardiac silhouette.

## 2020-05-26 NOTE — PROGRESS NOTE ADULT - SUBJECTIVE AND OBJECTIVE BOX
CARDIOLOGY FOLLOW UP - Dr. Steel    CC no cp/sob  seen back in room s/p OLGA  pt. c.o feeling fatigued  KEI drain removed, now with no further drains in place       PHYSICAL EXAM:  T(C): 36.9 (05-26-20 @ 04:25), Max: 36.9 (05-26-20 @ 04:25)  HR: 81 (05-26-20 @ 04:25) (80 - 88)  BP: 121/79 (05-26-20 @ 04:25) (121/79 - 127/73)  RR: 18 (05-26-20 @ 04:25) (18 - 18)  SpO2: 97% (05-26-20 @ 04:25) (97% - 99%)  Wt(kg): --  I&O's Summary    25 May 2020 07:01  -  26 May 2020 07:00  --------------------------------------------------------  IN: 750 mL / OUT: 20 mL / NET: 730 mL    26 May 2020 07:01  -  26 May 2020 14:37  --------------------------------------------------------  IN: 0 mL / OUT: 10 mL / NET: -10 mL        Appearance: Normal	  Cardiovascular: Normal S1 S2,RRR, No JVD, No murmurs  Respiratory: Lungs clear to auscultation	  Gastrointestinal:  Soft, Non-tender, + BS	  Extremities: Normal range of motion, No clubbing, cyanosis or edema        MEDICATIONS  (STANDING):  aMIOdarone    Tablet 200 milliGRAM(s) Oral daily  AQUAPHOR (petrolatum Ointment) 1 Application(s) Topical daily  aspirin enteric coated 81 milliGRAM(s) Oral daily  atorvastatin 40 milliGRAM(s) Oral at bedtime  BACItracin   Ointment 1 Application(s) Topical daily  buDESOnide    Inhalation Suspension 0.5 milliGRAM(s) Inhalation two times a day  dextrose 5%. 1000 milliLiter(s) (50 mL/Hr) IV Continuous <Continuous>  dextrose 50% Injectable 12.5 Gram(s) IV Push once  dextrose 50% Injectable 25 Gram(s) IV Push once  dextrose 50% Injectable 25 Gram(s) IV Push once  enoxaparin Injectable 40 milliGRAM(s) SubCutaneous daily  hydrocortisone 1% Cream 1 Application(s) Topical two times a day  insulin glargine Injectable (LANTUS) 40 Unit(s) SubCutaneous at bedtime  insulin lispro (HumaLOG) corrective regimen sliding scale   SubCutaneous three times a day before meals  insulin lispro (HumaLOG) corrective regimen sliding scale   SubCutaneous at bedtime  insulin lispro Injectable (HumaLOG) 22 Unit(s) SubCutaneous three times a day with meals  loratadine 10 milliGRAM(s) Oral daily  metoprolol tartrate 12.5 milliGRAM(s) Oral two times a day  pantoprazole    Tablet 40 milliGRAM(s) Oral before breakfast  polyethylene glycol 3350 17 Gram(s) Oral daily  senna 2 Tablet(s) Oral at bedtime  torsemide 10 milliGRAM(s) Oral daily  vancomycin  IVPB 1250 milliGRAM(s) IV Intermittent every 24 hours      TELEMETRY: nsr     ECG:  	  RADIOLOGY:   DIAGNOSTIC TESTING:  [ ] Echocardiogram: < from: Transesophageal Echocardiogram w/o TTE (05.26.20 @ 10:09) >  Conclusions:  1. Normal mitral valve. Mild mitral regurgitation.  2. There is a thickening of the non-coronary cusp of the  aortic valve.  Mild-moderate aortic regurgitation.  3. No left atrial or left atrial appendage thrombus.  4. Mild global left ventricular systolic dysfunction.  5. Normal right ventricular size and systolic function.  6. No vegetations seen on cardiac valves.    < end of copied text >    [ ]  Catheterization:  [ ] Stress Test:    OTHER: 	    LABS:	 	                                8.8    8.52  )-----------( 232      ( 25 May 2020 06:15 )             27.1     05-26    136  |  100  |  42<H>  ----------------------------<  201<H>  4.3   |  26  |  1.65<H>    Ca    8.8      26 May 2020 06:49

## 2020-05-26 NOTE — PROGRESS NOTE ADULT - SUBJECTIVE AND OBJECTIVE BOX
[facility] NEPHROLOGY- PROGRESS NOTE    65y Male with history of DM presents with syncope concern for sternal wound infection. Nephrology consulted for elevated Scr.    REVIEW OF SYSTEMS:  Gen: no changes in weight  Cards: no chest pain  Resp: + dyspnea resolved  GI: no nausea or vomiting or diarrhea  Vascular: no LE edema    No Known Allergies      Hospital Medications: Medications reviewed        VITALS:  T(F): 98.4 (05-26-20 @ 04:25), Max: 98.4 (05-26-20 @ 04:25)  HR: 81 (05-26-20 @ 04:25)  BP: 121/79 (05-26-20 @ 04:25)  RR: 18 (05-26-20 @ 04:25)  SpO2: 97% (05-26-20 @ 04:25)  Wt(kg): --    05-25 @ 07:01  -  05-26 @ 07:00  --------------------------------------------------------  IN: 750 mL / OUT: 20 mL / NET: 730 mL    05-26 @ 07:01  -  05-26 @ 14:12  --------------------------------------------------------  IN: 0 mL / OUT: 10 mL / NET: -10 mL          PHYSICAL EXAM:    Gen: NAD, calm  Cards: RRR, +S1/S2, no M/G/R  Resp: CTA B/L  GI: soft, NT/ND, NABS  Vascular: no LE edema B/L          LABS:  05-26    136  |  100  |  42<H>  ----------------------------<  201<H>  4.3   |  26  |  1.65<H>    Ca    8.8      26 May 2020 06:49      Creatinine Trend: 1.65 <--, 1.52 <--, 1.50 <--, 1.53 <--, 1.61 <--, 1.47 <--, 1.63 <--                        8.8    8.52  )-----------( 232      ( 25 May 2020 06:15 )             27.1     Urine Studies:

## 2020-05-27 ENCOUNTER — TRANSCRIPTION ENCOUNTER (OUTPATIENT)
Age: 65
End: 2020-05-27

## 2020-05-27 VITALS
SYSTOLIC BLOOD PRESSURE: 122 MMHG | HEART RATE: 70 BPM | DIASTOLIC BLOOD PRESSURE: 76 MMHG | RESPIRATION RATE: 16 BRPM | OXYGEN SATURATION: 100 % | TEMPERATURE: 98 F

## 2020-05-27 LAB
GLUCOSE BLDC GLUCOMTR-MCNC: 143 MG/DL — HIGH (ref 70–99)
GLUCOSE BLDC GLUCOMTR-MCNC: 180 MG/DL — HIGH (ref 70–99)
GLUCOSE BLDC GLUCOMTR-MCNC: 87 MG/DL — SIGNIFICANT CHANGE UP (ref 70–99)

## 2020-05-27 PROCEDURE — 99232 SBSQ HOSP IP/OBS MODERATE 35: CPT

## 2020-05-27 RX ORDER — VANCOMYCIN HCL 1 G
1.25 VIAL (EA) INTRAVENOUS
Qty: 0 | Refills: 0 | DISCHARGE
Start: 2020-05-27

## 2020-05-27 RX ORDER — PETROLATUM,WHITE
1 JELLY (GRAM) TOPICAL
Qty: 0 | Refills: 0 | DISCHARGE
Start: 2020-05-27

## 2020-05-27 RX ORDER — ALBUTEROL 90 UG/1
2 AEROSOL, METERED ORAL
Qty: 0 | Refills: 0 | DISCHARGE
Start: 2020-05-27

## 2020-05-27 RX ORDER — LORATADINE 10 MG/1
1 TABLET ORAL
Qty: 0 | Refills: 0 | DISCHARGE
Start: 2020-05-27

## 2020-05-27 RX ORDER — INSULIN GLARGINE 100 [IU]/ML
40 INJECTION, SOLUTION SUBCUTANEOUS
Qty: 5 | Refills: 0
Start: 2020-05-27

## 2020-05-27 RX ORDER — INSULIN LISPRO 100/ML
22 VIAL (ML) SUBCUTANEOUS
Qty: 5 | Refills: 0
Start: 2020-05-27

## 2020-05-27 RX ORDER — INSULIN LISPRO 100/ML
34 VIAL (ML) SUBCUTANEOUS
Qty: 5 | Refills: 0 | DISCHARGE
Start: 2020-05-27

## 2020-05-27 RX ORDER — BUDESONIDE, MICRONIZED 100 %
0.5 POWDER (GRAM) MISCELLANEOUS
Qty: 0 | Refills: 0 | DISCHARGE
Start: 2020-05-27

## 2020-05-27 RX ADMIN — Medication 10 MILLIGRAM(S): at 05:40

## 2020-05-27 RX ADMIN — Medication 1 APPLICATION(S): at 13:03

## 2020-05-27 RX ADMIN — POLYETHYLENE GLYCOL 3350 17 GRAM(S): 17 POWDER, FOR SOLUTION ORAL at 13:06

## 2020-05-27 RX ADMIN — Medication 166.67 MILLIGRAM(S): at 16:15

## 2020-05-27 RX ADMIN — Medication 22 UNIT(S): at 17:56

## 2020-05-27 RX ADMIN — AMIODARONE HYDROCHLORIDE 200 MILLIGRAM(S): 400 TABLET ORAL at 05:40

## 2020-05-27 RX ADMIN — Medication 22 UNIT(S): at 13:06

## 2020-05-27 RX ADMIN — ENOXAPARIN SODIUM 40 MILLIGRAM(S): 100 INJECTION SUBCUTANEOUS at 13:05

## 2020-05-27 RX ADMIN — LORATADINE 10 MILLIGRAM(S): 10 TABLET ORAL at 13:05

## 2020-05-27 RX ADMIN — Medication 1: at 13:06

## 2020-05-27 RX ADMIN — Medication 81 MILLIGRAM(S): at 13:06

## 2020-05-27 RX ADMIN — Medication 12.5 MILLIGRAM(S): at 05:41

## 2020-05-27 RX ADMIN — Medication 1 APPLICATION(S): at 05:40

## 2020-05-27 RX ADMIN — Medication 22 UNIT(S): at 09:09

## 2020-05-27 RX ADMIN — PANTOPRAZOLE SODIUM 40 MILLIGRAM(S): 20 TABLET, DELAYED RELEASE ORAL at 05:40

## 2020-05-27 RX ADMIN — Medication 1 APPLICATION(S): at 13:04

## 2020-05-27 NOTE — PROGRESS NOTE ADULT - SUBJECTIVE AND OBJECTIVE BOX
Patient is a 65y old  Male who presents with a chief complaint of syncope (27 May 2020 13:07)      SUBJECTIVE / OVERNIGHT EVENTS: Comfortable without new complaints.   Review of Systems  chest pain no  palpitations no  sob no  nausea no  headache no    MEDICATIONS  (STANDING):  aMIOdarone    Tablet 200 milliGRAM(s) Oral daily  AQUAPHOR (petrolatum Ointment) 1 Application(s) Topical daily  aspirin enteric coated 81 milliGRAM(s) Oral daily  atorvastatin 40 milliGRAM(s) Oral at bedtime  BACItracin   Ointment 1 Application(s) Topical daily  buDESOnide    Inhalation Suspension 0.5 milliGRAM(s) Inhalation two times a day  dextrose 5%. 1000 milliLiter(s) (50 mL/Hr) IV Continuous <Continuous>  dextrose 50% Injectable 12.5 Gram(s) IV Push once  dextrose 50% Injectable 25 Gram(s) IV Push once  dextrose 50% Injectable 25 Gram(s) IV Push once  enoxaparin Injectable 40 milliGRAM(s) SubCutaneous daily  hydrocortisone 1% Cream 1 Application(s) Topical two times a day  insulin glargine Injectable (LANTUS) 40 Unit(s) SubCutaneous at bedtime  insulin lispro (HumaLOG) corrective regimen sliding scale   SubCutaneous three times a day before meals  insulin lispro (HumaLOG) corrective regimen sliding scale   SubCutaneous at bedtime  insulin lispro Injectable (HumaLOG) 22 Unit(s) SubCutaneous three times a day with meals  loratadine 10 milliGRAM(s) Oral daily  metoprolol tartrate 12.5 milliGRAM(s) Oral two times a day  pantoprazole    Tablet 40 milliGRAM(s) Oral before breakfast  polyethylene glycol 3350 17 Gram(s) Oral daily  senna 2 Tablet(s) Oral at bedtime  torsemide 10 milliGRAM(s) Oral daily  vancomycin  IVPB 1250 milliGRAM(s) IV Intermittent every 24 hours    MEDICATIONS  (PRN):  acetaminophen   Tablet .. 650 milliGRAM(s) Oral every 6 hours PRN Temp greater or equal to 38.5C (101.3F), Mild Pain (1 - 3)  ALBUTerol    90 MICROgram(s) HFA Inhaler 2 Puff(s) Inhalation every 6 hours PRN Shortness of Breath and/or Wheezing  benzonatate 100 milliGRAM(s) Oral three times a day PRN Cough  dextrose 40% Gel 15 Gram(s) Oral once PRN Blood Glucose LESS THAN 70 milliGRAM(s)/deciliter  glucagon  Injectable 1 milliGRAM(s) IntraMuscular once PRN Glucose LESS THAN 70 milligrams/deciliter  oxycodone    5 mG/acetaminophen 325 mG 1 Tablet(s) Oral every 6 hours PRN Moderate Pain (4 - 6)      Vital Signs Last 24 Hrs  T(C): 36.6 (27 May 2020 12:50), Max: 36.8 (26 May 2020 21:06)  T(F): 97.9 (27 May 2020 12:50), Max: 98.3 (26 May 2020 21:06)  HR: 78 (27 May 2020 12:50) (71 - 85)  BP: 131/75 (27 May 2020 12:50) (112/70 - 137/77)  BP(mean): 94 (27 May 2020 12:50) (94 - 94)  RR: 18 (27 May 2020 12:50) (18 - 19)  SpO2: 98% (27 May 2020 12:50) (98% - 98%)    PHYSICAL EXAM:  GENERAL: NAD, well-developed  HEAD:  Atraumatic, Normocephalic  EYES: EOMI, PERRLA, conjunctiva and sclera clear  NECK: Supple, No JVD  CHEST/LUNG: Clear to auscultation bilaterally; No wheeze Sternal wound with clean dressing.  HEART: Regular rate and rhythm; No murmurs, rubs, or gallops  ABDOMEN: Soft, Nontender, Nondistended; Bowel sounds present  EXTREMITIES:  2+ Peripheral Pulses, No clubbing, cyanosis, or edema  PSYCH: AAOx3  NEUROLOGY: non-focal  SKIN: No rashes or lesions    LABS:    05-26    136  |  100  |  42<H>  ----------------------------<  201<H>  4.3   |  26  |  1.65<H>    Ca    8.8      26 May 2020 06:49                  RADIOLOGY & ADDITIONAL TESTS:    Imaging Personally Reviewed:    Consultant(s) Notes Reviewed:      Care Discussed with Consultants/Other Providers:

## 2020-05-27 NOTE — PROGRESS NOTE ADULT - ATTENDING COMMENTS
[facility] NEPHROLOGY  Justin Campoverde M.D.  Tyler Dueñas D.O.  Margy Tidwell M.D.  Tatiana Cesar, MSN, ANP-C    Telephone: [phone]  Facsimile: [phone]    [address] [place] [address]
[facility] NEPHROLOGY  Justin Campoverde M.D.  Tyler Dueñas D.O.  Margy Tidwell M.D.  Tatiana Cesar, MSN, ANP-C    Telephone: [phone]  Facsimile: [phone]    [address] [place] [address]
[facility] NEPHROLOGY  Justin Campoverde M.D.  Tyler Dueñas D.O.  Margy Tidwell M.D.  Tatiana Cesar, MSN, ANP-C    Telephone: [phone]  Facsimile: [phone]    [address] [place] [address]
[facility] NEPHROLOGY  Justin Campoverde M.D.  Tyler Dueñas D.O.  Margy Tidwell M.D.  Tatiaan Cesar, MSN, ANP-C    Telephone: [phone]  Facsimile: [phone]    [address] [place] [address]
[facility] NEPHROLOGY  Justin Campoverde M.D.  Tyler Dueñas D.O.  Margy Tidwell M.D.  Tatiana Cesar, MSN, ANP-C    Telephone: [phone]  Facsimile: [phone]    [address] [place] [address]
[facility] NEPHROLOGY  Justin Campoverde M.D.  Tyler Dueñas D.O.  Margy Tidwell M.D.  Tatiana Cesar, MSN, ANP-C    Telephone: [phone]  Facsimile: [phone]    [address] [place] [address]
[facility] NEPHROLOGY  Justin Campoverde M.D.  Tyler Dueñas D.O.  Margy Tidwell M.D.  Tatiana Cesar, MSN, ANP-C    Telephone: [phone]  Facsimile: [phone]    [address] [place] [address]
[facility] NEPHROLOGY  Justin Campoverde M.D.  Tyler Dueñas D.O.  Margy Tidwell M.D.  Tatiana Cesar, MSN, ANP-C    Telephone: [phone]  Facsimile: [phone]    [address] [place] [address]
[facility] NEPHROLOGY  Justin Campoverde M.D.  Tyler Dueñas D.O.  Margy Tidwell M.D.  Tatiana Cesar, MSN, ANP-C    Telephone: [phone]  Facsimile: [phone]    [address] [place] [address]
[facility] NEPHROLOGY  Justin Campoverde M.D.  Tyler Dueñas D.O.  Margy Tidwell M.D.  Tatiana Cesar, MSN, ANP-C    Telephone: [phone]  Facsimile: [phone]    [address] [place] [address]
[facility] NEPHROLOGY  Justin Campoverde M.D.  Tyler Dueñas D.O.  Margy Tidwell M.D.  Tatiana Cesar, MSN, ANP-C    Telephone: [phone]  Facsimile: [phone]    [address] [place] [address]
Agree with above NP note.  cv stable  cont current tx  ID still concerned with possibility of endocarditis and recommending KENNEDY for further evaluation  await possible kennedy next week after covid negative
Agree with above NP note.  cv stable  cont current tx  OLGA negative for endocarditis
Agree with above NP note.  s/p OR  cv stable  id f/u  abx per id   await Martinez for r/o endocarditis
Agree with above NP note.  s/p OR  cv stable  id f/u  abx per id   await Martinez for r/o endocarditis
Agree with above NP note.  s/p OR  cv stable  id f/u  abx per id   defer OLGA for now, OLGA attending cancelled case(-TTE with no obvious vegetations) + repeat blood cultures are negative
Agree with above NP note.  s/p OR  cv stable  id f/u  abx per id   defer OLGA for now, repeat blood cultures are negative
Agree with above NP note.  s/p OR  cv stable  id f/u  abx per id   plan to defer OLGA for now, repeat blood cultures are negative
Patient seen and examined.  Agree with above NP note.  cv stable  cont current tx  ID still concerned with possibility of endocarditis and recommending KENNEDY for further evaluation  await possible kennedy next week after covid negative
Patient seen and examined.  Agree with above NP note.  cv stable  cont current tx  ID still concerned with possibility of endocarditis and recommending KENNEDY for further evaluation  await possible kennedy next week after covid negative
[facility] NEPHROLOGY  Justin Campoverde M.D.  Tyler Dueñas D.O.  Margy Tidwell M.D.  Tatiana Cesar, MSN, ANP-C    Telephone: [phone]  Facsimile: [phone]    [address] [place] [address]
agree with above  wound vac placed  no significant collection on chest CT  IV abx per ID  ECHO reveals preserved LVEF  continue diuretic
agree with above note NP note  cv stable   wound vac placed  no significant collection on chest CT  IV abx per ID  ECHO reveals preserved LVEF  continue diuretic  await or
Patient seen and examined.  Agree with above NP note.  cv stable  cont current tx  d/w id today   ID attg still concerned with possibility of endocarditis and recommending KENNEDY for further evaluation  keep npo  will d/w with echo kiesha for poss kennedy
Agree with above NP note.  cv stable  cont current tx  OLGA negative for endocarditis
Agree with above NP note.  s/p OR  cv stable  insulin per pacu   id f/u  abx per id   consider OLGA next week per ID pending cultures and suspicion for endocarditis
Patient seen and exam today at bedside. Chart, labs, vitals, radiology reviewed. Above H&P reviewed and edited where appropriate.  Agree with history and physical exam. Agree with assessment and plan.

## 2020-05-27 NOTE — PROGRESS NOTE ADULT - ASSESSMENT
limited echo 5/12/20:EF 55-60%. overall preserved lv fx, despite segmental wall motion abnormalities, no LV thrombus  The apical anterior wall, the apical lateral wall, the basal anterior wall,the mid anterior wall, and the midanterolateral wall are hypokinetic.  echo 5/14/20:  Limited study to r/o Endocarditis Pt has very limited windows due to edema and wound vac. Unable to exclude valvular vegetations in the setting of a  technically difficult study. OLGA could be obtained for further evaluation of valvular vegetations, if clinical suspicion of endocarditis is high.        a/p  65y m PMHx CABG Feb 2020 complicated by PEA arrest, thoracotomy site infection w/empyema, DM, HTN p/w syncope, sternal wound infection.     1. Syncope   likely 2/2 to +orthostatics  cv stable, EKG unchanged from prior   HST elevated w/ normal CK/CKMB, demand ischemia in setting of STEPHANIE/CKD, possible wound infection   CT head negative for ICH  echo with overall preserved LVEF, despite segmental wall motion abnormalities, no LV thrombus   encourage PO intake    2. Sternal wound infection s/p RTOR for SWI    s/p debridement of sternal wound, reconstruction, Laparoscopic lysis of abdominal adhesions   IV abx per ID   blood cx neg    repeat echo unable to r/o endocarditis  OLGA without vegetations.  s/p PICC line 5/22  ID, f/u    3. CAD s/p CABG x 4 , s/p PEA arrest   cv stable   cont asa, statin, bb     4. Chronic diastolic CHF  euvolemic on exam with unchanged SOB   continue diuretics as ordered   repeat echo with preserved LVEF     5. PAF  remains NSR  continue amio, bb    6. HTN  bp stable    7. STEPHANIE/CKD   continue to trend creat     8. Pleural effusion, hx  CXR this admission with small left loculated pleural effusion now decreased in size from previous exam.  ENT f/u noted - pt. refusing scope at this time, outpt. f/u     dvt ppx   no cv objection to dc planning

## 2020-05-27 NOTE — PROGRESS NOTE ADULT - PROBLEM SELECTOR PLAN 1
Will continue current insulin regimen for now. Will continue monitoring FS and FU.  Patient was on insulin at home, knows how to do injections. Suggest DC on current insulin regimen and FU endo 4 weeks.  Discussed plan with patient. Counseled on adjusting insulin dose based on blood sugar values, compliance with consistent low carb diet, and exercise as tolerated outpatient.

## 2020-05-27 NOTE — PROGRESS NOTE ADULT - ASSESSMENT
65 m with    Syncope  - cardiology follow     Orthostatic hypotension  - off aldactone  - follow    S/P CABG  - CT sx evaluation noted    Sternal wound, s/p reconstruction and omental flap  - MRSA bacteremia  - Vancomycin as per ID for 6 weeks.  - KEI removed.  - ID follow  - postop care  - PICC     Surgery follow re KEI management.    Bacteremia  - OLGA with no vegetations.  - ID follow    Scalp abrasion healing    Diabetes control  - ADA diet  - BS control  - Endocrine follow    CKD  - nephrology follow    HTN control    Hoarseness improving   - ENT evaluation noted    PT    DC home.  Follow with PMD/ Cardiology/ Plastic surgery/ Nephrology/ Infectous Disease QA    Miguel Angel Duke MD pager [phone]

## 2020-05-27 NOTE — PROGRESS NOTE ADULT - SUBJECTIVE AND OBJECTIVE BOX
Plastic Surgery Progress Note (pg [id number]: [id number], [facility]: [phone])    SUBJECTIVE:  Patient seen and examined at bedside this AM. No acute events overnight. AF/VSS. Pain well controlled. Counselled regarding dc dressing and vns    OBJECTIVE:   Vital Signs Last 24 Hrs  T(C): 36.7 (27 May 2020 04:13), Max: 36.8 (26 May 2020 12:30)  T(F): 98.1 (27 May 2020 04:13), Max: 98.3 (26 May 2020 21:06)  HR: 71 (27 May 2020 04:13) (71 - 88)  BP: 137/77 (27 May 2020 04:13) (112/70 - 137/77)  RR: 18 (27 May 2020 04:13) (18 - 19)  SpO2: 98% (27 May 2020 04:13) (98% - 99%)    PHYSICAL EXAM:  General: alert and oriented, NAD  Resp: airway patent, respirations unlabored  Chest: Dressing changed.   Abdomen: soft, non-distended. steri strips over donor site     ** LABS **  05-26    136  |  100  |  42<H>  ----------------------------<  201<H>  4.3   |  26  |  1.65<H>    Ca    8.8      26 May 2020 06:49

## 2020-05-27 NOTE — PROGRESS NOTE ADULT - ASSESSMENT
Assessment  DMT2: 65y Male with DM T2 with hyperglycemia, A1C 7.9%, was on insulin at home, now on basal bolus insulin, FS improving and within overall acceptable range, no hypoglycemic episodes. Patient is eating meals with good appetite, well-appearing, planning DC home today.  CAD: s/p CABG previous admission, stable, monitored.  Sternal Wound: On IV  ABx, FU Plastics/ID.  HTN: Controlled,  on antihypertensive medications.  CKD: Monitor labs/BMP.          Harper Matias MD  Cell: [phone]  Office: [phone] Assessment  DMT2: 65y Male with DM T2 with hyperglycemia, A1C 7.9%, was on insulin at home, now on basal bolus insulin, FS improving and within overall  acceptable range, no hypoglycemic episodes. Patient is eating meals with good appetite, well-appearing, planning DC home today.  CAD: s/p CABG previous admission, stable, monitored.  Sternal Wound: On IV  ABx, FU Plastics/ID.  HTN: Controlled,  on antihypertensive medications.  CKD: Monitor labs/BMP.          Harper Matias MD  Cell: [phone]  Office: [phone]

## 2020-05-27 NOTE — PROGRESS NOTE ADULT - REASON FOR ADMISSION
syncope

## 2020-05-27 NOTE — PROGRESS NOTE ADULT - ASSESSMENT
65y m PMHx CABG Feb 2020 complicated by PEA arrest, sternal infection s/p sternal debridement with muscle flap reconstruction, DM, HTN p/w increased drainage from sternal wound s/p omental flap and incision closure w/ FTSG 5/15    Plan:  - Please follow-up with Dr. Adriana nunez within one (1) week of dc  - Please set up VNS  - Daily dressing care  -  primary team 5/26  - Care per primary team    Plastic Surgery  [phone]

## 2020-05-27 NOTE — PROGRESS NOTE ADULT - SUBJECTIVE AND OBJECTIVE BOX
Follow Up: MRSA     Interval History/ROS: Feels fine. Eating, no diarrhea. No cough or dyspnea. Second chest drain was removed. No fevers.     Allergies  Blueberries (Rash)  No Known Drug Allergies      ANTIMICROBIALS:  vancomycin  IVPB 1250 every 24 hours      OTHER MEDS:  acetaminophen   Tablet .. 650 milliGRAM(s) Oral every 6 hours PRN  ALBUTerol    90 MICROgram(s) HFA Inhaler 2 Puff(s) Inhalation every 6 hours PRN  aMIOdarone    Tablet 200 milliGRAM(s) Oral daily  AQUAPHOR (petrolatum Ointment) 1 Application(s) Topical daily  aspirin enteric coated 81 milliGRAM(s) Oral daily  atorvastatin 40 milliGRAM(s) Oral at bedtime  BACItracin   Ointment 1 Application(s) Topical daily  benzonatate 100 milliGRAM(s) Oral three times a day PRN  buDESOnide    Inhalation Suspension 0.5 milliGRAM(s) Inhalation two times a day  dextrose 40% Gel 15 Gram(s) Oral once PRN  dextrose 5%. 1000 milliLiter(s) IV Continuous <Continuous>  dextrose 50% Injectable 12.5 Gram(s) IV Push once  dextrose 50% Injectable 25 Gram(s) IV Push once  dextrose 50% Injectable 25 Gram(s) IV Push once  enoxaparin Injectable 40 milliGRAM(s) SubCutaneous daily  glucagon  Injectable 1 milliGRAM(s) IntraMuscular once PRN  hydrocortisone 1% Cream 1 Application(s) Topical two times a day  insulin glargine Injectable (LANTUS) 40 Unit(s) SubCutaneous at bedtime  insulin lispro (HumaLOG) corrective regimen sliding scale   SubCutaneous three times a day before meals  insulin lispro (HumaLOG) corrective regimen sliding scale   SubCutaneous at bedtime  insulin lispro Injectable (HumaLOG) 22 Unit(s) SubCutaneous three times a day with meals  loratadine 10 milliGRAM(s) Oral daily  metoprolol tartrate 12.5 milliGRAM(s) Oral two times a day  oxycodone    5 mG/acetaminophen 325 mG 1 Tablet(s) Oral every 6 hours PRN  pantoprazole    Tablet 40 milliGRAM(s) Oral before breakfast  polyethylene glycol 3350 17 Gram(s) Oral daily  senna 2 Tablet(s) Oral at bedtime  torsemide 10 milliGRAM(s) Oral daily      Vital Signs Last 24 Hrs  T(C): 36.7 (27 May 2020 04:13), Max: 36.8 (26 May 2020 12:30)  T(F): 98.1 (27 May 2020 04:13), Max: 98.3 (26 May 2020 21:06)  HR: 71 (27 May 2020 04:13) (71 - 88)  BP: 137/77 (27 May 2020 04:13) (112/70 - 137/77)  BP(mean): --  RR: 18 (27 May 2020 04:13) (18 - 19)  SpO2: 98% (27 May 2020 04:13) (98% - 99%)    Physical Exam:  General: awake, alert, non toxic  Head: atraumatic, normocephalic  Eye: normal sclera and conjunctiva  Cardio: regular rate   Respiratory: nonlabored on room air  abd: soft, bowel sounds present, no tenderness  Musculoskeletal: no focal joint swelling, no edema  vascular: right arm PICC. no phlebitis   Skin: inferior aspect of sternal wound dressed, otherwise appears intact without local inflammation. KEI drains removed   Neurologic: no focal deficit  psych: normal affect        05-26    136  |  100  |  42<H>  ----------------------------<  201<H>  4.3   |  26  |  1.65<H>    Ca    8.8      26 May 2020 06:49      MICROBIOLOGY:      RADIOLOGY:  Transesophageal Echocardiogram w/o TTE (05.26.20 @ 10:09)   Conclusions:  1. Normal mitral valve. Mild mitral regurgitation.  2. There is a thickening of the non-coronary cusp of the  aortic valve.  Mild-moderate aortic regurgitation.  3. No left atrial or left atrial appendage thrombus.  4. Mild global left ventricular systolic dysfunction.  5. Normal right ventricular size and systolic function.  6. No vegetations seen on cardiac valves.    Images below reviewed personally  Xray Chest 1 View- PORTABLE-Urgent (05.21.20 @ 18:40)   Right PICC line in satisfactory position. Mediastinal drainage catheter unchanged.  No acute infiltrate.  Enlarged cardiac silhouette.

## 2020-05-27 NOTE — DISCHARGE NOTE NURSING/CASE MANAGEMENT/SOCIAL WORK - PATIENT PORTAL LINK FT
You can access the FollowMyHealth Patient Portal offered by [facility] by registering at the following website: [email or web address]. By joining [facility]’s FollowMyHealth portal, you will also be able to view your health information using other applications (apps) compatible with our system.

## 2020-05-27 NOTE — PROGRESS NOTE ADULT - SUBJECTIVE AND OBJECTIVE BOX
[facility] NEPHROLOGY- PROGRESS NOTE    65y Male with history of DM presents with syncope concern for sternal wound infection. Nephrology consulted for elevated Scr.    REVIEW OF SYSTEMS:  Gen: no changes in weight  Cards: no chest pain  Resp: + dyspnea resolved  GI: no nausea or vomiting or diarrhea  Vascular: no LE edema    No Known Allergies      Hospital Medications: Medications reviewed        VITALS:  T(F): 97.9 (05-27-20 @ 12:50), Max: 98.3 (05-26-20 @ 21:06)  HR: 78 (05-27-20 @ 12:50)  BP: 131/75 (05-27-20 @ 12:50)  RR: 18 (05-27-20 @ 12:50)  SpO2: 98% (05-27-20 @ 12:50)  Wt(kg): --    05-26 @ 07:01  -  05-27 @ 07:00  --------------------------------------------------------  IN: 0 mL / OUT: 610 mL / NET: -610 mL          PHYSICAL EXAM:    Gen: NAD, calm  Cards: RRR, +S1/S2, no M/G/R  Resp: CTA B/L  GI: soft, NT/ND, NABS  Vascular: no LE edema B/L          LABS:  05-26    136  |  100  |  42<H>  ----------------------------<  201<H>  4.3   |  26  |  1.65<H>    Ca    8.8      26 May 2020 06:49      Creatinine Trend: 1.65 <--, 1.52 <--, 1.50 <--, 1.53 <--, 1.61 <--, 1.47 <--    Urine Studies:

## 2020-05-27 NOTE — PROGRESS NOTE ADULT - SUBJECTIVE AND OBJECTIVE BOX
CARDIOLOGY FOLLOW UP - Dr. Steel    CC  feels better today  no cp/sob     PHYSICAL EXAM:  T(C): 36.7 (05-27-20 @ 04:13), Max: 36.8 (05-26-20 @ 12:30)  HR: 71 (05-27-20 @ 04:13) (71 - 88)  BP: 137/77 (05-27-20 @ 04:13) (112/70 - 137/77)  RR: 18 (05-27-20 @ 04:13) (18 - 19)  SpO2: 98% (05-27-20 @ 04:13) (98% - 99%)  Wt(kg): --  I&O's Summary    26 May 2020 07:01  -  27 May 2020 07:00  --------------------------------------------------------  IN: 0 mL / OUT: 610 mL / NET: -610 mL        Appearance: Normal	  Cardiovascular: Normal S1 S2,RRR, No JVD, No murmurs  Respiratory: Lungs clear to auscultation	  Gastrointestinal:  Soft, Non-tender, + BS	  Extremities: Normal range of motion, No clubbing, cyanosis or edema        MEDICATIONS  (STANDING):  aMIOdarone    Tablet 200 milliGRAM(s) Oral daily  AQUAPHOR (petrolatum Ointment) 1 Application(s) Topical daily  aspirin enteric coated 81 milliGRAM(s) Oral daily  atorvastatin 40 milliGRAM(s) Oral at bedtime  BACItracin   Ointment 1 Application(s) Topical daily  buDESOnide    Inhalation Suspension 0.5 milliGRAM(s) Inhalation two times a day  dextrose 5%. 1000 milliLiter(s) (50 mL/Hr) IV Continuous <Continuous>  dextrose 50% Injectable 12.5 Gram(s) IV Push once  dextrose 50% Injectable 25 Gram(s) IV Push once  dextrose 50% Injectable 25 Gram(s) IV Push once  enoxaparin Injectable 40 milliGRAM(s) SubCutaneous daily  hydrocortisone 1% Cream 1 Application(s) Topical two times a day  insulin glargine Injectable (LANTUS) 40 Unit(s) SubCutaneous at bedtime  insulin lispro (HumaLOG) corrective regimen sliding scale   SubCutaneous three times a day before meals  insulin lispro (HumaLOG) corrective regimen sliding scale   SubCutaneous at bedtime  insulin lispro Injectable (HumaLOG) 22 Unit(s) SubCutaneous three times a day with meals  loratadine 10 milliGRAM(s) Oral daily  metoprolol tartrate 12.5 milliGRAM(s) Oral two times a day  pantoprazole    Tablet 40 milliGRAM(s) Oral before breakfast  polyethylene glycol 3350 17 Gram(s) Oral daily  senna 2 Tablet(s) Oral at bedtime  torsemide 10 milliGRAM(s) Oral daily  vancomycin  IVPB 1250 milliGRAM(s) IV Intermittent every 24 hours      TELEMETRY: NSR  	    ECG:  	  RADIOLOGY:   DIAGNOSTIC TESTING:  [ ] Echocardiogram:  [ ]  Catheterization:  [ ] Stress Test:    OTHER: 	    LABS:	 	            05-26    136  |  100  |  42<H>  ----------------------------<  201<H>  4.3   |  26  |  1.65<H>    Ca    8.8      26 May 2020 06:49

## 2020-05-27 NOTE — PROGRESS NOTE ADULT - SUBJECTIVE AND OBJECTIVE BOX
Chief complaint  Patient is a 65y old  Male who presents with a chief complaint of syncope (27 May 2020 12:17)   Review of systems  Patient in bed, looks comfortable, no hypoglycemic episodes.    Labs and Fingersticks  CAPILLARY BLOOD GLUCOSE      POCT Blood Glucose.: 143 mg/dL (27 May 2020 08:46)  POCT Blood Glucose.: 98 mg/dL (26 May 2020 21:22)  POCT Blood Glucose.: 183 mg/dL (26 May 2020 17:34)  POCT Blood Glucose.: 180 mg/dL (26 May 2020 12:50)      Anion Gap, Serum: 10 (05-26 @ 06:49)      Calcium, Total Serum: 8.8 (05-26 @ 06:49)          05-26    136  |  100  |  42<H>  ----------------------------<  201<H>  4.3   |  26  |  1.65<H>    Ca    8.8      26 May 2020 06:49      Medications  MEDICATIONS  (STANDING):  aMIOdarone    Tablet 200 milliGRAM(s) Oral daily  AQUAPHOR (petrolatum Ointment) 1 Application(s) Topical daily  aspirin enteric coated 81 milliGRAM(s) Oral daily  atorvastatin 40 milliGRAM(s) Oral at bedtime  BACItracin   Ointment 1 Application(s) Topical daily  buDESOnide    Inhalation Suspension 0.5 milliGRAM(s) Inhalation two times a day  dextrose 5%. 1000 milliLiter(s) (50 mL/Hr) IV Continuous <Continuous>  dextrose 50% Injectable 12.5 Gram(s) IV Push once  dextrose 50% Injectable 25 Gram(s) IV Push once  dextrose 50% Injectable 25 Gram(s) IV Push once  enoxaparin Injectable 40 milliGRAM(s) SubCutaneous daily  hydrocortisone 1% Cream 1 Application(s) Topical two times a day  insulin glargine Injectable (LANTUS) 40 Unit(s) SubCutaneous at bedtime  insulin lispro (HumaLOG) corrective regimen sliding scale   SubCutaneous three times a day before meals  insulin lispro (HumaLOG) corrective regimen sliding scale   SubCutaneous at bedtime  insulin lispro Injectable (HumaLOG) 22 Unit(s) SubCutaneous three times a day with meals  loratadine 10 milliGRAM(s) Oral daily  metoprolol tartrate 12.5 milliGRAM(s) Oral two times a day  pantoprazole    Tablet 40 milliGRAM(s) Oral before breakfast  polyethylene glycol 3350 17 Gram(s) Oral daily  senna 2 Tablet(s) Oral at bedtime  torsemide 10 milliGRAM(s) Oral daily  vancomycin  IVPB 1250 milliGRAM(s) IV Intermittent every 24 hours      Physical Exam  General: Patient comfortable in bed  Vital Signs Last 12 Hrs  T(F): 98.1 (05-27-20 @ 04:13), Max: 98.1 (05-27-20 @ 04:13)  HR: 71 (05-27-20 @ 04:13) (71 - 71)  BP: 137/77 (05-27-20 @ 04:13) (137/77 - 137/77)  BP(mean): --  RR: 18 (05-27-20 @ 04:13) (18 - 18)  SpO2: 98% (05-27-20 @ 04:13) (98% - 98%)  Neck: No palpable thyroid nodules.  CVS: S1S2, No murmurs  Respiratory: No wheezing, no crepitations  GI: Abdomen soft, bowel sounds positive  Musculoskeletal:  edema lower extremities.   Skin: No skin rashes, no ecchymosis    Diagnostics Chief complaint  Patient is a 65y old  Male who presents with a chief complaint of syncope (27 May 2020 12:17)   Review of systems  Patient in bed, looks comfortable,  no hypoglycemic episodes.    Labs and Fingersticks  CAPILLARY BLOOD GLUCOSE      POCT Blood Glucose.: 143 mg/dL (27 May 2020 08:46)  POCT Blood Glucose.: 98 mg/dL (26 May 2020 21:22)  POCT Blood Glucose.: 183 mg/dL (26 May 2020 17:34)  POCT Blood Glucose.: 180 mg/dL (26 May 2020 12:50)      Anion Gap, Serum: 10 (05-26 @ 06:49)      Calcium, Total Serum: 8.8 (05-26 @ 06:49)          05-26    136  |  100  |  42<H>  ----------------------------<  201<H>  4.3   |  26  |  1.65<H>    Ca    8.8      26 May 2020 06:49      Medications  MEDICATIONS  (STANDING):  aMIOdarone    Tablet 200 milliGRAM(s) Oral daily  AQUAPHOR (petrolatum Ointment) 1 Application(s) Topical daily  aspirin enteric coated 81 milliGRAM(s) Oral daily  atorvastatin 40 milliGRAM(s) Oral at bedtime  BACItracin   Ointment 1 Application(s) Topical daily  buDESOnide    Inhalation Suspension 0.5 milliGRAM(s) Inhalation two times a day  dextrose 5%. 1000 milliLiter(s) (50 mL/Hr) IV Continuous <Continuous>  dextrose 50% Injectable 12.5 Gram(s) IV Push once  dextrose 50% Injectable 25 Gram(s) IV Push once  dextrose 50% Injectable 25 Gram(s) IV Push once  enoxaparin Injectable 40 milliGRAM(s) SubCutaneous daily  hydrocortisone 1% Cream 1 Application(s) Topical two times a day  insulin glargine Injectable (LANTUS) 40 Unit(s) SubCutaneous at bedtime  insulin lispro (HumaLOG) corrective regimen sliding scale   SubCutaneous three times a day before meals  insulin lispro (HumaLOG) corrective regimen sliding scale   SubCutaneous at bedtime  insulin lispro Injectable (HumaLOG) 22 Unit(s) SubCutaneous three times a day with meals  loratadine 10 milliGRAM(s) Oral daily  metoprolol tartrate 12.5 milliGRAM(s) Oral two times a day  pantoprazole    Tablet 40 milliGRAM(s) Oral before breakfast  polyethylene glycol 3350 17 Gram(s) Oral daily  senna 2 Tablet(s) Oral at bedtime  torsemide 10 milliGRAM(s) Oral daily  vancomycin  IVPB 1250 milliGRAM(s) IV Intermittent every 24 hours      Physical Exam  General: Patient comfortable in bed  Vital Signs Last 12 Hrs  T(F): 98.1 (05-27-20 @ 04:13), Max: 98.1 (05-27-20 @ 04:13)  HR: 71 (05-27-20 @ 04:13) (71 - 71)  BP: 137/77 (05-27-20 @ 04:13) (137/77 - 137/77)  BP(mean): --  RR: 18 (05-27-20 @ 04:13) (18 - 18)  SpO2: 98% (05-27-20 @ 04:13) (98% - 98%)  Neck: No palpable thyroid nodules.  CVS: S1S2, No murmurs  Respiratory: No wheezing, no crepitations  GI: Abdomen soft, bowel sounds positive  Musculoskeletal:  edema lower extremities.   Skin: No skin rashes, no ecchymosis    Diagnostics

## 2020-05-27 NOTE — PROGRESS NOTE ADULT - ASSESSMENT
65M s/p CABG 2/3/20, course complicated by PEA arrest, E faecalis bacteremia and local infections of the sternal and right leg SVG sites, now admitted 5/11/20 for syncope.   In total he received about six weeks of antibiotics although no growth from blood or sternal OR debridement 2/25 and low intraoperative concern for osteomyelitis.   However, wound drainage increased significantly now with MRSA and low-grade bacteremia, cleared 5/14.   s/p OR 5/15 debridement, reconstruction with omentum   Would treat as osteomyelitis given elevated ESR/CRP although no aggressive osseous lesions on CT.   OLGA 5/26 without vegetations   s/p PICC line 5/22  Doing well, wound clean, drains removed, afebrile.     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   In total he received about six weeks of antibiotics although no growth from blood or sternal OR debridement 2/25 and low intraoperative concern for osteomyelitis.   However, wound drainage increased significantly now with MRSA and low-grade bacteremia, cleared 5/14.   s/p OR 5/15 debridement, reconstruction with omentum   Would treat as osteomyelitis given elevated ESR/CRP although no aggressive osseous lesions on CT.   OLGA 5/26 without vegetations   s/p PICC line 5/22  Doing well, wound clean, drains removed, afebrile. Discharge planning.     Suggest  -no objection to discharge on Vancomycin 1.25GM IV q24h through Fri 6/26 for a six-week course with weekly CBC, BUN, creatinine, Vancomycin trough, ESR, CRP faxed to [phone]  -if he's still here tomorrow I ordered a repeat ESR and CRP for the morning     Spoke with primary team   Will follow as needed    Chapito Delarosa MD   Infectious Disease   Pager [phone]   After 5PM and on weekends please page fellow on call or call [phone]

## 2020-06-01 ENCOUNTER — APPOINTMENT (OUTPATIENT)
Dept: INTERNAL MEDICINE | Facility: CLINIC | Age: 65
End: 2020-06-01

## 2020-06-03 ENCOUNTER — APPOINTMENT (OUTPATIENT)
Dept: INFECTIOUS DISEASE | Facility: CLINIC | Age: 65
End: 2020-06-03

## 2020-06-04 ENCOUNTER — OUTPATIENT (OUTPATIENT)
Dept: OUTPATIENT SERVICES | Facility: HOSPITAL | Age: 65
LOS: 1 days | End: 2020-06-04
Payer: COMMERCIAL

## 2020-06-04 ENCOUNTER — APPOINTMENT (OUTPATIENT)
Dept: INFECTIOUS DISEASE | Facility: CLINIC | Age: 65
End: 2020-06-04
Payer: MEDICARE

## 2020-06-04 DIAGNOSIS — Z95.1 PRESENCE OF AORTOCORONARY BYPASS GRAFT: Chronic | ICD-10-CM

## 2020-06-04 DIAGNOSIS — B97.89 OTHER VIRAL AGENTS AS THE CAUSE OF DISEASES CLASSIFIED ELSEWHERE: ICD-10-CM

## 2020-06-04 DIAGNOSIS — Z90.49 ACQUIRED ABSENCE OF OTHER SPECIFIED PARTS OF DIGESTIVE TRACT: Chronic | ICD-10-CM

## 2020-06-04 PROCEDURE — 99214 OFFICE O/P EST MOD 30 MIN: CPT | Mod: 95

## 2020-06-04 PROCEDURE — G0463: CPT

## 2020-06-04 NOTE — PHYSICAL EXAM
[General Appearance - Alert] : alert [General Appearance - In No Acute Distress] : in no acute distress [General Appearance - Well Nourished] : well nourished [Sclera] : the sclera and conjunctiva were normal [Respiration, Rhythm And Depth] : normal respiratory rhythm and effort [No Focal Deficits] : no focal deficits [FreeTextEntry1] : Sternal wound largely closed, residual defect by the xiphoid. No purulence or surrounding erythema

## 2020-06-04 NOTE — ASSESSMENT
[FreeTextEntry1] : 65M s/p CABG 2/3/20, course complicated by PEA arrest, E faecalis bacteremia and local infections of the sternal and right leg SVG sites. s/p sternal wound debridement 2/25, negative cultures, low intra-operative concern for osteomyelitis. Received about 6 weeks of antibiotics but the wound did not heal and drainage increased. \par Readmitted 5/11/20, MRSA wound infection with low-grade bacteremia, cleared 5/14, s/p OR 5/15 for debridement and reconstruction with omentum. \par On Vancomycin with adequate levels and stable renal function, completing 6-weeks 6/26/20. \par He's doing well aside from diffuse pruritus, ongoing for months. I don't think this is due to Vancomycin. No associated rash or correlation with Vancomycin infusion. Onset seems to started while on Daptomycin or Linezolid. Eosinophilia first appeared 3/16/20 (while on Linezolid) and persisted since. \par \par Plan\par -continue Vancomycin 1.25GM IV q24h through 6/26 for a 6-week course from surgery \par -PMD Dr Camara prescribed topical meds for pruritus; I left a message with his office to touch base [phone]\par -allergy referral 
No complaints

## 2020-06-04 NOTE — HISTORY OF PRESENT ILLNESS
[Home] : at home, [unfilled] , at the time of the visit. [Medical Office: ([facility])___] : at the medical office located in  [Spouse] : spouse [Family Member] : family member [Verbal consent obtained from patient] : the patient, [unfilled] [FreeTextEntry1] : 65M s/p CABG 2/3/20 course complicated by PEA arrest, E faecalis bacteremia and local infections of the sternal and right leg SVG sites. He underwent sternal wound debridement 2/25 with purulence noted but OR cultures were negative as well as blood cultures. There was low intra-operative concern for osteomyelitis. He received a total of approximately 6 weeks of antibiotics (Daptomycin, Linezolid) from surgery. \par I met him when after he was admitted 5/11/20 for syncope. He reported that since he's been home his sternal wound, which had a woundvac, had progressively increased drainage and never healed. \par He was afebrile, nontoxic and the wound did not look inflamed or purulent but MRSA grew from it and he had low-grade bacteremia which cleared 5/14. Returned to OR 5/15 for debridement and reconstruction with omentum. He's been on Vancomycin with adequate levels and stable renal function. Plan is to complete a 6-week course on 6/26. \par Visit for follow up. \par He saw plastic surgery Dr Wright and his PMD Dr Camara earlier today. \par Feels well aside from continued itching to his skin mostly to his neck and back. No associated rash. No association with timing of Vancomycin administration. No associated swelling or wheezing. No diarrhea or cough.

## 2020-06-04 NOTE — REVIEW OF SYSTEMS
[Itching] : itching [Chills] : no chills [Fever] : no fever [Nasal Discharge] : no nasal discharge [Sore Throat] : no sore throat [Shortness Of Breath] : no shortness of breath [Chest Pain] : no chest pain [Abdominal Pain] : no abdominal pain [Diarrhea] : no diarrhea [Dysuria] : no dysuria

## 2020-06-05 DIAGNOSIS — R78.81 BACTEREMIA: ICD-10-CM

## 2020-06-05 DIAGNOSIS — D72.1 EOSINOPHILIA: ICD-10-CM

## 2020-06-05 DIAGNOSIS — A49.02 METHICILLIN RESISTANT STAPHYLOCOCCUS AUREUS INFECTION, UNSPECIFIED SITE: ICD-10-CM

## 2020-06-05 DIAGNOSIS — L29.9 PRURITUS, UNSPECIFIED: ICD-10-CM

## 2020-06-11 PROCEDURE — 84484 ASSAY OF TROPONIN QUANT: CPT

## 2020-06-11 PROCEDURE — 85610 PROTHROMBIN TIME: CPT

## 2020-06-11 PROCEDURE — 36569 INSJ PICC 5 YR+ W/O IMAGING: CPT

## 2020-06-11 PROCEDURE — 84443 ASSAY THYROID STIM HORMONE: CPT

## 2020-06-11 PROCEDURE — 84132 ASSAY OF SERUM POTASSIUM: CPT

## 2020-06-11 PROCEDURE — 83605 ASSAY OF LACTIC ACID: CPT

## 2020-06-11 PROCEDURE — 82533 TOTAL CORTISOL: CPT

## 2020-06-11 PROCEDURE — 97162 PT EVAL MOD COMPLEX 30 MIN: CPT

## 2020-06-11 PROCEDURE — 99285 EMERGENCY DEPT VISIT HI MDM: CPT

## 2020-06-11 PROCEDURE — 87075 CULTR BACTERIA EXCEPT BLOOD: CPT

## 2020-06-11 PROCEDURE — 82330 ASSAY OF CALCIUM: CPT

## 2020-06-11 PROCEDURE — 93308 TTE F-UP OR LMTD: CPT

## 2020-06-11 PROCEDURE — 87070 CULTURE OTHR SPECIMN AEROBIC: CPT

## 2020-06-11 PROCEDURE — 84100 ASSAY OF PHOSPHORUS: CPT

## 2020-06-11 PROCEDURE — 93306 TTE W/DOPPLER COMPLETE: CPT

## 2020-06-11 PROCEDURE — 97530 THERAPEUTIC ACTIVITIES: CPT

## 2020-06-11 PROCEDURE — C8924: CPT

## 2020-06-11 PROCEDURE — 36415 COLL VENOUS BLD VENIPUNCTURE: CPT

## 2020-06-11 PROCEDURE — 85730 THROMBOPLASTIN TIME PARTIAL: CPT

## 2020-06-11 PROCEDURE — 86901 BLOOD TYPING SEROLOGIC RH(D): CPT

## 2020-06-11 PROCEDURE — 93321 DOPPLER ECHO F-UP/LMTD STD: CPT

## 2020-06-11 PROCEDURE — 86850 RBC ANTIBODY SCREEN: CPT

## 2020-06-11 PROCEDURE — 86140 C-REACTIVE PROTEIN: CPT

## 2020-06-11 PROCEDURE — 80202 ASSAY OF VANCOMYCIN: CPT

## 2020-06-11 PROCEDURE — 93312 ECHO TRANSESOPHAGEAL: CPT

## 2020-06-11 PROCEDURE — 83690 ASSAY OF LIPASE: CPT

## 2020-06-11 PROCEDURE — 97164 PT RE-EVAL EST PLAN CARE: CPT

## 2020-06-11 PROCEDURE — 82553 CREATINE MB FRACTION: CPT

## 2020-06-11 PROCEDURE — C1889: CPT

## 2020-06-11 PROCEDURE — 82962 GLUCOSE BLOOD TEST: CPT

## 2020-06-11 PROCEDURE — 82803 BLOOD GASES ANY COMBINATION: CPT

## 2020-06-11 PROCEDURE — 87184 SC STD DISK METHOD PER PLATE: CPT

## 2020-06-11 PROCEDURE — 84295 ASSAY OF SERUM SODIUM: CPT

## 2020-06-11 PROCEDURE — 83036 HEMOGLOBIN GLYCOSYLATED A1C: CPT

## 2020-06-11 PROCEDURE — 82947 ASSAY GLUCOSE BLOOD QUANT: CPT

## 2020-06-11 PROCEDURE — 86923 COMPATIBILITY TEST ELECTRIC: CPT

## 2020-06-11 PROCEDURE — P9016: CPT

## 2020-06-11 PROCEDURE — 97116 GAIT TRAINING THERAPY: CPT

## 2020-06-11 PROCEDURE — 85014 HEMATOCRIT: CPT

## 2020-06-11 PROCEDURE — 82550 ASSAY OF CK (CPK): CPT

## 2020-06-11 PROCEDURE — 87102 FUNGUS ISOLATION CULTURE: CPT

## 2020-06-11 PROCEDURE — 93320 DOPPLER ECHO COMPLETE: CPT

## 2020-06-11 PROCEDURE — 97112 NEUROMUSCULAR REEDUCATION: CPT

## 2020-06-11 PROCEDURE — 83880 ASSAY OF NATRIURETIC PEPTIDE: CPT

## 2020-06-11 PROCEDURE — C1751: CPT

## 2020-06-11 PROCEDURE — 80053 COMPREHEN METABOLIC PANEL: CPT

## 2020-06-11 PROCEDURE — 84300 ASSAY OF URINE SODIUM: CPT

## 2020-06-11 PROCEDURE — 87150 DNA/RNA AMPLIFIED PROBE: CPT

## 2020-06-11 PROCEDURE — 87186 SC STD MICRODIL/AGAR DIL: CPT

## 2020-06-11 PROCEDURE — 83930 ASSAY OF BLOOD OSMOLALITY: CPT

## 2020-06-11 PROCEDURE — 83935 ASSAY OF URINE OSMOLALITY: CPT

## 2020-06-11 PROCEDURE — 85652 RBC SED RATE AUTOMATED: CPT

## 2020-06-11 PROCEDURE — 93005 ELECTROCARDIOGRAM TRACING: CPT | Mod: 76

## 2020-06-11 PROCEDURE — 82435 ASSAY OF BLOOD CHLORIDE: CPT

## 2020-06-11 PROCEDURE — 36430 TRANSFUSION BLD/BLD COMPNT: CPT

## 2020-06-11 PROCEDURE — 87040 BLOOD CULTURE FOR BACTERIA: CPT

## 2020-06-11 PROCEDURE — 85027 COMPLETE CBC AUTOMATED: CPT

## 2020-06-11 PROCEDURE — 70450 CT HEAD/BRAIN W/O DYE: CPT

## 2020-06-11 PROCEDURE — 83735 ASSAY OF MAGNESIUM: CPT

## 2020-06-11 PROCEDURE — 71250 CT THORAX DX C-: CPT

## 2020-06-11 PROCEDURE — 86900 BLOOD TYPING SEROLOGIC ABO: CPT

## 2020-06-11 PROCEDURE — 94640 AIRWAY INHALATION TREATMENT: CPT

## 2020-06-11 PROCEDURE — 93325 DOPPLER ECHO COLOR FLOW MAPG: CPT

## 2020-06-11 PROCEDURE — 80048 BASIC METABOLIC PNL TOTAL CA: CPT

## 2020-06-11 PROCEDURE — 71045 X-RAY EXAM CHEST 1 VIEW: CPT

## 2020-06-11 PROCEDURE — 92610 EVALUATE SWALLOWING FUNCTION: CPT

## 2020-06-13 LAB
CULTURE RESULTS: SIGNIFICANT CHANGE UP
SPECIMEN SOURCE: SIGNIFICANT CHANGE UP

## 2020-06-16 ENCOUNTER — APPOINTMENT (OUTPATIENT)
Dept: PULMONOLOGY | Facility: CLINIC | Age: 65
End: 2020-06-16
Payer: MEDICARE

## 2020-06-16 VITALS
SYSTOLIC BLOOD PRESSURE: 112 MMHG | HEIGHT: 68 IN | TEMPERATURE: 98 F | WEIGHT: 174 LBS | DIASTOLIC BLOOD PRESSURE: 74 MMHG | BODY MASS INDEX: 26.37 KG/M2 | RESPIRATION RATE: 17 BRPM | OXYGEN SATURATION: 95 % | HEART RATE: 74 BPM

## 2020-06-16 DIAGNOSIS — Z23 ENCOUNTER FOR IMMUNIZATION: ICD-10-CM

## 2020-06-16 PROCEDURE — G0009: CPT

## 2020-06-16 PROCEDURE — 99214 OFFICE O/P EST MOD 30 MIN: CPT | Mod: 25

## 2020-06-16 PROCEDURE — 90670 PCV13 VACCINE IM: CPT

## 2020-06-16 NOTE — ASSESSMENT
[FreeTextEntry1] : Mr. FRANKLIN PRESLEY is a 65 year year old male who has a history of  DM, Coronary artery disease, pleural thickening, s/p median sternotomy complicated by cardiac arrest and fractured ribs.  who now comes in for pulmonary evaluation. SOB and Cough -improved -  completing abx for sternal wound infection\par \par The patient's SOB is felt to be multifactorial:\par -pulmonary \par    - Restrictive Dysfunction due to healing rib fractured and median sternotomy \par    - ?Tracheomalacia \par - Poor breathing mechanics\par - Overweight/ Out-of Shape\par - ?Cardiac Disease - Dr. Steel \par \par Problem 1: Restrictive Dysfunction\par -Due to healing rib fracture and median sternotomy. \par \par Problem 2: Tracheomalacia (?)\par -Complete Dynamic CT \par -continue Amitriptyline 10 mg Q8H; to take QHS for the first week, then increase to Q8H \par \par -Tracheomalacia is usually acquired in adults and common causes include damage by tracheostomy or endotracheal intubation damaging the tracheal cartilage with increase risk with multiple intubations, prolonged intubation, and concurrent high dose steroid therapy; external chest wall trauma and surgery; chronic compression of the trachea by benign etiologies (eg, benign mediastinal goiter) or malignancy; relapsing polychondritis; or recurrent infection. Tracheomalacia can be asymptomatic, however signs or symptoms can develop as the severity of the airway narrowing progresses with major symptoms include dyspnea, cough, and sputum retention. Other symptoms include severe paroxysms of coughing, wheezing or stridor, barking cough and may be exacerbated by forced expiration, cough, and valsalva maneuver. Tracheomalacia is diagnosed by a bronchoscopic visualization of dynamic airway collapse on dynamic chest CT. Therapy is warranted in symptomatic patients with severe tracheomalacia and includes surgical repair as tracheobronchoplasty. The patient was referred to Dr. Bulmaro Wing or Dr. Omar Gordon, at [facility] for a surgical consult.\par \par Problem 3: Chronic Cough\par DDx\par -Tracheomalacia \par RADS / Asthma \par PND \par reflux \par \par -Any cough greater than three weeks duration-differential diagnosis includes-asthma, upper airway cough syndrome, post nasal drip syndrome, gastroesophageal reflux, laryngopharyngeal reflux, cardiac disease (congestive heart failure, medicines, effects, etc), medication effects (b-blockers, ace inhibitors, ARBs, glaucoma meds, etc.), smoking, infectious, multifactorial, etc. \par \par Problem 4: RADS / Asthma \par -continue Xopenex (0.63) TID via nebulizer \par -continue Budesonide (1.0 mg 0.5 mg) BID \par \par -Asthma is believed to be caused by inherited (genetic) and environmental factor, but its exact cause is unknown. Asthma may be triggered by allergens, lung infections, or irritants in the air. Asthma triggers are different for each person \par \par Problem 5: PND\par -No medication needed at the time\par \par -Environmental measures for allergies were encouraged including mattress and pillow cover, air purifier, and environmental controls. \par \par Problem 6: GERD / Laryngomalacia\par -Recommended to see ENT Dr. Sy Marion\par -continue Protonix 40mg qAM before breakfast \par -continue Pepcid 40mg QHS \par \par - Things to avoid including overeating, spicy foods, tight clothing, eating within three hours of bed, this list is not all inclusive.\par \par - For treatment of reflux, possible options discussed including diet control, H2 blockers, PPIs, as well as coating motility agents discussed as treatment options. Timing of meals and proximity of last meal to sleep were discussed. If symptoms persist, a formal gastrointestinal evaluation is needed. \par \par Problem 7: Amiodarone \par -PFTs/TFTs/LFTs; 2-3 times per year\par - Amiodarone/bleomycin/methotrexate and other drugs have been associated with pulmonary parenchymal damage. These drugs require pulmonary function testing including DLCO regularly and possible CT/CXR if respiratory complaints develop. PFTs should be performed at 6 month intervals. \par \par Problem 7A: Cardiac \par - follow up with Dr. Cortes \par \par Problem 8: Overweight\par - Weight loss, exercise, and diet control were discussed and are highly encouraged. Treatment options were given such as, aqua therapy, and contacting a nutritionist. Recommended to use the elliptical, stationary bike, less use of treadmill. Mindful eating was explained to the patient Obesity is associated with worsening asthma, shortness of breath, and potential for cardiac disease, diabetes, and other underlying medical conditions.\par \par Problem 9: Poor Breathing Mechanics\par - Proper breathing techniques were reviewed with an emphasis of exhalation. Patient instructed to breath in for 1 second and out for four seconds. Patient was encouraged to not talk while walking.\par \par Problem 10: Health Maintenance/COVID19 Precautions:\par - Clean your hands often. Wash your hands often with soap and water for at least 20 seconds, especially after blowing your nose, coughing, or sneezing, or having been in a public place.\par - If soap and water are not available, use a hand  that contains at least 60% alcohol.\par - To the extent possible, avoid touching high-touch surfaces in public places - elevator buttons, door handles, handrails, handshaking with people, etc. Use a tissue or your sleeve to cover your hand or finger if you must touch something.\par - Wash your hands after touching surfaces in public places.\par - Avoid touching your face, nose, eyes, etc.\par - Clean and disinfect your home to remove germs: practice routine cleaning of frequently touched surfaces (for example: tables, doorknobs, light switches, handles, desks, toilets, faucets, sinks & cell phones)\par - Avoid crowds, especially in poorly ventilated spaces. Your risk of exposure to respiratory viruses like COVID-19 may increase in crowded, closed-in settings with little air circulation if there are people in the crowd who are sick. All patients are recommended to practice social distancing and stay at least 6 feet away from others.\par \par Immune Support Recommendations:\par -OTC Vitamin C 500mg BID \par -OTC Quercetin 250-500mg BID \par -OTC Zinc 75-100mg per day \par -OTC Melatonin 1 or 2 mg a night \par -OTC Vitamin D 1-4000mg per day \par -OTC Tonic Water 8oz per day\par \par \par Problem 11: Health maintenance\par -s/p flu shot\par -recommended strep pneumonia vaccines: Prevnar-13 vaccine (6/16/2020), followed by Pneumo vaccine 23 one year following (after the age of 65)\par -recommended early intervention for URIs\par -recommended regular osteoporosis evaluations\par -recommended early dermatological evaluations\par -recommended after the age of 50 to the age of 70, colonoscopy every 5 years\par \par f/u in 6-8 weeks\par pt is encouraged to call or fax the office with any questions or concerns.

## 2020-06-16 NOTE — ADDENDUM
[FreeTextEntry1] : Documented by Simona Acevedo acting as a scribe for Dr. Mike Hernandez on 06/16/2020.\par \par All medical record entries made by the Scribe were at my, Dr. Mike Hernandez's, direction and personally dictated by me on 06/16/2020. I have reviewed the chart and agree that the record accurately reflects my personal performance of the history, physical exam, assessment and plan. I have also personally directed, reviewed, and agree with the discharge instructions.

## 2020-06-16 NOTE — PROCEDURE
[FreeTextEntry1] : CT (5.12.2020) IMPRESSION: calcified mediastinal and hilar lymph nodes, little bit of hematoma in anterior mediastinum.\par

## 2020-06-16 NOTE — HISTORY OF PRESENT ILLNESS
[TextBox_4] : Mr. FRANKLIN PRESLEY is a 65 year old male coming into the office for an initial evaluation. His chief complaint is \par - he notes he has improving slowly. sometimes when he's fatigued he breathes a little hard but if he keeps himself from over exertion then he can manage his breathing. \par - he has no difficulty swallowing\par - he has not been sleeping well, he has an allergic reaction to his abx, so it keeps him from sleeping because it gives him a really bad itch.\par - when he goes to bed he sleeps for 3-4 hours but then he can't sleep any more. He twists and turns in bed. \par - he does not snore much\par - he notes he has to use reading glasses.\par - his breathing in general \par - he has been using the Tessalon Perles to take the edge off his cough \par - he has improved about 15% since last time. some days he's good, some days he's not. \par - ID team saw him in the hospital and told him he should be on the abx until the 16th of July. \par - he does not feel like he's getting a full capacity breath \par -he denies any headaches, nausea, vomiting, fever, chills, sweats, chest pain, chest pressure, diarrhea, constipation, dysphagia, dizziness, sour taste in the mouth, leg swelling, leg pain, itchy eyes, itchy ears, heartburn, reflux, myalgias or arthralgias.\par

## 2020-06-16 NOTE — REASON FOR VISIT
[Follow-Up] : a follow-up visit [TextBox_44] : Restrictive Dysfunction, RADS / Asthma, GERD, ?Laryngomalacia, ?TBM, Cough

## 2020-06-25 ENCOUNTER — OUTPATIENT (OUTPATIENT)
Dept: OUTPATIENT SERVICES | Facility: HOSPITAL | Age: 65
LOS: 1 days | End: 2020-06-25
Payer: MEDICAID

## 2020-06-25 ENCOUNTER — APPOINTMENT (OUTPATIENT)
Dept: ULTRASOUND IMAGING | Facility: IMAGING CENTER | Age: 65
End: 2020-06-25
Payer: MEDICAID

## 2020-06-25 ENCOUNTER — RESULT REVIEW (OUTPATIENT)
Age: 65
End: 2020-06-25

## 2020-06-25 DIAGNOSIS — Z00.8 ENCOUNTER FOR OTHER GENERAL EXAMINATION: ICD-10-CM

## 2020-06-25 DIAGNOSIS — Z95.1 PRESENCE OF AORTOCORONARY BYPASS GRAFT: Chronic | ICD-10-CM

## 2020-06-25 DIAGNOSIS — Z90.49 ACQUIRED ABSENCE OF OTHER SPECIFIED PARTS OF DIGESTIVE TRACT: Chronic | ICD-10-CM

## 2020-06-25 PROCEDURE — 76770 US EXAM ABDO BACK WALL COMP: CPT

## 2020-06-25 PROCEDURE — 76770 US EXAM ABDO BACK WALL COMP: CPT | Mod: 26

## 2020-07-01 ENCOUNTER — LABORATORY RESULT (OUTPATIENT)
Age: 65
End: 2020-07-01

## 2020-07-01 ENCOUNTER — APPOINTMENT (OUTPATIENT)
Dept: INFECTIOUS DISEASE | Facility: CLINIC | Age: 65
End: 2020-07-01
Payer: MEDICARE

## 2020-07-01 ENCOUNTER — OUTPATIENT (OUTPATIENT)
Dept: OUTPATIENT SERVICES | Facility: HOSPITAL | Age: 65
LOS: 1 days | End: 2020-07-01
Payer: COMMERCIAL

## 2020-07-01 VITALS
HEIGHT: 68 IN | DIASTOLIC BLOOD PRESSURE: 80 MMHG | BODY MASS INDEX: 26.83 KG/M2 | WEIGHT: 177 LBS | OXYGEN SATURATION: 99 % | RESPIRATION RATE: 16 BRPM | HEART RATE: 69 BPM | SYSTOLIC BLOOD PRESSURE: 140 MMHG | TEMPERATURE: 96.8 F

## 2020-07-01 DIAGNOSIS — Z95.1 PRESENCE OF AORTOCORONARY BYPASS GRAFT: Chronic | ICD-10-CM

## 2020-07-01 DIAGNOSIS — L08.9 UNSPECIFIED OPEN WOUND OF RIGHT FRONT WALL OF THORAX W/OUT PENETRATION INTO THORACIC CAVITY, INITIAL ENCOUNTER: ICD-10-CM

## 2020-07-01 DIAGNOSIS — S21.101A UNSPECIFIED OPEN WOUND OF RIGHT FRONT WALL OF THORAX W/OUT PENETRATION INTO THORACIC CAVITY, INITIAL ENCOUNTER: ICD-10-CM

## 2020-07-01 DIAGNOSIS — Z90.49 ACQUIRED ABSENCE OF OTHER SPECIFIED PARTS OF DIGESTIVE TRACT: Chronic | ICD-10-CM

## 2020-07-01 DIAGNOSIS — B97.89 OTHER VIRAL AGENTS AS THE CAUSE OF DISEASES CLASSIFIED ELSEWHERE: ICD-10-CM

## 2020-07-01 PROCEDURE — G0463: CPT

## 2020-07-01 PROCEDURE — 99214 OFFICE O/P EST MOD 30 MIN: CPT

## 2020-07-01 NOTE — ASSESSMENT
[FreeTextEntry1] : 65M s/p CABG 2/3/20, course complicated by PEA arrest, E faecalis bacteremia and local infections of the sternal and right leg SVG sites. s/p sternal wound debridement 2/25, negative cultures, low intra-operative concern for osteomyelitis. Received about 6 weeks of antibiotics but the wound did not heal and drainage increased. \par Readmitted 5/11/20, MRSA wound infection with low-grade bacteremia, cleared 5/14, s/p OR 5/15 for debridement and reconstruction with omentum. \par Completed a 6-week course of Vancomycin last week. \par He's doing well, wound is healing and looks clean, pruritus nearly resolved. I didn't think the Vancomycin caused his pruritus and eosinophilia but with such dramatic improvement in itching since last week, maybe it was. \par \par Plan\par -CBC with differential to trend eosinophils \par -BMP for renal function \par -has an allergy for further evaluation of eosinophilia and pruritus \par \par Follow up as needed

## 2020-07-01 NOTE — HISTORY OF PRESENT ILLNESS
[FreeTextEntry1] : Elia feels well. We stopped antibiotics early, last dose on 6/24 instead of 6/26 because his creatinine tu and Vancomycin level was on the higher side 18. \par He feels well but his wife says for the past two days he's been less energetic. No fevers or chills. Small amount of drainage from the sternal site, no pus. \par His skin itching is much better, down by 90%. \par No rash. \par No diarrhea.

## 2020-07-01 NOTE — REVIEW OF SYSTEMS
[Fever] : no fever [Chills] : no chills [Chest Pain] : no chest pain [Abdominal Pain] : no abdominal pain [Cough] : no cough [Diarrhea] : no diarrhea [Skin Lesions] : no skin lesions [Itching] : itching [Dysuria] : no dysuria [de-identified] : minimal itching now

## 2020-07-01 NOTE — PHYSICAL EXAM
[General Appearance - Alert] : alert [General Appearance - In No Acute Distress] : in no acute distress [Sclera] : the sclera and conjunctiva were normal [General Appearance - Well Nourished] : well nourished [] : no respiratory distress [Respiration, Rhythm And Depth] : normal respiratory rhythm and effort [Heart Rate And Rhythm] : heart rate was normal and rhythm regular [Bowel Sounds] : normal bowel sounds [Abdomen Soft] : soft [Musculoskeletal - Swelling] : no joint swelling [No Focal Deficits] : no focal deficits [FreeTextEntry1] : sternal wound is mostly healed and closed, small superficial defect about 2.5x1.5cm that hasn't epithelialized yet but no signs of cellulitis or purulence, nontender. no rash. former abdominal trochar sites healed. right arm former PICC site healed without phlebitis  [Affect] : the affect was normal

## 2020-07-02 DIAGNOSIS — L29.9 PRURITUS, UNSPECIFIED: ICD-10-CM

## 2020-07-02 DIAGNOSIS — R78.81 BACTEREMIA: ICD-10-CM

## 2020-07-02 DIAGNOSIS — D72.1 EOSINOPHILIA: ICD-10-CM

## 2020-07-02 DIAGNOSIS — S21.101A UNSPECIFIED OPEN WOUND OF RIGHT FRONT WALL OF THORAX WITHOUT PENETRATION INTO THORACIC CAVITY, INITIAL ENCOUNTER: ICD-10-CM

## 2020-07-06 ENCOUNTER — LABORATORY RESULT (OUTPATIENT)
Age: 65
End: 2020-07-06

## 2020-07-06 ENCOUNTER — APPOINTMENT (OUTPATIENT)
Dept: PEDIATRIC ALLERGY IMMUNOLOGY | Facility: CLINIC | Age: 65
End: 2020-07-06
Payer: MEDICARE

## 2020-07-06 ENCOUNTER — APPOINTMENT (OUTPATIENT)
Dept: DISASTER EMERGENCY | Facility: CLINIC | Age: 65
End: 2020-07-06

## 2020-07-06 VITALS — WEIGHT: 179.99 LBS | HEART RATE: 85 BPM | BODY MASS INDEX: 27.37 KG/M2

## 2020-07-06 DIAGNOSIS — Z01.818 ENCOUNTER FOR OTHER PREPROCEDURAL EXAMINATION: ICD-10-CM

## 2020-07-06 DIAGNOSIS — L29.9 PRURITUS, UNSPECIFIED: ICD-10-CM

## 2020-07-06 LAB
ALBUMIN SERPL ELPH-MCNC: 4 G/DL
ALP BLD-CCNC: 97 U/L
ALT SERPL-CCNC: 16 U/L
ANION GAP SERPL CALC-SCNC: 12 MMOL/L
AST SERPL-CCNC: 14 U/L
BILIRUB SERPL-MCNC: 0.2 MG/DL
BUN SERPL-MCNC: 47 MG/DL
CALCIUM SERPL-MCNC: 9.7 MG/DL
CHLORIDE SERPL-SCNC: 102 MMOL/L
CO2 SERPL-SCNC: 27 MMOL/L
CREAT SERPL-MCNC: 1.95 MG/DL
GLUCOSE SERPL-MCNC: 161 MG/DL
POTASSIUM SERPL-SCNC: 4.5 MMOL/L
PROT SERPL-MCNC: 7 G/DL
SODIUM SERPL-SCNC: 141 MMOL/L

## 2020-07-06 PROCEDURE — 95004 PERQ TESTS W/ALRGNC XTRCS: CPT

## 2020-07-06 PROCEDURE — 99205 OFFICE O/P NEW HI 60 MIN: CPT | Mod: 25

## 2020-07-06 RX ORDER — FAMOTIDINE 40 MG/1
40 TABLET, FILM COATED ORAL
Qty: 90 | Refills: 1 | Status: COMPLETED | COMMUNITY
Start: 2020-04-29 | End: 2020-07-06

## 2020-07-06 RX ORDER — LINEZOLID 600 MG/1
600 TABLET, FILM COATED ORAL
Qty: 28 | Refills: 0 | Status: COMPLETED | COMMUNITY
End: 2020-06-22

## 2020-07-06 RX ORDER — AMITRIPTYLINE HYDROCHLORIDE 10 MG/1
10 TABLET, FILM COATED ORAL 3 TIMES DAILY
Qty: 90 | Refills: 5 | Status: COMPLETED | COMMUNITY
Start: 2020-04-29 | End: 2020-07-06

## 2020-07-06 RX ORDER — OXYCODONE HYDROCHLORIDE AND ACETAMINOPHEN 10; 325 MG/1; MG/1
TABLET ORAL
Refills: 0 | Status: COMPLETED | COMMUNITY
End: 2020-07-06

## 2020-07-06 RX ORDER — CALCIUM ACETATE 667 MG/1
667 CAPSULE ORAL
Refills: 0 | Status: COMPLETED | COMMUNITY
End: 2020-07-06

## 2020-07-06 NOTE — PHYSICAL EXAM
[Alert] : alert [Well Nourished] : well nourished [No Acute Distress] : no acute distress [No Discharge] : no discharge [No Photophobia] : no photophobia [Sclera Not Icteric] : sclera not icteric [Normal Outer Ear/Nose] : the ears and nose were normal in appearance [No Thrush] : no thrush [No Oral Lesions or Ulcers] : no oral lesions or ulcers [Supple] : the neck was supple [Normal Rate and Effort] : normal respiratory rhythm and effort [No Crackles] : no crackles [Bilateral Audible Breath Sounds] : bilateral audible breath sounds [Normal Rate] : heart rate was normal  [Regular Rhythm] : with a regular rhythm [Soft] : abdomen soft [Not Tender] : non-tender [No HSM] : no hepato-splenomegaly [Normal Cervical Lymph Nodes] : cervical [No clubbing] : no clubbing [No Cyanosis] : no cyanosis [Normal Mood] : mood was normal [Normal Affect] : affect was normal [Alert, Awake, Oriented as Age-Appropriate] : alert, awake, oriented as age appropriate [Conjunctival Erythema] : no conjunctival erythema [No Neck Mass] : no neck mass was observed [Wheezing] : no wheezing was heard [Eczematous Patches] : no eczematous patches [Xerosis] : xerosis [de-identified] : multiple areas of hyperpigmentation, especially on the right shoulder/upper arm (s/p central line) and abdomen (had lovenox/heparin injections)

## 2020-07-06 NOTE — REVIEW OF SYSTEMS
[SOB at Rest] : shortness of breath at rest [Cough] : cough [Joint Pains] : arthralgias [Pruritus] : pruritus [Swelling] : swelling [Nl] : ENT [Fever] : no fever [Eye Discharge] : no eye discharge [Eye Redness] : no redness [Eye Itching] : no itchy eyes [Vomiting] : no vomiting [Diarrhea] : no diarrhea [Abdominal Pain] : no abdominal pain [Headache] : no headache [Urticaria] : no urticaria [Dry Skin] : ~L dry skin [Recurrent Sinus Infections] : no recurrent sinus infections [Recurrent Throat Infections] : no recurrence of throat infections [Recurrent Bronchitis] : no recurrent bronchitis [Recurrent Ear Infections] : no recurrence or ear infections [Recurrent Skin Infections] : no recurrent skin infections [Recurrent Pneumonia] : no ~T recurrent pneumonia [FreeTextEntry3] : in the spring [FreeTextEntry4] : in the spring [FreeTextEntry5] : s/p CABG 2/2020

## 2020-07-06 NOTE — CONSULT LETTER
[Dear  ___] : Dear  [unfilled], [Consult Letter:] : I had the pleasure of evaluating your patient, [unfilled]. [Please see my note below.] : Please see my note below. [Consult Closing:] : Thank you very much for allowing me to participate in the care of this patient.  If you have any questions, please do not hesitate to contact me. [Sincerely,] : Sincerely, [FreeTextEntry3] : Lawanda Dougherty MD FAASUKUMAR, KIKI\par Adult and Pediatric Allergy, Asthma and Clinical Immunology\par  of Medicine and Pediatrics at\par   [facility]\par Section Head, Adult Allergy and Immunology\par   [facility]\par   Division of Allergy, Asthma and Immunology\par   [address] [place] [address], [place] [address]\par   [place] [address]\par   Phone [phone]  Fax [phone]\par \par   [DrCharlene  ___] : Dr. BARRIENTOS

## 2020-07-06 NOTE — IMPRESSION
[Allergy Testing Dog] : dog [Allergy Testing Cat] : cat [Allergy Testing Trees] : trees [Allergy Testing Weeds] : weeds [Allergy Testing Grasses] : grasses [Allergy Testing Dust Mite] : dust mites [Allergy Testing Mixed Feathers] : feathers [Allergy Testing Cockroach] : cockroach [] : molds

## 2020-07-06 NOTE — HISTORY OF PRESENT ILLNESS
[de-identified] : \par 65 year old male with multiple medical problems, including CAD, s/p CABG 2/3/20 course complicated by PEA cardiac arrest, E faecalis bacteremia and local infections of the sternal and right leg SVG sites, syncope 5/11/20, presents for evaluation of itching and eosinophilia. \par Inpatient and outpatient records reviewed:  \par \par Absolute eosinophil count:\par 3/16/\par 3/20/\par 5/18/\par 5/24/ with progressive increase to 3960 on 7/1/20\par \par - beginning of February 2020 - 3/22/20- He received a total of approximately 6 weeks of antibiotics Daptomycin,and Linezolid  \par - May 2020 (readmitted 5/11/20 for MRSA)- 6/24/20  -  He's been on Vancomycin for almost 6 weeks. .\par \par Patient states that itching started during  March 2020 hospitalization. Itching has been getting better since he stopped Vancomycin in the end of June. \par \par Medications aside of antibiotics that he has been taking SINCE 2/2020: \par Amiodarone\par Metoprolol\par Torsemide\par Atorvastatin- used to take years ago, stopped and restarted 2/2020\par Benzonatate- for cough that started after surgery\par Humalog, Lantus\par \par Stopped Budesonide- 2 weeks ago, took intermittently before\par \par In the spring he gets nasal and ocular allergy symptoms. He tales Loratidine and it helps. He gets itchy mouth with fresh apples.

## 2020-07-07 ENCOUNTER — APPOINTMENT (OUTPATIENT)
Dept: DISASTER EMERGENCY | Facility: CLINIC | Age: 65
End: 2020-07-07

## 2020-07-07 LAB
BASOPHILS # BLD AUTO: 0.28 K/UL
BASOPHILS NFR BLD AUTO: 2.8 %
EOSINOPHIL # BLD AUTO: 2.23 K/UL
EOSINOPHIL NFR BLD AUTO: 22 %
HCT VFR BLD CALC: 35.9 %
HGB BLD-MCNC: 10.8 G/DL
LYMPHOCYTES # BLD AUTO: 2.23 K/UL
LYMPHOCYTES NFR BLD AUTO: 22 %
MAN DIFF?: NORMAL
MCHC RBC-ENTMCNC: 26.5 PG
MCHC RBC-ENTMCNC: 30.1 GM/DL
MCV RBC AUTO: 88.2 FL
MONOCYTES # BLD AUTO: 0.93 K/UL
MONOCYTES NFR BLD AUTO: 9.2 %
NEUTROPHILS # BLD AUTO: 4.46 K/UL
NEUTROPHILS NFR BLD AUTO: 44 %
PLATELET # BLD AUTO: 205 K/UL
RBC # BLD: 4.07 M/UL
RBC # FLD: 16.4 %
TOTAL IGE SMQN RAST: 357 KU/L
WBC # FLD AUTO: 10.14 K/UL

## 2020-07-08 LAB — SARS-COV-2 N GENE NPH QL NAA+PROBE: NOT DETECTED

## 2020-07-09 ENCOUNTER — APPOINTMENT (OUTPATIENT)
Dept: INTERNAL MEDICINE | Facility: CLINIC | Age: 65
End: 2020-07-09

## 2020-07-09 ENCOUNTER — APPOINTMENT (OUTPATIENT)
Dept: PULMONOLOGY | Facility: CLINIC | Age: 65
End: 2020-07-09

## 2020-07-10 ENCOUNTER — APPOINTMENT (OUTPATIENT)
Dept: PULMONOLOGY | Facility: CLINIC | Age: 65
End: 2020-07-10
Payer: MEDICARE

## 2020-07-10 PROCEDURE — 94729 DIFFUSING CAPACITY: CPT

## 2020-07-10 PROCEDURE — 94727 GAS DIL/WSHOT DETER LNG VOL: CPT

## 2020-07-10 PROCEDURE — 94010 BREATHING CAPACITY TEST: CPT

## 2020-07-11 ENCOUNTER — TRANSCRIPTION ENCOUNTER (OUTPATIENT)
Age: 65
End: 2020-07-11

## 2020-07-30 NOTE — ED PROVIDER NOTE - PMH
Chief Complaint   Patient presents with   • Establish Care     medication check,history of breast cancer, needs to see a new oncologist in [place], was told she has 2 spots on her brain       Medications: medications verified and updated  Refills needed today?  NO  Denies known Latex allergy or symptoms of Latex sensitivity.  Patient would like communication of their results via:    Home Phone: [phone] (home)  Okay to leave a message containing results? Yes  Tobacco history: not verified    Health Maintenance Summary     Topic Due On Due Status Completed On    MAMMOGRAM - BREAST CANCER SCREENING Dec 5, 2018 Not Due Dec 5, 2016    Colorectal Cancer Screening - Colonoscopy Jun 6, 1994 Overdue     Osteoporosis Screening  Completed Feb 4, 2016    Immunization - TDAP Pregnancy  Hidden     Medicare Wellness Visit Jun 6, 2009 Overdue     IMMUNIZATION - DTaP/Tdap/Td Jun 6, 1963 Overdue     Immunization-Influenza Sep 1, 2018 Not Due     Depression Screening Mar 20, 2019 Not Due Mar 20, 2018     Jun 6, 2009 Overdue           Patient is due for topics as listed above, she wishes to decline at this time .              [facility] status addressed. Patient Inactive - Patient declined.             Diabetes    HTN (hypertension) n/a

## 2020-07-31 ENCOUNTER — LABORATORY RESULT (OUTPATIENT)
Age: 65
End: 2020-07-31

## 2020-08-07 LAB
BASOPHILS # BLD AUTO: 0.2 K/UL
BASOPHILS NFR BLD AUTO: 2 %
EOSINOPHIL # BLD AUTO: 3.21 K/UL
EOSINOPHIL NFR BLD AUTO: 31.5 %
HCT VFR BLD CALC: 34.3 %
HGB BLD-MCNC: 10.9 G/DL
IMM GRANULOCYTES NFR BLD AUTO: 0.4 %
LYMPHOCYTES # BLD AUTO: 2.18 K/UL
LYMPHOCYTES NFR BLD AUTO: 21.4 %
MAN DIFF?: NORMAL
MCHC RBC-ENTMCNC: 27.3 PG
MCHC RBC-ENTMCNC: 31.8 GM/DL
MCV RBC AUTO: 86 FL
MONOCYTES # BLD AUTO: 0.78 K/UL
MONOCYTES NFR BLD AUTO: 7.7 %
NEUTROPHILS # BLD AUTO: 3.77 K/UL
NEUTROPHILS NFR BLD AUTO: 37 %
PLATELET # BLD AUTO: 190 K/UL
RBC # BLD: 3.99 M/UL
RBC # FLD: 14.9 %
WBC # FLD AUTO: 10.18 K/UL

## 2020-08-19 ENCOUNTER — APPOINTMENT (OUTPATIENT)
Dept: RADIOLOGY | Facility: IMAGING CENTER | Age: 65
End: 2020-08-19
Payer: MEDICARE

## 2020-08-19 ENCOUNTER — OUTPATIENT (OUTPATIENT)
Dept: OUTPATIENT SERVICES | Facility: HOSPITAL | Age: 65
LOS: 1 days | End: 2020-08-19
Payer: COMMERCIAL

## 2020-08-19 DIAGNOSIS — Z95.1 PRESENCE OF AORTOCORONARY BYPASS GRAFT: Chronic | ICD-10-CM

## 2020-08-19 DIAGNOSIS — D72.1 EOSINOPHILIA: ICD-10-CM

## 2020-08-19 DIAGNOSIS — Z90.49 ACQUIRED ABSENCE OF OTHER SPECIFIED PARTS OF DIGESTIVE TRACT: Chronic | ICD-10-CM

## 2020-08-19 DIAGNOSIS — Z00.8 ENCOUNTER FOR OTHER GENERAL EXAMINATION: ICD-10-CM

## 2020-08-19 PROCEDURE — 71046 X-RAY EXAM CHEST 2 VIEWS: CPT

## 2020-08-19 PROCEDURE — 71046 X-RAY EXAM CHEST 2 VIEWS: CPT | Mod: 26

## 2020-08-25 ENCOUNTER — APPOINTMENT (OUTPATIENT)
Dept: PEDIATRIC ALLERGY IMMUNOLOGY | Facility: CLINIC | Age: 65
End: 2020-08-25
Payer: MEDICAID

## 2020-08-25 ENCOUNTER — LABORATORY RESULT (OUTPATIENT)
Age: 65
End: 2020-08-25

## 2020-08-25 VITALS
OXYGEN SATURATION: 95 % | DIASTOLIC BLOOD PRESSURE: 69 MMHG | TEMPERATURE: 97.1 F | BODY MASS INDEX: 26.37 KG/M2 | WEIGHT: 174 LBS | HEIGHT: 68 IN | HEART RATE: 74 BPM | SYSTOLIC BLOOD PRESSURE: 119 MMHG

## 2020-08-25 DIAGNOSIS — D72.1 EOSINOPHILIA: ICD-10-CM

## 2020-08-25 DIAGNOSIS — T50.905D ADVERSE EFFECT OF UNSPECIFIED DRUGS, MEDICAMENTS AND BIOLOGICAL SUBSTANCES, SUBSEQUENT ENCOUNTER: ICD-10-CM

## 2020-08-25 DIAGNOSIS — T78.1XXD OTHER ADVERSE FOOD REACTIONS, NOT ELSEWHERE CLASSIFIED, SUBSEQUENT ENCOUNTER: ICD-10-CM

## 2020-08-25 DIAGNOSIS — T50.905A ADVERSE EFFECT OF UNSPECIFIED DRUGS, MEDICAMENTS AND BIOLOGICAL SUBSTANCES, INITIAL ENCOUNTER: ICD-10-CM

## 2020-08-25 DIAGNOSIS — L85.3 XEROSIS CUTIS: ICD-10-CM

## 2020-08-25 PROCEDURE — 99214 OFFICE O/P EST MOD 30 MIN: CPT

## 2020-08-25 RX ORDER — BENZONATATE 100 MG/1
100 CAPSULE ORAL 3 TIMES DAILY
Qty: 42 | Refills: 0 | Status: COMPLETED | COMMUNITY
Start: 2020-03-19 | End: 2020-08-25

## 2020-08-25 RX ORDER — AMIODARONE HYDROCHLORIDE 200 MG/1
200 TABLET ORAL
Refills: 0 | Status: DISCONTINUED | COMMUNITY
End: 2020-08-25

## 2020-08-25 RX ORDER — LISINOPRIL 5 MG/1
5 TABLET ORAL
Refills: 0 | Status: ACTIVE | COMMUNITY

## 2020-08-25 NOTE — HISTORY OF PRESENT ILLNESS
[de-identified] : \par 65 year old male with multiple medical problems, including CAD, s/p CABG 2/3/20 course complicated by PEA cardiac arrest, E faecalis bacteremia  syncope 5/11/20, returns for a follow up  of itching and eosinophilia. \par \par - peripheral eosinophilia since 3/2020, worsening 7/1/2020\par - dry cough and SOB; normal chest X-ray 8/17/20, spirometry- moderate obstruction and restriction, f/b Dr Hernandez\par - Absolute eosinophil count 7/21/20- 3210 off Vancomycin.\par - labs 8/21: trending up BUN/creat -56/1.96, N LFTs; CBC- not done\par - New med. started lisinoptil- end of July 2020\par - 8/7/20- Spoke to Dr Steel: he stateD it is safe to stop amiodarone, but patient never got the message and never stopped it.\par - His itching is better than before but he still gets it sometimes. \par - his mouth and ?skin got itchy after eating nectarine last night (has OAS with apple)\par \par HISTORY: \par Absolute eosinophil count:\par 3/16/\par 3/20/\par 5/18/\par 5/24/ with progressive increase to 3960 on 7/1/20\par \par - beginning of February 2020 - 3/22/20- He received a total of approximately 6 weeks of antibiotics Daptomycin,and Linezolid  \par - May 2020 (readmitted 5/11/20 for MRSA)- 6/24/20  -  He's been on Vancomycin for almost 6 weeks. .\par \par Patient states that itching started during  March 2020 hospitalization. Itching has been getting better since he stopped Vancomycin in the end of June. \par \par Medications aside of antibiotics that he has been taking SINCE 2/2020: \par Amiodarone\par Metoprolol\par Torsemide\par Atorvastatin- used to take years ago, stopped and restarted 2/2020\par Benzonatate- for cough that started after surgery\par Humalog, Lantus\par \par Stopped Budesonide- 2 weeks ago, took intermittently before\par \par In the spring he gets nasal and ocular allergy symptoms. He tales Loratidine and it helps. He gets itchy mouth with fresh apples.

## 2020-08-25 NOTE — REVIEW OF SYSTEMS
[Cough] : cough [Joint Pains] : arthralgias [Pruritus] : pruritus [Dry Skin] : ~L dry skin [Nl] : ENT [Fever] : no fever [Eye Discharge] : no eye discharge [Eye Redness] : no redness [Eye Itching] : no itchy eyes [SOB at Rest] : no shortness of breath at rest [SOB with Exertion] : dyspnea on exertion [Vomiting] : no vomiting [Headache] : no headache [Diarrhea] : no diarrhea [Abdominal Pain] : no abdominal pain [Urticaria] : no urticaria [Swelling] : no swelling [Atopic Dermatitis] : no atopic dermatitis [Recurrent Throat Infections] : no recurrence of throat infections [Recurrent Sinus Infections] : no recurrent sinus infections [Recurrent Ear Infections] : no recurrence or ear infections [Recurrent Bronchitis] : no recurrent bronchitis [Recurrent Skin Infections] : no recurrent skin infections [Recurrent Pneumonia] : no ~T recurrent pneumonia [FreeTextEntry3] : in the spring [FreeTextEntry4] : in the spring [FreeTextEntry5] : s/p CABG 2/2020

## 2020-08-25 NOTE — PHYSICAL EXAM
[Alert] : alert [Well Nourished] : well nourished [No Acute Distress] : no acute distress [Conjunctival Erythema] : no conjunctival erythema [No Thrush] : no thrush [Sclera Not Icteric] : sclera not icteric [No Neck Mass] : no neck mass was observed [Normal Rate and Effort] : normal respiratory rhythm and effort [No Oral Lesions or Ulcers] : no oral lesions or ulcers [Wheezing] : no wheezing was heard [No Crackles] : no crackles [Regular Rhythm] : with a regular rhythm [Normal Rate] : heart rate was normal  [Soft] : abdomen soft [Not Tender] : non-tender [No HSM] : no hepato-splenomegaly [Normal Cervical Lymph Nodes] : cervical [de-identified] : L anterior chest- mild eczematous patch [Xerosis] : xerosis

## 2020-08-25 NOTE — REASON FOR VISIT
[Routine Follow-Up] : a routine follow-up visit for [FreeTextEntry2] : peripheral eosinophilia  [Other: _____] : [unfilled]

## 2020-08-27 ENCOUNTER — TRANSCRIPTION ENCOUNTER (OUTPATIENT)
Age: 65
End: 2020-08-27

## 2020-08-27 LAB
BASOPHILS # BLD AUTO: 0.08 K/UL
BASOPHILS NFR BLD AUTO: 1.2 %
EOSINOPHIL # BLD AUTO: 0.9 K/UL
EOSINOPHIL NFR BLD AUTO: 12.9 %
HCT VFR BLD CALC: 34.9 %
HGB BLD-MCNC: 10.8 G/DL
IMM GRANULOCYTES NFR BLD AUTO: 0.3 %
LDH SERPL-CCNC: 170 U/L
LYMPHOCYTES # BLD AUTO: 1.7 K/UL
LYMPHOCYTES NFR BLD AUTO: 24.5 %
MAN DIFF?: NORMAL
MCHC RBC-ENTMCNC: 26.9 PG
MCHC RBC-ENTMCNC: 30.9 GM/DL
MCV RBC AUTO: 86.8 FL
MONOCYTES # BLD AUTO: 0.88 K/UL
MONOCYTES NFR BLD AUTO: 12.7 %
NEUTROPHILS # BLD AUTO: 3.37 K/UL
NEUTROPHILS NFR BLD AUTO: 48.4 %
PLATELET # BLD AUTO: 151 K/UL
RBC # BLD: 4.02 M/UL
RBC # FLD: 14.8 %
TROPONIN-T, HIGH SENSITIVITY: 76 NG/L
WBC # FLD AUTO: 6.95 K/UL

## 2020-09-08 ENCOUNTER — TRANSCRIPTION ENCOUNTER (OUTPATIENT)
Age: 65
End: 2020-09-08

## 2020-09-09 ENCOUNTER — TRANSCRIPTION ENCOUNTER (OUTPATIENT)
Age: 65
End: 2020-09-09

## 2020-09-11 ENCOUNTER — APPOINTMENT (OUTPATIENT)
Dept: OTOLARYNGOLOGY | Facility: CLINIC | Age: 65
End: 2020-09-11
Payer: MEDICARE

## 2020-09-11 VITALS
HEIGHT: 68 IN | TEMPERATURE: 97.9 F | HEART RATE: 68 BPM | DIASTOLIC BLOOD PRESSURE: 65 MMHG | SYSTOLIC BLOOD PRESSURE: 119 MMHG | WEIGHT: 178 LBS | BODY MASS INDEX: 26.98 KG/M2

## 2020-09-11 PROCEDURE — 92567 TYMPANOMETRY: CPT

## 2020-09-11 PROCEDURE — 99214 OFFICE O/P EST MOD 30 MIN: CPT | Mod: 25

## 2020-09-11 PROCEDURE — 92557 COMPREHENSIVE HEARING TEST: CPT

## 2020-09-11 NOTE — DATA REVIEWED
[de-identified] : bilat SNHL\par Hearing Test performed to evaluate the extent of hearing loss and  to explain pt's symptoms\par

## 2020-09-11 NOTE — PHYSICAL EXAM
[Fukuda Step Test] : Fukuda Step Test was Positive [Midline] : trachea located in midline position [Normal] : no rashes [Hearing Loss Right Only] : normal [Nystagmus] : ~T no ~M nystagmus was seen [Hearing Loss Left Only] : normal [Fistula Sign] : Fistula Sign: Negative [de-identified] : weak step test [de-identified] : w

## 2020-09-11 NOTE — HISTORY OF PRESENT ILLNESS
[Dizziness] : dizziness [Anxiety] : no anxiety [de-identified] : 64 yo male\par Patient with hx of cardia surgery complains of imbalance x 2 months. States after cardiac surgery he has bad balance. Room is not spinning, feels lightheaded and off balance. [place] his PCP advised to see ENT.\par Pt has no ear pain, ear drainage, hearing loss, tinnitus, nasal congestion, nasal discharge, epistaxis, sinus infections, facial pain, facial pressure, throat pain, dysphagia or fevers\par \par  [Hearing Loss] : no hearing loss [Headache] : no headache [Recurrent Otitis Media] : no recurrent otitis media [Otitis Media with Effusion] : no otitis media with effusion [Presbycusis] : no presbycusis [Congenital Ear Malformation] : no congenital ear malformation [Otosclerosis] : no otosclerosis [Meniere Disease] : no Meniere disease [Hypertension] : no hypertension [Perilymphatic Fistula] : no perilymphatic fistula [Loud Noise Exposure] : no history of loud noise exposure [Early Onset Hearing Loss] : no early onset hearing loss [Smoking] : no smoking [Stroke] : no stroke [Facial Pain] : no facial pain [Facial Pressure] : no facial pressure [Nasal Congestion] : no nasal congestion [Diplopia] : no diplopia [Allergic Rhinitis] : no allergic rhinitis [Ear Fullness] : no ear fullness [Asthma] : no asthma [Septal Deviation] : no septal deviation [Maxillary Tooth Infection] : no maxillary tooth infection [Seasonal Allergies] : no seasonal allergies [Nasal FB Removal] : no nasal foreign body removal [Neck Mass] : no neck mass [Chills] : no chills [Cold Intolerance] : no cold intolerance [Cough] : no cough [Fatigue] : no fatigue [Heat Intolerance] : no heat intolerance [Hyperthyroidism] : no hyperthyroidism [Sialadenitis] : no sialadenitis [Hodgkin Disease] : no hodgkin disease [Non-Hodgkin Lymphoma] : no non-hodgkin lymphoma [Tobacco Use] : no tobacco use [Alcohol Use] : no alcohol use [Thyroid Cancer] : no thyroid cancer [Graves Disease] : no graves disease

## 2020-09-11 NOTE — ASSESSMENT
[FreeTextEntry1] : Vestibulopathy-imbalance\par -Hearing Test performed to evaluate the extent of hearing loss and to explain pt's symptoms-SNHL\par bilateral sensorineural hearing loss-cleared for hearing aids\par \par Rec --vestibular therapy\par \par f/u 6 months

## 2020-09-14 ENCOUNTER — APPOINTMENT (OUTPATIENT)
Dept: OPHTHALMOLOGY | Facility: CLINIC | Age: 65
End: 2020-09-14
Payer: MEDICARE

## 2020-09-14 ENCOUNTER — NON-APPOINTMENT (OUTPATIENT)
Age: 65
End: 2020-09-14

## 2020-09-14 PROCEDURE — 92015 DETERMINE REFRACTIVE STATE: CPT

## 2020-09-14 PROCEDURE — 92004 COMPRE OPH EXAM NEW PT 1/>: CPT

## 2020-09-23 ENCOUNTER — APPOINTMENT (OUTPATIENT)
Dept: PULMONOLOGY | Facility: CLINIC | Age: 65
End: 2020-09-23
Payer: MEDICARE

## 2020-09-23 VITALS
HEIGHT: 68 IN | OXYGEN SATURATION: 98 % | RESPIRATION RATE: 17 BRPM | HEART RATE: 76 BPM | BODY MASS INDEX: 27.49 KG/M2 | TEMPERATURE: 97.3 F | DIASTOLIC BLOOD PRESSURE: 74 MMHG | WEIGHT: 181.38 LBS | SYSTOLIC BLOOD PRESSURE: 130 MMHG

## 2020-09-23 PROCEDURE — G0008: CPT

## 2020-09-23 PROCEDURE — 99214 OFFICE O/P EST MOD 30 MIN: CPT | Mod: 25

## 2020-09-23 PROCEDURE — 90662 IIV NO PRSV INCREASED AG IM: CPT

## 2020-09-23 RX ORDER — OLOPATADINE HYDROCHLORIDE 665 UG/1
0.6 SPRAY, METERED NASAL
Qty: 3 | Refills: 1 | Status: DISCONTINUED | COMMUNITY
Start: 2020-09-23 | End: 2020-09-23

## 2020-09-23 NOTE — ADDENDUM
[FreeTextEntry1] : Documented by Simona Acevedo acting as a scribe for Dr. Mike Hernandez on 09/23/2020 \par \par All medical record entries made by the Scribe were at my, Dr. Mike Hernandez's, direction and personally dictated by me on 09/23/2020 . I have reviewed the chart and agree that the record accurately reflects my personal performance of the history, physical exam, assessment and plan. I have also personally directed, reviewed, and agree with the discharge instructions.

## 2020-09-23 NOTE — PHYSICAL EXAM
[No Acute Distress] : no acute distress [Normal Oropharynx] : normal oropharynx [II] : Mallampati Class: II [Normal Appearance] : normal appearance [No Neck Mass] : no neck mass [Normal Rate/Rhythm] : normal rate/rhythm [Normal S1, S2] : normal s1, s2 [No Murmurs] : no murmurs [No Resp Distress] : no resp distress [Clear to Auscultation Bilaterally] : clear to auscultation bilaterally [No Abnormalities] : no abnormalities [Benign] : benign [Normal Gait] : normal gait [No Clubbing] : no clubbing [No Cyanosis] : no cyanosis [FROM] : FROM [Normal Color/ Pigmentation] : normal color/ pigmentation [No Focal Deficits] : no focal deficits [Oriented x3] : oriented x3 [Normal Affect] : normal affect [TextBox_2] : Wearing Oxygen, bandage over sternum  [TextBox_68] : I:E ratio 1:3; clear   [TextBox_80] : Wound VAC in place, Fractured ribs due to CPR.  [TextBox_105] : 1+ LE Edema

## 2020-09-23 NOTE — ASSESSMENT
[FreeTextEntry1] : Mr. FRANKLIN PRESLEY is a 65 year year old male who has a history of  DM, Coronary artery disease, pleural thickening, s/p median sternotomy complicated by cardiac arrest and fractured ribs.  who now comes in for pulmonary evaluation. SOB and Cough -improved \par \par The patient's SOB is felt to be multifactorial:\par -pulmonary \par    - Restrictive Dysfunction due to healing rib fractured and median sternotomy \par    - ?Tracheomalacia \par - Poor breathing mechanics\par - Overweight/ Out-of Shape\par - ?Cardiac Disease - Dr. Steel \par \par Problem 1: Restrictive Dysfunction\par -Due to healing rib fracture and median sternotomy. \par \par Problem 2: Tracheomalacia (?)\par -Complete Dynamic CT \par -continue Amitriptyline 10 mg Q8H; to take QHS for the first week, then increase to Q8H \par \par -Tracheomalacia is usually acquired in adults and common causes include damage by tracheostomy or endotracheal intubation damaging the tracheal cartilage with increase risk with multiple intubations, prolonged intubation, and concurrent high dose steroid therapy; external chest wall trauma and surgery; chronic compression of the trachea by benign etiologies (eg, benign mediastinal goiter) or malignancy; relapsing polychondritis; or recurrent infection. Tracheomalacia can be asymptomatic, however signs or symptoms can develop as the severity of the airway narrowing progresses with major symptoms include dyspnea, cough, and sputum retention. Other symptoms include severe paroxysms of coughing, wheezing or stridor, barking cough and may be exacerbated by forced expiration, cough, and valsalva maneuver. Tracheomalacia is diagnosed by a bronchoscopic visualization of dynamic airway collapse on dynamic chest CT. Therapy is warranted in symptomatic patients with severe tracheomalacia and includes surgical repair as tracheobronchoplasty. The patient was referred to Dr. Bulmaro Wing or Dr. Omar Gordon, at [facility] for a surgical consult.\par \par Problem 3: Chronic Cough\par DDx\par -Tracheomalacia \par RADS / Asthma \par PND \par reflux \par \par -Any cough greater than three weeks duration-differential diagnosis includes-asthma, upper airway cough syndrome, post nasal drip syndrome, gastroesophageal reflux, laryngopharyngeal reflux, cardiac disease (congestive heart failure, medicines, effects, etc), medication effects (b-blockers, ace inhibitors, ARBs, glaucoma meds, etc.), smoking, infectious, multifactorial, etc. \par \par Problem 4: RADS / Asthma \par -change Xopenex  and Budesonide (0.63) TID via nebulizer to -add Trelegy 1 puff QD\par -add Ventolin 2 puffs Q6H, pre-exercise  \par \par -Asthma is believed to be caused by inherited (genetic) and environmental factor, but its exact cause is unknown. Asthma may be triggered by allergens, lung infections, or irritants in the air. Asthma triggers are different for each person \par \par Problem 5: PND\par -add Olopatadine 0.6% 1 sniff BID \par \par -Environmental measures for allergies were encouraged including mattress and pillow cover, air purifier, and environmental controls. \par \par Problem 6: GERD / Laryngomalacia\par -Recommended to see ENT Dr. Sy Marion\par -continue Protonix 40mg qAM before breakfast \par -continue Pepcid 40mg QHS \par \par - Things to avoid including overeating, spicy foods, tight clothing, eating within three hours of bed, this list is not all inclusive.\par \par - For treatment of reflux, possible options discussed including diet control, H2 blockers, PPIs, as well as coating motility agents discussed as treatment options. Timing of meals and proximity of last meal to sleep were discussed. If symptoms persist, a formal gastrointestinal evaluation is needed. \par \par Problem 7: s/p Amiodarone (off)\par -PFTs/TFTs/LFTs; 2-3 times per year\par - Amiodarone/bleomycin/methotrexate and other drugs have been associated with pulmonary parenchymal damage. These drugs require pulmonary function testing including DLCO regularly and possible CT/CXR if respiratory complaints develop. PFTs should be performed at 6 month intervals. \par \par Problem 7A: Cardiac \par - follow up with Dr. Cortes \par \par Problem 8: Overweight\par - Weight loss, exercise, and diet control were discussed and are highly encouraged. Treatment options were given such as, aqua therapy, and contacting a nutritionist. Recommended to use the elliptical, stationary bike, less use of treadmill. Mindful eating was explained to the patient Obesity is associated with worsening asthma, shortness of breath, and potential for cardiac disease, diabetes, and other underlying medical conditions.\par \par Problem 9: Poor Breathing Mechanics\par - Proper breathing techniques were reviewed with an emphasis of exhalation. Patient instructed to breath in for 1 second and out for four seconds. Patient was encouraged to not talk while walking.\par \par Problem 10: Health Maintenance/COVID19 Precautions:\par - Clean your hands often. Wash your hands often with soap and water for at least 20 seconds, especially after blowing your nose, coughing, or sneezing, or having been in a public place.\par - If soap and water are not available, use a hand  that contains at least 60% alcohol.\par - To the extent possible, avoid touching high-touch surfaces in public places - elevator buttons, door handles, handrails, handshaking with people, etc. Use a tissue or your sleeve to cover your hand or finger if you must touch something.\par - Wash your hands after touching surfaces in public places.\par - Avoid touching your face, nose, eyes, etc.\par - Clean and disinfect your home to remove germs: practice routine cleaning of frequently touched surfaces (for example: tables, doorknobs, light switches, handles, desks, toilets, faucets, sinks & cell phones)\par - Avoid crowds, especially in poorly ventilated spaces. Your risk of exposure to respiratory viruses like COVID-19 may increase in crowded, closed-in settings with little air circulation if there are people in the crowd who are sick. All patients are recommended to practice social distancing and stay at least 6 feet away from others.\par \par Immune Support Recommendations:\par -OTC Vitamin C 500mg BID \par -OTC Quercetin 250-500mg BID \par -OTC Zinc 75-100mg per day \par -OTC Melatonin 1 or 2 mg a night \par -OTC Vitamin D 1-4000mg per day \par -OTC Tonic Water 8oz per day\par \par \par Problem 11: Health maintenance\par -s/p flu shot -9/2020\par -recommended strep pneumonia vaccines: Prevnar-13 vaccine (6/16/2020), followed by Pneumo vaccine 23 one year following (after the age of 65)\par -recommended early intervention for URIs\par -recommended regular osteoporosis evaluations\par -recommended early dermatological evaluations\par -recommended after the age of 50 to the age of 70, colonoscopy every 5 years\par \par f/u in 3 months with full PFTs \par pt is encouraged to call or fax the office with any questions or concerns.

## 2020-09-23 NOTE — HISTORY OF PRESENT ILLNESS
[TextBox_4] : Mr. FRANKLIN PRESLEY is a 65 year old male coming into the office for an initial evaluation. His chief complaint is \par - he has been feeling fatigued, he gets tired very fast. He feels its because he has been SOB. He feels his stamina has been down. \par - he notes his chest is still numb since the surgery. He notes it has been slowly progressing. He can't really tell. \par - he has been having some visual issues. He went to the eye doctor, he was given glasses but he needs to see a specialist for his right eye. \par - he quit taking some of his medications because he felt his blood pressure was too low. \par - he notes he has not been drinking enough water. \par - he is off antibiotics \par - he has not been doing the nebulizer for his breathing\par - he keeps his inhaler at hand just in case\par - right now he has been getting some post nasal drop, intermittently \par - he is OFF Amiodarone \par - he has been snoring a bit\par - he wakes up rested. \par - He  denies any , headaches, nausea, vomiting, fever, chills, sweats, diarrhea, constipation, dysphagia, myalgia, dizziness, leg swelling, leg pain, itchy eyes, itchy ears, heartburn, reflux, or sour taste in the mouth.\par

## 2020-10-07 ENCOUNTER — APPOINTMENT (OUTPATIENT)
Dept: OPHTHALMOLOGY | Facility: CLINIC | Age: 65
End: 2020-10-07

## 2020-12-01 PROCEDURE — G9005: CPT

## 2020-12-23 ENCOUNTER — APPOINTMENT (OUTPATIENT)
Dept: PULMONOLOGY | Facility: CLINIC | Age: 65
End: 2020-12-23
Payer: MEDICARE

## 2020-12-23 VITALS
RESPIRATION RATE: 16 BRPM | WEIGHT: 174 LBS | SYSTOLIC BLOOD PRESSURE: 120 MMHG | TEMPERATURE: 97.5 F | OXYGEN SATURATION: 98 % | BODY MASS INDEX: 27.31 KG/M2 | HEART RATE: 75 BPM | HEIGHT: 67 IN | DIASTOLIC BLOOD PRESSURE: 70 MMHG

## 2020-12-23 DIAGNOSIS — J30.81 ALLERGIC RHINITIS DUE TO ANIMAL (CAT) (DOG) HAIR AND DANDER: ICD-10-CM

## 2020-12-23 PROCEDURE — 99214 OFFICE O/P EST MOD 30 MIN: CPT

## 2020-12-23 PROCEDURE — 99072 ADDL SUPL MATRL&STAF TM PHE: CPT

## 2020-12-23 NOTE — ASSESSMENT
[FreeTextEntry1] : Mr. FRANKLIN PRESLEY is a 65 year year old male who has a history of  DM, Coronary artery disease, pleural thickening, s/p median sternotomy complicated by cardiac arrest and fractured ribs.  who now comes in for pulmonary evaluation. SOB and Cough -improved / still poor balance #1 chest tightness / tenderness \par \par The patient's SOB is felt to be multifactorial:\par -pulmonary \par    - Restrictive Dysfunction due to healing rib fractured and median sternotomy \par    - ?Tracheomalacia \par - Poor breathing mechanics\par - Overweight/ Out-of Shape\par - ?Cardiac Disease - Dr. Steel \par \par Problem 1: Restrictive Dysfunction\par -Due to healing rib fracture and median sternotomy. \par \par Problem 2: Tracheomalacia (?)\par -Complete Dynamic CT \par -continue Amitriptyline 10 mg Q8H; to take QHS for the first week, then increase to Q8H \par \par -Tracheomalacia is usually acquired in adults and common causes include damage by tracheostomy or endotracheal intubation damaging the tracheal cartilage with increase risk with multiple intubations, prolonged intubation, and concurrent high dose steroid therapy; external chest wall trauma and surgery; chronic compression of the trachea by benign etiologies (eg, benign mediastinal goiter) or malignancy; relapsing polychondritis; or recurrent infection. Tracheomalacia can be asymptomatic, however signs or symptoms can develop as the severity of the airway narrowing progresses with major symptoms include dyspnea, cough, and sputum retention. Other symptoms include severe paroxysms of coughing, wheezing or stridor, barking cough and may be exacerbated by forced expiration, cough, and valsalva maneuver. Tracheomalacia is diagnosed by a bronchoscopic visualization of dynamic airway collapse on dynamic chest CT. Therapy is warranted in symptomatic patients with severe tracheomalacia and includes surgical repair as tracheobronchoplasty. The patient was referred to Dr. Bulmaro Wing or Dr. Omar Gordon, at [facility] for a surgical consult.\par \par Problem 3: Chronic Cough\par DDx\par -Tracheomalacia \par RADS / Asthma \par PND \par reflux \par \par -Any cough greater than three weeks duration-differential diagnosis includes-asthma, upper airway cough syndrome, post nasal drip syndrome, gastroesophageal reflux, laryngopharyngeal reflux, cardiac disease (congestive heart failure, medicines, effects, etc), medication effects (b-blockers, ace inhibitors, ARBs, glaucoma meds, etc.), smoking, infectious, multifactorial, etc. \par \par Problem 4: RADS / Asthma \par -change Xopenex  and Budesonide (0.63) TID via nebulizer to -add Trelegy 1 puff QD (using)\par -continue Ventolin 2 puffs Q6H, pre-exercise  \par \par -Asthma is believed to be caused by inherited (genetic) and environmental factor, but its exact cause is unknown. Asthma may be triggered by allergens, lung infections, or irritants in the air. Asthma triggers are different for each person \par \par Problem 5: PND\par -continue Olopatadine 0.6% 1 sniff BID \par \par -Environmental measures for allergies were encouraged including mattress and pillow cover, air purifier, and environmental controls. \par \par Problem 6: GERD / Laryngomalacia\par -Recommended to see ENT Dr. Sy Marion\par -continue Protonix 40mg qAM before breakfast \par -continue Pepcid 40mg QHS \par \par - Things to avoid including overeating, spicy foods, tight clothing, eating within three hours of bed, this list is not all inclusive.\par \par - For treatment of reflux, possible options discussed including diet control, H2 blockers, PPIs, as well as coating motility agents discussed as treatment options. Timing of meals and proximity of last meal to sleep were discussed. If symptoms persist, a formal gastrointestinal evaluation is needed. \par \par Problem 7: s/p Amiodarone (off)\par -PFTs/TFTs/LFTs; 2-3 times per year\par - Amiodarone/bleomycin/methotrexate and other drugs have been associated with pulmonary parenchymal damage. These drugs require pulmonary function testing including DLCO regularly and possible CT/CXR if respiratory complaints develop. PFTs should be performed at 6 month intervals. \par \par Problem 7A: Cardiac \par - follow up with Dr. Cortes \par \par Problem 8: Overweight\par - Weight loss, exercise, and diet control were discussed and are highly encouraged. Treatment options were given such as, aqua therapy, and contacting a nutritionist. Recommended to use the elliptical, stationary bike, less use of treadmill. Mindful eating was explained to the patient Obesity is associated with worsening asthma, shortness of breath, and potential for cardiac disease, diabetes, and other underlying medical conditions.\par \par Problem 9: Poor Breathing Mechanics\par - Proper breathing techniques were reviewed with an emphasis of exhalation. Patient instructed to breath in for 1 second and out for four seconds. Patient was encouraged to not talk while walking.\par \par Problem 10: snoring / ?OSAS\par - get home sleep study\par Sleep apnea is associated with adverse clinical consequences which can affect most organ systems.  Cardiovascular disease risk includes arrhythmias, atrial fibrillation, hypertension, coronary artery disease, and stroke. Metabolic disorders include diabetes type 2, non-alcoholic fatty liver disease. Mood disorder especially depression; and cognitive decline especially in the elderly. Associations with  chronic reflux/Laurent’s esophagus some but not all inclusive. \par -Reasons  include arousal consistent with hypopnea; respiratory events most prominent in REM sleep or supine position; therefore sleep staging and body position are important for accurate diagnosis and estimation of AHI.\par \par Problem 11: Health Maintenance/COVID19 Precautions:\par - Clean your hands often. Wash your hands often with soap and water for at least 20 seconds, especially after blowing your nose, coughing, or sneezing, or having been in a public place.\par - If soap and water are not available, use a hand  that contains at least 60% alcohol.\par - To the extent possible, avoid touching high-touch surfaces in public places - elevator buttons, door handles, handrails, handshaking with people, etc. Use a tissue or your sleeve to cover your hand or finger if you must touch something.\par - Wash your hands after touching surfaces in public places.\par - Avoid touching your face, nose, eyes, etc.\par - Clean and disinfect your home to remove germs: practice routine cleaning of frequently touched surfaces (for example: tables, doorknobs, light switches, handles, desks, toilets, faucets, sinks & cell phones)\par - Avoid crowds, especially in poorly ventilated spaces. Your risk of exposure to respiratory viruses like COVID-19 may increase in crowded, closed-in settings with little air circulation if there are people in the crowd who are sick. All patients are recommended to practice social distancing and stay at least 6 feet away from others.\par \par Immune Support Recommendations:\par -OTC Vitamin C 500mg BID \par -OTC Quercetin 250-500mg BID \par -OTC Zinc 75-100mg per day \par -OTC Melatonin 1 or 2 mg a night \par -OTC Vitamin D 1-4000mg per day \par -OTC Tonic Water 8oz per day\par \par \par Problem 11: Health maintenance\par - recommended topricin cream for the chest \par -s/p flu shot -9/2020\par -recommended strep pneumonia vaccines: Prevnar-13 vaccine (6/16/2020), followed by Pneumo vaccine 23 one year following (after the age of 65)\par -recommended early intervention for URIs\par -recommended regular osteoporosis evaluations\par -recommended early dermatological evaluations\par -recommended after the age of 50 to the age of 70, colonoscopy every 5 years\par \par f/u in 3 months with full PFTs \par pt is encouraged to call or fax the office with any questions or concerns.

## 2020-12-23 NOTE — PHYSICAL EXAM
[No Acute Distress] : no acute distress [Normal Oropharynx] : normal oropharynx [Normal Appearance] : normal appearance [No Neck Mass] : no neck mass [Normal Rate/Rhythm] : normal rate/rhythm [Normal S1, S2] : normal s1, s2 [No Murmurs] : no murmurs [No Resp Distress] : no resp distress [Clear to Auscultation Bilaterally] : clear to auscultation bilaterally [No Abnormalities] : no abnormalities [Benign] : benign [Normal Gait] : normal gait [No Clubbing] : no clubbing [No Cyanosis] : no cyanosis [FROM] : FROM [Normal Color/ Pigmentation] : normal color/ pigmentation [No Focal Deficits] : no focal deficits [Oriented x3] : oriented x3 [Normal Affect] : normal affect [III] : Mallampati Class: III [TextBox_2] : Wearing Oxygen, bandage over sternum  [TextBox_54] : 1/6 sys murmur  [TextBox_68] : I:E ratio 1:3; clear   [TextBox_80] : Wound VAC in place, Fractured ribs due to CPR.  [TextBox_105] : 1+ LE Edema

## 2020-12-23 NOTE — HISTORY OF PRESENT ILLNESS
[TextBox_4] : Mr. FRANKLIN PRESLEY is a 65 year old male coming into the office for an initial evaluation. His chief complaint is \par - he has gotten 80% \par - his steadiness is still off, when he gets up and stands up he wobbles and is not quite strong yet. \par - no chest pain / pressure \par - he has constipation once in awhile but he uses MiraLAX. \par - he has been eating better, more balanced \par - no difficulty swallowing \par - his weight last time was 177 lbs,\par - he goes to bed very late about 1-3am. then he sleeps till about latest 2pm.\par - he notes he gets at least 6 hours of sleep \par - denies taking any new medications, vitamins, or supplements. \par - he notes he cut out of bunch of medications with his cardiologist, he feels better since he has been off of them. \par - he feels his chest is  / tight and it throws his balance off. \par - he still snores, his wife notes he snores then stops. \par - He  denies any visual issues, headaches, nausea, vomiting, fever, chills, sweats, chest pains, chest pressure, diarrhea, constipation, dysphagia, myalgia, dizziness, leg swelling, leg pain, itchy eyes, itchy ears, heartburn, reflux, or sour taste in the mouth.\par

## 2020-12-23 NOTE — ADDENDUM
[FreeTextEntry1] : Documented by Simona Acevedo acting as a scribe for Dr. Mike Hernandez on 12/23/2020 \par \par All medical record entries made by the Scribe were at my, Dr. Mike Hernandez's, direction and personally dictated by me on 12/23/2020 . I have reviewed the chart and agree that the record accurately reflects my personal performance of the history, physical exam, assessment and plan. I have also personally directed, reviewed, and agree with the discharge instructions.
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
## 2021-01-01 ENCOUNTER — OUTPATIENT (OUTPATIENT)
Dept: OUTPATIENT SERVICES | Facility: HOSPITAL | Age: 66
LOS: 1 days | End: 2021-01-01
Payer: MEDICAID

## 2021-01-01 DIAGNOSIS — Z95.1 PRESENCE OF AORTOCORONARY BYPASS GRAFT: Chronic | ICD-10-CM

## 2021-01-01 DIAGNOSIS — Z90.49 ACQUIRED ABSENCE OF OTHER SPECIFIED PARTS OF DIGESTIVE TRACT: Chronic | ICD-10-CM

## 2021-01-01 PROCEDURE — G9005: CPT

## 2021-02-01 ENCOUNTER — OUTPATIENT (OUTPATIENT)
Dept: OUTPATIENT SERVICES | Facility: HOSPITAL | Age: 66
LOS: 1 days | End: 2021-02-01
Payer: MEDICARE

## 2021-02-01 DIAGNOSIS — Z90.49 ACQUIRED ABSENCE OF OTHER SPECIFIED PARTS OF DIGESTIVE TRACT: Chronic | ICD-10-CM

## 2021-02-01 DIAGNOSIS — Z95.1 PRESENCE OF AORTOCORONARY BYPASS GRAFT: Chronic | ICD-10-CM

## 2021-02-18 DIAGNOSIS — Z71.89 OTHER SPECIFIED COUNSELING: ICD-10-CM

## 2021-03-04 ENCOUNTER — APPOINTMENT (OUTPATIENT)
Dept: SLEEP CENTER | Facility: CLINIC | Age: 66
End: 2021-03-04
Payer: MEDICARE

## 2021-03-04 ENCOUNTER — OUTPATIENT (OUTPATIENT)
Dept: OUTPATIENT SERVICES | Facility: HOSPITAL | Age: 66
LOS: 1 days | End: 2021-03-04
Payer: COMMERCIAL

## 2021-03-04 DIAGNOSIS — Z90.49 ACQUIRED ABSENCE OF OTHER SPECIFIED PARTS OF DIGESTIVE TRACT: Chronic | ICD-10-CM

## 2021-03-04 DIAGNOSIS — Z95.1 PRESENCE OF AORTOCORONARY BYPASS GRAFT: Chronic | ICD-10-CM

## 2021-03-04 PROCEDURE — 95800 SLP STDY UNATTENDED: CPT

## 2021-03-04 PROCEDURE — 95806 SLEEP STUDY UNATT&RESP EFFT: CPT | Mod: 26

## 2021-03-04 PROCEDURE — 95806 SLEEP STUDY UNATT&RESP EFFT: CPT

## 2021-03-09 DIAGNOSIS — G47.33 OBSTRUCTIVE SLEEP APNEA (ADULT) (PEDIATRIC): ICD-10-CM

## 2021-03-11 DIAGNOSIS — Z71.89 OTHER SPECIFIED COUNSELING: ICD-10-CM

## 2021-03-26 ENCOUNTER — NON-APPOINTMENT (OUTPATIENT)
Age: 66
End: 2021-03-26

## 2021-04-07 DIAGNOSIS — Z01.812 ENCOUNTER FOR PREPROCEDURAL LABORATORY EXAMINATION: ICD-10-CM

## 2021-04-17 DIAGNOSIS — Z01.818 ENCOUNTER FOR OTHER PREPROCEDURAL EXAMINATION: ICD-10-CM

## 2021-04-18 ENCOUNTER — APPOINTMENT (OUTPATIENT)
Dept: DISASTER EMERGENCY | Facility: CLINIC | Age: 66
End: 2021-04-18

## 2021-04-18 LAB — SARS-COV-2 N GENE NPH QL NAA+PROBE: NOT DETECTED

## 2021-04-21 ENCOUNTER — APPOINTMENT (OUTPATIENT)
Dept: PULMONOLOGY | Facility: CLINIC | Age: 66
End: 2021-04-21
Payer: MEDICAID

## 2021-04-21 VITALS
DIASTOLIC BLOOD PRESSURE: 78 MMHG | HEART RATE: 68 BPM | RESPIRATION RATE: 16 BRPM | OXYGEN SATURATION: 97 % | TEMPERATURE: 97.2 F | BODY MASS INDEX: 28.88 KG/M2 | HEIGHT: 67 IN | SYSTOLIC BLOOD PRESSURE: 120 MMHG | WEIGHT: 184 LBS

## 2021-04-21 DIAGNOSIS — G47.33 OBSTRUCTIVE SLEEP APNEA (ADULT) (PEDIATRIC): ICD-10-CM

## 2021-04-21 PROCEDURE — 94727 GAS DIL/WSHOT DETER LNG VOL: CPT

## 2021-04-21 PROCEDURE — ZZZZZ: CPT

## 2021-04-21 PROCEDURE — 95012 NITRIC OXIDE EXP GAS DETER: CPT

## 2021-04-21 PROCEDURE — 94618 PULMONARY STRESS TESTING: CPT

## 2021-04-21 PROCEDURE — 94010 BREATHING CAPACITY TEST: CPT

## 2021-04-21 PROCEDURE — 94729 DIFFUSING CAPACITY: CPT

## 2021-04-21 PROCEDURE — 99214 OFFICE O/P EST MOD 30 MIN: CPT | Mod: 25

## 2021-04-21 PROCEDURE — 99072 ADDL SUPL MATRL&STAF TM PHE: CPT

## 2021-04-21 NOTE — HISTORY OF PRESENT ILLNESS
[TextBox_4] : Mr. FRANKLIN PRESLEY is a 66 year old male coming into the office for an initial evaluation. His chief complaint is \par \par -he notes generally feeling well\par -he notes gait assisting intermittent poor balance\par -he notes regular exercise walking\par -he notes intermittent numbness sensation\par -he notes intermittent constipation exacerbated by inactivity\par -he notes energy levels 6/10 on average\par -he notes right knee and ankle swelling\par -he denies getting enough sleep due to poor sleep hygiene watching TV, alleviated by naps during the day\par -he notes recent weight gain\par -he notes intermittent chest pressure following surgery \par \par -denies any chest pain, diarrhea, dysphagia, sour taste in the mouth, dizziness, leg swelling, leg pain, myalgias, arthralgias, itchy eyes, itchy ears, heartburn, or reflux.\par \par

## 2021-04-21 NOTE — ASSESSMENT
[FreeTextEntry1] : Mr. FRANKLIN PRESLEY is a 66 year year old male who has a history of  DM, Coronary artery disease, pleural thickening, s/p median sternotomy complicated by cardiac arrest and fractured ribs.  who now comes in for pulmonary evaluation. SOB and Cough -improved / still poor balance #1 chest tightness / tenderness , Dxsevere OSAS\par \par The patient's SOB is felt to be multifactorial:\par -pulmonary \par    - Restrictive Dysfunction due to healing rib fractured and median sternotomy \par    - ?Tracheomalacia \par - Poor breathing mechanics\par - Overweight/ Out-of Shape\par - ?Cardiac Disease - Dr. Steel \par \par Problem 1: Restrictive Dysfunction\par -Due to healing rib fracture and median sternotomy. \par \par Problem 2: Tracheomalacia (?)\par -Complete Dynamic CT \par -continue Amitriptyline 10 mg Q8H\par \par -Tracheomalacia is usually acquired in adults and common causes include damage by tracheostomy or endotracheal intubation damaging the tracheal cartilage with increase risk with multiple intubations, prolonged intubation, and concurrent high dose steroid therapy; external chest wall trauma and surgery; chronic compression of the trachea by benign etiologies (eg, benign mediastinal goiter) or malignancy; relapsing polychondritis; or recurrent infection. Tracheomalacia can be asymptomatic, however signs or symptoms can develop as the severity of the airway narrowing progresses with major symptoms include dyspnea, cough, and sputum retention. Other symptoms include severe paroxysms of coughing, wheezing or stridor, barking cough and may be exacerbated by forced expiration, cough, and valsalva maneuver. Tracheomalacia is diagnosed by a bronchoscopic visualization of dynamic airway collapse on dynamic chest CT. Therapy is warranted in symptomatic patients with severe tracheomalacia and includes surgical repair as tracheobronchoplasty. The patient was referred to Dr. Bulmaro Wing or Dr. Omar Gordon, at [facility] for a surgical consult.\par \par Problem 3: Chronic Cough\par DDx\par -Tracheomalacia \par RADS / Asthma \par PND \par reflux \par \par -Any cough greater than three weeks duration-differential diagnosis includes-asthma, upper airway cough syndrome, post nasal drip syndrome, gastroesophageal reflux, laryngopharyngeal reflux, cardiac disease (congestive heart failure, medicines, effects, etc), medication effects (b-blockers, ace inhibitors, ARBs, glaucoma meds, etc.), smoking, infectious, multifactorial, etc. \par \par Problem 4: RADS / Asthma \par -change Xopenex  and Budesonide (0.63) TID via nebulizer to Trelegy 100 1 puff QD (using)\par -continue Ventolin 2 puffs Q6H, pre-exercise  \par \par -Asthma is believed to be caused by inherited (genetic) and environmental factor, but its exact cause is unknown. Asthma may be triggered by allergens, lung infections, or irritants in the air. Asthma triggers are different for each person \par \par Problem 5: PND\par -continue Olopatadine 0.6% 1 sniff BID \par \par -Environmental measures for allergies were encouraged including mattress and pillow cover, air purifier, and environmental controls. \par \par Problem 6: GERD / Laryngomalacia\par -Recommended to see ENT Dr. Sy Marion\par -continue Protonix 40mg qAM before breakfast \par -continue Pepcid 40mg QHS \par \par - Things to avoid including overeating, spicy foods, tight clothing, eating within three hours of bed, this list is not all inclusive.\par \par - For treatment of reflux, possible options discussed including diet control, H2 blockers, PPIs, as well as coating motility agents discussed as treatment options. Timing of meals and proximity of last meal to sleep were discussed. If symptoms persist, a formal gastrointestinal evaluation is needed. \par \par Problem 7: s/p Amiodarone (off)\par -PFTs/TFTs/LFTs; 2-3 times per year\par - Amiodarone/bleomycin/methotrexate and other drugs have been associated with pulmonary parenchymal damage. These drugs require pulmonary function testing including DLCO regularly and possible CT/CXR if respiratory complaints develop. PFTs should be performed at 6 month intervals. \par \par Problem 7A: Cardiac \par - follow up with Dr. Cortes \par \par Problem 8: Overweight\par - Weight loss, exercise, and diet control were discussed and are highly encouraged. Treatment options were given such as, aqua therapy, and contacting a nutritionist. Recommended to use the elliptical, stationary bike, less use of treadmill. Mindful eating was explained to the patient Obesity is associated with worsening asthma, shortness of breath, and potential for cardiac disease, diabetes, and other underlying medical conditions.\par \par Problem 9: Poor Breathing Mechanics\par - Proper breathing techniques were reviewed with an emphasis of exhalation. Patient instructed to breath in for 1 second and out for four seconds. Patient was encouraged to not talk while walking.\par \par Problem 10: snoring / + OSAS\par - s/p home sleep study(+)\par -set up CPAP/ Titration Study \par Sleep apnea is associated with adverse clinical consequences which can affect most organ systems.  Cardiovascular disease risk includes arrhythmias, atrial fibrillation, hypertension, coronary artery disease, and stroke. Metabolic disorders include diabetes type 2, non-alcoholic fatty liver disease. Mood disorder especially depression; and cognitive decline especially in the elderly. Associations with  chronic reflux/Laurent’s esophagus some but not all inclusive. \par -Reasons  include arousal consistent with hypopnea; respiratory events most prominent in REM sleep or supine position; therefore sleep staging and body position are important for accurate diagnosis and estimation of AHI.\par \par Problem 11: Health Maintenance/COVID19 Precautions:\par -s/p Pfizer COVID 19 vaccine x 2\par - Clean your hands often. Wash your hands often with soap and water for at least 20 seconds, especially after blowing your nose, coughing, or sneezing, or having been in a public place.\par - If soap and water are not available, use a hand  that contains at least 60% alcohol.\par - To the extent possible, avoid touching high-touch surfaces in public places - elevator buttons, door handles, handrails, handshaking with people, etc. Use a tissue or your sleeve to cover your hand or finger if you must touch something.\par - Wash your hands after touching surfaces in public places.\par - Avoid touching your face, nose, eyes, etc.\par - Clean and disinfect your home to remove germs: practice routine cleaning of frequently touched surfaces (for example: tables, doorknobs, light switches, handles, desks, toilets, faucets, sinks & cell phones)\par - Avoid crowds, especially in poorly ventilated spaces. Your risk of exposure to respiratory viruses like COVID-19 may increase in crowded, closed-in settings with little air circulation if there are people in the crowd who are sick. All patients are recommended to practice social distancing and stay at least 6 feet away from others.\par \par Immune Support Recommendations:\par -OTC Vitamin C 500mg BID \par -OTC Quercetin 250-500mg BID \par -OTC Zinc 75-100mg per day \par -OTC Melatonin 1 or 2 mg a night \par -OTC Vitamin D 1-4000mg per day \par -OTC Tonic Water 8oz per day\par \par \par Problem 11: Health maintenance\par - recommended topricin cream for the chest \par -s/p flu shot -9/2020\par -recommended strep pneumonia vaccines: Prevnar-13 vaccine (6/16/2020), followed by Pneumo vaccine 23 one year following (after the age of 65)\par -recommended early intervention for URIs\par -recommended regular osteoporosis evaluations\par -recommended early dermatological evaluations\par -recommended after the age of 50 to the age of 70, colonoscopy every 5 years\par \par f/u in 3 months with full PFTs \par pt is encouraged to call or fax the office with any questions or concerns.

## 2021-04-21 NOTE — PHYSICAL EXAM
[No Acute Distress] : no acute distress [Normal Oropharynx] : normal oropharynx [Normal Appearance] : normal appearance [No Neck Mass] : no neck mass [Normal Rate/Rhythm] : normal rate/rhythm [Normal S1, S2] : normal s1, s2 [No Murmurs] : no murmurs [No Resp Distress] : no resp distress [Clear to Auscultation Bilaterally] : clear to auscultation bilaterally [No Abnormalities] : no abnormalities [Benign] : benign [Normal Gait] : normal gait [No Clubbing] : no clubbing [No Cyanosis] : no cyanosis [FROM] : FROM [Normal Color/ Pigmentation] : normal color/ pigmentation [No Focal Deficits] : no focal deficits [Oriented x3] : oriented x3 [Normal Affect] : normal affect [II] : Mallampati Class: II [TextBox_2] : Wearing Oxygen, bandage over sternum  [TextBox_68] : I:E ratio 1:3; clear   [TextBox_54] : 1/6 sys murmur  [TextBox_105] : 1+ LE Edema on RLE

## 2021-04-21 NOTE — PROCEDURE
[FreeTextEntry1] : \par Full PFT revealed mild to moderate restrictive dysfunction, with a FEV1 of 1.88 L, which is 61 % of predicted, moderately reduced lung volumes, and a moderately reduced diffusion of 14.3, which is 66 % of predicted, with a normal flow volume loop.\par \par Sleep study (3.4.2021) revealed sleep apnea with an AHI/DARYL of 40.2. Interpretation Notes: This study shows evidence of severe obstructive sleep apnea associated with mild to moderate oxygen desaturation. The sena oxygen saturation was 70% with 14.9% of study time below 90%. The majority of respiratory events were obstructive apneas. PAP titration for treatment of sleep-disordered breathing is recommended, if clinically indicated. Clinical  correlation is suggested.\par \par 6 minute walk test reveals a low saturation of 97% with very slight evidence of dyspnea or fatigue; walked 358.6   meters\par  \par \par FENO was 22; a normal value being less than 25\par Fractional exhaled nitric oxide (FENO) is regarded as a simple, noninvasive method for assessing eosinophilic airway inflammation. Produced by a variety of cells within the lung, nitric oxide (NO) concentrations are generally low in healthy individuals. However, high concentrations of NO appear to be involved in nonspecific host defense mechanisms and chronic inflammatory diseases such as asthma. The American Thoracic Society (ATS) therefore has recommended using FENO to aid in the diagnosis and monitoring of eosinophilic airway inflammation and asthma, and for identifying steroid responsive individuals whose chronic respiratory symptoms may be caused by airway inflammation. \par \par \par \par

## 2021-04-21 NOTE — REASON FOR VISIT
[Follow-Up] : a follow-up visit [TextBox_44] : Restrictive Dysfunction, RADS / Asthma, GERD, ?Laryngomalacia, ?TBM, Cough, severe TAIWO

## 2021-04-21 NOTE — ADDENDUM
[FreeTextEntry1] : -denies any headaches, nausea, vomiting, fever, chills, sweats, chest pain, chest pressure, diarrhea, constipation, dysphagia, sour taste in the mouth, dizziness, leg swelling, leg pain, myalgias, arthralgias, itchy eyes, itchy ears, heartburn, or reflux.\par \par

## 2021-04-26 NOTE — PROGRESS NOTE ADULT - ASSESSMENT
Problem: Pain  Goal: #Acceptable pain level achieved/maintained at rest using NRS/Faces  Description: This goal is used for patients who can self-report.  Acceptable means the level is at or below the identified comfort/function goal.  Outcome: Outcome Met, Continue evaluating goal progress toward completion     Problem: Pain  Goal: # Verbalizes understanding of pain management  Description: Documented in Patient Education Activity  Outcome: Outcome Met, Continue evaluating goal progress toward completion     Problem: At Risk for Falls  Goal: # Patient does not fall  Outcome: Outcome Met, Continue evaluating goal progress toward completion     Problem: At Risk for Falls  Goal: # Verbalizes understanding of fall risk/precautions  Description: Document education using the patient education activity  Outcome: Outcome Met, Continue evaluating goal progress toward completion     Problem: At Risk for Injury Due to Fall  Goal: # Patient does not fall  Outcome: Outcome Met, Continue evaluating goal progress toward completion     Problem: At Risk for Injury Due to Fall  Goal: # Verbalizes understanding of fall-related injury personal risks  Description: Document education using the patient education activity  Outcome: Outcome Met, Continue evaluating goal progress toward completion      intraop kennedy 2/3/20 ef 50%, nl lv, mild diastolic dysfx stage 1  limited echo 2/4/20: nl LV sys fx , no pericardial effusion     a/p  64 year old man with history of HTN, DM II, admitted with progressive exertional angina, s/p cath with severe triple vessel disease, s/p CABG, post op course c/b brief witnessed PEA likely hypoxic arrest, s/p intubation.     1. CAD, s/p cath with severe triple vessel disease including lesions at the bifurcation of the LAD/diagonal and distal RCA/RPDA/RPL trifurcation.   -s/p CABG x 4   -intraop kennedy ef 50%  -cont asa, statin  -s/p PEA arrest, now extubated  -off vasopressors  --LE dopplers, negative for dvt     2. Postop  CHF-Systolic  -cont bumex po per ctu     3. PAF  -cont amio load  -AC per CTS    4. HTN  -bp stable on hydralazine    5. STEPHANIE/CKD  renal f/u     6. Postop Pleural effusion  - S/P Right pigtail placement  -CT mgmt per CTS    7. Sepsis -E. Faecalis bacteremia  IV abx per CTICU  ID following   repeat chest xray 2/9  with LLL pna/ atelectasis         dvt ppx

## 2021-04-28 NOTE — PROGRESS NOTE ADULT - SUBJECTIVE AND OBJECTIVE BOX
infectious diseases progress note:    Patient is a 64y old  Male who presents with a chief complaint of Chest pain (02 Mar 2020 04:47)        Acute ischemic heart disease        R     No Known Allergies    Intolerances        ANTIBIOTICS/RELEVANT:  antimicrobials  DAPTOmycin IVPB 500 milliGRAM(s) IV Intermittent every 24 hours    immunologic:    OTHER:  acetaminophen   Tablet .. 650 milliGRAM(s) Oral every 6 hours PRN  ALBUTerol    90 MICROgram(s) HFA Inhaler 1 Puff(s) Inhalation every 4 hours  albuterol/ipratropium for Nebulization. 3 milliLiter(s) Nebulizer every 6 hours PRN  albuterol/ipratropium for Nebulization. 3 milliLiter(s) Nebulizer every 6 hours  aMIOdarone    Tablet 200 milliGRAM(s) Oral daily  amitriptyline 10 milliGRAM(s) Oral every 8 hours  artificial tears (preservative free) Ophthalmic Solution 1 Drop(s) Both EYES two times a day  aspirin enteric coated 81 milliGRAM(s) Oral daily  atorvastatin 40 milliGRAM(s) Oral at bedtime  benzonatate 100 milliGRAM(s) Oral every 8 hours  buDESOnide    Inhalation Suspension 0.5 milliGRAM(s) Inhalation two times a day  calcium acetate 667 milliGRAM(s) Oral three times a day with meals  furosemide    Tablet 40 milliGRAM(s) Oral daily  guaiFENesin   Syrup  (Sugar-Free) 200 milliGRAM(s) Oral every 6 hours PRN  heparin  Injectable 5000 Unit(s) SubCutaneous every 8 hours  HYDROmorphone   Tablet 2 milliGRAM(s) Oral every 3 hours PRN  insulin glargine Injectable (LANTUS) 40 Unit(s) SubCutaneous at bedtime  insulin lispro (HumaLOG) corrective regimen sliding scale   SubCutaneous Before meals and at bedtime  insulin lispro Injectable (HumaLOG) 22 Unit(s) SubCutaneous three times a day with meals  melatonin 3 milliGRAM(s) Oral at bedtime  metoprolol tartrate 25 milliGRAM(s) Oral every 12 hours  modafinil 100 milliGRAM(s) Oral daily  multivitamin 1 Tablet(s) Oral daily  mupirocin 2% Ointment 1 Application(s) Topical two times a day  oxycodone    5 mG/acetaminophen 325 mG 2 Tablet(s) Oral every 6 hours PRN  pantoprazole    Tablet 40 milliGRAM(s) Oral before breakfast  polyethylene glycol 3350 17 Gram(s) Oral daily  senna 2 Tablet(s) Oral at bedtime  silver sulfADIAZINE 1% Cream 1 Application(s) Topical daily  sodium chloride 0.65% Nasal 1 Spray(s) Both Nostrils three times a day  sodium chloride 0.9% lock flush 10 milliLiter(s) IV Push every 1 hour PRN  spironolactone 25 milliGRAM(s) Oral daily  tiotropium 18 MICROgram(s) Capsule 1 Capsule(s) Inhalation daily      Objective:  Vital Signs Last 24 Hrs  T(C): 36.8 (02 Mar 2020 05:22), Max: 37.2 (01 Mar 2020 20:05)  T(F): 98.3 (02 Mar 2020 05:22), Max: 99 (01 Mar 2020 20:05)  HR: 100 (02 Mar 2020 05:22) (88 - 100)  BP: 127/73 (02 Mar 2020 05:22) (126/73 - 149/70)  BP(mean): 91 (02 Mar 2020 05:22) (91 - 91)  RR: 18 (02 Mar 2020 05:22) (18 - 18)  SpO2: 97% (02 Mar 2020 05:22) (96% - 97%)       Eyes:DANIELA, EOMI  Ear/Nose/Throat: no oral lesion, no sinus tenderness on percussion	  Neck:no JVD, no lymphadenopathy, supple  Respiratory: CTA lanre  Cardiovascular: S1S2 RRR, no murmurs  Gastrointestinal:soft, (+) BS, no HSM  Extremities:no e/e/c        LABS:                        8.0    9.61  )-----------( 317      ( 02 Mar 2020 06:11 )             25.9     03-02    131<L>  |  96  |  39<H>  ----------------------------<  211<H>  5.1   |  24  |  1.71<H>    Ca    8.6      02 Mar 2020 06:11              MICROBIOLOGY:    RECENT CULTURES:  02-26 @ 01:29 .Other Other                Testing in progress    02-24 @ 18:24 Skin sternal wound                No growth at 48 hours          RESPIRATORY CULTURES:              RADIOLOGY & ADDITIONAL STUDIES:        Pager [phone]  After 5 pm/weekends or if no response :[id number] PACU

## 2021-05-08 ENCOUNTER — EMERGENCY (EMERGENCY)
Facility: HOSPITAL | Age: 66
LOS: 1 days | Discharge: HOME | End: 2021-05-08
Attending: EMERGENCY MEDICINE
Payer: COMMERCIAL

## 2021-05-08 VITALS
OXYGEN SATURATION: 99 % | HEIGHT: 68 IN | DIASTOLIC BLOOD PRESSURE: 81 MMHG | RESPIRATION RATE: 18 BRPM | SYSTOLIC BLOOD PRESSURE: 136 MMHG | TEMPERATURE: 98 F | HEART RATE: 65 BPM | WEIGHT: 179.9 LBS

## 2021-05-08 VITALS
SYSTOLIC BLOOD PRESSURE: 144 MMHG | OXYGEN SATURATION: 99 % | HEART RATE: 60 BPM | RESPIRATION RATE: 16 BRPM | DIASTOLIC BLOOD PRESSURE: 71 MMHG | TEMPERATURE: 98 F

## 2021-05-08 DIAGNOSIS — Z95.1 PRESENCE OF AORTOCORONARY BYPASS GRAFT: Chronic | ICD-10-CM

## 2021-05-08 DIAGNOSIS — Z90.49 ACQUIRED ABSENCE OF OTHER SPECIFIED PARTS OF DIGESTIVE TRACT: Chronic | ICD-10-CM

## 2021-05-08 LAB
ALBUMIN SERPL ELPH-MCNC: 4.1 G/DL — SIGNIFICANT CHANGE UP (ref 3.3–5)
ALP SERPL-CCNC: 77 U/L — SIGNIFICANT CHANGE UP (ref 40–120)
ALT FLD-CCNC: 13 U/L — SIGNIFICANT CHANGE UP (ref 10–45)
ANION GAP SERPL CALC-SCNC: 15 MMOL/L — SIGNIFICANT CHANGE UP (ref 5–17)
APPEARANCE UR: CLEAR — SIGNIFICANT CHANGE UP
APTT BLD: 29.7 SEC — SIGNIFICANT CHANGE UP (ref 27.5–35.5)
AST SERPL-CCNC: 22 U/L — SIGNIFICANT CHANGE UP (ref 10–40)
BACTERIA # UR AUTO: NEGATIVE — SIGNIFICANT CHANGE UP
BASOPHILS # BLD AUTO: 0.08 K/UL — SIGNIFICANT CHANGE UP (ref 0–0.2)
BASOPHILS NFR BLD AUTO: 1.1 % — SIGNIFICANT CHANGE UP (ref 0–2)
BILIRUB SERPL-MCNC: 0.3 MG/DL — SIGNIFICANT CHANGE UP (ref 0.2–1.2)
BILIRUB UR-MCNC: NEGATIVE — SIGNIFICANT CHANGE UP
BUN SERPL-MCNC: 58 MG/DL — HIGH (ref 7–23)
CALCIUM SERPL-MCNC: 10.3 MG/DL — SIGNIFICANT CHANGE UP (ref 8.4–10.5)
CHLORIDE SERPL-SCNC: 102 MMOL/L — SIGNIFICANT CHANGE UP (ref 96–108)
CO2 SERPL-SCNC: 21 MMOL/L — LOW (ref 22–31)
COLOR SPEC: COLORLESS — SIGNIFICANT CHANGE UP
CREAT SERPL-MCNC: 1.88 MG/DL — HIGH (ref 0.5–1.3)
CRP SERPL-MCNC: 3 MG/L — SIGNIFICANT CHANGE UP (ref 0–4)
DIFF PNL FLD: ABNORMAL
EOSINOPHIL # BLD AUTO: 0.56 K/UL — HIGH (ref 0–0.5)
EOSINOPHIL NFR BLD AUTO: 7.8 % — HIGH (ref 0–6)
ERYTHROCYTE [SEDIMENTATION RATE] IN BLOOD: 59 MM/HR — HIGH (ref 0–20)
GLUCOSE SERPL-MCNC: 95 MG/DL — SIGNIFICANT CHANGE UP (ref 70–99)
GLUCOSE UR QL: ABNORMAL
HCT VFR BLD CALC: 36 % — LOW (ref 39–50)
HGB BLD-MCNC: 11.9 G/DL — LOW (ref 13–17)
IMM GRANULOCYTES NFR BLD AUTO: 0.1 % — SIGNIFICANT CHANGE UP (ref 0–1.5)
INR BLD: 0.99 RATIO — SIGNIFICANT CHANGE UP (ref 0.88–1.16)
KETONES UR-MCNC: NEGATIVE — SIGNIFICANT CHANGE UP
LEUKOCYTE ESTERASE UR-ACNC: NEGATIVE — SIGNIFICANT CHANGE UP
LYMPHOCYTES # BLD AUTO: 2.29 K/UL — SIGNIFICANT CHANGE UP (ref 1–3.3)
LYMPHOCYTES # BLD AUTO: 31.9 % — SIGNIFICANT CHANGE UP (ref 13–44)
MAGNESIUM SERPL-MCNC: 2.2 MG/DL — SIGNIFICANT CHANGE UP (ref 1.6–2.6)
MCHC RBC-ENTMCNC: 29.1 PG — SIGNIFICANT CHANGE UP (ref 27–34)
MCHC RBC-ENTMCNC: 33.1 GM/DL — SIGNIFICANT CHANGE UP (ref 32–36)
MCV RBC AUTO: 88 FL — SIGNIFICANT CHANGE UP (ref 80–100)
MONOCYTES # BLD AUTO: 1.03 K/UL — HIGH (ref 0–0.9)
MONOCYTES NFR BLD AUTO: 14.4 % — HIGH (ref 2–14)
NEUTROPHILS # BLD AUTO: 3.2 K/UL — SIGNIFICANT CHANGE UP (ref 1.8–7.4)
NEUTROPHILS NFR BLD AUTO: 44.7 % — SIGNIFICANT CHANGE UP (ref 43–77)
NITRITE UR-MCNC: NEGATIVE — SIGNIFICANT CHANGE UP
NRBC # BLD: 0 /100 WBCS — SIGNIFICANT CHANGE UP (ref 0–0)
NT-PROBNP SERPL-SCNC: 234 PG/ML — SIGNIFICANT CHANGE UP (ref 0–300)
PH UR: 6.5 — SIGNIFICANT CHANGE UP (ref 5–8)
PHOSPHATE SERPL-MCNC: 4 MG/DL — SIGNIFICANT CHANGE UP (ref 2.5–4.5)
PLATELET # BLD AUTO: 161 K/UL — SIGNIFICANT CHANGE UP (ref 150–400)
POTASSIUM SERPL-MCNC: 5.7 MMOL/L — HIGH (ref 3.5–5.3)
POTASSIUM SERPL-SCNC: 5.7 MMOL/L — HIGH (ref 3.5–5.3)
PROT SERPL-MCNC: 7.8 G/DL — SIGNIFICANT CHANGE UP (ref 6–8.3)
PROT UR-MCNC: ABNORMAL
PROTHROM AB SERPL-ACNC: 11.9 SEC — SIGNIFICANT CHANGE UP (ref 10.6–13.6)
RBC # BLD: 4.09 M/UL — LOW (ref 4.2–5.8)
RBC # FLD: 11.4 % — SIGNIFICANT CHANGE UP (ref 10.3–14.5)
RBC CASTS # UR COMP ASSIST: 2 /HPF — SIGNIFICANT CHANGE UP (ref 0–4)
SODIUM SERPL-SCNC: 138 MMOL/L — SIGNIFICANT CHANGE UP (ref 135–145)
SP GR SPEC: 1.01 — LOW (ref 1.01–1.02)
TROPONIN T, HIGH SENSITIVITY RESULT: 86 NG/L — HIGH (ref 0–51)
TROPONIN T, HIGH SENSITIVITY RESULT: 93 NG/L — HIGH (ref 0–51)
UROBILINOGEN FLD QL: NEGATIVE — SIGNIFICANT CHANGE UP
WBC # BLD: 7.17 K/UL — SIGNIFICANT CHANGE UP (ref 3.8–10.5)
WBC # FLD AUTO: 7.17 K/UL — SIGNIFICANT CHANGE UP (ref 3.8–10.5)
WBC UR QL: 0 /HPF — SIGNIFICANT CHANGE UP (ref 0–5)

## 2021-05-08 PROCEDURE — 93971 EXTREMITY STUDY: CPT

## 2021-05-08 PROCEDURE — 85025 COMPLETE CBC W/AUTO DIFF WBC: CPT

## 2021-05-08 PROCEDURE — 99284 EMERGENCY DEPT VISIT MOD MDM: CPT

## 2021-05-08 PROCEDURE — 73620 X-RAY EXAM OF FOOT: CPT

## 2021-05-08 PROCEDURE — 85652 RBC SED RATE AUTOMATED: CPT

## 2021-05-08 PROCEDURE — 85610 PROTHROMBIN TIME: CPT

## 2021-05-08 PROCEDURE — 84484 ASSAY OF TROPONIN QUANT: CPT

## 2021-05-08 PROCEDURE — 93971 EXTREMITY STUDY: CPT | Mod: 26,RT

## 2021-05-08 PROCEDURE — 84100 ASSAY OF PHOSPHORUS: CPT

## 2021-05-08 PROCEDURE — 83880 ASSAY OF NATRIURETIC PEPTIDE: CPT

## 2021-05-08 PROCEDURE — 86140 C-REACTIVE PROTEIN: CPT

## 2021-05-08 PROCEDURE — 93005 ELECTROCARDIOGRAM TRACING: CPT

## 2021-05-08 PROCEDURE — 73650 X-RAY EXAM OF HEEL: CPT

## 2021-05-08 PROCEDURE — 73620 X-RAY EXAM OF FOOT: CPT | Mod: 26,RT

## 2021-05-08 PROCEDURE — 83735 ASSAY OF MAGNESIUM: CPT

## 2021-05-08 PROCEDURE — 73610 X-RAY EXAM OF ANKLE: CPT

## 2021-05-08 PROCEDURE — 71045 X-RAY EXAM CHEST 1 VIEW: CPT | Mod: 26

## 2021-05-08 PROCEDURE — 99285 EMERGENCY DEPT VISIT HI MDM: CPT

## 2021-05-08 PROCEDURE — 36000 PLACE NEEDLE IN VEIN: CPT

## 2021-05-08 PROCEDURE — 80053 COMPREHEN METABOLIC PANEL: CPT

## 2021-05-08 PROCEDURE — 71045 X-RAY EXAM CHEST 1 VIEW: CPT

## 2021-05-08 PROCEDURE — 93010 ELECTROCARDIOGRAM REPORT: CPT | Mod: 76

## 2021-05-08 PROCEDURE — 82962 GLUCOSE BLOOD TEST: CPT

## 2021-05-08 PROCEDURE — 85730 THROMBOPLASTIN TIME PARTIAL: CPT

## 2021-05-08 PROCEDURE — 73650 X-RAY EXAM OF HEEL: CPT | Mod: 26,RT

## 2021-05-08 PROCEDURE — 73610 X-RAY EXAM OF ANKLE: CPT | Mod: 26,RT

## 2021-05-08 PROCEDURE — 81001 URINALYSIS AUTO W/SCOPE: CPT

## 2021-05-08 RX ORDER — INSULIN HUMAN 100 [IU]/ML
6 INJECTION, SOLUTION SUBCUTANEOUS ONCE
Refills: 0 | Status: DISCONTINUED | OUTPATIENT
Start: 2021-05-08 | End: 2021-05-08

## 2021-05-08 RX ORDER — SODIUM ZIRCONIUM CYCLOSILICATE 10 G/10G
5 POWDER, FOR SUSPENSION ORAL ONCE
Refills: 0 | Status: COMPLETED | OUTPATIENT
Start: 2021-05-08 | End: 2021-05-08

## 2021-05-08 RX ORDER — DEXTROSE 50 % IN WATER 50 %
50 SYRINGE (ML) INTRAVENOUS ONCE
Refills: 0 | Status: DISCONTINUED | OUTPATIENT
Start: 2021-05-08 | End: 2021-05-08

## 2021-05-08 RX ADMIN — SODIUM ZIRCONIUM CYCLOSILICATE 5 GRAM(S): 10 POWDER, FOR SUSPENSION ORAL at 19:37

## 2021-05-08 NOTE — ED PROVIDER NOTE - ATTENDING CONTRIBUTION TO CARE
Chris Alamo MD, FACEP: In this physician's medical judgement based on clinical history and physical exam, patient with right lower extremity swelling and posterior calf and calcaneal pain that started after his leg swelling. patient states that he felt some tightness to the back of his heel and one day the calf seized up on him with pain at the [points to achilles insertion] heel. Patient without trauma. Patient has history of angina but no changes in length of time, severity, or quality. Patient without exertional chest pain and states it is secondary to the surgical site and is typical for him. Rle swelling is the site of former graft retrieval and calcaneal pain could be achilles tendonitis vs occult injury. Will get iv, cbc, cmp, ekg, ce ordered for chest pain that is typical for patient, cxr, ankle xray, and va ultrasound of rle. Will follow up on labs, analgesia, imaging, reassess and disposition as clinically indicated.

## 2021-05-08 NOTE — ED PROVIDER NOTE - SHIFT CHANGE DETAILS
Chris Alamo MD FACEP note of transfer at the usual time of sign out: Receiving team will follow up on labs, analgesia, any clinical imaging, reassess and disposition as clinically indicated.  Details of patient and plan conveyed to receiving physician and conveyed back for understanding.  There were no questions at this time about the patient's status, disposition, and plan. Patient's care to be taken over by receiving physician at this time, all decisions regarding the progression of care will be made at their discretion.

## 2021-05-08 NOTE — ED ADULT NURSE NOTE - NSIMPLEMENTINTERV_GEN_ALL_ED
Implemented All Universal Safety Interventions:  [place] to call system. Call bell, personal items and telephone within reach. Instruct patient to call for assistance. Room bathroom lighting operational. Non-slip footwear when patient is off stretcher. Physically safe environment: no spills, clutter or unnecessary equipment. Stretcher in lowest position, wheels locked, appropriate side rails in place.

## 2021-05-08 NOTE — ED ADULT NURSE NOTE - OBJECTIVE STATEMENT
65 yo M arrived to the ed c/o R leg swelling x 2 weeks; also reports he was walking a herd a "pop" near [facility] region; c/o pain to that area; hx of DM/CABG feb 2020, reports no complications since surgery; denies chest pain, SOB, HA, N/V/D, abdominal pain, fever/chills, urinary symptoms, hematuria, weakness, dizziness, numbness, tinging. Peripheral pulses present b/l. Skin warm, dry and pink. Pt placed in position of comfort. Pt educated on call bell system and provided call bell. Bed in lowest position, wheels locked, appropriate side rails raised. Pt denies needs at this time.

## 2021-05-08 NOTE — ED PROVIDER NOTE - PHYSICAL EXAMINATION
Gen: NAD, A&Ox4. Non-toxic appearing  HEENT: Normocephalic and atraumatic. EOMI, no nasal discharge, mucous membranes moist, no scleral icterus.  CV: Regular rate and rhythm, +S1/S2, no M/R/G. Radial and DP pulses present and symmetrical. Capillary refill less than 2 seconds.  Resp: Normal effort and rate. CTAB, no rales, rhonchi, or wheezes.  GI: Abdomen soft, non-distended, non tender to palpation. No masses appreciated. Bowel sounds present.  MSK: R foot and ankle swelling with brown discoloration at posterior ankle that is mildly TTP, slightly warm. No erythema. Active and passive plantarflexion and dorsiflexion is normal.  Neuro: Following commands, speaking in full sentences, moving extremities spontaneously. Strength and sensation symmetric in bilateral lower extremities.  Psych: Appropriate mood, cooperative Gen: NAD, A&Ox4. Non-toxic appearing  HEENT: Normocephalic and atraumatic. EOMI, no nasal discharge, mucous membranes moist, no scleral icterus.  CV: Regular rate and rhythm, +S1/S2, no M/R/G. Radial and DP pulses present and symmetrical. Capillary refill less than 2 seconds.  Chest: midline sternotomy scar noted  Resp: Normal effort and rate. CTAB, no rales, rhonchi, or wheezes.  GI: Abdomen soft, non-distended, non tender to palpation. No masses appreciated. Bowel sounds present.  MSK: R foot and ankle swelling with brown discoloration at posterior ankle (cw hemosiderin deposition) that is mildly TTP, slightly warm. No erythema. Active and passive plantarflexion and dorsiflexion is normal.  Neuro: Following commands, speaking in full sentences, moving extremities spontaneously. Strength and sensation symmetric in bilateral lower extremities.  Psych: Appropriate mood, cooperative

## 2021-05-08 NOTE — ED PROVIDER NOTE - PATIENT PORTAL LINK FT
You can access the FollowMyHealth Patient Portal offered by [facility] by registering at the following website: [email or web address]. By joining [facility]’s FollowMyHealth portal, you will also be able to view your health information using other applications (apps) compatible with our system.

## 2021-05-08 NOTE — ED PROVIDER NOTE - PROGRESS NOTE DETAILS
K+ 5.7. Give Lokelma and recheck EKG. Trop 93, will recheck. Also pending Xrays of ankle. If EKG normal, trop without delta, and Xrays wnl, will d/c with f/u with PCP for repeat BMP. K+ 5.7. Give Lokelma and recheck EKG. Trop 93, will recheck. Also pending Xrays of ankle. If wnl, will d/c. Ankle Xray with possible evulsion fracture vs. degeneration. Patient w/o any tenderness. He is able to bear weight. Recommend following up podiatry. EKG stable. Ankle Xray with possible evulsion fracture vs. degeneration. Patient w/o any tenderness. He is able to bear weight. Recommend following up podiatry. EKG stable. Patient will f/u with cardiologist within a couple days to have repeat lab work

## 2021-05-08 NOTE — ED PROVIDER NOTE - NS ED ROS FT
GENERAL: No fever or chills  EYES: No change in vision  HEENT: No trouble swallowing or speaking  CARDIAC: +chest pain  PULMONARY: No cough or SOB  GI: No abdominal pain, no nausea or no vomiting, no diarrhea or constipation  : No changes in urination  SKIN: +leg swelling, discoloration, and pain  NEURO: No headache, no numbness  MSK: No joint pain  Otherwise as HPI or negative.

## 2021-05-08 NOTE — ED ADULT TRIAGE NOTE - CHIEF COMPLAINT QUOTE
Pt presents to ED triage with swelling of right foot and darkening of ankle.  Pt is diabetic, open heart feb 2020, and kidney issues per son.

## 2021-05-08 NOTE — ED PROVIDER NOTE - CLINICAL SUMMARY MEDICAL DECISION MAKING FREE TEXT BOX
Aramis Manning MD (PGY-1): The patient is a 66y Male with pmhx of quadruple bypass surgery in Feb. 2020, DM2 with nephropathy and neuropathy complaining of R ankle/foot swelling for the last 2 weeks. DDx includes DVT, osteomyelitis, fracture, swelling secondary to CABG. Pt doesn't want pain meds right now. Labs, trop, doppler US, x-rays ankle/foot, cxr, ekg. Pt will most likely be discharged home.

## 2021-05-08 NOTE — ED PROVIDER NOTE - OBJECTIVE STATEMENT
The patient is a 66y Male complaining of R ankle/foot swelling. The patient is a 66y Male with pmhx of quadruple bypass surgery in Feb. 2020, DM2 with nephropathy and neuropathy complaining of R ankle/foot swelling for the last 2 weeks. Pt says he's had residual CP since surgery last year, but no new CP or SOB. Pt has had some bilateral leg swelling since the surgery, takes torsemide. PCP added spironolactone to help with his swelling last week. Denies any trauma to lower extremities. No fevers. No hx of gout or DVT. Has been in able to ambulate, but has become more The patient is a 66y Male with pmhx of quadruple bypass surgery in Feb. 2020, DM2 with nephropathy and neuropathy complaining of R ankle/foot swelling for the last 2 weeks. Pt says he's had residual CP since surgery last year, but no new CP or SOB. Pt has had some bilateral leg swelling since the surgery, takes torsemide. PCP added spironolactone to help with his swelling last week. Denies any trauma to lower extremities. No fevers. No hx of gout or DVT. Can still ambulate, but increased pain with walking. Swelling is mostly in dorsum of right foot and posterior right ankle with brownish discoloration. No allergies. The patient is a 66y Male with pmhx of quadruple bypass surgery in Feb. 2020, DM2 with nephropathy and neuropathy complaining of R ankle/foot swelling for the last 2 weeks. Pt says he's had residual CP since surgery last year, but no new CP or shortness of breath, no pain different than his typical discomfort. Patient without exertional chest pain. Pt has had some bilateral leg swelling since the surgery, takes torsemide. PCP added spironolactone to help with his swelling last week. Denies any trauma to lower extremities. No fevers. No hx of gout or DVT. Can still ambulate, but increased pain with walking. Swelling is mostly in dorsum of right foot and posterior right ankle with brownish discoloration. No allergies.

## 2021-05-09 NOTE — ED POST DISCHARGE NOTE - DETAILS
Spoke with patient, advised of results, recommended discuss with podiatrist at follow up. demonstrates understanding - Luz Maria Gonsales PA-C

## 2021-08-06 ENCOUNTER — NON-APPOINTMENT (OUTPATIENT)
Age: 66
End: 2021-08-06

## 2021-08-06 ENCOUNTER — APPOINTMENT (OUTPATIENT)
Dept: OPHTHALMOLOGY | Facility: CLINIC | Age: 66
End: 2021-08-06
Payer: MEDICARE

## 2021-08-06 PROCEDURE — 99199 UNLISTED SPECIAL SVC PX/RPRT: CPT | Mod: NC

## 2021-08-11 ENCOUNTER — NON-APPOINTMENT (OUTPATIENT)
Age: 66
End: 2021-08-11

## 2021-08-11 ENCOUNTER — APPOINTMENT (OUTPATIENT)
Dept: OPHTHALMOLOGY | Facility: CLINIC | Age: 66
End: 2021-08-11
Payer: MEDICARE

## 2021-08-11 PROCEDURE — 92134 CPTRZ OPH DX IMG PST SGM RTA: CPT | Mod: NC

## 2021-08-11 PROCEDURE — 67028 INJECTION EYE DRUG: CPT | Mod: RT

## 2021-08-11 PROCEDURE — 92014 COMPRE OPH EXAM EST PT 1/>: CPT | Mod: 25

## 2021-08-13 ENCOUNTER — APPOINTMENT (OUTPATIENT)
Dept: OPHTHALMOLOGY | Facility: CLINIC | Age: 66
End: 2021-08-13
Payer: MEDICARE

## 2021-08-13 ENCOUNTER — NON-APPOINTMENT (OUTPATIENT)
Age: 66
End: 2021-08-13

## 2021-08-13 PROCEDURE — 67028 INJECTION EYE DRUG: CPT | Mod: LT

## 2021-08-13 PROCEDURE — 92235 FLUORESCEIN ANGRPH MLTIFRAME: CPT

## 2021-08-19 ENCOUNTER — APPOINTMENT (OUTPATIENT)
Dept: OTOLARYNGOLOGY | Facility: CLINIC | Age: 66
End: 2021-08-19
Payer: MEDICARE

## 2021-08-19 VITALS
WEIGHT: 184 LBS | SYSTOLIC BLOOD PRESSURE: 133 MMHG | DIASTOLIC BLOOD PRESSURE: 77 MMHG | TEMPERATURE: 97.5 F | HEART RATE: 69 BPM | HEIGHT: 67 IN | BODY MASS INDEX: 28.88 KG/M2

## 2021-08-19 DIAGNOSIS — Z01.10 ENCOUNTER FOR EXAMINATION OF EARS AND HEARING W/OUT ABNORMAL FINDINGS: ICD-10-CM

## 2021-08-19 PROCEDURE — 92557 COMPREHENSIVE HEARING TEST: CPT

## 2021-08-19 PROCEDURE — 99214 OFFICE O/P EST MOD 30 MIN: CPT | Mod: 25

## 2021-08-19 PROCEDURE — 92567 TYMPANOMETRY: CPT

## 2021-08-19 NOTE — PHYSICAL EXAM
[Midline] : trachea located in midline position [Normal] : horizontal positional vertigo maneuver was normal [Fukuda Step Test] : Fukuda Step Test was Positive [Hearing Loss Right Only] : normal [Hearing Loss Left Only] : normal [Nystagmus] : ~T no ~M nystagmus was seen [Fistula Sign] : Fistula Sign: Negative [de-identified] : weak step test

## 2021-08-19 NOTE — HISTORY OF PRESENT ILLNESS
[Dizziness] : dizziness [de-identified] : Patient with hx of cardiac surgery complains that he continues to feel off balance s/p cardiac surgery. He didn’t go to therapy last time it was scheduled because of COVID. Pt has no ear pain, ear drainage, hearing loss, tinnitus, nasal congestion, nasal discharge, epistaxis, sinus infections, facial pain, facial pressure, throat pain, dysphagia or fevers\par \par  [Anxiety] : no anxiety [Headache] : no headache [Hearing Loss] : no hearing loss [Recurrent Otitis Media] : no recurrent otitis media [Otitis Media with Effusion] : no otitis media with effusion [Presbycusis] : no presbycusis [Congenital Ear Malformation] : no congenital ear malformation [Meniere Disease] : no Meniere disease [Otosclerosis] : no otosclerosis [Perilymphatic Fistula] : no perilymphatic fistula [Hypertension] : no hypertension [Loud Noise Exposure] : no history of loud noise exposure [Smoking] : no smoking [Early Onset Hearing Loss] : no early onset hearing loss [Stroke] : no stroke [Facial Pain] : no facial pain [Facial Pressure] : no facial pressure [Nasal Congestion] : no nasal congestion [Diplopia] : no diplopia [Ear Fullness] : no ear fullness [Allergic Rhinitis] : no allergic rhinitis [Asthma] : no asthma [Maxillary Tooth Infection] : no maxillary tooth infection [Septal Deviation] : no septal deviation [Seasonal Allergies] : no seasonal allergies [Nasal FB Removal] : no nasal foreign body removal [Neck Mass] : no neck mass [Chills] : no chills [Cold Intolerance] : no cold intolerance [Cough] : no cough [Fatigue] : no fatigue [Heat Intolerance] : no heat intolerance [Hyperthyroidism] : no hyperthyroidism [Sialadenitis] : no sialadenitis [Hodgkin Disease] : no hodgkin disease [Non-Hodgkin Lymphoma] : no non-hodgkin lymphoma [Tobacco Use] : no tobacco use [Alcohol Use] : no alcohol use [Graves Disease] : no graves disease [Thyroid Cancer] : no thyroid cancer

## 2021-08-19 NOTE — ASSESSMENT
[FreeTextEntry1] : Patient with hx of cardia surgery complains that he continues to feel off balance s/p cardiac surgery.\par \par Vestibulopathy -imbalance\par -Hearing Test performed to evaluate the extent of hearing loss and to explain pt's symptoms\par -Vestibular rehab ordered \par may need vestib testing\par \par Bilateral SNHL:\par -cleared for hearing aids\par \par f/u prn

## 2021-09-01 ENCOUNTER — APPOINTMENT (OUTPATIENT)
Dept: OPHTHALMOLOGY | Facility: CLINIC | Age: 66
End: 2021-09-01

## 2021-09-14 ENCOUNTER — APPOINTMENT (OUTPATIENT)
Dept: DISASTER EMERGENCY | Facility: CLINIC | Age: 66
End: 2021-09-14

## 2021-09-15 ENCOUNTER — APPOINTMENT (OUTPATIENT)
Dept: OPHTHALMOLOGY | Facility: CLINIC | Age: 66
End: 2021-09-15

## 2021-09-15 LAB — SARS-COV-2 N GENE NPH QL NAA+PROBE: NOT DETECTED

## 2021-09-16 ENCOUNTER — OUTPATIENT (OUTPATIENT)
Dept: OUTPATIENT SERVICES | Facility: HOSPITAL | Age: 66
LOS: 1 days | End: 2021-09-16
Payer: COMMERCIAL

## 2021-09-16 ENCOUNTER — APPOINTMENT (OUTPATIENT)
Dept: SLEEP CENTER | Facility: CLINIC | Age: 66
End: 2021-09-16
Payer: MEDICARE

## 2021-09-16 DIAGNOSIS — Z90.49 ACQUIRED ABSENCE OF OTHER SPECIFIED PARTS OF DIGESTIVE TRACT: Chronic | ICD-10-CM

## 2021-09-16 DIAGNOSIS — Z95.1 PRESENCE OF AORTOCORONARY BYPASS GRAFT: Chronic | ICD-10-CM

## 2021-09-16 PROCEDURE — 95811 POLYSOM 6/>YRS CPAP 4/> PARM: CPT

## 2021-09-16 PROCEDURE — 95811 POLYSOM 6/>YRS CPAP 4/> PARM: CPT | Mod: 26

## 2021-09-17 DIAGNOSIS — G47.33 OBSTRUCTIVE SLEEP APNEA (ADULT) (PEDIATRIC): ICD-10-CM

## 2021-10-05 ENCOUNTER — APPOINTMENT (OUTPATIENT)
Dept: PULMONOLOGY | Facility: CLINIC | Age: 66
End: 2021-10-05
Payer: MEDICARE

## 2021-10-05 VITALS
HEIGHT: 67 IN | WEIGHT: 192 LBS | OXYGEN SATURATION: 98 % | DIASTOLIC BLOOD PRESSURE: 75 MMHG | SYSTOLIC BLOOD PRESSURE: 125 MMHG | TEMPERATURE: 96.9 F | RESPIRATION RATE: 16 BRPM | HEART RATE: 84 BPM | BODY MASS INDEX: 30.13 KG/M2

## 2021-10-05 DIAGNOSIS — Z79.899 OTHER LONG TERM (CURRENT) DRUG THERAPY: ICD-10-CM

## 2021-10-05 PROCEDURE — 99214 OFFICE O/P EST MOD 30 MIN: CPT

## 2021-10-05 RX ORDER — ALBUTEROL SULFATE 90 UG/1
108 (90 BASE) AEROSOL, METERED RESPIRATORY (INHALATION) EVERY 6 HOURS
Qty: 1 | Refills: 2 | Status: ACTIVE | COMMUNITY
Start: 2021-10-05 | End: 1900-01-01

## 2021-10-05 NOTE — ADDENDUM
[FreeTextEntry1] : Documented by Sy Flores acting as a scribe for Dr. Mike Hernandez on (10/05/2021).\par \par All medical record entries made by the Scribe were at my, Dr. Mike Hernandez's, direction and personally dictated by me on (10/05/2021). I have reviewed the chart and agree that the record accurately reflects my personal performance of the history, physical exam, assessment and plan. I have also personally directed, reviewed, and agree with the discharge instructions.\par

## 2021-10-05 NOTE — ASSESSMENT
[FreeTextEntry1] : Mr. FRANKLIN PRESLEY is a 66 year year old male who has a history of  DM, Coronary artery disease, pleural thickening, severe OSAS, s/p median sternotomy complicated by cardiac arrest and fractured ribs.  who now comes in for pulmonary evaluation. SOB and Cough -improved / still poor balance #1 is weight\par \par The patient's SOB is felt to be multifactorial:\par -pulmonary \par    - Restrictive Dysfunction due to healing rib fractured and median sternotomy \par    - ?Tracheomalacia \par - Poor breathing mechanics\par - Overweight/ Out-of Shape\par - ?Cardiac Disease - Dr. Steel \par \par Problem 1: Restrictive Dysfunction\par -Due to healing rib fracture and median sternotomy. \par \par Problem 2: Tracheomalacia (?)\par -Complete Dynamic CT \par -continue Amitriptyline 10 mg Q8H\par \par -Tracheomalacia is usually acquired in adults and common causes include damage by tracheostomy or endotracheal intubation damaging the tracheal cartilage with increase risk with multiple intubations, prolonged intubation, and concurrent high dose steroid therapy; external chest wall trauma and surgery; chronic compression of the trachea by benign etiologies (eg, benign mediastinal goiter) or malignancy; relapsing polychondritis; or recurrent infection. Tracheomalacia can be asymptomatic, however signs or symptoms can develop as the severity of the airway narrowing progresses with major symptoms include dyspnea, cough, and sputum retention. Other symptoms include severe paroxysms of coughing, wheezing or stridor, barking cough and may be exacerbated by forced expiration, cough, and valsalva maneuver. Tracheomalacia is diagnosed by a bronchoscopic visualization of dynamic airway collapse on dynamic chest CT. Therapy is warranted in symptomatic patients with severe tracheomalacia and includes surgical repair as tracheobronchoplasty. The patient was referred to Dr. Bulmaro Wing or Dr. Omar Gordon, at [facility] for a surgical consult.\par \par Problem 3: Chronic Cough\par DDx\par -Tracheomalacia \par RADS / Asthma \par PND \par reflux \par \par -Any cough greater than three weeks duration-differential diagnosis includes-asthma, upper airway cough syndrome, post nasal drip syndrome, gastroesophageal reflux, laryngopharyngeal reflux, cardiac disease (congestive heart failure, medicines, effects, etc), medication effects (b-blockers, ace inhibitors, ARBs, glaucoma meds, etc.), smoking, infectious, multifactorial, etc. \par \par Problem 4: RADS / Asthma \par -off Xopenex  and Budesonide (0.63) TID via nebulizer to Trelegy 100 1 puff QD (using)\par -continue Ventolin 2 puffs Q6H, pre-exercise  \par \par -Asthma is believed to be caused by inherited (genetic) and environmental factor, but its exact cause is unknown. Asthma may be triggered by allergens, lung infections, or irritants in the air. Asthma triggers are different for each person \par \par Problem 5: PND\par -continue Olopatadine 0.6% 1 sniff BID \par \par -Environmental measures for allergies were encouraged including mattress and pillow cover, air purifier, and environmental controls. \par \par Problem 6: GERD / Laryngomalacia\par -Recommended to see ENT Dr. Marcus\par -continue Protonix 40mg qAM before breakfast \par -continue Pepcid 40mg QHS \par \par - Things to avoid including overeating, spicy foods, tight clothing, eating within three hours of bed, this list is not all inclusive.\par \par - For treatment of reflux, possible options discussed including diet control, H2 blockers, PPIs, as well as coating motility agents discussed as treatment options. Timing of meals and proximity of last meal to sleep were discussed. If symptoms persist, a formal gastrointestinal evaluation is needed. \par \par Problem 7: s/p Amiodarone (off)\par -PFTs/TFTs/LFTs; 2-3 times per year\par - Amiodarone/bleomycin/methotrexate and other drugs have been associated with pulmonary parenchymal damage. These drugs require pulmonary function testing including DLCO regularly and possible CT/CXR if respiratory complaints develop. PFTs should be performed at 6 month intervals. \par \par Problem 7A: Cardiac \par - follow up with Dr. Cortes \par \par Problem 8: Overweight\par - Weight loss, exercise, and diet control were discussed and are highly encouraged. Treatment options were given such as, aqua therapy, and contacting a nutritionist. Recommended to use the elliptical, stationary bike, less use of treadmill. Mindful eating was explained to the patient Obesity is associated with worsening asthma, shortness of breath, and potential for cardiac disease, diabetes, and other underlying medical conditions.\par \par Problem 9: Poor Breathing Mechanics\par - Proper breathing techniques were reviewed with an emphasis of exhalation. Patient instructed to breath in for 1 second and out for four seconds. Patient was encouraged to not talk while walking.\par \par Problem 10: snoring / + OSAS\par -Set up Resmed Air Fit N20 with a setting of 8\par - s/p home sleep study(+)\par -set up CPAP/ Titration Study \par Sleep apnea is associated with adverse clinical consequences which can affect most organ systems.  Cardiovascular disease risk includes arrhythmias, atrial fibrillation, hypertension, coronary artery disease, and stroke. Metabolic disorders include diabetes type 2, non-alcoholic fatty liver disease. Mood disorder especially depression; and cognitive decline especially in the elderly. Associations with  chronic reflux/Laurent’s esophagus some but not all inclusive. \par -Reasons  include arousal consistent with hypopnea; respiratory events most prominent in REM sleep or supine position; therefore sleep staging and body position are important for accurate diagnosis and estimation of AHI.\par \par Problem 11: Health Maintenance/COVID19 Precautions:\par -s/p Pfizer COVID 19 vaccine x 2\par - Clean your hands often. Wash your hands often with soap and water for at least 20 seconds, especially after blowing your nose, coughing, or sneezing, or having been in a public place.\par - If soap and water are not available, use a hand  that contains at least 60% alcohol.\par - To the extent possible, avoid touching high-touch surfaces in public places - elevator buttons, door handles, handrails, handshaking with people, etc. Use a tissue or your sleeve to cover your hand or finger if you must touch something.\par - Wash your hands after touching surfaces in public places.\par - Avoid touching your face, nose, eyes, etc.\par - Clean and disinfect your home to remove germs: practice routine cleaning of frequently touched surfaces (for example: tables, doorknobs, light switches, handles, desks, toilets, faucets, sinks & cell phones)\par - Avoid crowds, especially in poorly ventilated spaces. Your risk of exposure to respiratory viruses like COVID-19 may increase in crowded, closed-in settings with little air circulation if there are people in the crowd who are sick. All patients are recommended to practice social distancing and stay at least 6 feet away from others.\par \par Immune Support Recommendations:\par -OTC Vitamin C 500mg BID \par -OTC Quercetin 250-500mg BID \par -OTC Zinc 75-100mg per day \par -OTC Melatonin 1 or 2 mg a night \par -OTC Vitamin D 1-4000mg per day \par -OTC Tonic Water 8oz per day\par \par \par Problem 11: Health maintenance\par - recommended topricin cream for the chest \par -s/p flu shot -9/2020\par -recommended strep pneumonia vaccines: Prevnar-13 vaccine (6/16/2020), followed by Pneumo vaccine 23 one year following (after the age of 65)\par -recommended early intervention for URIs\par -recommended regular osteoporosis evaluations\par -recommended early dermatological evaluations\par -recommended after the age of 50 to the age of 70, colonoscopy every 5 years\par \par f/u in 3 months with full PFTs \par pt is encouraged to call or fax the office with any questions or concerns.

## 2021-10-05 NOTE — PHYSICAL EXAM
[No Acute Distress] : no acute distress [Normal Oropharynx] : normal oropharynx [III] : Mallampati Class: III [Normal Appearance] : normal appearance [No Neck Mass] : no neck mass [Normal Rate/Rhythm] : normal rate/rhythm [Normal S1, S2] : normal s1, s2 [No Murmurs] : no murmurs [No Resp Distress] : no resp distress [Clear to Auscultation Bilaterally] : clear to auscultation bilaterally [No Abnormalities] : no abnormalities [Benign] : benign [Normal Gait] : normal gait [No Clubbing] : no clubbing [No Cyanosis] : no cyanosis [FROM] : FROM [Normal Color/ Pigmentation] : normal color/ pigmentation [No Focal Deficits] : no focal deficits [Oriented x3] : oriented x3 [Normal Affect] : normal affect [TextBox_2] : Ow, Wearing Oxygen, bandage over sternum  [TextBox_68] : I:E ratio 1:3; clear   [TextBox_105] : 1+ LE Edema on RLE

## 2021-10-05 NOTE — PROCEDURE
[FreeTextEntry1] : CPAP Sleep study (9/16/2021) revealed sleep apnea with an AHI/DARYL of 1.7, and a low oxygen saturation of 91%\par  \par -Images and procedures reviewed in detail and discussed with patient.

## 2021-10-05 NOTE — HISTORY OF PRESENT ILLNESS
[TextBox_4] : Mr. FRANKLIN PRESLEY is a 66 year old male coming into the office for an initial evaluation. His chief complaint is \par -he notes feeling like he has improved and is able to walk\par -he notes his balance is improved but he has an upcoming rehab appointment for his balance because it is not 100%\par -he notes seeing ENT Amrit who referred him to rehab\par -he notes being SOB when his body is fatigued\par -he notes experiencing the same numbness post surgery\par -he notes being of amiodarone \par -he notes gaining weight recently and wants to be at 180 lbs\par -he notes sleeping well\par -he notes sometimes his sinuses are leaky in the morning\par -he notes the only exercise he is able to do is walking at the moment and using a 5 lb dumbbell \par -s/p COVID-19 vaccine x2\par -he denies using the inhaler regularly\par \par patient denies any headaches, nausea, vomiting, fever, chills, sweats, chest pain, chest pressure, palpitations, coughing, wheezing, fatigue, diarrhea, constipation, dysphagia, myalgias, dizziness, leg swelling, leg pain, itchy eyes, itchy ears, heartburn, reflux or sour taste in the mouth

## 2021-10-07 NOTE — ED ADULT NURSE NOTE - SUICIDE SCREENING QUESTION 3
[id number]:  [id number]       Basic Metabolic Panel [[id number]]  [id number]:  [id number]       POC Glucose Once [[id number]]  (Abnormal) Collected: 10/06/21 1722    Specimen: Blood Updated: 10/06/21 1723     Glucose 234 mg/dL      Comment: Serial Number: [id number]:  [id number]              No

## 2021-10-27 ENCOUNTER — NON-APPOINTMENT (OUTPATIENT)
Age: 66
End: 2021-10-27

## 2021-10-27 ENCOUNTER — APPOINTMENT (OUTPATIENT)
Dept: OPHTHALMOLOGY | Facility: CLINIC | Age: 66
End: 2021-10-27
Payer: MEDICARE

## 2021-10-27 PROCEDURE — 92012 INTRM OPH EXAM EST PATIENT: CPT | Mod: 25

## 2021-10-27 PROCEDURE — 92134 CPTRZ OPH DX IMG PST SGM RTA: CPT

## 2021-10-27 PROCEDURE — 67028 INJECTION EYE DRUG: CPT | Mod: RT

## 2021-11-10 ENCOUNTER — APPOINTMENT (OUTPATIENT)
Dept: OPHTHALMOLOGY | Facility: CLINIC | Age: 66
End: 2021-11-10
Payer: MEDICARE

## 2021-11-10 ENCOUNTER — NON-APPOINTMENT (OUTPATIENT)
Age: 66
End: 2021-11-10

## 2021-11-10 PROCEDURE — 92012 INTRM OPH EXAM EST PATIENT: CPT | Mod: 25

## 2021-11-10 PROCEDURE — 67028 INJECTION EYE DRUG: CPT | Mod: LT

## 2021-11-24 ENCOUNTER — APPOINTMENT (OUTPATIENT)
Dept: OPHTHALMOLOGY | Facility: CLINIC | Age: 66
End: 2021-11-24
Payer: MEDICARE

## 2021-11-24 ENCOUNTER — NON-APPOINTMENT (OUTPATIENT)
Age: 66
End: 2021-11-24

## 2021-11-24 PROCEDURE — 92134 CPTRZ OPH DX IMG PST SGM RTA: CPT

## 2021-11-24 PROCEDURE — 92012 INTRM OPH EXAM EST PATIENT: CPT | Mod: 25

## 2021-11-24 PROCEDURE — 67028 INJECTION EYE DRUG: CPT | Mod: RT

## 2021-12-01 PROCEDURE — G9005: CPT

## 2022-01-10 ENCOUNTER — RX RENEWAL (OUTPATIENT)
Age: 67
End: 2022-01-10

## 2022-02-08 ENCOUNTER — APPOINTMENT (OUTPATIENT)
Dept: PULMONOLOGY | Facility: CLINIC | Age: 67
End: 2022-02-08

## 2022-02-09 ENCOUNTER — NON-APPOINTMENT (OUTPATIENT)
Age: 67
End: 2022-02-09

## 2022-02-09 ENCOUNTER — APPOINTMENT (OUTPATIENT)
Dept: OPHTHALMOLOGY | Facility: CLINIC | Age: 67
End: 2022-02-09
Payer: MEDICARE

## 2022-02-09 PROCEDURE — 92012 INTRM OPH EXAM EST PATIENT: CPT | Mod: 25

## 2022-02-09 PROCEDURE — 92134 CPTRZ OPH DX IMG PST SGM RTA: CPT

## 2022-02-09 PROCEDURE — 67028 INJECTION EYE DRUG: CPT | Mod: RT

## 2022-02-23 ENCOUNTER — NON-APPOINTMENT (OUTPATIENT)
Age: 67
End: 2022-02-23

## 2022-02-23 ENCOUNTER — APPOINTMENT (OUTPATIENT)
Dept: OPHTHALMOLOGY | Facility: CLINIC | Age: 67
End: 2022-02-23
Payer: MEDICARE

## 2022-02-23 PROCEDURE — 67028 INJECTION EYE DRUG: CPT | Mod: LT

## 2022-02-23 PROCEDURE — 92012 INTRM OPH EXAM EST PATIENT: CPT | Mod: 25

## 2022-03-04 NOTE — PHYSICAL THERAPY INITIAL EVALUATION ADULT - REHAB POTENTIAL, PT EVAL
Patient verbalizes understanding of using call light and assistive devices when necessary.   good, to achieve stated therapy goals

## 2022-03-08 NOTE — PHYSICAL THERAPY INITIAL EVALUATION ADULT - PRECAUTIONS/LIMITATIONS, REHAB EVAL
Pt stated this time his pain was associated with SOB. He denied symptoms of LOC, diaphoresis, palpitations, abd pain, N/V, fever or chills. Currently s/p C4L on 2/3./cardiac precautions/sternal precautions Vacuum Extraction was not used

## 2022-03-18 ENCOUNTER — APPOINTMENT (OUTPATIENT)
Dept: OPHTHALMOLOGY | Facility: CLINIC | Age: 67
End: 2022-03-18
Payer: MEDICARE

## 2022-03-18 ENCOUNTER — NON-APPOINTMENT (OUTPATIENT)
Age: 67
End: 2022-03-18

## 2022-03-18 PROCEDURE — 92136 OPHTHALMIC BIOMETRY: CPT

## 2022-03-18 PROCEDURE — 92014 COMPRE OPH EXAM EST PT 1/>: CPT

## 2022-03-23 ENCOUNTER — APPOINTMENT (OUTPATIENT)
Dept: OPHTHALMOLOGY | Facility: CLINIC | Age: 67
End: 2022-03-23

## 2022-04-13 ENCOUNTER — APPOINTMENT (OUTPATIENT)
Dept: OPHTHALMOLOGY | Facility: CLINIC | Age: 67
End: 2022-04-13

## 2022-07-25 ENCOUNTER — NON-APPOINTMENT (OUTPATIENT)
Age: 67
End: 2022-07-25

## 2022-07-25 ENCOUNTER — APPOINTMENT (OUTPATIENT)
Dept: PULMONOLOGY | Facility: CLINIC | Age: 67
End: 2022-07-25

## 2022-07-25 VITALS
WEIGHT: 195 LBS | OXYGEN SATURATION: 96 % | HEIGHT: 68 IN | RESPIRATION RATE: 16 BRPM | HEART RATE: 72 BPM | SYSTOLIC BLOOD PRESSURE: 124 MMHG | DIASTOLIC BLOOD PRESSURE: 80 MMHG | BODY MASS INDEX: 29.55 KG/M2 | TEMPERATURE: 97.7 F

## 2022-07-25 PROCEDURE — 99214 OFFICE O/P EST MOD 30 MIN: CPT | Mod: 25

## 2022-07-25 PROCEDURE — 94010 BREATHING CAPACITY TEST: CPT

## 2022-07-25 PROCEDURE — 95012 NITRIC OXIDE EXP GAS DETER: CPT

## 2022-07-25 RX ORDER — MONTELUKAST 10 MG/1
10 TABLET, FILM COATED ORAL
Qty: 30 | Refills: 0 | Status: ACTIVE | COMMUNITY
Start: 2022-06-17

## 2022-07-25 RX ORDER — INSULIN ASPART 100 [IU]/ML
100 INJECTION, SOLUTION INTRAVENOUS; SUBCUTANEOUS
Qty: 90 | Refills: 0 | Status: ACTIVE | COMMUNITY
Start: 2022-02-17

## 2022-07-25 RX ORDER — PROMETHAZINE HYDROCHLORIDE AND CODEINE PHOSPHATE 6.25; 1 MG/5ML; MG/5ML
6.25-1 SOLUTION ORAL
Qty: 180 | Refills: 0 | Status: ACTIVE | COMMUNITY
Start: 2022-06-17

## 2022-07-25 RX ORDER — FLASH GLUCOSE SENSOR
KIT MISCELLANEOUS
Qty: 2 | Refills: 0 | Status: ACTIVE | COMMUNITY
Start: 2021-11-12

## 2022-07-25 RX ORDER — ERGOCALCIFEROL 1.25 MG/1
1.25 MG CAPSULE, LIQUID FILLED ORAL
Qty: 4 | Refills: 0 | Status: ACTIVE | COMMUNITY
Start: 2022-02-16

## 2022-07-25 RX ORDER — AMOXICILLIN AND CLAVULANATE POTASSIUM 500; 125 MG/1; MG/1
500-125 TABLET, FILM COATED ORAL
Qty: 20 | Refills: 0 | Status: ACTIVE | COMMUNITY
Start: 2022-06-17

## 2022-07-25 RX ORDER — ISOPROPYL ALCOHOL 0.75 G/1
SWAB TOPICAL
Qty: 100 | Refills: 0 | Status: ACTIVE | COMMUNITY
Start: 2022-02-17

## 2022-07-25 RX ORDER — AZELASTINE HYDROCHLORIDE 137 UG/1
0.1 SPRAY, METERED NASAL TWICE DAILY
Qty: 3 | Refills: 1 | Status: ACTIVE | COMMUNITY
Start: 2020-09-23 | End: 1900-01-01

## 2022-07-25 RX ORDER — PEN NEEDLE, DIABETIC 29 G X1/2"
31G X 5 MM NEEDLE, DISPOSABLE MISCELLANEOUS
Qty: 400 | Refills: 0 | Status: ACTIVE | COMMUNITY
Start: 2022-03-13

## 2022-07-25 RX ORDER — TRIAMCINOLONE ACETONIDE 1 MG/G
0.1 OINTMENT TOPICAL
Qty: 80 | Refills: 0 | Status: ACTIVE | COMMUNITY
Start: 2022-03-30

## 2022-07-25 RX ORDER — ALLOPURINOL 300 MG/1
300 TABLET ORAL
Qty: 90 | Refills: 0 | Status: ACTIVE | COMMUNITY
Start: 2021-12-24

## 2022-07-25 RX ORDER — INSULIN GLARGINE 100 [IU]/ML
100 INJECTION, SOLUTION SUBCUTANEOUS
Qty: 30 | Refills: 0 | Status: ACTIVE | COMMUNITY
Start: 2021-10-05

## 2022-07-25 RX ORDER — FLUTICASONE PROPIONATE 50 UG/1
50 SPRAY, METERED NASAL
Qty: 16 | Refills: 0 | Status: ACTIVE | COMMUNITY
Start: 2022-06-02

## 2022-07-25 RX ORDER — COLCHICINE 0.6 MG/1
0.6 TABLET ORAL
Qty: 90 | Refills: 0 | Status: ACTIVE | COMMUNITY
Start: 2021-07-19

## 2022-07-25 RX ORDER — LORATADINE 10 MG/1
10 TABLET ORAL
Qty: 30 | Refills: 0 | Status: ACTIVE | COMMUNITY
Start: 2022-06-17

## 2022-07-25 RX ORDER — NAPROXEN 500 MG/1
500 TABLET ORAL
Qty: 60 | Refills: 0 | Status: ACTIVE | COMMUNITY
Start: 2022-06-17

## 2022-07-25 RX ORDER — LISINOPRIL 10 MG/1
10 TABLET ORAL
Qty: 90 | Refills: 0 | Status: ACTIVE | COMMUNITY
Start: 2022-02-16

## 2022-07-25 RX ORDER — CYCLOBENZAPRINE HYDROCHLORIDE 10 MG/1
10 TABLET, FILM COATED ORAL
Qty: 30 | Refills: 0 | Status: ACTIVE | COMMUNITY
Start: 2022-06-17

## 2022-07-25 NOTE — ADDENDUM
[FreeTextEntry1] : Documented by Madeline Wheeler acting as a scribe for Dr. Mike Hernandez on 07/25/2022 \par \par All medical record entries made by the Scribe were at my, Dr. Mike Hernandez's, direction and personally dictated by me on 07/25/2022 . I have reviewed the chart and agree that the record accurately reflects my personal performance of the history, physical exam, assessment and plan. I have also personally directed, reviewed, and agree with the discharge instructions

## 2022-07-25 NOTE — PHYSICAL EXAM
[No Acute Distress] : no acute distress [Normal Oropharynx] : normal oropharynx [III] : Mallampati Class: III [Normal Appearance] : normal appearance [No Neck Mass] : no neck mass [Normal Rate/Rhythm] : normal rate/rhythm [Normal S1, S2] : normal s1, s2 [No Murmurs] : no murmurs [No Resp Distress] : no resp distress [Clear to Auscultation Bilaterally] : clear to auscultation bilaterally [No Abnormalities] : no abnormalities [Benign] : benign [Normal Gait] : normal gait [No Clubbing] : no clubbing [No Cyanosis] : no cyanosis [FROM] : FROM [Normal Color/ Pigmentation] : normal color/ pigmentation [No Focal Deficits] : no focal deficits [Oriented x3] : oriented x3 [Normal Affect] : normal affect [TextBox_2] : Ow  [TextBox_68] : I:E ratio 1:3; clear   [TextBox_105] : 1+ LE Edema on RLE

## 2022-07-25 NOTE — PROCEDURE
[FreeTextEntry1] : FENO was 23; normal value being less than 25\par Fractional exhaled nitric oxide (FENO) is regarded as a simple, noninvasive method for assessing eosinophilic airway inflammation. Produced by a variety of cells within the lung, nitric oxide (NO) concentrations are generally low in healthy individuals. However, high concentrations of NO appear to be involved in nonspecific host defense mechanisms and chronic inflammatory diseases such as asthma. The American Thoracic Society (ATS) therefore has recommended using FENO to aid in the diagnosis and monitoring of eosinophilic airway inflammation and asthma, and for identifying steroid responsive individuals whose chronic respiratory symptoms may be airway inflammation. \par \par PFT reveals mild restrictive dysfunction, with an FEV1 of 1.91L, which is 72% of predicted, with normal flow volume loop

## 2022-07-25 NOTE — ASSESSMENT
[FreeTextEntry1] : Mr. FRANKLIN PRESLEY is a 67 year year old male who has a history of  DM, Coronary artery disease, pleural thickening, severe OSAS, s/p median sternotomy complicated by cardiac arrest and fractured ribs.  who now comes in for pulmonary evaluation. SOB and Cough -#1 issue is dysphagia\par \par The patient's SOB is felt to be multifactorial:\par -pulmonary \par    - Restrictive Dysfunction due to healing rib fractured and median sternotomy \par    - ?Tracheomalacia \par - Poor breathing mechanics\par - Overweight/ Out-of Shape\par - ?Cardiac Disease - Dr. Steel \par \par Problem 1: Restrictive Dysfunction\par -Due to healing rib fracture and median sternotomy. \par \par Problem 2: Tracheomalacia (?)\par -Complete Dynamic CT \par -continue Amitriptyline 10 mg Q8H\par \par -Tracheomalacia is usually acquired in adults and common causes include damage by tracheostomy or endotracheal intubation damaging the tracheal cartilage with increase risk with multiple intubations, prolonged intubation, and concurrent high dose steroid therapy; external chest wall trauma and surgery; chronic compression of the trachea by benign etiologies (eg, benign mediastinal goiter) or malignancy; relapsing polychondritis; or recurrent infection. Tracheomalacia can be asymptomatic, however signs or symptoms can develop as the severity of the airway narrowing progresses with major symptoms include dyspnea, cough, and sputum retention. Other symptoms include severe paroxysms of coughing, wheezing or stridor, barking cough and may be exacerbated by forced expiration, cough, and valsalva maneuver. Tracheomalacia is diagnosed by a bronchoscopic visualization of dynamic airway collapse on dynamic chest CT. Therapy is warranted in symptomatic patients with severe tracheomalacia and includes surgical repair as tracheobronchoplasty. The patient was referred to Dr. Bulmaro Wing or Dr. Omar Gordon, at [facility] for a surgical consult.\par \par Problem 3: Chronic Cough\par DDx\par -Tracheomalacia \par RADS / Asthma \par PND \par reflux \par dysphagia\par \par -Any cough greater than three weeks duration-differential diagnosis includes-asthma, upper airway cough syndrome, post nasal drip syndrome, gastroesophageal reflux, laryngopharyngeal reflux, cardiac disease (congestive heart failure, medicines, effects, etc), medication effects (b-blockers, ace inhibitors, ARBs, glaucoma meds, etc.), smoking, infectious, multifactorial, etc. \par \par Problem 4: RADS / Asthma \par -off Xopenex  and Budesonide (0.63) TID via nebulizer to Trelegy 100 1 puff QD (NC)\par -continue Ventolin 2 puffs Q6H, pre-exercise  \par \par -Asthma is believed to be caused by inherited (genetic) and environmental factor, but its exact cause is unknown. Asthma may be triggered by allergens, lung infections, or irritants in the air. Asthma triggers are different for each person \par -Inhaler technique reviewed as well as oral hygiene techniques reviewed with patient. Avoidance of cold air, extremes of temperature, rescue inhaler should be used before exercise. Order of medication reviewed with patient. Recommended use of a cool mist humidifier in the bedroom. \par \par Problem 5: PND\par -continue Olopatadine 0.6% 1 sniff BID\par - script given for blood work: asthma profile, food IgE panel, eosinophil level, IgE level, Vitamin D level \par \par -Environmental measures for allergies were encouraged including mattress and pillow cover, air purifier, and environmental controls. \par \par Problem 6: GERD / Laryngomalacia\par -Recommended to see ENT Dr. Marcus\par -complete FEESST study\par -continue Protonix 40mg qAM before breakfast \par -continue Pepcid 40mg QHS \par \par - Things to avoid including overeating, spicy foods, tight clothing, eating within three hours of bed, this list is not all inclusive.\par \par - For treatment of reflux, possible options discussed including diet control, H2 blockers, PPIs, as well as coating motility agents discussed as treatment options. Timing of meals and proximity of last meal to sleep were discussed. If symptoms persist, a formal gastrointestinal evaluation is needed. \par \par Problem 7: s/p Amiodarone (off)\par -PFTs/TFTs/LFTs; 2-3 times per year\par - Amiodarone/bleomycin/methotrexate and other drugs have been associated with pulmonary parenchymal damage. These drugs require pulmonary function testing including DLCO regularly and possible CT/CXR if respiratory complaints develop. PFTs should be performed at 6 month intervals. \par \par Problem 7A: Cardiac \par - follow up with Dr. Cortes \par \par Problem 8: Overweight\par Recommended "10-Day Detox" by Brian Saleem for 2-3 weeks followed by probiotics \par - Weight loss, exercise, and diet control were discussed and are highly encouraged. Treatment options were given such as, aqua therapy, and contacting a nutritionist. Recommended to use the elliptical, stationary bike, less use of treadmill. Mindful eating was explained to the patient Obesity is associated with worsening asthma, shortness of breath, and potential for cardiac disease, diabetes, and other underlying medical conditions.\par \par Problem 9: Poor Breathing Mechanics\par -recommended Wim Hof and Buteyko breathing techniques \par - Proper breathing techniques were reviewed with an emphasis of exhalation. Patient instructed to breath in for 1 second and out for four seconds. Patient was encouraged to not talk while walking.\par \par Problem 10: snoring / + OSAS\par -Resmed Air Fit N20 with a setting of 8 in place\par - s/p home sleep study(+)\par -set up CPAP/ Titration Study \par Sleep apnea is associated with adverse clinical consequences which can affect most organ systems.  Cardiovascular disease risk includes arrhythmias, atrial fibrillation, hypertension, coronary artery disease, and stroke. Metabolic disorders include diabetes type 2, non-alcoholic fatty liver disease. Mood disorder especially depression; and cognitive decline especially in the elderly. Associations with  chronic reflux/Laurent’s esophagus some but not all inclusive. \par -Reasons  include arousal consistent with hypopnea; respiratory events most prominent in REM sleep or supine position; therefore sleep staging and body position are important for accurate diagnosis and estimation of AHI.\par \par Problem 11: Health Maintenance/COVID19 Precautions:\par -s/p Pfizer COVID 19 vaccine x 2\par - Clean your hands often. Wash your hands often with soap and water for at least 20 seconds, especially after blowing your nose, coughing, or sneezing, or having been in a public place.\par - If soap and water are not available, use a hand  that contains at least 60% alcohol.\par - To the extent possible, avoid touching high-touch surfaces in public places - elevator buttons, door handles, handrails, handshaking with people, etc. Use a tissue or your sleeve to cover your hand or finger if you must touch something.\par - Wash your hands after touching surfaces in public places.\par - Avoid touching your face, nose, eyes, etc.\par - Clean and disinfect your home to remove germs: practice routine cleaning of frequently touched surfaces (for example: tables, doorknobs, light switches, handles, desks, toilets, faucets, sinks & cell phones)\par - Avoid crowds, especially in poorly ventilated spaces. Your risk of exposure to respiratory viruses like COVID-19 may increase in crowded, closed-in settings with little air circulation if there are people in the crowd who are sick. All patients are recommended to practice social distancing and stay at least 6 feet away from others.\par \par Immune Support Recommendations:\par -OTC Vitamin C 500mg BID \par -OTC Quercetin 250-500mg BID \par -OTC Zinc 75-100mg per day \par -OTC Melatonin 1 or 2 mg a night \par -OTC Vitamin D 1-4000mg per day \par -OTC Tonic Water 8oz per day\par \par \par Problem 11: Health maintenance\par - recommended topricin cream for the chest \par -s/p flu shot -9/2020\par -recommended strep pneumonia vaccines: Prevnar-13 vaccine (6/16/2020), followed by Pneumo vaccine 23 one year following (after the age of 65)\par -recommended early intervention for URIs\par -recommended regular osteoporosis evaluations\par -recommended early dermatological evaluations\par -recommended after the age of 50 to the age of 70, colonoscopy every 5 years\par \par f/u in 3 months with full PFTs \par pt is encouraged to call or fax the office with any questions or concerns.

## 2022-07-25 NOTE — HISTORY OF PRESENT ILLNESS
[TextBox_4] : Mr. FRANKLIN PRESLEY is a 67 year old male coming into the office for an initial evaluation. His chief complaint is \par \par -he notes a productive persistent cough onset 2-3 months\par -he notes cough improved with Rx\par -he notes dysphagia with both liquids and solids\par -he notes SOB and PALMA\par -he notes constipation with irregular use of Rx\par -he notes drinking 2-3 glasses of water\par -he notes drinking 1 tea and 1 coffee daily \par -he notes use of diuretics\par -he notes use of Metoprolol and Allopurinol \par -he notes gain of about 20 lbs\par -he denies use of nasal spray\par -he notes his sinuses are stable\par -he notes compliance with CPAP\par  \par \par -He denies any visual issues, headaches, nausea, vomiting, fever, chills, sweats, chest pains, chest pressure, diarrhea, myalgia, dizziness, leg swelling, leg pain, itchy eyes, itchy ears, heartburn, reflux, or sour taste in the mouth.

## 2022-08-01 ENCOUNTER — NON-APPOINTMENT (OUTPATIENT)
Age: 67
End: 2022-08-01

## 2022-08-01 ENCOUNTER — APPOINTMENT (OUTPATIENT)
Dept: OPHTHALMOLOGY | Facility: CLINIC | Age: 67
End: 2022-08-01

## 2022-08-01 PROCEDURE — 99214 OFFICE O/P EST MOD 30 MIN: CPT

## 2022-08-01 PROCEDURE — 92134 CPTRZ OPH DX IMG PST SGM RTA: CPT

## 2022-08-10 ENCOUNTER — NON-APPOINTMENT (OUTPATIENT)
Age: 67
End: 2022-08-10

## 2022-08-10 ENCOUNTER — APPOINTMENT (OUTPATIENT)
Dept: OPHTHALMOLOGY | Facility: CLINIC | Age: 67
End: 2022-08-10

## 2022-08-10 PROCEDURE — 67028 INJECTION EYE DRUG: CPT | Mod: RT

## 2022-08-10 PROCEDURE — 92012 INTRM OPH EXAM EST PATIENT: CPT | Mod: 25

## 2022-08-10 PROCEDURE — 92134 CPTRZ OPH DX IMG PST SGM RTA: CPT

## 2022-08-17 ENCOUNTER — APPOINTMENT (OUTPATIENT)
Dept: UROLOGY | Facility: CLINIC | Age: 67
End: 2022-08-17

## 2022-08-17 VITALS — DIASTOLIC BLOOD PRESSURE: 80 MMHG | RESPIRATION RATE: 15 BRPM | SYSTOLIC BLOOD PRESSURE: 160 MMHG | HEART RATE: 64 BPM

## 2022-08-17 DIAGNOSIS — Z12.5 ENCOUNTER FOR SCREENING FOR MALIGNANT NEOPLASM OF PROSTATE: ICD-10-CM

## 2022-08-17 PROCEDURE — 99204 OFFICE O/P NEW MOD 45 MIN: CPT

## 2022-08-17 NOTE — ASSESSMENT
[FreeTextEntry1] : Very pleasant 67-year-old gentleman with microscopic hematuria, screening for prostate cancer\par -urinalysis\par -urine culture\par -urine cytology\par -Renal ultrasound\par -BMP\par -PSA\par -cystoscopy\par -We discussed AUA guidelines regarding risk stratification for microscopic hematuria\par -Extensive discussion of the potential etiologies of hematuria, as well as the need to complete full work up for evaluation of cancer or other  sources of hematuria.  Patient understands and wishes to proceed.

## 2022-08-17 NOTE — HISTORY OF PRESENT ILLNESS
[FreeTextEntry1] : Very pleasant 67-year-old gentleman who presents for evaluation of microscopic hematuria, screening for prostate cancer, history of CKD.  Review of records demonstrates microscopic hematuria on numerous and urinalysis is in the past, up to 5 red blood cells per high-powered field.  Creatinine is noted to be around 2.2 and rising recently.  He underwent a renal ultrasound in the past which demonstrated no kidney stones, hydronephrosis, or renal masses.  Additionally, a CT scan from approximately 2 years ago demonstrated no kidney stones, hydronephrosis, or renal masses.  He reports that he urinates well.  Hemoglobin A1c noted to be 8.3.

## 2022-08-18 ENCOUNTER — EMERGENCY (EMERGENCY)
Facility: HOSPITAL | Age: 67
LOS: 1 days | Discharge: HOME | End: 2022-08-18
Attending: EMERGENCY MEDICINE
Payer: COMMERCIAL

## 2022-08-18 ENCOUNTER — NON-APPOINTMENT (OUTPATIENT)
Age: 67
End: 2022-08-18

## 2022-08-18 VITALS
OXYGEN SATURATION: 95 % | DIASTOLIC BLOOD PRESSURE: 79 MMHG | TEMPERATURE: 98 F | SYSTOLIC BLOOD PRESSURE: 175 MMHG | RESPIRATION RATE: 18 BRPM | HEIGHT: 68 IN | HEART RATE: 79 BPM | WEIGHT: 195.11 LBS

## 2022-08-18 VITALS
DIASTOLIC BLOOD PRESSURE: 71 MMHG | SYSTOLIC BLOOD PRESSURE: 140 MMHG | HEART RATE: 60 BPM | RESPIRATION RATE: 20 BRPM | OXYGEN SATURATION: 96 % | TEMPERATURE: 98 F

## 2022-08-18 DIAGNOSIS — Z95.1 PRESENCE OF AORTOCORONARY BYPASS GRAFT: Chronic | ICD-10-CM

## 2022-08-18 DIAGNOSIS — Z90.49 ACQUIRED ABSENCE OF OTHER SPECIFIED PARTS OF DIGESTIVE TRACT: Chronic | ICD-10-CM

## 2022-08-18 LAB
ALBUMIN SERPL ELPH-MCNC: 3.9 G/DL — SIGNIFICANT CHANGE UP (ref 3.3–5)
ALP SERPL-CCNC: 93 U/L — SIGNIFICANT CHANGE UP (ref 40–120)
ALT FLD-CCNC: 17 U/L — SIGNIFICANT CHANGE UP (ref 10–45)
ANION GAP SERPL CALC-SCNC: 13 MMOL/L
ANION GAP SERPL CALC-SCNC: 8 MMOL/L — SIGNIFICANT CHANGE UP (ref 5–17)
APPEARANCE: CLEAR
AST SERPL-CCNC: 18 U/L — SIGNIFICANT CHANGE UP (ref 10–40)
BACTERIA: NEGATIVE
BASE EXCESS BLDV CALC-SCNC: -3.2 MMOL/L — LOW (ref -2–3)
BASOPHILS # BLD AUTO: 0.08 K/UL — SIGNIFICANT CHANGE UP (ref 0–0.2)
BASOPHILS NFR BLD AUTO: 1.3 % — SIGNIFICANT CHANGE UP (ref 0–2)
BILIRUB SERPL-MCNC: 0.3 MG/DL — SIGNIFICANT CHANGE UP (ref 0.2–1.2)
BILIRUBIN URINE: NEGATIVE
BLOOD URINE: ABNORMAL
BUN SERPL-MCNC: 52 MG/DL — HIGH (ref 7–23)
BUN SERPL-MCNC: 54 MG/DL
CA-I SERPL-SCNC: 1.28 MMOL/L — SIGNIFICANT CHANGE UP (ref 1.15–1.33)
CALCIUM SERPL-MCNC: 9.4 MG/DL — SIGNIFICANT CHANGE UP (ref 8.4–10.5)
CALCIUM SERPL-MCNC: 9.7 MG/DL
CHLORIDE BLDV-SCNC: 103 MMOL/L — SIGNIFICANT CHANGE UP (ref 96–108)
CHLORIDE SERPL-SCNC: 103 MMOL/L — SIGNIFICANT CHANGE UP (ref 96–108)
CHLORIDE SERPL-SCNC: 104 MMOL/L
CO2 BLDV-SCNC: 26 MMOL/L — SIGNIFICANT CHANGE UP (ref 22–26)
CO2 SERPL-SCNC: 20 MMOL/L
CO2 SERPL-SCNC: 24 MMOL/L — SIGNIFICANT CHANGE UP (ref 22–31)
COLOR: NORMAL
CREAT SERPL-MCNC: 2.1 MG/DL — HIGH (ref 0.5–1.3)
CREAT SERPL-MCNC: 2.23 MG/DL
EGFR: 32 ML/MIN/1.73M2
EGFR: 34 ML/MIN/1.73M2 — LOW
EOSINOPHIL # BLD AUTO: 0.56 K/UL — HIGH (ref 0–0.5)
EOSINOPHIL NFR BLD AUTO: 9.4 % — HIGH (ref 0–6)
GAS PNL BLDV: 133 MMOL/L — LOW (ref 136–145)
GAS PNL BLDV: SIGNIFICANT CHANGE UP
GAS PNL BLDV: SIGNIFICANT CHANGE UP
GLUCOSE BLDV-MCNC: 299 MG/DL — HIGH (ref 70–99)
GLUCOSE QUALITATIVE U: ABNORMAL
GLUCOSE SERPL-MCNC: 221 MG/DL
GLUCOSE SERPL-MCNC: 294 MG/DL — HIGH (ref 70–99)
HCO3 BLDV-SCNC: 24 MMOL/L — SIGNIFICANT CHANGE UP (ref 22–29)
HCT VFR BLD CALC: 37.1 % — LOW (ref 39–50)
HCT VFR BLDA CALC: 38 % — LOW (ref 39–51)
HGB BLD CALC-MCNC: 12.5 G/DL — LOW (ref 12.6–17.4)
HGB BLD-MCNC: 12.4 G/DL — LOW (ref 13–17)
HYALINE CASTS: 2 /LPF
IMM GRANULOCYTES NFR BLD AUTO: 0.3 % — SIGNIFICANT CHANGE UP (ref 0–1.5)
KETONES URINE: NEGATIVE
LACTATE BLDV-MCNC: 1.7 MMOL/L — SIGNIFICANT CHANGE UP (ref 0.5–2)
LEUKOCYTE ESTERASE URINE: NEGATIVE
LYMPHOCYTES # BLD AUTO: 1.93 K/UL — SIGNIFICANT CHANGE UP (ref 1–3.3)
LYMPHOCYTES # BLD AUTO: 32.5 % — SIGNIFICANT CHANGE UP (ref 13–44)
MCHC RBC-ENTMCNC: 30.7 PG — SIGNIFICANT CHANGE UP (ref 27–34)
MCHC RBC-ENTMCNC: 33.4 GM/DL — SIGNIFICANT CHANGE UP (ref 32–36)
MCV RBC AUTO: 91.8 FL — SIGNIFICANT CHANGE UP (ref 80–100)
MICROSCOPIC-UA: NORMAL
MONOCYTES # BLD AUTO: 0.52 K/UL — SIGNIFICANT CHANGE UP (ref 0–0.9)
MONOCYTES NFR BLD AUTO: 8.8 % — SIGNIFICANT CHANGE UP (ref 2–14)
NEUTROPHILS # BLD AUTO: 2.83 K/UL — SIGNIFICANT CHANGE UP (ref 1.8–7.4)
NEUTROPHILS NFR BLD AUTO: 47.7 % — SIGNIFICANT CHANGE UP (ref 43–77)
NITRITE URINE: NEGATIVE
NRBC # BLD: 0 /100 WBCS — SIGNIFICANT CHANGE UP (ref 0–0)
PCO2 BLDV: 53 MMHG — SIGNIFICANT CHANGE UP (ref 42–55)
PH BLDV: 7.27 — LOW (ref 7.32–7.43)
PH URINE: 6.5
PLATELET # BLD AUTO: 131 K/UL — LOW (ref 150–400)
PO2 BLDV: 27 MMHG — SIGNIFICANT CHANGE UP (ref 25–45)
POTASSIUM BLDV-SCNC: 5.1 MMOL/L — SIGNIFICANT CHANGE UP (ref 3.5–5.1)
POTASSIUM SERPL-MCNC: 5.1 MMOL/L — SIGNIFICANT CHANGE UP (ref 3.5–5.3)
POTASSIUM SERPL-SCNC: 5.1 MMOL/L — SIGNIFICANT CHANGE UP (ref 3.5–5.3)
POTASSIUM SERPL-SCNC: 6.4 MMOL/L
PROT SERPL-MCNC: 6.8 G/DL — SIGNIFICANT CHANGE UP (ref 6–8.3)
PROTEIN URINE: ABNORMAL
PSA SERPL-MCNC: 0.39 NG/ML
RBC # BLD: 4.04 M/UL — LOW (ref 4.2–5.8)
RBC # FLD: 12.8 % — SIGNIFICANT CHANGE UP (ref 10.3–14.5)
RED BLOOD CELLS URINE: 5 /HPF
SAO2 % BLDV: 38.6 % — LOW (ref 67–88)
SODIUM SERPL-SCNC: 135 MMOL/L — SIGNIFICANT CHANGE UP (ref 135–145)
SODIUM SERPL-SCNC: 137 MMOL/L
SPECIFIC GRAVITY URINE: 1.01
SQUAMOUS EPITHELIAL CELLS: 0 /HPF
URINE CYTOLOGY: NORMAL
UROBILINOGEN URINE: NORMAL
WBC # BLD: 5.94 K/UL — SIGNIFICANT CHANGE UP (ref 3.8–10.5)
WBC # FLD AUTO: 5.94 K/UL — SIGNIFICANT CHANGE UP (ref 3.8–10.5)
WHITE BLOOD CELLS URINE: 1 /HPF

## 2022-08-18 PROCEDURE — 93005 ELECTROCARDIOGRAM TRACING: CPT

## 2022-08-18 PROCEDURE — 82330 ASSAY OF CALCIUM: CPT

## 2022-08-18 PROCEDURE — 84132 ASSAY OF SERUM POTASSIUM: CPT

## 2022-08-18 PROCEDURE — 82435 ASSAY OF BLOOD CHLORIDE: CPT

## 2022-08-18 PROCEDURE — 99283 EMERGENCY DEPT VISIT LOW MDM: CPT

## 2022-08-18 PROCEDURE — 93010 ELECTROCARDIOGRAM REPORT: CPT

## 2022-08-18 PROCEDURE — 82947 ASSAY GLUCOSE BLOOD QUANT: CPT

## 2022-08-18 PROCEDURE — 80053 COMPREHEN METABOLIC PANEL: CPT

## 2022-08-18 PROCEDURE — 99285 EMERGENCY DEPT VISIT HI MDM: CPT | Mod: 25

## 2022-08-18 PROCEDURE — 85025 COMPLETE CBC W/AUTO DIFF WBC: CPT

## 2022-08-18 PROCEDURE — 84295 ASSAY OF SERUM SODIUM: CPT

## 2022-08-18 PROCEDURE — 85014 HEMATOCRIT: CPT

## 2022-08-18 PROCEDURE — 85018 HEMOGLOBIN: CPT

## 2022-08-18 PROCEDURE — 83605 ASSAY OF LACTIC ACID: CPT

## 2022-08-18 PROCEDURE — 82803 BLOOD GASES ANY COMBINATION: CPT

## 2022-08-18 NOTE — ED PROVIDER NOTE - INTERPRETATION
EKG reviewed for rate, rhythm, axis, intervals and segments, including QRS morphology, P wave appearance T wave appearance, FL interval, and QT interval.  I find the EKG to be unremarkable in all of these regards except as follows: RBBB

## 2022-08-18 NOTE — ED CLERICAL - NS ED CLERK NOTE PRE-ARRIVAL INFORMATION; ADDITIONAL PRE-ARRIVAL INFORMATION
CC/Reason For referral: Critical potassium value 6.4   Preferred Consultant(if applicable):  Who admits for you (if needed):  Do you have documents you would like to fax over?  Would you still like to speak to an ED attending? Yes

## 2022-08-18 NOTE — ED PROVIDER NOTE - PATIENT PORTAL LINK FT
You can access the FollowMyHealth Patient Portal offered by [facility] by registering at the following website: [email or web address]. By joining [facility]’s FollowMyHealth portal, you will also be able to view your health information using other applications (apps) compatible with our system.

## 2022-08-18 NOTE — ED PROVIDER NOTE - ATTENDING CONTRIBUTION TO CARE
66 yo male with hx of CABG in usual state of health (which includes long-standing intermittent chest pain) here for asymptomatic hyperkalemia on outpatient labs in the setting of CKD.  repeat K here 5.1.  stable for d/c and continued presurgical testing.

## 2022-08-18 NOTE — ED PROVIDER NOTE - OBJECTIVE STATEMENT
66 y/o M with PMHx of HTN and DM and PSHx CABG x 4 presenting for high potassium found on outpatient labs. States he was seeing his urologist yesterday (Dr. Dooley) for pre-operative testing, and received a call this morning that his potassium came back elevated to 6. He is currently asymptomatic, denying recent illness, fevers/chills, abdominal pain, n/v/d, muscle aches or HA. Has had chest heaviness and shortness of breath since the CABG (procedure had several complications), no acute changes over the past few days. Follows with cardiology regularly. Allergic to PCN. 66 y/o M with PMHx of HTN and DM and PSHx CABG x 4 presenting for high potassium found on outpatient labs. States he was seeing his urologist yesterday (Dr. Dooley) for pre-operative testing, and received a call this morning that his potassium came back elevated to 6. He is currently asymptomatic, denying recent illness, fevers/chills, abdominal pain, n/v/d, muscle aches or HA. Has had chest heaviness and shortness of breath since the CABG (procedure had several complications), no acute changes over the past few days. Follows with cardiology regularly. Allergic to PCN.    pcp: Jax   urologist: Miah 68 y/o M with PMHx of HTN, CKD and DM and PSHx CABG x 4 presenting for high potassium found on outpatient labs. States he was seeing his urologist yesterday (Dr. Dooley) for pre-operative testing, and received a call this morning that his potassium came back elevated to 6. He is currently asymptomatic, denying recent illness, fevers/chills, abdominal pain, n/v/d, muscle aches or HA. Has had chest heaviness and shortness of breath since the CABG (procedure had several complications), no acute changes over the past few days. Follows with cardiology regularly. Allergic to PCN.    pcp: Jax   urologist: Miah

## 2022-08-18 NOTE — ED ADULT TRIAGE NOTE - TEMPERATURE IN FAHRENHEIT (DEGREES F)
3/22/2021       RE: Junito Wang  [id number] [facility] [id number]     Dear Colleague,    Thank you for referring your patient, Junito Wang, to the [facility] RADIATION ONCOLOGY GAMMA KNIFE at [facility]. Please see a copy of my visit note below.    Simulation for Gammaknife done under my direction      Again, thank you for allowing me to participate in the care of your patient.      Sincerely,    Emilia Qureshi MD     98.1

## 2022-08-18 NOTE — ED ADULT NURSE NOTE - OBJECTIVE STATEMENT
68 y/o male presents to ED complaining of abnormal potassium level. Pt a&ox4, PMH quadruple bypass 2020, went to his urologist for clearance for a procedure yesterday and was told to come to ER for potassium level of ~6. Reports chronic chest pain and thorpe since bypass, no new pain at this time. Denies body aches, headaches, nausea, vomiting, diarrhea, fever, chills. Pt placed on cardiac monitor. MD at bedside for eval. Orders to follow.

## 2022-08-18 NOTE — ED PROVIDER NOTE - CLINICAL SUMMARY MEDICAL DECISION MAKING FREE TEXT BOX
66 y/o M with PMHx HTN and DM, s/p of CABG x4 in 2020, presenting for evaluation of high potassium on outpatient labs. Patient currently asymptomatic. EKG with evidence of RBBB, but no other acute changes. Plan to repeat labs. If repeat shows persistent hyperK, plan to treat and admit patient for further workup. Currently stable on monitor and will continue to re-assess.

## 2022-08-18 NOTE — ED ADULT TRIAGE NOTE - CHIEF COMPLAINT QUOTE
chronic chest pain s/p quadruple bypass 2020  pt was told to come to the ER for elevated potassium from blood draw Wednesday

## 2022-08-18 NOTE — ED PROVIDER NOTE - PROGRESS NOTE DETAILS
Informed patient that repeat potassium was 5.1 here. Currently feels well and is comfortable with going home. Safe for d/c. Genaro Ordoñez, DO PGY-2: K 5.1, Cr at baseline, pt mildly acidotic in the setting of CKD. Pt feels well and will dc.

## 2022-08-18 NOTE — ED PROVIDER NOTE - NSFOLLOWUPINSTRUCTIONS_ED_ALL_ED_FT
1. Please follow up with your urologist and primary care doctor as scheduled.    2. Please return to the ER if you develop chest pain, shortness of breath or anything of concern.

## 2022-08-18 NOTE — ED PROVIDER NOTE - SKIN, MLM
Skin normal color for race, warm, dry and intact. No evidence of rash. Midline surgical scar noted with skin graft, well healed

## 2022-08-19 ENCOUNTER — OUTPATIENT (OUTPATIENT)
Dept: OUTPATIENT SERVICES | Facility: HOSPITAL | Age: 67
LOS: 1 days | End: 2022-08-19
Payer: COMMERCIAL

## 2022-08-19 ENCOUNTER — APPOINTMENT (OUTPATIENT)
Dept: CT IMAGING | Facility: IMAGING CENTER | Age: 67
End: 2022-08-19

## 2022-08-19 DIAGNOSIS — Z95.1 PRESENCE OF AORTOCORONARY BYPASS GRAFT: Chronic | ICD-10-CM

## 2022-08-19 DIAGNOSIS — Z90.49 ACQUIRED ABSENCE OF OTHER SPECIFIED PARTS OF DIGESTIVE TRACT: Chronic | ICD-10-CM

## 2022-08-19 DIAGNOSIS — J39.8 OTHER SPECIFIED DISEASES OF UPPER RESPIRATORY TRACT: ICD-10-CM

## 2022-08-19 DIAGNOSIS — Z00.8 ENCOUNTER FOR OTHER GENERAL EXAMINATION: ICD-10-CM

## 2022-08-19 LAB — BACTERIA UR CULT: NORMAL

## 2022-08-19 PROCEDURE — 71250 CT THORAX DX C-: CPT | Mod: 26

## 2022-08-19 PROCEDURE — 71250 CT THORAX DX C-: CPT

## 2022-08-19 NOTE — ED ADULT NURSE NOTE - NS ED PATIENT SAFETY CONCERN
Central Prior Authorization Team   Phone: [phone]      PA Initiation    Medication: PRADAXA ANTICOAGULANT 150 MG capsule - INITIATED  Insurance Company: [employer] [place] - Phone [phone] Fax [phone]  Pharmacy Filling the Rx: [facility] #[id number] [address]  AT [facility] [address] [facility]  Filling Pharmacy Phone: [phone]  Filling Pharmacy Fax:    Start Date: 8/19/2022         No

## 2022-08-22 ENCOUNTER — APPOINTMENT (OUTPATIENT)
Dept: OTOLARYNGOLOGY | Facility: CLINIC | Age: 67
End: 2022-08-22

## 2022-08-22 VITALS
HEIGHT: 68 IN | SYSTOLIC BLOOD PRESSURE: 165 MMHG | WEIGHT: 195 LBS | BODY MASS INDEX: 29.55 KG/M2 | HEART RATE: 74 BPM | DIASTOLIC BLOOD PRESSURE: 74 MMHG | TEMPERATURE: 97.4 F

## 2022-08-22 DIAGNOSIS — R13.13 DYSPHAGIA, PHARYNGEAL PHASE: ICD-10-CM

## 2022-08-22 DIAGNOSIS — R26.89 OTHER ABNORMALITIES OF GAIT AND MOBILITY: ICD-10-CM

## 2022-08-22 PROCEDURE — 99214 OFFICE O/P EST MOD 30 MIN: CPT | Mod: 25

## 2022-08-22 PROCEDURE — 31575 DIAGNOSTIC LARYNGOSCOPY: CPT

## 2022-08-22 NOTE — ASSESSMENT
[FreeTextEntry1] : Mr. PRESLEY 67 year M complains of cough, PND and dysphagia s/p cardiac surgery. Dizziness slightly improved with vestibular rehab \par \par Dysphagia secondary to GERD:\par -Antireflux recommendations were given to the patient\par -Maalox and omeprazole prescribed\par -A formal GI evaluation may be needed and was discussed with the patient.\par  \par f/u 1 month or prn

## 2022-08-22 NOTE — HISTORY OF PRESENT ILLNESS
[de-identified] : Patient with hx of cardiac surgery and dizziness presents for follow up. He had several vestibular rehab session, saw mild improvement so he stopped. He also complains of dysphagia, his pulmonologist suggested getting evaluated by ENT to r/o reflux. He had a barium swallow was wnl as per patient. He is making lots of mucus and always coughing it up.  [Hearing Loss] : hearing loss [Dizziness] : dizziness [Nasal Congestion] : nasal congestion [None] : The patient is currently asymptomatic.

## 2022-08-22 NOTE — PHYSICAL EXAM
[Midline] : trachea located in midline position [de-identified] : erythematous  [Normal] : no rashes

## 2022-08-22 NOTE — PROCEDURE
[de-identified] : Reason for the procedure-throat symptoms not adequately evaluated by mirror exam\par After informed verbal consent is obtained. \par The fiberoptic laryngoscope #3 is passed via the  nasal cavity. There is no adenoid hypertrophy of the nasopharynx.  \par The hypopharynx is clear with no lesions or masses. \par The vocal cords are clear intact, within normal limits and mobile bilaterally with  \par evidence of posterior laryngeal erythema and edema and erythema of the arytenoids.\par \par Tongue Base-wnl\par Larynx-wnl\par Hypopharynx-wnl\par tongue base, \par vallecular-wnl\par epiglottis-wnl\par subglottis-wnl\par posterior pharyngeal wall-wnl\par \par true vocal cords-wnl\par arytenoids-erythema and edema\par false vocal cords-wnl\par ventricles-wnl \par pyriform sinus-wnl no pooling\par aryepiglottic folds-wnl\par \par

## 2022-08-24 ENCOUNTER — NON-APPOINTMENT (OUTPATIENT)
Age: 67
End: 2022-08-24

## 2022-08-24 ENCOUNTER — APPOINTMENT (OUTPATIENT)
Dept: OPHTHALMOLOGY | Facility: CLINIC | Age: 67
End: 2022-08-24

## 2022-08-24 PROCEDURE — 92012 INTRM OPH EXAM EST PATIENT: CPT | Mod: 25

## 2022-08-24 PROCEDURE — 67028 INJECTION EYE DRUG: CPT | Mod: LT

## 2022-08-26 ENCOUNTER — APPOINTMENT (OUTPATIENT)
Dept: UROLOGY | Facility: CLINIC | Age: 67
End: 2022-08-26

## 2022-08-26 PROCEDURE — 76775 US EXAM ABDO BACK WALL LIM: CPT

## 2022-08-26 PROCEDURE — 99214 OFFICE O/P EST MOD 30 MIN: CPT

## 2022-08-26 NOTE — HISTORY OF PRESENT ILLNESS
[FreeTextEntry1] : Very pleasant 67-year-old gentleman who presents for follow-up of microscopic hematuria, screening for prostate cancer, history of CKD, atypical urine cytology.  Review of records demonstrates microscopic hematuria on numerous and urinalysis is in the past, up to 5 red blood cells per high-powered field.  Creatinine is noted to be around 2.2 and rising recently.  He underwent a renal ultrasound in the past which demonstrated no kidney stones, hydronephrosis, or renal masses.  Repeat renal ultrasound today again demonstrates no kidney stones, hydronephrosis, or renal masses.  Additionally, a CT scan from approximately 2 years ago demonstrated no kidney stones, hydronephrosis, or renal masses.  He reports that he urinates well.  Hemoglobin A1c noted to be 8.3.  Recently potassium was noted to be very elevated and he went to the emergency department where it was rechecked.  This was noted to decrease.  Urine cytology atypical.

## 2022-08-26 NOTE — ASSESSMENT
[FreeTextEntry1] : Very pleasant 67-year-old gentleman who presents for follow-up of atypical urine cytology, microscopic hematuria\par -Urine cytology reviewed–atypical\par -We discussed potential etiologies of an atypical urine cytology\par -Urinalysis demonstrated 5 red blood cells per high-powered field\par -PSA 0.39\par -Creatinine 2.23, potassium 6.4 for which he went to the emergency department and potassium was rechecked\par -Follow-up for cystoscopy

## 2022-08-30 ENCOUNTER — APPOINTMENT (OUTPATIENT)
Dept: UROLOGY | Facility: CLINIC | Age: 67
End: 2022-08-30

## 2022-09-14 ENCOUNTER — APPOINTMENT (OUTPATIENT)
Dept: OPHTHALMOLOGY | Facility: CLINIC | Age: 67
End: 2022-09-14

## 2022-09-14 ENCOUNTER — NON-APPOINTMENT (OUTPATIENT)
Age: 67
End: 2022-09-14

## 2022-09-14 PROCEDURE — 92134 CPTRZ OPH DX IMG PST SGM RTA: CPT

## 2022-09-14 PROCEDURE — 67028 INJECTION EYE DRUG: CPT | Mod: RT

## 2022-09-14 PROCEDURE — 92012 INTRM OPH EXAM EST PATIENT: CPT | Mod: 25

## 2022-09-27 ENCOUNTER — APPOINTMENT (OUTPATIENT)
Dept: PULMONOLOGY | Facility: CLINIC | Age: 67
End: 2022-09-27

## 2022-09-27 PROCEDURE — 99443: CPT

## 2022-09-30 ENCOUNTER — APPOINTMENT (OUTPATIENT)
Dept: OTOLARYNGOLOGY | Facility: CLINIC | Age: 67
End: 2022-09-30

## 2022-09-30 VITALS
BODY MASS INDEX: 29.55 KG/M2 | TEMPERATURE: 97.6 F | SYSTOLIC BLOOD PRESSURE: 152 MMHG | HEART RATE: 74 BPM | HEIGHT: 68 IN | DIASTOLIC BLOOD PRESSURE: 76 MMHG | WEIGHT: 195 LBS

## 2022-09-30 PROCEDURE — 99213 OFFICE O/P EST LOW 20 MIN: CPT

## 2022-09-30 NOTE — HISTORY OF PRESENT ILLNESS
[Hearing Loss] : hearing loss [Dizziness] : dizziness [Nasal Congestion] : nasal congestion [None] : The patient is currently asymptomatic. [de-identified] : Patient with hx of cardiac surgery and dizziness presents for follow up. He had several vestibular rehab session, saw mild improvement so he stopped. He also complains of dysphagia, his pulmonologist suggested getting evaluated by ENT to r/o reflux. He had a barium swallow was wnl as per patient. He is making lots of mucus and always coughing it up.  [FreeTextEntry1] : Patient presents for follow up, states he is doing better. He is not bringing up mucus as before. He is watching reflux diet. He finished balance therapy and he still feels dizzy.

## 2022-09-30 NOTE — PHYSICAL EXAM
[Midline] : trachea located in midline position [Normal] : no rashes [de-identified] : erythematous

## 2022-09-30 NOTE — END OF VISIT
[FreeTextEntry3] : I personally saw and examined FRANKLIN PRESLEY in detail. I spoke to SHAKIRA Christine regarding the assessment and plan of care. I reviewed the above assessment and plan of care, and agree. I have made changes in changes in the body of the note where appropriate.I personally reviewed the HPI, PMH, FH, SH, ROS and medications/allergies. I have spoken to SHAKIRA Christine regarding the history and have personally determined the assessment and plan of care, and documented this myself. I was present and participated in all key portions of the encounter and all procedures noted above. I have made changes in the body of the note where appropriate.\par \par Attesting Faculty: See Attending Signature Below \par \par \par  [Time Spent: ___ minutes] : I have spent [unfilled] minutes of time on the encounter.

## 2022-09-30 NOTE — ASSESSMENT
[FreeTextEntry1] : Mr. PRESLEY 67 year M presents for follow up. States he is doing better, not bring up much mucus, watching his diet and continuing with reflux medications. He continues to feel off balance despite balance therapy. \par \par Dysphagia secondary to GERD:\par -Continue Antireflux recommendations \par -Maalox and omeprazole prescribed\par \par Dizziness:\par -Recommended to do another round of vestibular rehab, pt deferred at this time \par  \par \par f/u  prn

## 2022-10-11 ENCOUNTER — APPOINTMENT (OUTPATIENT)
Dept: UROLOGY | Facility: CLINIC | Age: 67
End: 2022-10-11

## 2022-10-11 VITALS
HEART RATE: 64 BPM | OXYGEN SATURATION: 98 % | SYSTOLIC BLOOD PRESSURE: 155 MMHG | HEIGHT: 68 IN | DIASTOLIC BLOOD PRESSURE: 78 MMHG | TEMPERATURE: 98 F

## 2022-10-11 VITALS — DIASTOLIC BLOOD PRESSURE: 83 MMHG | HEART RATE: 65 BPM | SYSTOLIC BLOOD PRESSURE: 168 MMHG

## 2022-10-11 DIAGNOSIS — R82.89 OTHER ABNRM FNDNGS ON CYTOLOGICAL: ICD-10-CM

## 2022-10-11 PROCEDURE — 52000 CYSTOURETHROSCOPY: CPT

## 2022-10-12 ENCOUNTER — APPOINTMENT (OUTPATIENT)
Dept: OPHTHALMOLOGY | Facility: CLINIC | Age: 67
End: 2022-10-12

## 2022-10-12 ENCOUNTER — NON-APPOINTMENT (OUTPATIENT)
Age: 67
End: 2022-10-12

## 2022-10-12 PROCEDURE — 92134 CPTRZ OPH DX IMG PST SGM RTA: CPT

## 2022-10-12 PROCEDURE — 92012 INTRM OPH EXAM EST PATIENT: CPT

## 2022-10-14 LAB — URINE CYTOLOGY: NORMAL

## 2022-10-22 ENCOUNTER — APPOINTMENT (OUTPATIENT)
Dept: CARE COORDINATION | Facility: HOME HEALTH | Age: 67
End: 2022-10-22

## 2022-10-22 DIAGNOSIS — E11.22 TYPE 2 DIABETES MELLITUS WITH DIABETIC CHRONIC KIDNEY DISEASE: ICD-10-CM

## 2022-10-22 DIAGNOSIS — E11.3419 TYPE 2 DIABETES MELLITUS WITH SEVERE NONPROLIFERATIVE DIABETIC RETINOPATHY WITH MACULAR EDEMA, UNSPECIFIED EYE: ICD-10-CM

## 2022-10-22 DIAGNOSIS — R13.10 DYSPHAGIA, UNSPECIFIED: ICD-10-CM

## 2022-10-22 DIAGNOSIS — R05.3 CHRONIC COUGH: ICD-10-CM

## 2022-10-22 DIAGNOSIS — R31.29 OTHER MICROSCOPIC HEMATURIA: ICD-10-CM

## 2022-10-22 DIAGNOSIS — E11.9 TYPE 2 DIABETES MELLITUS W/OUT COMPLICATIONS: ICD-10-CM

## 2022-10-22 DIAGNOSIS — R05.9 COUGH, UNSPECIFIED: ICD-10-CM

## 2022-10-22 PROCEDURE — G0444 DEPRESSION SCREEN ANNUAL: CPT | Mod: 26,95

## 2022-10-22 PROCEDURE — 99350 HOME/RES VST EST HIGH MDM 60: CPT | Mod: 95

## 2022-10-26 ENCOUNTER — NON-APPOINTMENT (OUTPATIENT)
Age: 67
End: 2022-10-26

## 2022-10-26 ENCOUNTER — APPOINTMENT (OUTPATIENT)
Dept: OPHTHALMOLOGY | Facility: CLINIC | Age: 67
End: 2022-10-26

## 2022-10-26 PROBLEM — R05.9 COUGH: Status: ACTIVE | Noted: 2020-03-19

## 2022-10-26 PROBLEM — E11.22 CHRONIC KIDNEY DISEASE DUE TO TYPE 2 DIABETES MELLITUS: Status: ACTIVE | Noted: 2022-10-26

## 2022-10-26 PROBLEM — R05.3 CHRONIC COUGH: Status: ACTIVE | Noted: 2020-04-29

## 2022-10-26 PROBLEM — E11.3419: Status: ACTIVE | Noted: 2022-10-26

## 2022-10-26 PROBLEM — R13.10 DYSPHAGIA: Status: ACTIVE | Noted: 2022-07-25

## 2022-10-26 PROBLEM — E11.9 TYPE 2 DIABETES MELLITUS: Status: ACTIVE | Noted: 2020-03-19

## 2022-10-26 PROBLEM — R31.29 MICROSCOPIC HEMATURIA: Status: ACTIVE | Noted: 2022-08-17

## 2022-10-26 PROCEDURE — 67028 INJECTION EYE DRUG: CPT | Mod: RT

## 2022-10-26 PROCEDURE — 92134 CPTRZ OPH DX IMG PST SGM RTA: CPT

## 2022-10-26 PROCEDURE — 92012 INTRM OPH EXAM EST PATIENT: CPT | Mod: 25

## 2022-11-04 NOTE — PROGRESS NOTE ADULT - PROBLEM SELECTOR PLAN 1
Additional Notes: Provided Samples of Revion Brightening Wash, Soothing Rinse and Gentle cleansing lotion. Patient with sternal wound infection; Tight glycemic control necessary.  Will continue current insulin regimen for now. Will continue monitoring FS and FU.  Patient was on insulin at home, will be DC on insulin.  Patient and family counseled for tight sugar control, compliance with consistent low carb diet, not to bring food from home. Render Risk Assessment In Note?: no Detail Level: Simple

## 2022-11-16 ENCOUNTER — APPOINTMENT (OUTPATIENT)
Dept: OPHTHALMOLOGY | Facility: CLINIC | Age: 67
End: 2022-11-16

## 2022-11-16 ENCOUNTER — NON-APPOINTMENT (OUTPATIENT)
Age: 67
End: 2022-11-16

## 2022-11-16 PROCEDURE — 99213 OFFICE O/P EST LOW 20 MIN: CPT

## 2022-11-28 ENCOUNTER — APPOINTMENT (OUTPATIENT)
Dept: PULMONOLOGY | Facility: CLINIC | Age: 67
End: 2022-11-28

## 2022-11-28 VITALS
HEART RATE: 74 BPM | BODY MASS INDEX: 29.4 KG/M2 | TEMPERATURE: 97.7 F | OXYGEN SATURATION: 97 % | DIASTOLIC BLOOD PRESSURE: 70 MMHG | RESPIRATION RATE: 16 BRPM | WEIGHT: 194 LBS | SYSTOLIC BLOOD PRESSURE: 126 MMHG | HEIGHT: 68 IN

## 2022-11-28 DIAGNOSIS — G47.33 OBSTRUCTIVE SLEEP APNEA (ADULT) (PEDIATRIC): ICD-10-CM

## 2022-11-28 DIAGNOSIS — Z99.89 OBSTRUCTIVE SLEEP APNEA (ADULT) (PEDIATRIC): ICD-10-CM

## 2022-11-28 DIAGNOSIS — Z71.89 OTHER SPECIFIED COUNSELING: ICD-10-CM

## 2022-11-28 PROCEDURE — 90662 IIV NO PRSV INCREASED AG IM: CPT

## 2022-11-28 PROCEDURE — G0008: CPT

## 2022-11-28 PROCEDURE — 94727 GAS DIL/WSHOT DETER LNG VOL: CPT

## 2022-11-28 PROCEDURE — 94729 DIFFUSING CAPACITY: CPT

## 2022-11-28 PROCEDURE — 95012 NITRIC OXIDE EXP GAS DETER: CPT

## 2022-11-28 PROCEDURE — 94010 BREATHING CAPACITY TEST: CPT

## 2022-11-28 PROCEDURE — 99214 OFFICE O/P EST MOD 30 MIN: CPT | Mod: 25

## 2022-11-28 NOTE — ASSESSMENT
[FreeTextEntry1] : Mr. FRANKLIN PRESLEY is a 67 year year old male who has a history of  DM, Coronary artery disease, pleural thickening, severe OSAS, s/p median sternotomy complicated by cardiac arrest and fractured ribs.  who now comes in for pulmonary evaluation. SOB and Cough -#1 issue is weight gain/ muscle cramps \par \par The patient's SOB is felt to be multifactorial:\par -pulmonary \par    - Restrictive Dysfunction due to healing rib fractured and median sternotomy \par    - ?Tracheomalacia \par - Poor breathing mechanics\par - Overweight/ Out-of Shape\par - ?Cardiac Disease - Dr. Steel \par \par Problem 1: Restrictive Dysfunction- "Hernia" \par -Due to healing rib fracture and median sternotomy; "controlled cough"- weight loss  \par \par Problem 2: Tracheomalacia (-)\par -Complete Dynamic CT \par -continue Amitriptyline 10 mg Q8H\par \par -Tracheomalacia is usually acquired in adults and common causes include damage by tracheostomy or endotracheal intubation damaging the tracheal cartilage with increase risk with multiple intubations, prolonged intubation, and concurrent high dose steroid therapy; external chest wall trauma and surgery; chronic compression of the trachea by benign etiologies (eg, benign mediastinal goiter) or malignancy; relapsing polychondritis; or recurrent infection. Tracheomalacia can be asymptomatic, however signs or symptoms can develop as the severity of the airway narrowing progresses with major symptoms include dyspnea, cough, and sputum retention. Other symptoms include severe paroxysms of coughing, wheezing or stridor, barking cough and may be exacerbated by forced expiration, cough, and valsalva maneuver. Tracheomalacia is diagnosed by a bronchoscopic visualization of dynamic airway collapse on dynamic chest CT. Therapy is warranted in symptomatic patients with severe tracheomalacia and includes surgical repair as tracheobronchoplasty. The patient was referred to Dr. Bulmaro Wing or Dr. Omar Gordon, at [facility] for a surgical consult.\par \par Problem 2A: Bronchiectasis\par - Seen on the CT of the chest or chest x-ray signifies damaged bronchial tubes focal or diffuse which can be sites of recurrent infections. These areas can be colonized by various organisms including bacteria (hemophilous influenzae/Pseudomonas species etc.) as well as acid fast bacilli (mycobacterial disease- inclusive of TB/NAJMA etc.). Sputum either for bacteria culture/sensitivity or AFB culture and sensitivity will need to be sent if the patient has sputum- 3 specimens on consecutive days will need to be dropped at the laboratory- if the patient can produce sputum.   \par \par Problem 3: Chronic Cough\par DDx\par -Tracheomalacia \par RADS / Asthma \par PND \par reflux \par dysphagia\par \par -Any cough greater than three weeks duration-differential diagnosis includes-asthma, upper airway cough syndrome, post nasal drip syndrome, gastroesophageal reflux, laryngopharyngeal reflux, cardiac disease (congestive heart failure, medicines, effects, etc), medication effects (b-blockers, ace inhibitors, ARBs, glaucoma meds, etc.), smoking, infectious, multifactorial, etc. \par \par Problem 4: RADS / Asthma \par -off Xopenex  and Budesonide (0.63) TID via nebulizer to Trelegy 100 1 puff QD (NC)\par -continue Ventolin 2 puffs Q6H, pre-exercise  \par \par -Asthma is believed to be caused by inherited (genetic) and environmental factor, but its exact cause is unknown. Asthma may be triggered by allergens, lung infections, or irritants in the air. Asthma triggers are different for each person \par -Inhaler technique reviewed as well as oral hygiene techniques reviewed with patient. Avoidance of cold air, extremes of temperature, rescue inhaler should be used before exercise. Order of medication reviewed with patient. Recommended use of a cool mist humidifier in the bedroom. \par \par Problem 5: PND\par -continue Olopatadine 0.6% 1 sniff BID\par - script given for blood work: asthma profile, food IgE panel, eosinophil level, IgE level, Vitamin D level \par \par -Environmental measures for allergies were encouraged including mattress and pillow cover, air purifier, and environmental controls. \par \par Problem 6: GERD / Laryngomalacia\par -Recommended to see ENT Dr. Marcus\par -complete FEESST study\par -continue Protonix 40mg qAM before breakfast \par -continue Pepcid 40mg QHS \par \par - Things to avoid including overeating, spicy foods, tight clothing, eating within three hours of bed, this list is not all inclusive.\par \par - For treatment of reflux, possible options discussed including diet control, H2 blockers, PPIs, as well as coating motility agents discussed as treatment options. Timing of meals and proximity of last meal to sleep were discussed. If symptoms persist, a formal gastrointestinal evaluation is needed. \par \par Problem 7: s/p Amiodarone (off)\par -PFTs/TFTs/LFTs; 2-3 times per year\par - Amiodarone/bleomycin/methotrexate and other drugs have been associated with pulmonary parenchymal damage. These drugs require pulmonary function testing including DLCO regularly and possible CT/CXR if respiratory complaints develop. PFTs should be performed at 6 month intervals. \par \par Problem 7A: Cardiac \par - follow up with Dr. Cortes \par \par Problem 8: Overweight\par Recommended "10-Day Detox" by Brian Saleem for 2-3 weeks followed by probiotics \par - Weight loss, exercise, and diet control were discussed and are highly encouraged. Treatment options were given such as, aqua therapy, and contacting a nutritionist. Recommended to use the elliptical, stationary bike, less use of treadmill. Mindful eating was explained to the patient Obesity is associated with worsening asthma, shortness of breath, and potential for cardiac disease, diabetes, and other underlying medical conditions.\par \par Problem 9: Poor Breathing Mechanics\par -recommended Wim Hof and Buteyko breathing techniques \par - Proper breathing techniques were reviewed with an emphasis of exhalation. Patient instructed to breath in for 1 second and out for four seconds. Patient was encouraged to not talk while walking.\par \par Problem 10: snoring / + OSAS\par -Resmed Air Fit N20 with a setting of 8 in place\par - s/p home sleep study(+)\par Sleep apnea is associated with adverse clinical consequences which can affect most organ systems.  Cardiovascular disease risk includes arrhythmias, atrial fibrillation, hypertension, coronary artery disease, and stroke. Metabolic disorders include diabetes type 2, non-alcoholic fatty liver disease. Mood disorder especially depression; and cognitive decline especially in the elderly. Associations with  chronic reflux/Laurent’s esophagus some but not all inclusive. \par -Reasons  include arousal consistent with hypopnea; respiratory events most prominent in REM sleep or supine position; therefore sleep staging and body position are important for accurate diagnosis and estimation of AHI.\par \par Problem 10A: Muscle cramps\par - Myocalm PM\par \par Problem 11: Health Maintenance/COVID19 Precautions:\par -s/p Pfizer COVID 19 vaccine x 2\par - Clean your hands often. Wash your hands often with soap and water for at least 20 seconds, especially after blowing your nose, coughing, or sneezing, or having been in a public place.\par - If soap and water are not available, use a hand  that contains at least 60% alcohol.\par - To the extent possible, avoid touching high-touch surfaces in public places - elevator buttons, door handles, handrails, handshaking with people, etc. Use a tissue or your sleeve to cover your hand or finger if you must touch something.\par - Wash your hands after touching surfaces in public places.\par - Avoid touching your face, nose, eyes, etc.\par - Clean and disinfect your home to remove germs: practice routine cleaning of frequently touched surfaces (for example: tables, doorknobs, light switches, handles, desks, toilets, faucets, sinks & cell phones)\par - Avoid crowds, especially in poorly ventilated spaces. Your risk of exposure to respiratory viruses like COVID-19 may increase in crowded, closed-in settings with little air circulation if there are people in the crowd who are sick. All patients are recommended to practice social distancing and stay at least 6 feet away from others.\par \par Immune Support Recommendations:\par -OTC Vitamin C 500mg BID \par -OTC Quercetin 250-500mg BID \par -OTC Zinc 75-100mg per day \par -OTC Melatonin 1 or 2 mg a night \par -OTC Vitamin D 1-4000mg per day \par -OTC Tonic Water 8oz per day\par \par \par Problem 11: Health maintenance\par - recommended topricin cream for the chest \par -s/p flu shot -2022\par -recommended strep pneumonia vaccines: Prevnar-13 vaccine (6/16/2020), followed by Pneumo vaccine 23 one year following (after the age of 65)\par -recommended early intervention for URIs\par -recommended regular osteoporosis evaluations\par -recommended early dermatological evaluations\par -recommended after the age of 50 to the age of 70, colonoscopy every 5 years\par \par f/u in 3 months with full PFTs \par pt is encouraged to call or fax the office with any questions or concerns.

## 2022-11-28 NOTE — PROCEDURE
[FreeTextEntry1] : CT (08/19/2022) revealed No evidence of tracheobronchomalacia.\par Atelectasis/scarring in the lingula and bilateral lower lobes with associated bronchiectasis.\par \par Full PFT- spi reveals mild restriction; FEV1 was 2.00L which is 66% of predicted; mild low lung volumes; mild low diffusion at 14.8, which is 67% of predicted; normal flow volume loop \par \par FENO was 18; a normal value being less than 25\par Fractional exhaled nitric oxide (FENO) is regarded as a simple, noninvasive method for assessing eosinophilic airway inflammation. Produced by a variety of cells within the lung, nitric oxide (NO) concentrations are generally low in healthy individuals. However, high concentrations of NO appear to be involved in nonspecific host defense mechanisms and chronic inflammatory diseases such as asthma. The American Thoracic Society (ATS) therefore has recommended using FENO to aid in the diagnosis and monitoring of eosinophilic airway inflammation and asthma, and for identifying steroid responsive individuals whose chronic respiratory symptoms may be caused by airway inflammation.

## 2022-11-28 NOTE — HISTORY OF PRESENT ILLNESS
[TextBox_4] : Mr. FRANKLIN PRESLEY is a 67 year old male coming into the office for an initial evaluation. His chief complaint is \par - he recognized hernia right beneath sternum\par - his wife notes it became more recognizable since he has gained some weight \par - he notes feeling well in general\par - he notes constipation\par - he denies difficulty swallowing\par - he notes in January he will be getting cataract surgery\par - he notes ankle swelling sometimes \par - he notes not taking water pill everyday, he notes missing it deliberately\par - he notes getting cramps when he urinates often \par -  \par \par - He denies any headaches, nausea, vomiting, fever, chills, sweats, chest pain, chest pressure, palpitations, coughing, wheezing, fatigue, diarrhea, dysphagia, myalgias, dizziness, leg swelling, leg pain, itchy eyes, itchy ears, heartburn, reflux, or sour taste in the mouth

## 2022-11-28 NOTE — ADDENDUM
[FreeTextEntry1] : Documented by Baltazar Neville acting as a scribe for Dr. Mike Hernandez on (11/28/2022).\par \par All medical record entries made by the Scribe were at my, Dr. Mike Hernandez's, direction and personally dictated by me on (11/28/2022). I have reviewed the chart and agree that the record accurately reflects my personal performance of the history, physical exam, assessment and plan. I have personally directed, reviewed, and agree with the discharge instructions.

## 2022-12-01 ENCOUNTER — NON-APPOINTMENT (OUTPATIENT)
Age: 67
End: 2022-12-01

## 2022-12-01 ENCOUNTER — APPOINTMENT (OUTPATIENT)
Dept: OPHTHALMOLOGY | Facility: CLINIC | Age: 67
End: 2022-12-01

## 2022-12-01 PROCEDURE — 99215 OFFICE O/P EST HI 40 MIN: CPT

## 2022-12-03 ENCOUNTER — APPOINTMENT (OUTPATIENT)
Dept: OPHTHALMOLOGY | Facility: CLINIC | Age: 67
End: 2022-12-03

## 2022-12-03 ENCOUNTER — NON-APPOINTMENT (OUTPATIENT)
Age: 67
End: 2022-12-03

## 2022-12-03 PROCEDURE — 92012 INTRM OPH EXAM EST PATIENT: CPT

## 2022-12-03 PROCEDURE — 92285 EXTERNAL OCULAR PHOTOGRAPHY: CPT

## 2022-12-14 ENCOUNTER — APPOINTMENT (OUTPATIENT)
Dept: OPHTHALMOLOGY | Facility: CLINIC | Age: 67
End: 2022-12-14

## 2022-12-14 ENCOUNTER — NON-APPOINTMENT (OUTPATIENT)
Age: 67
End: 2022-12-14

## 2022-12-14 PROCEDURE — 67028 INJECTION EYE DRUG: CPT | Mod: RT

## 2022-12-14 PROCEDURE — 92012 INTRM OPH EXAM EST PATIENT: CPT | Mod: 25

## 2022-12-14 PROCEDURE — 92134 CPTRZ OPH DX IMG PST SGM RTA: CPT

## 2022-12-20 ENCOUNTER — NON-APPOINTMENT (OUTPATIENT)
Age: 67
End: 2022-12-20

## 2022-12-20 ENCOUNTER — APPOINTMENT (OUTPATIENT)
Dept: OPHTHALMOLOGY | Facility: CLINIC | Age: 67
End: 2022-12-20

## 2022-12-20 PROCEDURE — 92012 INTRM OPH EXAM EST PATIENT: CPT

## 2022-12-28 ENCOUNTER — NON-APPOINTMENT (OUTPATIENT)
Age: 67
End: 2022-12-28

## 2022-12-28 ENCOUNTER — APPOINTMENT (OUTPATIENT)
Dept: OPHTHALMOLOGY | Facility: EYE CENTER | Age: 67
End: 2022-12-28

## 2022-12-28 PROCEDURE — 66984 XCAPSL CTRC RMVL W/O ECP: CPT | Mod: RT

## 2022-12-29 ENCOUNTER — APPOINTMENT (OUTPATIENT)
Dept: OPHTHALMOLOGY | Facility: CLINIC | Age: 67
End: 2022-12-29

## 2022-12-29 ENCOUNTER — NON-APPOINTMENT (OUTPATIENT)
Age: 67
End: 2022-12-29

## 2022-12-29 PROCEDURE — 99024 POSTOP FOLLOW-UP VISIT: CPT

## 2023-01-03 NOTE — ED ADULT NURSE NOTE - NSSUHOSCREENINGYN_ED_ALL_ED
Aic 6.7   Lantus 26    Premeal 10   insulin teaching    house endocrine following  Approved [facility] program @ [facility] for insulin coverage Yes - the patient is able to be screened

## 2023-01-04 ENCOUNTER — APPOINTMENT (OUTPATIENT)
Dept: OPHTHALMOLOGY | Facility: CLINIC | Age: 68
End: 2023-01-04
Payer: MEDICARE

## 2023-01-04 ENCOUNTER — NON-APPOINTMENT (OUTPATIENT)
Age: 68
End: 2023-01-04

## 2023-01-04 PROCEDURE — 99024 POSTOP FOLLOW-UP VISIT: CPT

## 2023-01-23 NOTE — PROGRESS NOTE ADULT - ATTENDING COMMENTS
Agree with above NP note.  cv stable  pt to go back to OR for debridement for SW infection  abx per ID/CTS  diuretics per CTS  amio per cts Detail Level: Zone Add High Risk Medication Management Associated Diagnosis?: No

## 2023-02-01 ENCOUNTER — APPOINTMENT (OUTPATIENT)
Dept: OPHTHALMOLOGY | Facility: CLINIC | Age: 68
End: 2023-02-01
Payer: MEDICARE

## 2023-02-01 ENCOUNTER — NON-APPOINTMENT (OUTPATIENT)
Age: 68
End: 2023-02-01

## 2023-02-01 PROCEDURE — 99024 POSTOP FOLLOW-UP VISIT: CPT

## 2023-02-01 NOTE — PROGRESS NOTE ADULT - ASSESSMENT
Assessment  DMT2: 64y Male with DM T2 with hyperglycemia, A1C 9.2%, was on oral meds and insulin at home,  postop on basal bolus insulin, s/p pea arrest yesterday transferred to CTU blood sugars trending within acceptable range, patient on IV insulin not eating meals.  CAD: s/p CABG 2/3, on medications, no chest pain, stable, monitored.  HTN: Controlled,  on antihypertensive medications.  HLD: Controlled, on statin.  CKD: Monitor labs/BMP          Harper Matias MD  Cell: [phone]  Office: [phone] Bill For Surgical Tray: yes Post-Care Instructions: I reviewed with the patient in detail post-care instructions. Patient is to keep the biopsy site dry overnight, and then apply bacitracin twice daily until healed. Patient may apply hydrogen peroxide soaks to remove any crusting. Biopsy Method: Dermablade Detail Level: Detailed Wound Care: Petrolatum Billing Type: Third-Party Bill Render Post-Care Instructions In Note?: no Hemostasis: Drysol X Size Of Lesion In Cm (Optional): 0 Size Of Lesion In Cm (Required): 0.3 Anesthesia Type: 1% lidocaine with epinephrine Depth Of Shave: dermis Medical Necessity Clause: This procedure was medically necessary because the lesion that was treated was: Consent was obtained from the patient. The risks and benefits to therapy were discussed in detail. Specifically, the risks of infection, scarring, bleeding, prolonged wound healing, incomplete removal, allergy to anesthesia, nerve injury and recurrence were addressed. Prior to the procedure, the treatment site was clearly identified and confirmed by the patient. All components of Universal Protocol/PAUSE Rule completed. Medical Necessity Information: It is in your best interest to select a reason for this procedure from the list below. All of these items fulfill various CMS LCD requirements except the new and changing color options. Notification Instructions: Patient will be notified of pathology results. However, patient instructed to call the office if not contacted within 2 weeks.

## 2023-02-16 ENCOUNTER — APPOINTMENT (OUTPATIENT)
Dept: OPHTHALMOLOGY | Facility: CLINIC | Age: 68
End: 2023-02-16
Payer: MEDICARE

## 2023-02-16 ENCOUNTER — NON-APPOINTMENT (OUTPATIENT)
Age: 68
End: 2023-02-16

## 2023-02-16 PROCEDURE — 92012 INTRM OPH EXAM EST PATIENT: CPT | Mod: 25

## 2023-02-16 PROCEDURE — 92285 EXTERNAL OCULAR PHOTOGRAPHY: CPT

## 2023-02-22 ENCOUNTER — NON-APPOINTMENT (OUTPATIENT)
Age: 68
End: 2023-02-22

## 2023-02-22 ENCOUNTER — APPOINTMENT (OUTPATIENT)
Dept: OPHTHALMOLOGY | Facility: CLINIC | Age: 68
End: 2023-02-22
Payer: MEDICARE

## 2023-02-22 PROCEDURE — 92012 INTRM OPH EXAM EST PATIENT: CPT | Mod: 24

## 2023-02-22 PROCEDURE — 92134 CPTRZ OPH DX IMG PST SGM RTA: CPT

## 2023-03-28 ENCOUNTER — NON-APPOINTMENT (OUTPATIENT)
Age: 68
End: 2023-03-28

## 2023-03-28 ENCOUNTER — APPOINTMENT (OUTPATIENT)
Dept: PULMONOLOGY | Facility: CLINIC | Age: 68
End: 2023-03-28
Payer: MEDICARE

## 2023-03-28 VITALS
HEIGHT: 68 IN | OXYGEN SATURATION: 97 % | SYSTOLIC BLOOD PRESSURE: 130 MMHG | TEMPERATURE: 97.3 F | DIASTOLIC BLOOD PRESSURE: 78 MMHG | BODY MASS INDEX: 30.31 KG/M2 | WEIGHT: 200 LBS | HEART RATE: 76 BPM | RESPIRATION RATE: 16 BRPM

## 2023-03-28 DIAGNOSIS — R93.89 ABNORMAL FINDINGS ON DIAGNOSTIC IMAGING OF OTHER SPECIFIED BODY STRUCTURES: ICD-10-CM

## 2023-03-28 PROCEDURE — 94010 BREATHING CAPACITY TEST: CPT

## 2023-03-28 PROCEDURE — 95012 NITRIC OXIDE EXP GAS DETER: CPT

## 2023-03-28 PROCEDURE — 99214 OFFICE O/P EST MOD 30 MIN: CPT | Mod: 25

## 2023-03-28 NOTE — PROGRESS NOTE ADULT - SUBJECTIVE AND OBJECTIVE BOX
Plastic Surgery Progress Note (pg [facility]: [id number], [facility]: [phone])    SUBJECTIVE:  Patient seen and examined at bedside this AM. No acute events overnight. AF/VSS. Counselled regarding vac    OBJECTIVE:   Vital Signs Last 24 Hrs  T(C): 36.4 (18 May 2020 05:15), Max: 36.6 (17 May 2020 20:13)  T(F): 97.6 (18 May 2020 05:15), Max: 97.8 (17 May 2020 20:13)  HR: 87 (18 May 2020 05:15) (82 - 87)  BP: 152/78 (18 May 2020 05:15) (112/64 - 152/78)  RR: 16 (18 May 2020 05:15) (16 - 16)  SpO2: 96% (18 May 2020 05:15) (96% - 97%)    PHYSICAL EXAM:  General: alert and oriented, NAD  Resp: airway patent, respirations unlabored  Chest: KEI x2 with s/s output. Vac in place, holding suction. Soft  Abdomen: soft, non-distended.     ** LABS **             8.8    9.72  )-----------( 331      ( 17 May 2020 06:43 )             28.4     05-17    134<L>  |  97  |  40<H>  ----------------------------<  233<H>  4.9   |  24  |  1.77<H>    Ca    8.8      17 May 2020 06:43    TPro  6.1  /  Alb  2.3<L>  /  TBili  0.2  /  DBili  x   /  AST  17  /  ALT  15  /  AlkPhos  78  05-17 Hydroxychloroquine Pregnancy And Lactation Text: This medication has been shown to cause fetal harm but it isn't assigned a Pregnancy Risk Category. There are small amounts excreted in breast milk.

## 2023-03-28 NOTE — HISTORY OF PRESENT ILLNESS
[TextBox_4] : Mr. FRANKLIN PRESLEY is a 68 year old male coming into the office for a f/u evaluation. His chief complaint is \par \par \par -he notes SOB on exertion\par -he notes his breathing has generally improved but he is unable to walk up and down stairs without SOB\par -he notes he goes everywhere with his inhaler\par -he notes chest tightness when bending down to tie his shoes\par -he notes a cough at times when going up and down the stairs\par -he notes his weight is stable \par -s/p cataract surgery, pending second surgery\par -he notes that his appetite is good\par -he notes rhinitis at times in the morning and sometimes uses his nasal spray\par -he denies heartburn\par -he notes that he is sleeping well with CPAP\par -he notes he was given Farxiga and Valsartan \par \par \par -patient denies any headaches, nausea, vomiting, fever, chills, sweats, palpitations, diarrhea, constipation, dysphagia, myalgias, dizziness, leg swelling, leg pain, itchy eyes, itchy ears, heartburn, reflux or sour taste in the mouth

## 2023-03-28 NOTE — PHYSICAL EXAM
[No Acute Distress] : no acute distress [Normal Oropharynx] : normal oropharynx [Normal Appearance] : normal appearance [Normal Rate/Rhythm] : normal rate/rhythm [No Neck Mass] : no neck mass [Normal S1, S2] : normal s1, s2 [No Murmurs] : no murmurs [No Resp Distress] : no resp distress [Clear to Auscultation Bilaterally] : clear to auscultation bilaterally [No Abnormalities] : no abnormalities [Benign] : benign [No Clubbing] : no clubbing [Normal Gait] : normal gait [No Cyanosis] : no cyanosis [FROM] : FROM [Normal Color/ Pigmentation] : normal color/ pigmentation [No Focal Deficits] : no focal deficits [Normal Affect] : normal affect [Oriented x3] : oriented x3 [II] : Mallampati Class: II [No Edema] : no edema [TextBox_2] : Ow  [TextBox_68] : I:E ratio 1:3; clear

## 2023-03-28 NOTE — PROCEDURE
[FreeTextEntry1] : PFT revealed restrictive dysfunction, with a FEV1 of 1.8L, which is 69% of predicted, with a normal flow volume loop \par \par Feno was 29; a normal value being less than 25. Fractional exhaled nitric oxide (FENO) is regarded as a simple, noninvasive method for assessing eosinophilic airway inflammation. Produced by a variety of cells within the lung, nitric oxide (NO) concentrations are generally low in healthy individuals. However, high concentrations of NO appear to be involved in nonspecific host defense mechanisms and chronic inflammatory  diseases such as asthma. The American Thoracic Society (ATS) therefore recommended using FENO to aid in the diagnosis and monitoring of eosinophilic airway inflammation and asthma, and for identifying steroid responsive individuals whose chronic respiratory symptoms may be caused by airway inflammation

## 2023-03-28 NOTE — ADDENDUM
[FreeTextEntry1] : Documented by Klever Flores acting as a scribe for Dr. Mike Hernandez on 03/28/2023.\par \par All medical record entries made by the Scribe were at my, Dr. Mike Hernandez's, direction and personally dictated by me on 03/28/2023. I have reviewed the chart and agree that the record accurately reflects my personal performance of the history, physical exam, assessment and plan. I have also personally directed, reviewed, and agree with the discharge instructions.

## 2023-03-28 NOTE — ASSESSMENT
[FreeTextEntry1] : Mr. FRANKLIN PRESLEY is a 68 year year old male who has a history of  DM, bronchiectasis, Coronary artery disease, pleural thickening, severe OSAS, s/p median sternotomy complicated by cardiac arrest and fractured ribs.  who now comes in for pulmonary evaluation. SOB and Cough -#1 issue is weight gain/PALMA\par \par The patient's SOB is felt to be multifactorial:\par -pulmonary \par    - Restrictive Dysfunction due to healing rib fractured and median sternotomy \par    - bronchiectasis\par - Poor breathing mechanics\par - Overweight/ Out-of Shape\par - ?Cardiac Disease - Dr. Steel \par \par Problem 1: Restrictive Dysfunction- "Hernia" \par -Due to healing rib fracture and median sternotomy; "controlled cough"- weight loss  \par \par Problem 2: Tracheomalacia (-)\par -s/p Dynamic CT (8/2022)\par -continue Amitriptyline 10 mg Q8H\par \par -Tracheomalacia is usually acquired in adults and common causes include damage by tracheostomy or endotracheal intubation damaging the tracheal cartilage with increase risk with multiple intubations, prolonged intubation, and concurrent high dose steroid therapy; external chest wall trauma and surgery; chronic compression of the trachea by benign etiologies (eg, benign mediastinal goiter) or malignancy; relapsing polychondritis; or recurrent infection. Tracheomalacia can be asymptomatic, however signs or symptoms can develop as the severity of the airway narrowing progresses with major symptoms include dyspnea, cough, and sputum retention. Other symptoms include severe paroxysms of coughing, wheezing or stridor, barking cough and may be exacerbated by forced expiration, cough, and valsalva maneuver. Tracheomalacia is diagnosed by a bronchoscopic visualization of dynamic airway collapse on dynamic chest CT. Therapy is warranted in symptomatic patients with severe tracheomalacia and includes surgical repair as tracheobronchoplasty. The patient was referred to Dr. Bulmaro Wing or Dr. Omar Gordon, at [facility] for a surgical consult.\par \par Problem 2A: Bronchiectasis\par - Seen on the CT of the chest or chest x-ray signifies damaged bronchial tubes focal or diffuse which can be sites of recurrent infections. These areas can be colonized by various organisms including bacteria (hemophilous influenzae/Pseudomonas species etc.) as well as acid fast bacilli (mycobacterial disease- inclusive of TB/NAJMA etc.). Sputum either for bacteria culture/sensitivity or AFB culture and sensitivity will need to be sent if the patient has sputum- 3 specimens on consecutive days will need to be dropped at the laboratory- if the patient can produce sputum.   \par \par Problem 3: Chronic Cough\par DDx\par -bronchiectasis\par RADS / Asthma \par PND \par reflux \par dysphagia\par \par -Any cough greater than three weeks duration-differential diagnosis includes-asthma, upper airway cough syndrome, post nasal drip syndrome, gastroesophageal reflux, laryngopharyngeal reflux, cardiac disease (congestive heart failure, medicines, effects, etc), medication effects (b-blockers, ace inhibitors, ARBs, glaucoma meds, etc.), smoking, infectious, multifactorial, etc. \par \par Problem 4: RADS / Asthma (NC)\par -off Xopenex  and Budesonide (0.63) TID via nebulizer to Trelegy 100 1 puff QD (NC)\par -continue Ventolin 2 puffs Q6H, pre-exercise  \par \par -Asthma is believed to be caused by inherited (genetic) and environmental factor, but its exact cause is unknown. Asthma may be triggered by allergens, lung infections, or irritants in the air. Asthma triggers are different for each person \par -Inhaler technique reviewed as well as oral hygiene techniques reviewed with patient. Avoidance of cold air, extremes of temperature, rescue inhaler should be used before exercise. Order of medication reviewed with patient. Recommended use of a cool mist humidifier in the bedroom. \par \par Problem 5: PND\par -continue Olopatadine 0.6% 1 sniff BID\par - script given for blood work: asthma profile, food IgE panel, eosinophil level, IgE level, Vitamin D level \par \par -Environmental measures for allergies were encouraged including mattress and pillow cover, air purifier, and environmental controls. \par \par Problem 6: GERD / Laryngomalacia\par -Recommended to see ENT Dr. Marcus\par -complete FEESST study\par -continue Protonix 40mg qAM before breakfast \par -continue Pepcid 40mg QHS \par \par - Things to avoid including overeating, spicy foods, tight clothing, eating within three hours of bed, this list is not all inclusive.\par \par - For treatment of reflux, possible options discussed including diet control, H2 blockers, PPIs, as well as coating motility agents discussed as treatment options. Timing of meals and proximity of last meal to sleep were discussed. If symptoms persist, a formal gastrointestinal evaluation is needed. \par \par Problem 7: s/p Amiodarone (off)\par -PFTs/TFTs/LFTs; 2-3 times per year\par - Amiodarone/bleomycin/methotrexate and other drugs have been associated with pulmonary parenchymal damage. These drugs require pulmonary function testing including DLCO regularly and possible CT/CXR if respiratory complaints develop. PFTs should be performed at 6 month intervals. \par \par Problem 7A: Cardiac \par - follow up with Dr. Cortes \par \par Problem 8: Overweight\par Recommended "10-Day Detox" by Brian Saleem for 2-3 weeks followed by probiotics \par - Weight loss, exercise, and diet control were discussed and are highly encouraged. Treatment options were given such as, aqua therapy, and contacting a nutritionist. Recommended to use the elliptical, stationary bike, less use of treadmill. Mindful eating was explained to the patient Obesity is associated with worsening asthma, shortness of breath, and potential for cardiac disease, diabetes, and other underlying medical conditions.\par \par Problem 9: Poor Breathing Mechanics\par -recommended Wim Hof and Buteyko breathing techniques \par - Proper breathing techniques were reviewed with an emphasis of exhalation. Patient instructed to breath in for 1 second and out for four seconds. Patient was encouraged to not talk while walking.\par \par Problem 10: snoring / + OSAS\par -Resmed Air Fit N20 with a setting of 8 in place\par - s/p home sleep study(+)\par Sleep apnea is associated with adverse clinical consequences which can affect most organ systems.  Cardiovascular disease risk includes arrhythmias, atrial fibrillation, hypertension, coronary artery disease, and stroke. Metabolic disorders include diabetes type 2, non-alcoholic fatty liver disease. Mood disorder especially depression; and cognitive decline especially in the elderly. Associations with  chronic reflux/Laurent’s esophagus some but not all inclusive. \par -Reasons  include arousal consistent with hypopnea; respiratory events most prominent in REM sleep or supine position; therefore sleep staging and body position are important for accurate diagnosis and estimation of AHI.\par \par Problem 10A: Muscle cramps\par - Myocalm PM\par \par Problem 11: Health Maintenance/COVID19 Precautions:\par -s/p Pfizer COVID 19 vaccine x 2\par - Clean your hands often. Wash your hands often with soap and water for at least 20 seconds, especially after blowing your nose, coughing, or sneezing, or having been in a public place.\par - If soap and water are not available, use a hand  that contains at least 60% alcohol.\par - To the extent possible, avoid touching high-touch surfaces in public places - elevator buttons, door handles, handrails, handshaking with people, etc. Use a tissue or your sleeve to cover your hand or finger if you must touch something.\par - Wash your hands after touching surfaces in public places.\par - Avoid touching your face, nose, eyes, etc.\par - Clean and disinfect your home to remove germs: practice routine cleaning of frequently touched surfaces (for example: tables, doorknobs, light switches, handles, desks, toilets, faucets, sinks & cell phones)\par - Avoid crowds, especially in poorly ventilated spaces. Your risk of exposure to respiratory viruses like COVID-19 may increase in crowded, closed-in settings with little air circulation if there are people in the crowd who are sick. All patients are recommended to practice social distancing and stay at least 6 feet away from others.\par \par Immune Support Recommendations:\par -OTC Vitamin C 500mg BID \par -OTC Quercetin 250-500mg BID \par -OTC Zinc 75-100mg per day \par -OTC Melatonin 1 or 2 mg a night \par -OTC Vitamin D 1-4000mg per day \par -OTC Tonic Water 8oz per day\par \par \par Problem 11: Health maintenance\par - recommended topricin cream for the chest \par -s/p flu shot -2022\par -recommended strep pneumonia vaccines: Prevnar-13 vaccine (6/16/2020), followed by Pneumo vaccine 23 one year following (after the age of 65)\par -recommended early intervention for URIs\par -recommended regular osteoporosis evaluations\par -recommended early dermatological evaluations\par -recommended after the age of 50 to the age of 70, colonoscopy every 5 years\par \par f/u in 3 months with full PFTs \par pt is encouraged to call or fax the office with any questions or concerns.

## 2023-04-17 ENCOUNTER — NON-APPOINTMENT (OUTPATIENT)
Age: 68
End: 2023-04-17

## 2023-04-17 ENCOUNTER — APPOINTMENT (OUTPATIENT)
Dept: OPHTHALMOLOGY | Facility: CLINIC | Age: 68
End: 2023-04-17
Payer: MEDICARE

## 2023-04-17 PROCEDURE — 92014 COMPRE OPH EXAM EST PT 1/>: CPT

## 2023-04-17 PROCEDURE — 92286 ANT SGM IMG I&R SPECLR MIC: CPT

## 2023-04-17 PROCEDURE — 92134 CPTRZ OPH DX IMG PST SGM RTA: CPT

## 2023-04-18 NOTE — PROGRESS NOTE ADULT - ATTENDING COMMENTS
A (CATHETER 6FR JL3.5 CRV 100CM RADOPQ BRAID SLCT SUP TRQ ANGIO) catheter was inserted. 63 yo [place] male with DM poorly controlled, HTN with likely CKD3  He has echogenic kidney on u/s and has diabetic retinopathy and neuropathy with nephropathy  Had cath and multivessel disease  Renal function stable  Serologies ordered  For CABG next week    Chacha Dimas MD  Off: [phone]  Cell: [phone]

## 2023-05-08 ENCOUNTER — NON-APPOINTMENT (OUTPATIENT)
Age: 68
End: 2023-05-08

## 2023-05-08 ENCOUNTER — APPOINTMENT (OUTPATIENT)
Dept: OPHTHALMOLOGY | Facility: CLINIC | Age: 68
End: 2023-05-08
Payer: MEDICARE

## 2023-05-08 PROCEDURE — 99213 OFFICE O/P EST LOW 20 MIN: CPT

## 2023-05-10 ENCOUNTER — APPOINTMENT (OUTPATIENT)
Dept: OPHTHALMOLOGY | Facility: CLINIC | Age: 68
End: 2023-05-10

## 2023-05-24 ENCOUNTER — APPOINTMENT (OUTPATIENT)
Dept: OPHTHALMOLOGY | Facility: CLINIC | Age: 68
End: 2023-05-24
Payer: MEDICARE

## 2023-05-24 ENCOUNTER — NON-APPOINTMENT (OUTPATIENT)
Age: 68
End: 2023-05-24

## 2023-05-24 ENCOUNTER — APPOINTMENT (OUTPATIENT)
Dept: OPHTHALMOLOGY | Facility: CLINIC | Age: 68
End: 2023-05-24

## 2023-05-24 PROCEDURE — 92012 INTRM OPH EXAM EST PATIENT: CPT

## 2023-05-24 PROCEDURE — 92134 CPTRZ OPH DX IMG PST SGM RTA: CPT

## 2023-05-30 ENCOUNTER — NON-APPOINTMENT (OUTPATIENT)
Age: 68
End: 2023-05-30

## 2023-05-30 ENCOUNTER — APPOINTMENT (OUTPATIENT)
Dept: OPHTHALMOLOGY | Facility: CLINIC | Age: 68
End: 2023-05-30
Payer: MEDICARE

## 2023-05-30 PROCEDURE — 92014 COMPRE OPH EXAM EST PT 1/>: CPT

## 2023-05-31 ENCOUNTER — APPOINTMENT (OUTPATIENT)
Dept: OPHTHALMOLOGY | Facility: CLINIC | Age: 68
End: 2023-05-31

## 2023-06-16 ENCOUNTER — NON-APPOINTMENT (OUTPATIENT)
Age: 68
End: 2023-06-16

## 2023-06-16 ENCOUNTER — APPOINTMENT (OUTPATIENT)
Dept: OPHTHALMOLOGY | Facility: EYE CENTER | Age: 68
End: 2023-06-16
Payer: MEDICARE

## 2023-06-16 PROCEDURE — 66984 XCAPSL CTRC RMVL W/O ECP: CPT | Mod: 79,LT

## 2023-06-17 ENCOUNTER — APPOINTMENT (OUTPATIENT)
Dept: OPHTHALMOLOGY | Facility: CLINIC | Age: 68
End: 2023-06-17
Payer: MEDICARE

## 2023-06-17 ENCOUNTER — NON-APPOINTMENT (OUTPATIENT)
Age: 68
End: 2023-06-17

## 2023-06-17 PROCEDURE — 99024 POSTOP FOLLOW-UP VISIT: CPT

## 2023-06-19 NOTE — PROGRESS NOTE ADULT - PROVIDER SPECIALTY LIST ADULT
Critical Care Protopic Pregnancy And Lactation Text: This medication is Pregnancy Category C. It is unknown if this medication is excreted in breast milk when applied topically.

## 2023-06-22 ENCOUNTER — NON-APPOINTMENT (OUTPATIENT)
Age: 68
End: 2023-06-22

## 2023-06-22 ENCOUNTER — APPOINTMENT (OUTPATIENT)
Dept: OPHTHALMOLOGY | Facility: CLINIC | Age: 68
End: 2023-06-22
Payer: MEDICARE

## 2023-06-22 PROCEDURE — 99024 POSTOP FOLLOW-UP VISIT: CPT

## 2023-07-01 ENCOUNTER — RX RENEWAL (OUTPATIENT)
Age: 68
End: 2023-07-01

## 2023-07-07 NOTE — PROGRESS NOTE ADULT - SUBJECTIVE AND OBJECTIVE BOX
Below is some helpful information about your upcoming surgery.  • Please arrive at [facility] at 0830 on 7-11-23.  • Please do not eat after midnight, the night prior to your surgery.  It is ok to drink clear liquids up until 0630.  Some examples of clear liquids include: water, apple juice, jello or black coffee.   Please don’t drink anything with pulp such as, orange juice.    • Please take the following medication the morning of surgery: none.  Please bring medications that you take in their original prescription bottles the day of surgery.  • Bring any cases for your contact lens, dentures, partials or glasses.  • Leave any valuables at home and remove all jewelry prior to your arrival.  Please don't wear any make-up or hair product the morning of surgery.    • Bring your insurance card and a photo ID.  • Make sure you have arranged for someone to bring you home.     If you were to become ill prior to your surgery, please contact your family care physician.  The quality of your stay is important to us.  We want to ensure you have excellent care during your stay.  Please don’t hesitate to call with any questions about your surgery at [phone] (hours of operation are 8am-4pm Monday-Friday).  We look forward to caring for you!    Sincerely,   The Pre-surgical Evaluation Department    PLASTIC SURGERY DAILY PROGRESS NOTE:    Interval:  No acute events overnight endorsed.    Subjective:  Patient seen and examined this am. Counselled regarding potential surgery vac anxiety and anesthesia    Vital Signs Last 24 Hrs  T(C): 36.7 (15 May 2020 05:12), Max: 37.1 (14 May 2020 20:57)  T(F): 98.1 (15 May 2020 05:12), Max: 98.8 (14 May 2020 20:57)  HR: 107 (15 May 2020 05:12) (91 - 107)  BP: 143/82 (15 May 2020 05:12) (114/68 - 143/82)  RR: 18 (15 May 2020 05:12) (18 - 19)  SpO2: 99% (15 May 2020 05:12) (99% - 100%)    Exam:  Gen: NAD, resting in bed, alert and responding appropriately  Resp: Airway patent, non-labored respirations  Chest: Anterior chest wound vac  Abd: Soft, ND, NTTP x 4 quadrants, no rebound or guarding.   Ext: No edema, WWP  Neuro: AAOx3, no focal deficits    LABS:             8.2    11.73 )-----------( 352      ( 15 May 2020 06:52 )             26.0     05-15    132<L>  |  94<L>  |  40<H>  ----------------------------<  217<H>  4.9   |  24  |  1.78<H>    Ca    9.5      15 May 2020 06:52  Phos  3.3     05-15  Mg     2.0     05-15

## 2023-07-26 ENCOUNTER — APPOINTMENT (OUTPATIENT)
Dept: OPHTHALMOLOGY | Facility: CLINIC | Age: 68
End: 2023-07-26

## 2023-07-26 ENCOUNTER — NON-APPOINTMENT (OUTPATIENT)
Age: 68
End: 2023-07-26

## 2023-08-10 ENCOUNTER — APPOINTMENT (OUTPATIENT)
Dept: PULMONOLOGY | Facility: CLINIC | Age: 68
End: 2023-08-10
Payer: MEDICARE

## 2023-08-10 VITALS
HEART RATE: 74 BPM | WEIGHT: 194 LBS | BODY MASS INDEX: 29.4 KG/M2 | TEMPERATURE: 97.1 F | OXYGEN SATURATION: 98 % | DIASTOLIC BLOOD PRESSURE: 70 MMHG | HEIGHT: 68 IN | RESPIRATION RATE: 16 BRPM | SYSTOLIC BLOOD PRESSURE: 142 MMHG

## 2023-08-10 DIAGNOSIS — J43.9 EMPHYSEMA, UNSPECIFIED: ICD-10-CM

## 2023-08-10 DIAGNOSIS — J98.4 OTHER DISORDERS OF LUNG: ICD-10-CM

## 2023-08-10 DIAGNOSIS — J30.1 ALLERGIC RHINITIS DUE TO POLLEN: ICD-10-CM

## 2023-08-10 DIAGNOSIS — J98.4 EMPHYSEMA, UNSPECIFIED: ICD-10-CM

## 2023-08-10 PROCEDURE — 95012 NITRIC OXIDE EXP GAS DETER: CPT

## 2023-08-10 PROCEDURE — 94010 BREATHING CAPACITY TEST: CPT

## 2023-08-10 PROCEDURE — 94729 DIFFUSING CAPACITY: CPT

## 2023-08-10 PROCEDURE — ZZZZZ: CPT

## 2023-08-10 PROCEDURE — 94727 GAS DIL/WSHOT DETER LNG VOL: CPT

## 2023-08-10 PROCEDURE — 99214 OFFICE O/P EST MOD 30 MIN: CPT | Mod: 25

## 2023-08-10 NOTE — ASSESSMENT
[FreeTextEntry1] : Mr. FRANKLIN PRESLEY is a 68 year year old male who has a history of  DM, bronchiectasis, Coronary artery disease, pleural thickening, severe OSAS, s/p median sternotomy complicated by cardiac arrest and fractured ribs.  who now comes in for pulmonary evaluation. SOB and Cough -#1 issue is weight gain/PALMA; hernia (luis OHS)  The patient's SOB is felt to be multifactorial: -pulmonary     - Restrictive Dysfunction due to healing rib fractured and median sternotomy     - bronchiectasis - Poor breathing mechanics - Overweight/ Out-of Shape - ?Cardiac Disease - Dr. Steel   Problem 1: Restrictive Dysfunction- "Hernia"  -Due to healing rib fracture and median sternotomy; "controlled cough"- weight loss    - recommended The Breather and Power Breath Medic Plus IMT (30 breaths BID)  Problem 2: Tracheomalacia (-) -s/p Dynamic CT (8/2022) -continue Amitriptyline 10 mg Q8H  -Tracheomalacia is usually acquired in adults and common causes include damage by tracheostomy or endotracheal intubation damaging the tracheal cartilage with increase risk with multiple intubations, prolonged intubation, and concurrent high dose steroid therapy; external chest wall trauma and surgery; chronic compression of the trachea by benign etiologies (eg, benign mediastinal goiter) or malignancy; relapsing polychondritis; or recurrent infection. Tracheomalacia can be asymptomatic, however signs or symptoms can develop as the severity of the airway narrowing progresses with major symptoms include dyspnea, cough, and sputum retention. Other symptoms include severe paroxysms of coughing, wheezing or stridor, barking cough and may be exacerbated by forced expiration, cough, and valsalva maneuver. Tracheomalacia is diagnosed by a bronchoscopic visualization of dynamic airway collapse on dynamic chest CT. Therapy is warranted in symptomatic patients with severe tracheomalacia and includes surgical repair as tracheobronchoplasty. The patient was referred to Dr. Bulmaro Wing or Dr. Omar Gordon, at [facility] for a surgical consult.  Problem 2A: Bronchiectasis - Seen on the CT of the chest or chest x-ray signifies damaged bronchial tubes focal or diffuse which can be sites of recurrent infections. These areas can be colonized by various organisms including bacteria (hemophilous influenzae/Pseudomonas species etc.) as well as acid fast bacilli (mycobacterial disease- inclusive of TB/NAJMA etc.). Sputum either for bacteria culture/sensitivity or AFB culture and sensitivity will need to be sent if the patient has sputum- 3 specimens on consecutive days will need to be dropped at the laboratory- if the patient can produce sputum.     Problem 3: Chronic Cough DDx -bronchiectasis RADS / Asthma  PND  reflux  dysphagia  -Any cough greater than three weeks duration-differential diagnosis includes-asthma, upper airway cough syndrome, post nasal drip syndrome, gastroesophageal reflux, laryngopharyngeal reflux, cardiac disease (congestive heart failure, medicines, effects, etc), medication effects (b-blockers, ace inhibitors, ARBs, glaucoma meds, etc.), smoking, infectious, multifactorial, etc.   Problem 4: RADS / Asthma (NC) -off Xopenex  and Budesonide (0.63) TID via nebulizer to Trelegy 100 1 puff QD (NC) -continue Ventolin 2 puffs Q6H, pre-exercise    -Asthma is believed to be caused by inherited (genetic) and environmental factor, but its exact cause is unknown. Asthma may be triggered by allergens, lung infections, or irritants in the air. Asthma triggers are different for each person  -Inhaler technique reviewed as well as oral hygiene techniques reviewed with patient. Avoidance of cold air, extremes of temperature, rescue inhaler should be used before exercise. Order of medication reviewed with patient. Recommended use of a cool mist humidifier in the bedroom.   Problem 5: PND -continue Olopatadine 0.6% 1 sniff BID - script given for blood work: asthma profile, food IgE panel, eosinophil level, IgE level, Vitamin D level (NC)  -Environmental measures for allergies were encouraged including mattress and pillow cover, air purifier, and environmental controls.   Problem 6: GERD / Laryngomalacia -Recommended to see ENT Dr. Marcus -complete FEESST study -continue Protonix 40mg qAM before breakfast  -continue Pepcid 40mg QHS   - Things to avoid including overeating, spicy foods, tight clothing, eating within three hours of bed, this list is not all inclusive.  - For treatment of reflux, possible options discussed including diet control, H2 blockers, PPIs, as well as coating motility agents discussed as treatment options. Timing of meals and proximity of last meal to sleep were discussed. If symptoms persist, a formal gastrointestinal evaluation is needed.   Problem 7: s/p Amiodarone (off) -PFTs/TFTs/LFTs; 2-3 times per year - Amiodarone/bleomycin/methotrexate and other drugs have been associated with pulmonary parenchymal damage. These drugs require pulmonary function testing including DLCO regularly and possible CT/CXR if respiratory complaints develop. PFTs should be performed at 6 month intervals.   Problem 7A: Cardiac  - follow up with Dr. Steel et al   Problem 7B: Hernia - Plastic Surgeon evaluation pending  Problem 8: Overweight Recommended "10-Day Detox" by Brian Saleem for 2-3 weeks followed by probiotics  - Weight loss, exercise, and diet control were discussed and are highly encouraged. Treatment options were given such as, aqua therapy, and contacting a nutritionist. Recommended to use the elliptical, stationary bike, less use of treadmill. Mindful eating was explained to the patient Obesity is associated with worsening asthma, shortness of breath, and potential for cardiac disease, diabetes, and other underlying medical conditions.  Problem 9: Poor Breathing Mechanics -recommended Wibertha Hof and Buteyko breathing techniques  - Proper breathing techniques were reviewed with an emphasis of exhalation. Patient instructed to breath in for 1 second and out for four seconds. Patient was encouraged to not talk while walking.  Problem 10: snoring / + OSAS -Resmed Air Fit N20 with a setting of 8 in place - s/p home sleep study(+) Sleep apnea is associated with adverse clinical consequences which can affect most organ systems.  Cardiovascular disease risk includes arrhythmias, atrial fibrillation, hypertension, coronary artery disease, and stroke. Metabolic disorders include diabetes type 2, non-alcoholic fatty liver disease. Mood disorder especially depression; and cognitive decline especially in the elderly. Associations with  chronic reflux/Laurent's esophagus some but not all inclusive.  -Reasons  include arousal consistent with hypopnea; respiratory events most prominent in REM sleep or supine position; therefore sleep staging and body position are important for accurate diagnosis and estimation of AHI.  Problem 10A: Muscle cramps - Myocalm PM  Problem 11: Health Maintenance/COVID19 Precautions: -s/p Pfizer COVID 19 vaccine x 2 - Clean your hands often. Wash your hands often with soap and water for at least 20 seconds, especially after blowing your nose, coughing, or sneezing, or having been in a public place. - If soap and water are not available, use a hand  that contains at least 60% alcohol. - To the extent possible, avoid touching high-touch surfaces in public places - elevator buttons, door handles, handrails, handshaking with people, etc. Use a tissue or your sleeve to cover your hand or finger if you must touch something. - Wash your hands after touching surfaces in public places. - Avoid touching your face, nose, eyes, etc. - Clean and disinfect your home to remove germs: practice routine cleaning of frequently touched surfaces (for example: tables, doorknobs, light switches, handles, desks, toilets, faucets, sinks & cell phones) - Avoid crowds, especially in poorly ventilated spaces. Your risk of exposure to respiratory viruses like COVID-19 may increase in crowded, closed-in settings with little air circulation if there are people in the crowd who are sick. All patients are recommended to practice social distancing and stay at least 6 feet away from others.  Immune Support Recommendations: -OTC Vitamin C 500mg BID  -OTC Quercetin 250-500mg BID  -OTC Zinc 75-100mg per day  -OTC Melatonin 1 or 2 mg a night  -OTC Vitamin D 1-4000mg per day  -OTC Tonic Water 8oz per day   Problem 11: Health maintenance -recommended RSV vaccine in the Fall for anyone over the age of 60 - recommended topricin cream for the chest  -s/p flu shot -2022 -recommended strep pneumonia vaccines: Prevnar-13 vaccine (6/16/2020), followed by Pneumo vaccine 23 one year following (after the age of 65) -recommended early intervention for URIs -recommended regular osteoporosis evaluations -recommended early dermatological evaluations -recommended after the age of 50 to the age of 70, colonoscopy every 5 years  f/u in 3 months with full PFTs  pt is encouraged to call or fax the office with any questions or concerns.

## 2023-08-10 NOTE — PHYSICAL EXAM

## 2023-08-10 NOTE — HISTORY OF PRESENT ILLNESS
[TextBox_4] : Mr. FRANKLIN PRESLEY is a 68 year old male coming into the office for a f/u evaluation. His chief complaint is   - he notes having a graft done and the hernia in his chest is getting larger  - he notes recently starting to exercise - he notes he is not in the shape he used to be, but he is doing well with exercise - he notes some issues with balance    -he denies any headaches, nausea, emesis, fever, chills, sweats, chest pain, chest pressure, coughing, wheezing, palpitations, constipation, diarrhea, vertigo, dysphagia, heartburn, reflux, itchy eyes, itchy ears, leg swelling, leg pain, arthralgias, myalgias, or sour taste in the mouth.

## 2023-08-10 NOTE — ADDENDUM
[FreeTextEntry1] : Documented by Brody Brownlee acting as a scribe for Dr. Mike Hernandez on 08/10/2023 .  All medical record entries made by the Scribe were at my, Dr. Mike Hernandez's, direction and personally dictated by me on 08/10/2023 . I have reviewed the chart and agree that the record accurately reflects my personal performance of the history, physical exam, assessment and plan. I have also personally directed, reviewed, and agree with the discharge instructions.

## 2023-08-10 NOTE — PROCEDURE
[FreeTextEntry1] : Full PFT reveals mild restrictive and moderate obstructive dysfunction; FEV1 was 2.00 L which is 67% of predicted; mildly reduced lung volumes; moderately reduced diffusion at 11.3, which is 54% of predicted; with an abnormal inspiratory limb. PFTs were performed to evaluate for Asthma  FENO was 17; a normal value being less than 25 Fractional exhaled nitric oxide (FENO) is regarded as a simple, noninvasive method for assessing eosinophilic airway inflammation. Produced by a variety of cells within the lung, nitric oxide (NO) concentrations are generally low in healthy individuals. However, high concentrations of NO appear to be involved in nonspecific host defense mechanisms and chronic inflammatory diseases such as asthma. The American Thoracic Society (ATS) therefore has recommended using FENO to aid in the diagnosis and monitoring of eosinophilic airway inflammation and asthma, and for identifying steroid responsive individuals whose chronic respiratory symptoms may be caused by airway inflammation.  Negative

## 2023-10-25 ENCOUNTER — APPOINTMENT (OUTPATIENT)
Dept: OPHTHALMOLOGY | Facility: CLINIC | Age: 68
End: 2023-10-25
Payer: MEDICARE

## 2023-10-25 ENCOUNTER — NON-APPOINTMENT (OUTPATIENT)
Age: 68
End: 2023-10-25

## 2023-10-25 PROCEDURE — 92014 COMPRE OPH EXAM EST PT 1/>: CPT | Mod: 25

## 2023-10-25 PROCEDURE — 92201 OPSCPY EXTND RTA DRAW UNI/BI: CPT

## 2023-10-25 PROCEDURE — 92134 CPTRZ OPH DX IMG PST SGM RTA: CPT

## 2023-11-11 NOTE — PHYSICAL EXAM
[No Acute Distress] : no acute distress [Normal Oropharynx] : normal oropharynx [III] : Mallampati Class: III [Normal Appearance] : normal appearance [No Neck Mass] : no neck mass [Normal Rate/Rhythm] : normal rate/rhythm [Normal S1, S2] : normal s1, s2 [No Murmurs] : no murmurs [No Resp Distress] : no resp distress [Clear to Auscultation Bilaterally] : clear to auscultation bilaterally [No Abnormalities] : no abnormalities [Benign] : benign [Normal Gait] : normal gait [No Clubbing] : no clubbing [No Cyanosis] : no cyanosis [FROM] : FROM [Normal Color/ Pigmentation] : normal color/ pigmentation [No Focal Deficits] : no focal deficits Yes [Oriented x3] : oriented x3 [Normal Affect] : normal affect [TextBox_2] : Ow  [TextBox_11] : hernia right beneath sternum [TextBox_68] : I:E ratio 1:3; clear   [TextBox_105] : 1+ LE Edema on RLE

## 2023-11-16 NOTE — PHYSICAL THERAPY INITIAL EVALUATION ADULT - ADDITIONAL COMMENTS
José Miguel Carlson,     If you are due for any health screening(s) below please notify me so we can arrange them to be ordered and scheduled. Most healthy patients at your age complete them, but you are free to accept or refuse.     If you can't do it, I'll definitely understand. If you can, I'd certainly appreciate it!    Tests to Keep You Healthy    Cervical Cancer Screening: DUE      Your cervical cancer screening is due     Our records indicate that you may be overdue for your screening Pap smear. A Pap smear is an important health screening that can detect abnormal cells that can become cervical cancer. Cervical cancer screenings allow for early diagnosis and increase the likelihood of successful treatment.     The current recommendation for Pap smear screening is every 3-5 years for women at average risk. We encourage you to schedule your appointment with your womens health provider. Many women see a gynecologist for this screening, but some primary care providers also provide Pap screening.     If you recently had your Pap smear screening performed outside of [facility], please let your health care team know so that they can update your health record.      Were here to help you quit smoking     Our records indicated that you are still smoking. One of the best things you can do for your health is to stop smoking and we are here to help.     Talk with your provider about our Smoking Cessation Program and how we can support you on your journey.                  
Pt resides in private home with spouse & son, 4 steps to enter with handrail, flight within. 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
Pt resides in private home with spouse & son, 4 steps to enter with handrail, flight within. 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

## 2023-11-29 NOTE — PROGRESS NOTE ADULT - SUBJECTIVE AND OBJECTIVE BOX
Patient is a 65y old  Male who presents with a chief complaint of syncope (17 May 2020 15:50)      SUBJECTIVE / OVERNIGHT EVENTS: weak.   Review of Systems  chest pain no  palpitations no  sob no  nausea no  headache no    MEDICATIONS  (STANDING):  aMIOdarone    Tablet 200 milliGRAM(s) Oral daily  aspirin enteric coated 81 milliGRAM(s) Oral daily  atorvastatin 40 milliGRAM(s) Oral at bedtime  BACItracin   Ointment 1 Application(s) Topical daily  buDESOnide    Inhalation Suspension 0.5 milliGRAM(s) Inhalation two times a day  dextrose 5%. 1000 milliLiter(s) (50 mL/Hr) IV Continuous <Continuous>  dextrose 50% Injectable 12.5 Gram(s) IV Push once  dextrose 50% Injectable 25 Gram(s) IV Push once  dextrose 50% Injectable 25 Gram(s) IV Push once  enoxaparin Injectable 40 milliGRAM(s) SubCutaneous daily  insulin glargine Injectable (LANTUS) 26 Unit(s) SubCutaneous at bedtime  insulin lispro (HumaLOG) corrective regimen sliding scale   SubCutaneous three times a day before meals  insulin lispro (HumaLOG) corrective regimen sliding scale   SubCutaneous at bedtime  insulin lispro Injectable (HumaLOG) 9 Unit(s) SubCutaneous three times a day before meals  lactated ringers. 1000 milliLiter(s) (75 mL/Hr) IV Continuous <Continuous>  loratadine 10 milliGRAM(s) Oral daily  metoprolol tartrate 12.5 milliGRAM(s) Oral two times a day  pantoprazole    Tablet 40 milliGRAM(s) Oral before breakfast  polyethylene glycol 3350 17 Gram(s) Oral daily  senna 2 Tablet(s) Oral at bedtime  torsemide 10 milliGRAM(s) Oral daily  vancomycin  IVPB 1000 milliGRAM(s) IV Intermittent every 24 hours    MEDICATIONS  (PRN):  acetaminophen   Tablet .. 650 milliGRAM(s) Oral every 6 hours PRN Temp greater or equal to 38.5C (101.3F), Mild Pain (1 - 3)  ALBUTerol    90 MICROgram(s) HFA Inhaler 2 Puff(s) Inhalation every 6 hours PRN Shortness of Breath and/or Wheezing  benzonatate 100 milliGRAM(s) Oral three times a day PRN Cough  dextrose 40% Gel 15 Gram(s) Oral once PRN Blood Glucose LESS THAN 70 milliGRAM(s)/deciliter  glucagon  Injectable 1 milliGRAM(s) IntraMuscular once PRN Glucose LESS THAN 70 milligrams/deciliter  oxycodone    5 mG/acetaminophen 325 mG 1 Tablet(s) Oral every 6 hours PRN Moderate Pain (4 - 6)      Vital Signs Last 24 Hrs  T(C): 36.4 (17 May 2020 12:20), Max: 36.8 (16 May 2020 20:12)  T(F): 97.5 (17 May 2020 12:20), Max: 98.2 (16 May 2020 20:12)  HR: 83 (17 May 2020 12:20) (83 - 101)  BP: 112/64 (17 May 2020 12:20) (110/73 - 123/79)  BP(mean): --  RR: 16 (17 May 2020 12:20) (16 - 18)  SpO2: 97% (17 May 2020 12:20) (97% - 98%)    PHYSICAL EXAM:  GENERAL: NAD, well-developed  HEAD:  Atraumatic, Normocephalic  EYES: EOMI, PERRLA, conjunctiva and sclera clear  NECK: Supple, No JVD  CHEST/LUNG: Clear to auscultation bilaterally; No wheeze Sternal wound with VAC   HEART: Regular rate and rhythm; No murmurs, rubs, or gallops  ABDOMEN: Soft, Nontender, Nondistended; Bowel sounds present  EXTREMITIES:  2+ Peripheral Pulses, No clubbing, cyanosis, or edema  PSYCH: AAOx3  NEUROLOGY: non-focal  SKIN: No rashes or lesions    LABS:                        8.8    9.72  )-----------( 331      ( 17 May 2020 06:43 )             28.4     05-17    134<L>  |  97  |  40<H>  ----------------------------<  233<H>  4.9   |  24  |  1.77<H>    Ca    8.8      17 May 2020 06:43    TPro  6.1  /  Alb  2.3<L>  /  TBili  0.2  /  DBili  x   /  AST  17  /  ALT  15  /  AlkPhos  78  05-17              Culture - Tissue with Gram Stain (collected 16 May 2020 03:05)  Source: .Tissue Other  Gram Stain (16 May 2020 07:25):    Rare polymorphonuclear leukocytes seen per low power field    No organisms seen per oil power field  Preliminary Report (17 May 2020 08:11):    No growth        RADIOLOGY & ADDITIONAL TESTS:    Imaging Personally Reviewed:    Consultant(s) Notes Reviewed:      Care Discussed with Consultants/Other Providers: within normal limits

## 2023-12-04 ENCOUNTER — APPOINTMENT (OUTPATIENT)
Dept: PULMONOLOGY | Facility: CLINIC | Age: 68
End: 2023-12-04
Payer: MEDICARE

## 2023-12-04 VITALS
DIASTOLIC BLOOD PRESSURE: 78 MMHG | BODY MASS INDEX: 28.64 KG/M2 | WEIGHT: 189 LBS | HEART RATE: 68 BPM | RESPIRATION RATE: 16 BRPM | HEIGHT: 68 IN | SYSTOLIC BLOOD PRESSURE: 120 MMHG | OXYGEN SATURATION: 97 % | TEMPERATURE: 97 F

## 2023-12-04 PROCEDURE — 99214 OFFICE O/P EST MOD 30 MIN: CPT | Mod: 25

## 2023-12-04 PROCEDURE — G0008: CPT

## 2023-12-04 PROCEDURE — 90662 IIV NO PRSV INCREASED AG IM: CPT

## 2023-12-04 PROCEDURE — 95012 NITRIC OXIDE EXP GAS DETER: CPT

## 2023-12-04 PROCEDURE — 94010 BREATHING CAPACITY TEST: CPT

## 2023-12-27 ENCOUNTER — NON-APPOINTMENT (OUTPATIENT)
Age: 68
End: 2023-12-27

## 2023-12-27 ENCOUNTER — APPOINTMENT (OUTPATIENT)
Dept: OPHTHALMOLOGY | Facility: CLINIC | Age: 68
End: 2023-12-27
Payer: MEDICARE

## 2023-12-27 PROCEDURE — 92134 CPTRZ OPH DX IMG PST SGM RTA: CPT

## 2023-12-27 PROCEDURE — 92012 INTRM OPH EXAM EST PATIENT: CPT | Mod: 25

## 2023-12-31 ENCOUNTER — APPOINTMENT (OUTPATIENT)
Dept: CARE COORDINATION | Facility: HOME HEALTH | Age: 68
End: 2023-12-31

## 2024-01-01 ENCOUNTER — RX RENEWAL (OUTPATIENT)
Age: 69
End: 2024-01-01

## 2024-01-01 RX ORDER — AZELASTINE HYDROCHLORIDE 137 UG/1
137 SPRAY, METERED NASAL
Qty: 3 | Refills: 1 | Status: ACTIVE | COMMUNITY
Start: 2023-07-01 | End: 1900-01-01

## 2024-02-14 ENCOUNTER — APPOINTMENT (OUTPATIENT)
Dept: OPHTHALMOLOGY | Facility: CLINIC | Age: 69
End: 2024-02-14
Payer: MEDICARE

## 2024-02-14 ENCOUNTER — NON-APPOINTMENT (OUTPATIENT)
Age: 69
End: 2024-02-14

## 2024-02-14 PROCEDURE — 92134 CPTRZ OPH DX IMG PST SGM RTA: CPT

## 2024-02-14 PROCEDURE — 92012 INTRM OPH EXAM EST PATIENT: CPT | Mod: 25

## 2024-03-27 ENCOUNTER — APPOINTMENT (OUTPATIENT)
Dept: OPHTHALMOLOGY | Facility: CLINIC | Age: 69
End: 2024-03-27
Payer: MEDICARE

## 2024-03-27 ENCOUNTER — NON-APPOINTMENT (OUTPATIENT)
Age: 69
End: 2024-03-27

## 2024-03-27 PROCEDURE — 92134 CPTRZ OPH DX IMG PST SGM RTA: CPT

## 2024-03-27 PROCEDURE — 92012 INTRM OPH EXAM EST PATIENT: CPT | Mod: 25

## 2024-04-26 ENCOUNTER — APPOINTMENT (OUTPATIENT)
Dept: PULMONOLOGY | Facility: CLINIC | Age: 69
End: 2024-04-26
Payer: MEDICARE

## 2024-04-26 VITALS
OXYGEN SATURATION: 97 % | WEIGHT: 183 LBS | TEMPERATURE: 96.5 F | DIASTOLIC BLOOD PRESSURE: 72 MMHG | RESPIRATION RATE: 16 BRPM | HEART RATE: 74 BPM | BODY MASS INDEX: 27.74 KG/M2 | HEIGHT: 68 IN | SYSTOLIC BLOOD PRESSURE: 128 MMHG

## 2024-04-26 DIAGNOSIS — R25.2 CRAMP AND SPASM: ICD-10-CM

## 2024-04-26 DIAGNOSIS — G47.33 OBSTRUCTIVE SLEEP APNEA (ADULT) (PEDIATRIC): ICD-10-CM

## 2024-04-26 DIAGNOSIS — J47.9 BRONCHIECTASIS, UNCOMPLICATED: ICD-10-CM

## 2024-04-26 DIAGNOSIS — R06.83 SNORING: ICD-10-CM

## 2024-04-26 DIAGNOSIS — J45.909 UNSPECIFIED ASTHMA, UNCOMPLICATED: ICD-10-CM

## 2024-04-26 DIAGNOSIS — R06.02 SHORTNESS OF BREATH: ICD-10-CM

## 2024-04-26 DIAGNOSIS — K21.9 GASTRO-ESOPHAGEAL REFLUX DISEASE W/OUT ESOPHAGITIS: ICD-10-CM

## 2024-04-26 PROCEDURE — 95012 NITRIC OXIDE EXP GAS DETER: CPT

## 2024-04-26 PROCEDURE — 94010 BREATHING CAPACITY TEST: CPT

## 2024-04-26 PROCEDURE — 99214 OFFICE O/P EST MOD 30 MIN: CPT | Mod: 25

## 2024-04-26 RX ORDER — FLUTICASONE FUROATE, UMECLIDINIUM BROMIDE AND VILANTEROL TRIFENATATE 200; 62.5; 25 UG/1; UG/1; UG/1
200-62.5-25 POWDER RESPIRATORY (INHALATION)
Qty: 3 | Refills: 1 | Status: ACTIVE | COMMUNITY
Start: 2020-09-23 | End: 1900-01-01

## 2024-04-26 NOTE — PHYSICAL EXAM
[No Acute Distress] : no acute distress [Normal Oropharynx] : normal oropharynx [Normal Appearance] : normal appearance [No Neck Mass] : no neck mass [Normal Rate/Rhythm] : normal rate/rhythm [Normal S1, S2] : normal s1, s2 [No Resp Distress] : no resp distress [Clear to Auscultation Bilaterally] : clear to auscultation bilaterally [No Abnormalities] : no abnormalities [Benign] : benign [Normal Gait] : normal gait [No Clubbing] : no clubbing [No Cyanosis] : no cyanosis [No Edema] : no edema [FROM] : FROM [Normal Color/ Pigmentation] : normal color/ pigmentation [No Focal Deficits] : no focal deficits [Oriented x3] : oriented x3 [Normal Affect] : normal affect [III] : Mallampati Class: III [TextBox_2] : Ow  [TextBox_54] : 2/6 systolic murmur  [TextBox_68] : I:E ratio 1:3; clear   [TextBox_105] : infected L middle finger

## 2024-04-26 NOTE — PROCEDURE
[FreeTextEntry1] : PFT - spi reveals mild restrictive dysfunction; FEV1 is 1.94L which is 63.7% of predicted, with a normal flow volume loop PFTs were performed to evaluate for asthma  FENO was 23

## 2024-04-26 NOTE — REASON FOR VISIT
[Follow-Up] : a follow-up visit [Spouse] : spouse [TextBox_44] : Restrictive Dysfunction, RADS / Asthma, GERD, ?Laryngomalacia, ?TBM, Cough, severe TAIWO

## 2024-04-26 NOTE — HISTORY OF PRESENT ILLNESS
[TextBox_4] : Mr. FRANKLIN PRESLEY is a 69 year old male coming into the office for a follow-up pulmonary evaluation. His chief complaint is   -he notes feeling generally well -he notes dysphagia with solids -he denies getting enough sleep. He falls asleep around 1-3AM because he watches TV at night (YouTube) -he notes dypshonia when he starts talking -he denies back pain or neck pain -he notes PALMA with stairs and inclines -he denies exercising. He sometimes walks in the park. He doesn't feel motivated -he notes he doesn't wake up early enough to eat a good breakfast, and he doesn't drink enough water. He's dealing with constipation -he notes having tinnitus -he notes he consulted with his diabetes doctor and cardiologist about hernia surgery. They said he could wait   -he denies any headaches, nausea, vomiting, fever, chills, sweats, chest pain, chest pressure, diarrhea, dizziness, leg swelling, leg pain, itchy eyes, itchy ears, heartburn, reflux, sour taste in the mouth, myalgias or arthralgias.

## 2024-04-27 ENCOUNTER — INPATIENT (INPATIENT)
Facility: HOSPITAL | Age: 69
LOS: 3 days | Discharge: HOME HEALTH | DRG: 603 | End: 2024-05-01
Attending: STUDENT IN AN ORGANIZED HEALTH CARE EDUCATION/TRAINING PROGRAM | Admitting: STUDENT IN AN ORGANIZED HEALTH CARE EDUCATION/TRAINING PROGRAM
Payer: COMMERCIAL

## 2024-04-27 VITALS
OXYGEN SATURATION: 97 % | RESPIRATION RATE: 16 BRPM | WEIGHT: 182.98 LBS | HEART RATE: 74 BPM | SYSTOLIC BLOOD PRESSURE: 121 MMHG | TEMPERATURE: 98 F | HEIGHT: 68 IN | DIASTOLIC BLOOD PRESSURE: 70 MMHG

## 2024-04-27 DIAGNOSIS — J44.89 OTHER SPECIFIED CHRONIC OBSTRUCTIVE PULMONARY DISEASE: ICD-10-CM

## 2024-04-27 DIAGNOSIS — L03.011 CELLULITIS OF RIGHT FINGER: ICD-10-CM

## 2024-04-27 DIAGNOSIS — Z79.899 OTHER LONG TERM (CURRENT) DRUG THERAPY: ICD-10-CM

## 2024-04-27 DIAGNOSIS — Z90.49 ACQUIRED ABSENCE OF OTHER SPECIFIED PARTS OF DIGESTIVE TRACT: Chronic | ICD-10-CM

## 2024-04-27 DIAGNOSIS — I10 ESSENTIAL (PRIMARY) HYPERTENSION: ICD-10-CM

## 2024-04-27 DIAGNOSIS — N18.30 CHRONIC KIDNEY DISEASE, STAGE 3 UNSPECIFIED: ICD-10-CM

## 2024-04-27 DIAGNOSIS — E78.5 HYPERLIPIDEMIA, UNSPECIFIED: ICD-10-CM

## 2024-04-27 DIAGNOSIS — E11.9 TYPE 2 DIABETES MELLITUS WITHOUT COMPLICATIONS: ICD-10-CM

## 2024-04-27 DIAGNOSIS — I89.1 LYMPHANGITIS: ICD-10-CM

## 2024-04-27 DIAGNOSIS — K21.9 GASTRO-ESOPHAGEAL REFLUX DISEASE WITHOUT ESOPHAGITIS: ICD-10-CM

## 2024-04-27 DIAGNOSIS — I25.10 ATHEROSCLEROTIC HEART DISEASE OF NATIVE CORONARY ARTERY WITHOUT ANGINA PECTORIS: ICD-10-CM

## 2024-04-27 DIAGNOSIS — Z95.1 PRESENCE OF AORTOCORONARY BYPASS GRAFT: Chronic | ICD-10-CM

## 2024-04-27 LAB
ALBUMIN SERPL ELPH-MCNC: 3.7 G/DL — SIGNIFICANT CHANGE UP (ref 3.3–5)
ALP SERPL-CCNC: 87 U/L — SIGNIFICANT CHANGE UP (ref 40–120)
ALT FLD-CCNC: 9 U/L — LOW (ref 10–45)
ANION GAP SERPL CALC-SCNC: 13 MMOL/L — SIGNIFICANT CHANGE UP (ref 5–17)
APTT BLD: 28.8 SEC — SIGNIFICANT CHANGE UP (ref 24.5–35.6)
AST SERPL-CCNC: 15 U/L — SIGNIFICANT CHANGE UP (ref 10–40)
BASOPHILS # BLD AUTO: 0.07 K/UL — SIGNIFICANT CHANGE UP (ref 0–0.2)
BASOPHILS NFR BLD AUTO: 1 % — SIGNIFICANT CHANGE UP (ref 0–2)
BILIRUB SERPL-MCNC: 0.3 MG/DL — SIGNIFICANT CHANGE UP (ref 0.2–1.2)
BUN SERPL-MCNC: 60 MG/DL — HIGH (ref 7–23)
CALCIUM SERPL-MCNC: 9.7 MG/DL — SIGNIFICANT CHANGE UP (ref 8.4–10.5)
CHLORIDE SERPL-SCNC: 103 MMOL/L — SIGNIFICANT CHANGE UP (ref 96–108)
CO2 SERPL-SCNC: 22 MMOL/L — SIGNIFICANT CHANGE UP (ref 22–31)
CREAT SERPL-MCNC: 2.84 MG/DL — HIGH (ref 0.5–1.3)
EGFR: 23 ML/MIN/1.73M2 — LOW
EOSINOPHIL # BLD AUTO: 0.51 K/UL — HIGH (ref 0–0.5)
EOSINOPHIL NFR BLD AUTO: 7.3 % — HIGH (ref 0–6)
GLUCOSE BLDC GLUCOMTR-MCNC: 138 MG/DL — HIGH (ref 70–99)
GLUCOSE BLDC GLUCOMTR-MCNC: 155 MG/DL — HIGH (ref 70–99)
GLUCOSE SERPL-MCNC: 170 MG/DL — HIGH (ref 70–99)
HCT VFR BLD CALC: 36.9 % — LOW (ref 39–50)
HGB BLD-MCNC: 12.3 G/DL — LOW (ref 13–17)
IMM GRANULOCYTES NFR BLD AUTO: 0.3 % — SIGNIFICANT CHANGE UP (ref 0–0.9)
INR BLD: 0.98 RATIO — SIGNIFICANT CHANGE UP (ref 0.85–1.18)
LYMPHOCYTES # BLD AUTO: 2.32 K/UL — SIGNIFICANT CHANGE UP (ref 1–3.3)
LYMPHOCYTES # BLD AUTO: 33.4 % — SIGNIFICANT CHANGE UP (ref 13–44)
MCHC RBC-ENTMCNC: 29.1 PG — SIGNIFICANT CHANGE UP (ref 27–34)
MCHC RBC-ENTMCNC: 33.3 GM/DL — SIGNIFICANT CHANGE UP (ref 32–36)
MCV RBC AUTO: 87.2 FL — SIGNIFICANT CHANGE UP (ref 80–100)
MONOCYTES # BLD AUTO: 0.72 K/UL — SIGNIFICANT CHANGE UP (ref 0–0.9)
MONOCYTES NFR BLD AUTO: 10.4 % — SIGNIFICANT CHANGE UP (ref 2–14)
NEUTROPHILS # BLD AUTO: 3.31 K/UL — SIGNIFICANT CHANGE UP (ref 1.8–7.4)
NEUTROPHILS NFR BLD AUTO: 47.6 % — SIGNIFICANT CHANGE UP (ref 43–77)
NRBC # BLD: 0 /100 WBCS — SIGNIFICANT CHANGE UP (ref 0–0)
PLATELET # BLD AUTO: 135 K/UL — LOW (ref 150–400)
POTASSIUM SERPL-MCNC: 5.2 MMOL/L — SIGNIFICANT CHANGE UP (ref 3.5–5.3)
POTASSIUM SERPL-SCNC: 5.2 MMOL/L — SIGNIFICANT CHANGE UP (ref 3.5–5.3)
PROT SERPL-MCNC: 7 G/DL — SIGNIFICANT CHANGE UP (ref 6–8.3)
PROTHROM AB SERPL-ACNC: 10.8 SEC — SIGNIFICANT CHANGE UP (ref 9.5–13)
RBC # BLD: 4.23 M/UL — SIGNIFICANT CHANGE UP (ref 4.2–5.8)
RBC # FLD: 12.2 % — SIGNIFICANT CHANGE UP (ref 10.3–14.5)
SODIUM SERPL-SCNC: 138 MMOL/L — SIGNIFICANT CHANGE UP (ref 135–145)
WBC # BLD: 6.95 K/UL — SIGNIFICANT CHANGE UP (ref 3.8–10.5)
WBC # FLD AUTO: 6.95 K/UL — SIGNIFICANT CHANGE UP (ref 3.8–10.5)

## 2024-04-27 PROCEDURE — 99285 EMERGENCY DEPT VISIT HI MDM: CPT

## 2024-04-27 PROCEDURE — 73120 X-RAY EXAM OF HAND: CPT | Mod: 26,RT

## 2024-04-27 PROCEDURE — 99223 1ST HOSP IP/OBS HIGH 75: CPT

## 2024-04-27 RX ORDER — PANTOPRAZOLE SODIUM 20 MG/1
40 TABLET, DELAYED RELEASE ORAL
Refills: 0 | Status: DISCONTINUED | OUTPATIENT
Start: 2024-04-27 | End: 2024-05-01

## 2024-04-27 RX ORDER — ALBUTEROL 90 UG/1
2 AEROSOL, METERED ORAL EVERY 6 HOURS
Refills: 0 | Status: DISCONTINUED | OUTPATIENT
Start: 2024-04-27 | End: 2024-05-01

## 2024-04-27 RX ORDER — AZELASTINE 137 UG/1
2 SPRAY, METERED NASAL
Refills: 0 | DISCHARGE

## 2024-04-27 RX ORDER — LANOLIN ALCOHOL/MO/W.PET/CERES
3 CREAM (GRAM) TOPICAL AT BEDTIME
Refills: 0 | Status: DISCONTINUED | OUTPATIENT
Start: 2024-04-27 | End: 2024-05-01

## 2024-04-27 RX ORDER — ALLOPURINOL 300 MG
300 TABLET ORAL DAILY
Refills: 0 | Status: DISCONTINUED | OUTPATIENT
Start: 2024-04-27 | End: 2024-05-01

## 2024-04-27 RX ORDER — CEFAZOLIN SODIUM 1 G
1000 VIAL (EA) INJECTION EVERY 12 HOURS
Refills: 0 | Status: DISCONTINUED | OUTPATIENT
Start: 2024-04-27 | End: 2024-04-28

## 2024-04-27 RX ORDER — MONTELUKAST 4 MG/1
10 TABLET, CHEWABLE ORAL AT BEDTIME
Refills: 0 | Status: DISCONTINUED | OUTPATIENT
Start: 2024-04-27 | End: 2024-05-01

## 2024-04-27 RX ORDER — ALLOPURINOL 300 MG
1 TABLET ORAL
Refills: 0 | DISCHARGE

## 2024-04-27 RX ORDER — ACETAMINOPHEN 500 MG
650 TABLET ORAL EVERY 6 HOURS
Refills: 0 | Status: DISCONTINUED | OUTPATIENT
Start: 2024-04-27 | End: 2024-05-01

## 2024-04-27 RX ORDER — SODIUM BICARBONATE 1 MEQ/ML
1 SYRINGE (ML) INTRAVENOUS
Refills: 0 | DISCHARGE

## 2024-04-27 RX ORDER — INSULIN LISPRO 100/ML
VIAL (ML) SUBCUTANEOUS
Refills: 0 | Status: DISCONTINUED | OUTPATIENT
Start: 2024-04-27 | End: 2024-04-28

## 2024-04-27 RX ORDER — SODIUM BICARBONATE 1 MEQ/ML
650 SYRINGE (ML) INTRAVENOUS
Refills: 0 | Status: DISCONTINUED | OUTPATIENT
Start: 2024-04-27 | End: 2024-05-01

## 2024-04-27 RX ORDER — INSULIN LISPRO 100/ML
5 VIAL (ML) SUBCUTANEOUS
Refills: 0 | Status: DISCONTINUED | OUTPATIENT
Start: 2024-04-27 | End: 2024-04-28

## 2024-04-27 RX ORDER — CHOLECALCIFEROL (VITAMIN D3) 125 MCG
1 CAPSULE ORAL
Refills: 0 | DISCHARGE

## 2024-04-27 RX ORDER — INSULIN GLARGINE 100 [IU]/ML
15 INJECTION, SOLUTION SUBCUTANEOUS AT BEDTIME
Refills: 0 | Status: DISCONTINUED | OUTPATIENT
Start: 2024-04-28 | End: 2024-04-29

## 2024-04-27 RX ORDER — ATORVASTATIN CALCIUM 80 MG/1
40 TABLET, FILM COATED ORAL AT BEDTIME
Refills: 0 | Status: DISCONTINUED | OUTPATIENT
Start: 2024-04-27 | End: 2024-05-01

## 2024-04-27 RX ORDER — METOPROLOL TARTRATE 50 MG
25 TABLET ORAL
Refills: 0 | Status: DISCONTINUED | OUTPATIENT
Start: 2024-04-27 | End: 2024-05-01

## 2024-04-27 RX ORDER — INSULIN GLARGINE 100 [IU]/ML
15 INJECTION, SOLUTION SUBCUTANEOUS ONCE
Refills: 0 | Status: COMPLETED | OUTPATIENT
Start: 2024-04-27 | End: 2024-04-27

## 2024-04-27 RX ORDER — DEXTROSE 50 % IN WATER 50 %
15 SYRINGE (ML) INTRAVENOUS ONCE
Refills: 0 | Status: DISCONTINUED | OUTPATIENT
Start: 2024-04-27 | End: 2024-05-01

## 2024-04-27 RX ORDER — DEXTROSE 10 % IN WATER 10 %
125 INTRAVENOUS SOLUTION INTRAVENOUS ONCE
Refills: 0 | Status: DISCONTINUED | OUTPATIENT
Start: 2024-04-27 | End: 2024-05-01

## 2024-04-27 RX ORDER — LIDOCAINE 4 G/100G
1 CREAM TOPICAL DAILY
Refills: 0 | Status: DISCONTINUED | OUTPATIENT
Start: 2024-04-27 | End: 2024-05-01

## 2024-04-27 RX ORDER — DAPAGLIFLOZIN 10 MG/1
1 TABLET, FILM COATED ORAL
Refills: 0 | DISCHARGE

## 2024-04-27 RX ORDER — MONTELUKAST 4 MG/1
1 TABLET, CHEWABLE ORAL
Refills: 0 | DISCHARGE

## 2024-04-27 RX ORDER — COLCHICINE 0.6 MG
1 TABLET ORAL
Refills: 0 | DISCHARGE

## 2024-04-27 RX ORDER — TIOTROPIUM BROMIDE 18 UG/1
2 CAPSULE ORAL; RESPIRATORY (INHALATION) DAILY
Refills: 0 | Status: DISCONTINUED | OUTPATIENT
Start: 2024-04-27 | End: 2024-05-01

## 2024-04-27 RX ORDER — PATIROMER 8.4 G/1
8.4 POWDER, FOR SUSPENSION ORAL
Refills: 0 | DISCHARGE

## 2024-04-27 RX ORDER — SODIUM CHLORIDE 9 MG/ML
1000 INJECTION, SOLUTION INTRAVENOUS
Refills: 0 | Status: DISCONTINUED | OUTPATIENT
Start: 2024-04-27 | End: 2024-05-01

## 2024-04-27 RX ORDER — OXYCODONE HYDROCHLORIDE 5 MG/1
5 TABLET ORAL EVERY 4 HOURS
Refills: 0 | Status: DISCONTINUED | OUTPATIENT
Start: 2024-04-27 | End: 2024-05-01

## 2024-04-27 RX ORDER — METOPROLOL TARTRATE 50 MG
1 TABLET ORAL
Refills: 0 | DISCHARGE

## 2024-04-27 RX ORDER — ASPIRIN/CALCIUM CARB/MAGNESIUM 324 MG
81 TABLET ORAL DAILY
Refills: 0 | Status: DISCONTINUED | OUTPATIENT
Start: 2024-04-27 | End: 2024-05-01

## 2024-04-27 RX ORDER — VANCOMYCIN HCL 1 G
1000 VIAL (EA) INTRAVENOUS ONCE
Refills: 0 | Status: COMPLETED | OUTPATIENT
Start: 2024-04-27 | End: 2024-04-27

## 2024-04-27 RX ORDER — ACETAMINOPHEN 500 MG
975 TABLET ORAL ONCE
Refills: 0 | Status: COMPLETED | OUTPATIENT
Start: 2024-04-27 | End: 2024-04-27

## 2024-04-27 RX ORDER — POLYETHYLENE GLYCOL 3350 17 G/17G
17 POWDER, FOR SOLUTION ORAL DAILY
Refills: 0 | Status: DISCONTINUED | OUTPATIENT
Start: 2024-04-27 | End: 2024-05-01

## 2024-04-27 RX ORDER — OXYCODONE HYDROCHLORIDE 5 MG/1
2.5 TABLET ORAL EVERY 4 HOURS
Refills: 0 | Status: DISCONTINUED | OUTPATIENT
Start: 2024-04-27 | End: 2024-05-01

## 2024-04-27 RX ORDER — SENNA PLUS 8.6 MG/1
2 TABLET ORAL AT BEDTIME
Refills: 0 | Status: DISCONTINUED | OUTPATIENT
Start: 2024-04-27 | End: 2024-05-01

## 2024-04-27 RX ORDER — FLUTICASONE FUROATE, UMECLIDINIUM BROMIDE AND VILANTEROL TRIFENATATE 200; 62.5; 25 UG/1; UG/1; UG/1
1 POWDER RESPIRATORY (INHALATION)
Refills: 0 | DISCHARGE

## 2024-04-27 RX ORDER — INSULIN LISPRO 100/ML
VIAL (ML) SUBCUTANEOUS AT BEDTIME
Refills: 0 | Status: DISCONTINUED | OUTPATIENT
Start: 2024-04-27 | End: 2024-04-28

## 2024-04-27 RX ORDER — AMPICILLIN SODIUM AND SULBACTAM SODIUM 250; 125 MG/ML; MG/ML
3 INJECTION, POWDER, FOR SUSPENSION INTRAMUSCULAR; INTRAVENOUS ONCE
Refills: 0 | Status: COMPLETED | OUTPATIENT
Start: 2024-04-27 | End: 2024-04-27

## 2024-04-27 RX ORDER — ONDANSETRON 8 MG/1
4 TABLET, FILM COATED ORAL EVERY 8 HOURS
Refills: 0 | Status: DISCONTINUED | OUTPATIENT
Start: 2024-04-27 | End: 2024-05-01

## 2024-04-27 RX ORDER — BUDESONIDE AND FORMOTEROL FUMARATE DIHYDRATE 160; 4.5 UG/1; UG/1
2 AEROSOL RESPIRATORY (INHALATION)
Refills: 0 | Status: DISCONTINUED | OUTPATIENT
Start: 2024-04-27 | End: 2024-05-01

## 2024-04-27 RX ORDER — NALOXONE HYDROCHLORIDE 4 MG/.1ML
0.4 SPRAY NASAL ONCE
Refills: 0 | Status: DISCONTINUED | OUTPATIENT
Start: 2024-04-27 | End: 2024-05-01

## 2024-04-27 RX ORDER — DEXTROSE 50 % IN WATER 50 %
12.5 SYRINGE (ML) INTRAVENOUS ONCE
Refills: 0 | Status: DISCONTINUED | OUTPATIENT
Start: 2024-04-27 | End: 2024-05-01

## 2024-04-27 RX ORDER — GLUCAGON INJECTION, SOLUTION 0.5 MG/.1ML
1 INJECTION, SOLUTION SUBCUTANEOUS ONCE
Refills: 0 | Status: DISCONTINUED | OUTPATIENT
Start: 2024-04-27 | End: 2024-05-01

## 2024-04-27 RX ORDER — DEXTROSE 50 % IN WATER 50 %
25 SYRINGE (ML) INTRAVENOUS ONCE
Refills: 0 | Status: DISCONTINUED | OUTPATIENT
Start: 2024-04-27 | End: 2024-05-01

## 2024-04-27 RX ADMIN — AMPICILLIN SODIUM AND SULBACTAM SODIUM 200 GRAM(S): 250; 125 INJECTION, POWDER, FOR SUSPENSION INTRAMUSCULAR; INTRAVENOUS at 19:25

## 2024-04-27 RX ADMIN — ATORVASTATIN CALCIUM 40 MILLIGRAM(S): 80 TABLET, FILM COATED ORAL at 23:19

## 2024-04-27 RX ADMIN — Medication 250 MILLIGRAM(S): at 20:31

## 2024-04-27 RX ADMIN — INSULIN GLARGINE 15 UNIT(S): 100 INJECTION, SOLUTION SUBCUTANEOUS at 23:58

## 2024-04-27 RX ADMIN — Medication 975 MILLIGRAM(S): at 19:25

## 2024-04-27 RX ADMIN — SENNA PLUS 2 TABLET(S): 8.6 TABLET ORAL at 23:18

## 2024-04-27 NOTE — ED PROVIDER NOTE - OBJECTIVE STATEMENT
70 y/o male with pmhx of dm, kidney disease, htn, hld, cad, presents to the ed senst in by pcp for infection to right finger. Patient states that he has had pain/swelling to right middle finger for approximately 2 weeks. There was no trauma or inciting injury. He initially thought it was gout flare up so tried taking allopurinol and colchicine. He saw his doctor earlier in the week and was prescribed doxycycline for cellulitis. He has been taking as directed but symptoms have worsened. He states that he rubbed pepermint oil on the skin which caused the skin to become dry, cracked and raw. He returned to the pcp today and was told the the finger looked worse and to go to the hospital. Denies any headache, fever, chills, chest pain, cough, n/v/d.

## 2024-04-27 NOTE — PATIENT PROFILE ADULT - FUNCTIONAL ASSESSMENT - BASIC MOBILITY 6.
4 = No assist / stand by assistance
pt c/o not feeling well think she has "lactic acidosis," daily drinker and benzos. denies SI and Hi

## 2024-04-27 NOTE — H&P ADULT - NSHPREVIEWOFSYSTEMS_GEN_ALL_CORE
CONSTITUTIONAL: No fever. no weakness  ENMT:  No sinus or throat pain  RESPIRATORY: No cough, wheezing, chills or hemoptysis; No shortness of breath  CARDIOVASCULAR: No chest pain, palpitations, dizziness, or leg swelling  GASTROINTESTINAL: No abdominal or epigastric pain. No nausea, vomiting, or hematemesis; No diarrhea or constipation. No melena or hematochezia.  GENITOURINARY: No dysuria or incontinence  NEUROLOGICAL: No headaches, memory loss, loss of strength, numbness, or tremors  SKIN: No rashes,  No hives or eczema  ENDOCRINE: No heat or cold intolerance; No hair loss  MUSCULOSKELETAL: +right middle finger swelling, pain/tenderness, redness, warmth  PSYCHIATRIC: No depression, anxiety, mood swings, or difficulty sleeping  HEME/LYMPH: No easy bruising, or bleeding gums; no enlarged LN

## 2024-04-27 NOTE — H&P ADULT - PROBLEM SELECTOR PLAN 2
no clinft of acs  no trop or ekg collected/performed in er  monitor for chest pain and ekg changes  cont home asa, statin, bb

## 2024-04-27 NOTE — H&P ADULT - PROBLEM SELECTOR PLAN 4
hold home regimen  follow up a1c  trend fingerstick  start 15 u glargine + 5 u lispro tidac w low dose correctional scale  adjust to maintain goal 100-180  carb consistent diet

## 2024-04-27 NOTE — H&P ADULT - NSHPPHYSICALEXAM_GEN_ALL_CORE
T(C): 36.8 (04-27-24 @ 22:02), Max: 36.8 (04-27-24 @ 22:02)  HR: 70 (04-27-24 @ 22:02) (66 - 75)  BP: 151/71 (04-27-24 @ 22:02) (120/78 - 157/80)  RR: 18 (04-27-24 @ 22:02) (16 - 18)  SpO2: 96% (04-27-24 @ 22:02) (96% - 98%)  GENERAL: NAD, lying in bed comfortably  EYES: EOMI, PERRLA; conjunctiva and sclera clear  ENMT: Moist oral mucosa, no pharyngeal injection or exudates; normal dentition  NECK: Supple, no palpable masses; no JVD  RESPIRATORY: Normal respiratory effort; lungs are clear to auscultation bilaterally  CARDIOVASCULAR: Regular rate and rhythm, normal S1 and S2, no murmur/rub/gallop; No lower extremity edema; Peripheral pulses are 2+ bilaterally  ABDOMEN: Nontender to palpation, normoactive bowel sounds, no rebound/guarding   MUSCULOSKELETAL:  right middle finger swelling, pain/tenderness, redness, warmth  PSYCH: A+O to person, place, and time; affect appropriate  NEUROLOGY: CN 2-12 are intact and symmetric; no gross motor or sensory deficits   SKIN: No rashes; no palpable lesions T(C): 36.8 (04-27-24 @ 22:02), Max: 36.8 (04-27-24 @ 22:02)  HR: 70 (04-27-24 @ 22:02) (66 - 75)  BP: 151/71 (04-27-24 @ 22:02) (120/78 - 157/80)  RR: 18 (04-27-24 @ 22:02) (16 - 18)  SpO2: 96% (04-27-24 @ 22:02) (96% - 98%)  GENERAL: NAD, lying in bed    EYES: EOMI, PERRLA; conjunctiva and sclera clear  ENMT: Moist oral mucosa, no pharyngeal injection or exudates   NECK: Supple, no palpable masses; no JVD  RESPIRATORY: Normal respiratory effort; lungs are clear to auscultation bilaterally  CARDIOVASCULAR: Regular rate and rhythm, normal S1 and S2, no murmur/rub/gallop; No lower extremity edema; Peripheral pulses are 2+ bilaterally  ABDOMEN: Nontender to palpation, normoactive bowel sounds, no rebound/guarding   MUSCULOSKELETAL:  right middle finger swelling, pain/tenderness, redness, warmth  PSYCH: A+O to person, place, and time; affect appropriate  NEUROLOGY: CN 2-12 are intact and symmetric; no gross motor or sensory deficits   SKIN: No rashes; no palpable lesions

## 2024-04-27 NOTE — H&P ADULT - HISTORY OF PRESENT ILLNESS
70yo 83kg m w pmh htn, hld, dm, cad s/p cabg, ckd, rads/asthma/copd, bronchiectasis, savage, gerd, lprd, gout, p/w right middle finger swelling, pain/tenderness, redness, warmth; symptoms have been ongoing . in er, found to have findings suggestive of cellulitis; admit to medicine for further mgmt 70yo 83kg m w pmh htn, hld, dm, cad s/p cabg, ckd, rads/asthma/copd, bronchiectasis, savage, gerd, lprd, gout, p/w right middle finger swelling, pain/tenderness, redness, warmth; symptoms have been ongoing for a couple of weeks now; patient sought help from outpatient provider, who prescribed him a course of doxycycline a few days ago; however, redness/pain/swelling have not improved; reevaluated by provider earlier today, and recommended patient to go to er for further investigation. in er, found to have findings suggestive of cellulitis; admit to medicine for further mgmt

## 2024-04-27 NOTE — H&P ADULT - ASSESSMENT
70yo 83kg m w pmh htn, hld, dm, cad s/p cabg, ckd, rads/asthma/copd, bronchiectasis, savage, gerd, lprd, gout, p/w right middle finger swelling, pain/tenderness, redness, warmth; symptoms have been ongoing . in er, found to have findings suggestive of cellulitis; admit to medicine for further mgmt 68yo 83kg m w pmh htn, hld, dm, cad s/p cabg, ckd, rads/asthma/copd, bronchiectasis, savage, gerd, lprd, gout, p/w right middle finger swelling, pain/tenderness, redness, warmth; symptoms have been ongoing . in er, found to have findings suggestive of cellulitis; admit to medicine for further mgmt

## 2024-04-27 NOTE — H&P ADULT - NSICDXPASTMEDICALHX_GEN_ALL_CORE_FT
PAST MEDICAL HISTORY:  Asthma-COPD overlap syndrome     Bronchiectasis     CAD (coronary artery disease)     Diabetes     GERD (gastroesophageal reflux disease)     Gout     HLD (hyperlipidemia)     HTN (hypertension)     LPRD (laryngopharyngeal reflux disease)     TAIWO on CPAP     Stage 3 chronic kidney disease

## 2024-04-27 NOTE — ED PROVIDER NOTE - PHYSICAL EXAMINATION
CONSTITUTIONAL: Patient is awake, alert and oriented x 3. Patient is well appearing and in no acute distress.  HEAD: NCAT  ENT: Airway patent, Nasal mucosa clear  NECK: Supple,   LUNGS: CTA B/L,   HEART: RRR.+S1S2  MSK: FROM upper and lower ext b/l, distal pulses intact, brisk capillary refill, able to fully range right 3rd finger at mcp, dip and pip joints;   SKIN: erythema/edema and warmth to right 3rd middle finger with erythema streaking up the dorsum of right hand/wrist, area of ulceration noted to right 3rd middle finger lateral aspect,   NEURO: No focal deficits,

## 2024-04-27 NOTE — H&P ADULT - PROBLEM SELECTOR PLAN 1
h/o gout  appears to be nonpurulent, no area of fluctuance noted, does not appear to be necrotizing  tender, red, warm finger  afebrile, no leukocytosis, hds otherwise  xray shows no evidence of gas or underlying bone involvement  s/p vanc + unasyn in ER with improvement  follow up mrsa screen, inflammatory markers, uric acid  continue broad spec iv abx with ancef  continue zyloprim  consider further imaging with ct/mri lower extremity if no clinical improvement/worsening  analgesics and antipyretics as needed   elevate extremity + ice packs  ot eval in am  ortho hand consult in am

## 2024-04-27 NOTE — ED ADULT NURSE REASSESSMENT NOTE - COMFORT CARE
meal provided/plan of care explained/po fluids offered/side rails up/wait time explained/warm blanket provided

## 2024-04-27 NOTE — ED PROVIDER NOTE - ATTENDING APP SHARED VISIT CONTRIBUTION OF CARE
I, Reddy Marquis MD, Emergency Medicine Attending Physician, personally saw and examined the patient and I personally made/approve the management plan and take responsibility for the patient management.    MDM: 69-year-old male with history of hypertension, diabetes, CKD, CABG, who was sent in by PCP for right third finger infection that is been present for approximately 2 weeks and has not been improving despite taking doxycycline for the past 4 days.  Patient denies any acute injury to the finger.    ROS: denies any other areas of pain, trauma, fevers, chills, numbness, weakness.     On examination, patient with stable vitals. Cardiac examination RRR, lungs CTAB, abdomen soft and nontender.  Skin examination with mild erythema streaking up the dorsum of the right hand.  Left third finger with erythema and induration over the proximal phalanx lateral aspect, but there is no fluctuance noted.  There is no fusiform swelling, no tenderness over the flexor surface, no pain with passive extension, and digit is not held in passive flexion.  There is no discharge noted.  Neurovascularly intact in all 4 extremities with 5/5 strength, normal sensation, equal pulses and brisk capillary refill, soft compartments.    Will obtain labs to evaluate for hematologic disorder, metabolic derangements, hepatic and renal function, and screen for infection.  Will obtain x-ray of the right hand to evaluate for foreign body and acute bony pathology.  Will start patient on antibiotics.    Labs with mild anemia, above transfusion threshold, creatinine 2.84 which is elevated from prior, concerning for STEPHANIE.      My independent interpretation of the right hand x-ray images shows no acute fracture or dislocation, no foreign body.   More details to be seen in the official radiologist read.    The patient will need to be admitted to the hospital for continued evaluation and management.  Discussed with the accepting physician regarding the initial presentation, diagnostic studies, treatments given in the ED, and current plan of care.  The patient was accepted by and endorsed to the medicine team.

## 2024-04-27 NOTE — PATIENT PROFILE ADULT - DO YOU FEEL THREATENED BY OTHERS?
10/24/2020 14:35    Patient called complaining of bladder infection. She has recurrent urinary tract infections. Discussed with patient the need for urinalysis. She denies fever, chills, malaise or systemic manifestations. Her only complaint is urinary frequency and dysuria. No back pain. Explained the need for a urine culture and office evaluation with multiple UTI in the past few months. If she is unable to wait or symptoms worsen she is to present to urgent care.   
no

## 2024-04-27 NOTE — ED ADULT NURSE NOTE - OBJECTIVE STATEMENT
69y M BIB self from home p/w swelling of the right hand, possible infection. Pt is axo4, ambulates independently at baseline. pmhx of dm, kidney disease, htn, hld, cad. States that he has had pain/swelling to right middle finger for approximately 2 weeks. There was no trauma or puncturing injury. Pt figured this was related to gout inflammation but saw his PCD earlier in the week and was prescribed doxycycline for cellulitis which was started this past thurs. Mentions  returning to the PCD today and was told the finger looked worse and to go to the Ed for further eval, possible IV antibiotics. Denies headache, dizziness, vision changes, chest pain, shortness of breath, abdominal pain, nausea, vomiting, diarrhea, fevers, chills, recent illness travel or fall.

## 2024-04-27 NOTE — PATIENT PROFILE ADULT - FALL HARM RISK - UNIVERSAL INTERVENTIONS
Bed in lowest position, wheels locked, appropriate side rails in place/Call bell, personal items and telephone in reach/Instruct patient to call for assistance before getting out of bed or chair/Non-slip footwear when patient is out of bed/[place] to call system/Physically safe environment - no spills, clutter or unnecessary equipment/Purposeful Proactive Rounding/Room/bathroom lighting operational, light cord in reach

## 2024-04-27 NOTE — H&P ADULT - PROBLEM SELECTOR PLAN 3
baseline cr appears to have been fluctuating between 2.5-2.9; currently @ ~2.8  Monitor UO, BUN/Cr, volume status, acid-base balance, electrolytes;  cont home veltassa, bicarb, zyloprim, cholecalciferol  avoid nephrotoxic agents; appropriate dose adjustments for all renally cleared medications

## 2024-04-28 LAB
24R-OH-CALCIDIOL SERPL-MCNC: 18.7 NG/ML — LOW (ref 30–80)
A1C WITH ESTIMATED AVERAGE GLUCOSE RESULT: 8.1 % — HIGH (ref 4–5.6)
ALBUMIN SERPL ELPH-MCNC: 3.6 G/DL — SIGNIFICANT CHANGE UP (ref 3.3–5)
ALP SERPL-CCNC: 76 U/L — SIGNIFICANT CHANGE UP (ref 40–120)
ALT FLD-CCNC: 11 U/L — SIGNIFICANT CHANGE UP (ref 10–45)
ANION GAP SERPL CALC-SCNC: 13 MMOL/L — SIGNIFICANT CHANGE UP (ref 5–17)
APPEARANCE UR: CLEAR — SIGNIFICANT CHANGE UP
APTT BLD: 33.6 SEC — SIGNIFICANT CHANGE UP (ref 24.5–35.6)
AST SERPL-CCNC: 14 U/L — SIGNIFICANT CHANGE UP (ref 10–40)
BACTERIA # UR AUTO: NEGATIVE /HPF — SIGNIFICANT CHANGE UP
BASOPHILS # BLD AUTO: 0.06 K/UL — SIGNIFICANT CHANGE UP (ref 0–0.2)
BASOPHILS NFR BLD AUTO: 1.1 % — SIGNIFICANT CHANGE UP (ref 0–2)
BILIRUB SERPL-MCNC: 0.4 MG/DL — SIGNIFICANT CHANGE UP (ref 0.2–1.2)
BILIRUB UR-MCNC: NEGATIVE — SIGNIFICANT CHANGE UP
BUN SERPL-MCNC: 60 MG/DL — HIGH (ref 7–23)
CALCIUM SERPL-MCNC: 9.1 MG/DL — SIGNIFICANT CHANGE UP (ref 8.4–10.5)
CALCIUM SERPL-MCNC: 9.3 MG/DL — SIGNIFICANT CHANGE UP (ref 8.4–10.5)
CAST: 0 /LPF — SIGNIFICANT CHANGE UP (ref 0–4)
CHLORIDE SERPL-SCNC: 103 MMOL/L — SIGNIFICANT CHANGE UP (ref 96–108)
CHOLEST SERPL-MCNC: 154 MG/DL — SIGNIFICANT CHANGE UP
CO2 SERPL-SCNC: 22 MMOL/L — SIGNIFICANT CHANGE UP (ref 22–31)
COLOR SPEC: YELLOW — SIGNIFICANT CHANGE UP
CREAT ?TM UR-MCNC: 19 MG/DL — SIGNIFICANT CHANGE UP
CREAT SERPL-MCNC: 2.84 MG/DL — HIGH (ref 0.5–1.3)
CRP SERPL-MCNC: 4 MG/L — SIGNIFICANT CHANGE UP (ref 0–4)
DIFF PNL FLD: ABNORMAL
EGFR: 23 ML/MIN/1.73M2 — LOW
EOSINOPHIL # BLD AUTO: 0.51 K/UL — HIGH (ref 0–0.5)
EOSINOPHIL NFR BLD AUTO: 9.4 % — HIGH (ref 0–6)
ERYTHROCYTE [SEDIMENTATION RATE] IN BLOOD: 61 MM/HR — HIGH (ref 0–20)
ESTIMATED AVERAGE GLUCOSE: 186 MG/DL — HIGH (ref 68–114)
FERRITIN SERPL-MCNC: 150 NG/ML — SIGNIFICANT CHANGE UP (ref 30–400)
FOLATE SERPL-MCNC: >20 NG/ML — SIGNIFICANT CHANGE UP
GLUCOSE BLDC GLUCOMTR-MCNC: 168 MG/DL — HIGH (ref 70–99)
GLUCOSE BLDC GLUCOMTR-MCNC: 188 MG/DL — HIGH (ref 70–99)
GLUCOSE BLDC GLUCOMTR-MCNC: 189 MG/DL — HIGH (ref 70–99)
GLUCOSE BLDC GLUCOMTR-MCNC: 249 MG/DL — HIGH (ref 70–99)
GLUCOSE SERPL-MCNC: 163 MG/DL — HIGH (ref 70–99)
GLUCOSE UR QL: 500 MG/DL
GRAM STN FLD: SIGNIFICANT CHANGE UP
GRAM STN FLD: SIGNIFICANT CHANGE UP
HAPTOGLOB SERPL-MCNC: 173 MG/DL — SIGNIFICANT CHANGE UP (ref 34–200)
HCT VFR BLD CALC: 37.6 % — LOW (ref 39–50)
HDLC SERPL-MCNC: 30 MG/DL — LOW
HGB BLD-MCNC: 12.5 G/DL — LOW (ref 13–17)
IMM GRANULOCYTES NFR BLD AUTO: 0.4 % — SIGNIFICANT CHANGE UP (ref 0–0.9)
INR BLD: 0.99 RATIO — SIGNIFICANT CHANGE UP (ref 0.85–1.18)
IRON SATN MFR SERPL: 35 % — SIGNIFICANT CHANGE UP (ref 16–55)
IRON SATN MFR SERPL: 77 UG/DL — SIGNIFICANT CHANGE UP (ref 45–165)
KETONES UR-MCNC: NEGATIVE MG/DL — SIGNIFICANT CHANGE UP
LDH SERPL L TO P-CCNC: 159 U/L — SIGNIFICANT CHANGE UP (ref 50–242)
LEUKOCYTE ESTERASE UR-ACNC: NEGATIVE — SIGNIFICANT CHANGE UP
LIPID PNL WITH DIRECT LDL SERPL: 93 MG/DL — SIGNIFICANT CHANGE UP
LYMPHOCYTES # BLD AUTO: 2.07 K/UL — SIGNIFICANT CHANGE UP (ref 1–3.3)
LYMPHOCYTES # BLD AUTO: 38.3 % — SIGNIFICANT CHANGE UP (ref 13–44)
MAGNESIUM SERPL-MCNC: 1.9 MG/DL — SIGNIFICANT CHANGE UP (ref 1.6–2.6)
MCHC RBC-ENTMCNC: 29.1 PG — SIGNIFICANT CHANGE UP (ref 27–34)
MCHC RBC-ENTMCNC: 33.2 GM/DL — SIGNIFICANT CHANGE UP (ref 32–36)
MCV RBC AUTO: 87.6 FL — SIGNIFICANT CHANGE UP (ref 80–100)
MONOCYTES # BLD AUTO: 0.59 K/UL — SIGNIFICANT CHANGE UP (ref 0–0.9)
MONOCYTES NFR BLD AUTO: 10.9 % — SIGNIFICANT CHANGE UP (ref 2–14)
MRSA PCR RESULT.: DETECTED
NEUTROPHILS # BLD AUTO: 2.15 K/UL — SIGNIFICANT CHANGE UP (ref 1.8–7.4)
NEUTROPHILS NFR BLD AUTO: 39.9 % — LOW (ref 43–77)
NITRITE UR-MCNC: NEGATIVE — SIGNIFICANT CHANGE UP
NON HDL CHOLESTEROL: 124 MG/DL — SIGNIFICANT CHANGE UP
NRBC # BLD: 0 /100 WBCS — SIGNIFICANT CHANGE UP (ref 0–0)
PH UR: 6.5 — SIGNIFICANT CHANGE UP (ref 5–8)
PHOSPHATE SERPL-MCNC: 4 MG/DL — SIGNIFICANT CHANGE UP (ref 2.5–4.5)
PLATELET # BLD AUTO: 125 K/UL — LOW (ref 150–400)
POTASSIUM SERPL-MCNC: 4.4 MMOL/L — SIGNIFICANT CHANGE UP (ref 3.5–5.3)
POTASSIUM SERPL-SCNC: 4.4 MMOL/L — SIGNIFICANT CHANGE UP (ref 3.5–5.3)
PROT ?TM UR-MCNC: 78 MG/DL — HIGH (ref 0–12)
PROT SERPL-MCNC: 6.6 G/DL — SIGNIFICANT CHANGE UP (ref 6–8.3)
PROT UR-MCNC: 100 MG/DL
PROT/CREAT UR-RTO: 4.1 RATIO — HIGH (ref 0–0.2)
PROTHROM AB SERPL-ACNC: 10.4 SEC — SIGNIFICANT CHANGE UP (ref 9.5–13)
PTH-INTACT FLD-MCNC: 86 PG/ML — HIGH (ref 15–65)
RBC # BLD: 4.29 M/UL — SIGNIFICANT CHANGE UP (ref 4.2–5.8)
RBC # BLD: 4.29 M/UL — SIGNIFICANT CHANGE UP (ref 4.2–5.8)
RBC # FLD: 12.2 % — SIGNIFICANT CHANGE UP (ref 10.3–14.5)
RBC CASTS # UR COMP ASSIST: 2 /HPF — SIGNIFICANT CHANGE UP (ref 0–4)
RETICS #: 56.6 K/UL — SIGNIFICANT CHANGE UP (ref 25–125)
RETICS/RBC NFR: 1.3 % — SIGNIFICANT CHANGE UP (ref 0.5–2.5)
S AUREUS DNA NOSE QL NAA+PROBE: DETECTED
SODIUM SERPL-SCNC: 138 MMOL/L — SIGNIFICANT CHANGE UP (ref 135–145)
SODIUM UR-SCNC: 99 MMOL/L — SIGNIFICANT CHANGE UP
SP GR SPEC: 1.01 — SIGNIFICANT CHANGE UP (ref 1–1.03)
SPECIMEN SOURCE: SIGNIFICANT CHANGE UP
SPECIMEN SOURCE: SIGNIFICANT CHANGE UP
SQUAMOUS # UR AUTO: 0 /HPF — SIGNIFICANT CHANGE UP (ref 0–5)
TIBC SERPL-MCNC: 219 UG/DL — LOW (ref 220–430)
TRIGL SERPL-MCNC: 179 MG/DL — HIGH
TSH SERPL-MCNC: 1.65 UIU/ML — SIGNIFICANT CHANGE UP (ref 0.27–4.2)
UIBC SERPL-MCNC: 142 UG/DL — SIGNIFICANT CHANGE UP (ref 110–370)
URATE SERPL-MCNC: 4.7 MG/DL — SIGNIFICANT CHANGE UP (ref 3.4–8.8)
UROBILINOGEN FLD QL: 0.2 MG/DL — SIGNIFICANT CHANGE UP (ref 0.2–1)
UUN UR-MCNC: 165 MG/DL — SIGNIFICANT CHANGE UP
VIT B12 SERPL-MCNC: 392 PG/ML — SIGNIFICANT CHANGE UP (ref 232–1245)
WBC # BLD: 5.4 K/UL — SIGNIFICANT CHANGE UP (ref 3.8–10.5)
WBC # FLD AUTO: 5.4 K/UL — SIGNIFICANT CHANGE UP (ref 3.8–10.5)
WBC UR QL: 0 /HPF — SIGNIFICANT CHANGE UP (ref 0–5)

## 2024-04-28 PROCEDURE — 99233 SBSQ HOSP IP/OBS HIGH 50: CPT

## 2024-04-28 RX ORDER — LIDOCAINE HCL 20 MG/ML
10 VIAL (ML) INJECTION ONCE
Refills: 0 | Status: COMPLETED | OUTPATIENT
Start: 2024-04-28 | End: 2024-04-28

## 2024-04-28 RX ORDER — INSULIN LISPRO 100/ML
10 VIAL (ML) SUBCUTANEOUS
Refills: 0 | Status: DISCONTINUED | OUTPATIENT
Start: 2024-04-29 | End: 2024-05-01

## 2024-04-28 RX ORDER — INSULIN LISPRO 100/ML
VIAL (ML) SUBCUTANEOUS
Refills: 0 | Status: DISCONTINUED | OUTPATIENT
Start: 2024-04-28 | End: 2024-05-01

## 2024-04-28 RX ORDER — SODIUM ZIRCONIUM CYCLOSILICATE 10 G/10G
10 POWDER, FOR SUSPENSION ORAL DAILY
Refills: 0 | Status: DISCONTINUED | OUTPATIENT
Start: 2024-04-28 | End: 2024-04-30

## 2024-04-28 RX ORDER — CEFAZOLIN SODIUM 1 G
500 VIAL (EA) INJECTION EVERY 12 HOURS
Refills: 0 | Status: DISCONTINUED | OUTPATIENT
Start: 2024-04-28 | End: 2024-04-30

## 2024-04-28 RX ORDER — INSULIN LISPRO 100/ML
VIAL (ML) SUBCUTANEOUS AT BEDTIME
Refills: 0 | Status: DISCONTINUED | OUTPATIENT
Start: 2024-04-28 | End: 2024-05-01

## 2024-04-28 RX ORDER — INSULIN LISPRO 100/ML
10 VIAL (ML) SUBCUTANEOUS
Refills: 0 | Status: DISCONTINUED | OUTPATIENT
Start: 2024-04-28 | End: 2024-05-01

## 2024-04-28 RX ORDER — INSULIN LISPRO 100/ML
10 VIAL (ML) SUBCUTANEOUS
Refills: 0 | Status: DISCONTINUED | OUTPATIENT
Start: 2024-04-29 | End: 2024-04-29

## 2024-04-28 RX ADMIN — Medication 25 MILLIGRAM(S): at 05:43

## 2024-04-28 RX ADMIN — Medication 10 UNIT(S): at 17:17

## 2024-04-28 RX ADMIN — POLYETHYLENE GLYCOL 3350 17 GRAM(S): 17 POWDER, FOR SOLUTION ORAL at 12:58

## 2024-04-28 RX ADMIN — Medication 650 MILLIGRAM(S): at 22:57

## 2024-04-28 RX ADMIN — Medication 100 MILLIGRAM(S): at 17:21

## 2024-04-28 RX ADMIN — TIOTROPIUM BROMIDE 2 PUFF(S): 18 CAPSULE ORAL; RESPIRATORY (INHALATION) at 13:06

## 2024-04-28 RX ADMIN — Medication 650 MILLIGRAM(S): at 17:22

## 2024-04-28 RX ADMIN — Medication 650 MILLIGRAM(S): at 05:43

## 2024-04-28 RX ADMIN — ALBUTEROL 2 PUFF(S): 90 AEROSOL, METERED ORAL at 12:58

## 2024-04-28 RX ADMIN — BUDESONIDE AND FORMOTEROL FUMARATE DIHYDRATE 2 PUFF(S): 160; 4.5 AEROSOL RESPIRATORY (INHALATION) at 17:21

## 2024-04-28 RX ADMIN — Medication 5 UNIT(S): at 12:55

## 2024-04-28 RX ADMIN — Medication 300 MILLIGRAM(S): at 12:58

## 2024-04-28 RX ADMIN — INSULIN GLARGINE 15 UNIT(S): 100 INJECTION, SOLUTION SUBCUTANEOUS at 22:21

## 2024-04-28 RX ADMIN — Medication 2: at 12:54

## 2024-04-28 RX ADMIN — OXYCODONE HYDROCHLORIDE 5 MILLIGRAM(S): 5 TABLET ORAL at 15:02

## 2024-04-28 RX ADMIN — Medication 81 MILLIGRAM(S): at 12:58

## 2024-04-28 RX ADMIN — Medication 650 MILLIGRAM(S): at 16:27

## 2024-04-28 RX ADMIN — MONTELUKAST 10 MILLIGRAM(S): 4 TABLET, CHEWABLE ORAL at 22:20

## 2024-04-28 RX ADMIN — Medication 650 MILLIGRAM(S): at 22:18

## 2024-04-28 RX ADMIN — Medication 25 MILLIGRAM(S): at 17:21

## 2024-04-28 RX ADMIN — SENNA PLUS 2 TABLET(S): 8.6 TABLET ORAL at 22:18

## 2024-04-28 RX ADMIN — OXYCODONE HYDROCHLORIDE 5 MILLIGRAM(S): 5 TABLET ORAL at 15:53

## 2024-04-28 RX ADMIN — Medication 650 MILLIGRAM(S): at 17:47

## 2024-04-28 RX ADMIN — ATORVASTATIN CALCIUM 40 MILLIGRAM(S): 80 TABLET, FILM COATED ORAL at 22:21

## 2024-04-28 RX ADMIN — Medication 5 UNIT(S): at 08:35

## 2024-04-28 RX ADMIN — Medication 1: at 08:34

## 2024-04-28 RX ADMIN — Medication 20 MILLIGRAM(S): at 05:40

## 2024-04-28 RX ADMIN — Medication 2: at 17:18

## 2024-04-28 RX ADMIN — Medication 100 MILLIGRAM(S): at 05:39

## 2024-04-28 NOTE — PHYSICAL THERAPY INITIAL EVALUATION ADULT - WEIGHT-BEARING RESTRICTIONS, REHAB EVAL
Physical Therapy  Visit Type: treatment  Precautions:  Medical precautions: ; standard precautions.   Lines:     Basic: capped IV, telemetry and O2 (.5 L at rest, 1.5-2 L with ambulation)      Lines in chart and on patient reviewed, precautions maintained throughout session.    SUBJECTIVE  Patient verbally agrees to allow the following to be present during session: daughter  Gwen and [employer], RNs, approved session    Patient agreed to participate in therapy this date.  Pt. reports that she is tired.   Patient / Family Goal: return home and return to previous functional status    Pain     At onset of session (out of 10): 0  RN informed on pain level     OBJECTIVE     At rest, SpO2 93-95% on .5 L O2, but with ambulation, SpO2 dropped to 86% on .5 L.  O2 increased to 1L and then 1.5-2 L throughout rest of walk with pt able to maintain SpO2 at 89-91% with pursed lip breathing and cues. Level of consciousness: alert    Oriented to person, place and time     Arousal alertness: appropriate responses to stimuli    Affect/Behavior: pleasant and cooperative  Patient activity tolerance: 1 to 2 activity to rest  Functional Communication/Cognition    Overall status:  Within functional limits  Range of Motion (measured in degrees unless otherwise noted, active unless indicated)  WFL: RLE, LLE  Comments / Details: Left UE limited for shoulder flexion and abduction.  See OT evaluation for details.   Strength (out of 5 unless otherwise indicated)   Comments / Details:  Decreased LE strength noted with bed mobility, transfers, and ambulation.   Balance (trials measured in sec unless otherwise indicted)    Sitting: Static: modified independent double lower extremity support, Dynamic: modified independent double lower extremity support    Standing - Firm Surface - Eyes Open: Static: stand by assist double upper extremity support  Dynamic: contact guard/touching/steadying assist double upper extremity support  Balance Details: Bilateral  UE support on 2 wheeled walker, BLE instabilityx2 during ambulation.     Bed Mobility:        Supine to sit: minimal assist  Training completed:    Tasks: supine to sit    Minimal assist for trunk, cues for sequencing.  Increased time.   Transfers:    Assistive devices: gait belt and 2-wheeled walker    Sit to stand: contact guard/touching/steadying assist    Stand to sit: contact guard/touching/steadying assist  Training completed:    Tasks: sit to stand and stand to sit    CGA for safety.  Cues for hand placements.   Gait/Ambulation:     Assistance: total assist - non-dependent (CGA x 1 and SBA x 1 for w/c follow)   Assistive device: gait belt and 2-wheeled walker    Distance (ft): 30; 45; 75    Type: decreased jovita and excessive flexed posture  Training Completed:    Tasks: gait training on level surfaces    Decreased jovita and pt pushing 2 WW too far out in front of pt, cues to correct.  Distance limited by fatigue and shortness of breath, with pt needing seated rest breaks between walks.           Interventions     Additional exercise details: Therex at stairs (step ups) deferred due to fatigue following ambulation.  Training provided: bed mobility training, balance retraining, gait training, safety training, transfer training, activity tolerance, energy conservation, breathing/relaxation and compensatory techniques    Skilled input: Verbal instruction/cues  Verbal Consent: Writer verbally educated and received verbal consent for hand placement, positioning of patient, and techniques to be performed today from patient        ASSESSMENT   Impairments: strength, balance deficits, activity tolerance, endurance and shortness of breath  Functional Limitations: all functional mobility    Patient presents to physical therapy below baseline level of modified independent.  Pt. is progressing with increased ambulation distance, but pt continues to be limited by shortness of breath and fatigue.  Pt. is demonstrating  the impairments listed above, which are limiting the completion of all functional mobility at this time.  Further skilled physical therapy is required to address these limitations in attempt to maximize the patient's functional independence.   Recommend pt discharge home with assist of daughter and follow up with home PT to progress mobility, activity tolerance, balance, and strength.          Discharge Recommendations  Recommendation for Discharge Location: PT [place]: Home with Home therapy (and assist of daughter)  PT/OT Mobility Equipment for Discharge: 2 wheeled walker - pt owns                Pain at End of Session: RN informed on pain level  Pain: 0/10    Progress: progressing toward goals    • Skilled therapy is required to address these limitations in attempt to maximize the patient's independence.     • Predicted patient presentation: Moderate (evolving) - Patient comorbidities and complexities, as defined above, may have varying impact on steady progress for prescribed plan of care.    Education Provided:   Learning Assessment:  - Primary learner: patient  - Are they ready to learn: yes  - Preferred learning style: verbal  - Barriers to learning: no barriers apparent at this time  Education provided during session:  - Receiving Education: patient  - Results of above outlined education: Verbalizes understanding    Patient at End of Session:   Location: in chair  Safety measures: call light within reach, lines intact and alarm system in place/re-engaged  Handoff to: nurse    PLAN   Suggestions for next session as indicated: Bed mobility, transfers, ambulation, w/c follow to progress ambulation distance, LE ex    PT Frequency: 3-5 x per week  Frequency Comments: 9/28 - 5 (IS, CW, RK)        Interventions: bed mobility, gait training, stairs retraining, strengthening, balance, patient/family training, safety education, functional transfer training and HEP train/position  Agreement to plan and goals: patient agrees  with goals and treatment plan        GOALS  Review Date: 9/30/2022  Long Term Goals: (to be met by time of discharge from hospital)  Sit to supine: Patient will complete sit to supine modified independent.  Status: progressing/ongoing  Supine to sit: Patient will complete supine to sit modified independent.  Status: progressing/ongoing  Sit to stand: Patient will complete sit to stand transfer with least restrictive device, modified independent.   Status: progressing/ongoing  Stand to sit: Patient will complete stand to sit transfer with least restrictive device, modified independent.   Status: progressing/ongoing  Ambulation (even): Patient will ambulate on even surface for 100 feet with least restrictive device, modified independent.   Status: progressing/ongoing  Stairs: Patient will ambulate 2 steps with least restrictive device modified independent.  Status: not met    Patient will demonstrate and/or verbalize understanding of car transfer completion.  Not met.      Documented in the chart in the following areas: Assessment.      Therapy procedure time and total treatment time can be found documented on the Time Entry flowsheet   full weight-bearing

## 2024-04-28 NOTE — PROGRESS NOTE ADULT - SUBJECTIVE AND OBJECTIVE BOX
SUBJECTIVE / OVERNIGHT EVENTS:  Pt seen and evaluated at bedside. Feels right 3rd digit pain is improving. able to bend finger. otherwise no acute complaints.     MEDICATIONS  (STANDING):  allopurinol 300 milliGRAM(s) Oral daily  aspirin enteric coated 81 milliGRAM(s) Oral daily  atorvastatin 40 milliGRAM(s) Oral at bedtime  budesonide 160 MICROgram(s)/formoterol 4.5 MICROgram(s) Inhaler 2 Puff(s) Inhalation two times a day  ceFAZolin   IVPB 1000 milliGRAM(s) IV Intermittent every 12 hours  dextrose 10% Bolus 125 milliLiter(s) IV Bolus once  dextrose 5%. 1000 milliLiter(s) (50 mL/Hr) IV Continuous <Continuous>  dextrose 5%. 1000 milliLiter(s) (100 mL/Hr) IV Continuous <Continuous>  dextrose 50% Injectable 25 Gram(s) IV Push once  dextrose 50% Injectable 12.5 Gram(s) IV Push once  glucagon  Injectable 1 milliGRAM(s) IntraMuscular once  insulin glargine Injectable (LANTUS) 15 Unit(s) SubCutaneous at bedtime  insulin lispro (ADMELOG) corrective regimen sliding scale   SubCutaneous three times a day before meals  insulin lispro (ADMELOG) corrective regimen sliding scale   SubCutaneous at bedtime  insulin lispro Injectable (ADMELOG) 5 Unit(s) SubCutaneous three times a day before meals  lidocaine   4% Patch 1 Patch Transdermal daily  lidocaine 2% (Preservative-free) Injectable 10 milliLiter(s) Local Injection once  metoprolol tartrate 25 milliGRAM(s) Oral two times a day  montelukast 10 milliGRAM(s) Oral at bedtime  naloxone Injectable 0.4 milliGRAM(s) IV Push once  pantoprazole    Tablet 40 milliGRAM(s) Oral before breakfast  polyethylene glycol 3350 17 Gram(s) Oral daily  senna 2 Tablet(s) Oral at bedtime  sodium bicarbonate 650 milliGRAM(s) Oral two times a day  tiotropium 2.5 MICROgram(s) Inhaler 2 Puff(s) Inhalation daily  torsemide 20 milliGRAM(s) Oral daily    MEDICATIONS  (PRN):  acetaminophen     Tablet .. 650 milliGRAM(s) Oral every 6 hours PRN Temp greater or equal to 38C (100.4F), Mild Pain (1 - 3)  albuterol    90 MICROgram(s) HFA Inhaler 2 Puff(s) Inhalation every 6 hours PRN Shortness of Breath and/or Wheezing  aluminum hydroxide/magnesium hydroxide/simethicone Suspension 30 milliLiter(s) Oral every 4 hours PRN Dyspepsia  bisacodyl 5 milliGRAM(s) Oral daily PRN Constipation  dextrose Oral Gel 15 Gram(s) Oral once PRN Blood Glucose LESS THAN 70 milliGRAM(s)/deciliter  melatonin 3 milliGRAM(s) Oral at bedtime PRN Insomnia  ondansetron Injectable 4 milliGRAM(s) IV Push every 8 hours PRN Nausea and/or Vomiting  oxyCODONE    IR 2.5 milliGRAM(s) Oral every 4 hours PRN Moderate Pain (4 - 6)  oxyCODONE    IR 5 milliGRAM(s) Oral every 4 hours PRN Severe Pain (7 - 10)      I&O's Summary      PHYSICAL EXAM:  Vital Signs Last 24 Hrs  T(C): 36.9 (2024 05:05), Max: 36.9 (2024 05:05)  T(F): 98.5 (2024 05:05), Max: 98.5 (2024 05:05)  HR: 58 (2024 10:20) (58 - 75)  BP: 143/74 (2024 10:20) (116/69 - 157/80)  BP(mean): --  RR: 18 (2024 05:05) (16 - 18)  SpO2: 96% (2024 10:20) (96% - 98%)    Parameters below as of 2024 10:20  Patient On (Oxygen Delivery Method): room air      CONSTITUTIONAL: NAD  EYES: EOMI; conjunctiva and sclera clear  ENMT: Moist oral mucosa, no pharyngeal injection or exudates  NECK: Supple, trachea midline   RESPIRATORY: Normal respiratory effort; lungs are clear to auscultation bilaterally  CARDIOVASCULAR: Regular rate and rhythm, normal S1 and S2; No lower extremity edema  ABDOMEN: Nontender to palpation, normoactive bowel sounds, no rebound/guarding  MUSCULOSKELETAL: mild erythema with swelling of right hand, 3rd digit   PSYCH: A+O to person, place, and time; affect appropriate    LABS:                        12.5   5.40  )-----------( 125      ( 2024 07:45 )             37.6     04-28    138  |  103  |  60<H>  ----------------------------<  163<H>  4.4   |  22  |  2.84<H>    Ca    9.3      2024 07:45  Phos  4.0       Mg     1.9         TPro  6.6  /  Alb  3.6  /  TBili  0.4  /  DBili  x   /  AST  14  /  ALT  11  /  AlkPhos  76      PT/INR - ( 2024 07:45 )   PT: 10.4 sec;   INR: 0.99 ratio         PTT - ( 2024 07:45 )  PTT:33.6 sec      Urinalysis Basic - ( 2024 08:51 )    Color: Yellow / Appearance: Clear / S.009 / pH: x  Gluc: x / Ketone: Negative mg/dL  / Bili: Negative / Urobili: 0.2 mg/dL   Blood: x / Protein: 100 mg/dL / Nitrite: Negative   Leuk Esterase: Negative / RBC: 2 /HPF / WBC 0 /HPF   Sq Epi: x / Non Sq Epi: 0 /HPF / Bacteria: Negative /HPF

## 2024-04-28 NOTE — CONSULT NOTE ADULT - ASSESSMENT
69y old Male with history of T2DM, CAD, Stage IV CKD, CAD s/p CABG, HTN, HLD, Asthma, TAIWO, GERD here with right finger cellulitis.     1. T2DM with hyperglycemia  - Demonstrates mostly post prandial hyperglycemia  - Continue Lantus 15 units at night  - Increase Admelog to 10 units with meals  - Change to moderate Admelog correctional scale before meals and bedtime  - Monitor FS before meals and bedtime  - Follow hospital hypoglycemic protocol    2. Stage IV CKD  - Hold Farxiga in the hospital  - nephrology following  - monitor for hypoglycemia    3. Finger cellulitis  - on Cefazolin    Will continue to follow.    Renee Veloz MD  [facility]- Division of Endocrinology    [address] [place] [address]    T [phone]  F [phone]

## 2024-04-28 NOTE — CONSULT NOTE ADULT - ASSESSMENT
69y old Male with history of T2DM, CAD, Stage IV CKD, CAD s/p CABG, HTN, HLD, Asthma, TAIWO, GERD here with right finger cellulitis. The Nephrology service has been consulted for management of CKD.    1. Stage IV CKD: Creatinine upon admission at 2.64. Upon review of prior outpatient appointments, most recent Cr is 2.64 mg/dL. Creatinine has ranged from 2.6 - 2.8 since April 2023. Avoid any additional nephrotoxic medications unless urgently indicated. Dose antibiotics for CrCl < 30. Would recommend reviewing Ancef dosing and changing to 500 mg q12 hours. Endocrinology recommends holding Farxiga, but kidney function is stable; there is no Nephrology indication to hold at this time. Will need to check with Endocrinology.    2. Hyperkalemia: Maintain K+ < 5.3 mEq/L. Continue with low K+ diet. Patient takes Veltassa 8.4 gm daily. If this is non-formulary, then can use Lokelma 10 gm daily while in the hospital.    3. Proteinuria: Known nephrotic range proteinuria. Recommend continuing Farxiga, unless completely contraindicated as per Endocrine. Had bene taking Lisinopril during last outpatient appointment, will need to confirm if he is still taking this medication. This medication is appropriate if K+ is controlled by diet and binder.    4. HTN w/ CKD: Goal BP is 130/80. Will continue current medications and monitor BP for now. Monitor for hypotensive episodes in the setting of infection.    [facility] NEPHROLOG[facility]  Justin Campoverde M.D.  Tyler Dueñas D.O.  Margy Tidwell M.D.  MD Tatiana Mccoy, MSN, ANP-C    Telephone: [phone]  Facsimile: [phone]    [address] [place] [address], [address]  [place] [address]

## 2024-04-28 NOTE — CONSULT NOTE ADULT - ASSESSMENT
A/P    68 yo 83kg m w pmh htn, hld, dm, cad s/p cabg, ckd, rads/asthma/copd, bronchiectasis, savage, gerd, lprd, gout, p/w right middle finger swelling, pain/tenderness, redness, warmth; symptoms have been ongoing .       #Cellulitis of right finger.   -tx per med  -iv abx per med  -clinically improving   -xray shows no evidence of gas or underlying bone involvement    #CAD, c/p CABG  -stable, no chest pain  -asa, statin, bb.    #Stage 3 chronic kidney disease.   -renal f/u    #HTN   -cont current meds    #Asthma/COPD  -cont tx, nebz per med      dvt ppx      77 minutes spent on total encounter; more than 50% of the visit was spent counseling and/or coordinating care by the attending physician.

## 2024-04-28 NOTE — CONSULT NOTE ADULT - ASSESSMENT
ASSESSMENT & PLAN  69yMale w/ R middle finger pain/erythema/swelling suspicious for an abscess.    PLAN  -NWB RUE in soft dressing  TID betadine/normal saline soaks with 1cm packing removal daily (see wound care order for detailed instructions)  -keep dressing clean, dry, and intact (do not get wet)  -IV abx   -discharge on PO abx  -pain control  -ice/cold compress, elevation  -f/u outpt with Dr. Kumar within 1 week, please call office for appt

## 2024-04-28 NOTE — PHYSICAL THERAPY INITIAL EVALUATION ADULT - PERTINENT HX OF CURRENT PROBLEM, REHAB EVAL
Pt is a 68 yo M (83kg)  w pmh htn, hld, dm, cad s/p cabg, ckd, rads/asthma/copd, bronchiectasis, savage, gerd, lprd, gout, p/w right middle finger swelling, pain/tenderness, redness, warmth; symptoms have been ongoing . in er, found to have findings suggestive of cellulitis; admit to medicine for further mgmt.   Xray Hand: No radiopaque foreign body. No radiographic evidence of osteomyelitis or subcutaneous tracking gas.

## 2024-04-28 NOTE — PHYSICAL THERAPY INITIAL EVALUATION ADULT - ADDITIONAL COMMENTS
pt resides with wife in a private home. Pt has 5 stairs to enter and 13 inside with +HRs. pt independent with all ADLs and ambulation pta.

## 2024-04-28 NOTE — OCCUPATIONAL THERAPY INITIAL EVALUATION ADULT - ADDITIONAL COMMENTS
Pt lives with his wife in a private home +5 steps to enter and 13 steps inside; pt has a tub shower with shower chair, RW, cane. PTA pt was independent with all ADLs and mobility with no use of DME.

## 2024-04-29 LAB
ALBUMIN SERPL ELPH-MCNC: 3.7 G/DL — SIGNIFICANT CHANGE UP (ref 3.3–5)
ALP SERPL-CCNC: 74 U/L — SIGNIFICANT CHANGE UP (ref 40–120)
ALT FLD-CCNC: 9 U/L — LOW (ref 10–45)
ANION GAP SERPL CALC-SCNC: 12 MMOL/L — SIGNIFICANT CHANGE UP (ref 5–17)
AST SERPL-CCNC: 14 U/L — SIGNIFICANT CHANGE UP (ref 10–40)
BASOPHILS # BLD AUTO: 0.07 K/UL — SIGNIFICANT CHANGE UP (ref 0–0.2)
BASOPHILS NFR BLD AUTO: 1 % — SIGNIFICANT CHANGE UP (ref 0–2)
BILIRUB SERPL-MCNC: 0.4 MG/DL — SIGNIFICANT CHANGE UP (ref 0.2–1.2)
BUN SERPL-MCNC: 69 MG/DL — HIGH (ref 7–23)
CALCIUM SERPL-MCNC: 9.3 MG/DL — SIGNIFICANT CHANGE UP (ref 8.4–10.5)
CHLORIDE SERPL-SCNC: 101 MMOL/L — SIGNIFICANT CHANGE UP (ref 96–108)
CO2 SERPL-SCNC: 24 MMOL/L — SIGNIFICANT CHANGE UP (ref 22–31)
CREAT SERPL-MCNC: 3.18 MG/DL — HIGH (ref 0.5–1.3)
EGFR: 20 ML/MIN/1.73M2 — LOW
EOSINOPHIL # BLD AUTO: 0.51 K/UL — HIGH (ref 0–0.5)
EOSINOPHIL NFR BLD AUTO: 7.6 % — HIGH (ref 0–6)
GLUCOSE BLDC GLUCOMTR-MCNC: 138 MG/DL — HIGH (ref 70–99)
GLUCOSE BLDC GLUCOMTR-MCNC: 155 MG/DL — HIGH (ref 70–99)
GLUCOSE BLDC GLUCOMTR-MCNC: 177 MG/DL — HIGH (ref 70–99)
GLUCOSE BLDC GLUCOMTR-MCNC: 196 MG/DL — HIGH (ref 70–99)
GLUCOSE SERPL-MCNC: 123 MG/DL — HIGH (ref 70–99)
GRAM STN FLD: ABNORMAL
GRAM STN FLD: ABNORMAL
HCT VFR BLD CALC: 36.4 % — LOW (ref 39–50)
HGB BLD-MCNC: 12.3 G/DL — LOW (ref 13–17)
IMM GRANULOCYTES NFR BLD AUTO: 0.3 % — SIGNIFICANT CHANGE UP (ref 0–0.9)
LYMPHOCYTES # BLD AUTO: 2.32 K/UL — SIGNIFICANT CHANGE UP (ref 1–3.3)
LYMPHOCYTES # BLD AUTO: 34.7 % — SIGNIFICANT CHANGE UP (ref 13–44)
MCHC RBC-ENTMCNC: 29.1 PG — SIGNIFICANT CHANGE UP (ref 27–34)
MCHC RBC-ENTMCNC: 33.8 GM/DL — SIGNIFICANT CHANGE UP (ref 32–36)
MCV RBC AUTO: 86.3 FL — SIGNIFICANT CHANGE UP (ref 80–100)
MONOCYTES # BLD AUTO: 0.69 K/UL — SIGNIFICANT CHANGE UP (ref 0–0.9)
MONOCYTES NFR BLD AUTO: 10.3 % — SIGNIFICANT CHANGE UP (ref 2–14)
NEUTROPHILS # BLD AUTO: 3.07 K/UL — SIGNIFICANT CHANGE UP (ref 1.8–7.4)
NEUTROPHILS NFR BLD AUTO: 46.1 % — SIGNIFICANT CHANGE UP (ref 43–77)
NRBC # BLD: 0 /100 WBCS — SIGNIFICANT CHANGE UP (ref 0–0)
PLATELET # BLD AUTO: 126 K/UL — LOW (ref 150–400)
POTASSIUM SERPL-MCNC: 4.5 MMOL/L — SIGNIFICANT CHANGE UP (ref 3.5–5.3)
POTASSIUM SERPL-SCNC: 4.5 MMOL/L — SIGNIFICANT CHANGE UP (ref 3.5–5.3)
PROT SERPL-MCNC: 6.7 G/DL — SIGNIFICANT CHANGE UP (ref 6–8.3)
RBC # BLD: 4.22 M/UL — SIGNIFICANT CHANGE UP (ref 4.2–5.8)
RBC # FLD: 12.1 % — SIGNIFICANT CHANGE UP (ref 10.3–14.5)
SODIUM SERPL-SCNC: 137 MMOL/L — SIGNIFICANT CHANGE UP (ref 135–145)
WBC # BLD: 6.68 K/UL — SIGNIFICANT CHANGE UP (ref 3.8–10.5)
WBC # FLD AUTO: 6.68 K/UL — SIGNIFICANT CHANGE UP (ref 3.8–10.5)

## 2024-04-29 PROCEDURE — 99222 1ST HOSP IP/OBS MODERATE 55: CPT

## 2024-04-29 PROCEDURE — 99232 SBSQ HOSP IP/OBS MODERATE 35: CPT

## 2024-04-29 RX ORDER — INSULIN LISPRO 100/ML
12 VIAL (ML) SUBCUTANEOUS
Refills: 0 | Status: DISCONTINUED | OUTPATIENT
Start: 2024-04-30 | End: 2024-05-01

## 2024-04-29 RX ORDER — INSULIN GLARGINE 100 [IU]/ML
18 INJECTION, SOLUTION SUBCUTANEOUS AT BEDTIME
Refills: 0 | Status: DISCONTINUED | OUTPATIENT
Start: 2024-04-29 | End: 2024-05-01

## 2024-04-29 RX ORDER — MUPIROCIN 20 MG/G
1 OINTMENT TOPICAL
Refills: 0 | Status: DISCONTINUED | OUTPATIENT
Start: 2024-04-29 | End: 2024-05-01

## 2024-04-29 RX ADMIN — OXYCODONE HYDROCHLORIDE 5 MILLIGRAM(S): 5 TABLET ORAL at 02:47

## 2024-04-29 RX ADMIN — OXYCODONE HYDROCHLORIDE 5 MILLIGRAM(S): 5 TABLET ORAL at 15:00

## 2024-04-29 RX ADMIN — OXYCODONE HYDROCHLORIDE 5 MILLIGRAM(S): 5 TABLET ORAL at 02:15

## 2024-04-29 RX ADMIN — OXYCODONE HYDROCHLORIDE 5 MILLIGRAM(S): 5 TABLET ORAL at 14:04

## 2024-04-29 RX ADMIN — Medication 10 UNIT(S): at 08:47

## 2024-04-29 RX ADMIN — Medication 10 UNIT(S): at 12:44

## 2024-04-29 RX ADMIN — OXYCODONE HYDROCHLORIDE 5 MILLIGRAM(S): 5 TABLET ORAL at 20:02

## 2024-04-29 RX ADMIN — MUPIROCIN 1 APPLICATION(S): 20 OINTMENT TOPICAL at 18:04

## 2024-04-29 RX ADMIN — MUPIROCIN 1 APPLICATION(S): 20 OINTMENT TOPICAL at 05:42

## 2024-04-29 RX ADMIN — SENNA PLUS 2 TABLET(S): 8.6 TABLET ORAL at 22:32

## 2024-04-29 RX ADMIN — MONTELUKAST 10 MILLIGRAM(S): 4 TABLET, CHEWABLE ORAL at 22:32

## 2024-04-29 RX ADMIN — Medication 300 MILLIGRAM(S): at 11:29

## 2024-04-29 RX ADMIN — POLYETHYLENE GLYCOL 3350 17 GRAM(S): 17 POWDER, FOR SOLUTION ORAL at 11:30

## 2024-04-29 RX ADMIN — OXYCODONE HYDROCHLORIDE 5 MILLIGRAM(S): 5 TABLET ORAL at 06:15

## 2024-04-29 RX ADMIN — Medication 20 MILLIGRAM(S): at 05:39

## 2024-04-29 RX ADMIN — Medication 100 MILLIGRAM(S): at 17:43

## 2024-04-29 RX ADMIN — Medication 2: at 08:48

## 2024-04-29 RX ADMIN — ATORVASTATIN CALCIUM 40 MILLIGRAM(S): 80 TABLET, FILM COATED ORAL at 22:32

## 2024-04-29 RX ADMIN — Medication 25 MILLIGRAM(S): at 05:41

## 2024-04-29 RX ADMIN — Medication 25 MILLIGRAM(S): at 17:43

## 2024-04-29 RX ADMIN — Medication 10 UNIT(S): at 18:03

## 2024-04-29 RX ADMIN — OXYCODONE HYDROCHLORIDE 5 MILLIGRAM(S): 5 TABLET ORAL at 06:50

## 2024-04-29 RX ADMIN — BUDESONIDE AND FORMOTEROL FUMARATE DIHYDRATE 2 PUFF(S): 160; 4.5 AEROSOL RESPIRATORY (INHALATION) at 17:43

## 2024-04-29 RX ADMIN — PANTOPRAZOLE SODIUM 40 MILLIGRAM(S): 20 TABLET, DELAYED RELEASE ORAL at 06:14

## 2024-04-29 RX ADMIN — Medication 650 MILLIGRAM(S): at 17:43

## 2024-04-29 RX ADMIN — TIOTROPIUM BROMIDE 2 PUFF(S): 18 CAPSULE ORAL; RESPIRATORY (INHALATION) at 11:29

## 2024-04-29 RX ADMIN — Medication 100 MILLIGRAM(S): at 05:41

## 2024-04-29 RX ADMIN — Medication 81 MILLIGRAM(S): at 11:29

## 2024-04-29 RX ADMIN — Medication 2: at 18:03

## 2024-04-29 RX ADMIN — Medication 2: at 12:45

## 2024-04-29 RX ADMIN — SODIUM ZIRCONIUM CYCLOSILICATE 10 GRAM(S): 10 POWDER, FOR SUSPENSION ORAL at 11:29

## 2024-04-29 RX ADMIN — BUDESONIDE AND FORMOTEROL FUMARATE DIHYDRATE 2 PUFF(S): 160; 4.5 AEROSOL RESPIRATORY (INHALATION) at 06:15

## 2024-04-29 RX ADMIN — OXYCODONE HYDROCHLORIDE 5 MILLIGRAM(S): 5 TABLET ORAL at 21:02

## 2024-04-29 RX ADMIN — Medication 650 MILLIGRAM(S): at 05:40

## 2024-04-29 RX ADMIN — INSULIN GLARGINE 18 UNIT(S): 100 INJECTION, SOLUTION SUBCUTANEOUS at 22:30

## 2024-04-29 NOTE — CONSULT NOTE ADULT - SUBJECTIVE AND OBJECTIVE BOX
Patient is a 69y old  Male who presents with a chief complaint of right middle finger swelling, pain/tenderness, redness, warmth (29 Apr 2024 10:41)    HPI:    68yo M PMHx HTN, HLD, DM II, CAD s/p CABG c/b MRSA sternal wound, CKD, asthma/copd/LRPD, bronchiectasis, gout, p/w right middle finger swelling, pain/tenderness, redness, warmth x2 weeks duration. No associated trauma, recent injections, or recallable exacerbating factor. He also noticed a givens at the side of the finger during this time as well. He initially self-trialed colchiniate with improvement. He went to his PCP who started doxycycline 4/25, after 3 doses he went back to his PCP on 4/27 and was sent to the ED as there had been minimal improvement. No fever/chills at home, no Hx finger cellulitis in the past.       REVIEW OF SYSTEMS  Constitutional: No fevers, chills, weight loss or fatigue   Skin: +finger erythema, swelling, pain  Eyes: No discharge  ENMT: No sore throat, oral thrush, ulcers or exudate  Respiratory: No cough, no SOB  Cardiovascular:  No chest pain, palpitations or edema   Gastrointestinal: No pain, nausea, vomiting, diarrhea or constipation	  Genitourinary: No dysuria, discharge or flank pain  MSK: No arthralgias or back pain   Neurological: No HA, no weakness, no seizures, no AMS       prior hospital charts reviewed [V]  primary team notes reviewed [V]  other consultant notes reviewed [V]    PAST MEDICAL & SURGICAL HISTORY:  Diabetes      HTN (hypertension)      HLD (hyperlipidemia)      CAD (coronary artery disease)      Asthma-COPD overlap syndrome      Stage 3 chronic kidney disease      TAIWO on CPAP      GERD (gastroesophageal reflux disease)      LPRD (laryngopharyngeal reflux disease)      Bronchiectasis      Gout      History of appendectomy  x 30 yrs ago      S/P CABG (coronary artery bypass graft)    SOCIAL HISTORY:  - Denied smoking/vaping/alcohol/recreational drug use    FAMILY HISTORY:  FH: type 2 diabetes    Allergies  Blueberries (Rash)  No Known Drug Allergies    ANTIMICROBIALS:  ceFAZolin   IVPB 500 every 12 hours      ANTIMICROBIALS (past 90 days):  MEDICATIONS  (STANDING):  ampicillin/sulbactam  IVPB   200 mL/Hr IV Intermittent (04-27-24 @ 19:25)    ceFAZolin   IVPB   100 mL/Hr IV Intermittent (04-29-24 @ 05:41)    ceFAZolin   IVPB   100 mL/Hr IV Intermittent (04-28-24 @ 17:21)   100 mL/Hr IV Intermittent (04-28-24 @ 05:39)    vancomycin  IVPB   250 mL/Hr IV Intermittent (04-27-24 @ 20:31)        OTHER MEDS:   MEDICATIONS  (STANDING):  acetaminophen     Tablet .. 650 every 6 hours PRN  albuterol    90 MICROgram(s) HFA Inhaler 2 every 6 hours PRN  allopurinol 300 daily  aluminum hydroxide/magnesium hydroxide/simethicone Suspension 30 every 4 hours PRN  aspirin enteric coated 81 daily  atorvastatin 40 at bedtime  bisacodyl 5 daily PRN  budesonide 160 MICROgram(s)/formoterol 4.5 MICROgram(s) Inhaler 2 two times a day  dextrose 50% Injectable 25 once  dextrose 50% Injectable 12.5 once  dextrose Oral Gel 15 once PRN  glucagon  Injectable 1 once  insulin glargine Injectable (LANTUS) 15 at bedtime  insulin lispro (ADMELOG) corrective regimen sliding scale  at bedtime  insulin lispro (ADMELOG) corrective regimen sliding scale  three times a day before meals  insulin lispro Injectable (ADMELOG) 10 before dinner  insulin lispro Injectable (ADMELOG) 10 before breakfast  insulin lispro Injectable (ADMELOG) 10 before lunch  melatonin 3 at bedtime PRN  metoprolol tartrate 25 two times a day  montelukast 10 at bedtime  ondansetron Injectable 4 every 8 hours PRN  oxyCODONE    IR 2.5 every 4 hours PRN  oxyCODONE    IR 5 every 4 hours PRN  pantoprazole    Tablet 40 before breakfast  polyethylene glycol 3350 17 daily  senna 2 at bedtime  tiotropium 2.5 MICROgram(s) Inhaler 2 daily      VITALS:  Vital Signs Last 24 Hrs  T(F): 97.4 (04-29-24 @ 04:53), Max: 98.5 (04-28-24 @ 05:05)    Vital Signs Last 24 Hrs  HR: 56 (04-29-24 @ 04:53) (51 - 62)  BP: 128/68 (04-29-24 @ 04:53) (128/65 - 150/75)  RR: 18 (04-29-24 @ 04:53)  SpO2: 97% (04-29-24 @ 04:53) (95% - 97%)  Wt(kg): --    EXAM:  General: Patient in no acute distress  HEENT: NCAT, no oral lesions  CV: S1+S2, no m/r/g appreciated  Lungs: No respiratory distress, CTAB. Visible hernia near sternum, nontender  Abd: Soft, nontender  Neuro: Alert and oriented  Skin: L hand 3rd finger mild swelling and erythema, some tenderness to palpation, packing in place along medial proximal aspect of finger  IV: No phlebitis      Labs:                        12.3   6.68  )-----------( 126      ( 29 Apr 2024 07:00 )             36.4     04-29    137  |  101  |  69<H>  ----------------------------<  123<H>  4.5   |  24  |  3.18<H>    Ca    9.3      29 Apr 2024 07:02  Phos  4.0     04-28  Mg     1.9     04-28    TPro  6.7  /  Alb  3.7  /  TBili  0.4  /  DBili  x   /  AST  14  /  ALT  9<L>  /  AlkPhos  74  04-29      WBC Trend:  WBC Count: 6.68 (04-29-24 @ 07:00)  WBC Count: 5.40 (04-28-24 @ 07:45)  WBC Count: 6.95 (04-27-24 @ 19:28)      Auto Neutrophil #: 3.07 K/uL (04-29-24 @ 07:00)  Auto Neutrophil #: 2.15 K/uL (04-28-24 @ 07:45)  Auto Neutrophil #: 3.31 K/uL (04-27-24 @ 19:28)      Creatine Trend:  Creatinine: 3.18 (04-29)  Creatinine: 2.84 (04-28)  Creatinine: 2.84 (04-27)      Liver Biochemical Testing Trend:  Alanine Aminotransferase (ALT/SGPT): 9 *L* (04-29)  Alanine Aminotransferase (ALT/SGPT): 11 (04-28)  Alanine Aminotransferase (ALT/SGPT): 9 *L* (04-27)  Aspartate Aminotransferase (AST/SGOT): 14 (04-29-24 @ 07:02)  Aspartate Aminotransferase (AST/SGOT): 14 (04-28-24 @ 07:45)  Aspartate Aminotransferase (AST/SGOT): 15 (04-27-24 @ 19:28)  Bilirubin Total: 0.4 (04-29)  Bilirubin Total: 0.4 (04-28)  Bilirubin Total: 0.3 (04-27)      Trend LDH  04-28-24 @ 07:45  159      Auto Eosinophil %: 7.6 % (04-29-24 @ 07:00)  Auto Eosinophil %: 9.4 % (04-28-24 @ 07:45)  Auto Eosinophil %: 7.3 % (04-27-24 @ 19:28)      Urinalysis Basic - ( 29 Apr 2024 07:02 )    Color: x / Appearance: x / SG: x / pH: x  Gluc: 123 mg/dL / Ketone: x  / Bili: x / Urobili: x   Blood: x / Protein: x / Nitrite: x   Leuk Esterase: x / RBC: x / WBC x   Sq Epi: x / Non Sq Epi: x / Bacteria: x      MICROBIOLOGY:    MRSA PCR Result.: Detected (04-28-24 @ 07:03)      Culture - Abscess with Gram Stain (collected 28 Apr 2024 14:40)  Source: .Abscess Right Middle Finger    Culture - Abscess with Gram Stain (collected 28 Apr 2024 14:40)  Source: .Abscess Right Middle Finger  C-Reactive Protein: 4 (04-28)    Ferritin: 150 (04-28)    Lactate Dehydrogenase, Serum: 159 (04-28)    A1C with Estimated Average Glucose Result: 8.1 % (04-28-24 @ 07:45)    RADIOLOGY:  imaging below personally reviewed    < from: Xray Hand 2 Views, Right (04.27.24 @ 19:13) >  FINDINGS:    No acute fracture or dislocation.  Joint spaces are maintained.  No radiopaque foreign body.  There is no focal osteopenia, cortical destruction, and/or periosteal   reaction. No subcutaneous tracking gas.    IMPRESSION:    No radiopaque foreign body.  No radiographic evidence of osteomyelitis or subcutaneous tracking gas.    < end of copied text >
CARDIOLOGY CONSULT NOTE - DR. CUADRA        Date of Service: 04-28-24 @ 10:55      HPI:  68yo 83kg m w pmh htn, hld, dm, cad s/p cabg, ckd, rads/asthma/copd, bronchiectasis, savage, gerd, lprd, gout, p/w right middle finger swelling, pain/tenderness, redness, warmth; symptoms have been ongoing for a couple of weeks now; patient sought help from outpatient provider, who prescribed him a course of doxycycline a few days ago; however, redness/pain/swelling have not improved; reevaluated by provider earlier today, and recommended patient to go to er for further investigation. in er, found to have findings suggestive of cellulitis; admit to medicine for further mgmt (27 Apr 2024 22:14)    no chest pain, dyspnea  hand feels better        PAST MEDICAL & SURGICAL HISTORY:  Diabetes      HTN (hypertension)      HLD (hyperlipidemia)      CAD (coronary artery disease)      Asthma-COPD overlap syndrome      Stage 3 chronic kidney disease      SAVAGE on CPAP      GERD (gastroesophageal reflux disease)      LPRD (laryngopharyngeal reflux disease)      Bronchiectasis      Gout      History of appendectomy  x 30 yrs ago      S/P CABG (coronary artery bypass graft)            PREVIOUS DIAGNOSTIC TESTING:    [ ] Echocardiogram:  [ ]  Catheterization:  [ ] Stress Test:  	    MEDICATIONS:    Home Medications:  albuterol 90 mcg/inh inhalation aerosol: 2 puff(s) inhaled every 6 hours, As needed, Shortness of Breath and/or Wheezing (27 Apr 2024 22:39)  allopurinol 300 mg oral tablet: 1 tab(s) orally once a day (27 Apr 2024 22:40)  azelastine 137 mcg/inh (0.1%) nasal spray: 2 spray(s) intranasally 2 times a day (27 Apr 2024 22:38)  cholecalciferol 1250 mcg (50,000 intl units) oral capsule: 1 cap(s) orally once a week (27 Apr 2024 22:42)  colchicine 0.6 mg oral capsule: 1 cap(s) orally once a day as needed for  gout pain (27 Apr 2024 22:46)  Farxiga 10 mg oral tablet: 1 tab(s) orally once a day (27 Apr 2024 22:42)  HumaLOG KwikPen 100 units/mL injectable solution: 34 unit(s) subcutaneous 3 times a day before meals (27 Apr 2024 22:35)  loratadine 10 mg oral tablet: 1 tab(s) orally once a day, As Needed (27 Apr 2024 22:39)  metoprolol tartrate 25 mg oral tablet: 1 tab(s) orally 2 times a day (27 Apr 2024 22:46)  montelukast 10 mg oral tablet: 1 tab(s) orally once a day (at bedtime) (27 Apr 2024 22:42)  sodium bicarbonate 650 mg oral tablet: 1 tab(s) orally 2 times a day (27 Apr 2024 22:46)  Trelegy Ellipta 100 mcg-62.5 mcg-25 mcg/inh inhalation powder: 1 puff(s) inhaled once a day (27 Apr 2024 22:46)  Veltassa 8.4 g oral powder for reconstitution: 8.4 gram(s) orally once a day (27 Apr 2024 22:46)  Zestril 10 mg oral tablet: 1 tab(s) orally once a day (27 Apr 2024 22:46)      MEDICATIONS  (STANDING):  allopurinol 300 milliGRAM(s) Oral daily  aspirin enteric coated 81 milliGRAM(s) Oral daily  atorvastatin 40 milliGRAM(s) Oral at bedtime  budesonide 160 MICROgram(s)/formoterol 4.5 MICROgram(s) Inhaler 2 Puff(s) Inhalation two times a day  ceFAZolin   IVPB 1000 milliGRAM(s) IV Intermittent every 12 hours  dextrose 10% Bolus 125 milliLiter(s) IV Bolus once  dextrose 5%. 1000 milliLiter(s) (50 mL/Hr) IV Continuous <Continuous>  dextrose 5%. 1000 milliLiter(s) (100 mL/Hr) IV Continuous <Continuous>  dextrose 50% Injectable 25 Gram(s) IV Push once  dextrose 50% Injectable 12.5 Gram(s) IV Push once  glucagon  Injectable 1 milliGRAM(s) IntraMuscular once  insulin glargine Injectable (LANTUS) 15 Unit(s) SubCutaneous at bedtime  insulin lispro (ADMELOG) corrective regimen sliding scale   SubCutaneous three times a day before meals  insulin lispro (ADMELOG) corrective regimen sliding scale   SubCutaneous at bedtime  insulin lispro Injectable (ADMELOG) 5 Unit(s) SubCutaneous three times a day before meals  lidocaine   4% Patch 1 Patch Transdermal daily  metoprolol tartrate 25 milliGRAM(s) Oral two times a day  montelukast 10 milliGRAM(s) Oral at bedtime  naloxone Injectable 0.4 milliGRAM(s) IV Push once  pantoprazole    Tablet 40 milliGRAM(s) Oral before breakfast  polyethylene glycol 3350 17 Gram(s) Oral daily  senna 2 Tablet(s) Oral at bedtime  sodium bicarbonate 650 milliGRAM(s) Oral two times a day  tiotropium 2.5 MICROgram(s) Inhaler 2 Puff(s) Inhalation daily  torsemide 20 milliGRAM(s) Oral daily      FAMILY HISTORY:  FH: type 2 diabetes        SOCIAL HISTORY:    [x ] Non-smoker  [ ] Smoker  [ ] Alcohol    Allergies    Blueberries (Rash)  No Known Drug Allergies    Intolerances    	    REVIEW OF SYSTEMS:  CONSTITUTIONAL: No fever, weight loss, or fatigue  EYES: No eye pain, visual disturbances, or discharge  ENMT:  No difficulty hearing, tinnitus, vertigo; No sinus or throat pain  NECK: No pain or stiffness  RESPIRATORY: No cough, wheezing, chills or hemoptysis; No Shortness of Breath  CARDIOVASCULAR: as HPI  GASTROINTESTINAL: No abdominal or epigastric pain. No nausea, vomiting, or hematemesis; No diarrhea or constipation. No melena or hematochezia.  GENITOURINARY: No dysuria, frequency, hematuria, or incontinence  NEUROLOGICAL: No headaches, memory loss, loss of strength, numbness, or tremors  SKIN: No itching, burning, rashes, or lesions   dec rom right hand   	  [ ] All others negative	  [ ] Unable to obtain    PHYSICAL EXAM:    T(C): 36.9 (04-28-24 @ 05:05), Max: 36.9 (04-28-24 @ 05:05)  HR: 58 (04-28-24 @ 10:20) (58 - 75)  BP: 143/74 (04-28-24 @ 10:20) (116/69 - 157/80)  RR: 18 (04-28-24 @ 05:05) (16 - 18)  SpO2: 96% (04-28-24 @ 10:20) (96% - 98%)  Wt(kg): --  I&O's Summary    Daily Height in cm: 172.72 (27 Apr 2024 22:02)    Daily     Appearance: Normal	  Psychiatry: A & O x 3, Mood & affect appropriate  HEENT:   Normal oral mucosa, PERRL, EOMI	  Lymphatic: No lymphadenopathy  Cardiovascular: Normal S1 S2,RRR, No JVD, No murmurs  Respiratory: Lungs clear to auscultation	  Gastrointestinal:  Soft, Non-tender, + BS	  Skin: No rashes, No ecchymoses, No cyanosis	  Neurologic: Non-focal  Extremities: Normal range of motion, right hand edema dec rom  Vascular: Peripheral pulses palpable 2+ bilaterally    TELEMETRY: 	    ECG:  	  RADIOLOGY:  OTHER: 	  	  LABS:	 	    CARDIAC MARKERS:        proBNP:     Lipid Profile:   HgA1c:   TSH:                           12.5   5.40  )-----------( 125      ( 28 Apr 2024 07:45 )             37.6     04-28    138  |  103  |  60<H>  ----------------------------<  163<H>  4.4   |  22  |  2.84<H>    Ca    9.3      28 Apr 2024 07:45  Phos  4.0     04-28  Mg     1.9     04-28    TPro  6.6  /  Alb  3.6  /  TBili  0.4  /  DBili  x   /  AST  14  /  ALT  11  /  AlkPhos  76  04-28    PT/INR - ( 28 Apr 2024 07:45 )   PT: 10.4 sec;   INR: 0.99 ratio         PTT - ( 28 Apr 2024 07:45 )  PTT:33.6 sec    Creatinine: 2.84 mg/dL (04-28-24 @ 07:45)  Creatinine: 2.84 mg/dL (04-27-24 @ 19:28)        ASSESSMENT/PLAN: 	              
[facility] NEPHROLOGY- CONSULTATION NOTE    69y old Male with history of T2DM, CAD, Stage IV CKD, CAD s/p CABG, HTN, HLD, Asthma, TAIWO, GERD here with right finger cellulitis. The Nephrology service has been consulted for management of CKD.    The patient is known to our practice, as he normally follows with Dr. Dueñas. When he last saw Dr. Dueñas in 2024. his Creatinine was 2.64 mg/dL. The patient has hyperkalemia that is controlled with Veltassa daily as well as a low K+ diet. He also takes Farxiga for management of his Type 2 Diabetes.    PAST MEDICAL & SURGICAL HISTORY:  Diabetes  HTN (hypertension)  HLD (hyperlipidemia)  CAD (coronary artery disease)  Asthma-COPD overlap syndrome  Stage 4 chronic kidney disease  TAIWO on CPAP  GERD (gastroesophageal reflux disease)  LPRD (laryngopharyngeal reflux disease)  Bronchiectasis  Gout    History of appendectomy  x 30 yrs ago    S/P CABG (coronary artery bypass graft)    Allergies:  Blueberries (Rash)  Vancomycin (Itching)    Home Medications Reviewed  Hospital Medications:   MEDICATIONS  (STANDING):  allopurinol 300 milliGRAM(s) Oral daily  aspirin enteric coated 81 milliGRAM(s) Oral daily  atorvastatin 40 milliGRAM(s) Oral at bedtime  budesonide 160 MICROgram(s)/formoterol 4.5 MICROgram(s) Inhaler 2 Puff(s) Inhalation two times a day  ceFAZolin   IVPB 1000 milliGRAM(s) IV Intermittent every 12 hours  dextrose 10% Bolus 125 milliLiter(s) IV Bolus once  dextrose 5%. 1000 milliLiter(s) (50 mL/Hr) IV Continuous <Continuous>  dextrose 5%. 1000 milliLiter(s) (100 mL/Hr) IV Continuous <Continuous>  dextrose 50% Injectable 25 Gram(s) IV Push once  dextrose 50% Injectable 12.5 Gram(s) IV Push once  glucagon  Injectable 1 milliGRAM(s) IntraMuscular once  insulin glargine Injectable (LANTUS) 15 Unit(s) SubCutaneous at bedtime  insulin lispro (ADMELOG) corrective regimen sliding scale   SubCutaneous three times a day before meals  insulin lispro (ADMELOG) corrective regimen sliding scale   SubCutaneous at bedtime  insulin lispro Injectable (ADMELOG) 10 Unit(s) SubCutaneous before dinner  lidocaine   4% Patch 1 Patch Transdermal daily  metoprolol tartrate 25 milliGRAM(s) Oral two times a day  montelukast 10 milliGRAM(s) Oral at bedtime  naloxone Injectable 0.4 milliGRAM(s) IV Push once  pantoprazole    Tablet 40 milliGRAM(s) Oral before breakfast  polyethylene glycol 3350 17 Gram(s) Oral daily  senna 2 Tablet(s) Oral at bedtime  sodium bicarbonate 650 milliGRAM(s) Oral two times a day  tiotropium 2.5 MICROgram(s) Inhaler 2 Puff(s) Inhalation daily  torsemide 20 milliGRAM(s) Oral daily    SOCIAL HISTORY: No smoking, alcohol, or illicit drug usage.    FAMILY HISTORY:  FH: type 2 diabetes      REVIEW OF SYSTEMS:  CONSTITUTIONAL: No weakness, fevers or chills  EYES/ENT: No visual changes;  No vertigo or throat pain   NECK: No pain or stiffness  RESPIRATORY: No cough, wheezing, hemoptysis; No shortness of breath  CARDIOVASCULAR: No chest pain or palpitations.  GASTROINTESTINAL: No abdominal or epigastric pain. No nausea, vomiting, or hematemesis; No diarrhea or constipation. No melena or hematochezia.  GENITOURINARY: No dysuria, frequency, foamy urine, urinary urgency, incontinence or hematuria  NEUROLOGICAL: No numbness or weakness  SKIN: Swelling and brusing around middle finger on right hand.  VASCULAR: No bilateral lower extremity edema.   All other review of systems is negative unless indicated above.    VITALS:  T(F): 97.3 (24 @ 14:27), Max: 98.5 (24 @ 05:05)  HR: 51 (24 @ :27)  BP: 142/78 (24 @ 14:27)  RR: 18 (24 @ 14:27)  SpO2: 95% (24 @ :27)  Wt(kg): --    Height (cm): 172.7 ( @ 22:02)  Weight (kg): 81.647 ( @ 22:02)  BMI (kg/m2): 27.4 ( @ 22:02)  BSA (m2): 1.95 ( @ 22:02)    PHYSICAL EXAM:  Gen: NAD, calm. Speaking in complete sentences.  Cards: RRR, + S1/S2, no M/G/R  Resp: Clear to auscultation bilaterally.  GI: soft, NT/ND, NABS  Vascular: no LE edema B/L  Extremities: Right middle finger with edema, mild tenderness and erythema.    LABS:      138  |  103  |  60<H>  ----------------------------<  163<H>  4.4   |  22  |  2.84<H>    Ca    9.3      2024 07:45  Phos  4.0       Mg     1.9         TPro  6.6  /  Alb  3.6  /  TBili  0.4  /  DBili      /  AST  14  /  ALT  11  /  AlkPhos  76      Creatinine Trend: 2.84 <--, 2.84 <--                        12.5   5.40  )-----------( 125      ( 2024 07:45 )             37.6     Urine Studies:  Urinalysis Basic - ( 2024 08:51 )    Color: Yellow / Appearance: Clear / S.009 / pH:   Gluc:  / Ketone: Negative mg/dL  / Bili: Negative / Urobili: 0.2 mg/dL   Blood:  / Protein: 100 mg/dL / Nitrite: Negative   Leuk Esterase: Negative / RBC: 2 /HPF / WBC 0 /HPF   Sq Epi:  / Non Sq Epi: 0 /HPF / Bacteria: Negative /HPF      Sodium, Random Urine: 99 mmol/L ( @ 08:51)  Creatinine, Random Urine: 19 mg/dL ( @ 08:51)  Protein/Creatinine Ratio Calculation: 4.1 Ratio ( @ 08:51)    RADIOLOGY & ADDITIONAL STUDIES:    < from: Xray Hand 2 Views, Right (24 @ 19:13) >  No radiopaque foreign body.  No radiographic evidence of osteomyelitis or subcutaneous tracking gas.    < end of copied text >    < from: US Kidney and Bladder (20 @ 11:04) >  Right kidney:  9.4 cm. No renal mass, hydronephrosis or calculi.    Left kidney:  10.2 cm. No renal mass, hydronephrosis or calculi.    Urinary bladder: Within normal limits.    < end of copied text >            
HPI  69yMale R-hand dominant w 2 weeks of R middle finger swelling and pain. Denies any trauma, cuts or penetrating injury to fingers.  Patient noted redness and swelling x 2 weeks and saw PCP Thursday who prescribed doxycycline.  Said he felt some improvement in pain and swelling but when he saw PCP yesterday he advised going to ED. Denies fevers/chills. Denies any trauma/injury or numbness/tingling in R hand.    ROS  Negative unless otherwise specified in HPI.    PAST MEDICAL & SURGICAL Hx  PAST MEDICAL & SURGICAL HISTORY:  Diabetes      HTN (hypertension)      HLD (hyperlipidemia)      CAD (coronary artery disease)      Asthma-COPD overlap syndrome      Stage 3 chronic kidney disease      TAIWO on CPAP      GERD (gastroesophageal reflux disease)      LPRD (laryngopharyngeal reflux disease)      Bronchiectasis      Gout      History of appendectomy  x 30 yrs ago      S/P CABG (coronary artery bypass graft)          MEDICATIONS  Home Medications:  albuterol 90 mcg/inh inhalation aerosol: 2 puff(s) inhaled every 6 hours, As needed, Shortness of Breath and/or Wheezing (27 Apr 2024 22:39)  allopurinol 300 mg oral tablet: 1 tab(s) orally once a day (27 Apr 2024 22:40)  azelastine 137 mcg/inh (0.1%) nasal spray: 2 spray(s) intranasally 2 times a day (27 Apr 2024 22:38)  cholecalciferol 1250 mcg (50,000 intl units) oral capsule: 1 cap(s) orally once a week (27 Apr 2024 22:42)  colchicine 0.6 mg oral capsule: 1 cap(s) orally once a day as needed for  gout pain (27 Apr 2024 22:46)  Farxiga 10 mg oral tablet: 1 tab(s) orally once a day (27 Apr 2024 22:42)  HumaLOG KwikPen 100 units/mL injectable solution: 34 unit(s) subcutaneous 3 times a day before meals (27 Apr 2024 22:35)  loratadine 10 mg oral tablet: 1 tab(s) orally once a day, As Needed (27 Apr 2024 22:39)  metoprolol tartrate 25 mg oral tablet: 1 tab(s) orally 2 times a day (27 Apr 2024 22:46)  montelukast 10 mg oral tablet: 1 tab(s) orally once a day (at bedtime) (27 Apr 2024 22:42)  sodium bicarbonate 650 mg oral tablet: 1 tab(s) orally 2 times a day (27 Apr 2024 22:46)  Trelegy Ellipta 100 mcg-62.5 mcg-25 mcg/inh inhalation powder: 1 puff(s) inhaled once a day (27 Apr 2024 22:46)  Veltassa 8.4 g oral powder for reconstitution: 8.4 gram(s) orally once a day (27 Apr 2024 22:46)  Zestril 10 mg oral tablet: 1 tab(s) orally once a day (27 Apr 2024 22:46)      ALLERGIES  Blueberries (Rash)  No Known Drug Allergies      FAMILY Hx  FAMILY HISTORY:  FH: type 2 diabetes        SOCIAL Hx  Social History:  no pertinent social history (27 Apr 2024 22:14)      VITALS  Vital Signs Last 24 Hrs  T(C): 36.9 (28 Apr 2024 05:05), Max: 36.9 (28 Apr 2024 05:05)  T(F): 98.5 (28 Apr 2024 05:05), Max: 98.5 (28 Apr 2024 05:05)  HR: 58 (28 Apr 2024 10:20) (58 - 75)  BP: 143/74 (28 Apr 2024 10:20) (116/69 - 157/80)  BP(mean): --  RR: 18 (28 Apr 2024 05:05) (16 - 18)  SpO2: 96% (28 Apr 2024 10:20) (96% - 98%)    Parameters below as of 28 Apr 2024 10:20  Patient On (Oxygen Delivery Method): room air        PHYSICAL EXAM  Gen: Sitting in bed, NAD  Resp: No increased WOB  R Hand:  Middle finger with visible area of erythema/swelling from PIPJ proximally  Small ~1cm circular wound on radial side of proximal phalanx without purulent draiange though concern for underlying pus  TTP over proximal middle finger otherwise no TTP throughout remainder of hand, compartments soft  Motor: middle finger flexion/extension at DIPJ/PIPJ/MCPJ intact (swelling limited at PIPJ)  Sensory: SILT throughout middle finger  +Rad pulse, middle finger WWP w/ cap refill <2 sec    LABS                        12.5   5.40  )-----------( 125      ( 28 Apr 2024 07:45 )             37.6     04-28    138  |  103  |  60<H>  ----------------------------<  163<H>  4.4   |  22  |  2.84<H>    Ca    9.3      28 Apr 2024 07:45  Phos  4.0     04-28  Mg     1.9     04-28    TPro  6.6  /  Alb  3.6  /  TBili  0.4  /  DBili  x   /  AST  14  /  ALT  11  /  AlkPhos  76  04-28    PT/INR - ( 28 Apr 2024 07:45 )   PT: 10.4 sec;   INR: 0.99 ratio         PTT - ( 28 Apr 2024 07:45 )  PTT:33.6 sec    IMAGING  XRs: R middle finger soft tissue swelling, no acute fx or dislocation (personal read)    PROCEDURE  Using aseptic technique, a ring block was administered using a total of 10c of 1% lidocaine with epinephrine. A stab incision was made over the areas of erythema/swelling. A hemostat/fine scissors were then utilized to break up fibrous septae within the finger pulp. An attempt was made to express any present pus from the wound. The wound was copiously irrigated with betadine and saline. The wound was left open and packed. Once the wound was packed, a soft dressing was applied. The patient tolerated the procedure well without evidence of complications. The patient was neurovascularly intact following the procedure.    
[facility] Initial Consult Note    HPI  69y old Male with history of T2DM, CAD, Stage IV CKD, CAD s/p CABG, HTN, HLD, Asthma, TAIWO, GERD here with right finger cellulitis.     History was obtained from patient and chart review.    Diabetes history: He's had T2DM for about 10 years and on insulin for the last 5 years or so. He has hx of CABG, CKD and reports he has fair control outpatient with last a1c around 7.5%.  Home regimen: Lantus 15 units at night (sometimes doesn't take it if normal), Humalog 10-25 units with meals, Farxiga 10 mg daily  Home FS: has mumtaz 2 sensor, reviewed reader, avg 2 week glucose 166, overnight seems to be controlled, Time in range 64% the last two weeks  A1C: 8.1% on 4/28  Outpatient endocrinologist: Dr. Matias however no longer accepting his insurance so will be establishing care with a different provider  Complications: CAD, CKD    ROS  Denies chest pain, shortness of breath, abdominal pain, nausea, vomiting, diarrhea, dizziness, lightheadedness. Reports good appetite.    Vital Signs Last 24 Hrs  T(C): 36.3 (04-28-24 @ 14:27), Max: 36.9 (04-28-24 @ 05:05)  HR: 51 (04-28-24 @ 14:27) (51 - 75)  BP: 142/78 (04-28-24 @ 14:27) (116/69 - 157/80)  RR: 18 (04-28-24 @ 14:27) (16 - 18)  SpO2: 95% (04-28-24 @ 14:27) (95% - 98%)    Physical Exam  Gen: NAD, alert, awake  HEENT: NC/AT, moist mucous membranes  Chest: normal respiratory effort  Heart: +S1 S2  Neuro: normal mood and affect    Medications  acetaminophen     Tablet .. 650 milliGRAM(s) Oral every 6 hours PRN  albuterol    90 MICROgram(s) HFA Inhaler 2 Puff(s) Inhalation every 6 hours PRN  allopurinol 300 milliGRAM(s) Oral daily  aluminum hydroxide/magnesium hydroxide/simethicone Suspension 30 milliLiter(s) Oral every 4 hours PRN  aspirin enteric coated 81 milliGRAM(s) Oral daily  atorvastatin 40 milliGRAM(s) Oral at bedtime  bisacodyl 5 milliGRAM(s) Oral daily PRN  budesonide 160 MICROgram(s)/formoterol 4.5 MICROgram(s) Inhaler 2 Puff(s) Inhalation two times a day  ceFAZolin   IVPB 1000 milliGRAM(s) IV Intermittent every 12 hours  dextrose 10% Bolus 125 milliLiter(s) IV Bolus once  dextrose 5%. 1000 milliLiter(s) IV Continuous <Continuous>  dextrose 5%. 1000 milliLiter(s) IV Continuous <Continuous>  dextrose 50% Injectable 25 Gram(s) IV Push once  dextrose 50% Injectable 12.5 Gram(s) IV Push once  dextrose Oral Gel 15 Gram(s) Oral once PRN  glucagon  Injectable 1 milliGRAM(s) IntraMuscular once  insulin glargine Injectable (LANTUS) 15 Unit(s) SubCutaneous at bedtime  insulin lispro (ADMELOG) corrective regimen sliding scale   SubCutaneous at bedtime  insulin lispro (ADMELOG) corrective regimen sliding scale   SubCutaneous three times a day before meals  insulin lispro Injectable (ADMELOG) 10 Unit(s) SubCutaneous before dinner  lidocaine   4% Patch 1 Patch Transdermal daily  lidocaine 2% (Preservative-free) Injectable 10 milliLiter(s) Local Injection once  melatonin 3 milliGRAM(s) Oral at bedtime PRN  metoprolol tartrate 25 milliGRAM(s) Oral two times a day  montelukast 10 milliGRAM(s) Oral at bedtime  naloxone Injectable 0.4 milliGRAM(s) IV Push once  ondansetron Injectable 4 milliGRAM(s) IV Push every 8 hours PRN  oxyCODONE    IR 2.5 milliGRAM(s) Oral every 4 hours PRN  oxyCODONE    IR 5 milliGRAM(s) Oral every 4 hours PRN  pantoprazole    Tablet 40 milliGRAM(s) Oral before breakfast  polyethylene glycol 3350 17 Gram(s) Oral daily  senna 2 Tablet(s) Oral at bedtime  sodium bicarbonate 650 milliGRAM(s) Oral two times a day  tiotropium 2.5 MICROgram(s) Inhaler 2 Puff(s) Inhalation daily  torsemide 20 milliGRAM(s) Oral daily    Pertinent labs/imaging  POCT Blood Glucose.: 249 mg/dL (04-28-24 @ 12:24)  POCT Blood Glucose.: 188 mg/dL (04-28-24 @ 08:28)  POCT Blood Glucose.: 155 mg/dL (04-27-24 @ 23:27)  POCT Blood Glucose.: 138 mg/dL (04-27-24 @ 22:13)    eGFR: 23 mL/min/1.73m2 (04-28-24 @ 07:45)  eGFR: 23 mL/min/1.73m2 (04-27-24 @ 19:28)

## 2024-04-29 NOTE — PROGRESS NOTE ADULT - SUBJECTIVE AND OBJECTIVE BOX
[facility] NEPHROLOGY- PROGRESS NOTE    69y Male with history of HTN and DM presents with R middle finger infection. Nephrology consulted for elevated Scr.    REVIEW OF SYSTEMS:  Gen: no fevers  Cards: no chest pain  Resp: no dyspnea  GI: no nausea or vomiting or diarrhea  Vascular: no LE edema    Blueberries (Rash)  No Known Drug Allergies      Hospital Medications: Medications reviewed    VITALS:  T(F): 97.4 (04-29-24 @ 04:53), Max: 98.2 (04-29-24 @ 00:45)  HR: 56 (04-29-24 @ 04:53)  BP: 128/68 (04-29-24 @ 04:53)  RR: 18 (04-29-24 @ 04:53)  SpO2: 97% (04-29-24 @ 04:53)  Wt(kg): --  Height (cm): 172.7 (04-27 @ 22:02)  Weight (kg): 81.647 (04-27 @ 22:02)  BMI (kg/m2): 27.4 (04-27 @ 22:02)  BSA (m2): 1.95 (04-27 @ 22:02)    04-28 @ 07:01  -  04-29 @ 07:00  --------------------------------------------------------  IN: 680 mL / OUT: 0 mL / NET: 680 mL        PHYSICAL EXAM:    Gen: NAD, calm  Cards: RRR, +S1/S2, no M/G/R  Resp: CTA B/L  GI: soft, NT/ND, NABS  Vascular: no LE edema B/L      LABS:  04-29    137  |  101  |  69<H>  ----------------------------<  123<H>  4.5   |  24  |  3.18<H>    Ca    9.3      29 Apr 2024 07:02  Phos  4.0     04-28  Mg     1.9     04-28    TPro  6.7  /  Alb  3.7  /  TBili  0.4  /  DBili      /  AST  14  /  ALT  9<L>  /  AlkPhos  74  04-29    Creatinine Trend: 3.18 <--, 2.84 <--, 2.84 <--                        12.3   6.68  )-----------( 126      ( 29 Apr 2024 07:00 )             36.4     Urine Studies:  Urinalysis Basic - ( 29 Apr 2024 07:02 )    Color:  / Appearance:  / SG:  / pH:   Gluc: 123 mg/dL / Ketone:   / Bili:  / Urobili:    Blood:  / Protein:  / Nitrite:    Leuk Esterase:  / RBC:  / WBC    Sq Epi:  / Non Sq Epi:  / Bacteria:       Sodium, Random Urine: 99 mmol/L (04-28 @ 08:51)  Creatinine, Random Urine: 19 mg/dL (04-28 @ 08:51)  Protein/Creatinine Ratio Calculation: 4.1 Ratio (04-28 @ 08:51)      RADIOLOGY & ADDITIONAL STUDIES:   [facility] NEPHROLOGY- PROGRESS NOTE    69y Male with history of HTN and DM presents with R middle finger infection. Nephrology consulted for elevated Scr.    REVIEW OF SYSTEMS:  Gen: no fevers  Cards: no chest pain  Resp: + dyspnea with exertion  GI: no nausea or vomiting or diarrhea  Vascular: no LE edema    Blueberries (Rash)  No Known Drug Allergies      Hospital Medications: Medications reviewed    VITALS:  T(F): 97.4 (04-29-24 @ 04:53), Max: 98.2 (04-29-24 @ 00:45)  HR: 56 (04-29-24 @ 04:53)  BP: 128/68 (04-29-24 @ 04:53)  RR: 18 (04-29-24 @ 04:53)  SpO2: 97% (04-29-24 @ 04:53)  Wt(kg): --  Height (cm): 172.7 (04-27 @ 22:02)  Weight (kg): 81.647 (04-27 @ 22:02)  BMI (kg/m2): 27.4 (04-27 @ 22:02)  BSA (m2): 1.95 (04-27 @ 22:02)    04-28 @ 07:01  -  04-29 @ 07:00  --------------------------------------------------------  IN: 680 mL / OUT: 0 mL / NET: 680 mL        PHYSICAL EXAM:    Gen: NAD, calm  Cards: RRR, +S1/S2, no M/G/R  Resp: CTA B/L  GI: soft, NT/ND, NABS  Vascular: no LE edema B/L      LABS:  04-29    137  |  101  |  69<H>  ----------------------------<  123<H>  4.5   |  24  |  3.18<H>    Ca    9.3      29 Apr 2024 07:02  Phos  4.0     04-28  Mg     1.9     04-28    TPro  6.7  /  Alb  3.7  /  TBili  0.4  /  DBili      /  AST  14  /  ALT  9<L>  /  AlkPhos  74  04-29    Creatinine Trend: 3.18 <--, 2.84 <--, 2.84 <--                        12.3   6.68  )-----------( 126      ( 29 Apr 2024 07:00 )             36.4     Urine Studies:  Urinalysis Basic - ( 29 Apr 2024 07:02 )    Color:  / Appearance:  / SG:  / pH:   Gluc: 123 mg/dL / Ketone:   / Bili:  / Urobili:    Blood:  / Protein:  / Nitrite:    Leuk Esterase:  / RBC:  / WBC    Sq Epi:  / Non Sq Epi:  / Bacteria:       Sodium, Random Urine: 99 mmol/L (04-28 @ 08:51)  Creatinine, Random Urine: 19 mg/dL (04-28 @ 08:51)  Protein/Creatinine Ratio Calculation: 4.1 Ratio (04-28 @ 08:51)      RADIOLOGY & ADDITIONAL STUDIES:

## 2024-04-29 NOTE — CONSULT NOTE ADULT - ASSESSMENT
68yo M PMHx htn, hld, dm, cad s/p cabg, ckd, asthma/copd/LRPD, bronchiectasis, gout, p/w right middle finger swelling, pain/tenderness, redness, warmth x2 weeks duration. No associated trauma, recent injections, or recallable exacerbating factor. He also noticed a givens at the side of the finger during this time as well. He initially self-trialed colchiniate with improvement. He went to his PCP who started doxycycline 4/25, after 3 doses he went back to his PCP on 4/27 and was sent to the ED as there had been minimal improvement. No fever/chills at home, no Hx finger cellulitis in the past.    Afebrile, without leukocytosis  ESR elevated 61 but CRP wnl  Xray hand: No radiopaque foreign body.No radiographic evidence of osteomyelitis or subcutaneous tracking gas    S/p I+D with ortho on 4/28 - septae were broken up, no pus was expressed, wound left open and packed  Abscess cultures without WBC or organisms    MRSA PCR+ and hx of MRSA bacteremia from infected sternal wound (2020) in setting of complication from CABG.    Abx:  Vancomycin 4/27 x1  Unasyn 4/27 x1  Cefazolin 4/28 --> 70yo M PMHx htn, hld, dm, cad s/p cabg, ckd, asthma/copd/LRPD, bronchiectasis, gout, p/w right middle finger swelling, pain/tenderness, redness, warmth x2 weeks duration. No associated trauma, recent injections, or recallable exacerbating factor. He also noticed a givens at the side of the finger during this time as well. He initially self-trialed colchiniate with improvement. He went to his PCP who started doxycycline 4/25, after 3 doses he went back to his PCP on 4/27 and was sent to the ED as there had been minimal improvement. No fever/chills at home, no Hx finger cellulitis in the past.    Afebrile, without leukocytosis  ESR elevated 61 but CRP wnl  Xray hand: No radiopaque foreign body.No radiographic evidence of osteomyelitis or subcutaneous tracking gas    S/p I+D with ortho on 4/28 - septae were broken up, no pus was expressed, wound left open and packed  Abscess cultures without WBC or organisms    MRSA PCR+ and hx of MRSA bacteremia from infected sternal wound (2020) in setting of complication from CABG.    Abx:  Vancomycin 4/27 x1  Unasyn 4/27 x1  Cefazolin 4/28 -->    #Finger abscess with cellulitis  #Hx MRSA bacteremia with MRSA PCR+    Recommendations:  - continue cefazolin  - would add vancomycin by level as cannot r/o MRSA infection - please check level with AM labs  - f/u abscess cultures  - local wound care as per ortho    d/w attending.    Van Marion, PGY4  ID Fellow  [employer] Teams Preferred  After 5pm/weekends call [phone]

## 2024-04-29 NOTE — PROGRESS NOTE ADULT - SUBJECTIVE AND OBJECTIVE BOX
Pt seen and examined. Overall endorsing improvement in pain and swelling of R middle finger. No fevers, chills, or any other complaints at this time.    Vital Signs (24 Hrs):  T(C): 36.3 (04-29-24 @ 04:53), Max: 36.8 (04-29-24 @ 00:45)  HR: 56 (04-29-24 @ 04:53) (51 - 62)  BP: 128/68 (04-29-24 @ 04:53) (128/65 - 150/75)  RR: 18 (04-29-24 @ 04:53) (18 - 18)  SpO2: 97% (04-29-24 @ 04:53) (95% - 97%)  Wt(kg): --    LABS:                          12.5   5.40  )-----------( 125      ( 28 Apr 2024 07:45 )             37.6     04-28    138  |  103  |  60<H>  ----------------------------<  163<H>  4.4   |  22  |  2.84<H>    Ca    9.3      28 Apr 2024 07:45  Phos  4.0     04-28  Mg     1.9     04-28    TPro  6.6  /  Alb  3.6  /  TBili  0.4  /  DBili  x   /  AST  14  /  ALT  11  /  AlkPhos  76  04-28    LIVER FUNCTIONS - ( 28 Apr 2024 07:45 )  Alb: 3.6 g/dL / Pro: 6.6 g/dL / ALK PHOS: 76 U/L / ALT: 11 U/L / AST: 14 U/L / GGT: x           PT/INR - ( 28 Apr 2024 07:45 )   PT: 10.4 sec;   INR: 0.99 ratio      PHYSICAL EXAM  Gen: Sitting in bed, NAD  Resp: No increased WOB  R Hand:  Middle finger with visible area of erythema/swelling from PIPJ proximally, improved compared to initial encounter   Small ~1cm circular wound on radial side of proximal phalanx without purulent draiange though concern for underlying pus  TTP over proximal middle finger otherwise no TTP throughout remainder of hand, compartments soft  Motor: middle finger flexion/extension at DIPJ/PIPJ/MCPJ intact (swelling limited at PIPJ)  Sensory: SILT throughout middle finger  +Rad pulse, middle finger WWP w/ cap refill <2 sec    LABS                        12.5   5.40  )-----------( 125      ( 28 Apr 2024 07:45 )             37.6     04-28    138  |  103  |  60<H>  ----------------------------<  163<H>  4.4   |  22  |  2.84<H>    Ca    9.3      28 Apr 2024 07:45  Phos  4.0     04-28  Mg     1.9     04-28    TPro  6.6  /  Alb  3.6  /  TBili  0.4  /  DBili  x   /  AST  14  /  ALT  11  /  AlkPhos  76  04-28    PT/INR - ( 28 Apr 2024 07:45 )   PT: 10.4 sec;   INR: 0.99 ratio         PTT - ( 28 Apr 2024 07:45 )  PTT:33.6 sec      ASSESSMENT & PLAN  69yMale w/ R middle finger pain/erythema/swelling suspicious for an abscess.    PLAN  -NWB RUE in soft dressing  -TID betadine/normal saline soaks with 1cm packing removal daily (see wound care order for detailed instructions)  -keep dressing clean, dry, and intact (do not get wet)  -IV abx   -discharge on PO abx  -pain control  -ice/cold compress, elevation  -f/u outpt with Dr. Kumar within 1 week, please call office for appt

## 2024-04-29 NOTE — CONSULT NOTE ADULT - REASON FOR ADMISSION
right middle finger swelling, pain/tenderness, redness, warmth

## 2024-04-29 NOTE — PROGRESS NOTE ADULT - SUBJECTIVE AND OBJECTIVE BOX
CARDIOLOGY FOLLOW UP - Dr. Steel  DATE OF SERVICE: 4/29/24    CC  No cv complaints     REVIEW OF SYSTEMS:  CONSTITUTIONAL: No fever, weight loss, or fatigue  RESPIRATORY: No cough, wheezing, chills or hemoptysis; No Shortness of Breath  CARDIOVASCULAR: No chest pain, palpitations, passing out, dizziness, or leg swelling  GASTROINTESTINAL: No abdominal or epigastric pain. No nausea, vomiting, or hematemesis; No diarrhea or constipation. No melena or hematochezia.  VASCULAR: No edema     PHYSICAL EXAM:  T(C): 36.3 (04-29-24 @ 04:53), Max: 36.8 (04-29-24 @ 00:45)  HR: 56 (04-29-24 @ 04:53) (51 - 62)  BP: 128/68 (04-29-24 @ 04:53) (128/65 - 150/75)  RR: 18 (04-29-24 @ 04:53) (18 - 18)  SpO2: 97% (04-29-24 @ 04:53) (95% - 97%)  Wt(kg): --  I&O's Summary    28 Apr 2024 07:01  -  29 Apr 2024 07:00  --------------------------------------------------------  IN: 680 mL / OUT: 0 mL / NET: 680 mL        Appearance: Normal	  Cardiovascular: Normal S1 S2,RRR, No JVD, No murmurs  Respiratory: Lungs clear to auscultation b/l   Gastrointestinal:  Soft, Non-tender, + BS	  Extremities: Normal range of motion, No clubbing, cyanosis or edema      Home Medications:  albuterol 90 mcg/inh inhalation aerosol: 2 puff(s) inhaled every 6 hours, As needed, Shortness of Breath and/or Wheezing (27 Apr 2024 22:39)  allopurinol 300 mg oral tablet: 1 tab(s) orally once a day (27 Apr 2024 22:40)  azelastine 137 mcg/inh (0.1%) nasal spray: 2 spray(s) intranasally 2 times a day (27 Apr 2024 22:38)  cholecalciferol 1250 mcg (50,000 intl units) oral capsule: 1 cap(s) orally once a week (27 Apr 2024 22:42)  colchicine 0.6 mg oral capsule: 1 cap(s) orally once a day as needed for  gout pain (27 Apr 2024 22:46)  Farxiga 10 mg oral tablet: 1 tab(s) orally once a day (27 Apr 2024 22:42)  HumaLOG KwikPen 100 units/mL injectable solution: 34 unit(s) subcutaneous 3 times a day before meals (27 Apr 2024 22:35)  loratadine 10 mg oral tablet: 1 tab(s) orally once a day, As Needed (27 Apr 2024 22:39)  metoprolol tartrate 25 mg oral tablet: 1 tab(s) orally 2 times a day (27 Apr 2024 22:46)  montelukast 10 mg oral tablet: 1 tab(s) orally once a day (at bedtime) (27 Apr 2024 22:42)  sodium bicarbonate 650 mg oral tablet: 1 tab(s) orally 2 times a day (27 Apr 2024 22:46)  Trelegy Ellipta 100 mcg-62.5 mcg-25 mcg/inh inhalation powder: 1 puff(s) inhaled once a day (27 Apr 2024 22:46)  Veltassa 8.4 g oral powder for reconstitution: 8.4 gram(s) orally once a day (27 Apr 2024 22:46)  Zestril 10 mg oral tablet: 1 tab(s) orally once a day (27 Apr 2024 22:46)      MEDICATIONS  (STANDING):  allopurinol 300 milliGRAM(s) Oral daily  aspirin enteric coated 81 milliGRAM(s) Oral daily  atorvastatin 40 milliGRAM(s) Oral at bedtime  budesonide 160 MICROgram(s)/formoterol 4.5 MICROgram(s) Inhaler 2 Puff(s) Inhalation two times a day  ceFAZolin   IVPB 500 milliGRAM(s) IV Intermittent every 12 hours  dextrose 10% Bolus 125 milliLiter(s) IV Bolus once  dextrose 5%. 1000 milliLiter(s) (50 mL/Hr) IV Continuous <Continuous>  dextrose 5%. 1000 milliLiter(s) (100 mL/Hr) IV Continuous <Continuous>  dextrose 50% Injectable 25 Gram(s) IV Push once  dextrose 50% Injectable 12.5 Gram(s) IV Push once  glucagon  Injectable 1 milliGRAM(s) IntraMuscular once  insulin glargine Injectable (LANTUS) 15 Unit(s) SubCutaneous at bedtime  insulin lispro (ADMELOG) corrective regimen sliding scale   SubCutaneous at bedtime  insulin lispro (ADMELOG) corrective regimen sliding scale   SubCutaneous three times a day before meals  insulin lispro Injectable (ADMELOG) 10 Unit(s) SubCutaneous before dinner  insulin lispro Injectable (ADMELOG) 10 Unit(s) SubCutaneous before breakfast  insulin lispro Injectable (ADMELOG) 10 Unit(s) SubCutaneous before lunch  lidocaine   4% Patch 1 Patch Transdermal daily  metoprolol tartrate 25 milliGRAM(s) Oral two times a day  montelukast 10 milliGRAM(s) Oral at bedtime  mupirocin 2% Nasal 1 Application(s) Both Nostrils two times a day  naloxone Injectable 0.4 milliGRAM(s) IV Push once  pantoprazole    Tablet 40 milliGRAM(s) Oral before breakfast  polyethylene glycol 3350 17 Gram(s) Oral daily  senna 2 Tablet(s) Oral at bedtime  sodium bicarbonate 650 milliGRAM(s) Oral two times a day  sodium zirconium cyclosilicate 10 Gram(s) Oral daily  tiotropium 2.5 MICROgram(s) Inhaler 2 Puff(s) Inhalation daily  torsemide 20 milliGRAM(s) Oral daily      TELEMETRY: 	    ECG:  	  RADIOLOGY:   DIAGNOSTIC TESTING:  [ ] Echocardiogram:  [ ]  Catheterization:  [ ] Stress Test:    OTHER: 	    LABS:	 	                            12.3   6.68  )-----------( 126      ( 29 Apr 2024 07:00 )             36.4     04-29    137  |  101  |  69<H>  ----------------------------<  123<H>  4.5   |  24  |  3.18<H>    Ca    9.3      29 Apr 2024 07:02  Phos  4.0     04-28  Mg     1.9     04-28    TPro  6.7  /  Alb  3.7  /  TBili  0.4  /  DBili  x   /  AST  14  /  ALT  9<L>  /  AlkPhos  74  04-29    PT/INR - ( 28 Apr 2024 07:45 )   PT: 10.4 sec;   INR: 0.99 ratio         PTT - ( 28 Apr 2024 07:45 )  PTT:33.6 sec

## 2024-04-29 NOTE — CONSULT NOTE ADULT - ATTENDING COMMENTS
70yo M PMHx htn, hld, dm, cad s/p cabg, ckd, asthma/copd/LRPD, bronchiectasis, gout, p/w right middle finger swelling, pain/tenderness, redness, warmth x2 weeks duration. No associated trauma, recent injections, or recallable exacerbating factor. He also noticed a givens at the side of the finger during this time as well. He initially self-trialed colchine with no  improvement. He went to his PCP who started doxycycline 4/25, after 3 doses he went back to his PCP on 4/27 and was sent to the ED as there had been minimal improvement. No fever/chills at home, no Hx finger cellulitis in the past.    Afebrile, without leukocytosis  ESR elevated 61 but CRP wnl  Xray hand: No radiopaque foreign body.No radiographic evidence of osteomyelitis or subcutaneous tracking gas    S/p I+D with ortho on 4/28 - septae were broken up, no pus was expressed, wound left open and packed  Abscess cultures without WBC or organisms    MRSA PCR+ and hx of MRSA bacteremia from infected sternal wound (2020) in setting of complication from CABG.    Abx:  Vancomycin 4/27 x1  Unasyn 4/27 x1  Cefazolin 4/28 -->    #Finger abscess with cellulitis  #Hx MRSA bacteremia with MRSA PCR+  #renal insufficiency    Recommendations:  - continue cefazolin  - would add vancomycin by level as cannot r/o MRSA infection - please check level with AM labs  - f/u abscess cultures  - local wound care as per ortho  - dose meds per renal function      Dominique Goodwin M.D. ,   please reach via teams   If no answer, or after 5PM/ weekends,  then please call  [phone]    Assessment and plan discussed with the primary team .

## 2024-04-29 NOTE — PROGRESS NOTE ADULT - SUBJECTIVE AND OBJECTIVE BOX
[facility] Progress Note    MAR, chart notes, fingersticks and labs reviewed    Subjective:    Objective  Vital Signs  T(C): 36.3 (04-29-24 @ 04:53), Max: 36.8 (04-29-24 @ 00:45)  HR: 56 (04-29-24 @ 04:53) (56 - 62)  BP: 128/68 (04-29-24 @ 04:53) (128/65 - 130/68)  RR: 18 (04-29-24 @ 04:53) (18 - 18)  SpO2: 97% (04-29-24 @ 04:53) (96% - 97%)    Physical Exam  Gen: NAD, alert, awake  HEENT: NC/AT, EOMI  Neck: supple  Chest: breathing comfortably  Heart: +s1 +s2    Medications  acetaminophen     Tablet .. 650 milliGRAM(s) Oral every 6 hours PRN  albuterol    90 MICROgram(s) HFA Inhaler 2 Puff(s) Inhalation every 6 hours PRN  allopurinol 300 milliGRAM(s) Oral daily  aluminum hydroxide/magnesium hydroxide/simethicone Suspension 30 milliLiter(s) Oral every 4 hours PRN  aspirin enteric coated 81 milliGRAM(s) Oral daily  atorvastatin 40 milliGRAM(s) Oral at bedtime  bisacodyl 5 milliGRAM(s) Oral daily PRN  budesonide 160 MICROgram(s)/formoterol 4.5 MICROgram(s) Inhaler 2 Puff(s) Inhalation two times a day  ceFAZolin   IVPB 500 milliGRAM(s) IV Intermittent every 12 hours  dextrose 10% Bolus 125 milliLiter(s) IV Bolus once  dextrose 5%. 1000 milliLiter(s) IV Continuous <Continuous>  dextrose 5%. 1000 milliLiter(s) IV Continuous <Continuous>  dextrose 50% Injectable 25 Gram(s) IV Push once  dextrose 50% Injectable 12.5 Gram(s) IV Push once  dextrose Oral Gel 15 Gram(s) Oral once PRN  glucagon  Injectable 1 milliGRAM(s) IntraMuscular once  insulin glargine Injectable (LANTUS) 18 Unit(s) SubCutaneous at bedtime  insulin lispro (ADMELOG) corrective regimen sliding scale   SubCutaneous at bedtime  insulin lispro (ADMELOG) corrective regimen sliding scale   SubCutaneous three times a day before meals  insulin lispro Injectable (ADMELOG) 10 Unit(s) SubCutaneous before dinner  insulin lispro Injectable (ADMELOG) 10 Unit(s) SubCutaneous before lunch  lidocaine   4% Patch 1 Patch Transdermal daily  melatonin 3 milliGRAM(s) Oral at bedtime PRN  metoprolol tartrate 25 milliGRAM(s) Oral two times a day  montelukast 10 milliGRAM(s) Oral at bedtime  mupirocin 2% Nasal 1 Application(s) Both Nostrils two times a day  naloxone Injectable 0.4 milliGRAM(s) IV Push once  ondansetron Injectable 4 milliGRAM(s) IV Push every 8 hours PRN  oxyCODONE    IR 5 milliGRAM(s) Oral every 4 hours PRN  oxyCODONE    IR 2.5 milliGRAM(s) Oral every 4 hours PRN  pantoprazole    Tablet 40 milliGRAM(s) Oral before breakfast  polyethylene glycol 3350 17 Gram(s) Oral daily  senna 2 Tablet(s) Oral at bedtime  sodium bicarbonate 650 milliGRAM(s) Oral two times a day  sodium zirconium cyclosilicate 10 Gram(s) Oral daily  tiotropium 2.5 MICROgram(s) Inhaler 2 Puff(s) Inhalation daily    Pertinent labs/Imaging  POCT Blood Glucose.: 196 mg/dL (04-29-24 @ 12:43)  POCT Blood Glucose.: 177 mg/dL (04-29-24 @ 08:27)  POCT Blood Glucose.: 168 mg/dL (04-28-24 @ 21:32)  POCT Blood Glucose.: 189 mg/dL (04-28-24 @ 17:12)    eGFR: 20 mL/min/1.73m2 (04-29-24 @ 07:02)  eGFR: 23 mL/min/1.73m2 (04-28-24 @ 07:45)  eGFR: 23 mL/min/1.73m2 (04-27-24 @ 19:28)       [facility] Progress Note    MAR, chart notes, fingersticks and labs reviewed    Subjective: feels well    Objective  Vital Signs  T(C): 36.3 (04-29-24 @ 04:53), Max: 36.8 (04-29-24 @ 00:45)  HR: 56 (04-29-24 @ 04:53) (56 - 62)  BP: 128/68 (04-29-24 @ 04:53) (128/65 - 130/68)  RR: 18 (04-29-24 @ 04:53) (18 - 18)  SpO2: 97% (04-29-24 @ 04:53) (96% - 97%)    Physical Exam  Gen: NAD, alert, awake  HEENT: NC/AT, EOMI  Neck: supple  Chest: breathing comfortably  Heart: +s1 +s2    Medications  acetaminophen     Tablet .. 650 milliGRAM(s) Oral every 6 hours PRN  albuterol    90 MICROgram(s) HFA Inhaler 2 Puff(s) Inhalation every 6 hours PRN  allopurinol 300 milliGRAM(s) Oral daily  aluminum hydroxide/magnesium hydroxide/simethicone Suspension 30 milliLiter(s) Oral every 4 hours PRN  aspirin enteric coated 81 milliGRAM(s) Oral daily  atorvastatin 40 milliGRAM(s) Oral at bedtime  bisacodyl 5 milliGRAM(s) Oral daily PRN  budesonide 160 MICROgram(s)/formoterol 4.5 MICROgram(s) Inhaler 2 Puff(s) Inhalation two times a day  ceFAZolin   IVPB 500 milliGRAM(s) IV Intermittent every 12 hours  dextrose 10% Bolus 125 milliLiter(s) IV Bolus once  dextrose 5%. 1000 milliLiter(s) IV Continuous <Continuous>  dextrose 5%. 1000 milliLiter(s) IV Continuous <Continuous>  dextrose 50% Injectable 25 Gram(s) IV Push once  dextrose 50% Injectable 12.5 Gram(s) IV Push once  dextrose Oral Gel 15 Gram(s) Oral once PRN  glucagon  Injectable 1 milliGRAM(s) IntraMuscular once  insulin glargine Injectable (LANTUS) 18 Unit(s) SubCutaneous at bedtime  insulin lispro (ADMELOG) corrective regimen sliding scale   SubCutaneous at bedtime  insulin lispro (ADMELOG) corrective regimen sliding scale   SubCutaneous three times a day before meals  insulin lispro Injectable (ADMELOG) 10 Unit(s) SubCutaneous before dinner  insulin lispro Injectable (ADMELOG) 10 Unit(s) SubCutaneous before lunch  lidocaine   4% Patch 1 Patch Transdermal daily  melatonin 3 milliGRAM(s) Oral at bedtime PRN  metoprolol tartrate 25 milliGRAM(s) Oral two times a day  montelukast 10 milliGRAM(s) Oral at bedtime  mupirocin 2% Nasal 1 Application(s) Both Nostrils two times a day  naloxone Injectable 0.4 milliGRAM(s) IV Push once  ondansetron Injectable 4 milliGRAM(s) IV Push every 8 hours PRN  oxyCODONE    IR 5 milliGRAM(s) Oral every 4 hours PRN  oxyCODONE    IR 2.5 milliGRAM(s) Oral every 4 hours PRN  pantoprazole    Tablet 40 milliGRAM(s) Oral before breakfast  polyethylene glycol 3350 17 Gram(s) Oral daily  senna 2 Tablet(s) Oral at bedtime  sodium bicarbonate 650 milliGRAM(s) Oral two times a day  sodium zirconium cyclosilicate 10 Gram(s) Oral daily  tiotropium 2.5 MICROgram(s) Inhaler 2 Puff(s) Inhalation daily    Pertinent labs/Imaging  POCT Blood Glucose.: 196 mg/dL (04-29-24 @ 12:43)  POCT Blood Glucose.: 177 mg/dL (04-29-24 @ 08:27)  POCT Blood Glucose.: 168 mg/dL (04-28-24 @ 21:32)  POCT Blood Glucose.: 189 mg/dL (04-28-24 @ 17:12)    eGFR: 20 mL/min/1.73m2 (04-29-24 @ 07:02)  eGFR: 23 mL/min/1.73m2 (04-28-24 @ 07:45)  eGFR: 23 mL/min/1.73m2 (04-27-24 @ 19:28)

## 2024-04-29 NOTE — PROGRESS NOTE ADULT - SUBJECTIVE AND OBJECTIVE BOX
[facility] Division of [facility] Medicine  Rei Padgett DO  Reachable on [employer] Teams    Patient is a 69y old  Male who presents with a chief complaint of right middle finger swelling, pain/tenderness, redness, warmth (29 Apr 2024 14:44)    SUBJECTIVE / OVERNIGHT EVENTS: No acute events overnight. Patient seen and examined at bedside this morning, endorses R hand pain but improved.    REVIEW OF SYSTEMS:    CONSTITUTIONAL: No weakness, fevers or chills  EYES/ENT: No visual changes;  No vertigo or throat pain   NECK: No pain or stiffness  RESPIRATORY: No cough, wheezing, hemoptysis; No shortness of breath  CARDIOVASCULAR: No chest pain or palpitations  GASTROINTESTINAL: No abdominal or epigastric pain. No nausea, vomiting, or hematemesis; No diarrhea or constipation. No melena or hematochezia.  GENITOURINARY: No dysuria, frequency or hematuria  NEUROLOGICAL: No numbness or weakness  SKIN: No itching, burning, rashes, or lesions  MSK: No joint pain, no back pain  HEME: No easy bleeding, no easy bruising  All other review of systems is negative unless indicated above.    MEDICATIONS  (STANDING):  allopurinol 300 milliGRAM(s) Oral daily  aspirin enteric coated 81 milliGRAM(s) Oral daily  atorvastatin 40 milliGRAM(s) Oral at bedtime  budesonide 160 MICROgram(s)/formoterol 4.5 MICROgram(s) Inhaler 2 Puff(s) Inhalation two times a day  ceFAZolin   IVPB 500 milliGRAM(s) IV Intermittent every 12 hours  dextrose 10% Bolus 125 milliLiter(s) IV Bolus once  dextrose 5%. 1000 milliLiter(s) (50 mL/Hr) IV Continuous <Continuous>  dextrose 5%. 1000 milliLiter(s) (100 mL/Hr) IV Continuous <Continuous>  dextrose 50% Injectable 25 Gram(s) IV Push once  dextrose 50% Injectable 12.5 Gram(s) IV Push once  glucagon  Injectable 1 milliGRAM(s) IntraMuscular once  insulin glargine Injectable (LANTUS) 18 Unit(s) SubCutaneous at bedtime  insulin lispro (ADMELOG) corrective regimen sliding scale   SubCutaneous at bedtime  insulin lispro (ADMELOG) corrective regimen sliding scale   SubCutaneous three times a day before meals  insulin lispro Injectable (ADMELOG) 10 Unit(s) SubCutaneous before dinner  insulin lispro Injectable (ADMELOG) 10 Unit(s) SubCutaneous before lunch  lidocaine   4% Patch 1 Patch Transdermal daily  metoprolol tartrate 25 milliGRAM(s) Oral two times a day  montelukast 10 milliGRAM(s) Oral at bedtime  mupirocin 2% Nasal 1 Application(s) Both Nostrils two times a day  naloxone Injectable 0.4 milliGRAM(s) IV Push once  pantoprazole    Tablet 40 milliGRAM(s) Oral before breakfast  polyethylene glycol 3350 17 Gram(s) Oral daily  senna 2 Tablet(s) Oral at bedtime  sodium bicarbonate 650 milliGRAM(s) Oral two times a day  sodium zirconium cyclosilicate 10 Gram(s) Oral daily  tiotropium 2.5 MICROgram(s) Inhaler 2 Puff(s) Inhalation daily    MEDICATIONS  (PRN):  acetaminophen     Tablet .. 650 milliGRAM(s) Oral every 6 hours PRN Temp greater or equal to 38C (100.4F), Mild Pain (1 - 3)  albuterol    90 MICROgram(s) HFA Inhaler 2 Puff(s) Inhalation every 6 hours PRN Shortness of Breath and/or Wheezing  aluminum hydroxide/magnesium hydroxide/simethicone Suspension 30 milliLiter(s) Oral every 4 hours PRN Dyspepsia  bisacodyl 5 milliGRAM(s) Oral daily PRN Constipation  dextrose Oral Gel 15 Gram(s) Oral once PRN Blood Glucose LESS THAN 70 milliGRAM(s)/deciliter  melatonin 3 milliGRAM(s) Oral at bedtime PRN Insomnia  ondansetron Injectable 4 milliGRAM(s) IV Push every 8 hours PRN Nausea and/or Vomiting  oxyCODONE    IR 2.5 milliGRAM(s) Oral every 4 hours PRN Moderate Pain (4 - 6)  oxyCODONE    IR 5 milliGRAM(s) Oral every 4 hours PRN Severe Pain (7 - 10)      CAPILLARY BLOOD GLUCOSE      POCT Blood Glucose.: 196 mg/dL (29 Apr 2024 12:43)  POCT Blood Glucose.: 177 mg/dL (29 Apr 2024 08:27)  POCT Blood Glucose.: 168 mg/dL (28 Apr 2024 21:32)  POCT Blood Glucose.: 189 mg/dL (28 Apr 2024 17:12)    I&O's Summary    28 Apr 2024 07:01  -  29 Apr 2024 07:00  --------------------------------------------------------  IN: 680 mL / OUT: 0 mL / NET: 680 mL        PHYSICAL EXAM:  Vital Signs Last 24 Hrs  T(C): 36.3 (29 Apr 2024 04:53), Max: 36.8 (29 Apr 2024 00:45)  T(F): 97.4 (29 Apr 2024 04:53), Max: 98.2 (29 Apr 2024 00:45)  HR: 56 (29 Apr 2024 04:53) (56 - 62)  BP: 128/68 (29 Apr 2024 04:53) (128/65 - 130/68)  BP(mean): --  RR: 18 (29 Apr 2024 04:53) (18 - 18)  SpO2: 97% (29 Apr 2024 04:53) (96% - 97%)    Parameters below as of 29 Apr 2024 04:53  Patient On (Oxygen Delivery Method): room air      CONSTITUTIONAL: NAD, well-developed, well-groomed  EYES: PERRLA; conjunctiva and sclera clear  ENMT: Moist oral mucosa, no pharyngeal injection or exudates; normal dentition  NECK: Supple, no palpable masses; no thyromegaly  RESPIRATORY: Normal respiratory effort; lungs are clear to auscultation bilaterally  CARDIOVASCULAR: Regular rate and rhythm, normal S1 and S2, no murmur/rub/gallop; No lower extremity edema; Peripheral pulses are 2+ bilaterally  ABDOMEN: Nontender to palpation, normoactive bowel sounds, no rebound/guarding; No hepatosplenomegaly  MUSCULOSKELETAL:  Endorses R hand pain  PSYCH: A+O to person, place, and time; affect appropriate  NEUROLOGY: CN 2-12 are intact and symmetric; no gross sensory deficits   SKIN: No rashes; no palpable lesions    LABS:                        12.3   6.68  )-----------( 126      ( 29 Apr 2024 07:00 )             36.4     04-29    137  |  101  |  69<H>  ----------------------------<  123<H>  4.5   |  24  |  3.18<H>    Ca    9.3      29 Apr 2024 07:02  Phos  4.0     04-28  Mg     1.9     04-28    TPro  6.7  /  Alb  3.7  /  TBili  0.4  /  DBili  x   /  AST  14  /  ALT  9<L>  /  AlkPhos  74  04-29    PT/INR - ( 28 Apr 2024 07:45 )   PT: 10.4 sec;   INR: 0.99 ratio         PTT - ( 28 Apr 2024 07:45 )  PTT:33.6 sec      Urinalysis Basic - ( 29 Apr 2024 07:02 )    Color: x / Appearance: x / SG: x / pH: x  Gluc: 123 mg/dL / Ketone: x  / Bili: x / Urobili: x   Blood: x / Protein: x / Nitrite: x   Leuk Esterase: x / RBC: x / WBC x   Sq Epi: x / Non Sq Epi: x / Bacteria: x        Culture - Abscess with Gram Stain (collected 28 Apr 2024 14:40)  Source: .Abscess Right Middle Finger  Gram Stain (28 Apr 2024 23:03):    No polymorphonuclear cells seen per low power field    No organisms seen per oil power field    Culture - Abscess with Gram Stain (collected 28 Apr 2024 14:40)  Source: .Abscess Right Middle Finger  Gram Stain (28 Apr 2024 23:03):    No polymorphonuclear cells seen per low power field    No organisms seen per oil power field

## 2024-04-30 LAB
-  CLINDAMYCIN: SIGNIFICANT CHANGE UP
-  CLINDAMYCIN: SIGNIFICANT CHANGE UP
-  DAPTOMYCIN: SIGNIFICANT CHANGE UP
-  DAPTOMYCIN: SIGNIFICANT CHANGE UP
-  ERYTHROMYCIN: SIGNIFICANT CHANGE UP
-  ERYTHROMYCIN: SIGNIFICANT CHANGE UP
-  GENTAMICIN: SIGNIFICANT CHANGE UP
-  GENTAMICIN: SIGNIFICANT CHANGE UP
-  LINEZOLID: SIGNIFICANT CHANGE UP
-  LINEZOLID: SIGNIFICANT CHANGE UP
-  OXACILLIN: SIGNIFICANT CHANGE UP
-  OXACILLIN: SIGNIFICANT CHANGE UP
-  PENICILLIN: SIGNIFICANT CHANGE UP
-  PENICILLIN: SIGNIFICANT CHANGE UP
-  RIFAMPIN: SIGNIFICANT CHANGE UP
-  RIFAMPIN: SIGNIFICANT CHANGE UP
-  TETRACYCLINE: SIGNIFICANT CHANGE UP
-  TETRACYCLINE: SIGNIFICANT CHANGE UP
-  TRIMETHOPRIM/SULFAMETHOXAZOLE: SIGNIFICANT CHANGE UP
-  TRIMETHOPRIM/SULFAMETHOXAZOLE: SIGNIFICANT CHANGE UP
-  VANCOMYCIN: SIGNIFICANT CHANGE UP
-  VANCOMYCIN: SIGNIFICANT CHANGE UP
ANION GAP SERPL CALC-SCNC: 14 MMOL/L — SIGNIFICANT CHANGE UP (ref 5–17)
BUN SERPL-MCNC: 67 MG/DL — HIGH (ref 7–23)
CALCIUM SERPL-MCNC: 8.8 MG/DL — SIGNIFICANT CHANGE UP (ref 8.4–10.5)
CHLORIDE SERPL-SCNC: 99 MMOL/L — SIGNIFICANT CHANGE UP (ref 96–108)
CO2 SERPL-SCNC: 23 MMOL/L — SIGNIFICANT CHANGE UP (ref 22–31)
CREAT SERPL-MCNC: 3.31 MG/DL — HIGH (ref 0.5–1.3)
EGFR: 19 ML/MIN/1.73M2 — LOW
GLUCOSE BLDC GLUCOMTR-MCNC: 100 MG/DL — HIGH (ref 70–99)
GLUCOSE BLDC GLUCOMTR-MCNC: 125 MG/DL — HIGH (ref 70–99)
GLUCOSE BLDC GLUCOMTR-MCNC: 131 MG/DL — HIGH (ref 70–99)
GLUCOSE BLDC GLUCOMTR-MCNC: 151 MG/DL — HIGH (ref 70–99)
GLUCOSE BLDC GLUCOMTR-MCNC: 179 MG/DL — HIGH (ref 70–99)
GLUCOSE SERPL-MCNC: 172 MG/DL — HIGH (ref 70–99)
MAGNESIUM SERPL-MCNC: 1.9 MG/DL — SIGNIFICANT CHANGE UP (ref 1.6–2.6)
METHOD TYPE: SIGNIFICANT CHANGE UP
METHOD TYPE: SIGNIFICANT CHANGE UP
PHOSPHATE SERPL-MCNC: 4.4 MG/DL — SIGNIFICANT CHANGE UP (ref 2.5–4.5)
POTASSIUM SERPL-MCNC: 3.9 MMOL/L — SIGNIFICANT CHANGE UP (ref 3.5–5.3)
POTASSIUM SERPL-SCNC: 3.9 MMOL/L — SIGNIFICANT CHANGE UP (ref 3.5–5.3)
SODIUM SERPL-SCNC: 136 MMOL/L — SIGNIFICANT CHANGE UP (ref 135–145)
VANCOMYCIN FLD-MCNC: 4.3 UG/ML — SIGNIFICANT CHANGE UP

## 2024-04-30 PROCEDURE — 99232 SBSQ HOSP IP/OBS MODERATE 35: CPT

## 2024-04-30 RX ORDER — VANCOMYCIN HCL 1 G
1000 VIAL (EA) INTRAVENOUS ONCE
Refills: 0 | Status: COMPLETED | OUTPATIENT
Start: 2024-04-30 | End: 2024-04-30

## 2024-04-30 RX ORDER — CEFAZOLIN SODIUM 1 G
1000 VIAL (EA) INJECTION EVERY 12 HOURS
Refills: 0 | Status: DISCONTINUED | OUTPATIENT
Start: 2024-04-30 | End: 2024-05-01

## 2024-04-30 RX ADMIN — Medication 81 MILLIGRAM(S): at 12:02

## 2024-04-30 RX ADMIN — Medication 650 MILLIGRAM(S): at 06:17

## 2024-04-30 RX ADMIN — SENNA PLUS 2 TABLET(S): 8.6 TABLET ORAL at 21:56

## 2024-04-30 RX ADMIN — Medication 10 UNIT(S): at 17:48

## 2024-04-30 RX ADMIN — Medication 250 MILLIGRAM(S): at 12:06

## 2024-04-30 RX ADMIN — MUPIROCIN 1 APPLICATION(S): 20 OINTMENT TOPICAL at 06:16

## 2024-04-30 RX ADMIN — Medication 650 MILLIGRAM(S): at 17:47

## 2024-04-30 RX ADMIN — Medication 25 MILLIGRAM(S): at 06:17

## 2024-04-30 RX ADMIN — PANTOPRAZOLE SODIUM 40 MILLIGRAM(S): 20 TABLET, DELAYED RELEASE ORAL at 06:17

## 2024-04-30 RX ADMIN — SODIUM ZIRCONIUM CYCLOSILICATE 10 GRAM(S): 10 POWDER, FOR SUSPENSION ORAL at 15:33

## 2024-04-30 RX ADMIN — ATORVASTATIN CALCIUM 40 MILLIGRAM(S): 80 TABLET, FILM COATED ORAL at 21:56

## 2024-04-30 RX ADMIN — Medication 100 MILLIGRAM(S): at 06:17

## 2024-04-30 RX ADMIN — OXYCODONE HYDROCHLORIDE 5 MILLIGRAM(S): 5 TABLET ORAL at 10:00

## 2024-04-30 RX ADMIN — BUDESONIDE AND FORMOTEROL FUMARATE DIHYDRATE 2 PUFF(S): 160; 4.5 AEROSOL RESPIRATORY (INHALATION) at 06:16

## 2024-04-30 RX ADMIN — Medication 2: at 08:55

## 2024-04-30 RX ADMIN — Medication 12 UNIT(S): at 08:54

## 2024-04-30 RX ADMIN — BUDESONIDE AND FORMOTEROL FUMARATE DIHYDRATE 2 PUFF(S): 160; 4.5 AEROSOL RESPIRATORY (INHALATION) at 18:24

## 2024-04-30 RX ADMIN — MONTELUKAST 10 MILLIGRAM(S): 4 TABLET, CHEWABLE ORAL at 21:56

## 2024-04-30 RX ADMIN — OXYCODONE HYDROCHLORIDE 5 MILLIGRAM(S): 5 TABLET ORAL at 08:53

## 2024-04-30 RX ADMIN — POLYETHYLENE GLYCOL 3350 17 GRAM(S): 17 POWDER, FOR SOLUTION ORAL at 12:06

## 2024-04-30 RX ADMIN — INSULIN GLARGINE 18 UNIT(S): 100 INJECTION, SOLUTION SUBCUTANEOUS at 21:56

## 2024-04-30 RX ADMIN — Medication 300 MILLIGRAM(S): at 12:03

## 2024-04-30 RX ADMIN — Medication 2: at 12:37

## 2024-04-30 RX ADMIN — LIDOCAINE 1 PATCH: 4 CREAM TOPICAL at 12:10

## 2024-04-30 RX ADMIN — Medication 10 UNIT(S): at 12:37

## 2024-04-30 RX ADMIN — Medication 100 MILLIGRAM(S): at 18:23

## 2024-04-30 RX ADMIN — Medication 25 MILLIGRAM(S): at 17:47

## 2024-04-30 RX ADMIN — MUPIROCIN 1 APPLICATION(S): 20 OINTMENT TOPICAL at 17:20

## 2024-04-30 RX ADMIN — TIOTROPIUM BROMIDE 2 PUFF(S): 18 CAPSULE ORAL; RESPIRATORY (INHALATION) at 12:09

## 2024-04-30 NOTE — PROGRESS NOTE ADULT - SUBJECTIVE AND OBJECTIVE BOX
[facility] NEPHROLOGY- PROGRESS NOTE    69y Male with history of HTN and DM presents with R middle finger infection. Nephrology consulted for elevated Scr.    REVIEW OF SYSTEMS:  Gen: no fevers  Cards: no chest pain  Resp: + dyspnea with exertion  GI: no nausea or vomiting or diarrhea  Vascular: no LE edema    Blueberries (Rash)  No Known Drug Allergies      Hospital Medications: Medications reviewed      VITALS:  T(F): 97.9 (04-30-24 @ 13:51), Max: 98.1 (04-30-24 @ 09:58)  HR: 56 (04-30-24 @ 13:51)  BP: 137/58 (04-30-24 @ 13:51)  RR: 18 (04-30-24 @ 13:51)  SpO2: 96% (04-30-24 @ 13:51)  Wt(kg): --    04-30 @ 07:01  -  04-30 @ 16:12  --------------------------------------------------------  IN: 480 mL / OUT: 0 mL / NET: 480 mL        PHYSICAL EXAM:    Gen: NAD, calm  Cards: RRR, +S1/S2, no M/G/R  Resp: CTA B/L  GI: soft, NT/ND, NABS  Vascular: no LE edema B/L        LABS:  04-30    136  |  99  |  67<H>  ----------------------------<  172<H>  3.9   |  23  |  3.31<H>    Ca    8.8      30 Apr 2024 07:02  Phos  4.4     04-30  Mg     1.9     04-30    TPro  6.7  /  Alb  3.7  /  TBili  0.4  /  DBili      /  AST  14  /  ALT  9<L>  /  AlkPhos  74  04-29    Creatinine Trend: 3.31 <--, 3.18 <--, 2.84 <--, 2.84 <--                        12.3   6.68  )-----------( 126      ( 29 Apr 2024 07:00 )             36.4     Urine Studies:  Urinalysis Basic - ( 30 Apr 2024 07:02 )    Color:  / Appearance:  / SG:  / pH:   Gluc: 172 mg/dL / Ketone:   / Bili:  / Urobili:    Blood:  / Protein:  / Nitrite:    Leuk Esterase:  / RBC:  / WBC    Sq Epi:  / Non Sq Epi:  / Bacteria:       Sodium, Random Urine: 99 mmol/L (04-28 @ 08:51)  Creatinine, Random Urine: 19 mg/dL (04-28 @ 08:51)  Protein/Creatinine Ratio Calculation: 4.1 Ratio (04-28 @ 08:51)

## 2024-04-30 NOTE — PROGRESS NOTE ADULT - SUBJECTIVE AND OBJECTIVE BOX
[facility] Division of Hospital Medicine  Rei Padgett DO  Reachable on [employer] Teams    Patient is a 69y old  Male who presents with a chief complaint of right middle finger swelling, pain/tenderness, redness, warmth (30 Apr 2024 11:25)    SUBJECTIVE / OVERNIGHT EVENTS: No acute events overnight. Patient seen and examined at bedside this morning, still endorses R hand pain.    REVIEW OF SYSTEMS:    CONSTITUTIONAL: No weakness, fevers or chills  EYES/ENT: No visual changes;  No vertigo or throat pain   NECK: No pain or stiffness  RESPIRATORY: No cough, wheezing, hemoptysis; No shortness of breath  CARDIOVASCULAR: No chest pain or palpitations  GASTROINTESTINAL: No abdominal or epigastric pain. No nausea, vomiting, or hematemesis; No diarrhea or constipation. No melena or hematochezia.  GENITOURINARY: No dysuria, frequency or hematuria  NEUROLOGICAL: No numbness or weakness  SKIN: No itching, burning, rashes, or lesions  MSK: Endorses R hand pain  HEME: No easy bleeding, no easy bruising  All other review of systems is negative unless indicated above.    MEDICATIONS  (STANDING):  allopurinol 300 milliGRAM(s) Oral daily  aspirin enteric coated 81 milliGRAM(s) Oral daily  atorvastatin 40 milliGRAM(s) Oral at bedtime  budesonide 160 MICROgram(s)/formoterol 4.5 MICROgram(s) Inhaler 2 Puff(s) Inhalation two times a day  ceFAZolin   IVPB 1000 milliGRAM(s) IV Intermittent every 12 hours  dextrose 10% Bolus 125 milliLiter(s) IV Bolus once  dextrose 5%. 1000 milliLiter(s) (100 mL/Hr) IV Continuous <Continuous>  dextrose 5%. 1000 milliLiter(s) (50 mL/Hr) IV Continuous <Continuous>  dextrose 50% Injectable 25 Gram(s) IV Push once  dextrose 50% Injectable 12.5 Gram(s) IV Push once  glucagon  Injectable 1 milliGRAM(s) IntraMuscular once  insulin glargine Injectable (LANTUS) 18 Unit(s) SubCutaneous at bedtime  insulin lispro (ADMELOG) corrective regimen sliding scale   SubCutaneous three times a day before meals  insulin lispro (ADMELOG) corrective regimen sliding scale   SubCutaneous at bedtime  insulin lispro Injectable (ADMELOG) 10 Unit(s) SubCutaneous before dinner  insulin lispro Injectable (ADMELOG) 10 Unit(s) SubCutaneous before lunch  insulin lispro Injectable (ADMELOG) 12 Unit(s) SubCutaneous before breakfast  lidocaine   4% Patch 1 Patch Transdermal daily  metoprolol tartrate 25 milliGRAM(s) Oral two times a day  montelukast 10 milliGRAM(s) Oral at bedtime  mupirocin 2% Nasal 1 Application(s) Both Nostrils two times a day  naloxone Injectable 0.4 milliGRAM(s) IV Push once  pantoprazole    Tablet 40 milliGRAM(s) Oral before breakfast  polyethylene glycol 3350 17 Gram(s) Oral daily  senna 2 Tablet(s) Oral at bedtime  sodium bicarbonate 650 milliGRAM(s) Oral two times a day  sodium zirconium cyclosilicate 10 Gram(s) Oral daily  tiotropium 2.5 MICROgram(s) Inhaler 2 Puff(s) Inhalation daily    MEDICATIONS  (PRN):  acetaminophen     Tablet .. 650 milliGRAM(s) Oral every 6 hours PRN Temp greater or equal to 38C (100.4F), Mild Pain (1 - 3)  albuterol    90 MICROgram(s) HFA Inhaler 2 Puff(s) Inhalation every 6 hours PRN Shortness of Breath and/or Wheezing  aluminum hydroxide/magnesium hydroxide/simethicone Suspension 30 milliLiter(s) Oral every 4 hours PRN Dyspepsia  bisacodyl 5 milliGRAM(s) Oral daily PRN Constipation  dextrose Oral Gel 15 Gram(s) Oral once PRN Blood Glucose LESS THAN 70 milliGRAM(s)/deciliter  melatonin 3 milliGRAM(s) Oral at bedtime PRN Insomnia  ondansetron Injectable 4 milliGRAM(s) IV Push every 8 hours PRN Nausea and/or Vomiting  oxyCODONE    IR 2.5 milliGRAM(s) Oral every 4 hours PRN Moderate Pain (4 - 6)  oxyCODONE    IR 5 milliGRAM(s) Oral every 4 hours PRN Severe Pain (7 - 10)      CAPILLARY BLOOD GLUCOSE      POCT Blood Glucose.: 179 mg/dL (30 Apr 2024 12:30)  POCT Blood Glucose.: 151 mg/dL (30 Apr 2024 08:52)  POCT Blood Glucose.: 138 mg/dL (29 Apr 2024 21:45)  POCT Blood Glucose.: 155 mg/dL (29 Apr 2024 17:17)    I&O's Summary    30 Apr 2024 07:01  -  30 Apr 2024 14:47  --------------------------------------------------------  IN: 240 mL / OUT: 0 mL / NET: 240 mL        PHYSICAL EXAM:  Vital Signs Last 24 Hrs  T(C): 36.6 (30 Apr 2024 13:51), Max: 36.7 (30 Apr 2024 09:58)  T(F): 97.9 (30 Apr 2024 13:51), Max: 98.1 (30 Apr 2024 09:58)  HR: 56 (30 Apr 2024 13:51) (56 - 64)  BP: 137/58 (30 Apr 2024 13:51) (119/67 - 147/76)  BP(mean): --  RR: 18 (30 Apr 2024 13:51) (18 - 18)  SpO2: 96% (30 Apr 2024 13:51) (96% - 98%)    Parameters below as of 30 Apr 2024 13:51  Patient On (Oxygen Delivery Method): room air      CONSTITUTIONAL: NAD, well-developed, well-groomed  RESPIRATORY: Normal respiratory effort; lungs are clear to auscultation bilaterally  CARDIOVASCULAR: Regular rate and rhythm, normal S1 and S2, no murmur/rub/gallop  ABDOMEN: Nontender to palpation, soft nondistended  MUSCULOSKELETAL: R hand wrapped in gauze  PSYCH: A+O to person, place, and time; affect appropriate    LABS:                        12.3   6.68  )-----------( 126      ( 29 Apr 2024 07:00 )             36.4     04-30    136  |  99  |  67<H>  ----------------------------<  172<H>  3.9   |  23  |  3.31<H>    Ca    8.8      30 Apr 2024 07:02  Phos  4.4     04-30  Mg     1.9     04-30    TPro  6.7  /  Alb  3.7  /  TBili  0.4  /  DBili  x   /  AST  14  /  ALT  9<L>  /  AlkPhos  74  04-29          Urinalysis Basic - ( 30 Apr 2024 07:02 )    Color: x / Appearance: x / SG: x / pH: x  Gluc: 172 mg/dL / Ketone: x  / Bili: x / Urobili: x   Blood: x / Protein: x / Nitrite: x   Leuk Esterase: x / RBC: x / WBC x   Sq Epi: x / Non Sq Epi: x / Bacteria: x        Culture - Abscess with Gram Stain (collected 28 Apr 2024 14:40)  Source: .Abscess Right Middle Finger  Gram Stain (29 Apr 2024 18:56):    No polymorphonuclear cells seen per low power field    No organisms seen per oil power field  Preliminary Report (29 Apr 2024 18:56):    Few Staphylococcus aureus    Culture - Abscess with Gram Stain (collected 28 Apr 2024 14:40)  Source: .Abscess Right Middle Finger  Gram Stain (29 Apr 2024 18:49):    No polymorphonuclear cells seen per low power field    No organisms seen per oil power field  Preliminary Report (29 Apr 2024 18:49):    Few Staphylococcus aureus

## 2024-04-30 NOTE — PROGRESS NOTE ADULT - SUBJECTIVE AND OBJECTIVE BOX
Patient is a 69y old  Male who presents with a chief complaint of right middle finger swelling, pain/tenderness, redness, warmth (30 Apr 2024 11:00)    Being followed by ID for        Interval history:  No other acute events      ROS:  No cough,SOB,CP  No N/V/D  No abd pain  No urinary complaints  No HA  No joint or limb pain  No other complaints    PAST MEDICAL & SURGICAL HISTORY:  Diabetes      HTN (hypertension)      HLD (hyperlipidemia)      CAD (coronary artery disease)      Asthma-COPD overlap syndrome      Stage 3 chronic kidney disease      TAIWO on CPAP      GERD (gastroesophageal reflux disease)      LPRD (laryngopharyngeal reflux disease)      Bronchiectasis      Gout      History of appendectomy  x 30 yrs ago      S/P CABG (coronary artery bypass graft)        Allergies    Blueberries (Rash)  No Known Drug Allergies    Intolerances      Antimicrobials:    ceFAZolin   IVPB 1000 milliGRAM(s) IV Intermittent every 12 hours  vancomycin  IVPB 1000 milliGRAM(s) IV Intermittent once    MEDICATIONS  (STANDING):  allopurinol 300 milliGRAM(s) Oral daily  aspirin enteric coated 81 milliGRAM(s) Oral daily  atorvastatin 40 milliGRAM(s) Oral at bedtime  budesonide 160 MICROgram(s)/formoterol 4.5 MICROgram(s) Inhaler 2 Puff(s) Inhalation two times a day  ceFAZolin   IVPB 1000 milliGRAM(s) IV Intermittent every 12 hours  dextrose 10% Bolus 125 milliLiter(s) IV Bolus once  dextrose 5%. 1000 milliLiter(s) (100 mL/Hr) IV Continuous <Continuous>  dextrose 5%. 1000 milliLiter(s) (50 mL/Hr) IV Continuous <Continuous>  dextrose 50% Injectable 25 Gram(s) IV Push once  dextrose 50% Injectable 12.5 Gram(s) IV Push once  glucagon  Injectable 1 milliGRAM(s) IntraMuscular once  insulin glargine Injectable (LANTUS) 18 Unit(s) SubCutaneous at bedtime  insulin lispro (ADMELOG) corrective regimen sliding scale   SubCutaneous three times a day before meals  insulin lispro (ADMELOG) corrective regimen sliding scale   SubCutaneous at bedtime  insulin lispro Injectable (ADMELOG) 10 Unit(s) SubCutaneous before dinner  insulin lispro Injectable (ADMELOG) 10 Unit(s) SubCutaneous before lunch  insulin lispro Injectable (ADMELOG) 12 Unit(s) SubCutaneous before breakfast  lidocaine   4% Patch 1 Patch Transdermal daily  metoprolol tartrate 25 milliGRAM(s) Oral two times a day  montelukast 10 milliGRAM(s) Oral at bedtime  mupirocin 2% Nasal 1 Application(s) Both Nostrils two times a day  naloxone Injectable 0.4 milliGRAM(s) IV Push once  pantoprazole    Tablet 40 milliGRAM(s) Oral before breakfast  polyethylene glycol 3350 17 Gram(s) Oral daily  senna 2 Tablet(s) Oral at bedtime  sodium bicarbonate 650 milliGRAM(s) Oral two times a day  sodium zirconium cyclosilicate 10 Gram(s) Oral daily  tiotropium 2.5 MICROgram(s) Inhaler 2 Puff(s) Inhalation daily  vancomycin  IVPB 1000 milliGRAM(s) IV Intermittent once      Vital Signs Last 24 Hrs  T(C): 36.7 (04-30-24 @ 09:58), Max: 36.7 (04-30-24 @ 09:58)  T(F): 98.1 (04-30-24 @ 09:58), Max: 98.1 (04-30-24 @ 09:58)  HR: 64 (04-30-24 @ 09:58) (59 - 64)  BP: 124/78 (04-30-24 @ 09:58) (119/67 - 147/76)  BP(mean): --  RR: 18 (04-30-24 @ 09:58) (18 - 18)  SpO2: 97% (04-30-24 @ 09:58) (97% - 98%)    Physical Exam:    Constitutional well preserved,comfortable,pleasant    HEENT PERRLA EOMI,No pallor or icterus    No oral exudate or erythema    Neck supple no JVD or LN    Chest Good AE,CTA    CVS RRR S1 S2 WNl No murmur or rub or gallop    Abd soft BS normal No tenderness no masses    Ext No cyanosis clubbing or edema    IV site no erythema tenderness or discharge    Joints no swelling or LOM    CNS AAO X 3 no focal    Lab Data:                          12.3   6.68  )-----------( 126      ( 29 Apr 2024 07:00 )             36.4       04-30    136  |  99  |  67<H>  ----------------------------<  172<H>  3.9   |  23  |  3.31<H>    Ca    8.8      30 Apr 2024 07:02  Phos  4.4     04-30  Mg     1.9     04-30    TPro  6.7  /  Alb  3.7  /  TBili  0.4  /  DBili  x   /  AST  14  /  ALT  9<L>  /  AlkPhos  74  04-29      Urinalysis Basic - ( 30 Apr 2024 07:02 )    Color: x / Appearance: x / SG: x / pH: x  Gluc: 172 mg/dL / Ketone: x  / Bili: x / Urobili: x   Blood: x / Protein: x / Nitrite: x   Leuk Esterase: x / RBC: x / WBC x   Sq Epi: x / Non Sq Epi: x / Bacteria: x        .Abscess Right Middle Finger  04-28-24   Few Staphylococcus aureus  --    No polymorphonuclear cells seen per low power field  No organisms seen per oil power field                Vancomycin Level, Random: 4.3 ug/mL (04-30-24 @ 07:08)      WBC Count: 6.68 (04-29-24 @ 07:00)  WBC Count: 5.40 (04-28-24 @ 07:45)  WBC Count: 6.95 (04-27-24 @ 19:28)       Bilirubin Total: 0.4 mg/dL (04-29-24 @ 07:02)  Aspartate Aminotransferase (AST/SGOT): 14 U/L (04-29-24 @ 07:02)  Alanine Aminotransferase (ALT/SGPT): 9 U/L (04-29-24 @ 07:02)  Alkaline Phosphatase: 74 U/L (04-29-24 @ 07:02)  Bilirubin Total: 0.4 mg/dL (04-28-24 @ 07:45)  Aspartate Aminotransferase (AST/SGOT): 14 U/L (04-28-24 @ 07:45)  Alanine Aminotransferase (ALT/SGPT): 11 U/L (04-28-24 @ 07:45)  Alkaline Phosphatase: 76 U/L (04-28-24 @ 07:45)  Bilirubin Total: 0.3 mg/dL (04-27-24 @ 19:28)  Aspartate Aminotransferase (AST/SGOT): 15 U/L (04-27-24 @ 19:28)  Alanine Aminotransferase (ALT/SGPT): 9 U/L (04-27-24 @ 19:28)  Alkaline Phosphatase: 87 U/L (04-27-24 @ 19:28)         Patient is a 69y old  Male who presents with a chief complaint of right middle finger swelling, pain/tenderness, redness, warmth (30 Apr 2024 11:00)    Being followed by ID for finger swelling        Interval history:  pt feeling a little better  finger inspected  No other acute events      PAST MEDICAL & SURGICAL HISTORY:  Diabetes      HTN (hypertension)      HLD (hyperlipidemia)      CAD (coronary artery disease)      Asthma-COPD overlap syndrome      Stage 3 chronic kidney disease      TAIWO on CPAP      GERD (gastroesophageal reflux disease)      LPRD (laryngopharyngeal reflux disease)      Bronchiectasis      Gout      History of appendectomy  x 30 yrs ago      S/P CABG (coronary artery bypass graft)        Allergies    Blueberries (Rash)  No Known Drug Allergies    Intolerances      Antimicrobials:    ceFAZolin   IVPB 1000 milliGRAM(s) IV Intermittent every 12 hours  vancomycin  IVPB 1000 milliGRAM(s) IV Intermittent once    MEDICATIONS  (STANDING):  allopurinol 300 milliGRAM(s) Oral daily  aspirin enteric coated 81 milliGRAM(s) Oral daily  atorvastatin 40 milliGRAM(s) Oral at bedtime  budesonide 160 MICROgram(s)/formoterol 4.5 MICROgram(s) Inhaler 2 Puff(s) Inhalation two times a day  ceFAZolin   IVPB 1000 milliGRAM(s) IV Intermittent every 12 hours  dextrose 10% Bolus 125 milliLiter(s) IV Bolus once  dextrose 5%. 1000 milliLiter(s) (100 mL/Hr) IV Continuous <Continuous>  dextrose 5%. 1000 milliLiter(s) (50 mL/Hr) IV Continuous <Continuous>  dextrose 50% Injectable 25 Gram(s) IV Push once  dextrose 50% Injectable 12.5 Gram(s) IV Push once  glucagon  Injectable 1 milliGRAM(s) IntraMuscular once  insulin glargine Injectable (LANTUS) 18 Unit(s) SubCutaneous at bedtime  insulin lispro (ADMELOG) corrective regimen sliding scale   SubCutaneous three times a day before meals  insulin lispro (ADMELOG) corrective regimen sliding scale   SubCutaneous at bedtime  insulin lispro Injectable (ADMELOG) 10 Unit(s) SubCutaneous before dinner  insulin lispro Injectable (ADMELOG) 10 Unit(s) SubCutaneous before lunch  insulin lispro Injectable (ADMELOG) 12 Unit(s) SubCutaneous before breakfast  lidocaine   4% Patch 1 Patch Transdermal daily  metoprolol tartrate 25 milliGRAM(s) Oral two times a day  montelukast 10 milliGRAM(s) Oral at bedtime  mupirocin 2% Nasal 1 Application(s) Both Nostrils two times a day  naloxone Injectable 0.4 milliGRAM(s) IV Push once  pantoprazole    Tablet 40 milliGRAM(s) Oral before breakfast  polyethylene glycol 3350 17 Gram(s) Oral daily  senna 2 Tablet(s) Oral at bedtime  sodium bicarbonate 650 milliGRAM(s) Oral two times a day  sodium zirconium cyclosilicate 10 Gram(s) Oral daily  tiotropium 2.5 MICROgram(s) Inhaler 2 Puff(s) Inhalation daily  vancomycin  IVPB 1000 milliGRAM(s) IV Intermittent once      Vital Signs Last 24 Hrs  T(C): 36.7 (04-30-24 @ 09:58), Max: 36.7 (04-30-24 @ 09:58)  T(F): 98.1 (04-30-24 @ 09:58), Max: 98.1 (04-30-24 @ 09:58)  HR: 64 (04-30-24 @ 09:58) (59 - 64)  BP: 124/78 (04-30-24 @ 09:58) (119/67 - 147/76)  BP(mean): --  RR: 18 (04-30-24 @ 09:58) (18 - 18)  SpO2: 97% (04-30-24 @ 09:58) (97% - 98%)    Physical Exam:    Constitutional well preserved,comfortable,pleasant    HEENT PERRLA EOMI,No pallor or icterus    No oral exudate or erythema    Neck supple no JVD or LN    Chest Good AE,CTA    CVS  S1 S2     Abd soft BS normal No tenderness    Ext No cyanosis clubbing or edema  finger on dorsal surface scar( pt had put ontment on it after it started that had irritated) finger slightly swollen and discolored  wound packed     IV site no erythema tenderness or discharge    Joints no swelling or LOM    CNS AAO X 3 no focal    Lab Data:                          12.3   6.68  )-----------( 126      ( 29 Apr 2024 07:00 )             36.4       04-30    136  |  99  |  67<H>  ----------------------------<  172<H>  3.9   |  23  |  3.31<H>    Ca    8.8      30 Apr 2024 07:02  Phos  4.4     04-30  Mg     1.9     04-30    TPro  6.7  /  Alb  3.7  /  TBili  0.4  /  DBili  x   /  AST  14  /  ALT  9<L>  /  AlkPhos  74  04-29      .Abscess Right Middle Finger  04-28-24   Few Staphylococcus aureus  --    No polymorphonuclear cells seen per low power field  No organisms seen per oil power field      Vancomycin Level, Random: 4.3 ug/mL (04-30-24 @ 07:08)      WBC Count: 6.68 (04-29-24 @ 07:00)  WBC Count: 5.40 (04-28-24 @ 07:45)  WBC Count: 6.95 (04-27-24 @ 19:28)       Bilirubin Total: 0.4 mg/dL (04-29-24 @ 07:02)  Aspartate Aminotransferase (AST/SGOT): 14 U/L (04-29-24 @ 07:02)  Alanine Aminotransferase (ALT/SGPT): 9 U/L (04-29-24 @ 07:02)  Alkaline Phosphatase: 74 U/L (04-29-24 @ 07:02)  Bilirubin Total: 0.4 mg/dL (04-28-24 @ 07:45)  Aspartate Aminotransferase (AST/SGOT): 14 U/L (04-28-24 @ 07:45)  Alanine Aminotransferase (ALT/SGPT): 11 U/L (04-28-24 @ 07:45)  Alkaline Phosphatase: 76 U/L (04-28-24 @ 07:45)  Bilirubin Total: 0.3 mg/dL (04-27-24 @ 19:28)  Aspartate Aminotransferase (AST/SGOT): 15 U/L (04-27-24 @ 19:28)  Alanine Aminotransferase (ALT/SGPT): 9 U/L (04-27-24 @ 19:28)  Alkaline Phosphatase: 87 U/L (04-27-24 @ 19:28)

## 2024-04-30 NOTE — PROGRESS NOTE ADULT - SUBJECTIVE AND OBJECTIVE BOX
CARDIOLOGY FOLLOW UP - Dr. Steel  DATE OF SERVICE: 4/30/24    CC  No cv complaints     REVIEW OF SYSTEMS:  CONSTITUTIONAL: No fever, weight loss, or fatigue  RESPIRATORY: No cough, wheezing, chills or hemoptysis; No Shortness of Breath  CARDIOVASCULAR: No chest pain, palpitations, passing out, dizziness, or leg swelling  GASTROINTESTINAL: No abdominal or epigastric pain. No nausea, vomiting, or hematemesis; No diarrhea or constipation. No melena or hematochezia.  VASCULAR: No edema     PHYSICAL EXAM:  T(C): 36.7 (04-30-24 @ 09:58), Max: 36.7 (04-30-24 @ 09:58)  HR: 64 (04-30-24 @ 09:58) (59 - 64)  BP: 124/78 (04-30-24 @ 09:58) (119/67 - 147/76)  RR: 18 (04-30-24 @ 09:58) (18 - 18)  SpO2: 97% (04-30-24 @ 09:58) (97% - 98%)  Wt(kg): --  I&O's Summary      Appearance: Normal	  Cardiovascular: Normal S1 S2,RRR, No JVD, No murmurs  Respiratory: Lungs clear to auscultation	  Gastrointestinal:  Soft, Non-tender, + BS	  Extremities: Normal range of motion, No clubbing, cyanosis or edema. + R hand wound      Home Medications:  albuterol 90 mcg/inh inhalation aerosol: 2 puff(s) inhaled every 6 hours, As needed, Shortness of Breath and/or Wheezing (27 Apr 2024 22:39)  allopurinol 300 mg oral tablet: 1 tab(s) orally once a day (27 Apr 2024 22:40)  azelastine 137 mcg/inh (0.1%) nasal spray: 2 spray(s) intranasally 2 times a day (27 Apr 2024 22:38)  cholecalciferol 1250 mcg (50,000 intl units) oral capsule: 1 cap(s) orally once a week (27 Apr 2024 22:42)  colchicine 0.6 mg oral capsule: 1 cap(s) orally once a day as needed for  gout pain (27 Apr 2024 22:46)  Farxiga 10 mg oral tablet: 1 tab(s) orally once a day (27 Apr 2024 22:42)  HumaLOG KwikPen 100 units/mL injectable solution: 34 unit(s) subcutaneous 3 times a day before meals (27 Apr 2024 22:35)  loratadine 10 mg oral tablet: 1 tab(s) orally once a day, As Needed (27 Apr 2024 22:39)  metoprolol tartrate 25 mg oral tablet: 1 tab(s) orally 2 times a day (27 Apr 2024 22:46)  montelukast 10 mg oral tablet: 1 tab(s) orally once a day (at bedtime) (27 Apr 2024 22:42)  sodium bicarbonate 650 mg oral tablet: 1 tab(s) orally 2 times a day (27 Apr 2024 22:46)  Trelegy Ellipta 100 mcg-62.5 mcg-25 mcg/inh inhalation powder: 1 puff(s) inhaled once a day (27 Apr 2024 22:46)  Veltassa 8.4 g oral powder for reconstitution: 8.4 gram(s) orally once a day (27 Apr 2024 22:46)  Zestril 10 mg oral tablet: 1 tab(s) orally once a day (27 Apr 2024 22:46)      MEDICATIONS  (STANDING):  allopurinol 300 milliGRAM(s) Oral daily  aspirin enteric coated 81 milliGRAM(s) Oral daily  atorvastatin 40 milliGRAM(s) Oral at bedtime  budesonide 160 MICROgram(s)/formoterol 4.5 MICROgram(s) Inhaler 2 Puff(s) Inhalation two times a day  ceFAZolin   IVPB 1000 milliGRAM(s) IV Intermittent every 12 hours  dextrose 10% Bolus 125 milliLiter(s) IV Bolus once  dextrose 5%. 1000 milliLiter(s) (50 mL/Hr) IV Continuous <Continuous>  dextrose 5%. 1000 milliLiter(s) (100 mL/Hr) IV Continuous <Continuous>  dextrose 50% Injectable 25 Gram(s) IV Push once  dextrose 50% Injectable 12.5 Gram(s) IV Push once  glucagon  Injectable 1 milliGRAM(s) IntraMuscular once  insulin glargine Injectable (LANTUS) 18 Unit(s) SubCutaneous at bedtime  insulin lispro (ADMELOG) corrective regimen sliding scale   SubCutaneous at bedtime  insulin lispro (ADMELOG) corrective regimen sliding scale   SubCutaneous three times a day before meals  insulin lispro Injectable (ADMELOG) 10 Unit(s) SubCutaneous before dinner  insulin lispro Injectable (ADMELOG) 10 Unit(s) SubCutaneous before lunch  insulin lispro Injectable (ADMELOG) 12 Unit(s) SubCutaneous before breakfast  lidocaine   4% Patch 1 Patch Transdermal daily  metoprolol tartrate 25 milliGRAM(s) Oral two times a day  montelukast 10 milliGRAM(s) Oral at bedtime  mupirocin 2% Nasal 1 Application(s) Both Nostrils two times a day  naloxone Injectable 0.4 milliGRAM(s) IV Push once  pantoprazole    Tablet 40 milliGRAM(s) Oral before breakfast  polyethylene glycol 3350 17 Gram(s) Oral daily  senna 2 Tablet(s) Oral at bedtime  sodium bicarbonate 650 milliGRAM(s) Oral two times a day  sodium zirconium cyclosilicate 10 Gram(s) Oral daily  tiotropium 2.5 MICROgram(s) Inhaler 2 Puff(s) Inhalation daily  vancomycin  IVPB 1000 milliGRAM(s) IV Intermittent once      TELEMETRY: 	    ECG:  	  RADIOLOGY:   DIAGNOSTIC TESTING:  [ ] Echocardiogram:  [ ]  Catheterization:  [ ] Stress Test:    OTHER: 	    LABS:	 	                            12.3   6.68  )-----------( 126      ( 29 Apr 2024 07:00 )             36.4     04-30    136  |  99  |  67<H>  ----------------------------<  172<H>  3.9   |  23  |  3.31<H>    Ca    8.8      30 Apr 2024 07:02  Phos  4.4     04-30  Mg     1.9     04-30    TPro  6.7  /  Alb  3.7  /  TBili  0.4  /  DBili  x   /  AST  14  /  ALT  9<L>  /  AlkPhos  74  04-29

## 2024-04-30 NOTE — PROVIDER CONTACT NOTE (CRITICAL VALUE NOTIFICATION) - BACKGROUND
pt admitted for cellulitis of R middle finger wound s/p I&D, pmhx GERD, CKD, HTN, diabetes, HLD, asthma.

## 2024-04-30 NOTE — PROVIDER CONTACT NOTE (CRITICAL VALUE NOTIFICATION) - ASSESSMENT
Pt presents with a hx of bilateral earache and cervical lymphadenopathy developing for the past 10 days.  She has been seen medically twice in the interim, and treated with Amoxicillin (completed) and now Cleocin.    pt a&ox4, VSS, no acute distress noted.

## 2024-04-30 NOTE — PROVIDER CONTACT NOTE (CRITICAL VALUE NOTIFICATION) - ACTION/TREATMENT ORDERED:
pt to be placed on contact isolation, pt receiving vanco by dose next vanc level in AM, continue to monitor.

## 2024-04-30 NOTE — PROGRESS NOTE ADULT - SUBJECTIVE AND OBJECTIVE BOX
[facility] Progress Note    MAR, chart notes, fingersticks and labs reviewed    Subjective:    Objective  Vital Signs  T(C): 36.4 (04-30-24 @ 05:35), Max: 36.4 (04-29-24 @ 21:01)  HR: 59 (04-30-24 @ 05:35) (59 - 62)  BP: 119/67 (04-30-24 @ 05:35) (119/67 - 147/76)  RR: 18 (04-30-24 @ 05:35) (18 - 18)  SpO2: 97% (04-30-24 @ 05:35) (97% - 98%)    Physical Exam  Gen: NAD, alert, awake  HEENT: NC/AT, EOMI  Neck: supple  Chest: breathing comfortably  Heart: +s1 +s2    Medications  acetaminophen     Tablet .. 650 milliGRAM(s) Oral every 6 hours PRN  albuterol    90 MICROgram(s) HFA Inhaler 2 Puff(s) Inhalation every 6 hours PRN  allopurinol 300 milliGRAM(s) Oral daily  aluminum hydroxide/magnesium hydroxide/simethicone Suspension 30 milliLiter(s) Oral every 4 hours PRN  aspirin enteric coated 81 milliGRAM(s) Oral daily  atorvastatin 40 milliGRAM(s) Oral at bedtime  bisacodyl 5 milliGRAM(s) Oral daily PRN  budesonide 160 MICROgram(s)/formoterol 4.5 MICROgram(s) Inhaler 2 Puff(s) Inhalation two times a day  ceFAZolin   IVPB 1000 milliGRAM(s) IV Intermittent every 12 hours  dextrose 10% Bolus 125 milliLiter(s) IV Bolus once  dextrose 5%. 1000 milliLiter(s) IV Continuous <Continuous>  dextrose 5%. 1000 milliLiter(s) IV Continuous <Continuous>  dextrose 50% Injectable 25 Gram(s) IV Push once  dextrose 50% Injectable 12.5 Gram(s) IV Push once  dextrose Oral Gel 15 Gram(s) Oral once PRN  glucagon  Injectable 1 milliGRAM(s) IntraMuscular once  insulin glargine Injectable (LANTUS) 18 Unit(s) SubCutaneous at bedtime  insulin lispro (ADMELOG) corrective regimen sliding scale   SubCutaneous at bedtime  insulin lispro (ADMELOG) corrective regimen sliding scale   SubCutaneous three times a day before meals  insulin lispro Injectable (ADMELOG) 10 Unit(s) SubCutaneous before lunch  insulin lispro Injectable (ADMELOG) 12 Unit(s) SubCutaneous before breakfast  insulin lispro Injectable (ADMELOG) 10 Unit(s) SubCutaneous before dinner  lidocaine   4% Patch 1 Patch Transdermal daily  melatonin 3 milliGRAM(s) Oral at bedtime PRN  metoprolol tartrate 25 milliGRAM(s) Oral two times a day  montelukast 10 milliGRAM(s) Oral at bedtime  mupirocin 2% Nasal 1 Application(s) Both Nostrils two times a day  naloxone Injectable 0.4 milliGRAM(s) IV Push once  ondansetron Injectable 4 milliGRAM(s) IV Push every 8 hours PRN  oxyCODONE    IR 2.5 milliGRAM(s) Oral every 4 hours PRN  oxyCODONE    IR 5 milliGRAM(s) Oral every 4 hours PRN  pantoprazole    Tablet 40 milliGRAM(s) Oral before breakfast  polyethylene glycol 3350 17 Gram(s) Oral daily  senna 2 Tablet(s) Oral at bedtime  sodium bicarbonate 650 milliGRAM(s) Oral two times a day  sodium zirconium cyclosilicate 10 Gram(s) Oral daily  tiotropium 2.5 MICROgram(s) Inhaler 2 Puff(s) Inhalation daily  vancomycin  IVPB 1000 milliGRAM(s) IV Intermittent once    Pertinent labs/Imaging  POCT Blood Glucose.: 151 mg/dL (04-30-24 @ 08:52)  POCT Blood Glucose.: 138 mg/dL (04-29-24 @ 21:45)  POCT Blood Glucose.: 155 mg/dL (04-29-24 @ 17:17)  POCT Blood Glucose.: 196 mg/dL (04-29-24 @ 12:43)    eGFR: 19 mL/min/1.73m2 (04-30-24 @ 07:02)  eGFR: 20 mL/min/1.73m2 (04-29-24 @ 07:02)       [facility] Progress Note    MAR, chart notes, fingersticks and labs reviewed    Subjective: feels well, pending culture results    Objective  Vital Signs  T(C): 36.4 (04-30-24 @ 05:35), Max: 36.4 (04-29-24 @ 21:01)  HR: 59 (04-30-24 @ 05:35) (59 - 62)  BP: 119/67 (04-30-24 @ 05:35) (119/67 - 147/76)  RR: 18 (04-30-24 @ 05:35) (18 - 18)  SpO2: 97% (04-30-24 @ 05:35) (97% - 98%)    Physical Exam  Gen: NAD, alert, awake  HEENT: NC/AT, EOMI  Neck: supple  Chest: breathing comfortably  Heart: +s1 +s2    Medications  acetaminophen     Tablet .. 650 milliGRAM(s) Oral every 6 hours PRN  albuterol    90 MICROgram(s) HFA Inhaler 2 Puff(s) Inhalation every 6 hours PRN  allopurinol 300 milliGRAM(s) Oral daily  aluminum hydroxide/magnesium hydroxide/simethicone Suspension 30 milliLiter(s) Oral every 4 hours PRN  aspirin enteric coated 81 milliGRAM(s) Oral daily  atorvastatin 40 milliGRAM(s) Oral at bedtime  bisacodyl 5 milliGRAM(s) Oral daily PRN  budesonide 160 MICROgram(s)/formoterol 4.5 MICROgram(s) Inhaler 2 Puff(s) Inhalation two times a day  ceFAZolin   IVPB 1000 milliGRAM(s) IV Intermittent every 12 hours  dextrose 10% Bolus 125 milliLiter(s) IV Bolus once  dextrose 5%. 1000 milliLiter(s) IV Continuous <Continuous>  dextrose 5%. 1000 milliLiter(s) IV Continuous <Continuous>  dextrose 50% Injectable 25 Gram(s) IV Push once  dextrose 50% Injectable 12.5 Gram(s) IV Push once  dextrose Oral Gel 15 Gram(s) Oral once PRN  glucagon  Injectable 1 milliGRAM(s) IntraMuscular once  insulin glargine Injectable (LANTUS) 18 Unit(s) SubCutaneous at bedtime  insulin lispro (ADMELOG) corrective regimen sliding scale   SubCutaneous at bedtime  insulin lispro (ADMELOG) corrective regimen sliding scale   SubCutaneous three times a day before meals  insulin lispro Injectable (ADMELOG) 10 Unit(s) SubCutaneous before lunch  insulin lispro Injectable (ADMELOG) 12 Unit(s) SubCutaneous before breakfast  insulin lispro Injectable (ADMELOG) 10 Unit(s) SubCutaneous before dinner  lidocaine   4% Patch 1 Patch Transdermal daily  melatonin 3 milliGRAM(s) Oral at bedtime PRN  metoprolol tartrate 25 milliGRAM(s) Oral two times a day  montelukast 10 milliGRAM(s) Oral at bedtime  mupirocin 2% Nasal 1 Application(s) Both Nostrils two times a day  naloxone Injectable 0.4 milliGRAM(s) IV Push once  ondansetron Injectable 4 milliGRAM(s) IV Push every 8 hours PRN  oxyCODONE    IR 2.5 milliGRAM(s) Oral every 4 hours PRN  oxyCODONE    IR 5 milliGRAM(s) Oral every 4 hours PRN  pantoprazole    Tablet 40 milliGRAM(s) Oral before breakfast  polyethylene glycol 3350 17 Gram(s) Oral daily  senna 2 Tablet(s) Oral at bedtime  sodium bicarbonate 650 milliGRAM(s) Oral two times a day  sodium zirconium cyclosilicate 10 Gram(s) Oral daily  tiotropium 2.5 MICROgram(s) Inhaler 2 Puff(s) Inhalation daily  vancomycin  IVPB 1000 milliGRAM(s) IV Intermittent once    Pertinent labs/Imaging  POCT Blood Glucose.: 151 mg/dL (04-30-24 @ 08:52)  POCT Blood Glucose.: 138 mg/dL (04-29-24 @ 21:45)  POCT Blood Glucose.: 155 mg/dL (04-29-24 @ 17:17)  POCT Blood Glucose.: 196 mg/dL (04-29-24 @ 12:43)    eGFR: 19 mL/min/1.73m2 (04-30-24 @ 07:02)  eGFR: 20 mL/min/1.73m2 (04-29-24 @ 07:02)

## 2024-05-01 ENCOUNTER — TRANSCRIPTION ENCOUNTER (OUTPATIENT)
Age: 69
End: 2024-05-01

## 2024-05-01 VITALS
TEMPERATURE: 98 F | SYSTOLIC BLOOD PRESSURE: 133 MMHG | HEART RATE: 90 BPM | RESPIRATION RATE: 18 BRPM | DIASTOLIC BLOOD PRESSURE: 55 MMHG | OXYGEN SATURATION: 90 %

## 2024-05-01 LAB
ANION GAP SERPL CALC-SCNC: 13 MMOL/L — SIGNIFICANT CHANGE UP (ref 5–17)
BASOPHILS # BLD AUTO: 0.07 K/UL — SIGNIFICANT CHANGE UP (ref 0–0.2)
BASOPHILS NFR BLD AUTO: 1.2 % — SIGNIFICANT CHANGE UP (ref 0–2)
BUN SERPL-MCNC: 68 MG/DL — HIGH (ref 7–23)
CALCIUM SERPL-MCNC: 9.3 MG/DL — SIGNIFICANT CHANGE UP (ref 8.4–10.5)
CHLORIDE SERPL-SCNC: 101 MMOL/L — SIGNIFICANT CHANGE UP (ref 96–108)
CO2 SERPL-SCNC: 25 MMOL/L — SIGNIFICANT CHANGE UP (ref 22–31)
CREAT SERPL-MCNC: 3.4 MG/DL — HIGH (ref 0.5–1.3)
EGFR: 19 ML/MIN/1.73M2 — LOW
EOSINOPHIL # BLD AUTO: 0.52 K/UL — HIGH (ref 0–0.5)
EOSINOPHIL NFR BLD AUTO: 9 % — HIGH (ref 0–6)
GLUCOSE BLDC GLUCOMTR-MCNC: 126 MG/DL — HIGH (ref 70–99)
GLUCOSE BLDC GLUCOMTR-MCNC: 138 MG/DL — HIGH (ref 70–99)
GLUCOSE BLDC GLUCOMTR-MCNC: 165 MG/DL — HIGH (ref 70–99)
GLUCOSE SERPL-MCNC: 95 MG/DL — SIGNIFICANT CHANGE UP (ref 70–99)
HCT VFR BLD CALC: 35.5 % — LOW (ref 39–50)
HGB BLD-MCNC: 12.1 G/DL — LOW (ref 13–17)
IMM GRANULOCYTES NFR BLD AUTO: 0.3 % — SIGNIFICANT CHANGE UP (ref 0–0.9)
LYMPHOCYTES # BLD AUTO: 2.22 K/UL — SIGNIFICANT CHANGE UP (ref 1–3.3)
LYMPHOCYTES # BLD AUTO: 38.3 % — SIGNIFICANT CHANGE UP (ref 13–44)
MAGNESIUM SERPL-MCNC: 2.1 MG/DL — SIGNIFICANT CHANGE UP (ref 1.6–2.6)
MCHC RBC-ENTMCNC: 29 PG — SIGNIFICANT CHANGE UP (ref 27–34)
MCHC RBC-ENTMCNC: 34.1 GM/DL — SIGNIFICANT CHANGE UP (ref 32–36)
MCV RBC AUTO: 85.1 FL — SIGNIFICANT CHANGE UP (ref 80–100)
MONOCYTES # BLD AUTO: 0.63 K/UL — SIGNIFICANT CHANGE UP (ref 0–0.9)
MONOCYTES NFR BLD AUTO: 10.9 % — SIGNIFICANT CHANGE UP (ref 2–14)
NEUTROPHILS # BLD AUTO: 2.34 K/UL — SIGNIFICANT CHANGE UP (ref 1.8–7.4)
NEUTROPHILS NFR BLD AUTO: 40.3 % — LOW (ref 43–77)
NRBC # BLD: 0 /100 WBCS — SIGNIFICANT CHANGE UP (ref 0–0)
PHOSPHATE SERPL-MCNC: 3.9 MG/DL — SIGNIFICANT CHANGE UP (ref 2.5–4.5)
PLATELET # BLD AUTO: 122 K/UL — LOW (ref 150–400)
POTASSIUM SERPL-MCNC: 3.9 MMOL/L — SIGNIFICANT CHANGE UP (ref 3.5–5.3)
POTASSIUM SERPL-SCNC: 3.9 MMOL/L — SIGNIFICANT CHANGE UP (ref 3.5–5.3)
RBC # BLD: 4.17 M/UL — LOW (ref 4.2–5.8)
RBC # FLD: 12.1 % — SIGNIFICANT CHANGE UP (ref 10.3–14.5)
SODIUM SERPL-SCNC: 139 MMOL/L — SIGNIFICANT CHANGE UP (ref 135–145)
VANCOMYCIN FLD-MCNC: 10.7 UG/ML — SIGNIFICANT CHANGE UP
WBC # BLD: 5.8 K/UL — SIGNIFICANT CHANGE UP (ref 3.8–10.5)
WBC # FLD AUTO: 5.8 K/UL — SIGNIFICANT CHANGE UP (ref 3.8–10.5)

## 2024-05-01 PROCEDURE — 85045 AUTOMATED RETICULOCYTE COUNT: CPT

## 2024-05-01 PROCEDURE — 83550 IRON BINDING TEST: CPT

## 2024-05-01 PROCEDURE — 82570 ASSAY OF URINE CREATININE: CPT

## 2024-05-01 PROCEDURE — 82962 GLUCOSE BLOOD TEST: CPT

## 2024-05-01 PROCEDURE — 84156 ASSAY OF PROTEIN URINE: CPT

## 2024-05-01 PROCEDURE — 99285 EMERGENCY DEPT VISIT HI MDM: CPT

## 2024-05-01 PROCEDURE — 99232 SBSQ HOSP IP/OBS MODERATE 35: CPT

## 2024-05-01 PROCEDURE — 84540 ASSAY OF URINE/UREA-N: CPT

## 2024-05-01 PROCEDURE — 73120 X-RAY EXAM OF HAND: CPT

## 2024-05-01 PROCEDURE — 84100 ASSAY OF PHOSPHORUS: CPT

## 2024-05-01 PROCEDURE — 99239 HOSP IP/OBS DSCHRG MGMT >30: CPT

## 2024-05-01 PROCEDURE — 87070 CULTURE OTHR SPECIMN AEROBIC: CPT

## 2024-05-01 PROCEDURE — 85610 PROTHROMBIN TIME: CPT

## 2024-05-01 PROCEDURE — 86140 C-REACTIVE PROTEIN: CPT

## 2024-05-01 PROCEDURE — 84550 ASSAY OF BLOOD/URIC ACID: CPT

## 2024-05-01 PROCEDURE — 83735 ASSAY OF MAGNESIUM: CPT

## 2024-05-01 PROCEDURE — 85730 THROMBOPLASTIN TIME PARTIAL: CPT

## 2024-05-01 PROCEDURE — 80061 LIPID PANEL: CPT

## 2024-05-01 PROCEDURE — 87640 STAPH A DNA AMP PROBE: CPT

## 2024-05-01 PROCEDURE — 83615 LACTATE (LD) (LDH) ENZYME: CPT

## 2024-05-01 PROCEDURE — 82607 VITAMIN B-12: CPT

## 2024-05-01 PROCEDURE — 80048 BASIC METABOLIC PNL TOTAL CA: CPT

## 2024-05-01 PROCEDURE — 81001 URINALYSIS AUTO W/SCOPE: CPT

## 2024-05-01 PROCEDURE — 36415 COLL VENOUS BLD VENIPUNCTURE: CPT

## 2024-05-01 PROCEDURE — 96374 THER/PROPH/DIAG INJ IV PUSH: CPT

## 2024-05-01 PROCEDURE — 82728 ASSAY OF FERRITIN: CPT

## 2024-05-01 PROCEDURE — 84443 ASSAY THYROID STIM HORMONE: CPT

## 2024-05-01 PROCEDURE — 80053 COMPREHEN METABOLIC PANEL: CPT

## 2024-05-01 PROCEDURE — 83036 HEMOGLOBIN GLYCOSYLATED A1C: CPT

## 2024-05-01 PROCEDURE — 87641 MR-STAPH DNA AMP PROBE: CPT

## 2024-05-01 PROCEDURE — 80202 ASSAY OF VANCOMYCIN: CPT

## 2024-05-01 PROCEDURE — 87186 SC STD MICRODIL/AGAR DIL: CPT

## 2024-05-01 PROCEDURE — 83970 ASSAY OF PARATHORMONE: CPT

## 2024-05-01 PROCEDURE — 85652 RBC SED RATE AUTOMATED: CPT

## 2024-05-01 PROCEDURE — 82306 VITAMIN D 25 HYDROXY: CPT

## 2024-05-01 PROCEDURE — 84300 ASSAY OF URINE SODIUM: CPT

## 2024-05-01 PROCEDURE — 82746 ASSAY OF FOLIC ACID SERUM: CPT

## 2024-05-01 PROCEDURE — 87205 SMEAR GRAM STAIN: CPT

## 2024-05-01 PROCEDURE — 85025 COMPLETE CBC W/AUTO DIFF WBC: CPT

## 2024-05-01 PROCEDURE — 97165 OT EVAL LOW COMPLEX 30 MIN: CPT

## 2024-05-01 PROCEDURE — 97161 PT EVAL LOW COMPLEX 20 MIN: CPT

## 2024-05-01 PROCEDURE — 94640 AIRWAY INHALATION TREATMENT: CPT

## 2024-05-01 PROCEDURE — 82310 ASSAY OF CALCIUM: CPT

## 2024-05-01 PROCEDURE — 83010 ASSAY OF HAPTOGLOBIN QUANT: CPT

## 2024-05-01 PROCEDURE — 83540 ASSAY OF IRON: CPT

## 2024-05-01 RX ORDER — LISINOPRIL 2.5 MG/1
1 TABLET ORAL
Refills: 0 | DISCHARGE

## 2024-05-01 RX ORDER — CHLORHEXIDINE GLUCONATE 213 G/1000ML
1 SOLUTION TOPICAL DAILY
Refills: 0 | Status: DISCONTINUED | OUTPATIENT
Start: 2024-05-01 | End: 2024-05-01

## 2024-05-01 RX ADMIN — Medication 650 MILLIGRAM(S): at 05:57

## 2024-05-01 RX ADMIN — Medication 100 MILLIGRAM(S): at 06:34

## 2024-05-01 RX ADMIN — PANTOPRAZOLE SODIUM 40 MILLIGRAM(S): 20 TABLET, DELAYED RELEASE ORAL at 05:58

## 2024-05-01 RX ADMIN — BUDESONIDE AND FORMOTEROL FUMARATE DIHYDRATE 2 PUFF(S): 160; 4.5 AEROSOL RESPIRATORY (INHALATION) at 05:57

## 2024-05-01 RX ADMIN — TIOTROPIUM BROMIDE 2 PUFF(S): 18 CAPSULE ORAL; RESPIRATORY (INHALATION) at 12:55

## 2024-05-01 RX ADMIN — CHLORHEXIDINE GLUCONATE 1 APPLICATION(S): 213 SOLUTION TOPICAL at 12:57

## 2024-05-01 RX ADMIN — Medication 300 MILLIGRAM(S): at 12:55

## 2024-05-01 RX ADMIN — Medication 10 UNIT(S): at 12:54

## 2024-05-01 RX ADMIN — Medication 12 UNIT(S): at 09:34

## 2024-05-01 RX ADMIN — Medication 81 MILLIGRAM(S): at 12:54

## 2024-05-01 RX ADMIN — Medication 100 MILLIGRAM(S): at 12:57

## 2024-05-01 RX ADMIN — Medication 2: at 12:54

## 2024-05-01 RX ADMIN — POLYETHYLENE GLYCOL 3350 17 GRAM(S): 17 POWDER, FOR SOLUTION ORAL at 12:54

## 2024-05-01 RX ADMIN — Medication 25 MILLIGRAM(S): at 05:57

## 2024-05-01 RX ADMIN — MUPIROCIN 1 APPLICATION(S): 20 OINTMENT TOPICAL at 05:58

## 2024-05-01 NOTE — DISCHARGE NOTE PROVIDER - NSDCFUADDAPPT_GEN_ALL_CORE_FT
APPTS ARE READY TO BE MADE: [X] YES    Best Family or Patient Contact (if needed):    Additional Information about above appointments (if needed):    1: Nephrology  2: Hand surgery  3:     Other comments or requests:    APPTS ARE READY TO BE MADE: [X] YES    Best Family or Patient Contact (if needed):    Additional Information about above appointments (if needed):    1: Nephrology  2: Hand surgery  3:     Other comments or requests:     Prior to outreaching the patient, it was visible that the patient has secured a follow up appointment which was not scheduled by our team. Patient is scheduled with Dr. Armendariz 5/6 10:45am [address] [place] [address] APPTS ARE READY TO BE MADE: [X] YES    Best Family or Patient Contact (if needed):    Additional Information about above appointments (if needed):    1: Nephrology  2: Hand surgery  3:     Other comments or requests:     Prior to outreaching the patient, it was visible that the patient has secured a follow up appointment which was not scheduled by our team. Patient is scheduled with Dr. Armendariz 5/6 10:45am [address] [facility]  Provided patient with provider referral information, however patient prefers to schedule the appointments on their own

## 2024-05-01 NOTE — PROGRESS NOTE ADULT - NSPROGADDITIONALINFOA_GEN_ALL_CORE
Medically stable for discharge home with hand surgery follow up. Discussed with patient, 4 DSU ACP. Total time spent discharge planning 49 minutes.
Discussed with patient, 4 DSU ACP.
Discussed with patient, 4 DSU ACP.
d/w acp

## 2024-05-01 NOTE — PROGRESS NOTE ADULT - PROVIDER SPECIALTY LIST ADULT
Problem: Potential for injury, Restraints  Goal: # Remains free from injury  Outcome: Outcome Met, Continue evaluating goal progress toward completion  Note: Pt remains free from injury. Restraints taken off q2 and ROM performed. Will continue to monitor.      Problem: Breathing Pattern Ineffective  Goal: Air exchange is effective, demonstrated by Sp02 sat of greater then or = 92% (or as ordered)  Outcome: Outcome Met, Continue evaluating goal progress toward completion  Note: Pt remains intubated and FiO2 30%. Will continue to monitor.      
Cardiology
Endocrinology
Infectious Disease
Infectious Disease
Nephrology
Endocrinology
Endocrinology
Nephrology
Orthopedics
Cardiology
Cardiology
Nephrology
Hospitalist
Hospitalist
Internal Medicine
Hospitalist

## 2024-05-01 NOTE — DISCHARGE NOTE NURSING/CASE MANAGEMENT/SOCIAL WORK - PATIENT PORTAL LINK FT
You can access the FollowMyHealth Patient Portal offered by [facility] by registering at the following website: [email or web address]. By joining [email or web address]’s FollowMyHealth portal, you will also be able to view your health information using other applications (apps) compatible with our system.

## 2024-05-01 NOTE — PROGRESS NOTE ADULT - PROBLEM SELECTOR PLAN 5
demadex  lopressor  hold zestril
Continue lopressor  hold zestril

## 2024-05-01 NOTE — DISCHARGE NOTE NURSING/CASE MANAGEMENT/SOCIAL WORK - NSDCFUADDAPPT_GEN_ALL_CORE_FT
APPTS ARE READY TO BE MADE: [X] YES    Best Family or Patient Contact (if needed):    Additional Information about above appointments (if needed):    1: Nephrology  2: Hand surgery  3:     Other comments or requests:

## 2024-05-01 NOTE — PROGRESS NOTE ADULT - SUBJECTIVE AND OBJECTIVE BOX
[facility] Progress Note    MAR, chart notes, fingersticks and labs reviewed    Subjective: going home, waiting for DC paperwork    Objective  Vital Signs  T(C): 36.9 (05-01-24 @ 13:11), Max: 36.9 (05-01-24 @ 13:11)  HR: 90 (05-01-24 @ 13:11) (58 - 90)  BP: 133/55 (05-01-24 @ 13:11) (131/70 - 164/79)  RR: 18 (05-01-24 @ 13:11) (18 - 18)  SpO2: 90% (05-01-24 @ 13:11) (90% - 99%)    Physical Exam  Gen: NAD, alert, awake  HEENT: NC/AT, EOMI  Neck: supple  Chest: breathing comfortably  Heart: +s1 +s2    Medications  acetaminophen     Tablet .. 650 milliGRAM(s) Oral every 6 hours PRN  albuterol    90 MICROgram(s) HFA Inhaler 2 Puff(s) Inhalation every 6 hours PRN  allopurinol 300 milliGRAM(s) Oral daily  aluminum hydroxide/magnesium hydroxide/simethicone Suspension 30 milliLiter(s) Oral every 4 hours PRN  aspirin enteric coated 81 milliGRAM(s) Oral daily  atorvastatin 40 milliGRAM(s) Oral at bedtime  bisacodyl 5 milliGRAM(s) Oral daily PRN  budesonide 160 MICROgram(s)/formoterol 4.5 MICROgram(s) Inhaler 2 Puff(s) Inhalation two times a day  chlorhexidine 2% Cloths 1 Application(s) Topical daily  dextrose 10% Bolus 125 milliLiter(s) IV Bolus once  dextrose 5%. 1000 milliLiter(s) IV Continuous <Continuous>  dextrose 5%. 1000 milliLiter(s) IV Continuous <Continuous>  dextrose 50% Injectable 25 Gram(s) IV Push once  dextrose 50% Injectable 12.5 Gram(s) IV Push once  dextrose Oral Gel 15 Gram(s) Oral once PRN  glucagon  Injectable 1 milliGRAM(s) IntraMuscular once  insulin glargine Injectable (LANTUS) 18 Unit(s) SubCutaneous at bedtime  insulin lispro (ADMELOG) corrective regimen sliding scale   SubCutaneous three times a day before meals  insulin lispro (ADMELOG) corrective regimen sliding scale   SubCutaneous at bedtime  insulin lispro Injectable (ADMELOG) 10 Unit(s) SubCutaneous before dinner  insulin lispro Injectable (ADMELOG) 10 Unit(s) SubCutaneous before lunch  insulin lispro Injectable (ADMELOG) 12 Unit(s) SubCutaneous before breakfast  lidocaine   4% Patch 1 Patch Transdermal daily  melatonin 3 milliGRAM(s) Oral at bedtime PRN  metoprolol tartrate 25 milliGRAM(s) Oral two times a day  montelukast 10 milliGRAM(s) Oral at bedtime  mupirocin 2% Nasal 1 Application(s) Both Nostrils two times a day  naloxone Injectable 0.4 milliGRAM(s) IV Push once  ondansetron Injectable 4 milliGRAM(s) IV Push every 8 hours PRN  oxyCODONE    IR 2.5 milliGRAM(s) Oral every 4 hours PRN  oxyCODONE    IR 5 milliGRAM(s) Oral every 4 hours PRN  pantoprazole    Tablet 40 milliGRAM(s) Oral before breakfast  polyethylene glycol 3350 17 Gram(s) Oral daily  senna 2 Tablet(s) Oral at bedtime  sodium bicarbonate 650 milliGRAM(s) Oral two times a day  tiotropium 2.5 MICROgram(s) Inhaler 2 Puff(s) Inhalation daily    Pertinent labs/Imaging  POCT Blood Glucose.: 138 mg/dL (05-01-24 @ 17:12)  POCT Blood Glucose.: 165 mg/dL (05-01-24 @ 12:31)  POCT Blood Glucose.: 126 mg/dL (05-01-24 @ 09:07)  POCT Blood Glucose.: 100 mg/dL (04-30-24 @ 23:13)  POCT Blood Glucose.: 131 mg/dL (04-30-24 @ 21:52)  POCT Blood Glucose.: 125 mg/dL (04-30-24 @ 17:34)    eGFR: 19 mL/min/1.73m2 (05-01-24 @ 07:04)  eGFR: 19 mL/min/1.73m2 (04-30-24 @ 07:02)

## 2024-05-01 NOTE — DISCHARGE NOTE PROVIDER - HOSPITAL COURSE
68yo 83kg m w pmh htn, hld, dm, cad s/p cabg, ckd, rads/asthma/copd, bronchiectasis, savage, gerd, lprd, gout, p/w right middle finger swelling, pain/tenderness, redness, warmth; symptoms have been ongoing for a couple of weeks now; patient sought help from outpatient provider, who prescribed him a course of doxycycline a few days ago; however, redness/pain/swelling have not improved; reevaluated by provider earlier today, and recommended patient to go to er for further investigation. in er, found to have findings suggestive of cellulitis; admit to medicine for further mgmt (27 Apr 2024 22:14)    Hospital Course:  Cellulitis of right finger: xray shows no evidence of gas or underlying bone involvement, s/p incision and drainage with hand surgery, Received a course of IV antibiotics inpatient and will be discharged on Doxy 100mg bid x 6 days as per infectious disease specialist. TID betadine/normal saline soaks with 1cm packing removal daily  f/u outpt with Dr. Kumar within 1 week.    CAD (coronary artery disease): cont home asa, statin, bb.    Stage 3 chronic kidney disease: baseline cr appears to have been fluctuating between 2.5-2.9; currently over 3  avoid nephrotoxic agents; appropriate dose adjustments for all renally cleared medications.    DM (diabetes mellitus): continue with home regimen. Hgb a1C 8.1. seen by endocrinology     HTN (hypertension): Continue lopressor, hold zestril.    HLD (hyperlipidemia): lipitor.    Asthma-COPD overlap syndrome. h/o rads/asthma/copd, bronchiectasis, savage, trelegy => spiriva + symbicort singulair  ventolin prn.    GERD (gastroesophageal reflux disease): h/o lprd, protonix.    Important Medication Changes and Reason:   Cellulitis: Doxycycline  Hold nephrotoxic meds: hold zestril     Active or Pending Issues Requiring Follow-up:  Follow up with Dr Kumar in one week call office for appointment  See MD on Tuesday Nephrologist   Home nursing     Advanced Directives:   [ X] Full code  [ ] DNR  [ ] Hospice    Discharge Diagnoses:  Cellulitis R finger  Chronic Kidney Disease   Asthma  DM  GERD  As per attending pt is cleared for discharge and will follow up with primary care, appointment with nephrologist for tuesday. Call to confirm. Abx sent to pharmacy. Pt understands importance of finishing all medication and trained on wound care. Discharged with visiting nurse          70yo 83kg m w pmh htn, hld, dm, cad s/p cabg, ckd, rads/asthma/copd, bronchiectasis, savage, gerd, lprd, gout, p/w right middle finger swelling, pain/tenderness, redness, warmth; symptoms have been ongoing for a couple of weeks now; patient sought help from outpatient provider, who prescribed him a course of doxycycline a few days ago; however, redness/pain/swelling have not improved; reevaluated by provider earlier today, and recommended patient to go to er for further investigation. in er, found to have findings suggestive of cellulitis; admit to medicine for further mgmt (27 Apr 2024 22:14)    Hospital Course:  Cellulitis of right finger: xray shows no evidence of gas or underlying bone involvement, s/p incision and drainage with hand surgery, Received a course of IV antibiotics inpatient and will be discharged on Doxy 100mg bid x 6 days as per infectious disease specialist. TID betadine/normal saline soaks with 1cm packing removal daily  f/u outpt with Dr. Kumar within 1 week.    CAD (coronary artery disease): cont home asa, statin, bb.    Stage 3 chronic kidney disease: baseline cr appears to have been fluctuating between 2.5-2.9; currently over 3  avoid nephrotoxic agents; appropriate dose adjustments for all renally cleared medications.    DM (diabetes mellitus): continue with home regimen. Hgb a1C 8.1. seen by endocrinology     HTN (hypertension): Continue lopressor, hold zestril.    HLD (hyperlipidemia): lipitor.    Asthma-COPD overlap syndrome. h/o rads/asthma/copd, bronchiectasis, savage, trelegy => spiriva + symbicort singulair  ventolin prn.    GERD (gastroesophageal reflux disease): h/o lprd, protonix.    Important Medication Changes and Reason:   Cellulitis: Doxycycline  Hold nephrotoxic meds: hold zestril     Active or Pending Issues Requiring Follow-up:  Follow up with Dr Kumar in one week call office for appointment  See MD on Tuesday Nephrologist   Home nursing     Advanced Directives:   [ X] Full code  [ ] DNR  [ ] Hospice    Discharge Diagnoses:  MRSA Cellulitis R finger  Chronic Kidney Disease   Asthma  DM  GERD  As per attending pt is cleared for discharge and will follow up with primary care, appointment with nephrologist for Tuesday. Call to confirm. Abx sent to pharmacy. Pt understands importance of finishing all medication and trained on wound care. Discharged with visiting nurse

## 2024-05-01 NOTE — PROGRESS NOTE ADULT - SUBJECTIVE AND OBJECTIVE BOX
CARDIOLOGY FOLLOW UP - Dr. Steel  DATE OF SERVICE: 5/1/24    CC  No cv complaints     REVIEW OF SYSTEMS:  CONSTITUTIONAL: No fever, weight loss, or fatigue  RESPIRATORY: No cough, wheezing, chills or hemoptysis; No Shortness of Breath  CARDIOVASCULAR: No chest pain, palpitations, passing out, dizziness, or leg swelling  GASTROINTESTINAL: No abdominal or epigastric pain. No nausea, vomiting, or hematemesis; No diarrhea or constipation. No melena or hematochezia.  VASCULAR: No edema     PHYSICAL EXAM:  T(C): 36.5 (05-01-24 @ 04:54), Max: 36.7 (04-30-24 @ 09:58)  HR: 59 (05-01-24 @ 04:54) (56 - 65)  BP: 155/72 (05-01-24 @ 04:54) (124/78 - 155/72)  RR: 18 (05-01-24 @ 04:54) (18 - 18)  SpO2: 97% (05-01-24 @ 04:54) (96% - 99%)  Wt(kg): --  I&O's Summary    30 Apr 2024 07:01  -  01 May 2024 07:00  --------------------------------------------------------  IN: 480 mL / OUT: 0 mL / NET: 480 mL        Appearance: Normal	  Cardiovascular: Normal S1 S2,RRR, No JVD, No murmurs  Respiratory: Lungs clear to auscultation b/l   Gastrointestinal:  Soft, Non-tender, + BS	  Extremities: Normal range of motion, No clubbing, cyanosis or edema      Home Medications:  albuterol 90 mcg/inh inhalation aerosol: 2 puff(s) inhaled every 6 hours, As needed, Shortness of Breath and/or Wheezing (27 Apr 2024 22:39)  allopurinol 300 mg oral tablet: 1 tab(s) orally once a day (27 Apr 2024 22:40)  azelastine 137 mcg/inh (0.1%) nasal spray: 2 spray(s) intranasally 2 times a day (27 Apr 2024 22:38)  cholecalciferol 1250 mcg (50,000 intl units) oral capsule: 1 cap(s) orally once a week (27 Apr 2024 22:42)  colchicine 0.6 mg oral capsule: 1 cap(s) orally once a day as needed for  gout pain (27 Apr 2024 22:46)  Farxiga 10 mg oral tablet: 1 tab(s) orally once a day (27 Apr 2024 22:42)  HumaLOG KwikPen 100 units/mL injectable solution: 34 unit(s) subcutaneous 3 times a day before meals (27 Apr 2024 22:35)  loratadine 10 mg oral tablet: 1 tab(s) orally once a day, As Needed (27 Apr 2024 22:39)  metoprolol tartrate 25 mg oral tablet: 1 tab(s) orally 2 times a day (27 Apr 2024 22:46)  montelukast 10 mg oral tablet: 1 tab(s) orally once a day (at bedtime) (27 Apr 2024 22:42)  sodium bicarbonate 650 mg oral tablet: 1 tab(s) orally 2 times a day (27 Apr 2024 22:46)  Trelegy Ellipta 100 mcg-62.5 mcg-25 mcg/inh inhalation powder: 1 puff(s) inhaled once a day (27 Apr 2024 22:46)  Veltassa 8.4 g oral powder for reconstitution: 8.4 gram(s) orally once a day (27 Apr 2024 22:46)  Zestril 10 mg oral tablet: 1 tab(s) orally once a day (27 Apr 2024 22:46)      MEDICATIONS  (STANDING):  allopurinol 300 milliGRAM(s) Oral daily  aspirin enteric coated 81 milliGRAM(s) Oral daily  atorvastatin 40 milliGRAM(s) Oral at bedtime  budesonide 160 MICROgram(s)/formoterol 4.5 MICROgram(s) Inhaler 2 Puff(s) Inhalation two times a day  ceFAZolin   IVPB 1000 milliGRAM(s) IV Intermittent every 12 hours  chlorhexidine 2% Cloths 1 Application(s) Topical daily  dextrose 10% Bolus 125 milliLiter(s) IV Bolus once  dextrose 5%. 1000 milliLiter(s) (50 mL/Hr) IV Continuous <Continuous>  dextrose 5%. 1000 milliLiter(s) (100 mL/Hr) IV Continuous <Continuous>  dextrose 50% Injectable 25 Gram(s) IV Push once  dextrose 50% Injectable 12.5 Gram(s) IV Push once  glucagon  Injectable 1 milliGRAM(s) IntraMuscular once  insulin glargine Injectable (LANTUS) 18 Unit(s) SubCutaneous at bedtime  insulin lispro (ADMELOG) corrective regimen sliding scale   SubCutaneous at bedtime  insulin lispro (ADMELOG) corrective regimen sliding scale   SubCutaneous three times a day before meals  insulin lispro Injectable (ADMELOG) 10 Unit(s) SubCutaneous before lunch  insulin lispro Injectable (ADMELOG) 12 Unit(s) SubCutaneous before breakfast  insulin lispro Injectable (ADMELOG) 10 Unit(s) SubCutaneous before dinner  lidocaine   4% Patch 1 Patch Transdermal daily  metoprolol tartrate 25 milliGRAM(s) Oral two times a day  montelukast 10 milliGRAM(s) Oral at bedtime  mupirocin 2% Nasal 1 Application(s) Both Nostrils two times a day  naloxone Injectable 0.4 milliGRAM(s) IV Push once  pantoprazole    Tablet 40 milliGRAM(s) Oral before breakfast  polyethylene glycol 3350 17 Gram(s) Oral daily  senna 2 Tablet(s) Oral at bedtime  sodium bicarbonate 650 milliGRAM(s) Oral two times a day  tiotropium 2.5 MICROgram(s) Inhaler 2 Puff(s) Inhalation daily      TELEMETRY: 	    ECG:  	  RADIOLOGY:   DIAGNOSTIC TESTING:  [ ] Echocardiogram:  [ ]  Catheterization:  [ ] Stress Test:    OTHER: 	    LABS:	 	                            12.1   5.80  )-----------( 122      ( 01 May 2024 07:03 )             35.5     05-01    139  |  101  |  68<H>  ----------------------------<  95  3.9   |  25  |  3.40<H>    Ca    9.3      01 May 2024 07:04  Phos  3.9     05-01  Mg     2.1     05-01

## 2024-05-01 NOTE — PROGRESS NOTE ADULT - PROBLEM SELECTOR PLAN 4
hold home regimen  follow up a1c  trend fingerstick  15 u glargine + 5 u lispro tidac w low dose correctional scale  adjust to maintain goal 100-180  carb consistent diet
hold home regimen  A1C 8.1  trend fingerstick  Endo consulted, follow up recs

## 2024-05-01 NOTE — PROGRESS NOTE ADULT - PROBLEM SELECTOR PLAN 1
h/o gout  appears to be nonpurulent, no area of fluctuance noted, does not appear to be necrotizing  tender, red, warm finger  afebrile, no leukocytosis, hds otherwise  xray shows no evidence of gas or underlying bone involvement  s/p vanc + unasyn in ER with improvement  follow up mrsa screen, inflammatory markers, uric acid  continue with ancef for now - pt reporting improvement this morning in erythema/swelling   continue zyloprim  consider further imaging with ct/mri lower extremity if no clinical improvement/worsening  analgesics and antipyretics as needed   elevate extremity + ice packs  ot eval in am  appreciate ortho eval - NWB RUE in soft dressing  TID betadine/normal saline soaks with 1cm packing removal daily  f/u outpt with Dr. Kumar within 1 week
xray shows no evidence of gas or underlying bone involvement  s/p incision and drainage with hand surgery  continue with Doxy per ID  ID consulted, follow up recs  TID betadine/normal saline soaks with 1cm packing removal daily  f/u outpt with Dr. Kumar within 1 week
xray shows no evidence of gas or underlying bone involvement  s/p incision and drainage with hand surgery  continue with ancef for now  ID consulted, follow up recs  TID betadine/normal saline soaks with 1cm packing removal daily  f/u outpt with Dr. Kumar within 1 week
xray shows no evidence of gas or underlying bone involvement  s/p incision and drainage with hand surgery  continue with ancef for now and Vanc by level  ID consulted, follow up recs  TID betadine/normal saline soaks with 1cm packing removal daily  f/u outpt with Dr. Kumar within 1 week

## 2024-05-01 NOTE — DISCHARGE NOTE PROVIDER - PROVIDER TOKENS
PROVIDER:[TOKEN:[[id number]],FOLLOWUP:[1 week]],PROVIDER:[TOKEN:[[id number]],FOLLOWUP:[1 week]] PROVIDER:[TOKEN:[[id number]],FOLLOWUP:[1 week]],PROVIDER:[TOKEN:[[id number]],FOLLOWUP:[1 week]]

## 2024-05-01 NOTE — PROGRESS NOTE ADULT - REASON FOR ADMISSION
right middle finger swelling, pain/tenderness, redness, warmth

## 2024-05-01 NOTE — DISCHARGE NOTE PROVIDER - NSDCCPCAREPLAN_GEN_ALL_CORE_FT
PRINCIPAL DISCHARGE DIAGNOSIS  Diagnosis: Cellulitis of right finger  Assessment and Plan of Treatment: You have cellulitis of right finger which is a serious infectino. The xray shows no evidence of gas or underlying bone involvement. It was drained by hand surgery. You received a course of IV antibiotics through your veins and now will take oral antibiotics for the next six days. It is important to pick this up from [facility] and finish all medication.  You were also seen by an infectious disease doctor. You should clean this three times daily with betadine/normal saline soaks with 1cm packing removal daily. Make an appointment to see Dr Kumar in one week   Cellulitis  Cellulitis is a skin infection caused by bacteria. This condition occurs most often in the arms and lower legs but can occur anywhere over the body. Symptoms include redness, swelling, warm skin, tenderness, and chills/fever. If you were prescribed an antibiotic medicine, take it as told by your health care provider. Do not stop taking the antibiotic even if you start to feel better.  SEEK IMMEDIATE MEDICAL CARE IF YOU HAVE ANY OF THE FOLLOWING SYMPTOMS: worsening fever, red streaks coming from affected area, vomiting or diarrhea, or dizziness/lightheadedness.      SECONDARY DISCHARGE DIAGNOSES  Diagnosis: Stage 3 chronic kidney disease  Assessment and Plan of Treatment: You have Stage 3 chronic kidney disease. Your levels are baseline however you should avoid nephrotoxic agents;  Make an appointment to see [facility] NE[facility] Dr Justin Campoverde M.D tuesday.   Goal of hemoglobin A1C (HgbA1C) < 7%.  Avoid nephrotoxic drugs such as nonsteroidal anti-inflammatory agents (NSAIDs).   Please follow up with your nephrologist to monitor your kidney function, continue with low protein and potassium diet.    Diagnosis: DM (diabetes mellitus)  Assessment and Plan of Treatment: Continue with you home regimen. Hgb a1C 8.1. seen by endocrinology.   Monitor finger sticks pre-meal and bedtime, low salt, fat and carbohydrate diet, minimize glucose intake.  Exercise daily for at least 30 minutes and weight loss.  Follow up with primary care physician and endocrinologist for routine Hemoglobin A1C checks and management.  Follow up with your ophthalmologist for routine yearly vision exams.    Diagnosis: HTN (hypertension)  Assessment and Plan of Treatment: Continue to take lopressor, however stop zestril and stop torsemide for now as these medications can hurt your kidneys    Diagnosis: CAD (coronary artery disease)  Assessment and Plan of Treatment: Continue to take your home medication of aspirin, statin and metoprolol as prescribed previously.   Do not stop unless instructed by your physician.  Continue low salt, fat, cholesterol and carbohydrate diet. Follow up with cardiologist and primary care physician's routine appointment.     PRINCIPAL DISCHARGE DIAGNOSIS  Diagnosis: Cellulitis of right finger  Assessment and Plan of Treatment: You have MRSA cellulitis of your right finger. The xray showed no evidence of gas or underlying bone involvement. It was drained by hand surgery. You received a course of IV antibiotics through your veins and now will take oral antibiotics for the next six days. It is important to [facility] up from [facility] (Doxycycline 100mg twice daily) and finish all medication. You were also seen by an infectious disease doctor. You should clean this three times daily with betadine/normal saline soaks with 1cm packing removal daily. Make an appointment to see Dr Kumar (hand surgery) in one week.      SECONDARY DISCHARGE DIAGNOSES  Diagnosis: Stage 3 chronic kidney disease  Assessment and Plan of Treatment: Please hold your Lisinopril and Torsemide until you see Dr. Dueñas next Tuesday.     PRINCIPAL DISCHARGE DIAGNOSIS  Diagnosis: Cellulitis of right finger  Assessment and Plan of Treatment: You have MRSA cellulitis of your right finger. The xray showed no evidence of gas or underlying bone involvement. It was drained by hand surgery. You received a course of IV antibiotics through your veins and now will take oral antibiotics for the next six days. It is important to pick this up from [facility] (Doxycycline 100mg twice daily) and finish all medication. You were also seen by an infectious disease doctor. You should clean this three times daily with betadine/normal saline soaks for one hour. Also there is 1cm packing that should be removed daily. Make an appointment to see Dr Kumar (hand surgery) in one week.      SECONDARY DISCHARGE DIAGNOSES  Diagnosis: Stage 3 chronic kidney disease  Assessment and Plan of Treatment: Please hold your Lisinopril and Torsemide until you see Dr. Dueñas next Tuesday.     PRINCIPAL DISCHARGE DIAGNOSIS  Diagnosis: Cellulitis of right finger  Assessment and Plan of Treatment: You have MRSA cellulitis of your right finger. The xray showed no evidence of gas or underlying bone involvement. It was drained by hand surgery. You received a course of IV antibiotics through your veins and now will take oral antibiotics for the next six days. It is important to pick [facility] up from [facility] (Doxycycline 100mg twice daily) and finish all medication. You were also seen by an infectious disease doctor. You should clean this three times daily with betadine/normal saline soaks for one hour. Also there is 1cm packing that should be removed daily. Make an appointment to see Dr Kumar (hand surgery) in one week.  Rightmiddle finger to be soaked three times daily as follows:  - Remove dressing gently  - Submerge whole hand and finger in 50:50 mix of betadine andnormal saline for1 hour   - Remove hand and gently pat dry  - IF LAST SOAK OF DAY USE FORCEPS TO GENTLY PULL OUT ~ 1 CM OF PACKING FROM WOUND (DO NOT REMOVE ALL PACKING)- Redress with 4x4 gauze   - Wrap zara with margie      SECONDARY DISCHARGE DIAGNOSES  Diagnosis: Stage 3 chronic kidney disease  Assessment and Plan of Treatment: Please hold your Lisinopril and Torsemide until you see Dr. Dueñas next Tuesday.

## 2024-05-01 NOTE — PROGRESS NOTE ADULT - TIME BILLING
agree with above  iv abx per med  cv stable  cont current mgmt
Review of tests, imaging, labs, documents, medical management, coordination of care and counseling.
Review of tests, imaging, labs, documents, medical management, coordination of care and counseling.
reviewing documentation/labs/imaging, interviewing and examining patient, documentation, coordinating care with ACP/CM/Specialists

## 2024-05-01 NOTE — PROGRESS NOTE ADULT - SUBJECTIVE AND OBJECTIVE BOX
Patient is a 69y old  Male who presents with a chief complaint of right middle finger swelling, pain/tenderness, redness, warmth (01 May 2024 09:51)    Being followed by ID for        Interval history:  No other acute events      ROS:  No cough,SOB,CP  No N/V/D  No abd pain  No urinary complaints  No HA  No joint or limb pain  No other complaints    PAST MEDICAL & SURGICAL HISTORY:  Diabetes      HTN (hypertension)      HLD (hyperlipidemia)      CAD (coronary artery disease)      Asthma-COPD overlap syndrome      Stage 3 chronic kidney disease      TAIWO on CPAP      GERD (gastroesophageal reflux disease)      LPRD (laryngopharyngeal reflux disease)      Bronchiectasis      Gout      History of appendectomy  x 30 yrs ago      S/P CABG (coronary artery bypass graft)        Allergies    Blueberries (Rash)  No Known Drug Allergies    Intolerances      Antimicrobials:      MEDICATIONS  (STANDING):  allopurinol 300 milliGRAM(s) Oral daily  aspirin enteric coated 81 milliGRAM(s) Oral daily  atorvastatin 40 milliGRAM(s) Oral at bedtime  budesonide 160 MICROgram(s)/formoterol 4.5 MICROgram(s) Inhaler 2 Puff(s) Inhalation two times a day  chlorhexidine 2% Cloths 1 Application(s) Topical daily  dextrose 10% Bolus 125 milliLiter(s) IV Bolus once  dextrose 5%. 1000 milliLiter(s) (50 mL/Hr) IV Continuous <Continuous>  dextrose 5%. 1000 milliLiter(s) (100 mL/Hr) IV Continuous <Continuous>  dextrose 50% Injectable 25 Gram(s) IV Push once  dextrose 50% Injectable 12.5 Gram(s) IV Push once  glucagon  Injectable 1 milliGRAM(s) IntraMuscular once  insulin glargine Injectable (LANTUS) 18 Unit(s) SubCutaneous at bedtime  insulin lispro (ADMELOG) corrective regimen sliding scale   SubCutaneous three times a day before meals  insulin lispro (ADMELOG) corrective regimen sliding scale   SubCutaneous at bedtime  insulin lispro Injectable (ADMELOG) 10 Unit(s) SubCutaneous before dinner  insulin lispro Injectable (ADMELOG) 10 Unit(s) SubCutaneous before lunch  insulin lispro Injectable (ADMELOG) 12 Unit(s) SubCutaneous before breakfast  lidocaine   4% Patch 1 Patch Transdermal daily  metoprolol tartrate 25 milliGRAM(s) Oral two times a day  montelukast 10 milliGRAM(s) Oral at bedtime  mupirocin 2% Nasal 1 Application(s) Both Nostrils two times a day  naloxone Injectable 0.4 milliGRAM(s) IV Push once  pantoprazole    Tablet 40 milliGRAM(s) Oral before breakfast  polyethylene glycol 3350 17 Gram(s) Oral daily  senna 2 Tablet(s) Oral at bedtime  sodium bicarbonate 650 milliGRAM(s) Oral two times a day  tiotropium 2.5 MICROgram(s) Inhaler 2 Puff(s) Inhalation daily      Vital Signs Last 24 Hrs  T(C): 36.5 (05-01-24 @ 04:54), Max: 36.7 (04-30-24 @ 17:30)  T(F): 97.7 (05-01-24 @ 04:54), Max: 98 (04-30-24 @ 17:30)  HR: 59 (05-01-24 @ 04:54) (56 - 65)  BP: 155/72 (05-01-24 @ 04:54) (131/70 - 155/72)  BP(mean): --  RR: 18 (05-01-24 @ 04:54) (18 - 18)  SpO2: 97% (05-01-24 @ 04:54) (96% - 99%)    Physical Exam:    Constitutional well preserved,comfortable,pleasant    HEENT PERRLA EOMI,No pallor or icterus    No oral exudate or erythema    Neck supple no JVD or LN    Chest Good AE,CTA    CVS RRR S1 S2 WNl No murmur or rub or gallop    Abd soft BS normal No tenderness no masses    Ext No cyanosis clubbing or edema    IV site no erythema tenderness or discharge    Joints no swelling or LOM    CNS AAO X 3 no focal    Lab Data:                          12.1   5.80  )-----------( 122      ( 01 May 2024 07:03 )             35.5       05-01    139  |  101  |  68<H>  ----------------------------<  95  3.9   |  25  |  3.40<H>    Ca    9.3      01 May 2024 07:04  Phos  3.9     05-01  Mg     2.1     05-01        Urinalysis Basic - ( 01 May 2024 07:04 )    Color: x / Appearance: x / SG: x / pH: x  Gluc: 95 mg/dL / Ketone: x  / Bili: x / Urobili: x   Blood: x / Protein: x / Nitrite: x   Leuk Esterase: x / RBC: x / WBC x   Sq Epi: x / Non Sq Epi: x / Bacteria: x        .Abscess Right Middle Finger  04-28-24   Few Methicillin Resistant Staphylococcus aureus  --  Methicillin resistant Staphylococcus aureus                Vancomycin Level, Random: 10.7 ug/mL (05-01-24 @ 07:03)  Vancomycin Level, Random: 4.3 ug/mL (04-30-24 @ 07:08)      WBC Count: 5.80 (05-01-24 @ 07:03)  WBC Count: 6.68 (04-29-24 @ 07:00)  WBC Count: 5.40 (04-28-24 @ 07:45)  WBC Count: 6.95 (04-27-24 @ 19:28)       Bilirubin Total: 0.4 mg/dL (04-29-24 @ 07:02)  Aspartate Aminotransferase (AST/SGOT): 14 U/L (04-29-24 @ 07:02)  Alanine Aminotransferase (ALT/SGPT): 9 U/L (04-29-24 @ 07:02)  Alkaline Phosphatase: 74 U/L (04-29-24 @ 07:02)  Bilirubin Total: 0.4 mg/dL (04-28-24 @ 07:45)  Aspartate Aminotransferase (AST/SGOT): 14 U/L (04-28-24 @ 07:45)  Alanine Aminotransferase (ALT/SGPT): 11 U/L (04-28-24 @ 07:45)  Alkaline Phosphatase: 76 U/L (04-28-24 @ 07:45)  Bilirubin Total: 0.3 mg/dL (04-27-24 @ 19:28)  Aspartate Aminotransferase (AST/SGOT): 15 U/L (04-27-24 @ 19:28)  Alanine Aminotransferase (ALT/SGPT): 9 U/L (04-27-24 @ 19:28)  Alkaline Phosphatase: 87 U/L (04-27-24 @ 19:28)         Patient is a 69y old  Male who presents with a chief complaint of right middle finger swelling, pain/tenderness, redness, warmth (01 May 2024 09:51)    Being followed by ID for        Interval history:  pt anxious for discharge  finger slowly improving  cultures with MRSA   No other acute events        PAST MEDICAL & SURGICAL HISTORY:  Diabetes      HTN (hypertension)      HLD (hyperlipidemia)      CAD (coronary artery disease)      Asthma-COPD overlap syndrome      Stage 3 chronic kidney disease      TAIWO on CPAP      GERD (gastroesophageal reflux disease)      LPRD (laryngopharyngeal reflux disease)      Bronchiectasis      Gout      History of appendectomy  x 30 yrs ago      S/P CABG (coronary artery bypass graft)        Allergies    Blueberries (Rash)  No Known Drug Allergies    Intolerances      Antimicrobials:      MEDICATIONS  (STANDING):  allopurinol 300 milliGRAM(s) Oral daily  aspirin enteric coated 81 milliGRAM(s) Oral daily  atorvastatin 40 milliGRAM(s) Oral at bedtime  budesonide 160 MICROgram(s)/formoterol 4.5 MICROgram(s) Inhaler 2 Puff(s) Inhalation two times a day  chlorhexidine 2% Cloths 1 Application(s) Topical daily  dextrose 10% Bolus 125 milliLiter(s) IV Bolus once  dextrose 5%. 1000 milliLiter(s) (50 mL/Hr) IV Continuous <Continuous>  dextrose 5%. 1000 milliLiter(s) (100 mL/Hr) IV Continuous <Continuous>  dextrose 50% Injectable 25 Gram(s) IV Push once  dextrose 50% Injectable 12.5 Gram(s) IV Push once  glucagon  Injectable 1 milliGRAM(s) IntraMuscular once  insulin glargine Injectable (LANTUS) 18 Unit(s) SubCutaneous at bedtime  insulin lispro (ADMELOG) corrective regimen sliding scale   SubCutaneous three times a day before meals  insulin lispro (ADMELOG) corrective regimen sliding scale   SubCutaneous at bedtime  insulin lispro Injectable (ADMELOG) 10 Unit(s) SubCutaneous before dinner  insulin lispro Injectable (ADMELOG) 10 Unit(s) SubCutaneous before lunch  insulin lispro Injectable (ADMELOG) 12 Unit(s) SubCutaneous before breakfast  lidocaine   4% Patch 1 Patch Transdermal daily  metoprolol tartrate 25 milliGRAM(s) Oral two times a day  montelukast 10 milliGRAM(s) Oral at bedtime  mupirocin 2% Nasal 1 Application(s) Both Nostrils two times a day  naloxone Injectable 0.4 milliGRAM(s) IV Push once  pantoprazole    Tablet 40 milliGRAM(s) Oral before breakfast  polyethylene glycol 3350 17 Gram(s) Oral daily  senna 2 Tablet(s) Oral at bedtime  sodium bicarbonate 650 milliGRAM(s) Oral two times a day  tiotropium 2.5 MICROgram(s) Inhaler 2 Puff(s) Inhalation daily      Vital Signs Last 24 Hrs  T(C): 36.5 (05-01-24 @ 04:54), Max: 36.7 (04-30-24 @ 17:30)  T(F): 97.7 (05-01-24 @ 04:54), Max: 98 (04-30-24 @ 17:30)  HR: 59 (05-01-24 @ 04:54) (56 - 65)  BP: 155/72 (05-01-24 @ 04:54) (131/70 - 155/72)  BP(mean): --  RR: 18 (05-01-24 @ 04:54) (18 - 18)  SpO2: 97% (05-01-24 @ 04:54) (96% - 99%)    Physical Exam:    Constitutional well preserved,comfortable,pleasant    HEENT PERRLA EOMI,No pallor or icterus    No oral exudate or erythema    Neck supple no JVD or LN    Chest Good AE,CTA    CVS  S1 S2    Abd soft BS normal No tenderness    Ext finger dressed     IV site no erythema tenderness or discharge    CNS AAO X 3 no focal    Lab Data:                          12.1   5.80  )-----------( 122      ( 01 May 2024 07:03 )             35.5       05-01    139  |  101  |  68<H>  ----------------------------<  95  3.9   |  25  |  3.40<H>    Ca    9.3      01 May 2024 07:04  Phos  3.9     05-01  Mg     2.1     05-01              .Abscess Right Middle Finger  04-28-24   Few Methicillin Resistant Staphylococcus aureus  --  Methicillin resistant Staphylococcus aureus    Culture - Abscess with Gram Stain (04.28.24 @ 14:40)    Gram Stain:   No polymorphonuclear cells seen per low power field  No organisms seen per oil power field   -  Clindamycin: S 0.5   -  Daptomycin: S 1   -  Erythromycin: R >4   -  Gentamicin: S <=1 Should not be used as monotherapy   -  Linezolid: S 4   -  Oxacillin: R >2   -  Penicillin: R >8   -  Rifampin: S <=1 Should not be used as monotherapy   -  Tetracycline: S <=1   -  Trimethoprim/Sulfamethoxazole: S <=0.5/9.5   -  Vancomycin: S 2   Specimen Source: .Abscess Right Middle Finger   Culture Results:   Few Methicillin Resistant Staphylococcus aureus   Organism Identification: Methicillin resistant Staphylococcus aureus   Organism: Methicillin resistant Staphylococcus aureus   Method Type: REINALDO                Vancomycin Level, Random: 10.7 ug/mL (05-01-24 @ 07:03)  Vancomycin Level, Random: 4.3 ug/mL (04-30-24 @ 07:08)      WBC Count: 5.80 (05-01-24 @ 07:03)  WBC Count: 6.68 (04-29-24 @ 07:00)  WBC Count: 5.40 (04-28-24 @ 07:45)  WBC Count: 6.95 (04-27-24 @ 19:28)       Bilirubin Total: 0.4 mg/dL (04-29-24 @ 07:02)  Aspartate Aminotransferase (AST/SGOT): 14 U/L (04-29-24 @ 07:02)  Alanine Aminotransferase (ALT/SGPT): 9 U/L (04-29-24 @ 07:02)  Alkaline Phosphatase: 74 U/L (04-29-24 @ 07:02)  Bilirubin Total: 0.4 mg/dL (04-28-24 @ 07:45)  Aspartate Aminotransferase (AST/SGOT): 14 U/L (04-28-24 @ 07:45)  Alanine Aminotransferase (ALT/SGPT): 11 U/L (04-28-24 @ 07:45)  Alkaline Phosphatase: 76 U/L (04-28-24 @ 07:45)  Bilirubin Total: 0.3 mg/dL (04-27-24 @ 19:28)  Aspartate Aminotransferase (AST/SGOT): 15 U/L (04-27-24 @ 19:28)  Alanine Aminotransferase (ALT/SGPT): 9 U/L (04-27-24 @ 19:28)  Alkaline Phosphatase: 87 U/L (04-27-24 @ 19:28)      < from: Xray Hand 2 Views, Right (04.27.24 @ 19:13) >    IMPRESSION:    No radiopaque foreign body.  No radiographic evidence of osteomyelitis or subcutaneous tracking gas.    --- End of Report ---    < end of copied text >    < from: CT Chest No Cont (08.19.22 @ 09:37) >  IMPRESSION:.    No evidence of tracheobronchomalacia.    Atelectasis/scarring in the lingula and bilateral lower lobes with   associated bronchiectasis.    --- End of Report ---    < end of copied text >

## 2024-05-01 NOTE — PROGRESS NOTE ADULT - PROBLEM SELECTOR PROBLEM 7
Asthma-COPD overlap syndrome

## 2024-05-01 NOTE — PROGRESS NOTE ADULT - PROBLEM SELECTOR PLAN 7
h/o rads/asthma/copd, bronchiectasis, savage,  trelegy => spiriva + symbicort  singulair  ventolin prn

## 2024-05-01 NOTE — PROGRESS NOTE ADULT - SUBJECTIVE AND OBJECTIVE BOX
[facility] NEPHROLOGY- PROGRESS NOTE    69y Male with history of HTN and DM presents with R middle finger infection. Nephrology consulted for elevated Scr.    REVIEW OF SYSTEMS:  Gen: no fevers  Cards: no chest pain  Resp: + dyspnea with exertion (chronic)  GI: no nausea or vomiting or diarrhea  Vascular: no LE edema    Blueberries (Rash)  No Known Drug Allergies      Hospital Medications: Medications reviewed      VITALS:  T(F): 97.5 (05-01-24 @ 12:15), Max: 98 (04-30-24 @ 17:30)  HR: 58 (05-01-24 @ 12:15)  BP: 164/79 (05-01-24 @ 12:15)  RR: 18 (05-01-24 @ 12:15)  SpO2: 98% (05-01-24 @ 12:15)  Wt(kg): --    04-30 @ 07:01  -  05-01 @ 07:00  --------------------------------------------------------  IN: 480 mL / OUT: 0 mL / NET: 480 mL    05-01 @ 07:01  -  05-01 @ 12:38  --------------------------------------------------------  IN: 240 mL / OUT: 0 mL / NET: 240 mL        PHYSICAL EXAM:    Gen: NAD, calm  Cards: RRR, +S1/S2, no M/G/R  Resp: CTA B/L  GI: soft, NT, + ab distention, NABS  Vascular: no LE edema B/L        LABS:  05-01    139  |  101  |  68<H>  ----------------------------<  95  3.9   |  25  |  3.40<H>    Ca    9.3      01 May 2024 07:04  Phos  3.9     05-01  Mg     2.1     05-01      Creatinine Trend: 3.40 <--, 3.31 <--, 3.18 <--, 2.84 <--, 2.84 <--                        12.1   5.80  )-----------( 122      ( 01 May 2024 07:03 )             35.5     Urine Studies:  Urinalysis Basic - ( 01 May 2024 07:04 )    Color:  / Appearance:  / SG:  / pH:   Gluc: 95 mg/dL / Ketone:   / Bili:  / Urobili:    Blood:  / Protein:  / Nitrite:    Leuk Esterase:  / RBC:  / WBC    Sq Epi:  / Non Sq Epi:  / Bacteria:       Sodium, Random Urine: 99 mmol/L (04-28 @ 08:51)  Creatinine, Random Urine: 19 mg/dL (04-28 @ 08:51)  Protein/Creatinine Ratio Calculation: 4.1 Ratio (04-28 @ 08:51)

## 2024-05-01 NOTE — DISCHARGE NOTE PROVIDER - CARE PROVIDER_API CALL
Justin Campoverde  Nephrology  [id number] [address] [place] [address]  Phone: [phone]  Fax: [phone]  Follow Up Time: 1 week    Arya Kumar  Orthopaedic Surgery  [address], [address]  [place] [address]  Phone: [phone]  Fax: [phone]  Follow Up Time: 1 week   Arya Kumar  Orthopaedic Surgery  [address], [address]  [place] [address]  Phone: [phone]  Fax: [phone]  Follow Up Time: 1 week    Tyler Dueñas  Nephrology  [address] [place] [address], [address] [address]  Phone: [phone]  Fax: [phone]  Follow Up Time: 1 week

## 2024-05-01 NOTE — DISCHARGE NOTE NURSING/CASE MANAGEMENT/SOCIAL WORK - NSDCPEFALRISK_GEN_ALL_CORE
For information on Fall & Injury Prevention, visit: [email or web address]  [email or web address] OR  https://www.cdc.gov/steadi/patient.html

## 2024-05-01 NOTE — DISCHARGE NOTE PROVIDER - NSDCFUSCHEDAPPT_GEN_ALL_CORE_FT
[facility]  OPHTHALM [address]  Scheduled Appointment: 05/22/2024     [facility]  ORTHOSURG [address] [facility]  Scheduled Appointment: 05/06/2024    Yael Lozano  [facility]  [address]M [address] [facility]  Scheduled Appointment: 05/22/2024

## 2024-05-01 NOTE — DISCHARGE NOTE PROVIDER - NSDCMRMEDTOKEN_GEN_ALL_CORE_FT
albuterol 90 mcg/inh inhalation aerosol: 2 puff(s) inhaled every 6 hours, As needed, Shortness of Breath and/or Wheezing  allopurinol 300 mg oral tablet: 1 tab(s) orally once a day  aspirin 81 mg oral delayed release tablet: 1 tab(s) orally once a day  atorvastatin 40 mg oral tablet: 1 tab(s) orally once a day (at bedtime)  azelastine 137 mcg/inh (0.1%) nasal spray: 2 spray(s) intranasally 2 times a day  cholecalciferol 1250 mcg (50,000 intl units) oral capsule: 1 cap(s) orally once a week  colchicine 0.6 mg oral capsule: 1 cap(s) orally once a day as needed for  gout pain  doxycycline hyclate 100 mg oral tablet: 1 tab(s) orally 2 times a day  Farxiga 10 mg oral tablet: 1 tab(s) orally once a day  HumaLOG KwikPen 100 units/mL injectable solution: 34 unit(s) subcutaneous 3 times a day before meals  Lantus Solostar Pen 100 units/mL subcutaneous solution: 40 unit(s) subcutaneous once a day (at bedtime)  loratadine 10 mg oral tablet: 1 tab(s) orally once a day, As Needed  metoprolol tartrate 25 mg oral tablet: 1 tab(s) orally 2 times a day  montelukast 10 mg oral tablet: 1 tab(s) orally once a day (at bedtime)  pantoprazole 40 mg oral delayed release tablet: 1 tab(s) orally once a day (before a meal)  sodium bicarbonate 650 mg oral tablet: 1 tab(s) orally 2 times a day  Trelegy Ellipta 100 mcg-62.5 mcg-25 mcg/inh inhalation powder: 1 puff(s) inhaled once a day  Veltassa 8.4 g oral powder for reconstitution: 8.4 gram(s) orally once a day

## 2024-05-01 NOTE — DISCHARGE NOTE PROVIDER - CARE PROVIDERS DIRECT ADDRESSES
,[email or web address]

## 2024-05-01 NOTE — PROGRESS NOTE ADULT - ASSESSMENT
A/P  70 yo 83kg m w pmh htn, hld, dm, cad s/p cabg, ckd, rads/asthma/copd, bronchiectasis, savage, gerd, lprd, gout, p/w right middle finger swelling, pain/tenderness, redness, warmth; symptoms have been ongoing .       #Cellulitis of right finger  -tx per med  -iv abx per med  -clinically improving   -xray shows no evidence of gas or underlying bone involvement    #CAD, c/p CABG  -stable, no chest pain  -asa, statin, bb    #Stage 3 chronic kidney disease.   -renal f/u    #HTN   -cont current meds    #Asthma/COPD  -cont tx, nebz per med      dvt ppx  
  A/P  70 yo 83kg m w pmh htn, hld, dm, cad s/p cabg, ckd, rads/asthma/copd, bronchiectasis, savage, gerd, lprd, gout, p/w right middle finger swelling, pain/tenderness, redness, warmth; symptoms have been ongoing .     #Cellulitis of right finger  -tx per med  -+ MRSA  -iv abx per med  -clinically improving   -xray shows no evidence of gas or underlying bone involvement    #CAD, c/p CABG  -stable, no chest pain  -asa, statin, bb    #Stage 3 chronic kidney disease.   -renal f/u    #HTN   -cont current meds    #Asthma/COPD  -cont tx, nebz per med      dvt ppx  
68yo M w pmh htn, hld, dm, cad s/p cabg, ckd, rads/asthma/copd, bronchiectasis, savage, gerd, lprd, gout, p/w right middle finger swelling, pain/tenderness, redness, warmth; symptoms have been ongoing . in er, found to have findings suggestive of cellulitis; admit to medicine for further mgmt
69y Male with history of HTN and DM presents with R middle finger infection. Nephrology consulted for elevated Scr.    1. STEPHANIE: likely hemodynamically mediated in setting of diuresis. D/C torsemide. Avoid nephrotoxins.    2. CKD-4: With nephrotic range proteinuria due to diabetic nephropathy for which patient follows with me as an outpatient. Baseline Scr 2.6-2.8. Can resume lisinopril 40 mg daily and farxiga 10 mg daily on discharge. Monitor electrolytes.    3. HTN with CKD: BP controlled. Continue with current medications.    4. LE edema: Patient appears euvolemic. D/C torsemide 20 mg daily (patient had been taking medication prn as an outpatient). Monitor electrolytes.    5. Metabolic acidosis: Continue with sodium bicarbonate 650 mg twice daily. Monitor serum CO2.    6. Hyperkalemia: Continue with lokelma 10 grams PO daily and low K diet. Patient can resume veltassa 8.4 grams PO daily upon discharge. Monitor serum potassium.      [facility] NEPHROLOGY  Justin Campoverde M.D.  Tyler Dueñas D.O.  Margy Tidwell M.D.  MD Tatiana Mccyo, MSN, ANP-C    Telephone: [phone]  Facsimile: [phone] [place] [address], [address]  [place] [address]  
69y Male with history of HTN and DM presents with R middle finger infection. Nephrology consulted for elevated Scr.    1. STEPHANIE: likely hemodynamically mediated in setting of diuresis. Scr increasing for which torsemide discontinued on 4/29. Expect improvement in renal function off of diuretics. Avoid nephrotoxins.    2. CKD-4: With nephrotic range proteinuria due to diabetic nephropathy for which patient follows with me as an outpatient. Baseline Scr 2.6-2.8. Can resume farxiga 10 mg daily on discharge and patient advised to restart lisinopril on 5/3. Monitor electrolytes.    3. HTN with CKD: BP controlled. Continue with current medications.    4. LE edema: Patient appears euvolemic. No need for torsemide at this time. Monitor electrolytes.    5. Metabolic acidosis: Continue with sodium bicarbonate 650 mg twice daily. Monitor serum CO2.    6. Hyperkalemia: Hold lokelma given low normal serum potassium off of ACE-I. Patient can resume veltassa 8.4 grams PO daily upon discharge. Monitor serum potassium.      [facility] NEPHROLOGY  Justin Campoverde M.D.  Tyler Dueñas D.O.  Margy Tidwell M.D.  MD Tatiana Mccoy, MSN, ANP-C    Telephone: [phone]  Facsimile: [phone] [place] [address], [address]  [place] [address]  
69y old Male with history of T2DM, CAD, Stage IV CKD, CAD s/p CABG, HTN, HLD, Asthma, TAIWO, GERD here with right finger cellulitis.     1. T2DM with hyperglycemia  - Continue Lantus 18 units at night  - Continue Admelog 12 units with breakfast and 10 units with lunch and dinner  - Continue moderate Admelog correctional scale before meals and bedtime  - Monitor FS before meals and bedtime  - Follow hospital hypoglycemic protocol    2. Stage IV CKD  - Hold Farxiga in the hospital  - nephrology following  - monitor for hypoglycemia    3. Finger cellulitis  - going home on oral regimen    Discharge recs  1. Resume home insulin regimen and Farxiga 10 mg daily  2. Follow up with new outpatient Endocrinologist     Renee Veloz MD  [facility] Division of Endocrinology    [address] [place] [address]    T [phone]  F [phone]  
69y old Male with history of T2DM, CAD, Stage IV CKD, CAD s/p CABG, HTN, HLD, Asthma, TAIWO, GERD here with right finger cellulitis.     1. T2DM with hyperglycemia  - improved numbers  - Continue Lantus 18 units at night  - Continue Admelog 12 units with breakfast and 10 units with lunch and dinner  - Continue moderate Admelog correctional scale before meals and bedtime  - Monitor FS before meals and bedtime  - Follow hospital hypoglycemic protocol    2. Stage IV CKD  - Hold Farxiga in the hospital  - nephrology following  - monitor for hypoglycemia    3. Finger cellulitis  - on Cefazolin and Vancomycin  - ID following    Will continue to follow.    Renee Veloz MD  [facility] Endocr[facility]    [address] [place] [address]    T [phone]  F [phone]  
69y Male with history of HTN and DM presents with R middle finger infection. Nephrology consulted for elevated Scr.    1. STEPHANIE: likely hemodynamically mediated in setting of diuresis. Scr increasing despite discontinuation of diuretics. Will therefore check UA, urine sodium, urine creatinine and bladder scan. Avoid nephrotoxins.    2. CKD-4: With nephrotic range proteinuria due to diabetic nephropathy for which patient follows with me as an outpatient. Baseline Scr 2.6-2.8. Can resume farxiga 10 mg daily on discharge but given rising Scr, would hold lisinopril until patient follows up with me as an outpatient. Monitor electrolytes.    3. HTN with CKD: BP controlled. Continue with current medications.    4. LE edema: Patient appears euvolemic. No need for torsemide at this time. Monitor electrolytes.    5. Metabolic acidosis: Continue with sodium bicarbonate 650 mg twice daily. Monitor serum CO2.    6. Hyperkalemia: Holding lokelma given low normal serum potassium off of ACE-I. Patient can resume veltassa 8.4 grams PO daily upon discharge. Monitor serum potassium.      [facility] NEPHROLOGY  Justin Campoverde M.D.  Tyler Dueñas D.O.  Margy Tidwell M.D.  MD Tatiana Mccoy, MSN, ANP-C    Telephone: [phone]  Facsimile: [phone] [place] [address], [address]  [place] [address]  
69y old Male with history of T2DM, CAD, Stage IV CKD, CAD s/p CABG, HTN, HLD, Asthma, TAIWO, GERD here with right finger cellulitis.     1. T2DM with hyperglycemia  - improved numbers  - Increase Lantus to 18 units at night  - Increase Admelog to 12 units with breakfast and continue 10 units with lunch and dinner  - Continue moderate Admelog correctional scale before meals and bedtime  - Monitor FS before meals and bedtime  - Follow hospital hypoglycemic protocol    2. Stage IV CKD  - Hold Farxiga in the hospital  - nephrology following  - monitor for hypoglycemia    3. Finger cellulitis  - on Cefazolin  - ID consulted    Will continue to follow.    Renee Veloz MD  [facility] End[facility]    [address] [place] [address]    T [phone]  F [phone]  
A/P    70 yo 83kg m w pmh htn, hld, dm, cad s/p cabg, ckd, rads/asthma/copd, bronchiectasis, savage, gerd, lprd, gout, p/w right middle finger swelling, pain/tenderness, redness, warmth; symptoms have been ongoing .       #Cellulitis of right finger  -tx per med  -iv abx per med  -clinically improving   -xray shows no evidence of gas or underlying bone involvement    #CAD, c/p CABG  -stable, no chest pain  -asa, statin, bb    #Stage 3 chronic kidney disease.   -renal f/u    #HTN   -cont current meds    #Asthma/COPD  -cont tx, nebz per med      dvt ppx  
70yo M PMHx htn, hld, dm, cad s/p cabg, ckd, asthma/copd/LRPD, bronchiectasis, gout, p/w right middle finger swelling, pain/tenderness, redness, warmth x2 weeks duration. No associated trauma, recent injections, or recallable exacerbating factor. He also noticed a givens at the side of the finger during this time as well. He initially self-trialed colchiniate with improvement. He went to his PCP who started doxycycline 4/25, after 3 doses he went back to his PCP on 4/27 and was sent to the ED as there had been minimal improvement. No fever/chills at home, no Hx finger cellulitis in the past.    Afebrile, without leukocytosis  ESR elevated 61 but CRP wnl  Xray hand: No radiopaque foreign body.No radiographic evidence of osteomyelitis or subcutaneous tracking gas    S/p I+D with ortho on 4/28 - septae were broken up, no pus was expressed, wound left open and packed  Abscess cultures without WBC or organisms    MRSA PCR+ and hx of MRSA bacteremia from infected sternal wound (2020) in setting of complication from CABG.    Abx:  Vancomycin 4/27 x1  Unasyn 4/27 x1  Cefazolin 4/28 -->    #Finger abscess with cellulitis  #Hx MRSA bacteremia with MRSA PCR+    Recommendations:  - continue cefazolin  - would add vancomycin by level as cannot r/o MRSA infection - please check level with AM labs  - f/u abscess cultures  - local wound care as per ortho  - growing SA  , will streamline abs once with sensitivities     Dominique Goodwin M.D. ,   please reach via teams   If no answer, or after 5PM/ weekends,  then please call  [phone]    Assessment and plan discussed with the primary team .  
70yo M PMHx htn, hld, dm, cad s/p cabg, ckd, asthma/copd/LRPD, bronchiectasis, gout, p/w right middle finger swelling, pain/tenderness, redness, warmth x2 weeks duration. No associated trauma, recent injections, or recallable exacerbating factor. He also noticed a givens at the side of the finger during this time as well. He initially self-trialed colchiniate with improvement. He went to his PCP who started doxycycline 4/25, after 3 doses he went back to his PCP on 4/27 and was sent to the ED as there had been minimal improvement. No fever/chills at home, no Hx finger cellulitis in the past.    Afebrile, without leukocytosis  ESR elevated 61 but CRP wnl  Xray hand: No radiopaque foreign body.No radiographic evidence of osteomyelitis or subcutaneous tracking gas    S/p I+D with ortho on 4/28 - septae were broken up, no pus was expressed, wound left open and packed  Abscess cultures without WBC or organisms    MRSA PCR+ and hx of MRSA bacteremia from infected sternal wound (2020) in setting of complication from CABG.    Abx:  Vancomycin 4/27 x1  Unasyn 4/27 x1  Cefazolin 4/28 -->    #Finger abscess with cellulitis  #Hx MRSA bacteremia with MRSA PCR+    Recommendations:  - growing MRSA  - sensitive to bactrim, but would be hesitant to use with the elevated creatinine, zyvox REINALDO is 4  hesitant to use clinda with risk of C diff, agree with doxy or minocycline  - renal to follow closely as an outpt for elevated creatinine   - will need follow up with hand surgery/ortho/plastics for hand     Dominique Goodwin M.D. ,   please reach via teams   If no answer, or after 5PM/ weekends,  then please call  [phone]    Assessment and plan discussed with Dr Padgett  
68yo M w pmh htn, hld, dm, cad s/p cabg, ckd, rads/asthma/copd, bronchiectasis, savage, gerd, lprd, gout, p/w right middle finger swelling, pain/tenderness, redness, warmth; symptoms have been ongoing . in er, found to have findings suggestive of cellulitis; admit to medicine for further mgmt
68yo M w pmh htn, hld, dm, cad s/p cabg, ckd, rads/asthma/copd, bronchiectasis, savage, gerd, lprd, gout, p/w right middle finger swelling, pain/tenderness, redness, warmth; symptoms have been ongoing . in er, found to have findings suggestive of cellulitis; admit to medicine for further mgmt
70yo M w pmh htn, hld, dm, cad s/p cabg, ckd, rads/asthma/copd, bronchiectasis, savage, gerd, lprd, gout, p/w right middle finger swelling, pain/tenderness, redness, warmth; symptoms have been ongoing . in er, found to have findings suggestive of cellulitis; admit to medicine for further mgmt

## 2024-05-01 NOTE — PROGRESS NOTE ADULT - SUBJECTIVE AND OBJECTIVE BOX
[facility] Division of Hospital Medicine  Rei Padgett DO  Reachable on [employer] Teams    Patient is a 69y old  Male who presents with a chief complaint of right middle finger swelling, pain/tenderness, redness, warmth (01 May 2024 14:14)    SUBJECTIVE / OVERNIGHT EVENTS: No acute events overnight. Patient seen and examined at bedside this morning, hand pain improving.    REVIEW OF SYSTEMS:    CONSTITUTIONAL: No weakness, fevers or chills  EYES/ENT: No visual changes;  No vertigo or throat pain   NECK: No pain or stiffness  RESPIRATORY: No cough, wheezing, hemoptysis; No shortness of breath  CARDIOVASCULAR: No chest pain or palpitations  GASTROINTESTINAL: No abdominal or epigastric pain. No nausea, vomiting, or hematemesis; No diarrhea or constipation. No melena or hematochezia.  GENITOURINARY: No dysuria, frequency or hematuria  NEUROLOGICAL: No numbness or weakness  SKIN: No itching, burning, rashes, or lesions  MSK: Endorses R hand pain  HEME: No easy bleeding, no easy bruising  All other review of systems is negative unless indicated above.    MEDICATIONS  (STANDING):  allopurinol 300 milliGRAM(s) Oral daily  aspirin enteric coated 81 milliGRAM(s) Oral daily  atorvastatin 40 milliGRAM(s) Oral at bedtime  budesonide 160 MICROgram(s)/formoterol 4.5 MICROgram(s) Inhaler 2 Puff(s) Inhalation two times a day  chlorhexidine 2% Cloths 1 Application(s) Topical daily  dextrose 10% Bolus 125 milliLiter(s) IV Bolus once  dextrose 5%. 1000 milliLiter(s) (50 mL/Hr) IV Continuous <Continuous>  dextrose 5%. 1000 milliLiter(s) (100 mL/Hr) IV Continuous <Continuous>  dextrose 50% Injectable 25 Gram(s) IV Push once  dextrose 50% Injectable 12.5 Gram(s) IV Push once  glucagon  Injectable 1 milliGRAM(s) IntraMuscular once  insulin glargine Injectable (LANTUS) 18 Unit(s) SubCutaneous at bedtime  insulin lispro (ADMELOG) corrective regimen sliding scale   SubCutaneous at bedtime  insulin lispro (ADMELOG) corrective regimen sliding scale   SubCutaneous three times a day before meals  insulin lispro Injectable (ADMELOG) 10 Unit(s) SubCutaneous before lunch  insulin lispro Injectable (ADMELOG) 12 Unit(s) SubCutaneous before breakfast  insulin lispro Injectable (ADMELOG) 10 Unit(s) SubCutaneous before dinner  lidocaine   4% Patch 1 Patch Transdermal daily  metoprolol tartrate 25 milliGRAM(s) Oral two times a day  montelukast 10 milliGRAM(s) Oral at bedtime  mupirocin 2% Nasal 1 Application(s) Both Nostrils two times a day  naloxone Injectable 0.4 milliGRAM(s) IV Push once  pantoprazole    Tablet 40 milliGRAM(s) Oral before breakfast  polyethylene glycol 3350 17 Gram(s) Oral daily  senna 2 Tablet(s) Oral at bedtime  sodium bicarbonate 650 milliGRAM(s) Oral two times a day  tiotropium 2.5 MICROgram(s) Inhaler 2 Puff(s) Inhalation daily    MEDICATIONS  (PRN):  acetaminophen     Tablet .. 650 milliGRAM(s) Oral every 6 hours PRN Temp greater or equal to 38C (100.4F), Mild Pain (1 - 3)  albuterol    90 MICROgram(s) HFA Inhaler 2 Puff(s) Inhalation every 6 hours PRN Shortness of Breath and/or Wheezing  aluminum hydroxide/magnesium hydroxide/simethicone Suspension 30 milliLiter(s) Oral every 4 hours PRN Dyspepsia  bisacodyl 5 milliGRAM(s) Oral daily PRN Constipation  dextrose Oral Gel 15 Gram(s) Oral once PRN Blood Glucose LESS THAN 70 milliGRAM(s)/deciliter  melatonin 3 milliGRAM(s) Oral at bedtime PRN Insomnia  ondansetron Injectable 4 milliGRAM(s) IV Push every 8 hours PRN Nausea and/or Vomiting  oxyCODONE    IR 2.5 milliGRAM(s) Oral every 4 hours PRN Moderate Pain (4 - 6)  oxyCODONE    IR 5 milliGRAM(s) Oral every 4 hours PRN Severe Pain (7 - 10)      CAPILLARY BLOOD GLUCOSE      POCT Blood Glucose.: 165 mg/dL (01 May 2024 12:31)  POCT Blood Glucose.: 126 mg/dL (01 May 2024 09:07)  POCT Blood Glucose.: 100 mg/dL (30 Apr 2024 23:13)  POCT Blood Glucose.: 131 mg/dL (30 Apr 2024 21:52)  POCT Blood Glucose.: 125 mg/dL (30 Apr 2024 17:34)    I&O's Summary    30 Apr 2024 07:01  -  01 May 2024 07:00  --------------------------------------------------------  IN: 480 mL / OUT: 0 mL / NET: 480 mL    01 May 2024 07:01  -  01 May 2024 16:05  --------------------------------------------------------  IN: 600 mL / OUT: 0 mL / NET: 600 mL        PHYSICAL EXAM:  Vital Signs Last 24 Hrs  T(C): 36.9 (01 May 2024 13:11), Max: 36.9 (01 May 2024 13:11)  T(F): 98.4 (01 May 2024 13:11), Max: 98.4 (01 May 2024 13:11)  HR: 90 (01 May 2024 13:11) (58 - 90)  BP: 133/55 (01 May 2024 13:11) (131/70 - 164/79)  BP(mean): --  RR: 18 (01 May 2024 13:11) (18 - 18)  SpO2: 90% (01 May 2024 13:11) (90% - 99%)    Parameters below as of 01 May 2024 13:11  Patient On (Oxygen Delivery Method): room air    CONSTITUTIONAL: NAD, well-developed, well-groomed  RESPIRATORY: Normal respiratory effort; lungs are clear to auscultation bilaterally  CARDIOVASCULAR: Regular rate and rhythm, normal S1 and S2, no murmur/rub/gallop  ABDOMEN: Nontender to palpation, soft nondistended  MUSCULOSKELETAL: R hand wrapped in gauze  PSYCH: A+O to person, place, and time; affect appropriate    LABS:                        12.1   5.80  )-----------( 122      ( 01 May 2024 07:03 )             35.5     05-01    139  |  101  |  68<H>  ----------------------------<  95  3.9   |  25  |  3.40<H>    Ca    9.3      01 May 2024 07:04  Phos  3.9     05-01  Mg     2.1     05-01            Urinalysis Basic - ( 01 May 2024 07:04 )    Color: x / Appearance: x / SG: x / pH: x  Gluc: 95 mg/dL / Ketone: x  / Bili: x / Urobili: x   Blood: x / Protein: x / Nitrite: x   Leuk Esterase: x / RBC: x / WBC x   Sq Epi: x / Non Sq Epi: x / Bacteria: x

## 2024-05-01 NOTE — PROGRESS NOTE ADULT - PROBLEM SELECTOR PLAN 2
cont home asa, statin, bb

## 2024-05-01 NOTE — PROGRESS NOTE ADULT - PROBLEM SELECTOR PLAN 3
baseline cr appears to have been fluctuating between 2.5-2.9; currently @ ~2.8  Monitor UO, BUN/Cr, volume status, acid-base balance, electrolytes;  cont home veltassa, bicarb, zyloprim, cholecalciferol  avoid nephrotoxic agents; appropriate dose adjustments for all renally cleared medications
baseline cr appears to have been fluctuating between 2.5-2.9; currently over 3  Monitor UO, BUN/Cr, volume status, acid-base balance, electrolytes;  avoid nephrotoxic agents; appropriate dose adjustments for all renally cleared medications
baseline cr appears to have been fluctuating between 2.5-2.9; currently @ ~2.8  Monitor UO, BUN/Cr, volume status, acid-base balance, electrolytes;  cont home veltassa, bicarb, zyloprim, cholecalciferol  avoid nephrotoxic agents; appropriate dose adjustments for all renally cleared medications
baseline cr appears to have been fluctuating between 2.5-2.9; currently over 3  Monitor UO, BUN/Cr, volume status, acid-base balance, electrolytes;  avoid nephrotoxic agents; appropriate dose adjustments for all renally cleared medications

## 2024-05-01 NOTE — PROGRESS NOTE ADULT - PROBLEM SELECTOR PROBLEM 1
Cellulitis of right finger

## 2024-05-02 ENCOUNTER — TRANSCRIPTION ENCOUNTER (OUTPATIENT)
Age: 69
End: 2024-05-02

## 2024-05-03 LAB
CULTURE RESULTS: ABNORMAL
CULTURE RESULTS: ABNORMAL
ORGANISM # SPEC MICROSCOPIC CNT: ABNORMAL
SPECIMEN SOURCE: SIGNIFICANT CHANGE UP
SPECIMEN SOURCE: SIGNIFICANT CHANGE UP

## 2024-05-06 ENCOUNTER — APPOINTMENT (OUTPATIENT)
Dept: ORTHOPEDIC SURGERY | Facility: CLINIC | Age: 69
End: 2024-05-06
Payer: MEDICARE

## 2024-05-06 PROCEDURE — 99203 OFFICE O/P NEW LOW 30 MIN: CPT

## 2024-05-08 PROBLEM — E78.5 HYPERLIPIDEMIA, UNSPECIFIED: Chronic | Status: ACTIVE | Noted: 2024-04-27

## 2024-05-08 PROBLEM — G47.33 OBSTRUCTIVE SLEEP APNEA (ADULT) (PEDIATRIC): Chronic | Status: ACTIVE | Noted: 2024-04-27

## 2024-05-08 PROBLEM — K21.9 GASTRO-ESOPHAGEAL REFLUX DISEASE WITHOUT ESOPHAGITIS: Chronic | Status: ACTIVE | Noted: 2024-04-27

## 2024-05-08 PROBLEM — J44.89 OTHER SPECIFIED CHRONIC OBSTRUCTIVE PULMONARY DISEASE: Chronic | Status: ACTIVE | Noted: 2024-04-27

## 2024-05-08 PROBLEM — N18.30 CHRONIC KIDNEY DISEASE, STAGE 3 UNSPECIFIED: Chronic | Status: ACTIVE | Noted: 2024-04-27

## 2024-05-08 PROBLEM — M10.9 GOUT, UNSPECIFIED: Chronic | Status: ACTIVE | Noted: 2024-04-27

## 2024-05-08 PROBLEM — J47.9 BRONCHIECTASIS, UNCOMPLICATED: Chronic | Status: ACTIVE | Noted: 2024-04-27

## 2024-05-08 PROBLEM — I25.10 ATHEROSCLEROTIC HEART DISEASE OF NATIVE CORONARY ARTERY WITHOUT ANGINA PECTORIS: Chronic | Status: ACTIVE | Noted: 2024-04-27

## 2024-05-20 ENCOUNTER — APPOINTMENT (OUTPATIENT)
Dept: ORTHOPEDIC SURGERY | Facility: CLINIC | Age: 69
End: 2024-05-20
Payer: MEDICARE

## 2024-05-20 VITALS
SYSTOLIC BLOOD PRESSURE: 143 MMHG | DIASTOLIC BLOOD PRESSURE: 67 MMHG | OXYGEN SATURATION: 96 % | HEART RATE: 74 BPM | BODY MASS INDEX: 27.74 KG/M2 | WEIGHT: 183 LBS | HEIGHT: 68 IN

## 2024-05-20 PROCEDURE — 99213 OFFICE O/P EST LOW 20 MIN: CPT | Mod: 25

## 2024-05-22 ENCOUNTER — APPOINTMENT (OUTPATIENT)
Dept: OPHTHALMOLOGY | Facility: CLINIC | Age: 69
End: 2024-05-22
Payer: MEDICARE

## 2024-05-22 ENCOUNTER — NON-APPOINTMENT (OUTPATIENT)
Age: 69
End: 2024-05-22

## 2024-05-22 PROCEDURE — 92134 CPTRZ OPH DX IMG PST SGM RTA: CPT

## 2024-05-22 PROCEDURE — 92012 INTRM OPH EXAM EST PATIENT: CPT | Mod: 25

## 2024-06-03 ENCOUNTER — APPOINTMENT (OUTPATIENT)
Dept: ORTHOPEDIC SURGERY | Facility: CLINIC | Age: 69
End: 2024-06-03
Payer: MEDICARE

## 2024-06-03 DIAGNOSIS — L02.511 CUTANEOUS ABSCESS OF RIGHT HAND: ICD-10-CM

## 2024-06-03 PROCEDURE — 99212 OFFICE O/P EST SF 10 MIN: CPT

## 2024-07-02 NOTE — BRIEF OPERATIVE NOTE - CONDITION POST OP
Please schedule the abdominal ultrasound  I have ordered blood work for you so we can repeat your liver tests  Stop all supplements, but okay to take magnesium and vitamin D    If your symptoms worsen in any way before your appointment then please go to the ER   
intubated
Critical/intubated

## 2024-07-09 NOTE — PHYSICAL THERAPY INITIAL EVALUATION ADULT - DIAGNOSIS, PT EVAL
independent
Decreased functional mobility due to decreased strength and decreased endurance
normal for race
no

## 2024-07-24 ENCOUNTER — NON-APPOINTMENT (OUTPATIENT)
Age: 69
End: 2024-07-24

## 2024-07-24 ENCOUNTER — APPOINTMENT (OUTPATIENT)
Dept: OPHTHALMOLOGY | Facility: CLINIC | Age: 69
End: 2024-07-24
Payer: MEDICARE

## 2024-07-24 PROCEDURE — 92014 COMPRE OPH EXAM EST PT 1/>: CPT | Mod: 25

## 2024-07-24 PROCEDURE — 92201 OPSCPY EXTND RTA DRAW UNI/BI: CPT

## 2024-07-24 PROCEDURE — 92134 CPTRZ OPH DX IMG PST SGM RTA: CPT

## 2024-08-26 ENCOUNTER — APPOINTMENT (OUTPATIENT)
Dept: PULMONOLOGY | Facility: CLINIC | Age: 69
End: 2024-08-26
Payer: MEDICARE

## 2024-08-26 VITALS
TEMPERATURE: 97.3 F | WEIGHT: 180 LBS | HEIGHT: 68 IN | DIASTOLIC BLOOD PRESSURE: 80 MMHG | RESPIRATION RATE: 16 BRPM | HEART RATE: 62 BPM | BODY MASS INDEX: 27.28 KG/M2 | SYSTOLIC BLOOD PRESSURE: 116 MMHG | OXYGEN SATURATION: 98 %

## 2024-08-26 DIAGNOSIS — J47.9 BRONCHIECTASIS, UNCOMPLICATED: ICD-10-CM

## 2024-08-26 DIAGNOSIS — R93.89 ABNORMAL FINDINGS ON DIAGNOSTIC IMAGING OF OTHER SPECIFIED BODY STRUCTURES: ICD-10-CM

## 2024-08-26 DIAGNOSIS — J45.909 UNSPECIFIED ASTHMA, UNCOMPLICATED: ICD-10-CM

## 2024-08-26 DIAGNOSIS — G47.33 OBSTRUCTIVE SLEEP APNEA (ADULT) (PEDIATRIC): ICD-10-CM

## 2024-08-26 DIAGNOSIS — R06.02 SHORTNESS OF BREATH: ICD-10-CM

## 2024-08-26 DIAGNOSIS — K21.9 GASTRO-ESOPHAGEAL REFLUX DISEASE W/OUT ESOPHAGITIS: ICD-10-CM

## 2024-08-26 DIAGNOSIS — D72.19 OTHER EOSINOPHILIA: ICD-10-CM

## 2024-08-26 PROCEDURE — 99214 OFFICE O/P EST MOD 30 MIN: CPT | Mod: 25

## 2024-08-26 PROCEDURE — 94729 DIFFUSING CAPACITY: CPT

## 2024-08-26 PROCEDURE — 95012 NITRIC OXIDE EXP GAS DETER: CPT

## 2024-08-26 PROCEDURE — 94010 BREATHING CAPACITY TEST: CPT

## 2024-08-26 PROCEDURE — 94727 GAS DIL/WSHOT DETER LNG VOL: CPT

## 2024-08-26 PROCEDURE — 94618 PULMONARY STRESS TESTING: CPT

## 2024-08-26 NOTE — HISTORY OF PRESENT ILLNESS
[TextBox_4] : Mr. FRANKLIN PRESLEY is a 69 year old male coming into the office for a follow-up pulmonary evaluation. His chief complaint is   -he notes SOB with exertion, not when he's at rest or lying supine. He was breathing better in the hospital.  -he notes SOB with stairs. Prior, he used to climb stairs with joint/muscle aches without SOB  -he notes he's been feeling lightheaded since 2 weeks ago -he notes he's gained 20 lbs in total at this point. Prior, he'd gained 40 lbs and then lost 20 to get to his current weight -he notes he uses Trelegy, but not regularly. He uses it for around 1 week at a time, and stops when he starts feeling better -he notes having a nonproductive cough, which he thinks has caused his subxiphoid hernia -he notes rhinitis every morning -he notes his most recent bloodwork was 2 months ago -he notes constipation. He has a pending colonoscopy 9/13/2024 -he notes he's using his CPAP every night and tolerating it well  -he denies any headaches, nausea, emesis, fever, chills, sweats, chest pain, chest pressure, wheezing, palpitations, diarrhea, dysphagia, myalgias, leg swelling, itchy eyes, itchy ears, heartburn, reflux, or sour taste in the mouth.

## 2024-08-26 NOTE — ASSESSMENT
[FreeTextEntry1] : Mr. FRANKLIN PRESLEY is a 69 year old male who has a history of DM, bronchiectasis, Coronary artery disease, pleural thickening, severe OSAS, s/p median sternotomy complicated by cardiac arrest and fractured ribs- SOB and Cough -#1 issue is SOB- noncompliance with inhalers  The patient's SOB is felt to be multifactorial: -pulmonary  - Restrictive Dysfunction due to healing rib fractured and median sternotomy  - bronchiectasis - Poor breathing mechanics - Overweight/ Out-of Shape - Cardiac Disease - Dr. Steel -?Fe deficiency anemia  Problem 1: Restrictive Dysfunction- "Hernia" -Due to healing rib fracture and median sternotomy; "controlled cough"- weight loss -recommended The Breather and Power Breath Medic Plus IMT (30 breaths BID)  Problem 2: Tracheomalacia (-) -s/p Dynamic CT (8/2022) -continue Amitriptyline 10 mg Q8H -Tracheomalacia is usually acquired in adults and common causes include damage by tracheostomy or endotracheal intubation damaging the tracheal cartilage with increase risk with multiple intubations, prolonged intubation, and concurrent high dose steroid therapy; external chest wall trauma and surgery; chronic compression of the trachea by benign etiologies (eg, benign mediastinal goiter) or malignancy; relapsing polychondritis; or recurrent infection. Tracheomalacia can be asymptomatic, however signs or symptoms can develop as the severity of the airway narrowing progresses with major symptoms include dyspnea, cough, and sputum retention. Other symptoms include severe paroxysms of coughing, wheezing or stridor, barking cough and may be exacerbated by forced expiration, cough, and valsalva maneuver. Tracheomalacia is diagnosed by a bronchoscopic visualization of dynamic airway collapse on dynamic chest CT. Therapy is warranted in symptomatic patients with severe tracheomalacia and includes surgical repair as tracheobronchoplasty. The patient was referred to Dr. Bulmaro Wing or Dr. Omar Gordon, at [facility] for a surgical consult.  Problem 2A: Bronchiectasis - Seen on the CT of the chest or chest x-ray signifies damaged bronchial tubes focal or diffuse which can be sites of recurrent infections. These areas can be colonized by various organisms including bacteria (hemophilous influenzae/Pseudomonas species etc.) as well as acid fast bacilli (mycobacterial disease- inclusive of TB/NAJMA etc.). Sputum either for bacteria culture/sensitivity or AFB culture and sensitivity will need to be sent if the patient has sputum- 3 specimens on consecutive days will need to be dropped at the laboratory- if the patient can produce sputum.  Problem 3: Chronic Cough - improved DDx -bronchiectasis -RADS / Asthma -PND -reflux -dysphagia -Any cough greater than three weeks duration-differential diagnosis includes-asthma, upper airway cough syndrome, post nasal drip syndrome, gastroesophageal reflux, laryngopharyngeal reflux, cardiac disease (congestive heart failure, medicines, effects, etc), medication effects (b-blockers, ace inhibitors, ARBs, glaucoma meds, etc.), smoking, infectious, multifactorial, etc.  Problem 4: RADS / Asthma (NC) -Trelegy 100 1 puff QD (NC) -continue Ventolin 2 puffs Q6H, pre-exercise -Asthma is believed to be caused by inherited (genetic) and environmental factor, but its exact cause is unknown. Asthma may be triggered by allergens, lung infections, or irritants in the air. Asthma triggers are different for each person -Inhaler technique reviewed as well as oral hygiene techniques reviewed with patient. Avoidance of cold air, extremes of temperature, rescue inhaler should be used before exercise. Order of medication reviewed with patient. Recommended use of a cool mist humidifier in the bedroom.  Problem 4A:?Iron deficiency anemia -complete CBC, iron studies   Problem 5: PND -continue Olopatadine 0.6% 1 sniff BID - script given for blood work: asthma profile, food IgE panel, eosinophil level, IgE level, Vitamin D level (NC) -Environmental measures for allergies were encouraged including mattress and pillow cover, air purifier, and environmental controls.  Problem 6: GERD / Laryngomalacia -Recommended to see ENT Dr. Marcus -complete FEESST study -continue Protonix 40mg qAM before breakfast -continue Pepcid 40mg QHS - Things to avoid including overeating, spicy foods, tight clothing, eating within three hours of bed, this list is not all inclusive. - For treatment of reflux, possible options discussed including diet control, H2 blockers, PPIs, as well as coating motility agents discussed as treatment options. Timing of meals and proximity of last meal to sleep were discussed. If symptoms persist, a formal gastrointestinal evaluation is needed.  Problem 7: s/p Amiodarone (off) -PFTs/TFTs/LFTs; 2-3 times per year - Amiodarone/bleomycin/methotrexate and other drugs have been associated with pulmonary parenchymal damage. These drugs require pulmonary function testing including DLCO regularly and possible CT/CXR if respiratory complaints develop. PFTs should be performed at 6 month intervals.  Problem 7A: Cardiac - follow up with Dr. Cortes.  Problem 7B: Hernia - Plastic Surgeon evaluation pending (still)  Problem 8: Overweight Recommended "10-Day Detox" by Brian Saleem for 2-3 weeks followed by probiotics -recommended OTC Berberine Synergy for visceral fat loss  - Weight loss, exercise, and diet control were discussed and are highly encouraged. Treatment options were given such as, aqua therapy, and contacting a nutritionist. Recommended to use the elliptical, stationary bike, less use of treadmill. Mindful eating was explained to the patient Obesity is associated with worsening asthma, shortness of breath, and potential for cardiac disease, diabetes, and other underlying medical conditions.  Problem 9: Poor Breathing Mechanics -recommended Wibertha Hof and Buteyko breathing techniques - Proper breathing techniques were reviewed with an emphasis of exhalation. Patient instructed to breath in for 1 second and out for four seconds. Patient was encouraged to not talk while walking.  Problem 10: snoring / + OSAS -ResMed Air Fit N20 with a setting of 8 in place -s/p home sleep study(+) Sleep apnea is associated with adverse clinical consequences which can affect most organ systems. Cardiovascular disease risk includes arrhythmias, atrial fibrillation, hypertension, coronary artery disease, and stroke. Metabolic disorders include diabetes type 2, non-alcoholic fatty liver disease. Mood disorder especially depression; and cognitive decline especially in the elderly. Associations with chronic reflux/Laurent's esophagus some but not all inclusive. -Reasons include arousal consistent with hypopnea; respiratory events most prominent in REM sleep or supine position; therefore sleep staging and body position are important for accurate diagnosis and estimation of AHI.  Problem 10A: Muscle cramps - Myocalm PM  Problem 11: Health Maintenance/COVID19 Precautions: -s/p Pfizer COVID 19 vaccine x 3 - Clean your hands often. Wash your hands often with soap and water for at least 20 seconds, especially after blowing your nose, coughing, or sneezing, or having been in a public place. - If soap and water are not available, use a hand  that contains at least 60% alcohol. - To the extent possible, avoid touching high-touch surfaces in public places - elevator buttons, door handles, handrails, handshaking with people, etc. Use a tissue or your sleeve to cover your hand or finger if you must touch something. - Wash your hands after touching surfaces in public places. - Avoid touching your face, nose, eyes, etc. - Clean and disinfect your home to remove germs: practice routine cleaning of frequently touched surfaces (for example: tables, doorknobs, light switches, handles, desks, toilets, faucets, sinks & cell phones) - Avoid crowds, especially in poorly ventilated spaces. Your risk of exposure to respiratory viruses like COVID-19 may increase in crowded, closed-in settings with little air circulation if there are people in the crowd who are sick. All patients are recommended to practice social distancing and stay at least 6 feet away from others.  Immune Support Recommendations: -OTC Vitamin C 500mg BID -OTC Quercetin 250-500mg BID -OTC Zinc 75-100mg per day -OTC Melatonin 1 or 2 mg a night -OTC Vitamin D 1-4000mg per day -OTC Tonic Water 8oz per day  Problem 11: Health maintenance -recommended RSV vaccine in the Fall 2024 -recommended Topricin cream for the chest -s/p flu shot - 2023 -s/p shingles shot - 2023 -recommended strep pneumonia vaccines: Prevnar-13 vaccine (6/16/2020), followed by Pneumo vaccine 23 one year following (after the age of 65) -recommended early intervention for URIs -recommended regular osteoporosis evaluations -recommended early dermatological evaluations -recommended after the age of 50 to the age of 70, colonoscopy every 5 years  F/P in 3 months with full PFTs pt is encouraged to call or fax the office with any questions or concerns.

## 2024-08-26 NOTE — PROCEDURE
[FreeTextEntry1] : Full PFT reveals mild restrictive dysfunction; FEV1 was 2.10L which is 73% of predicted; mildly reduced lung volumes; moderately reduced diffusion at 11.39, which is 46% of predicted; normal flow volume loop. SAMANTHA test revealed normal MIP max of 79%; moderate-severely reduced MEP max of 37%  PFTs were performed to evaluate for SOB, asthma  6 minute walk test reveals a low saturation of 92% with very, very slight dyspnea or fatigue; walked 391.2 meters   FENO was 22; a normal value being less than 25 Fractional exhaled nitric oxide (FENO) is regarded as a simple, noninvasive method for assessing eosinophilic airway inflammation. Produced by a variety of cells within the lung, nitric oxide (NO) concentrations are generally low in healthy individuals. However, high concentrations of NO appear to be involved in nonspecific host defense mechanisms and chronic inflammatory diseases such as asthma. The American Thoracic Society (ATS) therefore has recommended using FENO to aid in the diagnosis and monitoring of eosinophilic airway inflammation and asthma, and for identifying steroid responsive individuals whose chronic respiratory symptoms may be caused by airway inflammation.

## 2024-08-26 NOTE — ADDENDUM
[FreeTextEntry1] : Documented by Sahara Manning acting as a scribe for Dr. Mike Hernandez on 08/26/2024. All medical record entries made by the Scribe were at my, Dr. Mike Hernandez's, direction and personally dictated by me on 08/26/2024. I have reviewed the chart and agree that the record accurately reflects my personal performance of the history, physical exam, assessment and plan. I have also personally directed, reviewed, and agree with the discharge instructions.

## 2024-08-26 NOTE — PHYSICAL EXAM
[No Acute Distress] : no acute distress [Normal Oropharynx] : normal oropharynx [III] : Mallampati Class: III [Normal Appearance] : normal appearance [No Neck Mass] : no neck mass [Normal Rate/Rhythm] : normal rate/rhythm [Normal S1, S2] : normal s1, s2 [No Resp Distress] : no resp distress [Clear to Auscultation Bilaterally] : clear to auscultation bilaterally [Benign] : benign [Normal Gait] : normal gait [No Clubbing] : no clubbing [No Cyanosis] : no cyanosis [No Edema] : no edema [FROM] : FROM [Normal Color/ Pigmentation] : normal color/ pigmentation [No Focal Deficits] : no focal deficits [Oriented x3] : oriented x3 [Normal Affect] : normal affect [TextBox_2] : OW [TextBox_54] : 2/6 systolic murmur  [TextBox_68] : I:E ratio 1:3; clear   [TextBox_80] : subxiphoid hernia [TextBox_105] : infected L middle finger

## 2024-09-25 ENCOUNTER — APPOINTMENT (OUTPATIENT)
Dept: OPHTHALMOLOGY | Facility: CLINIC | Age: 69
End: 2024-09-25
Payer: MEDICARE

## 2024-09-25 ENCOUNTER — NON-APPOINTMENT (OUTPATIENT)
Age: 69
End: 2024-09-25

## 2024-09-25 PROCEDURE — 92014 COMPRE OPH EXAM EST PT 1/>: CPT | Mod: 25

## 2024-09-25 PROCEDURE — 92250 FUNDUS PHOTOGRAPHY W/I&R: CPT

## 2024-10-29 NOTE — PROGRESS NOTE ADULT - PROBLEM SELECTOR PLAN 5
ID following Dr Carrasco  Continue on IV Dapto 500mg daily. will switch to Zyvox on Monday til 3/22  Silvadene to RLE wound  Wound care consult [As Noted in HPI] : as noted in HPI [Negative] : Heme/Lymph

## 2024-11-27 ENCOUNTER — APPOINTMENT (OUTPATIENT)
Dept: OPHTHALMOLOGY | Facility: CLINIC | Age: 69
End: 2024-11-27

## 2025-01-13 NOTE — PROGRESS NOTE ADULT - ASSESSMENT
- Infection w/u neg thus far  - Afebrile, stopping cefepime and started levaquin 12/31/24   Assessment  DMT2: 64y Male with DM T2 with hyperglycemia, A1C 9.2%, was on oral meds and insulin at home, now on basal bolus insulin, insulin held while patient was NPO, blood sugars within acceptable range despite this, no hypoglycemic episodes. Patient was NPO, s/p speech & swallow and MBS study, diet orders changed to dysphagia diet, will start eating meals.  Foot infection: On Tx, stable.  CAD: s/p CABG 2/3, on medications, no chest pain, stable, monitored.  HTN: Controlled,  on antihypertensive medications.  HLD: Controlled, on statin.  CKD: Monitor labs/BMP          Harper Matias MD  Cell: [phone]  Office: [phone] Assessment  DMT2: 64y Male with DM T2 with hyperglycemia, A1C 9.2%, was on oral meds and insulin at home, now on  basal bolus insulin, insulin held while patient was NPO, blood sugars within acceptable range despite this, no hypoglycemic episodes. Patient was NPO, s/p speech & swallow and MBS study, diet orders changed to dysphagia diet, will start eating  meals.  Foot infection: On Tx, stable.  CAD: s/p CABG 2/3, on medications, no chest pain, stable, monitored.  HTN: Controlled,  on antihypertensive medications.  HLD: Controlled, on statin.  CKD: Monitor labs/BMP          Harper Matias MD  Cell: [phone]  Office: [phone] No

## 2025-01-28 ENCOUNTER — APPOINTMENT (OUTPATIENT)
Dept: PULMONOLOGY | Facility: CLINIC | Age: 70
End: 2025-01-28

## 2025-02-26 ENCOUNTER — NON-APPOINTMENT (OUTPATIENT)
Age: 70
End: 2025-02-26

## 2025-02-26 ENCOUNTER — APPOINTMENT (OUTPATIENT)
Dept: OPHTHALMOLOGY | Facility: CLINIC | Age: 70
End: 2025-02-26
Payer: MEDICARE

## 2025-02-26 PROCEDURE — 92134 CPTRZ OPH DX IMG PST SGM RTA: CPT

## 2025-02-26 PROCEDURE — 92014 COMPRE OPH EXAM EST PT 1/>: CPT | Mod: 25

## 2025-02-26 PROCEDURE — 92201 OPSCPY EXTND RTA DRAW UNI/BI: CPT

## 2025-06-13 NOTE — PROVIDER CONTACT NOTE (CRITICAL VALUE NOTIFICATION) - NS PROVIDER READ BACK
Fever greater than (need to indicate Fahrenheit or Celsius) yes Bleeding that does not stop/Swelling that gets worse/Fever greater than (need to indicate Fahrenheit or Celsius)